# Patient Record
Sex: FEMALE | Race: BLACK OR AFRICAN AMERICAN | NOT HISPANIC OR LATINO | Employment: OTHER | RURAL
[De-identification: names, ages, dates, MRNs, and addresses within clinical notes are randomized per-mention and may not be internally consistent; named-entity substitution may affect disease eponyms.]

---

## 2015-10-27 LAB — CRC RECOMMENDATION EXT: NORMAL

## 2020-01-31 ENCOUNTER — HISTORICAL (OUTPATIENT)
Dept: ADMINISTRATIVE | Facility: HOSPITAL | Age: 60
End: 2020-01-31

## 2020-02-17 LAB
LAB AP CLINICAL INFORMATION: NORMAL
LAB AP DIAGNOSIS - HISTORICAL: NORMAL
LAB AP GROSS PATHOLOGY - HISTORICAL: NORMAL
LAB AP SPECIMEN SUBMITTED - HISTORICAL: NORMAL

## 2020-08-06 ENCOUNTER — HISTORICAL (OUTPATIENT)
Dept: ADMINISTRATIVE | Facility: HOSPITAL | Age: 60
End: 2020-08-06

## 2020-09-29 ENCOUNTER — HISTORICAL (OUTPATIENT)
Dept: ADMINISTRATIVE | Facility: HOSPITAL | Age: 60
End: 2020-09-29

## 2020-09-29 LAB
ANION GAP SERPL CALCULATED.3IONS-SCNC: 12 MMOL/L
APTT PPP: 27.6 SECONDS (ref 25.2–37.3)
BASOPHILS # BLD AUTO: 0.04 X10E3/UL (ref 0–0.2)
BASOPHILS NFR BLD AUTO: 0.5 % (ref 0–1)
BUN SERPL-MCNC: 13 MG/DL (ref 7–18)
CALCIUM SERPL-MCNC: 9.2 MG/DL (ref 8.5–10.1)
CHLORIDE SERPL-SCNC: 106 MMOL/L (ref 98–107)
CK MB SERPL-MCNC: <1 NG/ML (ref 1–3.6)
CK SERPL-CCNC: 88 U/L (ref 26–192)
CO2 SERPL-SCNC: 27 MMOL/L (ref 21–32)
CREAT SERPL-MCNC: 1.04 MG/DL (ref 0.55–1.02)
EOSINOPHIL # BLD AUTO: 0.4 X10E3/UL (ref 0–0.5)
EOSINOPHIL NFR BLD AUTO: 4.6 % (ref 1–4)
ERYTHROCYTE [DISTWIDTH] IN BLOOD BY AUTOMATED COUNT: 16.6 % (ref 11.5–14.5)
GLUCOSE SERPL-MCNC: 110 MG/DL (ref 70–105)
GLUCOSE SERPL-MCNC: 93 MG/DL (ref 74–106)
HCT VFR BLD AUTO: 42.5 % (ref 38–47)
HGB BLD-MCNC: 13.1 G/DL (ref 12–16)
IMM GRANULOCYTES # BLD AUTO: 0.06 X10E3/UL (ref 0–0.04)
IMM GRANULOCYTES NFR BLD: 0.7 % (ref 0–0.4)
INR BLD: 1.05 (ref 0–3.3)
LYMPHOCYTES # BLD AUTO: 1.99 X10E3/UL (ref 1–4.8)
LYMPHOCYTES NFR BLD AUTO: 23.1 % (ref 27–41)
MAGNESIUM SERPL-MCNC: 2.1 MG/DL (ref 1.7–2.3)
MCH RBC QN AUTO: 25.8 PG (ref 27–31)
MCHC RBC AUTO-ENTMCNC: 30.8 G/DL (ref 32–36)
MCV RBC AUTO: 83.7 FL (ref 80–96)
MONOCYTES # BLD AUTO: 0.47 X10E3/UL (ref 0–0.8)
MONOCYTES NFR BLD AUTO: 5.5 % (ref 2–6)
MPC BLD CALC-MCNC: 11.3 FL (ref 9.4–12.4)
MYOGLOBIN SERPL-MCNC: 46 NG/ML (ref 13–71)
NEUTROPHILS # BLD AUTO: 5.66 X10E3/UL (ref 1.8–7.7)
NEUTROPHILS NFR BLD AUTO: 65.6 % (ref 53–65)
NRBC # BLD AUTO: 0 X10E3/UL (ref 0–0)
NRBC, AUTO (.00): 0 /100 (ref 0–0)
NT-PROBNP SERPL-MCNC: 199 PG/ML (ref 1–125)
PLATELET # BLD AUTO: 264 X10E3/UL (ref 150–400)
POTASSIUM SERPL-SCNC: 3.7 MMOL/L (ref 3.5–5.1)
PROTHROMBIN TIME: 13.2 SECONDS (ref 11.7–14.7)
RBC # BLD AUTO: 5.08 X10E6/UL (ref 4.2–5.4)
SODIUM SERPL-SCNC: 141 MMOL/L (ref 136–145)
TROPONIN I SERPL-MCNC: <0.017 NG/ML (ref 0–0.06)
TSH SERPL DL<=0.005 MIU/L-ACNC: 2.27 UIU/ML (ref 0.36–3.74)
WBC # BLD AUTO: 8.62 X10E3/UL (ref 4.5–11)

## 2020-09-30 ENCOUNTER — HISTORICAL (OUTPATIENT)
Dept: ADMINISTRATIVE | Facility: HOSPITAL | Age: 60
End: 2020-09-30

## 2020-09-30 LAB
ANION GAP SERPL CALCULATED.3IONS-SCNC: 12 MMOL/L
BASOPHILS # BLD AUTO: 0.04 X10E3/UL (ref 0–0.2)
BASOPHILS NFR BLD AUTO: 0.4 % (ref 0–1)
BUN SERPL-MCNC: 11 MG/DL (ref 7–18)
CALCIUM SERPL-MCNC: 9.8 MG/DL (ref 8.5–10.1)
CHLORIDE SERPL-SCNC: 103 MMOL/L (ref 98–107)
CO2 SERPL-SCNC: 30 MMOL/L (ref 21–32)
CREAT SERPL-MCNC: 0.97 MG/DL (ref 0.55–1.02)
EOSINOPHIL # BLD AUTO: 0.38 X10E3/UL (ref 0–0.5)
EOSINOPHIL NFR BLD AUTO: 3.7 % (ref 1–4)
ERYTHROCYTE [DISTWIDTH] IN BLOOD BY AUTOMATED COUNT: 16.6 % (ref 11.5–14.5)
GLUCOSE SERPL-MCNC: 103 MG/DL (ref 70–105)
GLUCOSE SERPL-MCNC: 117 MG/DL (ref 70–105)
GLUCOSE SERPL-MCNC: 79 MG/DL (ref 70–105)
GLUCOSE SERPL-MCNC: 94 MG/DL (ref 74–106)
GLUCOSE SERPL-MCNC: 97 MG/DL (ref 70–105)
HCT VFR BLD AUTO: 46.4 % (ref 38–47)
HGB BLD-MCNC: 14.3 G/DL (ref 12–16)
IMM GRANULOCYTES # BLD AUTO: 0.09 X10E3/UL (ref 0–0.04)
IMM GRANULOCYTES NFR BLD: 0.9 % (ref 0–0.4)
LYMPHOCYTES # BLD AUTO: 2.64 X10E3/UL (ref 1–4.8)
LYMPHOCYTES NFR BLD AUTO: 26 % (ref 27–41)
MCH RBC QN AUTO: 26.1 PG (ref 27–31)
MCHC RBC AUTO-ENTMCNC: 30.8 G/DL (ref 32–36)
MCV RBC AUTO: 84.7 FL (ref 80–96)
MONOCYTES # BLD AUTO: 0.46 X10E3/UL (ref 0–0.8)
MONOCYTES NFR BLD AUTO: 4.5 % (ref 2–6)
MPC BLD CALC-MCNC: 11.2 FL (ref 9.4–12.4)
NEUTROPHILS # BLD AUTO: 6.56 X10E3/UL (ref 1.8–7.7)
NEUTROPHILS NFR BLD AUTO: 64.5 % (ref 53–65)
NRBC # BLD AUTO: 0 X10E3/UL (ref 0–0)
NRBC, AUTO (.00): 0 /100 (ref 0–0)
PLATELET # BLD AUTO: 284 X10E3/UL (ref 150–400)
POTASSIUM SERPL-SCNC: 3.6 MMOL/L (ref 3.5–5.1)
RBC # BLD AUTO: 5.48 X10E6/UL (ref 4.2–5.4)
SARS-COV-2 RNA AMPLIFICATION, QUAL: NEGATIVE
SODIUM SERPL-SCNC: 141 MMOL/L (ref 136–145)
TROPONIN I SERPL-MCNC: <0.017 NG/ML (ref 0–0.06)
WBC # BLD AUTO: 10.17 X10E3/UL (ref 4.5–11)

## 2020-10-01 ENCOUNTER — HISTORICAL (OUTPATIENT)
Dept: ADMINISTRATIVE | Facility: HOSPITAL | Age: 60
End: 2020-10-01

## 2020-10-01 LAB — GLUCOSE SERPL-MCNC: 101 MG/DL (ref 70–105)

## 2020-11-19 ENCOUNTER — HISTORICAL (OUTPATIENT)
Dept: ADMINISTRATIVE | Facility: HOSPITAL | Age: 60
End: 2020-11-19

## 2020-11-20 ENCOUNTER — HISTORICAL (OUTPATIENT)
Dept: ADMINISTRATIVE | Facility: HOSPITAL | Age: 60
End: 2020-11-20

## 2020-11-20 LAB
ABO: NORMAL
ALBUMIN SERPL BCP-MCNC: 3.1 G/DL (ref 3.5–5)
ALBUMIN SERPL BCP-MCNC: 3.3 G/DL (ref 3.5–5)
ALBUMIN/GLOB SERPL: 0.8 {RATIO}
ALBUMIN/GLOB SERPL: 0.8 {RATIO}
ALP SERPL-CCNC: 64 U/L (ref 50–130)
ALP SERPL-CCNC: 70 U/L (ref 50–130)
ALT SERPL W P-5'-P-CCNC: 16 U/L (ref 13–56)
ALT SERPL W P-5'-P-CCNC: 16 U/L (ref 13–56)
ANION GAP SERPL CALCULATED.3IONS-SCNC: 11 MMOL/L
ANION GAP SERPL CALCULATED.3IONS-SCNC: 13 MMOL/L
ANTIBODY IDENTIFICATION: NORMAL
ANTIBODY SCREEN: NORMAL
AST SERPL W P-5'-P-CCNC: 25 U/L (ref 15–37)
AST SERPL W P-5'-P-CCNC: 30 U/L (ref 15–37)
BASOPHILS # BLD AUTO: 0.02 X10E3/UL (ref 0–0.2)
BASOPHILS NFR BLD AUTO: 0.4 % (ref 0–1)
BILIRUB SERPL-MCNC: 0.4 MG/DL (ref 0–1.2)
BILIRUB SERPL-MCNC: 0.5 MG/DL (ref 0–1.2)
BUN SERPL-MCNC: 7 MG/DL (ref 7–18)
BUN SERPL-MCNC: 9 MG/DL (ref 7–18)
BUN/CREAT SERPL: 7.4
BUN/CREAT SERPL: 8
CALCIUM SERPL-MCNC: 8.2 MG/DL (ref 8.5–10.1)
CALCIUM SERPL-MCNC: 8.9 MG/DL (ref 8.5–10.1)
CHLORIDE SERPL-SCNC: 102 MMOL/L (ref 98–107)
CHLORIDE SERPL-SCNC: 104 MMOL/L (ref 98–107)
CO2 SERPL-SCNC: 26 MMOL/L (ref 21–32)
CO2 SERPL-SCNC: 27 MMOL/L (ref 21–32)
CREAT SERPL-MCNC: 0.87 MG/DL (ref 0.55–1.02)
CREAT SERPL-MCNC: 1.21 MG/DL (ref 0.55–1.02)
D DIMER PPP FEU-MCNC: 1.26 UG/ML (ref 0–0.47)
EOSINOPHIL # BLD AUTO: 0.04 X10E3/UL (ref 0–0.5)
EOSINOPHIL NFR BLD AUTO: 0.7 % (ref 1–4)
ERYTHROCYTE [DISTWIDTH] IN BLOOD BY AUTOMATED COUNT: 16.1 % (ref 11.5–14.5)
FLUAV AG UPPER RESP QL IA.RAPID: NEGATIVE
FLUBV AG UPPER RESP QL IA.RAPID: NEGATIVE
GLOBULIN SER-MCNC: 4 G/DL (ref 2–4)
GLOBULIN SER-MCNC: 4.1 G/DL (ref 2–4)
GLUCOSE SERPL-MCNC: 104 MG/DL (ref 74–106)
GLUCOSE SERPL-MCNC: 114 MG/DL (ref 70–105)
GLUCOSE SERPL-MCNC: 86 MG/DL (ref 70–105)
GLUCOSE SERPL-MCNC: 89 MG/DL (ref 70–105)
GLUCOSE SERPL-MCNC: 96 MG/DL (ref 74–106)
HCT VFR BLD AUTO: 43.5 % (ref 38–47)
HGB BLD-MCNC: 14.1 G/DL (ref 12–16)
IMM GRANULOCYTES # BLD AUTO: 0.02 X10E3/UL (ref 0–0.04)
IMM GRANULOCYTES NFR BLD: 0.4 % (ref 0–0.4)
LYMPHOCYTES # BLD AUTO: 1.35 X10E3/UL (ref 1–4.8)
LYMPHOCYTES NFR BLD AUTO: 25.2 % (ref 27–41)
MAGNESIUM SERPL-MCNC: 1.6 MG/DL (ref 1.7–2.3)
MCH RBC QN AUTO: 25.7 PG (ref 27–31)
MCHC RBC AUTO-ENTMCNC: 32.4 G/DL (ref 32–36)
MCV RBC AUTO: 79.2 FL (ref 80–96)
MONOCYTES # BLD AUTO: 0.34 X10E3/UL (ref 0–0.8)
MONOCYTES NFR BLD AUTO: 6.3 % (ref 2–6)
MPC BLD CALC-MCNC: 11.7 FL (ref 9.4–12.4)
NEUTROPHILS # BLD AUTO: 3.59 X10E3/UL (ref 1.8–7.7)
NEUTROPHILS NFR BLD AUTO: 67 % (ref 53–65)
NT-PROBNP SERPL-MCNC: 222 PG/ML (ref 1–125)
PLATELET # BLD AUTO: 193 X10E3/UL (ref 150–400)
POTASSIUM SERPL-SCNC: 3.3 MMOL/L (ref 3.5–5.1)
POTASSIUM SERPL-SCNC: 3.4 MMOL/L (ref 3.5–5.1)
PROT SERPL-MCNC: 7.2 G/DL (ref 6.4–8.2)
PROT SERPL-MCNC: 7.3 G/DL (ref 6.4–8.2)
RAPID GROUP A STREP: NEGATIVE
RBC # BLD AUTO: 5.49 X10E6/UL (ref 4.2–5.4)
RH TYPE: NORMAL
SARS-COV+SARS-COV-2 AG RESP QL IA.RAPID: POSITIVE
SODIUM SERPL-SCNC: 138 MMOL/L (ref 136–145)
SODIUM SERPL-SCNC: 139 MMOL/L (ref 136–145)
TROPONIN I SERPL-MCNC: <0.017 NG/ML (ref 0–0.06)
TROPONIN I SERPL-MCNC: <0.017 NG/ML (ref 0–0.06)
WBC # BLD AUTO: 5.36 X10E3/UL (ref 4.5–11)

## 2020-11-21 ENCOUNTER — HISTORICAL (OUTPATIENT)
Dept: ADMINISTRATIVE | Facility: HOSPITAL | Age: 60
End: 2020-11-21

## 2020-11-21 LAB
ALBUMIN SERPL BCP-MCNC: 2.8 G/DL (ref 3.5–5)
ALBUMIN/GLOB SERPL: 0.7 {RATIO}
ALP SERPL-CCNC: 62 U/L (ref 50–130)
ALT SERPL W P-5'-P-CCNC: 18 U/L (ref 13–56)
ANION GAP SERPL CALCULATED.3IONS-SCNC: 12 MMOL/L
AST SERPL W P-5'-P-CCNC: 35 U/L (ref 15–37)
BILIRUB SERPL-MCNC: 0.4 MG/DL (ref 0–1.2)
BUN SERPL-MCNC: 8 MG/DL (ref 7–18)
BUN/CREAT SERPL: 10
CALCIUM SERPL-MCNC: 8.3 MG/DL (ref 8.5–10.1)
CHLORIDE SERPL-SCNC: 103 MMOL/L (ref 98–107)
CO2 SERPL-SCNC: 26 MMOL/L (ref 21–32)
CREAT SERPL-MCNC: 0.8 MG/DL (ref 0.55–1.02)
GLOBULIN SER-MCNC: 4.1 G/DL (ref 2–4)
GLUCOSE SERPL-MCNC: 103 MG/DL (ref 70–105)
GLUCOSE SERPL-MCNC: 110 MG/DL (ref 70–105)
GLUCOSE SERPL-MCNC: 87 MG/DL (ref 74–106)
GLUCOSE SERPL-MCNC: 95 MG/DL (ref 70–105)
GLUCOSE SERPL-MCNC: 99 MG/DL (ref 70–105)
POTASSIUM SERPL-SCNC: 3.5 MMOL/L (ref 3.5–5.1)
PROCALCITONIN SERPL-MCNC: <0.1 NG/ML
PROT SERPL-MCNC: 6.9 G/DL (ref 6.4–8.2)
SODIUM SERPL-SCNC: 137 MMOL/L (ref 136–145)

## 2020-11-22 ENCOUNTER — HISTORICAL (OUTPATIENT)
Dept: ADMINISTRATIVE | Facility: HOSPITAL | Age: 60
End: 2020-11-22

## 2020-11-22 LAB
ALBUMIN SERPL BCP-MCNC: 2.6 G/DL (ref 3.5–5)
ALBUMIN/GLOB SERPL: 0.7 {RATIO}
ALP SERPL-CCNC: 68 U/L (ref 50–130)
ALT SERPL W P-5'-P-CCNC: 17 U/L (ref 13–56)
ANION GAP SERPL CALCULATED.3IONS-SCNC: 12 MMOL/L
AST SERPL W P-5'-P-CCNC: 31 U/L (ref 15–37)
BILIRUB SERPL-MCNC: 0.3 MG/DL (ref 0–1.2)
BUN SERPL-MCNC: 8 MG/DL (ref 7–18)
BUN/CREAT SERPL: 10.5
CALCIUM SERPL-MCNC: 8 MG/DL (ref 8.5–10.1)
CHLORIDE SERPL-SCNC: 106 MMOL/L (ref 98–107)
CO2 SERPL-SCNC: 27 MMOL/L (ref 21–32)
CREAT SERPL-MCNC: 0.76 MG/DL (ref 0.55–1.02)
GLOBULIN SER-MCNC: 3.9 G/DL (ref 2–4)
GLUCOSE SERPL-MCNC: 105 MG/DL (ref 70–105)
GLUCOSE SERPL-MCNC: 106 MG/DL (ref 74–106)
GLUCOSE SERPL-MCNC: 107 MG/DL (ref 70–105)
GLUCOSE SERPL-MCNC: 118 MG/DL (ref 70–105)
GLUCOSE SERPL-MCNC: 96 MG/DL (ref 70–105)
POTASSIUM SERPL-SCNC: 3.6 MMOL/L (ref 3.5–5.1)
PROT SERPL-MCNC: 6.5 G/DL (ref 6.4–8.2)
REPORT: NORMAL
SODIUM SERPL-SCNC: 141 MMOL/L (ref 136–145)

## 2020-11-23 ENCOUNTER — HISTORICAL (OUTPATIENT)
Dept: ADMINISTRATIVE | Facility: HOSPITAL | Age: 60
End: 2020-11-23

## 2020-11-23 LAB
ALBUMIN SERPL BCP-MCNC: 2.5 G/DL (ref 3.5–5)
ALBUMIN/GLOB SERPL: 0.7 {RATIO}
ALP SERPL-CCNC: 60 U/L (ref 50–130)
ALT SERPL W P-5'-P-CCNC: 16 U/L (ref 13–56)
ANION GAP SERPL CALCULATED.3IONS-SCNC: 11 MMOL/L
AST SERPL W P-5'-P-CCNC: 25 U/L (ref 15–37)
BILIRUB SERPL-MCNC: 0.2 MG/DL (ref 0–1.2)
BUN SERPL-MCNC: 9 MG/DL (ref 7–18)
BUN/CREAT SERPL: 11
CALCIUM SERPL-MCNC: 8.2 MG/DL (ref 8.5–10.1)
CHLORIDE SERPL-SCNC: 106 MMOL/L (ref 98–107)
CO2 SERPL-SCNC: 26 MMOL/L (ref 21–32)
CREAT SERPL-MCNC: 0.82 MG/DL (ref 0.55–1.02)
D DIMER PPP FEU-MCNC: 1.18 UG/ML (ref 0–0.47)
GLOBULIN SER-MCNC: 3.8 G/DL (ref 2–4)
GLUCOSE SERPL-MCNC: 107 MG/DL (ref 70–105)
GLUCOSE SERPL-MCNC: 129 MG/DL (ref 70–105)
GLUCOSE SERPL-MCNC: 153 MG/DL (ref 74–106)
GLUCOSE SERPL-MCNC: 93 MG/DL (ref 70–105)
GLUCOSE SERPL-MCNC: 99 MG/DL (ref 70–105)
POTASSIUM SERPL-SCNC: 3.1 MMOL/L (ref 3.5–5.1)
PROT SERPL-MCNC: 6.3 G/DL (ref 6.4–8.2)
SODIUM SERPL-SCNC: 140 MMOL/L (ref 136–145)

## 2020-11-24 ENCOUNTER — HISTORICAL (OUTPATIENT)
Dept: ADMINISTRATIVE | Facility: HOSPITAL | Age: 60
End: 2020-11-24

## 2020-11-24 LAB
ALBUMIN SERPL BCP-MCNC: 2.5 G/DL (ref 3.5–5)
ALBUMIN/GLOB SERPL: 0.6 {RATIO}
ALP SERPL-CCNC: 64 U/L (ref 50–130)
ALT SERPL W P-5'-P-CCNC: 16 U/L (ref 13–56)
ANION GAP SERPL CALCULATED.3IONS-SCNC: 9 MMOL/L
AST SERPL W P-5'-P-CCNC: 24 U/L (ref 15–37)
BILIRUB SERPL-MCNC: 0.2 MG/DL (ref 0–1.2)
BUN SERPL-MCNC: 8 MG/DL (ref 7–18)
BUN/CREAT SERPL: 11.1
CALCIUM SERPL-MCNC: 8.3 MG/DL (ref 8.5–10.1)
CHLORIDE SERPL-SCNC: 108 MMOL/L (ref 98–107)
CO2 SERPL-SCNC: 28 MMOL/L (ref 21–32)
CREAT SERPL-MCNC: 0.72 MG/DL (ref 0.55–1.02)
GLOBULIN SER-MCNC: 3.9 G/DL (ref 2–4)
GLUCOSE SERPL-MCNC: 103 MG/DL (ref 70–105)
GLUCOSE SERPL-MCNC: 107 MG/DL (ref 74–106)
GLUCOSE SERPL-MCNC: 138 MG/DL (ref 70–105)
GLUCOSE SERPL-MCNC: 95 MG/DL (ref 70–105)
GLUCOSE SERPL-MCNC: 97 MG/DL (ref 70–105)
POTASSIUM SERPL-SCNC: 3.7 MMOL/L (ref 3.5–5.1)
PROT SERPL-MCNC: 6.4 G/DL (ref 6.4–8.2)
SODIUM SERPL-SCNC: 141 MMOL/L (ref 136–145)

## 2020-11-25 ENCOUNTER — HISTORICAL (OUTPATIENT)
Dept: ADMINISTRATIVE | Facility: HOSPITAL | Age: 60
End: 2020-11-25

## 2020-11-25 LAB
GLUCOSE SERPL-MCNC: 102 MG/DL (ref 70–105)
GLUCOSE SERPL-MCNC: 114 MG/DL (ref 70–105)
GLUCOSE SERPL-MCNC: 134 MG/DL (ref 70–105)
GLUCOSE SERPL-MCNC: 95 MG/DL (ref 70–105)

## 2020-11-26 ENCOUNTER — HISTORICAL (OUTPATIENT)
Dept: ADMINISTRATIVE | Facility: HOSPITAL | Age: 60
End: 2020-11-26

## 2020-11-26 LAB
ANION GAP SERPL CALCULATED.3IONS-SCNC: 10 MMOL/L
ANISOCYTOSIS BLD QL SMEAR: ABNORMAL
BASOPHILS # BLD AUTO: 0.04 X10E3/UL (ref 0–0.2)
BASOPHILS NFR BLD AUTO: 0.8 % (ref 0–1)
BUN SERPL-MCNC: 11 MG/DL (ref 7–18)
CALCIUM SERPL-MCNC: 8.5 MG/DL (ref 8.5–10.1)
CHLORIDE SERPL-SCNC: 106 MMOL/L (ref 98–107)
CHOLEST SERPL-MCNC: 98 MG/DL
CHOLEST/HDLC SERPL: 2.1 {RATIO}
CO2 SERPL-SCNC: 28 MMOL/L (ref 21–32)
CREAT SERPL-MCNC: 0.75 MG/DL (ref 0.55–1.02)
CRP SERPL-MCNC: 3.1 UG/ML (ref 0–0.8)
CRYSTALS FLD MICRO: ABNORMAL
D DIMER PPP FEU-MCNC: 0.87 UG/ML (ref 0–0.47)
EOSINOPHIL # BLD AUTO: 0.31 X10E3/UL (ref 0–0.5)
EOSINOPHIL NFR BLD AUTO: 5.9 % (ref 1–4)
EOSINOPHIL NFR BLD MANUAL: 4 % (ref 1–4)
ERYTHROCYTE [DISTWIDTH] IN BLOOD BY AUTOMATED COUNT: 15.4 % (ref 11.5–14.5)
GLUCOSE SERPL-MCNC: 102 MG/DL (ref 70–105)
GLUCOSE SERPL-MCNC: 104 MG/DL (ref 74–106)
GLUCOSE SERPL-MCNC: 77 MG/DL (ref 70–105)
GLUCOSE SERPL-MCNC: 94 MG/DL (ref 70–105)
GLUCOSE SERPL-MCNC: 99 MG/DL (ref 70–105)
HCT VFR BLD AUTO: 39.5 % (ref 38–47)
HDLC SERPL-MCNC: 46 MG/DL
HGB BLD-MCNC: 12.3 G/DL (ref 12–16)
IMM GRANULOCYTES # BLD AUTO: 0.03 X10E3/UL (ref 0–0.04)
IMM GRANULOCYTES NFR BLD: 0.6 % (ref 0–0.4)
LDLC SERPL CALC-MCNC: 42 MG/DL
LYMPHOCYTES # BLD AUTO: 2.33 X10E3/UL (ref 1–4.8)
LYMPHOCYTES NFR BLD AUTO: 44.3 % (ref 27–41)
LYMPHOCYTES NFR BLD MANUAL: 49 % (ref 27–41)
MCH RBC QN AUTO: 26 PG (ref 27–31)
MCHC RBC AUTO-ENTMCNC: 31.1 G/DL (ref 32–36)
MCV RBC AUTO: 83.5 FL (ref 80–96)
MONOCYTES # BLD AUTO: 0.43 X10E3/UL (ref 0–0.8)
MONOCYTES NFR BLD AUTO: 8.2 % (ref 2–6)
MONOCYTES NFR BLD MANUAL: 6 % (ref 2–6)
MPC BLD CALC-MCNC: 11 FL (ref 9.4–12.4)
NEUTROPHILS # BLD AUTO: 2.12 X10E3/UL (ref 1.8–7.7)
NEUTROPHILS NFR BLD AUTO: 40.2 % (ref 53–65)
NEUTS BAND NFR BLD MANUAL: 2 % (ref 1–5)
NEUTS SEG NFR BLD MANUAL: 39 % (ref 50–62)
NRBC # BLD AUTO: 0 X10E3/UL (ref 0–0)
NRBC, AUTO (.00): 0 /100 (ref 0–0)
PLATELET # BLD AUTO: 277 X10E3/UL (ref 150–400)
PLATELET MORPHOLOGY: ABNORMAL
POTASSIUM SERPL-SCNC: 3.9 MMOL/L (ref 3.5–5.1)
RBC # BLD AUTO: 4.73 X10E6/UL (ref 4.2–5.4)
REPORT: NORMAL
SODIUM SERPL-SCNC: 140 MMOL/L (ref 136–145)
T4 SERPL-MCNC: 16.4 UG/DL (ref 4.8–13.9)
TRIGL SERPL-MCNC: 48 MG/DL
TSH SERPL DL<=0.005 MIU/L-ACNC: 4.99 UIU/ML (ref 0.36–3.74)
WBC # BLD AUTO: 5.26 X10E3/UL (ref 4.5–11)

## 2020-11-27 ENCOUNTER — HISTORICAL (OUTPATIENT)
Dept: ADMINISTRATIVE | Facility: HOSPITAL | Age: 60
End: 2020-11-27

## 2020-11-27 LAB
GLUCOSE SERPL-MCNC: 123 MG/DL (ref 70–105)
GLUCOSE SERPL-MCNC: 81 MG/DL (ref 70–105)

## 2021-04-07 ENCOUNTER — OFFICE VISIT (OUTPATIENT)
Dept: VASCULAR SURGERY | Facility: CLINIC | Age: 61
End: 2021-04-07
Payer: MEDICARE

## 2021-04-07 VITALS
SYSTOLIC BLOOD PRESSURE: 126 MMHG | DIASTOLIC BLOOD PRESSURE: 80 MMHG | RESPIRATION RATE: 18 BRPM | WEIGHT: 293 LBS | BODY MASS INDEX: 44.41 KG/M2 | HEART RATE: 84 BPM | HEIGHT: 68 IN

## 2021-04-07 DIAGNOSIS — M79.604 LEG PAIN, BILATERAL: ICD-10-CM

## 2021-04-07 DIAGNOSIS — R23.8 OTHER SKIN CHANGES: ICD-10-CM

## 2021-04-07 DIAGNOSIS — I83.813 VARICOSE VEINS OF BILATERAL LOWER EXTREMITIES WITH PAIN: ICD-10-CM

## 2021-04-07 DIAGNOSIS — R60.0 EDEMA, LOWER EXTREMITY: ICD-10-CM

## 2021-04-07 DIAGNOSIS — M79.605 LEG PAIN, BILATERAL: ICD-10-CM

## 2021-04-07 DIAGNOSIS — I87.2 VENOUS INSUFFICIENCY: Primary | ICD-10-CM

## 2021-04-07 DIAGNOSIS — I83.92 ASYMPTOMATIC RUPTURED VARICOSE VEIN OF LEFT LOWER EXTREMITY: ICD-10-CM

## 2021-04-07 PROCEDURE — 99213 OFFICE O/P EST LOW 20 MIN: CPT | Mod: S$PBB,,, | Performed by: FAMILY MEDICINE

## 2021-04-07 PROCEDURE — 99215 OFFICE O/P EST HI 40 MIN: CPT | Mod: PBBFAC | Performed by: FAMILY MEDICINE

## 2021-04-07 PROCEDURE — 99999 PR PBB SHADOW E&M-EST. PATIENT-LVL V: ICD-10-PCS | Mod: PBBFAC,,, | Performed by: FAMILY MEDICINE

## 2021-04-07 PROCEDURE — 99999 PR PBB SHADOW E&M-EST. PATIENT-LVL V: CPT | Mod: PBBFAC,,, | Performed by: FAMILY MEDICINE

## 2021-04-07 PROCEDURE — 99213 PR OFFICE/OUTPT VISIT, EST, LEVL III, 20-29 MIN: ICD-10-PCS | Mod: S$PBB,,, | Performed by: FAMILY MEDICINE

## 2021-04-07 RX ORDER — PEN NEEDLE, DIABETIC 32 GX 1/4"
NEEDLE, DISPOSABLE MISCELLANEOUS
COMMUNITY
Start: 2021-03-23 | End: 2021-10-29 | Stop reason: SDUPTHER

## 2021-04-07 RX ORDER — CILOSTAZOL 100 MG/1
TABLET ORAL
COMMUNITY
Start: 2021-03-24 | End: 2021-04-07 | Stop reason: SDUPTHER

## 2021-04-07 RX ORDER — INSULIN GLARGINE 300 U/ML
INJECTION, SOLUTION SUBCUTANEOUS
COMMUNITY
Start: 2021-03-23 | End: 2022-01-20 | Stop reason: SDUPTHER

## 2021-05-05 ENCOUNTER — OFFICE VISIT (OUTPATIENT)
Dept: PAIN MEDICINE | Facility: CLINIC | Age: 61
End: 2021-05-05
Payer: MEDICARE

## 2021-05-05 VITALS
DIASTOLIC BLOOD PRESSURE: 72 MMHG | BODY MASS INDEX: 44.41 KG/M2 | HEIGHT: 68 IN | WEIGHT: 293 LBS | RESPIRATION RATE: 19 BRPM | SYSTOLIC BLOOD PRESSURE: 173 MMHG | HEART RATE: 70 BPM

## 2021-05-05 DIAGNOSIS — G89.4 CHRONIC PAIN SYNDROME: Chronic | ICD-10-CM

## 2021-05-05 DIAGNOSIS — M79.672 FOOT PAIN, LEFT: Chronic | ICD-10-CM

## 2021-05-05 DIAGNOSIS — G89.29 CHRONIC PAIN OF RIGHT KNEE: Chronic | ICD-10-CM

## 2021-05-05 DIAGNOSIS — I73.9 PVD (PERIPHERAL VASCULAR DISEASE): Chronic | ICD-10-CM

## 2021-05-05 DIAGNOSIS — M47.817 LUMBOSACRAL SPONDYLOSIS WITHOUT MYELOPATHY: Primary | Chronic | ICD-10-CM

## 2021-05-05 DIAGNOSIS — M25.561 CHRONIC PAIN OF RIGHT KNEE: Chronic | ICD-10-CM

## 2021-05-05 PROCEDURE — 1125F PR PAIN SEVERITY QUANTIFIED, PAIN PRESENT: ICD-10-PCS | Mod: ,,, | Performed by: PHYSICIAN ASSISTANT

## 2021-05-05 PROCEDURE — 99214 PR OFFICE/OUTPT VISIT, EST, LEVL IV, 30-39 MIN: ICD-10-PCS | Mod: S$PBB,,, | Performed by: PHYSICIAN ASSISTANT

## 2021-05-05 PROCEDURE — 3008F BODY MASS INDEX DOCD: CPT | Mod: CPTII,,, | Performed by: PHYSICIAN ASSISTANT

## 2021-05-05 PROCEDURE — 1125F AMNT PAIN NOTED PAIN PRSNT: CPT | Mod: ,,, | Performed by: PHYSICIAN ASSISTANT

## 2021-05-05 PROCEDURE — 99215 HC OFFICE/OUTPT VISIT, EST, LEVL V, 40-54 MIN: ICD-10-PCS | Mod: PBBFAC,,, | Performed by: PHYSICIAN ASSISTANT

## 2021-05-05 PROCEDURE — 3008F PR BODY MASS INDEX (BMI) DOCUMENTED: ICD-10-PCS | Mod: CPTII,,, | Performed by: PHYSICIAN ASSISTANT

## 2021-05-05 PROCEDURE — 99214 OFFICE O/P EST MOD 30 MIN: CPT | Mod: S$PBB,,, | Performed by: PHYSICIAN ASSISTANT

## 2021-05-05 PROCEDURE — 99215 OFFICE O/P EST HI 40 MIN: CPT | Mod: PBBFAC,,, | Performed by: PHYSICIAN ASSISTANT

## 2021-05-05 PROCEDURE — 99215 OFFICE O/P EST HI 40 MIN: CPT | Mod: PBBFAC | Performed by: PHYSICIAN ASSISTANT

## 2021-05-05 RX ORDER — HYDROCODONE BITARTRATE AND ACETAMINOPHEN 10; 325 MG/1; MG/1
1 TABLET ORAL EVERY 6 HOURS
Qty: 120 TABLET | Refills: 0 | Status: SHIPPED | OUTPATIENT
Start: 2021-05-05 | End: 2021-06-04

## 2021-05-05 RX ORDER — PREGABALIN 100 MG/1
100 CAPSULE ORAL EVERY 8 HOURS
Qty: 90 CAPSULE | Refills: 0 | Status: SHIPPED | OUTPATIENT
Start: 2021-05-05 | End: 2021-06-07 | Stop reason: SDUPTHER

## 2021-05-13 ENCOUNTER — HISTORICAL (OUTPATIENT)
Dept: ADMINISTRATIVE | Facility: HOSPITAL | Age: 61
End: 2021-05-13

## 2021-06-03 DIAGNOSIS — I87.2 VENOUS INSUFFICIENCY (CHRONIC) (PERIPHERAL): Primary | ICD-10-CM

## 2021-06-07 ENCOUNTER — OFFICE VISIT (OUTPATIENT)
Dept: PAIN MEDICINE | Facility: CLINIC | Age: 61
End: 2021-06-07
Payer: MEDICARE

## 2021-06-07 VITALS
HEART RATE: 68 BPM | SYSTOLIC BLOOD PRESSURE: 176 MMHG | BODY MASS INDEX: 45.99 KG/M2 | RESPIRATION RATE: 16 BRPM | HEIGHT: 67 IN | WEIGHT: 293 LBS | DIASTOLIC BLOOD PRESSURE: 66 MMHG

## 2021-06-07 DIAGNOSIS — Z79.899 ENCOUNTER FOR LONG-TERM (CURRENT) USE OF OTHER MEDICATIONS: ICD-10-CM

## 2021-06-07 DIAGNOSIS — M25.561 CHRONIC PAIN OF RIGHT KNEE: Chronic | ICD-10-CM

## 2021-06-07 DIAGNOSIS — G89.29 CHRONIC PAIN OF RIGHT KNEE: Chronic | ICD-10-CM

## 2021-06-07 DIAGNOSIS — I73.9 PVD (PERIPHERAL VASCULAR DISEASE): Chronic | ICD-10-CM

## 2021-06-07 DIAGNOSIS — M47.817 LUMBOSACRAL SPONDYLOSIS WITHOUT MYELOPATHY: Primary | Chronic | ICD-10-CM

## 2021-06-07 LAB
CTP QC/QA: YES
POC (AMP) AMPHETAMINE: NEGATIVE
POC (BAR) BARBITURATES: NEGATIVE
POC (BUP) BUPRENORPHINE: NEGATIVE
POC (BZO) BENZODIAZEPINES: NEGATIVE
POC (COC) COCAINE: NEGATIVE
POC (MDMA) METHYLENEDIOXYMETHAMPHETAMINE 3,4: NEGATIVE
POC (MET) METHAMPHETAMINE: NEGATIVE
POC (MOP) OPIATES: ABNORMAL
POC (MTD) METHADONE: NEGATIVE
POC (OXY) OXYCODONE: NEGATIVE
POC (PCP) PHENCYCLIDINE: NEGATIVE
POC (TCA) TRICYCLIC ANTIDEPRESSANTS: NEGATIVE
POC TEMPERATURE (URINE): 94
POC THC: NEGATIVE

## 2021-06-07 PROCEDURE — 99215 OFFICE O/P EST HI 40 MIN: CPT | Mod: PBBFAC | Performed by: PHYSICIAN ASSISTANT

## 2021-06-07 PROCEDURE — 99214 PR OFFICE/OUTPT VISIT, EST, LEVL IV, 30-39 MIN: ICD-10-PCS | Mod: S$PBB,,, | Performed by: PHYSICIAN ASSISTANT

## 2021-06-07 PROCEDURE — 80305 DRUG TEST PRSMV DIR OPT OBS: CPT | Mod: PBBFAC | Performed by: PHYSICIAN ASSISTANT

## 2021-06-07 PROCEDURE — 1125F AMNT PAIN NOTED PAIN PRSNT: CPT | Mod: ,,, | Performed by: PHYSICIAN ASSISTANT

## 2021-06-07 PROCEDURE — 99214 OFFICE O/P EST MOD 30 MIN: CPT | Mod: S$PBB,,, | Performed by: PHYSICIAN ASSISTANT

## 2021-06-07 PROCEDURE — 3008F PR BODY MASS INDEX (BMI) DOCUMENTED: ICD-10-PCS | Mod: CPTII,,, | Performed by: PHYSICIAN ASSISTANT

## 2021-06-07 PROCEDURE — 1125F PR PAIN SEVERITY QUANTIFIED, PAIN PRESENT: ICD-10-PCS | Mod: ,,, | Performed by: PHYSICIAN ASSISTANT

## 2021-06-07 PROCEDURE — 3008F BODY MASS INDEX DOCD: CPT | Mod: CPTII,,, | Performed by: PHYSICIAN ASSISTANT

## 2021-06-07 RX ORDER — HYDROCODONE BITARTRATE AND ACETAMINOPHEN 10; 325 MG/1; MG/1
1 TABLET ORAL EVERY 6 HOURS
Qty: 120 TABLET | Refills: 0 | Status: SHIPPED | OUTPATIENT
Start: 2021-08-06 | End: 2021-09-03 | Stop reason: SDUPTHER

## 2021-06-07 RX ORDER — PREGABALIN 100 MG/1
100 CAPSULE ORAL EVERY 8 HOURS
Qty: 90 CAPSULE | Refills: 2 | Status: SHIPPED | OUTPATIENT
Start: 2021-06-07 | End: 2021-09-07 | Stop reason: SDUPTHER

## 2021-06-07 RX ORDER — HYDROCODONE BITARTRATE AND ACETAMINOPHEN 10; 325 MG/1; MG/1
1 TABLET ORAL EVERY 6 HOURS
Qty: 120 TABLET | Refills: 0 | Status: SHIPPED | OUTPATIENT
Start: 2021-07-07 | End: 2021-08-06

## 2021-06-07 RX ORDER — HYDROCODONE BITARTRATE AND ACETAMINOPHEN 10; 325 MG/1; MG/1
1 TABLET ORAL EVERY 6 HOURS
Qty: 120 TABLET | Refills: 0 | Status: SHIPPED | OUTPATIENT
Start: 2021-06-07 | End: 2021-07-07

## 2021-06-08 ENCOUNTER — OFFICE VISIT (OUTPATIENT)
Dept: PRIMARY CARE CLINIC | Facility: CLINIC | Age: 61
End: 2021-06-08
Payer: MEDICARE

## 2021-06-08 VITALS
TEMPERATURE: 97 F | RESPIRATION RATE: 22 BRPM | WEIGHT: 293 LBS | DIASTOLIC BLOOD PRESSURE: 78 MMHG | SYSTOLIC BLOOD PRESSURE: 140 MMHG | BODY MASS INDEX: 44.41 KG/M2 | HEIGHT: 68 IN | OXYGEN SATURATION: 94 % | HEART RATE: 80 BPM

## 2021-06-08 DIAGNOSIS — I10 ESSENTIAL HYPERTENSION: ICD-10-CM

## 2021-06-08 DIAGNOSIS — E11.9 TYPE 2 DIABETES MELLITUS WITHOUT COMPLICATION, WITH LONG-TERM CURRENT USE OF INSULIN: Primary | ICD-10-CM

## 2021-06-08 DIAGNOSIS — E13.51 PERIPHERAL VASCULAR DISEASE DUE TO SECONDARY DIABETES: ICD-10-CM

## 2021-06-08 DIAGNOSIS — M19.90 ARTHRITIS: ICD-10-CM

## 2021-06-08 DIAGNOSIS — J44.9 CHRONIC OBSTRUCTIVE PULMONARY DISEASE, UNSPECIFIED COPD TYPE: ICD-10-CM

## 2021-06-08 DIAGNOSIS — E03.9 HYPOTHYROIDISM, UNSPECIFIED TYPE: ICD-10-CM

## 2021-06-08 DIAGNOSIS — Z79.4 TYPE 2 DIABETES MELLITUS WITHOUT COMPLICATION, WITH LONG-TERM CURRENT USE OF INSULIN: Primary | ICD-10-CM

## 2021-06-08 LAB — GLUCOSE SERPL-MCNC: 92 MG/DL (ref 70–110)

## 2021-06-08 PROCEDURE — 85025 CBC WITH DIFFERENTIAL: ICD-10-PCS | Mod: ,,, | Performed by: CLINICAL MEDICAL LABORATORY

## 2021-06-08 PROCEDURE — 3077F SYST BP >= 140 MM HG: CPT | Mod: ,,, | Performed by: FAMILY MEDICINE

## 2021-06-08 PROCEDURE — 99214 OFFICE O/P EST MOD 30 MIN: CPT | Mod: 25,,, | Performed by: FAMILY MEDICINE

## 2021-06-08 PROCEDURE — 96372 THER/PROPH/DIAG INJ SC/IM: CPT | Mod: ,,, | Performed by: FAMILY MEDICINE

## 2021-06-08 PROCEDURE — 3008F PR BODY MASS INDEX (BMI) DOCUMENTED: ICD-10-PCS | Mod: ,,, | Performed by: FAMILY MEDICINE

## 2021-06-08 PROCEDURE — 3078F PR MOST RECENT DIASTOLIC BLOOD PRESSURE < 80 MM HG: ICD-10-PCS | Mod: ,,, | Performed by: FAMILY MEDICINE

## 2021-06-08 PROCEDURE — 3008F BODY MASS INDEX DOCD: CPT | Mod: ,,, | Performed by: FAMILY MEDICINE

## 2021-06-08 PROCEDURE — 3078F DIAST BP <80 MM HG: CPT | Mod: ,,, | Performed by: FAMILY MEDICINE

## 2021-06-08 PROCEDURE — 82962 POCT GLUCOSE, HAND-HELD DEVICE: ICD-10-PCS | Mod: ,,, | Performed by: FAMILY MEDICINE

## 2021-06-08 PROCEDURE — 85025 COMPLETE CBC W/AUTO DIFF WBC: CPT | Mod: ,,, | Performed by: CLINICAL MEDICAL LABORATORY

## 2021-06-08 PROCEDURE — 80053 COMPREHENSIVE METABOLIC PANEL: ICD-10-PCS | Mod: ,,, | Performed by: CLINICAL MEDICAL LABORATORY

## 2021-06-08 PROCEDURE — 99214 PR OFFICE/OUTPT VISIT, EST, LEVL IV, 30-39 MIN: ICD-10-PCS | Mod: 25,,, | Performed by: FAMILY MEDICINE

## 2021-06-08 PROCEDURE — 96372 PR INJECTION,THERAP/PROPH/DIAG2ST, IM OR SUBCUT: ICD-10-PCS | Mod: ,,, | Performed by: FAMILY MEDICINE

## 2021-06-08 PROCEDURE — 3077F PR MOST RECENT SYSTOLIC BLOOD PRESSURE >= 140 MM HG: ICD-10-PCS | Mod: ,,, | Performed by: FAMILY MEDICINE

## 2021-06-08 PROCEDURE — 80053 COMPREHEN METABOLIC PANEL: CPT | Mod: ,,, | Performed by: CLINICAL MEDICAL LABORATORY

## 2021-06-08 PROCEDURE — 83036 HEMOGLOBIN GLYCOSYLATED A1C: CPT | Mod: ,,, | Performed by: CLINICAL MEDICAL LABORATORY

## 2021-06-08 PROCEDURE — 82962 GLUCOSE BLOOD TEST: CPT | Mod: ,,, | Performed by: FAMILY MEDICINE

## 2021-06-08 PROCEDURE — 83036 HEMOGLOBIN A1C: ICD-10-PCS | Mod: ,,, | Performed by: CLINICAL MEDICAL LABORATORY

## 2021-06-08 RX ORDER — SODIUM CHLORIDE, SODIUM LACTATE, POTASSIUM CHLORIDE, CALCIUM CHLORIDE 600; 310; 30; 20 MG/100ML; MG/100ML; MG/100ML; MG/100ML
INJECTION, SOLUTION INTRAVENOUS CONTINUOUS
Status: CANCELLED | OUTPATIENT
Start: 2021-06-25

## 2021-06-08 RX ORDER — CILOSTAZOL 100 MG/1
100 TABLET ORAL 2 TIMES DAILY
COMMUNITY
Start: 2021-05-11 | End: 2022-02-14 | Stop reason: SDUPTHER

## 2021-06-08 RX ORDER — TRIAMCINOLONE ACETONIDE 1 MG/G
CREAM TOPICAL 3 TIMES DAILY
Qty: 400 G | Refills: 2 | Status: ON HOLD | OUTPATIENT
Start: 2021-06-08 | End: 2023-11-10 | Stop reason: HOSPADM

## 2021-06-08 RX ORDER — KETOROLAC TROMETHAMINE 30 MG/ML
60 INJECTION, SOLUTION INTRAMUSCULAR; INTRAVENOUS
Status: COMPLETED | OUTPATIENT
Start: 2021-06-08 | End: 2021-06-08

## 2021-06-08 RX ORDER — LIDOCAINE HYDROCHLORIDE 10 MG/ML
10 INJECTION, SOLUTION EPIDURAL; INFILTRATION; INTRACAUDAL; PERINEURAL ONCE
Status: CANCELLED | OUTPATIENT
Start: 2021-06-25 | End: 2021-06-25

## 2021-06-08 RX ADMIN — KETOROLAC TROMETHAMINE 60 MG: 30 INJECTION, SOLUTION INTRAMUSCULAR; INTRAVENOUS at 04:06

## 2021-06-09 LAB
ALBUMIN SERPL BCP-MCNC: 3.6 G/DL (ref 3.5–5)
ALBUMIN/GLOB SERPL: 0.9 {RATIO}
ALP SERPL-CCNC: 115 U/L (ref 50–130)
ALT SERPL W P-5'-P-CCNC: 13 U/L (ref 13–56)
ANION GAP SERPL CALCULATED.3IONS-SCNC: 9 MMOL/L (ref 7–16)
AST SERPL W P-5'-P-CCNC: 14 U/L (ref 15–37)
BASOPHILS # BLD AUTO: 0.07 K/UL (ref 0–0.2)
BASOPHILS NFR BLD AUTO: 0.8 % (ref 0–1)
BILIRUB SERPL-MCNC: 0.2 MG/DL (ref 0–1.2)
BUN SERPL-MCNC: 10 MG/DL (ref 7–18)
BUN/CREAT SERPL: 10 (ref 6–20)
CALCIUM SERPL-MCNC: 9.4 MG/DL (ref 8.5–10.1)
CHLORIDE SERPL-SCNC: 107 MMOL/L (ref 98–107)
CO2 SERPL-SCNC: 29 MMOL/L (ref 21–32)
CREAT SERPL-MCNC: 1 MG/DL (ref 0.55–1.02)
DIFFERENTIAL METHOD BLD: ABNORMAL
EOSINOPHIL # BLD AUTO: 0.47 K/UL (ref 0–0.5)
EOSINOPHIL NFR BLD AUTO: 5.1 % (ref 1–4)
ERYTHROCYTE [DISTWIDTH] IN BLOOD BY AUTOMATED COUNT: 15.9 % (ref 11.5–14.5)
GLOBULIN SER-MCNC: 4.2 G/DL (ref 2–4)
GLUCOSE SERPL-MCNC: 96 MG/DL (ref 74–106)
HCT VFR BLD AUTO: 46.2 % (ref 38–47)
HGB BLD-MCNC: 14.3 G/DL (ref 12–16)
IMM GRANULOCYTES # BLD AUTO: 0.06 K/UL (ref 0–0.04)
IMM GRANULOCYTES NFR BLD: 0.7 % (ref 0–0.4)
LYMPHOCYTES # BLD AUTO: 2.67 K/UL (ref 1–4.8)
LYMPHOCYTES NFR BLD AUTO: 29 % (ref 27–41)
MCH RBC QN AUTO: 25.4 PG (ref 27–31)
MCHC RBC AUTO-ENTMCNC: 31 G/DL (ref 32–36)
MCV RBC AUTO: 82.1 FL (ref 80–96)
MONOCYTES # BLD AUTO: 0.47 K/UL (ref 0–0.8)
MONOCYTES NFR BLD AUTO: 5.1 % (ref 2–6)
MPC BLD CALC-MCNC: 12.6 FL (ref 9.4–12.4)
NEUTROPHILS # BLD AUTO: 5.46 K/UL (ref 1.8–7.7)
NEUTROPHILS NFR BLD AUTO: 59.3 % (ref 53–65)
NRBC # BLD AUTO: 0 X10E3/UL
NRBC, AUTO (.00): 0 %
PLATELET # BLD AUTO: 267 K/UL (ref 150–400)
POTASSIUM SERPL-SCNC: 4.6 MMOL/L (ref 3.5–5.1)
PROT SERPL-MCNC: 7.8 G/DL (ref 6.4–8.2)
RBC # BLD AUTO: 5.63 M/UL (ref 4.2–5.4)
SODIUM SERPL-SCNC: 140 MMOL/L (ref 136–145)
WBC # BLD AUTO: 9.2 K/UL (ref 4.5–11)

## 2021-06-10 LAB
EST. AVERAGE GLUCOSE BLD GHB EST-MCNC: 120 MG/DL
HBA1C MFR BLD HPLC: 6.2 % (ref 4.5–6.6)

## 2021-06-16 ENCOUNTER — TELEPHONE (OUTPATIENT)
Dept: PRIMARY CARE CLINIC | Facility: CLINIC | Age: 61
End: 2021-06-16

## 2021-06-22 ENCOUNTER — OFFICE VISIT (OUTPATIENT)
Dept: VASCULAR SURGERY | Facility: CLINIC | Age: 61
End: 2021-06-22
Payer: MEDICARE

## 2021-06-22 VITALS
WEIGHT: 293 LBS | DIASTOLIC BLOOD PRESSURE: 74 MMHG | HEIGHT: 68 IN | SYSTOLIC BLOOD PRESSURE: 134 MMHG | HEART RATE: 84 BPM | BODY MASS INDEX: 44.41 KG/M2 | RESPIRATION RATE: 20 BRPM

## 2021-06-22 DIAGNOSIS — R60.0 EDEMA OF BOTH LOWER EXTREMITIES: ICD-10-CM

## 2021-06-22 DIAGNOSIS — Z86.718 HISTORY OF DVT (DEEP VEIN THROMBOSIS): ICD-10-CM

## 2021-06-22 DIAGNOSIS — M79.604 PAIN IN BOTH LOWER EXTREMITIES: ICD-10-CM

## 2021-06-22 DIAGNOSIS — M79.605 PAIN IN BOTH LOWER EXTREMITIES: ICD-10-CM

## 2021-06-22 DIAGNOSIS — I83.813 VARICOSE VEINS OF BILATERAL LOWER EXTREMITIES WITH PAIN: ICD-10-CM

## 2021-06-22 DIAGNOSIS — I87.2 CHRONIC VENOUS INSUFFICIENCY OF LOWER EXTREMITY: Primary | ICD-10-CM

## 2021-06-22 PROCEDURE — 99214 OFFICE O/P EST MOD 30 MIN: CPT | Mod: S$PBB,,, | Performed by: NURSE PRACTITIONER

## 2021-06-22 PROCEDURE — 3008F PR BODY MASS INDEX (BMI) DOCUMENTED: ICD-10-PCS | Mod: CPTII,,, | Performed by: NURSE PRACTITIONER

## 2021-06-22 PROCEDURE — 99214 PR OFFICE/OUTPT VISIT, EST, LEVL IV, 30-39 MIN: ICD-10-PCS | Mod: S$PBB,,, | Performed by: NURSE PRACTITIONER

## 2021-06-22 PROCEDURE — 3008F BODY MASS INDEX DOCD: CPT | Mod: CPTII,,, | Performed by: NURSE PRACTITIONER

## 2021-06-22 PROCEDURE — 99213 OFFICE O/P EST LOW 20 MIN: CPT | Mod: PBBFAC | Performed by: NURSE PRACTITIONER

## 2021-06-24 RX ORDER — LIDOCAINE HYDROCHLORIDE 10 MG/ML
10 INJECTION, SOLUTION EPIDURAL; INFILTRATION; INTRACAUDAL; PERINEURAL ONCE
Status: COMPLETED | OUTPATIENT
Start: 2021-06-25 | End: 2021-06-25

## 2021-06-25 ENCOUNTER — ANESTHESIA EVENT (OUTPATIENT)
Dept: SURGERY | Facility: HOSPITAL | Age: 61
End: 2021-06-25
Payer: MEDICARE

## 2021-06-25 ENCOUNTER — ANESTHESIA (OUTPATIENT)
Dept: SURGERY | Facility: HOSPITAL | Age: 61
End: 2021-06-25
Payer: MEDICARE

## 2021-06-25 ENCOUNTER — HOSPITAL ENCOUNTER (OUTPATIENT)
Facility: HOSPITAL | Age: 61
Discharge: HOME | End: 2021-06-25
Attending: FAMILY MEDICINE | Admitting: FAMILY MEDICINE
Payer: MEDICARE

## 2021-06-25 VITALS
BODY MASS INDEX: 44.41 KG/M2 | DIASTOLIC BLOOD PRESSURE: 74 MMHG | HEART RATE: 65 BPM | SYSTOLIC BLOOD PRESSURE: 174 MMHG | OXYGEN SATURATION: 94 % | WEIGHT: 293 LBS | RESPIRATION RATE: 20 BRPM | TEMPERATURE: 98 F | HEIGHT: 68 IN

## 2021-06-25 DIAGNOSIS — I87.2 VENOUS INSUFFICIENCY: Primary | ICD-10-CM

## 2021-06-25 DIAGNOSIS — I87.2 VENOUS INSUFFICIENCY (CHRONIC) (PERIPHERAL): Primary | ICD-10-CM

## 2021-06-25 LAB
GLUCOSE SERPL-MCNC: 76 MG/DL (ref 70–105)
GLUCOSE SERPL-MCNC: 87 MG/DL (ref 70–105)

## 2021-06-25 PROCEDURE — 82962 GLUCOSE BLOOD TEST: CPT

## 2021-06-25 PROCEDURE — 25000003 PHARM REV CODE 250: Performed by: NURSE ANESTHETIST, CERTIFIED REGISTERED

## 2021-06-25 PROCEDURE — 25000003 PHARM REV CODE 250: Performed by: FAMILY MEDICINE

## 2021-06-25 PROCEDURE — D9220A PRA ANESTHESIA: Mod: CRNA,,, | Performed by: NURSE ANESTHETIST, CERTIFIED REGISTERED

## 2021-06-25 PROCEDURE — 37000009 HC ANESTHESIA EA ADD 15 MINS: Performed by: FAMILY MEDICINE

## 2021-06-25 PROCEDURE — 36465 NJX NONCMPND SCLRSNT 1 VEIN: CPT | Mod: LT,,, | Performed by: FAMILY MEDICINE

## 2021-06-25 PROCEDURE — 63600175 PHARM REV CODE 636 W HCPCS: Performed by: NURSE ANESTHETIST, CERTIFIED REGISTERED

## 2021-06-25 PROCEDURE — 36000705 HC OR TIME LEV I EA ADD 15 MIN: Performed by: FAMILY MEDICINE

## 2021-06-25 PROCEDURE — 27201423 OPTIME MED/SURG SUP & DEVICES STERILE SUPPLY: Performed by: FAMILY MEDICINE

## 2021-06-25 PROCEDURE — 36000704 HC OR TIME LEV I 1ST 15 MIN: Performed by: FAMILY MEDICINE

## 2021-06-25 PROCEDURE — D9220A PRA ANESTHESIA: ICD-10-PCS | Mod: ANES,,, | Performed by: ANESTHESIOLOGY

## 2021-06-25 PROCEDURE — D9220A PRA ANESTHESIA: ICD-10-PCS | Mod: CRNA,,, | Performed by: NURSE ANESTHETIST, CERTIFIED REGISTERED

## 2021-06-25 PROCEDURE — 37000008 HC ANESTHESIA 1ST 15 MINUTES: Performed by: FAMILY MEDICINE

## 2021-06-25 PROCEDURE — 71000033 HC RECOVERY, INTIAL HOUR: Performed by: FAMILY MEDICINE

## 2021-06-25 PROCEDURE — 36465 PR INJ, NONCMPND FOAM SCLEROSANT, 1 VEIN: ICD-10-PCS | Mod: LT,,, | Performed by: FAMILY MEDICINE

## 2021-06-25 PROCEDURE — 71000015 HC POSTOP RECOV 1ST HR: Performed by: FAMILY MEDICINE

## 2021-06-25 PROCEDURE — D9220A PRA ANESTHESIA: Mod: ANES,,, | Performed by: ANESTHESIOLOGY

## 2021-06-25 RX ORDER — FENTANYL CITRATE 50 UG/ML
INJECTION, SOLUTION INTRAMUSCULAR; INTRAVENOUS
Status: DISCONTINUED | OUTPATIENT
Start: 2021-06-25 | End: 2021-06-25

## 2021-06-25 RX ORDER — MIDAZOLAM HYDROCHLORIDE 1 MG/ML
INJECTION INTRAMUSCULAR; INTRAVENOUS
Status: DISCONTINUED | OUTPATIENT
Start: 2021-06-25 | End: 2021-06-25

## 2021-06-25 RX ORDER — LIDOCAINE HYDROCHLORIDE 20 MG/ML
INJECTION, SOLUTION EPIDURAL; INFILTRATION; INTRACAUDAL; PERINEURAL
Status: DISCONTINUED | OUTPATIENT
Start: 2021-06-25 | End: 2021-06-25

## 2021-06-25 RX ORDER — PROPOFOL 10 MG/ML
VIAL (ML) INTRAVENOUS
Status: DISCONTINUED | OUTPATIENT
Start: 2021-06-25 | End: 2021-06-25

## 2021-06-25 RX ADMIN — PROPOFOL 50 MG: 10 INJECTION, EMULSION INTRAVENOUS at 02:06

## 2021-06-25 RX ADMIN — LIDOCAINE HYDROCHLORIDE 50 MG: 20 INJECTION, SOLUTION INTRAVENOUS at 02:06

## 2021-06-25 RX ADMIN — PROPOFOL 50 MG: 10 INJECTION, EMULSION INTRAVENOUS at 03:06

## 2021-06-25 RX ADMIN — FENTANYL CITRATE 100 MCG: 50 INJECTION INTRAMUSCULAR; INTRAVENOUS at 02:06

## 2021-06-25 RX ADMIN — MIDAZOLAM 2 MG: 1 INJECTION INTRAMUSCULAR; INTRAVENOUS at 02:06

## 2021-06-25 RX ADMIN — SODIUM CHLORIDE: 9 INJECTION, SOLUTION INTRAVENOUS at 02:06

## 2021-06-25 RX ADMIN — LIDOCAINE HYDROCHLORIDE 100 MG: 10 INJECTION, SOLUTION EPIDURAL; INFILTRATION; INTRACAUDAL; PERINEURAL at 07:06

## 2021-06-28 DIAGNOSIS — I87.2 VENOUS INSUFFICIENCY (CHRONIC) (PERIPHERAL): Primary | ICD-10-CM

## 2021-06-28 RX ORDER — SODIUM CHLORIDE 9 MG/ML
30 INJECTION, SOLUTION INTRAVENOUS CONTINUOUS
Status: CANCELLED | OUTPATIENT
Start: 2021-07-02

## 2021-07-01 ENCOUNTER — OFFICE VISIT (OUTPATIENT)
Dept: VASCULAR SURGERY | Facility: CLINIC | Age: 61
End: 2021-07-01
Payer: MEDICARE

## 2021-07-01 ENCOUNTER — HOSPITAL ENCOUNTER (OUTPATIENT)
Dept: RADIOLOGY | Facility: HOSPITAL | Age: 61
Discharge: HOME | End: 2021-07-01
Attending: FAMILY MEDICINE
Payer: MEDICARE

## 2021-07-01 VITALS
RESPIRATION RATE: 16 BRPM | SYSTOLIC BLOOD PRESSURE: 126 MMHG | HEIGHT: 68 IN | WEIGHT: 293 LBS | BODY MASS INDEX: 44.41 KG/M2 | HEART RATE: 92 BPM | DIASTOLIC BLOOD PRESSURE: 80 MMHG

## 2021-07-01 DIAGNOSIS — R23.8 OTHER SKIN CHANGES: ICD-10-CM

## 2021-07-01 DIAGNOSIS — M79.605 LEG PAIN, BILATERAL: ICD-10-CM

## 2021-07-01 DIAGNOSIS — I87.2 VENOUS INSUFFICIENCY: Primary | ICD-10-CM

## 2021-07-01 DIAGNOSIS — R60.0 EDEMA OF BOTH LOWER EXTREMITIES: ICD-10-CM

## 2021-07-01 DIAGNOSIS — I87.2 CHRONIC VENOUS INSUFFICIENCY OF LOWER EXTREMITY: Primary | ICD-10-CM

## 2021-07-01 DIAGNOSIS — I87.2 VENOUS INSUFFICIENCY: ICD-10-CM

## 2021-07-01 DIAGNOSIS — M79.604 LEG PAIN, BILATERAL: ICD-10-CM

## 2021-07-01 PROCEDURE — 3008F BODY MASS INDEX DOCD: CPT | Mod: CPTII,,, | Performed by: NURSE PRACTITIONER

## 2021-07-01 PROCEDURE — 3008F PR BODY MASS INDEX (BMI) DOCUMENTED: ICD-10-PCS | Mod: CPTII,,, | Performed by: NURSE PRACTITIONER

## 2021-07-01 PROCEDURE — 93971 US POST ABLATION VENOUS: ICD-10-PCS | Mod: 26,,, | Performed by: FAMILY MEDICINE

## 2021-07-01 PROCEDURE — 93971 EXTREMITY STUDY: CPT | Mod: 26,,, | Performed by: FAMILY MEDICINE

## 2021-07-01 PROCEDURE — 99214 OFFICE O/P EST MOD 30 MIN: CPT | Mod: S$PBB,,, | Performed by: NURSE PRACTITIONER

## 2021-07-01 PROCEDURE — 93971 EXTREMITY STUDY: CPT | Mod: TC

## 2021-07-01 PROCEDURE — 99214 PR OFFICE/OUTPT VISIT, EST, LEVL IV, 30-39 MIN: ICD-10-PCS | Mod: S$PBB,,, | Performed by: NURSE PRACTITIONER

## 2021-07-01 PROCEDURE — 99215 OFFICE O/P EST HI 40 MIN: CPT | Mod: PBBFAC,25 | Performed by: NURSE PRACTITIONER

## 2021-07-02 DIAGNOSIS — I87.2 VENOUS INSUFFICIENCY (CHRONIC) (PERIPHERAL): Primary | ICD-10-CM

## 2021-07-06 RX ORDER — SODIUM CHLORIDE 9 MG/ML
30 INJECTION, SOLUTION INTRAVENOUS CONTINUOUS
Status: CANCELLED | OUTPATIENT
Start: 2021-07-09

## 2021-07-07 DIAGNOSIS — L29.9 ITCHING: Primary | ICD-10-CM

## 2021-07-07 RX ORDER — HYDROXYZINE HYDROCHLORIDE 25 MG/1
25 TABLET, FILM COATED ORAL EVERY 6 HOURS PRN
Qty: 60 TABLET | Refills: 0 | Status: ON HOLD | OUTPATIENT
Start: 2021-07-07 | End: 2022-04-15 | Stop reason: ALTCHOICE

## 2021-08-03 ENCOUNTER — HOSPITAL ENCOUNTER (OUTPATIENT)
Dept: RADIOLOGY | Facility: HOSPITAL | Age: 61
Discharge: HOME | End: 2021-08-03
Attending: FAMILY MEDICINE
Payer: MEDICARE

## 2021-08-03 ENCOUNTER — OFFICE VISIT (OUTPATIENT)
Dept: VASCULAR SURGERY | Facility: CLINIC | Age: 61
End: 2021-08-03
Payer: MEDICARE

## 2021-08-03 VITALS
BODY MASS INDEX: 44.41 KG/M2 | SYSTOLIC BLOOD PRESSURE: 142 MMHG | WEIGHT: 293 LBS | HEIGHT: 68 IN | HEART RATE: 72 BPM | DIASTOLIC BLOOD PRESSURE: 82 MMHG | RESPIRATION RATE: 16 BRPM

## 2021-08-03 DIAGNOSIS — I87.2 VENOUS INSUFFICIENCY: Primary | ICD-10-CM

## 2021-08-03 DIAGNOSIS — I87.2 VENOUS INSUFFICIENCY: ICD-10-CM

## 2021-08-03 DIAGNOSIS — M79.605 LEG PAIN, BILATERAL: ICD-10-CM

## 2021-08-03 DIAGNOSIS — I83.813 VARICOSE VEINS OF BILATERAL LOWER EXTREMITIES WITH PAIN: ICD-10-CM

## 2021-08-03 DIAGNOSIS — I83.899 RUPTURED VARICOSE VEIN: ICD-10-CM

## 2021-08-03 DIAGNOSIS — R23.8 OTHER SKIN CHANGES: ICD-10-CM

## 2021-08-03 DIAGNOSIS — R60.0 EDEMA, LOWER EXTREMITY: ICD-10-CM

## 2021-08-03 DIAGNOSIS — M79.604 LEG PAIN, BILATERAL: ICD-10-CM

## 2021-08-03 PROCEDURE — 3044F PR MOST RECENT HEMOGLOBIN A1C LEVEL <7.0%: ICD-10-PCS | Mod: CPTII,,, | Performed by: FAMILY MEDICINE

## 2021-08-03 PROCEDURE — 93971 US POST ABLATION VENOUS: ICD-10-PCS | Mod: 26,,, | Performed by: FAMILY MEDICINE

## 2021-08-03 PROCEDURE — 3044F HG A1C LEVEL LT 7.0%: CPT | Mod: CPTII,,, | Performed by: FAMILY MEDICINE

## 2021-08-03 PROCEDURE — 3079F PR MOST RECENT DIASTOLIC BLOOD PRESSURE 80-89 MM HG: ICD-10-PCS | Mod: CPTII,,, | Performed by: FAMILY MEDICINE

## 2021-08-03 PROCEDURE — 3079F DIAST BP 80-89 MM HG: CPT | Mod: CPTII,,, | Performed by: FAMILY MEDICINE

## 2021-08-03 PROCEDURE — 99214 OFFICE O/P EST MOD 30 MIN: CPT | Mod: S$PBB,,, | Performed by: FAMILY MEDICINE

## 2021-08-03 PROCEDURE — 3008F BODY MASS INDEX DOCD: CPT | Mod: CPTII,,, | Performed by: FAMILY MEDICINE

## 2021-08-03 PROCEDURE — 1159F MED LIST DOCD IN RCRD: CPT | Mod: CPTII,,, | Performed by: FAMILY MEDICINE

## 2021-08-03 PROCEDURE — 1160F RVW MEDS BY RX/DR IN RCRD: CPT | Mod: CPTII,,, | Performed by: FAMILY MEDICINE

## 2021-08-03 PROCEDURE — 1160F PR REVIEW ALL MEDS BY PRESCRIBER/CLIN PHARMACIST DOCUMENTED: ICD-10-PCS | Mod: CPTII,,, | Performed by: FAMILY MEDICINE

## 2021-08-03 PROCEDURE — 3008F PR BODY MASS INDEX (BMI) DOCUMENTED: ICD-10-PCS | Mod: CPTII,,, | Performed by: FAMILY MEDICINE

## 2021-08-03 PROCEDURE — 3077F PR MOST RECENT SYSTOLIC BLOOD PRESSURE >= 140 MM HG: ICD-10-PCS | Mod: CPTII,,, | Performed by: FAMILY MEDICINE

## 2021-08-03 PROCEDURE — 99214 PR OFFICE/OUTPT VISIT, EST, LEVL IV, 30-39 MIN: ICD-10-PCS | Mod: S$PBB,,, | Performed by: FAMILY MEDICINE

## 2021-08-03 PROCEDURE — 93971 EXTREMITY STUDY: CPT | Mod: TC

## 2021-08-03 PROCEDURE — 1159F PR MEDICATION LIST DOCUMENTED IN MEDICAL RECORD: ICD-10-PCS | Mod: CPTII,,, | Performed by: FAMILY MEDICINE

## 2021-08-03 PROCEDURE — 93971 EXTREMITY STUDY: CPT | Mod: 26,,, | Performed by: FAMILY MEDICINE

## 2021-08-03 PROCEDURE — 99215 OFFICE O/P EST HI 40 MIN: CPT | Mod: PBBFAC,25 | Performed by: FAMILY MEDICINE

## 2021-08-03 PROCEDURE — 3077F SYST BP >= 140 MM HG: CPT | Mod: CPTII,,, | Performed by: FAMILY MEDICINE

## 2021-08-03 RX ORDER — ASPIRIN 81 MG/1
TABLET ORAL
COMMUNITY
Start: 2021-07-15 | End: 2021-11-10 | Stop reason: SDUPTHER

## 2021-08-23 ENCOUNTER — TELEPHONE (OUTPATIENT)
Dept: FAMILY MEDICINE | Facility: CLINIC | Age: 61
End: 2021-08-23

## 2021-09-07 ENCOUNTER — OFFICE VISIT (OUTPATIENT)
Dept: PAIN MEDICINE | Facility: CLINIC | Age: 61
End: 2021-09-07
Payer: COMMERCIAL

## 2021-09-07 VITALS
WEIGHT: 293 LBS | BODY MASS INDEX: 44.41 KG/M2 | HEIGHT: 68 IN | DIASTOLIC BLOOD PRESSURE: 76 MMHG | SYSTOLIC BLOOD PRESSURE: 165 MMHG | HEART RATE: 71 BPM

## 2021-09-07 DIAGNOSIS — G89.29 CHRONIC PAIN OF RIGHT KNEE: Chronic | ICD-10-CM

## 2021-09-07 DIAGNOSIS — M25.561 CHRONIC PAIN OF RIGHT KNEE: Chronic | ICD-10-CM

## 2021-09-07 DIAGNOSIS — I73.9 PVD (PERIPHERAL VASCULAR DISEASE): Chronic | ICD-10-CM

## 2021-09-07 DIAGNOSIS — Z79.899 ENCOUNTER FOR LONG-TERM (CURRENT) USE OF OTHER MEDICATIONS: ICD-10-CM

## 2021-09-07 DIAGNOSIS — M47.817 LUMBOSACRAL SPONDYLOSIS WITHOUT MYELOPATHY: Primary | Chronic | ICD-10-CM

## 2021-09-07 LAB
CTP QC/QA: YES
POC (AMP) AMPHETAMINE: NEGATIVE
POC (BAR) BARBITURATES: NEGATIVE
POC (BUP) BUPRENORPHINE: NEGATIVE
POC (BZO) BENZODIAZEPINES: NEGATIVE
POC (COC) COCAINE: NEGATIVE
POC (MDMA) METHYLENEDIOXYMETHAMPHETAMINE 3,4: NEGATIVE
POC (MET) METHAMPHETAMINE: NEGATIVE
POC (MOP) OPIATES: ABNORMAL
POC (MTD) METHADONE: NEGATIVE
POC (OXY) OXYCODONE: NEGATIVE
POC (PCP) PHENCYCLIDINE: NEGATIVE
POC (TCA) TRICYCLIC ANTIDEPRESSANTS: NEGATIVE
POC TEMPERATURE (URINE): 92
POC THC: NEGATIVE

## 2021-09-07 PROCEDURE — 80305 DRUG TEST PRSMV DIR OPT OBS: CPT | Mod: PBBFAC | Performed by: PHYSICIAN ASSISTANT

## 2021-09-07 PROCEDURE — 99214 PR OFFICE/OUTPT VISIT, EST, LEVL IV, 30-39 MIN: ICD-10-PCS | Mod: S$PBB,,, | Performed by: PHYSICIAN ASSISTANT

## 2021-09-07 PROCEDURE — 99214 OFFICE O/P EST MOD 30 MIN: CPT | Mod: S$PBB,,, | Performed by: PHYSICIAN ASSISTANT

## 2021-09-07 PROCEDURE — 99215 OFFICE O/P EST HI 40 MIN: CPT | Mod: PBBFAC | Performed by: PHYSICIAN ASSISTANT

## 2021-09-07 RX ORDER — HYDROCODONE BITARTRATE AND ACETAMINOPHEN 10; 325 MG/1; MG/1
1 TABLET ORAL EVERY 6 HOURS
Qty: 120 TABLET | Refills: 0 | Status: SHIPPED | OUTPATIENT
Start: 2021-09-07 | End: 2021-10-07

## 2021-09-07 RX ORDER — DICLOFENAC SODIUM 10 MG/G
2 GEL TOPICAL 4 TIMES DAILY
Qty: 10 TUBE | Refills: 2 | Status: SHIPPED | OUTPATIENT
Start: 2021-09-07 | End: 2021-12-06 | Stop reason: SDUPTHER

## 2021-09-07 RX ORDER — HYDROCODONE BITARTRATE AND ACETAMINOPHEN 10; 325 MG/1; MG/1
1 TABLET ORAL EVERY 6 HOURS
Qty: 120 TABLET | Refills: 0 | Status: SHIPPED | OUTPATIENT
Start: 2021-11-06 | End: 2021-12-06 | Stop reason: SDUPTHER

## 2021-09-07 RX ORDER — PREGABALIN 100 MG/1
100 CAPSULE ORAL EVERY 8 HOURS
Qty: 90 CAPSULE | Refills: 2 | Status: SHIPPED | OUTPATIENT
Start: 2021-09-07 | End: 2021-12-06 | Stop reason: SDUPTHER

## 2021-09-07 RX ORDER — HYDROCODONE BITARTRATE AND ACETAMINOPHEN 10; 325 MG/1; MG/1
1 TABLET ORAL EVERY 6 HOURS
Qty: 120 TABLET | Refills: 0 | Status: SHIPPED | OUTPATIENT
Start: 2021-10-07 | End: 2021-11-06

## 2021-09-09 ENCOUNTER — OFFICE VISIT (OUTPATIENT)
Dept: VASCULAR SURGERY | Facility: CLINIC | Age: 61
End: 2021-09-09
Payer: COMMERCIAL

## 2021-09-09 ENCOUNTER — OFFICE VISIT (OUTPATIENT)
Dept: SURGERY | Facility: CLINIC | Age: 61
End: 2021-09-09
Attending: SURGERY
Payer: COMMERCIAL

## 2021-09-09 VITALS
WEIGHT: 293 LBS | HEART RATE: 68 BPM | RESPIRATION RATE: 20 BRPM | BODY MASS INDEX: 44.41 KG/M2 | SYSTOLIC BLOOD PRESSURE: 132 MMHG | HEIGHT: 68 IN | DIASTOLIC BLOOD PRESSURE: 60 MMHG

## 2021-09-09 DIAGNOSIS — R60.0 EDEMA, LOWER EXTREMITY: ICD-10-CM

## 2021-09-09 DIAGNOSIS — M79.672 FOOT PAIN, LEFT: Primary | ICD-10-CM

## 2021-09-09 DIAGNOSIS — I87.2 VENOUS INSUFFICIENCY: Primary | ICD-10-CM

## 2021-09-09 DIAGNOSIS — I83.899 RUPTURED VARICOSE VEIN: ICD-10-CM

## 2021-09-09 DIAGNOSIS — M79.605 LEG PAIN, BILATERAL: ICD-10-CM

## 2021-09-09 DIAGNOSIS — I83.813 VARICOSE VEINS OF BILATERAL LOWER EXTREMITIES WITH PAIN: ICD-10-CM

## 2021-09-09 DIAGNOSIS — M79.604 LEG PAIN, BILATERAL: ICD-10-CM

## 2021-09-09 PROCEDURE — 99215 OFFICE O/P EST HI 40 MIN: CPT | Mod: PBBFAC | Performed by: FAMILY MEDICINE

## 2021-09-09 PROCEDURE — 99213 OFFICE O/P EST LOW 20 MIN: CPT | Mod: PBBFAC | Performed by: SURGERY

## 2021-09-09 PROCEDURE — 99214 PR OFFICE/OUTPT VISIT, EST, LEVL IV, 30-39 MIN: ICD-10-PCS | Mod: S$PBB,,, | Performed by: FAMILY MEDICINE

## 2021-09-09 PROCEDURE — 99214 OFFICE O/P EST MOD 30 MIN: CPT | Mod: S$PBB,,, | Performed by: FAMILY MEDICINE

## 2021-09-09 PROCEDURE — 99214 PR OFFICE/OUTPT VISIT, EST, LEVL IV, 30-39 MIN: ICD-10-PCS | Mod: S$PBB,,, | Performed by: SURGERY

## 2021-09-09 PROCEDURE — 99214 OFFICE O/P EST MOD 30 MIN: CPT | Mod: S$PBB,,, | Performed by: SURGERY

## 2021-09-13 RX ORDER — CIPROFLOXACIN 500 MG/1
500 TABLET ORAL EVERY 8 HOURS
Qty: 28 TABLET | Refills: 0 | Status: SHIPPED | OUTPATIENT
Start: 2021-09-13 | End: 2021-11-15 | Stop reason: CLARIF

## 2021-09-22 DIAGNOSIS — R07.9 CHEST PAIN: Primary | ICD-10-CM

## 2021-09-27 ENCOUNTER — OFFICE VISIT (OUTPATIENT)
Dept: FAMILY MEDICINE | Facility: CLINIC | Age: 61
End: 2021-09-27
Payer: COMMERCIAL

## 2021-09-27 VITALS
BODY MASS INDEX: 44.41 KG/M2 | DIASTOLIC BLOOD PRESSURE: 90 MMHG | WEIGHT: 293 LBS | SYSTOLIC BLOOD PRESSURE: 193 MMHG | HEART RATE: 68 BPM | RESPIRATION RATE: 20 BRPM | OXYGEN SATURATION: 95 % | HEIGHT: 68 IN

## 2021-09-27 DIAGNOSIS — M79.671 PAIN IN BOTH FEET: ICD-10-CM

## 2021-09-27 DIAGNOSIS — L89.899 PRESSURE INJURY OF SKIN OF FOOT, UNSPECIFIED INJURY STAGE, UNSPECIFIED LATERALITY: ICD-10-CM

## 2021-09-27 DIAGNOSIS — M86.9 OSTEOMYELITIS OF LEFT FOOT, UNSPECIFIED TYPE: ICD-10-CM

## 2021-09-27 DIAGNOSIS — M79.672 PAIN IN BOTH FEET: ICD-10-CM

## 2021-09-27 DIAGNOSIS — I10 ESSENTIAL HYPERTENSION: ICD-10-CM

## 2021-09-27 DIAGNOSIS — I73.9 PVD (PERIPHERAL VASCULAR DISEASE): ICD-10-CM

## 2021-09-27 DIAGNOSIS — L89.899 PRESSURE INJURY OF SKIN OF LEFT FOOT, UNSPECIFIED INJURY STAGE: Primary | ICD-10-CM

## 2021-09-27 DIAGNOSIS — E11.9 TYPE 2 DIABETES MELLITUS WITHOUT COMPLICATION, WITH LONG-TERM CURRENT USE OF INSULIN: ICD-10-CM

## 2021-09-27 DIAGNOSIS — Z79.4 TYPE 2 DIABETES MELLITUS WITHOUT COMPLICATION, WITH LONG-TERM CURRENT USE OF INSULIN: ICD-10-CM

## 2021-09-27 DIAGNOSIS — N39.45 CONTINUOUS LEAKAGE OF URINE: ICD-10-CM

## 2021-09-27 LAB
ANION GAP SERPL CALCULATED.3IONS-SCNC: 6 MMOL/L (ref 7–16)
BASOPHILS # BLD AUTO: 0.08 K/UL (ref 0–0.2)
BASOPHILS NFR BLD AUTO: 0.8 % (ref 0–1)
BILIRUB UR QL STRIP: NEGATIVE
BUN SERPL-MCNC: 10 MG/DL (ref 7–18)
BUN/CREAT SERPL: 10 (ref 6–20)
CALCIUM SERPL-MCNC: 10 MG/DL (ref 8.5–10.1)
CHLORIDE SERPL-SCNC: 108 MMOL/L (ref 98–107)
CLARITY UR: CLEAR
CO2 SERPL-SCNC: 30 MMOL/L (ref 21–32)
COLOR UR: YELLOW
CREAT SERPL-MCNC: 1.03 MG/DL (ref 0.55–1.02)
DIFFERENTIAL METHOD BLD: ABNORMAL
EOSINOPHIL # BLD AUTO: 0.42 K/UL (ref 0–0.5)
EOSINOPHIL NFR BLD AUTO: 4.1 % (ref 1–4)
ERYTHROCYTE [DISTWIDTH] IN BLOOD BY AUTOMATED COUNT: 15.9 % (ref 11.5–14.5)
ERYTHROCYTE [SEDIMENTATION RATE] IN BLOOD BY WESTERGREN METHOD: 31 MM/HR (ref 0–30)
GLUCOSE SERPL-MCNC: 85 MG/DL (ref 74–106)
GLUCOSE UR STRIP-MCNC: NEGATIVE MG/DL
HCT VFR BLD AUTO: 48.5 % (ref 38–47)
HGB BLD-MCNC: 14.9 G/DL (ref 12–16)
IMM GRANULOCYTES # BLD AUTO: 0.05 K/UL (ref 0–0.04)
IMM GRANULOCYTES NFR BLD: 0.5 % (ref 0–0.4)
KETONES UR STRIP-SCNC: NEGATIVE MG/DL
LEUKOCYTE ESTERASE UR QL STRIP: NEGATIVE
LYMPHOCYTES # BLD AUTO: 3.07 K/UL (ref 1–4.8)
LYMPHOCYTES NFR BLD AUTO: 30.3 % (ref 27–41)
MCH RBC QN AUTO: 25.6 PG (ref 27–31)
MCHC RBC AUTO-ENTMCNC: 30.7 G/DL (ref 32–36)
MCV RBC AUTO: 83.3 FL (ref 80–96)
MONOCYTES # BLD AUTO: 0.54 K/UL (ref 0–0.8)
MONOCYTES NFR BLD AUTO: 5.3 % (ref 2–6)
MPC BLD CALC-MCNC: 11.7 FL (ref 9.4–12.4)
NEUTROPHILS # BLD AUTO: 5.98 K/UL (ref 1.8–7.7)
NEUTROPHILS NFR BLD AUTO: 59 % (ref 53–65)
NITRITE UR QL STRIP: NEGATIVE
NRBC # BLD AUTO: 0 X10E3/UL
NRBC, AUTO (.00): 0 %
PH UR STRIP: 6.5 PH UNITS
PLATELET # BLD AUTO: 298 K/UL (ref 150–400)
POTASSIUM SERPL-SCNC: 3.7 MMOL/L (ref 3.5–5.1)
PROT UR QL STRIP: NEGATIVE
RBC # BLD AUTO: 5.82 M/UL (ref 4.2–5.4)
RBC # UR STRIP: NEGATIVE /UL
SODIUM SERPL-SCNC: 140 MMOL/L (ref 136–145)
SP GR UR STRIP: 1.01
TSH SERPL DL<=0.005 MIU/L-ACNC: 2.89 UIU/ML (ref 0.36–3.74)
UROBILINOGEN UR STRIP-ACNC: 0.2 MG/DL
WBC # BLD AUTO: 10.14 K/UL (ref 4.5–11)

## 2021-09-27 PROCEDURE — 85025 COMPLETE CBC W/AUTO DIFF WBC: CPT | Mod: ,,, | Performed by: CLINICAL MEDICAL LABORATORY

## 2021-09-27 PROCEDURE — 81003 URINALYSIS, REFLEX TO URINE CULTURE: ICD-10-PCS | Mod: QW,,, | Performed by: CLINICAL MEDICAL LABORATORY

## 2021-09-27 PROCEDURE — 84443 ASSAY THYROID STIM HORMONE: CPT | Mod: GZ,,, | Performed by: CLINICAL MEDICAL LABORATORY

## 2021-09-27 PROCEDURE — 85651 SEDIMENTATION RATE, AUTOMATED: ICD-10-PCS | Mod: ,,, | Performed by: CLINICAL MEDICAL LABORATORY

## 2021-09-27 PROCEDURE — 81003 URINALYSIS AUTO W/O SCOPE: CPT | Mod: QW,,, | Performed by: CLINICAL MEDICAL LABORATORY

## 2021-09-27 PROCEDURE — 99204 PR OFFICE/OUTPT VISIT, NEW, LEVL IV, 45-59 MIN: ICD-10-PCS | Mod: ,,, | Performed by: INTERNAL MEDICINE

## 2021-09-27 PROCEDURE — 80048 BASIC METABOLIC PNL TOTAL CA: CPT | Mod: ,,, | Performed by: CLINICAL MEDICAL LABORATORY

## 2021-09-27 PROCEDURE — 80048 BASIC METABOLIC PANEL: ICD-10-PCS | Mod: ,,, | Performed by: CLINICAL MEDICAL LABORATORY

## 2021-09-27 PROCEDURE — 84443 TSH: ICD-10-PCS | Mod: GZ,,, | Performed by: CLINICAL MEDICAL LABORATORY

## 2021-09-27 PROCEDURE — 85651 RBC SED RATE NONAUTOMATED: CPT | Mod: ,,, | Performed by: CLINICAL MEDICAL LABORATORY

## 2021-09-27 PROCEDURE — 85025 CBC WITH DIFFERENTIAL: ICD-10-PCS | Mod: ,,, | Performed by: CLINICAL MEDICAL LABORATORY

## 2021-09-27 PROCEDURE — 99204 OFFICE O/P NEW MOD 45 MIN: CPT | Mod: ,,, | Performed by: INTERNAL MEDICINE

## 2021-09-27 RX ORDER — CIPROFLOXACIN 500 MG/1
500 TABLET ORAL EVERY 12 HOURS
Qty: 14 TABLET | Refills: 0 | Status: SHIPPED | OUTPATIENT
Start: 2021-09-27 | End: 2021-11-15 | Stop reason: CLARIF

## 2021-09-27 RX ORDER — METOLAZONE 2.5 MG/1
2.5 TABLET ORAL DAILY
Qty: 30 TABLET | Refills: 2 | Status: SHIPPED | OUTPATIENT
Start: 2021-09-27 | End: 2022-10-25 | Stop reason: SDUPTHER

## 2021-10-13 ENCOUNTER — LAB VISIT (OUTPATIENT)
Dept: LAB | Facility: CLINIC | Age: 61
End: 2021-10-13
Payer: COMMERCIAL

## 2021-10-13 ENCOUNTER — CLINICAL SUPPORT (OUTPATIENT)
Dept: WOUND CARE | Facility: CLINIC | Age: 61
End: 2021-10-13
Attending: FAMILY MEDICINE
Payer: COMMERCIAL

## 2021-10-13 ENCOUNTER — HOSPITAL ENCOUNTER (OUTPATIENT)
Dept: RADIOLOGY | Facility: HOSPITAL | Age: 61
Discharge: HOME | End: 2021-10-13
Attending: INTERNAL MEDICINE
Payer: COMMERCIAL

## 2021-10-13 VITALS
RESPIRATION RATE: 20 BRPM | TEMPERATURE: 98 F | DIASTOLIC BLOOD PRESSURE: 80 MMHG | HEART RATE: 109 BPM | SYSTOLIC BLOOD PRESSURE: 142 MMHG

## 2021-10-13 DIAGNOSIS — M79.672 PAIN IN BOTH FEET: ICD-10-CM

## 2021-10-13 DIAGNOSIS — I73.9 PVD (PERIPHERAL VASCULAR DISEASE): ICD-10-CM

## 2021-10-13 DIAGNOSIS — M19.90 ARTHRITIS: ICD-10-CM

## 2021-10-13 DIAGNOSIS — E13.51 PERIPHERAL VASCULAR DISEASE DUE TO SECONDARY DIABETES: ICD-10-CM

## 2021-10-13 DIAGNOSIS — M25.561 CHRONIC PAIN OF RIGHT KNEE: ICD-10-CM

## 2021-10-13 DIAGNOSIS — E03.9 HYPOTHYROIDISM, UNSPECIFIED TYPE: ICD-10-CM

## 2021-10-13 DIAGNOSIS — L89.899 PRESSURE INJURY OF SKIN OF LEFT FOOT, UNSPECIFIED INJURY STAGE: ICD-10-CM

## 2021-10-13 DIAGNOSIS — M47.817 LUMBOSACRAL SPONDYLOSIS WITHOUT MYELOPATHY: ICD-10-CM

## 2021-10-13 DIAGNOSIS — G89.29 CHRONIC PAIN OF RIGHT KNEE: ICD-10-CM

## 2021-10-13 DIAGNOSIS — G89.4 CHRONIC PAIN SYNDROME: Primary | ICD-10-CM

## 2021-10-13 DIAGNOSIS — Z79.4 TYPE 2 DIABETES MELLITUS WITHOUT COMPLICATION, WITH LONG-TERM CURRENT USE OF INSULIN: ICD-10-CM

## 2021-10-13 DIAGNOSIS — M79.671 PAIN IN BOTH FEET: ICD-10-CM

## 2021-10-13 DIAGNOSIS — M79.672 FOOT PAIN, LEFT: ICD-10-CM

## 2021-10-13 DIAGNOSIS — E11.9 TYPE 2 DIABETES MELLITUS WITHOUT COMPLICATION, WITH LONG-TERM CURRENT USE OF INSULIN: ICD-10-CM

## 2021-10-13 DIAGNOSIS — J44.9 CHRONIC OBSTRUCTIVE PULMONARY DISEASE, UNSPECIFIED COPD TYPE: ICD-10-CM

## 2021-10-13 DIAGNOSIS — R23.8 OTHER SKIN CHANGES: ICD-10-CM

## 2021-10-13 DIAGNOSIS — I10 ESSENTIAL HYPERTENSION: ICD-10-CM

## 2021-10-13 LAB — URATE SERPL-MCNC: 7.6 MG/DL (ref 2.6–6)

## 2021-10-13 PROCEDURE — 84550 ASSAY OF BLOOD/URIC ACID: CPT | Mod: ,,, | Performed by: CLINICAL MEDICAL LABORATORY

## 2021-10-13 PROCEDURE — 73620 XR FOOT 2 VIEW LEFT: ICD-10-PCS | Mod: 26,LT,, | Performed by: RADIOLOGY

## 2021-10-13 PROCEDURE — 36415 COLL VENOUS BLD VENIPUNCTURE: CPT | Mod: ,,, | Performed by: CLINICAL MEDICAL LABORATORY

## 2021-10-13 PROCEDURE — 73620 X-RAY EXAM OF FOOT: CPT | Mod: TC,LT

## 2021-10-13 PROCEDURE — 36415 PR COLLECTION VENOUS BLOOD,VENIPUNCTURE: ICD-10-PCS | Mod: ,,, | Performed by: CLINICAL MEDICAL LABORATORY

## 2021-10-13 PROCEDURE — 99203 OFFICE O/P NEW LOW 30 MIN: CPT | Mod: S$PBB,,, | Performed by: FAMILY MEDICINE

## 2021-10-13 PROCEDURE — 99215 OFFICE O/P EST HI 40 MIN: CPT | Mod: PBBFAC | Performed by: FAMILY MEDICINE

## 2021-10-13 PROCEDURE — 84550 URIC ACID: ICD-10-PCS | Mod: ,,, | Performed by: CLINICAL MEDICAL LABORATORY

## 2021-10-13 PROCEDURE — 99203 PR OFFICE/OUTPT VISIT, NEW, LEVL III, 30-44 MIN: ICD-10-PCS | Mod: S$PBB,,, | Performed by: FAMILY MEDICINE

## 2021-10-13 PROCEDURE — 73620 X-RAY EXAM OF FOOT: CPT | Mod: 26,LT,, | Performed by: RADIOLOGY

## 2021-10-14 RX ORDER — ALLOPURINOL 300 MG/1
300 TABLET ORAL DAILY
Qty: 90 TABLET | Refills: 3 | Status: SHIPPED | OUTPATIENT
Start: 2021-10-14 | End: 2022-10-25 | Stop reason: SDUPTHER

## 2021-10-14 RX ORDER — COLCHICINE 0.6 MG/1
TABLET ORAL
Qty: 10 TABLET | Refills: 11 | Status: SHIPPED | OUTPATIENT
Start: 2021-10-14 | End: 2021-10-18 | Stop reason: SDUPTHER

## 2021-10-18 ENCOUNTER — OFFICE VISIT (OUTPATIENT)
Dept: FAMILY MEDICINE | Facility: CLINIC | Age: 61
End: 2021-10-18
Payer: COMMERCIAL

## 2021-10-18 VITALS
BODY MASS INDEX: 44.41 KG/M2 | WEIGHT: 293 LBS | OXYGEN SATURATION: 95 % | RESPIRATION RATE: 20 BRPM | HEART RATE: 20 BPM | SYSTOLIC BLOOD PRESSURE: 147 MMHG | HEIGHT: 68 IN | DIASTOLIC BLOOD PRESSURE: 67 MMHG

## 2021-10-18 DIAGNOSIS — Z79.4 TYPE 2 DIABETES MELLITUS WITHOUT COMPLICATION, WITH LONG-TERM CURRENT USE OF INSULIN: ICD-10-CM

## 2021-10-18 DIAGNOSIS — E11.9 TYPE 2 DIABETES MELLITUS WITHOUT COMPLICATION, WITH LONG-TERM CURRENT USE OF INSULIN: ICD-10-CM

## 2021-10-18 DIAGNOSIS — M10.9 GOUT, UNSPECIFIED CAUSE, UNSPECIFIED CHRONICITY, UNSPECIFIED SITE: Primary | ICD-10-CM

## 2021-10-18 PROCEDURE — 99214 OFFICE O/P EST MOD 30 MIN: CPT | Mod: ,,, | Performed by: INTERNAL MEDICINE

## 2021-10-18 PROCEDURE — 99214 PR OFFICE/OUTPT VISIT, EST, LEVL IV, 30-39 MIN: ICD-10-PCS | Mod: ,,, | Performed by: INTERNAL MEDICINE

## 2021-10-18 RX ORDER — LEVOTHYROXINE SODIUM 175 UG/1
175 TABLET ORAL DAILY
Qty: 90 TABLET | Refills: 1 | Status: SHIPPED | OUTPATIENT
Start: 2021-10-18 | End: 2022-04-27

## 2021-10-18 RX ORDER — INDOMETHACIN 50 MG/1
50 CAPSULE ORAL
Qty: 9 CAPSULE | Refills: 0 | Status: SHIPPED | OUTPATIENT
Start: 2021-10-18 | End: 2021-10-27

## 2021-10-18 RX ORDER — PANTOPRAZOLE SODIUM 40 MG/1
40 TABLET, DELAYED RELEASE ORAL DAILY
Qty: 90 TABLET | Refills: 1 | Status: SHIPPED | OUTPATIENT
Start: 2021-10-18 | End: 2022-04-25 | Stop reason: SDUPTHER

## 2021-10-18 RX ORDER — PREDNISONE 10 MG/1
10 TABLET ORAL 3 TIMES DAILY
Qty: 9 TABLET | Refills: 0 | Status: CANCELLED | OUTPATIENT
Start: 2021-10-18

## 2021-10-18 RX ORDER — IBUPROFEN 600 MG/1
600 TABLET ORAL 2 TIMES DAILY PRN
Qty: 20 TABLET | Refills: 1 | Status: SHIPPED | OUTPATIENT
Start: 2021-10-18 | End: 2022-01-25 | Stop reason: SDUPTHER

## 2021-10-18 RX ORDER — COLCHICINE 0.6 MG/1
TABLET ORAL
Qty: 30 TABLET | Refills: 2 | Status: SHIPPED | OUTPATIENT
Start: 2021-10-18 | End: 2022-01-25 | Stop reason: SDUPTHER

## 2021-10-27 ENCOUNTER — OFFICE VISIT (OUTPATIENT)
Dept: CARDIOLOGY | Facility: CLINIC | Age: 61
End: 2021-10-27
Payer: COMMERCIAL

## 2021-10-27 VITALS
SYSTOLIC BLOOD PRESSURE: 108 MMHG | HEART RATE: 78 BPM | OXYGEN SATURATION: 97 % | DIASTOLIC BLOOD PRESSURE: 80 MMHG | WEIGHT: 293 LBS | HEIGHT: 68 IN | BODY MASS INDEX: 44.41 KG/M2

## 2021-10-27 DIAGNOSIS — R60.0 BILATERAL LOWER EXTREMITY EDEMA: Primary | ICD-10-CM

## 2021-10-27 DIAGNOSIS — R07.9 CHEST PAIN: ICD-10-CM

## 2021-10-27 DIAGNOSIS — E66.01 MORBID OBESITY: ICD-10-CM

## 2021-10-27 PROCEDURE — 99213 OFFICE O/P EST LOW 20 MIN: CPT | Mod: S$PBB,,, | Performed by: INTERNAL MEDICINE

## 2021-10-27 PROCEDURE — 99213 PR OFFICE/OUTPT VISIT, EST, LEVL III, 20-29 MIN: ICD-10-PCS | Mod: S$PBB,,, | Performed by: INTERNAL MEDICINE

## 2021-10-27 PROCEDURE — 99215 OFFICE O/P EST HI 40 MIN: CPT | Mod: PBBFAC | Performed by: INTERNAL MEDICINE

## 2021-10-27 PROCEDURE — 93005 ELECTROCARDIOGRAM TRACING: CPT | Mod: PBBFAC | Performed by: INTERNAL MEDICINE

## 2021-10-27 PROCEDURE — 93010 EKG 12-LEAD: ICD-10-PCS | Mod: S$PBB,,, | Performed by: INTERNAL MEDICINE

## 2021-10-27 PROCEDURE — 93010 ELECTROCARDIOGRAM REPORT: CPT | Mod: S$PBB,,, | Performed by: INTERNAL MEDICINE

## 2021-10-29 DIAGNOSIS — E11.9 TYPE 2 DIABETES MELLITUS WITHOUT COMPLICATION, WITH LONG-TERM CURRENT USE OF INSULIN: Primary | ICD-10-CM

## 2021-10-29 DIAGNOSIS — J44.9 CHRONIC OBSTRUCTIVE PULMONARY DISEASE, UNSPECIFIED COPD TYPE: ICD-10-CM

## 2021-10-29 DIAGNOSIS — Z79.4 TYPE 2 DIABETES MELLITUS WITHOUT COMPLICATION, WITH LONG-TERM CURRENT USE OF INSULIN: Primary | ICD-10-CM

## 2021-11-01 RX ORDER — BUDESONIDE AND FORMOTEROL FUMARATE DIHYDRATE 160; 4.5 UG/1; UG/1
1-2 AEROSOL RESPIRATORY (INHALATION) EVERY 12 HOURS
Qty: 10.2 G | Refills: 5 | Status: SHIPPED | OUTPATIENT
Start: 2021-11-01 | End: 2022-03-31

## 2021-11-01 RX ORDER — PEN NEEDLE, DIABETIC 32 GX 1/4"
NEEDLE, DISPOSABLE MISCELLANEOUS
Qty: 90 EACH | Refills: 3 | Status: SHIPPED | OUTPATIENT
Start: 2021-11-01 | End: 2023-05-17 | Stop reason: SDUPTHER

## 2021-11-03 ENCOUNTER — CLINICAL SUPPORT (OUTPATIENT)
Dept: WOUND CARE | Facility: CLINIC | Age: 61
End: 2021-11-03
Attending: FAMILY MEDICINE
Payer: COMMERCIAL

## 2021-11-03 VITALS
RESPIRATION RATE: 18 BRPM | DIASTOLIC BLOOD PRESSURE: 90 MMHG | HEART RATE: 72 BPM | SYSTOLIC BLOOD PRESSURE: 172 MMHG | TEMPERATURE: 97 F

## 2021-11-03 DIAGNOSIS — M25.561 CHRONIC PAIN OF RIGHT KNEE: ICD-10-CM

## 2021-11-03 DIAGNOSIS — G89.29 CHRONIC PAIN OF RIGHT KNEE: ICD-10-CM

## 2021-11-03 DIAGNOSIS — E03.9 HYPOTHYROIDISM, UNSPECIFIED TYPE: ICD-10-CM

## 2021-11-03 DIAGNOSIS — E13.51 PERIPHERAL VASCULAR DISEASE DUE TO SECONDARY DIABETES: ICD-10-CM

## 2021-11-03 DIAGNOSIS — I73.9 PVD (PERIPHERAL VASCULAR DISEASE): ICD-10-CM

## 2021-11-03 DIAGNOSIS — M19.90 ARTHRITIS: ICD-10-CM

## 2021-11-03 DIAGNOSIS — J44.9 CHRONIC OBSTRUCTIVE PULMONARY DISEASE, UNSPECIFIED COPD TYPE: ICD-10-CM

## 2021-11-03 DIAGNOSIS — Z79.4 TYPE 2 DIABETES MELLITUS WITHOUT COMPLICATION, WITH LONG-TERM CURRENT USE OF INSULIN: ICD-10-CM

## 2021-11-03 DIAGNOSIS — E11.9 TYPE 2 DIABETES MELLITUS WITHOUT COMPLICATION, WITH LONG-TERM CURRENT USE OF INSULIN: ICD-10-CM

## 2021-11-03 DIAGNOSIS — G89.4 CHRONIC PAIN SYNDROME: Primary | ICD-10-CM

## 2021-11-03 DIAGNOSIS — I10 ESSENTIAL HYPERTENSION: ICD-10-CM

## 2021-11-03 DIAGNOSIS — M79.672 FOOT PAIN, LEFT: ICD-10-CM

## 2021-11-03 DIAGNOSIS — M47.817 LUMBOSACRAL SPONDYLOSIS WITHOUT MYELOPATHY: ICD-10-CM

## 2021-11-03 PROCEDURE — 99215 OFFICE O/P EST HI 40 MIN: CPT | Mod: PBBFAC | Performed by: FAMILY MEDICINE

## 2021-11-03 PROCEDURE — 99214 PR OFFICE/OUTPT VISIT, EST, LEVL IV, 30-39 MIN: ICD-10-PCS | Mod: S$PBB,,, | Performed by: FAMILY MEDICINE

## 2021-11-03 PROCEDURE — 99214 OFFICE O/P EST MOD 30 MIN: CPT | Mod: S$PBB,,, | Performed by: FAMILY MEDICINE

## 2021-11-03 RX ORDER — MUPIROCIN 20 MG/G
1 OINTMENT TOPICAL DAILY
COMMUNITY
Start: 2021-10-20 | End: 2022-01-17

## 2021-11-03 RX ORDER — GENTAMICIN SULFATE 1 MG/G
1 CREAM TOPICAL DAILY
COMMUNITY
Start: 2021-10-13 | End: 2022-02-09

## 2021-11-04 ENCOUNTER — DOCUMENTATION ONLY (OUTPATIENT)
Dept: CARDIOLOGY | Facility: CLINIC | Age: 61
End: 2021-11-04
Payer: MEDICAID

## 2021-11-10 PROBLEM — E66.01 MORBID OBESITY: Status: ACTIVE | Noted: 2021-11-10

## 2021-11-10 PROBLEM — R07.9 CHEST PAIN: Status: ACTIVE | Noted: 2021-11-10

## 2021-11-10 RX ORDER — METOPROLOL TARTRATE 50 MG/1
50 TABLET ORAL 2 TIMES DAILY
Qty: 180 TABLET | Refills: 1 | Status: SHIPPED | OUTPATIENT
Start: 2021-11-10 | End: 2022-02-09

## 2021-11-10 RX ORDER — ASPIRIN 81 MG/1
81 TABLET ORAL DAILY
Qty: 90 TABLET | Refills: 1 | Status: SHIPPED | OUTPATIENT
Start: 2021-11-10 | End: 2022-05-16 | Stop reason: SDUPTHER

## 2021-11-11 ENCOUNTER — OFFICE VISIT (OUTPATIENT)
Dept: VASCULAR SURGERY | Facility: CLINIC | Age: 61
End: 2021-11-11
Payer: COMMERCIAL

## 2021-11-11 VITALS
HEIGHT: 68 IN | RESPIRATION RATE: 16 BRPM | WEIGHT: 293 LBS | DIASTOLIC BLOOD PRESSURE: 82 MMHG | SYSTOLIC BLOOD PRESSURE: 138 MMHG | HEART RATE: 100 BPM | BODY MASS INDEX: 44.41 KG/M2

## 2021-11-11 DIAGNOSIS — R23.8 OTHER SKIN CHANGES: ICD-10-CM

## 2021-11-11 DIAGNOSIS — Z86.718 HISTORY OF DVT (DEEP VEIN THROMBOSIS): ICD-10-CM

## 2021-11-11 DIAGNOSIS — R60.0 EDEMA, LOWER EXTREMITY: ICD-10-CM

## 2021-11-11 DIAGNOSIS — I83.813 VARICOSE VEINS OF BILATERAL LOWER EXTREMITIES WITH PAIN: ICD-10-CM

## 2021-11-11 DIAGNOSIS — M79.605 LEG PAIN, BILATERAL: ICD-10-CM

## 2021-11-11 DIAGNOSIS — I87.2 VENOUS INSUFFICIENCY: Primary | ICD-10-CM

## 2021-11-11 DIAGNOSIS — M79.604 LEG PAIN, BILATERAL: ICD-10-CM

## 2021-11-11 PROCEDURE — 99215 OFFICE O/P EST HI 40 MIN: CPT | Mod: PBBFAC | Performed by: FAMILY MEDICINE

## 2021-11-11 PROCEDURE — 99214 OFFICE O/P EST MOD 30 MIN: CPT | Mod: S$PBB,,, | Performed by: FAMILY MEDICINE

## 2021-11-11 PROCEDURE — 99214 PR OFFICE/OUTPT VISIT, EST, LEVL IV, 30-39 MIN: ICD-10-PCS | Mod: S$PBB,,, | Performed by: FAMILY MEDICINE

## 2021-11-12 PROBLEM — Z86.718 HISTORY OF DVT (DEEP VEIN THROMBOSIS): Status: ACTIVE | Noted: 2021-11-12

## 2021-11-15 ENCOUNTER — HOSPITAL ENCOUNTER (EMERGENCY)
Facility: HOSPITAL | Age: 61
Discharge: HOME | End: 2021-11-15
Attending: FAMILY MEDICINE
Payer: MEDICAID

## 2021-11-15 VITALS
OXYGEN SATURATION: 95 % | TEMPERATURE: 99 F | HEIGHT: 68 IN | DIASTOLIC BLOOD PRESSURE: 52 MMHG | BODY MASS INDEX: 44.41 KG/M2 | SYSTOLIC BLOOD PRESSURE: 132 MMHG | HEART RATE: 78 BPM | RESPIRATION RATE: 18 BRPM | WEIGHT: 293 LBS

## 2021-11-15 DIAGNOSIS — R51.9 ACUTE NONINTRACTABLE HEADACHE, UNSPECIFIED HEADACHE TYPE: Primary | ICD-10-CM

## 2021-11-15 DIAGNOSIS — G44.209 TENSION HEADACHE: ICD-10-CM

## 2021-11-15 LAB
FLUAV AG UPPER RESP QL IA.RAPID: NEGATIVE
FLUBV AG UPPER RESP QL IA.RAPID: NEGATIVE
GLUCOSE SERPL-MCNC: 121 MG/DL (ref 70–105)
SARS-COV+SARS-COV-2 AG RESP QL IA.RAPID: NEGATIVE

## 2021-11-15 PROCEDURE — 63600175 PHARM REV CODE 636 W HCPCS: Performed by: FAMILY MEDICINE

## 2021-11-15 PROCEDURE — 96372 THER/PROPH/DIAG INJ SC/IM: CPT

## 2021-11-15 PROCEDURE — 99283 EMERGENCY DEPT VISIT LOW MDM: CPT | Performed by: FAMILY MEDICINE

## 2021-11-15 PROCEDURE — 99284 EMERGENCY DEPT VISIT MOD MDM: CPT | Mod: CS

## 2021-11-15 PROCEDURE — 82962 GLUCOSE BLOOD TEST: CPT

## 2021-11-15 PROCEDURE — 87428 SARSCOV & INF VIR A&B AG IA: CPT | Performed by: FAMILY MEDICINE

## 2021-11-15 RX ORDER — METHOCARBAMOL 750 MG/1
1500 TABLET, FILM COATED ORAL 3 TIMES DAILY
Qty: 30 TABLET | Refills: 0 | Status: SHIPPED | OUTPATIENT
Start: 2021-11-15 | End: 2021-11-20

## 2021-11-15 RX ORDER — KETOROLAC TROMETHAMINE 30 MG/ML
60 INJECTION, SOLUTION INTRAMUSCULAR; INTRAVENOUS
Status: COMPLETED | OUTPATIENT
Start: 2021-11-15 | End: 2021-11-15

## 2021-11-15 RX ORDER — NABUMETONE 750 MG/1
750 TABLET, FILM COATED ORAL 2 TIMES DAILY
Qty: 20 TABLET | Refills: 0 | Status: SHIPPED | OUTPATIENT
Start: 2021-11-15 | End: 2021-11-25

## 2021-11-15 RX ORDER — ORPHENADRINE CITRATE 30 MG/ML
60 INJECTION INTRAMUSCULAR; INTRAVENOUS
Status: COMPLETED | OUTPATIENT
Start: 2021-11-15 | End: 2021-11-15

## 2021-11-15 RX ADMIN — ORPHENADRINE CITRATE 60 MG: 30 INJECTION INTRAMUSCULAR; INTRAVENOUS at 01:11

## 2021-11-15 RX ADMIN — KETOROLAC TROMETHAMINE 60 MG: 30 INJECTION, SOLUTION INTRAMUSCULAR at 01:11

## 2021-11-16 ENCOUNTER — TELEPHONE (OUTPATIENT)
Dept: FAMILY MEDICINE | Facility: CLINIC | Age: 61
End: 2021-11-16
Payer: MEDICAID

## 2021-11-17 ENCOUNTER — TELEPHONE (OUTPATIENT)
Dept: CARDIOLOGY | Facility: CLINIC | Age: 61
End: 2021-11-17
Payer: MEDICAID

## 2021-11-17 ENCOUNTER — OFFICE VISIT (OUTPATIENT)
Dept: FAMILY MEDICINE | Facility: CLINIC | Age: 61
End: 2021-11-17
Payer: COMMERCIAL

## 2021-11-17 VITALS
SYSTOLIC BLOOD PRESSURE: 156 MMHG | BODY MASS INDEX: 44.41 KG/M2 | RESPIRATION RATE: 19 BRPM | OXYGEN SATURATION: 96 % | WEIGHT: 293 LBS | HEIGHT: 68 IN | DIASTOLIC BLOOD PRESSURE: 78 MMHG | HEART RATE: 69 BPM

## 2021-11-17 DIAGNOSIS — G89.29 CHRONIC NONINTRACTABLE HEADACHE, UNSPECIFIED HEADACHE TYPE: ICD-10-CM

## 2021-11-17 DIAGNOSIS — H53.8 BLURRED VISION: ICD-10-CM

## 2021-11-17 DIAGNOSIS — E66.01 MORBID OBESITY: ICD-10-CM

## 2021-11-17 DIAGNOSIS — S16.1XXA STRAIN OF NECK MUSCLE, INITIAL ENCOUNTER: ICD-10-CM

## 2021-11-17 DIAGNOSIS — I73.9 PVD (PERIPHERAL VASCULAR DISEASE): Primary | ICD-10-CM

## 2021-11-17 DIAGNOSIS — R51.9 CHRONIC NONINTRACTABLE HEADACHE, UNSPECIFIED HEADACHE TYPE: ICD-10-CM

## 2021-11-17 DIAGNOSIS — L03.115 CELLULITIS AND ABSCESS OF RIGHT LEG: ICD-10-CM

## 2021-11-17 DIAGNOSIS — R51.9 NONINTRACTABLE HEADACHE, UNSPECIFIED CHRONICITY PATTERN, UNSPECIFIED HEADACHE TYPE: ICD-10-CM

## 2021-11-17 DIAGNOSIS — L02.415 CELLULITIS AND ABSCESS OF RIGHT LEG: ICD-10-CM

## 2021-11-17 DIAGNOSIS — I10 ESSENTIAL HYPERTENSION: ICD-10-CM

## 2021-11-17 DIAGNOSIS — I87.8 VENOUS STASIS OF LOWER EXTREMITY: ICD-10-CM

## 2021-11-17 DIAGNOSIS — M54.2 NECK PAIN: ICD-10-CM

## 2021-11-17 PROCEDURE — 99214 OFFICE O/P EST MOD 30 MIN: CPT | Mod: ,,, | Performed by: INTERNAL MEDICINE

## 2021-11-17 PROCEDURE — 99214 PR OFFICE/OUTPT VISIT, EST, LEVL IV, 30-39 MIN: ICD-10-PCS | Mod: ,,, | Performed by: INTERNAL MEDICINE

## 2021-11-17 RX ORDER — CLINDAMYCIN HYDROCHLORIDE 150 MG/1
150 CAPSULE ORAL EVERY 8 HOURS
Qty: 21 CAPSULE | Refills: 0 | Status: SHIPPED | OUTPATIENT
Start: 2021-11-17 | End: 2022-01-11 | Stop reason: SDUPTHER

## 2021-11-17 RX ORDER — NAPROXEN 500 MG/1
500 TABLET ORAL 2 TIMES DAILY PRN
Qty: 20 TABLET | Refills: 0 | Status: SHIPPED | OUTPATIENT
Start: 2021-11-17 | End: 2022-02-09

## 2021-11-17 RX ORDER — TOPIRAMATE 25 MG/1
25 TABLET ORAL 2 TIMES DAILY
Qty: 30 TABLET | Refills: 1 | Status: SHIPPED | OUTPATIENT
Start: 2021-11-17 | End: 2021-12-17

## 2021-11-22 ENCOUNTER — OFFICE VISIT (OUTPATIENT)
Dept: VASCULAR SURGERY | Facility: CLINIC | Age: 61
End: 2021-11-22
Payer: COMMERCIAL

## 2021-11-22 ENCOUNTER — HOSPITAL ENCOUNTER (OUTPATIENT)
Dept: RADIOLOGY | Facility: HOSPITAL | Age: 61
Discharge: HOME | End: 2021-11-22
Attending: FAMILY MEDICINE
Payer: COMMERCIAL

## 2021-11-22 VITALS
DIASTOLIC BLOOD PRESSURE: 80 MMHG | HEIGHT: 68 IN | WEIGHT: 293 LBS | HEART RATE: 64 BPM | SYSTOLIC BLOOD PRESSURE: 140 MMHG | BODY MASS INDEX: 44.41 KG/M2 | RESPIRATION RATE: 20 BRPM

## 2021-11-22 DIAGNOSIS — M79.605 LEG PAIN, BILATERAL: ICD-10-CM

## 2021-11-22 DIAGNOSIS — L81.9 HYPERPIGMENTATION OF SKIN: ICD-10-CM

## 2021-11-22 DIAGNOSIS — I83.813 VARICOSE VEINS OF BILATERAL LOWER EXTREMITIES WITH PAIN: ICD-10-CM

## 2021-11-22 DIAGNOSIS — I87.2 VENOUS INSUFFICIENCY: ICD-10-CM

## 2021-11-22 DIAGNOSIS — M79.604 LEG PAIN, BILATERAL: ICD-10-CM

## 2021-11-22 DIAGNOSIS — Z86.718 HISTORY OF DVT (DEEP VEIN THROMBOSIS): ICD-10-CM

## 2021-11-22 DIAGNOSIS — R60.0 EDEMA, LOWER EXTREMITY: ICD-10-CM

## 2021-11-22 DIAGNOSIS — R23.8 OTHER SKIN CHANGES: ICD-10-CM

## 2021-11-22 DIAGNOSIS — I87.2 VENOUS INSUFFICIENCY: Primary | ICD-10-CM

## 2021-11-22 DIAGNOSIS — I87.2 VENOUS INSUFFICIENCY (CHRONIC) (PERIPHERAL): Primary | ICD-10-CM

## 2021-11-22 PROCEDURE — 93970 EXTREMITY STUDY: CPT | Mod: TC

## 2021-11-22 PROCEDURE — 99215 OFFICE O/P EST HI 40 MIN: CPT | Mod: PBBFAC,25 | Performed by: FAMILY MEDICINE

## 2021-11-22 PROCEDURE — 99214 OFFICE O/P EST MOD 30 MIN: CPT | Mod: S$PBB,,, | Performed by: FAMILY MEDICINE

## 2021-11-22 PROCEDURE — 99214 PR OFFICE/OUTPT VISIT, EST, LEVL IV, 30-39 MIN: ICD-10-PCS | Mod: S$PBB,,, | Performed by: FAMILY MEDICINE

## 2021-11-22 PROCEDURE — 93970 US VENOUS REFLUX STUDY BILATERAL: ICD-10-PCS | Mod: 26,,, | Performed by: FAMILY MEDICINE

## 2021-11-22 PROCEDURE — 93970 EXTREMITY STUDY: CPT | Mod: 26,,, | Performed by: FAMILY MEDICINE

## 2021-11-22 RX ORDER — SODIUM CHLORIDE 9 MG/ML
30 INJECTION, SOLUTION INTRAVENOUS CONTINUOUS
Status: CANCELLED | OUTPATIENT
Start: 2021-12-17

## 2021-11-24 ENCOUNTER — HOSPITAL ENCOUNTER (OUTPATIENT)
Dept: CARDIOLOGY | Facility: HOSPITAL | Age: 61
Discharge: HOME | End: 2021-11-24
Attending: INTERNAL MEDICINE
Payer: COMMERCIAL

## 2021-11-24 VITALS — HEIGHT: 68 IN | BODY MASS INDEX: 44.41 KG/M2 | WEIGHT: 293 LBS

## 2021-11-24 DIAGNOSIS — R07.9 CHEST PAIN: ICD-10-CM

## 2021-11-24 PROCEDURE — 93306 TTE W/DOPPLER COMPLETE: CPT | Mod: 26,,, | Performed by: INTERNAL MEDICINE

## 2021-11-24 PROCEDURE — 93306 TTE W/DOPPLER COMPLETE: CPT

## 2021-11-24 PROCEDURE — 93306 ECHO (CUPID ONLY): ICD-10-PCS | Mod: 26,,, | Performed by: INTERNAL MEDICINE

## 2021-11-29 DIAGNOSIS — R51.9 NONINTRACTABLE HEADACHE, UNSPECIFIED CHRONICITY PATTERN, UNSPECIFIED HEADACHE TYPE: Primary | ICD-10-CM

## 2021-11-29 LAB
AORTIC VALVE CUSP SEPERATION: 18.4 CM
AV INDEX (PROSTH): 0.74
AV MEAN GRADIENT: 4 MMHG
AV VALVE AREA: 2.33 CM2
BSA FOR ECHO PROCEDURE: 2.81 M2
CV ECHO LV RWT: 0.55 CM
DOP CALC AO VTI: 25.73 CM
DOP CALC LVOT AREA: 3.1 CM2
DOP CALC LVOT DIAMETER: 2 CM
DOP CALC LVOT STROKE VOLUME: 59.94 CM3
DOP CALCLVOT PEAK VEL VTI: 19.09 CM
E WAVE DECELERATION TIME: 196 MSEC
E/A RATIO: 0.89
E/E' RATIO: 6.22 M/S
ECHO EF ESTIMATED: 90 %
ECHO LV POSTERIOR WALL: 1.27 CM (ref 0.6–1.1)
EJECTION FRACTION: 50 %
FRACTIONAL SHORTENING: 60 % (ref 28–44)
INTERVENTRICULAR SEPTUM: 1.32 CM (ref 0.6–1.1)
LEFT ATRIUM SIZE: 2.99 CM
LEFT INTERNAL DIMENSION IN SYSTOLE: 1.84 CM (ref 2.1–4)
LEFT VENTRICLE DIASTOLIC VOLUME INDEX: 37.44 ML/M2
LEFT VENTRICLE DIASTOLIC VOLUME: 98.83 ML
LEFT VENTRICLE MASS INDEX: 88 G/M2
LEFT VENTRICLE SYSTOLIC VOLUME INDEX: 3.9 ML/M2
LEFT VENTRICLE SYSTOLIC VOLUME: 10.28 ML
LEFT VENTRICULAR INTERNAL DIMENSION IN DIASTOLE: 4.63 CM (ref 3.5–6)
LEFT VENTRICULAR MASS: 231.16 G
LV LATERAL E/E' RATIO: 6.22 M/S
LV SEPTAL E/E' RATIO: 6.22 M/S
LVOT MG: 1 MMHG
MV PEAK A VEL: 0.63 M/S
MV PEAK E VEL: 0.56 M/S
RIGHT VENTRICULAR END-DIASTOLIC DIMENSION: 2.62 CM
TDI LATERAL: 0.09 M/S
TDI SEPTAL: 0.09 M/S
TDI: 0.09 M/S

## 2021-12-01 ENCOUNTER — OFFICE VISIT (OUTPATIENT)
Dept: CARDIOLOGY | Facility: CLINIC | Age: 61
End: 2021-12-01
Payer: COMMERCIAL

## 2021-12-01 VITALS
HEIGHT: 68 IN | HEART RATE: 76 BPM | OXYGEN SATURATION: 92 % | WEIGHT: 293 LBS | BODY MASS INDEX: 44.41 KG/M2 | DIASTOLIC BLOOD PRESSURE: 78 MMHG | SYSTOLIC BLOOD PRESSURE: 134 MMHG

## 2021-12-01 DIAGNOSIS — E66.01 MORBID OBESITY: ICD-10-CM

## 2021-12-01 DIAGNOSIS — R00.2 INTERMITTENT PALPITATIONS: ICD-10-CM

## 2021-12-01 DIAGNOSIS — I10 ESSENTIAL HYPERTENSION: Primary | ICD-10-CM

## 2021-12-01 DIAGNOSIS — R06.09 EXERTIONAL DYSPNEA: ICD-10-CM

## 2021-12-01 PROCEDURE — 99215 OFFICE O/P EST HI 40 MIN: CPT | Mod: PBBFAC | Performed by: INTERNAL MEDICINE

## 2021-12-01 PROCEDURE — 99213 OFFICE O/P EST LOW 20 MIN: CPT | Mod: S$PBB,,, | Performed by: INTERNAL MEDICINE

## 2021-12-01 PROCEDURE — 99213 PR OFFICE/OUTPT VISIT, EST, LEVL III, 20-29 MIN: ICD-10-PCS | Mod: S$PBB,,, | Performed by: INTERNAL MEDICINE

## 2021-12-07 ENCOUNTER — OFFICE VISIT (OUTPATIENT)
Dept: PAIN MEDICINE | Facility: CLINIC | Age: 61
End: 2021-12-07
Payer: COMMERCIAL

## 2021-12-07 VITALS
RESPIRATION RATE: 20 BRPM | HEART RATE: 71 BPM | HEIGHT: 68 IN | WEIGHT: 293 LBS | SYSTOLIC BLOOD PRESSURE: 145 MMHG | BODY MASS INDEX: 44.41 KG/M2 | DIASTOLIC BLOOD PRESSURE: 63 MMHG

## 2021-12-07 DIAGNOSIS — M79.672 FOOT PAIN, LEFT: Chronic | ICD-10-CM

## 2021-12-07 DIAGNOSIS — G89.29 CHRONIC PAIN OF RIGHT KNEE: Chronic | ICD-10-CM

## 2021-12-07 DIAGNOSIS — M47.817 LUMBOSACRAL SPONDYLOSIS WITHOUT MYELOPATHY: Primary | Chronic | ICD-10-CM

## 2021-12-07 DIAGNOSIS — M25.561 CHRONIC PAIN OF RIGHT KNEE: Chronic | ICD-10-CM

## 2021-12-07 DIAGNOSIS — Z79.899 ENCOUNTER FOR LONG-TERM (CURRENT) USE OF OTHER MEDICATIONS: ICD-10-CM

## 2021-12-07 DIAGNOSIS — I73.9 PVD (PERIPHERAL VASCULAR DISEASE): Chronic | ICD-10-CM

## 2021-12-07 PROCEDURE — 96372 THER/PROPH/DIAG INJ SC/IM: CPT | Mod: PBBFAC | Performed by: PHYSICIAN ASSISTANT

## 2021-12-07 PROCEDURE — 99214 PR OFFICE/OUTPT VISIT, EST, LEVL IV, 30-39 MIN: ICD-10-PCS | Mod: S$PBB,25,, | Performed by: PHYSICIAN ASSISTANT

## 2021-12-07 PROCEDURE — 80305 DRUG TEST PRSMV DIR OPT OBS: CPT | Mod: PBBFAC | Performed by: PHYSICIAN ASSISTANT

## 2021-12-07 PROCEDURE — 99215 OFFICE O/P EST HI 40 MIN: CPT | Mod: PBBFAC | Performed by: PHYSICIAN ASSISTANT

## 2021-12-07 PROCEDURE — 99214 OFFICE O/P EST MOD 30 MIN: CPT | Mod: S$PBB,25,, | Performed by: PHYSICIAN ASSISTANT

## 2021-12-07 RX ORDER — HYDROCODONE BITARTRATE AND ACETAMINOPHEN 10; 325 MG/1; MG/1
1 TABLET ORAL EVERY 6 HOURS
Qty: 120 TABLET | Refills: 0 | Status: SHIPPED | OUTPATIENT
Start: 2021-12-08 | End: 2022-01-07

## 2021-12-07 RX ORDER — PREGABALIN 100 MG/1
100 CAPSULE ORAL EVERY 8 HOURS
Qty: 90 CAPSULE | Refills: 1 | Status: SHIPPED | OUTPATIENT
Start: 2021-12-07 | End: 2021-12-07

## 2021-12-07 RX ORDER — DICLOFENAC SODIUM 10 MG/G
2 GEL TOPICAL 4 TIMES DAILY
Qty: 10 EACH | Refills: 1 | Status: SHIPPED | OUTPATIENT
Start: 2021-12-07 | End: 2022-02-09

## 2021-12-07 RX ORDER — PREGABALIN 100 MG/1
100 CAPSULE ORAL EVERY 12 HOURS
Qty: 60 CAPSULE | Refills: 1 | Status: SHIPPED | OUTPATIENT
Start: 2021-12-07 | End: 2022-01-31 | Stop reason: SDUPTHER

## 2021-12-07 RX ORDER — HYDROCODONE BITARTRATE AND ACETAMINOPHEN 10; 325 MG/1; MG/1
1 TABLET ORAL EVERY 6 HOURS
Qty: 120 TABLET | Refills: 0 | Status: SHIPPED | OUTPATIENT
Start: 2022-01-07 | End: 2022-01-31 | Stop reason: SDUPTHER

## 2021-12-07 RX ORDER — KETOROLAC TROMETHAMINE 30 MG/ML
60 INJECTION, SOLUTION INTRAMUSCULAR; INTRAVENOUS
Status: COMPLETED | OUTPATIENT
Start: 2021-12-07 | End: 2021-12-07

## 2021-12-07 RX ADMIN — KETOROLAC TROMETHAMINE 60 MG: 30 INJECTION, SOLUTION INTRAMUSCULAR; INTRAVENOUS at 01:12

## 2021-12-17 DIAGNOSIS — M54.12 CERVICAL RADICULOPATHY: Primary | ICD-10-CM

## 2021-12-26 PROBLEM — R06.09 EXERTIONAL DYSPNEA: Status: ACTIVE | Noted: 2021-12-26

## 2021-12-26 PROBLEM — R00.2 INTERMITTENT PALPITATIONS: Status: ACTIVE | Noted: 2021-12-26

## 2022-01-03 ENCOUNTER — OFFICE VISIT (OUTPATIENT)
Dept: WOUND CARE | Facility: CLINIC | Age: 62
End: 2022-01-03
Attending: FAMILY MEDICINE
Payer: COMMERCIAL

## 2022-01-03 VITALS
RESPIRATION RATE: 20 BRPM | HEART RATE: 66 BPM | DIASTOLIC BLOOD PRESSURE: 66 MMHG | SYSTOLIC BLOOD PRESSURE: 170 MMHG | TEMPERATURE: 97 F

## 2022-01-03 DIAGNOSIS — I10 ESSENTIAL HYPERTENSION: ICD-10-CM

## 2022-01-03 DIAGNOSIS — E03.9 HYPOTHYROIDISM, UNSPECIFIED TYPE: ICD-10-CM

## 2022-01-03 DIAGNOSIS — J44.9 CHRONIC OBSTRUCTIVE PULMONARY DISEASE, UNSPECIFIED COPD TYPE: ICD-10-CM

## 2022-01-03 DIAGNOSIS — G89.29 CHRONIC PAIN OF RIGHT KNEE: ICD-10-CM

## 2022-01-03 DIAGNOSIS — Z79.4 TYPE 2 DIABETES MELLITUS WITHOUT COMPLICATION, WITH LONG-TERM CURRENT USE OF INSULIN: ICD-10-CM

## 2022-01-03 DIAGNOSIS — E11.9 TYPE 2 DIABETES MELLITUS WITHOUT COMPLICATION, WITH LONG-TERM CURRENT USE OF INSULIN: ICD-10-CM

## 2022-01-03 DIAGNOSIS — M47.817 LUMBOSACRAL SPONDYLOSIS WITHOUT MYELOPATHY: ICD-10-CM

## 2022-01-03 DIAGNOSIS — E13.51 PERIPHERAL VASCULAR DISEASE DUE TO SECONDARY DIABETES: ICD-10-CM

## 2022-01-03 DIAGNOSIS — G89.4 CHRONIC PAIN SYNDROME: Primary | ICD-10-CM

## 2022-01-03 DIAGNOSIS — M19.90 ARTHRITIS: ICD-10-CM

## 2022-01-03 DIAGNOSIS — I73.9 PVD (PERIPHERAL VASCULAR DISEASE): ICD-10-CM

## 2022-01-03 DIAGNOSIS — M25.561 CHRONIC PAIN OF RIGHT KNEE: ICD-10-CM

## 2022-01-03 DIAGNOSIS — M79.672 FOOT PAIN, LEFT: ICD-10-CM

## 2022-01-03 PROCEDURE — 99214 PR OFFICE/OUTPT VISIT, EST, LEVL IV, 30-39 MIN: ICD-10-PCS | Mod: S$PBB,,, | Performed by: FAMILY MEDICINE

## 2022-01-03 PROCEDURE — 99214 OFFICE O/P EST MOD 30 MIN: CPT | Mod: S$PBB,,, | Performed by: FAMILY MEDICINE

## 2022-01-03 PROCEDURE — 99215 OFFICE O/P EST HI 40 MIN: CPT | Mod: PBBFAC | Performed by: FAMILY MEDICINE

## 2022-01-03 NOTE — PATIENT INSTRUCTIONS
Left foot wound    Clean with baby shampoo and water    Apply unna boot     Cover with gauze and wrap with sahil,paper tape    Change 2-3 times daily and as needed    Elevate feet as much as possible    No prolong standing

## 2022-01-03 NOTE — PROGRESS NOTES
See wound assessment. Apply unna boot 2-3 times a week and as needed. Elevate feet as much as possible. No prolong standing. Home Health evaluation sent to [facility]. RTC 3 weeks

## 2022-01-04 NOTE — PROGRESS NOTES
Subjective:      Patient ID: Holly Porter is a 61 y.o. female.    Chief Complaint: Non-healing Wound Follow Up and Diabetic Foot Ulcer (Left foot)    Holly Porter a 61 y.o. female presents for follow up on all regular problems which are reviewed and discussed.     Problem List Items Addressed This Visit        Neuro    Chronic pain syndrome - Primary (Chronic)    Lumbosacral spondylosis without myelopathy (Chronic)       Pulmonary    Chronic obstructive pulmonary disease       Cardiac/Vascular    PVD (peripheral vascular disease) (Chronic)    Essential hypertension       Endocrine    Type 2 diabetes mellitus without complication, with long-term current use of insulin    Peripheral vascular disease due to secondary diabetes    Hypothyroidism       Orthopedic    Foot pain, left (Chronic)    Chronic pain of right knee (Chronic)    Arthritis          Past Medical History:  Past Medical History:   Diagnosis Date    Anxiety state     Atherosclerosis of native artery of extremity with intermittent claudication 01/30/2019    bilateral legs    Bilateral leg pain     BMI 50.0-59.9, adult 02/22/2021    Cellulitis 06/11/2020    Chronic pain syndrome     Chronic peripheral venous hypertension 01/08/2019    COPD (chronic obstructive pulmonary disease)     Diabetes     DVT (deep venous thrombosis) 02/12/2020    Embolism and thrombosis of superficial veins of unspecified lower extremity 07/01/2019    bilateral    Frequent headaches 09/20/2018    GERD (gastroesophageal reflux disease)     Hereditary lymphedema     Hx of thyroid cancer     Hyperlipidemia     Hypertension     Hypothyroidism     Migraines     Migraines     Morbid obesity     Nicotine dependence     Non-pressure chronic ulcer of left lower leg 03/09/2021    Osteoarthritis     Other skin changes 03/09/2021    bilateral gaiter regions    Pain in left leg     Pain in right leg     PVD (peripheral vascular disease) 01/08/2019    Seizure  disorder     Sleep apnea     Venous insufficiency (chronic) (peripheral)     Venous stasis     Vitamin D deficiency      Past Surgical History:   Procedure Laterality Date    STAB PHLEBECTOMY OF VARICOSE VEINS Left 01/25/2013    Left leg microphlebectomies x 23 stab avulsions and ligation of multiple varicose veins perrformed by Dr. Cirilo Aguirre.    THYROIDECTOMY      hx: thyroid cancer    TOE AMPUTATION      2nd, 4th and 5th    TOTAL ABDOMINAL HYSTERECTOMY W/ BILATERAL SALPINGOOPHORECTOMY      TUBAL LIGATION      VAGINAL DELIVERY      x 4    VENOUS ABLATION Right 08/09/2019    GSV Varithena Ablation performed by Dr. Estuardo Falcon    VENOUS ABLATION Left 08/02/2019    ATAV Varithena Ablation performed by Dr. Estuardo Falcon.    VENOUS ABLATION Right 07/13/2015    ATAV Laser Ablatio performed by Dr. Cirilo Aguirre.    VENOUS ABLATION Right 07/06/2015    Distal  GSV Laser Ablation performed by dr. Cirilo Aguirre.    VENOUS ABLATION Left 04/20/2015    Left Distal GSV Laser Ablation performed by dr. Cirilo Aguirre.    VENOUS ABLATION Right 10/28/2013    Right Distal GSV RF Ablation performed by Dr. Cirilo Aguirre.    VENOUS ABLATION Left 10/25/2013    SSV RF Ablation performed by dr. Cirilo Aguirre.    VENOUS ABLATION Left 10/07/2013    Left GSV and Left ATAV RF Ablation performed by Dr. Cirilo Aguirre.    VENOUS ABLATION Right 01/21/2013    GSV RF Ablation w/micros x 22 performed by Dr. Cirilo Aguirre.    VENOUS ABLATION Left 06/25/2021    Left distal GSV Varithena Ablation     Review of patient's allergies indicates:   Allergen Reactions    Ammonium peroxydisulfate Shortness Of Breath    Avocado (laurus persea) Anaphylaxis    Bananas [banana] Anaphylaxis and Swelling     CRAMPS,    Chocolate flavor Anaphylaxis     MOUTH SWELLING    Beef-potatoes-peas [nutritional supplement-fiber] Itching    Corticosteroids (glucocorticoids)     Hydrocortisone Blisters    Lasix [furosemide] Blisters     BURNS SKIN    Adhesive Rash and Blisters    Iodine  and iodide containing products Rash    Latex, natural rubber Rash    Pcn [penicillins] Rash    Sulfa (sulfonamide antibiotics) Rash and Blisters     Current Outpatient Medications on File Prior to Visit   Medication Sig Dispense Refill    allopurinoL (ZYLOPRIM) 300 MG tablet Take 1 tablet (300 mg total) by mouth once daily. 90 tablet 3    amitriptyline (ELAVIL) 10 MG tablet       aspirin (ECOTRIN) 81 MG EC tablet Take 1 tablet (81 mg total) by mouth once daily. 90 tablet 1    budesonide-formoterol 160-4.5 mcg (SYMBICORT) 160-4.5 mcg/actuation HFAA Inhale 1-2 puffs into the lungs every 12 (twelve) hours. 10.2 g 5    cilostazoL (PLETAL) 100 MG Tab       clindamycin (CLEOCIN) 150 MG capsule Take 1 capsule (150 mg total) by mouth every 8 (eight) hours. 21 capsule 0    colchicine (COLCRYS) 0.6 mg tablet One pill three times a day for two days,then one pill daily till out 30 tablet 2    diclofenac sodium (VOLTAREN ARTHRITIS PAIN) 1 % Gel Apply 2 g topically 4 (four) times daily. Apply to knees, elbows, joints as needed 10 each 1    gentamicin (GARAMYCIN) 0.1 % cream Apply 1 application topically once daily. Left foot wound      HYDROcodone-acetaminophen (NORCO)  mg per tablet Take 1 tablet by mouth every 6 (six) hours. 120 tablet 0    [START ON 1/7/2022] HYDROcodone-acetaminophen (NORCO)  mg per tablet Take 1 tablet by mouth every 6 (six) hours. 120 tablet 0    hydrOXYzine HCL (ATARAX) 25 MG tablet Take 1 tablet (25 mg total) by mouth every 6 (six) hours as needed for Itching. 60 tablet 0    ibuprofen (ADVIL,MOTRIN) 600 MG tablet Take 1 tablet (600 mg total) by mouth 2 (two) times daily as needed for Pain. 20 tablet 1    levothyroxine (SYNTHROID, LEVOTHROID) 175 MCG tablet Take 1 tablet (175 mcg total) by mouth once daily. 90 tablet 1    losartan-hydrochlorothiazide 50-12.5 mg (HYZAAR) 50-12.5 mg per tablet       metOLazone (ZAROXOLYN) 2.5 MG tablet Take 1 tablet (2.5 mg total) by mouth once  "daily. 30 tablet 2    metoprolol tartrate (LOPRESSOR) 50 MG tablet Take 1 tablet (50 mg total) by mouth 2 (two) times daily. 180 tablet 1    mupirocin (BACTROBAN) 2 % ointment Apply 1 g topically once daily.      naproxen (NAPROSYN) 500 MG tablet Take 1 tablet (500 mg total) by mouth 2 (two) times daily as needed (pain). 20 tablet 0    NOVOFINE 32 32 gauge x 1/4" Ndle Use as directed once daily. 90 each 3    pantoprazole (PROTONIX) 40 MG tablet Take 1 tablet (40 mg total) by mouth once daily. 90 tablet 1    potassium chloride (MICRO-K) 10 MEQ CpSR       pregabalin (LYRICA) 100 MG capsule Take 1 capsule (100 mg total) by mouth every 12 (twelve) hours. 60 capsule 1    topiramate (TOPAMAX) 50 MG tablet       TOUJEO SOLOSTAR U-300 INSULIN 300 unit/mL (1.5 mL) InPn pen       triamcinolone acetonide 0.1% (KENALOG) 0.1 % cream Apply topically 3 (three) times daily. 400 g 2     No current facility-administered medications on file prior to visit.     Social History     Socioeconomic History    Marital status:    Tobacco Use    Smoking status: Current Every Day Smoker     Packs/day: 1.00     Types: Cigarettes    Smokeless tobacco: Never Used   Substance and Sexual Activity    Alcohol use: Never    Drug use: Never    Sexual activity: Not Currently     Family History   Problem Relation Age of Onset    Heart disease Mother         age 84 CHF    Hypertension Mother     Osteoarthritis Mother     Coronary aneurysm Father     Coronary artery disease Father     Hypertension Father     Heart disease Father     No Known Problems Son     No Known Problems Son     No Known Problems Son     No Known Problems Son     No Known Problems Sister     No Known Problems Brother         hx: varicose veins    No Known Problems Brother         MVA: parlazed    No Known Problems Brother        Review of Systems   Constitutional: Negative.  Negative for activity change, appetite change, chills and diaphoresis. "   HENT: Negative.  Negative for congestion, ear pain, hearing loss and postnasal drip.    Eyes: Negative for itching.   Respiratory: Negative for chest tightness and shortness of breath.    Cardiovascular: Negative for chest pain.   Gastrointestinal: Negative for abdominal pain.   Endocrine: Negative for polydipsia.   Genitourinary: Negative for frequency.   Musculoskeletal: Negative for back pain.   Skin: Positive for color change and wound.   Neurological: Negative for headaches.       Objective:     BP (!) 170/66   Pulse 66   Temp 97.1 °F (36.2 °C)   Resp 20     Physical Exam  Constitutional:       Appearance: Normal appearance. She is obese.   HENT:      Head: Normocephalic and atraumatic.      Right Ear: External ear normal.      Left Ear: External ear normal.      Nose: Nose normal.      Mouth/Throat:      Mouth: Mucous membranes are moist.      Pharynx: Oropharynx is clear.   Eyes:      Pupils: Pupils are equal, round, and reactive to light.   Cardiovascular:      Rate and Rhythm: Normal rate and regular rhythm.      Heart sounds: Normal heart sounds.   Pulmonary:      Effort: Pulmonary effort is normal.      Breath sounds: Normal breath sounds.   Abdominal:      Palpations: Abdomen is soft.   Musculoskeletal:      Cervical back: Normal range of motion and neck supple.   Skin:     General: Skin is warm and dry.      Findings: Bruising, erythema and lesion present.   Neurological:      General: No focal deficit present.      Mental Status: She is alert and oriented to person, place, and time. Mental status is at baseline.   Psychiatric:         Mood and Affect: Mood normal.         Behavior: Behavior normal.         Thought Content: Thought content normal.         Judgment: Judgment normal.       Assessment:     1. Chronic pain syndrome    2. PVD (peripheral vascular disease)    3. Foot pain, left    4. Hypothyroidism, unspecified type    5. Lumbosacral spondylosis without myelopathy    6. Chronic pain of  right knee    7. Peripheral vascular disease due to secondary diabetes    8. Essential hypertension    9. Arthritis    10. Chronic obstructive pulmonary disease, unspecified COPD type    11. Type 2 diabetes mellitus without complication, with long-term current use of insulin        Plan:     Problem List Items Addressed This Visit        Neuro    Chronic pain syndrome - Primary (Chronic)    Lumbosacral spondylosis without myelopathy (Chronic)       Pulmonary    Chronic obstructive pulmonary disease       Cardiac/Vascular    PVD (peripheral vascular disease) (Chronic)    Essential hypertension       Endocrine    Type 2 diabetes mellitus without complication, with long-term current use of insulin    Peripheral vascular disease due to secondary diabetes    Hypothyroidism       Orthopedic    Foot pain, left (Chronic)    Chronic pain of right knee (Chronic)    Arthritis        Follow up in about 3 weeks (around 1/24/2022).  Use unaboot    I am having Holly Porter maintain her amitriptyline, losartan-hydrochlorothiazide 50-12.5 mg, potassium chloride, topiramate, TOUJEO SOLOSTAR U-300 INSULIN, cilostazoL, triamcinolone acetonide 0.1%, hydrOXYzine HCL, metOLazone, allopurinoL, pantoprazole, levothyroxine, colchicine, ibuprofen, budesonide-formoterol 160-4.5 mcg, NOVOFINE 32, gentamicin, mupirocin, aspirin, metoprolol tartrate, clindamycin, naproxen, diclofenac sodium, HYDROcodone-acetaminophen, HYDROcodone-acetaminophen, and pregabalin.    Holly was seen today for non-healing wound follow up and diabetic foot ulcer.    Diagnoses and all orders for this visit:    Chronic pain syndrome    PVD (peripheral vascular disease)    Foot pain, left    Hypothyroidism, unspecified type    Lumbosacral spondylosis without myelopathy    Chronic pain of right knee    Peripheral vascular disease due to secondary diabetes    Essential hypertension    Arthritis    Chronic obstructive pulmonary disease, unspecified COPD type    Type 2  diabetes mellitus without complication, with long-term current use of insulin         [unfilled]  No orders of the defined types were placed in this encounter.

## 2022-01-11 ENCOUNTER — OFFICE VISIT (OUTPATIENT)
Dept: FAMILY MEDICINE | Facility: CLINIC | Age: 62
End: 2022-01-11
Payer: COMMERCIAL

## 2022-01-11 ENCOUNTER — HOSPITAL ENCOUNTER (EMERGENCY)
Facility: HOSPITAL | Age: 62
Discharge: HOME | End: 2022-01-11
Attending: EMERGENCY MEDICINE
Payer: COMMERCIAL

## 2022-01-11 VITALS
HEART RATE: 70 BPM | WEIGHT: 293 LBS | TEMPERATURE: 98 F | HEIGHT: 68 IN | OXYGEN SATURATION: 96 % | DIASTOLIC BLOOD PRESSURE: 82 MMHG | BODY MASS INDEX: 44.41 KG/M2 | RESPIRATION RATE: 24 BRPM | SYSTOLIC BLOOD PRESSURE: 154 MMHG

## 2022-01-11 VITALS
WEIGHT: 293 LBS | SYSTOLIC BLOOD PRESSURE: 148 MMHG | TEMPERATURE: 97 F | RESPIRATION RATE: 20 BRPM | HEIGHT: 68 IN | BODY MASS INDEX: 44.41 KG/M2 | HEART RATE: 77 BPM | DIASTOLIC BLOOD PRESSURE: 84 MMHG | OXYGEN SATURATION: 95 %

## 2022-01-11 DIAGNOSIS — L89.899 PRESSURE INJURY OF SKIN OF FOOT, UNSPECIFIED INJURY STAGE, UNSPECIFIED LATERALITY: ICD-10-CM

## 2022-01-11 DIAGNOSIS — L08.9 DIABETIC FOOT INFECTION: ICD-10-CM

## 2022-01-11 DIAGNOSIS — N39.0 URINARY TRACT INFECTION WITHOUT HEMATURIA, SITE UNSPECIFIED: Primary | ICD-10-CM

## 2022-01-11 DIAGNOSIS — E11.628 DIABETIC FOOT INFECTION: ICD-10-CM

## 2022-01-11 LAB
ANION GAP SERPL CALCULATED.3IONS-SCNC: 10 MMOL/L (ref 7–16)
BASOPHILS # BLD AUTO: 0.03 K/UL (ref 0–0.2)
BASOPHILS NFR BLD AUTO: 0.3 % (ref 0–1)
BILIRUB UR QL STRIP: NEGATIVE
BUN SERPL-MCNC: 10 MG/DL (ref 7–18)
BUN/CREAT SERPL: 10 (ref 6–20)
CALCIUM SERPL-MCNC: 9 MG/DL (ref 8.5–10.1)
CHLORIDE SERPL-SCNC: 105 MMOL/L (ref 98–107)
CLARITY UR: CLEAR
CO2 SERPL-SCNC: 29 MMOL/L (ref 21–32)
COLOR UR: YELLOW
CREAT SERPL-MCNC: 1.01 MG/DL (ref 0.55–1.02)
DIFFERENTIAL METHOD BLD: ABNORMAL
EOSINOPHIL # BLD AUTO: 0.49 K/UL (ref 0–0.5)
EOSINOPHIL NFR BLD AUTO: 5.3 % (ref 1–4)
ERYTHROCYTE [DISTWIDTH] IN BLOOD BY AUTOMATED COUNT: 19.4 % (ref 11.5–14.5)
GLUCOSE SERPL-MCNC: 104 MG/DL (ref 74–106)
GLUCOSE SERPL-MCNC: 89 MG/DL (ref 70–105)
GLUCOSE UR STRIP-MCNC: NEGATIVE MG/DL
HCT VFR BLD AUTO: 46.1 % (ref 38–47)
HGB BLD-MCNC: 14.6 G/DL (ref 12–16)
IMM GRANULOCYTES # BLD AUTO: 0.02 K/UL (ref 0–0.04)
IMM GRANULOCYTES NFR BLD: 0.2 % (ref 0–0.4)
KETONES UR STRIP-SCNC: NEGATIVE MG/DL
LEUKOCYTE ESTERASE UR QL STRIP: NEGATIVE
LYMPHOCYTES # BLD AUTO: 2.24 K/UL (ref 1–4.8)
LYMPHOCYTES NFR BLD AUTO: 24 % (ref 27–41)
MCH RBC QN AUTO: 26.5 PG (ref 27–31)
MCHC RBC AUTO-ENTMCNC: 31.7 G/DL (ref 32–36)
MCV RBC AUTO: 83.7 FL (ref 80–96)
MONOCYTES # BLD AUTO: 0.49 K/UL (ref 0–0.8)
MONOCYTES NFR BLD AUTO: 5.3 % (ref 2–6)
MPC BLD CALC-MCNC: 11.2 FL (ref 9.4–12.4)
NEUTROPHILS # BLD AUTO: 6.05 K/UL (ref 1.8–7.7)
NEUTROPHILS NFR BLD AUTO: 64.9 % (ref 53–65)
NITRITE UR QL STRIP: NEGATIVE
PH UR STRIP: 5.5 PH UNITS
PLATELET # BLD AUTO: 250 K/UL (ref 150–400)
POTASSIUM SERPL-SCNC: 3.3 MMOL/L (ref 3.5–5.1)
PROT UR QL STRIP: NEGATIVE
RBC # BLD AUTO: 5.51 M/UL (ref 4.2–5.4)
RBC # UR STRIP: NEGATIVE /UL
SODIUM SERPL-SCNC: 141 MMOL/L (ref 136–145)
SP GR UR STRIP: 1.01
UROBILINOGEN UR STRIP-ACNC: 0.2 MG/DL
WBC # BLD AUTO: 9.32 K/UL (ref 4.5–11)

## 2022-01-11 PROCEDURE — 87070 CULTURE OTHR SPECIMN AEROBIC: CPT | Performed by: EMERGENCY MEDICINE

## 2022-01-11 PROCEDURE — 1159F MED LIST DOCD IN RCRD: CPT | Mod: ,,, | Performed by: INTERNAL MEDICINE

## 2022-01-11 PROCEDURE — 36415 COLL VENOUS BLD VENIPUNCTURE: CPT | Performed by: EMERGENCY MEDICINE

## 2022-01-11 PROCEDURE — 1160F PR REVIEW ALL MEDS BY PRESCRIBER/CLIN PHARMACIST DOCUMENTED: ICD-10-PCS | Mod: ,,, | Performed by: INTERNAL MEDICINE

## 2022-01-11 PROCEDURE — 3077F SYST BP >= 140 MM HG: CPT | Mod: ,,, | Performed by: INTERNAL MEDICINE

## 2022-01-11 PROCEDURE — 1159F PR MEDICATION LIST DOCUMENTED IN MEDICAL RECORD: ICD-10-PCS | Mod: ,,, | Performed by: INTERNAL MEDICINE

## 2022-01-11 PROCEDURE — 99284 EMERGENCY DEPT VISIT MOD MDM: CPT | Mod: 25

## 2022-01-11 PROCEDURE — 3077F PR MOST RECENT SYSTOLIC BLOOD PRESSURE >= 140 MM HG: ICD-10-PCS | Mod: ,,, | Performed by: INTERNAL MEDICINE

## 2022-01-11 PROCEDURE — 3079F DIAST BP 80-89 MM HG: CPT | Mod: ,,, | Performed by: INTERNAL MEDICINE

## 2022-01-11 PROCEDURE — 99283 EMERGENCY DEPT VISIT LOW MDM: CPT | Performed by: EMERGENCY MEDICINE

## 2022-01-11 PROCEDURE — 85025 COMPLETE CBC W/AUTO DIFF WBC: CPT | Performed by: EMERGENCY MEDICINE

## 2022-01-11 PROCEDURE — 3008F BODY MASS INDEX DOCD: CPT | Mod: ,,, | Performed by: INTERNAL MEDICINE

## 2022-01-11 PROCEDURE — 99213 OFFICE O/P EST LOW 20 MIN: CPT | Mod: ,,, | Performed by: INTERNAL MEDICINE

## 2022-01-11 PROCEDURE — 81003 URINALYSIS AUTO W/O SCOPE: CPT | Mod: QW,,, | Performed by: CLINICAL MEDICAL LABORATORY

## 2022-01-11 PROCEDURE — 82962 GLUCOSE BLOOD TEST: CPT

## 2022-01-11 PROCEDURE — 25000003 PHARM REV CODE 250: Performed by: EMERGENCY MEDICINE

## 2022-01-11 PROCEDURE — 80048 BASIC METABOLIC PNL TOTAL CA: CPT | Performed by: EMERGENCY MEDICINE

## 2022-01-11 PROCEDURE — 3079F PR MOST RECENT DIASTOLIC BLOOD PRESSURE 80-89 MM HG: ICD-10-PCS | Mod: ,,, | Performed by: INTERNAL MEDICINE

## 2022-01-11 PROCEDURE — 3008F PR BODY MASS INDEX (BMI) DOCUMENTED: ICD-10-PCS | Mod: ,,, | Performed by: INTERNAL MEDICINE

## 2022-01-11 PROCEDURE — 99213 PR OFFICE/OUTPT VISIT, EST, LEVL III, 20-29 MIN: ICD-10-PCS | Mod: ,,, | Performed by: INTERNAL MEDICINE

## 2022-01-11 PROCEDURE — 1160F RVW MEDS BY RX/DR IN RCRD: CPT | Mod: ,,, | Performed by: INTERNAL MEDICINE

## 2022-01-11 PROCEDURE — 81003 URINALYSIS, REFLEX TO URINE CULTURE: ICD-10-PCS | Mod: QW,,, | Performed by: CLINICAL MEDICAL LABORATORY

## 2022-01-11 PROCEDURE — 96365 THER/PROPH/DIAG IV INF INIT: CPT

## 2022-01-11 RX ORDER — CLINDAMYCIN HYDROCHLORIDE 300 MG/1
300 CAPSULE ORAL EVERY 6 HOURS
Qty: 56 CAPSULE | Refills: 0 | Status: SHIPPED | OUTPATIENT
Start: 2022-01-11 | End: 2022-01-16

## 2022-01-11 RX ORDER — CLINDAMYCIN HYDROCHLORIDE 300 MG/1
1 CAPSULE ORAL 2 TIMES DAILY
COMMUNITY
Start: 2022-01-04 | End: 2022-01-11

## 2022-01-11 RX ORDER — CLINDAMYCIN PHOSPHATE 900 MG/50ML
900 INJECTION, SOLUTION INTRAVENOUS
Status: COMPLETED | OUTPATIENT
Start: 2022-01-11 | End: 2022-01-11

## 2022-01-11 RX ADMIN — CLINDAMYCIN PHOSPHATE 900 MG: 900 INJECTION, SOLUTION INTRAVENOUS at 05:01

## 2022-01-11 NOTE — PROGRESS NOTES
Subjective:       Patient ID: Holly Porter is a 61 y.o. female.    Chief Complaint: Foot Swelling, Foot Pain, Leg Pain, Diabetic Foot Ulcer, and Urinary Tract Infection    Patient presents today with infected and painful left foot ulcer. Very tender to touch. States followed at wound care last week for left foot and seems pain and tenderness have worsened since. Patient advised to report to ER ASAP for IV antibiotics and xray.       Current Medications:    Current Outpatient Medications:     allopurinoL (ZYLOPRIM) 300 MG tablet, Take 1 tablet (300 mg total) by mouth once daily., Disp: 90 tablet, Rfl: 3    amitriptyline (ELAVIL) 10 MG tablet, , Disp: , Rfl:     aspirin (ECOTRIN) 81 MG EC tablet, Take 1 tablet (81 mg total) by mouth once daily., Disp: 90 tablet, Rfl: 1    budesonide-formoterol 160-4.5 mcg (SYMBICORT) 160-4.5 mcg/actuation HFAA, Inhale 1-2 puffs into the lungs every 12 (twelve) hours., Disp: 10.2 g, Rfl: 5    cilostazoL (PLETAL) 100 MG Tab, , Disp: , Rfl:     clindamycin (CLEOCIN) 300 MG capsule, Take 1 capsule by mouth 2 (two) times a day., Disp: , Rfl:     colchicine (COLCRYS) 0.6 mg tablet, One pill three times a day for two days,then one pill daily till out, Disp: 30 tablet, Rfl: 2    gentamicin (GARAMYCIN) 0.1 % cream, Apply 1 application topically once daily. Left foot wound, Disp: , Rfl:     HYDROcodone-acetaminophen (NORCO)  mg per tablet, Take 1 tablet by mouth every 6 (six) hours., Disp: 120 tablet, Rfl: 0    hydrOXYzine HCL (ATARAX) 25 MG tablet, Take 1 tablet (25 mg total) by mouth every 6 (six) hours as needed for Itching., Disp: 60 tablet, Rfl: 0    ibuprofen (ADVIL,MOTRIN) 600 MG tablet, Take 1 tablet (600 mg total) by mouth 2 (two) times daily as needed for Pain., Disp: 20 tablet, Rfl: 1    levothyroxine (SYNTHROID, LEVOTHROID) 175 MCG tablet, Take 1 tablet (175 mcg total) by mouth once daily., Disp: 90 tablet, Rfl: 1    losartan-hydrochlorothiazide 50-12.5 mg  "(HYZAAR) 50-12.5 mg per tablet, , Disp: , Rfl:     metOLazone (ZAROXOLYN) 2.5 MG tablet, Take 1 tablet (2.5 mg total) by mouth once daily., Disp: 30 tablet, Rfl: 2    metoprolol tartrate (LOPRESSOR) 50 MG tablet, Take 1 tablet (50 mg total) by mouth 2 (two) times daily., Disp: 180 tablet, Rfl: 1    mupirocin (BACTROBAN) 2 % ointment, Apply 1 g topically once daily., Disp: , Rfl:     naproxen (NAPROSYN) 500 MG tablet, Take 1 tablet (500 mg total) by mouth 2 (two) times daily as needed (pain)., Disp: 20 tablet, Rfl: 0    NOVOFINE 32 32 gauge x 1/4" Ndle, Use as directed once daily., Disp: 90 each, Rfl: 3    pantoprazole (PROTONIX) 40 MG tablet, Take 1 tablet (40 mg total) by mouth once daily., Disp: 90 tablet, Rfl: 1    potassium chloride (MICRO-K) 10 MEQ CpSR, , Disp: , Rfl:     pregabalin (LYRICA) 100 MG capsule, Take 1 capsule (100 mg total) by mouth every 12 (twelve) hours., Disp: 60 capsule, Rfl: 1    topiramate (TOPAMAX) 50 MG tablet, , Disp: , Rfl:     TOUJEO SOLOSTAR U-300 INSULIN 300 unit/mL (1.5 mL) InPn pen, , Disp: , Rfl:     triamcinolone acetonide 0.1% (KENALOG) 0.1 % cream, Apply topically 3 (three) times daily., Disp: 400 g, Rfl: 2    clindamycin (CLEOCIN) 150 MG capsule, Take 1 capsule (150 mg total) by mouth every 8 (eight) hours., Disp: 21 capsule, Rfl: 0    diclofenac sodium (VOLTAREN ARTHRITIS PAIN) 1 % Gel, Apply 2 g topically 4 (four) times daily. Apply to knees, elbows, joints as needed, Disp: 10 each, Rfl: 1    Last Labs:     No visits with results within 1 Month(s) from this visit.   Latest known visit with results is:   Office Visit on 12/07/2021   Component Date Value    POC Amphetamines 12/07/2021 Negative     POC Barbiturates 12/07/2021 Negative     POC Benzodiazepines 12/07/2021 Negative     POC Cocaine 12/07/2021 Negative     POC THC 12/07/2021 Negative     POC Methadone 12/07/2021 Negative     POC Methamphetamine 12/07/2021 Negative     POC Opiates 12/07/2021 " Presumptive Positive*    POC Oxycodone 12/07/2021 Negative     POC Phencyclidine 12/07/2021 Negative     POC Methylenedioxymetham* 12/07/2021 Negative     POC Tricyclic Antidepres* 12/07/2021 Negative     POC Buprenorphine 12/07/2021 Negative      Acceptab* 12/07/2021 Yes     POC Temperature (Urine) 12/07/2021 92        Last Imaging:  Echo  · The left ventricle is normal in size with concentric remodeling and low   normal systolic function.  · The estimated ejection fraction is 50%.  · Atrial fibrillation not observed.  · Normal right ventricular size.  · Normal left ventricular diastolic function.            Review of Systems   Musculoskeletal:        Infected left foot ulcer   All other systems reviewed and are negative.        Objective:      Physical Exam  Vitals reviewed.   Constitutional:       Appearance: Normal appearance.   Cardiovascular:      Rate and Rhythm: Normal rate and regular rhythm.      Pulses: Normal pulses.      Heart sounds: Normal heart sounds.   Pulmonary:      Effort: Pulmonary effort is normal.      Breath sounds: Normal breath sounds.   Abdominal:      General: Abdomen is flat. Bowel sounds are normal.      Palpations: Abdomen is soft.   Musculoskeletal:         General: Swelling and tenderness present.      Cervical back: Normal range of motion and neck supple.      Left foot: Swelling and tenderness present.   Skin:     General: Skin is warm and dry.   Neurological:      General: No focal deficit present.      Mental Status: She is alert and oriented to person, place, and time. Mental status is at baseline.         Assessment:       1. Urinary tract infection without hematuria, site unspecified  Urinalysis, Reflex to Urine Culture Urine, Clean Catch    Urinalysis, Reflex to Urine Culture Urine, Clean Catch   2. Pressure injury of skin of foot, unspecified injury stage, unspecified laterality          Plan:         Holly was seen today for foot swelling, foot  pain, leg pain, diabetic foot ulcer and urinary tract infection.    Diagnoses and all orders for this visit:    Urinary tract infection without hematuria, site unspecified  -     Urinalysis, Reflex to Urine Culture Urine, Clean Catch; Future  -     Urinalysis, Reflex to Urine Culture Urine, Clean Catch    Pressure injury of skin of foot, unspecified injury stage, unspecified laterality

## 2022-01-11 NOTE — PROGRESS NOTES
1420-Wrapped patient's left foot ulcer. Informed patient to report to ER per order from Dr. Frausto. Voiced understanding. Pt left ambulatory, no distress.   1426-report called to Josey at [facility]

## 2022-01-11 NOTE — ED TRIAGE NOTES
Pt went to wound care 1/2/22 and pt stated it has been draining and infectious looking drainage with swelling. Pt here today for pain and infection to left foot.

## 2022-01-11 NOTE — PATIENT INSTRUCTIONS
Holly was seen today for foot swelling, foot pain, leg pain, diabetic foot ulcer and urinary tract infection.    Diagnoses and all orders for this visit:    Urinary tract infection without hematuria, site unspecified  -     Urinalysis, Reflex to Urine Culture Urine, Clean Catch; Future  -     Urinalysis, Reflex to Urine Culture Urine, Clean Catch    Pressure injury of skin of foot, unspecified injury stage, unspecified laterality

## 2022-01-11 NOTE — ED PROVIDER NOTES
Encounter Date: 1/11/2022       History     Chief Complaint   Patient presents with    foot pain     Diabetic ulcer to left foot with swelling and pain       Patient presents with complaint of foot infection left.  This has been a chronic problem for her.  She has diabetes, has had amputation of the 2nd, 4th, and 5th toes on the left.  She states she saw her primary physician today, Dr. Frausto, and also called her wound care doctor, was told to come to the emergency department for a dose of IV antibiotics.  She is seeing wound care in [place].  Fingerstick blood sugar on arrival was 89 mg/dL.  Has diabetic neuropathy and numbness of the entire foot right and left.  She is supposed to have a vascular bypass operation in the left lower extremity but has not yet been scheduled.        Review of patient's allergies indicates:   Allergen Reactions    Ammonium peroxydisulfate Shortness Of Breath    Avocado (laurus persea) Anaphylaxis    Bananas [banana] Anaphylaxis and Swelling     CRAMPS,    Chocolate flavor Anaphylaxis     MOUTH SWELLING    Silvadene [silver sulfadiazine]     Beef-potatoes-peas [nutritional supplement-fiber] Itching    Corticosteroids (glucocorticoids)     Hydrocortisone Blisters    Lasix [furosemide] Blisters     BURNS SKIN    Adhesive Rash and Blisters    Iodine and iodide containing products Rash    Latex, natural rubber Rash    Pcn [penicillins] Rash    Sulfa (sulfonamide antibiotics) Rash and Blisters     Past Medical History:   Diagnosis Date    Anxiety state     Atherosclerosis of native artery of extremity with intermittent claudication 01/30/2019    bilateral legs    Bilateral leg pain     BMI 50.0-59.9, adult 02/22/2021    Cellulitis 06/11/2020    Chronic pain syndrome     Chronic peripheral venous hypertension 01/08/2019    COPD (chronic obstructive pulmonary disease)     Diabetes     DVT (deep venous thrombosis) 02/12/2020    Embolism and thrombosis of superficial  veins of unspecified lower extremity 07/01/2019    bilateral    Frequent headaches 09/20/2018    GERD (gastroesophageal reflux disease)     Hereditary lymphedema     Hx of thyroid cancer     Hyperlipidemia     Hypertension     Hypothyroidism     Migraines     Migraines     Morbid obesity     Nicotine dependence     Non-pressure chronic ulcer of left lower leg 03/09/2021    Osteoarthritis     Other skin changes 03/09/2021    bilateral gaiter regions    Pain in left leg     Pain in right leg     PVD (peripheral vascular disease) 01/08/2019    Seizure disorder     Sleep apnea     Venous insufficiency (chronic) (peripheral)     Venous stasis     Vitamin D deficiency      Past Surgical History:   Procedure Laterality Date    HYSTERECTOMY      STAB PHLEBECTOMY OF VARICOSE VEINS Left 01/25/2013    Left leg microphlebectomies x 23 stab avulsions and ligation of multiple varicose veins perrformed by Dr. Cirilo Aguirre.    THYROIDECTOMY      hx: thyroid cancer    TOE AMPUTATION      2nd, 4th and 5th    TOTAL ABDOMINAL HYSTERECTOMY W/ BILATERAL SALPINGOOPHORECTOMY      TUBAL LIGATION      VAGINAL DELIVERY      x 4    VENOUS ABLATION Right 08/09/2019    GSV Varithena Ablation performed by Dr. Estuardo Falcon    VENOUS ABLATION Left 08/02/2019    ATAV Varithena Ablation performed by Dr. Estuardo Falcon.    VENOUS ABLATION Right 07/13/2015    ATAV Laser Ablatio performed by Dr. Cirilo Aguirre.    VENOUS ABLATION Right 07/06/2015    Distal  GSV Laser Ablation performed by dr. Cirilo Aguirre.    VENOUS ABLATION Left 04/20/2015    Left Distal GSV Laser Ablation performed by dr. Cirilo Aguirre.    VENOUS ABLATION Right 10/28/2013    Right Distal GSV RF Ablation performed by Dr. Cirilo Aguirre.    VENOUS ABLATION Left 10/25/2013    SSV RF Ablation performed by dr. Cirilo Aguirre.    VENOUS ABLATION Left 10/07/2013    Left GSV and Left ATAV RF Ablation performed by Dr. Cirilo Aguirre.    VENOUS ABLATION Right 01/21/2013    GSV RF Ablation  w/micros x 22 performed by Dr. Cirilo Aguirre.    VENOUS ABLATION Left 06/25/2021    Left distal GSV Varithena Ablation     Family History   Problem Relation Age of Onset    Heart disease Mother         age 84 CHF    Hypertension Mother     Osteoarthritis Mother     Coronary aneurysm Father     Coronary artery disease Father     Hypertension Father     Heart disease Father     No Known Problems Son     No Known Problems Son     No Known Problems Son     No Known Problems Son     No Known Problems Sister     No Known Problems Brother         hx: varicose veins    No Known Problems Brother         MVA: parlazed    No Known Problems Brother      Social History     Tobacco Use    Smoking status: Current Every Day Smoker     Packs/day: 1.00     Types: Cigarettes    Smokeless tobacco: Never Used   Substance Use Topics    Alcohol use: Never    Drug use: Never     Review of Systems   Constitutional: Negative.    HENT: Negative.    Eyes: Negative.    Respiratory: Negative.    Cardiovascular: Negative.    Gastrointestinal: Negative.    Genitourinary: Negative.    Musculoskeletal: Negative.    Skin: Positive for wound (Has nonhealing wounds of the left foot at the site of a 2nd toe amputation on the left.).   Neurological: Negative.    Psychiatric/Behavioral: Negative.    All other systems reviewed and are negative.      Physical Exam     Initial Vitals [01/11/22 1646]   BP Pulse Resp Temp SpO2   (!) 148/84 77 20 97.4 °F (36.3 °C) --      MAP       --         Physical Exam    Nursing note and vitals reviewed.  Constitutional: She appears well-developed and well-nourished. She is not diaphoretic. No distress.   HENT:   Head: Normocephalic.   Right Ear: External ear normal.   Left Ear: External ear normal.   Nose: Nose normal.   Mouth/Throat: Oropharynx is clear and moist.   Eyes: Conjunctivae and EOM are normal. Pupils are equal, round, and reactive to light.   Neck: Neck supple. No tracheal deviation present. No  JVD present.   Normal range of motion.  Cardiovascular: Normal rate, regular rhythm, normal heart sounds and intact distal pulses. Exam reveals no friction rub.    No murmur heard.  Pulmonary/Chest: Breath sounds normal. No stridor. No respiratory distress. She has no wheezes. She has no rhonchi. She has no rales.   Abdominal: Abdomen is soft. Bowel sounds are normal. She exhibits no distension. There is no abdominal tenderness. There is no rebound and no guarding.   Musculoskeletal:         General: Tenderness ( there is tenderness of the distal left foot with swelling and erythema.) and edema (Patient has 2+ edema of both lower extremities, with 3+ edema of the left foot noted as well.) present. Normal range of motion.      Cervical back: Normal range of motion and neck supple.     Lymphadenopathy:     She has no cervical adenopathy.   Neurological: She is alert and oriented to person, place, and time. She has normal strength. A sensory deficit ( has stocking distribution sensory loss of the right and left feet.) is present. No cranial nerve deficit. GCS score is 15. GCS eye subscore is 4. GCS verbal subscore is 5. GCS motor subscore is 6.   Skin: Skin is warm and dry. Capillary refill takes less than 2 seconds. No abscess noted. There is erythema.   Patient has had amputation of the 2nd 4th and 5th toes on the left foot.  There is a small area of drainage at the site of the 2nd toe amputation and some crusting at the site of the 4th and 5th toe amputation.  There is some erythema of the entire left foot with some superficial blister formation noted of the left foot as well.   Psychiatric: She has a normal mood and affect. Her behavior is normal.         Medical Screening Exam   See Full Note    ED Course   Procedures  Labs Reviewed   BASIC METABOLIC PANEL - Abnormal; Notable for the following components:       Result Value    Potassium 3.3 (*)     eGFR 59 (*)     All other components within normal limits   CBC  WITH DIFFERENTIAL - Abnormal; Notable for the following components:    RBC 5.51 (*)     MCH 26.5 (*)     MCHC 31.7 (*)     RDW 19.4 (*)     Lymphocytes % 24.0 (*)     Eosinophils % 5.3 (*)     All other components within normal limits   CULTURE, WOUND   CBC W/ AUTO DIFFERENTIAL    Narrative:     The following orders were created for panel order CBC auto differential.  Procedure                               Abnormality         Status                     ---------                               -----------         ------                     CBC with Differential[[id number]]        Abnormal            Final result                 Please view results for these tests on the individual orders.   POCT GLUCOSE MONITORING CONTINUOUS          Imaging Results          X-Ray Foot Complete Left (Final result)  Result time 01/11/22 17:28:59    Final result by Edson De MD (01/11/22 17:28:59)                 Impression:      As above.      Electronically signed by: Edson De  Date:    01/11/2022  Time:    17:28             Narrative:    EXAMINATION:  XR FOOT COMPLETE 3 VIEW LEFT    CLINICAL HISTORY:  .  Type 2 diabetes mellitus with other skin complications    TECHNIQUE:  AP, lateral and oblique views of the left foot were performed.    COMPARISON:  01/07/2020    FINDINGS:  Since the prior exam there is been amputation at the base of the proximal phalanges of the 2nd, 4th, and 5th digits of the left foot.  There is no fracture identified.  No radiopaque foreign body.  Diffuse soft tissue swelling.                                 Medications   clindamycin in D5W 900 mg/50 mL IVPB 900 mg (900 mg Intravenous New Bag 1/11/22 6265)     Medical Decision Making:   Independently Interpreted Test(s):   I have ordered and independently interpreted X-rays - see summary below.       <> Summary of X-Ray Reading(s): No definite evidence of osteomyelitis by x-ray.  Clinical Tests:   Radiological Study: Reviewed  Radiologist report reviewed,  no indication of osteomyelitis by x-ray report.                 Clinical Impression:   Final diagnoses:  [E11.628, L08.9] Diabetic foot infection          ED Disposition Condition    Discharge Stable        ED Prescriptions     Medication Sig Dispense Start Date End Date Auth. Provider    clindamycin (CLEOCIN) 300 MG capsule Take 1 capsule (300 mg total) by mouth every 6 (six) hours. for 14 days 56 capsule 1/11/2022 1/25/2022 Donovan Cowan DO        Follow-up Information     Follow up With Specialties Details Why Contact Info    Kiah Ruiz MD Family Medicine Schedule an appointment as soon as possible for a visit in 1 day To recheck, and for referral to local wound care specialist here in [place]. [address] [facility] [address]  [phone]             Donovan Cowan DO  01/11/22 0556

## 2022-01-12 ENCOUNTER — TELEPHONE (OUTPATIENT)
Dept: EMERGENCY MEDICINE | Facility: HOSPITAL | Age: 62
End: 2022-01-12
Payer: MEDICAID

## 2022-01-14 DIAGNOSIS — M54.12 CERVICAL RADICULOPATHY: Primary | ICD-10-CM

## 2022-01-16 LAB — MICROORGANISM SPEC CULT: ABNORMAL

## 2022-01-16 RX ORDER — DOXYCYCLINE 100 MG/1
100 CAPSULE ORAL 2 TIMES DAILY
Qty: 20 CAPSULE | Refills: 0 | Status: SHIPPED | OUTPATIENT
Start: 2022-01-16 | End: 2022-01-26

## 2022-01-17 ENCOUNTER — OFFICE VISIT (OUTPATIENT)
Dept: VASCULAR SURGERY | Facility: CLINIC | Age: 62
End: 2022-01-17
Payer: COMMERCIAL

## 2022-01-17 ENCOUNTER — TELEPHONE (OUTPATIENT)
Dept: EMERGENCY MEDICINE | Facility: HOSPITAL | Age: 62
End: 2022-01-17
Payer: MEDICAID

## 2022-01-17 ENCOUNTER — OFFICE VISIT (OUTPATIENT)
Dept: SPINE | Facility: CLINIC | Age: 62
End: 2022-01-17
Payer: COMMERCIAL

## 2022-01-17 ENCOUNTER — HOSPITAL ENCOUNTER (OUTPATIENT)
Dept: RADIOLOGY | Facility: HOSPITAL | Age: 62
Discharge: HOME | End: 2022-01-17
Attending: ORTHOPAEDIC SURGERY
Payer: COMMERCIAL

## 2022-01-17 VITALS
RESPIRATION RATE: 22 BRPM | SYSTOLIC BLOOD PRESSURE: 136 MMHG | WEIGHT: 293 LBS | HEART RATE: 76 BPM | HEIGHT: 68 IN | DIASTOLIC BLOOD PRESSURE: 80 MMHG | BODY MASS INDEX: 44.41 KG/M2

## 2022-01-17 DIAGNOSIS — M54.12 CERVICAL RADICULOPATHY: ICD-10-CM

## 2022-01-17 DIAGNOSIS — I87.2 CHRONIC VENOUS INSUFFICIENCY OF LOWER EXTREMITY: ICD-10-CM

## 2022-01-17 DIAGNOSIS — R60.0 EDEMA OF BOTH LOWER EXTREMITIES: ICD-10-CM

## 2022-01-17 DIAGNOSIS — L81.9 HYPERPIGMENTATION OF SKIN: ICD-10-CM

## 2022-01-17 DIAGNOSIS — M79.604 PAIN IN BOTH LOWER EXTREMITIES: Primary | ICD-10-CM

## 2022-01-17 DIAGNOSIS — M79.605 PAIN IN BOTH LOWER EXTREMITIES: Primary | ICD-10-CM

## 2022-01-17 DIAGNOSIS — S16.1XXA STRAIN OF NECK MUSCLE, INITIAL ENCOUNTER: ICD-10-CM

## 2022-01-17 DIAGNOSIS — L97.521 ULCER OF LEFT FOOT, LIMITED TO BREAKDOWN OF SKIN: ICD-10-CM

## 2022-01-17 DIAGNOSIS — M54.12 CERVICAL RADICULOPATHY: Primary | ICD-10-CM

## 2022-01-17 DIAGNOSIS — I73.9 PERIPHERAL VASCULAR DISEASE: ICD-10-CM

## 2022-01-17 PROCEDURE — 72050 X-RAY EXAM NECK SPINE 4/5VWS: CPT | Mod: 26,,, | Performed by: ORTHOPAEDIC SURGERY

## 2022-01-17 PROCEDURE — 99215 OFFICE O/P EST HI 40 MIN: CPT | Mod: PBBFAC,25 | Performed by: FAMILY MEDICINE

## 2022-01-17 PROCEDURE — 99204 PR OFFICE/OUTPT VISIT, NEW, LEVL IV, 45-59 MIN: ICD-10-PCS | Mod: 25,S$PBB,, | Performed by: ORTHOPAEDIC SURGERY

## 2022-01-17 PROCEDURE — 1159F MED LIST DOCD IN RCRD: CPT | Mod: CPTII,,, | Performed by: FAMILY MEDICINE

## 2022-01-17 PROCEDURE — 72050 X-RAY EXAM NECK SPINE 4/5VWS: CPT | Mod: TC

## 2022-01-17 PROCEDURE — 3075F PR MOST RECENT SYSTOLIC BLOOD PRESS GE 130-139MM HG: ICD-10-PCS | Mod: CPTII,,, | Performed by: FAMILY MEDICINE

## 2022-01-17 PROCEDURE — 3008F BODY MASS INDEX DOCD: CPT | Mod: CPTII,,, | Performed by: FAMILY MEDICINE

## 2022-01-17 PROCEDURE — 3079F PR MOST RECENT DIASTOLIC BLOOD PRESSURE 80-89 MM HG: ICD-10-PCS | Mod: CPTII,,, | Performed by: FAMILY MEDICINE

## 2022-01-17 PROCEDURE — 99406 BEHAV CHNG SMOKING 3-10 MIN: CPT | Mod: S$PBB,,, | Performed by: ORTHOPAEDIC SURGERY

## 2022-01-17 PROCEDURE — 99215 OFFICE O/P EST HI 40 MIN: CPT | Mod: PBBFAC | Performed by: ORTHOPAEDIC SURGERY

## 2022-01-17 PROCEDURE — 99214 PR OFFICE/OUTPT VISIT, EST, LEVL IV, 30-39 MIN: ICD-10-PCS | Mod: S$PBB,,, | Performed by: FAMILY MEDICINE

## 2022-01-17 PROCEDURE — 99214 OFFICE O/P EST MOD 30 MIN: CPT | Mod: S$PBB,,, | Performed by: FAMILY MEDICINE

## 2022-01-17 PROCEDURE — 3079F DIAST BP 80-89 MM HG: CPT | Mod: CPTII,,, | Performed by: FAMILY MEDICINE

## 2022-01-17 PROCEDURE — 72050 XR CERVICAL SPINE AP LAT WITH FLEX EXTEN: ICD-10-PCS | Mod: 26,,, | Performed by: ORTHOPAEDIC SURGERY

## 2022-01-17 PROCEDURE — 3075F SYST BP GE 130 - 139MM HG: CPT | Mod: CPTII,,, | Performed by: FAMILY MEDICINE

## 2022-01-17 PROCEDURE — 99406 PR TOBACCO USE CESSATION INTERMEDIATE 3-10 MINUTES: ICD-10-PCS | Mod: S$PBB,,, | Performed by: ORTHOPAEDIC SURGERY

## 2022-01-17 PROCEDURE — 3008F PR BODY MASS INDEX (BMI) DOCUMENTED: ICD-10-PCS | Mod: CPTII,,, | Performed by: FAMILY MEDICINE

## 2022-01-17 PROCEDURE — 1160F PR REVIEW ALL MEDS BY PRESCRIBER/CLIN PHARMACIST DOCUMENTED: ICD-10-PCS | Mod: CPTII,,, | Performed by: FAMILY MEDICINE

## 2022-01-17 PROCEDURE — 99204 OFFICE O/P NEW MOD 45 MIN: CPT | Mod: 25,S$PBB,, | Performed by: ORTHOPAEDIC SURGERY

## 2022-01-17 PROCEDURE — 1160F RVW MEDS BY RX/DR IN RCRD: CPT | Mod: CPTII,,, | Performed by: FAMILY MEDICINE

## 2022-01-17 PROCEDURE — 1159F PR MEDICATION LIST DOCUMENTED IN MEDICAL RECORD: ICD-10-PCS | Mod: CPTII,,, | Performed by: FAMILY MEDICINE

## 2022-01-17 RX ORDER — MUPIROCIN 20 MG/G
OINTMENT TOPICAL 2 TIMES DAILY
Qty: 80 G | Refills: 2 | Status: SHIPPED | OUTPATIENT
Start: 2022-01-17 | End: 2022-12-01 | Stop reason: SDUPTHER

## 2022-01-17 RX ORDER — MUPIROCIN 20 MG/G
OINTMENT TOPICAL
Status: COMPLETED | OUTPATIENT
Start: 2022-01-17 | End: 2022-01-17

## 2022-01-17 RX ADMIN — MUPIROCIN: 20 OINTMENT TOPICAL at 03:01
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
## 2022-01-17 NOTE — PROGRESS NOTES
AP, lateral, flexion/extension views of the cervical spine reviewed    On the AP there is normal coronal alignment.  There is uncovertebral and facet hypertrophy seen.  On the lateral there is loss of cervical lordosis.  There are spondylotic changes noted with decrease in disc height and osteophyte formation seen.  No fractures or listhesis noted.  No instability on flexion-extension views.    Impression:  Degenerative changes of cervical spine as noted above

## 2022-01-17 NOTE — PROGRESS NOTES
[facility] CLINIC NOTE    Patient ID: Holly Porter is a 61 y.o. female.    I. HISTORY     Chief Complaint:   Chief Complaint   Patient presents with    Follow-up     H and P update.  For left distal ATAV varithena, right distal ATAV varithena, and right SSV RF ablation.        HPI: Holly Porter is a 61 y.o. female who returns to clinic today with complaints of continued lower extremity swelling, pain and or wounds of the left foot.  We last saw the patient on 11/22/2021 and planned on several venous interventions for her chronic venous insufficiency, but the patient canceled and never rescheduled.  Since that time, she has developed ulcerations of her left foot, including left lateral aspect, for foot and dorsum of the foot.  She only has to remaining toes of this foot and now experiencing further skin breakdown.  She was sent to the ED by her PCP, Dr. Frausto, last week due to suspected foot infection.  She was given IV antibiotics in the ED and then the given prescription for oral antibiotics secondary to cultures that were taken at the same time.  Patient complaining of increased foot pain with trouble ambulating.  Her hand-held dopplerable pulses today are monophasic.    Bilateral complete venous reflux study performed 11/22/2021 showed no evidence of DVT bilaterally.  The study also showed well ablated bilateral great saphenous veins in their entirety.  The bilateral anterior thigh accessory veins are short in course and partially close proximally with their to distal sections open, varicose and refluxing.  Refluxing pathological  also noted of the left Gator region.  Right small saphenous vein dilated refluxing.  Left small saphenous vein ablated in its entirety.    Proposed procedures from last visit were a laser ablation of left calf  with non thermal ablation of left distal anterior thigh accessory vein.  Non thermal ablation of the right distal anterior thigh accessory vein and  heat ablation of the right small saphenous vein.    The patient has had numerous venous interventions previously, see her surgical history for details.    Past Medical History:   Diagnosis Date    Anxiety state     Atherosclerosis of native artery of extremity with intermittent claudication 01/30/2019    bilateral legs    Bilateral leg pain     BMI 50.0-59.9, adult 02/22/2021    Cellulitis 06/11/2020    Chronic pain syndrome     Chronic peripheral venous hypertension 01/08/2019    COPD (chronic obstructive pulmonary disease)     Diabetes     DVT (deep venous thrombosis) 02/12/2020    Embolism and thrombosis of superficial veins of unspecified lower extremity 07/01/2019    bilateral    Frequent headaches 09/20/2018    GERD (gastroesophageal reflux disease)     Hereditary lymphedema     Hx of thyroid cancer     Hyperlipidemia     Hypertension     Hypothyroidism     Migraines     Migraines     Morbid obesity     Nicotine dependence     Non-pressure chronic ulcer of left lower leg 03/09/2021    Osteoarthritis     Other skin changes 03/09/2021    bilateral gaiter regions    Pain in left leg     Pain in right leg     PVD (peripheral vascular disease) 01/08/2019    Seizure disorder     Sleep apnea     Venous insufficiency (chronic) (peripheral)     Venous stasis     Vitamin D deficiency         Past Surgical History:   Procedure Laterality Date    HYSTERECTOMY      ILIAC ARTERY STENT Bilateral 01/28/2020    Bilateral distal aorta and common iliac 8 X 59 vbx covered stents performed by Dr. Antonio Jacinto.    STAB PHLEBECTOMY OF VARICOSE VEINS Left 01/25/2013    Left leg microphlebectomies x 23 stab avulsions and ligation of multiple varicose veins perrformed by Dr. Cirilo Aguirre.    THYROIDECTOMY      hx: thyroid cancer    TOE AMPUTATION Left 01/28/2020    2nd, 4th and 5th performed by Dr. Anam Saeed.    TOE AMPUTATION Right 01/28/2020    4th toe performed by Dr. Anam Saeed.    TOTAL  ABDOMINAL HYSTERECTOMY W/ BILATERAL SALPINGOOPHORECTOMY      TUBAL LIGATION      VAGINAL DELIVERY      x 4    VENOUS ABLATION Right 08/09/2019    GSV Varithena Ablation performed by Dr. Estuardo Falcon    VENOUS ABLATION Left 08/02/2019    ATAV Varithena Ablation performed by Dr. Estuardo Falcon.    VENOUS ABLATION Right 07/13/2015    ATAV Laser Ablatio performed by Dr. iCrilo Aguirre.    VENOUS ABLATION Right 07/06/2015    Distal  GSV Laser Ablation performed by dr. Cirilo Aguirre.    VENOUS ABLATION Left 04/20/2015    Left Distal GSV Laser Ablation performed by dr. Cirilo Aguirre.    VENOUS ABLATION Right 10/28/2013    Right Distal GSV RF Ablation performed by Dr. Cirilo Aguirre.    VENOUS ABLATION Left 10/25/2013    SSV RF Ablation performed by dr. Cirilo Aguirre.    VENOUS ABLATION Left 10/07/2013    Left GSV and Left ATAV RF Ablation performed by Dr. Cirilo Aguirre.    VENOUS ABLATION Right 01/21/2013    GSV RF Ablation w/micros x 22 performed by Dr. Cirilo Aguirre.    VENOUS ABLATION Left 06/25/2021    Left distal GSV Varithena Ablation       Social History     Tobacco Use   Smoking Status Current Every Day Smoker    Packs/day: 1.00    Types: Cigarettes   Smokeless Tobacco Never Used         Current Outpatient Medications:     allopurinoL (ZYLOPRIM) 300 MG tablet, Take 1 tablet (300 mg total) by mouth once daily., Disp: 90 tablet, Rfl: 3    amitriptyline (ELAVIL) 10 MG tablet, , Disp: , Rfl:     aspirin (ECOTRIN) 81 MG EC tablet, Take 1 tablet (81 mg total) by mouth once daily., Disp: 90 tablet, Rfl: 1    budesonide-formoterol 160-4.5 mcg (SYMBICORT) 160-4.5 mcg/actuation HFAA, Inhale 1-2 puffs into the lungs every 12 (twelve) hours., Disp: 10.2 g, Rfl: 5    cilostazoL (PLETAL) 100 MG Tab, , Disp: , Rfl:     colchicine (COLCRYS) 0.6 mg tablet, One pill three times a day for two days,then one pill daily till out, Disp: 30 tablet, Rfl: 2    doxycycline (VIBRAMYCIN) 100 MG Cap, Take 1 capsule (100 mg total) by mouth 2 (two) times daily.  "for 10 days, Disp: 20 capsule, Rfl: 0    gentamicin (GARAMYCIN) 0.1 % cream, Apply 1 application topically once daily. Left foot wound, Disp: , Rfl:     HYDROcodone-acetaminophen (NORCO)  mg per tablet, Take 1 tablet by mouth every 6 (six) hours., Disp: 120 tablet, Rfl: 0    hydrOXYzine HCL (ATARAX) 25 MG tablet, Take 1 tablet (25 mg total) by mouth every 6 (six) hours as needed for Itching., Disp: 60 tablet, Rfl: 0    ibuprofen (ADVIL,MOTRIN) 600 MG tablet, Take 1 tablet (600 mg total) by mouth 2 (two) times daily as needed for Pain., Disp: 20 tablet, Rfl: 1    levothyroxine (SYNTHROID, LEVOTHROID) 175 MCG tablet, Take 1 tablet (175 mcg total) by mouth once daily., Disp: 90 tablet, Rfl: 1    losartan-hydrochlorothiazide 50-12.5 mg (HYZAAR) 50-12.5 mg per tablet, , Disp: , Rfl:     metOLazone (ZAROXOLYN) 2.5 MG tablet, Take 1 tablet (2.5 mg total) by mouth once daily., Disp: 30 tablet, Rfl: 2    metoprolol tartrate (LOPRESSOR) 50 MG tablet, Take 1 tablet (50 mg total) by mouth 2 (two) times daily., Disp: 180 tablet, Rfl: 1    naproxen (NAPROSYN) 500 MG tablet, Take 1 tablet (500 mg total) by mouth 2 (two) times daily as needed (pain)., Disp: 20 tablet, Rfl: 0    NOVOFINE 32 32 gauge x 1/4" Ndle, Use as directed once daily., Disp: 90 each, Rfl: 3    pantoprazole (PROTONIX) 40 MG tablet, Take 1 tablet (40 mg total) by mouth once daily., Disp: 90 tablet, Rfl: 1    potassium chloride (MICRO-K) 10 MEQ CpSR, , Disp: , Rfl:     pregabalin (LYRICA) 100 MG capsule, Take 1 capsule (100 mg total) by mouth every 12 (twelve) hours., Disp: 60 capsule, Rfl: 1    topiramate (TOPAMAX) 50 MG tablet, , Disp: , Rfl:     TOUJEO SOLOSTAR U-300 INSULIN 300 unit/mL (1.5 mL) InPn pen, , Disp: , Rfl:     triamcinolone acetonide 0.1% (KENALOG) 0.1 % cream, Apply topically 3 (three) times daily., Disp: 400 g, Rfl: 2    diclofenac sodium (VOLTAREN ARTHRITIS PAIN) 1 % Gel, Apply 2 g topically 4 (four) times daily. Apply to " knees, elbows, joints as needed, Disp: 10 each, Rfl: 1    mupirocin (BACTROBAN) 2 % ointment, Apply topically 2 (two) times daily., Disp: 80 g, Rfl: 2  No current facility-administered medications for this visit.    Review of Systems   Constitutional: Negative for activity change, chills, diaphoresis, fatigue and fever.   Respiratory: Negative for cough and shortness of breath.    Cardiovascular: Positive for leg swelling. Negative for chest pain and claudication.        Hyperpigmentation LE   Gastrointestinal: Negative for nausea and vomiting.   Musculoskeletal: Positive for leg pain. Negative for joint swelling.   Integumentary:  Negative for rash and wound.   Neurological: Negative for weakness and numbness.        II. PHYSICAL EXAM     Physical Exam  Constitutional:       General: She is awake. She is not in acute distress.     Appearance: Normal appearance. She is obese. She is not ill-appearing or toxic-appearing.   HENT:      Head: Normocephalic and atraumatic.   Eyes:      Extraocular Movements: Extraocular movements intact.      Conjunctiva/sclera: Conjunctivae normal.      Pupils: Pupils are equal, round, and reactive to light.   Neck:      Vascular: No carotid bruit or JVD.   Cardiovascular:      Rate and Rhythm: Normal rate and regular rhythm.      Pulses:           Dorsalis pedis pulses are detected w/ Doppler on the left side.        Posterior tibial pulses are detected w/ Doppler on the left side.      Heart sounds: No murmur heard.      Pulmonary:      Effort: Pulmonary effort is normal. No respiratory distress.      Breath sounds: No stridor. No wheezing, rhonchi or rales.   Musculoskeletal:         General: No swelling, tenderness or deformity.      Right lower le+ Edema present.      Left lower le+ Edema present.        Legs:       Comments: Superficial skin breakdown of the medial, lateral and dorsum of the left foot.  Patient noted to have great toe and middle toe only of the left foot.   Tender to palpation along the lateral aspect and plantar surface of the foot.  No foul odor noted.  No actual purulent material noted.    Scattered hyperpigmentation and varicose veins of the bilateral Gator regions.   Feet:      Comments: Monophasic pulses of the left foot with hand-held Doppler of the DPs and PTs.  Skin:     General: Skin is warm.      Capillary Refill: Capillary refill takes less than 2 seconds.      Coloration: Skin is not ashen.      Findings: No bruising, erythema, lesion, rash or wound.   Neurological:      Mental Status: She is alert and oriented to person, place, and time.      Motor: No weakness.   Psychiatric:         Speech: Speech normal.         Behavior: Behavior normal. Behavior is cooperative.         Reticular/Spider veins noted:  RLE: anterior calf, medial calf, ankle and foot  LLE: anterior calf, medial calf, ankle and foot    Varicose veins noted:  RLE: anterior calf, medial calf, medial thigh, ankle and foot  LLE:  anterior calf, medial calf, medial thigh, ankle and foot    CEAP Classification  Clinical Signs: Class 6 - Leg ulceration, skin changes as defined above  Etiologic Classification: Primary  Anatomic distribution: Superficial  Pathophysiologic dysfunction: Reflux       Venous Clinical Severity Score  Pain:2=Daily, moderate activity limitation, occasional analgesics  Varicose Veins: 3=Extensive. Thigh and calf or GS and LS distribution  Venous Edema: 2=Afternoon edema, above ankle  Pigmentation: 2=Diffuse over most of gaiter distribution (lower 1/3) or recent pigmentation (purple)  Inflammation: 1=Mild cellulitis, limited to marginal area around ulcer  Induration: 2=Medial or lateral,less than lower third of leg  Number of Active Ulcers: 2=2  Active Ulceration, Duration: 1=<3 months  Active Ulcer Size: 1=<2 cm diameter  Compressive Therapy: 1=Intermittent use of stockings  Total Score: 17       III. ASSESSMENT & PLAN (MEDICAL DECISION MAKING)     1. Pain in both lower  extremities    2. Edema of both lower extremities    3. Chronic venous insufficiency of lower extremity    4. Hyperpigmentation of skin    5. Peripheral vascular disease    6. Ulcer of left foot, limited to breakdown of skin        Assessment/Diagnosis and Plan:  It has been sometime since the patient has had arterial duplex with ABIs and she showing reduced pulses on exam along with nonhealing ulcers and infection.  Today, I have scheduled the patient arterial duplex with ABIs and clinic visit with Dr. Jacinto, vascular surgery, next week on 01/24/2022, after the patient has had a full week of p.o. antibiotics.  We will follow-up with the patient following this visit in order to see vascular surgeries recommendation and possibly proceed with venous intervention.    Today, mupirocin ointment was applied to the patient's wound and a nonstick dressing was applied along with her sock.  No compression was used.  Orders Placed This Encounter    US Lower Extrem Arteries Bilat with DIMITRY (xpd)    mupirocin 2 % ointment    mupirocin (BACTROBAN) 2 % ointment      Matty Falcon DO

## 2022-01-17 NOTE — TELEPHONE ENCOUNTER
Called Patient. Spoke with Mrs. Porter, told her to stop the Clindamycin and to  Doxycycline sent in to pharmacy.  Pt told of Culture report.  Thanked Nurse for calling.

## 2022-01-17 NOTE — PROGRESS NOTES
Smoking Cessation counseling    Time spent:  3-10 minutes (41631)    We discussed the importance, over both the short and long-term, of smoking cessation; reviewed the patient's willingness to quit; overall history and current use of tobacco smoking products; that there are a variety of methods to help with smoking diminution and cessation (stopping alone, using nicotine substitution medications/gums, Chantix, other medications, etcetera, which the patient will also discuss with his or her primary care physician); that the patient should set a date for smoking cessation; and the patient will follow up with his or her primary care physician for further support and oversight.    We also discussed that for the patient to have surgery while using nicotine, wound healing may be compromised relative to those who do not smoke; that recovery from anesthesia may sometimes be more difficult; and that quitting may decrease the heart, vascular, pulmonary effects in the short term as well as over the long term.  Smoking cessation may be useful in important for any patient will have a fusion as part of surgery or have bone or tissue that has regrown heel (bone fusions, wound closures, etc.)  since surgical results with respect to wound healing, susceptibility to recovery from infection, bone fusion, and tissue and blood vessel health may be impaired or less optimal smokers then nonsmokers.  We talked about the elevated risk of surgical bone fusion failure in the setting of smoking and the potential need for a higher-risk revision surgery should fusion not occur.    I reviewed this with the patient in detail and all questions were answered.  We discussed nature of the patient's condition; real alternatives and realistic options; pros and cons, risks and benefits of all the options, in detail.  I believe the patient understands the above and wishes to proceed as outlined.

## 2022-01-18 RX ORDER — LOSARTAN POTASSIUM AND HYDROCHLOROTHIAZIDE 12.5; 5 MG/1; MG/1
1 TABLET ORAL DAILY
Qty: 90 TABLET | Refills: 1 | Status: SHIPPED | OUTPATIENT
Start: 2022-01-18 | End: 2022-08-11 | Stop reason: SDUPTHER

## 2022-01-18 NOTE — TELEPHONE ENCOUNTER
----- Message from Leora Méndez V sent at 1/17/2022  2:41 PM CST -----  REFILLS losartan-hydrochlorothiazide 50-12.5 mg (HYZAAR) 50-12.5 mg per tablet [phone]

## 2022-01-20 RX ORDER — INSULIN GLARGINE 300 U/ML
10 INJECTION, SOLUTION SUBCUTANEOUS NIGHTLY
Qty: 3 PEN | Refills: 1 | Status: SHIPPED | OUTPATIENT
Start: 2022-01-20 | End: 2022-02-09

## 2022-01-20 NOTE — TELEPHONE ENCOUNTER
----- Message from Leora Méndez V sent at 1/18/2022  2:46 PM CST -----  PT NEEDS TO TALK TO NURSE ABOUT NEW MEDICATION. [phone]    Patient first says she wanted Dr. Frausto to know she had a reaction to cleocin when she was at the hospital. She was getting an iv antibiotic, it was stopped and the med was changed. She says she just wanted Dr. Frausto to know. Then patient asked for an appt-made appt for next Wednesday re: rash and checking on her foot. Patient voiced she didn't want any other amputations. Patient voices need her toujeo, she may not make it through the weekend. Patient says she may be able to get one until she can make her appt on Wednesday.

## 2022-01-24 ENCOUNTER — HOSPITAL ENCOUNTER (OUTPATIENT)
Dept: RADIOLOGY | Facility: HOSPITAL | Age: 62
Discharge: HOME | End: 2022-01-24
Attending: FAMILY MEDICINE
Payer: COMMERCIAL

## 2022-01-24 DIAGNOSIS — M79.605 PAIN IN BOTH LOWER EXTREMITIES: ICD-10-CM

## 2022-01-24 DIAGNOSIS — I73.9 PERIPHERAL VASCULAR DISEASE: ICD-10-CM

## 2022-01-24 DIAGNOSIS — R60.0 EDEMA OF BOTH LOWER EXTREMITIES: ICD-10-CM

## 2022-01-24 DIAGNOSIS — L97.521 ULCER OF LEFT FOOT, LIMITED TO BREAKDOWN OF SKIN: ICD-10-CM

## 2022-01-24 DIAGNOSIS — L81.9 HYPERPIGMENTATION OF SKIN: ICD-10-CM

## 2022-01-24 DIAGNOSIS — M79.604 PAIN IN BOTH LOWER EXTREMITIES: ICD-10-CM

## 2022-01-24 DIAGNOSIS — I87.2 CHRONIC VENOUS INSUFFICIENCY OF LOWER EXTREMITY: ICD-10-CM

## 2022-01-24 PROCEDURE — 93922 UPR/L XTREMITY ART 2 LEVELS: CPT | Mod: 26,,, | Performed by: SURGERY

## 2022-01-24 PROCEDURE — 93925 LOWER EXTREMITY STUDY: CPT | Mod: TC

## 2022-01-24 PROCEDURE — 93925 US ARTERIAL LOWER EXTREMITY BILAT WITH ABI (XPD): ICD-10-PCS | Mod: 26,,, | Performed by: SURGERY

## 2022-01-24 PROCEDURE — 93922 US ARTERIAL LOWER EXTREMITY BILAT WITH ABI (XPD): ICD-10-PCS | Mod: 26,,, | Performed by: SURGERY

## 2022-01-24 PROCEDURE — 93925 LOWER EXTREMITY STUDY: CPT | Mod: 26,,, | Performed by: SURGERY

## 2022-01-25 NOTE — TELEPHONE ENCOUNTER
----- Message from Marianna Chavarria sent at 1/25/2022  3:46 PM CST -----  REFILLS ON colchicine (COLCRYS) 0.6 mg tablet ,cilostazoL (PLETAL) 100 MG Tab & ibuprofen (ADVIL,MOTRIN) 600 MG tablet

## 2022-01-25 NOTE — TELEPHONE ENCOUNTER
Call made to pt to clarify instructions on Pletal 100mg. No answer. No voicemail available.     Refill request cannot be fulfilled until information is received.

## 2022-01-26 ENCOUNTER — OFFICE VISIT (OUTPATIENT)
Dept: SURGERY | Facility: CLINIC | Age: 62
End: 2022-01-26
Payer: COMMERCIAL

## 2022-01-26 VITALS — BODY MASS INDEX: 44.41 KG/M2 | HEIGHT: 68 IN | WEIGHT: 293 LBS

## 2022-01-26 DIAGNOSIS — I73.9 PERIPHERAL VASCULAR DISEASE, UNSPECIFIED: ICD-10-CM

## 2022-01-26 DIAGNOSIS — I73.9 PVD (PERIPHERAL VASCULAR DISEASE): Primary | ICD-10-CM

## 2022-01-26 DIAGNOSIS — I73.9 PVD (PERIPHERAL VASCULAR DISEASE): Chronic | ICD-10-CM

## 2022-01-26 PROBLEM — I96 GANGRENE: Status: ACTIVE | Noted: 2022-01-26

## 2022-01-26 PROCEDURE — 99215 OFFICE O/P EST HI 40 MIN: CPT | Mod: PBBFAC | Performed by: SURGERY

## 2022-01-26 PROCEDURE — 3008F PR BODY MASS INDEX (BMI) DOCUMENTED: ICD-10-PCS | Mod: CPTII,,, | Performed by: SURGERY

## 2022-01-26 PROCEDURE — 1160F PR REVIEW ALL MEDS BY PRESCRIBER/CLIN PHARMACIST DOCUMENTED: ICD-10-PCS | Mod: CPTII,,, | Performed by: SURGERY

## 2022-01-26 PROCEDURE — 1160F RVW MEDS BY RX/DR IN RCRD: CPT | Mod: CPTII,,, | Performed by: SURGERY

## 2022-01-26 PROCEDURE — 99213 PR OFFICE/OUTPT VISIT, EST, LEVL III, 20-29 MIN: ICD-10-PCS | Mod: S$PBB,,, | Performed by: SURGERY

## 2022-01-26 PROCEDURE — 1159F MED LIST DOCD IN RCRD: CPT | Mod: CPTII,,, | Performed by: SURGERY

## 2022-01-26 PROCEDURE — 3008F BODY MASS INDEX DOCD: CPT | Mod: CPTII,,, | Performed by: SURGERY

## 2022-01-26 PROCEDURE — 1159F PR MEDICATION LIST DOCUMENTED IN MEDICAL RECORD: ICD-10-PCS | Mod: CPTII,,, | Performed by: SURGERY

## 2022-01-26 PROCEDURE — 99213 OFFICE O/P EST LOW 20 MIN: CPT | Mod: S$PBB,,, | Performed by: SURGERY

## 2022-01-26 RX ORDER — INSULIN ASPART 100 [IU]/ML
1-10 INJECTION, SOLUTION INTRAVENOUS; SUBCUTANEOUS
Status: CANCELLED | OUTPATIENT
Start: 2022-01-26

## 2022-01-26 RX ORDER — TALC
6 POWDER (GRAM) TOPICAL NIGHTLY PRN
Status: CANCELLED | OUTPATIENT
Start: 2022-01-26

## 2022-01-26 RX ORDER — HYDROCODONE BITARTRATE AND ACETAMINOPHEN 5; 325 MG/1; MG/1
1 TABLET ORAL EVERY 6 HOURS PRN
Status: CANCELLED | OUTPATIENT
Start: 2022-01-26

## 2022-01-26 RX ORDER — ONDANSETRON 2 MG/ML
4 INJECTION INTRAMUSCULAR; INTRAVENOUS EVERY 8 HOURS PRN
Status: CANCELLED | OUTPATIENT
Start: 2022-01-26

## 2022-01-26 RX ORDER — COLCHICINE 0.6 MG/1
TABLET ORAL
Qty: 30 TABLET | Refills: 2 | Status: SHIPPED | OUTPATIENT
Start: 2022-01-26 | End: 2022-05-24 | Stop reason: SDUPTHER

## 2022-01-26 RX ORDER — IBUPROFEN 600 MG/1
600 TABLET ORAL 2 TIMES DAILY PRN
Qty: 20 TABLET | Refills: 1 | Status: SHIPPED | OUTPATIENT
Start: 2022-01-26 | End: 2022-03-31

## 2022-01-26 RX ORDER — IBUPROFEN 200 MG
16 TABLET ORAL
Status: CANCELLED | OUTPATIENT
Start: 2022-01-26

## 2022-01-26 RX ORDER — GLUCAGON 1 MG
1 KIT INJECTION
Status: CANCELLED | OUTPATIENT
Start: 2022-01-26

## 2022-01-26 RX ORDER — ONDANSETRON 4 MG/1
8 TABLET, ORALLY DISINTEGRATING ORAL EVERY 8 HOURS PRN
Status: CANCELLED | OUTPATIENT
Start: 2022-01-26

## 2022-01-26 RX ORDER — IBUPROFEN 200 MG
24 TABLET ORAL
Status: CANCELLED | OUTPATIENT
Start: 2022-01-26

## 2022-01-26 RX ORDER — ACETAMINOPHEN 325 MG/1
650 TABLET ORAL EVERY 4 HOURS PRN
Status: CANCELLED | OUTPATIENT
Start: 2022-01-26

## 2022-01-26 RX ORDER — IBUPROFEN 400 MG/1
400 TABLET ORAL EVERY 6 HOURS PRN
Status: CANCELLED | OUTPATIENT
Start: 2022-01-26

## 2022-01-26 RX ORDER — MORPHINE SULFATE 10 MG/ML
4 INJECTION, SOLUTION INTRAMUSCULAR; INTRAVENOUS EVERY 4 HOURS PRN
Status: CANCELLED | OUTPATIENT
Start: 2022-01-26

## 2022-01-26 RX ORDER — SODIUM CHLORIDE 0.9 % (FLUSH) 0.9 %
10 SYRINGE (ML) INJECTION EVERY 12 HOURS PRN
Status: CANCELLED | OUTPATIENT
Start: 2022-01-26

## 2022-01-27 NOTE — PROGRESS NOTES
Subjective:       Patient ID: Holly Porter is a 61 y.o. female.    Chief Complaint: Follow-up (F/u test results)  Patient has a wound left foot DIMITRY 0.47 left 0.58 on the right need CT angiogram  family history includes Coronary aneurysm in her father; Coronary artery disease in her father; Heart disease in her father and mother; Hypertension in her father and mother; No Known Problems in her brother, brother, brother, sister, son, son, son, and son; Osteoarthritis in her mother.  Past Medical History:   Diagnosis Date    Anxiety state     Atherosclerosis of native artery of extremity with intermittent claudication 01/30/2019    bilateral legs    Bilateral leg pain     BMI 50.0-59.9, adult 02/22/2021    Cellulitis 06/11/2020    Chronic pain syndrome     Chronic peripheral venous hypertension 01/08/2019    COPD (chronic obstructive pulmonary disease)     Diabetes     DVT (deep venous thrombosis) 02/12/2020    Embolism and thrombosis of superficial veins of unspecified lower extremity 07/01/2019    bilateral    Frequent headaches 09/20/2018    GERD (gastroesophageal reflux disease)     Hereditary lymphedema     Hx of thyroid cancer     Hyperlipidemia     Hypertension     Hypothyroidism     Migraines     Migraines     Morbid obesity     Nicotine dependence     Non-pressure chronic ulcer of left lower leg 03/09/2021    Osteoarthritis     Other skin changes 03/09/2021    bilateral gaiter regions    Pain in left leg     Pain in right leg     PVD (peripheral vascular disease) 01/08/2019    Seizure disorder     Sleep apnea     Venous insufficiency (chronic) (peripheral)     Venous stasis     Vitamin D deficiency       Past Surgical History:   Procedure Laterality Date    HYSTERECTOMY      ILIAC ARTERY STENT Bilateral 01/28/2020    Bilateral distal aorta and common iliac 8 X 59 vbx covered stents performed by Dr. Antonio Jacinto.    STAB PHLEBECTOMY OF VARICOSE VEINS Left 01/25/2013    Left  "leg microphlebectomies x 23 stab avulsions and ligation of multiple varicose veins perrformed by Dr. Cirilo Aguirre.    THYROIDECTOMY      hx: thyroid cancer    TOE AMPUTATION Left 01/28/2020    2nd, 4th and 5th performed by Dr. Anam Saeed.    TOE AMPUTATION Right 01/28/2020    4th toe performed by Dr. Anam Saeed.    TOTAL ABDOMINAL HYSTERECTOMY W/ BILATERAL SALPINGOOPHORECTOMY      TUBAL LIGATION      VAGINAL DELIVERY      x 4    VENOUS ABLATION Right 08/09/2019    GSV Varithena Ablation performed by Dr. Estuardo Falcon    VENOUS ABLATION Left 08/02/2019    ATAV Varithena Ablation performed by Dr. Estuardo Falcon.    VENOUS ABLATION Right 07/13/2015    ATAV Laser Ablatio performed by Dr. Cirilo Aguirre.    VENOUS ABLATION Right 07/06/2015    Distal  GSV Laser Ablation performed by dr. Cirilo Aguirre.    VENOUS ABLATION Left 04/20/2015    Left Distal GSV Laser Ablation performed by dr. Cirilo Aguirre.    VENOUS ABLATION Right 10/28/2013    Right Distal GSV RF Ablation performed by Dr. Cirilo Aguirre.    VENOUS ABLATION Left 10/25/2013    SSV RF Ablation performed by dr. Cirilo Aguirre.    VENOUS ABLATION Left 10/07/2013    Left GSV and Left ATAV RF Ablation performed by Dr. Cirilo Aguirre.    VENOUS ABLATION Right 01/21/2013    GSV RF Ablation w/micros x 22 performed by Dr. Cirilo Aguirre.    VENOUS ABLATION Left 06/25/2021    Left distal GSV Varithena Ablation       reports that she has been smoking cigarettes. She has been smoking about 1.00 pack per day. She has never used smokeless tobacco. She reports that she does not drink alcohol and does not use drugs.   HPI  Review of Systems      Objective:      Ht 5' 8" (1.727 m)   Wt (!) 160.6 kg (354 lb)   BMI 53.83 kg/m²    Physical Exam  Constitutional:       Appearance: She is obese.   Cardiovascular:      Rate and Rhythm: Normal rate.   Pulmonary:      Effort: Pulmonary effort is normal.   Musculoskeletal:      Cervical back: Normal range of motion.   Skin:     Capillary Refill: Capillary refill " takes 2 to 3 seconds.      Comments: Great toe amputation left wounds involving metatarsophalangeal area on the dorsum.  With some eschar decreased pulses on exam no purulence no cellulitis   Neurological:      General: No focal deficit present.      Mental Status: She is alert and oriented to person, place, and time.   Psychiatric:         Mood and Affect: Mood normal.         Behavior: Behavior normal.         Thought Content: Thought content normal.         Judgment: Judgment normal.           Assessment:       1. PVD (peripheral vascular disease)    2. diabetes    3. Peripheral vascular disease, unspecified        Plan:       Plan is to admit due to allergy to IV contrast and steroids in order to get her vascular disease further evaluated patient has critical limb ischemia

## 2022-01-28 ENCOUNTER — HOSPITAL ENCOUNTER (OUTPATIENT)
Dept: RADIOLOGY | Facility: HOSPITAL | Age: 62
Discharge: HOME | End: 2022-01-28
Attending: SURGERY
Payer: COMMERCIAL

## 2022-01-28 ENCOUNTER — HOSPITAL ENCOUNTER (EMERGENCY)
Facility: HOSPITAL | Age: 62
Discharge: HOME | End: 2022-01-29
Attending: SPECIALIST
Payer: COMMERCIAL

## 2022-01-28 DIAGNOSIS — R79.89 TROPONIN LEVEL ELEVATED: ICD-10-CM

## 2022-01-28 DIAGNOSIS — I26.99 MULTIPLE PULMONARY EMBOLI: Primary | ICD-10-CM

## 2022-01-28 DIAGNOSIS — I73.9 PERIPHERAL VASCULAR DISEASE, UNSPECIFIED: ICD-10-CM

## 2022-01-28 DIAGNOSIS — R06.02 SHORTNESS OF BREATH: ICD-10-CM

## 2022-01-28 LAB
ALBUMIN SERPL BCP-MCNC: 3.5 G/DL (ref 3.5–5)
ALBUMIN/GLOB SERPL: 0.9 {RATIO}
ALP SERPL-CCNC: 80 U/L (ref 50–130)
ALT SERPL W P-5'-P-CCNC: 11 U/L (ref 13–56)
ANION GAP SERPL CALCULATED.3IONS-SCNC: 15 MMOL/L (ref 7–16)
APTT PPP: 26.9 SECONDS (ref 25.2–37.3)
AST SERPL W P-5'-P-CCNC: 23 U/L (ref 15–37)
BASOPHILS # BLD AUTO: 0.03 K/UL (ref 0–0.2)
BASOPHILS NFR BLD AUTO: 0.3 % (ref 0–1)
BILIRUB SERPL-MCNC: 0.4 MG/DL (ref 0–1.2)
BUN SERPL-MCNC: 13 MG/DL (ref 7–18)
BUN/CREAT SERPL: 13 (ref 6–20)
CALCIUM SERPL-MCNC: 9.5 MG/DL (ref 8.5–10.1)
CHLORIDE SERPL-SCNC: 105 MMOL/L (ref 98–107)
CO2 SERPL-SCNC: 26 MMOL/L (ref 21–32)
CREAT SERPL-MCNC: 1.01 MG/DL (ref 0.55–1.02)
DIFFERENTIAL METHOD BLD: ABNORMAL
EOSINOPHIL # BLD AUTO: 0.33 K/UL (ref 0–0.5)
EOSINOPHIL NFR BLD AUTO: 3.3 % (ref 1–4)
ERYTHROCYTE [DISTWIDTH] IN BLOOD BY AUTOMATED COUNT: 19.6 % (ref 11.5–14.5)
GLOBULIN SER-MCNC: 4.1 G/DL (ref 2–4)
GLUCOSE SERPL-MCNC: 108 MG/DL (ref 74–106)
HCT VFR BLD AUTO: 46.9 % (ref 38–47)
HGB BLD-MCNC: 14.8 G/DL (ref 12–16)
IMM GRANULOCYTES # BLD AUTO: 0.03 K/UL (ref 0–0.04)
IMM GRANULOCYTES NFR BLD: 0.3 % (ref 0–0.4)
INR BLD: 1.01 (ref 0.9–1.1)
LYMPHOCYTES # BLD AUTO: 2.81 K/UL (ref 1–4.8)
LYMPHOCYTES NFR BLD AUTO: 27.8 % (ref 27–41)
MAGNESIUM SERPL-MCNC: 2 MG/DL (ref 1.7–2.3)
MCH RBC QN AUTO: 26.8 PG (ref 27–31)
MCHC RBC AUTO-ENTMCNC: 31.6 G/DL (ref 32–36)
MCV RBC AUTO: 84.8 FL (ref 80–96)
MONOCYTES # BLD AUTO: 0.62 K/UL (ref 0–0.8)
MONOCYTES NFR BLD AUTO: 6.1 % (ref 2–6)
MPC BLD CALC-MCNC: 11.4 FL (ref 9.4–12.4)
NEUTROPHILS # BLD AUTO: 6.3 K/UL (ref 1.8–7.7)
NEUTROPHILS NFR BLD AUTO: 62.2 % (ref 53–65)
PLATELET # BLD AUTO: 224 K/UL (ref 150–400)
POTASSIUM SERPL-SCNC: 3.5 MMOL/L (ref 3.5–5.1)
PROT SERPL-MCNC: 7.6 G/DL (ref 6.4–8.2)
PROTHROMBIN TIME: 13.3 SECONDS (ref 11.7–14.7)
RBC # BLD AUTO: 5.53 M/UL (ref 4.2–5.4)
SODIUM SERPL-SCNC: 142 MMOL/L (ref 136–145)
TROPONIN I SERPL HS-MCNC: 193.4 PG/ML
TROPONIN I SERPL HS-MCNC: 207.7 PG/ML
WBC # BLD AUTO: 10.12 K/UL (ref 4.5–11)

## 2022-01-28 PROCEDURE — 96366 THER/PROPH/DIAG IV INF ADDON: CPT

## 2022-01-28 PROCEDURE — 80053 COMPREHEN METABOLIC PANEL: CPT | Performed by: SPECIALIST

## 2022-01-28 PROCEDURE — 85610 PROTHROMBIN TIME: CPT | Performed by: SPECIALIST

## 2022-01-28 PROCEDURE — 85730 THROMBOPLASTIN TIME PARTIAL: CPT | Performed by: SPECIALIST

## 2022-01-28 PROCEDURE — 99284 EMERGENCY DEPT VISIT MOD MDM: CPT | Performed by: SPECIALIST

## 2022-01-28 PROCEDURE — 96372 THER/PROPH/DIAG INJ SC/IM: CPT | Mod: 59

## 2022-01-28 PROCEDURE — 75635 CT ANGIO ABDOMINAL ARTERIES: CPT | Mod: 26,,, | Performed by: RADIOLOGY

## 2022-01-28 PROCEDURE — 63600175 PHARM REV CODE 636 W HCPCS: Performed by: SPECIALIST

## 2022-01-28 PROCEDURE — 96367 TX/PROPH/DG ADDL SEQ IV INF: CPT

## 2022-01-28 PROCEDURE — 93010 EKG 12-LEAD: ICD-10-PCS | Mod: ,,, | Performed by: INTERNAL MEDICINE

## 2022-01-28 PROCEDURE — 75635 CT ANGIO ABDOMINAL ARTERIES: CPT | Mod: TC

## 2022-01-28 PROCEDURE — 25500020 PHARM REV CODE 255: Performed by: SURGERY

## 2022-01-28 PROCEDURE — 84484 ASSAY OF TROPONIN QUANT: CPT | Performed by: SPECIALIST

## 2022-01-28 PROCEDURE — 99285 EMERGENCY DEPT VISIT HI MDM: CPT | Mod: 25

## 2022-01-28 PROCEDURE — 93010 ELECTROCARDIOGRAM REPORT: CPT | Mod: ,,, | Performed by: INTERNAL MEDICINE

## 2022-01-28 PROCEDURE — 63600175 PHARM REV CODE 636 W HCPCS: Performed by: RADIOLOGY

## 2022-01-28 PROCEDURE — 25000003 PHARM REV CODE 250: Performed by: SPECIALIST

## 2022-01-28 PROCEDURE — 93005 ELECTROCARDIOGRAM TRACING: CPT

## 2022-01-28 PROCEDURE — 75635 CTA RUNOFF ABD PEL BILAT LOWER EXT: ICD-10-PCS | Mod: 26,,, | Performed by: RADIOLOGY

## 2022-01-28 PROCEDURE — 83735 ASSAY OF MAGNESIUM: CPT | Performed by: SPECIALIST

## 2022-01-28 PROCEDURE — 85025 COMPLETE CBC W/AUTO DIFF WBC: CPT | Performed by: SPECIALIST

## 2022-01-28 PROCEDURE — 96365 THER/PROPH/DIAG IV INF INIT: CPT

## 2022-01-28 PROCEDURE — 36415 COLL VENOUS BLD VENIPUNCTURE: CPT | Performed by: SPECIALIST

## 2022-01-28 RX ORDER — ENOXAPARIN SODIUM 100 MG/ML
100 INJECTION SUBCUTANEOUS
Status: COMPLETED | OUTPATIENT
Start: 2022-01-28 | End: 2022-01-28

## 2022-01-28 RX ORDER — HEPARIN SODIUM,PORCINE/D5W 25000/250
25 INTRAVENOUS SOLUTION INTRAVENOUS CONTINUOUS
Status: DISCONTINUED | OUTPATIENT
Start: 2022-01-28 | End: 2022-01-29

## 2022-01-28 RX ORDER — HYDROCODONE BITARTRATE AND ACETAMINOPHEN 10; 325 MG/1; MG/1
1 TABLET ORAL
Status: COMPLETED | OUTPATIENT
Start: 2022-01-28 | End: 2022-01-28

## 2022-01-28 RX ORDER — DIPHENHYDRAMINE HYDROCHLORIDE 50 MG/ML
INJECTION INTRAMUSCULAR; INTRAVENOUS CODE/TRAUMA/SEDATION MEDICATION
Status: COMPLETED | OUTPATIENT
Start: 2022-01-28 | End: 2022-01-28

## 2022-01-28 RX ORDER — POTASSIUM CHLORIDE 7.45 MG/ML
10 INJECTION INTRAVENOUS
Status: COMPLETED | OUTPATIENT
Start: 2022-01-28 | End: 2022-01-28

## 2022-01-28 RX ADMIN — ENOXAPARIN SODIUM 100 MG: 100 INJECTION SUBCUTANEOUS at 03:01

## 2022-01-28 RX ADMIN — HYDROCODONE BITARTRATE AND ACETAMINOPHEN 1 TABLET: 10; 325 TABLET ORAL at 10:01

## 2022-01-28 RX ADMIN — POTASSIUM CHLORIDE 10 MEQ: 7.46 INJECTION, SOLUTION INTRAVENOUS at 05:01

## 2022-01-28 RX ADMIN — HEPARIN SODIUM 25 UNITS/KG/HR: 10000 INJECTION, SOLUTION INTRAVENOUS at 06:01

## 2022-01-28 RX ADMIN — DIPHENHYDRAMINE HYDROCHLORIDE 50 MG: 50 INJECTION, SOLUTION INTRAMUSCULAR; INTRAVENOUS at 09:01

## 2022-01-28 RX ADMIN — IOPAMIDOL 100 ML: 755 INJECTION, SOLUTION INTRAVENOUS at 10:01

## 2022-01-28 NOTE — PROGRESS NOTES
Patient had PE on ct scan  Called by radiologist, after patient had left  Called  all available numbers no answer  Attempting to get clinic admistration involved  Will give to ER if cant get a hold of patient

## 2022-01-28 NOTE — ED NOTES
Called Dr. Jacinto's office to speak with md -  states md not in office at hospital today after leaving voice mail with  - called hospital and obtained dr wheatley cell number-  [phone]- spoke with dr jacinto states have md evaluate pt and call him

## 2022-01-29 VITALS
HEIGHT: 68 IN | HEART RATE: 74 BPM | RESPIRATION RATE: 18 BRPM | TEMPERATURE: 98 F | OXYGEN SATURATION: 94 % | DIASTOLIC BLOOD PRESSURE: 72 MMHG | WEIGHT: 293 LBS | SYSTOLIC BLOOD PRESSURE: 142 MMHG | BODY MASS INDEX: 44.41 KG/M2

## 2022-01-29 LAB
ALBUMIN SERPL BCP-MCNC: 3.2 G/DL (ref 3.5–5)
ALBUMIN/GLOB SERPL: 0.9 {RATIO}
ALP SERPL-CCNC: 74 U/L (ref 50–130)
ALT SERPL W P-5'-P-CCNC: 12 U/L (ref 13–56)
ANION GAP SERPL CALCULATED.3IONS-SCNC: 13 MMOL/L (ref 7–16)
APTT PPP: 70.7 SECONDS (ref 25.2–37.3)
APTT PPP: >200 SECONDS (ref 25.2–37.3)
AST SERPL W P-5'-P-CCNC: 16 U/L (ref 15–37)
BASOPHILS # BLD AUTO: 0.04 K/UL (ref 0–0.2)
BASOPHILS NFR BLD AUTO: 0.3 % (ref 0–1)
BILIRUB SERPL-MCNC: 0.3 MG/DL (ref 0–1.2)
BUN SERPL-MCNC: 13 MG/DL (ref 7–18)
BUN/CREAT SERPL: 15 (ref 6–20)
CALCIUM SERPL-MCNC: 9.1 MG/DL (ref 8.5–10.1)
CHLORIDE SERPL-SCNC: 105 MMOL/L (ref 98–107)
CO2 SERPL-SCNC: 27 MMOL/L (ref 21–32)
CREAT SERPL-MCNC: 0.88 MG/DL (ref 0.55–1.02)
DIFFERENTIAL METHOD BLD: ABNORMAL
EOSINOPHIL # BLD AUTO: 0.5 K/UL (ref 0–0.5)
EOSINOPHIL NFR BLD AUTO: 4.4 % (ref 1–4)
ERYTHROCYTE [DISTWIDTH] IN BLOOD BY AUTOMATED COUNT: 19.3 % (ref 11.5–14.5)
GLOBULIN SER-MCNC: 3.6 G/DL (ref 2–4)
GLUCOSE SERPL-MCNC: 102 MG/DL (ref 74–106)
GLUCOSE SERPL-MCNC: 71 MG/DL (ref 70–105)
HCT VFR BLD AUTO: 45.1 % (ref 38–47)
HGB BLD-MCNC: 14.1 G/DL (ref 12–16)
IMM GRANULOCYTES # BLD AUTO: 0.06 K/UL (ref 0–0.04)
IMM GRANULOCYTES NFR BLD: 0.5 % (ref 0–0.4)
INR BLD: 1.02 (ref 0.9–1.1)
INR BLD: 1.04 (ref 0.9–1.1)
INR BLD: 1.16 (ref 0.9–1.1)
LYMPHOCYTES # BLD AUTO: 3.53 K/UL (ref 1–4.8)
LYMPHOCYTES NFR BLD AUTO: 30.7 % (ref 27–41)
MCH RBC QN AUTO: 26.5 PG (ref 27–31)
MCHC RBC AUTO-ENTMCNC: 31.3 G/DL (ref 32–36)
MCV RBC AUTO: 84.6 FL (ref 80–96)
MONOCYTES # BLD AUTO: 0.68 K/UL (ref 0–0.8)
MONOCYTES NFR BLD AUTO: 5.9 % (ref 2–6)
MPC BLD CALC-MCNC: 11.1 FL (ref 9.4–12.4)
NEUTROPHILS # BLD AUTO: 6.67 K/UL (ref 1.8–7.7)
NEUTROPHILS NFR BLD AUTO: 58.2 % (ref 53–65)
PLATELET # BLD AUTO: 201 K/UL (ref 150–400)
POTASSIUM SERPL-SCNC: 3.4 MMOL/L (ref 3.5–5.1)
PROT SERPL-MCNC: 6.8 G/DL (ref 6.4–8.2)
PROTHROMBIN TIME: 13.4 SECONDS (ref 11.7–14.7)
PROTHROMBIN TIME: 13.6 SECONDS (ref 11.7–14.7)
PROTHROMBIN TIME: 14.8 SECONDS (ref 11.7–14.7)
RBC # BLD AUTO: 5.33 M/UL (ref 4.2–5.4)
SODIUM SERPL-SCNC: 142 MMOL/L (ref 136–145)
TROPONIN I SERPL HS-MCNC: 166.1 PG/ML
WBC # BLD AUTO: 11.48 K/UL (ref 4.5–11)

## 2022-01-29 PROCEDURE — 85730 THROMBOPLASTIN TIME PARTIAL: CPT | Performed by: SPECIALIST

## 2022-01-29 PROCEDURE — 63600175 PHARM REV CODE 636 W HCPCS: Performed by: SPECIALIST

## 2022-01-29 PROCEDURE — 82962 GLUCOSE BLOOD TEST: CPT

## 2022-01-29 PROCEDURE — 84484 ASSAY OF TROPONIN QUANT: CPT | Performed by: SPECIALIST

## 2022-01-29 PROCEDURE — 80053 COMPREHEN METABOLIC PANEL: CPT | Performed by: SPECIALIST

## 2022-01-29 PROCEDURE — 85610 PROTHROMBIN TIME: CPT | Performed by: SPECIALIST

## 2022-01-29 PROCEDURE — 85730 THROMBOPLASTIN TIME PARTIAL: CPT | Performed by: FAMILY MEDICINE

## 2022-01-29 PROCEDURE — 36415 COLL VENOUS BLD VENIPUNCTURE: CPT | Performed by: SPECIALIST

## 2022-01-29 PROCEDURE — 85025 COMPLETE CBC W/AUTO DIFF WBC: CPT | Performed by: SPECIALIST

## 2022-01-29 RX ADMIN — HEPARIN SODIUM 21 UNITS/KG/HR: 10000 INJECTION, SOLUTION INTRAVENOUS at 03:01

## 2022-01-29 NOTE — ED NOTES
VOICED CONCERN WITH DR. MICHEL OF PTT >200 AND DC'ING PT.  ORDERS RECEIVED TO REPEAT PTT, INR, AND PT.

## 2022-01-29 NOTE — ED NOTES
CHEIKH COBIAN CALLED WITH PTT OF >200. HEPARIN INFUSION STOPPED. DR. MICHEL NOTIFIED. VERBAL ORDERS TO DC HEPARIN AND DRAW AN INR.

## 2022-01-29 NOTE — ED PROVIDER NOTES
Encounter Date: 1/28/2022       History     Chief Complaint   Patient presents with    blood clot      HPI  Review of patient's allergies indicates:   Allergen Reactions    Ammonium peroxydisulfate Shortness Of Breath    Avocado (laurus persea) Anaphylaxis    Bananas [banana] Anaphylaxis and Swelling     CRAMPS,    Chocolate flavor Anaphylaxis     MOUTH SWELLING    Silvadene [silver sulfadiazine]     Beef-potatoes-peas [nutritional supplement-fiber] Itching    Clindamycin     Corticosteroids (glucocorticoids)     Hydrocortisone Blisters    Lasix [furosemide] Blisters     BURNS SKIN    Adhesive Rash and Blisters    Iodine and iodide containing products Rash    Latex, natural rubber Rash    Pcn [penicillins] Rash    Sulfa (sulfonamide antibiotics) Rash and Blisters     Past Medical History:   Diagnosis Date    Anxiety state     Atherosclerosis of native artery of extremity with intermittent claudication 01/30/2019    bilateral legs    Bilateral leg pain     BMI 50.0-59.9, adult 02/22/2021    Cellulitis 06/11/2020    Chronic pain syndrome     Chronic peripheral venous hypertension 01/08/2019    COPD (chronic obstructive pulmonary disease)     Diabetes     DVT (deep venous thrombosis) 02/12/2020    Embolism and thrombosis of superficial veins of unspecified lower extremity 07/01/2019    bilateral    Frequent headaches 09/20/2018    GERD (gastroesophageal reflux disease)     Hereditary lymphedema     Hx of thyroid cancer     Hyperlipidemia     Hypertension     Hypothyroidism     Migraines     Migraines     Morbid obesity     Nicotine dependence     Non-pressure chronic ulcer of left lower leg 03/09/2021    Osteoarthritis     Other skin changes 03/09/2021    bilateral gaiter regions    Pain in left leg     Pain in right leg     PVD (peripheral vascular disease) 01/08/2019    Seizure disorder     Sleep apnea     Venous insufficiency (chronic) (peripheral)     Venous stasis      Vitamin D deficiency      Past Surgical History:   Procedure Laterality Date    HYSTERECTOMY      ILIAC ARTERY STENT Bilateral 01/28/2020    Bilateral distal aorta and common iliac 8 X 59 vbx covered stents performed by Dr. Antonio Jacinto.    STAB PHLEBECTOMY OF VARICOSE VEINS Left 01/25/2013    Left leg microphlebectomies x 23 stab avulsions and ligation of multiple varicose veins perrformed by Dr. Cirilo Aguirre.    THYROIDECTOMY      hx: thyroid cancer    TOE AMPUTATION Left 01/28/2020    2nd, 4th and 5th performed by Dr. Anam Saeed.    TOE AMPUTATION Right 01/28/2020    4th toe performed by Dr. Anam Saeed.    TOTAL ABDOMINAL HYSTERECTOMY W/ BILATERAL SALPINGOOPHORECTOMY      TUBAL LIGATION      VAGINAL DELIVERY      x 4    VENOUS ABLATION Right 08/09/2019    GSV Varithena Ablation performed by Dr. Estuardo Falcon    VENOUS ABLATION Left 08/02/2019    ATAV Varithena Ablation performed by Dr. Estuardo Falcon.    VENOUS ABLATION Right 07/13/2015    ATAV Laser Ablatio performed by Dr. Cirilo Aguirre.    VENOUS ABLATION Right 07/06/2015    Distal  GSV Laser Ablation performed by dr. Cirilo Aguirre.    VENOUS ABLATION Left 04/20/2015    Left Distal GSV Laser Ablation performed by dr. Cirilo Aguirre.    VENOUS ABLATION Right 10/28/2013    Right Distal GSV RF Ablation performed by Dr. Cirilo Aguirre.    VENOUS ABLATION Left 10/25/2013    SSV RF Ablation performed by dr. Cirilo Aguirre.    VENOUS ABLATION Left 10/07/2013    Left GSV and Left ATAV RF Ablation performed by Dr. Cirilo Aguirre.    VENOUS ABLATION Right 01/21/2013    GSV RF Ablation w/micros x 22 performed by Dr. Cirilo Aguirre.    VENOUS ABLATION Left 06/25/2021    Left distal GSV Varithena Ablation     Family History   Problem Relation Age of Onset    Heart disease Mother         age 84 CHF    Hypertension Mother     Osteoarthritis Mother     Coronary aneurysm Father     Coronary artery disease Father     Hypertension Father     Heart disease Father     No Known Problems Son     No  Known Problems Son     No Known Problems Son     No Known Problems Son     No Known Problems Sister     No Known Problems Brother         hx: varicose veins    No Known Problems Brother         MVA: parlazed    No Known Problems Brother      Social History     Tobacco Use    Smoking status: Current Every Day Smoker     Packs/day: 1.00     Types: Cigarettes    Smokeless tobacco: Never Used   Substance Use Topics    Alcohol use: Never    Drug use: Never     Review of Systems    Physical Exam     Initial Vitals [01/28/22 1505]   BP Pulse Resp Temp SpO2   (!) 149/86 84 18 97.9 °F (36.6 °C) 97 %      MAP       --         Physical Exam    Medical Screening Exam   See Full Note    ED Course   Procedures  Labs Reviewed   COMPREHENSIVE METABOLIC PANEL - Abnormal; Notable for the following components:       Result Value    Glucose 108 (*)     Globulin 4.1 (*)     ALT 11 (*)     eGFR 59 (*)     All other components within normal limits   TROPONIN I - Abnormal; Notable for the following components:    Troponin I High Sensitivity 207.7 (*)     All other components within normal limits   CBC WITH DIFFERENTIAL - Abnormal; Notable for the following components:    RBC 5.53 (*)     MCH 26.8 (*)     MCHC 31.6 (*)     RDW 19.6 (*)     Monocytes % 6.1 (*)     All other components within normal limits   TROPONIN I - Abnormal; Notable for the following components:    Troponin I High Sensitivity 193.4 (*)     All other components within normal limits   APTT - Abnormal; Notable for the following components:    PTT >200.0 (*)     All other components within normal limits   TROPONIN I - Abnormal; Notable for the following components:    Troponin I High Sensitivity 166.1 (*)     All other components within normal limits   CBC WITH DIFFERENTIAL - Abnormal; Notable for the following components:    WBC 11.48 (*)     MCH 26.5 (*)     MCHC 31.3 (*)     RDW 19.3 (*)     Eosinophils % 4.4 (*)     Immature Granulocytes % 0.5 (*)     Immature  Granulocytes, Absolute 0.06 (*)     All other components within normal limits   COMPREHENSIVE METABOLIC PANEL - Abnormal; Notable for the following components:    Potassium 3.4 (*)     Albumin 3.2 (*)     ALT 12 (*)     All other components within normal limits   APTT - Abnormal; Notable for the following components:    PTT >200.0 (*)     All other components within normal limits   PROTIME-INR - Abnormal; Notable for the following components:    PT 14.8 (*)     INR 1.16 (*)     All other components within normal limits   PT AND PTT - Abnormal; Notable for the following components:    PTT >200.0 (*)     All other components within normal limits   APTT - Normal   PROTIME-INR - Normal   MAGNESIUM - Normal   CBC W/ AUTO DIFFERENTIAL    Narrative:     The following orders were created for panel order CBC auto differential.  Procedure                               Abnormality         Status                     ---------                               -----------         ------                     CBC with Differential[[id number]]        Abnormal            Final result                 Please view results for these tests on the individual orders.   CBC W/ AUTO DIFFERENTIAL    Narrative:     The following orders were created for panel order CBC auto differential.  Procedure                               Abnormality         Status                     ---------                               -----------         ------                     CBC with Differential[[id number]]        Abnormal            Final result                 Please view results for these tests on the individual orders.        ECG Results          EKG 12-lead (In process)  Result time 01/28/22 16:42:02    In process by Interface, Lab In [facility] (01/28/22 16:42:02)                 Narrative:    Test Reason : R06.02,    Vent. Rate : 074 BPM     Atrial Rate : 074 BPM     P-R Int : 150 ms          QRS Dur : 110 ms      QT Int : 410 ms       P-R-T Axes : 072 036 003  degrees     QTc Int : 455 ms    Sinus rhythm with Fusion complexes  Cannot rule out Anterior infarct ,age undetermined  Abnormal ECG  When compared with ECG of 27-OCT-2021 13:24,  Fusion complexes are now Present  Premature atrial complexes are no longer Present  (RBBB and left anterior fascicular block) is no longer Present  Minimal criteria for Anterior infarct are now Present    Referred By: [email or web address]   SELF           Confirmed By:                             Imaging Results          US Lower Extremity Veins Bilateral (Final result)  Result time 01/28/22 16:25:04    Final result by Edson De MD (01/28/22 16:25:04)                 Impression:      No evidence of deep venous thrombosis in either lower extremity.      Electronically signed by: Edson De  Date:    01/28/2022  Time:    16:25             Narrative:    EXAMINATION:  US LOWER EXTREMITY VEINS BILATERAL    CLINICAL HISTORY:  Shortness of breath    TECHNIQUE:  Duplex and color flow Doppler and dynamic compression was performed of the bilateral lower extremity veins.  Ultrasound images captured and stored.    COMPARISON:  None    FINDINGS:  Right thigh veins: The common femoral, femoral, popliteal, and deep femoral veins are patent and free of thrombus. The veins are normally compressible and have normal phasic flow and augmentation response.    Right calf veins: The visualized calf veins are patent.    Left thigh veins: The common femoral, femoral, popliteal, and deep femoral veins are patent and free of thrombus. The veins are normally compressible and have normal phasic flow and augmentation response.    Left calf veins: The visualized calf veins are patent.    Miscellaneous: None                                 Medications   enoxaparin injection 100 mg (100 mg Subcutaneous Given 1/28/22 1524)   potassium chloride 10 mEq in 100 mL IVPB (0 mEq Intravenous Stopped 1/28/22 1826)   HYDROcodone-acetaminophen  mg per tablet 1 tablet (1 tablet Oral  Given 1/28/22 6980)                       Clinical Impression:   Final diagnoses:  [R06.02] Shortness of breath  [I26.99] Multiple pulmonary emboli (Primary)  [R77.8] Troponin level elevated                 Dung Ford MD  01/29/22 0339

## 2022-01-29 NOTE — ED PROVIDER NOTES
Encounter Date: 1/28/2022       History     Chief Complaint   Patient presents with    blood clot      Patient is a morbidly obese AA female (162 kg) who was sent here by her physician Dr. Jacinto for anticogulation due to having RUL, RML, RLL emboli in pulmonary arteries.  Upon entrance to ER Lovenox was given.  Patient labs revealed a Troponin 200 and MD called Cardiology on call.  Dr CASEY reviewed case suggesting heparin drip follow by Freda then d/c home.  I will monitor patient in ER throughout the night and do troponin levels to see if her condition worsens.  There is obvious strain on her heart due to emboli load in right lung.  Patient understands reason for work up and staying in ER due to not having an ICU.  Patient was placed on oxygen.        Review of patient's allergies indicates:   Allergen Reactions    Ammonium peroxydisulfate Shortness Of Breath    Avocado (laurus persea) Anaphylaxis    Bananas [banana] Anaphylaxis and Swelling     CRAMPS,    Chocolate flavor Anaphylaxis     MOUTH SWELLING    Silvadene [silver sulfadiazine]     Beef-potatoes-peas [nutritional supplement-fiber] Itching    Clindamycin     Corticosteroids (glucocorticoids)     Hydrocortisone Blisters    Lasix [furosemide] Blisters     BURNS SKIN    Adhesive Rash and Blisters    Iodine and iodide containing products Rash    Latex, natural rubber Rash    Pcn [penicillins] Rash    Sulfa (sulfonamide antibiotics) Rash and Blisters     Past Medical History:   Diagnosis Date    Anxiety state     Atherosclerosis of native artery of extremity with intermittent claudication 01/30/2019    bilateral legs    Bilateral leg pain     BMI 50.0-59.9, adult 02/22/2021    Cellulitis 06/11/2020    Chronic pain syndrome     Chronic peripheral venous hypertension 01/08/2019    COPD (chronic obstructive pulmonary disease)     Diabetes     DVT (deep venous thrombosis) 02/12/2020    Embolism and thrombosis of superficial veins of  unspecified lower extremity 07/01/2019    bilateral    Frequent headaches 09/20/2018    GERD (gastroesophageal reflux disease)     Hereditary lymphedema     Hx of thyroid cancer     Hyperlipidemia     Hypertension     Hypothyroidism     Migraines     Migraines     Morbid obesity     Nicotine dependence     Non-pressure chronic ulcer of left lower leg 03/09/2021    Osteoarthritis     Other skin changes 03/09/2021    bilateral gaiter regions    Pain in left leg     Pain in right leg     PVD (peripheral vascular disease) 01/08/2019    Seizure disorder     Sleep apnea     Venous insufficiency (chronic) (peripheral)     Venous stasis     Vitamin D deficiency      Past Surgical History:   Procedure Laterality Date    HYSTERECTOMY      ILIAC ARTERY STENT Bilateral 01/28/2020    Bilateral distal aorta and common iliac 8 X 59 vbx covered stents performed by Dr. Antonio Jacinto.    STAB PHLEBECTOMY OF VARICOSE VEINS Left 01/25/2013    Left leg microphlebectomies x 23 stab avulsions and ligation of multiple varicose veins perrformed by Dr. Cirilo Aguirre.    THYROIDECTOMY      hx: thyroid cancer    TOE AMPUTATION Left 01/28/2020    2nd, 4th and 5th performed by Dr. Anam Saeed.    TOE AMPUTATION Right 01/28/2020    4th toe performed by Dr. Anam Saeed.    TOTAL ABDOMINAL HYSTERECTOMY W/ BILATERAL SALPINGOOPHORECTOMY      TUBAL LIGATION      VAGINAL DELIVERY      x 4    VENOUS ABLATION Right 08/09/2019    GSV Varithena Ablation performed by Dr. Estuardo Falcon    VENOUS ABLATION Left 08/02/2019    ATAV Varithena Ablation performed by Dr. Estuardo Falcon.    VENOUS ABLATION Right 07/13/2015    ATAV Laser Ablatio performed by Dr. Cirilo Aguirre.    VENOUS ABLATION Right 07/06/2015    Distal  GSV Laser Ablation performed by dr. Cirilo Aguirre.    VENOUS ABLATION Left 04/20/2015    Left Distal GSV Laser Ablation performed by dr. Criilo Aguirre.    VENOUS ABLATION Right 10/28/2013    Right Distal GSV RF Ablation performed by   Cirilo Aguirre.    VENOUS ABLATION Left 10/25/2013    SSV RF Ablation performed by dr. Cirilo Aguirre.    VENOUS ABLATION Left 10/07/2013    Left GSV and Left ATAV RF Ablation performed by Dr. Cirilo Aguirre.    VENOUS ABLATION Right 01/21/2013    GSV RF Ablation w/micros x 22 performed by Dr. Cirilo Aguirre.    VENOUS ABLATION Left 06/25/2021    Left distal GSV Varithena Ablation     Family History   Problem Relation Age of Onset    Heart disease Mother         age 84 CHF    Hypertension Mother     Osteoarthritis Mother     Coronary aneurysm Father     Coronary artery disease Father     Hypertension Father     Heart disease Father     No Known Problems Son     No Known Problems Son     No Known Problems Son     No Known Problems Son     No Known Problems Sister     No Known Problems Brother         hx: varicose veins    No Known Problems Brother         MVA: parlazed    No Known Problems Brother      Social History     Tobacco Use    Smoking status: Current Every Day Smoker     Packs/day: 1.00     Types: Cigarettes    Smokeless tobacco: Never Used   Substance Use Topics    Alcohol use: Never    Drug use: Never     Review of Systems   Constitutional: Positive for activity change.   HENT: Negative.    Respiratory: Negative.    Cardiovascular: Negative.    Gastrointestinal: Negative.    Endocrine: Negative.    Genitourinary: Negative.    Musculoskeletal: Negative.    Neurological: Negative.    Hematological: Negative.    Psychiatric/Behavioral: Negative.    All other systems reviewed and are negative.      Physical Exam     Initial Vitals [01/28/22 1505]   BP Pulse Resp Temp SpO2   (!) 149/86 84 18 97.9 °F (36.6 °C) 97 %      MAP       --         Physical Exam    Nursing note and vitals reviewed.  Constitutional: She appears well-developed. She is not diaphoretic. No distress.   HENT:   Head: Normocephalic and atraumatic.   Eyes: Conjunctivae are normal. Pupils are equal, round, and reactive to light.   Neck: Neck  supple.   Normal range of motion.  Cardiovascular: Normal rate and regular rhythm.   Pulmonary/Chest: Breath sounds normal.   Abdominal: Abdomen is soft.   Musculoskeletal:         General: Normal range of motion.      Cervical back: Normal range of motion and neck supple.     Neurological: She is alert and oriented to person, place, and time. She has normal strength. GCS score is 15. GCS eye subscore is 4. GCS verbal subscore is 5. GCS motor subscore is 6.   Skin: Skin is warm.   Psychiatric: She has a normal mood and affect. Her behavior is normal.         Medical Screening Exam   See Full Note    ED Course   Procedures  Labs Reviewed   COMPREHENSIVE METABOLIC PANEL - Abnormal; Notable for the following components:       Result Value    Glucose 108 (*)     Globulin 4.1 (*)     ALT 11 (*)     eGFR 59 (*)     All other components within normal limits   TROPONIN I - Abnormal; Notable for the following components:    Troponin I High Sensitivity 207.7 (*)     All other components within normal limits   CBC WITH DIFFERENTIAL - Abnormal; Notable for the following components:    RBC 5.53 (*)     MCH 26.8 (*)     MCHC 31.6 (*)     RDW 19.6 (*)     Monocytes % 6.1 (*)     All other components within normal limits   TROPONIN I - Abnormal; Notable for the following components:    Troponin I High Sensitivity 193.4 (*)     All other components within normal limits   APTT - Abnormal; Notable for the following components:    PTT >200.0 (*)     All other components within normal limits   TROPONIN I - Abnormal; Notable for the following components:    Troponin I High Sensitivity 166.1 (*)     All other components within normal limits   CBC WITH DIFFERENTIAL - Abnormal; Notable for the following components:    WBC 11.48 (*)     MCH 26.5 (*)     MCHC 31.3 (*)     RDW 19.3 (*)     Eosinophils % 4.4 (*)     Immature Granulocytes % 0.5 (*)     Immature Granulocytes, Absolute 0.06 (*)     All other components within normal limits    COMPREHENSIVE METABOLIC PANEL - Abnormal; Notable for the following components:    Potassium 3.4 (*)     Albumin 3.2 (*)     ALT 12 (*)     All other components within normal limits   APTT - Normal   PROTIME-INR - Normal   MAGNESIUM - Normal   CBC W/ AUTO DIFFERENTIAL    Narrative:     The following orders were created for panel order CBC auto differential.  Procedure                               Abnormality         Status                     ---------                               -----------         ------                     CBC with Differential[[id number]]        Abnormal            Final result                 Please view results for these tests on the individual orders.   CBC W/ AUTO DIFFERENTIAL    Narrative:     The following orders were created for panel order CBC auto differential.  Procedure                               Abnormality         Status                     ---------                               -----------         ------                     CBC with Differential[[id number]]        Abnormal            Final result                 Please view results for these tests on the individual orders.        ECG Results          EKG 12-lead (In process)  Result time 01/28/22 16:42:02    In process by Interface, Lab In [facility] (01/28/22 16:42:02)                 Narrative:    Test Reason : R06.02,    Vent. Rate : 074 BPM     Atrial Rate : 074 BPM     P-R Int : 150 ms          QRS Dur : 110 ms      QT Int : 410 ms       P-R-T Axes : 072 036 003 degrees     QTc Int : 455 ms    Sinus rhythm with Fusion complexes  Cannot rule out Anterior infarct ,age undetermined  Abnormal ECG  When compared with ECG of 27-OCT-2021 13:24,  Fusion complexes are now Present  Premature atrial complexes are no longer Present  (RBBB and left anterior fascicular block) is no longer Present  Minimal criteria for Anterior infarct are now Present    Referred By: [email or web address]   SELF           Confirmed By:                              Imaging Results          US Lower Extremity Veins Bilateral (Final result)  Result time 01/28/22 16:25:04    Final result by Edson De MD (01/28/22 16:25:04)                 Impression:      No evidence of deep venous thrombosis in either lower extremity.      Electronically signed by: Edson De  Date:    01/28/2022  Time:    16:25             Narrative:    EXAMINATION:  US LOWER EXTREMITY VEINS BILATERAL    CLINICAL HISTORY:  Shortness of breath    TECHNIQUE:  Duplex and color flow Doppler and dynamic compression was performed of the bilateral lower extremity veins.  Ultrasound images captured and stored.    COMPARISON:  None    FINDINGS:  Right thigh veins: The common femoral, femoral, popliteal, and deep femoral veins are patent and free of thrombus. The veins are normally compressible and have normal phasic flow and augmentation response.    Right calf veins: The visualized calf veins are patent.    Left thigh veins: The common femoral, femoral, popliteal, and deep femoral veins are patent and free of thrombus. The veins are normally compressible and have normal phasic flow and augmentation response.    Left calf veins: The visualized calf veins are patent.    Miscellaneous: None                                 Medications   heparin 25,000 units in dextrose 5% (100 units/ml) IV bolus from bag INITIAL BOLUS (has no administration in time range)   heparin 25,000 units in dextrose 5% 250 mL (100 units/mL) infusion HIGH INTENSITY nomogram - RUSH (21 Units/kg/hr × 162 kg (Order-Specific) Intravenous New Bag 1/29/22 5389)   heparin 25,000 units in dextrose 5% (100 units/ml) IV bolus from bag - ADDITIONAL PRN BOLUS - 60 units/kg (has no administration in time range)   heparin 25,000 units in dextrose 5% (100 units/ml) IV bolus from bag - ADDITIONAL PRN BOLUS - 30 units/kg (has no administration in time range)   enoxaparin injection 100 mg (100 mg Subcutaneous Given 1/28/22 1524)   potassium chloride 10 mEq in  100 mL IVPB (0 mEq Intravenous Stopped 1/28/22 1826)   HYDROcodone-acetaminophen  mg per tablet 1 tablet (1 tablet Oral Given 1/28/22 9737)                       Clinical Impression:   Final diagnoses:  [R06.02] Shortness of breath  [I26.99] Multiple pulmonary emboli (Primary)  [R77.8] Troponin level elevated                 Sugar Marcial MD  01/29/22 0747

## 2022-01-29 NOTE — ED NOTES
Pt assisted to wheelchair- pt to bathroom - pt voided without difficulty - and had bm- pt assisted back to wheelchair and to room - pt room cleansed and sheets changed- pt served lunch tray - pt states she cant eat that - fish noted again -pt states she has seafood allergy- called dietary and explained allergies -pt served new diet tray again

## 2022-01-29 NOTE — ED NOTES
DR. MICHEL SPEAKING WITH DR. GALINDO ON CALL FOR DR. LOERA ABOUT. INSTRUCTED TO RECHECK PTT AT 1600 AND OK TO DC IF IN ACCEPTABLE RANGE.

## 2022-01-29 NOTE — ED NOTES
Pt noted with iv leaking - pt noted with moderate amount of blood on floor and fluid- pt iv clamped and iv reheplocked - flushed with saline and potassium restarted as ordered- pt potassium rate decreased to 20 cc/hr due to iv burning - pt placed in gown

## 2022-01-30 ENCOUNTER — TELEPHONE (OUTPATIENT)
Dept: EMERGENCY MEDICINE | Facility: HOSPITAL | Age: 62
End: 2022-01-30
Payer: MEDICAID

## 2022-01-31 NOTE — PROGRESS NOTES
Disclaimer:  This note has been generated using voice recognition software.  There may be type of graft focal areas that have been missed during a proof reading      Subjective:      Patient ID: Holly Porter is a 61 y.o. female.    Chief Complaint: Leg Pain (left) and Low-back Pain      Pain  This is a chronic problem. The current episode started more than 1 year ago. The problem occurs daily. The problem has been unchanged. Associated symptoms include arthralgias. Pertinent negatives include no change in bowel habit, chest pain, chills, coughing, diaphoresis, fever, rash, sore throat, vertigo or vomiting.     Review of Systems   Constitutional: Negative for appetite change, chills, diaphoresis, fever and unexpected weight change.   HENT: Negative for drooling, ear discharge, ear pain, facial swelling, nosebleeds, sore throat, trouble swallowing, voice change and goiter.    Eyes: Negative for photophobia, pain, discharge, redness and visual disturbance.   Respiratory: Negative for apnea, cough, choking, chest tightness, shortness of breath, wheezing and stridor.    Cardiovascular: Negative for chest pain, palpitations and leg swelling.   Gastrointestinal: Negative for abdominal distention, change in bowel habit, diarrhea, rectal pain, vomiting, fecal incontinence and change in bowel habit.   Endocrine: Negative for cold intolerance, heat intolerance, polydipsia, polyphagia and polyuria.   Genitourinary: Negative for bladder incontinence, dysuria, flank pain, frequency and hot flashes.   Musculoskeletal: Positive for arthralgias, back pain, leg pain and neck stiffness.   Integumentary:  Negative for color change, pallor and rash.   Allergic/Immunologic: Negative for immunocompromised state.   Neurological: Negative for dizziness, vertigo, seizures, syncope, facial asymmetry, speech difficulty, light-headedness, disturbances in coordination, memory loss and coordination difficulties.   Hematological: Negative for  "adenopathy. Does not bruise/bleed easily.   Psychiatric/Behavioral: Negative for agitation, behavioral problems, confusion, decreased concentration, dysphoric mood, hallucinations, self-injury and suicidal ideas. The patient is not nervous/anxious and is not hyperactive.             Objective:  Vitals:    02/02/22 1308 02/02/22 1310   BP: (!) 152/68    Pulse: 84    Resp: 20    Weight: (!) 161 kg (355 lb)    Height: 5' 8" (1.727 m)    PainSc:   8   8         Physical Exam  Vitals and nursing note reviewed. Exam conducted with a chaperone present.   Constitutional:       General: She is awake.      Appearance: Normal appearance. She is obese. She is not diaphoretic.   HENT:      Head: Normocephalic and atraumatic.      Nose: Nose normal.      Mouth/Throat:      Mouth: Mucous membranes are moist.      Pharynx: Oropharynx is clear.   Eyes:      Conjunctiva/sclera: Conjunctivae normal.      Pupils: Pupils are equal, round, and reactive to light.   Cardiovascular:      Rate and Rhythm: Normal rate.   Pulmonary:      Effort: Pulmonary effort is normal. No respiratory distress.   Abdominal:      Palpations: Abdomen is soft.   Musculoskeletal:         General: Normal range of motion.      Cervical back: Normal range of motion and neck supple.   Skin:     General: Skin is warm and dry.   Neurological:      General: No focal deficit present.      Mental Status: She is alert and oriented to person, place, and time. Mental status is at baseline.      Cranial Nerves: Cranial nerves are intact. No cranial nerve deficit (II-XII).   Psychiatric:         Mood and Affect: Mood normal.         Behavior: Behavior normal. Behavior is cooperative.         Thought Content: Thought content normal.           No orders of the defined types were placed in this encounter.       US Lower Extremity Veins Bilateral  Narrative: EXAMINATION:  US LOWER EXTREMITY VEINS BILATERAL    CLINICAL HISTORY:  Shortness of breath    TECHNIQUE:  Duplex and color " flow Doppler and dynamic compression was performed of the bilateral lower extremity veins.  Ultrasound images captured and stored.    COMPARISON:  None    FINDINGS:  Right thigh veins: The common femoral, femoral, popliteal, and deep femoral veins are patent and free of thrombus. The veins are normally compressible and have normal phasic flow and augmentation response.    Right calf veins: The visualized calf veins are patent.    Left thigh veins: The common femoral, femoral, popliteal, and deep femoral veins are patent and free of thrombus. The veins are normally compressible and have normal phasic flow and augmentation response.    Left calf veins: The visualized calf veins are patent.    Miscellaneous: None  Impression: No evidence of deep venous thrombosis in either lower extremity.    Electronically signed by: Edson De  Date:    01/28/2022  Time:    16:25  CTA Runoff ABD PEL Bilat Lower Ext  Narrative: EXAMINATION:  CTA RUNOFF ABD PEL BILAT LOWER EXT    CLINICAL HISTORY:  Claudication or leg ischemia;  Peripheral vascular disease, unspecified    TECHNIQUE:  CT angiogram of the abdomen and pelvis with runoff through the bilateral lower extremities.  3D MIP reconstructions were created and reviewed.  100 mL Isovue 370 utilized.    COMPARISON:  Ultrasound 01/24/2022    FINDINGS:  There is a pulmonary embolus seen in both the right upper lobe, right lower and middle lobe pulmonary arteries.  No pulmonary emboli seen on the left lung base.  Heart appears normal size.    The clinical service was alerted of this incidental finding at time of dictation (critical test result).    Fatty infiltration of the liver.  Gallbladder is unremarkable.  Spleen, kidneys, and pancreas appear normal.  Cyst in the right kidney.  There is a low attenuating left adrenal nodule probably add renal adenoma.    No pneumoperitoneum.  No ascites.    No bowel obstruction or acute bowel abnormality.  A few colonic diverticula.  Urinary bladder  is unremarkable.    There is no fracture detected.    The abdominal aorta is normal caliber with scattered calcifications.  Normal origins of the of celiac and superior mesenteric artery.  Two renal arteries on the right and 1 on the left both of which are patent.  There are bilateral common iliac stents that extend into the distal/lower abdominal aorta that appear patent internal iliac arteries have multifocal calcification.  External iliac arteries appear patent.    The right common femoral artery is patent.  Mild plaque at the proximal right superficial femoral artery which is patent.  The profundus femoris is patent.  Popliteal artery patent.  There appears to be contrast opacification at the trifurcation.  At this point on both the right and left side there is poor contrast opacification bilaterally.  This appears to be a combination of probably the patient's underlying vascular disease as well as the CT scan being time somewhat early for the contrast bolus.    In the left common femoral artery is patent with minimal plaque.  Profundus femoris and superficial femoral artery appear patent as does the popliteal artery.    Numerous venous collateral seen over the right and left lower extremities in the superficial tissues.  Impression: Incidental note made of a prominent pulmonary embolus burden in the right lung.  No pulmonary emboli seen on the left.    Bilateral common iliac stents that appear patent.  Scattered atherosclerotic disease as detailed.  The contrast tapers in the lower extremities as detailed that appears to be a sequela of early scanning rather than stenoses given the symmetry.    Numerous venous collaterals in the lower extremities.    Electronically signed by: Edson De  Date:    01/28/2022  Time:    12:45       Admission on 01/28/2022, Discharged on 01/29/2022   Component Date Value Ref Range Status    Sodium 01/28/2022 142  136 - 145 mmol/L Final    Potassium 01/28/2022 3.5  3.5 - 5.1 mmol/L  Final    Chloride 01/28/2022 105  98 - 107 mmol/L Final    CO2 01/28/2022 26  21 - 32 mmol/L Final    Anion Gap 01/28/2022 15  7 - 16 mmol/L Final    Glucose 01/28/2022 108* 74 - 106 mg/dL Final    BUN 01/28/2022 13  7 - 18 mg/dL Final    Creatinine 01/28/2022 1.01  0.55 - 1.02 mg/dL Final    BUN/Creatinine Ratio 01/28/2022 13  6 - 20 Final    Calcium 01/28/2022 9.5  8.5 - 10.1 mg/dL Final    Total Protein 01/28/2022 7.6  6.4 - 8.2 g/dL Final    Albumin 01/28/2022 3.5  3.5 - 5.0 g/dL Final    Globulin 01/28/2022 4.1* 2.0 - 4.0 g/dL Final    A/G Ratio 01/28/2022 0.9   Final    Bilirubin, Total 01/28/2022 0.4  0.0 - 1.2 mg/dL Final    Alk Phos 01/28/2022 80  50 - 130 U/L Final    ALT 01/28/2022 11* 13 - 56 U/L Final    AST 01/28/2022 23  15 - 37 U/L Final    eGFR 01/28/2022 59* >=60 mL/min/1.73m² Final    PTT 01/28/2022 26.9  25.2 - 37.3 seconds Final    PT 01/28/2022 13.3  11.7 - 14.7 seconds Final    INR 01/28/2022 1.01  0.90 - 1.10 Final    Magnesium 01/28/2022 2.0  1.7 - 2.3 mg/dL Final    Troponin I High Sensitivity 01/28/2022 207.7* <=60.4 pg/mL Final    WBC 01/28/2022 10.12  4.50 - 11.00 K/uL Final    RBC 01/28/2022 5.53* 4.20 - 5.40 M/uL Final    Hemoglobin 01/28/2022 14.8  12.0 - 16.0 g/dL Final    Hematocrit 01/28/2022 46.9  38.0 - 47.0 % Final    MCV 01/28/2022 84.8  80.0 - 96.0 fL Final    MCH 01/28/2022 26.8* 27.0 - 31.0 pg Final    MCHC 01/28/2022 31.6* 32.0 - 36.0 g/dL Final    RDW 01/28/2022 19.6* 11.5 - 14.5 % Final    Platelet Count 01/28/2022 224  150 - 400 K/uL Final    MPV 01/28/2022 11.4  9.4 - 12.4 fL Final    Neutrophils % 01/28/2022 62.2  53.0 - 65.0 % Final    Lymphocytes % 01/28/2022 27.8  27.0 - 41.0 % Final    Neutrophils, Abs 01/28/2022 6.30  1.80 - 7.70 K/uL Final    Lymphocytes, Absolute 01/28/2022 2.81  1.00 - 4.80 K/uL Final    Diff Type 01/28/2022 Auto   Final    Monocytes % 01/28/2022 6.1* 2.0 - 6.0 % Final    Eosinophils % 01/28/2022 3.3  1.0  - 4.0 % Final    Basophils % 01/28/2022 0.3  0.0 - 1.0 % Final    Immature Granulocytes % 01/28/2022 0.3  0.0 - 0.4 % Final    Monocytes, Absolute 01/28/2022 0.62  0.00 - 0.80 K/uL Final    Eosinophils, Absolute 01/28/2022 0.33  0.00 - 0.50 K/uL Final    Immature Granulocytes, Absolute 01/28/2022 0.03  0.00 - 0.04 K/uL Final    Basophils, Absolute 01/28/2022 0.03  0.00 - 0.20 K/uL Final    Troponin I High Sensitivity 01/28/2022 193.4* <=60.4 pg/mL Final    PTT 01/29/2022 >200.0* 25.2 - 37.3 seconds Final    Troponin I High Sensitivity 01/29/2022 166.1* <=60.4 pg/mL Final    WBC 01/29/2022 11.48* 4.50 - 11.00 K/uL Final    RBC 01/29/2022 5.33  4.20 - 5.40 M/uL Final    Hemoglobin 01/29/2022 14.1  12.0 - 16.0 g/dL Final    Hematocrit 01/29/2022 45.1  38.0 - 47.0 % Final    MCV 01/29/2022 84.6  80.0 - 96.0 fL Final    MCH 01/29/2022 26.5* 27.0 - 31.0 pg Final    MCHC 01/29/2022 31.3* 32.0 - 36.0 g/dL Final    RDW 01/29/2022 19.3* 11.5 - 14.5 % Final    Platelet Count 01/29/2022 201  150 - 400 K/uL Final    MPV 01/29/2022 11.1  9.4 - 12.4 fL Final    Neutrophils % 01/29/2022 58.2  53.0 - 65.0 % Final    Lymphocytes % 01/29/2022 30.7  27.0 - 41.0 % Final    Neutrophils, Abs 01/29/2022 6.67  1.80 - 7.70 K/uL Final    Lymphocytes, Absolute 01/29/2022 3.53  1.00 - 4.80 K/uL Final    Diff Type 01/29/2022 Auto   Final    Monocytes % 01/29/2022 5.9  2.0 - 6.0 % Final    Eosinophils % 01/29/2022 4.4* 1.0 - 4.0 % Final    Basophils % 01/29/2022 0.3  0.0 - 1.0 % Final    Immature Granulocytes % 01/29/2022 0.5* 0.0 - 0.4 % Final    Monocytes, Absolute 01/29/2022 0.68  0.00 - 0.80 K/uL Final    Eosinophils, Absolute 01/29/2022 0.50  0.00 - 0.50 K/uL Final    Immature Granulocytes, Absolute 01/29/2022 0.06* 0.00 - 0.04 K/uL Final    Basophils, Absolute 01/29/2022 0.04  0.00 - 0.20 K/uL Final    Sodium 01/29/2022 142  136 - 145 mmol/L Final    Potassium 01/29/2022 3.4* 3.5 - 5.1 mmol/L Final     Chloride 01/29/2022 105  98 - 107 mmol/L Final    CO2 01/29/2022 27  21 - 32 mmol/L Final    Anion Gap 01/29/2022 13  7 - 16 mmol/L Final    Glucose 01/29/2022 102  74 - 106 mg/dL Final    BUN 01/29/2022 13  7 - 18 mg/dL Final    Creatinine 01/29/2022 0.88  0.55 - 1.02 mg/dL Final    BUN/Creatinine Ratio 01/29/2022 15  6 - 20 Final    Calcium 01/29/2022 9.1  8.5 - 10.1 mg/dL Final    Total Protein 01/29/2022 6.8  6.4 - 8.2 g/dL Final    Albumin 01/29/2022 3.2* 3.5 - 5.0 g/dL Final    Globulin 01/29/2022 3.6  2.0 - 4.0 g/dL Final    A/G Ratio 01/29/2022 0.9   Final    Bilirubin, Total 01/29/2022 0.3  0.0 - 1.2 mg/dL Final    Alk Phos 01/29/2022 74  50 - 130 U/L Final    ALT 01/29/2022 12* 13 - 56 U/L Final    AST 01/29/2022 16  15 - 37 U/L Final    eGFR 01/29/2022 69  >=60 mL/min/1.73m² Final    PTT 01/29/2022 >200.0* 25.2 - 37.3 seconds Final    PT 01/29/2022 14.8* 11.7 - 14.7 seconds Final    INR 01/29/2022 1.16* 0.90 - 1.10 Final    PT 01/29/2022 13.6  11.7 - 14.7 seconds Final    INR 01/29/2022 1.04  0.90 - 1.10 Final    PTT 01/29/2022 >200.0* 25.2 - 37.3 seconds Final    PT 01/29/2022 13.4  11.7 - 14.7 seconds Final    INR 01/29/2022 1.02  0.90 - 1.10 Final    PTT 01/29/2022 70.7* 25.2 - 37.3 seconds Final    POC Glucose 01/29/2022 71  70 - 105 mg/dL Final   Admission on 01/11/2022, Discharged on 01/11/2022   Component Date Value Ref Range Status    Culture, Wound/Abscess 01/11/2022 Heavy Growth Staphylococcus epidermidis*  Final    Sodium 01/11/2022 141  136 - 145 mmol/L Final    Potassium 01/11/2022 3.3* 3.5 - 5.1 mmol/L Final    Chloride 01/11/2022 105  98 - 107 mmol/L Final    CO2 01/11/2022 29  21 - 32 mmol/L Final    Anion Gap 01/11/2022 10  7 - 16 mmol/L Final    Glucose 01/11/2022 104  74 - 106 mg/dL Final    BUN 01/11/2022 10  7 - 18 mg/dL Final    Creatinine 01/11/2022 1.01  0.55 - 1.02 mg/dL Final    BUN/Creatinine Ratio 01/11/2022 10  6 - 20 Final    Calcium  01/11/2022 9.0  8.5 - 10.1 mg/dL Final    eGFR 01/11/2022 59* >=60 mL/min/1.73m² Final    WBC 01/11/2022 9.32  4.50 - 11.00 K/uL Final    RBC 01/11/2022 5.51* 4.20 - 5.40 M/uL Final    Hemoglobin 01/11/2022 14.6  12.0 - 16.0 g/dL Final    Hematocrit 01/11/2022 46.1  38.0 - 47.0 % Final    MCV 01/11/2022 83.7  80.0 - 96.0 fL Final    MCH 01/11/2022 26.5* 27.0 - 31.0 pg Final    MCHC 01/11/2022 31.7* 32.0 - 36.0 g/dL Final    RDW 01/11/2022 19.4* 11.5 - 14.5 % Final    Platelet Count 01/11/2022 250  150 - 400 K/uL Final    MPV 01/11/2022 11.2  9.4 - 12.4 fL Final    Neutrophils % 01/11/2022 64.9  53.0 - 65.0 % Final    Lymphocytes % 01/11/2022 24.0* 27.0 - 41.0 % Final    Neutrophils, Abs 01/11/2022 6.05  1.80 - 7.70 K/uL Final    Lymphocytes, Absolute 01/11/2022 2.24  1.00 - 4.80 K/uL Final    Diff Type 01/11/2022 Auto   Final    Monocytes % 01/11/2022 5.3  2.0 - 6.0 % Final    Eosinophils % 01/11/2022 5.3* 1.0 - 4.0 % Final    Basophils % 01/11/2022 0.3  0.0 - 1.0 % Final    Immature Granulocytes % 01/11/2022 0.2  0.0 - 0.4 % Final    Monocytes, Absolute 01/11/2022 0.49  0.00 - 0.80 K/uL Final    Eosinophils, Absolute 01/11/2022 0.49  0.00 - 0.50 K/uL Final    Immature Granulocytes, Absolute 01/11/2022 0.02  0.00 - 0.04 K/uL Final    Basophils, Absolute 01/11/2022 0.03  0.00 - 0.20 K/uL Final    POC Glucose 01/11/2022 89  70 - 105 mg/dL Final   Office Visit on 01/11/2022   Component Date Value Ref Range Status    Color, UA 01/11/2022 Yellow  Colorless, Straw, Yellow, Dark Yellow Final    Clarity, UA 01/11/2022 Clear  Clear Final    pH, UA 01/11/2022 5.5  5.0, 5.5, 6.0, 6.5, 7.0, 7.5, 8.0 pH Units Final    Leukocytes, UA 01/11/2022 Negative  Negative Final    Nitrites, UA 01/11/2022 Negative  Negative Final    Protein, UA 01/11/2022 Negative  Negative Final    Glucose, UA 01/11/2022 Negative  Negative mg/dL Final    Ketones, UA 01/11/2022 Negative  Negative, Trace mg/dL Final     Urobilinogen, UA 01/11/2022 0.2  0.2, 1.0 mg/dL Final    Bilirubin, UA 01/11/2022 Negative  Negative Final    Blood, UA 01/11/2022 Negative  Negative Final    Specific Gravity, UA 01/11/2022 1.010  <=1.005, 1.010, 1.015, 1.020, 1.025, 1.030 Final   Office Visit on 12/07/2021   Component Date Value Ref Range Status    POC Amphetamines 12/07/2021 Negative  Negative, Inconclusive Final    POC Barbiturates 12/07/2021 Negative  Negative, Inconclusive Final    POC Benzodiazepines 12/07/2021 Negative  Negative, Inconclusive Final    POC Cocaine 12/07/2021 Negative  Negative, Inconclusive Final    POC THC 12/07/2021 Negative  Negative, Inconclusive Final    POC Methadone 12/07/2021 Negative  Negative, Inconclusive Final    POC Methamphetamine 12/07/2021 Negative  Negative, Inconclusive Final    POC Opiates 12/07/2021 Presumptive Positive* Negative, Inconclusive Final    POC Oxycodone 12/07/2021 Negative  Negative, Inconclusive Final    POC Phencyclidine 12/07/2021 Negative  Negative, Inconclusive Final    POC Methylenedioxymethamphetamine * 12/07/2021 Negative  Negative, Inconclusive Final    POC Tricyclic Antidepressants 12/07/2021 Negative  Negative, Inconclusive Final    POC Buprenorphine 12/07/2021 Negative   Final     Acceptable 12/07/2021 Yes   Final    POC Temperature (Urine) 12/07/2021 92   Final   Hospital Outpatient Visit on 11/24/2021   Component Date Value Ref Range Status    BSA 11/24/2021 2.81  m2 Final    TDI SEPTAL 11/24/2021 0.09  m/s Final    LV LATERAL E/E' RATIO 11/24/2021 6.22  m/s Final    LV SEPTAL E/E' RATIO 11/24/2021 6.22  m/s Final    EF 11/24/2021 50  % Final    Left Ventricular Outflow Tract Michaela* 11/24/2021 1.00  mmHg Final    AORTIC VALVE CUSP SEPERATION 11/24/2021 [id number]  cm Final    TDI LATERAL 11/24/2021 0.09  m/s Final    LVIDd 11/24/2021 4.63  3.5 - 6.0 cm Final    IVS 11/24/2021 1.32* 0.6 - 1.1 cm Final    Posterior Wall 11/24/2021  1.27* 0.6 - 1.1 cm Final    LVIDs 11/24/2021 1.84* 2.1 - 4.0 cm Final    FS 11/24/2021 60  28 - 44 % Final    LV mass 11/24/2021 231.16  g Final    LA size 11/24/2021 2.99  cm Final    RVDD 11/24/2021 2.62  cm Final    Left Ventricle Relative Wall Thick* 11/24/2021 0.55  cm Final    AV mean gradient 11/24/2021 4  mmHg Final    AV valve area 11/24/2021 2.33  cm2 Final    AV index (prosthetic) 11/24/2021 0.74   Final    E/A ratio 11/24/2021 0.89   Final    Mean e' 11/24/2021 0.09  m/s Final    E wave deceleration time 11/24/2021 196  msec Final    LVOT diameter 11/24/2021 2.00  cm Final    LVOT area 11/24/2021 3.1  cm2 Final    LVOT peak VTI 11/24/2021 19.09  cm Final    Ao VTI 11/24/2021 25.73  cm Final    LVOT stroke volume 11/24/2021 59.94  cm3 Final    E/E' ratio 11/24/2021 6.22  m/s Final    MV Peak E Dima 11/24/2021 0.56  m/s Final    MV Peak A Dima 11/24/2021 0.63  m/s Final    LV Systolic Volume 11/24/2021 10.28  mL Final    LV Systolic Volume Index 11/24/2021 3.9  mL/m2 Final    LV Diastolic Volume 11/24/2021 98.83  mL Final    LV Diastolic Volume Index 11/24/2021 37.44  mL/m2 Final    LV Mass Index 11/24/2021 88  g/m2 Final    Echo EF Estimated 11/24/2021 90  % Final   Admission on 11/15/2021, Discharged on 11/15/2021   Component Date Value Ref Range Status    Influenza A 11/15/2021 Negative  Negative, Invalid Final    Influenza B 11/15/2021 Negative  Negative, Invalid Final    COVID-19 Ag 11/15/2021 Negative  Negative, Invalid Final    POC Glucose 11/15/2021 121* 70 - 105 mg/dL Final   Lab Visit on 10/13/2021   Component Date Value Ref Range Status    Uric Acid 10/13/2021 7.6* 2.6 - 6.0 mg/dL Final   Office Visit on 09/27/2021   Component Date Value Ref Range Status    ESR Westergren 09/27/2021 31* 0 - 30 mm/Hr Final    Sodium 09/27/2021 140  136 - 145 mmol/L Final    Potassium 09/27/2021 3.7  3.5 - 5.1 mmol/L Final    Chloride 09/27/2021 108* 98 - 107 mmol/L Final    CO2  09/27/2021 30  21 - 32 mmol/L Final    Anion Gap 09/27/2021 6* 7 - 16 mmol/L Final    Glucose 09/27/2021 85  74 - 106 mg/dL Final    BUN 09/27/2021 10  7 - 18 mg/dL Final    Creatinine 09/27/2021 1.03* 0.55 - 1.02 mg/dL Final    BUN/Creatinine Ratio 09/27/2021 10  6 - 20 Final    Calcium 09/27/2021 10.0  8.5 - 10.1 mg/dL Final    eGFR  09/27/2021 70  >=60 mL/min/1.73m² Final    Color, UA 09/27/2021 Yellow  Colorless, Straw, Yellow, Dark Yellow Final    Clarity, UA 09/27/2021 Clear  Clear Final    pH, UA 09/27/2021 6.5  5.0, 5.5, 6.0, 6.5, 7.0, 7.5, 8.0 pH Units Final    Leukocytes, UA 09/27/2021 Negative  Negative Final    Nitrites, UA 09/27/2021 Negative  Negative Final    Protein, UA 09/27/2021 Negative  Negative Final    Glucose, UA 09/27/2021 Negative  Negative mg/dL Final    Ketones, UA 09/27/2021 Negative  Negative, Trace mg/dL Final    Urobilinogen, UA 09/27/2021 0.2  0.2, 1.0 mg/dL Final    Bilirubin, UA 09/27/2021 Negative  Negative Final    Blood, UA 09/27/2021 Negative  Negative Final    Specific [place], UA 09/27/2021 1.015  <=1.005, 1.010, 1.015, 1.020, 1.025, 1.030 Final    TSH 09/27/2021 2.890  0.358 - 3.740 uIU/mL Final    WBC 09/27/2021 10.14  4.50 - 11.00 K/uL Final    RBC 09/27/2021 5.82* 4.20 - 5.40 M/uL Final    Hemoglobin 09/27/2021 14.9  12.0 - 16.0 g/dL Final    Hematocrit 09/27/2021 48.5* 38.0 - 47.0 % Final    MCV 09/27/2021 83.3  80.0 - 96.0 fL Final    MCH 09/27/2021 25.6* 27.0 - 31.0 pg Final    MCHC 09/27/2021 30.7* 32.0 - 36.0 g/dL Final    RDW 09/27/2021 15.9* 11.5 - 14.5 % Final    Platelet Count 09/27/2021 298  150 - 400 K/uL Final    MPV 09/27/2021 11.7  9.4 - 12.4 fL Final    Neutrophils % 09/27/2021 59.0  53.0 - 65.0 % Final    Lymphocytes % 09/27/2021 30.3  27.0 - 41.0 % Final    Monocytes % 09/27/2021 5.3  2.0 - 6.0 % Final    Eosinophils % 09/27/2021 4.1* 1.0 - 4.0 % Final    Basophils % 09/27/2021 0.8  0.0 - 1.0 % Final     Immature Granulocytes % 09/27/2021 0.5* 0.0 - 0.4 % Final    nRBC, Auto 09/27/2021 0.0  <=0.0 % Final    Neutrophils, Abs 09/27/2021 5.98  1.80 - 7.70 K/uL Final    Lymphocytes, Absolute 09/27/2021 3.07  1.00 - 4.80 K/uL Final    Monocytes, Absolute 09/27/2021 0.54  0.00 - 0.80 K/uL Final    Eosinophils, Absolute 09/27/2021 0.42  0.00 - 0.50 K/uL Final    Basophils, Absolute 09/27/2021 0.08  0.00 - 0.20 K/uL Final    Immature Granulocytes, Absolute 09/27/2021 0.05* 0.00 - 0.04 K/uL Final    nRBC, Absolute 09/27/2021 0.00  <=0.00 x10e3/uL Final    Diff Type 09/27/2021 Auto   Final   Office Visit on 09/07/2021   Component Date Value Ref Range Status    POC Amphetamines 09/07/2021 Negative  Negative, Inconclusive Final    POC Barbiturates 09/07/2021 Negative  Negative, Inconclusive Final    POC Benzodiazepines 09/07/2021 Negative  Negative, Inconclusive Final    POC Cocaine 09/07/2021 Negative  Negative, Inconclusive Final    POC THC 09/07/2021 Negative  Negative, Inconclusive Final    POC Methadone 09/07/2021 Negative  Negative, Inconclusive Final    POC Methamphetamine 09/07/2021 Negative  Negative, Inconclusive Final    POC Opiates 09/07/2021 Presumptive Positive* Negative, Inconclusive Final    POC Oxycodone 09/07/2021 Negative  Negative, Inconclusive Final    POC Phencyclidine 09/07/2021 Negative  Negative, Inconclusive Final    POC Methylenedioxymethamphetamine * 09/07/2021 Negative  Negative, Inconclusive Final    POC Tricyclic Antidepressants 09/07/2021 Negative  Negative, Inconclusive Final    POC Buprenorphine 09/07/2021 Negative   Final     Acceptable 09/07/2021 Yes   Final    POC Temperature (Urine) 09/07/2021 92   Final           Assessment:      1. Foot pain, left    2. Lumbosacral spondylosis without myelopathy    3. Chronic pain of right knee    4. PVD (peripheral vascular disease)            A's of Opioid Risk Assessment  Activity:Patient can perform ADL.    Analgesia:Patients pain is partially controlled by current medication. Patient has tried OTC medications such as Tylenol and Ibuprofen with out relief.   Adverse Effects: Patient denies constipation or sedation.  Aberrant Behavior:  reviewed with no aberrant drug seeking/taking behavior.  Overdose reversal drug naloxone discussed    Drug screen reviewed      Requested Prescriptions     Signed Prescriptions Disp Refills    HYDROcodone-acetaminophen (NORCO)  mg per tablet 120 tablet 0     Sig: Take 1 tablet by mouth every 6 (six) hours.    pregabalin (LYRICA) 100 MG capsule 60 capsule 1     Sig: Take 1 capsule (100 mg total) by mouth every 12 (twelve) hours.    HYDROcodone-acetaminophen (NORCO)  mg per tablet 120 tablet 0     Sig: Take 1 tablet by mouth every 6 (six) hours.    amitriptyline (ELAVIL) 10 MG tablet 30 tablet 1     Sig: Take 1 tablet (10 mg total) by mouth every evening.         Plan:    Recovering after pulmonary embolism    Again today dealing with left lower extremity pain dealing with severe gout following wound management for nonhealing sore left foot    Diabetic, try to limit steroids    Has had further surgery on her left foot only as 2 toes remaining    She would like to continue with conservative management she states current medications helping control her discomfort    Continue with current medication    Continue activity as directed    Follow-up 2 months    Dr. Crandall, March 2022      Bring original prescription medication bottles/container/box with labels to each visit

## 2022-02-02 ENCOUNTER — OFFICE VISIT (OUTPATIENT)
Dept: PAIN MEDICINE | Facility: CLINIC | Age: 62
End: 2022-02-02
Payer: COMMERCIAL

## 2022-02-02 VITALS
DIASTOLIC BLOOD PRESSURE: 68 MMHG | RESPIRATION RATE: 20 BRPM | BODY MASS INDEX: 44.41 KG/M2 | SYSTOLIC BLOOD PRESSURE: 152 MMHG | HEIGHT: 68 IN | WEIGHT: 293 LBS | HEART RATE: 84 BPM

## 2022-02-02 DIAGNOSIS — M79.672 FOOT PAIN, LEFT: Primary | Chronic | ICD-10-CM

## 2022-02-02 DIAGNOSIS — M25.561 CHRONIC PAIN OF RIGHT KNEE: Chronic | ICD-10-CM

## 2022-02-02 DIAGNOSIS — G89.29 CHRONIC PAIN OF RIGHT KNEE: Chronic | ICD-10-CM

## 2022-02-02 DIAGNOSIS — I73.9 PVD (PERIPHERAL VASCULAR DISEASE): Chronic | ICD-10-CM

## 2022-02-02 DIAGNOSIS — M47.817 LUMBOSACRAL SPONDYLOSIS WITHOUT MYELOPATHY: Chronic | ICD-10-CM

## 2022-02-02 PROCEDURE — 3078F DIAST BP <80 MM HG: CPT | Mod: CPTII,,, | Performed by: PHYSICIAN ASSISTANT

## 2022-02-02 PROCEDURE — 1159F MED LIST DOCD IN RCRD: CPT | Mod: CPTII,,, | Performed by: PHYSICIAN ASSISTANT

## 2022-02-02 PROCEDURE — 99214 OFFICE O/P EST MOD 30 MIN: CPT | Mod: S$PBB,,, | Performed by: PHYSICIAN ASSISTANT

## 2022-02-02 PROCEDURE — 3008F PR BODY MASS INDEX (BMI) DOCUMENTED: ICD-10-PCS | Mod: CPTII,,, | Performed by: PHYSICIAN ASSISTANT

## 2022-02-02 PROCEDURE — 99214 PR OFFICE/OUTPT VISIT, EST, LEVL IV, 30-39 MIN: ICD-10-PCS | Mod: S$PBB,,, | Performed by: PHYSICIAN ASSISTANT

## 2022-02-02 PROCEDURE — 3008F BODY MASS INDEX DOCD: CPT | Mod: CPTII,,, | Performed by: PHYSICIAN ASSISTANT

## 2022-02-02 PROCEDURE — 3077F PR MOST RECENT SYSTOLIC BLOOD PRESSURE >= 140 MM HG: ICD-10-PCS | Mod: CPTII,,, | Performed by: PHYSICIAN ASSISTANT

## 2022-02-02 PROCEDURE — 3078F PR MOST RECENT DIASTOLIC BLOOD PRESSURE < 80 MM HG: ICD-10-PCS | Mod: CPTII,,, | Performed by: PHYSICIAN ASSISTANT

## 2022-02-02 PROCEDURE — 1159F PR MEDICATION LIST DOCUMENTED IN MEDICAL RECORD: ICD-10-PCS | Mod: CPTII,,, | Performed by: PHYSICIAN ASSISTANT

## 2022-02-02 PROCEDURE — 3077F SYST BP >= 140 MM HG: CPT | Mod: CPTII,,, | Performed by: PHYSICIAN ASSISTANT

## 2022-02-02 PROCEDURE — 99215 OFFICE O/P EST HI 40 MIN: CPT | Mod: PBBFAC | Performed by: PHYSICIAN ASSISTANT

## 2022-02-02 RX ORDER — AMITRIPTYLINE HYDROCHLORIDE 10 MG/1
10 TABLET, FILM COATED ORAL NIGHTLY
Qty: 30 TABLET | Refills: 1 | Status: SHIPPED | OUTPATIENT
Start: 2022-02-02 | End: 2022-04-04

## 2022-02-02 RX ORDER — HYDROCODONE BITARTRATE AND ACETAMINOPHEN 10; 325 MG/1; MG/1
1 TABLET ORAL EVERY 6 HOURS
Qty: 120 TABLET | Refills: 0 | Status: SHIPPED | OUTPATIENT
Start: 2022-02-04 | End: 2022-03-06

## 2022-02-02 RX ORDER — DICLOFENAC SODIUM 10 MG/G
2 GEL TOPICAL 4 TIMES DAILY
Qty: 10 EACH | Refills: 1 | Status: CANCELLED | OUTPATIENT
Start: 2022-02-02 | End: 2022-03-04

## 2022-02-02 RX ORDER — PREGABALIN 100 MG/1
100 CAPSULE ORAL EVERY 12 HOURS
Qty: 60 CAPSULE | Refills: 1 | Status: SHIPPED | OUTPATIENT
Start: 2022-02-02 | End: 2022-06-01 | Stop reason: SDUPTHER

## 2022-02-02 RX ORDER — HYDROCODONE BITARTRATE AND ACETAMINOPHEN 10; 325 MG/1; MG/1
1 TABLET ORAL EVERY 6 HOURS
Qty: 120 TABLET | Refills: 0 | Status: SHIPPED | OUTPATIENT
Start: 2022-03-08 | End: 2022-02-09

## 2022-02-02 NOTE — PATIENT INSTRUCTIONS
Patient Education       Joint Pain   About this topic   Joint pain is sometimes called arthralgia. You have pain where one or more bones are connected.       What are the causes?   Joint pain may be caused by:  · Injury  · Infection  · Health problems like an immune disorder, or other illness  · Problems with cartilage, ligaments, or tendons  What can make this more likely to happen?   · Older age  · Doing the same motion over and over with a joint  · Being overweight  · Injuries  What are the main signs?   You may have mild or very bad pain. The pain may be constant or it may come and go. You may have trouble moving the joint that hurts. The pain may burn, stab, or throb. It may be sharp or dull. Your joint may feel stiff, numb, or tingly. It may be hard to move or put weight on a joint if the pain is bad.  How does the doctor diagnose this health problem?   The doctor will ask you questions about your history and do an exam. The doctor will check your joints with care and may order:  · Lab tests  · X-rays  How does the doctor treat this health problem?   Your care is based on what is causing your pain. The doctor will also treat it based on how bad your pain is and where your pain is found on your body. The doctor may suggest you:  · Limit your activity.  · Do stretching exercises.  · Your doctor may want you to start an exercise program. Some kinds of exercise, like swimming, may help ease pain. It can keep your muscles strong and helps you maintain a healthy weight.  · Place an ice pack or a bag of frozen peas wrapped in a towel over the painful part. Never put ice right on the skin. Do not leave the ice on more than 10 to 15 minutes at a time.  · Use heat. Put a heating pad on your painful part for no more than 20 minutes at a time. Never go to sleep with a heating pad on as this can cause burns.  What drugs may be needed?   The doctor may order drugs to:  · Help with pain and swelling  Your doctor may instruct  you to take:  · Drugs like ibuprofen or naproxen for pain. These are all nonsteroidal anti-inflammatory drugs (NSAIDS). Do not take more than one type of these drugs at the same time.  · Drugs for pain such as acetaminophen.  The doctor may give you a shot of an anti-inflammatory drug called a corticosteroid. This will help with swelling.  Helpful tips   · Stay active and work out to keep your muscles strong and flexible.  · Keep a healthy weight. Being heavy puts more stress on your joints. This makes them more likely to hurt.  · Warm up slowly and stretch before you work out. Use good ways to train, such as slowly adding to how far you run. Do not work out if you are overly tired. Take extra care if working out in cold weather.  Last Reviewed Date   2020-10-12  Consumer Information Use and Disclaimer   This information is not specific medical advice and does not replace information you receive from your health care provider. This is only a brief summary of general information. It does NOT include all information about conditions, illnesses, injuries, tests, procedures, treatments, therapies, discharge instructions or life-style choices that may apply to you. You must talk with your health care provider for complete information about your health and treatment options. This information should not be used to decide whether or not to accept your health care providers advice, instructions or recommendations. Only your health care provider has the knowledge and training to provide advice that is right for you.  Copyright   Copyright © 2021 [employer] and its affiliates and/or licensors. All rights reserved.

## 2022-02-03 ENCOUNTER — TELEPHONE (OUTPATIENT)
Dept: FAMILY MEDICINE | Facility: CLINIC | Age: 62
End: 2022-02-03
Payer: MEDICAID

## 2022-02-03 NOTE — TELEPHONE ENCOUNTER
----- Message from Nataliia Martinez sent at 2/2/2022  2:07 PM CST -----  [facility] WILL NOT COVER TOUJEO - NEEDS SUBSTITUTE MED CALLED IN - [facility]- [phone]. PT ONLY HAS MED FOR TONIGHT LEFT.

## 2022-02-03 NOTE — TELEPHONE ENCOUNTER
Pt is referring the pt to [facility].     Call made pt to let her know that a referral will be sent to [facility] to manage the altering of her diabetes medications. Pt asked about a PA being sent to the pharmacy for 1 emergency Toujeo pen. PA request to be completed by Ashley Sanches.

## 2022-02-09 ENCOUNTER — OFFICE VISIT (OUTPATIENT)
Dept: DIABETES SERVICES | Facility: CLINIC | Age: 62
End: 2022-02-09
Payer: COMMERCIAL

## 2022-02-09 VITALS
OXYGEN SATURATION: 95 % | WEIGHT: 293 LBS | BODY MASS INDEX: 44.41 KG/M2 | RESPIRATION RATE: 16 BRPM | HEIGHT: 68 IN | HEART RATE: 71 BPM | DIASTOLIC BLOOD PRESSURE: 84 MMHG | SYSTOLIC BLOOD PRESSURE: 136 MMHG

## 2022-02-09 DIAGNOSIS — M47.817 LUMBOSACRAL SPONDYLOSIS WITHOUT MYELOPATHY: Chronic | ICD-10-CM

## 2022-02-09 DIAGNOSIS — Z79.4 TYPE 2 DIABETES MELLITUS WITHOUT COMPLICATION, WITH LONG-TERM CURRENT USE OF INSULIN: Primary | ICD-10-CM

## 2022-02-09 DIAGNOSIS — G89.4 CHRONIC PAIN SYNDROME: Chronic | ICD-10-CM

## 2022-02-09 DIAGNOSIS — E03.9 HYPOTHYROIDISM, UNSPECIFIED TYPE: ICD-10-CM

## 2022-02-09 DIAGNOSIS — G89.29 CHRONIC PAIN OF RIGHT KNEE: Chronic | ICD-10-CM

## 2022-02-09 DIAGNOSIS — L81.9 HYPERPIGMENTATION OF SKIN: ICD-10-CM

## 2022-02-09 DIAGNOSIS — R60.0 EDEMA OF BOTH LOWER EXTREMITIES: ICD-10-CM

## 2022-02-09 DIAGNOSIS — E13.51 PERIPHERAL VASCULAR DISEASE DUE TO SECONDARY DIABETES: ICD-10-CM

## 2022-02-09 DIAGNOSIS — M25.561 CHRONIC PAIN OF RIGHT KNEE: Chronic | ICD-10-CM

## 2022-02-09 DIAGNOSIS — I10 PRIMARY HYPERTENSION: ICD-10-CM

## 2022-02-09 DIAGNOSIS — E11.9 TYPE 2 DIABETES MELLITUS WITHOUT COMPLICATION, WITH LONG-TERM CURRENT USE OF INSULIN: Primary | ICD-10-CM

## 2022-02-09 DIAGNOSIS — C73 THYROID CANCER: ICD-10-CM

## 2022-02-09 DIAGNOSIS — M79.672 FOOT PAIN, LEFT: Chronic | ICD-10-CM

## 2022-02-09 DIAGNOSIS — J44.9 CHRONIC OBSTRUCTIVE PULMONARY DISEASE, UNSPECIFIED COPD TYPE: ICD-10-CM

## 2022-02-09 DIAGNOSIS — E66.01 MORBID OBESITY: ICD-10-CM

## 2022-02-09 LAB
GLUCOSE SERPL-MCNC: 112 MG/DL (ref 70–110)
HBA1C MFR BLD: 5.7 % (ref 4.5–6.6)

## 2022-02-09 PROCEDURE — 3079F PR MOST RECENT DIASTOLIC BLOOD PRESSURE 80-89 MM HG: ICD-10-PCS | Mod: CPTII,,, | Performed by: NURSE PRACTITIONER

## 2022-02-09 PROCEDURE — 99204 OFFICE O/P NEW MOD 45 MIN: CPT | Mod: S$PBB,,, | Performed by: NURSE PRACTITIONER

## 2022-02-09 PROCEDURE — 3075F SYST BP GE 130 - 139MM HG: CPT | Mod: CPTII,,, | Performed by: NURSE PRACTITIONER

## 2022-02-09 PROCEDURE — 99204 PR OFFICE/OUTPT VISIT, NEW, LEVL IV, 45-59 MIN: ICD-10-PCS | Mod: S$PBB,,, | Performed by: NURSE PRACTITIONER

## 2022-02-09 PROCEDURE — 83036 HEMOGLOBIN GLYCOSYLATED A1C: CPT | Mod: PBBFAC | Performed by: NURSE PRACTITIONER

## 2022-02-09 PROCEDURE — 82962 GLUCOSE BLOOD TEST: CPT | Mod: PBBFAC | Performed by: NURSE PRACTITIONER

## 2022-02-09 PROCEDURE — 1160F PR REVIEW ALL MEDS BY PRESCRIBER/CLIN PHARMACIST DOCUMENTED: ICD-10-PCS | Mod: CPTII,,, | Performed by: NURSE PRACTITIONER

## 2022-02-09 PROCEDURE — 3079F DIAST BP 80-89 MM HG: CPT | Mod: CPTII,,, | Performed by: NURSE PRACTITIONER

## 2022-02-09 PROCEDURE — 1159F PR MEDICATION LIST DOCUMENTED IN MEDICAL RECORD: ICD-10-PCS | Mod: CPTII,,, | Performed by: NURSE PRACTITIONER

## 2022-02-09 PROCEDURE — 3008F BODY MASS INDEX DOCD: CPT | Mod: CPTII,,, | Performed by: NURSE PRACTITIONER

## 2022-02-09 PROCEDURE — 1159F MED LIST DOCD IN RCRD: CPT | Mod: CPTII,,, | Performed by: NURSE PRACTITIONER

## 2022-02-09 PROCEDURE — 3075F PR MOST RECENT SYSTOLIC BLOOD PRESS GE 130-139MM HG: ICD-10-PCS | Mod: CPTII,,, | Performed by: NURSE PRACTITIONER

## 2022-02-09 PROCEDURE — 99215 OFFICE O/P EST HI 40 MIN: CPT | Mod: PBBFAC | Performed by: NURSE PRACTITIONER

## 2022-02-09 PROCEDURE — 1160F RVW MEDS BY RX/DR IN RCRD: CPT | Mod: CPTII,,, | Performed by: NURSE PRACTITIONER

## 2022-02-09 PROCEDURE — 3008F PR BODY MASS INDEX (BMI) DOCUMENTED: ICD-10-PCS | Mod: CPTII,,, | Performed by: NURSE PRACTITIONER

## 2022-02-09 RX ORDER — INSULIN DETEMIR 100 [IU]/ML
10 INJECTION, SOLUTION SUBCUTANEOUS NIGHTLY
COMMUNITY
End: 2023-01-04 | Stop reason: SDUPTHER

## 2022-02-09 RX ORDER — INSULIN GLARGINE 100 [IU]/ML
INJECTION, SOLUTION SUBCUTANEOUS
Qty: 15 ML | Refills: 1 | Status: SHIPPED | OUTPATIENT
Start: 2022-02-09 | End: 2023-01-06

## 2022-02-09 NOTE — PROGRESS NOTES
Subjective:       Patient ID: Holly Porter is a 61 y.o. female.    Chief Complaint: Initial Diabetes Care Management Assessment (Pt here to establish care, diagnosed in 2016, checks sugar 1 x day.)    Here today for DM care.  PCP Dr Frausto.  She has been on toujeo and insurance has changed.  We will change to covered insulin.   Sees Dr Benoit for wound care management/venous insufficiency that is controlled at this time.   Sees Dr Ortega every 6 months of cardiology  Sees Eye care provider in [place] every 3-6 months.  History of thyroid carcinoma, but she is unsure which type.  Dr Whitney removed thyroid here at [facility] so if need be we would be able to get the path report.  On ARB, not on statin.  Last lipid in 2020, we will have her do one today and treat with statin accordingly.      Review of Systems   Constitutional: Negative for activity change, appetite change, diaphoresis and fatigue.   HENT: Negative for nasal congestion, facial swelling and sinus pressure/congestion.    Eyes: Negative for visual disturbance.   Respiratory: Negative for shortness of breath and wheezing.    Cardiovascular: Positive for leg swelling. Negative for chest pain.   Gastrointestinal: Negative for constipation, diarrhea, nausea and vomiting.   Endocrine: Negative for polydipsia, polyphagia and polyuria.   Genitourinary: Negative for dysuria, frequency and urgency.   Musculoskeletal: Negative for gait problem and myalgias.   Integumentary:  Negative for color change, rash and wound.   Neurological: Negative for dizziness, syncope, weakness, headaches, disturbances in coordination and coordination difficulties.   Hematological: Does not bruise/bleed easily.   Psychiatric/Behavioral: Negative for self-injury, sleep disturbance and suicidal ideas. The patient is not nervous/anxious.          Objective:      Physical Exam  Vitals and nursing note reviewed.   Constitutional:       Appearance: Normal appearance.   HENT:      Head:  Normocephalic.   Cardiovascular:      Rate and Rhythm: Normal rate and regular rhythm.      Pulses:           Dorsalis pedis pulses are 2+ on the right side and 2+ on the left side.        Posterior tibial pulses are 2+ on the right side and 2+ on the left side.      Heart sounds: Normal heart sounds.   Pulmonary:      Effort: Pulmonary effort is normal.      Breath sounds: Normal breath sounds.   Musculoskeletal:         General: Normal range of motion.      Right lower leg: Edema present.      Left lower leg: Edema present.   Feet:      Right foot:      Protective Sensation: 6 sites tested. 6 sites sensed.      Toenail Condition: Right toenails are normal.      Left foot:      Protective Sensation: 6 sites tested. 6 sites sensed.      Toenail Condition: Left toenails are normal.      Comments: Right foot amputated 4th toe  Left foot amputated 2nd, 4th and 5th toes  Skin:     General: Skin is warm and dry.      Findings: Lesion present.   Neurological:      General: No focal deficit present.      Mental Status: She is alert and oriented to person, place, and time.   Psychiatric:         Mood and Affect: Mood normal.         Behavior: Behavior normal.         Thought Content: Thought content normal.         Judgment: Judgment normal.         Assessment:       Problem List Items Addressed This Visit        Neuro    Chronic pain syndrome (Chronic)    Lumbosacral spondylosis without myelopathy (Chronic)       Derm    Hyperpigmentation of skin       Pulmonary    Chronic obstructive pulmonary disease       Endocrine    Hypothyroidism    Morbid obesity    Peripheral vascular disease due to secondary diabetes    Relevant Medications    insulin (LANTUS SOLOSTAR U-100 INSULIN) glargine 100 units/mL (3mL) SubQ pen    Type 2 diabetes mellitus without complication, with long-term current use of insulin - Primary    Relevant Medications    insulin (LANTUS SOLOSTAR U-100 INSULIN) glargine 100 units/mL (3mL) SubQ pen    Other  Relevant Orders    Hemoglobin A1C, POCT (Completed)    POCT Glucose, Hand-Held Device (Completed)       Orthopedic    Chronic pain of right knee (Chronic)    Foot pain, left (Chronic)       Other    Edema of both lower extremities          Plan:       Problem List Items Addressed This Visit        Neuro    Chronic pain syndrome (Chronic)    Lumbosacral spondylosis without myelopathy (Chronic)       Derm    Hyperpigmentation of skin       Pulmonary    Chronic obstructive pulmonary disease       Cardiac/Vascular    Hypertension       Oncology    Thyroid cancer       Endocrine    Hypothyroidism    Morbid obesity    Peripheral vascular disease due to secondary diabetes    Relevant Medications    insulin (LANTUS SOLOSTAR U-100 INSULIN) glargine 100 units/mL (3mL) SubQ pen    Type 2 diabetes mellitus without complication, with long-term current use of insulin - Primary    Relevant Medications    insulin (LANTUS SOLOSTAR U-100 INSULIN) glargine 100 units/mL (3mL) SubQ pen    Other Relevant Orders    Hemoglobin A1C, POCT (Completed)    POCT Glucose, Hand-Held Device (Completed)    Lipid Panel    Microalbumin/Creatinine Ratio, Urine       Orthopedic    Chronic pain of right knee (Chronic)    Foot pain, left (Chronic)       Other    Edema of both lower extremities        Stop toujeo and start lantus.  Formulary would not pull in system so I advised pt that it lantus is not covered at the pharmacy have them call us and we will substitute levemir or tresiba.    Education provided and discussed at length.  Weight loss encouraged.

## 2022-02-09 NOTE — PATIENT INSTRUCTIONS
Pt is advised to monitor and document glucose fasting when you wake up before you eat and 2 hours after meal and bring in meter to next visit.      Ensure to take medications as directed.      Follow diabetic diet as directed.      Work to achieve normal body weight.     Ensure to exercise 4-5 times per week for 20 minutes.

## 2022-02-10 ENCOUNTER — OFFICE VISIT (OUTPATIENT)
Dept: NEUROLOGY | Facility: CLINIC | Age: 62
End: 2022-02-10
Payer: COMMERCIAL

## 2022-02-10 VITALS
RESPIRATION RATE: 18 BRPM | WEIGHT: 293 LBS | SYSTOLIC BLOOD PRESSURE: 138 MMHG | HEIGHT: 68 IN | OXYGEN SATURATION: 97 % | DIASTOLIC BLOOD PRESSURE: 84 MMHG | HEART RATE: 88 BPM | BODY MASS INDEX: 44.41 KG/M2

## 2022-02-10 DIAGNOSIS — H53.8 BLURRED VISION: ICD-10-CM

## 2022-02-10 DIAGNOSIS — G44.85 PRIMARY STABBING HEADACHE: ICD-10-CM

## 2022-02-10 DIAGNOSIS — R51.9 NONINTRACTABLE HEADACHE, UNSPECIFIED CHRONICITY PATTERN, UNSPECIFIED HEADACHE TYPE: Primary | ICD-10-CM

## 2022-02-10 DIAGNOSIS — M54.2 NECK PAIN: ICD-10-CM

## 2022-02-10 PROCEDURE — 1159F PR MEDICATION LIST DOCUMENTED IN MEDICAL RECORD: ICD-10-PCS | Mod: CPTII,,, | Performed by: NURSE PRACTITIONER

## 2022-02-10 PROCEDURE — 3079F DIAST BP 80-89 MM HG: CPT | Mod: CPTII,,, | Performed by: NURSE PRACTITIONER

## 2022-02-10 PROCEDURE — 99215 OFFICE O/P EST HI 40 MIN: CPT | Mod: PBBFAC | Performed by: NURSE PRACTITIONER

## 2022-02-10 PROCEDURE — 3075F SYST BP GE 130 - 139MM HG: CPT | Mod: CPTII,,, | Performed by: NURSE PRACTITIONER

## 2022-02-10 PROCEDURE — 3008F PR BODY MASS INDEX (BMI) DOCUMENTED: ICD-10-PCS | Mod: CPTII,,, | Performed by: NURSE PRACTITIONER

## 2022-02-10 PROCEDURE — 99203 OFFICE O/P NEW LOW 30 MIN: CPT | Mod: S$PBB,,, | Performed by: NURSE PRACTITIONER

## 2022-02-10 PROCEDURE — 3008F BODY MASS INDEX DOCD: CPT | Mod: CPTII,,, | Performed by: NURSE PRACTITIONER

## 2022-02-10 PROCEDURE — 1160F RVW MEDS BY RX/DR IN RCRD: CPT | Mod: CPTII,,, | Performed by: NURSE PRACTITIONER

## 2022-02-10 PROCEDURE — 1159F MED LIST DOCD IN RCRD: CPT | Mod: CPTII,,, | Performed by: NURSE PRACTITIONER

## 2022-02-10 PROCEDURE — 3079F PR MOST RECENT DIASTOLIC BLOOD PRESSURE 80-89 MM HG: ICD-10-PCS | Mod: CPTII,,, | Performed by: NURSE PRACTITIONER

## 2022-02-10 PROCEDURE — 3075F PR MOST RECENT SYSTOLIC BLOOD PRESS GE 130-139MM HG: ICD-10-PCS | Mod: CPTII,,, | Performed by: NURSE PRACTITIONER

## 2022-02-10 PROCEDURE — 99203 PR OFFICE/OUTPT VISIT, NEW, LEVL III, 30-44 MIN: ICD-10-PCS | Mod: S$PBB,,, | Performed by: NURSE PRACTITIONER

## 2022-02-10 PROCEDURE — 1160F PR REVIEW ALL MEDS BY PRESCRIBER/CLIN PHARMACIST DOCUMENTED: ICD-10-PCS | Mod: CPTII,,, | Performed by: NURSE PRACTITIONER

## 2022-02-10 RX ORDER — TOPIRAMATE 100 MG/1
150 TABLET, FILM COATED ORAL 2 TIMES DAILY
Qty: 90 TABLET | Refills: 11 | Status: ON HOLD | OUTPATIENT
Start: 2022-02-10 | End: 2024-02-14 | Stop reason: HOSPADM

## 2022-02-10 NOTE — PATIENT INSTRUCTIONS
1. Increase topamax back to 150mg twice daily    2. CT head due to recent sudden severe headache lasting 7 days and not typical of her prior migraines and also recent pulmonary emboli    3. Notify clinic if another severe headache

## 2022-02-10 NOTE — PROGRESS NOTES
Subjective:       Patient ID: Holly Porter is a 61 y.o. female     Chief Complaint:    Chief Complaint   Patient presents with    Follow-up    Migraine        Allergies:  Ammonium peroxydisulfate; Avocado (laurus persea); Bananas [banana]; Chocolate flavor; Silvadene [silver sulfadiazine]; Beef-potatoes-peas [nutritional supplement-fiber]; Clindamycin; Corticosteroids (glucocorticoids); Hydrocortisone; Lasix [furosemide]; Adhesive; Iodine and iodide containing products; Latex, natural rubber; Pcn [penicillins]; and Sulfa (sulfonamide antibiotics)    Current Medications:    Outpatient Encounter Medications as of 2/10/2022   Medication Sig Dispense Refill    allopurinoL (ZYLOPRIM) 300 MG tablet Take 1 tablet (300 mg total) by mouth once daily. 90 tablet 3    amitriptyline (ELAVIL) 10 MG tablet Take 1 tablet (10 mg total) by mouth every evening. 30 tablet 1    aspirin (ECOTRIN) 81 MG EC tablet Take 1 tablet (81 mg total) by mouth once daily. 90 tablet 1    budesonide-formoterol 160-4.5 mcg (SYMBICORT) 160-4.5 mcg/actuation HFAA Inhale 1-2 puffs into the lungs every 12 (twelve) hours. 10.2 g 5    cilostazoL (PLETAL) 100 MG Tab Take 100 mg by mouth 2 (two) times daily.      colchicine (COLCRYS) 0.6 mg tablet One pill three times a day for two days,then one pill daily till out 30 tablet 2    HYDROcodone-acetaminophen (NORCO)  mg per tablet Take 1 tablet by mouth every 6 (six) hours. 120 tablet 0    ibuprofen (ADVIL,MOTRIN) 600 MG tablet Take 1 tablet (600 mg total) by mouth 2 (two) times daily as needed for Pain. 20 tablet 1    insulin (LANTUS SOLOSTAR U-100 INSULIN) glargine 100 units/mL (3mL) SubQ pen Inject 10 units sq daily 15 mL 1    insulin detemir U-100 (LEVEMIR FLEXTOUCH U-100 INSULN) 100 unit/mL (3 mL) InPn pen Inject 10 Units into the skin every evening. 15 ml with 1 refill      levothyroxine (SYNTHROID, LEVOTHROID) 175 MCG tablet Take 1 tablet (175 mcg total) by mouth once daily. 90  "tablet 1    losartan-hydrochlorothiazide 50-12.5 mg (HYZAAR) 50-12.5 mg per tablet Take 1 tablet by mouth once daily. 90 tablet 1    metOLazone (ZAROXOLYN) 2.5 MG tablet Take 1 tablet (2.5 mg total) by mouth once daily. 30 tablet 2    mupirocin (BACTROBAN) 2 % ointment Apply topically 2 (two) times daily. 80 g 2    NOVOFINE 32 32 gauge x 1/4" Ndle Use as directed once daily. 90 each 3    pantoprazole (PROTONIX) 40 MG tablet Take 1 tablet (40 mg total) by mouth once daily. 90 tablet 1    pregabalin (LYRICA) 100 MG capsule Take 1 capsule (100 mg total) by mouth every 12 (twelve) hours. 60 capsule 1    triamcinolone acetonide 0.1% (KENALOG) 0.1 % cream Apply topically 3 (three) times daily. 400 g 2    [DISCONTINUED] topiramate (TOPAMAX) 50 MG tablet       hydrOXYzine HCL (ATARAX) 25 MG tablet Take 1 tablet (25 mg total) by mouth every 6 (six) hours as needed for Itching. (Patient not taking: Reported on 2/10/2022) 60 tablet 0    potassium chloride (MICRO-K) 10 MEQ CpSR       topiramate (TOPAMAX) 100 MG tablet Take 1.5 tablets (150 mg total) by mouth 2 (two) times daily. 90 tablet 11     No facility-administered encounter medications on file as of 2/10/2022.       History of Present Illness  60 y/o AAF new referral from Dr. Frausto for reported headaches.    She is not a new referral.  She has been followed by us in the past for migraine headaches but has not followed since December of 2020.  At that time was taking topamax 150mg BID and reported that her headaches were for most part very well controlled.  However she was out of the medication due to lack of followup at that time.  She still says the topamax works well for her.  On average she only reports about 2 migraines a month on this treatment.  However, more recently she had severe headache for 7 days, posterior, not typical of her prior migraines which are usually more frontal.  On evaluation of her meds, it seems whomever did her last refill only " prescribed it once daily per the patient report.  She is supposed to be on 150mg BID and I refill this.    She does have underlying history of obstructive sleep apnea.    Noted 2 weeks ago patient diagnosed with multiple pulmonary emboli and started on eliquis.  She is to follow with her primary care on Monday for further evaluation.    No recent head imaging, she had severe week long headache just prior to the recent findings of the pulmonary emboli certainly feel this is needed.         Review of Systems  Review of Systems   Constitutional: Negative for diaphoresis and fever.   HENT: Negative for congestion, hearing loss and tinnitus.    Eyes: Negative for blurred vision, double vision, photophobia, discharge and redness.   Respiratory: Negative for cough and shortness of breath.    Cardiovascular: Negative for chest pain.   Gastrointestinal: Negative for abdominal pain, nausea and vomiting.   Musculoskeletal: Negative for back pain, joint pain, myalgias and neck pain.   Skin: Negative for itching and rash.   Neurological: Positive for headaches. Negative for dizziness, tremors, sensory change, speech change, focal weakness, seizures, loss of consciousness and weakness.   Psychiatric/Behavioral: Negative for depression, hallucinations and memory loss. The patient does not have insomnia.    All other systems reviewed and are negative.     Objective:     NEUROLOGICAL EXAMINATION:     MENTAL STATUS   Oriented to person, place, and time.   Attention: normal. Concentration: normal.   Speech: speech is normal   Level of consciousness: alert  Knowledge: good and consistent with education.   Normal comprehension.     CRANIAL NERVES     CN II   Visual fields full to confrontation.   Visual acuity: normal  Right visual field deficit: none  Left visual field deficit: none     CN III, IV, VI   Pupils are equal, round, and reactive to light.  Extraocular motions are normal.   Right pupil: Size: 3 mm. Shape: regular.  Reactivity: brisk. Consensual response: intact. Accommodation: intact.   Left pupil: Size: 3 mm. Shape: regular. Reactivity: brisk. Consensual response: intact. Accommodation: intact.   CN III: no CN III palsy  CN VI: no CN VI palsy  Nystagmus: none   Diplopia: none  Upgaze: normal  Downgaze: normal  Conjugate gaze: present  Vestibulo-ocular reflex: present    CN V   Facial sensation intact.   Right facial sensation deficit: none  Left facial sensation deficit: none  Right corneal reflex: normal  Left corneal reflex: normal    CN VII   Facial expression full, symmetric.   Right facial weakness: none  Left facial weakness: none  Right taste: normal  Left taste: normal    CN VIII   CN VIII normal.   Hearing: intact    CN IX, X   CN IX normal.   CN X normal.   Palate: symmetric    CN XI   CN XI normal.   Right sternocleidomastoid strength: normal  Left sternocleidomastoid strength: normal  Right trapezius strength: normal  Left trapezius strength: normal    CN XII   CN XII normal.   Tongue: not atrophic  Fasciculations: absent  Tongue deviation: none    MOTOR EXAM   Muscle bulk: normal  Overall muscle tone: normal  Right arm tone: normal  Left arm tone: normal  Right arm pronator drift: absent  Left arm pronator drift: absent  Right leg tone: normal  Left leg tone: normal    Strength   Right neck flexion: 5/5  Left neck flexion: 5/5  Right neck extension: 5/5  Left neck extension: 5/5  Right deltoid: 5/5  Left deltoid: 5/5  Right biceps: 5/5  Left biceps: 5/5  Right triceps: 5/5  Left triceps: 5/5  Right wrist flexion: 5/5  Left wrist flexion: 5/5  Right wrist extension: 5/5  Left wrist extension: 5/5  Right interossei: 5/5  Left interossei: 5/5  Right iliopsoas: 5/5  Left iliopsoas: 5/5  Right quadriceps: 5/5  Left quadriceps: 5/5  Right hamstrin/5  Left hamstrin/5  Right anterior tibial: 5/5  Left anterior tibial: 5/5  Right posterior tibial: 5/5  Left posterior tibial: 5/5  Right gastroc: 5/5  Left gastroc:  5/5    REFLEXES     Reflexes   Right brachioradialis: 2+  Left brachioradialis: 2+  Right biceps: 2+  Left biceps: 2+  Right triceps: 2+  Left triceps: 2+  Right patellar: 2+  Left patellar: 2+  Right achilles: 2+  Left achilles: 2+  Right plantar: normal  Left plantar: normal  Right Lambert: absent  Left Lambert: absent  Right ankle clonus: absent  Left ankle clonus: absent  Right pendular knee jerk: absent  Left pendular knee jerk: absent    SENSORY EXAM   Light touch normal.   Right arm light touch: normal  Left arm light touch: normal  Right leg light touch: normal  Left leg light touch: normal  Vibration normal.   Right arm vibration: normal  Left arm vibration: normal  Right leg vibration: normal  Left leg vibration: normal  Proprioception normal.   Right arm proprioception: normal  Left arm proprioception: normal  Right leg proprioception: normal  Left leg proprioception: normal  Pinprick normal.   Right arm pinprick: normal  Left arm pinprick: normal  Right leg pinprick: normal  Left leg pinprick: normal  Graphesthesia: normal  Romberg: negative  Stereognosis: normal    GAIT AND COORDINATION     Gait  Gait: wide-based     Coordination   Finger to nose coordination: normal  Heel to shin coordination: normal  Tandem walking coordination: abnormal    Tremor   Resting tremor: absent  Intention tremor: absent  Action tremor: absent       Physical Exam  Vitals and nursing note reviewed.   Constitutional:       Appearance: Normal appearance. She is obese.   HENT:      Head: Normocephalic.   Eyes:      Extraocular Movements: Extraocular movements intact and EOM normal.      Pupils: Pupils are equal, round, and reactive to light.   Cardiovascular:      Rate and Rhythm: Normal rate and regular rhythm.   Pulmonary:      Effort: Pulmonary effort is normal.      Breath sounds: Normal breath sounds.   Musculoskeletal:         General: No swelling or tenderness. Normal range of motion.      Cervical back: Normal range of  motion and neck supple.      Right lower leg: No edema.      Left lower leg: No edema.   Skin:     General: Skin is warm and dry.      Coloration: Skin is not jaundiced.      Findings: No rash.   Neurological:      General: No focal deficit present.      Mental Status: She is alert and oriented to person, place, and time.      GCS: GCS eye subscore is 4. GCS verbal subscore is 5. GCS motor subscore is 6.      Cranial Nerves: No cranial nerve deficit.      Sensory: No sensory deficit.      Motor: Motor function is intact. No weakness.      Coordination: Coordination is intact. Coordination normal. Finger-Nose-Finger Test, Heel to Shin Test and Romberg Test normal.      Gait: Tandem walk abnormal. Gait normal.      Deep Tendon Reflexes: Reflexes normal.      Reflex Scores:       Tricep reflexes are 2+ on the right side and 2+ on the left side.       Bicep reflexes are 2+ on the right side and 2+ on the left side.       Brachioradialis reflexes are 2+ on the right side and 2+ on the left side.       Patellar reflexes are 2+ on the right side and 2+ on the left side.       Achilles reflexes are 2+ on the right side and 2+ on the left side.  Psychiatric:         Mood and Affect: Mood normal.         Speech: Speech normal.         Behavior: Behavior normal.          Assessment:     Problem List Items Addressed This Visit        Neuro    Nonintractable headache - Primary    Relevant Medications    topiramate (TOPAMAX) 100 MG tablet    Primary stabbing headache    Relevant Orders    CT Head Without Contrast      Other Visit Diagnoses     Neck pain        Blurred vision               Primary Diagnosis and ICD10  Nonintractable headache, unspecified chronicity pattern, unspecified headache type [R51.9]    Plan:     Patient Instructions   1. Increase topamax back to 150mg twice daily    2. CT head due to recent sudden severe headache lasting 7 days and not typical of her prior migraines and also recent pulmonary emboli    3.  Notify clinic if another severe headache      Medications Discontinued During This Encounter   Medication Reason    topiramate (TOPAMAX) 50 MG tablet        Requested Prescriptions     Signed Prescriptions Disp Refills    topiramate (TOPAMAX) 100 MG tablet 90 tablet 11     Sig: Take 1.5 tablets (150 mg total) by mouth 2 (two) times daily.       Orders Placed This Encounter   Procedures    CT Head Without Contrast

## 2022-02-14 ENCOUNTER — OFFICE VISIT (OUTPATIENT)
Dept: FAMILY MEDICINE | Facility: CLINIC | Age: 62
End: 2022-02-14
Payer: COMMERCIAL

## 2022-02-14 VITALS
DIASTOLIC BLOOD PRESSURE: 69 MMHG | SYSTOLIC BLOOD PRESSURE: 132 MMHG | HEART RATE: 68 BPM | HEIGHT: 68 IN | BODY MASS INDEX: 44.41 KG/M2 | TEMPERATURE: 98 F | WEIGHT: 293 LBS | OXYGEN SATURATION: 95 % | RESPIRATION RATE: 20 BRPM

## 2022-02-14 DIAGNOSIS — J40 BRONCHITIS: ICD-10-CM

## 2022-02-14 DIAGNOSIS — I10 PRIMARY HYPERTENSION: Primary | Chronic | ICD-10-CM

## 2022-02-14 DIAGNOSIS — J44.9 CHRONIC OBSTRUCTIVE PULMONARY DISEASE, UNSPECIFIED COPD TYPE: ICD-10-CM

## 2022-02-14 DIAGNOSIS — R05.9 COUGH: ICD-10-CM

## 2022-02-14 DIAGNOSIS — Z79.4 TYPE 2 DIABETES MELLITUS WITHOUT COMPLICATION, WITH LONG-TERM CURRENT USE OF INSULIN: Chronic | ICD-10-CM

## 2022-02-14 DIAGNOSIS — J34.89 NASAL DRAINAGE: ICD-10-CM

## 2022-02-14 DIAGNOSIS — J30.9 ALLERGIC RHINITIS, UNSPECIFIED SEASONALITY, UNSPECIFIED TRIGGER: ICD-10-CM

## 2022-02-14 DIAGNOSIS — E11.9 TYPE 2 DIABETES MELLITUS WITHOUT COMPLICATION, WITH LONG-TERM CURRENT USE OF INSULIN: Chronic | ICD-10-CM

## 2022-02-14 DIAGNOSIS — I26.99 PULMONARY EMBOLISM, UNSPECIFIED CHRONICITY, UNSPECIFIED PULMONARY EMBOLISM TYPE, UNSPECIFIED WHETHER ACUTE COR PULMONALE PRESENT: ICD-10-CM

## 2022-02-14 DIAGNOSIS — Z86.718 HISTORY OF DVT (DEEP VEIN THROMBOSIS): ICD-10-CM

## 2022-02-14 PROCEDURE — 1159F PR MEDICATION LIST DOCUMENTED IN MEDICAL RECORD: ICD-10-PCS | Mod: ,,, | Performed by: INTERNAL MEDICINE

## 2022-02-14 PROCEDURE — 3078F PR MOST RECENT DIASTOLIC BLOOD PRESSURE < 80 MM HG: ICD-10-PCS | Mod: ,,, | Performed by: INTERNAL MEDICINE

## 2022-02-14 PROCEDURE — 3075F SYST BP GE 130 - 139MM HG: CPT | Mod: ,,, | Performed by: INTERNAL MEDICINE

## 2022-02-14 PROCEDURE — 99214 PR OFFICE/OUTPT VISIT, EST, LEVL IV, 30-39 MIN: ICD-10-PCS | Mod: ,,, | Performed by: INTERNAL MEDICINE

## 2022-02-14 PROCEDURE — 1160F RVW MEDS BY RX/DR IN RCRD: CPT | Mod: ,,, | Performed by: INTERNAL MEDICINE

## 2022-02-14 PROCEDURE — 3075F PR MOST RECENT SYSTOLIC BLOOD PRESS GE 130-139MM HG: ICD-10-PCS | Mod: ,,, | Performed by: INTERNAL MEDICINE

## 2022-02-14 PROCEDURE — 3078F DIAST BP <80 MM HG: CPT | Mod: ,,, | Performed by: INTERNAL MEDICINE

## 2022-02-14 PROCEDURE — 3008F BODY MASS INDEX DOCD: CPT | Mod: ,,, | Performed by: INTERNAL MEDICINE

## 2022-02-14 PROCEDURE — 1160F PR REVIEW ALL MEDS BY PRESCRIBER/CLIN PHARMACIST DOCUMENTED: ICD-10-PCS | Mod: ,,, | Performed by: INTERNAL MEDICINE

## 2022-02-14 PROCEDURE — 1159F MED LIST DOCD IN RCRD: CPT | Mod: ,,, | Performed by: INTERNAL MEDICINE

## 2022-02-14 PROCEDURE — 3008F PR BODY MASS INDEX (BMI) DOCUMENTED: ICD-10-PCS | Mod: ,,, | Performed by: INTERNAL MEDICINE

## 2022-02-14 PROCEDURE — 99214 OFFICE O/P EST MOD 30 MIN: CPT | Mod: ,,, | Performed by: INTERNAL MEDICINE

## 2022-02-14 RX ORDER — AZITHROMYCIN 250 MG/1
TABLET, FILM COATED ORAL
Qty: 6 TABLET | Refills: 0 | Status: SHIPPED | OUTPATIENT
Start: 2022-02-14 | End: 2022-03-31 | Stop reason: ALTCHOICE

## 2022-02-14 RX ORDER — CILOSTAZOL 100 MG/1
100 TABLET ORAL 2 TIMES DAILY
Qty: 90 TABLET | Refills: 1 | Status: SHIPPED | OUTPATIENT
Start: 2022-02-14 | End: 2022-06-16 | Stop reason: SDUPTHER

## 2022-02-14 RX ORDER — LORATADINE 10 MG/1
10 TABLET ORAL DAILY
Qty: 30 TABLET | Refills: 0 | Status: SHIPPED | OUTPATIENT
Start: 2022-02-14 | End: 2022-06-06 | Stop reason: SDUPTHER

## 2022-02-14 RX ORDER — GUAIFENESIN 1200 MG/1
1200 TABLET, EXTENDED RELEASE ORAL 2 TIMES DAILY
Qty: 30 TABLET | Refills: 0 | Status: SHIPPED | OUTPATIENT
Start: 2022-02-14 | End: 2022-02-24

## 2022-02-14 NOTE — PATIENT INSTRUCTIONS
Holly was seen today for follow-up, shortness of breath, cough and medication refill.    Diagnoses and all orders for this visit:    Primary hypertension  Comments:  Stable    History of DVT (deep vein thrombosis)    Type 2 diabetes mellitus without complication, with long-term current use of insulin    Cough  Comments:  Referrral and xray ordered    Nasal drainage  Comments:  Meds ordered    Chronic obstructive pulmonary disease, unspecified COPD type  Comments:  Test ordered  Orders:  -     Ambulatory referral/consult to Pulmonology; Future    Pulmonary embolism, unspecified chronicity, unspecified pulmonary embolism type, unspecified whether acute cor pulmonale present  -     X-Ray Chest PA And Lateral; Future  -     Pulmonary function test; Future    Bronchitis  Comments:  Meds ordered    Allergic rhinitis, unspecified seasonality, unspecified trigger  Comments:  Meds ordered    Other orders  -     cilostazoL (PLETAL) 100 MG Tab; Take 1 tablet (100 mg total) by mouth 2 (two) times daily.  -     azithromycin (Z-SANJU) 250 MG tablet; Take 2 tablets by mouth on day 1; Take 1 tablet by mouth on days 2-5  -     loratadine (CLARITIN) 10 mg tablet; Take 1 tablet (10 mg total) by mouth once daily.  -     apixaban (ELIQUIS) 5 mg Tab; Take 1 tablet (5 mg total) by mouth 2 (two) times daily.  -     guaiFENesin 1,200 mg Ta12; Take 1,200 mg by mouth 2 (two) times a day. for 10 days

## 2022-02-14 NOTE — PROGRESS NOTES
Subjective:       Patient ID: Holly Porter is a 61 y.o. female.    Chief Complaint: Follow-up (PE), Shortness of Breath, Cough, and Medication Refill    Patient is here for hospital follow up evaluation. Blood pressure is 132/69 today. Patient states she was in the hospital overnight last month for blood clots in her lungs. Patient states they only put her on Eliquis for a week. States she continues to have a cough and drainage. Also requests a test for COPD. Will refer to pulmonology. Will order PFT and chest xray. Will start Zpack, Guaifenesin 1200mg one PO BID, Claritin 10mg one PO QD, and Eliquis 5mg one PO BID. Follow up in 6 weeks.       Current Medications:    Current Outpatient Medications:     allopurinoL (ZYLOPRIM) 300 MG tablet, Take 1 tablet (300 mg total) by mouth once daily., Disp: 90 tablet, Rfl: 3    amitriptyline (ELAVIL) 10 MG tablet, Take 1 tablet (10 mg total) by mouth every evening., Disp: 30 tablet, Rfl: 1    apixaban (ELIQUIS) 5 mg Tab, Take 1 tablet (5 mg total) by mouth 2 (two) times daily., Disp: 60 tablet, Rfl: 3    aspirin (ECOTRIN) 81 MG EC tablet, Take 1 tablet (81 mg total) by mouth once daily., Disp: 90 tablet, Rfl: 1    azithromycin (Z-SANJU) 250 MG tablet, Take 2 tablets by mouth on day 1; Take 1 tablet by mouth on days 2-5, Disp: 6 tablet, Rfl: 0    budesonide-formoterol 160-4.5 mcg (SYMBICORT) 160-4.5 mcg/actuation HFAA, Inhale 1-2 puffs into the lungs every 12 (twelve) hours., Disp: 10.2 g, Rfl: 5    cilostazoL (PLETAL) 100 MG Tab, Take 1 tablet (100 mg total) by mouth 2 (two) times daily., Disp: 90 tablet, Rfl: 1    colchicine (COLCRYS) 0.6 mg tablet, One pill three times a day for two days,then one pill daily till out, Disp: 30 tablet, Rfl: 2    guaiFENesin 1,200 mg Ta12, Take 1,200 mg by mouth 2 (two) times a day. for 10 days, Disp: 30 tablet, Rfl: 0    HYDROcodone-acetaminophen (NORCO)  mg per tablet, Take 1 tablet by mouth every 6 (six) hours., Disp: 120  "tablet, Rfl: 0    hydrOXYzine HCL (ATARAX) 25 MG tablet, Take 1 tablet (25 mg total) by mouth every 6 (six) hours as needed for Itching. (Patient not taking: Reported on 2/10/2022), Disp: 60 tablet, Rfl: 0    ibuprofen (ADVIL,MOTRIN) 600 MG tablet, Take 1 tablet (600 mg total) by mouth 2 (two) times daily as needed for Pain., Disp: 20 tablet, Rfl: 1    insulin (LANTUS SOLOSTAR U-100 INSULIN) glargine 100 units/mL (3mL) SubQ pen, Inject 10 units sq daily, Disp: 15 mL, Rfl: 1    insulin detemir U-100 (LEVEMIR FLEXTOUCH U-100 INSULN) 100 unit/mL (3 mL) InPn pen, Inject 10 Units into the skin every evening. 15 ml with 1 refill, Disp: , Rfl:     levothyroxine (SYNTHROID, LEVOTHROID) 175 MCG tablet, Take 1 tablet (175 mcg total) by mouth once daily., Disp: 90 tablet, Rfl: 1    loratadine (CLARITIN) 10 mg tablet, Take 1 tablet (10 mg total) by mouth once daily., Disp: 30 tablet, Rfl: 0    losartan-hydrochlorothiazide 50-12.5 mg (HYZAAR) 50-12.5 mg per tablet, Take 1 tablet by mouth once daily., Disp: 90 tablet, Rfl: 1    metOLazone (ZAROXOLYN) 2.5 MG tablet, Take 1 tablet (2.5 mg total) by mouth once daily., Disp: 30 tablet, Rfl: 2    mupirocin (BACTROBAN) 2 % ointment, Apply topically 2 (two) times daily., Disp: 80 g, Rfl: 2    NOVOFINE 32 32 gauge x 1/4" Ndle, Use as directed once daily., Disp: 90 each, Rfl: 3    pantoprazole (PROTONIX) 40 MG tablet, Take 1 tablet (40 mg total) by mouth once daily., Disp: 90 tablet, Rfl: 1    potassium chloride (MICRO-K) 10 MEQ CpSR, , Disp: , Rfl:     pregabalin (LYRICA) 100 MG capsule, Take 1 capsule (100 mg total) by mouth every 12 (twelve) hours., Disp: 60 capsule, Rfl: 1    topiramate (TOPAMAX) 100 MG tablet, Take 1.5 tablets (150 mg total) by mouth 2 (two) times daily., Disp: 90 tablet, Rfl: 11    triamcinolone acetonide 0.1% (KENALOG) 0.1 % cream, Apply topically 3 (three) times daily., Disp: 400 g, Rfl: 2    Last Labs:     Office Visit on 02/09/2022   Component " Date Value    Hemoglobin A1C 02/09/2022 5.7     POC Glucose 02/09/2022 112*   Admission on 01/28/2022, Discharged on 01/29/2022   Component Date Value    Sodium 01/28/2022 142     Potassium 01/28/2022 3.5     Chloride 01/28/2022 105     CO2 01/28/2022 26     Anion Gap 01/28/2022 15     Glucose 01/28/2022 108*    BUN 01/28/2022 13     Creatinine 01/28/2022 1.01     BUN/Creatinine Ratio 01/28/2022 13     Calcium 01/28/2022 9.5     Total Protein 01/28/2022 7.6     Albumin 01/28/2022 3.5     Globulin 01/28/2022 4.1*    A/G Ratio 01/28/2022 0.9     Bilirubin, Total 01/28/2022 0.4     Alk Phos 01/28/2022 80     ALT 01/28/2022 11*    AST 01/28/2022 23     eGFR 01/28/2022 59*    PTT 01/28/2022 26.9     PT 01/28/2022 13.3     INR 01/28/2022 1.01     Magnesium 01/28/2022 2.0     Troponin I High Sensitiv* 01/28/2022 207.7*    WBC 01/28/2022 10.12     RBC 01/28/2022 5.53*    Hemoglobin 01/28/2022 14.8     Hematocrit 01/28/2022 46.9     MCV 01/28/2022 84.8     MCH 01/28/2022 26.8*    MCHC 01/28/2022 31.6*    RDW 01/28/2022 19.6*    Platelet Count 01/28/2022 224     MPV 01/28/2022 11.4     Neutrophils % 01/28/2022 62.2     Lymphocytes % 01/28/2022 27.8     Neutrophils, Abs 01/28/2022 6.30     Lymphocytes, Absolute 01/28/2022 2.81     Diff Type 01/28/2022 Auto     Monocytes % 01/28/2022 6.1*    Eosinophils % 01/28/2022 3.3     Basophils % 01/28/2022 0.3     Immature Granulocytes % 01/28/2022 0.3     Monocytes, Absolute 01/28/2022 0.62     Eosinophils, Absolute 01/28/2022 0.33     Immature Granulocytes, A* 01/28/2022 0.03     Basophils, Absolute 01/28/2022 0.03     Troponin I High Sensitiv* 01/28/2022 193.4*    PTT 01/29/2022 >200.0*    Troponin I High Sensitiv* 01/29/2022 166.1*    WBC 01/29/2022 11.48*    RBC 01/29/2022 5.33     Hemoglobin 01/29/2022 14.1     Hematocrit 01/29/2022 45.1     MCV 01/29/2022 84.6     MCH 01/29/2022 26.5*    MCHC 01/29/2022 31.3*    RDW  01/29/2022 19.3*    Platelet Count 01/29/2022 201     MPV 01/29/2022 11.1     Neutrophils % 01/29/2022 58.2     Lymphocytes % 01/29/2022 30.7     Neutrophils, Abs 01/29/2022 6.67     Lymphocytes, Absolute 01/29/2022 3.53     Diff Type 01/29/2022 Auto     Monocytes % 01/29/2022 5.9     Eosinophils % 01/29/2022 4.4*    Basophils % 01/29/2022 0.3     Immature Granulocytes % 01/29/2022 0.5*    Monocytes, Absolute 01/29/2022 0.68     Eosinophils, Absolute 01/29/2022 0.50     Immature Granulocytes, A* 01/29/2022 0.06*    Basophils, Absolute 01/29/2022 0.04     Sodium 01/29/2022 142     Potassium 01/29/2022 3.4*    Chloride 01/29/2022 105     CO2 01/29/2022 27     Anion Gap 01/29/2022 13     Glucose 01/29/2022 102     BUN 01/29/2022 13     Creatinine 01/29/2022 0.88     BUN/Creatinine Ratio 01/29/2022 15     Calcium 01/29/2022 9.1     Total Protein 01/29/2022 6.8     Albumin 01/29/2022 3.2*    Globulin 01/29/2022 3.6     A/G Ratio 01/29/2022 0.9     Bilirubin, Total 01/29/2022 0.3     Alk Phos 01/29/2022 74     ALT 01/29/2022 12*    AST 01/29/2022 16     eGFR 01/29/2022 69     PTT 01/29/2022 >200.0*    PT 01/29/2022 14.8*    INR 01/29/2022 1.16*    PT 01/29/2022 13.6     INR 01/29/2022 1.04     PTT 01/29/2022 >200.0*    PT 01/29/2022 13.4     INR 01/29/2022 1.02     PTT 01/29/2022 70.7*    POC Glucose 01/29/2022 71        Last Imaging:  US Lower Extremity Veins Bilateral  Narrative: EXAMINATION:  US LOWER EXTREMITY VEINS BILATERAL    CLINICAL HISTORY:  Shortness of breath    TECHNIQUE:  Duplex and color flow Doppler and dynamic compression was performed of the bilateral lower extremity veins.  Ultrasound images captured and stored.    COMPARISON:  None    FINDINGS:  Right thigh veins: The common femoral, femoral, popliteal, and deep femoral veins are patent and free of thrombus. The veins are normally compressible and have normal phasic flow and augmentation response.    Right  calf veins: The visualized calf veins are patent.    Left thigh veins: The common femoral, femoral, popliteal, and deep femoral veins are patent and free of thrombus. The veins are normally compressible and have normal phasic flow and augmentation response.    Left calf veins: The visualized calf veins are patent.    Miscellaneous: None  Impression: No evidence of deep venous thrombosis in either lower extremity.    Electronically signed by: Edson De  Date:    01/28/2022  Time:    16:25  CTA Runoff ABD PEL Bilat Lower Ext  Narrative: EXAMINATION:  CTA RUNOFF ABD PEL BILAT LOWER EXT    CLINICAL HISTORY:  Claudication or leg ischemia;  Peripheral vascular disease, unspecified    TECHNIQUE:  CT angiogram of the abdomen and pelvis with runoff through the bilateral lower extremities.  3D MIP reconstructions were created and reviewed.  100 mL Isovue 370 utilized.    COMPARISON:  Ultrasound 01/24/2022    FINDINGS:  There is a pulmonary embolus seen in both the right upper lobe, right lower and middle lobe pulmonary arteries.  No pulmonary emboli seen on the left lung base.  Heart appears normal size.    The clinical service was alerted of this incidental finding at time of dictation (critical test result).    Fatty infiltration of the liver.  Gallbladder is unremarkable.  Spleen, kidneys, and pancreas appear normal.  Cyst in the right kidney.  There is a low attenuating left adrenal nodule probably add renal adenoma.    No pneumoperitoneum.  No ascites.    No bowel obstruction or acute bowel abnormality.  A few colonic diverticula.  Urinary bladder is unremarkable.    There is no fracture detected.    The abdominal aorta is normal caliber with scattered calcifications.  Normal origins of the of celiac and superior mesenteric artery.  Two renal arteries on the right and 1 on the left both of which are patent.  There are bilateral common iliac stents that extend into the distal/lower abdominal aorta that appear patent  internal iliac arteries have multifocal calcification.  External iliac arteries appear patent.    The right common femoral artery is patent.  Mild plaque at the proximal right superficial femoral artery which is patent.  The profundus femoris is patent.  Popliteal artery patent.  There appears to be contrast opacification at the trifurcation.  At this point on both the right and left side there is poor contrast opacification bilaterally.  This appears to be a combination of probably the patient's underlying vascular disease as well as the CT scan being time somewhat early for the contrast bolus.    In the left common femoral artery is patent with minimal plaque.  Profundus femoris and superficial femoral artery appear patent as does the popliteal artery.    Numerous venous collateral seen over the right and left lower extremities in the superficial tissues.  Impression: Incidental note made of a prominent pulmonary embolus burden in the right lung.  No pulmonary emboli seen on the left.    Bilateral common iliac stents that appear patent.  Scattered atherosclerotic disease as detailed.  The contrast tapers in the lower extremities as detailed that appears to be a sequela of early scanning rather than stenoses given the symmetry.    Numerous venous collaterals in the lower extremities.    Electronically signed by: Edson De  Date:    01/28/2022  Time:    12:45         Review of Systems   HENT: Positive for nasal congestion.    Respiratory: Positive for cough.    All other systems reviewed and are negative.        Objective:      Physical Exam  Vitals reviewed.   Constitutional:       Appearance: Normal appearance. She is normal weight.   HENT:      Right Ear: Tympanic membrane normal.      Left Ear: Tympanic membrane normal.      Nose: Nose normal.      Mouth/Throat:      Mouth: Mucous membranes are moist.      Pharynx: Oropharynx is clear.   Cardiovascular:      Rate and Rhythm: Normal rate and regular rhythm.       Pulses: Normal pulses.      Heart sounds: Normal heart sounds.   Pulmonary:      Effort: Pulmonary effort is normal.   Abdominal:      General: Abdomen is flat. Bowel sounds are normal.      Palpations: Abdomen is soft.   Musculoskeletal:         General: Normal range of motion.      Cervical back: Normal range of motion and neck supple.   Skin:     General: Skin is warm and dry.   Neurological:      General: No focal deficit present.      Mental Status: She is alert and oriented to person, place, and time. Mental status is at baseline.         Assessment:       1. Primary hypertension      Stable   2. History of DVT (deep vein thrombosis)     3. Type 2 diabetes mellitus without complication, with long-term current use of insulin     4. Cough      Referrral and xray ordered   5. Nasal drainage      Meds ordered   6. Chronic obstructive pulmonary disease, unspecified COPD type  Ambulatory referral/consult to Pulmonology    Test ordered   7. Pulmonary embolism, unspecified chronicity, unspecified pulmonary embolism type, unspecified whether acute cor pulmonale present  X-Ray Chest PA And Lateral    Pulmonary function test   8. Bronchitis      Meds ordered   9. Allergic rhinitis, unspecified seasonality, unspecified trigger      Meds ordered        Plan:         Holly was seen today for follow-up, shortness of breath, cough and medication refill.    Diagnoses and all orders for this visit:    Primary hypertension  Comments:  Stable    History of DVT (deep vein thrombosis)    Type 2 diabetes mellitus without complication, with long-term current use of insulin    Cough  Comments:  Referrral and xray ordered    Nasal drainage  Comments:  Meds ordered    Chronic obstructive pulmonary disease, unspecified COPD type  Comments:  Test ordered  Orders:  -     Ambulatory referral/consult to Pulmonology; Future    Pulmonary embolism, unspecified chronicity, unspecified pulmonary embolism type, unspecified whether acute cor  pulmonale present  -     X-Ray Chest PA And Lateral; Future  -     Pulmonary function test; Future    Bronchitis  Comments:  Meds ordered    Allergic rhinitis, unspecified seasonality, unspecified trigger  Comments:  Meds ordered    Other orders  -     cilostazoL (PLETAL) 100 MG Tab; Take 1 tablet (100 mg total) by mouth 2 (two) times daily.  -     azithromycin (Z-SANJU) 250 MG tablet; Take 2 tablets by mouth on day 1; Take 1 tablet by mouth on days 2-5  -     loratadine (CLARITIN) 10 mg tablet; Take 1 tablet (10 mg total) by mouth once daily.  -     apixaban (ELIQUIS) 5 mg Tab; Take 1 tablet (5 mg total) by mouth 2 (two) times daily.  -     guaiFENesin 1,200 mg Ta12; Take 1,200 mg by mouth 2 (two) times a day. for 10 days       Follow up in 6 weeks.

## 2022-02-17 ENCOUNTER — CLINICAL SUPPORT (OUTPATIENT)
Dept: PULMONOLOGY | Facility: HOSPITAL | Age: 62
End: 2022-02-17
Attending: INTERNAL MEDICINE
Payer: COMMERCIAL

## 2022-02-17 ENCOUNTER — TELEPHONE (OUTPATIENT)
Dept: NEUROLOGY | Facility: CLINIC | Age: 62
End: 2022-02-17
Payer: MEDICAID

## 2022-02-17 ENCOUNTER — HOSPITAL ENCOUNTER (OUTPATIENT)
Dept: RADIOLOGY | Facility: HOSPITAL | Age: 62
Discharge: HOME | End: 2022-02-17
Attending: NURSE PRACTITIONER
Payer: COMMERCIAL

## 2022-02-17 DIAGNOSIS — I26.99 PULMONARY EMBOLISM, UNSPECIFIED CHRONICITY, UNSPECIFIED PULMONARY EMBOLISM TYPE, UNSPECIFIED WHETHER ACUTE COR PULMONALE PRESENT: ICD-10-CM

## 2022-02-17 DIAGNOSIS — G44.85 PRIMARY STABBING HEADACHE: ICD-10-CM

## 2022-02-17 PROCEDURE — 94727 GAS DIL/WSHOT DETER LNG VOL: CPT

## 2022-02-17 PROCEDURE — 70450 CT HEAD/BRAIN W/O DYE: CPT | Mod: TC

## 2022-02-17 PROCEDURE — 94727 GAS DIL/WSHOT DETER LNG VOL: CPT | Mod: 26,,, | Performed by: INTERNAL MEDICINE

## 2022-02-17 PROCEDURE — 94727 PR PULM FUNCTION TEST BY GAS: ICD-10-PCS | Mod: 26,,, | Performed by: INTERNAL MEDICINE

## 2022-02-17 PROCEDURE — 94060 PR EVAL OF BRONCHOSPASM: ICD-10-PCS | Mod: 26,,, | Performed by: INTERNAL MEDICINE

## 2022-02-17 PROCEDURE — 70450 CT HEAD WITHOUT CONTRAST: ICD-10-PCS | Mod: 26,,, | Performed by: RADIOLOGY

## 2022-02-17 PROCEDURE — 94729 DIFFUSING CAPACITY: CPT | Mod: 26,,, | Performed by: INTERNAL MEDICINE

## 2022-02-17 PROCEDURE — 94060 EVALUATION OF WHEEZING: CPT

## 2022-02-17 PROCEDURE — 94060 EVALUATION OF WHEEZING: CPT | Mod: 26,,, | Performed by: INTERNAL MEDICINE

## 2022-02-17 PROCEDURE — 94729 DIFFUSING CAPACITY: CPT

## 2022-02-17 PROCEDURE — 70450 CT HEAD/BRAIN W/O DYE: CPT | Mod: 26,,, | Performed by: RADIOLOGY

## 2022-02-17 PROCEDURE — 94729 PR C02/MEMBANE DIFFUSE CAPACITY: ICD-10-PCS | Mod: 26,,, | Performed by: INTERNAL MEDICINE

## 2022-02-17 NOTE — TELEPHONE ENCOUNTER
----- Message from WARREN Mendez sent at 2/17/2022 12:55 PM CST -----  Ct head did not show any acute abnormalities

## 2022-02-17 NOTE — PROCEDURES
Pulmonary function test  Forced vital capacity 2.42 L 68% of predicted  FEV1 1.95 L 70% of predicted  FEV1 ratio 81%  Small airways normal  Mild hyperinflation  Normal DLCO  Very mild restrictive ventilatory impairment secondary to extrathoracic causes

## 2022-03-30 DIAGNOSIS — J44.9 CHRONIC OBSTRUCTIVE PULMONARY DISEASE, UNSPECIFIED COPD TYPE: ICD-10-CM

## 2022-03-30 DIAGNOSIS — I26.99 PULMONARY EMBOLISM, UNSPECIFIED CHRONICITY, UNSPECIFIED PULMONARY EMBOLISM TYPE, UNSPECIFIED WHETHER ACUTE COR PULMONALE PRESENT: Primary | ICD-10-CM

## 2022-03-31 ENCOUNTER — OFFICE VISIT (OUTPATIENT)
Dept: VASCULAR SURGERY | Facility: CLINIC | Age: 62
End: 2022-03-31
Payer: COMMERCIAL

## 2022-03-31 ENCOUNTER — OFFICE VISIT (OUTPATIENT)
Dept: PULMONOLOGY | Facility: CLINIC | Age: 62
End: 2022-03-31
Payer: COMMERCIAL

## 2022-03-31 ENCOUNTER — HOSPITAL ENCOUNTER (OUTPATIENT)
Dept: RADIOLOGY | Facility: HOSPITAL | Age: 62
Discharge: HOME | End: 2022-03-31
Attending: INTERNAL MEDICINE
Payer: COMMERCIAL

## 2022-03-31 VITALS
HEART RATE: 68 BPM | RESPIRATION RATE: 18 BRPM | DIASTOLIC BLOOD PRESSURE: 90 MMHG | WEIGHT: 293 LBS | SYSTOLIC BLOOD PRESSURE: 138 MMHG | OXYGEN SATURATION: 94 % | HEIGHT: 68 IN | BODY MASS INDEX: 44.41 KG/M2

## 2022-03-31 VITALS
DIASTOLIC BLOOD PRESSURE: 80 MMHG | WEIGHT: 293 LBS | HEIGHT: 68 IN | HEART RATE: 86 BPM | SYSTOLIC BLOOD PRESSURE: 116 MMHG | RESPIRATION RATE: 16 BRPM | BODY MASS INDEX: 44.41 KG/M2

## 2022-03-31 DIAGNOSIS — I87.2 VENOUS INSUFFICIENCY: ICD-10-CM

## 2022-03-31 DIAGNOSIS — Z01.818 PRE-OPERATIVE CLEARANCE: ICD-10-CM

## 2022-03-31 DIAGNOSIS — L97.521 ULCER OF LEFT FOOT, LIMITED TO BREAKDOWN OF SKIN: ICD-10-CM

## 2022-03-31 DIAGNOSIS — L81.9 HYPERPIGMENTATION OF SKIN: Primary | ICD-10-CM

## 2022-03-31 DIAGNOSIS — M79.604 LEG PAIN, BILATERAL: ICD-10-CM

## 2022-03-31 DIAGNOSIS — I26.99 PULMONARY EMBOLISM, UNSPECIFIED CHRONICITY, UNSPECIFIED PULMONARY EMBOLISM TYPE, UNSPECIFIED WHETHER ACUTE COR PULMONALE PRESENT: ICD-10-CM

## 2022-03-31 DIAGNOSIS — I87.2 VENOUS INSUFFICIENCY (CHRONIC) (PERIPHERAL): Primary | ICD-10-CM

## 2022-03-31 DIAGNOSIS — I83.813 VARICOSE VEINS OF BILATERAL LOWER EXTREMITIES WITH PAIN: ICD-10-CM

## 2022-03-31 DIAGNOSIS — J44.9 CHRONIC OBSTRUCTIVE PULMONARY DISEASE, UNSPECIFIED COPD TYPE: ICD-10-CM

## 2022-03-31 DIAGNOSIS — I83.899 RUPTURED VARICOSE VEIN: ICD-10-CM

## 2022-03-31 DIAGNOSIS — M79.605 LEG PAIN, BILATERAL: ICD-10-CM

## 2022-03-31 DIAGNOSIS — R60.0 EDEMA, LOWER EXTREMITY: ICD-10-CM

## 2022-03-31 PROCEDURE — 71046 XR CHEST PA AND LATERAL: ICD-10-PCS | Mod: 26,,, | Performed by: RADIOLOGY

## 2022-03-31 PROCEDURE — 71046 X-RAY EXAM CHEST 2 VIEWS: CPT | Mod: 26,,, | Performed by: RADIOLOGY

## 2022-03-31 PROCEDURE — 3008F PR BODY MASS INDEX (BMI) DOCUMENTED: ICD-10-PCS | Mod: CPTII,,, | Performed by: INTERNAL MEDICINE

## 2022-03-31 PROCEDURE — 3079F PR MOST RECENT DIASTOLIC BLOOD PRESSURE 80-89 MM HG: ICD-10-PCS | Mod: CPTII,,, | Performed by: FAMILY MEDICINE

## 2022-03-31 PROCEDURE — 3080F DIAST BP >= 90 MM HG: CPT | Mod: CPTII,,, | Performed by: INTERNAL MEDICINE

## 2022-03-31 PROCEDURE — 99215 OFFICE O/P EST HI 40 MIN: CPT | Mod: PBBFAC,25 | Performed by: INTERNAL MEDICINE

## 2022-03-31 PROCEDURE — 99213 PR OFFICE/OUTPT VISIT, EST, LEVL III, 20-29 MIN: ICD-10-PCS | Mod: S$PBB,,, | Performed by: INTERNAL MEDICINE

## 2022-03-31 PROCEDURE — 99214 PR OFFICE/OUTPT VISIT, EST, LEVL IV, 30-39 MIN: ICD-10-PCS | Mod: S$PBB,,, | Performed by: FAMILY MEDICINE

## 2022-03-31 PROCEDURE — 3074F PR MOST RECENT SYSTOLIC BLOOD PRESSURE < 130 MM HG: ICD-10-PCS | Mod: CPTII,,, | Performed by: FAMILY MEDICINE

## 2022-03-31 PROCEDURE — 3079F DIAST BP 80-89 MM HG: CPT | Mod: CPTII,,, | Performed by: FAMILY MEDICINE

## 2022-03-31 PROCEDURE — 3008F PR BODY MASS INDEX (BMI) DOCUMENTED: ICD-10-PCS | Mod: CPTII,,, | Performed by: FAMILY MEDICINE

## 2022-03-31 PROCEDURE — 99215 OFFICE O/P EST HI 40 MIN: CPT | Mod: PBBFAC,25 | Performed by: FAMILY MEDICINE

## 2022-03-31 PROCEDURE — 99213 OFFICE O/P EST LOW 20 MIN: CPT | Mod: S$PBB,,, | Performed by: INTERNAL MEDICINE

## 2022-03-31 PROCEDURE — 71046 X-RAY EXAM CHEST 2 VIEWS: CPT | Mod: TC

## 2022-03-31 PROCEDURE — 3074F SYST BP LT 130 MM HG: CPT | Mod: CPTII,,, | Performed by: FAMILY MEDICINE

## 2022-03-31 PROCEDURE — 3008F BODY MASS INDEX DOCD: CPT | Mod: CPTII,,, | Performed by: INTERNAL MEDICINE

## 2022-03-31 PROCEDURE — 3080F PR MOST RECENT DIASTOLIC BLOOD PRESSURE >= 90 MM HG: ICD-10-PCS | Mod: CPTII,,, | Performed by: INTERNAL MEDICINE

## 2022-03-31 PROCEDURE — 1159F PR MEDICATION LIST DOCUMENTED IN MEDICAL RECORD: ICD-10-PCS | Mod: CPTII,,, | Performed by: INTERNAL MEDICINE

## 2022-03-31 PROCEDURE — 3075F PR MOST RECENT SYSTOLIC BLOOD PRESS GE 130-139MM HG: ICD-10-PCS | Mod: CPTII,,, | Performed by: INTERNAL MEDICINE

## 2022-03-31 PROCEDURE — 99214 OFFICE O/P EST MOD 30 MIN: CPT | Mod: S$PBB,,, | Performed by: FAMILY MEDICINE

## 2022-03-31 PROCEDURE — 3075F SYST BP GE 130 - 139MM HG: CPT | Mod: CPTII,,, | Performed by: INTERNAL MEDICINE

## 2022-03-31 PROCEDURE — 3008F BODY MASS INDEX DOCD: CPT | Mod: CPTII,,, | Performed by: FAMILY MEDICINE

## 2022-03-31 PROCEDURE — 1159F MED LIST DOCD IN RCRD: CPT | Mod: CPTII,,, | Performed by: INTERNAL MEDICINE

## 2022-03-31 RX ORDER — HYDROCODONE BITARTRATE AND ACETAMINOPHEN 10; 325 MG/1; MG/1
TABLET ORAL
Status: ON HOLD | COMMUNITY
Start: 2022-03-08 | End: 2022-04-15 | Stop reason: SDUPTHER

## 2022-03-31 RX ORDER — FLUTICASONE PROPIONATE AND SALMETEROL 250; 50 UG/1; UG/1
1 POWDER RESPIRATORY (INHALATION) 2 TIMES DAILY
Qty: 60 EACH | Refills: 5 | Status: SHIPPED | OUTPATIENT
Start: 2022-03-31 | End: 2022-05-10

## 2022-03-31 RX ORDER — ALBUTEROL SULFATE 90 UG/1
2 AEROSOL, METERED RESPIRATORY (INHALATION) EVERY 6 HOURS PRN
Qty: 18 G | Refills: 0 | Status: SHIPPED | OUTPATIENT
Start: 2022-03-31 | End: 2022-04-25 | Stop reason: SDUPTHER

## 2022-03-31 NOTE — ASSESSMENT & PLAN NOTE
Patient presents today for evaluation of shortness of breath seems to be doing reasonably well.  No change in patient's medical regimen.

## 2022-03-31 NOTE — PROGRESS NOTES
[facility] CLINIC NOTE    Patient ID: Holly Porter is a 61 y.o. female.    I. HISTORY     Chief Complaint:   Chief Complaint   Patient presents with    Follow-up     Procedure 2, venous follow up        HPI: Holly Porter is a 61 y.o. female who returns to clinic today with complaints of continued lower extremity swelling, pain and or wounds of the left foot.  We last saw the patient proximally 2 and half months ago and referred her to vascular surgery due to her poor peripheral vascular status.  No interventions performed.  The patient was noted to have a PE and started on anticoagulation.  She is currently on Eliquis 5 mg b.i.d..  Since that time the patient has been sent to Wound Care to heal the ulcerations of her left foot.  She now only has a small opening with the left 5th toe would be.  She complains of continued daily edema, pain, hyperpigmentation and nonhealing status of her wounds.  She continues to have symptomatic varicose veins despite previous conservative measures and interventions.  She would like to have underlying condition corrected at this time if possible.    Bilateral complete venous reflux study performed 11/22/2021 showed no evidence of DVT bilaterally.  The study also showed well ablated bilateral great saphenous veins in their entirety.  The bilateral anterior thigh accessory veins are short in course and partially close proximally with their to distal sections open, varicose and refluxing.  Refluxing pathological  also noted of the left Gator region.  Right small saphenous vein dilated refluxing.  Left small saphenous vein ablated in its entirety.    Proposed procedures from last visit were a laser ablation of left calf  with non thermal ablation of left distal anterior thigh accessory vein.  Non thermal ablation of the right distal anterior thigh accessory vein and heat ablation of the right small saphenous vein.    The patient has had numerous venous  interventions previously, see her surgical history for details.    Past Medical History:   Diagnosis Date    Anxiety state     Atherosclerosis of native artery of extremity with intermittent claudication 01/30/2019    bilateral legs    Bilateral leg pain     BMI 50.0-59.9, adult 02/22/2021    Cellulitis 06/11/2020    Chronic pain syndrome     Chronic peripheral venous hypertension 01/08/2019    COPD (chronic obstructive pulmonary disease)     Diabetes     Diabetes mellitus, type 2     DVT (deep venous thrombosis) 02/12/2020    Embolism and thrombosis of superficial veins of unspecified lower extremity 07/01/2019    bilateral    Frequent headaches 09/20/2018    GERD (gastroesophageal reflux disease)     Hereditary lymphedema     Hx of thyroid cancer     Hyperlipidemia     Hypertension     Hypothyroidism     Migraines     Migraines     Morbid obesity     Nicotine dependence     Non-pressure chronic ulcer of left lower leg 03/09/2021    Osteoarthritis     Other skin changes 03/09/2021    bilateral gaiter regions    Pain in left leg     Pain in right leg     PVD (peripheral vascular disease) 01/08/2019    Seizure disorder     Sleep apnea     Venous insufficiency (chronic) (peripheral)     Venous stasis     Vitamin D deficiency         Past Surgical History:   Procedure Laterality Date    HYSTERECTOMY      ILIAC ARTERY STENT Bilateral 01/28/2020    Bilateral distal aorta and common iliac 8 X 59 vbx covered stents performed by Dr. Antonio Jacinto.    STAB PHLEBECTOMY OF VARICOSE VEINS Left 01/25/2013    Left leg microphlebectomies x 23 stab avulsions and ligation of multiple varicose veins perrformed by Dr. Cirilo Aguirre.    THYROIDECTOMY      hx: thyroid cancer    TOE AMPUTATION Left 01/28/2020    2nd, 4th and 5th performed by Dr. Anam Saeed.    TOE AMPUTATION Right 01/28/2020    4th toe performed by Dr. Anam Saeed.    TOTAL ABDOMINAL HYSTERECTOMY W/ BILATERAL SALPINGOOPHORECTOMY       TUBAL LIGATION      VAGINAL DELIVERY      x 4    VENOUS ABLATION Right 08/09/2019    GSV Varithena Ablation performed by Dr. Estuardo Falcon    VENOUS ABLATION Left 08/02/2019    ATAV Varithena Ablation performed by Dr. Estuardo Falcon.    VENOUS ABLATION Right 07/13/2015    ATAV Laser Ablatio performed by Dr. Cirilo Aguirre.    VENOUS ABLATION Right 07/06/2015    Distal  GSV Laser Ablation performed by dr. Cirilo Aguirre.    VENOUS ABLATION Left 04/20/2015    Left Distal GSV Laser Ablation performed by dr. Cirilo Aguirre.    VENOUS ABLATION Right 10/28/2013    Right Distal GSV RF Ablation performed by Dr. Cirilo Aguirre.    VENOUS ABLATION Left 10/25/2013    SSV RF Ablation performed by dr. Cirilo Aguirre.    VENOUS ABLATION Left 10/07/2013    Left GSV and Left ATAV RF Ablation performed by Dr. Cirilo Aguirre.    VENOUS ABLATION Right 01/21/2013    GSV RF Ablation w/micros x 22 performed by Dr. Cirilo Aguirre.    VENOUS ABLATION Left 06/25/2021    Left distal GSV Varithena Ablation       Social History     Tobacco Use   Smoking Status Current Every Day Smoker    Packs/day: 1.00    Years: 33.00    Pack years: 33.00    Types: Cigarettes   Smokeless Tobacco Never Used         Current Outpatient Medications:     allopurinoL (ZYLOPRIM) 300 MG tablet, Take 1 tablet (300 mg total) by mouth once daily., Disp: 90 tablet, Rfl: 3    amitriptyline (ELAVIL) 10 MG tablet, Take 1 tablet (10 mg total) by mouth every evening., Disp: 30 tablet, Rfl: 1    apixaban (ELIQUIS) 5 mg Tab, Take 1 tablet (5 mg total) by mouth 2 (two) times daily., Disp: 60 tablet, Rfl: 3    aspirin (ECOTRIN) 81 MG EC tablet, Take 1 tablet (81 mg total) by mouth once daily., Disp: 90 tablet, Rfl: 1    budesonide-formoterol 160-4.5 mcg (SYMBICORT) 160-4.5 mcg/actuation HFAA, Inhale 1-2 puffs into the lungs every 12 (twelve) hours., Disp: 10.2 g, Rfl: 5    cilostazoL (PLETAL) 100 MG Tab, Take 1 tablet (100 mg total) by mouth 2 (two) times daily., Disp: 90 tablet, Rfl: 1    colchicine  "(COLCRYS) 0.6 mg tablet, One pill three times a day for two days,then one pill daily till out, Disp: 30 tablet, Rfl: 2    HYDROcodone-acetaminophen (NORCO)  mg per tablet, , Disp: , Rfl:     hydrOXYzine HCL (ATARAX) 25 MG tablet, Take 1 tablet (25 mg total) by mouth every 6 (six) hours as needed for Itching., Disp: 60 tablet, Rfl: 0    insulin (LANTUS SOLOSTAR U-100 INSULIN) glargine 100 units/mL (3mL) SubQ pen, Inject 10 units sq daily, Disp: 15 mL, Rfl: 1    insulin detemir U-100 (LEVEMIR FLEXTOUCH U-100 INSULN) 100 unit/mL (3 mL) InPn pen, Inject 10 Units into the skin every evening. 15 ml with 1 refill, Disp: , Rfl:     levothyroxine (SYNTHROID, LEVOTHROID) 175 MCG tablet, Take 1 tablet (175 mcg total) by mouth once daily., Disp: 90 tablet, Rfl: 1    loratadine (CLARITIN) 10 mg tablet, Take 1 tablet (10 mg total) by mouth once daily., Disp: 30 tablet, Rfl: 0    losartan-hydrochlorothiazide 50-12.5 mg (HYZAAR) 50-12.5 mg per tablet, Take 1 tablet by mouth once daily., Disp: 90 tablet, Rfl: 1    metOLazone (ZAROXOLYN) 2.5 MG tablet, Take 1 tablet (2.5 mg total) by mouth once daily., Disp: 30 tablet, Rfl: 2    mupirocin (BACTROBAN) 2 % ointment, Apply topically 2 (two) times daily., Disp: 80 g, Rfl: 2    NOVOFINE 32 32 gauge x 1/4" Ndle, Use as directed once daily., Disp: 90 each, Rfl: 3    pantoprazole (PROTONIX) 40 MG tablet, Take 1 tablet (40 mg total) by mouth once daily., Disp: 90 tablet, Rfl: 1    potassium chloride (MICRO-K) 10 MEQ CpSR, , Disp: , Rfl:     pregabalin (LYRICA) 100 MG capsule, Take 1 capsule (100 mg total) by mouth every 12 (twelve) hours., Disp: 60 capsule, Rfl: 1    topiramate (TOPAMAX) 100 MG tablet, Take 1.5 tablets (150 mg total) by mouth 2 (two) times daily., Disp: 90 tablet, Rfl: 11    triamcinolone acetonide 0.1% (KENALOG) 0.1 % cream, Apply topically 3 (three) times daily., Disp: 400 g, Rfl: 2    Review of Systems   Constitutional: Negative for activity change, " chills, diaphoresis, fatigue and fever.   Respiratory: Negative for cough and shortness of breath.    Cardiovascular: Positive for leg swelling. Negative for chest pain and claudication.        Hyperpigmentation LE   Gastrointestinal: Negative for nausea and vomiting.   Musculoskeletal: Positive for leg pain. Negative for joint swelling.   Integumentary:  Negative for rash and wound.   Neurological: Negative for weakness and numbness.        II. PHYSICAL EXAM     Physical Exam  Constitutional:       General: She is awake. She is not in acute distress.     Appearance: Normal appearance. She is obese. She is not ill-appearing or toxic-appearing.   HENT:      Head: Normocephalic and atraumatic.   Eyes:      Extraocular Movements: Extraocular movements intact.      Conjunctiva/sclera: Conjunctivae normal.      Pupils: Pupils are equal, round, and reactive to light.   Neck:      Vascular: No carotid bruit or JVD.   Cardiovascular:      Rate and Rhythm: Normal rate and regular rhythm.      Pulses:           Dorsalis pedis pulses are detected w/ Doppler on the left side.        Posterior tibial pulses are detected w/ Doppler on the left side.      Heart sounds: No murmur heard.  Pulmonary:      Effort: Pulmonary effort is normal. No respiratory distress.      Breath sounds: No stridor. No wheezing, rhonchi or rales.   Musculoskeletal:         General: No swelling, tenderness or deformity.      Right lower le+ Edema present.      Left lower le+ Edema present.        Legs:       Comments:  Patient noted to have great toe and middle toe only of the left foot.  Tender to palpation along the lateral aspect and plantar surface of the foot.  No foul odor noted.  No actual purulent material noted.  Small opening noted over the most distal aspect of the location with the 5th toe with the.    Scattered hyperpigmentation and varicose veins of the bilateral Gator regions.   Feet:      Comments: Monophasic pulses of the left  foot with hand-held Doppler of the DPs and PTs.  Skin:     General: Skin is warm.      Capillary Refill: Capillary refill takes less than 2 seconds.      Coloration: Skin is not ashen.      Findings: No bruising, erythema, lesion, rash or wound.   Neurological:      Mental Status: She is alert and oriented to person, place, and time.      Motor: No weakness.   Psychiatric:         Speech: Speech normal.         Behavior: Behavior normal. Behavior is cooperative.         Reticular/Spider veins noted:  RLE: anterior calf, medial calf, ankle and foot  LLE: anterior calf, medial calf, ankle and foot    Varicose veins noted:  RLE: anterior calf, medial calf, medial thigh, ankle and foot  LLE:  anterior calf, medial calf, medial thigh, ankle and foot    03/31/2022 Measurements  Trunk:  158cm  Rt:  thigh-84cm, calf-50cm, ankle-30cm  Lt:  thigh-83cm, calf-49cm, ankle-29cm    CEAP Classification  Clinical Signs: Class 6 - Leg ulceration, skin changes as defined above  Etiologic Classification: Primary  Anatomic distribution: Superficial  Pathophysiologic dysfunction: Reflux       Venous Clinical Severity Score  Pain:2=Daily, moderate activity limitation, occasional analgesics  Varicose Veins: 3=Extensive. Thigh and calf or GS and LS distribution  Venous Edema: 2=Afternoon edema, above ankle  Pigmentation: 2=Diffuse over most of gaiter distribution (lower 1/3) or recent pigmentation (purple)  Inflammation: 1=Mild cellulitis, limited to marginal area around ulcer  Induration: 2=Medial or lateral,less than lower third of leg  Number of Active Ulcers: 1=1  Active Ulceration, Duration: 1=<3 months  Active Ulcer Size: 1=<2 cm diameter  Compressive Therapy: 1=Intermittent use of stockings  Total Score: 16       III. ASSESSMENT & PLAN (MEDICAL DECISION MAKING)     1. Hyperpigmentation of skin    2. Edema, lower extremity    3. Venous insufficiency    4. Varicose veins of bilateral lower extremities with pain    5. Leg pain,  bilateral    6. Ulcer of left foot, limited to breakdown of skin    7. Ruptured varicose vein        Assessment/Diagnosis and Plan:  The patient continues to have sequela of chronic venous insufficiency despite over 3 months of conservative therapy. The patient continues to have life altering symptoms as well as a positive reflux study as noted in the history of present illness above. At this time I believe it would be reasonable to proceed with a left calf  with non thermal ablation of the distal anterior thigh accessory vein for symptomatic venous insufficiency.  Untreated venous disease increases the patient's risk for cellulitis, deep vein thrombosis, chronic skin changes and venous ulceration.  Risk and benefits of the procedure were explained to the patient and the patient wishes to proceed. The patient does have a history of DVT and will require prophylactic anticoagulation.  We will start the patient on anticoagulation 1 day prior to the procedure and discontinue at 1 month with a clear postablation ultrasound.    Patient with primary lymphedema of lower extremities. Patient presents with stage 2 primary lymphedema in both lower extremities with hyperplasia present. She has swelling in her lower abdomen along with both legs. She has been compliant with conservative therapies including compression stockings, elevation and a home exercise program for at least 4 weeks yet her swelling persists. We are recommending a compression pump as an adjunct to conservative therapies to better manage her swelling. Due to abdominal swelling as well as the lower extremities, she will require an advanced pump that includes a truncal component to decongest both her extremities and trunk.       Matty Falcon DO

## 2022-03-31 NOTE — PROGRESS NOTES
Subjective:       Patient ID: Holly Porter is a 61 y.o. female.    Chief Complaint: COPD (Consult:Dr. Frausto)    COPD  This is a chronic problem. The current episode started more than 1 year ago. The problem occurs constantly. The problem has been unchanged. Pertinent negatives include no abdominal pain, arthralgias, chest pain, chills, congestion, headaches or rash. The symptoms are aggravated by exertion.     Past Medical History:   Diagnosis Date    Anxiety state     Atherosclerosis of native artery of extremity with intermittent claudication 01/30/2019    bilateral legs    Bilateral leg pain     BMI 50.0-59.9, adult 02/22/2021    Cellulitis 06/11/2020    Chronic pain syndrome     Chronic peripheral venous hypertension 01/08/2019    COPD (chronic obstructive pulmonary disease)     Diabetes     Diabetes mellitus, type 2     DVT (deep venous thrombosis) 02/12/2020    Embolism and thrombosis of superficial veins of unspecified lower extremity 07/01/2019    bilateral    Frequent headaches 09/20/2018    GERD (gastroesophageal reflux disease)     Hereditary lymphedema     Hx of thyroid cancer     Hyperlipidemia     Hypertension     Hypothyroidism     Migraines     Migraines     Morbid obesity     Nicotine dependence     Non-pressure chronic ulcer of left lower leg 03/09/2021    Osteoarthritis     Other skin changes 03/09/2021    bilateral gaiter regions    Pain in left leg     Pain in right leg     PVD (peripheral vascular disease) 01/08/2019    Seizure disorder     Sleep apnea     Venous insufficiency (chronic) (peripheral)     Venous stasis     Vitamin D deficiency      Past Surgical History:   Procedure Laterality Date    HYSTERECTOMY      ILIAC ARTERY STENT Bilateral 01/28/2020    Bilateral distal aorta and common iliac 8 X 59 vbx covered stents performed by Dr. Antonio Jacinto.    STAB PHLEBECTOMY OF VARICOSE VEINS Left 01/25/2013    Left leg microphlebectomies x 23 stab  avulsions and ligation of multiple varicose veins perrformed by Dr. Cirilo Aguirre.    THYROIDECTOMY      hx: thyroid cancer    TOE AMPUTATION Left 01/28/2020    2nd, 4th and 5th performed by Dr. Anam Saeed.    TOE AMPUTATION Right 01/28/2020    4th toe performed by Dr. Anam Saeed.    TOTAL ABDOMINAL HYSTERECTOMY W/ BILATERAL SALPINGOOPHORECTOMY      TUBAL LIGATION      VAGINAL DELIVERY      x 4    VENOUS ABLATION Right 08/09/2019    GSV Varithena Ablation performed by Dr. Estuardo Falcon    VENOUS ABLATION Left 08/02/2019    ATAV Varithena Ablation performed by Dr. Estuardo Falcon.    VENOUS ABLATION Right 07/13/2015    ATAV Laser Ablatio performed by Dr. Cirilo Aguirre.    VENOUS ABLATION Right 07/06/2015    Distal  GSV Laser Ablation performed by dr. Cirilo Aguirre.    VENOUS ABLATION Left 04/20/2015    Left Distal GSV Laser Ablation performed by dr. Cirilo Aguirre.    VENOUS ABLATION Right 10/28/2013    Right Distal GSV RF Ablation performed by Dr. Cirilo Aguirre.    VENOUS ABLATION Left 10/25/2013    SSV RF Ablation performed by dr. Cirilo Aguirre.    VENOUS ABLATION Left 10/07/2013    Left GSV and Left ATAV RF Ablation performed by Dr. Cirilo Aguirre.    VENOUS ABLATION Right 01/21/2013    GSV RF Ablation w/micros x 22 performed by Dr. Cirilo Aguirre.    VENOUS ABLATION Left 06/25/2021    Left distal GSV Varithena Ablation     Family History   Problem Relation Age of Onset    Heart disease Mother         age 84 CHF    Hypertension Mother     Osteoarthritis Mother     Coronary aneurysm Father     Coronary artery disease Father     Hypertension Father     Heart disease Father     No Known Problems Son     No Known Problems Son     No Known Problems Son     No Known Problems Son     No Known Problems Sister     No Known Problems Brother         hx: varicose veins    No Known Problems Brother         MVA: parlazed    No Known Problems Brother      Review of patient's allergies indicates:   Allergen Reactions    Ammonium peroxydisulfate  Shortness Of Breath    Avocado (laurus persea) Anaphylaxis    Bananas [banana] Anaphylaxis and Swelling     CRAMPS,    Chocolate flavor Anaphylaxis     MOUTH SWELLING    Silvadene [silver sulfadiazine]     Clindamycin     Corticosteroids (glucocorticoids)     Hydrocortisone Blisters    Lasix [furosemide] Blisters     BURNS SKIN    Nutritional supplement-fiber Itching    Pregabalin     Shellfish containing products     Adhesive Rash and Blisters    Iodine and iodide containing products Rash    Latex, natural rubber Rash    Pcn [penicillins] Rash    Sulfa (sulfonamide antibiotics) Rash and Blisters      Social History     Tobacco Use    Smoking status: Current Every Day Smoker     Packs/day: 1.00     Years: 33.00     Pack years: 33.00     Types: Cigarettes    Smokeless tobacco: Never Used   Substance Use Topics    Alcohol use: Never    Drug use: Never      Review of Systems   Constitutional: Negative for chills, activity change and night sweats.   HENT: Negative for congestion and ear pain.    Eyes: Negative for redness and itching.   Cardiovascular: Negative for chest pain and palpitations.   Musculoskeletal: Negative for arthralgias and back pain.   Skin: Negative for rash.   Gastrointestinal: Negative for abdominal pain and abdominal distention.   Neurological: Negative for dizziness and headaches.   Hematological: Negative for adenopathy. Does not bruise/bleed easily.   Psychiatric/Behavioral: Negative for confusion. The patient is not nervous/anxious.        Objective:      Physical Exam   Constitutional: She is oriented to person, place, and time. She appears well-developed and well-nourished.   HENT:   Head: Normocephalic.   Nose: Nose normal.   Mouth/Throat: Oropharynx is clear and moist.   Neck: No JVD present. No thyromegaly present.   Cardiovascular: Normal rate, regular rhythm, normal heart sounds and intact distal pulses.   Pulmonary/Chest: Normal expansion, hyperinflation, symmetric  chest wall expansion, effort normal and breath sounds normal.   Abdominal: Soft. Bowel sounds are normal.   Musculoskeletal:         General: Normal range of motion.      Cervical back: Normal range of motion and neck supple.   Lymphadenopathy: No supraclavicular adenopathy is present.     She has no cervical adenopathy.     She has no axillary adenopathy.   Neurological: She is alert and oriented to person, place, and time. She has normal reflexes.   Skin: Skin is warm and dry.   Psychiatric: She has a normal mood and affect. Her behavior is normal.     Personal Diagnostic Review  none pertinent    No flowsheet data found.      Assessment:       1. Chronic obstructive pulmonary disease, unspecified COPD type        Outpatient Encounter Medications as of 3/31/2022   Medication Sig Dispense Refill    albuterol (PROVENTIL/VENTOLIN HFA) 90 mcg/actuation inhaler Inhale 2 puffs into the lungs every 6 (six) hours as needed for Wheezing. Rescue 18 g 0    allopurinoL (ZYLOPRIM) 300 MG tablet Take 1 tablet (300 mg total) by mouth once daily. 90 tablet 3    amitriptyline (ELAVIL) 10 MG tablet Take 1 tablet (10 mg total) by mouth every evening. 30 tablet 1    apixaban (ELIQUIS) 5 mg Tab Take 1 tablet (5 mg total) by mouth 2 (two) times daily. 60 tablet 3    aspirin (ECOTRIN) 81 MG EC tablet Take 1 tablet (81 mg total) by mouth once daily. 90 tablet 1    cilostazoL (PLETAL) 100 MG Tab Take 1 tablet (100 mg total) by mouth 2 (two) times daily. 90 tablet 1    colchicine (COLCRYS) 0.6 mg tablet One pill three times a day for two days,then one pill daily till out 30 tablet 2    fluticasone-salmeterol diskus inhaler 250-50 mcg Inhale 1 puff into the lungs 2 (two) times daily. Controller 60 each 5    HYDROcodone-acetaminophen (NORCO)  mg per tablet       hydrOXYzine HCL (ATARAX) 25 MG tablet Take 1 tablet (25 mg total) by mouth every 6 (six) hours as needed for Itching. 60 tablet 0    insulin (LANTUS SOLOSTAR U-100  "INSULIN) glargine 100 units/mL (3mL) SubQ pen Inject 10 units sq daily 15 mL 1    insulin detemir U-100 (LEVEMIR FLEXTOUCH U-100 INSULN) 100 unit/mL (3 mL) InPn pen Inject 10 Units into the skin every evening. 15 ml with 1 refill      levothyroxine (SYNTHROID, LEVOTHROID) 175 MCG tablet Take 1 tablet (175 mcg total) by mouth once daily. 90 tablet 1    loratadine (CLARITIN) 10 mg tablet Take 1 tablet (10 mg total) by mouth once daily. 30 tablet 0    losartan-hydrochlorothiazide 50-12.5 mg (HYZAAR) 50-12.5 mg per tablet Take 1 tablet by mouth once daily. 90 tablet 1    metOLazone (ZAROXOLYN) 2.5 MG tablet Take 1 tablet (2.5 mg total) by mouth once daily. 30 tablet 2    mupirocin (BACTROBAN) 2 % ointment Apply topically 2 (two) times daily. 80 g 2    NOVOFINE 32 32 gauge x 1/4" Ndle Use as directed once daily. 90 each 3    pantoprazole (PROTONIX) 40 MG tablet Take 1 tablet (40 mg total) by mouth once daily. 90 tablet 1    potassium chloride (MICRO-K) 10 MEQ CpSR       pregabalin (LYRICA) 100 MG capsule Take 1 capsule (100 mg total) by mouth every 12 (twelve) hours. 60 capsule 1    topiramate (TOPAMAX) 100 MG tablet Take 1.5 tablets (150 mg total) by mouth 2 (two) times daily. 90 tablet 11    triamcinolone acetonide 0.1% (KENALOG) 0.1 % cream Apply topically 3 (three) times daily. 400 g 2    [DISCONTINUED] azithromycin (Z-SANJU) 250 MG tablet Take 2 tablets by mouth on day 1; Take 1 tablet by mouth on days 2-5 (Patient not taking: Reported on 3/31/2022) 6 tablet 0    [DISCONTINUED] budesonide-formoterol 160-4.5 mcg (SYMBICORT) 160-4.5 mcg/actuation HFAA Inhale 1-2 puffs into the lungs every 12 (twelve) hours. 10.2 g 5    [DISCONTINUED] ibuprofen (ADVIL,MOTRIN) 600 MG tablet Take 1 tablet (600 mg total) by mouth 2 (two) times daily as needed for Pain. (Patient not taking: Reported on 3/31/2022) 20 tablet 1     No facility-administered encounter medications on file as of 3/31/2022.     No orders of the " defined types were placed in this encounter.      Plan:       Problem List Items Addressed This Visit        Pulmonary    Chronic obstructive pulmonary disease     Patient presents today for evaluation of shortness of breath seems to be doing reasonably well.  No change in patient's medical regimen.           Relevant Medications    fluticasone-salmeterol diskus inhaler 250-50 mcg    albuterol (PROVENTIL/VENTOLIN HFA) 90 mcg/actuation inhaler

## 2022-04-01 RX ORDER — SODIUM CHLORIDE 9 MG/ML
30 INJECTION, SOLUTION INTRAVENOUS CONTINUOUS
Status: CANCELLED | OUTPATIENT
Start: 2022-04-15

## 2022-04-04 ENCOUNTER — OFFICE VISIT (OUTPATIENT)
Dept: FAMILY MEDICINE | Facility: CLINIC | Age: 62
End: 2022-04-04
Payer: COMMERCIAL

## 2022-04-04 ENCOUNTER — OFFICE VISIT (OUTPATIENT)
Dept: PAIN MEDICINE | Facility: CLINIC | Age: 62
End: 2022-04-04
Payer: COMMERCIAL

## 2022-04-04 ENCOUNTER — HOSPITAL ENCOUNTER (OUTPATIENT)
Dept: RADIOLOGY | Facility: HOSPITAL | Age: 62
Discharge: HOME | End: 2022-04-04
Attending: INTERNAL MEDICINE
Payer: COMMERCIAL

## 2022-04-04 ENCOUNTER — TELEPHONE (OUTPATIENT)
Dept: FAMILY MEDICINE | Facility: CLINIC | Age: 62
End: 2022-04-04
Payer: MEDICAID

## 2022-04-04 VITALS
TEMPERATURE: 98 F | BODY MASS INDEX: 44.41 KG/M2 | OXYGEN SATURATION: 99 % | DIASTOLIC BLOOD PRESSURE: 67 MMHG | SYSTOLIC BLOOD PRESSURE: 122 MMHG | HEART RATE: 77 BPM | HEIGHT: 68 IN | RESPIRATION RATE: 22 BRPM | WEIGHT: 293 LBS

## 2022-04-04 VITALS
WEIGHT: 293 LBS | SYSTOLIC BLOOD PRESSURE: 178 MMHG | HEIGHT: 68 IN | DIASTOLIC BLOOD PRESSURE: 78 MMHG | RESPIRATION RATE: 18 BRPM | BODY MASS INDEX: 44.41 KG/M2 | HEART RATE: 82 BPM | OXYGEN SATURATION: 98 %

## 2022-04-04 DIAGNOSIS — E11.628 DIABETIC FOOT INFECTION: ICD-10-CM

## 2022-04-04 DIAGNOSIS — E11.59 TYPE 2 DIABETES MELLITUS WITH OTHER CIRCULATORY COMPLICATION, WITH LONG-TERM CURRENT USE OF INSULIN: ICD-10-CM

## 2022-04-04 DIAGNOSIS — Z79.4 TYPE 2 DIABETES MELLITUS WITH OTHER CIRCULATORY COMPLICATION, WITH LONG-TERM CURRENT USE OF INSULIN: Primary | ICD-10-CM

## 2022-04-04 DIAGNOSIS — E11.59 TYPE 2 DIABETES MELLITUS WITH OTHER CIRCULATORY COMPLICATION, WITH LONG-TERM CURRENT USE OF INSULIN: Primary | ICD-10-CM

## 2022-04-04 DIAGNOSIS — M47.817 LUMBOSACRAL SPONDYLOSIS WITHOUT MYELOPATHY: Chronic | ICD-10-CM

## 2022-04-04 DIAGNOSIS — Z79.4 TYPE 2 DIABETES MELLITUS WITH OTHER CIRCULATORY COMPLICATION, WITH LONG-TERM CURRENT USE OF INSULIN: ICD-10-CM

## 2022-04-04 DIAGNOSIS — M79.672 FOOT PAIN, LEFT: Chronic | ICD-10-CM

## 2022-04-04 DIAGNOSIS — Z79.899 ENCOUNTER FOR LONG-TERM (CURRENT) USE OF OTHER MEDICATIONS: Primary | ICD-10-CM

## 2022-04-04 DIAGNOSIS — L08.9 DIABETIC FOOT INFECTION: ICD-10-CM

## 2022-04-04 DIAGNOSIS — I73.9 PVD (PERIPHERAL VASCULAR DISEASE): Chronic | ICD-10-CM

## 2022-04-04 DIAGNOSIS — L89.899 PRESSURE INJURY OF SKIN OF LEFT FOOT, UNSPECIFIED INJURY STAGE: ICD-10-CM

## 2022-04-04 DIAGNOSIS — G89.4 CHRONIC PAIN SYNDROME: ICD-10-CM

## 2022-04-04 DIAGNOSIS — G62.9 NEUROPATHY: ICD-10-CM

## 2022-04-04 LAB
BASOPHILS # BLD AUTO: 0.08 K/UL (ref 0–0.2)
BASOPHILS NFR BLD AUTO: 0.7 % (ref 0–1)
CTP QC/QA: YES
DIFFERENTIAL METHOD BLD: ABNORMAL
EOSINOPHIL # BLD AUTO: 0.33 K/UL (ref 0–0.5)
EOSINOPHIL NFR BLD AUTO: 3.1 % (ref 1–4)
ERYTHROCYTE [DISTWIDTH] IN BLOOD BY AUTOMATED COUNT: 18.4 % (ref 11.5–14.5)
ERYTHROCYTE [SEDIMENTATION RATE] IN BLOOD BY WESTERGREN METHOD: 3 MM/HR (ref 0–30)
HCT VFR BLD AUTO: 50.7 % (ref 38–47)
HGB BLD-MCNC: 15.5 G/DL (ref 12–16)
IMM GRANULOCYTES # BLD AUTO: 0.06 K/UL (ref 0–0.04)
IMM GRANULOCYTES NFR BLD: 0.6 % (ref 0–0.4)
LYMPHOCYTES # BLD AUTO: 2.9 K/UL (ref 1–4.8)
LYMPHOCYTES NFR BLD AUTO: 27 % (ref 27–41)
MCH RBC QN AUTO: 27.1 PG (ref 27–31)
MCHC RBC AUTO-ENTMCNC: 30.6 G/DL (ref 32–36)
MCV RBC AUTO: 88.8 FL (ref 80–96)
MONOCYTES # BLD AUTO: 0.66 K/UL (ref 0–0.8)
MONOCYTES NFR BLD AUTO: 6.1 % (ref 2–6)
MPC BLD CALC-MCNC: 12.7 FL (ref 9.4–12.4)
NEUTROPHILS # BLD AUTO: 6.71 K/UL (ref 1.8–7.7)
NEUTROPHILS NFR BLD AUTO: 62.5 % (ref 53–65)
NRBC # BLD AUTO: 0 X10E3/UL
NRBC, AUTO (.00): 0 %
PLATELET # BLD AUTO: 243 K/UL (ref 150–400)
POC (AMP) AMPHETAMINE: NEGATIVE
POC (BAR) BARBITURATES: NEGATIVE
POC (BUP) BUPRENORPHINE: NEGATIVE
POC (BZO) BENZODIAZEPINES: NEGATIVE
POC (COC) COCAINE: NEGATIVE
POC (MDMA) METHYLENEDIOXYMETHAMPHETAMINE 3,4: NEGATIVE
POC (MET) METHAMPHETAMINE: NEGATIVE
POC (MOP) OPIATES: ABNORMAL
POC (MTD) METHADONE: NEGATIVE
POC (OXY) OXYCODONE: NEGATIVE
POC (PCP) PHENCYCLIDINE: NEGATIVE
POC (TCA) TRICYCLIC ANTIDEPRESSANTS: NEGATIVE
POC TEMPERATURE (URINE): 90
POC THC: NEGATIVE
RBC # BLD AUTO: 5.71 M/UL (ref 4.2–5.4)
WBC # BLD AUTO: 10.74 K/UL (ref 4.5–11)

## 2022-04-04 PROCEDURE — 73620 X-RAY EXAM OF FOOT: CPT | Mod: 26,LT,, | Performed by: RADIOLOGY

## 2022-04-04 PROCEDURE — 3078F PR MOST RECENT DIASTOLIC BLOOD PRESSURE < 80 MM HG: ICD-10-PCS | Mod: CPTII,,, | Performed by: PAIN MEDICINE

## 2022-04-04 PROCEDURE — 3078F DIAST BP <80 MM HG: CPT | Mod: CPTII,,, | Performed by: PAIN MEDICINE

## 2022-04-04 PROCEDURE — 99214 PR OFFICE/OUTPT VISIT, EST, LEVL IV, 30-39 MIN: ICD-10-PCS | Mod: ,,, | Performed by: INTERNAL MEDICINE

## 2022-04-04 PROCEDURE — 99214 OFFICE O/P EST MOD 30 MIN: CPT | Mod: S$PBB,,, | Performed by: PAIN MEDICINE

## 2022-04-04 PROCEDURE — 73620 X-RAY EXAM OF FOOT: CPT | Mod: TC,LT

## 2022-04-04 PROCEDURE — 3078F DIAST BP <80 MM HG: CPT | Mod: ,,, | Performed by: INTERNAL MEDICINE

## 2022-04-04 PROCEDURE — 73620 XR FOOT 2 VIEW LEFT: ICD-10-PCS | Mod: 26,LT,, | Performed by: RADIOLOGY

## 2022-04-04 PROCEDURE — 3077F PR MOST RECENT SYSTOLIC BLOOD PRESSURE >= 140 MM HG: ICD-10-PCS | Mod: CPTII,,, | Performed by: PAIN MEDICINE

## 2022-04-04 PROCEDURE — 1159F PR MEDICATION LIST DOCUMENTED IN MEDICAL RECORD: ICD-10-PCS | Mod: ,,, | Performed by: INTERNAL MEDICINE

## 2022-04-04 PROCEDURE — 99215 OFFICE O/P EST HI 40 MIN: CPT | Mod: PBBFAC | Performed by: PAIN MEDICINE

## 2022-04-04 PROCEDURE — 99214 PR OFFICE/OUTPT VISIT, EST, LEVL IV, 30-39 MIN: ICD-10-PCS | Mod: S$PBB,,, | Performed by: PAIN MEDICINE

## 2022-04-04 PROCEDURE — 85025 COMPLETE CBC W/AUTO DIFF WBC: CPT | Mod: ,,, | Performed by: CLINICAL MEDICAL LABORATORY

## 2022-04-04 PROCEDURE — 1160F RVW MEDS BY RX/DR IN RCRD: CPT | Mod: ,,, | Performed by: INTERNAL MEDICINE

## 2022-04-04 PROCEDURE — 1159F MED LIST DOCD IN RCRD: CPT | Mod: ,,, | Performed by: INTERNAL MEDICINE

## 2022-04-04 PROCEDURE — 85651 RBC SED RATE NONAUTOMATED: CPT | Mod: ,,, | Performed by: CLINICAL MEDICAL LABORATORY

## 2022-04-04 PROCEDURE — 3074F PR MOST RECENT SYSTOLIC BLOOD PRESSURE < 130 MM HG: ICD-10-PCS | Mod: ,,, | Performed by: INTERNAL MEDICINE

## 2022-04-04 PROCEDURE — 3074F SYST BP LT 130 MM HG: CPT | Mod: ,,, | Performed by: INTERNAL MEDICINE

## 2022-04-04 PROCEDURE — 99214 OFFICE O/P EST MOD 30 MIN: CPT | Mod: ,,, | Performed by: INTERNAL MEDICINE

## 2022-04-04 PROCEDURE — 3008F PR BODY MASS INDEX (BMI) DOCUMENTED: ICD-10-PCS | Mod: CPTII,,, | Performed by: PAIN MEDICINE

## 2022-04-04 PROCEDURE — 1160F PR REVIEW ALL MEDS BY PRESCRIBER/CLIN PHARMACIST DOCUMENTED: ICD-10-PCS | Mod: ,,, | Performed by: INTERNAL MEDICINE

## 2022-04-04 PROCEDURE — 85651 SEDIMENTATION RATE, AUTOMATED: ICD-10-PCS | Mod: ,,, | Performed by: CLINICAL MEDICAL LABORATORY

## 2022-04-04 PROCEDURE — 3078F PR MOST RECENT DIASTOLIC BLOOD PRESSURE < 80 MM HG: ICD-10-PCS | Mod: ,,, | Performed by: INTERNAL MEDICINE

## 2022-04-04 PROCEDURE — 3008F BODY MASS INDEX DOCD: CPT | Mod: ,,, | Performed by: INTERNAL MEDICINE

## 2022-04-04 PROCEDURE — 80305 DRUG TEST PRSMV DIR OPT OBS: CPT | Mod: PBBFAC | Performed by: PAIN MEDICINE

## 2022-04-04 PROCEDURE — 3008F PR BODY MASS INDEX (BMI) DOCUMENTED: ICD-10-PCS | Mod: ,,, | Performed by: INTERNAL MEDICINE

## 2022-04-04 PROCEDURE — 3077F SYST BP >= 140 MM HG: CPT | Mod: CPTII,,, | Performed by: PAIN MEDICINE

## 2022-04-04 PROCEDURE — 85025 CBC WITH DIFFERENTIAL: ICD-10-PCS | Mod: ,,, | Performed by: CLINICAL MEDICAL LABORATORY

## 2022-04-04 PROCEDURE — 3008F BODY MASS INDEX DOCD: CPT | Mod: CPTII,,, | Performed by: PAIN MEDICINE

## 2022-04-04 RX ORDER — HYDROCODONE BITARTRATE AND ACETAMINOPHEN 10; 325 MG/1; MG/1
1 TABLET ORAL EVERY 6 HOURS PRN
Qty: 120 TABLET | Refills: 0 | Status: SHIPPED | OUTPATIENT
Start: 2022-04-07 | End: 2022-05-07

## 2022-04-04 RX ORDER — SULFAMETHOXAZOLE AND TRIMETHOPRIM 800; 160 MG/1; MG/1
1 TABLET ORAL 2 TIMES DAILY
Qty: 20 TABLET | Refills: 0 | Status: CANCELLED | OUTPATIENT
Start: 2022-04-04

## 2022-04-04 RX ORDER — AMITRIPTYLINE HYDROCHLORIDE 50 MG/1
50 TABLET, FILM COATED ORAL NIGHTLY
Qty: 90 TABLET | Refills: 1 | Status: SHIPPED | OUTPATIENT
Start: 2022-04-04 | End: 2022-04-13

## 2022-04-04 RX ORDER — MUPIROCIN 20 MG/G
OINTMENT TOPICAL 2 TIMES DAILY
Qty: 30 G | Refills: 0 | Status: ON HOLD | OUTPATIENT
Start: 2022-04-04 | End: 2022-04-15 | Stop reason: SDUPTHER

## 2022-04-04 RX ORDER — CEPHALEXIN 500 MG/1
500 CAPSULE ORAL 3 TIMES DAILY
Qty: 21 CAPSULE | Refills: 0 | Status: CANCELLED | OUTPATIENT
Start: 2022-04-04 | End: 2022-04-11

## 2022-04-04 RX ORDER — HYDROCODONE BITARTRATE AND ACETAMINOPHEN 10; 325 MG/1; MG/1
1 TABLET ORAL EVERY 6 HOURS PRN
Qty: 120 TABLET | Refills: 0 | Status: ON HOLD | OUTPATIENT
Start: 2022-05-07 | End: 2022-04-15 | Stop reason: SDUPTHER

## 2022-04-04 NOTE — PROGRESS NOTES
Subjective:       Patient ID: Holly Porter is a 61 y.o. female.    Chief Complaint: Follow-up, Foot Pain (C/o pain in left foot when ambulating. ), and Toe Pain (C/o pain and a poorly healing wound on pinky toe (left foot).)    Patient is here today for check up evaluation. Patient is complaining of severe left foot pain s/p toe amputation by Dr Saeed. Patient has severe pain and tenderness to area. States unable to put shoe on foot and cannot stand for long. States has to constantly keep foot elevated or will have severe pain. Xray of left foot from Jan 2022 shows soft tissue swelling. Will need repeat of xray today. Patient feels pain is worsening. Will refer back to Dr Saeed. Will order Home health for wound care of left foot. Will prescribe Bactroban ointment to apply BID to affected area and Keflex 500 mg PO TID x 7 days. Will also increase Elavil to 50 mg PO QHS to help with pain. Will also order lab work. Will follow in 1 month.       Current Medications:    Current Outpatient Medications:     albuterol (PROVENTIL/VENTOLIN HFA) 90 mcg/actuation inhaler, Inhale 2 puffs into the lungs every 6 (six) hours as needed for Wheezing. Rescue, Disp: 18 g, Rfl: 0    allopurinoL (ZYLOPRIM) 300 MG tablet, Take 1 tablet (300 mg total) by mouth once daily., Disp: 90 tablet, Rfl: 3    apixaban (ELIQUIS) 5 mg Tab, Take 1 tablet (5 mg total) by mouth 2 (two) times daily., Disp: 60 tablet, Rfl: 3    aspirin (ECOTRIN) 81 MG EC tablet, Take 1 tablet (81 mg total) by mouth once daily., Disp: 90 tablet, Rfl: 1    cilostazoL (PLETAL) 100 MG Tab, Take 1 tablet (100 mg total) by mouth 2 (two) times daily., Disp: 90 tablet, Rfl: 1    colchicine (COLCRYS) 0.6 mg tablet, One pill three times a day for two days,then one pill daily till out, Disp: 30 tablet, Rfl: 2    fluticasone-salmeterol diskus inhaler 250-50 mcg, Inhale 1 puff into the lungs 2 (two) times daily. Controller, Disp: 60 each, Rfl: 5    HYDROcodone-acetaminophen  "(NORCO)  mg per tablet, , Disp: , Rfl:     [START ON 4/7/2022] HYDROcodone-acetaminophen (NORCO)  mg per tablet, Take 1 tablet by mouth every 6 (six) hours as needed for Pain., Disp: 120 tablet, Rfl: 0    [START ON 5/7/2022] HYDROcodone-acetaminophen (NORCO)  mg per tablet, Take 1 tablet by mouth every 6 (six) hours as needed for Pain., Disp: 120 tablet, Rfl: 0    hydrOXYzine HCL (ATARAX) 25 MG tablet, Take 1 tablet (25 mg total) by mouth every 6 (six) hours as needed for Itching., Disp: 60 tablet, Rfl: 0    insulin (LANTUS SOLOSTAR U-100 INSULIN) glargine 100 units/mL (3mL) SubQ pen, Inject 10 units sq daily, Disp: 15 mL, Rfl: 1    insulin detemir U-100 (LEVEMIR FLEXTOUCH U-100 INSULN) 100 unit/mL (3 mL) InPn pen, Inject 10 Units into the skin every evening. 15 ml with 1 refill, Disp: , Rfl:     levothyroxine (SYNTHROID, LEVOTHROID) 175 MCG tablet, Take 1 tablet (175 mcg total) by mouth once daily., Disp: 90 tablet, Rfl: 1    loratadine (CLARITIN) 10 mg tablet, Take 1 tablet (10 mg total) by mouth once daily., Disp: 30 tablet, Rfl: 0    losartan-hydrochlorothiazide 50-12.5 mg (HYZAAR) 50-12.5 mg per tablet, Take 1 tablet by mouth once daily., Disp: 90 tablet, Rfl: 1    metOLazone (ZAROXOLYN) 2.5 MG tablet, Take 1 tablet (2.5 mg total) by mouth once daily., Disp: 30 tablet, Rfl: 2    mupirocin (BACTROBAN) 2 % ointment, Apply topically 2 (two) times daily., Disp: 80 g, Rfl: 2    NOVOFINE 32 32 gauge x 1/4" Ndle, Use as directed once daily., Disp: 90 each, Rfl: 3    pantoprazole (PROTONIX) 40 MG tablet, Take 1 tablet (40 mg total) by mouth once daily., Disp: 90 tablet, Rfl: 1    potassium chloride (MICRO-K) 10 MEQ CpSR, , Disp: , Rfl:     pregabalin (LYRICA) 100 MG capsule, Take 1 capsule (100 mg total) by mouth every 12 (twelve) hours., Disp: 60 capsule, Rfl: 1    topiramate (TOPAMAX) 100 MG tablet, Take 1.5 tablets (150 mg total) by mouth 2 (two) times daily., Disp: 90 tablet, Rfl: " 11    triamcinolone acetonide 0.1% (KENALOG) 0.1 % cream, Apply topically 3 (three) times daily., Disp: 400 g, Rfl: 2    amitriptyline (ELAVIL) 50 MG tablet, Take 1 tablet (50 mg total) by mouth every evening., Disp: 90 tablet, Rfl: 1    mupirocin (BACTROBAN) 2 % ointment, Apply topically 2 (two) times daily. for 10 days, Disp: 30 g, Rfl: 0    Last Labs:     Office Visit on 04/04/2022   Component Date Value    POC Amphetamines 04/04/2022 Negative     POC Barbiturates 04/04/2022 Negative     POC Benzodiazepines 04/04/2022 Negative     POC Cocaine 04/04/2022 Negative     POC THC 04/04/2022 Negative     POC Methadone 04/04/2022 Negative     POC Methamphetamine 04/04/2022 Negative     POC Opiates 04/04/2022 Presumptive Positive (A)    POC Oxycodone 04/04/2022 Negative     POC Phencyclidine 04/04/2022 Negative     POC Methylenedioxymetham* 04/04/2022 Negative     POC Tricyclic Antidepres* 04/04/2022 Negative     POC Buprenorphine 04/04/2022 Negative      Acceptab* 04/04/2022 Yes     POC Temperature (Urine) 04/04/2022 90        Last Imaging:  X-Ray Foot 2 View Left  Narrative: EXAMINATION:  XR FOOT 2 VIEW LEFT    CLINICAL HISTORY:  Type 2 diabetes mellitus with other circulatory complications    TECHNIQUE:  AP and lateral views of the left foot    COMPARISON:  01/11/2022    FINDINGS:  Previous amputation is are again seen involving the 2nd, 4th, and 5th toes at the proximal aspect of the proximal phalanx.  There is soft tissue swelling or edema present.  There is no osseous erosion or acute fracture detected.  Degenerative changes.  Impression: Similar appearance of the foot.    Electronically signed by: Edson De  Date:    04/04/2022  Time:    16:21         Review of Systems   Musculoskeletal: Positive for gait problem.   Integumentary:  Positive for wound.   Psychiatric/Behavioral: Positive for sleep disturbance.   All other systems reviewed and are negative.        Objective:       Physical Exam  Vitals reviewed.   Constitutional:       Appearance: Normal appearance.   Cardiovascular:      Rate and Rhythm: Normal rate and regular rhythm.      Pulses: Normal pulses.      Heart sounds: Normal heart sounds.   Pulmonary:      Effort: Pulmonary effort is normal.      Breath sounds: Normal breath sounds.   Abdominal:      General: Abdomen is flat. Bowel sounds are normal.      Palpations: Abdomen is soft.   Musculoskeletal:         General: Normal range of motion.      Cervical back: Normal range of motion and neck supple.   Skin:     General: Skin is warm and dry.   Neurological:      General: No focal deficit present.      Mental Status: She is alert and oriented to person, place, and time. Mental status is at baseline.         Assessment:       1. Type 2 diabetes mellitus with other circulatory complication, with long-term current use of insulin  CBC Auto Differential    Sedimentation Rate    X-Ray Foot 2 View Left    CBC Auto Differential    Sedimentation Rate    Ambulatory referral/consult to General Surgery    Ambulatory referral/consult to Home Health   2. Diabetic foot infection     3. Pressure injury of skin of left foot, unspecified injury stage     4. Neuropathy          Plan:         Holly was seen today for follow-up, foot pain and toe pain.    Diagnoses and all orders for this visit:    Type 2 diabetes mellitus with other circulatory complication, with long-term current use of insulin  -     CBC Auto Differential; Future  -     Sedimentation Rate; Future  -     X-Ray Foot 2 View Left; Future  -     CBC Auto Differential  -     Sedimentation Rate  -     Ambulatory referral/consult to General Surgery; Future  -     Ambulatory referral/consult to Home Health; Future    Diabetic foot infection    Pressure injury of skin of left foot, unspecified injury stage    Neuropathy    Other orders  -     amitriptyline (ELAVIL) 50 MG tablet; Take 1 tablet (50 mg total) by mouth every evening.  -      mupirocin (BACTROBAN) 2 % ointment; Apply topically 2 (two) times daily. for 10 days  The following orders have not been finalized:  -     Cancel: sulfamethoxazole-trimethoprim 800-160mg (BACTRIM DS) 800-160 mg Tab  -     Cancel: cephALEXin (KEFLEX) 500 MG capsule

## 2022-04-04 NOTE — TELEPHONE ENCOUNTER
Patient's friend called (pt was in imaging center doing her xray). Friend says the pharmacy told Holly she could take certain antibiotics-keflex, omnicef, ceftin and amoxil.  Dr. Frausto ordered keflex 500mg TID for 7 days.

## 2022-04-04 NOTE — PATIENT INSTRUCTIONS
Holly was seen today for follow-up, foot pain and toe pain.    Diagnoses and all orders for this visit:    Type 2 diabetes mellitus with other circulatory complication, with long-term current use of insulin  -     CBC Auto Differential; Future  -     Sedimentation Rate; Future  -     X-Ray Foot 2 View Left; Future  -     CBC Auto Differential  -     Sedimentation Rate  -     Ambulatory referral/consult to General Surgery; Future  -     Ambulatory referral/consult to Home Health; Future    Diabetic foot infection    Pressure injury of skin of left foot, unspecified injury stage    Neuropathy    Other orders  -     amitriptyline (ELAVIL) 50 MG tablet; Take 1 tablet (50 mg total) by mouth every evening.  -     mupirocin (BACTROBAN) 2 % ointment; Apply topically 2 (two) times daily. for 10 days  The following orders have not been finalized:  -     Cancel: sulfamethoxazole-trimethoprim 800-160mg (BACTRIM DS) 800-160 mg Tab  -     Cancel: cephALEXin (KEFLEX) 500 MG capsule

## 2022-04-04 NOTE — PROGRESS NOTES
She Disclaimer: This note has been generated using voice-recognition software. There may be typographical errors that have been missed during proof-reading        Patient ID: Holly Porter is a 61 y.o. female.      Chief Complaint: Foot Pain      61-year-old female presents today for re-evaluation of severe pain involving the left lower extremity.  She has chronic peripheral vascular disease and is awaiting surgical intervention, April 15, 2021, with Dr. Falcon.  She has a long history of chronic left foot ulceration and has been treated conservatively with medication management for several years. Her pain has worsened and  she is unable to tolerate oxycodone  secondary to severe pruritus.  She returns today for medication refill.            Pain Assessment  Pain Assessment: 0-10  Pain Score:   8  Pain Location: Foot  Pain Descriptors: Aching  Pain Intervention(s): Home medication, Rest      A's of Opioid Risk Assessment  Activity:Patient can not perform ADL.   Analgesia:Patients pain is not controlled by current medication.   Adverse Effects: Patient denies constipation or sedation.  Aberrant Behavior:  reviewed with no aberrant drug seeking/taking behavior.      Patient denies any suicidal or homicidal ideations    Physical Therapy/Home Exercise: not applicable      X-Ray Foot 2 View Left  Narrative: EXAMINATION:  XR FOOT 2 VIEW LEFT    CLINICAL HISTORY:  Type 2 diabetes mellitus with other circulatory complications    TECHNIQUE:  AP and lateral views of the left foot    COMPARISON:  01/11/2022    FINDINGS:  Previous amputation is are again seen involving the 2nd, 4th, and 5th toes at the proximal aspect of the proximal phalanx.  There is soft tissue swelling or edema present.  There is no osseous erosion or acute fracture detected.  Degenerative changes.  Impression: Similar appearance of the foot.    Electronically signed by: Edson De  Date:    04/04/2022  Time:    16:21      Review of Systems    Constitutional: Negative.    HENT: Negative.    Eyes: Negative.    Respiratory: Negative.    Cardiovascular: Negative.    Gastrointestinal: Negative.    Endocrine: Negative.    Genitourinary: Negative.    Musculoskeletal: Positive for joint swelling (LLE), leg pain (LLE) and joint deformity (LLE).   Integumentary:  Positive for color change (LLE). Wound: LLE.   Neurological: Negative.    Hematological: Negative.    Psychiatric/Behavioral: Positive for sleep disturbance.             Past Medical History:   Diagnosis Date    Anxiety state     Atherosclerosis of native artery of extremity with intermittent claudication 01/30/2019    bilateral legs    Bilateral leg pain     BMI 50.0-59.9, adult 02/22/2021    Cellulitis 06/11/2020    Chronic pain syndrome     Chronic peripheral venous hypertension 01/08/2019    COPD (chronic obstructive pulmonary disease)     Diabetes     Diabetes mellitus, type 2     DVT (deep venous thrombosis) 02/12/2020    Embolism and thrombosis of superficial veins of unspecified lower extremity 07/01/2019    bilateral    Frequent headaches 09/20/2018    GERD (gastroesophageal reflux disease)     Hereditary lymphedema     Hx of thyroid cancer     Hyperlipidemia     Hypertension     Hypothyroidism     Migraines     Migraines     Morbid obesity     Nicotine dependence     Non-pressure chronic ulcer of left lower leg 03/09/2021    Osteoarthritis     Other skin changes 03/09/2021    bilateral gaiter regions    Pain in left leg     Pain in right leg     PVD (peripheral vascular disease) 01/08/2019    Seizure disorder     Sleep apnea     Venous insufficiency (chronic) (peripheral)     Venous stasis     Vitamin D deficiency      Past Surgical History:   Procedure Laterality Date    HYSTERECTOMY      ILIAC ARTERY STENT Bilateral 01/28/2020    Bilateral distal aorta and common iliac 8 X 59 vbx covered stents performed by Dr. Antonio Jacinto.    STAB PHLEBECTOMY OF VARICOSE  VEINS Left 01/25/2013    Left leg microphlebectomies x 23 stab avulsions and ligation of multiple varicose veins perrformed by Dr. Cirilo Aguirre.    THYROIDECTOMY      hx: thyroid cancer    TOE AMPUTATION Left 01/28/2020    2nd, 4th and 5th performed by Dr. Anam Saeed.    TOE AMPUTATION Right 01/28/2020    4th toe performed by Dr. Anam Saeed.    TOTAL ABDOMINAL HYSTERECTOMY W/ BILATERAL SALPINGOOPHORECTOMY      TUBAL LIGATION      VAGINAL DELIVERY      x 4    VENOUS ABLATION Right 08/09/2019    GSV Varithena Ablation performed by Dr. Estuardo Falcon    VENOUS ABLATION Left 08/02/2019    ATAV Varithena Ablation performed by Dr. Estuardo Falcon.    VENOUS ABLATION Right 07/13/2015    ATAV Laser Ablatio performed by Dr. Cirilo Aguirre.    VENOUS ABLATION Right 07/06/2015    Distal  GSV Laser Ablation performed by dr. Cirilo Aguirre.    VENOUS ABLATION Left 04/20/2015    Left Distal GSV Laser Ablation performed by dr. Cirilo Aguirre.    VENOUS ABLATION Right 10/28/2013    Right Distal GSV RF Ablation performed by Dr. Cirilo Aguirre.    VENOUS ABLATION Left 10/25/2013    SSV RF Ablation performed by dr. Cirilo Aguirre.    VENOUS ABLATION Left 10/07/2013    Left GSV and Left ATAV RF Ablation performed by Dr. Cirilo Aguirre.    VENOUS ABLATION Right 01/21/2013    GSV RF Ablation w/micros x 22 performed by Dr. Cirilo Aguirre.    VENOUS ABLATION Left 06/25/2021    Left distal GSV Varithena Ablation     Social History     Socioeconomic History    Marital status:    Tobacco Use    Smoking status: Current Every Day Smoker     Packs/day: 1.00     Years: 33.00     Pack years: 33.00     Types: Cigarettes    Smokeless tobacco: Never Used   Substance and Sexual Activity    Alcohol use: Never    Drug use: Never    Sexual activity: Not Currently     Family History   Problem Relation Age of Onset    Heart disease Mother         age 84 CHF    Hypertension Mother     Osteoarthritis Mother     Coronary aneurysm Father     Coronary artery disease Father      Hypertension Father     Heart disease Father     No Known Problems Son     No Known Problems Son     No Known Problems Son     No Known Problems Son     No Known Problems Sister     No Known Problems Brother         hx: varicose veins    No Known Problems Brother         MVA: parlazed    No Known Problems Brother      Review of patient's allergies indicates:   Allergen Reactions    Ammonium peroxydisulfate Shortness Of Breath    Avocado (laurus persea) Anaphylaxis    Bananas [banana] Anaphylaxis and Swelling     CRAMPS,    Chocolate flavor Anaphylaxis     MOUTH SWELLING    Silvadene [silver sulfadiazine]     Clindamycin     Corticosteroids (glucocorticoids)     Hydrocortisone Blisters    Lasix [furosemide] Blisters     BURNS SKIN    Nutritional supplement-fiber Itching    Pregabalin     Shellfish containing products     Adhesive Rash and Blisters    Iodine and iodide containing products Rash    Latex, natural rubber Rash    Pcn [penicillins] Rash    Sulfa (sulfonamide antibiotics) Rash and Blisters     has a current medication list which includes the following prescription(s): albuterol, allopurinol, apixaban, aspirin, cilostazol, colchicine, fluticasone-salmeterol 250-50 mcg/dose, hydrocodone-acetaminophen, hydroxyzine hcl, lantus solostar u-100 insulin, levemir flextouch u-100 insuln, levothyroxine, loratadine, losartan-hydrochlorothiazide 50-12.5 mg, metolazone, mupirocin, novofine 32, pantoprazole, potassium chloride, pregabalin, topiramate, triamcinolone acetonide 0.1%, amitriptyline, [START ON 4/7/2022] hydrocodone-acetaminophen, [START ON 5/7/2022] hydrocodone-acetaminophen, and mupirocin.      Objective:  Vitals:    04/04/22 1313   BP: (!) 178/78   Pulse: 82   Resp: 18        Physical Exam  Vitals and nursing note reviewed.   Constitutional:       General: She is not in acute distress.     Appearance: Normal appearance. She is not ill-appearing, toxic-appearing or diaphoretic.    HENT:      Head: Normocephalic and atraumatic.      Nose: Nose normal.      Mouth/Throat:      Mouth: Mucous membranes are moist.   Eyes:      Extraocular Movements: Extraocular movements intact.      Pupils: Pupils are equal, round, and reactive to light.   Cardiovascular:      Rate and Rhythm: Normal rate and regular rhythm.      Heart sounds: Normal heart sounds.   Pulmonary:      Effort: Pulmonary effort is normal. No respiratory distress.      Breath sounds: Normal breath sounds. No stridor. No wheezing or rhonchi.   Abdominal:      General: Bowel sounds are normal.      Palpations: Abdomen is soft.   Musculoskeletal:         General: No swelling, tenderness or deformity.      Cervical back: Normal and normal range of motion. No spasms or tenderness. No pain with movement. Normal range of motion.      Thoracic back: Normal.      Lumbar back: No spasms, tenderness or bony tenderness. Normal range of motion. Negative right straight leg raise test and negative left straight leg raise test. No scoliosis.      Right lower leg: No edema.      Left lower leg: No edema.      Left ankle: Swelling present. Decreased range of motion.   Skin:     General: Skin is warm.   Neurological:      General: No focal deficit present.      Mental Status: She is alert and oriented to person, place, and time. Mental status is at baseline.      Cranial Nerves: Cranial nerves are intact. No cranial nerve deficit.      Sensory: Sensation is intact. No sensory deficit.      Motor: No weakness.      Coordination: Coordination normal.      Gait: Gait normal.      Deep Tendon Reflexes: Reflexes are normal and symmetric.   Psychiatric:         Mood and Affect: Mood normal.         Behavior: Behavior normal.           Assessment:      1. Encounter for long-term (current) use of other medications    2. Foot pain, left    3. PVD (peripheral vascular disease)    4. Chronic pain syndrome    5. Lumbosacral spondylosis without myelopathy           Plan:  1. reviewed  2.Addiction, Dependency, Tolerance, Opioid abuse-misuse, Death, Diversion Discussed. Overdose reversal drug Naloxone discussed.  3.Refill/Continue medications for pain control and function       Requested Prescriptions     Signed Prescriptions Disp Refills    HYDROcodone-acetaminophen (NORCO)  mg per tablet 120 tablet 0     Sig: Take 1 tablet by mouth every 6 (six) hours as needed for Pain.    HYDROcodone-acetaminophen (NORCO)  mg per tablet 120 tablet 0     Sig: Take 1 tablet by mouth every 6 (six) hours as needed for Pain.     4.Urine drug screen point of care obtained and consistent with prescribed medications and medication refill date    Orders Placed This Encounter   Procedures    POCT Urine Drug Screen Presump     Interpretive Information:     Negative:  No drug detected at the cut off level.   Positive:  This result represents presumptive positive for the   tested drug, other substances may yield a positive response other   than the analyte of interest. This result should be utilized for   diagnostic purpose only. Confirmation testing will be performed upon physician request only.         5.Follow with GLENN Pittman in 2 months for re-evaluation and medication refill  6. Awaiting vein surgery with Dr. Falcon for lower extremity peripheral vascular disease         report:  Reviewed and consistent with medication use as prescribed.      The total time spent for evaluation and management on 04/05/2022 including reviewing separately obtained history, performing a medically appropriate exam and evaluation, documenting clinical information in the health record, independently interpreting results and communicating them to the patient/family/caregiver, and ordering medications/tests/procedures was between 15-29 minutes.    The above plan and management options were discussed at length with patient. Patient is in agreement with the above and verbalized understanding. It  will be communicated with the referring physician via electronic record, fax, or mail.

## 2022-04-11 ENCOUNTER — TELEPHONE (OUTPATIENT)
Dept: FAMILY MEDICINE | Facility: CLINIC | Age: 62
End: 2022-04-11
Payer: COMMERCIAL

## 2022-04-11 NOTE — TELEPHONE ENCOUNTER
Patient called in to ask about oral antibiotic. Informed her no med was sent but will check orders from last week to be sure of change. Voiced understanding.    1242-called patient back to inform her that order wasn't sent to due allergy alert came up when ordering med. Informed will speak with Dr. Frausto ( to discuss what medication may be needed

## 2022-04-13 ENCOUNTER — HOSPITAL ENCOUNTER (OUTPATIENT)
Dept: RADIOLOGY | Facility: HOSPITAL | Age: 62
Discharge: HOME | End: 2022-04-13
Attending: INTERNAL MEDICINE
Payer: COMMERCIAL

## 2022-04-13 ENCOUNTER — OFFICE VISIT (OUTPATIENT)
Dept: FAMILY MEDICINE | Facility: CLINIC | Age: 62
End: 2022-04-13
Payer: COMMERCIAL

## 2022-04-13 VITALS
TEMPERATURE: 97 F | WEIGHT: 293 LBS | SYSTOLIC BLOOD PRESSURE: 142 MMHG | HEART RATE: 70 BPM | OXYGEN SATURATION: 98 % | HEIGHT: 68 IN | DIASTOLIC BLOOD PRESSURE: 88 MMHG | BODY MASS INDEX: 44.41 KG/M2 | RESPIRATION RATE: 18 BRPM

## 2022-04-13 DIAGNOSIS — Z79.4 TYPE 2 DIABETES MELLITUS WITHOUT COMPLICATION, WITH LONG-TERM CURRENT USE OF INSULIN: ICD-10-CM

## 2022-04-13 DIAGNOSIS — R53.83 OTHER FATIGUE: ICD-10-CM

## 2022-04-13 DIAGNOSIS — I10 PRIMARY HYPERTENSION: ICD-10-CM

## 2022-04-13 DIAGNOSIS — E11.9 TYPE 2 DIABETES MELLITUS WITHOUT COMPLICATION, WITH LONG-TERM CURRENT USE OF INSULIN: ICD-10-CM

## 2022-04-13 DIAGNOSIS — E66.01 MORBID OBESITY: ICD-10-CM

## 2022-04-13 DIAGNOSIS — Z11.52 ENCOUNTER FOR SCREENING FOR COVID-19: Primary | ICD-10-CM

## 2022-04-13 DIAGNOSIS — R10.12 LEFT UPPER QUADRANT ABDOMINAL PAIN: ICD-10-CM

## 2022-04-13 DIAGNOSIS — K59.09 OTHER CONSTIPATION: ICD-10-CM

## 2022-04-13 LAB
ANION GAP SERPL CALCULATED.3IONS-SCNC: 10 MMOL/L (ref 7–16)
BUN SERPL-MCNC: 12 MG/DL (ref 7–18)
BUN/CREAT SERPL: 13 (ref 6–20)
CALCIUM SERPL-MCNC: 9.2 MG/DL (ref 8.5–10.1)
CHLORIDE SERPL-SCNC: 108 MMOL/L (ref 98–107)
CO2 SERPL-SCNC: 29 MMOL/L (ref 21–32)
CREAT SERPL-MCNC: 0.9 MG/DL (ref 0.55–1.02)
CTP QC/QA: YES
GLUCOSE SERPL-MCNC: 73 MG/DL (ref 74–106)
NT-PROBNP SERPL-MCNC: 205 PG/ML (ref 1–125)
POTASSIUM SERPL-SCNC: 4.5 MMOL/L (ref 3.5–5.1)
SARS-COV-2 AG RESP QL IA.RAPID: NEGATIVE
SODIUM SERPL-SCNC: 142 MMOL/L (ref 136–145)
TSH SERPL DL<=0.005 MIU/L-ACNC: 0.2 UIU/ML (ref 0.36–3.74)

## 2022-04-13 PROCEDURE — 3079F PR MOST RECENT DIASTOLIC BLOOD PRESSURE 80-89 MM HG: ICD-10-PCS | Mod: ,,, | Performed by: INTERNAL MEDICINE

## 2022-04-13 PROCEDURE — 74018 RADEX ABDOMEN 1 VIEW: CPT | Mod: TC

## 2022-04-13 PROCEDURE — 87426 SARS CORONAVIRUS 2 ANTIGEN POCT: ICD-10-PCS | Mod: QW,,, | Performed by: INTERNAL MEDICINE

## 2022-04-13 PROCEDURE — 83880 NT-PRO NATRIURETIC PEPTIDE: ICD-10-PCS | Mod: ,,, | Performed by: CLINICAL MEDICAL LABORATORY

## 2022-04-13 PROCEDURE — 71100 X-RAY EXAM RIBS UNI 2 VIEWS: CPT | Mod: 26,LT,, | Performed by: STUDENT IN AN ORGANIZED HEALTH CARE EDUCATION/TRAINING PROGRAM

## 2022-04-13 PROCEDURE — 74018 XR KUB: ICD-10-PCS | Mod: 26,,, | Performed by: STUDENT IN AN ORGANIZED HEALTH CARE EDUCATION/TRAINING PROGRAM

## 2022-04-13 PROCEDURE — 71100 X-RAY EXAM RIBS UNI 2 VIEWS: CPT | Mod: TC,LT

## 2022-04-13 PROCEDURE — 71100 XR RIBS 2 VIEW LEFT: ICD-10-PCS | Mod: 26,LT,, | Performed by: STUDENT IN AN ORGANIZED HEALTH CARE EDUCATION/TRAINING PROGRAM

## 2022-04-13 PROCEDURE — 71046 X-RAY EXAM CHEST 2 VIEWS: CPT | Mod: 26,,, | Performed by: STUDENT IN AN ORGANIZED HEALTH CARE EDUCATION/TRAINING PROGRAM

## 2022-04-13 PROCEDURE — 71046 X-RAY EXAM CHEST 2 VIEWS: CPT | Mod: TC

## 2022-04-13 PROCEDURE — 1160F RVW MEDS BY RX/DR IN RCRD: CPT | Mod: ,,, | Performed by: INTERNAL MEDICINE

## 2022-04-13 PROCEDURE — 1159F PR MEDICATION LIST DOCUMENTED IN MEDICAL RECORD: ICD-10-PCS | Mod: ,,, | Performed by: INTERNAL MEDICINE

## 2022-04-13 PROCEDURE — 74018 RADEX ABDOMEN 1 VIEW: CPT | Mod: 26,,, | Performed by: STUDENT IN AN ORGANIZED HEALTH CARE EDUCATION/TRAINING PROGRAM

## 2022-04-13 PROCEDURE — 99214 PR OFFICE/OUTPT VISIT, EST, LEVL IV, 30-39 MIN: ICD-10-PCS | Mod: ,,, | Performed by: INTERNAL MEDICINE

## 2022-04-13 PROCEDURE — 3008F BODY MASS INDEX DOCD: CPT | Mod: ,,, | Performed by: INTERNAL MEDICINE

## 2022-04-13 PROCEDURE — 80048 BASIC METABOLIC PANEL: ICD-10-PCS | Mod: ,,, | Performed by: CLINICAL MEDICAL LABORATORY

## 2022-04-13 PROCEDURE — 3079F DIAST BP 80-89 MM HG: CPT | Mod: ,,, | Performed by: INTERNAL MEDICINE

## 2022-04-13 PROCEDURE — 80048 BASIC METABOLIC PNL TOTAL CA: CPT | Mod: ,,, | Performed by: CLINICAL MEDICAL LABORATORY

## 2022-04-13 PROCEDURE — 83880 ASSAY OF NATRIURETIC PEPTIDE: CPT | Mod: ,,, | Performed by: CLINICAL MEDICAL LABORATORY

## 2022-04-13 PROCEDURE — 1160F PR REVIEW ALL MEDS BY PRESCRIBER/CLIN PHARMACIST DOCUMENTED: ICD-10-PCS | Mod: ,,, | Performed by: INTERNAL MEDICINE

## 2022-04-13 PROCEDURE — 84443 TSH: ICD-10-PCS | Mod: GZ,,, | Performed by: CLINICAL MEDICAL LABORATORY

## 2022-04-13 PROCEDURE — 3077F SYST BP >= 140 MM HG: CPT | Mod: ,,, | Performed by: INTERNAL MEDICINE

## 2022-04-13 PROCEDURE — 1159F MED LIST DOCD IN RCRD: CPT | Mod: ,,, | Performed by: INTERNAL MEDICINE

## 2022-04-13 PROCEDURE — 87426 SARSCOV CORONAVIRUS AG IA: CPT | Mod: QW,,, | Performed by: INTERNAL MEDICINE

## 2022-04-13 PROCEDURE — 3008F PR BODY MASS INDEX (BMI) DOCUMENTED: ICD-10-PCS | Mod: ,,, | Performed by: INTERNAL MEDICINE

## 2022-04-13 PROCEDURE — 99214 OFFICE O/P EST MOD 30 MIN: CPT | Mod: ,,, | Performed by: INTERNAL MEDICINE

## 2022-04-13 PROCEDURE — 84443 ASSAY THYROID STIM HORMONE: CPT | Mod: GZ,,, | Performed by: CLINICAL MEDICAL LABORATORY

## 2022-04-13 PROCEDURE — 71046 XR CHEST PA AND LATERAL: ICD-10-PCS | Mod: 26,,, | Performed by: STUDENT IN AN ORGANIZED HEALTH CARE EDUCATION/TRAINING PROGRAM

## 2022-04-13 PROCEDURE — 3077F PR MOST RECENT SYSTOLIC BLOOD PRESSURE >= 140 MM HG: ICD-10-PCS | Mod: ,,, | Performed by: INTERNAL MEDICINE

## 2022-04-13 RX ORDER — AMITRIPTYLINE HYDROCHLORIDE 25 MG/1
25 TABLET, FILM COATED ORAL NIGHTLY PRN
Qty: 90 TABLET | Refills: 1 | Status: SHIPPED | OUTPATIENT
Start: 2022-04-13 | End: 2022-10-25 | Stop reason: SDUPTHER

## 2022-04-13 RX ORDER — PHENTERMINE HYDROCHLORIDE 37.5 MG/1
37.5 CAPSULE ORAL EVERY MORNING
Qty: 30 CAPSULE | Refills: 0 | Status: SHIPPED | OUTPATIENT
Start: 2022-04-13 | End: 2022-05-13

## 2022-04-13 NOTE — PATIENT INSTRUCTIONS
Holly was seen today for follow-up and pain.    Diagnoses and all orders for this visit:    Encounter for screening for COVID-19  -     SARS Coronavirus 2 Antigen, POCT    Type 2 diabetes mellitus without complication, with long-term current use of insulin  -     Basic Metabolic Panel; Future  -     NT-Pro Natriuretic Peptide; Future  -     Basic Metabolic Panel  -     NT-Pro Natriuretic Peptide    Morbid obesity  -     TSH; Future  -     TSH    Primary hypertension  -     NT-Pro Natriuretic Peptide; Future  -     NT-Pro Natriuretic Peptide    Left upper quadrant abdominal pain  -     X-Ray Chest PA And Lateral; Future  -     X-Ray KUB; Future  -     X-Ray Ribs 2 View Left; Future    Other fatigue    Other constipation    Other orders  The following orders have not been finalized:  -     phentermine 37.5 MG capsule  -     amitriptyline (ELAVIL) 25 MG tablet

## 2022-04-13 NOTE — PROGRESS NOTES
Subjective:       Patient ID: Holly Porter is a 61 y.o. female.    Chief Complaint: Follow-up (Lab work) and pain (Left upper side pain #7 out of 10)    Patient is here today for check up evaluation. Patient reports feeling very weak and tired today. Patient currently on Elavil 50 mg PO QHS. Will decrease to 25 mg PO QHS.  Patient is complaining of pain under her left breast. Denies chest pain. States feels it could be referred pain from constipation. States is takes a stool softener at this time. Will xray left rib series today. Will also order KUB and chest xray. Patient states she feels she is full of fluid and has gained 10 lbs in the past month. Crackles heard on auscultation. Will order BNP. Patient BMI at 54. Diet and exercise discussed. Adipex discussed and risks and benefits explained. Patient current weight at 261. Will start adipex today and advise patient to follow monthly for refills and weight loss progress. Recent labs reviewed and all within normal limits.  Will follow in 1 month.      Current Medications:    Current Outpatient Medications:     albuterol (PROVENTIL/VENTOLIN HFA) 90 mcg/actuation inhaler, Inhale 2 puffs into the lungs every 6 (six) hours as needed for Wheezing. Rescue, Disp: 18 g, Rfl: 0    allopurinoL (ZYLOPRIM) 300 MG tablet, Take 1 tablet (300 mg total) by mouth once daily., Disp: 90 tablet, Rfl: 3    apixaban (ELIQUIS) 5 mg Tab, Take 1 tablet (5 mg total) by mouth 2 (two) times daily., Disp: 60 tablet, Rfl: 3    aspirin (ECOTRIN) 81 MG EC tablet, Take 1 tablet (81 mg total) by mouth once daily., Disp: 90 tablet, Rfl: 1    cilostazoL (PLETAL) 100 MG Tab, Take 1 tablet (100 mg total) by mouth 2 (two) times daily., Disp: 90 tablet, Rfl: 1    colchicine (COLCRYS) 0.6 mg tablet, One pill three times a day for two days,then one pill daily till out, Disp: 30 tablet, Rfl: 2    fluticasone-salmeterol diskus inhaler 250-50 mcg, Inhale 1 puff into the lungs 2 (two) times daily.  "Controller, Disp: 60 each, Rfl: 5    HYDROcodone-acetaminophen (NORCO)  mg per tablet, , Disp: , Rfl:     HYDROcodone-acetaminophen (NORCO)  mg per tablet, Take 1 tablet by mouth every 6 (six) hours as needed for Pain., Disp: 120 tablet, Rfl: 0    [START ON 5/7/2022] HYDROcodone-acetaminophen (NORCO)  mg per tablet, Take 1 tablet by mouth every 6 (six) hours as needed for Pain., Disp: 120 tablet, Rfl: 0    hydrOXYzine HCL (ATARAX) 25 MG tablet, Take 1 tablet (25 mg total) by mouth every 6 (six) hours as needed for Itching., Disp: 60 tablet, Rfl: 0    insulin (LANTUS SOLOSTAR U-100 INSULIN) glargine 100 units/mL (3mL) SubQ pen, Inject 10 units sq daily, Disp: 15 mL, Rfl: 1    insulin detemir U-100 (LEVEMIR FLEXTOUCH U-100 INSULN) 100 unit/mL (3 mL) InPn pen, Inject 10 Units into the skin every evening. 15 ml with 1 refill, Disp: , Rfl:     levothyroxine (SYNTHROID, LEVOTHROID) 175 MCG tablet, Take 1 tablet (175 mcg total) by mouth once daily., Disp: 90 tablet, Rfl: 1    loratadine (CLARITIN) 10 mg tablet, Take 1 tablet (10 mg total) by mouth once daily., Disp: 30 tablet, Rfl: 0    losartan-hydrochlorothiazide 50-12.5 mg (HYZAAR) 50-12.5 mg per tablet, Take 1 tablet by mouth once daily., Disp: 90 tablet, Rfl: 1    metOLazone (ZAROXOLYN) 2.5 MG tablet, Take 1 tablet (2.5 mg total) by mouth once daily., Disp: 30 tablet, Rfl: 2    mupirocin (BACTROBAN) 2 % ointment, Apply topically 2 (two) times daily., Disp: 80 g, Rfl: 2    mupirocin (BACTROBAN) 2 % ointment, Apply topically 2 (two) times daily. for 10 days, Disp: 30 g, Rfl: 0    NOVOFINE 32 32 gauge x 1/4" Ndle, Use as directed once daily., Disp: 90 each, Rfl: 3    pantoprazole (PROTONIX) 40 MG tablet, Take 1 tablet (40 mg total) by mouth once daily., Disp: 90 tablet, Rfl: 1    potassium chloride (MICRO-K) 10 MEQ CpSR, , Disp: , Rfl:     pregabalin (LYRICA) 100 MG capsule, Take 1 capsule (100 mg total) by mouth every 12 (twelve) " hours., Disp: 60 capsule, Rfl: 1    topiramate (TOPAMAX) 100 MG tablet, Take 1.5 tablets (150 mg total) by mouth 2 (two) times daily., Disp: 90 tablet, Rfl: 11    triamcinolone acetonide 0.1% (KENALOG) 0.1 % cream, Apply topically 3 (three) times daily., Disp: 400 g, Rfl: 2  No current facility-administered medications for this visit.    Facility-Administered Medications Ordered in Other Visits:     [START ON 4/15/2022] LIDOcaine-EPINEPHrine 1%-1:100,000 30 mL, LIDOcaine HCL 10 mg/ml (1%) 20 mL, sodium bicarbonate 10 mL in sodium chloride 0.9% 500 mL solution, , MISCELLANEOUS, Once, Matty Falcon, DO    Last Labs:     Office Visit on 04/13/2022   Component Date Value    SARS Coronavirus 2 Antig* 04/13/2022 Negative      Acceptab* 04/13/2022 Yes    Office Visit on 04/04/2022   Component Date Value    ESR Westergren 04/04/2022 3     WBC 04/04/2022 10.74     RBC 04/04/2022 5.71 (A)    Hemoglobin 04/04/2022 15.5     Hematocrit 04/04/2022 50.7 (A)    MCV 04/04/2022 88.8     MCH 04/04/2022 27.1     MCHC 04/04/2022 30.6 (A)    RDW 04/04/2022 18.4 (A)    Platelet Count 04/04/2022 243     MPV 04/04/2022 12.7 (A)    Neutrophils % 04/04/2022 62.5     Lymphocytes % 04/04/2022 27.0     Monocytes % 04/04/2022 6.1 (A)    Eosinophils % 04/04/2022 3.1     Basophils % 04/04/2022 0.7     Immature Granulocytes % 04/04/2022 0.6 (A)    nRBC, Auto 04/04/2022 0.0     Neutrophils, Abs 04/04/2022 6.71     Lymphocytes, Absolute 04/04/2022 2.90     Monocytes, Absolute 04/04/2022 0.66     Eosinophils, Absolute 04/04/2022 0.33     Basophils, Absolute 04/04/2022 0.08     Immature Granulocytes, A* 04/04/2022 0.06 (A)    nRBC, Absolute 04/04/2022 0.00     Diff Type 04/04/2022 Auto    Office Visit on 04/04/2022   Component Date Value    POC Amphetamines 04/04/2022 Negative     POC Barbiturates 04/04/2022 Negative     POC Benzodiazepines 04/04/2022 Negative     POC Cocaine 04/04/2022 Negative      POC THC 04/04/2022 Negative     POC Methadone 04/04/2022 Negative     POC Methamphetamine 04/04/2022 Negative     POC Opiates 04/04/2022 Presumptive Positive (A)    POC Oxycodone 04/04/2022 Negative     POC Phencyclidine 04/04/2022 Negative     POC Methylenedioxymetham* 04/04/2022 Negative     POC Tricyclic Antidepres* 04/04/2022 Negative     POC Buprenorphine 04/04/2022 Negative      Acceptab* 04/04/2022 Yes     POC Temperature (Urine) 04/04/2022 90        Last Imaging:  X-Ray Chest PA And Lateral  Narrative: EXAMINATION:  XR CHEST PA AND LATERAL    CLINICAL HISTORY:  Left upper quadrant pain    TECHNIQUE:  XR CHEST PA AND LATERAL    COMPARISON:  Comparisons were reviewed, if available.    FINDINGS:  No lines or tubes.    Lungs are clear.    Normal pleura.    Enlarged heart    No new acute bone findings.  Impression: As above.    Electronically signed by: Damon Barrow  Date:    04/13/2022  Time:    13:41  X-Ray KUB  Narrative: EXAMINATION:  XR KUB    CLINICAL HISTORY:  Left upper quadrant pain    TECHNIQUE:  XR KUB    COMPARISON:  Comparisons were reviewed, if available.    FINDINGS:  Moderate colonic stool.  No bowel obstruction.  No free air.  No renal calculi.  Impression: As above.    Electronically signed by: Damon Barrow  Date:    04/13/2022  Time:    13:37  X-Ray Ribs 2 View Left  Narrative: EXAMINATION:  XR RIBS 2 VIEW LEFT    CLINICAL HISTORY:  Left upper quadrant pain    TECHNIQUE:  XR RIBS 2 VIEW LEFT    COMPARISON:  Comparisons were reviewed, if available.    FINDINGS:  Left lower ribs poorly visualized.  Otherwise no acutely displaced left-sided rib fracture.  Impression: As above.    Electronically signed by: Damon Barrow  Date:    04/13/2022  Time:    13:36         Review of Systems   Constitutional: Positive for fatigue.   Gastrointestinal: Positive for change in bowel habit, constipation and change in bowel habit.   Integumentary:  Positive for breast  tenderness.   Neurological: Positive for weakness.   All other systems reviewed and are negative.  Breast: Positive for tenderness.        Objective:      Physical Exam  Vitals reviewed.   Constitutional:       Appearance: Normal appearance. She is obese.   Cardiovascular:      Rate and Rhythm: Normal rate and regular rhythm.      Pulses: Normal pulses.      Heart sounds: Normal heart sounds.   Pulmonary:      Effort: Pulmonary effort is normal.      Breath sounds: Normal breath sounds.   Abdominal:      General: Abdomen is flat. Bowel sounds are normal.      Palpations: Abdomen is soft.   Musculoskeletal:         General: Normal range of motion.      Cervical back: Normal range of motion and neck supple.   Skin:     General: Skin is warm and dry.   Neurological:      General: No focal deficit present.      Mental Status: She is alert and oriented to person, place, and time. Mental status is at baseline.         Assessment:       1. Encounter for screening for COVID-19  SARS Coronavirus 2 Antigen, POCT   2. Type 2 diabetes mellitus without complication, with long-term current use of insulin  Basic Metabolic Panel    NT-Pro Natriuretic Peptide    Basic Metabolic Panel    NT-Pro Natriuretic Peptide   3. Morbid obesity  TSH    TSH   4. Primary hypertension  NT-Pro Natriuretic Peptide    NT-Pro Natriuretic Peptide   5. Left upper quadrant abdominal pain  X-Ray Chest PA And Lateral    X-Ray KUB    X-Ray Ribs 2 View Left   6. Other fatigue     7. Other constipation          Plan:         Holly was seen today for follow-up and pain.    Diagnoses and all orders for this visit:    Encounter for screening for COVID-19  -     SARS Coronavirus 2 Antigen, POCT    Type 2 diabetes mellitus without complication, with long-term current use of insulin  -     Basic Metabolic Panel; Future  -     NT-Pro Natriuretic Peptide; Future  -     Basic Metabolic Panel  -     NT-Pro Natriuretic Peptide    Morbid obesity  -     TSH; Future  -      TSH    Primary hypertension  -     NT-Pro Natriuretic Peptide; Future  -     NT-Pro Natriuretic Peptide    Left upper quadrant abdominal pain  -     X-Ray Chest PA And Lateral; Future  -     X-Ray KUB; Future  -     X-Ray Ribs 2 View Left; Future    Other fatigue    Other constipation    Other orders  The following orders have not been finalized:  -     phentermine 37.5 MG capsule  -     amitriptyline (ELAVIL) 25 MG tablet

## 2022-04-14 ENCOUNTER — TELEPHONE (OUTPATIENT)
Dept: FAMILY MEDICINE | Facility: CLINIC | Age: 62
End: 2022-04-14
Payer: COMMERCIAL

## 2022-04-14 NOTE — TELEPHONE ENCOUNTER
----- Message from Saida Soto sent at 4/13/2022  3:58 PM CDT -----  PT CAME IN TODAY FOR HER APPT AND STILL HAVEN'T RECEIVED HER ADEPIX PRESCRIPTION.....& IS WONDERING WHAT'S THE WHOLE UP [phone]

## 2022-04-14 NOTE — TELEPHONE ENCOUNTER
Call returned to pt to let her know that Adipex was sent to Mr. Coronel in [place] on 4/13/22 at 3:14 p.m.     No answer.

## 2022-04-15 ENCOUNTER — ANESTHESIA (OUTPATIENT)
Dept: SURGERY | Facility: HOSPITAL | Age: 62
End: 2022-04-15
Payer: COMMERCIAL

## 2022-04-15 ENCOUNTER — ANESTHESIA EVENT (OUTPATIENT)
Dept: SURGERY | Facility: HOSPITAL | Age: 62
End: 2022-04-15
Payer: COMMERCIAL

## 2022-04-15 ENCOUNTER — HOSPITAL ENCOUNTER (OUTPATIENT)
Facility: HOSPITAL | Age: 62
Discharge: HOME | End: 2022-04-15
Attending: FAMILY MEDICINE | Admitting: FAMILY MEDICINE
Payer: COMMERCIAL

## 2022-04-15 ENCOUNTER — TELEPHONE (OUTPATIENT)
Dept: FAMILY MEDICINE | Facility: CLINIC | Age: 62
End: 2022-04-15
Payer: COMMERCIAL

## 2022-04-15 VITALS
DIASTOLIC BLOOD PRESSURE: 82 MMHG | WEIGHT: 293 LBS | SYSTOLIC BLOOD PRESSURE: 148 MMHG | TEMPERATURE: 98 F | HEART RATE: 64 BPM | RESPIRATION RATE: 15 BRPM | HEIGHT: 66 IN | OXYGEN SATURATION: 95 % | BODY MASS INDEX: 47.09 KG/M2

## 2022-04-15 DIAGNOSIS — I87.2 VENOUS INSUFFICIENCY (CHRONIC) (PERIPHERAL): Primary | ICD-10-CM

## 2022-04-15 LAB
GLUCOSE SERPL-MCNC: 117 MG/DL (ref 70–105)
GLUCOSE SERPL-MCNC: 54 MG/DL (ref 70–105)
GLUCOSE SERPL-MCNC: 66 MG/DL (ref 70–105)
GLUCOSE SERPL-MCNC: 82 MG/DL (ref 70–105)
GLUCOSE SERPL-MCNC: 83 MG/DL (ref 70–105)

## 2022-04-15 PROCEDURE — 71000015 HC POSTOP RECOV 1ST HR: Performed by: FAMILY MEDICINE

## 2022-04-15 PROCEDURE — 36475 PR ENDOVENOUS RF, 1ST VEIN: ICD-10-PCS | Mod: LT,,, | Performed by: FAMILY MEDICINE

## 2022-04-15 PROCEDURE — 37000008 HC ANESTHESIA 1ST 15 MINUTES: Performed by: FAMILY MEDICINE

## 2022-04-15 PROCEDURE — 71000016 HC POSTOP RECOV ADDL HR: Performed by: FAMILY MEDICINE

## 2022-04-15 PROCEDURE — 94640 AIRWAY INHALATION TREATMENT: CPT | Mod: XB

## 2022-04-15 PROCEDURE — 94761 N-INVAS EAR/PLS OXIMETRY MLT: CPT

## 2022-04-15 PROCEDURE — 25000242 PHARM REV CODE 250 ALT 637 W/ HCPCS: Performed by: FAMILY MEDICINE

## 2022-04-15 PROCEDURE — 25000242 PHARM REV CODE 250 ALT 637 W/ HCPCS: Performed by: ANESTHESIOLOGY

## 2022-04-15 PROCEDURE — 27201423 OPTIME MED/SURG SUP & DEVICES STERILE SUPPLY: Performed by: FAMILY MEDICINE

## 2022-04-15 PROCEDURE — D9220A PRA ANESTHESIA: ICD-10-PCS | Mod: CRNA,,, | Performed by: NURSE ANESTHETIST, CERTIFIED REGISTERED

## 2022-04-15 PROCEDURE — 82962 GLUCOSE BLOOD TEST: CPT

## 2022-04-15 PROCEDURE — 36000704 HC OR TIME LEV I 1ST 15 MIN: Performed by: FAMILY MEDICINE

## 2022-04-15 PROCEDURE — 25000003 PHARM REV CODE 250: Performed by: ANESTHESIOLOGY

## 2022-04-15 PROCEDURE — 36000705 HC OR TIME LEV I EA ADD 15 MIN: Performed by: FAMILY MEDICINE

## 2022-04-15 PROCEDURE — 37000009 HC ANESTHESIA EA ADD 15 MINS: Performed by: FAMILY MEDICINE

## 2022-04-15 PROCEDURE — D9220A PRA ANESTHESIA: Mod: CRNA,,, | Performed by: NURSE ANESTHETIST, CERTIFIED REGISTERED

## 2022-04-15 PROCEDURE — D9220A PRA ANESTHESIA: Mod: ANES,,, | Performed by: ANESTHESIOLOGY

## 2022-04-15 PROCEDURE — 36465 NJX NONCMPND SCLRSNT 1 VEIN: CPT | Mod: LT,,, | Performed by: FAMILY MEDICINE

## 2022-04-15 PROCEDURE — 36475 ENDOVENOUS RF 1ST VEIN: CPT | Mod: LT,,, | Performed by: FAMILY MEDICINE

## 2022-04-15 PROCEDURE — 25000003 PHARM REV CODE 250: Performed by: NURSE ANESTHETIST, CERTIFIED REGISTERED

## 2022-04-15 PROCEDURE — 36465 PR INJ, NONCMPND FOAM SCLEROSANT, 1 VEIN: ICD-10-PCS | Mod: LT,,, | Performed by: FAMILY MEDICINE

## 2022-04-15 PROCEDURE — 27000221 HC OXYGEN, UP TO 24 HOURS

## 2022-04-15 PROCEDURE — 71000033 HC RECOVERY, INTIAL HOUR: Performed by: FAMILY MEDICINE

## 2022-04-15 PROCEDURE — D9220A PRA ANESTHESIA: ICD-10-PCS | Mod: ANES,,, | Performed by: ANESTHESIOLOGY

## 2022-04-15 PROCEDURE — 25000003 PHARM REV CODE 250: Performed by: FAMILY MEDICINE

## 2022-04-15 PROCEDURE — 63600175 PHARM REV CODE 636 W HCPCS: Performed by: NURSE ANESTHETIST, CERTIFIED REGISTERED

## 2022-04-15 RX ORDER — SODIUM CHLORIDE 9 MG/ML
30 INJECTION, SOLUTION INTRAVENOUS CONTINUOUS
Status: DISCONTINUED | OUTPATIENT
Start: 2022-04-15 | End: 2022-04-15 | Stop reason: HOSPADM

## 2022-04-15 RX ORDER — PROPOFOL 10 MG/ML
VIAL (ML) INTRAVENOUS
Status: DISCONTINUED | OUTPATIENT
Start: 2022-04-15 | End: 2022-04-15

## 2022-04-15 RX ORDER — SODIUM CHLORIDE, SODIUM LACTATE, POTASSIUM CHLORIDE, CALCIUM CHLORIDE 600; 310; 30; 20 MG/100ML; MG/100ML; MG/100ML; MG/100ML
INJECTION, SOLUTION INTRAVENOUS CONTINUOUS
Status: DISCONTINUED | OUTPATIENT
Start: 2022-04-15 | End: 2022-04-15 | Stop reason: HOSPADM

## 2022-04-15 RX ORDER — MEPERIDINE HYDROCHLORIDE 25 MG/ML
25 INJECTION INTRAMUSCULAR; INTRAVENOUS; SUBCUTANEOUS EVERY 10 MIN PRN
Status: DISCONTINUED | OUTPATIENT
Start: 2022-04-15 | End: 2022-04-15 | Stop reason: HOSPADM

## 2022-04-15 RX ORDER — HYDROMORPHONE HYDROCHLORIDE 2 MG/ML
0.5 INJECTION, SOLUTION INTRAMUSCULAR; INTRAVENOUS; SUBCUTANEOUS EVERY 5 MIN PRN
Status: DISCONTINUED | OUTPATIENT
Start: 2022-04-15 | End: 2022-04-15 | Stop reason: HOSPADM

## 2022-04-15 RX ORDER — ONDANSETRON 2 MG/ML
4 INJECTION INTRAMUSCULAR; INTRAVENOUS DAILY PRN
Status: DISCONTINUED | OUTPATIENT
Start: 2022-04-15 | End: 2022-04-15 | Stop reason: HOSPADM

## 2022-04-15 RX ORDER — MORPHINE SULFATE 10 MG/ML
4 INJECTION INTRAMUSCULAR; INTRAVENOUS; SUBCUTANEOUS EVERY 5 MIN PRN
Status: DISCONTINUED | OUTPATIENT
Start: 2022-04-15 | End: 2022-04-15 | Stop reason: HOSPADM

## 2022-04-15 RX ORDER — MUPIROCIN 20 MG/G
OINTMENT TOPICAL
Status: DISCONTINUED | OUTPATIENT
Start: 2022-04-15 | End: 2022-04-15 | Stop reason: HOSPADM

## 2022-04-15 RX ORDER — IPRATROPIUM BROMIDE AND ALBUTEROL SULFATE 2.5; .5 MG/3ML; MG/3ML
3 SOLUTION RESPIRATORY (INHALATION) ONCE
Status: COMPLETED | OUTPATIENT
Start: 2022-04-15 | End: 2022-04-15

## 2022-04-15 RX ORDER — LIDOCAINE HYDROCHLORIDE 20 MG/ML
INJECTION, SOLUTION EPIDURAL; INFILTRATION; INTRACAUDAL; PERINEURAL
Status: DISCONTINUED | OUTPATIENT
Start: 2022-04-15 | End: 2022-04-15

## 2022-04-15 RX ORDER — LIDOCAINE HYDROCHLORIDE 10 MG/ML
1 INJECTION INFILTRATION; PERINEURAL ONCE
Status: DISCONTINUED | OUTPATIENT
Start: 2022-04-15 | End: 2022-04-15 | Stop reason: HOSPADM

## 2022-04-15 RX ORDER — DIPHENHYDRAMINE HYDROCHLORIDE 50 MG/ML
25 INJECTION INTRAMUSCULAR; INTRAVENOUS EVERY 6 HOURS PRN
Status: DISCONTINUED | OUTPATIENT
Start: 2022-04-15 | End: 2022-04-15 | Stop reason: HOSPADM

## 2022-04-15 RX ADMIN — SODIUM BICARBONATE: 84 INJECTION, SOLUTION INTRAVENOUS at 11:04

## 2022-04-15 RX ADMIN — PROPOFOL 200 MG: 10 INJECTION, EMULSION INTRAVENOUS at 11:04

## 2022-04-15 RX ADMIN — SODIUM CHLORIDE 30 ML/HR: 9 INJECTION, SOLUTION INTRAVENOUS at 07:04

## 2022-04-15 RX ADMIN — LIDOCAINE HYDROCHLORIDE 100 MG: 20 INJECTION, SOLUTION INTRAVENOUS at 10:04

## 2022-04-15 RX ADMIN — IPRATROPIUM BROMIDE AND ALBUTEROL SULFATE 3 ML: .5; 3 SOLUTION RESPIRATORY (INHALATION) at 01:04

## 2022-04-15 RX ADMIN — DEXTROSE MONOHYDRATE 25 G: 25 INJECTION, SOLUTION INTRAVENOUS at 10:04

## 2022-04-15 RX ADMIN — DEXTROSE MONOHYDRATE 25 G: 25 INJECTION, SOLUTION INTRAVENOUS at 12:04

## 2022-04-15 RX ADMIN — IPRATROPIUM BROMIDE AND ALBUTEROL SULFATE 3 ML: 2.5; .5 SOLUTION RESPIRATORY (INHALATION) at 09:04

## 2022-04-15 RX ADMIN — PROPOFOL 200 MG: 10 INJECTION, EMULSION INTRAVENOUS at 10:04

## 2022-04-15 NOTE — INTERVAL H&P NOTE
The patient has been examined and the H&P has been reviewed:    I concur with the findings and no changes have occurred since H&P was written.    Surgery risks, benefits and alternative options discussed and understood by patient/family for a left calf  RF ablation with non thermal ablation of distal anterior thigh vein.          There are no hospital problems to display for this patient.

## 2022-04-15 NOTE — TRANSFER OF CARE
"Anesthesia Transfer of Care Note    Patient: Holly Porter    Procedure(s) Performed: Procedure(s) (LRB):  Left calf  Radiofrequency Ablation (Left)  Left Distal Accessory Thigh vein Varithena Ablation. (Left)    Patient location: PACU    Anesthesia Type: general    Transport from OR: Transported from OR on room air with adequate spontaneous ventilation    Post pain: adequate analgesia    Post assessment: no apparent anesthetic complications and tolerated procedure well    Post vital signs: stable    Level of consciousness: awake    Nausea/Vomiting: no nausea    Complications: none    Transfer of care protocol was followed      Last vitals:   Visit Vitals  BP (!) 155/72   Pulse (!) 57   Temp 36.6 °C (97.9 °F)   Resp 18   Ht 5' 6" (1.676 m)   Wt (!) 161.5 kg (356 lb)   SpO2 100%   Breastfeeding No   BMI 57.46 kg/m²     "

## 2022-04-15 NOTE — PLAN OF CARE
1330 Chinyere GODFREY came to check left leg dressing. Dressing changed and pressure dressing applied to inner calf. Will continue to monitor.

## 2022-04-15 NOTE — PLAN OF CARE
1202 Rec'd pt to PACU arousable to verbal stimuli. VSS. No signs of distress noted. Left lower leg dressing c/d/i.     1205 Accu check 66. D50 given IV per orders, see MAR.     1234 Out of PACU. VSS. No signs of distress/bleeding.     1240 Pt to ASC 19. No family at bedside. Report given to DARIEN Jewell RN. Left lower leg c/d/i with no bleeding noted. Pt awake and alert. Denies needs. /96, P 58, R 14, O2 98% 2L NC.

## 2022-04-15 NOTE — OP NOTE
Procedure:  Endovenous radio frequency ablation of the Left calf  vein and non thermal ablation of the distal anterior thigh accessory vein of the lower extremity.  Non thermal ablation of the left distal anterior thigh accessory vein vein under ultrasound guidance using Varithena non compounded foam Sclerosant 1%.    Preprocedure and postprocedure diagnosis: Symptomatic varicose veins and venous insufficiency of the Left leg and other complications to include leg pain, leg edema and chronic skin changes.  The patient has previously failed conservative therapy consider a C5 chronic venous disease with venous hypertension.    Anesthesia: Local infiltration with monitored anesthesia care.  Estimated blood loss 5 cc.  Specimens removed none.   Complications none.  Implants or grafts: none.    Findings:  A total of 15 cycles of radiofrequency ablation was used to treat 4.5 cm of vein over 5 minutes.  Maximum diameter of the  vein treated is 4.7 mm with a maximum reflux time of 0.5 seconds.    A total of 15 cc of Varithena non compound and foam sclerosant 1%, was used to treat the left distal anterior thigh accessory vein vein.  Maximum diameter of the vein treated was 7.4 mm with a maximum reflux time of 1.1 seconds.    Procedure detail:  After patient identification, surgical site verification, and marked symptomatic Left leg, the patient was taken to the procedure room and placed in the supine position.  Monitored anesthesia care was induced.  The patient was prepped and draped in the normal sterile fashion leaving the Left leg exposed.  A time-out performed identifying the patient and the correct surgical site.  Tumescent anesthesia was then infiltrated at the site of expected cannulation of the Left calf  vein.  The Left calf  vein was then cannulated using the modified Seldinger technique to place a 6 Dutch sheath.  I then advanced a 2.5x10 cm radiofrequency ablation  catheter through the sheath up the saphenous vein to approximately 2 cm distal to the epigastric vein.  Tumescence anesthesia was then infiltrated around the vein to be treated.  The patient was placed in the head-down position.  Radiofrequency ablation of the Left calf  vein was then performed using a total of 15 cycles of radiofrequency ablation to treat 4.5 cm of vein over 15 minutes.  After this was done the catheter and sheath were removed and noted to be intact.  Completion ultrasound revealed a widely patent popliteal vein, sapheno popliteal junction and an ablated calf  vein.      Local 1% lidocaine cutaneous anesthetic was infiltrated at the site of expected cannulation of the left March 11 her vein.  A 5 [place] micropuncture introducer kit was used to gain venous access.  The patient's leg was then elevated approximately 45° above the horizontal and held in that position for 10 minutes to aid in the exsanguination of blood from the treatment area.  Using ultrasound guidance I administered 5 cc at a time of Varithena non compounded a foam sclerosant through the access site into the saphenous varicosity and multiple adjacent varicosities.  Evacuation of blood from the target veins was improved by manipulation of the ultrasound probe which also helped to improve vein filling with the medication.  The volume, rate and flow of the medication was determined by using ultrasound appearance of the saphenous vein system filling with the medication and faustino.  The previously mapping treated veins were scanned extensively with ultrasound to confirm vascular spasm.  After this was done, the catheter and sheath were removed and noted to be intact.  Completion ultrasound revealed a widely patent popliteal and femoral vein and an ablated distal anterior thigh accessory vein vein.  The patient's leg was kept in elevation of approximately 45° above the horizontal and held in that position for  approximately 10 minutes while compression pads were placed over the overlying course of the treated veins and short stretch bandages were applied over the treated area.  The patient was transferred to the recovery room in stable condition.

## 2022-04-15 NOTE — PLAN OF CARE
0800 pt laying in bed. Audible wheeze heard. O2 sat 94% on room air. Pt states she wheezes at home she has history of asthma. Dr. Harrison notified new orders received. Will continue to monitor.

## 2022-04-15 NOTE — TELEPHONE ENCOUNTER
----- Message from Regan Frausto MD sent at 4/14/2022  2:20 PM CDT -----  Need to see 1 week  abnl   tsh     Called patient X2 without success. Was able to contact another person on profile. Namrata-says patient was to have surgery today. Informed her, if she can, to tell patient to call on Monday and schedule appointment for Wednesday or Thursday with Dr. Frausto to discuss lab results. Namrata voiced understanding-says she will give her the message

## 2022-04-15 NOTE — ANESTHESIA POSTPROCEDURE EVALUATION
Anesthesia Post Evaluation    Patient: Holly Porter    Procedure(s) Performed: Procedure(s) (LRB):  Left calf  Radiofrequency Ablation (Left)  Left Distal Accessory Thigh vein Varithena Ablation. (Left)    Final Anesthesia Type: general      Patient location during evaluation: PACU  Post-procedure vital signs: reviewed and stable  Pain management: adequate  Airway patency: patent  JOVANNI mitigation strategies: Extubation and recovery carried out in lateral, semiupright, or other nonsupine position  PONV status at discharge: No PONV  Anesthetic complications: no      Cardiovascular status: hemodynamically stable  Respiratory status: unassisted  Hydration status: euvolemic  Follow-up not needed.          Vitals Value Taken Time   /82 04/15/22 1345   Temp 36.7 °C (98 °F) 04/15/22 1345   Pulse 64 04/15/22 1345   Resp 15 04/15/22 1332   SpO2 95 % 04/15/22 1345         Event Time   Out of Recovery 12:34:00         Pain/Pablo Score: Pablo Score: 9 (4/15/2022 12:30 PM)

## 2022-04-15 NOTE — ANESTHESIA PREPROCEDURE EVALUATION
"                                                                                                             04/15/2022  Holly Porter is a 61 y.o., female.      Pre-op Assessment    I have reviewed the Patient Summary Reports.    I have reviewed the NPO Status.   I have reviewed the Medications.     Review of Systems         Anesthesia Plan  Type of Anesthesia, risks & benefits discussed:    Anesthesia Type: Gen Natural Airway  Intra-op Monitoring Plan: Standard ASA Monitors  Induction:  IV  Informed Consent: Informed consent signed with the Patient and all parties understand the risks and agree with anesthesia plan.  All questions answered.   ASA Score: 3    Ready For Surgery From Anesthesia Perspective.     .  NPO greater than 8 hours  "Slow to wake up" after thyroid surgery  Allergies   Ammonium Peroxydisulfate Shortness Of Breath High  4/7/2021    Avocado (laurus [place]sea) Anaphylaxis High  4/7/2021    Bananas [Banana] Anaphylaxis, Swelling High  4/7/2021    CRAMPS,      Chocolate Flavor Anaphylaxis High  4/7/2021    MOUTH SWELLING      Silvadene [Silver Sulfadiazine]  High  1/11/2022    Clindamycin  Not Specified  1/28/2022    Corticosteroids (glucocorticoids)  Not Specified  5/5/2021    Hydrocortisone Blisters Not Specified  4/7/2021    Lasix [Furosemide] Blisters Not Specified  4/7/2021    BURNS SKIN      Nutritional Supplement-fiber Itching Not Specified  6/12/2020    Pregabalin  Not Specified  6/12/2020    Shellfish Containing Products  Not Specified  6/12/2020    Adhesive Rash, Blisters Low  4/7/2021    Iodine And Iodide Containing Products Rash Low  4/7/2021    Latex, Natural Rubber Rash Low  4/7/2021    Pcn [Penicillins] Rash Low  4/7/2021    Sulfa (sulfonamide Antibiotics) Rash, Blisters Low  4/7/2021      Hct 51  Accucheck 54 . . . Gave one amp D50 and glucose up to 117  1/28/22 EKG: Sinus rhythm with Fusion complexes 74 bpm  11/24/21 Echo: trace MR; EF 50%    Migraines Anxiety state   Cellulitis COPD " (chronic obstructive pulmonary disease)   Chronic pain syndrome PVD (peripheral vascular disease)   BMI 50.0-59.9, adult Atherosclerosis of native artery of extremity with intermittent claudication   Chronic peripheral venous hypertension Embolism and thrombosis of superficial veins of unspecified lower extremity   GERD (gastroesophageal reflux disease) Hypertension   DVT (deep venous thrombosis) Frequent headaches   Hereditary lymphedema Hyperlipidemia   Hypothyroidism Migraines   Morbid obesity Nicotine dependence   Non-pressure chronic ulcer of left lower leg Sleep apnea   Osteoarthritis Other skin changes   Bilateral leg pain Pain in right leg   Pain in left leg Seizure disorder   Diabetes Venous insufficiency (chronic) (peripheral)   Venous stasis Vitamin D deficiency   Hx of thyroid cancer Diabetes mellitus, type 2   JOVANNI  Room air sats ranging from 85 - 96% this morning    Airway exam deferred (COVID precautions); adequate ROM at neck.

## 2022-04-20 ENCOUNTER — HOSPITAL ENCOUNTER (OUTPATIENT)
Dept: RADIOLOGY | Facility: HOSPITAL | Age: 62
Discharge: HOME | End: 2022-04-20
Attending: FAMILY MEDICINE
Payer: COMMERCIAL

## 2022-04-20 ENCOUNTER — OFFICE VISIT (OUTPATIENT)
Dept: VASCULAR SURGERY | Facility: CLINIC | Age: 62
End: 2022-04-20
Payer: COMMERCIAL

## 2022-04-20 ENCOUNTER — TELEPHONE (OUTPATIENT)
Dept: FAMILY MEDICINE | Facility: CLINIC | Age: 62
End: 2022-04-20
Payer: COMMERCIAL

## 2022-04-20 VITALS
WEIGHT: 293 LBS | HEIGHT: 66 IN | BODY MASS INDEX: 47.09 KG/M2 | DIASTOLIC BLOOD PRESSURE: 80 MMHG | HEART RATE: 76 BPM | SYSTOLIC BLOOD PRESSURE: 130 MMHG | RESPIRATION RATE: 20 BRPM

## 2022-04-20 DIAGNOSIS — R60.0 EDEMA, LOWER EXTREMITY: ICD-10-CM

## 2022-04-20 DIAGNOSIS — I83.813 VARICOSE VEINS OF BILATERAL LOWER EXTREMITIES WITH PAIN: ICD-10-CM

## 2022-04-20 DIAGNOSIS — I87.2 VENOUS INSUFFICIENCY: ICD-10-CM

## 2022-04-20 DIAGNOSIS — L81.9 HYPERPIGMENTATION OF SKIN: Primary | ICD-10-CM

## 2022-04-20 DIAGNOSIS — I87.2 VENOUS INSUFFICIENCY (CHRONIC) (PERIPHERAL): ICD-10-CM

## 2022-04-20 PROCEDURE — 1159F MED LIST DOCD IN RCRD: CPT | Mod: CPTII,,, | Performed by: FAMILY MEDICINE

## 2022-04-20 PROCEDURE — 3075F PR MOST RECENT SYSTOLIC BLOOD PRESS GE 130-139MM HG: ICD-10-PCS | Mod: CPTII,,, | Performed by: FAMILY MEDICINE

## 2022-04-20 PROCEDURE — 93971 US POST ABLATION VENOUS: ICD-10-PCS | Mod: 26,,, | Performed by: FAMILY MEDICINE

## 2022-04-20 PROCEDURE — 99215 OFFICE O/P EST HI 40 MIN: CPT | Mod: PBBFAC,25 | Performed by: FAMILY MEDICINE

## 2022-04-20 PROCEDURE — 3079F DIAST BP 80-89 MM HG: CPT | Mod: CPTII,,, | Performed by: FAMILY MEDICINE

## 2022-04-20 PROCEDURE — 99214 PR OFFICE/OUTPT VISIT, EST, LEVL IV, 30-39 MIN: ICD-10-PCS | Mod: S$PBB,,, | Performed by: FAMILY MEDICINE

## 2022-04-20 PROCEDURE — 99214 OFFICE O/P EST MOD 30 MIN: CPT | Mod: S$PBB,,, | Performed by: FAMILY MEDICINE

## 2022-04-20 PROCEDURE — 3008F PR BODY MASS INDEX (BMI) DOCUMENTED: ICD-10-PCS | Mod: CPTII,,, | Performed by: FAMILY MEDICINE

## 2022-04-20 PROCEDURE — 3079F PR MOST RECENT DIASTOLIC BLOOD PRESSURE 80-89 MM HG: ICD-10-PCS | Mod: CPTII,,, | Performed by: FAMILY MEDICINE

## 2022-04-20 PROCEDURE — 3008F BODY MASS INDEX DOCD: CPT | Mod: CPTII,,, | Performed by: FAMILY MEDICINE

## 2022-04-20 PROCEDURE — 93971 EXTREMITY STUDY: CPT | Mod: TC

## 2022-04-20 PROCEDURE — 1160F PR REVIEW ALL MEDS BY PRESCRIBER/CLIN PHARMACIST DOCUMENTED: ICD-10-PCS | Mod: CPTII,,, | Performed by: FAMILY MEDICINE

## 2022-04-20 PROCEDURE — 93971 EXTREMITY STUDY: CPT | Mod: 26,,, | Performed by: FAMILY MEDICINE

## 2022-04-20 PROCEDURE — 3075F SYST BP GE 130 - 139MM HG: CPT | Mod: CPTII,,, | Performed by: FAMILY MEDICINE

## 2022-04-20 PROCEDURE — 1159F PR MEDICATION LIST DOCUMENTED IN MEDICAL RECORD: ICD-10-PCS | Mod: CPTII,,, | Performed by: FAMILY MEDICINE

## 2022-04-20 PROCEDURE — 1160F RVW MEDS BY RX/DR IN RCRD: CPT | Mod: CPTII,,, | Performed by: FAMILY MEDICINE

## 2022-04-20 RX ORDER — PHENTERMINE HYDROCHLORIDE 37.5 MG/1
37.5 TABLET ORAL NIGHTLY
COMMUNITY
Start: 2022-04-13 | End: 2022-05-16 | Stop reason: SDUPTHER

## 2022-04-20 RX ORDER — BUDESONIDE AND FORMOTEROL FUMARATE DIHYDRATE 160; 4.5 UG/1; UG/1
AEROSOL RESPIRATORY (INHALATION)
COMMUNITY
Start: 2022-04-07 | End: 2023-12-06

## 2022-04-20 NOTE — PROGRESS NOTES
[facility] CLINIC NOTE    Patient ID: Holly Porter is a 61 y.o. female.    I. HISTORY     Chief Complaint:   Chief Complaint   Patient presents with    Follow-up     Exam room 4.  5 days s/p left distal accessory thigh vein varithena ablation and left calf  rf ablation.        HPI: Holly Proter is a 61 y.o. female who presents to clinic today for a 5 day follow-up post non thermal ablation of the distal accessory thigh vein and thermal ablation of a left calf  vein.  Post ablation ultrasound performed today shows well ablated calf  and well ablated left distal anterior accessory thigh vein.  No signs of thrombus or complication.  The patient states that she did well over the weekend without complications.  She continues to her postoperative wrap.  No signs of bleeding or phlebitis.  I have encouraged her to wear her thigh-high wrapped with a 2 week postoperative period to help prevent against complications.  She actually looks like she has less swelling today.    The patient has had numerous venous interventions previously, see her surgical history for details.    Past Medical History:   Diagnosis Date    Anxiety state     Atherosclerosis of native artery of extremity with intermittent claudication 01/30/2019    bilateral legs    Bilateral leg pain     BMI 50.0-59.9, adult 02/22/2021    Cellulitis 06/11/2020    Chronic pain syndrome     Chronic peripheral venous hypertension 01/08/2019    COPD (chronic obstructive pulmonary disease)     Diabetes     Diabetes mellitus, type 2     DVT (deep venous thrombosis) 02/12/2020    Embolism and thrombosis of superficial veins of unspecified lower extremity 07/01/2019    bilateral    Frequent headaches 09/20/2018    GERD (gastroesophageal reflux disease)     Hereditary lymphedema     Hx of thyroid cancer     Hyperlipidemia     Hypertension     Hypothyroidism     Migraines     Migraines     Morbid obesity     Nicotine  dependence     Non-pressure chronic ulcer of left lower leg 03/09/2021    Osteoarthritis     Other skin changes 03/09/2021    bilateral gaiter regions    Pain in left leg     Pain in right leg     PVD (peripheral vascular disease) 01/08/2019    Seizure disorder     Sleep apnea     Venous insufficiency (chronic) (peripheral)     Venous stasis     Vitamin D deficiency         Past Surgical History:   Procedure Laterality Date    HYSTERECTOMY      ILIAC ARTERY STENT Bilateral 01/28/2020    Bilateral distal aorta and common iliac 8 X 59 vbx covered stents performed by Dr. Antonio Jacinto.    RADIOFREQUENCY ABLATION Left 4/15/2022    Procedure: Left calf  Radiofrequency Ablation;  Surgeon: Matty Falcon DO;  Location: [facility] OR;  Service: Vascular;  Laterality: Left;    STAB PHLEBECTOMY OF VARICOSE VEINS Left 01/25/2013    Left leg microphlebectomies x 23 stab avulsions and ligation of multiple varicose veins perrformed by Dr. Cirilo Aguirre.    THYROIDECTOMY      hx: thyroid cancer    TOE AMPUTATION Left 01/28/2020    2nd, 4th and 5th performed by Dr. Anam Saeed.    TOE AMPUTATION Right 01/28/2020    4th toe performed by Dr. Anam Saeed.    TOTAL ABDOMINAL HYSTERECTOMY W/ BILATERAL SALPINGOOPHORECTOMY      TUBAL LIGATION      VAGINAL DELIVERY      x 4    VENOUS ABLATION Right 08/09/2019    GSV Varithena Ablation performed by Dr. Estuardo Falcon    VENOUS ABLATION Left 08/02/2019    ATAV Varithena Ablation performed by Dr. Estuardo Falcno.    VENOUS ABLATION Right 07/13/2015    ATAV Laser Ablatio performed by Dr. Cirilo Aguirre.    VENOUS ABLATION Right 07/06/2015    Distal  GSV Laser Ablation performed by dr. Cirilo Aguirre.    VENOUS ABLATION Left 04/20/2015    Left Distal GSV Laser Ablation performed by dr. Cirilo Aguirre.    VENOUS ABLATION Right 10/28/2013    Right Distal GSV RF Ablation performed by Dr. Cirilo Aguirre.    VENOUS ABLATION Left 10/25/2013    SSV RF Ablation performed by dr. Cirilo Aguirre.    VENOUS  ABLATION Left 10/07/2013    Left GSV and Left ATAV RF Ablation performed by Dr. Cirilo Aguirre.    VENOUS ABLATION Right 01/21/2013    GSV RF Ablation w/micros x 22 performed by Dr. Cirilo Aguirre.    VENOUS ABLATION Left 06/25/2021    Left distal GSV Varithena Ablation       Social History     Tobacco Use   Smoking Status Current Every Day Smoker    Packs/day: 1.00    Years: 33.00    Pack years: 33.00    Types: Cigarettes   Smokeless Tobacco Never Used         Current Outpatient Medications:     albuterol (PROVENTIL/VENTOLIN HFA) 90 mcg/actuation inhaler, Inhale 2 puffs into the lungs every 6 (six) hours as needed for Wheezing. Rescue, Disp: 18 g, Rfl: 0    allopurinoL (ZYLOPRIM) 300 MG tablet, Take 1 tablet (300 mg total) by mouth once daily., Disp: 90 tablet, Rfl: 3    amitriptyline (ELAVIL) 25 MG tablet, Take 1 tablet (25 mg total) by mouth nightly as needed for Insomnia., Disp: 90 tablet, Rfl: 1    apixaban (ELIQUIS) 5 mg Tab, Take 1 tablet (5 mg total) by mouth 2 (two) times daily., Disp: 60 tablet, Rfl: 3    aspirin (ECOTRIN) 81 MG EC tablet, Take 1 tablet (81 mg total) by mouth once daily., Disp: 90 tablet, Rfl: 1    cilostazoL (PLETAL) 100 MG Tab, Take 1 tablet (100 mg total) by mouth 2 (two) times daily., Disp: 90 tablet, Rfl: 1    colchicine (COLCRYS) 0.6 mg tablet, One pill three times a day for two days,then one pill daily till out, Disp: 30 tablet, Rfl: 2    fluticasone-salmeterol diskus inhaler 250-50 mcg, Inhale 1 puff into the lungs 2 (two) times daily. Controller, Disp: 60 each, Rfl: 5    HYDROcodone-acetaminophen (NORCO)  mg per tablet, Take 1 tablet by mouth every 6 (six) hours as needed for Pain., Disp: 120 tablet, Rfl: 0    insulin (LANTUS SOLOSTAR U-100 INSULIN) glargine 100 units/mL (3mL) SubQ pen, Inject 10 units sq daily, Disp: 15 mL, Rfl: 1    insulin detemir U-100 (LEVEMIR FLEXTOUCH U-100 INSULN) 100 unit/mL (3 mL) InPn pen, Inject 10 Units into the skin every evening. 15 ml  "with 1 refill, Disp: , Rfl:     levothyroxine (SYNTHROID, LEVOTHROID) 175 MCG tablet, Take 1 tablet (175 mcg total) by mouth once daily., Disp: 90 tablet, Rfl: 1    loratadine (CLARITIN) 10 mg tablet, Take 1 tablet (10 mg total) by mouth once daily., Disp: 30 tablet, Rfl: 0    losartan-hydrochlorothiazide 50-12.5 mg (HYZAAR) 50-12.5 mg per tablet, Take 1 tablet by mouth once daily., Disp: 90 tablet, Rfl: 1    metOLazone (ZAROXOLYN) 2.5 MG tablet, Take 1 tablet (2.5 mg total) by mouth once daily., Disp: 30 tablet, Rfl: 2    mupirocin (BACTROBAN) 2 % ointment, Apply topically 2 (two) times daily., Disp: 80 g, Rfl: 2    NOVOFINE 32 32 gauge x 1/4" Ndle, Use as directed once daily., Disp: 90 each, Rfl: 3    pantoprazole (PROTONIX) 40 MG tablet, Take 1 tablet (40 mg total) by mouth once daily., Disp: 90 tablet, Rfl: 1    phentermine 37.5 MG capsule, Take 1 capsule (37.5 mg total) by mouth every morning., Disp: 30 capsule, Rfl: 0    potassium chloride (MICRO-K) 10 MEQ CpSR, , Disp: , Rfl:     pregabalin (LYRICA) 100 MG capsule, Take 1 capsule (100 mg total) by mouth every 12 (twelve) hours., Disp: 60 capsule, Rfl: 1    SYMBICORT 160-4.5 mcg/actuation HFAA, Inhale into the lungs., Disp: , Rfl:     topiramate (TOPAMAX) 100 MG tablet, Take 1.5 tablets (150 mg total) by mouth 2 (two) times daily., Disp: 90 tablet, Rfl: 11    triamcinolone acetonide 0.1% (KENALOG) 0.1 % cream, Apply topically 3 (three) times daily., Disp: 400 g, Rfl: 2    phentermine (ADIPEX-P) 37.5 mg tablet, Take 37.5 mg by mouth nightly., Disp: , Rfl:     Review of Systems   Constitutional: Negative for activity change, chills, diaphoresis, fatigue and fever.   Respiratory: Negative for cough and shortness of breath.    Cardiovascular: Positive for leg swelling. Negative for chest pain and claudication.        Hyperpigmentation LE   Gastrointestinal: Negative for nausea and vomiting.   Musculoskeletal: Positive for leg pain. Negative for joint " swelling.   Integumentary:  Negative for rash and wound.   Neurological: Negative for weakness and numbness.        II. PHYSICAL EXAM     Physical Exam  Constitutional:       General: She is awake. She is not in acute distress.     Appearance: Normal appearance. She is obese. She is not ill-appearing or toxic-appearing.   HENT:      Head: Normocephalic and atraumatic.   Eyes:      Extraocular Movements: Extraocular movements intact.      Conjunctiva/sclera: Conjunctivae normal.      Pupils: Pupils are equal, round, and reactive to light.   Neck:      Vascular: No carotid bruit or JVD.   Cardiovascular:      Rate and Rhythm: Normal rate and regular rhythm.      Pulses:           Dorsalis pedis pulses are detected w/ Doppler on the left side.        Posterior tibial pulses are detected w/ Doppler on the left side.      Heart sounds: No murmur heard.  Pulmonary:      Effort: Pulmonary effort is normal. No respiratory distress.      Breath sounds: No stridor. No wheezing, rhonchi or rales.   Musculoskeletal:         General: No swelling, tenderness or deformity.      Right lower le+ Edema present.      Left lower le+ Edema present.        Legs:       Comments:  Patient noted to have great toe and middle toe only of the left foot.  Tender to palpation along the lateral aspect and plantar surface of the foot.  No foul odor noted.  No actual purulent material noted.  Small opening noted over the most distal aspect of the location with the 5th toe with the.    Scattered hyperpigmentation and varicose veins of the bilateral Gator regions.   Feet:      Comments: Monophasic pulses of the left foot with hand-held Doppler of the DPs and PTs.  Skin:     General: Skin is warm.      Capillary Refill: Capillary refill takes less than 2 seconds.      Coloration: Skin is not ashen.      Findings: No bruising, erythema, lesion, rash or wound.   Neurological:      Mental Status: She is alert and oriented to person, place, and  time.      Motor: No weakness.   Psychiatric:         Speech: Speech normal.         Behavior: Behavior normal. Behavior is cooperative.       Reticular/Spider veins noted:  RLE: anterior calf, medial calf, ankle and foot  LLE: anterior calf, medial calf, ankle and foot    Varicose veins noted:  RLE: anterior calf, medial calf, medial thigh, ankle and foot  LLE:  anterior calf, medial calf, medial thigh, ankle and foot    CEAP Classification  Clinical Signs: Class 5 - Skin changes as defined above with healed ulceration  Etiologic Classification: Primary  Anatomic distribution: Superficial  Pathophysiologic dysfunction: Reflux       Venous Clinical Severity Score  Pain:2=Daily, moderate activity limitation, occasional analgesics  Varicose Veins: 3=Extensive. Thigh and calf or GS and LS distribution  Venous Edema: 2=Afternoon edema, above ankle  Pigmentation: 2=Diffuse over most of gaiter distribution (lower 1/3) or recent pigmentation (purple)  Inflammation: 1=Mild cellulitis, limited to marginal area around ulcer  Induration: 2=Medial or lateral,less than lower third of leg  Number of Active Ulcers: 1=1  Active Ulceration, Duration: 1=<3 months  Active Ulcer Size: 1=<2 cm diameter  Compressive Therapy: 1=Intermittent use of stockings  Total Score: 16       III. ASSESSMENT & PLAN (MEDICAL DECISION MAKING)     1. Hyperpigmentation of skin    2. Edema, lower extremity    3. Venous insufficiency    4. Varicose veins of bilateral lower extremities with pain        Assessment/Diagnosis and Plan:  The patient is doing well post ablation as encouraged to continue wearing her thigh-high wrap for the 2 week postoperative period and then resume use of knee-high compression garments.  She is also encouraged for daily leg elevation and leg exercises when appropriate.  She continues on anticoagulation.  We will follow up in 1 month for 1 month post ablation visit.  No ultrasound needed unless she is having difficulties.    A leg  only basic pump trial was conducted on 4/28/22 and proved to be ineffective due to non-coverage of the abdomen. It exacerbated the swelling in her abdomen as well as the thigh. Measurements are as follows:  Thigh: Pre 75cm Post 76  Abdomen: Pre 157.5cm Post 159cm  An advanced pump trial was then conducted which contains a truncal component. An advanced pump decongests the lower extremity as well as the abdomen and proved to be a more appropriate treatment. Measurements are as follows:  Thigh: Pre 76cm Post 74cm  Abdomen: Pre 159cm Post 157cm   Please give due consideration to this medically necessary device as lymphedema is a chronic and progressive condition.        Matty Falcon, DO

## 2022-04-20 NOTE — TELEPHONE ENCOUNTER
----- Message from Elizabeth Qureshi sent at 4/19/2022  3:40 PM CDT -----  Patient called and wanted to speak with a nurse     [phone]     Patient had left message because she received message to call the clinic to make appointment. Informed patient Dr. Frausto wanted her to come in one day this week to discuss lab results. She says she can come on Monday due to she will already be in [place] for another appointment. Appointment made for 4-25-22

## 2022-04-25 ENCOUNTER — OFFICE VISIT (OUTPATIENT)
Dept: FAMILY MEDICINE | Facility: CLINIC | Age: 62
End: 2022-04-25
Payer: COMMERCIAL

## 2022-04-25 ENCOUNTER — OFFICE VISIT (OUTPATIENT)
Dept: SURGERY | Facility: CLINIC | Age: 62
End: 2022-04-25
Attending: SURGERY
Payer: COMMERCIAL

## 2022-04-25 VITALS
HEART RATE: 75 BPM | SYSTOLIC BLOOD PRESSURE: 110 MMHG | DIASTOLIC BLOOD PRESSURE: 78 MMHG | TEMPERATURE: 97 F | OXYGEN SATURATION: 97 % | RESPIRATION RATE: 16 BRPM | BODY MASS INDEX: 47.09 KG/M2 | HEIGHT: 66 IN | WEIGHT: 293 LBS

## 2022-04-25 DIAGNOSIS — L97.521 ULCER OF LEFT FOOT, LIMITED TO BREAKDOWN OF SKIN: Primary | ICD-10-CM

## 2022-04-25 DIAGNOSIS — K59.09 OTHER CONSTIPATION: ICD-10-CM

## 2022-04-25 DIAGNOSIS — I83.893 VARICOSE VEINS OF BILATERAL LOWER EXTREMITIES WITH OTHER COMPLICATIONS: ICD-10-CM

## 2022-04-25 DIAGNOSIS — J44.9 CHRONIC OBSTRUCTIVE PULMONARY DISEASE, UNSPECIFIED COPD TYPE: ICD-10-CM

## 2022-04-25 DIAGNOSIS — E53.8 B12 DEFICIENCY: Primary | ICD-10-CM

## 2022-04-25 LAB — VIT B12 SERPL-MCNC: 373 PG/ML (ref 193–986)

## 2022-04-25 PROCEDURE — 99214 PR OFFICE/OUTPT VISIT, EST, LEVL IV, 30-39 MIN: ICD-10-PCS | Mod: ,,, | Performed by: INTERNAL MEDICINE

## 2022-04-25 PROCEDURE — 3008F PR BODY MASS INDEX (BMI) DOCUMENTED: ICD-10-PCS | Mod: ,,, | Performed by: INTERNAL MEDICINE

## 2022-04-25 PROCEDURE — 82607 VITAMIN B-12: CPT | Mod: ,,, | Performed by: CLINICAL MEDICAL LABORATORY

## 2022-04-25 PROCEDURE — 1159F MED LIST DOCD IN RCRD: CPT | Mod: ,,, | Performed by: INTERNAL MEDICINE

## 2022-04-25 PROCEDURE — 99212 PR OFFICE/OUTPT VISIT, EST, LEVL II, 10-19 MIN: ICD-10-PCS | Mod: S$PBB,,, | Performed by: SURGERY

## 2022-04-25 PROCEDURE — 3078F DIAST BP <80 MM HG: CPT | Mod: ,,, | Performed by: INTERNAL MEDICINE

## 2022-04-25 PROCEDURE — 3074F PR MOST RECENT SYSTOLIC BLOOD PRESSURE < 130 MM HG: ICD-10-PCS | Mod: ,,, | Performed by: INTERNAL MEDICINE

## 2022-04-25 PROCEDURE — 1160F PR REVIEW ALL MEDS BY PRESCRIBER/CLIN PHARMACIST DOCUMENTED: ICD-10-PCS | Mod: ,,, | Performed by: INTERNAL MEDICINE

## 2022-04-25 PROCEDURE — 99212 OFFICE O/P EST SF 10 MIN: CPT | Mod: S$PBB,,, | Performed by: SURGERY

## 2022-04-25 PROCEDURE — 99212 OFFICE O/P EST SF 10 MIN: CPT | Mod: PBBFAC | Performed by: SURGERY

## 2022-04-25 PROCEDURE — 1160F RVW MEDS BY RX/DR IN RCRD: CPT | Mod: ,,, | Performed by: INTERNAL MEDICINE

## 2022-04-25 PROCEDURE — 3074F SYST BP LT 130 MM HG: CPT | Mod: ,,, | Performed by: INTERNAL MEDICINE

## 2022-04-25 PROCEDURE — 3008F BODY MASS INDEX DOCD: CPT | Mod: ,,, | Performed by: INTERNAL MEDICINE

## 2022-04-25 PROCEDURE — 3078F PR MOST RECENT DIASTOLIC BLOOD PRESSURE < 80 MM HG: ICD-10-PCS | Mod: ,,, | Performed by: INTERNAL MEDICINE

## 2022-04-25 PROCEDURE — 82607 VITAMIN B12: ICD-10-PCS | Mod: ,,, | Performed by: CLINICAL MEDICAL LABORATORY

## 2022-04-25 PROCEDURE — 99214 OFFICE O/P EST MOD 30 MIN: CPT | Mod: ,,, | Performed by: INTERNAL MEDICINE

## 2022-04-25 PROCEDURE — 1159F PR MEDICATION LIST DOCUMENTED IN MEDICAL RECORD: ICD-10-PCS | Mod: ,,, | Performed by: INTERNAL MEDICINE

## 2022-04-25 RX ORDER — SYRING-NEEDL,DISP,INSUL,0.3 ML 29 G X1/2"
296 SYRINGE, EMPTY DISPOSABLE MISCELLANEOUS ONCE
Qty: 1 EACH | Refills: 0 | Status: SHIPPED | OUTPATIENT
Start: 2022-04-25 | End: 2022-04-25

## 2022-04-25 RX ORDER — ALBUTEROL SULFATE 90 UG/1
2 AEROSOL, METERED RESPIRATORY (INHALATION) 4 TIMES DAILY
Qty: 18 G | Refills: 2 | Status: SHIPPED | OUTPATIENT
Start: 2022-04-25 | End: 2023-10-17 | Stop reason: SDUPTHER

## 2022-04-25 RX ORDER — PANTOPRAZOLE SODIUM 40 MG/1
40 TABLET, DELAYED RELEASE ORAL DAILY
Qty: 90 TABLET | Refills: 1 | Status: SHIPPED | OUTPATIENT
Start: 2022-04-25 | End: 2022-10-25 | Stop reason: SDUPTHER

## 2022-04-25 NOTE — PATIENT INSTRUCTIONS
Holly was seen today for results and diabetes.    Diagnoses and all orders for this visit:    B12 deficiency  -     Vitamin B12; Future  -     Vitamin B12    Chronic obstructive pulmonary disease, unspecified COPD type  Comments:  Test ordered  The following orders have not been finalized:  Orders:  -     albuterol (PROVENTIL/VENTOLIN HFA) 90 mcg/actuation inhaler    Other constipation    Varicose veins of bilateral lower extremities with other complications    Other orders  The following orders have not been finalized:  -     pantoprazole (PROTONIX) 40 MG tablet  -     magnesium citrate solution

## 2022-04-25 NOTE — PROGRESS NOTES
Subjective:       Patient ID: Holly Porter is a 61 y.o. female.    Chief Complaint: Results and Diabetes    Patient is here today for a follow up evaluation. Recent labs reviewed, results were within normal range. Recent stomach X-Ray reviewed, results show excessive stool. Patient has a complaint of constipation. Will order Magnesium Citrate 1 bottle. Patient has a complaint of wheezing. Lungs clear on exam. Will increase Albuterol Inhaler QID. Patient is concerned about B12 level. Will order B12 today. Will follow in 3 months.       Current Medications:    Current Outpatient Medications:     albuterol (PROVENTIL/VENTOLIN HFA) 90 mcg/actuation inhaler, Inhale 2 puffs into the lungs every 6 (six) hours as needed for Wheezing. Rescue, Disp: 18 g, Rfl: 0    allopurinoL (ZYLOPRIM) 300 MG tablet, Take 1 tablet (300 mg total) by mouth once daily., Disp: 90 tablet, Rfl: 3    amitriptyline (ELAVIL) 25 MG tablet, Take 1 tablet (25 mg total) by mouth nightly as needed for Insomnia., Disp: 90 tablet, Rfl: 1    apixaban (ELIQUIS) 5 mg Tab, Take 1 tablet (5 mg total) by mouth 2 (two) times daily., Disp: 60 tablet, Rfl: 3    aspirin (ECOTRIN) 81 MG EC tablet, Take 1 tablet (81 mg total) by mouth once daily., Disp: 90 tablet, Rfl: 1    cilostazoL (PLETAL) 100 MG Tab, Take 1 tablet (100 mg total) by mouth 2 (two) times daily., Disp: 90 tablet, Rfl: 1    colchicine (COLCRYS) 0.6 mg tablet, One pill three times a day for two days,then one pill daily till out, Disp: 30 tablet, Rfl: 2    fluticasone-salmeterol diskus inhaler 250-50 mcg, Inhale 1 puff into the lungs 2 (two) times daily. Controller, Disp: 60 each, Rfl: 5    HYDROcodone-acetaminophen (NORCO)  mg per tablet, Take 1 tablet by mouth every 6 (six) hours as needed for Pain., Disp: 120 tablet, Rfl: 0    insulin (LANTUS SOLOSTAR U-100 INSULIN) glargine 100 units/mL (3mL) SubQ pen, Inject 10 units sq daily, Disp: 15 mL, Rfl: 1    insulin detemir U-100  "(LEVEMIR FLEXTOUCH U-100 INSULN) 100 unit/mL (3 mL) InPn pen, Inject 10 Units into the skin every evening. 15 ml with 1 refill, Disp: , Rfl:     levothyroxine (SYNTHROID, LEVOTHROID) 175 MCG tablet, Take 1 tablet (175 mcg total) by mouth once daily., Disp: 90 tablet, Rfl: 1    loratadine (CLARITIN) 10 mg tablet, Take 1 tablet (10 mg total) by mouth once daily., Disp: 30 tablet, Rfl: 0    losartan-hydrochlorothiazide 50-12.5 mg (HYZAAR) 50-12.5 mg per tablet, Take 1 tablet by mouth once daily., Disp: 90 tablet, Rfl: 1    metOLazone (ZAROXOLYN) 2.5 MG tablet, Take 1 tablet (2.5 mg total) by mouth once daily., Disp: 30 tablet, Rfl: 2    mupirocin (BACTROBAN) 2 % ointment, Apply topically 2 (two) times daily., Disp: 80 g, Rfl: 2    NOVOFINE 32 32 gauge x 1/4" Ndle, Use as directed once daily., Disp: 90 each, Rfl: 3    pantoprazole (PROTONIX) 40 MG tablet, Take 1 tablet (40 mg total) by mouth once daily., Disp: 90 tablet, Rfl: 1    phentermine (ADIPEX-P) 37.5 mg tablet, Take 37.5 mg by mouth nightly., Disp: , Rfl:     phentermine 37.5 MG capsule, Take 1 capsule (37.5 mg total) by mouth every morning., Disp: 30 capsule, Rfl: 0    pregabalin (LYRICA) 100 MG capsule, Take 1 capsule (100 mg total) by mouth every 12 (twelve) hours., Disp: 60 capsule, Rfl: 1    SYMBICORT 160-4.5 mcg/actuation HFAA, Inhale into the lungs., Disp: , Rfl:     topiramate (TOPAMAX) 100 MG tablet, Take 1.5 tablets (150 mg total) by mouth 2 (two) times daily., Disp: 90 tablet, Rfl: 11    triamcinolone acetonide 0.1% (KENALOG) 0.1 % cream, Apply topically 3 (three) times daily., Disp: 400 g, Rfl: 2    potassium chloride (MICRO-K) 10 MEQ CpSR, , Disp: , Rfl:     Last Labs:     Admission on 04/15/2022, Discharged on 04/15/2022   Component Date Value    POC Glucose 04/15/2022 82     POC Glucose 04/15/2022 54 (A)    POC Glucose 04/15/2022 117 (A)    POC Glucose 04/15/2022 66 (A)    POC Glucose 04/15/2022 83    Office Visit on 04/13/2022 "   Component Date Value    SARS Coronavirus 2 Antig* 04/13/2022 Negative      Acceptab* 04/13/2022 Yes     Sodium 04/13/2022 142     Potassium 04/13/2022 4.5     Chloride 04/13/2022 108 (A)    CO2 04/13/2022 29     Anion Gap 04/13/2022 10     Glucose 04/13/2022 73 (A)    BUN 04/13/2022 12     Creatinine 04/13/2022 0.90     BUN/Creatinine Ratio 04/13/2022 13     Calcium 04/13/2022 9.2     eGFR 04/13/2022 67     TSH 04/13/2022 0.197 (A)    ProBNP 04/13/2022 205 (A)   Office Visit on 04/04/2022   Component Date Value    ESR Westergren 04/04/2022 3     WBC 04/04/2022 10.74     RBC 04/04/2022 5.71 (A)    Hemoglobin 04/04/2022 15.5     Hematocrit 04/04/2022 50.7 (A)    MCV 04/04/2022 88.8     MCH 04/04/2022 27.1     MCHC 04/04/2022 30.6 (A)    RDW 04/04/2022 18.4 (A)    Platelet Count 04/04/2022 243     MPV 04/04/2022 12.7 (A)    Neutrophils % 04/04/2022 62.5     Lymphocytes % 04/04/2022 27.0     Monocytes % 04/04/2022 6.1 (A)    Eosinophils % 04/04/2022 3.1     Basophils % 04/04/2022 0.7     Immature Granulocytes % 04/04/2022 0.6 (A)    nRBC, Auto 04/04/2022 0.0     Neutrophils, Abs 04/04/2022 6.71     Lymphocytes, Absolute 04/04/2022 2.90     Monocytes, Absolute 04/04/2022 0.66     Eosinophils, Absolute 04/04/2022 0.33     Basophils, Absolute 04/04/2022 0.08     Immature Granulocytes, A* 04/04/2022 0.06 (A)    nRBC, Absolute 04/04/2022 0.00     Diff Type 04/04/2022 Auto    Office Visit on 04/04/2022   Component Date Value    POC Amphetamines 04/04/2022 Negative     POC Barbiturates 04/04/2022 Negative     POC Benzodiazepines 04/04/2022 Negative     POC Cocaine 04/04/2022 Negative     POC THC 04/04/2022 Negative     POC Methadone 04/04/2022 Negative     POC Methamphetamine 04/04/2022 Negative     POC Opiates 04/04/2022 Presumptive Positive (A)    POC Oxycodone 04/04/2022 Negative     POC Phencyclidine 04/04/2022 Negative     POC Methylenedioxymetham*  04/04/2022 Negative     POC Tricyclic Antidepres* 04/04/2022 Negative     POC Buprenorphine 04/04/2022 Negative      Acceptab* 04/04/2022 Yes     POC Temperature (Urine) 04/04/2022 90        Last Imaging:  US Post Ablation Venous  Narrative: EXAMINATION:  US POST ABLATION VENOUS    CLINICAL HISTORY:  5 days s/p Left calf  RF Ablation and Left Distal Accessory Thigh Vein Varithena Ablation.;.    COMPARISON:  None.    TECHNIQUE:  Patient was placed in the supine position.  Compression on compression images were saved for the record.  Theleft anterior accessory thigh vein, calf  saphenofemoral junction, common femoral vein and great saphenous vein were visualized interrogated.    FINDINGS:  The left saphenofemoral junction and common femoral vein appear to be widely patent with no evidence of EHIT phenomena or thrombosis.  The left anterior thigh accessory vein and left calf  appear to be ablated as expected.  Impression: Status post ablation ultrasound with no evidence of recannulization or complication.    Electronically signed by: Matty Falcon  Date:    04/20/2022  Time:    16:17         Review of Systems   Respiratory: Positive for wheezing.    Gastrointestinal: Positive for constipation.   All other systems reviewed and are negative.        Objective:      Physical Exam  Vitals reviewed.   Constitutional:       Appearance: Normal appearance. She is normal weight.   Cardiovascular:      Rate and Rhythm: Normal rate and regular rhythm.      Pulses: Normal pulses.      Heart sounds: Normal heart sounds.   Pulmonary:      Effort: Pulmonary effort is normal.      Breath sounds: Normal breath sounds.   Abdominal:      General: Abdomen is flat. Bowel sounds are normal.      Palpations: Abdomen is soft.   Musculoskeletal:         General: Normal range of motion.      Cervical back: Normal range of motion and neck supple.   Skin:     General: Skin is warm and dry.    Neurological:      General: No focal deficit present.      Mental Status: She is alert and oriented to person, place, and time. Mental status is at baseline.         Assessment:       1. B12 deficiency  Vitamin B12    Vitamin B12   2. Chronic obstructive pulmonary disease, unspecified COPD type      Test ordered   3. Other constipation     4. Varicose veins of bilateral lower extremities with other complications          Plan:         Holly was seen today for results and diabetes.    Diagnoses and all orders for this visit:    B12 deficiency  -     Vitamin B12; Future  -     Vitamin B12    Chronic obstructive pulmonary disease, unspecified COPD type  Comments:  Test ordered  The following orders have not been finalized:  Orders:  -     albuterol (PROVENTIL/VENTOLIN HFA) 90 mcg/actuation inhaler    Other constipation    Varicose veins of bilateral lower extremities with other complications    Other orders  The following orders have not been finalized:  -     pantoprazole (PROTONIX) 40 MG tablet  -     magnesium citrate solution

## 2022-04-26 NOTE — PROGRESS NOTES
Subjective:       Patient ID: Holly Porter is a 61 y.o. female.    Chief Complaint: Wound Check (Patient states she had surgery by Dr Falcon 2 weeks ago and wound is looking better)    62 y/o female required toe amputations in the past, has been treated in vein center for edema.  She is concerned that the toe amp site has a residual suture, and wishes evaluation.     Wound Check        family history includes Coronary aneurysm in her father; Coronary artery disease in her father; Heart disease in her father and mother; Hypertension in her father and mother; No Known Problems in her brother, brother, brother, sister, son, son, son, and son; Osteoarthritis in her mother.  Past Medical History:   Diagnosis Date    Anxiety state     Atherosclerosis of native artery of extremity with intermittent claudication 01/30/2019    bilateral legs    Bilateral leg pain     BMI 50.0-59.9, adult 02/22/2021    Cellulitis 06/11/2020    Chronic pain syndrome     Chronic peripheral venous hypertension 01/08/2019    COPD (chronic obstructive pulmonary disease)     Diabetes     Diabetes mellitus, type 2     DVT (deep venous thrombosis) 02/12/2020    Embolism and thrombosis of superficial veins of unspecified lower extremity 07/01/2019    bilateral    Frequent headaches 09/20/2018    GERD (gastroesophageal reflux disease)     Hereditary lymphedema     Hx of thyroid cancer     Hyperlipidemia     Hypertension     Hypothyroidism     Migraines     Migraines     Morbid obesity     Nicotine dependence     Non-pressure chronic ulcer of left lower leg 03/09/2021    Osteoarthritis     Other skin changes 03/09/2021    bilateral gaiter regions    Pain in left leg     Pain in right leg     PVD (peripheral vascular disease) 01/08/2019    Seizure disorder     Sleep apnea     Venous insufficiency (chronic) (peripheral)     Venous stasis     Vitamin D deficiency       Past Surgical History:   Procedure Laterality Date     HYSTERECTOMY      ILIAC ARTERY STENT Bilateral 01/28/2020    Bilateral distal aorta and common iliac 8 X 59 vbx covered stents performed by Dr. Antonio Jacinto.    RADIOFREQUENCY ABLATION Left 4/15/2022    Procedure: Left calf  Radiofrequency Ablation;  Surgeon: Matty Falcon DO;  Location: [facility];  Service: Vascular;  Laterality: Left;    STAB PHLEBECTOMY OF VARICOSE VEINS Left 01/25/2013    Left leg microphlebectomies x 23 stab avulsions and ligation of multiple varicose veins perrformed by Dr. Cirilo Aguirre.    THYROIDECTOMY      hx: thyroid cancer    TOE AMPUTATION Left 01/28/2020    2nd, 4th and 5th performed by Dr. Anam Saeed.    TOE AMPUTATION Right 01/28/2020    4th toe performed by Dr. Anam Saeed.    TOTAL ABDOMINAL HYSTERECTOMY W/ BILATERAL SALPINGOOPHORECTOMY      TUBAL LIGATION      VAGINAL DELIVERY      x 4    VENOUS ABLATION Right 08/09/2019    GSV Varithena Ablation performed by Dr. Estuardo Falcon    VENOUS ABLATION Left 08/02/2019    ATAV Varithena Ablation performed by Dr. Estuardo Falcon.    VENOUS ABLATION Right 07/13/2015    ATAV Laser Ablatio performed by Dr. Cirilo Aguirre.    VENOUS ABLATION Right 07/06/2015    Distal  GSV Laser Ablation performed by dr. Cirilo Aguirre.    VENOUS ABLATION Left 04/20/2015    Left Distal GSV Laser Ablation performed by dr. Cirilo Aguirre.    VENOUS ABLATION Right 10/28/2013    Right Distal GSV RF Ablation performed by Dr. Cirilo Aguirre.    VENOUS ABLATION Left 10/25/2013    SSV RF Ablation performed by dr. Cirilo Aguirre.    VENOUS ABLATION Left 10/07/2013    Left GSV and Left ATAV RF Ablation performed by Dr. Cirilo Aguirre.    VENOUS ABLATION Right 01/21/2013    GSV RF Ablation w/micros x 22 performed by Dr. Cirilo Aguirre.    VENOUS ABLATION Left 06/25/2021    Left distal GSV Varithena Ablation       reports that she has been smoking cigarettes. She has a 33.00 pack-year smoking history. She has never used smokeless tobacco. She reports that she does not drink alcohol and  does not use drugs.     Review of Systems   All other systems reviewed and are negative.         Objective:      There were no vitals taken for this visit.   Physical Exam  Cardiovascular:      Rate and Rhythm: Normal rate.   Pulmonary:      Effort: Pulmonary effort is normal.   Abdominal:      Palpations: Abdomen is soft.   Skin:     Comments: Dry skin over 4th toe amp site; no evidence of residual suture present after debridement of dry skin eschar   Neurological:      General: No focal deficit present.      Mental Status: She is alert.           Assessment/Plan:         Ulcer of left foot, limited to breakdown of skin         Problem List Items Addressed This Visit        Orthopedic    Ulcer of left foot, limited to breakdown of skin - Primary    Overview     Had toes amputated in the past;  Wound has healed           Current Assessment & Plan     Continue dressing changes

## 2022-04-27 DIAGNOSIS — E03.9 HYPOTHYROIDISM, UNSPECIFIED TYPE: Primary | ICD-10-CM

## 2022-04-27 NOTE — TELEPHONE ENCOUNTER
Call returned to pt to inform her that Dr. Frausto is adjusting her levothyroxine dose in response to her last TSH results. Dose changed to levothyroxine 1 tab po  150mcg qam. Pharmacy confirmed by pt - [facility] [place]

## 2022-04-27 NOTE — TELEPHONE ENCOUNTER
----- Message from Saida Soto sent at 4/27/2022  1:14 PM CDT -----  PT TYROID MEDICINE NEEDS REFILLED [phone]

## 2022-04-28 RX ORDER — LEVOTHYROXINE SODIUM 150 UG/1
150 TABLET ORAL
Qty: 90 TABLET | Refills: 1 | Status: SHIPPED | OUTPATIENT
Start: 2022-04-28 | End: 2022-10-25 | Stop reason: SDUPTHER

## 2022-05-10 ENCOUNTER — HOSPITAL ENCOUNTER (EMERGENCY)
Facility: HOSPITAL | Age: 62
Discharge: HOME | End: 2022-05-10
Attending: EMERGENCY MEDICINE
Payer: COMMERCIAL

## 2022-05-10 VITALS
DIASTOLIC BLOOD PRESSURE: 78 MMHG | OXYGEN SATURATION: 97 % | WEIGHT: 293 LBS | BODY MASS INDEX: 44.41 KG/M2 | SYSTOLIC BLOOD PRESSURE: 161 MMHG | HEIGHT: 68 IN | HEART RATE: 77 BPM | RESPIRATION RATE: 22 BRPM | TEMPERATURE: 99 F

## 2022-05-10 DIAGNOSIS — R06.00 DYSPNEA: ICD-10-CM

## 2022-05-10 DIAGNOSIS — J44.1 COPD EXACERBATION: Primary | ICD-10-CM

## 2022-05-10 DIAGNOSIS — E87.6 HYPOKALEMIA: ICD-10-CM

## 2022-05-10 LAB
ALBUMIN SERPL BCP-MCNC: 3.8 G/DL (ref 3.5–5)
ALBUMIN/GLOB SERPL: 1 {RATIO}
ALP SERPL-CCNC: 129 U/L (ref 50–130)
ALT SERPL W P-5'-P-CCNC: 14 U/L (ref 13–56)
ANION GAP SERPL CALCULATED.3IONS-SCNC: 12 MMOL/L (ref 7–16)
AST SERPL W P-5'-P-CCNC: 19 U/L (ref 15–37)
BASOPHILS # BLD AUTO: 0.03 K/UL (ref 0–0.2)
BASOPHILS NFR BLD AUTO: 0.2 % (ref 0–1)
BILIRUB SERPL-MCNC: 0.3 MG/DL (ref 0–1.2)
BILIRUB UR QL STRIP: NEGATIVE
BUN SERPL-MCNC: 15 MG/DL (ref 7–18)
BUN/CREAT SERPL: 16 (ref 6–20)
CALCIUM SERPL-MCNC: 9.1 MG/DL (ref 8.5–10.1)
CHLORIDE SERPL-SCNC: 102 MMOL/L (ref 98–107)
CLARITY UR: CLEAR
CO2 SERPL-SCNC: 30 MMOL/L (ref 21–32)
COLOR UR: YELLOW
CREAT SERPL-MCNC: 0.95 MG/DL (ref 0.55–1.02)
DIFFERENTIAL METHOD BLD: ABNORMAL
EOSINOPHIL # BLD AUTO: 1.06 K/UL (ref 0–0.5)
EOSINOPHIL NFR BLD AUTO: 8.7 % (ref 1–4)
ERYTHROCYTE [DISTWIDTH] IN BLOOD BY AUTOMATED COUNT: 16.7 % (ref 11.5–14.5)
GLOBULIN SER-MCNC: 4 G/DL (ref 2–4)
GLUCOSE SERPL-MCNC: 117 MG/DL (ref 74–106)
GLUCOSE SERPL-MCNC: 131 MG/DL (ref 70–105)
GLUCOSE UR STRIP-MCNC: NEGATIVE MG/DL
HCT VFR BLD AUTO: 47.7 % (ref 38–47)
HGB BLD-MCNC: 15.3 G/DL (ref 12–16)
IMM GRANULOCYTES # BLD AUTO: 0.04 K/UL (ref 0–0.04)
IMM GRANULOCYTES NFR BLD: 0.3 % (ref 0–0.4)
KETONES UR STRIP-SCNC: NEGATIVE MG/DL
LEUKOCYTE ESTERASE UR QL STRIP: NEGATIVE
LYMPHOCYTES # BLD AUTO: 2.69 K/UL (ref 1–4.8)
LYMPHOCYTES NFR BLD AUTO: 22 % (ref 27–41)
MAGNESIUM SERPL-MCNC: 2.1 MG/DL (ref 1.7–2.3)
MCH RBC QN AUTO: 28 PG (ref 27–31)
MCHC RBC AUTO-ENTMCNC: 32.1 G/DL (ref 32–36)
MCV RBC AUTO: 87.2 FL (ref 80–96)
MONOCYTES # BLD AUTO: 0.55 K/UL (ref 0–0.8)
MONOCYTES NFR BLD AUTO: 4.5 % (ref 2–6)
MPC BLD CALC-MCNC: 12.1 FL (ref 9.4–12.4)
NEUTROPHILS # BLD AUTO: 7.86 K/UL (ref 1.8–7.7)
NEUTROPHILS NFR BLD AUTO: 64.3 % (ref 53–65)
NITRITE UR QL STRIP: NEGATIVE
NT-PROBNP SERPL-MCNC: 443 PG/ML (ref 1–125)
PH UR STRIP: 7 PH UNITS
PLATELET # BLD AUTO: 232 K/UL (ref 150–400)
POTASSIUM SERPL-SCNC: 3.3 MMOL/L (ref 3.5–5.1)
PROT SERPL-MCNC: 7.8 G/DL (ref 6.4–8.2)
PROT UR QL STRIP: NEGATIVE
RBC # BLD AUTO: 5.47 M/UL (ref 4.2–5.4)
RBC # UR STRIP: NEGATIVE /UL
SODIUM SERPL-SCNC: 141 MMOL/L (ref 136–145)
SP GR UR STRIP: 1.01
UROBILINOGEN UR STRIP-ACNC: 0.2 MG/DL
WBC # BLD AUTO: 12.23 K/UL (ref 4.5–11)

## 2022-05-10 PROCEDURE — 99285 EMERGENCY DEPT VISIT HI MDM: CPT | Mod: 25

## 2022-05-10 PROCEDURE — 25000003 PHARM REV CODE 250: Performed by: EMERGENCY MEDICINE

## 2022-05-10 PROCEDURE — 93010 ELECTROCARDIOGRAM REPORT: CPT | Mod: ,,, | Performed by: EMERGENCY MEDICINE

## 2022-05-10 PROCEDURE — 93005 ELECTROCARDIOGRAM TRACING: CPT

## 2022-05-10 PROCEDURE — 94760 N-INVAS EAR/PLS OXIMETRY 1: CPT

## 2022-05-10 PROCEDURE — 36415 COLL VENOUS BLD VENIPUNCTURE: CPT | Performed by: EMERGENCY MEDICINE

## 2022-05-10 PROCEDURE — 25000242 PHARM REV CODE 250 ALT 637 W/ HCPCS: Mod: GY | Performed by: EMERGENCY MEDICINE

## 2022-05-10 PROCEDURE — 83880 ASSAY OF NATRIURETIC PEPTIDE: CPT | Performed by: EMERGENCY MEDICINE

## 2022-05-10 PROCEDURE — 85025 COMPLETE CBC W/AUTO DIFF WBC: CPT | Performed by: EMERGENCY MEDICINE

## 2022-05-10 PROCEDURE — 27000221 HC OXYGEN, UP TO 24 HOURS

## 2022-05-10 PROCEDURE — 80053 COMPREHEN METABOLIC PANEL: CPT | Performed by: EMERGENCY MEDICINE

## 2022-05-10 PROCEDURE — 83735 ASSAY OF MAGNESIUM: CPT | Performed by: EMERGENCY MEDICINE

## 2022-05-10 PROCEDURE — 93010 EKG 12-LEAD: ICD-10-PCS | Mod: ,,, | Performed by: EMERGENCY MEDICINE

## 2022-05-10 PROCEDURE — 81003 URINALYSIS AUTO W/O SCOPE: CPT | Performed by: EMERGENCY MEDICINE

## 2022-05-10 PROCEDURE — 99284 EMERGENCY DEPT VISIT MOD MDM: CPT | Mod: ,,, | Performed by: EMERGENCY MEDICINE

## 2022-05-10 PROCEDURE — 82962 GLUCOSE BLOOD TEST: CPT

## 2022-05-10 PROCEDURE — 94640 AIRWAY INHALATION TREATMENT: CPT

## 2022-05-10 PROCEDURE — 99284 PR EMERGENCY DEPT VISIT,LEVEL IV: ICD-10-PCS | Mod: ,,, | Performed by: EMERGENCY MEDICINE

## 2022-05-10 RX ORDER — SPIRONOLACTONE 50 MG/1
50 TABLET, FILM COATED ORAL DAILY
Qty: 30 TABLET | Refills: 11 | Status: ON HOLD | OUTPATIENT
Start: 2022-05-10 | End: 2024-02-14 | Stop reason: HOSPADM

## 2022-05-10 RX ORDER — LEVALBUTEROL INHALATION SOLUTION 1.25 MG/3ML
1.25 SOLUTION RESPIRATORY (INHALATION)
Status: COMPLETED | OUTPATIENT
Start: 2022-05-10 | End: 2022-05-10

## 2022-05-10 RX ORDER — POTASSIUM CHLORIDE 20 MEQ/1
20 TABLET, EXTENDED RELEASE ORAL
Status: COMPLETED | OUTPATIENT
Start: 2022-05-10 | End: 2022-05-10

## 2022-05-10 RX ORDER — SPIRONOLACTONE 25 MG/1
100 TABLET ORAL
Status: COMPLETED | OUTPATIENT
Start: 2022-05-10 | End: 2022-05-10

## 2022-05-10 RX ORDER — IPRATROPIUM BROMIDE AND ALBUTEROL SULFATE 2.5; .5 MG/3ML; MG/3ML
3 SOLUTION RESPIRATORY (INHALATION)
Status: COMPLETED | OUTPATIENT
Start: 2022-05-10 | End: 2022-05-10

## 2022-05-10 RX ORDER — POTASSIUM CHLORIDE 20 MEQ/1
20 TABLET, EXTENDED RELEASE ORAL DAILY
Qty: 30 TABLET | Refills: 0 | Status: SHIPPED | OUTPATIENT
Start: 2022-05-10 | End: 2022-08-11 | Stop reason: SDUPTHER

## 2022-05-10 RX ORDER — AZITHROMYCIN 250 MG/1
250 TABLET, FILM COATED ORAL DAILY
Qty: 6 TABLET | Refills: 0 | Status: SHIPPED | OUTPATIENT
Start: 2022-05-10 | End: 2022-06-06 | Stop reason: SDUPTHER

## 2022-05-10 RX ORDER — LEVALBUTEROL INHALATION SOLUTION 0.63 MG/3ML
0.63 SOLUTION RESPIRATORY (INHALATION)
Status: DISCONTINUED | OUTPATIENT
Start: 2022-05-10 | End: 2022-05-10

## 2022-05-10 RX ADMIN — POTASSIUM CHLORIDE 20 MEQ: 20 TABLET, EXTENDED RELEASE ORAL at 07:05

## 2022-05-10 RX ADMIN — SPIRONOLACTONE 100 MG: 25 TABLET ORAL at 06:05

## 2022-05-10 RX ADMIN — IPRATROPIUM BROMIDE AND ALBUTEROL SULFATE 3 ML: .5; 3 SOLUTION RESPIRATORY (INHALATION) at 06:05

## 2022-05-10 RX ADMIN — LEVALBUTEROL HYDROCHLORIDE 1.25 MG: 1.25 SOLUTION RESPIRATORY (INHALATION) at 07:05

## 2022-05-10 NOTE — ED TRIAGE NOTES
"C/o sob starting this am with worsening thru day-states" started advair about 1 week ago and I feel like it made me worse"  "

## 2022-05-10 NOTE — ED PROVIDER NOTES
Encounter Date: 5/10/2022       History     Chief Complaint   Patient presents with    Shortness of Breath     C/o shortness of breath since this am     Patient presents with shortness of breath and wheezing.  Onset this morning but patient also reports that has been getting worse for the past few days.  She feels like the wheezing and shortness of breath this triggered by her Advair Diskus inhaler.  Has a history of CHF and COPD/asthma.  Also has a history of pulmonary embolus and is on Eliquis, states she has been taking her medication as directed and has not missed any doses each of her Eliquis.  Patient is a smoker and continues to smoke.  She takes Zaroxolyn as a diuretic, she is allergic to Lasix.        Review of patient's allergies indicates:   Allergen Reactions    Ammonium peroxydisulfate Shortness Of Breath    Avocado (laurus persea) Anaphylaxis    Bananas [banana] Anaphylaxis and Swelling     CRAMPS,    Chocolate flavor Anaphylaxis     MOUTH SWELLING    Silvadene [silver sulfadiazine]     Clindamycin     Corticosteroids (glucocorticoids)     Hydrocortisone Blisters    Lasix [furosemide] Blisters     BURNS SKIN    Nutritional supplement-fiber Itching    Pregabalin     Shellfish containing products     Adhesive Rash and Blisters    Iodine and iodide containing products Rash    Latex, natural rubber Rash    Pcn [penicillins] Rash    Sulfa (sulfonamide antibiotics) Rash and Blisters     Past Medical History:   Diagnosis Date    Anxiety state     Atherosclerosis of native artery of extremity with intermittent claudication 01/30/2019    bilateral legs    Bilateral leg pain     BMI 50.0-59.9, adult 02/22/2021    Cellulitis 06/11/2020    Chronic pain syndrome     Chronic peripheral venous hypertension 01/08/2019    COPD (chronic obstructive pulmonary disease)     Diabetes     Diabetes mellitus, type 2     DVT (deep venous thrombosis) 02/12/2020    Embolism and thrombosis of  superficial veins of unspecified lower extremity 07/01/2019    bilateral    Frequent headaches 09/20/2018    GERD (gastroesophageal reflux disease)     Hereditary lymphedema     Hx of thyroid cancer     Hyperlipidemia     Hypertension     Hypothyroidism     Migraines     Migraines     Morbid obesity     Nicotine dependence     Non-pressure chronic ulcer of left lower leg 03/09/2021    Osteoarthritis     Other skin changes 03/09/2021    bilateral gaiter regions    Pain in left leg     Pain in right leg     PVD (peripheral vascular disease) 01/08/2019    Seizure disorder     Sleep apnea     Venous insufficiency (chronic) (peripheral)     Venous stasis     Vitamin D deficiency      Past Surgical History:   Procedure Laterality Date    HYSTERECTOMY      ILIAC ARTERY STENT Bilateral 01/28/2020    Bilateral distal aorta and common iliac 8 X 59 vbx covered stents performed by Dr. Antonio Jacinto.    RADIOFREQUENCY ABLATION Left 4/15/2022    Procedure: Left calf  Radiofrequency Ablation;  Surgeon: Matty Falcon DO;  Location: [facility];  Service: Vascular;  Laterality: Left;    STAB PHLEBECTOMY OF VARICOSE VEINS Left 01/25/2013    Left leg microphlebectomies x 23 stab avulsions and ligation of multiple varicose veins perrformed by Dr. Cirilo Aguirre.    THYROIDECTOMY      hx: thyroid cancer    TOE AMPUTATION Left 01/28/2020    2nd, 4th and 5th performed by Dr. Anam Saeed.    TOE AMPUTATION Right 01/28/2020    4th toe performed by Dr. Anam Saeed.    TOTAL ABDOMINAL HYSTERECTOMY W/ BILATERAL SALPINGOOPHORECTOMY      TUBAL LIGATION      VAGINAL DELIVERY      x 4    VENOUS ABLATION Right 08/09/2019    GSV Varithena Ablation performed by Dr. Estuardo Falcon    VENOUS ABLATION Left 08/02/2019    ATAV Varithena Ablation performed by Dr. Estuardo Falcon.    VENOUS ABLATION Right 07/13/2015    ATAV Laser Ablatio performed by Dr. Cirilo Aguirre.    VENOUS ABLATION Right 07/06/2015    Distal  GSV Laser  Ablation performed by dr. Cirilo Aguirre.    VENOUS ABLATION Left 04/20/2015    Left Distal GSV Laser Ablation performed by dr. Cirilo Aguirre.    VENOUS ABLATION Right 10/28/2013    Right Distal GSV RF Ablation performed by Dr. Cirilo Aguirre.    VENOUS ABLATION Left 10/25/2013    SSV RF Ablation performed by dr. Cirilo Aguirre.    VENOUS ABLATION Left 10/07/2013    Left GSV and Left ATAV RF Ablation performed by Dr. Cirilo Aguirre.    VENOUS ABLATION Right 01/21/2013    GSV RF Ablation w/micros x 22 performed by Dr. Cirilo Aguirre.    VENOUS ABLATION Left 06/25/2021    Left distal GSV Varithena Ablation     Family History   Problem Relation Age of Onset    Heart disease Mother         age 84 CHF    Hypertension Mother     Osteoarthritis Mother     Coronary aneurysm Father     Coronary artery disease Father     Hypertension Father     Heart disease Father     No Known Problems Son     No Known Problems Son     No Known Problems Son     No Known Problems Son     No Known Problems Sister     No Known Problems Brother         hx: varicose veins    No Known Problems Brother         MVA: parlazed    No Known Problems Brother      Social History     Tobacco Use    Smoking status: Current Every Day Smoker     Packs/day: 1.00     Years: 33.00     Pack years: 33.00     Types: Cigarettes    Smokeless tobacco: Never Used   Substance Use Topics    Alcohol use: Never    Drug use: Never     Review of Systems   Constitutional: Negative.    HENT: Negative.    Eyes: Negative.    Respiratory: Positive for cough, shortness of breath and wheezing. Negative for choking, chest tightness and stridor.    Cardiovascular: Positive for leg swelling. Negative for chest pain and palpitations.   Gastrointestinal: Negative.    Genitourinary: Negative.    Musculoskeletal: Negative.    Skin: Negative.    Neurological: Negative.    Hematological: Negative.    Psychiatric/Behavioral: Negative.    All other systems reviewed and are negative.      Physical Exam      Initial Vitals   BP Pulse Resp Temp SpO2   05/10/22 1725 05/10/22 1725 05/10/22 1725 05/10/22 1800 05/10/22 1725   (!) 178/93 104 (!) 24 98.8 °F (37.1 °C) (!) 77 %      MAP       --                Physical Exam    Nursing note and vitals reviewed.  Constitutional: She appears well-developed and well-nourished.   Patient appears anxious   HENT:   Head: Normocephalic.   Right Ear: External ear normal.   Left Ear: External ear normal.   Nose: Nose normal.   Mouth/Throat: Oropharynx is clear and moist.   Eyes: Conjunctivae and EOM are normal. Pupils are equal, round, and reactive to light.   Neck: Neck supple. No tracheal deviation present. No JVD present.   Normal range of motion.  Cardiovascular: Normal rate, regular rhythm, normal heart sounds and intact distal pulses.   No murmur heard.  Pulmonary/Chest: No stridor. No respiratory distress. She has wheezes. She has no rhonchi. She has no rales. She exhibits no tenderness.   Abdominal: Abdomen is soft. Bowel sounds are normal. She exhibits no distension. There is no abdominal tenderness.   Musculoskeletal:         General: Edema (Patient has 3+ edema in both lower extremities.) present. Normal range of motion.      Cervical back: Normal range of motion and neck supple.     Lymphadenopathy:     She has no cervical adenopathy.   Neurological: She is alert and oriented to person, place, and time. She has normal strength. No cranial nerve deficit. GCS score is 15. GCS eye subscore is 4. GCS verbal subscore is 5. GCS motor subscore is 6.   Skin: Skin is warm and dry.   Patient has chronic changes of venous stasis in both lower extremities.   Psychiatric: She has a normal mood and affect. Her behavior is normal.         Medical Screening Exam   See Full Note    ED Course   Procedures  Labs Reviewed   NT-PRO NATRIURETIC PEPTIDE - Abnormal; Notable for the following components:       Result Value    ProBNP 443 (*)     All other components within normal limits    COMPREHENSIVE METABOLIC PANEL - Abnormal; Notable for the following components:    Potassium 3.3 (*)     Glucose 117 (*)     All other components within normal limits   CBC WITH DIFFERENTIAL - Abnormal; Notable for the following components:    WBC 12.23 (*)     RBC 5.47 (*)     Hematocrit 47.7 (*)     RDW 16.7 (*)     Lymphocytes % 22.0 (*)     Neutrophils, Abs 7.86 (*)     Eosinophils % 8.7 (*)     Eosinophils, Absolute 1.06 (*)     All other components within normal limits   POCT GLUCOSE MONITORING CONTINUOUS - Abnormal; Notable for the following components:    POC Glucose 131 (*)     All other components within normal limits   MAGNESIUM - Normal   URINALYSIS, REFLEX TO URINE CULTURE - Normal   CBC W/ AUTO DIFFERENTIAL    Narrative:     The following orders were created for panel order CBC auto differential.  Procedure                               Abnormality         Status                     ---------                               -----------         ------                     CBC with Differential[[id number]]        Abnormal            Final result                 Please view results for these tests on the individual orders.        ECG Results          EKG 12-lead (In process)  Result time 05/10/22 18:21:03    In process by Interface, Lab In [facility] (05/10/22 18:21:03)                 Narrative:    Test Reason : R06.00,    Vent. Rate : 104 BPM     Atrial Rate : 104 BPM     P-R Int : 182 ms          QRS Dur : 092 ms      QT Int : 360 ms       P-R-T Axes : 067 112 006 degrees     QTc Int : 473 ms    Sinus tachycardia with Fusion complexes  Right axis deviation  Incomplete right bundle branch block  Possible Right ventricular hypertrophy  Nonspecific ST abnormality  Abnormal ECG  When compared with ECG of 28-JAN-2022 15:29,  ST now depressed in Inferior leads  T wave inversion no longer evident in Anterior leads    Referred By: [email or web address]   SELF           Confirmed By:                             Imaging Results           X-Ray Chest PA And Lateral (Final result)  Result time 05/10/22 18:22:15    Final result by Edson De MD (05/10/22 18:22:15)                 Impression:      No acute findings.      Electronically signed by: Edson De  Date:    05/10/2022  Time:    18:22             Narrative:    EXAMINATION:  XR CHEST PA AND LATERAL    CLINICAL HISTORY:  Dyspnea, unspecified    TECHNIQUE:  PA and lateral views of the chest were performed.    COMPARISON:  None    FINDINGS:  Heart size normal.  Lungs clear.  No pneumothorax or pleural effusion.  Pulmonary vessels show normal caliber.  Degenerative changes of the spine.                                 Medications   levalbuterol nebulizer solution 1.25 mg (has no administration in time range)   albuterol-ipratropium 2.5 mg-0.5 mg/3 mL nebulizer solution 3 mL (3 mLs Nebulization Given 5/10/22 1848)   spironolactone tablet 100 mg (100 mg Oral Given 5/10/22 1832)   potassium chloride SA CR tablet 20 mEq (20 mEq Oral Given 5/10/22 1901)     Medical Decision Making:   Independently Interpreted Test(s):   I have ordered and independently interpreted X-rays - see summary below.       <> Summary of X-Ray Reading(s): Review of chest x-ray shows normal heart size, no acute infiltrates, no acute process noted.  Clinical Tests:   Radiological Study: Reviewed  ED Management:  Patient improved after nebulizer treatment x2.  Patient is saturating 95% on room air at this time.   Radiologist report for chest x-ray shows no acute process.                 Clinical Impression:   Final diagnoses:  [R06.00] Dyspnea  [E87.6] Hypokalemia  [J44.1] COPD exacerbation (Primary)                 Donovan Cowan DO  05/10/22 1938

## 2022-05-11 ENCOUNTER — TELEPHONE (OUTPATIENT)
Dept: EMERGENCY MEDICINE | Facility: HOSPITAL | Age: 62
End: 2022-05-11
Payer: COMMERCIAL

## 2022-05-11 NOTE — DISCHARGE INSTRUCTIONS
Follow-up with your pulmonologist as soon as possible to discuss alternative therapy regarding Symbicort which you have have noticed triggers wheezing when you use it.

## 2022-05-16 ENCOUNTER — TELEPHONE (OUTPATIENT)
Dept: PULMONOLOGY | Facility: CLINIC | Age: 62
End: 2022-05-16
Payer: COMMERCIAL

## 2022-05-16 RX ORDER — HYDROCODONE BITARTRATE AND ACETAMINOPHEN 10; 325 MG/1; MG/1
1 TABLET ORAL 4 TIMES DAILY PRN
COMMUNITY
Start: 2022-05-09 | End: 2022-06-02 | Stop reason: SDUPTHER

## 2022-05-16 RX ORDER — ASPIRIN 81 MG/1
81 TABLET ORAL DAILY
Qty: 90 TABLET | Refills: 1 | Status: SHIPPED | OUTPATIENT
Start: 2022-05-16 | End: 2022-08-11 | Stop reason: SDUPTHER

## 2022-05-16 RX ORDER — PHENTERMINE HYDROCHLORIDE 37.5 MG/1
37.5 TABLET ORAL
Qty: 30 TABLET | Refills: 0 | Status: SHIPPED | OUTPATIENT
Start: 2022-05-16 | End: 2022-06-16 | Stop reason: SDUPTHER

## 2022-05-16 NOTE — TELEPHONE ENCOUNTER
"Requesting "new inhaler order".    Pt reported she went to ER because   she "couldn't breathe".  (ER visit 5/10/22 on chart).  Reported she "allergic to Steroids"   and was using Advair, reported breathing worse with Advair inhaler.   Asked for new inhaler.  Pt could not give details because she was at dentists office at time of return call:  Asked that we call tomorrow.    Addendum  5/17/22    Reported learned she was "allergic to steroids" when in the hospital with Covid.    Reported she had been on Advair in the past:  Then was on Symbicort.  She asked and was changed back  to Advair when she reported she "did better" on Advair.  Stp[[ed Symbicort as instructed when she started Advair    She now reports she feels Advair made her breathing worse, due to "allergy".  Has not used Advair since ER visit  Confirmed she does have and uses Albuterol prn when walking or SOB.   reported breathing "ok" today.  She was told MD will be asked for recommendation later this week on return to clinic.  She agreed to phone here or to , corrinen.    5/23/22: MD recommendation phoned to  and she voiced understanding.  Continue using Albuterol Inhaler, prn. (No new inhaler order at present time).  VO /gp                "

## 2022-05-16 NOTE — TELEPHONE ENCOUNTER
----- Message from Gurinder Anderson RN sent at 5/16/2022  2:17 PM CDT -----  Regarding: Medication Refills  Meds: ASA 81mg once daily, Adipex 37mg once daily    Pharmacy: [facility] [place]    Phone: [phone]

## 2022-05-23 ENCOUNTER — OFFICE VISIT (OUTPATIENT)
Dept: VASCULAR SURGERY | Facility: CLINIC | Age: 62
End: 2022-05-23
Payer: COMMERCIAL

## 2022-05-23 VITALS
SYSTOLIC BLOOD PRESSURE: 126 MMHG | WEIGHT: 293 LBS | RESPIRATION RATE: 18 BRPM | HEIGHT: 68 IN | DIASTOLIC BLOOD PRESSURE: 80 MMHG | HEART RATE: 64 BPM | BODY MASS INDEX: 44.41 KG/M2

## 2022-05-23 DIAGNOSIS — I87.2 VENOUS INSUFFICIENCY: Primary | ICD-10-CM

## 2022-05-23 DIAGNOSIS — I83.813 VARICOSE VEINS OF BILATERAL LOWER EXTREMITIES WITH PAIN: ICD-10-CM

## 2022-05-23 DIAGNOSIS — R23.8 OTHER SKIN CHANGES: ICD-10-CM

## 2022-05-23 DIAGNOSIS — M79.672 FOOT PAIN, LEFT: ICD-10-CM

## 2022-05-23 DIAGNOSIS — I73.9 PVD (PERIPHERAL VASCULAR DISEASE): ICD-10-CM

## 2022-05-23 DIAGNOSIS — R60.0 EDEMA, LOWER EXTREMITY: ICD-10-CM

## 2022-05-23 PROCEDURE — 99215 OFFICE O/P EST HI 40 MIN: CPT | Mod: PBBFAC | Performed by: NURSE PRACTITIONER

## 2022-05-23 PROCEDURE — 3079F DIAST BP 80-89 MM HG: CPT | Mod: ,,, | Performed by: NURSE PRACTITIONER

## 2022-05-23 PROCEDURE — 99214 OFFICE O/P EST MOD 30 MIN: CPT | Mod: S$PBB,,, | Performed by: NURSE PRACTITIONER

## 2022-05-23 PROCEDURE — 1159F MED LIST DOCD IN RCRD: CPT | Mod: ,,, | Performed by: NURSE PRACTITIONER

## 2022-05-23 PROCEDURE — 99214 PR OFFICE/OUTPT VISIT, EST, LEVL IV, 30-39 MIN: ICD-10-PCS | Mod: S$PBB,,, | Performed by: NURSE PRACTITIONER

## 2022-05-23 PROCEDURE — 3008F BODY MASS INDEX DOCD: CPT | Mod: ,,, | Performed by: NURSE PRACTITIONER

## 2022-05-23 PROCEDURE — 3074F SYST BP LT 130 MM HG: CPT | Mod: ,,, | Performed by: NURSE PRACTITIONER

## 2022-05-23 PROCEDURE — 3079F PR MOST RECENT DIASTOLIC BLOOD PRESSURE 80-89 MM HG: ICD-10-PCS | Mod: ,,, | Performed by: NURSE PRACTITIONER

## 2022-05-23 PROCEDURE — 3008F PR BODY MASS INDEX (BMI) DOCUMENTED: ICD-10-PCS | Mod: ,,, | Performed by: NURSE PRACTITIONER

## 2022-05-23 PROCEDURE — 1159F PR MEDICATION LIST DOCUMENTED IN MEDICAL RECORD: ICD-10-PCS | Mod: ,,, | Performed by: NURSE PRACTITIONER

## 2022-05-23 PROCEDURE — 3074F PR MOST RECENT SYSTOLIC BLOOD PRESSURE < 130 MM HG: ICD-10-PCS | Mod: ,,, | Performed by: NURSE PRACTITIONER

## 2022-05-23 NOTE — PROGRESS NOTES
[facility] CLINIC NOTE    Patient ID: Holly Porter is a 61 y.o. female.    I. HISTORY     Chief Complaint:   Chief Complaint   Patient presents with    Follow-up     Exam room 4.  1 month s/p left calf  rf ablation.        HPI: Holly Porter is a 61 y.o. female who presents to clinic today for a one month follow-up post non thermal ablation of the distal accessory thigh vein and thermal ablation of a left calf  vein.  Patient reports her left leg feels better and swelling some improved. Her primary complaint today is left foot pain.   She continues to wear compression intermittently but not daily. She actually looks like she has less swelling today. She has chronic darker skin changes bilaterally. No open ulcers at this time.     The patient has had numerous venous interventions previously, see her surgical history for details.    Past Medical History:   Diagnosis Date    Anxiety state     Atherosclerosis of native artery of extremity with intermittent claudication 01/30/2019    bilateral legs    Bilateral leg pain     BMI 50.0-59.9, adult 02/22/2021    Cellulitis 06/11/2020    Chronic pain syndrome     Chronic peripheral venous hypertension 01/08/2019    COPD (chronic obstructive pulmonary disease)     Diabetes     Diabetes mellitus, type 2     DVT (deep venous thrombosis) 02/12/2020    Embolism and thrombosis of superficial veins of unspecified lower extremity 07/01/2019    bilateral    Frequent headaches 09/20/2018    GERD (gastroesophageal reflux disease)     Hereditary lymphedema     Hx of thyroid cancer     Hyperlipidemia     Hypertension     Hypothyroidism     Migraines     Migraines     Morbid obesity     Nicotine dependence     Non-pressure chronic ulcer of left lower leg 03/09/2021    Osteoarthritis     Other skin changes 03/09/2021    bilateral gaiter regions    Pain in left leg     Pain in right leg     PVD (peripheral vascular disease) 01/08/2019     Seizure disorder     Sleep apnea     Venous insufficiency (chronic) (peripheral)     Venous stasis     Vitamin D deficiency         Past Surgical History:   Procedure Laterality Date    HYSTERECTOMY      ILIAC ARTERY STENT Bilateral 01/28/2020    Bilateral distal aorta and common iliac 8 X 59 vbx covered stents performed by Dr. Antonio Jacinto.    RADIOFREQUENCY ABLATION Left 4/15/2022    Procedure: Left calf  Radiofrequency Ablation;  Surgeon: Matty Falcon DO;  Location: [facility];  Service: Vascular;  Laterality: Left;    STAB PHLEBECTOMY OF VARICOSE VEINS Left 01/25/2013    Left leg microphlebectomies x 23 stab avulsions and ligation of multiple varicose veins perrformed by Dr. Cirilo Aguirre.    THYROIDECTOMY      hx: thyroid cancer    TOE AMPUTATION Left 01/28/2020    2nd, 4th and 5th performed by Dr. Anam Saeed.    TOE AMPUTATION Right 01/28/2020    4th toe performed by Dr. Anam Saeed.    TOTAL ABDOMINAL HYSTERECTOMY W/ BILATERAL SALPINGOOPHORECTOMY      TUBAL LIGATION      VAGINAL DELIVERY      x 4    VENOUS ABLATION Right 08/09/2019    GSV Varithena Ablation performed by Dr. Estuardo Falcon    VENOUS ABLATION Left 08/02/2019    ATAV Varithena Ablation performed by Dr. Estuardo Falcon.    VENOUS ABLATION Right 07/13/2015    ATAV Laser Ablatio performed by Dr. Cirilo Aguirre.    VENOUS ABLATION Right 07/06/2015    Distal  GSV Laser Ablation performed by dr. Cirilo Aguirre.    VENOUS ABLATION Left 04/20/2015    Left Distal GSV Laser Ablation performed by dr. Cirilo Aguirre.    VENOUS ABLATION Right 10/28/2013    Right Distal GSV RF Ablation performed by Dr. Cirilo Aguirre.    VENOUS ABLATION Left 10/25/2013    SSV RF Ablation performed by dr. Cirilo Aguirre.    VENOUS ABLATION Left 10/07/2013    Left GSV and Left ATAV RF Ablation performed by Dr. Cirilo Aguirre.    VENOUS ABLATION Right 01/21/2013    GSV RF Ablation w/micros x 22 performed by Dr. Cirilo Aguirre.    VENOUS ABLATION Left 06/25/2021    Left distal GSV  Varithena Ablation       Social History     Tobacco Use   Smoking Status Current Every Day Smoker    Packs/day: 1.00    Years: 33.00    Pack years: 33.00    Types: Cigarettes   Smokeless Tobacco Never Used         Current Outpatient Medications:     albuterol (PROVENTIL/VENTOLIN HFA) 90 mcg/actuation inhaler, Inhale 2 puffs into the lungs 4 (four) times daily. Rescue, Disp: 18 g, Rfl: 2    allopurinoL (ZYLOPRIM) 300 MG tablet, Take 1 tablet (300 mg total) by mouth once daily., Disp: 90 tablet, Rfl: 3    amitriptyline (ELAVIL) 25 MG tablet, Take 1 tablet (25 mg total) by mouth nightly as needed for Insomnia., Disp: 90 tablet, Rfl: 1    apixaban (ELIQUIS) 5 mg Tab, Take 1 tablet (5 mg total) by mouth 2 (two) times daily., Disp: 60 tablet, Rfl: 3    aspirin (ECOTRIN) 81 MG EC tablet, Take 1 tablet (81 mg total) by mouth once daily., Disp: 90 tablet, Rfl: 1    azithromycin (Z-SANJU) 250 MG tablet, Take 1 tablet (250 mg total) by mouth once daily. Take first 2 tablets together, then 1 every day until finished., Disp: 6 tablet, Rfl: 0    cilostazoL (PLETAL) 100 MG Tab, Take 1 tablet (100 mg total) by mouth 2 (two) times daily., Disp: 90 tablet, Rfl: 1    colchicine (COLCRYS) 0.6 mg tablet, One pill three times a day for two days,then one pill daily till out, Disp: 30 tablet, Rfl: 2    HYDROcodone-acetaminophen (NORCO)  mg per tablet, Take 1 tablet by mouth 4 (four) times daily as needed., Disp: , Rfl:     insulin (LANTUS SOLOSTAR U-100 INSULIN) glargine 100 units/mL (3mL) SubQ pen, Inject 10 units sq daily, Disp: 15 mL, Rfl: 1    insulin detemir U-100 (LEVEMIR FLEXTOUCH U-100 INSULN) 100 unit/mL (3 mL) InPn pen, Inject 10 Units into the skin every evening. 15 ml with 1 refill, Disp: , Rfl:     levothyroxine (SYNTHROID) 150 MCG tablet, Take 1 tablet (150 mcg total) by mouth before breakfast., Disp: 90 tablet, Rfl: 1    loratadine (CLARITIN) 10 mg tablet, Take 1 tablet (10 mg total) by mouth once  "daily., Disp: 30 tablet, Rfl: 0    losartan-hydrochlorothiazide 50-12.5 mg (HYZAAR) 50-12.5 mg per tablet, Take 1 tablet by mouth once daily., Disp: 90 tablet, Rfl: 1    metOLazone (ZAROXOLYN) 2.5 MG tablet, Take 1 tablet (2.5 mg total) by mouth once daily., Disp: 30 tablet, Rfl: 2    mupirocin (BACTROBAN) 2 % ointment, Apply topically 2 (two) times daily., Disp: 80 g, Rfl: 2    NOVOFINE 32 32 gauge x 1/4" Ndle, Use as directed once daily., Disp: 90 each, Rfl: 3    pantoprazole (PROTONIX) 40 MG tablet, Take 1 tablet (40 mg total) by mouth once daily., Disp: 90 tablet, Rfl: 1    phentermine (ADIPEX-P) 37.5 mg tablet, Take 1 tablet (37.5 mg total) by mouth before breakfast., Disp: 30 tablet, Rfl: 0    potassium chloride SA (K-DUR,KLOR-CON) 20 MEQ tablet, Take 1 tablet (20 mEq total) by mouth once daily., Disp: 30 tablet, Rfl: 0    pregabalin (LYRICA) 100 MG capsule, Take 1 capsule (100 mg total) by mouth every 12 (twelve) hours., Disp: 60 capsule, Rfl: 1    spironolactone (ALDACTONE) 50 MG tablet, Take 1 tablet (50 mg total) by mouth once daily., Disp: 30 tablet, Rfl: 11    SYMBICORT 160-4.5 mcg/actuation HFAA, Inhale into the lungs., Disp: , Rfl:     topiramate (TOPAMAX) 100 MG tablet, Take 1.5 tablets (150 mg total) by mouth 2 (two) times daily., Disp: 90 tablet, Rfl: 11    triamcinolone acetonide 0.1% (KENALOG) 0.1 % cream, Apply topically 3 (three) times daily., Disp: 400 g, Rfl: 2    Review of Systems   Constitutional: Negative for activity change, chills, diaphoresis, fatigue and fever.   Respiratory: Negative for cough and shortness of breath.    Cardiovascular: Positive for leg swelling. Negative for chest pain and claudication.        Hyperpigmentation LE   Gastrointestinal: Negative for nausea and vomiting.   Musculoskeletal: Positive for leg pain. Negative for joint swelling.   Integumentary:  Negative for rash and wound.   Neurological: Negative for weakness and numbness.        II. PHYSICAL " EXAM     Physical Exam  Constitutional:       General: She is awake. She is not in acute distress.     Appearance: Normal appearance. She is obese. She is not ill-appearing or toxic-appearing.   HENT:      Head: Normocephalic and atraumatic.   Eyes:      Extraocular Movements: Extraocular movements intact.      Conjunctiva/sclera: Conjunctivae normal.      Pupils: Pupils are equal, round, and reactive to light.   Neck:      Vascular: No carotid bruit or JVD.   Cardiovascular:      Rate and Rhythm: Normal rate and regular rhythm.      Pulses:           Dorsalis pedis pulses are detected w/ Doppler on the left side.        Posterior tibial pulses are detected w/ Doppler on the left side.      Heart sounds: No murmur heard.  Pulmonary:      Effort: Pulmonary effort is normal. No respiratory distress.      Breath sounds: No stridor. No wheezing, rhonchi or rales.   Musculoskeletal:         General: No swelling, tenderness or deformity.      Right lower le+ Edema present.      Left lower le+ Edema present.        Legs:       Comments:  Patient noted to have great toe and middle toe only of the left foot.  Tender to palpation along the lateral aspect and plantar surface of the foot.  No foul odor noted.  No actual purulent material noted.      Scattered hyperpigmentation and varicose veins of the bilateral Gator regions.   Feet:      Comments: Monophasic pulses of the left foot with hand-held Doppler of the DPs and PTs.  Skin:     General: Skin is warm.      Capillary Refill: Capillary refill takes less than 2 seconds.      Coloration: Skin is not ashen.      Findings: No bruising, erythema, lesion, rash or wound.   Neurological:      Mental Status: She is alert and oriented to person, place, and time.      Motor: No weakness.   Psychiatric:         Speech: Speech normal.         Behavior: Behavior normal. Behavior is cooperative.       Reticular/Spider veins noted:  RLE: anterior calf, medial calf, ankle and  foot  LLE: anterior calf, medial calf, ankle and foot    Varicose veins noted:  RLE: anterior calf, medial calf, medial thigh, ankle and foot  LLE:  anterior calf, medial calf, medial thigh, ankle and foot    CEAP Classification  Clinical Signs: Class 5 - Skin changes as defined above with healed ulceration  Etiologic Classification: Primary  Anatomic distribution: Superficial  Pathophysiologic dysfunction: Reflux       Venous Clinical Severity Score  Pain:2=Daily, moderate activity limitation, occasional analgesics  Varicose Veins: 3=Extensive. Thigh and calf or GS and LS distribution  Venous Edema: 2=Afternoon edema, above ankle  Pigmentation: 2=Diffuse over most of gaiter distribution (lower 1/3) or recent pigmentation (purple)  Inflammation: 1=Mild cellulitis, limited to marginal area around ulcer  Induration: 2=Medial or lateral,less than lower third of leg  Number of Active Ulcers: 1=1  Active Ulceration, Duration: 1=<3 months  Active Ulcer Size: 1=<2 cm diameter  Compressive Therapy: 1=Intermittent use of stockings  Total Score: 16       III. ASSESSMENT & PLAN (MEDICAL DECISION MAKING)     1. Venous insufficiency    2. Other skin changes    3. PVD (peripheral vascular disease)    4. Varicose veins of bilateral lower extremities with pain    5. Edema, lower extremity    6. Foot pain, left        Assessment/Diagnosis and Plan:  The patient is doing okay post ablation as encouraged to continue wearing her compression wraps or stockings. Encouraged to wear daily.   She is also encouraged for daily leg elevation and leg exercises when appropriate.  She continues on anticoagulation.  We will follow up in 2month for 3 month post ablation visit.  No ultrasound needed unless she is having difficulties.    She was previously evaluated & submitted for an Advanced lymphedema pump.           WARREN Hou

## 2022-05-24 RX ORDER — COLCHICINE 0.6 MG/1
TABLET ORAL
Qty: 30 TABLET | Refills: 2 | Status: SHIPPED | OUTPATIENT
Start: 2022-05-24 | End: 2022-06-16 | Stop reason: SDUPTHER

## 2022-05-24 NOTE — TELEPHONE ENCOUNTER
----- Message from Denise Guerrero sent at 5/24/2022  3:09 PM CDT -----  Pt would like a refill on colchicine (COLCRYS) 0.6 mg tablet

## 2022-06-01 NOTE — PROGRESS NOTES
Subjective:      Patient ID: Holly Porter is a 61 y.o. female.    Chief Complaint: Low-back Pain, Foot Pain, and Leg Pain      Pain  This is a chronic problem. The current episode started more than 1 year ago. The problem occurs daily. The problem has been unchanged. Associated symptoms include arthralgias. Pertinent negatives include no change in bowel habit, chest pain, chills, coughing, diaphoresis, fever, rash, sore throat, vertigo or vomiting.     Review of Systems   Constitutional: Negative for appetite change, chills, diaphoresis, fever and unexpected weight change.   HENT: Negative for drooling, ear discharge, ear pain, facial swelling, nosebleeds, sore throat, trouble swallowing, voice change and goiter.    Eyes: Negative for photophobia, pain, discharge, redness and visual disturbance.   Respiratory: Negative for apnea, cough, choking, chest tightness, shortness of breath, wheezing and stridor.    Cardiovascular: Negative for chest pain, palpitations and leg swelling.   Gastrointestinal: Negative for abdominal distention, change in bowel habit, diarrhea, rectal pain, vomiting, fecal incontinence and change in bowel habit.   Endocrine: Negative for cold intolerance, heat intolerance, polydipsia, polyphagia and polyuria.   Genitourinary: Negative for bladder incontinence, dysuria, flank pain, frequency and hot flashes.   Musculoskeletal: Positive for arthralgias, back pain, leg pain and neck stiffness.   Integumentary:  Negative for color change, pallor and rash.   Allergic/Immunologic: Negative for immunocompromised state.   Neurological: Negative for dizziness, vertigo, seizures, syncope, facial asymmetry, speech difficulty, light-headedness, disturbances in coordination, memory loss and coordination difficulties.   Hematological: Negative for adenopathy. Does not bruise/bleed easily.   Psychiatric/Behavioral: Negative for agitation, behavioral problems, confusion, decreased concentration, dysphoric  "mood, hallucinations, self-injury and suicidal ideas. The patient is not nervous/anxious and is not hyperactive.             Objective:  Vitals:    06/02/22 1321 06/02/22 1323   BP: 114/63    Pulse: 92    Resp: (P) 17    Weight: (!) (P) 160.1 kg (353 lb)    Height: (P) 5' 8" (1.727 m)    PainSc: (P) 10-Worst pain ever 10-Worst pain ever         Physical Exam  Vitals and nursing note reviewed. Exam conducted with a chaperone present.   Constitutional:       General: She is awake.      Appearance: Normal appearance. She is obese. She is not diaphoretic.   HENT:      Head: Normocephalic and atraumatic.      Nose: Nose normal.      Mouth/Throat:      Mouth: Mucous membranes are moist.      Pharynx: Oropharynx is clear.   Eyes:      Conjunctiva/sclera: Conjunctivae normal.      Pupils: Pupils are equal, round, and reactive to light.   Cardiovascular:      Rate and Rhythm: Normal rate.   Pulmonary:      Effort: Pulmonary effort is normal. No respiratory distress.   Abdominal:      Palpations: Abdomen is soft.   Musculoskeletal:         General: Normal range of motion.      Cervical back: Normal range of motion and neck supple.   Skin:     General: Skin is warm and dry.   Neurological:      General: No focal deficit present.      Mental Status: She is alert and oriented to person, place, and time. Mental status is at baseline.      Cranial Nerves: Cranial nerves are intact. No cranial nerve deficit (II-XII).   Psychiatric:         Mood and Affect: Mood normal.         Behavior: Behavior normal. Behavior is cooperative.         Thought Content: Thought content normal.           No orders of the defined types were placed in this encounter.       X-Ray Chest PA And Lateral  Narrative: EXAMINATION:  XR CHEST PA AND LATERAL    CLINICAL HISTORY:  Dyspnea, unspecified    TECHNIQUE:  PA and lateral views of the chest were performed.    COMPARISON:  None    FINDINGS:  Heart size normal.  Lungs clear.  No pneumothorax or pleural " effusion.  Pulmonary vessels show normal caliber.  Degenerative changes of the spine.  Impression: No acute findings.    Electronically signed by: Edson De  Date:    05/10/2022  Time:    18:22       Admission on 05/10/2022, Discharged on 05/10/2022   Component Date Value Ref Range Status    POC Glucose 05/10/2022 131 (A) 70 - 105 mg/dL Final    ProBNP 05/10/2022 443 (A) 1 - 125 pg/mL Final    Sodium 05/10/2022 141  136 - 145 mmol/L Final    Potassium 05/10/2022 3.3 (A) 3.5 - 5.1 mmol/L Final    Chloride 05/10/2022 102  98 - 107 mmol/L Final    CO2 05/10/2022 30  21 - 32 mmol/L Final    Anion Gap 05/10/2022 12  7 - 16 mmol/L Final    Glucose 05/10/2022 117 (A) 74 - 106 mg/dL Final    BUN 05/10/2022 15  7 - 18 mg/dL Final    Creatinine 05/10/2022 0.95  0.55 - 1.02 mg/dL Final    BUN/Creatinine Ratio 05/10/2022 16  6 - 20 Final    Calcium 05/10/2022 9.1  8.5 - 10.1 mg/dL Final    Total Protein 05/10/2022 7.8  6.4 - 8.2 g/dL Final    Albumin 05/10/2022 3.8  3.5 - 5.0 g/dL Final    Globulin 05/10/2022 4.0  2.0 - 4.0 g/dL Final    A/G Ratio 05/10/2022 1.0   Final    Bilirubin, Total 05/10/2022 0.3  0.0 - 1.2 mg/dL Final    Alk Phos 05/10/2022 129  50 - 130 U/L Final    ALT 05/10/2022 14  13 - 56 U/L Final    AST 05/10/2022 19  15 - 37 U/L Final    eGFR 05/10/2022 63  >=60 mL/min/1.73m² Final    Magnesium 05/10/2022 2.1  1.7 - 2.3 mg/dL Final    Color, UA 05/10/2022 Yellow  Colorless, Straw, Yellow, Dark Yellow Final    Clarity, UA 05/10/2022 Clear  Clear Final    pH, UA 05/10/2022 7.0  5.0, 5.5, 6.0, 6.5, 7.0, 7.5, 8.0 pH Units Final    Leukocytes, UA 05/10/2022 Negative  Negative Final    Nitrites, UA 05/10/2022 Negative  Negative Final    Protein, UA 05/10/2022 Negative  Negative Final    Glucose, UA 05/10/2022 Negative  Negative mg/dL Final    Ketones, UA 05/10/2022 Negative  Negative, Trace mg/dL Final    Urobilinogen, UA 05/10/2022 0.2  0.2, 1.0 mg/dL Final    Bilirubin, UA 05/10/2022  Negative  Negative Final    Blood, UA 05/10/2022 Negative  Negative Final    Specific Gravity, UA 05/10/2022 1.010  <=1.005, 1.010, 1.015, 1.020, 1.025, 1.030 Final    WBC 05/10/2022 12.23 (A) 4.50 - 11.00 K/uL Final    RBC 05/10/2022 5.47 (A) 4.20 - 5.40 M/uL Final    Hemoglobin 05/10/2022 15.3  12.0 - 16.0 g/dL Final    Hematocrit 05/10/2022 47.7 (A) 38.0 - 47.0 % Final    MCV 05/10/2022 87.2  80.0 - 96.0 fL Final    MCH 05/10/2022 28.0  27.0 - 31.0 pg Final    MCHC 05/10/2022 32.1  32.0 - 36.0 g/dL Final    RDW 05/10/2022 16.7 (A) 11.5 - 14.5 % Final    Platelet Count 05/10/2022 232  150 - 400 K/uL Final    MPV 05/10/2022 12.1  9.4 - 12.4 fL Final    Neutrophils % 05/10/2022 64.3  53.0 - 65.0 % Final    Lymphocytes % 05/10/2022 22.0 (A) 27.0 - 41.0 % Final    Neutrophils, Abs 05/10/2022 7.86 (A) 1.80 - 7.70 K/uL Final    Lymphocytes, Absolute 05/10/2022 2.69  1.00 - 4.80 K/uL Final    Diff Type 05/10/2022 Auto   Final    Monocytes % 05/10/2022 4.5  2.0 - 6.0 % Final    Eosinophils % 05/10/2022 8.7 (A) 1.0 - 4.0 % Final    Basophils % 05/10/2022 0.2  0.0 - 1.0 % Final    Immature Granulocytes % 05/10/2022 0.3  0.0 - 0.4 % Final    Monocytes, Absolute 05/10/2022 0.55  0.00 - 0.80 K/uL Final    Eosinophils, Absolute 05/10/2022 1.06 (A) 0.00 - 0.50 K/uL Final    Immature Granulocytes, Absolute 05/10/2022 0.04  0.00 - 0.04 K/uL Final    Basophils, Absolute 05/10/2022 0.03  0.00 - 0.20 K/uL Final   Office Visit on 04/25/2022   Component Date Value Ref Range Status    Vitamin B12 04/25/2022 373  193 - 986 pg/mL Final   Admission on 04/15/2022, Discharged on 04/15/2022   Component Date Value Ref Range Status    POC Glucose 04/15/2022 82  70 - 105 mg/dL Final    POC Glucose 04/15/2022 54 (A) 70 - 105 mg/dL Final    POC Glucose 04/15/2022 117 (A) 70 - 105 mg/dL Final    POC Glucose 04/15/2022 66 (A) 70 - 105 mg/dL Final    POC Glucose 04/15/2022 83  70 - 105 mg/dL Final   Office Visit on  04/13/2022   Component Date Value Ref Range Status    SARS Coronavirus 2 Antigen 04/13/2022 Negative  Negative Final     Acceptable 04/13/2022 Yes   Final    Sodium 04/13/2022 142  136 - 145 mmol/L Final    Potassium 04/13/2022 4.5  3.5 - 5.1 mmol/L Final    Chloride 04/13/2022 108 (A) 98 - 107 mmol/L Final    CO2 04/13/2022 29  21 - 32 mmol/L Final    Anion Gap 04/13/2022 10  7 - 16 mmol/L Final    Glucose 04/13/2022 73 (A) 74 - 106 mg/dL Final    BUN 04/13/2022 12  7 - 18 mg/dL Final    Creatinine 04/13/2022 0.90  0.55 - 1.02 mg/dL Final    BUN/Creatinine Ratio 04/13/2022 13  6 - 20 Final    Calcium 04/13/2022 9.2  8.5 - 10.1 mg/dL Final    eGFR 04/13/2022 67  >=60 mL/min/1.73m² Final    TSH 04/13/2022 0.197 (A) 0.358 - 3.740 uIU/mL Final    ProBNP 04/13/2022 205 (A) 1 - 125 pg/mL Final   Office Visit on 04/04/2022   Component Date Value Ref Range Status    ESR Westergren 04/04/2022 3  0 - 30 mm/Hr Final    WBC 04/04/2022 10.74  4.50 - 11.00 K/uL Final    RBC 04/04/2022 5.71 (A) 4.20 - 5.40 M/uL Final    Hemoglobin 04/04/2022 15.5  12.0 - 16.0 g/dL Final    Hematocrit 04/04/2022 50.7 (A) 38.0 - 47.0 % Final    MCV 04/04/2022 88.8  80.0 - 96.0 fL Final    MCH 04/04/2022 27.1  27.0 - 31.0 pg Final    MCHC 04/04/2022 30.6 (A) 32.0 - 36.0 g/dL Final    RDW 04/04/2022 18.4 (A) 11.5 - 14.5 % Final    Platelet Count 04/04/2022 243  150 - 400 K/uL Final    MPV 04/04/2022 12.7 (A) 9.4 - 12.4 fL Final    Neutrophils % 04/04/2022 62.5  53.0 - 65.0 % Final    Lymphocytes % 04/04/2022 27.0  27.0 - 41.0 % Final    Monocytes % 04/04/2022 6.1 (A) 2.0 - 6.0 % Final    Eosinophils % 04/04/2022 3.1  1.0 - 4.0 % Final    Basophils % 04/04/2022 0.7  0.0 - 1.0 % Final    Immature Granulocytes % 04/04/2022 0.6 (A) 0.0 - 0.4 % Final    nRBC, Auto 04/04/2022 0.0  <=0.0 % Final    Neutrophils, Abs 04/04/2022 6.71  1.80 - 7.70 K/uL Final    Lymphocytes, Absolute 04/04/2022 2.90  1.00 - 4.80  K/uL Final    Monocytes, Absolute 04/04/2022 0.66  0.00 - 0.80 K/uL Final    Eosinophils, Absolute 04/04/2022 0.33  0.00 - 0.50 K/uL Final    Basophils, Absolute 04/04/2022 0.08  0.00 - 0.20 K/uL Final    Immature Granulocytes, Absolute 04/04/2022 0.06 (A) 0.00 - 0.04 K/uL Final    nRBC, Absolute 04/04/2022 0.00  <=0.00 x10e3/uL Final    Diff Type 04/04/2022 Auto   Final   Office Visit on 04/04/2022   Component Date Value Ref Range Status    POC Amphetamines 04/04/2022 Negative  Negative, Inconclusive Final    POC Barbiturates 04/04/2022 Negative  Negative, Inconclusive Final    POC Benzodiazepines 04/04/2022 Negative  Negative, Inconclusive Final    POC Cocaine 04/04/2022 Negative  Negative, Inconclusive Final    POC THC 04/04/2022 Negative  Negative, Inconclusive Final    POC Methadone 04/04/2022 Negative  Negative, Inconclusive Final    POC Methamphetamine 04/04/2022 Negative  Negative, Inconclusive Final    POC Opiates 04/04/2022 Presumptive Positive (A) Negative, Inconclusive Final    POC Oxycodone 04/04/2022 Negative  Negative, Inconclusive Final    POC Phencyclidine 04/04/2022 Negative  Negative, Inconclusive Final    POC Methylenedioxymethamphetamine * 04/04/2022 Negative  Negative, Inconclusive Final    POC Tricyclic Antidepressants 04/04/2022 Negative  Negative, Inconclusive Final    POC Buprenorphine 04/04/2022 Negative   Final     Acceptable 04/04/2022 Yes   Final    POC Temperature (Urine) 04/04/2022 90   Final   Office Visit on 02/09/2022   Component Date Value Ref Range Status    Hemoglobin A1C 02/09/2022 5.7  4.5 - 6.6 % Final    POC Glucose 02/09/2022 112 (A) 70 - 110 MG/DL Final   Admission on 01/28/2022, Discharged on 01/29/2022   Component Date Value Ref Range Status    Sodium 01/28/2022 142  136 - 145 mmol/L Final    Potassium 01/28/2022 3.5  3.5 - 5.1 mmol/L Final    Chloride 01/28/2022 105  98 - 107 mmol/L Final    CO2 01/28/2022 26  21 - 32 mmol/L  Final    Anion Gap 01/28/2022 15  7 - 16 mmol/L Final    Glucose 01/28/2022 108 (A) 74 - 106 mg/dL Final    BUN 01/28/2022 13  7 - 18 mg/dL Final    Creatinine 01/28/2022 1.01  0.55 - 1.02 mg/dL Final    BUN/Creatinine Ratio 01/28/2022 13  6 - 20 Final    Calcium 01/28/2022 9.5  8.5 - 10.1 mg/dL Final    Total Protein 01/28/2022 7.6  6.4 - 8.2 g/dL Final    Albumin 01/28/2022 3.5  3.5 - 5.0 g/dL Final    Globulin 01/28/2022 4.1 (A) 2.0 - 4.0 g/dL Final    A/G Ratio 01/28/2022 0.9   Final    Bilirubin, Total 01/28/2022 0.4  0.0 - 1.2 mg/dL Final    Alk Phos 01/28/2022 80  50 - 130 U/L Final    ALT 01/28/2022 11 (A) 13 - 56 U/L Final    AST 01/28/2022 23  15 - 37 U/L Final    eGFR 01/28/2022 59 (A) >=60 mL/min/1.73m² Final    PTT 01/28/2022 26.9  25.2 - 37.3 seconds Final    PT 01/28/2022 13.3  11.7 - 14.7 seconds Final    INR 01/28/2022 1.01  0.90 - 1.10 Final    Magnesium 01/28/2022 2.0  1.7 - 2.3 mg/dL Final    Troponin I High Sensitivity 01/28/2022 207.7 (A) <=60.4 pg/mL Final    WBC 01/28/2022 10.12  4.50 - 11.00 K/uL Final    RBC 01/28/2022 5.53 (A) 4.20 - 5.40 M/uL Final    Hemoglobin 01/28/2022 14.8  12.0 - 16.0 g/dL Final    Hematocrit 01/28/2022 46.9  38.0 - 47.0 % Final    MCV 01/28/2022 84.8  80.0 - 96.0 fL Final    MCH 01/28/2022 26.8 (A) 27.0 - 31.0 pg Final    MCHC 01/28/2022 31.6 (A) 32.0 - 36.0 g/dL Final    RDW 01/28/2022 19.6 (A) 11.5 - 14.5 % Final    Platelet Count 01/28/2022 224  150 - 400 K/uL Final    MPV 01/28/2022 11.4  9.4 - 12.4 fL Final    Neutrophils % 01/28/2022 62.2  53.0 - 65.0 % Final    Lymphocytes % 01/28/2022 27.8  27.0 - 41.0 % Final    Neutrophils, Abs 01/28/2022 6.30  1.80 - 7.70 K/uL Final    Lymphocytes, Absolute 01/28/2022 2.81  1.00 - 4.80 K/uL Final    Diff Type 01/28/2022 Auto   Final    Monocytes % 01/28/2022 6.1 (A) 2.0 - 6.0 % Final    Eosinophils % 01/28/2022 3.3  1.0 - 4.0 % Final    Basophils % 01/28/2022 0.3  0.0 - 1.0 % Final     Immature Granulocytes % 01/28/2022 0.3  0.0 - 0.4 % Final    Monocytes, Absolute 01/28/2022 0.62  0.00 - 0.80 K/uL Final    Eosinophils, Absolute 01/28/2022 0.33  0.00 - 0.50 K/uL Final    Immature Granulocytes, Absolute 01/28/2022 0.03  0.00 - 0.04 K/uL Final    Basophils, Absolute 01/28/2022 0.03  0.00 - 0.20 K/uL Final    Troponin I High Sensitivity 01/28/2022 193.4 (A) <=60.4 pg/mL Final    PTT 01/29/2022 >200.0 (A) 25.2 - 37.3 seconds Final    Troponin I High Sensitivity 01/29/2022 166.1 (A) <=60.4 pg/mL Final    WBC 01/29/2022 11.48 (A) 4.50 - 11.00 K/uL Final    RBC 01/29/2022 5.33  4.20 - 5.40 M/uL Final    Hemoglobin 01/29/2022 14.1  12.0 - 16.0 g/dL Final    Hematocrit 01/29/2022 45.1  38.0 - 47.0 % Final    MCV 01/29/2022 84.6  80.0 - 96.0 fL Final    MCH 01/29/2022 26.5 (A) 27.0 - 31.0 pg Final    MCHC 01/29/2022 31.3 (A) 32.0 - 36.0 g/dL Final    RDW 01/29/2022 19.3 (A) 11.5 - 14.5 % Final    Platelet Count 01/29/2022 201  150 - 400 K/uL Final    MPV 01/29/2022 11.1  9.4 - 12.4 fL Final    Neutrophils % 01/29/2022 58.2  53.0 - 65.0 % Final    Lymphocytes % 01/29/2022 30.7  27.0 - 41.0 % Final    Neutrophils, Abs 01/29/2022 6.67  1.80 - 7.70 K/uL Final    Lymphocytes, Absolute 01/29/2022 3.53  1.00 - 4.80 K/uL Final    Diff Type 01/29/2022 Auto   Final    Monocytes % 01/29/2022 5.9  2.0 - 6.0 % Final    Eosinophils % 01/29/2022 4.4 (A) 1.0 - 4.0 % Final    Basophils % 01/29/2022 0.3  0.0 - 1.0 % Final    Immature Granulocytes % 01/29/2022 0.5 (A) 0.0 - 0.4 % Final    Monocytes, Absolute 01/29/2022 0.68  0.00 - 0.80 K/uL Final    Eosinophils, Absolute 01/29/2022 0.50  0.00 - 0.50 K/uL Final    Immature Granulocytes, Absolute 01/29/2022 0.06 (A) 0.00 - 0.04 K/uL Final    Basophils, Absolute 01/29/2022 0.04  0.00 - 0.20 K/uL Final    Sodium 01/29/2022 142  136 - 145 mmol/L Final    Potassium 01/29/2022 3.4 (A) 3.5 - 5.1 mmol/L Final    Chloride 01/29/2022 105  98 - 107  mmol/L Final    CO2 01/29/2022 27  21 - 32 mmol/L Final    Anion Gap 01/29/2022 13  7 - 16 mmol/L Final    Glucose 01/29/2022 102  74 - 106 mg/dL Final    BUN 01/29/2022 13  7 - 18 mg/dL Final    Creatinine 01/29/2022 0.88  0.55 - 1.02 mg/dL Final    BUN/Creatinine Ratio 01/29/2022 15  6 - 20 Final    Calcium 01/29/2022 9.1  8.5 - 10.1 mg/dL Final    Total Protein 01/29/2022 6.8  6.4 - 8.2 g/dL Final    Albumin 01/29/2022 3.2 (A) 3.5 - 5.0 g/dL Final    Globulin 01/29/2022 3.6  2.0 - 4.0 g/dL Final    A/G Ratio 01/29/2022 0.9   Final    Bilirubin, Total 01/29/2022 0.3  0.0 - 1.2 mg/dL Final    Alk Phos 01/29/2022 74  50 - 130 U/L Final    ALT 01/29/2022 12 (A) 13 - 56 U/L Final    AST 01/29/2022 16  15 - 37 U/L Final    eGFR 01/29/2022 69  >=60 mL/min/1.73m² Final    PTT 01/29/2022 >200.0 (A) 25.2 - 37.3 seconds Final    PT 01/29/2022 14.8 (A) 11.7 - 14.7 seconds Final    INR 01/29/2022 1.16 (A) 0.90 - 1.10 Final    PT 01/29/2022 13.6  11.7 - 14.7 seconds Final    INR 01/29/2022 1.04  0.90 - 1.10 Final    PTT 01/29/2022 >200.0 (A) 25.2 - 37.3 seconds Final    PT 01/29/2022 13.4  11.7 - 14.7 seconds Final    INR 01/29/2022 1.02  0.90 - 1.10 Final    PTT 01/29/2022 70.7 (A) 25.2 - 37.3 seconds Final    POC Glucose 01/29/2022 71  70 - 105 mg/dL Final   Admission on 01/11/2022, Discharged on 01/11/2022   Component Date Value Ref Range Status    Culture, Wound/Abscess 01/11/2022 Heavy Growth Staphylococcus epidermidis (A)  Final    Sodium 01/11/2022 141  136 - 145 mmol/L Final    Potassium 01/11/2022 3.3 (A) 3.5 - 5.1 mmol/L Final    Chloride 01/11/2022 105  98 - 107 mmol/L Final    CO2 01/11/2022 29  21 - 32 mmol/L Final    Anion Gap 01/11/2022 10  7 - 16 mmol/L Final    Glucose 01/11/2022 104  74 - 106 mg/dL Final    BUN 01/11/2022 10  7 - 18 mg/dL Final    Creatinine 01/11/2022 1.01  0.55 - 1.02 mg/dL Final    BUN/Creatinine Ratio 01/11/2022 10  6 - 20 Final    Calcium 01/11/2022  9.0  8.5 - 10.1 mg/dL Final    eGFR 01/11/2022 59 (A) >=60 mL/min/1.73m² Final    WBC 01/11/2022 9.32  4.50 - 11.00 K/uL Final    RBC 01/11/2022 5.51 (A) 4.20 - 5.40 M/uL Final    Hemoglobin 01/11/2022 14.6  12.0 - 16.0 g/dL Final    Hematocrit 01/11/2022 46.1  38.0 - 47.0 % Final    MCV 01/11/2022 83.7  80.0 - 96.0 fL Final    MCH 01/11/2022 26.5 (A) 27.0 - 31.0 pg Final    MCHC 01/11/2022 31.7 (A) 32.0 - 36.0 g/dL Final    RDW 01/11/2022 19.4 (A) 11.5 - 14.5 % Final    Platelet Count 01/11/2022 250  150 - 400 K/uL Final    MPV 01/11/2022 11.2  9.4 - 12.4 fL Final    Neutrophils % 01/11/2022 64.9  53.0 - 65.0 % Final    Lymphocytes % 01/11/2022 24.0 (A) 27.0 - 41.0 % Final    Neutrophils, Abs 01/11/2022 6.05  1.80 - 7.70 K/uL Final    Lymphocytes, Absolute 01/11/2022 2.24  1.00 - 4.80 K/uL Final    Diff Type 01/11/2022 Auto   Final    Monocytes % 01/11/2022 5.3  2.0 - 6.0 % Final    Eosinophils % 01/11/2022 5.3 (A) 1.0 - 4.0 % Final    Basophils % 01/11/2022 0.3  0.0 - 1.0 % Final    Immature Granulocytes % 01/11/2022 0.2  0.0 - 0.4 % Final    Monocytes, Absolute 01/11/2022 0.49  0.00 - 0.80 K/uL Final    Eosinophils, Absolute 01/11/2022 0.49  0.00 - 0.50 K/uL Final    Immature Granulocytes, Absolute 01/11/2022 0.02  0.00 - 0.04 K/uL Final    Basophils, Absolute 01/11/2022 0.03  0.00 - 0.20 K/uL Final    POC Glucose 01/11/2022 89  70 - 105 mg/dL Final   Office Visit on 01/11/2022   Component Date Value Ref Range Status    Color, UA 01/11/2022 Yellow  Colorless, Straw, Yellow, Dark Yellow Final    Clarity, UA 01/11/2022 Clear  Clear Final    pH, UA 01/11/2022 5.5  5.0, 5.5, 6.0, 6.5, 7.0, 7.5, 8.0 pH Units Final    Leukocytes, UA 01/11/2022 Negative  Negative Final    Nitrites, UA 01/11/2022 Negative  Negative Final    Protein, UA 01/11/2022 Negative  Negative Final    Glucose, UA 01/11/2022 Negative  Negative mg/dL Final    Ketones, UA 01/11/2022 Negative  Negative, Trace mg/dL  Final    Urobilinogen, UA 01/11/2022 0.2  0.2, 1.0 mg/dL Final    Bilirubin, UA 01/11/2022 Negative  Negative Final    Blood, UA 01/11/2022 Negative  Negative Final    Specific Gravity, UA 01/11/2022 1.010  <=1.005, 1.010, 1.015, 1.020, 1.025, 1.030 Final   There may be more visits with results that are not included.           Assessment:      1. Lumbosacral spondylosis without myelopathy    2. Chronic pain of right knee    3. PVD (peripheral vascular disease)    4. Foot pain, left            A's of Opioid Risk Assessment  Activity:Patient can perform ADL.   Analgesia:Patients pain is partially controlled by current medication. Patient has tried OTC medications such as Tylenol and Ibuprofen with out relief.   Adverse Effects: Patient denies constipation or sedation.  Aberrant Behavior:  reviewed with no aberrant drug seeking/taking behavior.  Overdose reversal drug naloxone discussed    Drug screen reviewed      Requested Prescriptions     Pending Prescriptions Disp Refills    pregabalin (LYRICA) 100 MG capsule 60 capsule 1     Sig: Take 1 capsule (100 mg total) by mouth every 12 (twelve) hours.         Plan:    Uses Rollator walker assistance ambulation    Requesting assistance with 4 point walker for home use for assistance round how she states her roller walker too wide to get through the door at home    Will order walker for home use assistance with ambulation    Dealing with left lower extremity pain dealing with severe gout following wound management for nonhealing sore left foot    Diabetic, try to limit steroids    Requesting assistance with narcotics she states current medications not controlling her discomfort    We discussed fentanyl patch she declines to use patch    Cannot tolerate OxyContin she is severely allergic    After discussing options were continue with Norco 10 1 p.o. q.6 hours    Continue activity as tolerated    Follow-up 2 months    Dr. Crandall, April 2023    Bring original  prescription medication bottles/container/box with labels to each visit

## 2022-06-02 ENCOUNTER — TELEPHONE (OUTPATIENT)
Dept: SURGERY | Facility: CLINIC | Age: 62
End: 2022-06-02
Payer: COMMERCIAL

## 2022-06-02 ENCOUNTER — OFFICE VISIT (OUTPATIENT)
Dept: PAIN MEDICINE | Facility: CLINIC | Age: 62
End: 2022-06-02
Payer: COMMERCIAL

## 2022-06-02 VITALS — DIASTOLIC BLOOD PRESSURE: 63 MMHG | HEART RATE: 92 BPM | SYSTOLIC BLOOD PRESSURE: 114 MMHG

## 2022-06-02 DIAGNOSIS — M47.817 LUMBOSACRAL SPONDYLOSIS WITHOUT MYELOPATHY: Primary | Chronic | ICD-10-CM

## 2022-06-02 DIAGNOSIS — I73.9 PVD (PERIPHERAL VASCULAR DISEASE): Chronic | ICD-10-CM

## 2022-06-02 DIAGNOSIS — G89.29 CHRONIC PAIN OF RIGHT KNEE: Chronic | ICD-10-CM

## 2022-06-02 DIAGNOSIS — M25.561 CHRONIC PAIN OF RIGHT KNEE: Chronic | ICD-10-CM

## 2022-06-02 DIAGNOSIS — M79.672 FOOT PAIN, LEFT: Chronic | ICD-10-CM

## 2022-06-02 PROCEDURE — 1159F MED LIST DOCD IN RCRD: CPT | Mod: ,,, | Performed by: PHYSICIAN ASSISTANT

## 2022-06-02 PROCEDURE — 99215 OFFICE O/P EST HI 40 MIN: CPT | Mod: PBBFAC | Performed by: PHYSICIAN ASSISTANT

## 2022-06-02 PROCEDURE — 1159F PR MEDICATION LIST DOCUMENTED IN MEDICAL RECORD: ICD-10-PCS | Mod: ,,, | Performed by: PHYSICIAN ASSISTANT

## 2022-06-02 PROCEDURE — 99214 PR OFFICE/OUTPT VISIT, EST, LEVL IV, 30-39 MIN: ICD-10-PCS | Mod: S$PBB,,, | Performed by: PHYSICIAN ASSISTANT

## 2022-06-02 PROCEDURE — 3078F PR MOST RECENT DIASTOLIC BLOOD PRESSURE < 80 MM HG: ICD-10-PCS | Mod: ,,, | Performed by: PHYSICIAN ASSISTANT

## 2022-06-02 PROCEDURE — 3078F DIAST BP <80 MM HG: CPT | Mod: ,,, | Performed by: PHYSICIAN ASSISTANT

## 2022-06-02 PROCEDURE — 3074F SYST BP LT 130 MM HG: CPT | Mod: ,,, | Performed by: PHYSICIAN ASSISTANT

## 2022-06-02 PROCEDURE — 3074F PR MOST RECENT SYSTOLIC BLOOD PRESSURE < 130 MM HG: ICD-10-PCS | Mod: ,,, | Performed by: PHYSICIAN ASSISTANT

## 2022-06-02 PROCEDURE — 99214 OFFICE O/P EST MOD 30 MIN: CPT | Mod: S$PBB,,, | Performed by: PHYSICIAN ASSISTANT

## 2022-06-02 RX ORDER — PREGABALIN 100 MG/1
100 CAPSULE ORAL EVERY 12 HOURS
Qty: 60 CAPSULE | Refills: 1 | Status: SHIPPED | OUTPATIENT
Start: 2022-06-02 | End: 2022-08-04 | Stop reason: SDUPTHER

## 2022-06-02 RX ORDER — HYDROCODONE BITARTRATE AND ACETAMINOPHEN 10; 325 MG/1; MG/1
1 TABLET ORAL EVERY 6 HOURS PRN
Qty: 120 TABLET | Refills: 0 | Status: SHIPPED | OUTPATIENT
Start: 2022-07-08 | End: 2022-08-04 | Stop reason: SDUPTHER

## 2022-06-02 RX ORDER — HYDROCODONE BITARTRATE AND ACETAMINOPHEN 10; 325 MG/1; MG/1
1 TABLET ORAL EVERY 6 HOURS PRN
Qty: 120 TABLET | Refills: 0 | Status: SHIPPED | OUTPATIENT
Start: 2022-06-08 | End: 2022-07-08

## 2022-06-02 NOTE — TELEPHONE ENCOUNTER
6/1/22 Phone call from patient. Patient states she is having increased pain with purulent drainage from left foot toe and foot.Patient states she needs to be seen asap. Patient did come to appointment.

## 2022-06-03 NOTE — PROGRESS NOTES
[facility]     PATIENT NAME: Holly Porter   : 1960    AGE: 61 y.o. DATE: 2022   MRN: [id number]        Reason for Visit / Chief Complaint: Medicare AWV (Subsequent [employer] AWV visit )        Holly Porter presents for a Subsequent [employer] AWV today.     The following components were reviewed and updated:    Medical/Social/Family History:  Past Medical History:   Diagnosis Date    Anxiety state     Atherosclerosis of native artery of extremity with intermittent claudication 2019    bilateral legs    Bilateral leg pain     BMI 50.0-59.9, adult 2021    Cellulitis 2020    Chronic pain syndrome     Chronic peripheral venous hypertension 2019    COPD (chronic obstructive pulmonary disease)     Diabetes     Diabetes mellitus, type 2     DVT (deep venous thrombosis) 2020    Embolism and thrombosis of superficial veins of unspecified lower extremity 2019    bilateral    Frequent headaches 2018    GERD (gastroesophageal reflux disease)     Hereditary lymphedema     Hx of thyroid cancer     Hyperlipidemia     Hypertension     Hypothyroidism     Migraines     Migraines     Morbid obesity     Nicotine dependence     Non-pressure chronic ulcer of left lower leg 2021    Osteoarthritis     Other skin changes 2021    bilateral gaiter regions    Pain in left leg     Pain in right leg     PVD (peripheral vascular disease) 2019    Seizure disorder     Sleep apnea     Venous insufficiency (chronic) (peripheral)     Venous stasis     Vitamin D deficiency         Family History   Problem Relation Age of Onset    Heart disease Mother         age 84 CHF    Hypertension Mother     Osteoarthritis Mother     Coronary aneurysm Father     Coronary artery disease Father     Hypertension Father     Heart disease Father     No Known Problems Son     No Known Problems Son     No Known  Problems Son     No Known Problems Son     No Known Problems Sister     No Known Problems Brother         hx: varicose veins    No Known Problems Brother         MVA: parlazed    No Known Problems Brother         Past Surgical History:   Procedure Laterality Date    HYSTERECTOMY      ILIAC ARTERY STENT Bilateral 01/28/2020    Bilateral distal aorta and common iliac 8 X 59 vbx covered stents performed by Dr. Antonio Jacinto.    RADIOFREQUENCY ABLATION Left 4/15/2022    Procedure: Left calf  Radiofrequency Ablation;  Surgeon: Matty Falcon DO;  Location: [facility];  Service: Vascular;  Laterality: Left;    STAB PHLEBECTOMY OF VARICOSE VEINS Left 01/25/2013    Left leg microphlebectomies x 23 stab avulsions and ligation of multiple varicose veins perrformed by Dr. Cirilo Aguirre.    THYROIDECTOMY      hx: thyroid cancer    TOE AMPUTATION Left 01/28/2020    2nd, 4th and 5th performed by Dr. Anam Saeed.    TOE AMPUTATION Right 01/28/2020    4th toe performed by Dr. Anam Saeed.    TOTAL ABDOMINAL HYSTERECTOMY W/ BILATERAL SALPINGOOPHORECTOMY      TUBAL LIGATION      VAGINAL DELIVERY      x 4    VENOUS ABLATION Right 08/09/2019    GSV Varithena Ablation performed by Dr. Estuardo Falcon    VENOUS ABLATION Left 08/02/2019    ATAV Varithena Ablation performed by Dr. Estuardo Falcon.    VENOUS ABLATION Right 07/13/2015    ATAV Laser Ablatio performed by Dr. Cirilo Aguirre.    VENOUS ABLATION Right 07/06/2015    Distal  GSV Laser Ablation performed by dr. Cirilo Aguirre.    VENOUS ABLATION Left 04/20/2015    Left Distal GSV Laser Ablation performed by dr. Cirilo Aguirre.    VENOUS ABLATION Right 10/28/2013    Right Distal GSV RF Ablation performed by Dr. Cirilo Aguirre.    VENOUS ABLATION Left 10/25/2013    SSV RF Ablation performed by dr. Cirilo Aguirre.    VENOUS ABLATION Left 10/07/2013    Left GSV and Left ATAV RF Ablation performed by Dr. Cirilo Aguirre.    VENOUS ABLATION Right 01/21/2013    GSV RF Ablation w/micros x 22 performed by  Dr. Cirilo Aguirre.    VENOUS ABLATION Left 06/25/2021    Left distal GSV Varithena Ablation       Social History     Tobacco Use   Smoking Status Current Every Day Smoker    Packs/day: 1.00    Years: 33.00    Pack years: 33.00    Types: Cigarettes   Smokeless Tobacco Never Used       Social History     Substance and Sexual Activity   Alcohol Use Never         · Allergies and Current Medications     Review of patient's allergies indicates:   Allergen Reactions    Ammonium peroxydisulfate Shortness Of Breath    Avocado (laurus persea) Anaphylaxis    Bananas [banana] Anaphylaxis and Swelling     CRAMPS,    Chocolate flavor Anaphylaxis     MOUTH SWELLING    Fentanyl Shortness Of Breath and Itching    Percocet [oxycodone-acetaminophen] Shortness Of Breath and Itching    Silvadene [silver sulfadiazine]     Clindamycin     Corticosteroids (glucocorticoids)     Hydrocortisone Blisters    Lasix [furosemide] Blisters     BURNS SKIN    Nutritional supplement-fiber Itching    Pregabalin     Shellfish containing products     Adhesive Rash and Blisters    Iodine and iodide containing products Rash    Latex, natural rubber Rash    Pcn [penicillins] Rash    Sulfa (sulfonamide antibiotics) Rash and Blisters       Current Outpatient Medications:     albuterol (PROVENTIL/VENTOLIN HFA) 90 mcg/actuation inhaler, Inhale 2 puffs into the lungs 4 (four) times daily. Rescue, Disp: 18 g, Rfl: 2    allopurinoL (ZYLOPRIM) 300 MG tablet, Take 1 tablet (300 mg total) by mouth once daily., Disp: 90 tablet, Rfl: 3    amitriptyline (ELAVIL) 25 MG tablet, Take 1 tablet (25 mg total) by mouth nightly as needed for Insomnia., Disp: 90 tablet, Rfl: 1    apixaban (ELIQUIS) 5 mg Tab, Take 1 tablet (5 mg total) by mouth 2 (two) times daily., Disp: 60 tablet, Rfl: 3    aspirin (ECOTRIN) 81 MG EC tablet, Take 1 tablet (81 mg total) by mouth once daily., Disp: 90 tablet, Rfl: 1    cilostazoL (PLETAL) 100 MG Tab, Take 1 tablet (100 mg  "total) by mouth 2 (two) times daily., Disp: 90 tablet, Rfl: 1    colchicine (COLCRYS) 0.6 mg tablet, One pill three times a day for two days,then one pill daily till out, Disp: 30 tablet, Rfl: 2    [START ON 6/8/2022] HYDROcodone-acetaminophen (NORCO)  mg per tablet, Take 1 tablet by mouth every 6 (six) hours as needed for Pain., Disp: 120 tablet, Rfl: 0    insulin (LANTUS SOLOSTAR U-100 INSULIN) glargine 100 units/mL (3mL) SubQ pen, Inject 10 units sq daily, Disp: 15 mL, Rfl: 1    insulin detemir U-100 (LEVEMIR FLEXTOUCH U-100 INSULN) 100 unit/mL (3 mL) InPn pen, Inject 10 Units into the skin every evening. 15 ml with 1 refill, Disp: , Rfl:     levothyroxine (SYNTHROID) 150 MCG tablet, Take 1 tablet (150 mcg total) by mouth before breakfast., Disp: 90 tablet, Rfl: 1    losartan-hydrochlorothiazide 50-12.5 mg (HYZAAR) 50-12.5 mg per tablet, Take 1 tablet by mouth once daily., Disp: 90 tablet, Rfl: 1    metOLazone (ZAROXOLYN) 2.5 MG tablet, Take 1 tablet (2.5 mg total) by mouth once daily., Disp: 30 tablet, Rfl: 2    mupirocin (BACTROBAN) 2 % ointment, Apply topically 2 (two) times daily., Disp: 80 g, Rfl: 2    NOVOFINE 32 32 gauge x 1/4" Ndle, Use as directed once daily., Disp: 90 each, Rfl: 3    pantoprazole (PROTONIX) 40 MG tablet, Take 1 tablet (40 mg total) by mouth once daily., Disp: 90 tablet, Rfl: 1    phentermine (ADIPEX-P) 37.5 mg tablet, Take 1 tablet (37.5 mg total) by mouth before breakfast., Disp: 30 tablet, Rfl: 0    potassium chloride SA (K-DUR,KLOR-CON) 20 MEQ tablet, Take 1 tablet (20 mEq total) by mouth once daily., Disp: 30 tablet, Rfl: 0    pregabalin (LYRICA) 100 MG capsule, Take 1 capsule (100 mg total) by mouth every 12 (twelve) hours., Disp: 60 capsule, Rfl: 1    spironolactone (ALDACTONE) 50 MG tablet, Take 1 tablet (50 mg total) by mouth once daily., Disp: 30 tablet, Rfl: 11    SYMBICORT 160-4.5 mcg/actuation HFAA, Inhale into the lungs., Disp: , Rfl:     topiramate " (TOPAMAX) 100 MG tablet, Take 1.5 tablets (150 mg total) by mouth 2 (two) times daily., Disp: 90 tablet, Rfl: 11    triamcinolone acetonide 0.1% (KENALOG) 0.1 % cream, Apply topically 3 (three) times daily., Disp: 400 g, Rfl: 2    azithromycin (Z-SANJU) 250 MG tablet, Take 1 tablet (250 mg total) by mouth once daily. Take first 2 tablets together, then 1 every day until finished., Disp: 6 tablet, Rfl: 0    [START ON 7/8/2022] HYDROcodone-acetaminophen (NORCO)  mg per tablet, Take 1 tablet by mouth every 6 (six) hours as needed for Pain. (Patient not taking: Reported on 6/6/2022), Disp: 120 tablet, Rfl: 0    loratadine (CLARITIN) 10 mg tablet, Take 1 tablet (10 mg total) by mouth once daily., Disp: 30 tablet, Rfl: 3      · Health Risk Assessment   Fall Risk: uses walker but not at risk    Obesity: BMI Body mass index is 53.98 kg/m².   Advance Directive: no but information given   Depression: PHQ9- 6   HTN:  DASH diet, exercise, weight management, med compliance, home BP monitoring, and follow-up discussed.   T2DM:  diabetic diet, glucose monitoring, activity level, weight management, med compliance, and follow-up discussed.  STI: not at risk   Statin Use: no      · Health Maintenance   Last eye exam: states saw Dr. Conley 4/21-[facility] faxed   Last CV screen with lipids: drawn this visit   Diabetes screening with fasting glucose or A1c: 4/13/22   Colonoscopy: 10/28/15 Dr. Stephen-repeat in 5 years-referral sent   Flu Vaccine: declined   Pneumonia vaccines: declined   COVID vaccine:states had done at [facility] faxed   Hep B vaccine: na   DEXA: referral sent    Last pap/pelvic: declined  Last Mammogram: referral sent  AAA screening: na   HIV Screeing: drawn this visit  Hepatitis C Screen: drawn this visit  Low Dose CT Scan: declined states had done a month ago with Dr. Loyola    Health Maintenance Topics with due status: Not Due       Topic Last Completion Date    Foot Exam 02/09/2022    Hemoglobin A1c  02/09/2022    Influenza Vaccine Not Due     Health Maintenance Due   Topic Date Due    Hepatitis C Screening  Never done    Lipid Panel  Never done    Diabetes Urine Screening  Never done    COVID-19 Vaccine (1) Never done    Pneumococcal Vaccines (Age 0-64) (1 - PCV) Never done    Eye Exam  Never done    HIV Screening  Never done    TETANUS VACCINE  Never done    Mammogram  Never done    Shingles Vaccine (1 of 2) Never done    Low Dose Statin  Never done    Colorectal Cancer Screening  10/27/2020    LDCT Lung Screen  11/23/2021         Lab results available in Epic or see dates from [facility] above:   Lab Results   Component Value Date    CHOL 98 11/26/2020     Lab Results   Component Value Date    HDL 46 11/26/2020     Lab Results   Component Value Date    LDLCALC 42 11/26/2020     Lab Results   Component Value Date    TRIG 48 11/26/2020     Lab Results   Component Value Date    CHOLHDL 2.1 11/26/2020       Lab Results   Component Value Date    HGBA1C 5.7 02/09/2022       Sodium   Date Value Ref Range Status   05/10/2022 141 136 - 145 mmol/L Final     Potassium   Date Value Ref Range Status   05/10/2022 3.3 (L) 3.5 - 5.1 mmol/L Final     Chloride   Date Value Ref Range Status   05/10/2022 102 98 - 107 mmol/L Final     CO2   Date Value Ref Range Status   05/10/2022 30 21 - 32 mmol/L Final     Glucose   Date Value Ref Range Status   05/10/2022 117 (H) 74 - 106 mg/dL Final     BUN   Date Value Ref Range Status   05/10/2022 15 7 - 18 mg/dL Final     Creatinine   Date Value Ref Range Status   05/10/2022 0.95 0.55 - 1.02 mg/dL Final     Calcium   Date Value Ref Range Status   05/10/2022 9.1 8.5 - 10.1 mg/dL Final     Total Protein   Date Value Ref Range Status   05/10/2022 7.8 6.4 - 8.2 g/dL Final     Albumin   Date Value Ref Range Status   05/10/2022 3.8 3.5 - 5.0 g/dL Final     Bilirubin, Total   Date Value Ref Range Status   05/10/2022 0.3 0.0 - 1.2 mg/dL Final     Alk Phos   Date Value Ref Range Status  "  05/10/2022 129 50 - 130 U/L Final     AST   Date Value Ref Range Status   05/10/2022 19 15 - 37 U/L Final     ALT   Date Value Ref Range Status   05/10/2022 14 13 - 56 U/L Final     Anion Gap   Date Value Ref Range Status   05/10/2022 12 7 - 16 mmol/L Final     eGFR    Date Value Ref Range Status   09/27/2021 70 >=60 mL/min/1.73m² Final     eGFR   Date Value Ref Range Status   05/10/2022 63 >=60 mL/min/1.73m² Final            Incontinence  Bowel: no  Bladder: wears pads for bladder leakage      · Care Team  Dr. Frausto- PCP                  Dr. Conley-optometry                             Dr. Crandall-pain treatment                             Dr. Ortega-cardiology                             Dr. Davis-psychiatry  **See Completed Assessments for Annual Wellness visit within the encounter summary    The following assessments were completed & reviewed:  · Depression Screening  · Cognitive function Screening  · Timed Get Up Test  · Whisper Test  · Vision Screen  · Health Risk Assessment  · Checklist of ADLs and IADLs        Objective  Vitals:    06/06/22 1031   BP: 124/82   Pulse: 69   Resp: 18   Temp: 98.4 °F (36.9 °C)   TempSrc: Oral   SpO2: 96%   Weight: (!) 161 kg (355 lb)   Height: 5' 8" (1.727 m)   PainSc:   9   PainLoc: Foot      Body mass index is 53.98 kg/m².  Ideal body weight: 63.9 kg (140 lb 14 oz)       Physical Exam  Constitutional:       General: She is not in acute distress.     Appearance: Normal appearance.   HENT:      Head: Normocephalic.      Mouth/Throat:      Mouth: Mucous membranes are moist.   Cardiovascular:      Rate and Rhythm: Normal rate and regular rhythm.      Pulses: Normal pulses.      Heart sounds: Normal heart sounds. No murmur heard.  Pulmonary:      Effort: No respiratory distress.      Breath sounds: Normal breath sounds. No wheezing, rhonchi or rales.   Musculoskeletal:         General: Normal range of motion.      Cervical back: Neck supple.   Skin:     General: " Skin is warm and dry.      Comments: Pt noted to have great toe and middle toe only of the left foot, left foot swollen and tender to touch. No drainage or malodor noted.    Neurological:      Mental Status: She is alert and oriented to person, place, and time.   Psychiatric:         Mood and Affect: Mood normal.         Behavior: Behavior normal.           Assessment:     1. Encounter for subsequent annual wellness visit (AWV) in Medicare patient    2. Essential hypertension  - Lipid Panel; Future    3. Hyperlipidemia, unspecified hyperlipidemia type  - Lipid Panel; Future    4. Type 2 diabetes mellitus without complication, with long-term current use of insulin    5. Osteoarthritis, unspecified osteoarthritis type, unspecified site  - DXA Bone Density Spine And Hip; Future    6. Morbid obesity    7. Gastroesophageal reflux disease, unspecified whether esophagitis present    8. BMI 50.0-59.9, adult    9. Screening for STD (sexually transmitted disease)  - HIV 1/2 Ag/Ab (4th Gen); Future    10. Screening for diabetes mellitus  - Microalbumin/Creatinine Ratio, Urine; Future    11. Encounter for screening for malignant neoplasm of breast, unspecified screening modality  - Mammo Digital Screening Bilat; Future    12. Screening for colon cancer  - Ambulatory referral/consult to Gastroenterology; Future    13. Screening for cervical cancer  - Ambulatory referral/consult to Gynecology; Future    14. Post-menopausal  - DXA Bone Density Spine And Hip; Future         Plan:    Referrals:   Bone Density screening  Colonoscopy screening  Pap smear / pelvic exam  Screening mammogram       Advised to call office if does not hear from anyone with referral appt within 2-3 weeks to check on status of referral. Voiced understanding.      Discussed and provided with a screening schedule and personal prevention plan in accordance with USPSTF age appropriate recommendations and Medicare screening guidelines.   Education, counseling, and  referrals were provided as needed.  After Visit Summary printed and given to patient which includes written education and a list of any referrals if indicated.     Education including diet, exercise, falls, smoking cessation and advanced directives discussed with patient and patient verbalized understanding.      F/u plan for yearly AWV.    Signature: Fay SEAY-BC

## 2022-06-06 ENCOUNTER — OFFICE VISIT (OUTPATIENT)
Dept: FAMILY MEDICINE | Facility: CLINIC | Age: 62
End: 2022-06-06
Payer: COMMERCIAL

## 2022-06-06 VITALS
RESPIRATION RATE: 18 BRPM | OXYGEN SATURATION: 96 % | DIASTOLIC BLOOD PRESSURE: 82 MMHG | BODY MASS INDEX: 44.41 KG/M2 | WEIGHT: 293 LBS | HEART RATE: 69 BPM | HEIGHT: 68 IN | SYSTOLIC BLOOD PRESSURE: 124 MMHG | TEMPERATURE: 98 F

## 2022-06-06 DIAGNOSIS — M19.90 OSTEOARTHRITIS, UNSPECIFIED OSTEOARTHRITIS TYPE, UNSPECIFIED SITE: ICD-10-CM

## 2022-06-06 DIAGNOSIS — Z12.4 SCREENING FOR CERVICAL CANCER: ICD-10-CM

## 2022-06-06 DIAGNOSIS — Z78.0 POST-MENOPAUSAL: ICD-10-CM

## 2022-06-06 DIAGNOSIS — Z12.11 SCREENING FOR COLON CANCER: ICD-10-CM

## 2022-06-06 DIAGNOSIS — Z79.4 TYPE 2 DIABETES MELLITUS WITHOUT COMPLICATION, WITH LONG-TERM CURRENT USE OF INSULIN: ICD-10-CM

## 2022-06-06 DIAGNOSIS — E66.01 MORBID OBESITY: ICD-10-CM

## 2022-06-06 DIAGNOSIS — E78.5 HYPERLIPIDEMIA, UNSPECIFIED HYPERLIPIDEMIA TYPE: ICD-10-CM

## 2022-06-06 DIAGNOSIS — Z00.00 ENCOUNTER FOR SUBSEQUENT ANNUAL WELLNESS VISIT (AWV) IN MEDICARE PATIENT: Primary | ICD-10-CM

## 2022-06-06 DIAGNOSIS — I10 ESSENTIAL HYPERTENSION: ICD-10-CM

## 2022-06-06 DIAGNOSIS — Z12.39 ENCOUNTER FOR SCREENING FOR MALIGNANT NEOPLASM OF BREAST, UNSPECIFIED SCREENING MODALITY: ICD-10-CM

## 2022-06-06 DIAGNOSIS — K21.9 GASTROESOPHAGEAL REFLUX DISEASE, UNSPECIFIED WHETHER ESOPHAGITIS PRESENT: ICD-10-CM

## 2022-06-06 DIAGNOSIS — Z13.1 SCREENING FOR DIABETES MELLITUS: ICD-10-CM

## 2022-06-06 DIAGNOSIS — E11.9 TYPE 2 DIABETES MELLITUS WITHOUT COMPLICATION, WITH LONG-TERM CURRENT USE OF INSULIN: ICD-10-CM

## 2022-06-06 DIAGNOSIS — Z11.3 SCREENING FOR STD (SEXUALLY TRANSMITTED DISEASE): ICD-10-CM

## 2022-06-06 LAB
CHOLEST SERPL-MCNC: 172 MG/DL (ref 0–200)
CHOLEST/HDLC SERPL: 3.4 {RATIO}
CREAT UR-MCNC: 107 MG/DL (ref 28–219)
HDLC SERPL-MCNC: 51 MG/DL (ref 40–60)
HIV 1+O+2 AB SERPL QL: NORMAL
LDLC SERPL CALC-MCNC: 99 MG/DL
LDLC/HDLC SERPL: 1.9 {RATIO}
MICROALBUMIN UR-MCNC: 0.8 MG/DL (ref 0–2.8)
MICROALBUMIN/CREAT RATIO PNL UR: 7.5 MG/G (ref 0–30)
NONHDLC SERPL-MCNC: 121 MG/DL
TRIGL SERPL-MCNC: 112 MG/DL (ref 35–150)
VLDLC SERPL-MCNC: 22 MG/DL

## 2022-06-06 PROCEDURE — 3074F SYST BP LT 130 MM HG: CPT | Mod: ,,, | Performed by: NURSE PRACTITIONER

## 2022-06-06 PROCEDURE — G0439 PPPS, SUBSEQ VISIT: HCPCS | Mod: ,,, | Performed by: NURSE PRACTITIONER

## 2022-06-06 PROCEDURE — 3008F BODY MASS INDEX DOCD: CPT | Mod: ,,, | Performed by: NURSE PRACTITIONER

## 2022-06-06 PROCEDURE — 1159F PR MEDICATION LIST DOCUMENTED IN MEDICAL RECORD: ICD-10-PCS | Mod: ,,, | Performed by: NURSE PRACTITIONER

## 2022-06-06 PROCEDURE — 87389 HIV 1 / 2 ANTIBODY: ICD-10-PCS | Mod: ,,, | Performed by: CLINICAL MEDICAL LABORATORY

## 2022-06-06 PROCEDURE — 3079F PR MOST RECENT DIASTOLIC BLOOD PRESSURE 80-89 MM HG: ICD-10-PCS | Mod: ,,, | Performed by: NURSE PRACTITIONER

## 2022-06-06 PROCEDURE — 3079F DIAST BP 80-89 MM HG: CPT | Mod: ,,, | Performed by: NURSE PRACTITIONER

## 2022-06-06 PROCEDURE — 1160F PR REVIEW ALL MEDS BY PRESCRIBER/CLIN PHARMACIST DOCUMENTED: ICD-10-PCS | Mod: ,,, | Performed by: NURSE PRACTITIONER

## 2022-06-06 PROCEDURE — 87389 HIV-1 AG W/HIV-1&-2 AB AG IA: CPT | Mod: ,,, | Performed by: CLINICAL MEDICAL LABORATORY

## 2022-06-06 PROCEDURE — 82043 UR ALBUMIN QUANTITATIVE: CPT | Mod: ,,, | Performed by: CLINICAL MEDICAL LABORATORY

## 2022-06-06 PROCEDURE — 80061 LIPID PANEL: ICD-10-PCS | Mod: ,,, | Performed by: CLINICAL MEDICAL LABORATORY

## 2022-06-06 PROCEDURE — 82043 MICROALBUMIN / CREATININE RATIO URINE: ICD-10-PCS | Mod: ,,, | Performed by: CLINICAL MEDICAL LABORATORY

## 2022-06-06 PROCEDURE — 3008F PR BODY MASS INDEX (BMI) DOCUMENTED: ICD-10-PCS | Mod: ,,, | Performed by: NURSE PRACTITIONER

## 2022-06-06 PROCEDURE — 82570 MICROALBUMIN / CREATININE RATIO URINE: ICD-10-PCS | Mod: ,,, | Performed by: CLINICAL MEDICAL LABORATORY

## 2022-06-06 PROCEDURE — G0439 PR MEDICARE ANNUAL WELLNESS SUBSEQUENT VISIT: ICD-10-PCS | Mod: ,,, | Performed by: NURSE PRACTITIONER

## 2022-06-06 PROCEDURE — 1159F MED LIST DOCD IN RCRD: CPT | Mod: ,,, | Performed by: NURSE PRACTITIONER

## 2022-06-06 PROCEDURE — 82570 ASSAY OF URINE CREATININE: CPT | Mod: ,,, | Performed by: CLINICAL MEDICAL LABORATORY

## 2022-06-06 PROCEDURE — 80061 LIPID PANEL: CPT | Mod: ,,, | Performed by: CLINICAL MEDICAL LABORATORY

## 2022-06-06 PROCEDURE — 1160F RVW MEDS BY RX/DR IN RCRD: CPT | Mod: ,,, | Performed by: NURSE PRACTITIONER

## 2022-06-06 PROCEDURE — 3074F PR MOST RECENT SYSTOLIC BLOOD PRESSURE < 130 MM HG: ICD-10-PCS | Mod: ,,, | Performed by: NURSE PRACTITIONER

## 2022-06-06 RX ORDER — LORATADINE 10 MG/1
10 TABLET ORAL DAILY
Qty: 30 TABLET | Refills: 3 | Status: ON HOLD | OUTPATIENT
Start: 2022-06-06 | End: 2023-11-01 | Stop reason: SDUPTHER

## 2022-06-06 RX ORDER — AZITHROMYCIN 250 MG/1
250 TABLET, FILM COATED ORAL DAILY
Qty: 6 TABLET | Refills: 0 | Status: SHIPPED | OUTPATIENT
Start: 2022-06-06 | End: 2022-12-01

## 2022-06-06 NOTE — PATIENT INSTRUCTIONS
Counseling and Referral of Other Preventative  (Italic type indicates deductible and co-insurance are waived)    Patient Name: Holly Porter  Today's Date: 6/6/2022    Health Maintenance         Date Due Completion Date    Hepatitis C Screening Never done ---    Lipid Panel Never done ---    Diabetes Urine Screening Never done ---    COVID-19 Vaccine (1) Never done ---    Pneumococcal Vaccines (Age 0-64) (1 - PCV) Never done ---    Eye Exam Never done ---    HIV Screening Never done ---    TETANUS VACCINE Never done ---    Mammogram Never done ---    Shingles Vaccine (1 of 2) Never done ---    Low Dose Statin Never done ---    Colorectal Cancer Screening 10/27/2020 10/27/2015    LDCT Lung Screen 11/23/2021 11/23/2020    Hemoglobin A1c 08/09/2022 2/9/2022    Influenza Vaccine (Season Ended) 09/01/2022 ---    Foot Exam 02/09/2023 2/9/2022 (Done)    Override on 2/9/2022: Done          No orders of the defined types were placed in this encounter.

## 2022-06-16 ENCOUNTER — OFFICE VISIT (OUTPATIENT)
Dept: FAMILY MEDICINE | Facility: CLINIC | Age: 62
End: 2022-06-16
Payer: COMMERCIAL

## 2022-06-16 VITALS
TEMPERATURE: 98 F | HEIGHT: 68 IN | DIASTOLIC BLOOD PRESSURE: 78 MMHG | SYSTOLIC BLOOD PRESSURE: 140 MMHG | HEART RATE: 79 BPM | WEIGHT: 293 LBS | RESPIRATION RATE: 20 BRPM | BODY MASS INDEX: 44.41 KG/M2 | OXYGEN SATURATION: 97 %

## 2022-06-16 DIAGNOSIS — E66.01 MORBID OBESITY: ICD-10-CM

## 2022-06-16 DIAGNOSIS — G89.4 CHRONIC PAIN SYNDROME: ICD-10-CM

## 2022-06-16 DIAGNOSIS — R30.0 DYSURIA: Primary | ICD-10-CM

## 2022-06-16 DIAGNOSIS — M10.9 GOUT, UNSPECIFIED CAUSE, UNSPECIFIED CHRONICITY, UNSPECIFIED SITE: ICD-10-CM

## 2022-06-16 DIAGNOSIS — F17.200 TOBACCO DEPENDENCY: ICD-10-CM

## 2022-06-16 DIAGNOSIS — Z02.83 ENCOUNTER FOR DRUG SCREENING: ICD-10-CM

## 2022-06-16 DIAGNOSIS — K59.09 OTHER CONSTIPATION: ICD-10-CM

## 2022-06-16 DIAGNOSIS — I87.8 VENOUS STASIS: ICD-10-CM

## 2022-06-16 LAB
BACTERIA #/AREA URNS HPF: ABNORMAL /HPF
BILIRUB UR QL STRIP: NEGATIVE
CLARITY UR: CLEAR
COLOR UR: YELLOW
CTP QC/QA: YES
GLUCOSE UR STRIP-MCNC: NEGATIVE MG/DL
KETONES UR STRIP-SCNC: NEGATIVE MG/DL
LEUKOCYTE ESTERASE UR QL STRIP: ABNORMAL
MUCOUS THREADS #/AREA URNS HPF: ABNORMAL /HPF
NITRITE UR QL STRIP: NEGATIVE
PH UR STRIP: 6.5 PH UNITS
POC (AMP) AMPHETAMINE: ABNORMAL
POC (BAR) BARBITURATES: NEGATIVE
POC (BUP) BUPRENORPHINE: NEGATIVE
POC (BZO) BENZODIAZEPINES: NEGATIVE
POC (COC) COCAINE: NEGATIVE
POC (MDMA) METHYLENEDIOXYMETHAMPHETAMINE 3,4: NEGATIVE
POC (MET) METHAMPHETAMINE: NEGATIVE
POC (MOP) OPIATES: ABNORMAL
POC (MTD) METHADONE: NEGATIVE
POC (OXY) OXYCODONE: NEGATIVE
POC (PCP) PHENCYCLIDINE: NEGATIVE
POC (TCA) TRICYCLIC ANTIDEPRESSANTS: ABNORMAL
POC TEMPERATURE (URINE): 95
POC THC: NEGATIVE
PROT UR QL STRIP: NEGATIVE
RBC # UR STRIP: NEGATIVE /UL
RBC #/AREA URNS HPF: ABNORMAL /HPF
SP GR UR STRIP: 1.02
SQUAMOUS #/AREA URNS LPF: ABNORMAL /LPF
UROBILINOGEN UR STRIP-ACNC: 0.2 MG/DL
WBC #/AREA URNS HPF: ABNORMAL /HPF

## 2022-06-16 PROCEDURE — 81001 URINALYSIS, REFLEX TO URINE CULTURE: ICD-10-PCS | Mod: ,,, | Performed by: CLINICAL MEDICAL LABORATORY

## 2022-06-16 PROCEDURE — 3061F PR NEG MICROALBUMINURIA RESULT DOCUMENTED/REVIEW: ICD-10-PCS | Mod: ,,, | Performed by: INTERNAL MEDICINE

## 2022-06-16 PROCEDURE — 3066F NEPHROPATHY DOC TX: CPT | Mod: ,,, | Performed by: INTERNAL MEDICINE

## 2022-06-16 PROCEDURE — 87086 URINE CULTURE/COLONY COUNT: CPT | Mod: ,,, | Performed by: CLINICAL MEDICAL LABORATORY

## 2022-06-16 PROCEDURE — 3078F DIAST BP <80 MM HG: CPT | Mod: ,,, | Performed by: INTERNAL MEDICINE

## 2022-06-16 PROCEDURE — 1159F PR MEDICATION LIST DOCUMENTED IN MEDICAL RECORD: ICD-10-PCS | Mod: ,,, | Performed by: INTERNAL MEDICINE

## 2022-06-16 PROCEDURE — 3077F PR MOST RECENT SYSTOLIC BLOOD PRESSURE >= 140 MM HG: ICD-10-PCS | Mod: ,,, | Performed by: INTERNAL MEDICINE

## 2022-06-16 PROCEDURE — 1160F RVW MEDS BY RX/DR IN RCRD: CPT | Mod: ,,, | Performed by: INTERNAL MEDICINE

## 2022-06-16 PROCEDURE — 1159F MED LIST DOCD IN RCRD: CPT | Mod: ,,, | Performed by: INTERNAL MEDICINE

## 2022-06-16 PROCEDURE — 3066F PR DOCUMENTATION OF TREATMENT FOR NEPHROPATHY: ICD-10-PCS | Mod: ,,, | Performed by: INTERNAL MEDICINE

## 2022-06-16 PROCEDURE — 3078F PR MOST RECENT DIASTOLIC BLOOD PRESSURE < 80 MM HG: ICD-10-PCS | Mod: ,,, | Performed by: INTERNAL MEDICINE

## 2022-06-16 PROCEDURE — 99214 PR OFFICE/OUTPT VISIT, EST, LEVL IV, 30-39 MIN: ICD-10-PCS | Mod: ,,, | Performed by: INTERNAL MEDICINE

## 2022-06-16 PROCEDURE — 80305 POCT URINE DRUG SCREEN PRESUMP: ICD-10-PCS | Mod: QW,,, | Performed by: INTERNAL MEDICINE

## 2022-06-16 PROCEDURE — 3061F NEG MICROALBUMINURIA REV: CPT | Mod: ,,, | Performed by: INTERNAL MEDICINE

## 2022-06-16 PROCEDURE — 80305 DRUG TEST PRSMV DIR OPT OBS: CPT | Mod: QW,,, | Performed by: INTERNAL MEDICINE

## 2022-06-16 PROCEDURE — 3008F BODY MASS INDEX DOCD: CPT | Mod: ,,, | Performed by: INTERNAL MEDICINE

## 2022-06-16 PROCEDURE — 87086 CULTURE, URINE: ICD-10-PCS | Mod: ,,, | Performed by: CLINICAL MEDICAL LABORATORY

## 2022-06-16 PROCEDURE — 81001 URINALYSIS AUTO W/SCOPE: CPT | Mod: ,,, | Performed by: CLINICAL MEDICAL LABORATORY

## 2022-06-16 PROCEDURE — 3008F PR BODY MASS INDEX (BMI) DOCUMENTED: ICD-10-PCS | Mod: ,,, | Performed by: INTERNAL MEDICINE

## 2022-06-16 PROCEDURE — 1160F PR REVIEW ALL MEDS BY PRESCRIBER/CLIN PHARMACIST DOCUMENTED: ICD-10-PCS | Mod: ,,, | Performed by: INTERNAL MEDICINE

## 2022-06-16 PROCEDURE — 3077F SYST BP >= 140 MM HG: CPT | Mod: ,,, | Performed by: INTERNAL MEDICINE

## 2022-06-16 PROCEDURE — 99214 OFFICE O/P EST MOD 30 MIN: CPT | Mod: ,,, | Performed by: INTERNAL MEDICINE

## 2022-06-16 RX ORDER — DOCUSATE SODIUM 100 MG/1
100 CAPSULE, LIQUID FILLED ORAL 2 TIMES DAILY
Qty: 60 CAPSULE | Refills: 2 | Status: SHIPPED | OUTPATIENT
Start: 2022-06-16 | End: 2023-12-06

## 2022-06-16 RX ORDER — CILOSTAZOL 100 MG/1
100 TABLET ORAL 2 TIMES DAILY
Qty: 90 TABLET | Refills: 1 | Status: SHIPPED | OUTPATIENT
Start: 2022-06-16 | End: 2022-10-25 | Stop reason: SDUPTHER

## 2022-06-16 RX ORDER — PHENTERMINE HYDROCHLORIDE 37.5 MG/1
37.5 TABLET ORAL
Qty: 30 TABLET | Refills: 0 | Status: SHIPPED | OUTPATIENT
Start: 2022-06-16 | End: 2022-08-11 | Stop reason: SDUPTHER

## 2022-06-16 RX ORDER — POLYETHYLENE GLYCOL 3350 17 G/17G
17 POWDER, FOR SOLUTION ORAL DAILY
Qty: 10 EACH | Refills: 3 | Status: SHIPPED | OUTPATIENT
Start: 2022-06-16 | End: 2022-06-26

## 2022-06-16 RX ORDER — COLCHICINE 0.6 MG/1
TABLET ORAL
Qty: 30 TABLET | Refills: 2 | Status: SHIPPED | OUTPATIENT
Start: 2022-06-16 | End: 2022-10-25 | Stop reason: SDUPTHER

## 2022-06-17 NOTE — TELEPHONE ENCOUNTER
----- Message from Regan Frausto MD sent at 6/16/2022  3:35 PM CDT -----  Macrobid 100mg    1 po bid   x 7 days     Called patient-no answer, not able to leave message.  Called another number-friend Namrata answered, informed friend if she could just let patient know she has an antibiotic to take for UTI. Friend says she will tell patient.    153-patient called in. Informed her Dr. Frausto has to send the medication through to her pharmacy. Voiced understanding.

## 2022-06-18 LAB — UA COMPLETE W REFLEX CULTURE PNL UR: NORMAL

## 2022-06-19 RX ORDER — NITROFURANTOIN 25; 75 MG/1; MG/1
100 CAPSULE ORAL 2 TIMES DAILY
Qty: 14 CAPSULE | Refills: 0 | Status: SHIPPED | OUTPATIENT
Start: 2022-06-19 | End: 2022-06-26

## 2022-06-20 NOTE — PROGRESS NOTES
Subjective:       Patient ID: Holly Porter is a 61 y.o. female.    Chief Complaint: Back Pain, Hip Pain, Leg Pain, Dysuria, and Medication Refill    Patient is here today for check up evaluation. Patient states she currently follows with pain management but is still in pain. Does have allergies to a majority of pain medications making it hard to control her pain. Patient reports her leg feels like is has gotten worse. States 'goes dead' at times when walking and cannot feel her legs and feet. Will refer back to [facility] ASAP. Patient states she already has appointment next week. Patient also complaining of constipation. States is taking OTC laxative but not helping. Will prescribe Colace 100 mg PO BID and Miralax 1 pk per day x 10 days. Patient also reports she has been cutting back on smoking. Continued tobacco cessation.  Patient also requesting refill of adipex. Will refill today and follow in 1 month for weight loss progress.     Back Pain  Associated symptoms include dysuria and leg pain.   Hip Pain     Leg Pain     Dysuria     Medication Refill        Current Medications:    Current Outpatient Medications:     albuterol (PROVENTIL/VENTOLIN HFA) 90 mcg/actuation inhaler, Inhale 2 puffs into the lungs 4 (four) times daily. Rescue, Disp: 18 g, Rfl: 2    allopurinoL (ZYLOPRIM) 300 MG tablet, Take 1 tablet (300 mg total) by mouth once daily., Disp: 90 tablet, Rfl: 3    amitriptyline (ELAVIL) 25 MG tablet, Take 1 tablet (25 mg total) by mouth nightly as needed for Insomnia., Disp: 90 tablet, Rfl: 1    apixaban (ELIQUIS) 5 mg Tab, Take 1 tablet (5 mg total) by mouth 2 (two) times daily., Disp: 60 tablet, Rfl: 3    aspirin (ECOTRIN) 81 MG EC tablet, Take 1 tablet (81 mg total) by mouth once daily., Disp: 90 tablet, Rfl: 1    azithromycin (Z-SANJU) 250 MG tablet, Take 1 tablet (250 mg total) by mouth once daily. Take first 2 tablets together, then 1 every day until finished., Disp: 6 tablet, Rfl: 0     "HYDROcodone-acetaminophen (NORCO)  mg per tablet, Take 1 tablet by mouth every 6 (six) hours as needed for Pain., Disp: 120 tablet, Rfl: 0    [START ON 7/8/2022] HYDROcodone-acetaminophen (NORCO)  mg per tablet, Take 1 tablet by mouth every 6 (six) hours as needed for Pain., Disp: 120 tablet, Rfl: 0    insulin (LANTUS SOLOSTAR U-100 INSULIN) glargine 100 units/mL (3mL) SubQ pen, Inject 10 units sq daily, Disp: 15 mL, Rfl: 1    insulin detemir U-100 (LEVEMIR FLEXTOUCH U-100 INSULN) 100 unit/mL (3 mL) InPn pen, Inject 10 Units into the skin every evening. 15 ml with 1 refill, Disp: , Rfl:     levothyroxine (SYNTHROID) 150 MCG tablet, Take 1 tablet (150 mcg total) by mouth before breakfast., Disp: 90 tablet, Rfl: 1    loratadine (CLARITIN) 10 mg tablet, Take 1 tablet (10 mg total) by mouth once daily., Disp: 30 tablet, Rfl: 3    losartan-hydrochlorothiazide 50-12.5 mg (HYZAAR) 50-12.5 mg per tablet, Take 1 tablet by mouth once daily., Disp: 90 tablet, Rfl: 1    metOLazone (ZAROXOLYN) 2.5 MG tablet, Take 1 tablet (2.5 mg total) by mouth once daily., Disp: 30 tablet, Rfl: 2    mupirocin (BACTROBAN) 2 % ointment, Apply topically 2 (two) times daily., Disp: 80 g, Rfl: 2    NOVOFINE 32 32 gauge x 1/4" Ndle, Use as directed once daily., Disp: 90 each, Rfl: 3    pantoprazole (PROTONIX) 40 MG tablet, Take 1 tablet (40 mg total) by mouth once daily., Disp: 90 tablet, Rfl: 1    potassium chloride SA (K-DUR,KLOR-CON) 20 MEQ tablet, Take 1 tablet (20 mEq total) by mouth once daily., Disp: 30 tablet, Rfl: 0    pregabalin (LYRICA) 100 MG capsule, Take 1 capsule (100 mg total) by mouth every 12 (twelve) hours., Disp: 60 capsule, Rfl: 1    spironolactone (ALDACTONE) 50 MG tablet, Take 1 tablet (50 mg total) by mouth once daily., Disp: 30 tablet, Rfl: 11    SYMBICORT 160-4.5 mcg/actuation HFAA, Inhale into the lungs., Disp: , Rfl:     topiramate (TOPAMAX) 100 MG tablet, Take 1.5 tablets (150 mg total) by " mouth 2 (two) times daily., Disp: 90 tablet, Rfl: 11    triamcinolone acetonide 0.1% (KENALOG) 0.1 % cream, Apply topically 3 (three) times daily., Disp: 400 g, Rfl: 2    cilostazoL (PLETAL) 100 MG Tab, Take 1 tablet (100 mg total) by mouth 2 (two) times daily., Disp: 90 tablet, Rfl: 1    colchicine (COLCRYS) 0.6 mg tablet, One pill three times a day for two days,then one pill daily till out, Disp: 30 tablet, Rfl: 2    docusate sodium (COLACE) 100 MG capsule, Take 1 capsule (100 mg total) by mouth 2 (two) times daily., Disp: 60 capsule, Rfl: 2    nitrofurantoin, macrocrystal-monohydrate, (MACROBID) 100 MG capsule, Take 1 capsule (100 mg total) by mouth 2 (two) times daily. for 7 days, Disp: 14 capsule, Rfl: 0    phentermine (ADIPEX-P) 37.5 mg tablet, Take 1 tablet (37.5 mg total) by mouth before breakfast., Disp: 30 tablet, Rfl: 0    polyethylene glycol (GLYCOLAX) 17 gram PwPk, Take 17 g by mouth once daily. for 10 days, Disp: 10 each, Rfl: 3    Last Labs:     Office Visit on 06/16/2022   Component Date Value    POC Amphetamines 06/16/2022 Presumptive Positive (A)    POC Barbiturates 06/16/2022 Negative     POC Benzodiazepines 06/16/2022 Negative     POC Cocaine 06/16/2022 Negative     POC THC 06/16/2022 Negative     POC Methadone 06/16/2022 Negative     POC Methamphetamine 06/16/2022 Negative     POC Opiates 06/16/2022 Presumptive Positive (A)    POC Oxycodone 06/16/2022 Negative     POC Phencyclidine 06/16/2022 Negative     POC Methylenedioxymetham* 06/16/2022 Negative     POC Buprenorphine 06/16/2022 Negative      Acceptab* 06/16/2022 Yes     POC Temperature (Urine) 06/16/2022 95     Color, UA 06/16/2022 Yellow     Clarity, UA 06/16/2022 Clear     pH, UA 06/16/2022 6.5     Leukocytes, UA 06/16/2022 Trace (A)    Nitrites, UA 06/16/2022 Negative     Protein, UA 06/16/2022 Negative     Glucose, UA 06/16/2022 Negative     Ketones, UA 06/16/2022 Negative     Urobilinogen,  UA 06/16/2022 0.2     Bilirubin, UA 06/16/2022 Negative     Blood, UA 06/16/2022 Negative     Specific Gravity, UA 06/16/2022 1.020     WBC, UA 06/16/2022 5-10 (A)    RBC, UA 06/16/2022 None Seen     Bacteria, UA 06/16/2022 Moderate (A)    Squamous Epithelial Cell* 06/16/2022 Moderate (A)    Mucus, UA 06/16/2022 Few (A)    Culture, Urine 06/16/2022 Skin/Urogenital Tamara Isolated, no further workup.    Office Visit on 06/06/2022   Component Date Value    Creatinine, Urine 06/06/2022 107     Microalbumin 06/06/2022 0.8     Microalbumin/Creatinine * 06/06/2022 7.5     Triglycerides 06/06/2022 112     Cholesterol 06/06/2022 172     HDL Cholesterol 06/06/2022 51     Cholesterol/HDL Ratio (R* 06/06/2022 3.4     Non-HDL 06/06/2022 121     LDL Calculated 06/06/2022 99     LDL/HDL 06/06/2022 1.9     VLDL 06/06/2022 22     HIV 1/2 06/06/2022 Non-Reactive        Last Imaging:  X-Ray Chest PA And Lateral  Narrative: EXAMINATION:  XR CHEST PA AND LATERAL    CLINICAL HISTORY:  Dyspnea, unspecified    TECHNIQUE:  PA and lateral views of the chest were performed.    COMPARISON:  None    FINDINGS:  Heart size normal.  Lungs clear.  No pneumothorax or pleural effusion.  Pulmonary vessels show normal caliber.  Degenerative changes of the spine.  Impression: No acute findings.    Electronically signed by: Edson De  Date:    05/10/2022  Time:    18:22         Review of Systems   Genitourinary: Positive for dysuria.   Musculoskeletal: Positive for back pain and leg pain.   All other systems reviewed and are negative.        Objective:      Physical Exam  Vitals reviewed.   Constitutional:       Appearance: Normal appearance.   Cardiovascular:      Rate and Rhythm: Normal rate and regular rhythm.      Pulses: Normal pulses.      Heart sounds: Normal heart sounds.   Pulmonary:      Effort: Pulmonary effort is normal.      Breath sounds: Normal breath sounds.   Abdominal:      General: Abdomen is flat. Bowel sounds are  normal.      Palpations: Abdomen is soft.   Musculoskeletal:         General: Normal range of motion.      Cervical back: Normal range of motion and neck supple.   Skin:     General: Skin is warm and dry.   Neurological:      General: No focal deficit present.      Mental Status: She is alert and oriented to person, place, and time. Mental status is at baseline.         Assessment:       1. Dysuria  Urinalysis, Reflex to Urine Culture    Urinalysis, Reflex to Urine Culture    Urinalysis, Microscopic    Urine culture   2. Encounter for drug screening  POCT Urine Drug Screen Presump   3. Morbid obesity     4. Tobacco dependency     5. Gout, unspecified cause, unspecified chronicity, unspecified site     6. Chronic pain syndrome     7. Venous stasis     8. Other constipation          Plan:         Holly was seen today for back pain, hip pain, leg pain, dysuria and medication refill.    Diagnoses and all orders for this visit:    Dysuria  -     Urinalysis, Reflex to Urine Culture; Future  -     Urinalysis, Reflex to Urine Culture  -     Urinalysis, Microscopic  -     Urine culture    Encounter for drug screening  -     POCT Urine Drug Screen Presump    Morbid obesity    Tobacco dependency    Gout, unspecified cause, unspecified chronicity, unspecified site    Chronic pain syndrome    Venous stasis    Other constipation    Other orders  -     colchicine (COLCRYS) 0.6 mg tablet; One pill three times a day for two days,then one pill daily till out  -     cilostazoL (PLETAL) 100 MG Tab; Take 1 tablet (100 mg total) by mouth 2 (two) times daily.  -     phentermine (ADIPEX-P) 37.5 mg tablet; Take 1 tablet (37.5 mg total) by mouth before breakfast.  -     docusate sodium (COLACE) 100 MG capsule; Take 1 capsule (100 mg total) by mouth 2 (two) times daily.  -     polyethylene glycol (GLYCOLAX) 17 gram PwPk; Take 17 g by mouth once daily. for 10 days

## 2022-06-20 NOTE — PATIENT INSTRUCTIONS
Holly was seen today for back pain, hip pain, leg pain, dysuria and medication refill.    Diagnoses and all orders for this visit:    Dysuria  -     Urinalysis, Reflex to Urine Culture; Future  -     Urinalysis, Reflex to Urine Culture  -     Urinalysis, Microscopic  -     Urine culture    Encounter for drug screening  -     POCT Urine Drug Screen Presump    Morbid obesity    Tobacco dependency    Gout, unspecified cause, unspecified chronicity, unspecified site    Chronic pain syndrome    Venous stasis    Other constipation    Other orders  -     colchicine (COLCRYS) 0.6 mg tablet; One pill three times a day for two days,then one pill daily till out  -     cilostazoL (PLETAL) 100 MG Tab; Take 1 tablet (100 mg total) by mouth 2 (two) times daily.  -     phentermine (ADIPEX-P) 37.5 mg tablet; Take 1 tablet (37.5 mg total) by mouth before breakfast.  -     docusate sodium (COLACE) 100 MG capsule; Take 1 capsule (100 mg total) by mouth 2 (two) times daily.  -     polyethylene glycol (GLYCOLAX) 17 gram PwPk; Take 17 g by mouth once daily. for 10 days

## 2022-07-29 ENCOUNTER — PATIENT OUTREACH (OUTPATIENT)
Dept: FAMILY MEDICINE | Facility: CLINIC | Age: 62
End: 2022-07-29
Payer: MEDICAID

## 2022-08-04 ENCOUNTER — OFFICE VISIT (OUTPATIENT)
Dept: PAIN MEDICINE | Facility: CLINIC | Age: 62
End: 2022-08-04
Payer: COMMERCIAL

## 2022-08-04 VITALS
BODY MASS INDEX: 44.41 KG/M2 | DIASTOLIC BLOOD PRESSURE: 60 MMHG | HEIGHT: 68 IN | HEART RATE: 91 BPM | WEIGHT: 293 LBS | SYSTOLIC BLOOD PRESSURE: 161 MMHG

## 2022-08-04 DIAGNOSIS — M47.817 LUMBOSACRAL SPONDYLOSIS WITHOUT MYELOPATHY: Primary | Chronic | ICD-10-CM

## 2022-08-04 DIAGNOSIS — M25.561 CHRONIC PAIN OF RIGHT KNEE: Chronic | ICD-10-CM

## 2022-08-04 DIAGNOSIS — I73.9 PVD (PERIPHERAL VASCULAR DISEASE): Chronic | ICD-10-CM

## 2022-08-04 DIAGNOSIS — M79.672 FOOT PAIN, LEFT: Chronic | ICD-10-CM

## 2022-08-04 DIAGNOSIS — G89.29 CHRONIC PAIN OF RIGHT KNEE: Chronic | ICD-10-CM

## 2022-08-04 PROCEDURE — 3008F PR BODY MASS INDEX (BMI) DOCUMENTED: ICD-10-PCS | Mod: ,,, | Performed by: PHYSICIAN ASSISTANT

## 2022-08-04 PROCEDURE — 99215 OFFICE O/P EST HI 40 MIN: CPT | Mod: PBBFAC | Performed by: PHYSICIAN ASSISTANT

## 2022-08-04 PROCEDURE — 3078F DIAST BP <80 MM HG: CPT | Mod: ,,, | Performed by: PHYSICIAN ASSISTANT

## 2022-08-04 PROCEDURE — 3077F SYST BP >= 140 MM HG: CPT | Mod: ,,, | Performed by: PHYSICIAN ASSISTANT

## 2022-08-04 PROCEDURE — 3008F BODY MASS INDEX DOCD: CPT | Mod: ,,, | Performed by: PHYSICIAN ASSISTANT

## 2022-08-04 PROCEDURE — 99214 PR OFFICE/OUTPT VISIT, EST, LEVL IV, 30-39 MIN: ICD-10-PCS | Mod: S$PBB,,, | Performed by: PHYSICIAN ASSISTANT

## 2022-08-04 PROCEDURE — 3077F PR MOST RECENT SYSTOLIC BLOOD PRESSURE >= 140 MM HG: ICD-10-PCS | Mod: ,,, | Performed by: PHYSICIAN ASSISTANT

## 2022-08-04 PROCEDURE — 99214 OFFICE O/P EST MOD 30 MIN: CPT | Mod: S$PBB,,, | Performed by: PHYSICIAN ASSISTANT

## 2022-08-04 PROCEDURE — 3078F PR MOST RECENT DIASTOLIC BLOOD PRESSURE < 80 MM HG: ICD-10-PCS | Mod: ,,, | Performed by: PHYSICIAN ASSISTANT

## 2022-08-04 RX ORDER — HYDROCODONE BITARTRATE AND ACETAMINOPHEN 10; 325 MG/1; MG/1
1 TABLET ORAL EVERY 6 HOURS PRN
Qty: 120 TABLET | Refills: 0 | Status: SHIPPED | OUTPATIENT
Start: 2022-09-06 | End: 2022-08-08

## 2022-08-04 RX ORDER — HYDROCODONE BITARTRATE AND ACETAMINOPHEN 10; 325 MG/1; MG/1
1 TABLET ORAL EVERY 6 HOURS PRN
Qty: 120 TABLET | Refills: 0 | Status: SHIPPED | OUTPATIENT
Start: 2022-08-07 | End: 2022-08-08

## 2022-08-04 RX ORDER — PREGABALIN 100 MG/1
100 CAPSULE ORAL EVERY 12 HOURS
Qty: 60 CAPSULE | Refills: 1 | Status: SHIPPED | OUTPATIENT
Start: 2022-08-04 | End: 2022-08-11 | Stop reason: SDUPTHER

## 2022-08-04 NOTE — PROGRESS NOTES
Subjective:      Patient ID: Holly Porter is a 61 y.o. female.    Chief Complaint: Back Pain, Knee Pain, Foot Pain (Left is worse), and Hip Pain (bilateral)      Pain  This is a chronic problem. The current episode started more than 1 year ago. The problem occurs daily. The problem has been waxing and waning. Associated symptoms include arthralgias. Pertinent negatives include no change in bowel habit, chest pain, chills, coughing, diaphoresis, fever, rash, sore throat, vertigo or vomiting.     Review of Systems   Constitutional: Negative for appetite change, chills, diaphoresis, fever and unexpected weight change.   HENT: Negative for drooling, ear discharge, ear pain, facial swelling, nosebleeds, sore throat, trouble swallowing, voice change and goiter.    Eyes: Negative for photophobia, pain, discharge, redness and visual disturbance.   Respiratory: Negative for apnea, cough, choking, chest tightness, shortness of breath, wheezing and stridor.    Cardiovascular: Negative for chest pain, palpitations and leg swelling.   Gastrointestinal: Negative for abdominal distention, change in bowel habit, diarrhea, rectal pain, vomiting, fecal incontinence and change in bowel habit.   Endocrine: Negative for cold intolerance, heat intolerance, polydipsia, polyphagia and polyuria.   Genitourinary: Negative for bladder incontinence, dysuria, flank pain, frequency and hot flashes.   Musculoskeletal: Positive for arthralgias, back pain, leg pain and neck stiffness.   Integumentary:  Negative for color change, pallor and rash.   Allergic/Immunologic: Negative for immunocompromised state.   Neurological: Negative for dizziness, vertigo, seizures, syncope, facial asymmetry, speech difficulty, light-headedness, disturbances in coordination, memory loss and coordination difficulties.   Hematological: Negative for adenopathy. Does not bruise/bleed easily.   Psychiatric/Behavioral: Negative for agitation, behavioral problems,  confusion, decreased concentration, dysphoric mood, hallucinations, self-injury and suicidal ideas. The patient is not nervous/anxious and is not hyperactive.             Past Surgical History:   Procedure Laterality Date    HYSTERECTOMY      ILIAC ARTERY STENT Bilateral 01/28/2020    Bilateral distal aorta and common iliac 8 X 59 vbx covered stents performed by Dr. Antonio Jacinto.    RADIOFREQUENCY ABLATION Left 4/15/2022    Procedure: Left calf  Radiofrequency Ablation;  Surgeon: Matty Faclon DO;  Location: [facility], [place];  Service: Vascular;  Laterality: Left;    STAB PHLEBECTOMY OF VARICOSE VEINS Left 01/25/2013    Left leg microphlebectomies x 23 stab avulsions and ligation of multiple varicose veins perrformed by Dr. Cirilo Aguirre.    THYROIDECTOMY      hx: thyroid cancer    TOE AMPUTATION Left 01/28/2020    2nd, 4th and 5th performed by Dr. Anam Saeed.    TOE AMPUTATION Right 01/28/2020    4th toe performed by Dr. Anam Saeed.    TOTAL ABDOMINAL HYSTERECTOMY W/ BILATERAL SALPINGOOPHORECTOMY      TUBAL LIGATION      VAGINAL DELIVERY      x 4    VENOUS ABLATION Right 08/09/2019    GSV Varithena Ablation performed by Dr. Estuardo Falcon    VENOUS ABLATION Left 08/02/2019    ATAV Varithena Ablation performed by Dr. Estuardo Falcon.    VENOUS ABLATION Right 07/13/2015    ATAV Laser Ablatio performed by Dr. Cirilo Aguirre.    VENOUS ABLATION Right 07/06/2015    Distal  GSV Laser Ablation performed by dr. Cirilo Aguirre.    VENOUS ABLATION Left 04/20/2015    Left Distal GSV Laser Ablation performed by dr. Cirilo Aguirre.    VENOUS ABLATION Right 10/28/2013    Right Distal GSV RF Ablation performed by Dr. Cirilo Aguirre.    VENOUS ABLATION Left 10/25/2013    SSV RF Ablation performed by dr. Cirilo Aguirre.    VENOUS ABLATION Left 10/07/2013    Left GSV and Left ATAV RF Ablation performed by Dr. Cirilo Aguirre.    VENOUS ABLATION Right 01/21/2013    GSV RF Ablation w/micros x 22 performed by Dr. Cirilo Aguirre.    VENOUS ABLATION Left  "06/25/2021    Left distal GSV Varithena Ablation       Objective:  Vitals:    08/04/22 1326 08/04/22 1327   BP: (!) 161/60    Pulse: 91    Weight: (!) 166.5 kg (367 lb)    Height: 5' 8" (1.727 m)    PainSc:   7   7         Physical Exam  Vitals and nursing note reviewed. Exam conducted with a chaperone present.   Constitutional:       General: She is awake.      Appearance: Normal appearance. She is obese. She is not diaphoretic.   HENT:      Head: Normocephalic and atraumatic.      Nose: Nose normal.      Mouth/Throat:      Mouth: Mucous membranes are moist.      Pharynx: Oropharynx is clear.   Eyes:      Conjunctiva/sclera: Conjunctivae normal.      Pupils: Pupils are equal, round, and reactive to light.   Cardiovascular:      Rate and Rhythm: Normal rate.   Pulmonary:      Effort: Pulmonary effort is normal. No respiratory distress.   Abdominal:      Palpations: Abdomen is soft.   Musculoskeletal:         General: Normal range of motion.      Cervical back: Normal range of motion and neck supple.   Skin:     General: Skin is warm and dry.   Neurological:      General: No focal deficit present.      Mental Status: She is alert and oriented to person, place, and time. Mental status is at baseline.      Cranial Nerves: No cranial nerve deficit (II-XII).   Psychiatric:         Mood and Affect: Mood normal.         Behavior: Behavior normal. Behavior is cooperative.         Thought Content: Thought content normal.           Orders Placed This Encounter   Procedures    Ambulatory referral/consult to Physical/Occupational Therapy     Standing Status:   Future     Standing Expiration Date:   9/4/2023     Referral Priority:   Routine     Referral Type:   Physical Medicine     Referral Reason:   Specialty Services Required     Requested Specialty:   Physical Therapy     Number of Visits Requested:   1        X-Ray Chest PA And Lateral  Narrative: EXAMINATION:  XR CHEST PA AND LATERAL    CLINICAL HISTORY:  Dyspnea, " unspecified    TECHNIQUE:  PA and lateral views of the chest were performed.    COMPARISON:  None    FINDINGS:  Heart size normal.  Lungs clear.  No pneumothorax or pleural effusion.  Pulmonary vessels show normal caliber.  Degenerative changes of the spine.  Impression: No acute findings.    Electronically signed by: Edson De  Date:    05/10/2022  Time:    18:22       Patient Outreach on 07/29/2022   Component Date Value Ref Range Status    CRC Recommendation External 10/27/2015 Repeat colonoscopy in 5 years   Final   Office Visit on 06/16/2022   Component Date Value Ref Range Status    POC Amphetamines 06/16/2022 Presumptive Positive (A) Negative, Inconclusive Final    POC Barbiturates 06/16/2022 Negative  Negative, Inconclusive Final    POC Benzodiazepines 06/16/2022 Negative  Negative, Inconclusive Final    POC Cocaine 06/16/2022 Negative  Negative, Inconclusive Final    POC THC 06/16/2022 Negative  Negative, Inconclusive Final    POC Methadone 06/16/2022 Negative  Negative, Inconclusive Final    POC Methamphetamine 06/16/2022 Negative  Negative, Inconclusive Final    POC Opiates 06/16/2022 Presumptive Positive (A) Negative, Inconclusive Final    POC Oxycodone 06/16/2022 Negative  Negative, Inconclusive Final    POC Phencyclidine 06/16/2022 Negative  Negative, Inconclusive Final    POC Methylenedioxymethamphetamine * 06/16/2022 Negative  Negative, Inconclusive Final    POC Buprenorphine 06/16/2022 Negative   Final     Acceptable 06/16/2022 Yes   Final    POC Temperature (Urine) 06/16/2022 95   Final    Color, UA 06/16/2022 Yellow  Colorless, Straw, Yellow, Dark Yellow Final    Clarity, UA 06/16/2022 Clear  Clear Final    pH, UA 06/16/2022 6.5  5.0, 5.5, 6.0, 6.5, 7.0, 7.5, 8.0 pH Units Final    Leukocytes, UA 06/16/2022 Trace (A) Negative Final    Nitrites, UA 06/16/2022 Negative  Negative Final    Protein, UA 06/16/2022 Negative  Negative Final    Glucose, UA 06/16/2022  Negative  Negative mg/dL Final    Ketones, UA 06/16/2022 Negative  Negative, Trace mg/dL Final    Urobilinogen, UA 06/16/2022 0.2  0.2, 1.0 mg/dL Final    Bilirubin, UA 06/16/2022 Negative  Negative Final    Blood, UA 06/16/2022 Negative  Negative Final    Specific [place], UA 06/16/2022 1.020  <=1.005, 1.010, 1.015, 1.020, 1.025, 1.030 Final    WBC, UA 06/16/2022 5-10 (A) None Seen, 0-5 /hpf Final    RBC, UA 06/16/2022 None Seen  None Seen, 0-3 /hpf Final    Bacteria, UA 06/16/2022 Moderate (A) None Seen, None Seen To Occasional, Rare /hpf Final    Squamous Epithelial Cells, UA 06/16/2022 Moderate (A) None Seen, Rare, None Seen To Occasional /lpf Final    Mucus, UA 06/16/2022 Few (A) None Seen /hpf Final    Culture, Urine 06/16/2022 Skin/Urogenital Tamara Isolated, no further workup.   Final   Office Visit on 06/06/2022   Component Date Value Ref Range Status    Creatinine, Urine 06/06/2022 107  28 - 219 mg/dL Final    Microalbumin 06/06/2022 0.8  0.0 - 2.8 mg/dL Final    Microalbumin/Creatinine Ratio 06/06/2022 7.5  0.0 - 30.0 mg/g Final    Triglycerides 06/06/2022 112  35 - 150 mg/dL Final    Cholesterol 06/06/2022 172  0 - 200 mg/dL Final    HDL Cholesterol 06/06/2022 51  40 - 60 mg/dL Final    Cholesterol/HDL Ratio (Risk Factor) 06/06/2022 3.4   Final    Non-HDL 06/06/2022 121  mg/dL Final    LDL Calculated 06/06/2022 99  mg/dL Final    LDL/HDL 06/06/2022 1.9   Final    VLDL 06/06/2022 22  mg/dL Final    HIV 1/2 06/06/2022 Non-Reactive  Non-Reactive Final   Admission on 05/10/2022, Discharged on 05/10/2022   Component Date Value Ref Range Status    POC Glucose 05/10/2022 131 (A) 70 - 105 mg/dL Final    ProBNP 05/10/2022 443 (A) 1 - 125 pg/mL Final    Sodium 05/10/2022 141  136 - 145 mmol/L Final    Potassium 05/10/2022 3.3 (A) 3.5 - 5.1 mmol/L Final    Chloride 05/10/2022 102  98 - 107 mmol/L Final    CO2 05/10/2022 30  21 - 32 mmol/L Final    Anion Gap 05/10/2022 12  7 - 16 mmol/L  Final    Glucose 05/10/2022 117 (A) 74 - 106 mg/dL Final    BUN 05/10/2022 15  7 - 18 mg/dL Final    Creatinine 05/10/2022 0.95  0.55 - 1.02 mg/dL Final    BUN/Creatinine Ratio 05/10/2022 16  6 - 20 Final    Calcium 05/10/2022 9.1  8.5 - 10.1 mg/dL Final    Total Protein 05/10/2022 7.8  6.4 - 8.2 g/dL Final    Albumin 05/10/2022 3.8  3.5 - 5.0 g/dL Final    Globulin 05/10/2022 4.0  2.0 - 4.0 g/dL Final    A/G Ratio 05/10/2022 1.0   Final    Bilirubin, Total 05/10/2022 0.3  0.0 - 1.2 mg/dL Final    Alk Phos 05/10/2022 129  50 - 130 U/L Final    ALT 05/10/2022 14  13 - 56 U/L Final    AST 05/10/2022 19  15 - 37 U/L Final    eGFR 05/10/2022 63  >=60 mL/min/1.73m² Final    Magnesium 05/10/2022 2.1  1.7 - 2.3 mg/dL Final    Color, UA 05/10/2022 Yellow  Colorless, Straw, Yellow, Dark Yellow Final    Clarity, UA 05/10/2022 Clear  Clear Final    pH, UA 05/10/2022 7.0  5.0, 5.5, 6.0, 6.5, 7.0, 7.5, 8.0 pH Units Final    Leukocytes, UA 05/10/2022 Negative  Negative Final    Nitrites, UA 05/10/2022 Negative  Negative Final    Protein, UA 05/10/2022 Negative  Negative Final    Glucose, UA 05/10/2022 Negative  Negative mg/dL Final    Ketones, UA 05/10/2022 Negative  Negative, Trace mg/dL Final    Urobilinogen, UA 05/10/2022 0.2  0.2, 1.0 mg/dL Final    Bilirubin, UA 05/10/2022 Negative  Negative Final    Blood, UA 05/10/2022 Negative  Negative Final    Specific Gravity, UA 05/10/2022 1.010  <=1.005, 1.010, 1.015, 1.020, 1.025, 1.030 Final    WBC 05/10/2022 12.23 (A) 4.50 - 11.00 K/uL Final    RBC 05/10/2022 5.47 (A) 4.20 - 5.40 M/uL Final    Hemoglobin 05/10/2022 15.3  12.0 - 16.0 g/dL Final    Hematocrit 05/10/2022 47.7 (A) 38.0 - 47.0 % Final    MCV 05/10/2022 87.2  80.0 - 96.0 fL Final    MCH 05/10/2022 28.0  27.0 - 31.0 pg Final    MCHC 05/10/2022 32.1  32.0 - 36.0 g/dL Final    RDW 05/10/2022 16.7 (A) 11.5 - 14.5 % Final    Platelet Count 05/10/2022 232  150 - 400 K/uL Final    MPV  05/10/2022 12.1  9.4 - 12.4 fL Final    Neutrophils % 05/10/2022 64.3  53.0 - 65.0 % Final    Lymphocytes % 05/10/2022 22.0 (A) 27.0 - 41.0 % Final    Neutrophils, Abs 05/10/2022 7.86 (A) 1.80 - 7.70 K/uL Final    Lymphocytes, Absolute 05/10/2022 2.69  1.00 - 4.80 K/uL Final    Diff Type 05/10/2022 Auto   Final    Monocytes % 05/10/2022 4.5  2.0 - 6.0 % Final    Eosinophils % 05/10/2022 8.7 (A) 1.0 - 4.0 % Final    Basophils % 05/10/2022 0.2  0.0 - 1.0 % Final    Immature Granulocytes % 05/10/2022 0.3  0.0 - 0.4 % Final    Monocytes, Absolute 05/10/2022 0.55  0.00 - 0.80 K/uL Final    Eosinophils, Absolute 05/10/2022 1.06 (A) 0.00 - 0.50 K/uL Final    Immature Granulocytes, Absolute 05/10/2022 0.04  0.00 - 0.04 K/uL Final    Basophils, Absolute 05/10/2022 0.03  0.00 - 0.20 K/uL Final   Office Visit on 04/25/2022   Component Date Value Ref Range Status    Vitamin B12 04/25/2022 373  193 - 986 pg/mL Final   Admission on 04/15/2022, Discharged on 04/15/2022   Component Date Value Ref Range Status    POC Glucose 04/15/2022 82  70 - 105 mg/dL Final    POC Glucose 04/15/2022 54 (A) 70 - 105 mg/dL Final    POC Glucose 04/15/2022 117 (A) 70 - 105 mg/dL Final    POC Glucose 04/15/2022 66 (A) 70 - 105 mg/dL Final    POC Glucose 04/15/2022 83  70 - 105 mg/dL Final   Office Visit on 04/13/2022   Component Date Value Ref Range Status    SARS Coronavirus 2 Antigen 04/13/2022 Negative  Negative Final     Acceptable 04/13/2022 Yes   Final    Sodium 04/13/2022 142  136 - 145 mmol/L Final    Potassium 04/13/2022 4.5  3.5 - 5.1 mmol/L Final    Chloride 04/13/2022 108 (A) 98 - 107 mmol/L Final    CO2 04/13/2022 29  21 - 32 mmol/L Final    Anion Gap 04/13/2022 10  7 - 16 mmol/L Final    Glucose 04/13/2022 73 (A) 74 - 106 mg/dL Final    BUN 04/13/2022 12  7 - 18 mg/dL Final    Creatinine 04/13/2022 0.90  0.55 - 1.02 mg/dL Final    BUN/Creatinine Ratio 04/13/2022 13  6 - 20 Final    Calcium  04/13/2022 9.2  8.5 - 10.1 mg/dL Final    eGFR 04/13/2022 67  >=60 mL/min/1.73m² Final    TSH 04/13/2022 0.197 (A) 0.358 - 3.740 uIU/mL Final    ProBNP 04/13/2022 205 (A) 1 - 125 pg/mL Final   Office Visit on 04/04/2022   Component Date Value Ref Range Status    ESR Westergren 04/04/2022 3  0 - 30 mm/Hr Final    WBC 04/04/2022 10.74  4.50 - 11.00 K/uL Final    RBC 04/04/2022 5.71 (A) 4.20 - 5.40 M/uL Final    Hemoglobin 04/04/2022 15.5  12.0 - 16.0 g/dL Final    Hematocrit 04/04/2022 50.7 (A) 38.0 - 47.0 % Final    MCV 04/04/2022 88.8  80.0 - 96.0 fL Final    MCH 04/04/2022 27.1  27.0 - 31.0 pg Final    MCHC 04/04/2022 30.6 (A) 32.0 - 36.0 g/dL Final    RDW 04/04/2022 18.4 (A) 11.5 - 14.5 % Final    Platelet Count 04/04/2022 243  150 - 400 K/uL Final    MPV 04/04/2022 12.7 (A) 9.4 - 12.4 fL Final    Neutrophils % 04/04/2022 62.5  53.0 - 65.0 % Final    Lymphocytes % 04/04/2022 27.0  27.0 - 41.0 % Final    Monocytes % 04/04/2022 6.1 (A) 2.0 - 6.0 % Final    Eosinophils % 04/04/2022 3.1  1.0 - 4.0 % Final    Basophils % 04/04/2022 0.7  0.0 - 1.0 % Final    Immature Granulocytes % 04/04/2022 0.6 (A) 0.0 - 0.4 % Final    nRBC, Auto 04/04/2022 0.0  <=0.0 % Final    Neutrophils, Abs 04/04/2022 6.71  1.80 - 7.70 K/uL Final    Lymphocytes, Absolute 04/04/2022 2.90  1.00 - 4.80 K/uL Final    Monocytes, Absolute 04/04/2022 0.66  0.00 - 0.80 K/uL Final    Eosinophils, Absolute 04/04/2022 0.33  0.00 - 0.50 K/uL Final    Basophils, Absolute 04/04/2022 0.08  0.00 - 0.20 K/uL Final    Immature Granulocytes, Absolute 04/04/2022 0.06 (A) 0.00 - 0.04 K/uL Final    nRBC, Absolute 04/04/2022 0.00  <=0.00 x10e3/uL Final    Diff Type 04/04/2022 Auto   Final   Office Visit on 04/04/2022   Component Date Value Ref Range Status    POC Amphetamines 04/04/2022 Negative  Negative, Inconclusive Final    POC Barbiturates 04/04/2022 Negative  Negative, Inconclusive Final    POC Benzodiazepines 04/04/2022 Negative   Negative, Inconclusive Final    POC Cocaine 04/04/2022 Negative  Negative, Inconclusive Final    POC THC 04/04/2022 Negative  Negative, Inconclusive Final    POC Methadone 04/04/2022 Negative  Negative, Inconclusive Final    POC Methamphetamine 04/04/2022 Negative  Negative, Inconclusive Final    POC Opiates 04/04/2022 Presumptive Positive (A) Negative, Inconclusive Final    POC Oxycodone 04/04/2022 Negative  Negative, Inconclusive Final    POC Phencyclidine 04/04/2022 Negative  Negative, Inconclusive Final    POC Methylenedioxymethamphetamine * 04/04/2022 Negative  Negative, Inconclusive Final    POC Tricyclic Antidepressants 04/04/2022 Negative  Negative, Inconclusive Final    POC Buprenorphine 04/04/2022 Negative   Final     Acceptable 04/04/2022 Yes   Final    POC Temperature (Urine) 04/04/2022 90   Final   Office Visit on 02/09/2022   Component Date Value Ref Range Status    Hemoglobin A1C 02/09/2022 5.7  4.5 - 6.6 % Final    POC Glucose 02/09/2022 112 (A) 70 - 110 MG/DL Final   There may be more visits with results that are not included.           Assessment:      1. Lumbosacral spondylosis without myelopathy    2. Chronic pain of right knee    3. PVD (peripheral vascular disease)    4. Foot pain, left            A's of Opioid Risk Assessment  Activity:Patient can perform ADL.   Analgesia:Patients pain is partially controlled by current medication. Patient has tried OTC medications such as Tylenol and Ibuprofen with out relief.   Adverse Effects: Patient denies constipation or sedation.  Aberrant Behavior:  reviewed with no aberrant drug seeking/taking behavior.  Overdose reversal drug naloxone discussed    Drug screen reviewed      Requested Prescriptions     Signed Prescriptions Disp Refills    pregabalin (LYRICA) 100 MG capsule 60 capsule 1     Sig: Take 1 capsule (100 mg total) by mouth every 12 (twelve) hours.    HYDROcodone-acetaminophen (NORCO)  mg per tablet 120  tablet 0     Sig: Take 1 tablet by mouth every 6 (six) hours as needed for Pain.    HYDROcodone-acetaminophen (NORCO)  mg per tablet 120 tablet 0     Sig: Take 1 tablet by mouth every 6 (six) hours as needed for Pain.         Plan:    Uses Rollator walker assistance ambulation    Requesting assistance with 4 point walker for home use for assistance round how she states her roller walker too wide to get through the door at home    Will order walker for home use assistance with ambulation    Dealing with left lower extremity pain dealing with severe gout following wound management for nonhealing sore left foot    Diabetic, try to limit steroids    Requesting assistance with narcotics she states current medications not controlling her discomfort    We discussed fentanyl patch she declines to use patch    Cannot tolerate OxyContin she is severely allergic    After discussing options were continue with Norco 10 1 p.o. q.6 hours    Continue activity as tolerated    Follow-up 2 months    Dr. Crandall, April 2023    Pill count     Physical therapy August 4, 2022 [facility]    Bring original prescription medication bottles/container/box with labels to each visit

## 2022-08-08 RX ORDER — HYDROCODONE BITARTRATE AND ACETAMINOPHEN 10; 325 MG/1; MG/1
1 TABLET ORAL EVERY 6 HOURS PRN
Qty: 120 TABLET | Refills: 0 | Status: SHIPPED | OUTPATIENT
Start: 2022-09-07 | End: 2022-10-04 | Stop reason: SDUPTHER

## 2022-08-08 RX ORDER — HYDROCODONE BITARTRATE AND ACETAMINOPHEN 10; 325 MG/1; MG/1
1 TABLET ORAL EVERY 6 HOURS PRN
Qty: 120 TABLET | Refills: 0 | Status: SHIPPED | OUTPATIENT
Start: 2022-08-08 | End: 2022-09-07

## 2022-08-11 ENCOUNTER — OFFICE VISIT (OUTPATIENT)
Dept: FAMILY MEDICINE | Facility: CLINIC | Age: 62
End: 2022-08-11
Payer: COMMERCIAL

## 2022-08-11 VITALS
WEIGHT: 293 LBS | SYSTOLIC BLOOD PRESSURE: 162 MMHG | OXYGEN SATURATION: 98 % | RESPIRATION RATE: 20 BRPM | HEIGHT: 68 IN | BODY MASS INDEX: 44.41 KG/M2 | HEART RATE: 93 BPM | TEMPERATURE: 97 F | DIASTOLIC BLOOD PRESSURE: 84 MMHG

## 2022-08-11 DIAGNOSIS — Z00.00 ENCOUNTER FOR GENERAL ADULT MEDICAL EXAMINATION W/O ABNORMAL FINDINGS: ICD-10-CM

## 2022-08-11 DIAGNOSIS — E11.9 TYPE 2 DIABETES MELLITUS WITHOUT COMPLICATION, WITH LONG-TERM CURRENT USE OF INSULIN: ICD-10-CM

## 2022-08-11 DIAGNOSIS — M47.817 LUMBOSACRAL SPONDYLOSIS WITHOUT MYELOPATHY: Chronic | ICD-10-CM

## 2022-08-11 DIAGNOSIS — M79.672 FOOT PAIN, LEFT: Chronic | ICD-10-CM

## 2022-08-11 DIAGNOSIS — E66.01 MORBID OBESITY: ICD-10-CM

## 2022-08-11 DIAGNOSIS — R10.2 PELVIC PAIN: Primary | ICD-10-CM

## 2022-08-11 DIAGNOSIS — N39.498 OTHER URINARY INCONTINENCE: ICD-10-CM

## 2022-08-11 DIAGNOSIS — N30.00 ACUTE CYSTITIS WITHOUT HEMATURIA: ICD-10-CM

## 2022-08-11 DIAGNOSIS — Z79.4 TYPE 2 DIABETES MELLITUS WITHOUT COMPLICATION, WITH LONG-TERM CURRENT USE OF INSULIN: ICD-10-CM

## 2022-08-11 DIAGNOSIS — I73.9 PVD (PERIPHERAL VASCULAR DISEASE): Chronic | ICD-10-CM

## 2022-08-11 DIAGNOSIS — K59.09 OTHER CONSTIPATION: ICD-10-CM

## 2022-08-11 DIAGNOSIS — G89.29 CHRONIC PAIN OF RIGHT KNEE: Chronic | ICD-10-CM

## 2022-08-11 DIAGNOSIS — M25.561 CHRONIC PAIN OF RIGHT KNEE: Chronic | ICD-10-CM

## 2022-08-11 DIAGNOSIS — I10 ESSENTIAL HYPERTENSION: ICD-10-CM

## 2022-08-11 PROCEDURE — 90471 ZOSTER RECOMBINANT VACCINE: ICD-10-PCS | Mod: GZ,,, | Performed by: INTERNAL MEDICINE

## 2022-08-11 PROCEDURE — 90472 IMMUNIZATION ADMIN EACH ADD: CPT | Mod: GZ,,, | Performed by: INTERNAL MEDICINE

## 2022-08-11 PROCEDURE — 90472 TDAP VACCINE GREATER THAN OR EQUAL TO 7YO IM: ICD-10-PCS | Mod: GZ,,, | Performed by: INTERNAL MEDICINE

## 2022-08-11 PROCEDURE — 99214 OFFICE O/P EST MOD 30 MIN: CPT | Mod: 25,,, | Performed by: INTERNAL MEDICINE

## 2022-08-11 PROCEDURE — 81003 URINALYSIS AUTO W/O SCOPE: CPT | Mod: QW,,, | Performed by: CLINICAL MEDICAL LABORATORY

## 2022-08-11 PROCEDURE — 90715 TDAP VACCINE GREATER THAN OR EQUAL TO 7YO IM: ICD-10-PCS | Mod: GZ,,, | Performed by: INTERNAL MEDICINE

## 2022-08-11 PROCEDURE — 90750 ZOSTER RECOMBINANT VACCINE: ICD-10-PCS | Mod: GZ,,, | Performed by: INTERNAL MEDICINE

## 2022-08-11 PROCEDURE — 3077F SYST BP >= 140 MM HG: CPT | Mod: ,,, | Performed by: INTERNAL MEDICINE

## 2022-08-11 PROCEDURE — 90750 HZV VACC RECOMBINANT IM: CPT | Mod: GZ,,, | Performed by: INTERNAL MEDICINE

## 2022-08-11 PROCEDURE — 3008F PR BODY MASS INDEX (BMI) DOCUMENTED: ICD-10-PCS | Mod: ,,, | Performed by: INTERNAL MEDICINE

## 2022-08-11 PROCEDURE — 81003 URINALYSIS, REFLEX TO URINE CULTURE: ICD-10-PCS | Mod: QW,,, | Performed by: CLINICAL MEDICAL LABORATORY

## 2022-08-11 PROCEDURE — 90715 TDAP VACCINE 7 YRS/> IM: CPT | Mod: GZ,,, | Performed by: INTERNAL MEDICINE

## 2022-08-11 PROCEDURE — 99214 PR OFFICE/OUTPT VISIT, EST, LEVL IV, 30-39 MIN: ICD-10-PCS | Mod: 25,,, | Performed by: INTERNAL MEDICINE

## 2022-08-11 PROCEDURE — 3008F BODY MASS INDEX DOCD: CPT | Mod: ,,, | Performed by: INTERNAL MEDICINE

## 2022-08-11 PROCEDURE — 3077F PR MOST RECENT SYSTOLIC BLOOD PRESSURE >= 140 MM HG: ICD-10-PCS | Mod: ,,, | Performed by: INTERNAL MEDICINE

## 2022-08-11 PROCEDURE — 90471 IMMUNIZATION ADMIN: CPT | Mod: GZ,,, | Performed by: INTERNAL MEDICINE

## 2022-08-11 PROCEDURE — 86803 HEPATITIS C ANTIBODY: ICD-10-PCS | Mod: ,,, | Performed by: CLINICAL MEDICAL LABORATORY

## 2022-08-11 PROCEDURE — 86803 HEPATITIS C AB TEST: CPT | Mod: ,,, | Performed by: CLINICAL MEDICAL LABORATORY

## 2022-08-11 RX ORDER — PREGABALIN 100 MG/1
100 CAPSULE ORAL EVERY 12 HOURS
Qty: 60 CAPSULE | Refills: 1 | Status: SHIPPED | OUTPATIENT
Start: 2022-08-11 | End: 2022-10-04 | Stop reason: SDUPTHER

## 2022-08-11 RX ORDER — CLINDAMYCIN HYDROCHLORIDE 300 MG/1
300 CAPSULE ORAL 3 TIMES DAILY
Qty: 15 CAPSULE | Refills: 0 | Status: CANCELLED | OUTPATIENT
Start: 2022-08-11 | End: 2022-08-16

## 2022-08-12 LAB
BILIRUB UR QL STRIP: NEGATIVE
CLARITY UR: CLEAR
COLOR UR: NORMAL
GLUCOSE UR STRIP-MCNC: NORMAL MG/DL
HCV AB SER QL: NORMAL
KETONES UR STRIP-SCNC: NEGATIVE MG/DL
LEUKOCYTE ESTERASE UR QL STRIP: NEGATIVE
NITRITE UR QL STRIP: NEGATIVE
PH UR STRIP: 5 PH UNITS
PROT UR QL STRIP: NEGATIVE
RBC # UR STRIP: NEGATIVE /UL
SP GR UR STRIP: 1.02
UROBILINOGEN UR STRIP-ACNC: NORMAL MG/DL

## 2022-08-12 RX ORDER — MUPIROCIN 20 MG/G
OINTMENT TOPICAL 2 TIMES DAILY
Qty: 30 G | Refills: 0 | Status: SHIPPED | OUTPATIENT
Start: 2022-08-12 | End: 2022-08-17

## 2022-08-12 RX ORDER — ASPIRIN 81 MG/1
81 TABLET ORAL DAILY
Qty: 90 TABLET | Refills: 1 | Status: SHIPPED | OUTPATIENT
Start: 2022-08-12 | End: 2022-10-25 | Stop reason: SDUPTHER

## 2022-08-12 RX ORDER — PHENTERMINE HYDROCHLORIDE 37.5 MG/1
37.5 TABLET ORAL
Qty: 30 TABLET | Refills: 0 | Status: SHIPPED | OUTPATIENT
Start: 2022-08-12 | End: 2023-12-06

## 2022-08-12 RX ORDER — LOSARTAN POTASSIUM AND HYDROCHLOROTHIAZIDE 12.5; 5 MG/1; MG/1
1 TABLET ORAL DAILY
Qty: 90 TABLET | Refills: 1 | Status: SHIPPED | OUTPATIENT
Start: 2022-08-12 | End: 2023-12-06

## 2022-08-12 RX ORDER — POTASSIUM CHLORIDE 20 MEQ/1
20 TABLET, EXTENDED RELEASE ORAL DAILY
Qty: 90 TABLET | Refills: 1 | Status: SHIPPED | OUTPATIENT
Start: 2022-08-12 | End: 2023-06-29 | Stop reason: SDUPTHER

## 2022-08-12 NOTE — PROGRESS NOTES
"Subjective:       Patient ID: Holly Porter is a 61 y.o. female.    Chief Complaint: Insect Bite (Right shoulder), Obesity, Sinus Problem (Related to mold in house), Rash, Cough, Sputum Production, Hypertension, Diabetes, Medication Refill, Constipation, Urinary Urgency, Vaginal Pain, and Hip Pain    Patient is here today for check up evaluation. Patient states her left foot is getting better and the swelling has gone down. States has not followed with a Podiatrist in years. Will refer her to Dr Lucio. Patient is complaining of a rash and discomfort of her groin and pelvis. States when standing up has very severe pain and 'feels like something is tearing apart". 1 month ago patient had UTI. States is still having trouble with frequency and incontinence. Patient is complaining of severe pelvic pain and states it is unbearable. Will order STAT CT abdomen/pelvis with PO contrast and IV contrast. Will refer to Dr Ambrose and reorder UA with C&S. Patient also complaining of chronic constipation. States has tried every stool softener and nothing is working. Advise to try castor oil. Patient is also complaining of cyst/pimple to right shoulder/neck area and states was very painful until she "popped the head out". Will order Cleocin 300 mg PPO TID x 5 days and bactroban ointment to apply BID. Will follow in 1 month.   Care gaps discussed and patient refused all.      Current Medications:    Current Outpatient Medications:     albuterol (PROVENTIL/VENTOLIN HFA) 90 mcg/actuation inhaler, Inhale 2 puffs into the lungs 4 (four) times daily. Rescue, Disp: 18 g, Rfl: 2    allopurinoL (ZYLOPRIM) 300 MG tablet, Take 1 tablet (300 mg total) by mouth once daily., Disp: 90 tablet, Rfl: 3    amitriptyline (ELAVIL) 25 MG tablet, Take 1 tablet (25 mg total) by mouth nightly as needed for Insomnia., Disp: 90 tablet, Rfl: 1    apixaban (ELIQUIS) 5 mg Tab, Take 1 tablet (5 mg total) by mouth 2 (two) times daily., Disp: 60 tablet, Rfl: " "3    cilostazoL (PLETAL) 100 MG Tab, Take 1 tablet (100 mg total) by mouth 2 (two) times daily., Disp: 90 tablet, Rfl: 1    colchicine (COLCRYS) 0.6 mg tablet, One pill three times a day for two days,then one pill daily till out, Disp: 30 tablet, Rfl: 2    HYDROcodone-acetaminophen (NORCO)  mg per tablet, Take 1 tablet by mouth every 6 (six) hours as needed for Pain., Disp: 120 tablet, Rfl: 0    [START ON 9/7/2022] HYDROcodone-acetaminophen (NORCO)  mg per tablet, Take 1 tablet by mouth every 6 (six) hours as needed for Pain., Disp: 120 tablet, Rfl: 0    insulin detemir U-100 (LEVEMIR FLEXTOUCH U-100 INSULN) 100 unit/mL (3 mL) InPn pen, Inject 10 Units into the skin every evening. 15 ml with 1 refill, Disp: , Rfl:     levothyroxine (SYNTHROID) 150 MCG tablet, Take 1 tablet (150 mcg total) by mouth before breakfast., Disp: 90 tablet, Rfl: 1    loratadine (CLARITIN) 10 mg tablet, Take 1 tablet (10 mg total) by mouth once daily., Disp: 30 tablet, Rfl: 3    metOLazone (ZAROXOLYN) 2.5 MG tablet, Take 1 tablet (2.5 mg total) by mouth once daily., Disp: 30 tablet, Rfl: 2    mupirocin (BACTROBAN) 2 % ointment, Apply topically 2 (two) times daily., Disp: 80 g, Rfl: 2    NOVOFINE 32 32 gauge x 1/4" Ndle, Use as directed once daily., Disp: 90 each, Rfl: 3    pantoprazole (PROTONIX) 40 MG tablet, Take 1 tablet (40 mg total) by mouth once daily., Disp: 90 tablet, Rfl: 1    pregabalin (LYRICA) 100 MG capsule, Take 1 capsule (100 mg total) by mouth every 12 (twelve) hours., Disp: 60 capsule, Rfl: 1    spironolactone (ALDACTONE) 50 MG tablet, Take 1 tablet (50 mg total) by mouth once daily., Disp: 30 tablet, Rfl: 11    SYMBICORT 160-4.5 mcg/actuation HFAA, Inhale into the lungs., Disp: , Rfl:     topiramate (TOPAMAX) 100 MG tablet, Take 1.5 tablets (150 mg total) by mouth 2 (two) times daily., Disp: 90 tablet, Rfl: 11    triamcinolone acetonide 0.1% (KENALOG) 0.1 % cream, Apply topically 3 (three) times " daily., Disp: 400 g, Rfl: 2    aspirin (ECOTRIN) 81 MG EC tablet, Take 1 tablet (81 mg total) by mouth once daily., Disp: 90 tablet, Rfl: 1    azithromycin (Z-SANJU) 250 MG tablet, Take 1 tablet (250 mg total) by mouth once daily. Take first 2 tablets together, then 1 every day until finished., Disp: 6 tablet, Rfl: 0    docusate sodium (COLACE) 100 MG capsule, Take 1 capsule (100 mg total) by mouth 2 (two) times daily. (Patient not taking: Reported on 8/11/2022), Disp: 60 capsule, Rfl: 2    insulin (LANTUS SOLOSTAR U-100 INSULIN) glargine 100 units/mL (3mL) SubQ pen, Inject 10 units sq daily, Disp: 15 mL, Rfl: 1    losartan-hydrochlorothiazide 50-12.5 mg (HYZAAR) 50-12.5 mg per tablet, Take 1 tablet by mouth once daily., Disp: 90 tablet, Rfl: 1    mupirocin (BACTROBAN) 2 % ointment, Apply topically 2 (two) times daily. for 5 days, Disp: 30 g, Rfl: 0    phentermine (ADIPEX-P) 37.5 mg tablet, Take 1 tablet (37.5 mg total) by mouth before breakfast., Disp: 30 tablet, Rfl: 0    potassium chloride SA (K-DUR,KLOR-CON) 20 MEQ tablet, Take 1 tablet (20 mEq total) by mouth once daily., Disp: 90 tablet, Rfl: 1    Last Labs:     Patient Outreach on 07/29/2022   Component Date Value    CRC Recommendation Exter* 10/27/2015 Repeat colonoscopy in 5 years        Last Imaging:  X-Ray Chest PA And Lateral  Narrative: EXAMINATION:  XR CHEST PA AND LATERAL    CLINICAL HISTORY:  Dyspnea, unspecified    TECHNIQUE:  PA and lateral views of the chest were performed.    COMPARISON:  None    FINDINGS:  Heart size normal.  Lungs clear.  No pneumothorax or pleural effusion.  Pulmonary vessels show normal caliber.  Degenerative changes of the spine.  Impression: No acute findings.    Electronically signed by: Edson De  Date:    05/10/2022  Time:    18:22         Review of Systems   Gastrointestinal: Positive for constipation.   Genitourinary: Positive for bladder incontinence, frequency, pelvic pain and urgency.   Integumentary:   Positive for mole/lesion.   All other systems reviewed and are negative.        Objective:      Physical Exam  Vitals reviewed.   Constitutional:       Appearance: Normal appearance.   Cardiovascular:      Rate and Rhythm: Normal rate and regular rhythm.      Pulses: Normal pulses.      Heart sounds: Normal heart sounds.   Pulmonary:      Effort: Pulmonary effort is normal.      Breath sounds: Normal breath sounds.   Abdominal:      General: Abdomen is flat. Bowel sounds are normal.      Palpations: Abdomen is soft.   Musculoskeletal:         General: Normal range of motion.      Cervical back: Normal range of motion and neck supple.   Skin:     General: Skin is warm and dry.   Neurological:      General: No focal deficit present.      Mental Status: She is alert and oriented to person, place, and time. Mental status is at baseline.         Assessment:       1. Pelvic pain  Ambulatory referral/consult to Obstetrics / Gynecology    CT Abdomen Pelvis W Wo Contrast    CANCELED: Ambulatory referral/consult to Obstetrics / Gynecology   2. Morbid obesity     3. Essential hypertension     4. Other constipation  Hepatitis C Antibody    Ambulatory referral/consult to Gastroenterology    Urinalysis, Reflex to Urine Culture    Hepatitis C Antibody    Urinalysis, Reflex to Urine Culture   5. Acute cystitis without hematuria     6. Other urinary incontinence     7. Type 2 diabetes mellitus without complication, with long-term current use of insulin     8. Encounter for general adult medical examination w/o abnormal findings  Ambulatory referral/consult to Ophthalmology    Ambulatory referral/consult to Podiatry        Plan:         Holly was seen today for insect bite, obesity, sinus problem, rash, cough, sputum production, hypertension, diabetes, medication refill, constipation, urinary urgency, vaginal pain and hip pain.    Diagnoses and all orders for this visit:    Pelvic pain  -     Cancel: Ambulatory referral/consult to  Obstetrics / Gynecology; Future  -     Ambulatory referral/consult to Obstetrics / Gynecology; Future  -     CT Abdomen Pelvis W Wo Contrast; Future    Morbid obesity    Essential hypertension    Other constipation  -     Hepatitis C Antibody; Future  -     Ambulatory referral/consult to Gastroenterology; Future  -     Urinalysis, Reflex to Urine Culture; Future  -     Hepatitis C Antibody  -     Urinalysis, Reflex to Urine Culture    Acute cystitis without hematuria    Other urinary incontinence    Type 2 diabetes mellitus without complication, with long-term current use of insulin    Encounter for general adult medical examination w/o abnormal findings  -     Ambulatory referral/consult to Ophthalmology; Future  -     Ambulatory referral/consult to Podiatry; Future    Other orders  -     aspirin (ECOTRIN) 81 MG EC tablet; Take 1 tablet (81 mg total) by mouth once daily.  -     losartan-hydrochlorothiazide 50-12.5 mg (HYZAAR) 50-12.5 mg per tablet; Take 1 tablet by mouth once daily.  -     phentermine (ADIPEX-P) 37.5 mg tablet; Take 1 tablet (37.5 mg total) by mouth before breakfast.  -     potassium chloride SA (K-DUR,KLOR-CON) 20 MEQ tablet; Take 1 tablet (20 mEq total) by mouth once daily.  -     Zoster Recombinant Vaccine  -     Tdap Vaccine  -     mupirocin (BACTROBAN) 2 % ointment; Apply topically 2 (two) times daily. for 5 days  The following orders have not been finalized:  -     Cancel: clindamycin (CLEOCIN) 300 MG capsule

## 2022-08-12 NOTE — PATIENT INSTRUCTIONS
Holly was seen today for insect bite, obesity, sinus problem, rash, cough, sputum production, hypertension, diabetes, medication refill, constipation, urinary urgency, vaginal pain and hip pain.    Diagnoses and all orders for this visit:    Pelvic pain  -     Cancel: Ambulatory referral/consult to Obstetrics / Gynecology; Future  -     Ambulatory referral/consult to Obstetrics / Gynecology; Future  -     CT Abdomen Pelvis W Wo Contrast; Future    Morbid obesity    Essential hypertension    Other constipation  -     Hepatitis C Antibody; Future  -     Ambulatory referral/consult to Gastroenterology; Future  -     Urinalysis, Reflex to Urine Culture; Future  -     Hepatitis C Antibody  -     Urinalysis, Reflex to Urine Culture    Acute cystitis without hematuria    Other urinary incontinence    Type 2 diabetes mellitus without complication, with long-term current use of insulin    Encounter for general adult medical examination w/o abnormal findings  -     Ambulatory referral/consult to Ophthalmology; Future  -     Ambulatory referral/consult to Podiatry; Future    Other orders  -     aspirin (ECOTRIN) 81 MG EC tablet; Take 1 tablet (81 mg total) by mouth once daily.  -     losartan-hydrochlorothiazide 50-12.5 mg (HYZAAR) 50-12.5 mg per tablet; Take 1 tablet by mouth once daily.  -     phentermine (ADIPEX-P) 37.5 mg tablet; Take 1 tablet (37.5 mg total) by mouth before breakfast.  -     potassium chloride SA (K-DUR,KLOR-CON) 20 MEQ tablet; Take 1 tablet (20 mEq total) by mouth once daily.  -     Zoster Recombinant Vaccine  -     Tdap Vaccine  -     mupirocin (BACTROBAN) 2 % ointment; Apply topically 2 (two) times daily. for 5 days  The following orders have not been finalized:  -     Cancel: clindamycin (CLEOCIN) 300 MG capsule

## 2022-08-16 ENCOUNTER — HOSPITAL ENCOUNTER (OUTPATIENT)
Dept: RADIOLOGY | Facility: HOSPITAL | Age: 62
Discharge: HOME | End: 2022-08-16
Attending: NURSE PRACTITIONER
Payer: MEDICARE

## 2022-08-16 DIAGNOSIS — Z78.0 POST-MENOPAUSAL: ICD-10-CM

## 2022-08-16 DIAGNOSIS — Z12.39 ENCOUNTER FOR SCREENING FOR MALIGNANT NEOPLASM OF BREAST, UNSPECIFIED SCREENING MODALITY: ICD-10-CM

## 2022-08-16 DIAGNOSIS — M19.90 OSTEOARTHRITIS, UNSPECIFIED OSTEOARTHRITIS TYPE, UNSPECIFIED SITE: ICD-10-CM

## 2022-08-16 PROCEDURE — 77067 SCR MAMMO BI INCL CAD: CPT | Mod: TC

## 2022-08-16 PROCEDURE — 77080 DEXA BONE DENSITY SPINE HIP: ICD-10-PCS | Mod: 26,,, | Performed by: RADIOLOGY

## 2022-08-16 PROCEDURE — 77080 DXA BONE DENSITY AXIAL: CPT | Mod: 26,,, | Performed by: RADIOLOGY

## 2022-08-16 PROCEDURE — 77080 DXA BONE DENSITY AXIAL: CPT | Mod: TC

## 2022-09-09 ENCOUNTER — HOSPITAL ENCOUNTER (EMERGENCY)
Facility: HOSPITAL | Age: 62
Discharge: HOME | End: 2022-09-09
Attending: EMERGENCY MEDICINE
Payer: COMMERCIAL

## 2022-09-09 VITALS
DIASTOLIC BLOOD PRESSURE: 66 MMHG | SYSTOLIC BLOOD PRESSURE: 128 MMHG | WEIGHT: 293 LBS | BODY MASS INDEX: 55.65 KG/M2 | TEMPERATURE: 99 F | HEART RATE: 62 BPM | RESPIRATION RATE: 20 BRPM | OXYGEN SATURATION: 98 %

## 2022-09-09 DIAGNOSIS — Z71.89 COMPLEX CARE COORDINATION: ICD-10-CM

## 2022-09-09 DIAGNOSIS — M79.604 PAIN AND SWELLING OF RIGHT LOWER EXTREMITY: ICD-10-CM

## 2022-09-09 DIAGNOSIS — M79.89 PAIN AND SWELLING OF RIGHT LOWER EXTREMITY: ICD-10-CM

## 2022-09-09 DIAGNOSIS — R52 PAIN: ICD-10-CM

## 2022-09-09 DIAGNOSIS — M25.561 ARTHRALGIA OF RIGHT KNEE: Primary | ICD-10-CM

## 2022-09-09 LAB
ALBUMIN SERPL BCP-MCNC: 3.5 G/DL (ref 3.5–5)
ALBUMIN/GLOB SERPL: 0.9 {RATIO}
ALP SERPL-CCNC: 94 U/L (ref 50–130)
ALT SERPL W P-5'-P-CCNC: 14 U/L (ref 13–56)
ANION GAP SERPL CALCULATED.3IONS-SCNC: 8 MMOL/L (ref 7–16)
AST SERPL W P-5'-P-CCNC: 19 U/L (ref 15–37)
BASOPHILS # BLD AUTO: 0.08 K/UL (ref 0–0.2)
BASOPHILS NFR BLD AUTO: 0.7 % (ref 0–1)
BILIRUB SERPL-MCNC: 0.4 MG/DL (ref ?–1.2)
BUN SERPL-MCNC: 19 MG/DL (ref 7–18)
BUN/CREAT SERPL: 17 (ref 6–20)
CALCIUM SERPL-MCNC: 9.6 MG/DL (ref 8.5–10.1)
CHLORIDE SERPL-SCNC: 101 MMOL/L (ref 98–107)
CO2 SERPL-SCNC: 29 MMOL/L (ref 21–32)
CREAT SERPL-MCNC: 1.11 MG/DL (ref 0.55–1.02)
DIFFERENTIAL METHOD BLD: ABNORMAL
EGFR (NO RACE VARIABLE) (RUSH/TITUS): 56 ML/MIN/1.73M²
EOSINOPHIL # BLD AUTO: 1.04 K/UL (ref 0–0.5)
EOSINOPHIL NFR BLD AUTO: 9.3 % (ref 1–4)
ERYTHROCYTE [DISTWIDTH] IN BLOOD BY AUTOMATED COUNT: 17.4 % (ref 11.5–14.5)
GLOBULIN SER-MCNC: 4 G/DL (ref 2–4)
GLUCOSE SERPL-MCNC: 110 MG/DL (ref 74–106)
HCT VFR BLD AUTO: 47.1 % (ref 38–47)
HGB BLD-MCNC: 15.2 G/DL (ref 12–16)
IMM GRANULOCYTES # BLD AUTO: 0.07 K/UL (ref 0–0.04)
IMM GRANULOCYTES NFR BLD: 0.6 % (ref 0–0.4)
LIPASE SERPL-CCNC: 75 U/L (ref 73–393)
LYMPHOCYTES # BLD AUTO: 2.8 K/UL (ref 1–4.8)
LYMPHOCYTES NFR BLD AUTO: 25 % (ref 27–41)
MCH RBC QN AUTO: 26.7 PG (ref 27–31)
MCHC RBC AUTO-ENTMCNC: 32.3 G/DL (ref 32–36)
MCV RBC AUTO: 82.6 FL (ref 80–96)
MONOCYTES # BLD AUTO: 0.61 K/UL (ref 0–0.8)
MONOCYTES NFR BLD AUTO: 5.5 % (ref 2–6)
MPC BLD CALC-MCNC: 11.4 FL (ref 9.4–12.4)
NEUTROPHILS # BLD AUTO: 6.59 K/UL (ref 1.8–7.7)
NEUTROPHILS NFR BLD AUTO: 58.9 % (ref 53–65)
NRBC # BLD AUTO: 0 X10E3/UL
NRBC, AUTO (.00): 0 %
PLATELET # BLD AUTO: 261 K/UL (ref 150–400)
POTASSIUM SERPL-SCNC: 3.1 MMOL/L (ref 3.5–5.1)
PROT SERPL-MCNC: 7.5 G/DL (ref 6.4–8.2)
RBC # BLD AUTO: 5.7 M/UL (ref 4.2–5.4)
SODIUM SERPL-SCNC: 135 MMOL/L (ref 136–145)
WBC # BLD AUTO: 11.19 K/UL (ref 4.5–11)

## 2022-09-09 PROCEDURE — 99285 EMERGENCY DEPT VISIT HI MDM: CPT | Mod: 25

## 2022-09-09 PROCEDURE — 99283 EMERGENCY DEPT VISIT LOW MDM: CPT | Mod: ,,, | Performed by: EMERGENCY MEDICINE

## 2022-09-09 PROCEDURE — 83690 ASSAY OF LIPASE: CPT | Performed by: EMERGENCY MEDICINE

## 2022-09-09 PROCEDURE — 99283 PR EMERGENCY DEPT VISIT,LEVEL III: ICD-10-PCS | Mod: ,,, | Performed by: EMERGENCY MEDICINE

## 2022-09-09 PROCEDURE — 36415 COLL VENOUS BLD VENIPUNCTURE: CPT | Performed by: EMERGENCY MEDICINE

## 2022-09-09 PROCEDURE — 80053 COMPREHEN METABOLIC PANEL: CPT | Performed by: EMERGENCY MEDICINE

## 2022-09-09 PROCEDURE — 99284 EMERGENCY DEPT VISIT MOD MDM: CPT | Mod: 25

## 2022-09-09 PROCEDURE — 85025 COMPLETE CBC W/AUTO DIFF WBC: CPT | Performed by: EMERGENCY MEDICINE

## 2022-09-09 NOTE — DISCHARGE INSTRUCTIONS
Take your pain medications as prescribed by Dr. Frausto.  Follow-up with Dr. Frausto as needed.    Also follow-up with orthopedist.  Wear knee brace as needed.    Use ibuprofen 400 mg up to 3 times daily for a week.

## 2022-09-09 NOTE — ED NOTES
Assumed care of patient, resting in bed quietly without distress.  Patient assessment unchanged since last visit.  No complaints voiced at present.

## 2022-09-09 NOTE — ED PROVIDER NOTES
Encounter Date: 9/9/2022       History     Chief Complaint   Patient presents with    Leg Pain     R leg - states cant walk on it - hx dvt    Abdominal Pain     Chronic - appt c dr stewart monday    Back Pain     Chronic - appt c dr stewart monday     A 62-year-old female patient came to the emergency department with multiple complaints.  The patient states that she woke up this morning trying to go to the bathroom she started having severe pain in her right leg mainly in her right knee and she was barely able to put weight on it.  The patient also mentioned that she has lower abdominal pain and back pain.  Those pains has been chronic but they were increased this morning.  There is no fever or chills.  There is no chest pain or shortness of breath.  There is no reported nausea or vomiting.    The history is provided by the patient. No  was used.   Review of patient's allergies indicates:   Allergen Reactions    Ammonium peroxydisulfate Shortness Of Breath    Avocado (laurus persea) Anaphylaxis    Bananas [banana] Anaphylaxis and Swelling     CRAMPS,    Chocolate flavor Anaphylaxis     MOUTH SWELLING    Fentanyl Shortness Of Breath and Itching    Percocet [oxycodone-acetaminophen] Shortness Of Breath and Itching    Silvadene [silver sulfadiazine]     Clindamycin     Corticosteroids (glucocorticoids)     Hydrocortisone Blisters    Lasix [furosemide] Blisters     BURNS SKIN    Nutritional supplement-fiber Itching    Pregabalin     Shellfish containing products     Adhesive Rash and Blisters    Iodine and iodide containing products Rash    Latex, natural rubber Rash    Pcn [penicillins] Rash    Sulfa (sulfonamide antibiotics) Rash and Blisters     Past Medical History:   Diagnosis Date    Anxiety state     Atherosclerosis of native artery of extremity with intermittent claudication 01/30/2019    bilateral legs    Bilateral leg pain     BMI 50.0-59.9, adult 02/22/2021    Cellulitis 06/11/2020    Chronic  pain syndrome     Chronic peripheral venous hypertension 01/08/2019    COPD (chronic obstructive pulmonary disease)     Diabetes     Diabetes mellitus, type 2     DVT (deep venous thrombosis) 02/12/2020    Embolism and thrombosis of superficial veins of unspecified lower extremity 07/01/2019    bilateral    Frequent headaches 09/20/2018    GERD (gastroesophageal reflux disease)     Hereditary lymphedema     Hx of thyroid cancer     Hyperlipidemia     Hypertension     Hypothyroidism     Migraines     Migraines     Morbid obesity     Nicotine dependence     Non-pressure chronic ulcer of left lower leg 03/09/2021    Osteoarthritis     Other skin changes 03/09/2021    bilateral gaiter regions    Pain in left leg     Pain in right leg     PVD (peripheral vascular disease) 01/08/2019    Seizure disorder     Sleep apnea     Venous insufficiency (chronic) (peripheral)     Venous stasis     Vitamin D deficiency      Past Surgical History:   Procedure Laterality Date    HYSTERECTOMY      ILIAC ARTERY STENT Bilateral 01/28/2020    Bilateral distal aorta and common iliac 8 X 59 vbx covered stents performed by Dr. Antonio Jacinto.    RADIOFREQUENCY ABLATION Left 04/15/2022    Procedure: Left calf  Radiofrequency Ablation;  Surgeon: Matty Falcon DO;  Location: [facility], [place];  Service: Vascular;  Laterality: Left;    STAB PHLEBECTOMY OF VARICOSE VEINS Left 01/25/2013    Left leg microphlebectomies x 23 stab avulsions and ligation of multiple varicose veins perrformed by Dr. Cirilo Aguirre.    THYROIDECTOMY      hx: thyroid cancer    TOE AMPUTATION Left 01/28/2020    2nd, 4th and 5th performed by Dr. Anam Saeed.    TOE AMPUTATION Right 01/28/2020    4th toe performed by Dr. Anam Saeed.    TOTAL ABDOMINAL HYSTERECTOMY W/ BILATERAL SALPINGOOPHORECTOMY      TUBAL LIGATION      VAGINAL DELIVERY      x 4    VENOUS ABLATION Right 08/09/2019    GSV Varithena Ablation performed by Dr. Estuardo Falcon    VENOUS ABLATION Left 08/02/2019     ATAV Varithena Ablation performed by Dr. Estuardo Falcon.    VENOUS ABLATION Right 07/13/2015    ATAV Laser Ablatio performed by Dr. Cirilo Aguirre.    VENOUS ABLATION Right 07/06/2015    Distal  GSV Laser Ablation performed by dr. Cirilo Aguirre.    VENOUS ABLATION Left 04/20/2015    Left Distal GSV Laser Ablation performed by dr. Cirilo Aguirre.    VENOUS ABLATION Right 10/28/2013    Right Distal GSV RF Ablation performed by Dr. Cirilo Aguirre.    VENOUS ABLATION Left 10/25/2013    SSV RF Ablation performed by dr. Cirilo Aguirre.    VENOUS ABLATION Left 10/07/2013    Left GSV and Left ATAV RF Ablation performed by Dr. Cirilo Aguirre.    VENOUS ABLATION Right 01/21/2013    GSV RF Ablation w/micros x 22 performed by Dr. Cirilo Aguirre.    VENOUS ABLATION Left 06/25/2021    Left distal GSV Varithena Ablation     Family History   Problem Relation Age of Onset    Heart disease Mother         age 84 CHF    Hypertension Mother     Osteoarthritis Mother     Coronary aneurysm Father     Coronary artery disease Father     Hypertension Father     Heart disease Father     No Known Problems Son     No Known Problems Son     No Known Problems Son     No Known Problems Son     No Known Problems Sister     No Known Problems Brother         hx: varicose veins    No Known Problems Brother         MVA: parlazed    No Known Problems Brother      Social History     Tobacco Use    Smoking status: Every Day     Packs/day: 1.00     Years: 33.00     Pack years: 33.00     Types: Cigarettes    Smokeless tobacco: Never   Substance Use Topics    Alcohol use: Never    Drug use: Never     Review of Systems   Constitutional:  Negative for fever.   HENT:  Negative for sore throat.    Respiratory:  Negative for shortness of breath.    Cardiovascular:  Negative for chest pain.   Gastrointestinal:  Positive for abdominal pain. Negative for nausea.   Genitourinary:  Negative for dysuria.   Musculoskeletal:  Positive for back pain.   Skin:  Negative for rash.   Neurological:  Negative for  weakness.   Hematological:  Does not bruise/bleed easily.   All other systems reviewed and are negative.    Physical Exam     Initial Vitals [09/09/22 0520]   BP Pulse Resp Temp SpO2   134/80 78 20 98.7 °F (37.1 °C) (!) 94 %      MAP       --         Physical Exam    Nursing note and vitals reviewed.  Constitutional: She appears well-developed and well-nourished.   Morbidly obese female patient in moderate distress   HENT:   Head: Normocephalic and atraumatic.   Eyes: EOM are normal. Pupils are equal, round, and reactive to light.   Neck: Neck supple. No thyromegaly present.   Normal range of motion.  Cardiovascular:  Normal rate, regular rhythm, normal heart sounds and intact distal pulses.           No murmur heard.  Pulmonary/Chest: Breath sounds normal. She has no wheezes.   Abdominal: Abdomen is soft. Bowel sounds are normal. There is no abdominal tenderness.   Musculoskeletal:         General: No tenderness or edema. Normal range of motion.      Cervical back: Normal range of motion and neck supple.      Comments: Painful tender right knee     Lymphadenopathy:     She has no cervical adenopathy.   Neurological: She is alert and oriented to person, place, and time. She has normal strength and normal reflexes. No cranial nerve deficit or sensory deficit. GCS score is 15. GCS eye subscore is 4. GCS verbal subscore is 5. GCS motor subscore is 6.   Skin: Skin is warm and dry. Capillary refill takes less than 2 seconds. No rash noted.   Psychiatric: She has a normal mood and affect.       Medical Screening Exam   See Full Note    ED Course   Procedures  Labs Reviewed   COMPREHENSIVE METABOLIC PANEL - Abnormal; Notable for the following components:       Result Value    Sodium 135 (*)     Potassium 3.1 (*)     Glucose 110 (*)     BUN 19 (*)     Creatinine 1.11 (*)     eGFR 56 (*)     All other components within normal limits   CBC WITH DIFFERENTIAL - Abnormal; Notable for the following components:    WBC 11.19 (*)      RBC 5.70 (*)     Hematocrit 47.1 (*)     MCH 26.7 (*)     RDW 17.4 (*)     Lymphocytes % 25.0 (*)     Eosinophils % 9.3 (*)     Immature Granulocytes % 0.6 (*)     Eosinophils, Absolute 1.04 (*)     Immature Granulocytes, Absolute 0.07 (*)     All other components within normal limits   LIPASE - Normal   CBC W/ AUTO DIFFERENTIAL    Narrative:     The following orders were created for panel order CBC W/ AUTO DIFFERENTIAL.  Procedure                               Abnormality         Status                     ---------                               -----------         ------                     CBC with Differential[[id number]]        Abnormal            Final result                 Please view results for these tests on the individual orders.   URINALYSIS, REFLEX TO URINE CULTURE          Imaging Results              US Lower Extremity Veins Right (In process)                      X-Ray Tibia Fibula 2 View Right (In process)                      X-Ray Knee 1 or 2 View Right (In process)  Result time 09/09/22 06:08:59   Procedure changed from X-Ray Knee 3 View Right                    X-Ray Lumbar Spine Ap And Lateral (In process)  Result time 09/09/22 06:07:23                     Medications - No data to display  Medical Decision Making:   At 6:45 a.m. patient says that she has been having some pain to right knee.  She came to the ER last night she says because when she put weight on the right knee heart worse than usual.  She has been having exacerbations of this type pain for at least several months.  Her primary care doctor's prescribed her hydrocodone which she took before she came the ER says now she is feeling much better.  However she says she has never seen the orthopedist.  She plans on following back up with primary care to have a bone density scan.  Patient's x-ray showed no acute abnormality based on the ED read.  Ultrasound shows new DVT.  Physical exam shows patient is obese with mild generalized  tenderness to the right knee with no effusion.  Full range of motion passive without laxity.  X-ray does show that she does have loss of joint space especially medially.  Patient agreed to have ambulatory referral to orthopedist.  She will be placed in a knee immobilizer to use as needed.  She voiced understanding to return if symptoms worsen or new symptoms develop.           ED Course as of 09/09/22 0704   Fri Sep 09, 2022   0628 Patient care is transferred to Dr. Recinos [HK]      ED Course User Index  [HK] Terrence Malnoey MD          Clinical Impression:   Final diagnoses:  [R52] Pain  [M79.604, M79.89] Pain and swelling of right lower extremity  [M25.561] Arthralgia of right knee (Primary)        ED Disposition Condition    Discharge Stable          ED Prescriptions    None       Follow-up Information       Follow up With Specialties Details Why Contact Info    Regan Frausto MD Internal Medicine, Family Medicine Schedule an appointment as soon as possible for a visit  As needed [address] [place] [address]  [facility] [id number]  [phone]      [facility] - Emergency Department Emergency Medicine  If symptoms worsen [address] [place] [id number] [phone]             Agus Recinos MD  09/09/22 0704

## 2022-09-22 ENCOUNTER — HOSPITAL ENCOUNTER (OUTPATIENT)
Dept: RADIOLOGY | Facility: HOSPITAL | Age: 62
Discharge: HOME | End: 2022-09-22
Attending: ORTHOPAEDIC SURGERY
Payer: COMMERCIAL

## 2022-09-22 DIAGNOSIS — M25.561 ACUTE PAIN OF RIGHT KNEE: ICD-10-CM

## 2022-09-22 PROCEDURE — 73564 X-RAY EXAM KNEE 4 OR MORE: CPT | Mod: TC,RT

## 2022-10-04 ENCOUNTER — OFFICE VISIT (OUTPATIENT)
Dept: FAMILY MEDICINE | Facility: CLINIC | Age: 62
End: 2022-10-04
Payer: COMMERCIAL

## 2022-10-04 ENCOUNTER — OFFICE VISIT (OUTPATIENT)
Dept: PAIN MEDICINE | Facility: CLINIC | Age: 62
End: 2022-10-04
Payer: COMMERCIAL

## 2022-10-04 VITALS
HEART RATE: 64 BPM | BODY MASS INDEX: 41.95 KG/M2 | DIASTOLIC BLOOD PRESSURE: 72 MMHG | HEIGHT: 70 IN | WEIGHT: 293 LBS | SYSTOLIC BLOOD PRESSURE: 154 MMHG

## 2022-10-04 VITALS
OXYGEN SATURATION: 93 % | DIASTOLIC BLOOD PRESSURE: 80 MMHG | BODY MASS INDEX: 43.4 KG/M2 | RESPIRATION RATE: 18 BRPM | SYSTOLIC BLOOD PRESSURE: 138 MMHG | HEIGHT: 69 IN | WEIGHT: 293 LBS | HEART RATE: 95 BPM | TEMPERATURE: 98 F

## 2022-10-04 DIAGNOSIS — E11.9 TYPE 2 DIABETES MELLITUS WITHOUT COMPLICATION, WITH LONG-TERM CURRENT USE OF INSULIN: ICD-10-CM

## 2022-10-04 DIAGNOSIS — E66.01 MORBID OBESITY: ICD-10-CM

## 2022-10-04 DIAGNOSIS — M47.817 LUMBOSACRAL SPONDYLOSIS WITHOUT MYELOPATHY: Primary | Chronic | ICD-10-CM

## 2022-10-04 DIAGNOSIS — M85.80 OSTEOPENIA, UNSPECIFIED LOCATION: ICD-10-CM

## 2022-10-04 DIAGNOSIS — M79.672 FOOT PAIN, LEFT: Chronic | ICD-10-CM

## 2022-10-04 DIAGNOSIS — F17.200 TOBACCO DEPENDENCY: ICD-10-CM

## 2022-10-04 DIAGNOSIS — Z79.899 ENCOUNTER FOR LONG-TERM (CURRENT) USE OF OTHER MEDICATIONS: ICD-10-CM

## 2022-10-04 DIAGNOSIS — E87.6 HYPOKALEMIA: ICD-10-CM

## 2022-10-04 DIAGNOSIS — G89.29 CHRONIC PAIN OF RIGHT KNEE: Chronic | ICD-10-CM

## 2022-10-04 DIAGNOSIS — Z79.4 TYPE 2 DIABETES MELLITUS WITHOUT COMPLICATION, WITH LONG-TERM CURRENT USE OF INSULIN: ICD-10-CM

## 2022-10-04 DIAGNOSIS — I73.9 PVD (PERIPHERAL VASCULAR DISEASE): Chronic | ICD-10-CM

## 2022-10-04 DIAGNOSIS — F51.01 PRIMARY INSOMNIA: ICD-10-CM

## 2022-10-04 DIAGNOSIS — M25.561 CHRONIC PAIN OF RIGHT KNEE: Chronic | ICD-10-CM

## 2022-10-04 DIAGNOSIS — G47.33 OSA ON CPAP: Primary | ICD-10-CM

## 2022-10-04 DIAGNOSIS — F29 PSYCHOSIS, UNSPECIFIED PSYCHOSIS TYPE: ICD-10-CM

## 2022-10-04 LAB
CTP QC/QA: YES
EST. AVERAGE GLUCOSE BLD GHB EST-MCNC: 120 MG/DL
HBA1C MFR BLD HPLC: 6.2 % (ref 4.5–6.6)
POC (AMP) AMPHETAMINE: ABNORMAL
POC (BAR) BARBITURATES: NEGATIVE
POC (BUP) BUPRENORPHINE: NEGATIVE
POC (BZO) BENZODIAZEPINES: NEGATIVE
POC (COC) COCAINE: NEGATIVE
POC (MDMA) METHYLENEDIOXYMETHAMPHETAMINE 3,4: NEGATIVE
POC (MET) METHAMPHETAMINE: NEGATIVE
POC (MOP) OPIATES: ABNORMAL
POC (MTD) METHADONE: NEGATIVE
POC (OXY) OXYCODONE: NEGATIVE
POC (PCP) PHENCYCLIDINE: NEGATIVE
POC (TCA) TRICYCLIC ANTIDEPRESSANTS: NEGATIVE
POC TEMPERATURE (URINE): 92
POC THC: NEGATIVE

## 2022-10-04 PROCEDURE — 1159F PR MEDICATION LIST DOCUMENTED IN MEDICAL RECORD: ICD-10-PCS | Mod: ,,, | Performed by: INTERNAL MEDICINE

## 2022-10-04 PROCEDURE — 83036 HEMOGLOBIN A1C: ICD-10-PCS | Mod: ,,, | Performed by: CLINICAL MEDICAL LABORATORY

## 2022-10-04 PROCEDURE — 3066F PR DOCUMENTATION OF TREATMENT FOR NEPHROPATHY: ICD-10-PCS | Mod: ,,, | Performed by: INTERNAL MEDICINE

## 2022-10-04 PROCEDURE — 3044F HG A1C LEVEL LT 7.0%: CPT | Mod: ,,, | Performed by: INTERNAL MEDICINE

## 2022-10-04 PROCEDURE — 3066F NEPHROPATHY DOC TX: CPT | Mod: ,,, | Performed by: INTERNAL MEDICINE

## 2022-10-04 PROCEDURE — 80305 DRUG TEST PRSMV DIR OPT OBS: CPT | Mod: PBBFAC | Performed by: PHYSICIAN ASSISTANT

## 2022-10-04 PROCEDURE — 3079F PR MOST RECENT DIASTOLIC BLOOD PRESSURE 80-89 MM HG: ICD-10-PCS | Mod: ,,, | Performed by: INTERNAL MEDICINE

## 2022-10-04 PROCEDURE — 83036 HEMOGLOBIN GLYCOSYLATED A1C: CPT | Mod: ,,, | Performed by: CLINICAL MEDICAL LABORATORY

## 2022-10-04 PROCEDURE — 1159F PR MEDICATION LIST DOCUMENTED IN MEDICAL RECORD: ICD-10-PCS | Mod: CPTII,,, | Performed by: PHYSICIAN ASSISTANT

## 2022-10-04 PROCEDURE — 3078F PR MOST RECENT DIASTOLIC BLOOD PRESSURE < 80 MM HG: ICD-10-PCS | Mod: CPTII,,, | Performed by: PHYSICIAN ASSISTANT

## 2022-10-04 PROCEDURE — 3066F NEPHROPATHY DOC TX: CPT | Mod: CPTII,,, | Performed by: PHYSICIAN ASSISTANT

## 2022-10-04 PROCEDURE — 1160F PR REVIEW ALL MEDS BY PRESCRIBER/CLIN PHARMACIST DOCUMENTED: ICD-10-PCS | Mod: ,,, | Performed by: INTERNAL MEDICINE

## 2022-10-04 PROCEDURE — 3008F PR BODY MASS INDEX (BMI) DOCUMENTED: ICD-10-PCS | Mod: CPTII,,, | Performed by: PHYSICIAN ASSISTANT

## 2022-10-04 PROCEDURE — 3008F BODY MASS INDEX DOCD: CPT | Mod: ,,, | Performed by: INTERNAL MEDICINE

## 2022-10-04 PROCEDURE — 3061F NEG MICROALBUMINURIA REV: CPT | Mod: ,,, | Performed by: INTERNAL MEDICINE

## 2022-10-04 PROCEDURE — 3077F SYST BP >= 140 MM HG: CPT | Mod: CPTII,,, | Performed by: PHYSICIAN ASSISTANT

## 2022-10-04 PROCEDURE — 3044F PR MOST RECENT HEMOGLOBIN A1C LEVEL <7.0%: ICD-10-PCS | Mod: ,,, | Performed by: INTERNAL MEDICINE

## 2022-10-04 PROCEDURE — 3061F NEG MICROALBUMINURIA REV: CPT | Mod: CPTII,,, | Performed by: PHYSICIAN ASSISTANT

## 2022-10-04 PROCEDURE — 99214 OFFICE O/P EST MOD 30 MIN: CPT | Mod: ,,, | Performed by: INTERNAL MEDICINE

## 2022-10-04 PROCEDURE — 3008F BODY MASS INDEX DOCD: CPT | Mod: CPTII,,, | Performed by: PHYSICIAN ASSISTANT

## 2022-10-04 PROCEDURE — 99215 OFFICE O/P EST HI 40 MIN: CPT | Mod: PBBFAC | Performed by: PHYSICIAN ASSISTANT

## 2022-10-04 PROCEDURE — 3061F PR NEG MICROALBUMINURIA RESULT DOCUMENTED/REVIEW: ICD-10-PCS | Mod: CPTII,,, | Performed by: PHYSICIAN ASSISTANT

## 2022-10-04 PROCEDURE — 3077F PR MOST RECENT SYSTOLIC BLOOD PRESSURE >= 140 MM HG: ICD-10-PCS | Mod: CPTII,,, | Performed by: PHYSICIAN ASSISTANT

## 2022-10-04 PROCEDURE — 1160F RVW MEDS BY RX/DR IN RCRD: CPT | Mod: ,,, | Performed by: INTERNAL MEDICINE

## 2022-10-04 PROCEDURE — 3079F DIAST BP 80-89 MM HG: CPT | Mod: ,,, | Performed by: INTERNAL MEDICINE

## 2022-10-04 PROCEDURE — G0481 PR DRUG TEST DEF 8-14 CLASSES: ICD-10-PCS | Mod: ,,, | Performed by: CLINICAL MEDICAL LABORATORY

## 2022-10-04 PROCEDURE — G0481 DRUG TEST DEF 8-14 CLASSES: HCPCS | Mod: ,,, | Performed by: CLINICAL MEDICAL LABORATORY

## 2022-10-04 PROCEDURE — 3078F DIAST BP <80 MM HG: CPT | Mod: CPTII,,, | Performed by: PHYSICIAN ASSISTANT

## 2022-10-04 PROCEDURE — 1159F MED LIST DOCD IN RCRD: CPT | Mod: ,,, | Performed by: INTERNAL MEDICINE

## 2022-10-04 PROCEDURE — 99214 PR OFFICE/OUTPT VISIT, EST, LEVL IV, 30-39 MIN: ICD-10-PCS | Mod: ,,, | Performed by: INTERNAL MEDICINE

## 2022-10-04 PROCEDURE — 3008F PR BODY MASS INDEX (BMI) DOCUMENTED: ICD-10-PCS | Mod: ,,, | Performed by: INTERNAL MEDICINE

## 2022-10-04 PROCEDURE — 3075F PR MOST RECENT SYSTOLIC BLOOD PRESS GE 130-139MM HG: ICD-10-PCS | Mod: ,,, | Performed by: INTERNAL MEDICINE

## 2022-10-04 PROCEDURE — 3061F PR NEG MICROALBUMINURIA RESULT DOCUMENTED/REVIEW: ICD-10-PCS | Mod: ,,, | Performed by: INTERNAL MEDICINE

## 2022-10-04 PROCEDURE — 1159F MED LIST DOCD IN RCRD: CPT | Mod: CPTII,,, | Performed by: PHYSICIAN ASSISTANT

## 2022-10-04 PROCEDURE — 3075F SYST BP GE 130 - 139MM HG: CPT | Mod: ,,, | Performed by: INTERNAL MEDICINE

## 2022-10-04 PROCEDURE — 99214 PR OFFICE/OUTPT VISIT, EST, LEVL IV, 30-39 MIN: ICD-10-PCS | Mod: S$PBB,,, | Performed by: PHYSICIAN ASSISTANT

## 2022-10-04 PROCEDURE — 99214 OFFICE O/P EST MOD 30 MIN: CPT | Mod: S$PBB,,, | Performed by: PHYSICIAN ASSISTANT

## 2022-10-04 PROCEDURE — 3066F PR DOCUMENTATION OF TREATMENT FOR NEPHROPATHY: ICD-10-PCS | Mod: CPTII,,, | Performed by: PHYSICIAN ASSISTANT

## 2022-10-04 RX ORDER — HYDROCODONE BITARTRATE AND ACETAMINOPHEN 10; 325 MG/1; MG/1
1 TABLET ORAL EVERY 6 HOURS PRN
Qty: 120 TABLET | Refills: 0 | Status: SHIPPED | OUTPATIENT
Start: 2022-11-04 | End: 2022-12-07 | Stop reason: SDUPTHER

## 2022-10-04 RX ORDER — NABUMETONE 750 MG/1
750 TABLET, FILM COATED ORAL 2 TIMES DAILY PRN
Qty: 60 TABLET | Refills: 0 | Status: SHIPPED | OUTPATIENT
Start: 2022-10-04 | End: 2022-12-07

## 2022-10-04 RX ORDER — HYDROCODONE BITARTRATE AND ACETAMINOPHEN 10; 325 MG/1; MG/1
1 TABLET ORAL EVERY 6 HOURS PRN
Qty: 120 TABLET | Refills: 0 | Status: SHIPPED | OUTPATIENT
Start: 2022-10-07 | End: 2022-11-06

## 2022-10-04 RX ORDER — PREGABALIN 100 MG/1
100 CAPSULE ORAL EVERY 12 HOURS
Qty: 60 CAPSULE | Refills: 1 | Status: SHIPPED | OUTPATIENT
Start: 2022-10-04 | End: 2022-12-08

## 2022-10-04 NOTE — PROGRESS NOTES
Subjective:         Patient ID: Holly Porter is a 62 y.o. female.    Chief Complaint: Hip Pain, Leg Pain, Knee Pain, and Foot Pain      Pain  This is a chronic problem. The current episode started more than 1 year ago. The problem occurs daily. The problem has been unchanged. Associated symptoms include arthralgias. Pertinent negatives include no anorexia, change in bowel habit, chest pain, chills, coughing, diaphoresis, fever, rash, sore throat, vertigo or vomiting.   Review of Systems   Constitutional:  Negative for activity change, appetite change, chills, diaphoresis, fever and unexpected weight change.   HENT:  Negative for drooling, ear discharge, ear pain, facial swelling, nosebleeds, sore throat, trouble swallowing, voice change and goiter.    Eyes:  Negative for photophobia, pain, discharge, redness and visual disturbance.   Respiratory:  Negative for apnea, cough, choking, chest tightness, shortness of breath, wheezing and stridor.    Cardiovascular:  Negative for chest pain, palpitations and leg swelling.   Gastrointestinal:  Negative for abdominal distention, anorexia, change in bowel habit, diarrhea, rectal pain, vomiting, fecal incontinence and change in bowel habit.   Endocrine: Negative for cold intolerance, heat intolerance, polydipsia, polyphagia and polyuria.   Genitourinary:  Negative for bladder incontinence, dysuria, flank pain, frequency and hot flashes.   Musculoskeletal:  Positive for arthralgias, back pain, leg pain and neck stiffness.   Integumentary:  Negative for color change, pallor and rash.   Allergic/Immunologic: Negative for immunocompromised state.   Neurological:  Negative for dizziness, vertigo, seizures, syncope, facial asymmetry, speech difficulty, light-headedness, coordination difficulties, memory loss and coordination difficulties.   Hematological:  Negative for adenopathy. Does not bruise/bleed easily.   Psychiatric/Behavioral:  Negative for agitation, behavioral  problems, confusion, decreased concentration, dysphoric mood, hallucinations, self-injury and suicidal ideas. The patient is not nervous/anxious and is not hyperactive.          Past Medical History:   Diagnosis Date    Anxiety state     Atherosclerosis of native artery of extremity with intermittent claudication 01/30/2019    bilateral legs    Bilateral leg pain     BMI 50.0-59.9, adult 02/22/2021    Cellulitis 06/11/2020    Chronic pain syndrome     Chronic peripheral venous hypertension 01/08/2019    COPD (chronic obstructive pulmonary disease)     Diabetes     Diabetes mellitus, type 2     DVT (deep venous thrombosis) 02/12/2020    Embolism and thrombosis of superficial veins of unspecified lower extremity 07/01/2019    bilateral    Frequent headaches 09/20/2018    GERD (gastroesophageal reflux disease)     Hereditary lymphedema     Hx of thyroid cancer     Hyperlipidemia     Hypertension     Hypothyroidism     Migraines     Migraines     Morbid obesity     Nicotine dependence     Non-pressure chronic ulcer of left lower leg 03/09/2021    Osteoarthritis     Other skin changes 03/09/2021    bilateral gaiter regions    Pain in left leg     Pain in right leg     PVD (peripheral vascular disease) 01/08/2019    Seizure disorder     Sleep apnea     Venous insufficiency (chronic) (peripheral)     Venous stasis     Vitamin D deficiency      Past Surgical History:   Procedure Laterality Date    HYSTERECTOMY      ILIAC ARTERY STENT Bilateral 01/28/2020    Bilateral distal aorta and common iliac 8 X 59 vbx covered stents performed by Dr. Antonio Jacinto.    RADIOFREQUENCY ABLATION Left 04/15/2022    Procedure: Left calf  Radiofrequency Ablation;  Surgeon: Matty Falcon DO;  Location: [facility];  Service: Vascular;  Laterality: Left;    STAB PHLEBECTOMY OF VARICOSE VEINS Left 01/25/2013    Left leg microphlebectomies x 23 stab avulsions and ligation of multiple varicose veins perrformed by Dr. Cirilo Aguirre.     THYROIDECTOMY      hx: thyroid cancer    TOE AMPUTATION Left 01/28/2020    2nd, 4th and 5th performed by Dr. Anam Saeed.    TOE AMPUTATION Right 01/28/2020    4th toe performed by Dr. Anam Saeed.    TOTAL ABDOMINAL HYSTERECTOMY W/ BILATERAL SALPINGOOPHORECTOMY      TUBAL LIGATION      VAGINAL DELIVERY      x 4    VENOUS ABLATION Right 08/09/2019    GSV Varithena Ablation performed by Dr. Estuardo Falcon    VENOUS ABLATION Left 08/02/2019    ATAV Varithena Ablation performed by Dr. Estuardo Falcon.    VENOUS ABLATION Right 07/13/2015    ATAV Laser Ablatio performed by Dr. Cirilo Aguirre.    VENOUS ABLATION Right 07/06/2015    Distal  GSV Laser Ablation performed by dr. Cirilo Aguirre.    VENOUS ABLATION Left 04/20/2015    Left Distal GSV Laser Ablation performed by dr. Cirilo Aguirre.    VENOUS ABLATION Right 10/28/2013    Right Distal GSV RF Ablation performed by Dr. Cirilo Aguirre.    VENOUS ABLATION Left 10/25/2013    SSV RF Ablation performed by dr. Cirilo Aguirre.    VENOUS ABLATION Left 10/07/2013    Left GSV and Left ATAV RF Ablation performed by Dr. Cirilo Aguirre.    VENOUS ABLATION Right 01/21/2013    GSV RF Ablation w/micros x 22 performed by Dr. Cirilo Aguirre.    VENOUS ABLATION Left 06/25/2021    Left distal GSV Varithena Ablation     Social History     Socioeconomic History    Marital status:    Tobacco Use    Smoking status: Every Day     Packs/day: 1.00     Years: 33.00     Pack years: 33.00     Types: Cigarettes    Smokeless tobacco: Never   Substance and Sexual Activity    Alcohol use: Never    Drug use: Never    Sexual activity: Not Currently     Family History   Problem Relation Age of Onset    Heart disease Mother         age 84 CHF    Hypertension Mother     Osteoarthritis Mother     Coronary aneurysm Father     Coronary artery disease Father     Hypertension Father     Heart disease Father     No Known Problems Son     No Known Problems Son     No Known Problems Son     No Known Problems Son     No Known Problems Sister     No Known  "Problems Brother         hx: varicose veins    No Known Problems Brother         MVA: parlazed    No Known Problems Brother      Review of patient's allergies indicates:   Allergen Reactions    Ammonium peroxydisulfate Shortness Of Breath    Avocado (laurus persea) Anaphylaxis    Bananas [banana] Anaphylaxis and Swelling     CRAMPS,    Chocolate flavor Anaphylaxis     MOUTH SWELLING    Fentanyl Shortness Of Breath and Itching    Percocet [oxycodone-acetaminophen] Shortness Of Breath and Itching    Silvadene [silver sulfadiazine]     Clindamycin     Corticosteroids (glucocorticoids)     Hydrocortisone Blisters    Lasix [furosemide] Blisters     BURNS SKIN    Nutritional supplement-fiber Itching    Pregabalin     Shellfish containing products     Adhesive Rash and Blisters    Iodine and iodide containing products Rash    Latex, natural rubber Rash    Pcn [penicillins] Rash    Sulfa (sulfonamide antibiotics) Rash and Blisters        Objective:  Vitals:    10/04/22 1300 10/04/22 1301   BP: (!) 154/72    Pulse: 64    Weight: (!) 166 kg (366 lb)    Height: 5' 9.6" (1.768 m)    PainSc:   7   7         Physical Exam  Vitals and nursing note reviewed. Exam conducted with a chaperone present.   Constitutional:       General: She is awake. She is not in acute distress.     Appearance: Normal appearance. She is obese. She is not toxic-appearing or diaphoretic.   HENT:      Head: Normocephalic and atraumatic.      Nose: Nose normal.      Mouth/Throat:      Mouth: Mucous membranes are moist.      Pharynx: Oropharynx is clear.   Eyes:      Conjunctiva/sclera: Conjunctivae normal.      Pupils: Pupils are equal, round, and reactive to light.   Cardiovascular:      Rate and Rhythm: Normal rate.   Pulmonary:      Effort: Pulmonary effort is normal. No respiratory distress.   Abdominal:      Palpations: Abdomen is soft.   Musculoskeletal:         General: Normal range of motion.      Cervical back: Normal range of motion and neck " supple.   Skin:     General: Skin is warm and dry.   Neurological:      General: No focal deficit present.      Mental Status: She is alert and oriented to person, place, and time. Mental status is at baseline.      Cranial Nerves: No cranial nerve deficit (II-XII).   Psychiatric:         Mood and Affect: Mood normal.         Behavior: Behavior normal. Behavior is cooperative.         Thought Content: Thought content normal.         X-Ray Knee Complete 4 Or More Views Right  See Procedure Notes for results.     IMPRESSION: Please see Ortho procedure notes for report.      This procedure was auto-finalized by: Virtual Radiologist       Admission on 09/09/2022, Discharged on 09/09/2022   Component Date Value Ref Range Status    Sodium 09/09/2022 135 (L)  136 - 145 mmol/L Final    Potassium 09/09/2022 3.1 (L)  3.5 - 5.1 mmol/L Final    Chloride 09/09/2022 101  98 - 107 mmol/L Final    CO2 09/09/2022 29  21 - 32 mmol/L Final    Anion Gap 09/09/2022 8  7 - 16 mmol/L Final    Glucose 09/09/2022 110 (H)  74 - 106 mg/dL Final    BUN 09/09/2022 19 (H)  7 - 18 mg/dL Final    Creatinine 09/09/2022 1.11 (H)  0.55 - 1.02 mg/dL Final    BUN/Creatinine Ratio 09/09/2022 17  6 - 20 Final    Calcium 09/09/2022 9.6  8.5 - 10.1 mg/dL Final    Total Protein 09/09/2022 7.5  6.4 - 8.2 g/dL Final    Albumin 09/09/2022 3.5  3.5 - 5.0 g/dL Final    Globulin 09/09/2022 4.0  2.0 - 4.0 g/dL Final    A/G Ratio 09/09/2022 0.9   Final    Bilirubin, Total 09/09/2022 0.4  >0.0 - 1.2 mg/dL Final    Alk Phos 09/09/2022 94  50 - 130 U/L Final    ALT 09/09/2022 14  13 - 56 U/L Final    AST 09/09/2022 19  15 - 37 U/L Final    eGFR 09/09/2022 56 (L)  >=60 mL/min/1.73m² Final    Lipase 09/09/2022 75  73 - 393 U/L Final    WBC 09/09/2022 11.19 (H)  4.50 - 11.00 K/uL Final    RBC 09/09/2022 5.70 (H)  4.20 - 5.40 M/uL Final    Hemoglobin 09/09/2022 15.2  12.0 - 16.0 g/dL Final    Hematocrit 09/09/2022 47.1 (H)  38.0 - 47.0 % Final    MCV 09/09/2022 82.6  80.0  - 96.0 fL Final    MCH 09/09/2022 26.7 (L)  27.0 - 31.0 pg Final    MCHC 09/09/2022 32.3  32.0 - 36.0 g/dL Final    RDW 09/09/2022 17.4 (H)  11.5 - 14.5 % Final    Platelet Count 09/09/2022 261  150 - 400 K/uL Final    MPV 09/09/2022 11.4  9.4 - 12.4 fL Final    Neutrophils % 09/09/2022 58.9  53.0 - 65.0 % Final    Lymphocytes % 09/09/2022 25.0 (L)  27.0 - 41.0 % Final    Monocytes % 09/09/2022 5.5  2.0 - 6.0 % Final    Eosinophils % 09/09/2022 9.3 (H)  1.0 - 4.0 % Final    Basophils % 09/09/2022 0.7  0.0 - 1.0 % Final    Immature Granulocytes % 09/09/2022 0.6 (H)  0.0 - 0.4 % Final    nRBC, Auto 09/09/2022 0.0  <=0.0 % Final    Neutrophils, Abs 09/09/2022 6.59  1.80 - 7.70 K/uL Final    Lymphocytes, Absolute 09/09/2022 2.80  1.00 - 4.80 K/uL Final    Monocytes, Absolute 09/09/2022 0.61  0.00 - 0.80 K/uL Final    Eosinophils, Absolute 09/09/2022 1.04 (H)  0.00 - 0.50 K/uL Final    Basophils, Absolute 09/09/2022 0.08  0.00 - 0.20 K/uL Final    Immature Granulocytes, Absolute 09/09/2022 0.07 (H)  0.00 - 0.04 K/uL Final    nRBC, Absolute 09/09/2022 0.00  <=0.00 x10e3/uL Final    Diff Type 09/09/2022 Auto   Final   Office Visit on 08/11/2022   Component Date Value Ref Range Status    Hepatitis C Ab 08/11/2022 Non-Reactive  Non-Reactive Final    Color, UA 08/11/2022 Light-Yellow  Colorless, Straw, Yellow, Light Yellow, Dark Yellow Final    Clarity, UA 08/11/2022 Clear  Clear Final    pH, UA 08/11/2022 5.0  5.0 to 8.0 pH Units Final    Leukocytes, UA 08/11/2022 Negative  Negative Final    Nitrites, UA 08/11/2022 Negative  Negative Final    Protein, UA 08/11/2022 Negative  Negative Final    Glucose, UA 08/11/2022 Normal  Normal mg/dL Final    Ketones, UA 08/11/2022 Negative  Negative mg/dL Final    Urobilinogen, UA 08/11/2022 Normal  0.2, 1.0, Normal mg/dL Final    Bilirubin, UA 08/11/2022 Negative  Negative Final    Blood, UA 08/11/2022 Negative  Negative Final    Specific Gravity, UA 08/11/2022 1.017  <=1.030 Final    Patient Outreach on 07/29/2022   Component Date Value Ref Range Status    CRC Recommendation External 10/27/2015 Repeat colonoscopy in 5 years   Final   Office Visit on 06/16/2022   Component Date Value Ref Range Status    POC Amphetamines 06/16/2022 Presumptive Positive (A)  Negative, Inconclusive Final    POC Barbiturates 06/16/2022 Negative  Negative, Inconclusive Final    POC Benzodiazepines 06/16/2022 Negative  Negative, Inconclusive Final    POC Cocaine 06/16/2022 Negative  Negative, Inconclusive Final    POC THC 06/16/2022 Negative  Negative, Inconclusive Final    POC Methadone 06/16/2022 Negative  Negative, Inconclusive Final    POC Methamphetamine 06/16/2022 Negative  Negative, Inconclusive Final    POC Opiates 06/16/2022 Presumptive Positive (A)  Negative, Inconclusive Final    POC Oxycodone 06/16/2022 Negative  Negative, Inconclusive Final    POC Phencyclidine 06/16/2022 Negative  Negative, Inconclusive Final    POC Methylenedioxymethamphetamine * 06/16/2022 Negative  Negative, Inconclusive Final    POC Buprenorphine 06/16/2022 Negative   Final     Acceptable 06/16/2022 Yes   Final    POC Temperature (Urine) 06/16/2022 95   Final    Color, UA 06/16/2022 Yellow  Colorless, Straw, Yellow, Dark Yellow Final    Clarity, UA 06/16/2022 Clear  Clear Final    pH, UA 06/16/2022 6.5  5.0, 5.5, 6.0, 6.5, 7.0, 7.5, 8.0 pH Units Final    Leukocytes, UA 06/16/2022 Trace (A)  Negative Final    Nitrites, UA 06/16/2022 Negative  Negative Final    Protein, UA 06/16/2022 Negative  Negative Final    Glucose, UA 06/16/2022 Negative  Negative mg/dL Final    Ketones, UA 06/16/2022 Negative  Negative, Trace mg/dL Final    Urobilinogen, UA 06/16/2022 0.2  0.2, 1.0 mg/dL Final    Bilirubin, UA 06/16/2022 Negative  Negative Final    Blood, UA 06/16/2022 Negative  Negative Final    Specific Gravity, UA 06/16/2022 1.020  <=1.005, 1.010, 1.015, 1.020, 1.025, 1.030 Final    WBC, UA 06/16/2022 5-10 (A)  None Seen, 0-5  /hpf Final    RBC, UA 06/16/2022 None Seen  None Seen, 0-3 /hpf Final    Bacteria, UA 06/16/2022 Moderate (A)  None Seen, None Seen To Occasional, Rare /hpf Final    Squamous Epithelial Cells, UA 06/16/2022 Moderate (A)  None Seen, Rare, None Seen To Occasional /lpf Final    Mucus, UA 06/16/2022 Few (A)  None Seen /hpf Final    Culture, Urine 06/16/2022 Skin/Urogenital Tamara Isolated, no further workup.   Final   Office Visit on 06/06/2022   Component Date Value Ref Range Status    Creatinine, Urine 06/06/2022 107  28 - 219 mg/dL Final    Microalbumin 06/06/2022 0.8  0.0 - 2.8 mg/dL Final    Microalbumin/Creatinine Ratio 06/06/2022 7.5  0.0 - 30.0 mg/g Final    Triglycerides 06/06/2022 112  35 - 150 mg/dL Final    Cholesterol 06/06/2022 172  0 - 200 mg/dL Final    HDL Cholesterol 06/06/2022 51  40 - 60 mg/dL Final    Cholesterol/HDL Ratio (Risk Factor) 06/06/2022 3.4   Final    Non-HDL 06/06/2022 121  mg/dL Final    LDL Calculated 06/06/2022 99  mg/dL Final    LDL/HDL 06/06/2022 1.9   Final    VLDL 06/06/2022 22  mg/dL Final    HIV 1/2 06/06/2022 Non-Reactive  Non-Reactive Final   Admission on 05/10/2022, Discharged on 05/10/2022   Component Date Value Ref Range Status    POC Glucose 05/10/2022 131 (H)  70 - 105 mg/dL Final    ProBNP 05/10/2022 443 (H)  1 - 125 pg/mL Final    Sodium 05/10/2022 141  136 - 145 mmol/L Final    Potassium 05/10/2022 3.3 (L)  3.5 - 5.1 mmol/L Final    Chloride 05/10/2022 102  98 - 107 mmol/L Final    CO2 05/10/2022 30  21 - 32 mmol/L Final    Anion Gap 05/10/2022 12  7 - 16 mmol/L Final    Glucose 05/10/2022 117 (H)  74 - 106 mg/dL Final    BUN 05/10/2022 15  7 - 18 mg/dL Final    Creatinine 05/10/2022 0.95  0.55 - 1.02 mg/dL Final    BUN/Creatinine Ratio 05/10/2022 16  6 - 20 Final    Calcium 05/10/2022 9.1  8.5 - 10.1 mg/dL Final    Total Protein 05/10/2022 7.8  6.4 - 8.2 g/dL Final    Albumin 05/10/2022 3.8  3.5 - 5.0 g/dL Final    Globulin 05/10/2022 4.0  2.0 - 4.0 g/dL Final    A/G  Ratio 05/10/2022 1.0   Final    Bilirubin, Total 05/10/2022 0.3  0.0 - 1.2 mg/dL Final    Alk Phos 05/10/2022 129  50 - 130 U/L Final    ALT 05/10/2022 14  13 - 56 U/L Final    AST 05/10/2022 19  15 - 37 U/L Final    eGFR 05/10/2022 63  >=60 mL/min/1.73m² Final    Magnesium 05/10/2022 2.1  1.7 - 2.3 mg/dL Final    Color, UA 05/10/2022 Yellow  Colorless, Straw, Yellow, Dark Yellow Final    Clarity, UA 05/10/2022 Clear  Clear Final    pH, UA 05/10/2022 7.0  5.0, 5.5, 6.0, 6.5, 7.0, 7.5, 8.0 pH Units Final    Leukocytes, UA 05/10/2022 Negative  Negative Final    Nitrites, UA 05/10/2022 Negative  Negative Final    Protein, UA 05/10/2022 Negative  Negative Final    Glucose, UA 05/10/2022 Negative  Negative mg/dL Final    Ketones, UA 05/10/2022 Negative  Negative, Trace mg/dL Final    Urobilinogen, UA 05/10/2022 0.2  0.2, 1.0 mg/dL Final    Bilirubin, UA 05/10/2022 Negative  Negative Final    Blood, UA 05/10/2022 Negative  Negative Final    Specific Gravity, UA 05/10/2022 1.010  <=1.005, 1.010, 1.015, 1.020, 1.025, 1.030 Final    WBC 05/10/2022 12.23 (H)  4.50 - 11.00 K/uL Final    RBC 05/10/2022 5.47 (H)  4.20 - 5.40 M/uL Final    Hemoglobin 05/10/2022 15.3  12.0 - 16.0 g/dL Final    Hematocrit 05/10/2022 47.7 (H)  38.0 - 47.0 % Final    MCV 05/10/2022 87.2  80.0 - 96.0 fL Final    MCH 05/10/2022 28.0  27.0 - 31.0 pg Final    MCHC 05/10/2022 32.1  32.0 - 36.0 g/dL Final    RDW 05/10/2022 16.7 (H)  11.5 - 14.5 % Final    Platelet Count 05/10/2022 232  150 - 400 K/uL Final    MPV 05/10/2022 12.1  9.4 - 12.4 fL Final    Neutrophils % 05/10/2022 64.3  53.0 - 65.0 % Final    Lymphocytes % 05/10/2022 22.0 (L)  27.0 - 41.0 % Final    Neutrophils, Abs 05/10/2022 7.86 (H)  1.80 - 7.70 K/uL Final    Lymphocytes, Absolute 05/10/2022 2.69  1.00 - 4.80 K/uL Final    Diff Type 05/10/2022 Auto   Final    Monocytes % 05/10/2022 4.5  2.0 - 6.0 % Final    Eosinophils % 05/10/2022 8.7 (H)  1.0 - 4.0 % Final    Basophils % 05/10/2022 0.2   0.0 - 1.0 % Final    Immature Granulocytes % 05/10/2022 0.3  0.0 - 0.4 % Final    Monocytes, Absolute 05/10/2022 0.55  0.00 - 0.80 K/uL Final    Eosinophils, Absolute 05/10/2022 1.06 (H)  0.00 - 0.50 K/uL Final    Immature Granulocytes, Absolute 05/10/2022 0.04  0.00 - 0.04 K/uL Final    Basophils, Absolute 05/10/2022 0.03  0.00 - 0.20 K/uL Final   Office Visit on 04/25/2022   Component Date Value Ref Range Status    Vitamin B12 04/25/2022 373  193 - 986 pg/mL Final   Admission on 04/15/2022, Discharged on 04/15/2022   Component Date Value Ref Range Status    POC Glucose 04/15/2022 82  70 - 105 mg/dL Final    POC Glucose 04/15/2022 54 (L)  70 - 105 mg/dL Final    POC Glucose 04/15/2022 117 (H)  70 - 105 mg/dL Final    POC Glucose 04/15/2022 66 (L)  70 - 105 mg/dL Final    POC Glucose 04/15/2022 83  70 - 105 mg/dL Final   Office Visit on 04/13/2022   Component Date Value Ref Range Status    SARS Coronavirus 2 Antigen 04/13/2022 Negative  Negative Final     Acceptable 04/13/2022 Yes   Final    Sodium 04/13/2022 142  136 - 145 mmol/L Final    Potassium 04/13/2022 4.5  3.5 - 5.1 mmol/L Final    Chloride 04/13/2022 108 (H)  98 - 107 mmol/L Final    CO2 04/13/2022 29  21 - 32 mmol/L Final    Anion Gap 04/13/2022 10  7 - 16 mmol/L Final    Glucose 04/13/2022 73 (L)  74 - 106 mg/dL Final    BUN 04/13/2022 12  7 - 18 mg/dL Final    Creatinine 04/13/2022 0.90  0.55 - 1.02 mg/dL Final    BUN/Creatinine Ratio 04/13/2022 13  6 - 20 Final    Calcium 04/13/2022 9.2  8.5 - 10.1 mg/dL Final    eGFR 04/13/2022 67  >=60 mL/min/1.73m² Final    TSH 04/13/2022 0.197 (L)  0.358 - 3.740 uIU/mL Final    ProBNP 04/13/2022 205 (H)  1 - 125 pg/mL Final         Orders Placed This Encounter   Procedures    Drug Screen Definitive 14, Urine     Standing Status:   Future     Number of Occurrences:   1     Standing Expiration Date:   12/3/2023     Order Specific Question:   Specimen Source     Answer:   Urine    POCT Urine Drug Screen  Presump     Interpretive Information:     Negative:  No drug detected at the cut off level.   Positive:  This result represents presumptive positive for the   tested drug, other substances may yield a positive response other   than the analyte of interest. This result should be utilized for   diagnostic purpose only. Confirmation testing will be performed upon physician request only.            Requested Prescriptions     Pending Prescriptions Disp Refills    pregabalin (LYRICA) 100 MG capsule 60 capsule 1     Sig: Take 1 capsule (100 mg total) by mouth every 12 (twelve) hours.    HYDROcodone-acetaminophen (NORCO)  mg per tablet 120 tablet 0     Sig: Take 1 tablet by mouth every 6 (six) hours as needed for Pain.    HYDROcodone-acetaminophen (NORCO)  mg per tablet 120 tablet 0     Sig: Take 1 tablet by mouth every 6 (six) hours as needed for Pain.       Assessment:     1. Lumbosacral spondylosis without myelopathy    2. Chronic pain of right knee    3. PVD (peripheral vascular disease)    4. Foot pain, left    5. Encounter for long-term (current) use of other medications         A's of Opioid Risk Assessment  Activity:Patient can perform ADL.   Analgesia:Patients pain is partially controlled by current medication. Patient has tried OTC medications such as Tylenol and Ibuprofen with out relief.   Adverse Effects: Patient denies constipation or sedation.  Aberrant Behavior:  reviewed with no aberrant drug seeking/taking behavior.  Overdose reversal drug naloxone discussed    Drug screen reviewed      Plan:    Uses Rollator walker assistance ambulation    Following orthopedics knee pain [facility], she states she was advised not to have surgery due to her weight    Continues to heal with nonhealing wounds lower extremities    Diabetic, try to limit steroids    Cannot tolerate OxyContin she is severely allergic    Continue home exercise program as tolerated    Continue activity as tolerated    Follow-up  2 months    Dr. Crandall, April 2023    Bring original prescription medication bottles/container/box with labels to each visit    Pill count    Physical therapy    Massage therapy declines

## 2022-10-05 NOTE — PROGRESS NOTES
"Subjective:       Patient ID: Holly Porter is a 62 y.o. female.    Chief Complaint: Pelvic Pain and Results (Dexa scan)    Patient is here today check up evaluation. Patient recent bone scan reviewed and shows osteopenia. Discussed with patient the risks of fractures. Will prescribe Vit D 50,000 units BID and CA Carbinate 500 mg PO BID. Patient appears drowsy today and states she is fatigued. States she cannot sleep and sometimes gets paranoid. States feels "the people on the TV are going to come out and get me". States has not followed with Psychology due to finances and was following with Dr River. Patient is currently on Elavil. Will discontinue today and start Seroquel 25 mg PO QHS. Patient reports she has JOVANNI and uses a Cpap but her Cpap was recalled and has not received a new one. Will order new CPAP. Will follow in 6 weeks.     Current Medications:    Current Outpatient Medications:     albuterol (PROVENTIL/VENTOLIN HFA) 90 mcg/actuation inhaler, Inhale 2 puffs into the lungs 4 (four) times daily. Rescue, Disp: 18 g, Rfl: 2    allopurinoL (ZYLOPRIM) 300 MG tablet, Take 1 tablet (300 mg total) by mouth once daily., Disp: 90 tablet, Rfl: 3    amitriptyline (ELAVIL) 25 MG tablet, Take 1 tablet (25 mg total) by mouth nightly as needed for Insomnia., Disp: 90 tablet, Rfl: 1    apixaban (ELIQUIS) 5 mg Tab, Take 1 tablet (5 mg total) by mouth 2 (two) times daily., Disp: 60 tablet, Rfl: 3    aspirin (ECOTRIN) 81 MG EC tablet, Take 1 tablet (81 mg total) by mouth once daily., Disp: 90 tablet, Rfl: 1    cilostazoL (PLETAL) 100 MG Tab, Take 1 tablet (100 mg total) by mouth 2 (two) times daily., Disp: 90 tablet, Rfl: 1    colchicine (COLCRYS) 0.6 mg tablet, One pill three times a day for two days,then one pill daily till out, Disp: 30 tablet, Rfl: 2    [START ON 10/7/2022] HYDROcodone-acetaminophen (NORCO)  mg per tablet, Take 1 tablet by mouth every 6 (six) hours as needed for Pain., Disp: 120 tablet, Rfl: " "0    [START ON 11/4/2022] HYDROcodone-acetaminophen (NORCO)  mg per tablet, Take 1 tablet by mouth every 6 (six) hours as needed for Pain., Disp: 120 tablet, Rfl: 0    insulin (LANTUS SOLOSTAR U-100 INSULIN) glargine 100 units/mL (3mL) SubQ pen, Inject 10 units sq daily, Disp: 15 mL, Rfl: 1    insulin detemir U-100 (LEVEMIR FLEXTOUCH U-100 INSULN) 100 unit/mL (3 mL) InPn pen, Inject 10 Units into the skin every evening. 15 ml with 1 refill, Disp: , Rfl:     levothyroxine (SYNTHROID) 150 MCG tablet, Take 1 tablet (150 mcg total) by mouth before breakfast., Disp: 90 tablet, Rfl: 1    loratadine (CLARITIN) 10 mg tablet, Take 1 tablet (10 mg total) by mouth once daily., Disp: 30 tablet, Rfl: 3    losartan-hydrochlorothiazide 50-12.5 mg (HYZAAR) 50-12.5 mg per tablet, Take 1 tablet by mouth once daily., Disp: 90 tablet, Rfl: 1    metOLazone (ZAROXOLYN) 2.5 MG tablet, Take 1 tablet (2.5 mg total) by mouth once daily., Disp: 30 tablet, Rfl: 2    mupirocin (BACTROBAN) 2 % ointment, Apply topically 2 (two) times daily., Disp: 80 g, Rfl: 2    nabumetone (RELAFEN) 750 MG tablet, Take 1 tablet (750 mg total) by mouth 2 (two) times daily as needed for Pain., Disp: 60 tablet, Rfl: 0    NOVOFINE 32 32 gauge x 1/4" Ndle, Use as directed once daily., Disp: 90 each, Rfl: 3    pantoprazole (PROTONIX) 40 MG tablet, Take 1 tablet (40 mg total) by mouth once daily., Disp: 90 tablet, Rfl: 1    phentermine (ADIPEX-P) 37.5 mg tablet, Take 1 tablet (37.5 mg total) by mouth before breakfast., Disp: 30 tablet, Rfl: 0    potassium chloride SA (K-DUR,KLOR-CON) 20 MEQ tablet, Take 1 tablet (20 mEq total) by mouth once daily., Disp: 90 tablet, Rfl: 1    pregabalin (LYRICA) 100 MG capsule, Take 1 capsule (100 mg total) by mouth every 12 (twelve) hours., Disp: 60 capsule, Rfl: 1    spironolactone (ALDACTONE) 50 MG tablet, Take 1 tablet (50 mg total) by mouth once daily., Disp: 30 tablet, Rfl: 11    SYMBICORT 160-4.5 mcg/actuation HFAA, Inhale " into the lungs., Disp: , Rfl:     topiramate (TOPAMAX) 100 MG tablet, Take 1.5 tablets (150 mg total) by mouth 2 (two) times daily., Disp: 90 tablet, Rfl: 11    triamcinolone acetonide 0.1% (KENALOG) 0.1 % cream, Apply topically 3 (three) times daily., Disp: 400 g, Rfl: 2    azithromycin (Z-SANJU) 250 MG tablet, Take 1 tablet (250 mg total) by mouth once daily. Take first 2 tablets together, then 1 every day until finished. (Patient not taking: Reported on 10/4/2022), Disp: 6 tablet, Rfl: 0    docusate sodium (COLACE) 100 MG capsule, Take 1 capsule (100 mg total) by mouth 2 (two) times daily. (Patient not taking: No sig reported), Disp: 60 capsule, Rfl: 2    Last Labs:     Office Visit on 10/04/2022   Component Date Value    Hemoglobin A1C 10/04/2022 6.2     Estimated Average Glucose 10/04/2022 120    Office Visit on 10/04/2022   Component Date Value    POC Amphetamines 10/04/2022 Presumptive Positive (A)     POC Barbiturates 10/04/2022 Negative     POC Benzodiazepines 10/04/2022 Negative     POC Cocaine 10/04/2022 Negative     POC THC 10/04/2022 Negative     POC Methadone 10/04/2022 Negative     POC Methamphetamine 10/04/2022 Negative     POC Opiates 10/04/2022 Presumptive Positive (A)     POC Oxycodone 10/04/2022 Negative     POC Phencyclidine 10/04/2022 Negative     POC Methylenedioxymetham* 10/04/2022 Negative     POC Tricyclic Antidepres* 10/04/2022 Negative     POC Buprenorphine 10/04/2022 Negative      Acceptab* 10/04/2022 Yes     POC Temperature (Urine) 10/04/2022 92    Admission on 09/09/2022, Discharged on 09/09/2022   Component Date Value    Sodium 09/09/2022 135 (L)     Potassium 09/09/2022 3.1 (L)     Chloride 09/09/2022 101     CO2 09/09/2022 29     Anion Gap 09/09/2022 8     Glucose 09/09/2022 110 (H)     BUN 09/09/2022 19 (H)     Creatinine 09/09/2022 1.11 (H)     BUN/Creatinine Ratio 09/09/2022 17     Calcium 09/09/2022 9.6     Total Protein 09/09/2022 7.5     Albumin 09/09/2022 3.5      Globulin 09/09/2022 4.0     A/G Ratio 09/09/2022 0.9     Bilirubin, Total 09/09/2022 0.4     Alk Phos 09/09/2022 94     ALT 09/09/2022 14     AST 09/09/2022 19     eGFR 09/09/2022 56 (L)     Lipase 09/09/2022 75     WBC 09/09/2022 11.19 (H)     RBC 09/09/2022 5.70 (H)     Hemoglobin 09/09/2022 15.2     Hematocrit 09/09/2022 47.1 (H)     MCV 09/09/2022 82.6     MCH 09/09/2022 26.7 (L)     MCHC 09/09/2022 32.3     RDW 09/09/2022 17.4 (H)     Platelet Count 09/09/2022 261     MPV 09/09/2022 11.4     Neutrophils % 09/09/2022 58.9     Lymphocytes % 09/09/2022 25.0 (L)     Monocytes % 09/09/2022 5.5     Eosinophils % 09/09/2022 9.3 (H)     Basophils % 09/09/2022 0.7     Immature Granulocytes % 09/09/2022 0.6 (H)     nRBC, Auto 09/09/2022 0.0     Neutrophils, Abs 09/09/2022 6.59     Lymphocytes, Absolute 09/09/2022 2.80     Monocytes, Absolute 09/09/2022 0.61     Eosinophils, Absolute 09/09/2022 1.04 (H)     Basophils, Absolute 09/09/2022 0.08     Immature Granulocytes, A* 09/09/2022 0.07 (H)     nRBC, Absolute 09/09/2022 0.00     Diff Type 09/09/2022 Auto        Last Imaging:  X-Ray Knee Complete 4 Or More Views Right  See Procedure Notes for results.     IMPRESSION: Please see Ortho procedure notes for report.      This procedure was auto-finalized by: Virtual Radiologist         Review of Systems   Constitutional:  Positive for fatigue.   Respiratory:  Positive for apnea.    Psychiatric/Behavioral:  Positive for hallucinations and sleep disturbance.    All other systems reviewed and are negative.      Objective:      Physical Exam  Vitals reviewed.   Constitutional:       Appearance: Normal appearance. She is obese.   Cardiovascular:      Rate and Rhythm: Normal rate and regular rhythm.      Pulses: Normal pulses.      Heart sounds: Normal heart sounds.   Pulmonary:      Effort: Pulmonary effort is normal.      Breath sounds: Normal breath sounds.   Abdominal:      General: Abdomen is flat. Bowel sounds are normal.       Palpations: Abdomen is soft.   Musculoskeletal:         General: Normal range of motion.      Cervical back: Normal range of motion and neck supple.   Skin:     General: Skin is warm and dry.   Neurological:      General: No focal deficit present.      Mental Status: She is alert and oriented to person, place, and time. Mental status is at baseline.       Assessment:       1. JOVANNI on CPAP  Ambulatory referral/consult to Sleep Disorders      2. Tobacco dependency        3. Primary insomnia        4. Osteopenia, unspecified location        5. Morbid obesity  Ambulatory referral/consult to Weight Management Program      6. Psychosis, unspecified psychosis type        7. Type 2 diabetes mellitus without complication, with long-term current use of insulin  Hemoglobin A1C    Hemoglobin A1C      8. Hypokalemia  Potassium    Potassium           Plan:         Holly was seen today for pelvic pain and results.    Diagnoses and all orders for this visit:    JOVANNI on CPAP  -     Ambulatory referral/consult to Sleep Disorders; Future    Tobacco dependency    Primary insomnia    Osteopenia, unspecified location    Morbid obesity  -     Ambulatory referral/consult to Weight Management Program; Future    Psychosis, unspecified psychosis type    Type 2 diabetes mellitus without complication, with long-term current use of insulin  -     Hemoglobin A1C; Future  -     Hemoglobin A1C    Hypokalemia  -     Potassium; Future  -     Potassium    Other orders  The following orders have not been finalized:  -     cholecalciferol, vitamin D3, 125 mcg (5,000 unit) capsule  -     calcium carbonate (OS-MICHAELA) 500 mg calcium (1,250 mg) tablet  -     QUEtiapine (SEROQUEL) 25 MG Tab

## 2022-10-05 NOTE — PATIENT INSTRUCTIONS
Holly was seen today for pelvic pain and results.    Diagnoses and all orders for this visit:    JOVANNI on CPAP  -     Ambulatory referral/consult to Sleep Disorders; Future    Tobacco dependency    Primary insomnia    Osteopenia, unspecified location    Morbid obesity  -     Ambulatory referral/consult to Weight Management Program; Future    Psychosis, unspecified psychosis type    Type 2 diabetes mellitus without complication, with long-term current use of insulin  -     Hemoglobin A1C; Future  -     Hemoglobin A1C    Hypokalemia  -     Potassium; Future  -     Potassium    Other orders  The following orders have not been finalized:  -     cholecalciferol, vitamin D3, 125 mcg (5,000 unit) capsule  -     calcium carbonate (OS-MICHAELA) 500 mg calcium (1,250 mg) tablet  -     QUEtiapine (SEROQUEL) 25 MG Tab

## 2022-10-06 LAB
6-ACETYLMORPHINE, URINE (RUSH): NEGATIVE 10 NG/ML
7-AMINOCLONAZEPAM, URINE (RUSH): NEGATIVE 25 NG/ML
A-HYDROXYALPRAZOLAM, URINE (RUSH): NEGATIVE 25 NG/ML
ACETYL FENTANYL, URINE (RUSH): NEGATIVE 2.5 NG/ML
ACETYL NORFENTANYL OXALATE, URINE (RUSH): NEGATIVE 5 NG/ML
AMPHET UR QL SCN: NEGATIVE
BENZOYLECGONINE, URINE (RUSH): NEGATIVE 100 NG/ML
BUPRENORPHINE UR QL SCN: NEGATIVE 25 NG/ML
CODEINE, URINE (RUSH): NEGATIVE 25 NG/ML
CREAT UR-MCNC: 225 MG/DL (ref 28–219)
EDDP, URINE (RUSH): NEGATIVE 25 NG/ML
FENTANYL, URINE (RUSH): NEGATIVE 2.5 NG/ML
HYDROCODONE, URINE (RUSH): >250 25 NG/ML
HYDROMORPHONE, URINE (RUSH): >250 25 NG/ML
LORAZEPAM, URINE (RUSH): NEGATIVE 25 NG/ML
METHADONE UR QL SCN: NEGATIVE 25 NG/ML
METHAMPHET UR QL SCN: NEGATIVE
MORPHINE, URINE (RUSH): NEGATIVE 25 NG/ML
NORBUPRENORPHINE, URINE (RUSH): NEGATIVE 25 NG/ML
NORDIAZEPAM, URINE (RUSH): NEGATIVE 25 NG/ML
NORFENTANYL OXALATE, URINE (RUSH): NEGATIVE 5 NG/ML
NORHYDROCODONE, URINE (RUSH): >500 50 NG/ML
NOROXYCODONE HCL, URINE (RUSH): NEGATIVE 50 NG/ML
OXAZEPAM, URINE (RUSH): NEGATIVE 25 NG/ML
OXYCODONE UR QL SCN: NEGATIVE 25 NG/ML
OXYMORPHONE, URINE (RUSH): NEGATIVE 25 NG/ML
PH UR STRIP: 5.5 PH UNITS
SP GR UR STRIP: 1.02
TAPENTADOL, URINE (RUSH): NEGATIVE 25 NG/ML
TEMAZEPAM, URINE (RUSH): NEGATIVE 25 NG/ML
THC-COOH, URINE (RUSH): NEGATIVE 25 NG/ML
TRAMADOL, URINE (RUSH): NEGATIVE 100 NG/ML

## 2022-10-06 RX ORDER — QUETIAPINE FUMARATE 25 MG/1
25 TABLET, FILM COATED ORAL DAILY
Qty: 30 TABLET | Refills: 1 | Status: ON HOLD | OUTPATIENT
Start: 2022-10-06 | End: 2023-11-01 | Stop reason: SDUPTHER

## 2022-10-06 RX ORDER — CALCIUM CARBONATE 500(1250)
1 TABLET ORAL 2 TIMES DAILY
Qty: 60 TABLET | Refills: 3 | Status: SHIPPED | OUTPATIENT
Start: 2022-10-06

## 2022-10-06 RX ORDER — CHOLECALCIFEROL (VITAMIN D3) 125 MCG
5000 CAPSULE ORAL 2 TIMES DAILY
Qty: 60 CAPSULE | Refills: 3 | Status: SHIPPED | OUTPATIENT
Start: 2022-10-06 | End: 2023-06-28 | Stop reason: SDUPTHER

## 2022-10-25 RX ORDER — ASPIRIN 81 MG/1
81 TABLET ORAL DAILY
Qty: 90 TABLET | Refills: 1 | Status: SHIPPED | OUTPATIENT
Start: 2022-10-25 | End: 2023-05-30 | Stop reason: SDUPTHER

## 2022-10-25 RX ORDER — LEVOTHYROXINE SODIUM 150 UG/1
150 TABLET ORAL
Qty: 90 TABLET | Refills: 1 | Status: SHIPPED | OUTPATIENT
Start: 2022-10-25 | End: 2023-05-30 | Stop reason: SDUPTHER

## 2022-10-25 RX ORDER — METOLAZONE 2.5 MG/1
2.5 TABLET ORAL DAILY
Qty: 30 TABLET | Refills: 2 | Status: SHIPPED | OUTPATIENT
Start: 2022-10-25 | End: 2022-11-07 | Stop reason: SDUPTHER

## 2022-10-25 RX ORDER — CILOSTAZOL 100 MG/1
100 TABLET ORAL 2 TIMES DAILY
Qty: 90 TABLET | Refills: 1 | Status: SHIPPED | OUTPATIENT
Start: 2022-10-25 | End: 2023-06-28 | Stop reason: SDUPTHER

## 2022-10-25 RX ORDER — PANTOPRAZOLE SODIUM 40 MG/1
40 TABLET, DELAYED RELEASE ORAL DAILY
Qty: 90 TABLET | Refills: 1 | Status: SHIPPED | OUTPATIENT
Start: 2022-10-25 | End: 2023-05-04 | Stop reason: SDUPTHER

## 2022-10-25 RX ORDER — ALLOPURINOL 300 MG/1
300 TABLET ORAL DAILY
Qty: 90 TABLET | Refills: 3 | Status: SHIPPED | OUTPATIENT
Start: 2022-10-25 | End: 2023-08-01

## 2022-10-25 RX ORDER — AMITRIPTYLINE HYDROCHLORIDE 25 MG/1
25 TABLET, FILM COATED ORAL NIGHTLY PRN
Qty: 90 TABLET | Refills: 1 | Status: SHIPPED | OUTPATIENT
Start: 2022-10-25 | End: 2023-12-06

## 2022-10-25 RX ORDER — COLCHICINE 0.6 MG/1
TABLET ORAL
Qty: 30 TABLET | Refills: 2 | Status: SHIPPED | OUTPATIENT
Start: 2022-10-25 | End: 2023-02-16 | Stop reason: SDUPTHER

## 2022-10-25 NOTE — TELEPHONE ENCOUNTER
----- Message from Van Benavidez sent at 10/25/2022 12:33 PM CDT -----  Pt would like a nurse to contact her ASAP

## 2022-11-04 ENCOUNTER — TELEPHONE (OUTPATIENT)
Dept: FAMILY MEDICINE | Facility: CLINIC | Age: 62
End: 2022-11-04
Payer: COMMERCIAL

## 2022-11-04 NOTE — TELEPHONE ENCOUNTER
----- Message from Sruthi Mayer sent at 11/2/2022  3:57 PM CDT -----  Pt called and asked for a refill and also asked for a call back so she can get two referral appts rescheduled     [phone]- called pt regarding med refills; pt stated that she needed Atarax for itching under skin; informed her that she would need to make an appt to see doctor pat in order to be rx that med; pt voiced understanding and stated that she would just wait until her appt on 11/15 to see him. No other questions asked at this time.

## 2022-11-07 ENCOUNTER — TELEPHONE (OUTPATIENT)
Dept: FAMILY MEDICINE | Facility: CLINIC | Age: 62
End: 2022-11-07
Payer: COMMERCIAL

## 2022-11-07 RX ORDER — METOLAZONE 2.5 MG/1
2.5 TABLET ORAL DAILY
Qty: 30 TABLET | Refills: 2 | Status: SHIPPED | OUTPATIENT
Start: 2022-11-07 | End: 2023-08-01 | Stop reason: SDUPTHER

## 2022-11-07 NOTE — TELEPHONE ENCOUNTER
----- Message from Sruthi Mayer sent at 11/7/2022  1:28 PM CST -----  Pt called and asked for a call back about her nose bleeding     [phone]- called pt regarding above message; I asked pt if nose is still bleeding; pt stated it has not bled since yesterday; also stated that EMS has been to her house twice about it over the weekend; advised pt that if nose starts to bleed again to go to nearest ER. Pt voiced understanding and made an appt for 11/08/22.

## 2022-11-08 ENCOUNTER — OFFICE VISIT (OUTPATIENT)
Dept: FAMILY MEDICINE | Facility: CLINIC | Age: 62
End: 2022-11-08
Payer: COMMERCIAL

## 2022-11-08 VITALS
RESPIRATION RATE: 16 BRPM | TEMPERATURE: 98 F | HEIGHT: 69 IN | BODY MASS INDEX: 43.4 KG/M2 | SYSTOLIC BLOOD PRESSURE: 126 MMHG | HEART RATE: 84 BPM | OXYGEN SATURATION: 95 % | WEIGHT: 293 LBS | DIASTOLIC BLOOD PRESSURE: 74 MMHG

## 2022-11-08 DIAGNOSIS — M79.671 BILATERAL LEG AND FOOT PAIN: ICD-10-CM

## 2022-11-08 DIAGNOSIS — M79.604 BILATERAL LEG AND FOOT PAIN: ICD-10-CM

## 2022-11-08 DIAGNOSIS — M79.672 BILATERAL LEG AND FOOT PAIN: ICD-10-CM

## 2022-11-08 DIAGNOSIS — R04.0 EPISTAXIS: Primary | ICD-10-CM

## 2022-11-08 DIAGNOSIS — M79.605 BILATERAL LEG AND FOOT PAIN: ICD-10-CM

## 2022-11-08 DIAGNOSIS — F17.200 TOBACCO DEPENDENCE: ICD-10-CM

## 2022-11-08 LAB
BASOPHILS # BLD AUTO: 0.06 K/UL (ref 0–0.2)
BASOPHILS NFR BLD AUTO: 0.6 % (ref 0–1)
DIFFERENTIAL METHOD BLD: ABNORMAL
EOSINOPHIL # BLD AUTO: 0.33 K/UL (ref 0–0.5)
EOSINOPHIL NFR BLD AUTO: 3.3 % (ref 1–4)
ERYTHROCYTE [DISTWIDTH] IN BLOOD BY AUTOMATED COUNT: 16.2 % (ref 11.5–14.5)
ERYTHROCYTE [SEDIMENTATION RATE] IN BLOOD BY WESTERGREN METHOD: 12 MM/HR (ref 0–30)
HCT VFR BLD AUTO: 49.8 % (ref 38–47)
HGB BLD-MCNC: 15.5 G/DL (ref 12–16)
IMM GRANULOCYTES # BLD AUTO: 0.06 K/UL (ref 0–0.04)
IMM GRANULOCYTES NFR BLD: 0.6 % (ref 0–0.4)
LYMPHOCYTES # BLD AUTO: 2.38 K/UL (ref 1–4.8)
LYMPHOCYTES NFR BLD AUTO: 23.7 % (ref 27–41)
MCH RBC QN AUTO: 26.8 PG (ref 27–31)
MCHC RBC AUTO-ENTMCNC: 31.1 G/DL (ref 32–36)
MCV RBC AUTO: 86 FL (ref 80–96)
MONOCYTES # BLD AUTO: 0.62 K/UL (ref 0–0.8)
MONOCYTES NFR BLD AUTO: 6.2 % (ref 2–6)
MPC BLD CALC-MCNC: 13.1 FL (ref 9.4–12.4)
NEUTROPHILS # BLD AUTO: 6.59 K/UL (ref 1.8–7.7)
NEUTROPHILS NFR BLD AUTO: 65.6 % (ref 53–65)
NRBC # BLD AUTO: 0 X10E3/UL
NRBC, AUTO (.00): 0 %
PLATELET # BLD AUTO: 241 K/UL (ref 150–400)
PLATELET MORPHOLOGY: ABNORMAL
POTASSIUM SERPL-SCNC: 3.9 MMOL/L (ref 3.5–5.1)
RBC # BLD AUTO: 5.79 M/UL (ref 4.2–5.4)
RBC MORPH BLD: NORMAL
WBC # BLD AUTO: 10.04 K/UL (ref 4.5–11)

## 2022-11-08 PROCEDURE — 3061F PR NEG MICROALBUMINURIA RESULT DOCUMENTED/REVIEW: ICD-10-PCS | Mod: ,,, | Performed by: INTERNAL MEDICINE

## 2022-11-08 PROCEDURE — 99214 PR OFFICE/OUTPT VISIT, EST, LEVL IV, 30-39 MIN: ICD-10-PCS | Mod: ,,, | Performed by: INTERNAL MEDICINE

## 2022-11-08 PROCEDURE — 3066F PR DOCUMENTATION OF TREATMENT FOR NEPHROPATHY: ICD-10-PCS | Mod: ,,, | Performed by: INTERNAL MEDICINE

## 2022-11-08 PROCEDURE — 3044F HG A1C LEVEL LT 7.0%: CPT | Mod: ,,, | Performed by: INTERNAL MEDICINE

## 2022-11-08 PROCEDURE — 3008F PR BODY MASS INDEX (BMI) DOCUMENTED: ICD-10-PCS | Mod: ,,, | Performed by: INTERNAL MEDICINE

## 2022-11-08 PROCEDURE — 3078F DIAST BP <80 MM HG: CPT | Mod: ,,, | Performed by: INTERNAL MEDICINE

## 2022-11-08 PROCEDURE — 85025 COMPLETE CBC W/AUTO DIFF WBC: CPT | Mod: ,,, | Performed by: CLINICAL MEDICAL LABORATORY

## 2022-11-08 PROCEDURE — 85651 SEDIMENTATION RATE, AUTOMATED: ICD-10-PCS | Mod: ,,, | Performed by: CLINICAL MEDICAL LABORATORY

## 2022-11-08 PROCEDURE — 99214 OFFICE O/P EST MOD 30 MIN: CPT | Mod: ,,, | Performed by: INTERNAL MEDICINE

## 2022-11-08 PROCEDURE — 3074F SYST BP LT 130 MM HG: CPT | Mod: ,,, | Performed by: INTERNAL MEDICINE

## 2022-11-08 PROCEDURE — 84132 POTASSIUM: ICD-10-PCS | Mod: ,,, | Performed by: CLINICAL MEDICAL LABORATORY

## 2022-11-08 PROCEDURE — 1159F PR MEDICATION LIST DOCUMENTED IN MEDICAL RECORD: ICD-10-PCS | Mod: ,,, | Performed by: INTERNAL MEDICINE

## 2022-11-08 PROCEDURE — 3066F NEPHROPATHY DOC TX: CPT | Mod: ,,, | Performed by: INTERNAL MEDICINE

## 2022-11-08 PROCEDURE — 3061F NEG MICROALBUMINURIA REV: CPT | Mod: ,,, | Performed by: INTERNAL MEDICINE

## 2022-11-08 PROCEDURE — 3074F PR MOST RECENT SYSTOLIC BLOOD PRESSURE < 130 MM HG: ICD-10-PCS | Mod: ,,, | Performed by: INTERNAL MEDICINE

## 2022-11-08 PROCEDURE — 1160F RVW MEDS BY RX/DR IN RCRD: CPT | Mod: ,,, | Performed by: INTERNAL MEDICINE

## 2022-11-08 PROCEDURE — 85651 RBC SED RATE NONAUTOMATED: CPT | Mod: ,,, | Performed by: CLINICAL MEDICAL LABORATORY

## 2022-11-08 PROCEDURE — 3008F BODY MASS INDEX DOCD: CPT | Mod: ,,, | Performed by: INTERNAL MEDICINE

## 2022-11-08 PROCEDURE — 84132 ASSAY OF SERUM POTASSIUM: CPT | Mod: ,,, | Performed by: CLINICAL MEDICAL LABORATORY

## 2022-11-08 PROCEDURE — 85025 CBC WITH DIFFERENTIAL: ICD-10-PCS | Mod: ,,, | Performed by: CLINICAL MEDICAL LABORATORY

## 2022-11-08 PROCEDURE — 1160F PR REVIEW ALL MEDS BY PRESCRIBER/CLIN PHARMACIST DOCUMENTED: ICD-10-PCS | Mod: ,,, | Performed by: INTERNAL MEDICINE

## 2022-11-08 PROCEDURE — 1159F MED LIST DOCD IN RCRD: CPT | Mod: ,,, | Performed by: INTERNAL MEDICINE

## 2022-11-08 PROCEDURE — 3078F PR MOST RECENT DIASTOLIC BLOOD PRESSURE < 80 MM HG: ICD-10-PCS | Mod: ,,, | Performed by: INTERNAL MEDICINE

## 2022-11-08 PROCEDURE — 3044F PR MOST RECENT HEMOGLOBIN A1C LEVEL <7.0%: ICD-10-PCS | Mod: ,,, | Performed by: INTERNAL MEDICINE

## 2022-11-08 RX ORDER — HYDROXYZINE HYDROCHLORIDE 25 MG/1
25 TABLET, FILM COATED ORAL 2 TIMES DAILY
Qty: 20 TABLET | Refills: 0 | Status: SHIPPED | OUTPATIENT
Start: 2022-11-08 | End: 2022-12-01

## 2022-11-08 NOTE — PROGRESS NOTES
"Subjective:       Patient ID: Holly Porter is a 62 y.o. female.    Chief Complaint: Epistaxis, Arthritis, and Facial Pain    Patient is here today for a follow up evaluation. Patient blood pressure is stable today on intake, 126/74. Patient has a complaint of recurrent nose bleeds. Patient states her nosebleeds started on Thursday. Patient states her mouth would also become full of blood. Patient states her nose also bled Friday and Saturday. Patient states she contacted the ambulance on each occasion but her nose had stopped bleeding before their arrival. Patient states she used gauze to pack her nose during the night. Discussed with patient reasons of nose bleeds. Patient states she does not pick her nose. Patient also states she is currently not taking Eliquis. Patient states she has a history of blood clots. Will order CBC. Will order venous dopplers bilateral lower extremity. Will refer to , ENT for appointment today or tomorrow morning. Patient also has a complaint of "lump" on the left side of her face. Patient states the lump is sore. Patient also states she is unable to wear her dentures due to lump. Will order x-ray of facial series. Will order US of Neck for left sided jaw and neck pain. Will order lab work. Patient is requesting Atarax. Will order Atarax 25mg PO BID #20. Will follow with patient in 1 week.     Epistaxis     Arthritis    Facial Pain      Current Medications:    Current Outpatient Medications:     albuterol (PROVENTIL/VENTOLIN HFA) 90 mcg/actuation inhaler, Inhale 2 puffs into the lungs 4 (four) times daily. Rescue, Disp: 18 g, Rfl: 2    allopurinoL (ZYLOPRIM) 300 MG tablet, Take 1 tablet (300 mg total) by mouth once daily., Disp: 90 tablet, Rfl: 3    amitriptyline (ELAVIL) 25 MG tablet, Take 1 tablet (25 mg total) by mouth nightly as needed for Insomnia., Disp: 90 tablet, Rfl: 1    apixaban (ELIQUIS) 5 mg Tab, Take 1 tablet (5 mg total) by mouth 2 (two) times daily., Disp: 60 " "tablet, Rfl: 3    aspirin (ECOTRIN) 81 MG EC tablet, Take 1 tablet (81 mg total) by mouth once daily., Disp: 90 tablet, Rfl: 1    calcium carbonate (OS-MICHAELA) 500 mg calcium (1,250 mg) tablet, Take 1 tablet (500 mg total) by mouth 2 (two) times daily., Disp: 60 tablet, Rfl: 3    cholecalciferol, vitamin D3, 125 mcg (5,000 unit) capsule, Take 1 capsule (5,000 Units total) by mouth 2 (two) times a day., Disp: 60 capsule, Rfl: 3    cilostazoL (PLETAL) 100 MG Tab, Take 1 tablet (100 mg total) by mouth 2 (two) times daily., Disp: 90 tablet, Rfl: 1    colchicine (COLCRYS) 0.6 mg tablet, One pill three times a day for two days,then one pill daily till out, Disp: 30 tablet, Rfl: 2    HYDROcodone-acetaminophen (NORCO)  mg per tablet, Take 1 tablet by mouth every 6 (six) hours as needed for Pain., Disp: 120 tablet, Rfl: 0    insulin (LANTUS SOLOSTAR U-100 INSULIN) glargine 100 units/mL (3mL) SubQ pen, Inject 10 units sq daily, Disp: 15 mL, Rfl: 1    insulin detemir U-100 (LEVEMIR FLEXTOUCH U-100 INSULN) 100 unit/mL (3 mL) InPn pen, Inject 10 Units into the skin every evening. 15 ml with 1 refill, Disp: , Rfl:     levothyroxine (SYNTHROID) 150 MCG tablet, Take 1 tablet (150 mcg total) by mouth before breakfast., Disp: 90 tablet, Rfl: 1    loratadine (CLARITIN) 10 mg tablet, Take 1 tablet (10 mg total) by mouth once daily., Disp: 30 tablet, Rfl: 3    losartan-hydrochlorothiazide 50-12.5 mg (HYZAAR) 50-12.5 mg per tablet, Take 1 tablet by mouth once daily., Disp: 90 tablet, Rfl: 1    metOLazone (ZAROXOLYN) 2.5 MG tablet, Take 1 tablet (2.5 mg total) by mouth once daily., Disp: 30 tablet, Rfl: 2    mupirocin (BACTROBAN) 2 % ointment, Apply topically 2 (two) times daily., Disp: 80 g, Rfl: 2    nabumetone (RELAFEN) 750 MG tablet, Take 1 tablet (750 mg total) by mouth 2 (two) times daily as needed for Pain., Disp: 60 tablet, Rfl: 0    NOVOFINE 32 32 gauge x 1/4" Ndle, Use as directed once daily., Disp: 90 each, Rfl: 3    " pantoprazole (PROTONIX) 40 MG tablet, Take 1 tablet (40 mg total) by mouth once daily., Disp: 90 tablet, Rfl: 1    phentermine (ADIPEX-P) 37.5 mg tablet, Take 1 tablet (37.5 mg total) by mouth before breakfast., Disp: 30 tablet, Rfl: 0    potassium chloride SA (K-DUR,KLOR-CON) 20 MEQ tablet, Take 1 tablet (20 mEq total) by mouth once daily., Disp: 90 tablet, Rfl: 1    pregabalin (LYRICA) 100 MG capsule, Take 1 capsule (100 mg total) by mouth every 12 (twelve) hours., Disp: 60 capsule, Rfl: 1    QUEtiapine (SEROQUEL) 25 MG Tab, Take 1 tablet (25 mg total) by mouth once daily., Disp: 30 tablet, Rfl: 1    spironolactone (ALDACTONE) 50 MG tablet, Take 1 tablet (50 mg total) by mouth once daily., Disp: 30 tablet, Rfl: 11    SYMBICORT 160-4.5 mcg/actuation HFAA, Inhale into the lungs., Disp: , Rfl:     topiramate (TOPAMAX) 100 MG tablet, Take 1.5 tablets (150 mg total) by mouth 2 (two) times daily., Disp: 90 tablet, Rfl: 11    triamcinolone acetonide 0.1% (KENALOG) 0.1 % cream, Apply topically 3 (three) times daily., Disp: 400 g, Rfl: 2    azithromycin (Z-SANJU) 250 MG tablet, Take 1 tablet (250 mg total) by mouth once daily. Take first 2 tablets together, then 1 every day until finished., Disp: 6 tablet, Rfl: 0    docusate sodium (COLACE) 100 MG capsule, Take 1 capsule (100 mg total) by mouth 2 (two) times daily., Disp: 60 capsule, Rfl: 2    Last Labs:     No visits with results within 1 Month(s) from this visit.   Latest known visit with results is:   Office Visit on 10/04/2022   Component Date Value    Hemoglobin A1C 10/04/2022 6.2     Estimated Average Glucose 10/04/2022 120        Last Imaging:  X-Ray Facial Bones  3 Or More View  Narrative: EXAMINATION:  XR FACIAL BONES 3 OR MORE VIEW    CLINICAL HISTORY:  Epistaxis    COMPARISON:  None.    FINDINGS:  No air-fluid levels or abnormal soft tissue density are present within the sinuses. No fracture is identified. Sinuses and orbits appear normally formed.  Patient is  edentulous.  No other abnormality seen.  Impression: No evidence of sinus abnormality demonstrated.    Electronically signed by: Modesto Tristan  Date:    11/08/2022  Time:    15:28         Review of Systems   HENT:  Positive for nosebleeds.    Musculoskeletal:  Positive for arthritis.   Integumentary:         Lump left side of face   All other systems reviewed and are negative.      Objective:      Physical Exam  Vitals reviewed.   Constitutional:       Appearance: Normal appearance. She is normal weight.   Cardiovascular:      Rate and Rhythm: Normal rate and regular rhythm.      Pulses: Normal pulses.      Heart sounds: Normal heart sounds.   Pulmonary:      Effort: Pulmonary effort is normal.      Breath sounds: Normal breath sounds.   Abdominal:      General: Abdomen is flat. Bowel sounds are normal.      Palpations: Abdomen is soft.   Musculoskeletal:         General: Normal range of motion.      Cervical back: Normal range of motion and neck supple.   Skin:     General: Skin is warm and dry.   Neurological:      General: No focal deficit present.      Mental Status: She is alert and oriented to person, place, and time. Mental status is at baseline.       Assessment:       1. Epistaxis  CBC Auto Differential    Sedimentation Rate    X-Ray Facial Bones  3 Or More View    US Soft Tissue Head Neck Thyroid    CBC Auto Differential    Sedimentation Rate    Ambulatory referral/consult to ENT      2. Bilateral leg and foot pain  VAS US Venous Legs Bilateral      3. Tobacco dependence             Plan:         Holly was seen today for epistaxis, arthritis and facial pain.    Diagnoses and all orders for this visit:    Epistaxis  -     CBC Auto Differential; Future  -     Sedimentation Rate; Future  -     X-Ray Facial Bones  3 Or More View; Future  -     US Soft Tissue Head Neck Thyroid; Future  -     CBC Auto Differential  -     Sedimentation Rate  -     Ambulatory referral/consult to ENT; Future    Bilateral leg  and foot pain  -     VAS US Venous Legs Bilateral; Future    Tobacco dependence    Other orders  The following orders have not been finalized:  -     hydrOXYzine HCL (ATARAX) 25 MG tablet

## 2022-11-08 NOTE — PATIENT INSTRUCTIONS
Holly was seen today for epistaxis, arthritis and facial pain.    Diagnoses and all orders for this visit:    Epistaxis  -     CBC Auto Differential; Future  -     Sedimentation Rate; Future  -     X-Ray Facial Bones  3 Or More View; Future  -     US Soft Tissue Head Neck Thyroid; Future  -     CBC Auto Differential  -     Sedimentation Rate  -     Ambulatory referral/consult to ENT; Future    Bilateral leg and foot pain  -     VAS US Venous Legs Bilateral; Future    Tobacco dependence    Other orders  The following orders have not been finalized:  -     hydrOXYzine HCL (ATARAX) 25 MG tablet

## 2022-11-24 ENCOUNTER — HOSPITAL ENCOUNTER (EMERGENCY)
Facility: HOSPITAL | Age: 62
Discharge: HOME | End: 2022-11-24
Attending: INTERNAL MEDICINE
Payer: MEDICARE

## 2022-11-24 VITALS
OXYGEN SATURATION: 100 % | TEMPERATURE: 98 F | BODY MASS INDEX: 44.41 KG/M2 | WEIGHT: 293 LBS | HEART RATE: 89 BPM | HEIGHT: 68 IN | DIASTOLIC BLOOD PRESSURE: 63 MMHG | SYSTOLIC BLOOD PRESSURE: 137 MMHG | RESPIRATION RATE: 20 BRPM

## 2022-11-24 DIAGNOSIS — G89.29 TOE PAIN, CHRONIC, RIGHT: ICD-10-CM
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
## 2022-11-24 DIAGNOSIS — M79.674 TOE PAIN, CHRONIC, RIGHT: ICD-10-CM

## 2022-11-24 DIAGNOSIS — S80.01XA CONTUSION OF RIGHT KNEE, INITIAL ENCOUNTER: Primary | ICD-10-CM

## 2022-11-24 PROCEDURE — 63600175 PHARM REV CODE 636 W HCPCS: Performed by: INTERNAL MEDICINE

## 2022-11-24 PROCEDURE — 99284 EMERGENCY DEPT VISIT MOD MDM: CPT

## 2022-11-24 PROCEDURE — 99284 EMERGENCY DEPT VISIT MOD MDM: CPT | Performed by: INTERNAL MEDICINE

## 2022-11-24 PROCEDURE — 96372 THER/PROPH/DIAG INJ SC/IM: CPT | Performed by: INTERNAL MEDICINE

## 2022-11-24 RX ORDER — KETOROLAC TROMETHAMINE 15 MG/ML
15 INJECTION, SOLUTION INTRAMUSCULAR; INTRAVENOUS
Status: COMPLETED | OUTPATIENT
Start: 2022-11-24 | End: 2022-11-24

## 2022-11-24 RX ADMIN — KETOROLAC TROMETHAMINE 15 MG: 15 INJECTION, SOLUTION INTRAMUSCULAR; INTRAVENOUS at 03:11

## 2022-11-24 NOTE — ED PROVIDER NOTES
Encounter Date: 11/24/2022       History     Chief Complaint   Patient presents with    Fall     Patient comes in said that she fell this morning cut her right knee gave way.  She has chronic right knee pain is wearing abrasion has seen orthopedics multiple times.  She is had ultrasounds and she is on Eliquis.  Complaining mainly of right foot pain now.      Review of patient's allergies indicates:   Allergen Reactions    Ammonium peroxydisulfate Shortness Of Breath    Avocado (laurus persea) Anaphylaxis    Bananas [banana] Anaphylaxis and Swelling     CRAMPS,    Chocolate flavor Anaphylaxis     MOUTH SWELLING    Fentanyl Shortness Of Breath and Itching    Percocet [oxycodone-acetaminophen] Shortness Of Breath and Itching    Silvadene [silver sulfadiazine]     Clindamycin     Corticosteroids (glucocorticoids)     Hydrocortisone Blisters    Lasix [furosemide] Blisters     BURNS SKIN    Nutritional supplement-fiber Itching    Pregabalin     Shellfish containing products     Adhesive Rash and Blisters    Iodine and iodide containing products Rash    Latex, natural rubber Rash    Pcn [penicillins] Rash    Sulfa (sulfonamide antibiotics) Rash and Blisters     Past Medical History:   Diagnosis Date    Anxiety state     Atherosclerosis of native artery of extremity with intermittent claudication 01/30/2019    bilateral legs    Bilateral leg pain     BMI 50.0-59.9, adult 02/22/2021    Cellulitis 06/11/2020    Chronic pain syndrome     Chronic peripheral venous hypertension 01/08/2019    COPD (chronic obstructive pulmonary disease)     Diabetes     Diabetes mellitus, type 2     DVT (deep venous thrombosis) 02/12/2020    Embolism and thrombosis of superficial veins of unspecified lower extremity 07/01/2019    bilateral    Frequent headaches 09/20/2018    GERD (gastroesophageal reflux disease)     Hereditary lymphedema     Hx of thyroid cancer     Hyperlipidemia     Hypertension     Hypothyroidism     Migraines     Migraines      Morbid obesity     Nicotine dependence     Non-pressure chronic ulcer of left lower leg 03/09/2021    Osteoarthritis     Other skin changes 03/09/2021    bilateral gaiter regions    Pain in left leg     Pain in right leg     PVD (peripheral vascular disease) 01/08/2019    Seizure disorder     Sleep apnea     Venous insufficiency (chronic) (peripheral)     Venous stasis     Vitamin D deficiency      Past Surgical History:   Procedure Laterality Date    HYSTERECTOMY      ILIAC ARTERY STENT Bilateral 01/28/2020    Bilateral distal aorta and common iliac 8 X 59 vbx covered stents performed by Dr. Antonio Jacinto.    RADIOFREQUENCY ABLATION Left 04/15/2022    Procedure: Left calf  Radiofrequency Ablation;  Surgeon: Matty Falcon DO;  Location: [facility];  Service: Vascular;  Laterality: Left;    STAB PHLEBECTOMY OF VARICOSE VEINS Left 01/25/2013    Left leg microphlebectomies x 23 stab avulsions and ligation of multiple varicose veins perrformed by Dr. Cirilo Aguirre.    THYROIDECTOMY      hx: thyroid cancer    TOE AMPUTATION Left 01/28/2020    2nd, 4th and 5th performed by Dr. Anam Saeed.    TOE AMPUTATION Right 01/28/2020    4th toe performed by Dr. Anam Saeed.    TOTAL ABDOMINAL HYSTERECTOMY W/ BILATERAL SALPINGOOPHORECTOMY      TUBAL LIGATION      VAGINAL DELIVERY      x 4    VENOUS ABLATION Right 08/09/2019    GSV Varithena Ablation performed by Dr. Estuardo Falcon    VENOUS ABLATION Left 08/02/2019    ATAV Varithena Ablation performed by Dr. Estuardo Falcon.    VENOUS ABLATION Right 07/13/2015    ATAV Laser Ablatio performed by Dr. Cirilo Aguirre.    VENOUS ABLATION Right 07/06/2015    Distal  GSV Laser Ablation performed by dr. Cirilo Aguirre.    VENOUS ABLATION Left 04/20/2015    Left Distal GSV Laser Ablation performed by dr. Cirilo Aguirre.    VENOUS ABLATION Right 10/28/2013    Right Distal GSV RF Ablation performed by Dr. Cirilo Aguirre.    VENOUS ABLATION Left 10/25/2013    SSV RF Ablation performed by dr. Cirilo Aguirre.    VENOUS  ABLATION Left 10/07/2013    Left GSV and Left ATAV RF Ablation performed by Dr. Cirilo Aguirre.    VENOUS ABLATION Right 01/21/2013    GSV RF Ablation w/micros x 22 performed by Dr. Cirilo Aguirre.    VENOUS ABLATION Left 06/25/2021    Left distal GSV Varithena Ablation     Family History   Problem Relation Age of Onset    Heart disease Mother         age 84 CHF    Hypertension Mother     Osteoarthritis Mother     Coronary aneurysm Father     Coronary artery disease Father     Hypertension Father     Heart disease Father     No Known Problems Son     No Known Problems Son     No Known Problems Son     No Known Problems Son     No Known Problems Sister     No Known Problems Brother         hx: varicose veins    No Known Problems Brother         MVA: parlazed    No Known Problems Brother      Social History     Tobacco Use    Smoking status: Every Day     Packs/day: 1.00     Years: 33.00     Pack years: 33.00     Types: Cigarettes    Smokeless tobacco: Never   Substance Use Topics    Alcohol use: Never    Drug use: Never     Review of Systems   Constitutional:  Negative for fever.   HENT:  Negative for sore throat.    Respiratory:  Negative for shortness of breath.    Cardiovascular:  Negative for chest pain.   Gastrointestinal:  Negative for nausea.   Genitourinary:  Negative for dysuria.   Musculoskeletal:  Negative for back pain.   Skin:  Negative for rash.   Neurological:  Negative for weakness.   Hematological:  Does not bruise/bleed easily.     Physical Exam     Initial Vitals [11/24/22 1427]   BP Pulse Resp Temp SpO2   (!) 159/65 89 20 98.2 °F (36.8 °C) 100 %      MAP       --         Physical Exam    Vitals reviewed.  Constitutional: She appears well-developed.   Eyes: Pupils are equal, round, and reactive to light.   Neck:   Normal range of motion.  Cardiovascular:  Normal rate.           Pulmonary/Chest: Breath sounds normal.   Abdominal: Abdomen is soft.   Musculoskeletal:      Cervical back: Normal range of motion.       Right knee: Bony tenderness and crepitus present. Decreased range of motion. Tenderness present. No lateral joint line tenderness.        Legs:       Comments: 1. Bony crepitus at the knee decreased range of motion secondary to pain no deformity.2.  Right little toe shows some deformity tenderness some bleeding secondary to trauma.  Chronic swelling of the right lower leg.  Neurovascular intact.     Neurological: She is alert. She has normal strength and normal reflexes. No cranial nerve deficit or sensory deficit. GCS eye subscore is 4. GCS verbal subscore is 5. GCS motor subscore is 6.   Skin: Skin is warm.   Psychiatric: She has a normal mood and affect.       Medical Screening Exam   See Full Note    ED Course   Procedures  Labs Reviewed - No data to display       Imaging Results              X-Ray Knee 1 or 2 View Right (Final result)  Result time 11/24/22 14:52:55      Final result by Christian Foss DO (11/24/22 14:52:55)                   Impression:      As above.    Point of Service: [facility]      Electronically signed by: Christian Foss  Date:    11/24/2022  Time:    14:52               Narrative:    EXAMINATION:  XR KNEE 1 OR 2 VIEW RIGHT    CLINICAL HISTORY:  Fall (on) (from) other stairs and steps, initial encounter    COMPARISON:  Right knee x-ray September 22, 2022    TECHNIQUE:  Frontal and lateral views of the right knee.    FINDINGS:  Moderate loss of joint space of the medial compartment.  Mild tricompartmental marginal osteophyte formation.  No acute fracture or dislocation.                                       X-Ray Foot Complete Right (Final result)  Result time 11/24/22 14:57:25      Final result by Christian Foss DO (11/24/22 14:57:25)                   Impression:      As above.    Point of Service: [facility]      Electronically signed by: Christian Foss  Date:    11/24/2022  Time:    14:57               Narrative:    EXAMINATION:  XR FOOT COMPLETE 3 VIEW  RIGHT    CLINICAL HISTORY:  Fall (on) (from) other stairs and steps, initial encounter    COMPARISON:  Bilateral foot x-ray January 7, 2020    TECHNIQUE:  Frontal, lateral, and oblique views of the right foot.    FINDINGS:  There is amputation change of the 4th digit to the level of the proximal aspect of the proximal phalanx.  There are suspected erosive changes of the metatarsal heads of the 2nd through 5th digits suspicious for sequela of osteomyelitis.  Age indeterminate mildly displaced fractures involving the distal metaphyses of the 2nd through 4th digits.  Significant soft tissue swelling.  Prominent plantar calcaneal spurring.                                       Medications   ketorolac injection 15 mg (has no administration in time range)     Medical Decision Making:   ED Management:  X-ray the right foot shows no fracture dislocation.  He does have some areas of chronic osteomyelitis or according to radiologist some sequelae age indeterminate.  She states Dr. Saeed sees her for this as well as she sees Dr. Avalos and vascular.  Advised her she needs to go back to Dr. Avalos and vascular to see if they need to consider amputation of that toe.  Patient is he Dr. Avalos on Monday.           ED Course as of 11/24/22 1526   Thu Nov 24, 2022   1456 X-Ray Knee 1 or 2 View Right [PW]   1501 X-Ray Foot Complete Right [PW]      ED Course User Index  [PW] Jett Sunshine MD          Clinical Impression:   Final diagnoses:  [W10.8XXA] Fall (on) (from) other stairs and steps, initial encounter  [S80.01XA] Contusion of right knee, initial encounter (Primary)  [M79.674, G89.29] Toe pain, chronic, right        ED Disposition Condition    Discharge Stable          ED Prescriptions    None       Follow-up Information       Follow up With Specialties Details Why Contact Info    Regan Frausto MD Internal Medicine, Family Medicine In 3 days  [address] [place] [address]  [facility] [place]  [address]  [phone]      Regan Frausto MD Internal Medicine, Family Medicine   [address] [place] [address]  [facility] [address]  [phone]               Jett Sunshine MD  11/24/22 6136

## 2022-11-28 ENCOUNTER — TELEPHONE (OUTPATIENT)
Dept: OBSTETRICS AND GYNECOLOGY | Facility: CLINIC | Age: 62
End: 2022-11-28
Payer: COMMERCIAL

## 2022-11-30 ENCOUNTER — TELEPHONE (OUTPATIENT)
Dept: FAMILY MEDICINE | Facility: CLINIC | Age: 62
End: 2022-11-30
Payer: COMMERCIAL

## 2022-11-30 NOTE — TELEPHONE ENCOUNTER
1554- pt called and stated that she fell Thanksgiving morning and stated that she injured herself getting out of bed; stated it was a toe next to the one that is cut off; also stated that she went to the [facility] 11/24/22 and stated they did xrays but no blood work; c/o bilateral feet edema. Informed pt to come in to see Dr. Frausto so a plan of care can be made for the fall that she had; pt stated she will come in the morning to facility. No other questions asked at this time.

## 2022-12-01 ENCOUNTER — HOSPITAL ENCOUNTER (INPATIENT)
Facility: HOSPITAL | Age: 62
LOS: 3 days | Discharge: HOME | DRG: 603 | End: 2022-12-04
Attending: HOSPITALIST | Admitting: HOSPITALIST
Payer: COMMERCIAL

## 2022-12-01 ENCOUNTER — OFFICE VISIT (OUTPATIENT)
Dept: FAMILY MEDICINE | Facility: CLINIC | Age: 62
End: 2022-12-01
Payer: COMMERCIAL

## 2022-12-01 VITALS
SYSTOLIC BLOOD PRESSURE: 146 MMHG | HEART RATE: 71 BPM | WEIGHT: 293 LBS | RESPIRATION RATE: 20 BRPM | BODY MASS INDEX: 44.41 KG/M2 | DIASTOLIC BLOOD PRESSURE: 89 MMHG | TEMPERATURE: 97 F | OXYGEN SATURATION: 96 % | HEIGHT: 68 IN

## 2022-12-01 DIAGNOSIS — T14.8XXA FRACTURE: Primary | ICD-10-CM

## 2022-12-01 DIAGNOSIS — R07.9 CHEST PAIN: ICD-10-CM

## 2022-12-01 DIAGNOSIS — Z79.4 TYPE 2 DIABETES MELLITUS WITHOUT COMPLICATION, WITH LONG-TERM CURRENT USE OF INSULIN: ICD-10-CM

## 2022-12-01 DIAGNOSIS — E11.628 TYPE 2 DIABETES MELLITUS WITH OTHER SKIN COMPLICATION, WITH LONG-TERM CURRENT USE OF INSULIN: ICD-10-CM

## 2022-12-01 DIAGNOSIS — L03.115 CELLULITIS OF RIGHT FOOT: ICD-10-CM
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
## 2022-12-01 DIAGNOSIS — L03.115 CELLULITIS OF RIGHT LOWER EXTREMITY: Primary | ICD-10-CM

## 2022-12-01 DIAGNOSIS — E11.9 TYPE 2 DIABETES MELLITUS WITHOUT COMPLICATION, WITH LONG-TERM CURRENT USE OF INSULIN: ICD-10-CM

## 2022-12-01 DIAGNOSIS — Z79.4 TYPE 2 DIABETES MELLITUS WITH OTHER SKIN COMPLICATION, WITH LONG-TERM CURRENT USE OF INSULIN: ICD-10-CM

## 2022-12-01 PROBLEM — Z72.0 TOBACCO USE: Status: ACTIVE | Noted: 2022-12-01

## 2022-12-01 LAB
ALBUMIN SERPL BCP-MCNC: 3.8 G/DL (ref 3.5–5)
ALBUMIN/GLOB SERPL: 1 {RATIO}
ALP SERPL-CCNC: 102 U/L (ref 50–130)
ALT SERPL W P-5'-P-CCNC: 23 U/L (ref 13–56)
ANION GAP SERPL CALCULATED.3IONS-SCNC: 13 MMOL/L (ref 7–16)
ANISOCYTOSIS BLD QL SMEAR: ABNORMAL
APTT PPP: 31.1 SECONDS (ref 25.2–37.3)
AST SERPL W P-5'-P-CCNC: 24 U/L (ref 15–37)
BASOPHILS # BLD AUTO: 0.08 K/UL (ref 0–0.2)
BASOPHILS NFR BLD AUTO: 0.8 % (ref 0–1)
BILIRUB SERPL-MCNC: 0.5 MG/DL (ref ?–1.2)
BILIRUB UR QL STRIP: NEGATIVE
BUN SERPL-MCNC: 14 MG/DL (ref 7–18)
BUN/CREAT SERPL: 12 (ref 6–20)
CALCIUM SERPL-MCNC: 9.6 MG/DL (ref 8.5–10.1)
CHLORIDE SERPL-SCNC: 100 MMOL/L (ref 98–107)
CLARITY UR: CLEAR
CO2 SERPL-SCNC: 30 MMOL/L (ref 21–32)
COLOR UR: YELLOW
CREAT SERPL-MCNC: 1.18 MG/DL (ref 0.55–1.02)
CRP SERPL-MCNC: 3.7 MG/DL (ref 0–0.8)
DIFFERENTIAL METHOD BLD: ABNORMAL
EGFR (NO RACE VARIABLE) (RUSH/TITUS): 52 ML/MIN/1.73M²
EOSINOPHIL # BLD AUTO: 0.24 K/UL (ref 0–0.5)
EOSINOPHIL NFR BLD AUTO: 2.3 % (ref 1–4)
EOSINOPHIL NFR BLD MANUAL: 3 % (ref 1–4)
ERYTHROCYTE [DISTWIDTH] IN BLOOD BY AUTOMATED COUNT: 16 % (ref 11.5–14.5)
ERYTHROCYTE [SEDIMENTATION RATE] IN BLOOD BY WESTERGREN METHOD: 19 MM/HR (ref 0–30)
FERRITIN SERPL-MCNC: 51 NG/ML (ref 8–252)
GLOBULIN SER-MCNC: 4 G/DL (ref 2–4)
GLUCOSE SERPL-MCNC: 114 MG/DL (ref 70–105)
GLUCOSE SERPL-MCNC: 142 MG/DL (ref 70–105)
GLUCOSE SERPL-MCNC: 98 MG/DL (ref 74–106)
GLUCOSE UR STRIP-MCNC: NORMAL MG/DL
HCT VFR BLD AUTO: 53.1 % (ref 38–47)
HGB BLD-MCNC: 16.2 G/DL (ref 12–16)
IMM GRANULOCYTES # BLD AUTO: 0.07 K/UL (ref 0–0.04)
IMM GRANULOCYTES NFR BLD: 0.7 % (ref 0–0.4)
INR BLD: 1.04
IRON SATN MFR SERPL: 12 % (ref 14–50)
IRON SERPL-MCNC: 46 ΜG/DL (ref 50–170)
KETONES UR STRIP-SCNC: NEGATIVE MG/DL
LEUKOCYTE ESTERASE UR QL STRIP: NEGATIVE
LYMPHOCYTES # BLD AUTO: 2.31 K/UL (ref 1–4.8)
LYMPHOCYTES NFR BLD AUTO: 21.7 % (ref 27–41)
LYMPHOCYTES NFR BLD MANUAL: 22 % (ref 27–41)
MCH RBC QN AUTO: 26.3 PG (ref 27–31)
MCHC RBC AUTO-ENTMCNC: 30.5 G/DL (ref 32–36)
MCV RBC AUTO: 86.3 FL (ref 80–96)
MONOCYTES # BLD AUTO: 0.63 K/UL (ref 0–0.8)
MONOCYTES NFR BLD AUTO: 5.9 % (ref 2–6)
MONOCYTES NFR BLD MANUAL: 6 % (ref 2–6)
MPC BLD CALC-MCNC: 12.2 FL (ref 9.4–12.4)
NEUTROPHILS # BLD AUTO: 7.3 K/UL (ref 1.8–7.7)
NEUTROPHILS NFR BLD AUTO: 68.6 % (ref 53–65)
NEUTS SEG NFR BLD MANUAL: 69 % (ref 50–62)
NITRITE UR QL STRIP: NEGATIVE
NRBC # BLD AUTO: 0 X10E3/UL
NRBC, AUTO (.00): 0 %
PH UR STRIP: 5 PH UNITS
PLATELET # BLD AUTO: 308 K/UL (ref 150–400)
PLATELET MORPHOLOGY: ABNORMAL
POTASSIUM SERPL-SCNC: 3.6 MMOL/L (ref 3.5–5.1)
PROT SERPL-MCNC: 7.8 G/DL (ref 6.4–8.2)
PROT UR QL STRIP: 10
PROTHROMBIN TIME: 13.2 SECONDS (ref 11.7–14.7)
RBC # BLD AUTO: 6.15 M/UL (ref 4.2–5.4)
RBC # UR STRIP: NEGATIVE /UL
SODIUM SERPL-SCNC: 139 MMOL/L (ref 136–145)
SP GR UR STRIP: 1.03
TIBC SERPL-MCNC: 376 ΜG/DL (ref 250–450)
TSH SERPL DL<=0.005 MIU/L-ACNC: 2 UIU/ML (ref 0.36–3.74)
UROBILINOGEN UR STRIP-ACNC: 2 MG/DL
WBC # BLD AUTO: 10.63 K/UL (ref 4.5–11)

## 2022-12-01 PROCEDURE — 3077F SYST BP >= 140 MM HG: CPT | Mod: ,,, | Performed by: INTERNAL MEDICINE

## 2022-12-01 PROCEDURE — 81003 URINALYSIS AUTO W/O SCOPE: CPT | Performed by: NURSE PRACTITIONER

## 2022-12-01 PROCEDURE — S0030 INJECTION, METRONIDAZOLE: HCPCS | Performed by: NURSE PRACTITIONER

## 2022-12-01 PROCEDURE — 3079F PR MOST RECENT DIASTOLIC BLOOD PRESSURE 80-89 MM HG: ICD-10-PCS | Mod: ,,, | Performed by: INTERNAL MEDICINE

## 2022-12-01 PROCEDURE — 85025 COMPLETE CBC W/AUTO DIFF WBC: CPT | Performed by: NURSE PRACTITIONER

## 2022-12-01 PROCEDURE — 3077F PR MOST RECENT SYSTOLIC BLOOD PRESSURE >= 140 MM HG: ICD-10-PCS | Mod: ,,, | Performed by: INTERNAL MEDICINE

## 2022-12-01 PROCEDURE — 1159F MED LIST DOCD IN RCRD: CPT | Mod: ,,, | Performed by: INTERNAL MEDICINE

## 2022-12-01 PROCEDURE — 94640 AIRWAY INHALATION TREATMENT: CPT

## 2022-12-01 PROCEDURE — 99900031 HC PATIENT EDUCATION (STAT)

## 2022-12-01 PROCEDURE — 96368 THER/DIAG CONCURRENT INF: CPT

## 2022-12-01 PROCEDURE — 82728 ASSAY OF FERRITIN: CPT | Performed by: STUDENT IN AN ORGANIZED HEALTH CARE EDUCATION/TRAINING PROGRAM

## 2022-12-01 PROCEDURE — 25000003 PHARM REV CODE 250: Performed by: STUDENT IN AN ORGANIZED HEALTH CARE EDUCATION/TRAINING PROGRAM

## 2022-12-01 PROCEDURE — 86140 C-REACTIVE PROTEIN: CPT | Performed by: STUDENT IN AN ORGANIZED HEALTH CARE EDUCATION/TRAINING PROGRAM

## 2022-12-01 PROCEDURE — 11000001 HC ACUTE MED/SURG PRIVATE ROOM

## 2022-12-01 PROCEDURE — 1160F RVW MEDS BY RX/DR IN RCRD: CPT | Mod: ,,, | Performed by: INTERNAL MEDICINE

## 2022-12-01 PROCEDURE — 99223 1ST HOSP IP/OBS HIGH 75: CPT | Mod: ,,, | Performed by: STUDENT IN AN ORGANIZED HEALTH CARE EDUCATION/TRAINING PROGRAM

## 2022-12-01 PROCEDURE — 3079F DIAST BP 80-89 MM HG: CPT | Mod: ,,, | Performed by: INTERNAL MEDICINE

## 2022-12-01 PROCEDURE — 63600175 PHARM REV CODE 636 W HCPCS: Performed by: STUDENT IN AN ORGANIZED HEALTH CARE EDUCATION/TRAINING PROGRAM

## 2022-12-01 PROCEDURE — 99285 EMERGENCY DEPT VISIT HI MDM: CPT | Mod: 25

## 2022-12-01 PROCEDURE — 99214 PR OFFICE/OUTPT VISIT, EST, LEVL IV, 30-39 MIN: ICD-10-PCS | Mod: ,,, | Performed by: INTERNAL MEDICINE

## 2022-12-01 PROCEDURE — 96375 TX/PRO/DX INJ NEW DRUG ADDON: CPT

## 2022-12-01 PROCEDURE — 93010 ELECTROCARDIOGRAM REPORT: CPT | Mod: ,,, | Performed by: STUDENT IN AN ORGANIZED HEALTH CARE EDUCATION/TRAINING PROGRAM

## 2022-12-01 PROCEDURE — 85730 THROMBOPLASTIN TIME PARTIAL: CPT | Performed by: NURSE PRACTITIONER

## 2022-12-01 PROCEDURE — 3061F PR NEG MICROALBUMINURIA RESULT DOCUMENTED/REVIEW: ICD-10-PCS | Mod: ,,, | Performed by: INTERNAL MEDICINE

## 2022-12-01 PROCEDURE — 1159F PR MEDICATION LIST DOCUMENTED IN MEDICAL RECORD: ICD-10-PCS | Mod: ,,, | Performed by: INTERNAL MEDICINE

## 2022-12-01 PROCEDURE — 3008F PR BODY MASS INDEX (BMI) DOCUMENTED: ICD-10-PCS | Mod: ,,, | Performed by: INTERNAL MEDICINE

## 2022-12-01 PROCEDURE — 83540 ASSAY OF IRON: CPT | Performed by: STUDENT IN AN ORGANIZED HEALTH CARE EDUCATION/TRAINING PROGRAM

## 2022-12-01 PROCEDURE — 87040 BLOOD CULTURE FOR BACTERIA: CPT | Performed by: NURSE PRACTITIONER

## 2022-12-01 PROCEDURE — 82962 GLUCOSE BLOOD TEST: CPT

## 2022-12-01 PROCEDURE — 99214 OFFICE O/P EST MOD 30 MIN: CPT | Mod: ,,, | Performed by: INTERNAL MEDICINE

## 2022-12-01 PROCEDURE — 25000003 PHARM REV CODE 250: Performed by: NURSE PRACTITIONER

## 2022-12-01 PROCEDURE — 99223 PR INITIAL HOSPITAL CARE,LEVL III: ICD-10-PCS | Mod: ,,, | Performed by: STUDENT IN AN ORGANIZED HEALTH CARE EDUCATION/TRAINING PROGRAM

## 2022-12-01 PROCEDURE — 36415 COLL VENOUS BLD VENIPUNCTURE: CPT | Performed by: NURSE PRACTITIONER

## 2022-12-01 PROCEDURE — 3066F NEPHROPATHY DOC TX: CPT | Mod: ,,, | Performed by: INTERNAL MEDICINE

## 2022-12-01 PROCEDURE — 3066F PR DOCUMENTATION OF TREATMENT FOR NEPHROPATHY: ICD-10-PCS | Mod: ,,, | Performed by: INTERNAL MEDICINE

## 2022-12-01 PROCEDURE — 3061F NEG MICROALBUMINURIA REV: CPT | Mod: ,,, | Performed by: INTERNAL MEDICINE

## 2022-12-01 PROCEDURE — 1160F PR REVIEW ALL MEDS BY PRESCRIBER/CLIN PHARMACIST DOCUMENTED: ICD-10-PCS | Mod: ,,, | Performed by: INTERNAL MEDICINE

## 2022-12-01 PROCEDURE — 84443 ASSAY THYROID STIM HORMONE: CPT | Performed by: STUDENT IN AN ORGANIZED HEALTH CARE EDUCATION/TRAINING PROGRAM

## 2022-12-01 PROCEDURE — S0073 INJECTION, AZTREONAM, 500 MG: HCPCS | Performed by: NURSE PRACTITIONER

## 2022-12-01 PROCEDURE — 80053 COMPREHEN METABOLIC PANEL: CPT | Performed by: NURSE PRACTITIONER

## 2022-12-01 PROCEDURE — 99285 EMERGENCY DEPT VISIT HI MDM: CPT | Mod: ,,, | Performed by: NURSE PRACTITIONER

## 2022-12-01 PROCEDURE — 93010 EKG 12-LEAD: ICD-10-PCS | Mod: ,,, | Performed by: STUDENT IN AN ORGANIZED HEALTH CARE EDUCATION/TRAINING PROGRAM

## 2022-12-01 PROCEDURE — 96365 THER/PROPH/DIAG IV INF INIT: CPT

## 2022-12-01 PROCEDURE — 85651 RBC SED RATE NONAUTOMATED: CPT | Performed by: STUDENT IN AN ORGANIZED HEALTH CARE EDUCATION/TRAINING PROGRAM

## 2022-12-01 PROCEDURE — 99285 PR EMERGENCY DEPT VISIT,LEVEL V: ICD-10-PCS | Mod: ,,, | Performed by: NURSE PRACTITIONER

## 2022-12-01 PROCEDURE — 85610 PROTHROMBIN TIME: CPT | Performed by: NURSE PRACTITIONER

## 2022-12-01 PROCEDURE — 3044F PR MOST RECENT HEMOGLOBIN A1C LEVEL <7.0%: ICD-10-PCS | Mod: ,,, | Performed by: INTERNAL MEDICINE

## 2022-12-01 PROCEDURE — 3008F BODY MASS INDEX DOCD: CPT | Mod: ,,, | Performed by: INTERNAL MEDICINE

## 2022-12-01 PROCEDURE — 25000242 PHARM REV CODE 250 ALT 637 W/ HCPCS: Performed by: STUDENT IN AN ORGANIZED HEALTH CARE EDUCATION/TRAINING PROGRAM

## 2022-12-01 PROCEDURE — 94761 N-INVAS EAR/PLS OXIMETRY MLT: CPT

## 2022-12-01 PROCEDURE — 93005 ELECTROCARDIOGRAM TRACING: CPT

## 2022-12-01 PROCEDURE — 3044F HG A1C LEVEL LT 7.0%: CPT | Mod: ,,, | Performed by: INTERNAL MEDICINE

## 2022-12-01 RX ORDER — ALLOPURINOL 300 MG/1
300 TABLET ORAL DAILY
Status: DISCONTINUED | OUTPATIENT
Start: 2022-12-02 | End: 2022-12-04 | Stop reason: HOSPADM

## 2022-12-01 RX ORDER — FLUTICASONE FUROATE AND VILANTEROL 100; 25 UG/1; UG/1
1 POWDER RESPIRATORY (INHALATION) DAILY
Status: DISCONTINUED | OUTPATIENT
Start: 2022-12-02 | End: 2022-12-01

## 2022-12-01 RX ORDER — METOLAZONE 2.5 MG/1
2.5 TABLET ORAL DAILY
Status: DISCONTINUED | OUTPATIENT
Start: 2022-12-02 | End: 2022-12-04 | Stop reason: HOSPADM

## 2022-12-01 RX ORDER — LEVOTHYROXINE SODIUM 150 UG/1
150 TABLET ORAL
Status: DISCONTINUED | OUTPATIENT
Start: 2022-12-02 | End: 2022-12-04 | Stop reason: HOSPADM

## 2022-12-01 RX ORDER — SODIUM CHLORIDE 0.9 % (FLUSH) 0.9 %
10 SYRINGE (ML) INJECTION EVERY 12 HOURS PRN
Status: DISCONTINUED | OUTPATIENT
Start: 2022-12-01 | End: 2022-12-04 | Stop reason: HOSPADM

## 2022-12-01 RX ORDER — DOCUSATE SODIUM 100 MG/1
100 CAPSULE, LIQUID FILLED ORAL 2 TIMES DAILY
Status: DISCONTINUED | OUTPATIENT
Start: 2022-12-01 | End: 2022-12-04 | Stop reason: HOSPADM

## 2022-12-01 RX ORDER — IBUPROFEN 200 MG
16 TABLET ORAL
Status: DISCONTINUED | OUTPATIENT
Start: 2022-12-01 | End: 2022-12-04 | Stop reason: HOSPADM

## 2022-12-01 RX ORDER — ENOXAPARIN SODIUM 100 MG/ML
40 INJECTION SUBCUTANEOUS EVERY 12 HOURS
Status: DISCONTINUED | OUTPATIENT
Start: 2022-12-01 | End: 2022-12-04 | Stop reason: HOSPADM

## 2022-12-01 RX ORDER — METRONIDAZOLE 500 MG/100ML
500 INJECTION, SOLUTION INTRAVENOUS
Status: DISCONTINUED | OUTPATIENT
Start: 2022-12-01 | End: 2022-12-04 | Stop reason: HOSPADM

## 2022-12-01 RX ORDER — GLUCAGON 1 MG
1 KIT INJECTION
Status: DISCONTINUED | OUTPATIENT
Start: 2022-12-01 | End: 2022-12-04 | Stop reason: HOSPADM

## 2022-12-01 RX ORDER — CETIRIZINE HYDROCHLORIDE 10 MG/1
10 TABLET ORAL DAILY
Status: DISCONTINUED | OUTPATIENT
Start: 2022-12-02 | End: 2022-12-04 | Stop reason: HOSPADM

## 2022-12-01 RX ORDER — SPIRONOLACTONE 25 MG/1
50 TABLET ORAL DAILY
Status: DISCONTINUED | OUTPATIENT
Start: 2022-12-02 | End: 2022-12-04 | Stop reason: HOSPADM

## 2022-12-01 RX ORDER — NALOXONE HCL 0.4 MG/ML
0.02 VIAL (ML) INJECTION
Status: DISCONTINUED | OUTPATIENT
Start: 2022-12-01 | End: 2022-12-04 | Stop reason: HOSPADM

## 2022-12-01 RX ORDER — ASPIRIN 81 MG/1
81 TABLET ORAL DAILY
Status: DISCONTINUED | OUTPATIENT
Start: 2022-12-02 | End: 2022-12-04 | Stop reason: HOSPADM

## 2022-12-01 RX ORDER — BUDESONIDE 0.5 MG/2ML
0.5 INHALANT ORAL EVERY 12 HOURS
Status: DISCONTINUED | OUTPATIENT
Start: 2022-12-01 | End: 2022-12-04 | Stop reason: HOSPADM

## 2022-12-01 RX ORDER — IBUPROFEN 200 MG
24 TABLET ORAL
Status: DISCONTINUED | OUTPATIENT
Start: 2022-12-01 | End: 2022-12-04 | Stop reason: HOSPADM

## 2022-12-01 RX ORDER — PREGABALIN 50 MG/1
100 CAPSULE ORAL EVERY 12 HOURS
Status: DISCONTINUED | OUTPATIENT
Start: 2022-12-01 | End: 2022-12-04 | Stop reason: HOSPADM

## 2022-12-01 RX ORDER — QUETIAPINE FUMARATE 25 MG/1
25 TABLET, FILM COATED ORAL DAILY
Status: DISCONTINUED | OUTPATIENT
Start: 2022-12-02 | End: 2022-12-04 | Stop reason: HOSPADM

## 2022-12-01 RX ORDER — METRONIDAZOLE 500 MG/100ML
500 INJECTION, SOLUTION INTRAVENOUS
Status: DISCONTINUED | OUTPATIENT
Start: 2022-12-01 | End: 2022-12-01

## 2022-12-01 RX ORDER — PANTOPRAZOLE SODIUM 40 MG/1
40 TABLET, DELAYED RELEASE ORAL DAILY
Status: DISCONTINUED | OUTPATIENT
Start: 2022-12-02 | End: 2022-12-04 | Stop reason: HOSPADM

## 2022-12-01 RX ORDER — CILOSTAZOL 100 MG/1
100 TABLET ORAL 2 TIMES DAILY
Status: DISCONTINUED | OUTPATIENT
Start: 2022-12-01 | End: 2022-12-04 | Stop reason: HOSPADM

## 2022-12-01 RX ORDER — ALBUTEROL SULFATE 90 UG/1
2 AEROSOL, METERED RESPIRATORY (INHALATION) 4 TIMES DAILY
Status: DISCONTINUED | OUTPATIENT
Start: 2022-12-01 | End: 2022-12-01

## 2022-12-01 RX ORDER — INSULIN ASPART 100 [IU]/ML
1-10 INJECTION, SOLUTION INTRAVENOUS; SUBCUTANEOUS
Status: DISCONTINUED | OUTPATIENT
Start: 2022-12-01 | End: 2022-12-04 | Stop reason: HOSPADM

## 2022-12-01 RX ORDER — ALBUTEROL SULFATE 0.83 MG/ML
2.5 SOLUTION RESPIRATORY (INHALATION) EVERY 4 HOURS
Status: DISCONTINUED | OUTPATIENT
Start: 2022-12-01 | End: 2022-12-04 | Stop reason: HOSPADM

## 2022-12-01 RX ADMIN — ALBUTEROL SULFATE 2.5 MG: 2.5 SOLUTION RESPIRATORY (INHALATION) at 07:12

## 2022-12-01 RX ADMIN — BUDESONIDE 0.5 MG: 0.5 INHALANT ORAL at 07:12

## 2022-12-01 RX ADMIN — CEFEPIME 1 G: 1 INJECTION, POWDER, FOR SOLUTION INTRAMUSCULAR; INTRAVENOUS at 09:12

## 2022-12-01 RX ADMIN — METRONIDAZOLE 500 MG: 500 INJECTION, SOLUTION INTRAVENOUS at 04:12

## 2022-12-01 RX ADMIN — CILOSTAZOL 100 MG: 100 TABLET ORAL at 09:12

## 2022-12-01 RX ADMIN — ENOXAPARIN SODIUM 40 MG: 40 INJECTION SUBCUTANEOUS at 09:12

## 2022-12-01 RX ADMIN — TOPIRAMATE 150 MG: 100 TABLET, FILM COATED ORAL at 09:12

## 2022-12-01 RX ADMIN — VANCOMYCIN HYDROCHLORIDE 1750 MG: 500 INJECTION, POWDER, LYOPHILIZED, FOR SOLUTION INTRAVENOUS at 04:12

## 2022-12-01 RX ADMIN — DOCUSATE SODIUM 100 MG: 100 CAPSULE, LIQUID FILLED ORAL at 09:12

## 2022-12-01 RX ADMIN — AZTREONAM 1000 MG: 1 INJECTION, POWDER, LYOPHILIZED, FOR SOLUTION INTRAMUSCULAR; INTRAVENOUS at 04:12

## 2022-12-01 RX ADMIN — PREGABALIN 100 MG: 50 CAPSULE ORAL at 09:12

## 2022-12-01 NOTE — SUBJECTIVE & OBJECTIVE
Past Medical History:   Diagnosis Date    Anxiety state     Atherosclerosis of native artery of extremity with intermittent claudication 01/30/2019    bilateral legs    Bilateral leg pain     BMI 50.0-59.9, adult 02/22/2021    Cellulitis 06/11/2020    Chronic pain syndrome     Chronic peripheral venous hypertension 01/08/2019    COPD (chronic obstructive pulmonary disease)     Diabetes     Diabetes mellitus, type 2     DVT (deep venous thrombosis) 02/12/2020    Embolism and thrombosis of superficial veins of unspecified lower extremity 07/01/2019    bilateral    Frequent headaches 09/20/2018    GERD (gastroesophageal reflux disease)     Hereditary lymphedema     Hx of thyroid cancer     Hyperlipidemia     Hypertension     Hypothyroidism     Migraines     Migraines     Morbid obesity     Nicotine dependence     Non-pressure chronic ulcer of left lower leg 03/09/2021    Osteoarthritis     Other skin changes 03/09/2021    bilateral gaiter regions    Pain in left leg     Pain in right leg     PVD (peripheral vascular disease) 01/08/2019    Seizure disorder     Sleep apnea     Venous insufficiency (chronic) (peripheral)     Venous stasis     Vitamin D deficiency        Past Surgical History:   Procedure Laterality Date    HYSTERECTOMY      ILIAC ARTERY STENT Bilateral 01/28/2020    Bilateral distal aorta and common iliac 8 X 59 vbx covered stents performed by Dr. Antonio Jacinto.    RADIOFREQUENCY ABLATION Left 04/15/2022    Procedure: Left calf  Radiofrequency Ablation;  Surgeon: Matty Falcon DO;  Location: [facility] [place];  Service: Vascular;  Laterality: Left;    STAB PHLEBECTOMY OF VARICOSE VEINS Left 01/25/2013    Left leg microphlebectomies x 23 stab avulsions and ligation of multiple varicose veins perrformed by Dr. Cirilo Aguirre.    THYROIDECTOMY      hx: thyroid cancer    TOE AMPUTATION Left 01/28/2020    2nd, 4th and 5th performed by Dr. Anam Saeed.    TOE AMPUTATION Right 01/28/2020    4th toe performed by  Dr. Anam Saeed.    TOTAL ABDOMINAL HYSTERECTOMY W/ BILATERAL SALPINGOOPHORECTOMY      TUBAL LIGATION      VAGINAL DELIVERY      x 4    VENOUS ABLATION Right 08/09/2019    GSV Varithena Ablation performed by Dr. Estuardo Falcon    VENOUS ABLATION Left 08/02/2019    ATAV Varithena Ablation performed by Dr. Estuardo Falcon.    VENOUS ABLATION Right 07/13/2015    ATAV Laser Ablatio performed by Dr. Cirilo Aguirre.    VENOUS ABLATION Right 07/06/2015    Distal  GSV Laser Ablation performed by dr. Cirilo Aguirre.    VENOUS ABLATION Left 04/20/2015    Left Distal GSV Laser Ablation performed by dr. Cirilo Aguirre.    VENOUS ABLATION Right 10/28/2013    Right Distal GSV RF Ablation performed by Dr. Cirilo Aguirre.    VENOUS ABLATION Left 10/25/2013    SSV RF Ablation performed by dr. Cirilo Aguirre.    VENOUS ABLATION Left 10/07/2013    Left GSV and Left ATAV RF Ablation performed by Dr. Cirilo Aguirre.    VENOUS ABLATION Right 01/21/2013    GSV RF Ablation w/micros x 22 performed by Dr. Cirilo Aguirre.    VENOUS ABLATION Left 06/25/2021    Left distal GSV Varithena Ablation       Review of patient's allergies indicates:   Allergen Reactions    Ammonium peroxydisulfate Shortness Of Breath    Avocado (laurus persea) Anaphylaxis    Bananas [banana] Anaphylaxis and Swelling     CRAMPS,    Chocolate flavor Anaphylaxis     MOUTH SWELLING    Fentanyl Shortness Of Breath and Itching    Percocet [oxycodone-acetaminophen] Shortness Of Breath and Itching    Silvadene [silver sulfadiazine]     Clindamycin     Corticosteroids (glucocorticoids)     Hydrocortisone Blisters    Lasix [furosemide] Blisters     BURNS SKIN    Nutritional supplement-fiber Itching    Pregabalin     Shellfish containing products     Adhesive Rash and Blisters    Iodine and iodide containing products Rash    Latex, natural rubber Rash    Pcn [penicillins] Rash    Sulfa (sulfonamide antibiotics) Rash and Blisters       No current facility-administered medications on file prior to encounter.     Current  Outpatient Medications on File Prior to Encounter   Medication Sig    albuterol (PROVENTIL/VENTOLIN HFA) 90 mcg/actuation inhaler Inhale 2 puffs into the lungs 4 (four) times daily. Rescue    allopurinoL (ZYLOPRIM) 300 MG tablet Take 1 tablet (300 mg total) by mouth once daily.    amitriptyline (ELAVIL) 25 MG tablet Take 1 tablet (25 mg total) by mouth nightly as needed for Insomnia.    apixaban (ELIQUIS) 5 mg Tab Take 1 tablet (5 mg total) by mouth 2 (two) times daily.    aspirin (ECOTRIN) 81 MG EC tablet Take 1 tablet (81 mg total) by mouth once daily.    calcium carbonate (OS-MICHAELA) 500 mg calcium (1,250 mg) tablet Take 1 tablet (500 mg total) by mouth 2 (two) times daily.    cholecalciferol, vitamin D3, 125 mcg (5,000 unit) capsule Take 1 capsule (5,000 Units total) by mouth 2 (two) times a day.    cilostazoL (PLETAL) 100 MG Tab Take 1 tablet (100 mg total) by mouth 2 (two) times daily.    colchicine (COLCRYS) 0.6 mg tablet One pill three times a day for two days,then one pill daily till out    docusate sodium (COLACE) 100 MG capsule Take 1 capsule (100 mg total) by mouth 2 (two) times daily.    HYDROcodone-acetaminophen (NORCO)  mg per tablet Take 1 tablet by mouth every 6 (six) hours as needed for Pain.    insulin (LANTUS SOLOSTAR U-100 INSULIN) glargine 100 units/mL (3mL) SubQ pen Inject 10 units sq daily    insulin detemir U-100 (LEVEMIR FLEXTOUCH U-100 INSULN) 100 unit/mL (3 mL) InPn pen Inject 10 Units into the skin every evening. 15 ml with 1 refill    levothyroxine (SYNTHROID) 150 MCG tablet Take 1 tablet (150 mcg total) by mouth before breakfast.    loratadine (CLARITIN) 10 mg tablet Take 1 tablet (10 mg total) by mouth once daily.    losartan-hydrochlorothiazide 50-12.5 mg (HYZAAR) 50-12.5 mg per tablet Take 1 tablet by mouth once daily.    metOLazone (ZAROXOLYN) 2.5 MG tablet Take 1 tablet (2.5 mg total) by mouth once daily.    mupirocin (BACTROBAN) 2 % ointment Apply topically 2 (two) times  "daily.    nabumetone (RELAFEN) 750 MG tablet Take 1 tablet (750 mg total) by mouth 2 (two) times daily as needed for Pain.    NOVOFINE 32 32 gauge x 1/4" Ndle Use as directed once daily.    pantoprazole (PROTONIX) 40 MG tablet Take 1 tablet (40 mg total) by mouth once daily.    phentermine (ADIPEX-P) 37.5 mg tablet Take 1 tablet (37.5 mg total) by mouth before breakfast.    potassium chloride SA (K-DUR,KLOR-CON) 20 MEQ tablet Take 1 tablet (20 mEq total) by mouth once daily.    pregabalin (LYRICA) 100 MG capsule Take 1 capsule (100 mg total) by mouth every 12 (twelve) hours.    QUEtiapine (SEROQUEL) 25 MG Tab Take 1 tablet (25 mg total) by mouth once daily.    spironolactone (ALDACTONE) 50 MG tablet Take 1 tablet (50 mg total) by mouth once daily.    SYMBICORT 160-4.5 mcg/actuation HFAA Inhale into the lungs.    topiramate (TOPAMAX) 100 MG tablet Take 1.5 tablets (150 mg total) by mouth 2 (two) times daily.    triamcinolone acetonide 0.1% (KENALOG) 0.1 % cream Apply topically 3 (three) times daily.    [DISCONTINUED] azithromycin (Z-SANJU) 250 MG tablet Take 1 tablet (250 mg total) by mouth once daily. Take first 2 tablets together, then 1 every day until finished. (Patient not taking: Reported on 12/1/2022)    [DISCONTINUED] hydrOXYzine HCL (ATARAX) 25 MG tablet Take 1 tablet (25 mg total) by mouth 2 (two) times a day.     Family History       Problem Relation (Age of Onset)    Coronary aneurysm Father    Coronary artery disease Father    Heart disease Mother, Father    Hypertension Mother, Father    No Known Problems Son, Son, Son, Son, Sister, Brother, Brother, Brother    Osteoarthritis Mother          Tobacco Use    Smoking status: Every Day     Packs/day: 1.00     Years: 33.00     Pack years: 33.00     Types: Cigarettes    Smokeless tobacco: Never   Substance and Sexual Activity    Alcohol use: Never    Drug use: Never    Sexual activity: Not Currently     Review of Systems   Constitutional:  Negative for chills, " fatigue, fever and unexpected weight change.   HENT:  Negative for congestion, mouth sores and sore throat.    Eyes:  Negative for photophobia and visual disturbance.   Respiratory:  Negative for cough, chest tightness, shortness of breath and wheezing.    Cardiovascular:  Positive for leg swelling. Negative for chest pain and palpitations.   Gastrointestinal:  Negative for abdominal pain, diarrhea, nausea and vomiting.   Endocrine: Negative for cold intolerance and heat intolerance.   Genitourinary:  Negative for difficulty urinating, dysuria, frequency and urgency.   Musculoskeletal:  Positive for joint swelling. Negative for arthralgias, back pain and myalgias.        Right foot redness, swelling and tenderness   Skin:  Negative for pallor and rash.   Neurological:  Negative for tremors, seizures, syncope, weakness, numbness and headaches.   Hematological:  Does not bruise/bleed easily.   Psychiatric/Behavioral:  Negative for agitation, confusion, hallucinations and suicidal ideas.    Objective:     Vital Signs (Most Recent):  Temp: 97.6 °F (36.4 °C) (12/01/22 1154)  Pulse: 98 (12/01/22 1154)  Resp: 19 (12/01/22 1154)  BP: (!) 138/98 (12/01/22 1154)  SpO2: 97 % (12/01/22 1154)   Vital Signs (24h Range):  Temp:  [97.1 °F (36.2 °C)-97.6 °F (36.4 °C)] 97.6 °F (36.4 °C)  Pulse:  [71-98] 98  Resp:  [19-20] 19  SpO2:  [96 %-97 %] 97 %  BP: (138-150)/(89-98) 138/98     Weight: (!) 161 kg (355 lb)  Body mass index is 53.98 kg/m².    Physical Exam  Vitals reviewed.   Constitutional:       Appearance: Normal appearance. She is obese.   HENT:      Head: Normocephalic and atraumatic.      Nose: Nose normal.   Eyes:      Extraocular Movements: Extraocular movements intact.      Conjunctiva/sclera: Conjunctivae normal.   Neck:      Trachea: Trachea normal.   Cardiovascular:      Rate and Rhythm: Normal rate and regular rhythm.      Pulses: Normal pulses.      Heart sounds: Normal heart sounds.   Pulmonary:      Effort:  Pulmonary effort is normal.      Breath sounds: Normal breath sounds and air entry.   Abdominal:      General: Bowel sounds are normal.      Palpations: Abdomen is soft.   Musculoskeletal:      Cervical back: Neck supple.      Comments: Moves all extremities, normal tone  Amputated toes- well healed surgical scars   Skin:     General: Skin is warm and dry.      Capillary Refill: Capillary refill takes less than 2 seconds.   Neurological:      General: No focal deficit present.      Mental Status: She is alert and oriented to person, place, and time.      Cranial Nerves: Cranial nerves 2-12 are intact.      Comments: Grossly normal motor and sensory function without focal deficit appreciated.   Psychiatric:         Mood and Affect: Mood and affect normal.         Behavior: Behavior normal. Behavior is cooperative.         Thought Content: Thought content normal.           Significant Labs: All pertinent labs within the past 24 hours have been reviewed.    Significant Imaging: I have reviewed all pertinent imaging results/findings within the past 24 hours.

## 2022-12-01 NOTE — PROGRESS NOTES
Pharmacy consulted to assist in vancomycin dosing for this 62 year-old female with suspected bacteremia, goal trough 15-20. Will begin vancomycin 2500 mg IV every 18 hours. BUN/Scr within normal limits. Trough ordered for 12/3 at 2230 before 4th dose. Pharmacy will monitor daily and make necessary adjustments.    Jing Rm, PharmD  Ext. [phone]

## 2022-12-01 NOTE — ASSESSMENT & PLAN NOTE
Body mass index is 53.98 kg/m². Morbid obesity complicates all aspects of disease management from diagnostic modalities to treatment. Weight loss encouraged and health benefits explained to patient.

## 2022-12-01 NOTE — HPI
"62yoaaf with pmh of morbid obesity, diabetes, PVD, HF?, tobacco use, htn, COPD who presents to ED from for evaluation of painful right foot. On Thanksgiving Day she experienced a mechanical fall that resulted in obvious deformity of right little toe that was "put back in place" by her sister.  There was an open wound at that time. She presented to [facility] ED and was told she needed to see Dr. Saeed (who amputated her other toes) for an amputation.  She returned home, her foot subsequently became red, tender and swollen so she presents to [facility] ED today for evaluation. Denies any f/c, no chest pain, sob, diaphoresis, wheezing, cough, dysuria, diarrhea or abdominal pain. There is still a small wound on the medial aspect of the little toe which intermittently bleeds. Denies any purulent drainage.  ROS otherwise negative.    "

## 2022-12-01 NOTE — ASSESSMENT & PLAN NOTE
Diabetic with open foot wound  Vanc, flagyl, zosyn for now  Blood cultures sent  MRI for osteo  Gen surg if osteo  Ortho for fracture if no osteo

## 2022-12-01 NOTE — H&P
"[facility] - Emergency Department  Hospital Medicine  History & Physical    Patient Name: Holly Porter  MRN: [id number]  Patient Class: IP- Inpatient  Admission Date: 12/1/2022  Attending Physician: Kenan Broderick DO   Primary Care Provider: Regan Frausto MD         Patient information was obtained from patient, past medical records and ER records.     Subjective:     Principal Problem:Cellulitis of right foot    Chief Complaint:   Chief Complaint   Patient presents with    Foot Swelling        HPI: 62yoaaf with pmh of morbid obesity, diabetes, PVD, HF?, tobacco use, htn, COPD who presents to ED from for evaluation of painful right foot. On Thanksgiving Day she experienced a mechanical fall that resulted in obvious deformity of right little toe that was "put back in place" by her sister.  There was an open wound at that time. She presented to [facility] ED and was told she needed to see Dr. Saeed (who amputated her other toes) for an amputation.  She returned home, her foot subsequently became red, tender and swollen so she presents to [facility] ED today for evaluation. Denies any f/c, no chest pain, sob, diaphoresis, wheezing, cough, dysuria, diarrhea or abdominal pain. There is still a small wound on the medial aspect of the little toe which intermittently bleeds. Denies any purulent drainage.  ROS otherwise negative.        Past Medical History:   Diagnosis Date    Anxiety state     Atherosclerosis of native artery of extremity with intermittent claudication 01/30/2019    bilateral legs    Bilateral leg pain     BMI 50.0-59.9, adult 02/22/2021    Cellulitis 06/11/2020    Chronic pain syndrome     Chronic peripheral venous hypertension 01/08/2019    COPD (chronic obstructive pulmonary disease)     Diabetes     Diabetes mellitus, type 2     DVT (deep venous thrombosis) 02/12/2020    Embolism and thrombosis of superficial veins of unspecified lower extremity 07/01/2019    bilateral    Frequent " headaches 09/20/2018    GERD (gastroesophageal reflux disease)     Hereditary lymphedema     Hx of thyroid cancer     Hyperlipidemia     Hypertension     Hypothyroidism     Migraines     Migraines     Morbid obesity     Nicotine dependence     Non-pressure chronic ulcer of left lower leg 03/09/2021    Osteoarthritis     Other skin changes 03/09/2021    bilateral gaiter regions    Pain in left leg     Pain in right leg     PVD (peripheral vascular disease) 01/08/2019    Seizure disorder     Sleep apnea     Venous insufficiency (chronic) (peripheral)     Venous stasis     Vitamin D deficiency        Past Surgical History:   Procedure Laterality Date    HYSTERECTOMY      ILIAC ARTERY STENT Bilateral 01/28/2020    Bilateral distal aorta and common iliac 8 X 59 vbx covered stents performed by Dr. Antonio Jacinto.    RADIOFREQUENCY ABLATION Left 04/15/2022    Procedure: Left calf  Radiofrequency Ablation;  Surgeon: Matty Falcon DO;  Location: [facility];  Service: Vascular;  Laterality: Left;    STAB PHLEBECTOMY OF VARICOSE VEINS Left 01/25/2013    Left leg microphlebectomies x 23 stab avulsions and ligation of multiple varicose veins perrformed by Dr. Cirilo Aguirre.    THYROIDECTOMY      hx: thyroid cancer    TOE AMPUTATION Left 01/28/2020    2nd, 4th and 5th performed by Dr. Anam Saeed.    TOE AMPUTATION Right 01/28/2020    4th toe performed by Dr. Anam Saeed.    TOTAL ABDOMINAL HYSTERECTOMY W/ BILATERAL SALPINGOOPHORECTOMY      TUBAL LIGATION      VAGINAL DELIVERY      x 4    VENOUS ABLATION Right 08/09/2019    GSV Varithena Ablation performed by Dr. Estuardo Falcon    VENOUS ABLATION Left 08/02/2019    ATAV Varithena Ablation performed by Dr. Estuardo Falcon.    VENOUS ABLATION Right 07/13/2015    ATAV Laser Ablatio performed by Dr. Cirilo Aguirre.    VENOUS ABLATION Right 07/06/2015    Distal  GSV Laser Ablation performed by dr. Cirilo Aguirre.    VENOUS ABLATION Left 04/20/2015    Left  Distal GSV Laser Ablation performed by dr. Cirilo Aguirre.    VENOUS ABLATION Right 10/28/2013    Right Distal GSV RF Ablation performed by Dr. Cirilo Aguirre.    VENOUS ABLATION Left 10/25/2013    SSV RF Ablation performed by dr. Cirilo Aguirre.    VENOUS ABLATION Left 10/07/2013    Left GSV and Left ATAV RF Ablation performed by Dr. Cirilo Aguirre.    VENOUS ABLATION Right 01/21/2013    GSV RF Ablation w/micros x 22 performed by Dr. Cirilo Aguirre.    VENOUS ABLATION Left 06/25/2021    Left distal GSV Varithena Ablation       Review of patient's allergies indicates:   Allergen Reactions    Ammonium peroxydisulfate Shortness Of Breath    Avocado (laurus persea) Anaphylaxis    Bananas [banana] Anaphylaxis and Swelling     CRAMPS,    Chocolate flavor Anaphylaxis     MOUTH SWELLING    Fentanyl Shortness Of Breath and Itching    Percocet [oxycodone-acetaminophen] Shortness Of Breath and Itching    Silvadene [silver sulfadiazine]     Clindamycin     Corticosteroids (glucocorticoids)     Hydrocortisone Blisters    Lasix [furosemide] Blisters     BURNS SKIN    Nutritional supplement-fiber Itching    Pregabalin     Shellfish containing products     Adhesive Rash and Blisters    Iodine and iodide containing products Rash    Latex, natural rubber Rash    Pcn [penicillins] Rash    Sulfa (sulfonamide antibiotics) Rash and Blisters       No current facility-administered medications on file prior to encounter.     Current Outpatient Medications on File Prior to Encounter   Medication Sig    albuterol (PROVENTIL/VENTOLIN HFA) 90 mcg/actuation inhaler Inhale 2 puffs into the lungs 4 (four) times daily. Rescue    allopurinoL (ZYLOPRIM) 300 MG tablet Take 1 tablet (300 mg total) by mouth once daily.    amitriptyline (ELAVIL) 25 MG tablet Take 1 tablet (25 mg total) by mouth nightly as needed for Insomnia.    apixaban (ELIQUIS) 5 mg Tab Take 1 tablet (5 mg total) by mouth 2 (two) times daily.    aspirin (ECOTRIN) 81 MG EC tablet Take  "1 tablet (81 mg total) by mouth once daily.    calcium carbonate (OS-MICHAELA) 500 mg calcium (1,250 mg) tablet Take 1 tablet (500 mg total) by mouth 2 (two) times daily.    cholecalciferol, vitamin D3, 125 mcg (5,000 unit) capsule Take 1 capsule (5,000 Units total) by mouth 2 (two) times a day.    cilostazoL (PLETAL) 100 MG Tab Take 1 tablet (100 mg total) by mouth 2 (two) times daily.    colchicine (COLCRYS) 0.6 mg tablet One pill three times a day for two days,then one pill daily till out    docusate sodium (COLACE) 100 MG capsule Take 1 capsule (100 mg total) by mouth 2 (two) times daily.    HYDROcodone-acetaminophen (NORCO)  mg per tablet Take 1 tablet by mouth every 6 (six) hours as needed for Pain.    insulin (LANTUS SOLOSTAR U-100 INSULIN) glargine 100 units/mL (3mL) SubQ pen Inject 10 units sq daily    insulin detemir U-100 (LEVEMIR FLEXTOUCH U-100 INSULN) 100 unit/mL (3 mL) InPn pen Inject 10 Units into the skin every evening. 15 ml with 1 refill    levothyroxine (SYNTHROID) 150 MCG tablet Take 1 tablet (150 mcg total) by mouth before breakfast.    loratadine (CLARITIN) 10 mg tablet Take 1 tablet (10 mg total) by mouth once daily.    losartan-hydrochlorothiazide 50-12.5 mg (HYZAAR) 50-12.5 mg per tablet Take 1 tablet by mouth once daily.    metOLazone (ZAROXOLYN) 2.5 MG tablet Take 1 tablet (2.5 mg total) by mouth once daily.    mupirocin (BACTROBAN) 2 % ointment Apply topically 2 (two) times daily.    nabumetone (RELAFEN) 750 MG tablet Take 1 tablet (750 mg total) by mouth 2 (two) times daily as needed for Pain.    NOVOFINE 32 32 gauge x 1/4" Ndle Use as directed once daily.    pantoprazole (PROTONIX) 40 MG tablet Take 1 tablet (40 mg total) by mouth once daily.    phentermine (ADIPEX-P) 37.5 mg tablet Take 1 tablet (37.5 mg total) by mouth before breakfast.    potassium chloride SA (K-DUR,KLOR-CON) 20 MEQ tablet Take 1 tablet (20 mEq total) by mouth once daily.    pregabalin (LYRICA) " 100 MG capsule Take 1 capsule (100 mg total) by mouth every 12 (twelve) hours.    QUEtiapine (SEROQUEL) 25 MG Tab Take 1 tablet (25 mg total) by mouth once daily.    spironolactone (ALDACTONE) 50 MG tablet Take 1 tablet (50 mg total) by mouth once daily.    SYMBICORT 160-4.5 mcg/actuation HFAA Inhale into the lungs.    topiramate (TOPAMAX) 100 MG tablet Take 1.5 tablets (150 mg total) by mouth 2 (two) times daily.    triamcinolone acetonide 0.1% (KENALOG) 0.1 % cream Apply topically 3 (three) times daily.    [DISCONTINUED] azithromycin (Z-SANJU) 250 MG tablet Take 1 tablet (250 mg total) by mouth once daily. Take first 2 tablets together, then 1 every day until finished. (Patient not taking: Reported on 12/1/2022)    [DISCONTINUED] hydrOXYzine HCL (ATARAX) 25 MG tablet Take 1 tablet (25 mg total) by mouth 2 (two) times a day.     Family History       Problem Relation (Age of Onset)    Coronary aneurysm Father    Coronary artery disease Father    Heart disease Mother, Father    Hypertension Mother, Father    No Known Problems Son, Son, Son, Son, Sister, Brother, Brother, Brother    Osteoarthritis Mother          Tobacco Use    Smoking status: Every Day     Packs/day: 1.00     Years: 33.00     Pack years: 33.00     Types: Cigarettes    Smokeless tobacco: Never   Substance and Sexual Activity    Alcohol use: Never    Drug use: Never    Sexual activity: Not Currently     Review of Systems   Constitutional:  Negative for chills, fatigue, fever and unexpected weight change.   HENT:  Negative for congestion, mouth sores and sore throat.    Eyes:  Negative for photophobia and visual disturbance.   Respiratory:  Negative for cough, chest tightness, shortness of breath and wheezing.    Cardiovascular:  Positive for leg swelling. Negative for chest pain and palpitations.   Gastrointestinal:  Negative for abdominal pain, diarrhea, nausea and vomiting.   Endocrine: Negative for cold intolerance and heat intolerance.    Genitourinary:  Negative for difficulty urinating, dysuria, frequency and urgency.   Musculoskeletal:  Positive for joint swelling. Negative for arthralgias, back pain and myalgias.        Right foot redness, swelling and tenderness   Skin:  Negative for pallor and rash.   Neurological:  Negative for tremors, seizures, syncope, weakness, numbness and headaches.   Hematological:  Does not bruise/bleed easily.   Psychiatric/Behavioral:  Negative for agitation, confusion, hallucinations and suicidal ideas.    Objective:     Vital Signs (Most Recent):  Temp: 97.6 °F (36.4 °C) (12/01/22 1154)  Pulse: 98 (12/01/22 1154)  Resp: 19 (12/01/22 1154)  BP: (!) 138/98 (12/01/22 1154)  SpO2: 97 % (12/01/22 1154)   Vital Signs (24h Range):  Temp:  [97.1 °F (36.2 °C)-97.6 °F (36.4 °C)] 97.6 °F (36.4 °C)  Pulse:  [71-98] 98  Resp:  [19-20] 19  SpO2:  [96 %-97 %] 97 %  BP: (138-150)/(89-98) 138/98     Weight: (!) 161 kg (355 lb)  Body mass index is 53.98 kg/m².    Physical Exam  Vitals reviewed.   Constitutional:       Appearance: Normal appearance. She is obese.   HENT:      Head: Normocephalic and atraumatic.      Nose: Nose normal.   Eyes:      Extraocular Movements: Extraocular movements intact.      Conjunctiva/sclera: Conjunctivae normal.   Neck:      Trachea: Trachea normal.   Cardiovascular:      Rate and Rhythm: Normal rate and regular rhythm.      Pulses: Normal pulses.      Heart sounds: Normal heart sounds.   Pulmonary:      Effort: Pulmonary effort is normal.      Breath sounds: Normal breath sounds and air entry.   Abdominal:      General: Bowel sounds are normal.      Palpations: Abdomen is soft.   Musculoskeletal:      Cervical back: Neck supple.      Comments: Moves all extremities, normal tone  Amputated toes- well healed surgical scars   Skin:     General: Skin is warm and dry.      Capillary Refill: Capillary refill takes less than 2 seconds.   Neurological:      General: No focal deficit present.      Mental  Status: She is alert and oriented to person, place, and time.      Cranial Nerves: Cranial nerves 2-12 are intact.      Comments: Grossly normal motor and sensory function without focal deficit appreciated.   Psychiatric:         Mood and Affect: Mood and affect normal.         Behavior: Behavior normal. Behavior is cooperative.         Thought Content: Thought content normal.           Significant Labs: All pertinent labs within the past 24 hours have been reviewed.    Significant Imaging: I have reviewed all pertinent imaging results/findings within the past 24 hours.    Assessment/Plan:     * Cellulitis of right foot  Diabetic with open foot wound  Vanc, flagyl, zosyn for now  Blood cultures sent  MRI for osteo  Gen surg if osteo  Ortho for fracture if no osteo      Tobacco use  Counseled cessation      Exertional dyspnea  Chronic        Morbid obesity  Body mass index is 53.98 kg/m². Morbid obesity complicates all aspects of disease management from diagnostic modalities to treatment. Weight loss encouraged and health benefits explained to patient.         Chronic obstructive pulmonary disease  Continue home LABA, ICS  Albuterol PRN      Hypothyroidism  Continue synthroid      Arthritis  Tylenol if needed      Hypertension  Adjust medications as needed      PVD (peripheral vascular disease)  Continue asa        VTE Risk Mitigation (From admission, onward)         Ordered     enoxaparin injection 40 mg  Every 12 hours         12/01/22 1715     IP VTE HIGH RISK PATIENT  Once         12/01/22 1715     Place sequential compression device  Until discontinued         12/01/22 1715                   Kenan Broderick DO  Department of Hospital Medicine   [facility] - Emergency Department

## 2022-12-01 NOTE — PROGRESS NOTES
"1105- pt informed of needing to go to [facility] r/t severe cellulitis of Right foot.pt voiced understanding and stated she "would wait out in lobby of this facility for someone to come and pick her up and take her there."    1106- call made to [facility] to give report; spoke with Tina.     "

## 2022-12-01 NOTE — ED PROVIDER NOTES
Encounter Date: 12/1/2022       History     Chief Complaint   Patient presents with    Foot Swelling     Patient presents to ER with complaint of right foot pain and edema.  Patient states she was referred to ER from Dr Frausto's office.  Patient states her symptoms started after a fall on Thanksgiving. Patient states slipped and fell hit her head, hurt her upper back and caught her left small toe on something when she fell.  She states the small toe was bleeding at time of fall.  She states states her 4th digit of left foot was amputated 2 years ago by Dr Saeed.  She has not had any problems with the foot since that time.  She states she was seen after fall and had x-rays done.  She was told to follow up with her PCP.  She states her foot is swollen, red, and painful.  She scheduled follow up and when she got there today, PCP told her she needed to be seen by Dr Saeed because he needed to amputate her toe.  She is anxious and emotional about this.  She she is also followed by pain treatment.  She toke Norco PTA without any relief from her pain.      The history is provided by the patient and the EMS personnel. No  was used.   Review of patient's allergies indicates:   Allergen Reactions    Ammonium peroxydisulfate Shortness Of Breath    Avocado (laurus persea) Anaphylaxis    Bananas [banana] Anaphylaxis and Swelling     CRAMPS,    Chocolate flavor Anaphylaxis     MOUTH SWELLING    Fentanyl Shortness Of Breath and Itching    Percocet [oxycodone-acetaminophen] Shortness Of Breath and Itching    Silvadene [silver sulfadiazine]     Clindamycin     Corticosteroids (glucocorticoids)     Hydrocortisone Blisters    Lasix [furosemide] Blisters     BURNS SKIN    Nutritional supplement-fiber Itching    Pregabalin     Shellfish containing products     Adhesive Rash and Blisters    Iodine and iodide containing products Rash    Latex, natural rubber Rash    Pcn [penicillins] Rash    Sulfa (sulfonamide  antibiotics) Rash and Blisters     Past Medical History:   Diagnosis Date    Anxiety state     Atherosclerosis of native artery of extremity with intermittent claudication 01/30/2019    bilateral legs    Bilateral leg pain     BMI 50.0-59.9, adult 02/22/2021    Cellulitis 06/11/2020    Chronic pain syndrome     Chronic peripheral venous hypertension 01/08/2019    COPD (chronic obstructive pulmonary disease)     Diabetes     Diabetes mellitus, type 2     DVT (deep venous thrombosis) 02/12/2020    Embolism and thrombosis of superficial veins of unspecified lower extremity 07/01/2019    bilateral    Frequent headaches 09/20/2018    GERD (gastroesophageal reflux disease)     Hereditary lymphedema     Hx of thyroid cancer     Hyperlipidemia     Hypertension     Hypothyroidism     Migraines     Migraines     Morbid obesity     Nicotine dependence     Non-pressure chronic ulcer of left lower leg 03/09/2021    Osteoarthritis     Other skin changes 03/09/2021    bilateral gaiter regions    Pain in left leg     Pain in right leg     PVD (peripheral vascular disease) 01/08/2019    Seizure disorder     Sleep apnea     Venous insufficiency (chronic) (peripheral)     Venous stasis     Vitamin D deficiency      Past Surgical History:   Procedure Laterality Date    HYSTERECTOMY      ILIAC ARTERY STENT Bilateral 01/28/2020    Bilateral distal aorta and common iliac 8 X 59 vbx covered stents performed by Dr. Antonio Jacinto.    RADIOFREQUENCY ABLATION Left 04/15/2022    Procedure: Left calf  Radiofrequency Ablation;  Surgeon: Matty Falcon DO;  Location: [facility] OR;  Service: Vascular;  Laterality: Left;    STAB PHLEBECTOMY OF VARICOSE VEINS Left 01/25/2013    Left leg microphlebectomies x 23 stab avulsions and ligation of multiple varicose veins perrformed by Dr. Cirilo Aguirre.    THYROIDECTOMY      hx: thyroid cancer    TOE AMPUTATION Left 01/28/2020    2nd, 4th and 5th performed by Dr. Anam Saeed.    TOE AMPUTATION Right  01/28/2020    4th toe performed by Dr. Anam Saeed.    TOTAL ABDOMINAL HYSTERECTOMY W/ BILATERAL SALPINGOOPHORECTOMY      TUBAL LIGATION      VAGINAL DELIVERY      x 4    VENOUS ABLATION Right 08/09/2019    GSV Varithena Ablation performed by Dr. sEtuardo Falcon    VENOUS ABLATION Left 08/02/2019    ATAV Varithena Ablation performed by Dr. Estuardo Falcon.    VENOUS ABLATION Right 07/13/2015    ATAV Laser Ablatio performed by Dr. Cirilo Aguirre.    VENOUS ABLATION Right 07/06/2015    Distal  GSV Laser Ablation performed by dr. Cirilo Aguirre.    VENOUS ABLATION Left 04/20/2015    Left Distal GSV Laser Ablation performed by dr. Cirilo Aguirre.    VENOUS ABLATION Right 10/28/2013    Right Distal GSV RF Ablation performed by Dr. Cirilo Aguirre.    VENOUS ABLATION Left 10/25/2013    SSV RF Ablation performed by dr. Cirilo Aguirre.    VENOUS ABLATION Left 10/07/2013    Left GSV and Left ATAV RF Ablation performed by Dr. Cirilo Aguirre.    VENOUS ABLATION Right 01/21/2013    GSV RF Ablation w/micros x 22 performed by Dr. Cirilo Aguirre.    VENOUS ABLATION Left 06/25/2021    Left distal GSV Varithena Ablation     Family History   Problem Relation Age of Onset    Heart disease Mother         age 84 CHF    Hypertension Mother     Osteoarthritis Mother     Coronary aneurysm Father     Coronary artery disease Father     Hypertension Father     Heart disease Father     No Known Problems Son     No Known Problems Son     No Known Problems Son     No Known Problems Son     No Known Problems Sister     No Known Problems Brother         hx: varicose veins    No Known Problems Brother         MVA: parlazed    No Known Problems Brother      Social History     Tobacco Use    Smoking status: Every Day     Packs/day: 1.00     Years: 33.00     Pack years: 33.00     Types: Cigarettes    Smokeless tobacco: Never   Substance Use Topics    Alcohol use: Never    Drug use: Never     Review of Systems   Constitutional:  Positive for activity change and fatigue.   All other systems reviewed  and are negative.    Physical Exam     Initial Vitals [12/01/22 1154]   BP Pulse Resp Temp SpO2   (!) 138/98 98 19 97.6 °F (36.4 °C) 97 %      MAP       --         Physical Exam    Nursing note and vitals reviewed.  Constitutional: She appears well-developed and well-nourished.   HENT:   Head: Normocephalic.   Right Ear: External ear normal.   Left Ear: External ear normal.   Nose: Nose normal.   Mouth/Throat: Oropharynx is clear and moist.   Eyes: Conjunctivae and EOM are normal. Pupils are equal, round, and reactive to light.   Neck: Neck supple.   Normal range of motion.  Cardiovascular:  Normal rate, regular rhythm, normal heart sounds and intact distal pulses.           Pulmonary/Chest: Breath sounds normal.   Abdominal: Abdomen is soft. Bowel sounds are normal.   Musculoskeletal:         General: Tenderness and edema present. Normal range of motion.      Cervical back: Normal range of motion and neck supple.     Neurological: She is alert and oriented to person, place, and time. She has normal strength. GCS score is 15. GCS eye subscore is 4. GCS verbal subscore is 5. GCS motor subscore is 6.   Skin: Skin is warm and dry. Capillary refill takes less than 2 seconds. There is erythema.        Edema and erythema noted to right foot.  Dried blood noted medial 5th digit without noted wound.  Foot is painful to touch.  Exam is limited related to pain.    Psychiatric: She has a normal mood and affect. Her behavior is normal. Judgment and thought content normal.       Medical Screening Exam   See Full Note    ED Course   Procedures  Labs Reviewed   COMPREHENSIVE METABOLIC PANEL - Abnormal; Notable for the following components:       Result Value    Creatinine 1.18 (*)     eGFR 52 (*)     All other components within normal limits   URINALYSIS, REFLEX TO URINE CULTURE - Abnormal; Notable for the following components:    Protein, UA 10 (*)     Urobilinogen, UA 2 (*)     Specific Gravity, UA 1.031 (*)     All other  components within normal limits   CBC WITH DIFFERENTIAL - Abnormal; Notable for the following components:    RBC 6.15 (*)     Hemoglobin 16.2 (*)     Hematocrit 53.1 (*)     MCH 26.3 (*)     MCHC 30.5 (*)     RDW 16.0 (*)     Neutrophils % 68.6 (*)     Lymphocytes % 21.7 (*)     Immature Granulocytes % 0.7 (*)     Immature Granulocytes, Absolute 0.07 (*)     All other components within normal limits   MANUAL DIFFERENTIAL - Abnormal; Notable for the following components:    Segmented Neutrophils, Man % 69 (*)     Lymphocytes, Man % 22 (*)     Platelet Morphology Few Large Platelets (*)     All other components within normal limits   POCT GLUCOSE MONITORING CONTINUOUS - Abnormal; Notable for the following components:    POC Glucose 114 (*)     All other components within normal limits   APTT - Normal   PROTIME-INR - Normal   CULTURE, BLOOD   CULTURE, BLOOD   CBC W/ AUTO DIFFERENTIAL    Narrative:     The following orders were created for panel order CBC auto differential.  Procedure                               Abnormality         Status                     ---------                               -----------         ------                     CBC with Differential[[id number]]        Abnormal            Final result               Manual Differential[[id number]]          Abnormal            Final result                 Please view results for these tests on the individual orders.          Imaging Results              X-Ray Foot Complete Right (Final result)  Result time 12/01/22 12:44:41      Final result by Modesto Tristan II, MD (12/01/22 12:44:41)                   Impression:      Distal 1st 2nd 3rd metatarsal fractures as described above.      Electronically signed by: Modesto Tristan  Date:    12/01/2022  Time:    12:44               Narrative:    EXAMINATION:  XR FOOT COMPLETE 3 VIEW RIGHT    CLINICAL HISTORY:  Unspecified fall, initial encounter    COMPARISON:  None available    FINDINGS:  No there is  suggestion of nondisplaced fracture of the distal 1st metatarsal.  There is fractures of the distal 2nd and 3rd metatarsals with slight displacement.  No other definite fracture seen.  There is erosion of the distal 4th metatarsal.  Definite fracture at this level not seen.  The alignment of the joints appears normal.  No degenerative change is present.  No soft tissue abnormality is seen.                                       Medications   metronidazole IVPB 500 mg (has no administration in time range)   aztreonam (AZACTAM) 1,000 mg in dextrose 5 % in water (D5W) 5 % 50 mL IVPB (MB+) (has no administration in time range)     Medical Decision Making:   Other:   I have discussed this case with another health care provider.       <> Summary of the Discussion: Dr Rangel called in consult.  Recommend admission to medicine and consult to surgery. NPO after midnight.   1545 Dr Tang consulted.  Will admit to hospitalist service care of Dr RONALDO Broderick.                  Clinical Impression:   Final diagnoses:  [W19.XXXA] Fall  [L03.115] Cellulitis of right foot (Primary)  [E11.628, Z79.4] Type 2 diabetes mellitus with other skin complication, with long-term current use of insulin        ED Disposition Condition    Admit Stable                Angeli Del Cid, WARREN  12/01/22 4394

## 2022-12-01 NOTE — PROGRESS NOTES
"Subjective:       Patient ID: Holly Porter is a 62 y.o. female.    Chief Complaint: Knee Pain (Right Knee) and Toe Pain    Patient is here today for a follow up evaluation. Patient blood pressure is 138/98 today on intake. Patient has a complaint of fall. Patient states she fell 11/24/22. Patient states she hit her head and her back. Patient states she hit her right knee. Patient also states her toe "caught onto something" and "snagged". Patient states her foot is swollen and painful. Patient right foot swollen and warm on exam. Discussed with patient about possible infection of right foot. Patient is currently wearing a knee brace. Patient also has her foot wrapped with gauze. Patient states she followed with ER after fall. Patient states she was given a shot. Patient also states she had an x-ray done. Will refer patient to ER for evaluation and admission for IV and Ultrasound of Right Leg. Will follow with patient  in 1 month.     Current Medications:  No current facility-administered medications for this visit.  No current outpatient medications on file.    Facility-Administered Medications Ordered in Other Visits:     albuterol nebulizer solution 2.5 mg, 2.5 mg, Nebulization, Q4H, Kenan Broderick DO, 2.5 mg at 12/02/22 0735    allopurinoL tablet 300 mg, 300 mg, Oral, Daily, Kenan Broderick DO, 300 mg at 12/02/22 0904    aspirin EC tablet 81 mg, 81 mg, Oral, Daily, Kenan Broderick DO, 81 mg at 12/02/22 0904    budesonide nebulizer solution 0.5 mg, 0.5 mg, Nebulization, Q12H, Keann Broderick DO, 0.5 mg at 12/02/22 0741    ceFEPIme (MAXIPIME) 1 g in dextrose 5 % in water (D5W) 5 % 50 mL IVPB (MB+), 1 g, Intravenous, Q6H, Kenan Broderick DO, Stopped at 12/02/22 0935    cetirizine tablet 10 mg, 10 mg, Oral, Daily, Kenan Broderick DO, 10 mg at 12/02/22 0905    cilostazoL tablet 100 mg, 100 mg, Oral, BID, Kenan Broderick DO, 100 mg at 12/02/22 0904    dextrose 10% bolus 125 mL, 12.5 g, Intravenous, PRN, Kenan Broderick DO   "  dextrose 10% bolus 125 mL, 12.5 g, Intravenous, PRN, Kenan Broderick DO    dextrose 10% bolus 250 mL, 25 g, Intravenous, PRN, Kenan Broderick DO    dextrose 10% bolus 250 mL, 25 g, Intravenous, PRN, Kenan Broderick DO    docusate sodium capsule 100 mg, 100 mg, Oral, BID, Kenan Broderick DO, 100 mg at 12/02/22 0904    enoxaparin injection 40 mg, 40 mg, Subcutaneous, Q12H, Kenan Broderick DO, 40 mg at 12/02/22 0905    glucagon (human recombinant) injection 1 mg, 1 mg, Intramuscular, PRN, Kenan Broderick DO    glucagon (human recombinant) injection 1 mg, 1 mg, Intramuscular, PRN, Kenan Broderick DO    glucose chewable tablet 16 g, 16 g, Oral, PRN, Kenan Broderick DO    glucose chewable tablet 24 g, 24 g, Oral, PRN, Kenan Broderick DO    HYDROcodone-acetaminophen  mg per tablet 1 tablet, 1 tablet, Oral, Q6H PRN, Nilo Murrieta MD, 1 tablet at 12/02/22 0048    insulin aspart U-100 injection 1-10 Units, 1-10 Units, Subcutaneous, QID (AC + HS) PRN, Kenan Broderick DO    insulin detemir U-100 injection 20 Units, 20 Units, Subcutaneous, QHS, Kenan Broderick DO    levothyroxine tablet 150 mcg, 150 mcg, Oral, Before breakfast, Kenan Broderick DO, 150 mcg at 12/02/22 0634    metOLazone tablet 2.5 mg, 2.5 mg, Oral, Daily, Kenan Broderick DO, 2.5 mg at 12/02/22 0905    metronidazole IVPB 500 mg, 500 mg, Intravenous, Q8H, Kenan Broderick DO, Stopped at 12/02/22 1005    naloxone 0.4 mg/mL injection 0.02 mg, 0.02 mg, Intravenous, PRN, Kenan Broderick DO    NIFEdipine 24 hr tablet 60 mg, 60 mg, Oral, Daily, Kenan Broderick DO, 60 mg at 12/02/22 0904    pantoprazole EC tablet 40 mg, 40 mg, Oral, Daily, Kenan Broderick DO, 40 mg at 12/02/22 0905    pregabalin capsule 100 mg, 100 mg, Oral, Q12H, Kenan Broderick DO, 100 mg at 12/02/22 0904    QUEtiapine tablet 25 mg, 25 mg, Oral, Daily, Kenan Broderick DO    sodium chloride 0.9% flush 10 mL, 10 mL, Intravenous, Q12H PRN, Kenan Broderick DO    spironolactone tablet 50 mg, 50 mg, Oral, Daily, Keann  DO [place], 50 mg at 12/02/22 0905    topiramate tablet 150 mg, 150 mg, Oral, BID, Kenan Broderick DO, 150 mg at 12/02/22 0904    vancomycin (VANCOCIN) 2,500 mg in dextrose 5 % 500 mL IVPB, 2,500 mg, Intravenous, Q18H, Kenan Broderick DO    vancomycin - pharmacy to dose, , Intravenous, pharmacy to manage frequency, Kenan Broderick DO    Last Labs:     Admission on 12/01/2022   Component Date Value    POC Glucose 12/01/2022 114 (H)     Sodium 12/01/2022 139     Potassium 12/01/2022 3.6     Chloride 12/01/2022 100     CO2 12/01/2022 30     Anion Gap 12/01/2022 13     Glucose 12/01/2022 98     BUN 12/01/2022 14     Creatinine 12/01/2022 1.18 (H)     BUN/Creatinine Ratio 12/01/2022 12     Calcium 12/01/2022 9.6     Total Protein 12/01/2022 7.8     Albumin 12/01/2022 3.8     Globulin 12/01/2022 4.0     A/G Ratio 12/01/2022 1.0     Bilirubin, Total 12/01/2022 0.5     Alk Phos 12/01/2022 102     ALT 12/01/2022 23     AST 12/01/2022 24     eGFR 12/01/2022 52 (L)     Color, UA 12/01/2022 Yellow     Clarity, UA 12/01/2022 Clear     pH, UA 12/01/2022 5.0     Leukocytes, UA 12/01/2022 Negative     Nitrites, UA 12/01/2022 Negative     Protein, UA 12/01/2022 10 (A)     Glucose, UA 12/01/2022 Normal     Ketones, UA 12/01/2022 Negative     Urobilinogen, UA 12/01/2022 2 (A)     Bilirubin, UA 12/01/2022 Negative     Blood, UA 12/01/2022 Negative     Specific Gravity, UA 12/01/2022 1.031 (H)     PTT 12/01/2022 31.1     PT 12/01/2022 13.2     INR 12/01/2022 1.04     WBC 12/01/2022 10.63     RBC 12/01/2022 6.15 (H)     Hemoglobin 12/01/2022 16.2 (H)     Hematocrit 12/01/2022 53.1 (H)     MCV 12/01/2022 86.3     MCH 12/01/2022 26.3 (L)     MCHC 12/01/2022 30.5 (L)     RDW 12/01/2022 16.0 (H)     Platelet Count 12/01/2022 308     MPV 12/01/2022 12.2     Neutrophils % 12/01/2022 68.6 (H)     Lymphocytes % 12/01/2022 21.7 (L)     Monocytes % 12/01/2022 5.9     Eosinophils % 12/01/2022 2.3     Basophils % 12/01/2022 0.8     Immature  Granulocytes % 12/01/2022 0.7 (H)     nRBC, Auto 12/01/2022 0.0     Neutrophils, Abs 12/01/2022 7.30     Lymphocytes, Absolute 12/01/2022 2.31     Monocytes, Absolute 12/01/2022 0.63     Eosinophils, Absolute 12/01/2022 0.24     Basophils, Absolute 12/01/2022 0.08     Immature Granulocytes, A* 12/01/2022 0.07 (H)     nRBC, Absolute 12/01/2022 0.00     Diff Type 12/01/2022 Manual     Segmented Neutrophils, M* 12/01/2022 69 (H)     Lymphocytes, Man % 12/01/2022 22 (L)     Monocytes, Man % 12/01/2022 6     Eosinophils, Man % 12/01/2022 3     Platelet Morphology 12/01/2022 Few Large Platelets (A)     Anisocytosis 12/01/2022 1+     ESR Westergren 12/01/2022 19     CRP 12/01/2022 3.70 (H)     Iron 12/01/2022 46 (L)     Iron Saturation 12/01/2022 12 (L)     TIBC 12/01/2022 376     Ferritin 12/01/2022 51     TSH 12/01/2022 2.000     POC Glucose 12/01/2022 142 (H)     Sodium 12/02/2022 139     Potassium 12/02/2022 3.7     Chloride 12/02/2022 105     CO2 12/02/2022 28     Anion Gap 12/02/2022 10     Glucose 12/02/2022 105     BUN 12/02/2022 17     Creatinine 12/02/2022 1.09 (H)     BUN/Creatinine Ratio 12/02/2022 16     Calcium 12/02/2022 9.4     eGFR 12/02/2022 57 (L)     Magnesium 12/02/2022 1.9     Phosphorus 12/02/2022 4.7 (H)     WBC 12/02/2022 8.97     RBC 12/02/2022 5.53 (H)     Hemoglobin 12/02/2022 14.6     Hematocrit 12/02/2022 49.4 (H)     MCV 12/02/2022 89.3     MCH 12/02/2022 26.4 (L)     MCHC 12/02/2022 29.6 (L)     RDW 12/02/2022 15.7 (H)     Platelet Count 12/02/2022 247     MPV 12/02/2022 12.4     Neutrophils % 12/02/2022 60.1     Lymphocytes % 12/02/2022 27.1     Monocytes % 12/02/2022 7.8 (H)     Eosinophils % 12/02/2022 3.9     Basophils % 12/02/2022 0.8     Immature Granulocytes % 12/02/2022 0.3     nRBC, Auto 12/02/2022 0.0     Neutrophils, Abs 12/02/2022 5.39     Lymphocytes, Absolute 12/02/2022 2.43     Monocytes, Absolute 12/02/2022 0.70     Eosinophils, Absolute 12/02/2022 0.35     Basophils,  Absolute 12/02/2022 0.07     Immature Granulocytes, A* 12/02/2022 0.03     nRBC, Absolute 12/02/2022 0.00     Diff Type 12/02/2022 Auto     POC Glucose 12/02/2022 112 (H)    Office Visit on 11/08/2022   Component Date Value    ESR Westergren 11/08/2022 12     WBC 11/08/2022 10.04     RBC 11/08/2022 5.79 (H)     Hemoglobin 11/08/2022 15.5     Hematocrit 11/08/2022 49.8 (H)     MCV 11/08/2022 86.0     MCH 11/08/2022 26.8 (L)     MCHC 11/08/2022 31.1 (L)     RDW 11/08/2022 16.2 (H)     Platelet Count 11/08/2022 241     MPV 11/08/2022 13.1 (H)     Neutrophils % 11/08/2022 65.6 (H)     Lymphocytes % 11/08/2022 23.7 (L)     Monocytes % 11/08/2022 6.2 (H)     Eosinophils % 11/08/2022 3.3     Basophils % 11/08/2022 0.6     Immature Granulocytes % 11/08/2022 0.6 (H)     nRBC, Auto 11/08/2022 0.0     Neutrophils, Abs 11/08/2022 6.59     Lymphocytes, Absolute 11/08/2022 2.38     Monocytes, Absolute 11/08/2022 0.62     Eosinophils, Absolute 11/08/2022 0.33     Basophils, Absolute 11/08/2022 0.06     Immature Granulocytes, A* 11/08/2022 0.06 (H)     nRBC, Absolute 11/08/2022 0.00     Diff Type 11/08/2022 Scan Smear     Platelet Morphology 11/08/2022 Large Platelets (A)     RBC Morphology 11/08/2022 Normal        Last Imaging:  X-Ray Foot Complete Right  Narrative: EXAMINATION:  XR FOOT COMPLETE 3 VIEW RIGHT    CLINICAL HISTORY:  Unspecified fall, initial encounter    COMPARISON:  None available    FINDINGS:  No there is suggestion of nondisplaced fracture of the distal 1st metatarsal.  There is fractures of the distal 2nd and 3rd metatarsals with slight displacement.  No other definite fracture seen.  There is erosion of the distal 4th metatarsal.  Definite fracture at this level not seen.  The alignment of the joints appears normal.  No degenerative change is present.  No soft tissue abnormality is seen.  Impression: Distal 1st 2nd 3rd metatarsal fractures as described above.    Electronically signed by: Modesto  Gilsongenesis  Date:    12/01/2022  Time:    12:44         Review of Systems   Musculoskeletal:  Positive for leg pain.        Right knee pain     Foot pain    All other systems reviewed and are negative.      Objective:      Physical Exam  Vitals reviewed.   Constitutional:       Appearance: Normal appearance. She is normal weight.   Cardiovascular:      Rate and Rhythm: Normal rate and regular rhythm.      Pulses: Normal pulses.      Heart sounds: Normal heart sounds.   Pulmonary:      Effort: Pulmonary effort is normal.      Breath sounds: Normal breath sounds.   Abdominal:      General: Abdomen is flat. Bowel sounds are normal.      Palpations: Abdomen is soft.   Musculoskeletal:         General: Swelling present. Normal range of motion.      Cervical back: Normal range of motion and neck supple.      Comments: Right foot swelling    Skin:     General: Skin is warm and dry.   Neurological:      General: No focal deficit present.      Mental Status: She is alert and oriented to person, place, and time. Mental status is at baseline.       Assessment:       1. Cellulitis of right lower extremity  US Lower Extremity Veins Right           Plan:         Holly was seen today for knee pain and toe pain.    Diagnoses and all orders for this visit:    Cellulitis of right lower extremity  -     US Lower Extremity Veins Right; Future    Other orders  The following orders have not been finalized:  -     mupirocin (BACTROBAN) 2 % ointment

## 2022-12-02 LAB
ANION GAP SERPL CALCULATED.3IONS-SCNC: 10 MMOL/L (ref 7–16)
BASOPHILS # BLD AUTO: 0.07 K/UL (ref 0–0.2)
BASOPHILS NFR BLD AUTO: 0.8 % (ref 0–1)
BUN SERPL-MCNC: 17 MG/DL (ref 7–18)
BUN/CREAT SERPL: 16 (ref 6–20)
CALCIUM SERPL-MCNC: 9.4 MG/DL (ref 8.5–10.1)
CHLORIDE SERPL-SCNC: 105 MMOL/L (ref 98–107)
CO2 SERPL-SCNC: 28 MMOL/L (ref 21–32)
CREAT SERPL-MCNC: 1.09 MG/DL (ref 0.55–1.02)
DIFFERENTIAL METHOD BLD: ABNORMAL
EGFR (NO RACE VARIABLE) (RUSH/TITUS): 57 ML/MIN/1.73M²
EOSINOPHIL # BLD AUTO: 0.35 K/UL (ref 0–0.5)
EOSINOPHIL NFR BLD AUTO: 3.9 % (ref 1–4)
ERYTHROCYTE [DISTWIDTH] IN BLOOD BY AUTOMATED COUNT: 15.7 % (ref 11.5–14.5)
GLUCOSE SERPL-MCNC: 105 MG/DL (ref 74–106)
GLUCOSE SERPL-MCNC: 112 MG/DL (ref 70–105)
GLUCOSE SERPL-MCNC: 132 MG/DL (ref 70–105)
GLUCOSE SERPL-MCNC: 139 MG/DL (ref 70–105)
GLUCOSE SERPL-MCNC: 142 MG/DL (ref 70–105)
HCT VFR BLD AUTO: 49.4 % (ref 38–47)
HGB BLD-MCNC: 14.6 G/DL (ref 12–16)
IMM GRANULOCYTES # BLD AUTO: 0.03 K/UL (ref 0–0.04)
IMM GRANULOCYTES NFR BLD: 0.3 % (ref 0–0.4)
LYMPHOCYTES # BLD AUTO: 2.43 K/UL (ref 1–4.8)
LYMPHOCYTES NFR BLD AUTO: 27.1 % (ref 27–41)
MAGNESIUM SERPL-MCNC: 1.9 MG/DL (ref 1.7–2.3)
MCH RBC QN AUTO: 26.4 PG (ref 27–31)
MCHC RBC AUTO-ENTMCNC: 29.6 G/DL (ref 32–36)
MCV RBC AUTO: 89.3 FL (ref 80–96)
MONOCYTES # BLD AUTO: 0.7 K/UL (ref 0–0.8)
MONOCYTES NFR BLD AUTO: 7.8 % (ref 2–6)
MPC BLD CALC-MCNC: 12.4 FL (ref 9.4–12.4)
NEUTROPHILS # BLD AUTO: 5.39 K/UL (ref 1.8–7.7)
NEUTROPHILS NFR BLD AUTO: 60.1 % (ref 53–65)
NRBC # BLD AUTO: 0 X10E3/UL
NRBC, AUTO (.00): 0 %
PHOSPHATE SERPL-MCNC: 4.7 MG/DL (ref 2.5–4.5)
PLATELET # BLD AUTO: 247 K/UL (ref 150–400)
POTASSIUM SERPL-SCNC: 3.7 MMOL/L (ref 3.5–5.1)
RBC # BLD AUTO: 5.53 M/UL (ref 4.2–5.4)
SODIUM SERPL-SCNC: 139 MMOL/L (ref 136–145)
WBC # BLD AUTO: 8.97 K/UL (ref 4.5–11)

## 2022-12-02 PROCEDURE — 82962 GLUCOSE BLOOD TEST: CPT

## 2022-12-02 PROCEDURE — 85025 COMPLETE CBC W/AUTO DIFF WBC: CPT | Performed by: STUDENT IN AN ORGANIZED HEALTH CARE EDUCATION/TRAINING PROGRAM

## 2022-12-02 PROCEDURE — 80048 BASIC METABOLIC PNL TOTAL CA: CPT | Performed by: STUDENT IN AN ORGANIZED HEALTH CARE EDUCATION/TRAINING PROGRAM

## 2022-12-02 PROCEDURE — 25000242 PHARM REV CODE 250 ALT 637 W/ HCPCS: Performed by: STUDENT IN AN ORGANIZED HEALTH CARE EDUCATION/TRAINING PROGRAM

## 2022-12-02 PROCEDURE — 63600175 PHARM REV CODE 636 W HCPCS: Performed by: STUDENT IN AN ORGANIZED HEALTH CARE EDUCATION/TRAINING PROGRAM

## 2022-12-02 PROCEDURE — 36415 COLL VENOUS BLD VENIPUNCTURE: CPT | Performed by: STUDENT IN AN ORGANIZED HEALTH CARE EDUCATION/TRAINING PROGRAM

## 2022-12-02 PROCEDURE — 99900035 HC TECH TIME PER 15 MIN (STAT)

## 2022-12-02 PROCEDURE — 25500020 PHARM REV CODE 255: Performed by: STUDENT IN AN ORGANIZED HEALTH CARE EDUCATION/TRAINING PROGRAM

## 2022-12-02 PROCEDURE — 99232 SBSQ HOSP IP/OBS MODERATE 35: CPT | Mod: ,,, | Performed by: STUDENT IN AN ORGANIZED HEALTH CARE EDUCATION/TRAINING PROGRAM

## 2022-12-02 PROCEDURE — 83735 ASSAY OF MAGNESIUM: CPT | Performed by: STUDENT IN AN ORGANIZED HEALTH CARE EDUCATION/TRAINING PROGRAM

## 2022-12-02 PROCEDURE — 25000003 PHARM REV CODE 250: Performed by: STUDENT IN AN ORGANIZED HEALTH CARE EDUCATION/TRAINING PROGRAM

## 2022-12-02 PROCEDURE — 25000003 PHARM REV CODE 250

## 2022-12-02 PROCEDURE — A9577 INJ MULTIHANCE: HCPCS | Performed by: STUDENT IN AN ORGANIZED HEALTH CARE EDUCATION/TRAINING PROGRAM

## 2022-12-02 PROCEDURE — 11000001 HC ACUTE MED/SURG PRIVATE ROOM

## 2022-12-02 PROCEDURE — 27000221 HC OXYGEN, UP TO 24 HOURS

## 2022-12-02 PROCEDURE — 94761 N-INVAS EAR/PLS OXIMETRY MLT: CPT

## 2022-12-02 PROCEDURE — 99232 PR SUBSEQUENT HOSPITAL CARE,LEVL II: ICD-10-PCS | Mod: ,,, | Performed by: STUDENT IN AN ORGANIZED HEALTH CARE EDUCATION/TRAINING PROGRAM

## 2022-12-02 PROCEDURE — 84100 ASSAY OF PHOSPHORUS: CPT | Performed by: STUDENT IN AN ORGANIZED HEALTH CARE EDUCATION/TRAINING PROGRAM

## 2022-12-02 PROCEDURE — S0030 INJECTION, METRONIDAZOLE: HCPCS | Performed by: STUDENT IN AN ORGANIZED HEALTH CARE EDUCATION/TRAINING PROGRAM

## 2022-12-02 PROCEDURE — 94640 AIRWAY INHALATION TREATMENT: CPT

## 2022-12-02 RX ORDER — HYDROCODONE BITARTRATE AND ACETAMINOPHEN 10; 325 MG/1; MG/1
1 TABLET ORAL EVERY 6 HOURS PRN
Status: DISCONTINUED | OUTPATIENT
Start: 2022-12-02 | End: 2022-12-03

## 2022-12-02 RX ORDER — NIFEDIPINE 60 MG/1
60 TABLET, EXTENDED RELEASE ORAL DAILY
Status: DISCONTINUED | OUTPATIENT
Start: 2022-12-02 | End: 2022-12-03

## 2022-12-02 RX ADMIN — CILOSTAZOL 100 MG: 100 TABLET ORAL at 09:12

## 2022-12-02 RX ADMIN — ALLOPURINOL 300 MG: 300 TABLET ORAL at 09:12

## 2022-12-02 RX ADMIN — METRONIDAZOLE 500 MG: 500 INJECTION, SOLUTION INTRAVENOUS at 12:12

## 2022-12-02 RX ADMIN — LEVOTHYROXINE SODIUM 150 MCG: 0.15 TABLET ORAL at 06:12

## 2022-12-02 RX ADMIN — ENOXAPARIN SODIUM 40 MG: 40 INJECTION SUBCUTANEOUS at 09:12

## 2022-12-02 RX ADMIN — CEFEPIME 1 G: 1 INJECTION, POWDER, FOR SOLUTION INTRAMUSCULAR; INTRAVENOUS at 11:12

## 2022-12-02 RX ADMIN — CEFEPIME 1 G: 1 INJECTION, POWDER, FOR SOLUTION INTRAMUSCULAR; INTRAVENOUS at 09:12

## 2022-12-02 RX ADMIN — TOPIRAMATE 150 MG: 100 TABLET, FILM COATED ORAL at 09:12

## 2022-12-02 RX ADMIN — METRONIDAZOLE 500 MG: 500 INJECTION, SOLUTION INTRAVENOUS at 09:12

## 2022-12-02 RX ADMIN — ASPIRIN 81 MG: 81 TABLET, DELAYED RELEASE ORAL at 09:12

## 2022-12-02 RX ADMIN — GADOBENATE DIMEGLUMINE 20 ML: 529 INJECTION, SOLUTION INTRAVENOUS at 12:12

## 2022-12-02 RX ADMIN — NIFEDIPINE 60 MG: 60 TABLET, EXTENDED RELEASE ORAL at 09:12

## 2022-12-02 RX ADMIN — ALBUTEROL SULFATE 2.5 MG: 2.5 SOLUTION RESPIRATORY (INHALATION) at 07:12

## 2022-12-02 RX ADMIN — PANTOPRAZOLE SODIUM 40 MG: 40 TABLET, DELAYED RELEASE ORAL at 09:12

## 2022-12-02 RX ADMIN — CETIRIZINE HYDROCHLORIDE 10 MG: 10 TABLET, FILM COATED ORAL at 09:12

## 2022-12-02 RX ADMIN — DOCUSATE SODIUM 100 MG: 100 CAPSULE, LIQUID FILLED ORAL at 09:12

## 2022-12-02 RX ADMIN — SPIRONOLACTONE 50 MG: 25 TABLET ORAL at 09:12

## 2022-12-02 RX ADMIN — ALBUTEROL SULFATE 2.5 MG: 2.5 SOLUTION RESPIRATORY (INHALATION) at 04:12

## 2022-12-02 RX ADMIN — CEFEPIME 1 G: 1 INJECTION, POWDER, FOR SOLUTION INTRAMUSCULAR; INTRAVENOUS at 03:12

## 2022-12-02 RX ADMIN — PREGABALIN 100 MG: 50 CAPSULE ORAL at 09:12

## 2022-12-02 RX ADMIN — HYDROCODONE BITARTRATE AND ACETAMINOPHEN 1 TABLET: 10; 325 TABLET ORAL at 09:12

## 2022-12-02 RX ADMIN — HYDROCODONE BITARTRATE AND ACETAMINOPHEN 1 TABLET: 10; 325 TABLET ORAL at 12:12

## 2022-12-02 RX ADMIN — METOLAZONE 2.5 MG: 2.5 TABLET ORAL at 09:12

## 2022-12-02 RX ADMIN — METRONIDAZOLE 500 MG: 500 INJECTION, SOLUTION INTRAVENOUS at 05:12

## 2022-12-02 RX ADMIN — ALBUTEROL SULFATE 2.5 MG: 2.5 SOLUTION RESPIRATORY (INHALATION) at 12:12

## 2022-12-02 RX ADMIN — BUDESONIDE 0.5 MG: 0.5 INHALANT ORAL at 07:12

## 2022-12-02 RX ADMIN — INSULIN DETEMIR 20 UNITS: 100 INJECTION, SOLUTION SUBCUTANEOUS at 09:12

## 2022-12-02 RX ADMIN — CEFEPIME 1 G: 1 INJECTION, POWDER, FOR SOLUTION INTRAMUSCULAR; INTRAVENOUS at 05:12

## 2022-12-02 RX ADMIN — VANCOMYCIN HYDROCHLORIDE 2500 MG: 500 INJECTION, POWDER, LYOPHILIZED, FOR SOLUTION INTRAVENOUS at 12:12

## 2022-12-02 NOTE — NURSING
1800 Pt arrived to floor from ED. No distress noted. Assisted into bed. VS stable. Redness and swelling noted to RLE. No complaints or requests at this time. Call bell in reach. Side rails up x2.

## 2022-12-02 NOTE — CONSULTS
Department of Orthopedic Surgery    Consult    HISTORY: Holly Porter is a 62 y.o. consult for right foot fractures happened on Thanksgiving 11/24/2022.  She had x-rays performed Dr. Jett Sunshine on that day then had follow-up x-rays performed on 12/01/2022 her foot is not been immobilized and she is been walking on it she has x-rays showing fractures involving the 1st metatarsal head and the necks of the neck head neck fracture involving the 2nd 3rd 4th and 5th metatarsals so all the right foot    I did have the nurses get a cam walker boot put her foot to immobilize it she has underlying chronic cellulitis for years she is had the right 4th toe amputated either by Dr. Jacinto or Dr. Saeed she is not for sure which but she is been on antibiotics for years she has neuropathy she has morbid obesity and peripheral vascular disease        And her foot is not been immobilized.  Or her weight taken off of the    PAST MEDICAL HISTORY:   Past Medical History:   Diagnosis Date    Anxiety state     Atherosclerosis of native artery of extremity with intermittent claudication 01/30/2019    bilateral legs    Bilateral leg pain     BMI 50.0-59.9, adult 02/22/2021    Cellulitis 06/11/2020    Chronic pain syndrome     Chronic peripheral venous hypertension 01/08/2019    COPD (chronic obstructive pulmonary disease)     Diabetes     Diabetes mellitus, type 2     DVT (deep venous thrombosis) 02/12/2020    Embolism and thrombosis of superficial veins of unspecified lower extremity 07/01/2019    bilateral    Frequent headaches 09/20/2018    GERD (gastroesophageal reflux disease)     Hereditary lymphedema     Hx of thyroid cancer     Hyperlipidemia     Hypertension     Hypothyroidism     Migraines     Migraines     Morbid obesity     Nicotine dependence     Non-pressure chronic ulcer of left lower leg 03/09/2021    Osteoarthritis     Other skin changes 03/09/2021    bilateral gaiter regions    Pain in left leg     Pain in right leg      PVD (peripheral vascular disease) 01/08/2019    Seizure disorder     Sleep apnea     Venous insufficiency (chronic) (peripheral)     Venous stasis     Vitamin D deficiency         PAST SURGICAL HISTORY:   Past Surgical History:   Procedure Laterality Date    HYSTERECTOMY      ILIAC ARTERY STENT Bilateral 01/28/2020    Bilateral distal aorta and common iliac 8 X 59 vbx covered stents performed by Dr. Antonio Jacinto.    RADIOFREQUENCY ABLATION Left 04/15/2022    Procedure: Left calf  Radiofrequency Ablation;  Surgeon: Matty Falcon DO;  Location: [facility];  Service: Vascular;  Laterality: Left;    STAB PHLEBECTOMY OF VARICOSE VEINS Left 01/25/2013    Left leg microphlebectomies x 23 stab avulsions and ligation of multiple varicose veins perrformed by Dr. Cirilo Aguirre.    THYROIDECTOMY      hx: thyroid cancer    TOE AMPUTATION Left 01/28/2020    2nd, 4th and 5th performed by Dr. Anam Saeed.    TOE AMPUTATION Right 01/28/2020    4th toe performed by Dr. Anam Saeed.    TOTAL ABDOMINAL HYSTERECTOMY W/ BILATERAL SALPINGOOPHORECTOMY      TUBAL LIGATION      VAGINAL DELIVERY      x 4    VENOUS ABLATION Right 08/09/2019    GSV Varithena Ablation performed by Dr. Estuardo Falcon    VENOUS ABLATION Left 08/02/2019    ATAV Varithena Ablation performed by Dr. Estuardo Falcon.    VENOUS ABLATION Right 07/13/2015    ATAV Laser Ablatio performed by Dr. Cirilo Aguirre.    VENOUS ABLATION Right 07/06/2015    Distal  GSV Laser Ablation performed by dr. Cirilo Aguirre.    VENOUS ABLATION Left 04/20/2015    Left Distal GSV Laser Ablation performed by dr. Cirilo Aguirre.    VENOUS ABLATION Right 10/28/2013    Right Distal GSV RF Ablation performed by Dr. Cirilo Aguirre.    VENOUS ABLATION Left 10/25/2013    SSV RF Ablation performed by dr. Cirilo Aguirre.    VENOUS ABLATION Left 10/07/2013    Left GSV and Left ATAV RF Ablation performed by Dr. Cirilo Aguirre.    VENOUS ABLATION Right 01/21/2013    GSV RF Ablation w/micros x 22 performed by Dr. Cirilo Aguirre.    VENOUS  ABLATION Left 06/25/2021    Left distal GSV Varithena Ablation          ALLERGIES:   Review of patient's allergies indicates:   Allergen Reactions    Ammonium peroxydisulfate Shortness Of Breath    Avocado (laurus persea) Anaphylaxis    Bananas [banana] Anaphylaxis and Swelling     CRAMPS,    Chocolate flavor Anaphylaxis     MOUTH SWELLING    Fentanyl Shortness Of Breath and Itching    Percocet [oxycodone-acetaminophen] Shortness Of Breath and Itching    Silvadene [silver sulfadiazine]     Clindamycin     Corticosteroids (glucocorticoids)     Hydrocortisone Blisters    Lasix [furosemide] Blisters     BURNS SKIN    Nutritional supplement-fiber Itching    Pregabalin     Shellfish containing products     Adhesive Rash and Blisters    Iodine and iodide containing products Rash    Latex, natural rubber Rash    Pcn [penicillins] Rash    Sulfa (sulfonamide antibiotics) Rash and Blisters          MEDICATIONS:   Medications Prior to Admission   Medication Sig Dispense Refill Last Dose    albuterol (PROVENTIL/VENTOLIN HFA) 90 mcg/actuation inhaler Inhale 2 puffs into the lungs 4 (four) times daily. Rescue 18 g 2 Unknown    allopurinoL (ZYLOPRIM) 300 MG tablet Take 1 tablet (300 mg total) by mouth once daily. 90 tablet 3 Unknown    amitriptyline (ELAVIL) 25 MG tablet Take 1 tablet (25 mg total) by mouth nightly as needed for Insomnia. 90 tablet 1 Unknown    apixaban (ELIQUIS) 5 mg Tab Take 1 tablet (5 mg total) by mouth 2 (two) times daily. 60 tablet 3 Unknown    aspirin (ECOTRIN) 81 MG EC tablet Take 1 tablet (81 mg total) by mouth once daily. 90 tablet 1 Unknown    calcium carbonate (OS-MICHAELA) 500 mg calcium (1,250 mg) tablet Take 1 tablet (500 mg total) by mouth 2 (two) times daily. 60 tablet 3 Unknown    cholecalciferol, vitamin D3, 125 mcg (5,000 unit) capsule Take 1 capsule (5,000 Units total) by mouth 2 (two) times a day. 60 capsule 3 Unknown    cilostazoL (PLETAL) 100 MG Tab Take 1 tablet (100 mg total) by mouth 2 (two)  "times daily. 90 tablet 1 Unknown    colchicine (COLCRYS) 0.6 mg tablet One pill three times a day for two days,then one pill daily till out 30 tablet 2 Unknown    docusate sodium (COLACE) 100 MG capsule Take 1 capsule (100 mg total) by mouth 2 (two) times daily. 60 capsule 2 Unknown    HYDROcodone-acetaminophen (NORCO)  mg per tablet Take 1 tablet by mouth every 6 (six) hours as needed for Pain. 120 tablet 0 Unknown    insulin (LANTUS SOLOSTAR U-100 INSULIN) glargine 100 units/mL (3mL) SubQ pen Inject 10 units sq daily 15 mL 1 Unknown    insulin detemir U-100 (LEVEMIR FLEXTOUCH U-100 INSULN) 100 unit/mL (3 mL) InPn pen Inject 10 Units into the skin every evening. 15 ml with 1 refill   Unknown    levothyroxine (SYNTHROID) 150 MCG tablet Take 1 tablet (150 mcg total) by mouth before breakfast. 90 tablet 1 Unknown    loratadine (CLARITIN) 10 mg tablet Take 1 tablet (10 mg total) by mouth once daily. 30 tablet 3 Unknown    losartan-hydrochlorothiazide 50-12.5 mg (HYZAAR) 50-12.5 mg per tablet Take 1 tablet by mouth once daily. 90 tablet 1 Unknown    metOLazone (ZAROXOLYN) 2.5 MG tablet Take 1 tablet (2.5 mg total) by mouth once daily. 30 tablet 2 Unknown    mupirocin (BACTROBAN) 2 % ointment Apply topically 2 (two) times daily. 80 g 2 Unknown    nabumetone (RELAFEN) 750 MG tablet Take 1 tablet (750 mg total) by mouth 2 (two) times daily as needed for Pain. 60 tablet 0 Unknown    NOVOFINE 32 32 gauge x 1/4" Ndle Use as directed once daily. 90 each 3 Unknown    pantoprazole (PROTONIX) 40 MG tablet Take 1 tablet (40 mg total) by mouth once daily. 90 tablet 1 Unknown    phentermine (ADIPEX-P) 37.5 mg tablet Take 1 tablet (37.5 mg total) by mouth before breakfast. 30 tablet 0 Unknown    potassium chloride SA (K-DUR,KLOR-CON) 20 MEQ tablet Take 1 tablet (20 mEq total) by mouth once daily. 90 tablet 1 Unknown    pregabalin (LYRICA) 100 MG capsule Take 1 capsule (100 mg total) by mouth every 12 (twelve) hours. 60 capsule " 1 Unknown    QUEtiapine (SEROQUEL) 25 MG Tab Take 1 tablet (25 mg total) by mouth once daily. 30 tablet 1 Unknown    spironolactone (ALDACTONE) 50 MG tablet Take 1 tablet (50 mg total) by mouth once daily. 30 tablet 11 Unknown    SYMBICORT 160-4.5 mcg/actuation HFAA Inhale into the lungs.   Unknown    topiramate (TOPAMAX) 100 MG tablet Take 1.5 tablets (150 mg total) by mouth 2 (two) times daily. 90 tablet 11 Unknown    triamcinolone acetonide 0.1% (KENALOG) 0.1 % cream Apply topically 3 (three) times daily. 400 g 2 Unknown        SOCIAL HISTORY:   Social History     Socioeconomic History    Marital status:    Tobacco Use    Smoking status: Every Day     Packs/day: 1.00     Years: 33.00     Pack years: 33.00     Types: Cigarettes    Smokeless tobacco: Never   Substance and Sexual Activity    Alcohol use: Never    Drug use: Never    Sexual activity: Not Currently     Social Determinants of Health     Financial Resource Strain: Low Risk     Difficulty of Paying Living Expenses: Not very hard   Food Insecurity: No Food Insecurity    Worried About Running Out of Food in the Last Year: Never true    Ran Out of Food in the Last Year: Never true   Transportation Needs: No Transportation Needs    Lack of Transportation (Medical): No    Lack of Transportation (Non-Medical): No   Physical Activity: Inactive    Days of Exercise per Week: 0 days    Minutes of Exercise per Session: 0 min   Stress: No Stress Concern Present    Feeling of Stress : Not at all   Social Connections: Socially Isolated    Frequency of Communication with Friends and Family: More than three times a week    Frequency of Social Gatherings with Friends and Family: More than three times a week    Attends Synagogue Services: Never    Active Member of Clubs or Organizations: No    Attends Club or Organization Meetings: Never    Marital Status:    Housing Stability: Low Risk     Unable to Pay for Housing in the Last Year: No    Number of Places  Lived in the Last Year: 2    Unstable Housing in the Last Year: No          FAMILY HISTORY:   Family History   Problem Relation Age of Onset    Heart disease Mother         age 84 CHF    Hypertension Mother     Osteoarthritis Mother     Coronary aneurysm Father     Coronary artery disease Father     Hypertension Father     Heart disease Father     No Known Problems Son     No Known Problems Son     No Known Problems Son     No Known Problems Son     No Known Problems Sister     No Known Problems Brother         hx: varicose veins    No Known Problems Brother         MVA: parlazed    No Known Problems Brother           PHYSICAL EXAM:    Vitals:    12/02/22 1600   BP: (!) 154/68   Pulse: 76   Resp: 18   Temp: 98.4 °F (36.9 °C)     Body mass index is 51.79 kg/m².      In general, this is a well-developed, well-nourished female . The patient is alert, oriented and cooperative.      HEENT:  Normocephalic, atraumatic.  Extraocular movements are intact bilaterally.  The oropharynx is benign.       NECK:  Nontender with good range of motion.      LUNGS:  Clear to auscultation bilaterally.      HEART:  Demonstrates a regular rate and rhythm.  No murmurs appreciated.      ABDOMEN:  Soft, non-tender, non-distended.        EXTREMITIES:  Right foot there is little erythema at swollen she is tender to the touch there is no skin breakdown on the toes      RADIOGRAPHIC FINDINGS:  X-rays from yesterday are reviewed there are metatarsal fractures look like they could be treated non operatively involving the 1st 2nd 3rd 4th and 5th metatarsal heads metatarsal head neck is      LAB:    Lab Results   Component Value Date    WBC 8.97 12/02/2022    HGB 14.6 12/02/2022    HCT 49.4 (H) 12/02/2022     12/02/2022                  Lab Results   Component Value Date    INR 1.04 12/01/2022    INR 1.02 01/29/2022    INR 1.04 01/29/2022    APTT 31.1 12/01/2022                Lab Results   Component Value Date     12/02/2022          IMPRESSION:  Fractures about a week old she is been walking on these she has chronic cellulitis fractures involving the 1st metatarsal tarsal head 2nd 3rd 4th and 5th metatarsal head neck fractures all in acceptable position      PLAN:  Strict nonweightbearing 1. She is got good her weight off these they are never going to heal  Cam walker boot to the right foot    John Navas III

## 2022-12-02 NOTE — PT/OT/SLP PROGRESS
Physical Therapy      Patient Name:  Holly Porter   MRN:  [id number]    Patient not seen today secondary to Other (Comment) (Pt just had MRI of right foot which revealed multiple metatarsal fractures. Pt has not been evaluated by ortho. Pt complaining of pain and states he does not want to try to get up until foot is addressed). Will follow-up 12/3/22.

## 2022-12-02 NOTE — PLAN OF CARE
[facility] - Orthopedic  Initial Discharge Assessment       Primary Care Provider: Regan Frausto MD    Admission Diagnosis: Fall [W19.XXXA]  Cellulitis of right foot [L03.115]  Chest pain [R07.9]  Type 2 diabetes mellitus with other skin complication, with long-term current use of insulin [E11.628, Z79.4]    Admission Date: 12/1/2022  Expected Discharge Date:     Discharge Barriers Identified: None    Payor: [employer] / [facility] / Product Type: Medicare Advantage /     Extended Emergency Contact Information  Primary Emergency Contact: COLT MCDERMOTT  Home Phone: [phone]  Relation: Friend  Preferred language: English   needed? No  Secondary Emergency Contact: DANIEL BRANCH  [employer] Phone: [phone]  Relation: Other  Preferred language: English   needed? No    Discharge Plan A: Home  Discharge Plan B: Home Health      [facility] [place] [address]  [address]  [place] [address]  Phone: [phone] Fax: [phone]      Initial Assessment (most recent)       Adult Discharge Assessment - 12/02/22 1454          Discharge Assessment    Assessment Type Discharge Planning Assessment     Source of Information patient     Communicated MONTSERRAT with patient/caregiver Date not available/Unable to determine     Lives With alone   but has been staying with her sister for about 2 months while her house is being worked on.    Do you expect to return to your current living situation? Yes     Do you have help at home or someone to help you manage your care at home? Yes     Who are your caregiver(s) and their phone number(s)? sister     Prior to hospitilization cognitive status: Alert/Oriented     Current cognitive status: Alert/Oriented     Walking or Climbing Stairs Difficulty ambulation difficulty, requires equipment     Mobility Management usues rollator     Dressing/Bathing Difficulty none     Equipment Currently Used at Home rollator      Patient currently being followed by outpatient case management? No     Do you currently have service(s) that help you manage your care at home? No     Do you take prescription medications? Yes     Do you have prescription coverage? Yes     Coverage [employer]     Do you have any problems affording any of your prescribed medications? No     Is the patient taking medications as prescribed? yes     Who is going to help you get home at discharge? nic Robledo     How do you get to doctors appointments? family or friend will provide     Are you on dialysis? No     Do you take coumadin? No     Discharge Plan A Home     Discharge Plan B Home Health     DME Needed Upon Discharge  none     Discharge Plan discussed with: Patient     Discharge Barriers Identified None        Physical Activity    On average, how many days per week do you engage in moderate to strenuous exercise (like a brisk walk)? 0 days     On average, how many minutes do you engage in exercise at this level? 0 min        Financial Resource Strain    How hard is it for you to pay for the very basics like food, housing, medical care, and heating? Not very hard        Housing Stability    In the last 12 months, was there a time when you were not able to pay the mortgage or rent on time? No     In the last 12 months, how many places have you lived? 2     In the last 12 months, was there a time when you did not have a steady place to sleep or slept in a shelter (including now)? No        Transportation Needs    In the past 12 months, has lack of transportation kept you from medical appointments or from getting medications? No     In the past 12 months, has lack of transportation kept you from meetings, work, or from getting things needed for daily living? No        Food Insecurity    Within the past 12 months, you worried that your food would run out before you got the money to buy more. Never true     Within the past 12 months, the food you bought just didn't  last and you didn't have money to get more. Never true        Stress    Do you feel stress - tense, restless, nervous, or anxious, or unable to sleep at night because your mind is troubled all the time - these days? Not at all        Social Connections    In a typical week, how many times do you talk on the phone with family, friends, or neighbors? More than three times a week     How often do you get together with friends or relatives? More than three times a week     How often do you attend Mandaen or Hindu services? Never     Do you belong to any clubs or organizations such as Mandaen groups, unions, fraternal or athletic groups, or school groups? No     How often do you attend meetings of the clubs or organizations you belong to? Never     Are you , , , , never , or living with a partner?         Alcohol Use    Q1: How often do you have a drink containing alcohol? Never     Q2: How many drinks containing alcohol do you have on a typical day when you are drinking? Patient does not drink     Q3: How often do you have six or more drinks on one occasion? Never                   Spoke with patient in her room. She states that she lives alone but has been staying with her sister as she is having work done on her house. No home health. Has a rollator at home but no other equipment. Dc plan is back home with sister. IM explained and signed by patient. Will follow for dc planning.

## 2022-12-02 NOTE — SUBJECTIVE & OBJECTIVE
Interval History: NAEO    Review of Systems   Constitutional:  Negative for chills, fatigue, fever and unexpected weight change.   HENT:  Negative for congestion, mouth sores and sore throat.    Eyes:  Negative for photophobia and visual disturbance.   Respiratory:  Negative for cough, chest tightness, shortness of breath and wheezing.    Cardiovascular:  Positive for leg swelling. Negative for chest pain and palpitations.   Gastrointestinal:  Negative for abdominal pain, diarrhea, nausea and vomiting.   Endocrine: Negative for cold intolerance and heat intolerance.   Genitourinary:  Negative for difficulty urinating, dysuria, frequency and urgency.   Musculoskeletal:  Positive for joint swelling. Negative for arthralgias, back pain and myalgias.        Right foot redness, swelling and tenderness   Skin:  Negative for pallor and rash.   Neurological:  Negative for tremors, seizures, syncope, weakness, numbness and headaches.   Hematological:  Does not bruise/bleed easily.   Psychiatric/Behavioral:  Negative for agitation, confusion, hallucinations and suicidal ideas.    Objective:     Vital Signs (Most Recent):  Temp: 97.9 °F (36.6 °C) (12/02/22 0814)  Pulse: 70 (12/02/22 0814)  Resp: 20 (12/02/22 0814)  BP: (!) 141/67 (12/02/22 0814)  SpO2: 100 % (12/02/22 0814)   Vital Signs (24h Range):  Temp:  [97.1 °F (36.2 °C)-97.9 °F (36.6 °C)] 97.9 °F (36.6 °C)  Pulse:  [52-98] 70  Resp:  [16-20] 20  SpO2:  [89 %-100 %] 100 %  BP: (138-195)/(63-98) 141/67     Weight: (!) 154.5 kg (340 lb 9.6 oz)  Body mass index is 51.79 kg/m².    Intake/Output Summary (Last 24 hours) at 12/2/2022 1030  Last data filed at 12/2/2022 0336  Gross per 24 hour   Intake 1100 ml   Output --   Net 1100 ml      Physical Exam  Vitals reviewed.   Constitutional:       Appearance: Normal appearance. She is obese.   HENT:      Head: Normocephalic and atraumatic.      Nose: Nose normal.   Eyes:      Extraocular Movements: Extraocular movements intact.       Conjunctiva/sclera: Conjunctivae normal.   Neck:      Trachea: Trachea normal.   Cardiovascular:      Rate and Rhythm: Normal rate and regular rhythm.      Pulses: Normal pulses.      Heart sounds: Normal heart sounds.   Pulmonary:      Effort: Pulmonary effort is normal.      Breath sounds: Normal breath sounds and air entry.   Abdominal:      General: Bowel sounds are normal.      Palpations: Abdomen is soft.   Musculoskeletal:      Cervical back: Neck supple.      Comments: Moves all extremities, normal tone  Amputated toes- well healed surgical scars   Skin:     General: Skin is warm and dry.      Capillary Refill: Capillary refill takes less than 2 seconds.   Neurological:      General: No focal deficit present.      Mental Status: She is alert and oriented to person, place, and time.      Cranial Nerves: Cranial nerves 2-12 are intact.      Comments: Grossly normal motor and sensory function without focal deficit appreciated.   Psychiatric:         Mood and Affect: Mood and affect normal.         Behavior: Behavior normal. Behavior is cooperative.         Thought Content: Thought content normal.       Significant Labs: All pertinent labs within the past 24 hours have been reviewed.    Significant Imaging: I have reviewed all pertinent imaging results/findings within the past 24 hours.

## 2022-12-02 NOTE — HOSPITAL COURSE
12/2-pain better, MRI today  12/3- no osteo.  Needs non-weight bearing so fractures can heal. Abx for cellulitis. CM for wheelchair. Ortho ordered boot.  12/4- wheelchair and bariatric rolling walker at bedside. Patient is stable for discharge.  Re-enforced ortho recommendation for non-weight bearing and elevation of the foot.  Will give 5 days abx to cover for cellulitis.  Needs to follow up with PCP in 1 week. Follow up with Ortho in 6-8 weeks. Given COPD history will add LAMA to regimen.

## 2022-12-02 NOTE — PLAN OF CARE
Problem: Adult Inpatient Plan of Care  Goal: Plan of Care Review  Outcome: Ongoing, Progressing  Goal: Patient-Specific Goal (Individualized)  Outcome: Ongoing, Progressing  Goal: Absence of Hospital-Acquired Illness or Injury  Outcome: Ongoing, Progressing  Goal: Optimal Comfort and Wellbeing  Outcome: Ongoing, Progressing  Goal: Readiness for Transition of Care  Outcome: Ongoing, Progressing     Problem: Bariatric Environmental Safety  Goal: Safety Maintained with Care  Outcome: Ongoing, Progressing     Problem: Diabetes Comorbidity  Goal: Blood Glucose Level Within Targeted Range  Outcome: Ongoing, Progressing     Problem: Impaired Wound Healing  Goal: Optimal Wound Healing  Outcome: Ongoing, Progressing     Problem: Gas Exchange Impaired  Goal: Optimal Gas Exchange  Outcome: Ongoing, Progressing

## 2022-12-02 NOTE — PLAN OF CARE
Attempted to see patient for initial dc planning assessment but patient is gone for an MRI. Will follow up later.

## 2022-12-02 NOTE — PATIENT INSTRUCTIONS
Holly was seen today for knee pain and toe pain.    Diagnoses and all orders for this visit:    Cellulitis of right lower extremity  -     US Lower Extremity Veins Right; Future    Other orders  The following orders have not been finalized:  -     mupirocin (BACTROBAN) 2 % ointment

## 2022-12-02 NOTE — ASSESSMENT & PLAN NOTE
Body mass index is 51.79 kg/m². Morbid obesity complicates all aspects of disease management from diagnostic modalities to treatment. Weight loss encouraged and health benefits explained to patient.

## 2022-12-02 NOTE — PROGRESS NOTES
"[facility]  [facility] Medicine  Progress Note    Patient Name: Holly Porter  MRN: [id number]  Patient Class: IP- Inpatient   Admission Date: 12/1/2022  Length of Stay: 1 days  Attending Physician: Kenan Broderick DO  Primary Care Provider: Regan Frausto MD        Subjective:     Principal Problem:Cellulitis of right foot        HPI:  62yoaaf with pmh of morbid obesity, diabetes, PVD, HF?, tobacco use, htn, COPD who presents to ED from for evaluation of painful right foot. On Thanksgiving Day she experienced a mechanical fall that resulted in obvious deformity of right little toe that was "put back in place" by her sister.  There was an open wound at that time. She presented to [facility] and was told she needed to see Dr. Saeed (who amputated her other toes) for an amputation.  She returned home, her foot subsequently became red, tender and swollen so she presents to [facility] ED today for evaluation. Denies any f/c, no chest pain, sob, diaphoresis, wheezing, cough, dysuria, diarrhea or abdominal pain. There is still a small wound on the medial aspect of the little toe which intermittently bleeds. Denies any purulent drainage.  ROS otherwise negative.        Overview/Hospital Course:  12/2-pain better, MRI today      Interval History: NAEO    Review of Systems   Constitutional:  Negative for chills, fatigue, fever and unexpected weight change.   HENT:  Negative for congestion, mouth sores and sore throat.    Eyes:  Negative for photophobia and visual disturbance.   Respiratory:  Negative for cough, chest tightness, shortness of breath and wheezing.    Cardiovascular:  Positive for leg swelling. Negative for chest pain and palpitations.   Gastrointestinal:  Negative for abdominal pain, diarrhea, nausea and vomiting.   Endocrine: Negative for cold intolerance and heat intolerance.   Genitourinary:  Negative for difficulty urinating, dysuria, frequency and urgency.   Musculoskeletal:  Positive for " joint swelling. Negative for arthralgias, back pain and myalgias.        Right foot redness, swelling and tenderness   Skin:  Negative for pallor and rash.   Neurological:  Negative for tremors, seizures, syncope, weakness, numbness and headaches.   Hematological:  Does not bruise/bleed easily.   Psychiatric/Behavioral:  Negative for agitation, confusion, hallucinations and suicidal ideas.    Objective:     Vital Signs (Most Recent):  Temp: 97.9 °F (36.6 °C) (12/02/22 0814)  Pulse: 70 (12/02/22 0814)  Resp: 20 (12/02/22 0814)  BP: (!) 141/67 (12/02/22 0814)  SpO2: 100 % (12/02/22 0814)   Vital Signs (24h Range):  Temp:  [97.1 °F (36.2 °C)-97.9 °F (36.6 °C)] 97.9 °F (36.6 °C)  Pulse:  [52-98] 70  Resp:  [16-20] 20  SpO2:  [89 %-100 %] 100 %  BP: (138-195)/(63-98) 141/67     Weight: (!) 154.5 kg (340 lb 9.6 oz)  Body mass index is 51.79 kg/m².    Intake/Output Summary (Last 24 hours) at 12/2/2022 1030  Last data filed at 12/2/2022 0336  Gross per 24 hour   Intake 1100 ml   Output --   Net 1100 ml      Physical Exam  Vitals reviewed.   Constitutional:       Appearance: Normal appearance. She is obese.   HENT:      Head: Normocephalic and atraumatic.      Nose: Nose normal.   Eyes:      Extraocular Movements: Extraocular movements intact.      Conjunctiva/sclera: Conjunctivae normal.   Neck:      Trachea: Trachea normal.   Cardiovascular:      Rate and Rhythm: Normal rate and regular rhythm.      Pulses: Normal pulses.      Heart sounds: Normal heart sounds.   Pulmonary:      Effort: Pulmonary effort is normal.      Breath sounds: Normal breath sounds and air entry.   Abdominal:      General: Bowel sounds are normal.      Palpations: Abdomen is soft.   Musculoskeletal:      Cervical back: Neck supple.      Comments: Moves all extremities, normal tone  Amputated toes- well healed surgical scars   Skin:     General: Skin is warm and dry.      Capillary Refill: Capillary refill takes less than 2 seconds.   Neurological:       General: No focal deficit present.      Mental Status: She is alert and oriented to person, place, and time.      Cranial Nerves: Cranial nerves 2-12 are intact.      Comments: Grossly normal motor and sensory function without focal deficit appreciated.   Psychiatric:         Mood and Affect: Mood and affect normal.         Behavior: Behavior normal. Behavior is cooperative.         Thought Content: Thought content normal.       Significant Labs: All pertinent labs within the past 24 hours have been reviewed.    Significant Imaging: I have reviewed all pertinent imaging results/findings within the past 24 hours.      Assessment/Plan:      * Cellulitis of right foot  Diabetic with open foot wound  Vanc, flagyl, cef  Blood cultures sent  MRI for osteo  Gen surg if osteo  Ortho for fracture if no osteo      Tobacco use  Counseled cessation      Exertional dyspnea  Chronic        Morbid obesity  Body mass index is 51.79 kg/m². Morbid obesity complicates all aspects of disease management from diagnostic modalities to treatment. Weight loss encouraged and health benefits explained to patient.         Chronic obstructive pulmonary disease  Continue home LABA, ICS  Albuterol PRN      Hypothyroidism  Continue synthroid      Arthritis  Tylenol if needed      Hypertension  Adjust medications as needed      PVD (peripheral vascular disease)  Continue asa        VTE Risk Mitigation (From admission, onward)         Ordered     enoxaparin injection 40 mg  Every 12 hours         12/01/22 1715     IP VTE HIGH RISK PATIENT  Once         12/01/22 1715     Place sequential compression device  Until discontinued         12/01/22 1715                Discharge Planning   MONTSERRAT:      Code Status: Full Code   Is the patient medically ready for discharge?:     Reason for patient still in hospital (select all that apply): Treatment                     Kenan Broderick DO  Department of Hospital Medicine   [facility]

## 2022-12-03 PROBLEM — T14.8XXA FRACTURE: Status: ACTIVE | Noted: 2022-12-03

## 2022-12-03 PROBLEM — G47.33 OSA ON CPAP: Chronic | Status: ACTIVE | Noted: 2022-12-03

## 2022-12-03 LAB
ANION GAP SERPL CALCULATED.3IONS-SCNC: 10 MMOL/L (ref 7–16)
BASOPHILS # BLD AUTO: 0.06 K/UL (ref 0–0.2)
BASOPHILS NFR BLD AUTO: 0.6 % (ref 0–1)
BUN SERPL-MCNC: 15 MG/DL (ref 7–18)
BUN/CREAT SERPL: 14 (ref 6–20)
CALCIUM SERPL-MCNC: 9.8 MG/DL (ref 8.5–10.1)
CHLORIDE SERPL-SCNC: 100 MMOL/L (ref 98–107)
CO2 SERPL-SCNC: 32 MMOL/L (ref 21–32)
CREAT SERPL-MCNC: 1.07 MG/DL (ref 0.55–1.02)
DIFFERENTIAL METHOD BLD: ABNORMAL
EGFR (NO RACE VARIABLE) (RUSH/TITUS): 58 ML/MIN/1.73M²
EOSINOPHIL # BLD AUTO: 0.72 K/UL (ref 0–0.5)
EOSINOPHIL NFR BLD AUTO: 7.5 % (ref 1–4)
ERYTHROCYTE [DISTWIDTH] IN BLOOD BY AUTOMATED COUNT: 15.6 % (ref 11.5–14.5)
GLUCOSE SERPL-MCNC: 105 MG/DL (ref 70–105)
GLUCOSE SERPL-MCNC: 123 MG/DL (ref 74–106)
GLUCOSE SERPL-MCNC: 134 MG/DL (ref 70–105)
GLUCOSE SERPL-MCNC: 147 MG/DL (ref 70–105)
GLUCOSE SERPL-MCNC: 94 MG/DL (ref 70–105)
HCT VFR BLD AUTO: 47.5 % (ref 38–47)
HGB BLD-MCNC: 14.5 G/DL (ref 12–16)
IMM GRANULOCYTES # BLD AUTO: 0.04 K/UL (ref 0–0.04)
IMM GRANULOCYTES NFR BLD: 0.4 % (ref 0–0.4)
LYMPHOCYTES # BLD AUTO: 2.15 K/UL (ref 1–4.8)
LYMPHOCYTES NFR BLD AUTO: 22.3 % (ref 27–41)
MAGNESIUM SERPL-MCNC: 1.9 MG/DL (ref 1.7–2.3)
MCH RBC QN AUTO: 26.6 PG (ref 27–31)
MCHC RBC AUTO-ENTMCNC: 30.5 G/DL (ref 32–36)
MCV RBC AUTO: 87.2 FL (ref 80–96)
MONOCYTES # BLD AUTO: 0.78 K/UL (ref 0–0.8)
MONOCYTES NFR BLD AUTO: 8.1 % (ref 2–6)
MPC BLD CALC-MCNC: 12.6 FL (ref 9.4–12.4)
NEUTROPHILS # BLD AUTO: 5.9 K/UL (ref 1.8–7.7)
NEUTROPHILS NFR BLD AUTO: 61.1 % (ref 53–65)
NRBC # BLD AUTO: 0 X10E3/UL
NRBC, AUTO (.00): 0 %
PHOSPHATE SERPL-MCNC: 5.1 MG/DL (ref 2.5–4.5)
PLATELET # BLD AUTO: 265 K/UL (ref 150–400)
POTASSIUM SERPL-SCNC: 3.7 MMOL/L (ref 3.5–5.1)
RBC # BLD AUTO: 5.45 M/UL (ref 4.2–5.4)
SODIUM SERPL-SCNC: 138 MMOL/L (ref 136–145)
WBC # BLD AUTO: 9.65 K/UL (ref 4.5–11)

## 2022-12-03 PROCEDURE — 82962 GLUCOSE BLOOD TEST: CPT

## 2022-12-03 PROCEDURE — 84100 ASSAY OF PHOSPHORUS: CPT | Performed by: STUDENT IN AN ORGANIZED HEALTH CARE EDUCATION/TRAINING PROGRAM

## 2022-12-03 PROCEDURE — 94761 N-INVAS EAR/PLS OXIMETRY MLT: CPT

## 2022-12-03 PROCEDURE — 11000001 HC ACUTE MED/SURG PRIVATE ROOM

## 2022-12-03 PROCEDURE — 25000242 PHARM REV CODE 250 ALT 637 W/ HCPCS: Performed by: STUDENT IN AN ORGANIZED HEALTH CARE EDUCATION/TRAINING PROGRAM

## 2022-12-03 PROCEDURE — 94660 CPAP INITIATION&MGMT: CPT

## 2022-12-03 PROCEDURE — 99232 PR SUBSEQUENT HOSPITAL CARE,LEVL II: ICD-10-PCS | Mod: ,,, | Performed by: STUDENT IN AN ORGANIZED HEALTH CARE EDUCATION/TRAINING PROGRAM

## 2022-12-03 PROCEDURE — 25000003 PHARM REV CODE 250: Performed by: STUDENT IN AN ORGANIZED HEALTH CARE EDUCATION/TRAINING PROGRAM

## 2022-12-03 PROCEDURE — S0030 INJECTION, METRONIDAZOLE: HCPCS | Performed by: STUDENT IN AN ORGANIZED HEALTH CARE EDUCATION/TRAINING PROGRAM

## 2022-12-03 PROCEDURE — 85025 COMPLETE CBC W/AUTO DIFF WBC: CPT | Performed by: STUDENT IN AN ORGANIZED HEALTH CARE EDUCATION/TRAINING PROGRAM

## 2022-12-03 PROCEDURE — 99900035 HC TECH TIME PER 15 MIN (STAT)

## 2022-12-03 PROCEDURE — 97161 PT EVAL LOW COMPLEX 20 MIN: CPT

## 2022-12-03 PROCEDURE — 36415 COLL VENOUS BLD VENIPUNCTURE: CPT | Performed by: STUDENT IN AN ORGANIZED HEALTH CARE EDUCATION/TRAINING PROGRAM

## 2022-12-03 PROCEDURE — 63600175 PHARM REV CODE 636 W HCPCS: Performed by: STUDENT IN AN ORGANIZED HEALTH CARE EDUCATION/TRAINING PROGRAM

## 2022-12-03 PROCEDURE — 94640 AIRWAY INHALATION TREATMENT: CPT

## 2022-12-03 PROCEDURE — 80048 BASIC METABOLIC PNL TOTAL CA: CPT | Performed by: STUDENT IN AN ORGANIZED HEALTH CARE EDUCATION/TRAINING PROGRAM

## 2022-12-03 PROCEDURE — 27000221 HC OXYGEN, UP TO 24 HOURS

## 2022-12-03 PROCEDURE — 25000003 PHARM REV CODE 250

## 2022-12-03 PROCEDURE — 83735 ASSAY OF MAGNESIUM: CPT | Performed by: STUDENT IN AN ORGANIZED HEALTH CARE EDUCATION/TRAINING PROGRAM

## 2022-12-03 PROCEDURE — 27000190 HC CPAP FULL FACE MASK W/VALVE

## 2022-12-03 PROCEDURE — 99232 SBSQ HOSP IP/OBS MODERATE 35: CPT | Mod: ,,, | Performed by: STUDENT IN AN ORGANIZED HEALTH CARE EDUCATION/TRAINING PROGRAM

## 2022-12-03 RX ORDER — CARVEDILOL 12.5 MG/1
12.5 TABLET ORAL 2 TIMES DAILY
Status: DISCONTINUED | OUTPATIENT
Start: 2022-12-03 | End: 2022-12-04 | Stop reason: HOSPADM

## 2022-12-03 RX ORDER — ACETAMINOPHEN 500 MG
1000 TABLET ORAL EVERY 8 HOURS PRN
Status: DISCONTINUED | OUTPATIENT
Start: 2022-12-03 | End: 2022-12-04 | Stop reason: HOSPADM

## 2022-12-03 RX ORDER — LABETALOL HYDROCHLORIDE 5 MG/ML
20 INJECTION, SOLUTION INTRAVENOUS EVERY 6 HOURS PRN
Status: DISCONTINUED | OUTPATIENT
Start: 2022-12-03 | End: 2022-12-04 | Stop reason: HOSPADM

## 2022-12-03 RX ADMIN — CARVEDILOL 12.5 MG: 12.5 TABLET, FILM COATED ORAL at 03:12

## 2022-12-03 RX ADMIN — TOPIRAMATE 150 MG: 100 TABLET, FILM COATED ORAL at 08:12

## 2022-12-03 RX ADMIN — CEFEPIME 1 G: 1 INJECTION, POWDER, FOR SOLUTION INTRAMUSCULAR; INTRAVENOUS at 12:12

## 2022-12-03 RX ADMIN — QUETIAPINE 25 MG: 25 TABLET ORAL at 09:12

## 2022-12-03 RX ADMIN — CILOSTAZOL 100 MG: 100 TABLET ORAL at 08:12

## 2022-12-03 RX ADMIN — ALBUTEROL SULFATE 2.5 MG: 2.5 SOLUTION RESPIRATORY (INHALATION) at 12:12

## 2022-12-03 RX ADMIN — PANTOPRAZOLE SODIUM 40 MG: 40 TABLET, DELAYED RELEASE ORAL at 09:12

## 2022-12-03 RX ADMIN — CEFEPIME 1 G: 1 INJECTION, POWDER, FOR SOLUTION INTRAMUSCULAR; INTRAVENOUS at 07:12

## 2022-12-03 RX ADMIN — ALLOPURINOL 300 MG: 300 TABLET ORAL at 09:12

## 2022-12-03 RX ADMIN — LEVOTHYROXINE SODIUM 150 MCG: 0.15 TABLET ORAL at 05:12

## 2022-12-03 RX ADMIN — BUDESONIDE 0.5 MG: 0.5 INHALANT ORAL at 07:12

## 2022-12-03 RX ADMIN — ACETAMINOPHEN 1000 MG: 500 TABLET ORAL at 09:12

## 2022-12-03 RX ADMIN — METRONIDAZOLE 500 MG: 500 INJECTION, SOLUTION INTRAVENOUS at 12:12

## 2022-12-03 RX ADMIN — CILOSTAZOL 100 MG: 100 TABLET ORAL at 09:12

## 2022-12-03 RX ADMIN — ENOXAPARIN SODIUM 40 MG: 40 INJECTION SUBCUTANEOUS at 08:12

## 2022-12-03 RX ADMIN — CARVEDILOL 12.5 MG: 12.5 TABLET, FILM COATED ORAL at 08:12

## 2022-12-03 RX ADMIN — METRONIDAZOLE 500 MG: 500 INJECTION, SOLUTION INTRAVENOUS at 09:12

## 2022-12-03 RX ADMIN — DOCUSATE SODIUM 100 MG: 100 CAPSULE, LIQUID FILLED ORAL at 09:12

## 2022-12-03 RX ADMIN — SPIRONOLACTONE 50 MG: 25 TABLET ORAL at 09:12

## 2022-12-03 RX ADMIN — ALBUTEROL SULFATE 2.5 MG: 2.5 SOLUTION RESPIRATORY (INHALATION) at 07:12

## 2022-12-03 RX ADMIN — ALBUTEROL SULFATE 2.5 MG: 2.5 SOLUTION RESPIRATORY (INHALATION) at 05:12

## 2022-12-03 RX ADMIN — PREGABALIN 100 MG: 50 CAPSULE ORAL at 09:12

## 2022-12-03 RX ADMIN — LABETALOL HYDROCHLORIDE 20 MG: 5 INJECTION INTRAVENOUS at 02:12

## 2022-12-03 RX ADMIN — TOPIRAMATE 150 MG: 100 TABLET, FILM COATED ORAL at 12:12

## 2022-12-03 RX ADMIN — CETIRIZINE HYDROCHLORIDE 10 MG: 10 TABLET, FILM COATED ORAL at 09:12

## 2022-12-03 RX ADMIN — METOLAZONE 2.5 MG: 2.5 TABLET ORAL at 09:12

## 2022-12-03 RX ADMIN — NIFEDIPINE 60 MG: 60 TABLET, EXTENDED RELEASE ORAL at 09:12

## 2022-12-03 RX ADMIN — CEFEPIME 1 G: 1 INJECTION, POWDER, FOR SOLUTION INTRAMUSCULAR; INTRAVENOUS at 05:12

## 2022-12-03 RX ADMIN — CEFEPIME 1 G: 1 INJECTION, POWDER, FOR SOLUTION INTRAMUSCULAR; INTRAVENOUS at 11:12

## 2022-12-03 RX ADMIN — ALBUTEROL SULFATE 2.5 MG: 2.5 SOLUTION RESPIRATORY (INHALATION) at 04:12

## 2022-12-03 RX ADMIN — VANCOMYCIN HYDROCHLORIDE 2500 MG: 500 INJECTION, POWDER, LYOPHILIZED, FOR SOLUTION INTRAVENOUS at 05:12

## 2022-12-03 RX ADMIN — ENOXAPARIN SODIUM 40 MG: 40 INJECTION SUBCUTANEOUS at 09:12

## 2022-12-03 RX ADMIN — ASPIRIN 81 MG: 81 TABLET, DELAYED RELEASE ORAL at 09:12

## 2022-12-03 RX ADMIN — INSULIN DETEMIR 20 UNITS: 100 INJECTION, SOLUTION SUBCUTANEOUS at 09:12

## 2022-12-03 RX ADMIN — HYDROCODONE BITARTRATE AND ACETAMINOPHEN 1 TABLET: 10; 325 TABLET ORAL at 05:12

## 2022-12-03 NOTE — ASSESSMENT & PLAN NOTE
Diabetic with open foot wound  flagyl, cef  Blood cultures sent  No osteo  Fractures-appreciate ortho  Non-weight bearing, walking boot  CM for wheelchair

## 2022-12-03 NOTE — PROGRESS NOTES
Physical therapy seen the patient is getting her a wheelchair she is unable to maintain nonweightbearing status on a walker along    Long discussion with the patient again she is got to elevate her foot she had her foot hanging down today    She is also got get back with her doctors about her chronic cellulitis.  She has severe venous disease apparently

## 2022-12-03 NOTE — SUBJECTIVE & OBJECTIVE
Interval History: NAEO    Review of Systems   Constitutional:  Negative for chills, fatigue, fever and unexpected weight change.   HENT:  Negative for congestion, mouth sores and sore throat.    Eyes:  Negative for photophobia and visual disturbance.   Respiratory:  Negative for cough, chest tightness, shortness of breath and wheezing.    Cardiovascular:  Positive for leg swelling. Negative for chest pain and palpitations.   Gastrointestinal:  Negative for abdominal pain, diarrhea, nausea and vomiting.   Endocrine: Negative for cold intolerance and heat intolerance.   Genitourinary:  Negative for difficulty urinating, dysuria, frequency and urgency.   Musculoskeletal:  Positive for joint swelling. Negative for arthralgias, back pain and myalgias.        Right foot redness, swelling and tenderness   Skin:  Negative for pallor and rash.   Neurological:  Negative for tremors, seizures, syncope, weakness, numbness and headaches.   Hematological:  Does not bruise/bleed easily.   Psychiatric/Behavioral:  Negative for agitation, confusion, hallucinations and suicidal ideas.    Objective:     Vital Signs (Most Recent):  Temp: 98.4 °F (36.9 °C) (12/03/22 0735)  Pulse: 83 (12/03/22 0735)  Resp: 16 (12/03/22 0729)  BP: (!) 165/88 (12/03/22 0735)  SpO2: (!) 94 % (12/03/22 0735)   Vital Signs (24h Range):  Temp:  [97.4 °F (36.3 °C)-98.5 °F (36.9 °C)] 98.4 °F (36.9 °C)  Pulse:  [] 83  Resp:  [16-21] 16  SpO2:  [90 %-97 %] 94 %  BP: (127-165)/(49-88) 165/88     Weight: (!) 154.5 kg (340 lb 9.6 oz)  Body mass index is 51.79 kg/m².    Intake/Output Summary (Last 24 hours) at 12/3/2022 0950  Last data filed at 12/3/2022 0710  Gross per 24 hour   Intake 700 ml   Output 1 ml   Net 699 ml        Physical Exam  Vitals reviewed.   Constitutional:       Appearance: Normal appearance. She is obese.   HENT:      Head: Normocephalic and atraumatic.      Nose: Nose normal.   Eyes:      Extraocular Movements: Extraocular movements intact.       Conjunctiva/sclera: Conjunctivae normal.   Neck:      Trachea: Trachea normal.   Cardiovascular:      Rate and Rhythm: Normal rate and regular rhythm.      Pulses: Normal pulses.      Heart sounds: Normal heart sounds.   Pulmonary:      Effort: Pulmonary effort is normal.      Breath sounds: Normal breath sounds and air entry.   Abdominal:      General: Bowel sounds are normal.      Palpations: Abdomen is soft.   Musculoskeletal:      Cervical back: Neck supple.      Comments: Moves all extremities, normal tone  Amputated toes- well healed surgical scars   Skin:     General: Skin is warm and dry.      Capillary Refill: Capillary refill takes less than 2 seconds.   Neurological:      General: No focal deficit present.      Mental Status: She is alert and oriented to person, place, and time.      Cranial Nerves: Cranial nerves 2-12 are intact.      Comments: Grossly normal motor and sensory function without focal deficit appreciated.   Psychiatric:         Mood and Affect: Mood and affect normal.         Behavior: Behavior normal. Behavior is cooperative.         Thought Content: Thought content normal.       Significant Labs: All pertinent labs within the past 24 hours have been reviewed.    Significant Imaging: I have reviewed all pertinent imaging results/findings within the past 24 hours.

## 2022-12-03 NOTE — PLAN OF CARE
MARIAA consulted for bariatric W/C with elevating leg rests and bariatric rollator.  Discussed with pt and she would like to use [facility] for DME.  Orders faxed to  [facility].  SW following.

## 2022-12-03 NOTE — PLAN OF CARE
Problem: Adult Inpatient Plan of Care  Goal: Plan of Care Review  Outcome: Ongoing, Progressing  Flowsheets (Taken 12/2/2022 1810)  Plan of Care Reviewed With: patient  Goal: Patient-Specific Goal (Individualized)  Outcome: Ongoing, Progressing  Flowsheets (Taken 12/2/2022 1810)  Anxieties, Fears or Concerns: pain control  Individualized Care Needs: pain control  Patient-Specific Goals (Include Timeframe): pain control  Goal: Absence of Hospital-Acquired Illness or Injury  Outcome: Ongoing, Progressing  Goal: Optimal Comfort and Wellbeing  Outcome: Ongoing, Progressing  Goal: Readiness for Transition of Care  Outcome: Ongoing, Progressing

## 2022-12-03 NOTE — PT/OT/SLP EVAL
"Physical Therapy Evaluation and Treatment    Patient Name: Holly Porter   MRN: [id number]  Recent Surgery: * No surgery found *      Recommendations:     Discharge Recommendations: home with home health, home health PT   Discharge Equipment Recommendations: rollator, wheelchair (bariatric)   Barriers to discharge:  equipment to be arranged; IV abx    Assessment:     Holly Porter is a 62 y.o. female admitted with a medical diagnosis of Cellulitis of right foot. Fractures of 1-5 metatarsal heads She presents with the following impairments/functional limitations: weakness, impaired endurance, impaired functional mobility, pain, orthopedic precautions.     Patient with orders for strict NWB right LE in CAM walking boot but is unable to maintain this restriction and hop with walker. Patient will require a wheel chair for mobility; wheel chair will not go in to the bathroom/bedroom. Patient able to use the knee walker but reports her insurance did not pay for same last time she needed one. Patient has an old rollator and would like to arrange a new one whilst hospitalized to help with transfers and transition into bedroom and bathroom. Patient will be safe to go home once equipment is arranged.    Rehab Prognosis: Good; patient would benefit from acute skilled PT services to address these deficits and reach maximum level of function.  Recent Surgery: * No surgery found *      Plan:     During this hospitalization, patient to be seen 5 x/week to address the identified rehab impairments via gait training, therapeutic activities and progress toward the following goals:    Plan of Care Expires: 01/03/23    Subjective     Chief Complaint: cellulitis right foot; fractures 1-5 MT right  Patient/Family Comments/Goals: "I am at my sister's until my house if ready on the 15th"  Pain/Comfort:  Pain Rating 1: 4/10  Location - Side 1: Right  Location 1: foot  Pain Addressed 1: Pre-medicate for activity, Reposition, Distraction, " Cessation of Activity  Pain Rating Post-Intervention 1: 4/10    Patients cultural, spiritual, Oriental orthodox conflicts given the current situation: no    Social History:  Living Environment: Patient  living with her sister until her home renos are completed on the 15th. To have a ramp put on sister's home and her home to increase ease of entry  . Both homes are single story.  Prior Level of Function: Prior to admission, patient was independent with ADLs, using Rolling walker at times for ambulation.  Equipment Used at Home: RW, BSC  DME owned (not currently used):  old rollator that is broken per pt  Assistance Upon Discharge: family    Objective:     Communicated with Rancho Holt RN prior to session. Patient found sitting edge of bed with peripheral IV upon PT entry to room.    General Precautions: Standard, fall   Orthopedic Precautions:RLE non weight bearing (in CAM walking boot)   Braces:  (CAM walking boot)   Respiratory Status: Nasal cannula, flow 2 L/min    Exams:  RLE ROM: Deficits: hip/knee WEF; ankle in CAM walker  RLE Strength: Deficits: hip/knee WFL; ankle not tested  LLE ROM: WFL  LLE Strength: WFL  Cognitive Exam: Patient is oriented to Person, Place, Time, Situation      Functional Mobility:  Bed Mobility:   Sit to Supine: independence  Transfers:  Sit to Stand: contact guard assistance with rolling walker with cues for  weight bearing precautions. Patient exceeding NWB right LE restriction  Bed to Chair: contact guard assistance with  knee walker  with cues for  weight bearing precautions and sequencing  using step transfer with right knee on knee walker. With use of RW pt bearing PWB via right heel in CAM Walking boot- unable to fully maintain NWB right LE  Gait:   Patient ambulated 40 with  knee walker  and contact guard assistance. Patient required cues for sequencing and speed/safety  to increase independence and safety. Patient required cues ~ 25% of the time. Patient unable to ambulate with RW  maintaining NWB right LE and will require a w/c at d/c. Patient reports insurance did not pay for knee walker last time she needed one.  Balance:   Sitting: GOOD+: Maintains balance through MAXIMAL excursions of active trunk motion  Standing: GOOD: Needs SUPERVISION only during gait and able to self right with moderate LOB when using knee walker; Adequate standing balance with RW and CAM walker if pt using right heel balance pressure. Poor ability to stand maintaining NWB rigght    Therapeutic Activities and Exercises:  Patient educated on role of acute care PT and PT POC and safety while in hospital including calling nurse for mobility  Educated about weightbearing precautions (NWB right in CAM walking boot) and provided cuing for adherence as appropriate during session    Discussion re: home equipments needs based on NWB restriction and home limitations. Recommend wheel chair for primary mobility. Pt has old rollator (broken) and would like another for transitions to bedroom/bathroom as ins would not pay for knee scooter.    AM-PAC 6 CLICK MOBILITY  Total Score:18     Patient left up in chair with all lines intact, call button in reach, and RN notified.    GOALS:   Multidisciplinary Problems       Physical Therapy Goals          Problem: Physical Therapy    Goal Priority Disciplines Outcome Goal Variances Interventions   Physical Therapy Goal     PT, PT/OT Ongoing, Progressing     Description: Short Term Goals to be met by: 2022    Patient will increase functional independence with mobility by performin. Supine to sit with independently  2. Sit to stand transfer with independently using Rolling walker and NWB right LE  3. Bed to chair transfer with independently using Rolling walker and NWB right LE  4. Gait  x 100 feet with independently using knee walker  5. Lower extremity exercise program x30 reps per handout, with assistance as needed    Long Term Goals to be met by: 2/3/2022    Pt will regain  full independent functional mobility with lowest level of assistive device to return to home situation and prior activities of daily living.                        History:     Past Medical History:   Diagnosis Date    Anxiety state     Atherosclerosis of native artery of extremity with intermittent claudication 01/30/2019    bilateral legs    Bilateral leg pain     BMI 50.0-59.9, adult 02/22/2021    Cellulitis 06/11/2020    Chronic pain syndrome     Chronic peripheral venous hypertension 01/08/2019    COPD (chronic obstructive pulmonary disease)     Diabetes     Diabetes mellitus, type 2     DVT (deep venous thrombosis) 02/12/2020    Embolism and thrombosis of superficial veins of unspecified lower extremity 07/01/2019    bilateral    Frequent headaches 09/20/2018    GERD (gastroesophageal reflux disease)     Hereditary lymphedema     Hx of thyroid cancer     Hyperlipidemia     Hypertension     Hypothyroidism     Migraines     Migraines     Morbid obesity     Nicotine dependence     Non-pressure chronic ulcer of left lower leg 03/09/2021    Osteoarthritis     Other skin changes 03/09/2021    bilateral gaiter regions    Pain in left leg     Pain in right leg     PVD (peripheral vascular disease) 01/08/2019    Seizure disorder     Sleep apnea     Venous insufficiency (chronic) (peripheral)     Venous stasis     Vitamin D deficiency        Past Surgical History:   Procedure Laterality Date    HYSTERECTOMY      ILIAC ARTERY STENT Bilateral 01/28/2020    Bilateral distal aorta and common iliac 8 X 59 vbx covered stents performed by Dr. Antonio Jacinto.    RADIOFREQUENCY ABLATION Left 04/15/2022    Procedure: Left calf  Radiofrequency Ablation;  Surgeon: Matty Falcon DO;  Location: [facility];  Service: Vascular;  Laterality: Left;    STAB PHLEBECTOMY OF VARICOSE VEINS Left 01/25/2013    Left leg microphlebectomies x 23 stab avulsions and ligation of multiple varicose veins perrformed by Dr. Cirilo Aguirre.     THYROIDECTOMY      hx: thyroid cancer    TOE AMPUTATION Left 01/28/2020    2nd, 4th and 5th performed by Dr. Anam Saeed.    TOE AMPUTATION Right 01/28/2020    4th toe performed by Dr. Anam Saeed.    TOTAL ABDOMINAL HYSTERECTOMY W/ BILATERAL SALPINGOOPHORECTOMY      TUBAL LIGATION      VAGINAL DELIVERY      x 4    VENOUS ABLATION Right 08/09/2019    GSV Varithena Ablation performed by Dr. Estuardo Falcon    VENOUS ABLATION Left 08/02/2019    ATAV Varithena Ablation performed by Dr. Estuardo Falcon.    VENOUS ABLATION Right 07/13/2015    ATAV Laser Ablatio performed by Dr. Cirilo Aguirre.    VENOUS ABLATION Right 07/06/2015    Distal  GSV Laser Ablation performed by dr. Cirilo Aguirre.    VENOUS ABLATION Left 04/20/2015    Left Distal GSV Laser Ablation performed by dr. Cirilo Aguirre.    VENOUS ABLATION Right 10/28/2013    Right Distal GSV RF Ablation performed by Dr. Cirilo Aguirre.    VENOUS ABLATION Left 10/25/2013    SSV RF Ablation performed by dr. Cirilo Aguirre.    VENOUS ABLATION Left 10/07/2013    Left GSV and Left ATAV RF Ablation performed by Dr. Cirilo Aguirre.    VENOUS ABLATION Right 01/21/2013    GSV RF Ablation w/micros x 22 performed by Dr. Cirilo Aguirre.    VENOUS ABLATION Left 06/25/2021    Left distal GSV Varithena Ablation       Time Tracking:     PT Received On: 12/03/22  PT Start Time: 0900  PT Stop Time: 0920  PT Total Time (min): 20 min     Billable Minutes: Evaluation Low complexity    12/03/2022

## 2022-12-03 NOTE — PROGRESS NOTES
"[facility]  [facility] Medicine  Progress Note    Patient Name: Holly Porter  MRN: [id number]  Patient Class: IP- Inpatient   Admission Date: 12/1/2022  Length of Stay: 2 days  Attending Physician: Kenan Broderick DO  Primary Care Provider: Regan Frausto MD        Subjective:     Principal Problem:Cellulitis of right foot        HPI:  62yoaaf with pmh of morbid obesity, diabetes, PVD, HF?, tobacco use, htn, COPD who presents to ED from for evaluation of painful right foot. On Thanksgiving Day she experienced a mechanical fall that resulted in obvious deformity of right little toe that was "put back in place" by her sister.  There was an open wound at that time. She presented to [facility] ED and was told she needed to see Dr. Saeed (who amputated her other toes) for an amputation.  She returned home, her foot subsequently became red, tender and swollen so she presents to [facility] ED today for evaluation. Denies any f/c, no chest pain, sob, diaphoresis, wheezing, cough, dysuria, diarrhea or abdominal pain. There is still a small wound on the medial aspect of the little toe which intermittently bleeds. Denies any purulent drainage.  ROS otherwise negative.        Overview/Hospital Course:  12/2-pain better, MRI today  12/3- no osteo.  Needs non-weight bearing so fractures can heal. Abx for cellulitis. CM for wheelchair. Ortho ordered boot.      Interval History: NAEO    Review of Systems   Constitutional:  Negative for chills, fatigue, fever and unexpected weight change.   HENT:  Negative for congestion, mouth sores and sore throat.    Eyes:  Negative for photophobia and visual disturbance.   Respiratory:  Negative for cough, chest tightness, shortness of breath and wheezing.    Cardiovascular:  Positive for leg swelling. Negative for chest pain and palpitations.   Gastrointestinal:  Negative for abdominal pain, diarrhea, nausea and vomiting.   Endocrine: Negative for cold intolerance and heat " intolerance.   Genitourinary:  Negative for difficulty urinating, dysuria, frequency and urgency.   Musculoskeletal:  Positive for joint swelling. Negative for arthralgias, back pain and myalgias.        Right foot redness, swelling and tenderness   Skin:  Negative for pallor and rash.   Neurological:  Negative for tremors, seizures, syncope, weakness, numbness and headaches.   Hematological:  Does not bruise/bleed easily.   Psychiatric/Behavioral:  Negative for agitation, confusion, hallucinations and suicidal ideas.    Objective:     Vital Signs (Most Recent):  Temp: 98.4 °F (36.9 °C) (12/03/22 0735)  Pulse: 83 (12/03/22 0735)  Resp: 16 (12/03/22 0729)  BP: (!) 165/88 (12/03/22 0735)  SpO2: (!) 94 % (12/03/22 0735)   Vital Signs (24h Range):  Temp:  [97.4 °F (36.3 °C)-98.5 °F (36.9 °C)] 98.4 °F (36.9 °C)  Pulse:  [] 83  Resp:  [16-21] 16  SpO2:  [90 %-97 %] 94 %  BP: (127-165)/(49-88) 165/88     Weight: (!) 154.5 kg (340 lb 9.6 oz)  Body mass index is 51.79 kg/m².    Intake/Output Summary (Last 24 hours) at 12/3/2022 0950  Last data filed at 12/3/2022 0710  Gross per 24 hour   Intake 700 ml   Output 1 ml   Net 699 ml        Physical Exam  Vitals reviewed.   Constitutional:       Appearance: Normal appearance. She is obese.   HENT:      Head: Normocephalic and atraumatic.      Nose: Nose normal.   Eyes:      Extraocular Movements: Extraocular movements intact.      Conjunctiva/sclera: Conjunctivae normal.   Neck:      Trachea: Trachea normal.   Cardiovascular:      Rate and Rhythm: Normal rate and regular rhythm.      Pulses: Normal pulses.      Heart sounds: Normal heart sounds.   Pulmonary:      Effort: Pulmonary effort is normal.      Breath sounds: Normal breath sounds and air entry.   Abdominal:      General: Bowel sounds are normal.      Palpations: Abdomen is soft.   Musculoskeletal:      Cervical back: Neck supple.      Comments: Moves all extremities, normal tone  Amputated toes- well healed surgical  scars   Skin:     General: Skin is warm and dry.      Capillary Refill: Capillary refill takes less than 2 seconds.   Neurological:      General: No focal deficit present.      Mental Status: She is alert and oriented to person, place, and time.      Cranial Nerves: Cranial nerves 2-12 are intact.      Comments: Grossly normal motor and sensory function without focal deficit appreciated.   Psychiatric:         Mood and Affect: Mood and affect normal.         Behavior: Behavior normal. Behavior is cooperative.         Thought Content: Thought content normal.       Significant Labs: All pertinent labs within the past 24 hours have been reviewed.    Significant Imaging: I have reviewed all pertinent imaging results/findings within the past 24 hours.      Assessment/Plan:      * Cellulitis of right foot  Diabetic with open foot wound  flagyl, cef  Blood cultures sent  No osteo  Fractures-appreciate ortho  Non-weight bearing, walking boot  CM for wheelchair      Fracture  Fracture right foot  Appreciate ortho      JOVANNI on CPAP  cpap at night      Tobacco use  Counseled cessation      Exertional dyspnea  Chronic        Morbid obesity  Body mass index is 51.79 kg/m². Morbid obesity complicates all aspects of disease management from diagnostic modalities to treatment. Weight loss encouraged and health benefits explained to patient.         Chronic obstructive pulmonary disease  Continue home LABA, ICS  Albuterol PRN      Hypothyroidism  Continue synthroid      Arthritis  Tylenol if needed      Hypertension  Adjust medications as needed      PVD (peripheral vascular disease)  Continue asa        VTE Risk Mitigation (From admission, onward)         Ordered     enoxaparin injection 40 mg  Every 12 hours         12/01/22 1715     IP VTE HIGH RISK PATIENT  Once         12/01/22 1715     Place sequential compression device  Until discontinued         12/01/22 1715                Discharge Planning   MONTSERRAT:      Code Status: Full  Code   Is the patient medically ready for discharge?:     Reason for patient still in hospital (select all that apply): Treatment  Discharge Plan A: Home                  Kenan Broderick DO  Department of Hospital Medicine   [facility]

## 2022-12-03 NOTE — PLAN OF CARE
Problem: Physical Therapy  Goal: Physical Therapy Goal  Description: Short Term Goals to be met by: 2022    Patient will increase functional independence with mobility by performin. Supine to sit with independently  2. Sit to stand transfer with independently using Rolling walker and NWB right LE  3. Bed to chair transfer with independently using Rolling walker and NWB right LE  4. Gait  x 100 feet with independently using knee walker  5. Lower extremity exercise program x30 reps per handout, with assistance as needed    Long Term Goals to be met by: 2/3/2022    Pt will regain full independent functional mobility with lowest level of assistive device to return to home situation and prior activities of daily living.   Outcome: Ongoing, Progressing     Patient with orders for strict NWB right LE in CAM walking boot but is unable to maintain this restriction and hop with walker. Patient will require a wheel chair for mobility; wheel chair will not go in to the bathroom/bedroom. Patient able to use the knee walker but reports her insurance did not pay for same last time she needed one. Patient has an old rollator and would like to arrange a new one whilst hospitalized to help with transfers and transition into bedroom and bathroom. Patient will be safe to go home once equipment is arranged.

## 2022-12-04 ENCOUNTER — HOSPITAL ENCOUNTER (EMERGENCY)
Facility: HOSPITAL | Age: 62
Discharge: HOME | End: 2022-12-04
Payer: COMMERCIAL

## 2022-12-04 VITALS
SYSTOLIC BLOOD PRESSURE: 130 MMHG | RESPIRATION RATE: 20 BRPM | BODY MASS INDEX: 44.41 KG/M2 | HEART RATE: 96 BPM | TEMPERATURE: 98 F | DIASTOLIC BLOOD PRESSURE: 54 MMHG | OXYGEN SATURATION: 91 % | WEIGHT: 293 LBS | HEIGHT: 68 IN

## 2022-12-04 VITALS
BODY MASS INDEX: 44.41 KG/M2 | HEART RATE: 77 BPM | SYSTOLIC BLOOD PRESSURE: 130 MMHG | DIASTOLIC BLOOD PRESSURE: 56 MMHG | HEIGHT: 68 IN | OXYGEN SATURATION: 97 % | RESPIRATION RATE: 14 BRPM | TEMPERATURE: 98 F | WEIGHT: 293 LBS

## 2022-12-04 DIAGNOSIS — Z92.89 HOSPITALIZATION WITHIN LAST 30 DAYS: ICD-10-CM

## 2022-12-04 DIAGNOSIS — R06.02 CHRONIC SHORTNESS OF BREATH: Primary | ICD-10-CM

## 2022-12-04 DIAGNOSIS — R91.1 NODULE OF LEFT LUNG: ICD-10-CM

## 2022-12-04 LAB
ANION GAP SERPL CALCULATED.3IONS-SCNC: 9 MMOL/L (ref 7–16)
BASOPHILS # BLD AUTO: 0.05 K/UL (ref 0–0.2)
BASOPHILS NFR BLD AUTO: 0.6 % (ref 0–1)
BUN SERPL-MCNC: 14 MG/DL (ref 7–18)
BUN/CREAT SERPL: 14 (ref 6–20)
CALCIUM SERPL-MCNC: 9.8 MG/DL (ref 8.5–10.1)
CHLORIDE SERPL-SCNC: 99 MMOL/L (ref 98–107)
CO2 SERPL-SCNC: 32 MMOL/L (ref 21–32)
CREAT SERPL-MCNC: 1 MG/DL (ref 0.55–1.02)
D DIMER PPP FEU-MCNC: 0.68 ΜG/ML (ref 0–0.47)
DIFFERENTIAL METHOD BLD: ABNORMAL
EGFR (NO RACE VARIABLE) (RUSH/TITUS): 63 ML/MIN/1.73M²
EOSINOPHIL # BLD AUTO: 1.02 K/UL (ref 0–0.5)
EOSINOPHIL NFR BLD AUTO: 12 % (ref 1–4)
ERYTHROCYTE [DISTWIDTH] IN BLOOD BY AUTOMATED COUNT: 15.3 % (ref 11.5–14.5)
GLUCOSE SERPL-MCNC: 101 MG/DL (ref 70–105)
GLUCOSE SERPL-MCNC: 111 MG/DL (ref 74–106)
HCT VFR BLD AUTO: 47.2 % (ref 38–47)
HGB BLD-MCNC: 14.5 G/DL (ref 12–16)
IMM GRANULOCYTES # BLD AUTO: 0.05 K/UL (ref 0–0.04)
IMM GRANULOCYTES NFR BLD: 0.6 % (ref 0–0.4)
LYMPHOCYTES # BLD AUTO: 1.74 K/UL (ref 1–4.8)
LYMPHOCYTES NFR BLD AUTO: 20.5 % (ref 27–41)
MAGNESIUM SERPL-MCNC: 2 MG/DL (ref 1.7–2.3)
MAGNESIUM SERPL-MCNC: 2.3 MG/DL (ref 1.7–2.3)
MCH RBC QN AUTO: 26.3 PG (ref 27–31)
MCHC RBC AUTO-ENTMCNC: 30.7 G/DL (ref 32–36)
MCV RBC AUTO: 85.7 FL (ref 80–96)
MONOCYTES # BLD AUTO: 0.52 K/UL (ref 0–0.8)
MONOCYTES NFR BLD AUTO: 6.1 % (ref 2–6)
MPC BLD CALC-MCNC: 12.1 FL (ref 9.4–12.4)
NEUTROPHILS # BLD AUTO: 5.11 K/UL (ref 1.8–7.7)
NEUTROPHILS NFR BLD AUTO: 60.2 % (ref 53–65)
NRBC # BLD AUTO: 0 X10E3/UL
NRBC, AUTO (.00): 0 %
NT-PROBNP SERPL-MCNC: 44 PG/ML (ref 1–125)
PHOSPHATE SERPL-MCNC: 4.4 MG/DL (ref 2.5–4.5)
PLATELET # BLD AUTO: 265 K/UL (ref 150–400)
POTASSIUM SERPL-SCNC: 3.4 MMOL/L (ref 3.5–5.1)
RBC # BLD AUTO: 5.51 M/UL (ref 4.2–5.4)
SODIUM SERPL-SCNC: 137 MMOL/L (ref 136–145)
TROPONIN I SERPL HS-MCNC: 13.3 PG/ML
WBC # BLD AUTO: 8.49 K/UL (ref 4.5–11)

## 2022-12-04 PROCEDURE — 99239 PR HOSPITAL DISCHARGE DAY,>30 MIN: ICD-10-PCS | Mod: ,,, | Performed by: STUDENT IN AN ORGANIZED HEALTH CARE EDUCATION/TRAINING PROGRAM

## 2022-12-04 PROCEDURE — 63600175 PHARM REV CODE 636 W HCPCS: Performed by: STUDENT IN AN ORGANIZED HEALTH CARE EDUCATION/TRAINING PROGRAM

## 2022-12-04 PROCEDURE — 85379 FIBRIN DEGRADATION QUANT: CPT | Performed by: NURSE PRACTITIONER

## 2022-12-04 PROCEDURE — 99284 PR EMERGENCY DEPT VISIT,LEVEL IV: ICD-10-PCS | Mod: ,,, | Performed by: NURSE PRACTITIONER

## 2022-12-04 PROCEDURE — 82962 GLUCOSE BLOOD TEST: CPT

## 2022-12-04 PROCEDURE — 99285 EMERGENCY DEPT VISIT HI MDM: CPT | Mod: 25

## 2022-12-04 PROCEDURE — 25500020 PHARM REV CODE 255: Performed by: NURSE PRACTITIONER

## 2022-12-04 PROCEDURE — 99900035 HC TECH TIME PER 15 MIN (STAT)

## 2022-12-04 PROCEDURE — 99284 EMERGENCY DEPT VISIT MOD MDM: CPT | Mod: ,,, | Performed by: NURSE PRACTITIONER

## 2022-12-04 PROCEDURE — 63600175 PHARM REV CODE 636 W HCPCS: Performed by: NURSE PRACTITIONER

## 2022-12-04 PROCEDURE — 94660 CPAP INITIATION&MGMT: CPT

## 2022-12-04 PROCEDURE — 36415 COLL VENOUS BLD VENIPUNCTURE: CPT | Performed by: STUDENT IN AN ORGANIZED HEALTH CARE EDUCATION/TRAINING PROGRAM

## 2022-12-04 PROCEDURE — 85025 COMPLETE CBC W/AUTO DIFF WBC: CPT | Performed by: STUDENT IN AN ORGANIZED HEALTH CARE EDUCATION/TRAINING PROGRAM

## 2022-12-04 PROCEDURE — 83735 ASSAY OF MAGNESIUM: CPT | Performed by: NURSE PRACTITIONER

## 2022-12-04 PROCEDURE — 25000003 PHARM REV CODE 250: Performed by: STUDENT IN AN ORGANIZED HEALTH CARE EDUCATION/TRAINING PROGRAM

## 2022-12-04 PROCEDURE — 94640 AIRWAY INHALATION TREATMENT: CPT

## 2022-12-04 PROCEDURE — S0030 INJECTION, METRONIDAZOLE: HCPCS | Performed by: STUDENT IN AN ORGANIZED HEALTH CARE EDUCATION/TRAINING PROGRAM

## 2022-12-04 PROCEDURE — 94761 N-INVAS EAR/PLS OXIMETRY MLT: CPT

## 2022-12-04 PROCEDURE — 80048 BASIC METABOLIC PNL TOTAL CA: CPT | Performed by: STUDENT IN AN ORGANIZED HEALTH CARE EDUCATION/TRAINING PROGRAM

## 2022-12-04 PROCEDURE — 83735 ASSAY OF MAGNESIUM: CPT | Performed by: STUDENT IN AN ORGANIZED HEALTH CARE EDUCATION/TRAINING PROGRAM

## 2022-12-04 PROCEDURE — 99239 HOSP IP/OBS DSCHRG MGMT >30: CPT | Mod: ,,, | Performed by: STUDENT IN AN ORGANIZED HEALTH CARE EDUCATION/TRAINING PROGRAM

## 2022-12-04 PROCEDURE — 25000242 PHARM REV CODE 250 ALT 637 W/ HCPCS: Performed by: STUDENT IN AN ORGANIZED HEALTH CARE EDUCATION/TRAINING PROGRAM

## 2022-12-04 PROCEDURE — 27000221 HC OXYGEN, UP TO 24 HOURS

## 2022-12-04 PROCEDURE — 93010 EKG 12-LEAD: ICD-10-PCS | Mod: ,,, | Performed by: INTERNAL MEDICINE

## 2022-12-04 PROCEDURE — 84100 ASSAY OF PHOSPHORUS: CPT | Performed by: STUDENT IN AN ORGANIZED HEALTH CARE EDUCATION/TRAINING PROGRAM

## 2022-12-04 PROCEDURE — 83880 ASSAY OF NATRIURETIC PEPTIDE: CPT | Performed by: NURSE PRACTITIONER

## 2022-12-04 PROCEDURE — 96374 THER/PROPH/DIAG INJ IV PUSH: CPT

## 2022-12-04 PROCEDURE — 93010 ELECTROCARDIOGRAM REPORT: CPT | Mod: ,,, | Performed by: INTERNAL MEDICINE

## 2022-12-04 PROCEDURE — 84484 ASSAY OF TROPONIN QUANT: CPT | Performed by: NURSE PRACTITIONER

## 2022-12-04 PROCEDURE — 93005 ELECTROCARDIOGRAM TRACING: CPT

## 2022-12-04 RX ORDER — TIOTROPIUM BROMIDE 18 UG/1
18 CAPSULE ORAL; RESPIRATORY (INHALATION) DAILY
Qty: 90 CAPSULE | Refills: 3 | Status: SHIPPED | OUTPATIENT
Start: 2022-12-04 | End: 2024-02-14

## 2022-12-04 RX ORDER — CARVEDILOL 12.5 MG/1
6.25 TABLET ORAL 2 TIMES DAILY
Qty: 30 TABLET | Refills: 11 | Status: SHIPPED | OUTPATIENT
Start: 2022-12-04 | End: 2024-03-13 | Stop reason: SDUPTHER

## 2022-12-04 RX ORDER — NIFEDIPINE 60 MG/1
60 TABLET, EXTENDED RELEASE ORAL DAILY
Qty: 30 TABLET | Refills: 11 | Status: SHIPPED | OUTPATIENT
Start: 2022-12-04 | End: 2023-10-17 | Stop reason: SDUPTHER

## 2022-12-04 RX ORDER — CIPROFLOXACIN 500 MG/1
750 TABLET ORAL EVERY 12 HOURS
Qty: 10 TABLET | Refills: 0 | Status: SHIPPED | OUTPATIENT
Start: 2022-12-04 | End: 2023-03-15 | Stop reason: ALTCHOICE

## 2022-12-04 RX ORDER — DIPHENHYDRAMINE HYDROCHLORIDE 50 MG/ML
25 INJECTION INTRAMUSCULAR; INTRAVENOUS
Status: COMPLETED | OUTPATIENT
Start: 2022-12-04 | End: 2022-12-04

## 2022-12-04 RX ORDER — METRONIDAZOLE 500 MG/1
500 TABLET ORAL EVERY 8 HOURS
Qty: 15 TABLET | Refills: 0 | Status: SHIPPED | OUTPATIENT
Start: 2022-12-04 | End: 2022-12-09

## 2022-12-04 RX ADMIN — ASPIRIN 81 MG: 81 TABLET, DELAYED RELEASE ORAL at 09:12

## 2022-12-04 RX ADMIN — METOLAZONE 2.5 MG: 2.5 TABLET ORAL at 09:12

## 2022-12-04 RX ADMIN — DOCUSATE SODIUM 100 MG: 100 CAPSULE, LIQUID FILLED ORAL at 09:12

## 2022-12-04 RX ADMIN — ENOXAPARIN SODIUM 40 MG: 40 INJECTION SUBCUTANEOUS at 09:12

## 2022-12-04 RX ADMIN — TOPIRAMATE 150 MG: 100 TABLET, FILM COATED ORAL at 09:12

## 2022-12-04 RX ADMIN — CARVEDILOL 12.5 MG: 12.5 TABLET, FILM COATED ORAL at 09:12

## 2022-12-04 RX ADMIN — NIFEDIPINE 90 MG: 30 TABLET, FILM COATED, EXTENDED RELEASE ORAL at 09:12

## 2022-12-04 RX ADMIN — CEFEPIME 1 G: 1 INJECTION, POWDER, FOR SOLUTION INTRAMUSCULAR; INTRAVENOUS at 05:12

## 2022-12-04 RX ADMIN — ALLOPURINOL 300 MG: 300 TABLET ORAL at 09:12

## 2022-12-04 RX ADMIN — BUDESONIDE 0.5 MG: 0.5 INHALANT ORAL at 07:12

## 2022-12-04 RX ADMIN — PANTOPRAZOLE SODIUM 40 MG: 40 TABLET, DELAYED RELEASE ORAL at 09:12

## 2022-12-04 RX ADMIN — ALBUTEROL SULFATE 2.5 MG: 2.5 SOLUTION RESPIRATORY (INHALATION) at 01:12

## 2022-12-04 RX ADMIN — LEVOTHYROXINE SODIUM 150 MCG: 0.15 TABLET ORAL at 05:12

## 2022-12-04 RX ADMIN — ALBUTEROL SULFATE 2.5 MG: 2.5 SOLUTION RESPIRATORY (INHALATION) at 07:12

## 2022-12-04 RX ADMIN — DIPHENHYDRAMINE HYDROCHLORIDE 25 MG: 50 INJECTION INTRAMUSCULAR; INTRAVENOUS at 04:12

## 2022-12-04 RX ADMIN — ALBUTEROL SULFATE 2.5 MG: 2.5 SOLUTION RESPIRATORY (INHALATION) at 12:12

## 2022-12-04 RX ADMIN — SPIRONOLACTONE 50 MG: 25 TABLET ORAL at 09:12

## 2022-12-04 RX ADMIN — IOPAMIDOL 100 ML: 755 INJECTION, SOLUTION INTRAVENOUS at 04:12

## 2022-12-04 RX ADMIN — QUETIAPINE 25 MG: 25 TABLET ORAL at 09:12

## 2022-12-04 RX ADMIN — METRONIDAZOLE 500 MG: 500 INJECTION, SOLUTION INTRAVENOUS at 10:12

## 2022-12-04 RX ADMIN — ALBUTEROL SULFATE 2.5 MG: 2.5 SOLUTION RESPIRATORY (INHALATION) at 03:12

## 2022-12-04 RX ADMIN — CEFEPIME 1 G: 1 INJECTION, POWDER, FOR SOLUTION INTRAMUSCULAR; INTRAVENOUS at 10:12

## 2022-12-04 RX ADMIN — CETIRIZINE HYDROCHLORIDE 10 MG: 10 TABLET, FILM COATED ORAL at 09:12

## 2022-12-04 RX ADMIN — METRONIDAZOLE 500 MG: 500 INJECTION, SOLUTION INTRAVENOUS at 03:12

## 2022-12-04 RX ADMIN — CILOSTAZOL 100 MG: 100 TABLET ORAL at 09:12

## 2022-12-04 NOTE — DISCHARGE SUMMARY
"[facility]  [facility] Medicine  Discharge Summary      Patient Name: Holly Porter  MRN: [id number]  EDGARD: [id number]  Patient Class: IP- Inpatient  Admission Date: 12/1/2022  Hospital Length of Stay: 3 days  Discharge Date and Time:  12/04/2022 10:02 AM  Attending Physician: Kenan Broderick DO   Discharging Provider: Kenan Broderick DO  Primary Care Provider: Regan Frausto MD    Primary Care Team: Networked reference to record PCT     HPI:   62yoaaf with pmh of morbid obesity, diabetes, PVD, HF?, tobacco use, htn, COPD who presents to ED from for evaluation of painful right foot. On Thanksgiving Day she experienced a mechanical fall that resulted in obvious deformity of right little toe that was "put back in place" by her sister.  There was an open wound at that time. She presented to [facility] ED and was told she needed to see Dr. Saeed (who amputated her other toes) for an amputation.  She returned home, her foot subsequently became red, tender and swollen so she presents to [facility] ED today for evaluation. Denies any f/c, no chest pain, sob, diaphoresis, wheezing, cough, dysuria, diarrhea or abdominal pain. There is still a small wound on the medial aspect of the little toe which intermittently bleeds. Denies any purulent drainage.  ROS otherwise negative.        * No surgery found *      Hospital Course:   12/2-pain better, MRI today  12/3- no osteo.  Needs non-weight bearing so fractures can heal. Abx for cellulitis. CM for wheelchair. Ortho ordered boot.  12/4- wheelchair and bariatric rolling walker at bedside. Patient is stable for discharge.  Re-enforced ortho recommendation for non-weight bearing and elevation of the foot.  Will give 5 days abx to cover for cellulitis.  Needs to follow up with PCP in 1 week. Follow up with Ortho in 6-8 weeks. Given COPD history will add LAMA to regimen.       Goals of Care Treatment Preferences:  Code Status: Full Code      Consults:   Consults (From " admission, onward)        Status Ordering Provider     Inpatient consult to Social Work  Once        Provider:  (Not yet assigned)    Completed JOSAFAT ORTIZ     Inpatient consult to Social Work  Once        Provider:  (Not yet assigned)    Completed KIMBERLI PAIZ III     Inpatient consult to Orthopedic Surgery  Once        Provider:  Kimberli Paiz III, MD    Completed JOSAFAT ORTIZ     Pharmacy to dose Vancomycin consult  Once        Provider:  (Not yet assigned)   See Hyperspace for full Linked Orders Report.    Acknowledged JOSAFAT ORTIZ          * Cellulitis of right foot  Oral abx to completed 7 day course      Fracture  Walking boot  Wheelchair   Bariatric rolling walker      JOVANNI on CPAP  cpap at night      Tobacco use  Counseled cessation      Exertional dyspnea  Chronic        Morbid obesity  Body mass index is 51.79 kg/m². Morbid obesity complicates all aspects of disease management from diagnostic modalities to treatment. Weight loss encouraged and health benefits explained to patient.         Chronic obstructive pulmonary disease  Add LAMA  Continue LABA, ICS      Hypothyroidism  Continue synthroid      Arthritis  Tylenol if needed      Hypertension  Adjust medications as needed      PVD (peripheral vascular disease)  Continue asa        Final Active Diagnoses:    Diagnosis Date Noted POA    PRINCIPAL PROBLEM:  Cellulitis of right foot [L03.115] 12/01/2022 Unknown    JOVANNI on CPAP [G47.33, Z99.89] 12/03/2022 Not Applicable     Chronic    Fracture [T14.8XXA] 12/03/2022 Yes    Tobacco use [Z72.0] 12/01/2022 Yes    Exertional dyspnea [R06.09] 12/26/2021 Yes    Morbid obesity [E66.01] 11/10/2021 Yes    Hypertension [I10] 06/08/2021 Yes    Arthritis [M19.90] 06/08/2021 Yes    Hypothyroidism [E03.9] 06/08/2021 Yes    Chronic obstructive pulmonary disease [J44.9] 06/08/2021 Yes    PVD (peripheral vascular disease) [I73.9] 01/08/2019 Yes      Problems Resolved During this Admission:        Discharged Condition: good    Disposition: Home or Self Care    Follow Up:   Follow-up Information     Regan Frausto MD. Schedule an appointment as soon as possible for a visit in 1 week(s).    Specialties: Internal Medicine, Family Medicine  Contact information:  [address] [place]  [facility] [address]  [phone]             John Navas III, MD. Schedule an appointment as soon as possible for a visit in 2 month(s).    Specialty: Orthopedic Surgery  Contact information:  [address] [place] [id number]  [phone]                       Patient Instructions:      Diet diabetic     Weight bearing restrictions (specify):   Order Comments: No weight bearing right foot       Significant Diagnostic Studies: Labs: All labs within the past 24 hours have been reviewed    Pending Diagnostic Studies:     None         Medications:  Reconciled Home Medications:      Medication List      START taking these medications    carvediloL 12.5 MG tablet  Commonly known as: COREG  Take 0.5 tablets (6.25 mg total) by mouth 2 (two) times daily.     ciprofloxacin HCl 500 MG tablet  Commonly known as: CIPRO  Take 1.5 tablets (750 mg total) by mouth every 12 (twelve) hours.     metroNIDAZOLE 500 MG tablet  Commonly known as: FLAGYL  Take 1 tablet (500 mg total) by mouth every 8 (eight) hours. for 5 days     NIFEdipine 60 MG (OSM) 24 hr tablet  Commonly known as: PROCARDIA-XL  Take 1 tablet (60 mg total) by mouth once daily.     tiotropium 18 mcg inhalation capsule  Commonly known as: SPIRIVA  Inhale 1 capsule (18 mcg total) into the lungs once daily. Controller        CONTINUE taking these medications    albuterol 90 mcg/actuation inhaler  Commonly known as: PROVENTIL/VENTOLIN HFA  Inhale 2 puffs into the lungs 4 (four) times daily. Rescue     allopurinoL 300 MG tablet  Commonly known as: ZYLOPRIM  Take 1 tablet (300 mg total) by mouth once daily.     amitriptyline 25 MG tablet  Commonly  known as: ELAVIL  Take 1 tablet (25 mg total) by mouth nightly as needed for Insomnia.     apixaban 5 mg Tab  Commonly known as: ELIQUIS  Take 1 tablet (5 mg total) by mouth 2 (two) times daily.     aspirin 81 MG EC tablet  Commonly known as: ECOTRIN  Take 1 tablet (81 mg total) by mouth once daily.     calcium carbonate 500 mg calcium (1,250 mg) tablet  Commonly known as: OS-MICHAELA  Take 1 tablet (500 mg total) by mouth 2 (two) times daily.     cholecalciferol (vitamin D3) 125 mcg (5,000 unit) capsule  Take 1 capsule (5,000 Units total) by mouth 2 (two) times a day.     cilostazoL 100 MG Tab  Commonly known as: PLETAL  Take 1 tablet (100 mg total) by mouth 2 (two) times daily.     colchicine 0.6 mg tablet  Commonly known as: COLCRYS  One pill three times a day for two days,then one pill daily till out     docusate sodium 100 MG capsule  Commonly known as: COLACE  Take 1 capsule (100 mg total) by mouth 2 (two) times daily.     HYDROcodone-acetaminophen  mg per tablet  Commonly known as: NORCO  Take 1 tablet by mouth every 6 (six) hours as needed for Pain.     LANTUS SOLOSTAR U-100 INSULIN glargine 100 units/mL SubQ pen  Generic drug: insulin  Inject 10 units sq daily     LEVEMIR FLEXTOUCH U-100 INSULN 100 unit/mL (3 mL) Inpn pen  Generic drug: insulin detemir U-100  Inject 10 Units into the skin every evening. 15 ml with 1 refill     levothyroxine 150 MCG tablet  Commonly known as: SYNTHROID  Take 1 tablet (150 mcg total) by mouth before breakfast.     loratadine 10 mg tablet  Commonly known as: CLARITIN  Take 1 tablet (10 mg total) by mouth once daily.     losartan-hydrochlorothiazide 50-12.5 mg 50-12.5 mg per tablet  Commonly known as: HYZAAR  Take 1 tablet by mouth once daily.     metOLazone 2.5 MG tablet  Commonly known as: ZAROXOLYN  Take 1 tablet (2.5 mg total) by mouth once daily.     mupirocin 2 % ointment  Commonly known as: BACTROBAN  Apply topically 2 (two) times daily.     nabumetone 750 MG  "tablet  Commonly known as: RELAFEN  Take 1 tablet (750 mg total) by mouth 2 (two) times daily as needed for Pain.     NOVOFINE 32 32 gauge x 1/4" Ndle  Generic drug: pen needle, diabetic  Use as directed once daily.     pantoprazole 40 MG tablet  Commonly known as: PROTONIX  Take 1 tablet (40 mg total) by mouth once daily.     phentermine 37.5 mg tablet  Commonly known as: ADIPEX-P  Take 1 tablet (37.5 mg total) by mouth before breakfast.     potassium chloride SA 20 MEQ tablet  Commonly known as: K-DUR,KLOR-CON  Take 1 tablet (20 mEq total) by mouth once daily.     pregabalin 100 MG capsule  Commonly known as: LYRICA  Take 1 capsule (100 mg total) by mouth every 12 (twelve) hours.     QUEtiapine 25 MG Tab  Commonly known as: SEROQUEL  Take 1 tablet (25 mg total) by mouth once daily.     spironolactone 50 MG tablet  Commonly known as: ALDACTONE  Take 1 tablet (50 mg total) by mouth once daily.     SYMBICORT 160-4.5 mcg/actuation Hfaa  Generic drug: budesonide-formoterol 160-4.5 mcg  Inhale into the lungs.     topiramate 100 MG tablet  Commonly known as: TOPAMAX  Take 1.5 tablets (150 mg total) by mouth 2 (two) times daily.     triamcinolone acetonide 0.1% 0.1 % cream  Commonly known as: KENALOG  Apply topically 3 (three) times daily.            Indwelling Lines/Drains at time of discharge:   Lines/Drains/Airways     None                 Time spent on the discharge of patient: >30 minutes         Kenan Broderick DO  Department of Hospital Medicine  [facility]  "

## 2022-12-04 NOTE — PLAN OF CARE
Problem: Adult Inpatient Plan of Care  Goal: Plan of Care Review  Outcome: Ongoing, Progressing  Goal: Patient-Specific Goal (Individualized)  Outcome: Ongoing, Progressing  Goal: Absence of Hospital-Acquired Illness or Injury  Outcome: Ongoing, Progressing  Goal: Optimal Comfort and Wellbeing  Outcome: Ongoing, Progressing  Goal: Readiness for Transition of Care  Outcome: Ongoing, Progressing     Problem: Bariatric Environmental Safety  Goal: Safety Maintained with Care  Outcome: Ongoing, Progressing     Problem: Diabetes Comorbidity  Goal: Blood Glucose Level Within Targeted Range  Outcome: Ongoing, Progressing     Problem: Impaired Wound Healing  Goal: Optimal Wound Healing  Outcome: Ongoing, Progressing     Problem: Gas Exchange Impaired  Goal: Optimal Gas Exchange  Outcome: Ongoing, Progressing   Refused VTE prophylaxis

## 2022-12-04 NOTE — ED PROVIDER NOTES
Encounter Date: 12/4/2022       History     Chief Complaint   Patient presents with    Shortness of Breath     Patient presents to ER with complaint of shortness of breath.  She was discharged from the hospital less than 1 hour ago.  She was hospitalized for fracture and cellulitis right foot. She stopped by restaurant to get food to go home and she became weak and short of breath.  She denies chest pain at present.  She complains of shortness of breath and weakness.  She has chronic shortness of breath and has oxygen to use at home.  She states she does not wear it all the time but only when she needs it.  She determines when she needs it.  She did not have the oxygen in car for the ride home. She denies nausea, vomiting, or diarrhea.  She is wearing a boot on her right foot.     The history is provided by the patient and a relative. No  was used.   Review of patient's allergies indicates:   Allergen Reactions    Ammonium peroxydisulfate Shortness Of Breath    Avocado (laurus persea) Anaphylaxis    Bananas [banana] Anaphylaxis and Swelling     CRAMPS,    Chocolate flavor Anaphylaxis     MOUTH SWELLING    Fentanyl Shortness Of Breath and Itching    Percocet [oxycodone-acetaminophen] Shortness Of Breath and Itching    Silvadene [silver sulfadiazine]     Clindamycin     Corticosteroids (glucocorticoids)     Hydrocortisone Blisters    Lasix [furosemide] Blisters     BURNS SKIN    Nutritional supplement-fiber Itching    Pregabalin     Shellfish containing products     Adhesive Rash and Blisters    Iodine and iodide containing products Rash    Latex, natural rubber Rash    Pcn [penicillins] Rash    Sulfa (sulfonamide antibiotics) Rash and Blisters     Past Medical History:   Diagnosis Date    Anxiety state     Atherosclerosis of native artery of extremity with intermittent claudication 01/30/2019    bilateral legs    Bilateral leg pain     BMI 50.0-59.9, adult 02/22/2021    Cellulitis 06/11/2020     Chronic pain syndrome     Chronic peripheral venous hypertension 01/08/2019    COPD (chronic obstructive pulmonary disease)     Diabetes     Diabetes mellitus, type 2     DVT (deep venous thrombosis) 02/12/2020    Embolism and thrombosis of superficial veins of unspecified lower extremity 07/01/2019    bilateral    Frequent headaches 09/20/2018    GERD (gastroesophageal reflux disease)     Hereditary lymphedema     Hx of thyroid cancer     Hyperlipidemia     Hypertension     Hypothyroidism     Migraines     Migraines     Morbid obesity     Nicotine dependence     Non-pressure chronic ulcer of left lower leg 03/09/2021    Osteoarthritis     Other skin changes 03/09/2021    bilateral gaiter regions    Pain in left leg     Pain in right leg     PVD (peripheral vascular disease) 01/08/2019    Seizure disorder     Sleep apnea     Venous insufficiency (chronic) (peripheral)     Venous stasis     Vitamin D deficiency      Past Surgical History:   Procedure Laterality Date    HYSTERECTOMY      ILIAC ARTERY STENT Bilateral 01/28/2020    Bilateral distal aorta and common iliac 8 X 59 vbx covered stents performed by Dr. Antonio Jacinto.    RADIOFREQUENCY ABLATION Left 04/15/2022    Procedure: Left calf  Radiofrequency Ablation;  Surgeon: Matty Falcon DO;  Location: [facility];  Service: Vascular;  Laterality: Left;    STAB PHLEBECTOMY OF VARICOSE VEINS Left 01/25/2013    Left leg microphlebectomies x 23 stab avulsions and ligation of multiple varicose veins perrformed by Dr. Cirilo Aguirre.    THYROIDECTOMY      hx: thyroid cancer    TOE AMPUTATION Left 01/28/2020    2nd, 4th and 5th performed by Dr. Anam Saeed.    TOE AMPUTATION Right 01/28/2020    4th toe performed by Dr. Anam Saeed.    TOTAL ABDOMINAL HYSTERECTOMY W/ BILATERAL SALPINGOOPHORECTOMY      TUBAL LIGATION      VAGINAL DELIVERY      x 4    VENOUS ABLATION Right 08/09/2019    GSV Varithena Ablation performed by Dr. Estuardo Falcon    VENOUS ABLATION Left  08/02/2019    ATAV Varithena Ablation performed by Dr. Estuardo Falcon.    VENOUS ABLATION Right 07/13/2015    ATAV Laser Ablatio performed by Dr. Cirilo Agiurre.    VENOUS ABLATION Right 07/06/2015    Distal  GSV Laser Ablation performed by dr. Cirilo Aguirre.    VENOUS ABLATION Left 04/20/2015    Left Distal GSV Laser Ablation performed by dr. Cirilo Aguirre.    VENOUS ABLATION Right 10/28/2013    Right Distal GSV RF Ablation performed by Dr. Cirilo Aguirre.    VENOUS ABLATION Left 10/25/2013    SSV RF Ablation performed by dr. Cirilo Aguirre.    VENOUS ABLATION Left 10/07/2013    Left GSV and Left ATAV RF Ablation performed by Dr. Cirilo Aguirre.    VENOUS ABLATION Right 01/21/2013    GSV RF Ablation w/micros x 22 performed by Dr. Cirilo Aguirre.    VENOUS ABLATION Left 06/25/2021    Left distal GSV Varithena Ablation     Family History   Problem Relation Age of Onset    Heart disease Mother         age 84 CHF    Hypertension Mother     Osteoarthritis Mother     Coronary aneurysm Father     Coronary artery disease Father     Hypertension Father     Heart disease Father     No Known Problems Son     No Known Problems Son     No Known Problems Son     No Known Problems Son     No Known Problems Sister     No Known Problems Brother         hx: varicose veins    No Known Problems Brother         MVA: parlazed    No Known Problems Brother      Social History     Tobacco Use    Smoking status: Every Day     Packs/day: 1.00     Years: 33.00     Pack years: 33.00     Types: Cigarettes    Smokeless tobacco: Never   Substance Use Topics    Alcohol use: Never    Drug use: Never     Review of Systems   Constitutional:  Positive for activity change and fatigue.   Respiratory:  Positive for shortness of breath.    Neurological:  Positive for weakness.   All other systems reviewed and are negative.    Physical Exam     Initial Vitals [12/04/22 1515]   BP Pulse Resp Temp SpO2   (!) 128/54 82 18 98 °F (36.7 °C) (!) 75 %      MAP       --         Physical Exam    Nursing  note and vitals reviewed.  Constitutional: She appears well-developed and well-nourished.   HENT:   Head: Normocephalic.   Right Ear: External ear normal.   Left Ear: External ear normal.   Nose: Nose normal.   Mouth/Throat: Oropharynx is clear and moist.   Eyes: Conjunctivae and EOM are normal. Pupils are equal, round, and reactive to light.   Neck: Neck supple.   Normal range of motion.  Cardiovascular:  Normal rate, regular rhythm, normal heart sounds and intact distal pulses.           Pulmonary/Chest: She has rhonchi.   Abdominal: Abdomen is soft. Bowel sounds are normal.   Musculoskeletal:         General: Normal range of motion.      Cervical back: Normal range of motion and neck supple.     Neurological: She is alert and oriented to person, place, and time. She has normal strength. GCS score is 15. GCS eye subscore is 4. GCS verbal subscore is 5. GCS motor subscore is 6.   Skin: Skin is warm and dry. Capillary refill takes less than 2 seconds.   Psychiatric: She has a normal mood and affect. Her behavior is normal. Judgment and thought content normal.       Medical Screening Exam   See Full Note    ED Course   Procedures  Labs Reviewed   D DIMER, QUANTITATIVE - Abnormal; Notable for the following components:       Result Value    D-Dimer 0.68 (*)     All other components within normal limits   NT-PRO NATRIURETIC PEPTIDE - Normal   TROPONIN I - Normal   MAGNESIUM - Normal   POCT GLUCOSE MONITORING CONTINUOUS          Imaging Results              US Lower Extremity Veins Bilateral (Final result)  Result time 12/04/22 17:27:22      Final result by Rene Santiago MD (12/04/22 17:27:22)                   Impression:      Unremarkable duplex imaging of the bilateral lower extremity. No evidence of DVT.    The exam was technically difficult with limited visualization because of patient body habitus, per the technologist      Electronically signed by: Rene Santiago  Date:    12/04/2022  Time:    17:27                Narrative:    EXAMINATION:  US LOWER EXTREMITY VEINS BILATERAL    CLINICAL HISTORY:  Bilateral lower extremity edema, elevated d-dimer;    TECHNIQUE:  Duplex scan of the bilateral lower extremity veins using the B-mode/grayscale imaging and Doppler spectral analysis and color-flow    COMPARISON:  No previous similar    FINDINGS:  Major venous structures of the bilateral lower extremity demonstrate a normal course and caliber. There is no evidence of deep venous thrombosis. No abnormal intrinsic echogenic lesions are demonstrated in the scanned blood vessels. The veins of the bilateral lower extremity demonstrate good compressibility with normal color flow study and spectral analysis.                                       CTA Chest Non-Coronary (PE Studies) (Final result)  Result time 12/04/22 17:04:10      Final result by Rene Santiago MD (12/04/22 17:04:10)                   Impression:      No gross central pulmonary embolus.  Evaluation for pulmonary emboli is grossly limited otherwise because of contrast timing factors    No omar pneumonia    A 5 mm pleural base nodule left upper lobe of uncertain chronicity.  Recommend follow-up CT in 6-12 months to document stability over time      Electronically signed by: Rene Santiago  Date:    12/04/2022  Time:    17:04               Narrative:    EXAMINATION:  CTA CHEST NON CORONARY (PE STUDIES)    CLINICAL HISTORY:  Pulmonary embolism (PE) suspected, positive D-dimer;.    COMPARISON:  No previous similar CT    TECHNIQUE:  Thin spiral CT sections were obtained through the chest during the dynamic IV administration of 100 ml of Isovue 370.  In addition to multiplanar reconstruction images, 3-D images were also generated, archived, and analyzed.    The CT examination was performed using one or more of the following dose reduction techniques: Automated exposure control, adjustment of the mA and kV according to patient's size, use of acute or iterative  reconstruction techniques.    FINDINGS:  There is no gross central pulmonary embolus, but evaluation for pulmonary embolus is otherwise limited because of contrast timing factors.  There is no thoracic aorta aneurysm or dissection.  There is mild to moderate atherosclerotic calcification of the wall of the aortic arch.    There is a mildly enlarged right paratracheal lymph node measuring 15 mm short axis diameter.  There is mild to moderate coronary artery calcification involving the left anterior descending coronary artery.    There is no pleural or pericardial effusion.    There is no omar pneumonia.  There is a 5 mm pleural base nodule left upper lobe image 197 of the 5th series.  Lungs are otherwise clear aside from some dependent atelectasis.  Central airway is clear.    There is no definite acute process in the partially visualized upper abdomen.    There is scattered mild to moderate degenerative disc narrowing and spondylosis of the thoracic spine                                       X-Ray Chest AP Portable (Final result)  Result time 12/04/22 15:55:47      Final result by Rene Santiago MD (12/04/22 15:55:47)                   Impression:      Cardiomegaly and borderline pulmonary venous hypertension appearance without overt pulmonary edema    Shallow breath      Electronically signed by: Rene Santiago  Date:    12/04/2022  Time:    15:55               Narrative:    EXAMINATION:  XR CHEST AP PORTABLE    CLINICAL HISTORY:  shortness of breath;.    COMPARISON:  05/10/2022    TECHNIQUE:  Chest x-ray AP portable erect    FINDINGS:  There is cardiomegaly.  There is no mediastinal mass.  There is mild to moderate aortic arch calcification.  Pulmonary vasculature is upper normal.    Lungs are grossly clear for shallow breath.  There is no gross pleural effusion.    Osseous structures are unchanged.  There is moderate thoracic spondylosis                                       Medications   diphenhydrAMINE  injection 25 mg (25 mg Intravenous Given 12/4/22 1615)   iopamidoL (ISOVUE-370) injection 100 mL (100 mLs Intravenous Given 12/4/22 1643)     Medical Decision Making:   Differential Diagnosis:   Copd exacerbation  Pneumonia   PE  Clinical Tests:   Lab Tests: Ordered and Reviewed       <> Summary of Lab: D-dimer - elevated  Radiological Study: Ordered and Reviewed  ED Management:  Discussed elevated d-dimer with patient and family.  CT PE ordered.  CT PE results negative for PE.  Left lung nodule identified.  Will refer to pulmonology for follow up.   Discussed results with patient and family. Discussed importance of follow up with Pulmonology and importance of wearing home O2.   Family and patient verbalized understanding.   Other:   I have discussed this case with another health care provider.       <> Summary of the Discussion: Discussed patient and plan of care with Dr Navas (ER).                  Clinical Impression:   Final diagnoses:  [R06.02] Chronic shortness of breath (Primary)  [Z92.89] Hospitalization within last 30 days  [R91.1] Nodule of left lung        ED Disposition Condition    Discharge Stable          ED Prescriptions    None       Follow-up Information       Follow up With Specialties Details Why Contact Info    Regan Frausto MD Internal Medicine, Family Medicine Schedule an appointment as soon as possible for a visit  If symptoms worsen [facility] [place] [address]  [facility] [place] [address]  [phone]               WARREN Leong  12/04/22 2052

## 2022-12-05 ENCOUNTER — TELEPHONE (OUTPATIENT)
Dept: EMERGENCY MEDICINE | Facility: HOSPITAL | Age: 62
End: 2022-12-05
Payer: COMMERCIAL

## 2022-12-05 ENCOUNTER — TELEPHONE (OUTPATIENT)
Dept: FAMILY MEDICINE | Facility: CLINIC | Age: 62
End: 2022-12-05

## 2022-12-05 RX ORDER — POLYETHYLENE GLYCOL 3350 17 G/17G
17 POWDER, FOR SOLUTION ORAL DAILY
COMMUNITY

## 2022-12-05 RX ORDER — MUPIROCIN 20 MG/G
OINTMENT TOPICAL 2 TIMES DAILY
Qty: 80 G | Refills: 2 | Status: SHIPPED | OUTPATIENT
Start: 2022-12-05 | End: 2023-12-06

## 2022-12-05 NOTE — ED NOTES
NP was in talking to pt about discharge. Pt called out and requested to have help go to the restroom. Pt was unplugged from monitor and assisted to the potty chair. Was instructed to reach out when pt was finished.

## 2022-12-05 NOTE — PLAN OF CARE
[facility] - Emergency Department  Discharge Final Note    Primary Care Provider: Regan Frausto MD    Expected Discharge Date:     Final Discharge Note (most recent)       Final Note - 12/05/22 0915          Final Note    Assessment Type Final Discharge Note     Anticipated Discharge Disposition Home or Self Care                     Important Message from Medicare             Contact Info       Regan Frausto MD   Specialty: Internal Medicine, Family Medicine   Relationship: PCP - General    [facility] [address] [place] [address] [facility] [address]   Phone: [phone]       Next Steps: Schedule an appointment as soon as possible for a visit    Instructions: If symptoms worsen          Patient discharged home. Obtained equipment for patient. IM done.

## 2022-12-05 NOTE — TELEPHONE ENCOUNTER
Spoke with pt for [facility]CC call. Pt reports she is doing okay today. She states she went back to the ER yesterday because she was not feeling good. Per ER pt c/o SOB. Pt denies. She reports she uses oxygen at home as needed. She states she got a tank from a friend. Pt reports she continues to smoke and states she plans to quit. She states she will discuss this with PCP. Pt reports her right foot is doing okay. She states she does not have an open wound at this time. She reports she is wearing boot, not bearing wt on foot and she is keeping her foot elevated. Pt reports she is staying with her sister for support. She reports she has a rollator and w/c and denies any other DME needs. Pt vu of f/u appt with PCP. She reports she will schedule appt with Dr Navas on her own. Meds reviewed. Pt reports she no longer takes docusate, Levemir, nabumetone, Adipex, pregabalin, Seroquel or triamcinolone cream. Pt reports got all new Rx except for Spirva d/t needing authorization from dr. Instructed pt to take all meds to her dr visit. Joanne vu. Instructed pt to report new or worsening symptoms to her dr. Joanne vu.

## 2022-12-05 NOTE — DISCHARGE INSTRUCTIONS
Continue treatment plan given at hospital discharge earlier today.   Wear home O2 as prescribed.   Dr Loyola's office will call with appointment to follow up in pulmonology clinic.  Return to ER with new or worsening symptoms.

## 2022-12-06 NOTE — TELEPHONE ENCOUNTER
Contacted [facility] pharmacy to follow up on Spiriva. Pharmacy tech stated med was not covered by pt's insurance. Stated fax was sent to Dr Broderick for other med covered by pt's insurance. Sent a message to Dr Broderick to notify him of fax from [facility].

## 2022-12-07 LAB
BACTERIA BLD CULT: NORMAL
BACTERIA BLD CULT: NORMAL

## 2022-12-07 NOTE — PROGRESS NOTES
Subjective:         Patient ID: Holly Porter is a 62 y.o. female.    Chief Complaint: Low-back Pain and Back Pain      Pain  This is a chronic problem. The current episode started more than 1 year ago. The problem occurs daily. The problem has been waxing and waning. Associated symptoms include arthralgias. Pertinent negatives include no anorexia, change in bowel habit, chest pain, chills, coughing, diaphoresis, fever, sore throat, vertigo or vomiting.   Review of Systems   Constitutional:  Negative for activity change, appetite change, chills, diaphoresis, fever and unexpected weight change.   HENT:  Negative for drooling, ear discharge, ear pain, facial swelling, nosebleeds, sore throat, trouble swallowing, voice change and goiter.    Eyes:  Negative for photophobia, pain, discharge, redness and visual disturbance.   Respiratory:  Negative for apnea, cough, choking, chest tightness, shortness of breath, wheezing and stridor.    Cardiovascular:  Negative for chest pain, palpitations and leg swelling.   Gastrointestinal:  Negative for abdominal distention, anorexia, change in bowel habit, diarrhea, rectal pain, vomiting, fecal incontinence and change in bowel habit.   Endocrine: Negative for cold intolerance, heat intolerance, polydipsia, polyphagia and polyuria.   Genitourinary:  Negative for bladder incontinence, dysuria, flank pain, frequency and hot flashes.   Musculoskeletal:  Positive for arthralgias, back pain, leg pain and neck stiffness.   Integumentary:  Negative for color change and pallor.   Allergic/Immunologic: Negative for immunocompromised state.   Neurological:  Negative for dizziness, vertigo, seizures, syncope, facial asymmetry, speech difficulty, light-headedness, coordination difficulties, memory loss and coordination difficulties.   Hematological:  Negative for adenopathy. Does not bruise/bleed easily.   Psychiatric/Behavioral:  Negative for agitation, behavioral problems, confusion,  decreased concentration, dysphoric mood, hallucinations, self-injury and suicidal ideas. The patient is not nervous/anxious and is not hyperactive.          Past Medical History:   Diagnosis Date    Anxiety state     Atherosclerosis of native artery of extremity with intermittent claudication 01/30/2019    bilateral legs    Bilateral leg pain     BMI 50.0-59.9, adult 02/22/2021    Cellulitis 06/11/2020    Chronic pain syndrome     Chronic peripheral venous hypertension 01/08/2019    COPD (chronic obstructive pulmonary disease)     Diabetes     Diabetes mellitus, type 2     DVT (deep venous thrombosis) 02/12/2020    Embolism and thrombosis of superficial veins of unspecified lower extremity 07/01/2019    bilateral    Frequent headaches 09/20/2018    GERD (gastroesophageal reflux disease)     Hereditary lymphedema     Hx of thyroid cancer     Hyperlipidemia     Hypertension     Hypothyroidism     Migraines     Migraines     Morbid obesity     Nicotine dependence     Non-pressure chronic ulcer of left lower leg 03/09/2021    Osteoarthritis     Other skin changes 03/09/2021    bilateral gaiter regions    Pain in left leg     Pain in right leg     PVD (peripheral vascular disease) 01/08/2019    Seizure disorder     Sleep apnea     Venous insufficiency (chronic) (peripheral)     Venous stasis     Vitamin D deficiency      Past Surgical History:   Procedure Laterality Date    HYSTERECTOMY      ILIAC ARTERY STENT Bilateral 01/28/2020    Bilateral distal aorta and common iliac 8 X 59 vbx covered stents performed by Dr. Antonio Jacinto.    RADIOFREQUENCY ABLATION Left 04/15/2022    Procedure: Left calf  Radiofrequency Ablation;  Surgeon: Matty Falcon DO;  Location: [facility];  Service: Vascular;  Laterality: Left;    STAB PHLEBECTOMY OF VARICOSE VEINS Left 01/25/2013    Left leg microphlebectomies x 23 stab avulsions and ligation of multiple varicose veins perrformed by Dr. Cirilo Aguirre.    THYROIDECTOMY      hx:  thyroid cancer    TOE AMPUTATION Left 01/28/2020    2nd, 4th and 5th performed by Dr. Anam Saeed.    TOE AMPUTATION Right 01/28/2020    4th toe performed by Dr. Anam Saeed.    TOTAL ABDOMINAL HYSTERECTOMY W/ BILATERAL SALPINGOOPHORECTOMY      TUBAL LIGATION      VAGINAL DELIVERY      x 4    VENOUS ABLATION Right 08/09/2019    GSV Varithena Ablation performed by Dr. Estuardo Falcon    VENOUS ABLATION Left 08/02/2019    ATAV Varithena Ablation performed by Dr. Estuardo Falcon.    VENOUS ABLATION Right 07/13/2015    ATAV Laser Ablatio performed by Dr. Cirilo Aguirre.    VENOUS ABLATION Right 07/06/2015    Distal  GSV Laser Ablation performed by dr. Cirilo Aguirre.    VENOUS ABLATION Left 04/20/2015    Left Distal GSV Laser Ablation performed by dr. Cirilo Aguirre.    VENOUS ABLATION Right 10/28/2013    Right Distal GSV RF Ablation performed by Dr. Cirilo Aguirre.    VENOUS ABLATION Left 10/25/2013    SSV RF Ablation performed by dr. Cirilo Aguirre.    VENOUS ABLATION Left 10/07/2013    Left GSV and Left ATAV RF Ablation performed by Dr. Cirilo Aguirre.    VENOUS ABLATION Right 01/21/2013    GSV RF Ablation w/micros x 22 performed by Dr. Cirilo Aguirre.    VENOUS ABLATION Left 06/25/2021    Left distal GSV Varithena Ablation     Social History     Socioeconomic History    Marital status:    Tobacco Use    Smoking status: Every Day     Packs/day: 1.00     Years: 33.00     Pack years: 33.00     Types: Cigarettes    Smokeless tobacco: Never   Substance and Sexual Activity    Alcohol use: Never    Drug use: Never    Sexual activity: Not Currently     Social Determinants of Health     Financial Resource Strain: Low Risk     Difficulty of Paying Living Expenses: Not very hard   Food Insecurity: No Food Insecurity    Worried About Running Out of Food in the Last Year: Never true    Ran Out of Food in the Last Year: Never true   Transportation Needs: No Transportation Needs    Lack of Transportation (Medical): No    Lack of Transportation (Non-Medical): No   Physical  Activity: Inactive    Days of Exercise per Week: 0 days    Minutes of Exercise per Session: 0 min   Stress: No Stress Concern Present    Feeling of Stress : Not at all   Social Connections: Socially Isolated    Frequency of Communication with Friends and Family: More than three times a week    Frequency of Social Gatherings with Friends and Family: More than three times a week    Attends Jewish Services: Never    Active Member of Clubs or Organizations: No    Attends Club or Organization Meetings: Never    Marital Status:    Housing Stability: Low Risk     Unable to Pay for Housing in the Last Year: No    Number of Places Lived in the Last Year: 2    Unstable Housing in the Last Year: No     Family History   Problem Relation Age of Onset    Heart disease Mother         age 84 CHF    Hypertension Mother     Osteoarthritis Mother     Coronary aneurysm Father     Coronary artery disease Father     Hypertension Father     Heart disease Father     No Known Problems Son     No Known Problems Son     No Known Problems Son     No Known Problems Son     No Known Problems Sister     No Known Problems Brother         hx: varicose veins    No Known Problems Brother         MVA: parlazed    No Known Problems Brother      Review of patient's allergies indicates:   Allergen Reactions    Ammonium peroxydisulfate Shortness Of Breath    Avocado (laurus persea) Anaphylaxis    Bananas [banana] Anaphylaxis and Swelling     CRAMPS,    Chocolate flavor Anaphylaxis     MOUTH SWELLING    Fentanyl Shortness Of Breath and Itching    Percocet [oxycodone-acetaminophen] Shortness Of Breath and Itching    Silvadene [silver sulfadiazine]     Clindamycin     Corticosteroids (glucocorticoids)     Hydrocortisone Blisters    Lasix [furosemide] Blisters     BURNS SKIN    Nutritional supplement-fiber Itching    Pregabalin     Shellfish containing products     Adhesive Rash and Blisters    Iodine and iodide containing products Rash    Latex,  "natural rubber Rash    Pcn [penicillins] Rash    Sulfa (sulfonamide antibiotics) Rash and Blisters        Objective:  Vitals:    12/08/22 1405   Resp: 20   Weight: (!) 161 kg (355 lb)   Height: 5' 8" (1.727 m)   PainSc:   6         Physical Exam  Vitals and nursing note reviewed. Exam conducted with a chaperone present.   Constitutional:       General: She is awake. She is not in acute distress.     Appearance: Normal appearance. She is obese. She is not toxic-appearing or diaphoretic.   HENT:      Head: Normocephalic and atraumatic.      Nose: Nose normal.      Mouth/Throat:      Mouth: Mucous membranes are moist.      Pharynx: Oropharynx is clear.   Eyes:      Conjunctiva/sclera: Conjunctivae normal.      Pupils: Pupils are equal, round, and reactive to light.   Cardiovascular:      Rate and Rhythm: Normal rate.   Pulmonary:      Effort: Pulmonary effort is normal. No respiratory distress.   Abdominal:      Palpations: Abdomen is soft.   Musculoskeletal:         General: Normal range of motion.      Cervical back: Normal range of motion and neck supple.   Skin:     General: Skin is warm and dry.   Neurological:      General: No focal deficit present.      Mental Status: She is alert and oriented to person, place, and time. Mental status is at baseline.      Cranial Nerves: No cranial nerve deficit (II-XII).   Psychiatric:         Mood and Affect: Mood normal.         Behavior: Behavior normal. Behavior is cooperative.         Thought Content: Thought content normal.         US Lower Extremity Veins Bilateral  Narrative: EXAMINATION:  US LOWER EXTREMITY VEINS BILATERAL    CLINICAL HISTORY:  Bilateral lower extremity edema, elevated d-dimer;    TECHNIQUE:  Duplex scan of the bilateral lower extremity veins using the B-mode/grayscale imaging and Doppler spectral analysis and color-flow    COMPARISON:  No previous similar    FINDINGS:  Major venous structures of the bilateral lower extremity demonstrate a normal " course and caliber. There is no evidence of deep venous thrombosis. No abnormal intrinsic echogenic lesions are demonstrated in the scanned blood vessels. The veins of the bilateral lower extremity demonstrate good compressibility with normal color flow study and spectral analysis.  Impression: Unremarkable duplex imaging of the bilateral lower extremity. No evidence of DVT.    The exam was technically difficult with limited visualization because of patient body habitus, per the technologist    Electronically signed by: Rene Santiago  Date:    12/04/2022  Time:    17:27  CTA Chest Non-Coronary (PE Studies)  Narrative: EXAMINATION:  CTA CHEST NON CORONARY (PE STUDIES)    CLINICAL HISTORY:  Pulmonary embolism (PE) suspected, positive D-dimer;.    COMPARISON:  No previous similar CT    TECHNIQUE:  Thin spiral CT sections were obtained through the chest during the dynamic IV administration of 100 ml of Isovue 370.  In addition to multiplanar reconstruction images, 3-D images were also generated, archived, and analyzed.    The CT examination was performed using one or more of the following dose reduction techniques: Automated exposure control, adjustment of the mA and kV according to patient's size, use of acute or iterative reconstruction techniques.    FINDINGS:  There is no gross central pulmonary embolus, but evaluation for pulmonary embolus is otherwise limited because of contrast timing factors.  There is no thoracic aorta aneurysm or dissection.  There is mild to moderate atherosclerotic calcification of the wall of the aortic arch.    There is a mildly enlarged right paratracheal lymph node measuring 15 mm short axis diameter.  There is mild to moderate coronary artery calcification involving the left anterior descending coronary artery.    There is no pleural or pericardial effusion.    There is no omar pneumonia.  There is a 5 mm pleural base nodule left upper lobe image 197 of the 5th series.  Lungs are  otherwise clear aside from some dependent atelectasis.  Central airway is clear.    There is no definite acute process in the partially visualized upper abdomen.    There is scattered mild to moderate degenerative disc narrowing and spondylosis of the thoracic spine  Impression: No gross central pulmonary embolus.  Evaluation for pulmonary emboli is grossly limited otherwise because of contrast timing factors    No omar pneumonia    A 5 mm pleural base nodule left upper lobe of uncertain chronicity.  Recommend follow-up CT in 6-12 months to document stability over time    Electronically signed by: Rene Santiago  Date:    12/04/2022  Time:    17:04  X-Ray Chest AP Portable  Narrative: EXAMINATION:  XR CHEST AP PORTABLE    CLINICAL HISTORY:  shortness of breath;.    COMPARISON:  05/10/2022    TECHNIQUE:  Chest x-ray AP portable erect    FINDINGS:  There is cardiomegaly.  There is no mediastinal mass.  There is mild to moderate aortic arch calcification.  Pulmonary vasculature is upper normal.    Lungs are grossly clear for shallow breath.  There is no gross pleural effusion.    Osseous structures are unchanged.  There is moderate thoracic spondylosis  Impression: Cardiomegaly and borderline pulmonary venous hypertension appearance without overt pulmonary edema    Shallow breath    Electronically signed by: Rene Santiago  Date:    12/04/2022  Time:    15:55       Admission on 12/04/2022, Discharged on 12/04/2022   Component Date Value Ref Range Status    D-Dimer 12/04/2022 0.68 (H)  0.00 - 0.47 µg/mL Final    ProBNP 12/04/2022 44  1 - 125 pg/mL Final    Troponin I High Sensitivity 12/04/2022 13.3  <=60.4 pg/mL Final    Magnesium 12/04/2022 2.3  1.7 - 2.3 mg/dL Final    POC Glucose 12/04/2022 101  70 - 105 mg/dL Final   Admission on 12/01/2022, Discharged on 12/04/2022   Component Date Value Ref Range Status    POC Glucose 12/01/2022 114 (H)  70 - 105 mg/dL Final    Sodium 12/01/2022 139  136 - 145 mmol/L Final     Potassium 12/01/2022 3.6  3.5 - 5.1 mmol/L Final    Chloride 12/01/2022 100  98 - 107 mmol/L Final    CO2 12/01/2022 30  21 - 32 mmol/L Final    Anion Gap 12/01/2022 13  7 - 16 mmol/L Final    Glucose 12/01/2022 98  74 - 106 mg/dL Final    BUN 12/01/2022 14  7 - 18 mg/dL Final    Creatinine 12/01/2022 1.18 (H)  0.55 - 1.02 mg/dL Final    BUN/Creatinine Ratio 12/01/2022 12  6 - 20 Final    Calcium 12/01/2022 9.6  8.5 - 10.1 mg/dL Final    Total Protein 12/01/2022 7.8  6.4 - 8.2 g/dL Final    Albumin 12/01/2022 3.8  3.5 - 5.0 g/dL Final    Globulin 12/01/2022 4.0  2.0 - 4.0 g/dL Final    A/G Ratio 12/01/2022 1.0   Final    Bilirubin, Total 12/01/2022 0.5  >0.0 - 1.2 mg/dL Final    Alk Phos 12/01/2022 102  50 - 130 U/L Final    ALT 12/01/2022 23  13 - 56 U/L Final    AST 12/01/2022 24  15 - 37 U/L Final    eGFR 12/01/2022 52 (L)  >=60 mL/min/1.73m² Final    Color, UA 12/01/2022 Yellow  Colorless, Straw, Yellow, Light Yellow, Dark Yellow Final    Clarity, UA 12/01/2022 Clear  Clear Final    pH, UA 12/01/2022 5.0  5.0 to 8.0 pH Units Final    Leukocytes, UA 12/01/2022 Negative  Negative Final    Nitrites, UA 12/01/2022 Negative  Negative Final    Protein, UA 12/01/2022 10 (A)  Negative Final    Glucose, UA 12/01/2022 Normal  Normal mg/dL Final    Ketones, UA 12/01/2022 Negative  Negative mg/dL Final    Urobilinogen, UA 12/01/2022 2 (A)  0.2, 1.0, Normal mg/dL Final    Bilirubin, UA 12/01/2022 Negative  Negative Final    Blood, UA 12/01/2022 Negative  Negative Final    Specific Gravity, UA 12/01/2022 1.031 (H)  <=1.030 Final    Culture, Blood 12/01/2022 No Growth at 5 days   Final    Culture, Blood 12/01/2022 No Growth at 5 days   Final    PTT 12/01/2022 31.1  25.2 - 37.3 seconds Final    PT 12/01/2022 13.2  11.7 - 14.7 seconds Final    INR 12/01/2022 1.04  <=3.30 Final    WBC 12/01/2022 10.63  4.50 - 11.00 K/uL Final    RBC 12/01/2022 6.15 (H)  4.20 - 5.40 M/uL Final    Hemoglobin 12/01/2022 16.2 (H)  12.0 - 16.0 g/dL  Final    Hematocrit 12/01/2022 53.1 (H)  38.0 - 47.0 % Final    MCV 12/01/2022 86.3  80.0 - 96.0 fL Final    MCH 12/01/2022 26.3 (L)  27.0 - 31.0 pg Final    MCHC 12/01/2022 30.5 (L)  32.0 - 36.0 g/dL Final    RDW 12/01/2022 16.0 (H)  11.5 - 14.5 % Final    Platelet Count 12/01/2022 308  150 - 400 K/uL Final    MPV 12/01/2022 12.2  9.4 - 12.4 fL Final    Neutrophils % 12/01/2022 68.6 (H)  53.0 - 65.0 % Final    Lymphocytes % 12/01/2022 21.7 (L)  27.0 - 41.0 % Final    Monocytes % 12/01/2022 5.9  2.0 - 6.0 % Final    Eosinophils % 12/01/2022 2.3  1.0 - 4.0 % Final    Basophils % 12/01/2022 0.8  0.0 - 1.0 % Final    Immature Granulocytes % 12/01/2022 0.7 (H)  0.0 - 0.4 % Final    nRBC, Auto 12/01/2022 0.0  <=0.0 % Final    Neutrophils, Abs 12/01/2022 7.30  1.80 - 7.70 K/uL Final    Lymphocytes, Absolute 12/01/2022 2.31  1.00 - 4.80 K/uL Final    Monocytes, Absolute 12/01/2022 0.63  0.00 - 0.80 K/uL Final    Eosinophils, Absolute 12/01/2022 0.24  0.00 - 0.50 K/uL Final    Basophils, Absolute 12/01/2022 0.08  0.00 - 0.20 K/uL Final    Immature Granulocytes, Absolute 12/01/2022 0.07 (H)  0.00 - 0.04 K/uL Final    nRBC, Absolute 12/01/2022 0.00  <=0.00 x10e3/uL Final    Diff Type 12/01/2022 Manual   Final    Segmented Neutrophils, Man % 12/01/2022 69 (H)  50 - 62 % Final    Lymphocytes, Man % 12/01/2022 22 (L)  27 - 41 % Final    Monocytes, Man % 12/01/2022 6  2 - 6 % Final    Eosinophils, Man % 12/01/2022 3  1 - 4 % Final    Platelet Morphology 12/01/2022 Few Large Platelets (A)  Normal Final    Anisocytosis 12/01/2022 1+   Final    ESR Westergren 12/01/2022 19  0 - 30 mm/Hr Final    CRP 12/01/2022 3.70 (H)  0.00 - 0.80 mg/dL Final    Iron 12/01/2022 46 (L)  50 - 170 µg/dL Final    Iron Saturation 12/01/2022 12 (L)  14 - 50 % Final    TIBC 12/01/2022 376  250 - 450 µg/dL Final    Ferritin 12/01/2022 51  8 - 252 ng/mL Final    TSH 12/01/2022 2.000  0.358 - 3.740 uIU/mL Final    POC Glucose 12/01/2022 142 (H)  70 - 105  mg/dL Final    Sodium 12/02/2022 139  136 - 145 mmol/L Final    Potassium 12/02/2022 3.7  3.5 - 5.1 mmol/L Final    Chloride 12/02/2022 105  98 - 107 mmol/L Final    CO2 12/02/2022 28  21 - 32 mmol/L Final    Anion Gap 12/02/2022 10  7 - 16 mmol/L Final    Glucose 12/02/2022 105  74 - 106 mg/dL Final    BUN 12/02/2022 17  7 - 18 mg/dL Final    Creatinine 12/02/2022 1.09 (H)  0.55 - 1.02 mg/dL Final    BUN/Creatinine Ratio 12/02/2022 16  6 - 20 Final    Calcium 12/02/2022 9.4  8.5 - 10.1 mg/dL Final    eGFR 12/02/2022 57 (L)  >=60 mL/min/1.73m² Final    Magnesium 12/02/2022 1.9  1.7 - 2.3 mg/dL Final    Phosphorus 12/02/2022 4.7 (H)  2.5 - 4.5 mg/dL Final    WBC 12/02/2022 8.97  4.50 - 11.00 K/uL Final    RBC 12/02/2022 5.53 (H)  4.20 - 5.40 M/uL Final    Hemoglobin 12/02/2022 14.6  12.0 - 16.0 g/dL Final    Hematocrit 12/02/2022 49.4 (H)  38.0 - 47.0 % Final    MCV 12/02/2022 89.3  80.0 - 96.0 fL Final    MCH 12/02/2022 26.4 (L)  27.0 - 31.0 pg Final    MCHC 12/02/2022 29.6 (L)  32.0 - 36.0 g/dL Final    RDW 12/02/2022 15.7 (H)  11.5 - 14.5 % Final    Platelet Count 12/02/2022 247  150 - 400 K/uL Final    MPV 12/02/2022 12.4  9.4 - 12.4 fL Final    Neutrophils % 12/02/2022 60.1  53.0 - 65.0 % Final    Lymphocytes % 12/02/2022 27.1  27.0 - 41.0 % Final    Monocytes % 12/02/2022 7.8 (H)  2.0 - 6.0 % Final    Eosinophils % 12/02/2022 3.9  1.0 - 4.0 % Final    Basophils % 12/02/2022 0.8  0.0 - 1.0 % Final    Immature Granulocytes % 12/02/2022 0.3  0.0 - 0.4 % Final    nRBC, Auto 12/02/2022 0.0  <=0.0 % Final    Neutrophils, Abs 12/02/2022 5.39  1.80 - 7.70 K/uL Final    Lymphocytes, Absolute 12/02/2022 2.43  1.00 - 4.80 K/uL Final    Monocytes, Absolute 12/02/2022 0.70  0.00 - 0.80 K/uL Final    Eosinophils, Absolute 12/02/2022 0.35  0.00 - 0.50 K/uL Final    Basophils, Absolute 12/02/2022 0.07  0.00 - 0.20 K/uL Final    Immature Granulocytes, Absolute 12/02/2022 0.03  0.00 - 0.04 K/uL Final    nRBC, Absolute 12/02/2022  0.00  <=0.00 x10e3/uL Final    Diff Type 12/02/2022 Auto   Final    POC Glucose 12/02/2022 112 (H)  70 - 105 mg/dL Final    POC Glucose 12/02/2022 132 (H)  70 - 105 mg/dL Final    POC Glucose 12/02/2022 139 (H)  70 - 105 mg/dL Final    POC Glucose 12/02/2022 142 (H)  70 - 105 mg/dL Final    Sodium 12/03/2022 138  136 - 145 mmol/L Final    Potassium 12/03/2022 3.7  3.5 - 5.1 mmol/L Final    Chloride 12/03/2022 100  98 - 107 mmol/L Final    CO2 12/03/2022 32  21 - 32 mmol/L Final    Anion Gap 12/03/2022 10  7 - 16 mmol/L Final    Glucose 12/03/2022 123 (H)  74 - 106 mg/dL Final    BUN 12/03/2022 15  7 - 18 mg/dL Final    Creatinine 12/03/2022 1.07 (H)  0.55 - 1.02 mg/dL Final    BUN/Creatinine Ratio 12/03/2022 14  6 - 20 Final    Calcium 12/03/2022 9.8  8.5 - 10.1 mg/dL Final    eGFR 12/03/2022 58 (L)  >=60 mL/min/1.73m² Final    Magnesium 12/03/2022 1.9  1.7 - 2.3 mg/dL Final    Phosphorus 12/03/2022 5.1 (H)  2.5 - 4.5 mg/dL Final    WBC 12/03/2022 9.65  4.50 - 11.00 K/uL Final    RBC 12/03/2022 5.45 (H)  4.20 - 5.40 M/uL Final    Hemoglobin 12/03/2022 14.5  12.0 - 16.0 g/dL Final    Hematocrit 12/03/2022 47.5 (H)  38.0 - 47.0 % Final    MCV 12/03/2022 87.2  80.0 - 96.0 fL Final    MCH 12/03/2022 26.6 (L)  27.0 - 31.0 pg Final    MCHC 12/03/2022 30.5 (L)  32.0 - 36.0 g/dL Final    RDW 12/03/2022 15.6 (H)  11.5 - 14.5 % Final    Platelet Count 12/03/2022 265  150 - 400 K/uL Final    MPV 12/03/2022 12.6 (H)  9.4 - 12.4 fL Final    Neutrophils % 12/03/2022 61.1  53.0 - 65.0 % Final    Lymphocytes % 12/03/2022 22.3 (L)  27.0 - 41.0 % Final    Monocytes % 12/03/2022 8.1 (H)  2.0 - 6.0 % Final    Eosinophils % 12/03/2022 7.5 (H)  1.0 - 4.0 % Final    Basophils % 12/03/2022 0.6  0.0 - 1.0 % Final    Immature Granulocytes % 12/03/2022 0.4  0.0 - 0.4 % Final    nRBC, Auto 12/03/2022 0.0  <=0.0 % Final    Neutrophils, Abs 12/03/2022 5.90  1.80 - 7.70 K/uL Final    Lymphocytes, Absolute 12/03/2022 2.15  1.00 - 4.80 K/uL Final     Monocytes, Absolute 12/03/2022 0.78  0.00 - 0.80 K/uL Final    Eosinophils, Absolute 12/03/2022 0.72 (H)  0.00 - 0.50 K/uL Final    Basophils, Absolute 12/03/2022 0.06  0.00 - 0.20 K/uL Final    Immature Granulocytes, Absolute 12/03/2022 0.04  0.00 - 0.04 K/uL Final    nRBC, Absolute 12/03/2022 0.00  <=0.00 x10e3/uL Final    Diff Type 12/03/2022 Auto   Final    POC Glucose 12/03/2022 147 (H)  70 - 105 mg/dL Final    POC Glucose 12/03/2022 134 (H)  70 - 105 mg/dL Final    POC Glucose 12/03/2022 105  70 - 105 mg/dL Final    POC Glucose 12/03/2022 94  70 - 105 mg/dL Final    Sodium 12/04/2022 137  136 - 145 mmol/L Final    Potassium 12/04/2022 3.4 (L)  3.5 - 5.1 mmol/L Final    Chloride 12/04/2022 99  98 - 107 mmol/L Final    CO2 12/04/2022 32  21 - 32 mmol/L Final    Anion Gap 12/04/2022 9  7 - 16 mmol/L Final    Glucose 12/04/2022 111 (H)  74 - 106 mg/dL Final    BUN 12/04/2022 14  7 - 18 mg/dL Final    Creatinine 12/04/2022 1.00  0.55 - 1.02 mg/dL Final    BUN/Creatinine Ratio 12/04/2022 14  6 - 20 Final    Calcium 12/04/2022 9.8  8.5 - 10.1 mg/dL Final    eGFR 12/04/2022 63  >=60 mL/min/1.73m² Final    Magnesium 12/04/2022 2.0  1.7 - 2.3 mg/dL Final    Phosphorus 12/04/2022 4.4  2.5 - 4.5 mg/dL Final    WBC 12/04/2022 8.49  4.50 - 11.00 K/uL Final    RBC 12/04/2022 5.51 (H)  4.20 - 5.40 M/uL Final    Hemoglobin 12/04/2022 14.5  12.0 - 16.0 g/dL Final    Hematocrit 12/04/2022 47.2 (H)  38.0 - 47.0 % Final    MCV 12/04/2022 85.7  80.0 - 96.0 fL Final    MCH 12/04/2022 26.3 (L)  27.0 - 31.0 pg Final    MCHC 12/04/2022 30.7 (L)  32.0 - 36.0 g/dL Final    RDW 12/04/2022 15.3 (H)  11.5 - 14.5 % Final    Platelet Count 12/04/2022 265  150 - 400 K/uL Final    MPV 12/04/2022 12.1  9.4 - 12.4 fL Final    Neutrophils % 12/04/2022 60.2  53.0 - 65.0 % Final    Lymphocytes % 12/04/2022 20.5 (L)  27.0 - 41.0 % Final    Monocytes % 12/04/2022 6.1 (H)  2.0 - 6.0 % Final    Eosinophils % 12/04/2022 12.0 (H)  1.0 - 4.0 % Final     Basophils % 12/04/2022 0.6  0.0 - 1.0 % Final    Immature Granulocytes % 12/04/2022 0.6 (H)  0.0 - 0.4 % Final    nRBC, Auto 12/04/2022 0.0  <=0.0 % Final    Neutrophils, Abs 12/04/2022 5.11  1.80 - 7.70 K/uL Final    Lymphocytes, Absolute 12/04/2022 1.74  1.00 - 4.80 K/uL Final    Monocytes, Absolute 12/04/2022 0.52  0.00 - 0.80 K/uL Final    Eosinophils, Absolute 12/04/2022 1.02 (H)  0.00 - 0.50 K/uL Final    Basophils, Absolute 12/04/2022 0.05  0.00 - 0.20 K/uL Final    Immature Granulocytes, Absolute 12/04/2022 0.05 (H)  0.00 - 0.04 K/uL Final    nRBC, Absolute 12/04/2022 0.00  <=0.00 x10e3/uL Final    Diff Type 12/04/2022 Auto   Final   Office Visit on 11/08/2022   Component Date Value Ref Range Status    ESR Westergren 11/08/2022 12  0 - 30 mm/Hr Final    WBC 11/08/2022 10.04  4.50 - 11.00 K/uL Final    RBC 11/08/2022 5.79 (H)  4.20 - 5.40 M/uL Final    Hemoglobin 11/08/2022 15.5  12.0 - 16.0 g/dL Final    Hematocrit 11/08/2022 49.8 (H)  38.0 - 47.0 % Final    MCV 11/08/2022 86.0  80.0 - 96.0 fL Final    MCH 11/08/2022 26.8 (L)  27.0 - 31.0 pg Final    MCHC 11/08/2022 31.1 (L)  32.0 - 36.0 g/dL Final    RDW 11/08/2022 16.2 (H)  11.5 - 14.5 % Final    Platelet Count 11/08/2022 241  150 - 400 K/uL Final    MPV 11/08/2022 13.1 (H)  9.4 - 12.4 fL Final    Neutrophils % 11/08/2022 65.6 (H)  53.0 - 65.0 % Final    Lymphocytes % 11/08/2022 23.7 (L)  27.0 - 41.0 % Final    Monocytes % 11/08/2022 6.2 (H)  2.0 - 6.0 % Final    Eosinophils % 11/08/2022 3.3  1.0 - 4.0 % Final    Basophils % 11/08/2022 0.6  0.0 - 1.0 % Final    Immature Granulocytes % 11/08/2022 0.6 (H)  0.0 - 0.4 % Final    nRBC, Auto 11/08/2022 0.0  <=0.0 % Final    Neutrophils, Abs 11/08/2022 6.59  1.80 - 7.70 K/uL Final    Lymphocytes, Absolute 11/08/2022 2.38  1.00 - 4.80 K/uL Final    Monocytes, Absolute 11/08/2022 0.62  0.00 - 0.80 K/uL Final    Eosinophils, Absolute 11/08/2022 0.33  0.00 - 0.50 K/uL Final    Basophils, Absolute 11/08/2022 0.06   0.00 - 0.20 K/uL Final    Immature Granulocytes, Absolute 11/08/2022 0.06 (H)  0.00 - 0.04 K/uL Final    nRBC, Absolute 11/08/2022 0.00  <=0.00 x10e3/uL Final    Diff Type 11/08/2022 Scan Smear   Final    Platelet Morphology 11/08/2022 Large Platelets (A)  Normal Final    RBC Morphology 11/08/2022 Normal   Final   Office Visit on 10/04/2022   Component Date Value Ref Range Status    Potassium 11/08/2022 3.9  3.5 - 5.1 mmol/L Final    Hemoglobin A1C 10/04/2022 6.2  4.5 - 6.6 % Final    Estimated Average Glucose 10/04/2022 120  mg/dL Final   Office Visit on 10/04/2022   Component Date Value Ref Range Status    POC Amphetamines 10/04/2022 Presumptive Positive (A)  Negative, Inconclusive Final    POC Barbiturates 10/04/2022 Negative  Negative, Inconclusive Final    POC Benzodiazepines 10/04/2022 Negative  Negative, Inconclusive Final    POC Cocaine 10/04/2022 Negative  Negative, Inconclusive Final    POC THC 10/04/2022 Negative  Negative, Inconclusive Final    POC Methadone 10/04/2022 Negative  Negative, Inconclusive Final    POC Methamphetamine 10/04/2022 Negative  Negative, Inconclusive Final    POC Opiates 10/04/2022 Presumptive Positive (A)  Negative, Inconclusive Final    POC Oxycodone 10/04/2022 Negative  Negative, Inconclusive Final    POC Phencyclidine 10/04/2022 Negative  Negative, Inconclusive Final    POC Methylenedioxymethamphetamine * 10/04/2022 Negative  Negative, Inconclusive Final    POC Tricyclic Antidepressants 10/04/2022 Negative  Negative, Inconclusive Final    POC Buprenorphine 10/04/2022 Negative   Final     Acceptable 10/04/2022 Yes   Final    POC Temperature (Urine) 10/04/2022 92   Final    pH, UA 10/04/2022 5.5  5.0 to 8.0 pH Units Final    Creatinine, Urine 10/04/2022 225 (H)  28 - 219 mg/dL Final    6-Acetylmorphine 10/04/2022 Negative  10 ng/mL Final    7-Aminoclonazepam 10/04/2022 Negative  Negative 25 ng/mL Final    a-Hydroxyalprazolam 10/04/2022 Negative  Negative 25 ng/mL  Final    Acetyl Fentanyl 10/04/2022 Negative  2.5 ng/mL Final    Acetyl Norfentanyl Oxalate 10/04/2022 Negative  5 ng/mL Final    Benzoylecgonine 10/04/2022 Negative  100 ng/mL Final    Buprenorphine 10/04/2022 Negative  25 ng/mL Final    Codeine 10/04/2022 Negative  25 ng/mL Final    EDDP 10/04/2022 Negative  25 ng/mL Final    Fentanyl 10/04/2022 Negative  2.5 ng/mL Final    Hydrocodone 10/04/2022 >250.0 (H)  <25.0 25 ng/mL Final    Hydromorphone 10/04/2022 >250.0 (H)  <25.0 25 ng/mL Final    Lorazepam 10/04/2022 Negative  25 ng/mL Final    Morphine 10/04/2022 Negative  25 ng/mL Final    Norbuprenorphine 10/04/2022 Negative  25 ng/mL Final    Nordiazepam 10/04/2022 Negative  25 ng/mL Final    Norfentanyl Oxalate 10/04/2022 Negative  5 ng/mL Final    Norhydrocodone 10/04/2022 >500.0 (H)  <50.0 50 ng/mL Final    Noroxycodone HCL 10/04/2022 Negative  50 ng/mL Final    Oxazepam 10/04/2022 Negative  25 ng/mL Final    Oxymorphone 10/04/2022 Negative  25 ng/mL Final    Tapentadol 10/04/2022 Negative  25 ng/mL Final    Temazepam 10/04/2022 Negative  25 ng/mL Final    THC-COOH 10/04/2022 Negative  25 ng/mL Final    Tramadol 10/04/2022 Negative  100 ng/mL Final    Amphetamine, Urine 10/04/2022 Negative  Negative Final    Methamphetamines, Urine 10/04/2022 Negative  Negative Final    Methadone, Urine 10/04/2022 Negative  Negative 25 ng/mL Final    Oxycodone, Urine 10/04/2022 Negative  Negative 25 ng/mL Final    Specific Gravity, UA 10/04/2022 1.023  <=1.030 Final   Admission on 09/09/2022, Discharged on 09/09/2022   Component Date Value Ref Range Status    Sodium 09/09/2022 135 (L)  136 - 145 mmol/L Final    Potassium 09/09/2022 3.1 (L)  3.5 - 5.1 mmol/L Final    Chloride 09/09/2022 101  98 - 107 mmol/L Final    CO2 09/09/2022 29  21 - 32 mmol/L Final    Anion Gap 09/09/2022 8  7 - 16 mmol/L Final    Glucose 09/09/2022 110 (H)  74 - 106 mg/dL Final    BUN 09/09/2022 19 (H)  7 - 18 mg/dL Final    Creatinine 09/09/2022 1.11 (H)   0.55 - 1.02 mg/dL Final    BUN/Creatinine Ratio 09/09/2022 17  6 - 20 Final    Calcium 09/09/2022 9.6  8.5 - 10.1 mg/dL Final    Total Protein 09/09/2022 7.5  6.4 - 8.2 g/dL Final    Albumin 09/09/2022 3.5  3.5 - 5.0 g/dL Final    Globulin 09/09/2022 4.0  2.0 - 4.0 g/dL Final    A/G Ratio 09/09/2022 0.9   Final    Bilirubin, Total 09/09/2022 0.4  >0.0 - 1.2 mg/dL Final    Alk Phos 09/09/2022 94  50 - 130 U/L Final    ALT 09/09/2022 14  13 - 56 U/L Final    AST 09/09/2022 19  15 - 37 U/L Final    eGFR 09/09/2022 56 (L)  >=60 mL/min/1.73m² Final    Lipase 09/09/2022 75  73 - 393 U/L Final    WBC 09/09/2022 11.19 (H)  4.50 - 11.00 K/uL Final    RBC 09/09/2022 5.70 (H)  4.20 - 5.40 M/uL Final    Hemoglobin 09/09/2022 15.2  12.0 - 16.0 g/dL Final    Hematocrit 09/09/2022 47.1 (H)  38.0 - 47.0 % Final    MCV 09/09/2022 82.6  80.0 - 96.0 fL Final    MCH 09/09/2022 26.7 (L)  27.0 - 31.0 pg Final    MCHC 09/09/2022 32.3  32.0 - 36.0 g/dL Final    RDW 09/09/2022 17.4 (H)  11.5 - 14.5 % Final    Platelet Count 09/09/2022 261  150 - 400 K/uL Final    MPV 09/09/2022 11.4  9.4 - 12.4 fL Final    Neutrophils % 09/09/2022 58.9  53.0 - 65.0 % Final    Lymphocytes % 09/09/2022 25.0 (L)  27.0 - 41.0 % Final    Monocytes % 09/09/2022 5.5  2.0 - 6.0 % Final    Eosinophils % 09/09/2022 9.3 (H)  1.0 - 4.0 % Final    Basophils % 09/09/2022 0.7  0.0 - 1.0 % Final    Immature Granulocytes % 09/09/2022 0.6 (H)  0.0 - 0.4 % Final    nRBC, Auto 09/09/2022 0.0  <=0.0 % Final    Neutrophils, Abs 09/09/2022 6.59  1.80 - 7.70 K/uL Final    Lymphocytes, Absolute 09/09/2022 2.80  1.00 - 4.80 K/uL Final    Monocytes, Absolute 09/09/2022 0.61  0.00 - 0.80 K/uL Final    Eosinophils, Absolute 09/09/2022 1.04 (H)  0.00 - 0.50 K/uL Final    Basophils, Absolute 09/09/2022 0.08  0.00 - 0.20 K/uL Final    Immature Granulocytes, Absolute 09/09/2022 0.07 (H)  0.00 - 0.04 K/uL Final    nRBC, Absolute 09/09/2022 0.00  <=0.00 x10e3/uL Final    Diff Type  09/09/2022 Auto   Final   Office Visit on 08/11/2022   Component Date Value Ref Range Status    Hepatitis C Ab 08/11/2022 Non-Reactive  Non-Reactive Final    Color, UA 08/11/2022 Light-Yellow  Colorless, Straw, Yellow, Light Yellow, Dark Yellow Final    Clarity, UA 08/11/2022 Clear  Clear Final    pH, UA 08/11/2022 5.0  5.0 to 8.0 pH Units Final    Leukocytes, UA 08/11/2022 Negative  Negative Final    Nitrites, UA 08/11/2022 Negative  Negative Final    Protein, UA 08/11/2022 Negative  Negative Final    Glucose, UA 08/11/2022 Normal  Normal mg/dL Final    Ketones, UA 08/11/2022 Negative  Negative mg/dL Final    Urobilinogen, UA 08/11/2022 Normal  0.2, 1.0, Normal mg/dL Final    Bilirubin, UA 08/11/2022 Negative  Negative Final    Blood, UA 08/11/2022 Negative  Negative Final    Specific Gravity, UA 08/11/2022 1.017  <=1.030 Final   Patient Outreach on 07/29/2022   Component Date Value Ref Range Status    CRC Recommendation External 10/27/2015 Repeat colonoscopy in 5 years   Final   Office Visit on 06/16/2022   Component Date Value Ref Range Status    POC Amphetamines 06/16/2022 Presumptive Positive (A)  Negative, Inconclusive Final    POC Barbiturates 06/16/2022 Negative  Negative, Inconclusive Final    POC Benzodiazepines 06/16/2022 Negative  Negative, Inconclusive Final    POC Cocaine 06/16/2022 Negative  Negative, Inconclusive Final    POC THC 06/16/2022 Negative  Negative, Inconclusive Final    POC Methadone 06/16/2022 Negative  Negative, Inconclusive Final    POC Methamphetamine 06/16/2022 Negative  Negative, Inconclusive Final    POC Opiates 06/16/2022 Presumptive Positive (A)  Negative, Inconclusive Final    POC Oxycodone 06/16/2022 Negative  Negative, Inconclusive Final    POC Phencyclidine 06/16/2022 Negative  Negative, Inconclusive Final    POC Methylenedioxymethamphetamine * 06/16/2022 Negative  Negative, Inconclusive Final    POC Buprenorphine 06/16/2022 Negative   Final     Acceptable  06/16/2022 Yes   Final    POC Temperature (Urine) 06/16/2022 95   Final    Color, UA 06/16/2022 Yellow  Colorless, Straw, Yellow, Dark Yellow Final    Clarity, UA 06/16/2022 Clear  Clear Final    pH, UA 06/16/2022 6.5  5.0, 5.5, 6.0, 6.5, 7.0, 7.5, 8.0 pH Units Final    Leukocytes, UA 06/16/2022 Trace (A)  Negative Final    Nitrites, UA 06/16/2022 Negative  Negative Final    Protein, UA 06/16/2022 Negative  Negative Final    Glucose, UA 06/16/2022 Negative  Negative mg/dL Final    Ketones, UA 06/16/2022 Negative  Negative, Trace mg/dL Final    Urobilinogen, UA 06/16/2022 0.2  0.2, 1.0 mg/dL Final    Bilirubin, UA 06/16/2022 Negative  Negative Final    Blood, UA 06/16/2022 Negative  Negative Final    Specific Gravity, UA 06/16/2022 1.020  <=1.005, 1.010, 1.015, 1.020, 1.025, 1.030 Final    WBC, UA 06/16/2022 5-10 (A)  None Seen, 0-5 /hpf Final    RBC, UA 06/16/2022 None Seen  None Seen, 0-3 /hpf Final    Bacteria, UA 06/16/2022 Moderate (A)  None Seen, None Seen To Occasional, Rare /hpf Final    Squamous Epithelial Cells, UA 06/16/2022 Moderate (A)  None Seen, Rare, None Seen To Occasional /lpf Final    Mucus, UA 06/16/2022 Few (A)  None Seen /hpf Final    Culture, Urine 06/16/2022 Skin/Urogenital Tamara Isolated, no further workup.   Final         Orders Placed This Encounter   Procedures    POCT Urine Drug Screen Presump     Interpretive Information:     Negative:  No drug detected at the cut off level.   Positive:  This result represents presumptive positive for the   tested drug, other substances may yield a positive response other   than the analyte of interest. This result should be utilized for   diagnostic purpose only. Confirmation testing will be performed upon physician request only.              Requested Prescriptions     Signed Prescriptions Disp Refills    HYDROcodone-acetaminophen (NORCO)  mg per tablet 120 tablet 0     Sig: Take 1 tablet by mouth every 6 (six) hours as needed for Pain.     naloxone (NARCAN) 4 mg/actuation Spry 1 each 0     Si spray (4 mg total) by Nasal route once. for 1 dose    HYDROcodone-acetaminophen (NORCO)  mg per tablet 120 tablet 0     Sig: Take 1 tablet by mouth every 6 (six) hours as needed for Pain.       Assessment:     1. Lumbosacral spondylosis without myelopathy    2. Chronic pain of right knee    3. PVD (peripheral vascular disease)    4. Foot pain, left    5. Encounter for long-term (current) use of other medications         A's of Opioid Risk Assessment  Activity:Patient can perform ADL.   Analgesia:Patients pain is partially controlled by current medication. Patient has tried OTC medications such as Tylenol and Ibuprofen with out relief.   Adverse Effects: Patient denies constipation or sedation.  Aberrant Behavior:  reviewed with no aberrant drug seeking/taking behavior.  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 Crandall, 2023    Bring original prescription medication bottles/container/box with labels to each visit    Pill count    Physical therapy    Massage therapy declines

## 2022-12-08 ENCOUNTER — OFFICE VISIT (OUTPATIENT)
Dept: PAIN MEDICINE | Facility: CLINIC | Age: 62
End: 2022-12-08
Payer: COMMERCIAL

## 2022-12-08 VITALS — WEIGHT: 293 LBS | HEIGHT: 68 IN | RESPIRATION RATE: 20 BRPM | BODY MASS INDEX: 44.41 KG/M2

## 2022-12-08 DIAGNOSIS — M79.672 FOOT PAIN, LEFT: Chronic | ICD-10-CM

## 2022-12-08 DIAGNOSIS — M47.817 LUMBOSACRAL SPONDYLOSIS WITHOUT MYELOPATHY: Primary | Chronic | ICD-10-CM

## 2022-12-08 DIAGNOSIS — Z79.899 ENCOUNTER FOR LONG-TERM (CURRENT) USE OF OTHER MEDICATIONS: ICD-10-CM

## 2022-12-08 DIAGNOSIS — I73.9 PVD (PERIPHERAL VASCULAR DISEASE): Chronic | ICD-10-CM

## 2022-12-08 DIAGNOSIS — M25.561 CHRONIC PAIN OF RIGHT KNEE: Chronic | ICD-10-CM

## 2022-12-08 DIAGNOSIS — G89.29 CHRONIC PAIN OF RIGHT KNEE: Chronic | ICD-10-CM

## 2022-12-08 LAB
CTP QC/QA: YES
POC (AMP) AMPHETAMINE: NEGATIVE
POC (BAR) BARBITURATES: NEGATIVE
POC (BUP) BUPRENORPHINE: NEGATIVE
POC (BZO) BENZODIAZEPINES: NEGATIVE
POC (COC) COCAINE: NEGATIVE
POC (MDMA) METHYLENEDIOXYMETHAMPHETAMINE 3,4: NEGATIVE
POC (MET) METHAMPHETAMINE: NEGATIVE
POC (MOP) OPIATES: ABNORMAL
POC (MTD) METHADONE: NEGATIVE
POC (OXY) OXYCODONE: NEGATIVE
POC (PCP) PHENCYCLIDINE: NEGATIVE
POC (TCA) TRICYCLIC ANTIDEPRESSANTS: NEGATIVE
POC TEMPERATURE (URINE): 90
POC THC: NEGATIVE

## 2022-12-08 PROCEDURE — 3066F PR DOCUMENTATION OF TREATMENT FOR NEPHROPATHY: ICD-10-PCS | Mod: CPTII,,, | Performed by: PHYSICIAN ASSISTANT

## 2022-12-08 PROCEDURE — 3008F BODY MASS INDEX DOCD: CPT | Mod: CPTII,,, | Performed by: PHYSICIAN ASSISTANT

## 2022-12-08 PROCEDURE — 99215 OFFICE O/P EST HI 40 MIN: CPT | Mod: PBBFAC | Performed by: PHYSICIAN ASSISTANT

## 2022-12-08 PROCEDURE — 80305 DRUG TEST PRSMV DIR OPT OBS: CPT | Mod: PBBFAC | Performed by: PHYSICIAN ASSISTANT

## 2022-12-08 PROCEDURE — 1159F MED LIST DOCD IN RCRD: CPT | Mod: CPTII,,, | Performed by: PHYSICIAN ASSISTANT

## 2022-12-08 PROCEDURE — 99214 PR OFFICE/OUTPT VISIT, EST, LEVL IV, 30-39 MIN: ICD-10-PCS | Mod: S$PBB,,, | Performed by: PHYSICIAN ASSISTANT

## 2022-12-08 PROCEDURE — 3061F PR NEG MICROALBUMINURIA RESULT DOCUMENTED/REVIEW: ICD-10-PCS | Mod: CPTII,,, | Performed by: PHYSICIAN ASSISTANT

## 2022-12-08 PROCEDURE — 3008F PR BODY MASS INDEX (BMI) DOCUMENTED: ICD-10-PCS | Mod: CPTII,,, | Performed by: PHYSICIAN ASSISTANT

## 2022-12-08 PROCEDURE — 3044F HG A1C LEVEL LT 7.0%: CPT | Mod: CPTII,,, | Performed by: PHYSICIAN ASSISTANT

## 2022-12-08 PROCEDURE — 1111F DSCHRG MED/CURRENT MED MERGE: CPT | Mod: CPTII,,, | Performed by: PHYSICIAN ASSISTANT

## 2022-12-08 PROCEDURE — 3044F PR MOST RECENT HEMOGLOBIN A1C LEVEL <7.0%: ICD-10-PCS | Mod: CPTII,,, | Performed by: PHYSICIAN ASSISTANT

## 2022-12-08 PROCEDURE — 3061F NEG MICROALBUMINURIA REV: CPT | Mod: CPTII,,, | Performed by: PHYSICIAN ASSISTANT

## 2022-12-08 PROCEDURE — 1111F PR DISCHARGE MEDS RECONCILED W/ CURRENT OUTPATIENT MED LIST: ICD-10-PCS | Mod: CPTII,,, | Performed by: PHYSICIAN ASSISTANT

## 2022-12-08 PROCEDURE — 1159F PR MEDICATION LIST DOCUMENTED IN MEDICAL RECORD: ICD-10-PCS | Mod: CPTII,,, | Performed by: PHYSICIAN ASSISTANT

## 2022-12-08 PROCEDURE — 3066F NEPHROPATHY DOC TX: CPT | Mod: CPTII,,, | Performed by: PHYSICIAN ASSISTANT

## 2022-12-08 PROCEDURE — 99214 OFFICE O/P EST MOD 30 MIN: CPT | Mod: S$PBB,,, | Performed by: PHYSICIAN ASSISTANT

## 2022-12-08 RX ORDER — HYDROCODONE BITARTRATE AND ACETAMINOPHEN 10; 325 MG/1; MG/1
1 TABLET ORAL EVERY 6 HOURS PRN
Qty: 120 TABLET | Refills: 0 | Status: SHIPPED | OUTPATIENT
Start: 2022-12-08 | End: 2023-01-07

## 2022-12-08 RX ORDER — NALOXONE HYDROCHLORIDE 4 MG/.1ML
1 SPRAY NASAL ONCE
Qty: 1 EACH | Refills: 0 | Status: SHIPPED | OUTPATIENT
Start: 2022-12-08 | End: 2022-12-08

## 2022-12-08 RX ORDER — HYDROCODONE BITARTRATE AND ACETAMINOPHEN 10; 325 MG/1; MG/1
1 TABLET ORAL EVERY 6 HOURS PRN
Qty: 120 TABLET | Refills: 0 | Status: SHIPPED | OUTPATIENT
Start: 2023-01-07 | End: 2023-02-08 | Stop reason: SDUPTHER

## 2022-12-08 RX ORDER — PREGABALIN 100 MG/1
100 CAPSULE ORAL EVERY 12 HOURS
Qty: 60 CAPSULE | Refills: 1 | Status: CANCELLED | OUTPATIENT
Start: 2022-12-08

## 2022-12-15 ENCOUNTER — TELEPHONE (OUTPATIENT)
Dept: INTERNAL MEDICINE | Facility: CLINIC | Age: 62
End: 2022-12-15
Payer: COMMERCIAL

## 2022-12-16 ENCOUNTER — TELEPHONE (OUTPATIENT)
Dept: PULMONOLOGY | Facility: CLINIC | Age: 62
End: 2022-12-16
Payer: COMMERCIAL

## 2022-12-16 NOTE — TELEPHONE ENCOUNTER
"Pt phoned clinic and requested an order for "portable Oxygen".    Request was forwarded to MD: he reviewed chart and responded:  Pt was seen in March, no indication for Oxygen at that time.  Per /gp    Pt had PFT 2/17/22: seen in clinic 3/31/22:  was to be seen in 6 months for follow-up with    and was a "no show" for 10/3/22 appointment.    Since then she has been to other clinic appts, ER, and  Admitted once.  Chart does reflect appt with  scheduled for 1/23/22 as requested by Blayne, RAJESHP, ER from pt's 12/4/22 visit to ER.    On return call to  today:  She was given above assessment from MD.  She confirmed that she does not currently have Oxygen.  She confirmed that she understood that she is to see  for issue with CT from earlier this month.  (Ct done  recommends follow-up ~ 6 months for 5 mm nodule).  Date  for  appt 1/23/23 confirmed with her.    She was asked about sleep study, c_pap.  She reported that she needs to reschedule Sleep test: missed due to illness.  She confirmed that she tried calling 's office but has not had return call yet.   was encouraged to keep 1/23/23 appt with  as currently that is "sooner" than his first open appt.  She was encouraged to contact 's office  early next week, prn.  She voiced understanding.//gp    "

## 2023-01-06 RX ORDER — INSULIN DETEMIR 100 [IU]/ML
10 INJECTION, SOLUTION SUBCUTANEOUS NIGHTLY
Qty: 15 ML | Refills: 1 | Status: SHIPPED | OUTPATIENT
Start: 2023-01-06 | End: 2023-05-17 | Stop reason: SDUPTHER

## 2023-01-11 ENCOUNTER — OFFICE VISIT (OUTPATIENT)
Dept: OBSTETRICS AND GYNECOLOGY | Facility: CLINIC | Age: 63
End: 2023-01-11
Payer: MEDICARE

## 2023-01-11 VITALS
SYSTOLIC BLOOD PRESSURE: 138 MMHG | DIASTOLIC BLOOD PRESSURE: 90 MMHG | WEIGHT: 293 LBS | RESPIRATION RATE: 18 BRPM | HEIGHT: 68 IN | BODY MASS INDEX: 44.41 KG/M2

## 2023-01-11 DIAGNOSIS — R10.2 PELVIC PAIN: Primary | ICD-10-CM

## 2023-01-11 PROCEDURE — 3008F PR BODY MASS INDEX (BMI) DOCUMENTED: ICD-10-PCS | Mod: CPTII,,, | Performed by: STUDENT IN AN ORGANIZED HEALTH CARE EDUCATION/TRAINING PROGRAM

## 2023-01-11 PROCEDURE — 3080F DIAST BP >= 90 MM HG: CPT | Mod: CPTII,,, | Performed by: STUDENT IN AN ORGANIZED HEALTH CARE EDUCATION/TRAINING PROGRAM

## 2023-01-11 PROCEDURE — 99203 PR OFFICE/OUTPT VISIT, NEW, LEVL III, 30-44 MIN: ICD-10-PCS | Mod: S$PBB,,, | Performed by: STUDENT IN AN ORGANIZED HEALTH CARE EDUCATION/TRAINING PROGRAM

## 2023-01-11 PROCEDURE — 99215 OFFICE O/P EST HI 40 MIN: CPT | Mod: PBBFAC | Performed by: STUDENT IN AN ORGANIZED HEALTH CARE EDUCATION/TRAINING PROGRAM

## 2023-01-11 PROCEDURE — 1159F PR MEDICATION LIST DOCUMENTED IN MEDICAL RECORD: ICD-10-PCS | Mod: CPTII,,, | Performed by: STUDENT IN AN ORGANIZED HEALTH CARE EDUCATION/TRAINING PROGRAM

## 2023-01-11 PROCEDURE — 3080F PR MOST RECENT DIASTOLIC BLOOD PRESSURE >= 90 MM HG: ICD-10-PCS | Mod: CPTII,,, | Performed by: STUDENT IN AN ORGANIZED HEALTH CARE EDUCATION/TRAINING PROGRAM

## 2023-01-11 PROCEDURE — 1159F MED LIST DOCD IN RCRD: CPT | Mod: CPTII,,, | Performed by: STUDENT IN AN ORGANIZED HEALTH CARE EDUCATION/TRAINING PROGRAM

## 2023-01-11 PROCEDURE — 3075F SYST BP GE 130 - 139MM HG: CPT | Mod: CPTII,,, | Performed by: STUDENT IN AN ORGANIZED HEALTH CARE EDUCATION/TRAINING PROGRAM

## 2023-01-11 PROCEDURE — 99203 OFFICE O/P NEW LOW 30 MIN: CPT | Mod: S$PBB,,, | Performed by: STUDENT IN AN ORGANIZED HEALTH CARE EDUCATION/TRAINING PROGRAM

## 2023-01-11 PROCEDURE — 3075F PR MOST RECENT SYSTOLIC BLOOD PRESS GE 130-139MM HG: ICD-10-PCS | Mod: CPTII,,, | Performed by: STUDENT IN AN ORGANIZED HEALTH CARE EDUCATION/TRAINING PROGRAM

## 2023-01-11 PROCEDURE — 3008F BODY MASS INDEX DOCD: CPT | Mod: CPTII,,, | Performed by: STUDENT IN AN ORGANIZED HEALTH CARE EDUCATION/TRAINING PROGRAM

## 2023-01-11 RX ORDER — LANCETS
EACH MISCELLANEOUS
COMMUNITY
Start: 2023-01-09

## 2023-01-11 RX ORDER — BLOOD-GLUCOSE METER
EACH MISCELLANEOUS
COMMUNITY
Start: 2023-01-09

## 2023-01-11 RX ORDER — NALOXONE HYDROCHLORIDE 4 MG/.1ML
1 SPRAY NASAL ONCE
COMMUNITY
Start: 2022-12-08

## 2023-01-11 RX ORDER — BLOOD SUGAR DIAGNOSTIC
STRIP MISCELLANEOUS
COMMUNITY
Start: 2023-01-09

## 2023-01-11 NOTE — PROGRESS NOTES
Gynecology History and Physical    Assessment/Plan:   Problem List Items Addressed This Visit    None  Visit Diagnoses       Pelvic pain    -  Primary    Relevant Orders    US Pelvis Comp with Transvag NON-OB (xpd              CC:   Chief Complaint   Patient presents with    Well Woman     Complains of pelvic pain and lower abdominal pain   Pt has a hyst in        HPI: Holly Porter is a 62 y.o. female who presents with complaints of groin pain that is worse when she is stressed.  States she sometimes has a bump with a rash/hives when she gets really stressed.      Review of Systems: The following ROS was otherwise negative, except as noted in the HPI:  constitutional, HEENT, respiratory, cardiovascular, gastrointestinal, genitourinary, skin, musculoskeletal, neurological, psych    Gynecologic History:   denies history of abnormal pap smears  denies history of of STIs  Menstrual history:       Obstetrical History:  OB History          4    Para   4    Term                AB        Living             SAB        IAB        Ectopic        Multiple        Live Births                     Past Medical History:   Past Medical History:   Diagnosis Date    Anxiety state     Atherosclerosis of native artery of extremity with intermittent claudication 2019    bilateral legs    Bilateral leg pain     BMI 50.0-59.9, adult 2021    Cellulitis 2020    Chronic pain syndrome     Chronic peripheral venous hypertension 2019    COPD (chronic obstructive pulmonary disease)     Diabetes     Diabetes mellitus, type 2     DVT (deep venous thrombosis) 2020    Embolism and thrombosis of superficial veins of unspecified lower extremity 2019    bilateral    Frequent headaches 2018    GERD (gastroesophageal reflux disease)     Hereditary lymphedema     Hx of thyroid cancer     Hyperlipidemia     Hypertension     Hypothyroidism     Migraines     Migraines     Morbid obesity      Nicotine dependence     Non-pressure chronic ulcer of left lower leg 03/09/2021    Osteoarthritis     Other skin changes 03/09/2021    bilateral gaiter regions    Pain in left leg     Pain in right leg     PVD (peripheral vascular disease) 01/08/2019    Seizure disorder     Sleep apnea     Venous insufficiency (chronic) (peripheral)     Venous stasis     Vitamin D deficiency        Medications:  Medication List with Changes/Refills   Current Medications    ACCU-CHEK GUIDE GLUCOSE METER MISC        ACCU-CHEK GUIDE TEST STRIPS STRP        ACCU-CHEK SOFTCLIX LANCETS MISC        ALBUTEROL (PROVENTIL/VENTOLIN HFA) 90 MCG/ACTUATION INHALER    Inhale 2 puffs into the lungs 4 (four) times daily. Rescue    ALLOPURINOL (ZYLOPRIM) 300 MG TABLET    Take 1 tablet (300 mg total) by mouth once daily.    AMITRIPTYLINE (ELAVIL) 25 MG TABLET    Take 1 tablet (25 mg total) by mouth nightly as needed for Insomnia.    APIXABAN (ELIQUIS) 5 MG TAB    Take 1 tablet (5 mg total) by mouth 2 (two) times daily.    ASPIRIN (ECOTRIN) 81 MG EC TABLET    Take 1 tablet (81 mg total) by mouth once daily.    CALCIUM CARBONATE (OS-MICHAELA) 500 MG CALCIUM (1,250 MG) TABLET    Take 1 tablet (500 mg total) by mouth 2 (two) times daily.    CARVEDILOL (COREG) 12.5 MG TABLET    Take 0.5 tablets (6.25 mg total) by mouth 2 (two) times daily.    CHOLECALCIFEROL, VITAMIN D3, 125 MCG (5,000 UNIT) CAPSULE    Take 1 capsule (5,000 Units total) by mouth 2 (two) times a day.    CILOSTAZOL (PLETAL) 100 MG TAB    Take 1 tablet (100 mg total) by mouth 2 (two) times daily.    CIPROFLOXACIN HCL (CIPRO) 500 MG TABLET    Take 1.5 tablets (750 mg total) by mouth every 12 (twelve) hours.    COLCHICINE (COLCRYS) 0.6 MG TABLET    One pill three times a day for two days,then one pill daily till out    DOCUSATE SODIUM (COLACE) 100 MG CAPSULE    Take 1 capsule (100 mg total) by mouth 2 (two) times daily.    HYDROCODONE-ACETAMINOPHEN (NORCO)  MG PER TABLET    Take 1 tablet by  "mouth every 6 (six) hours as needed for Pain.    INSULIN DETEMIR U-100 (LEVEMIR FLEXTOUCH U-100 INSULN) 100 UNIT/ML (3 ML) INPN PEN    Inject 10 Units into the skin every evening. 15 ml with 1 refill    LEVOTHYROXINE (SYNTHROID) 150 MCG TABLET    Take 1 tablet (150 mcg total) by mouth before breakfast.    LORATADINE (CLARITIN) 10 MG TABLET    Take 1 tablet (10 mg total) by mouth once daily.    LOSARTAN-HYDROCHLOROTHIAZIDE 50-12.5 MG (HYZAAR) 50-12.5 MG PER TABLET    Take 1 tablet by mouth once daily.    METOLAZONE (ZAROXOLYN) 2.5 MG TABLET    Take 1 tablet (2.5 mg total) by mouth once daily.    MUPIROCIN (BACTROBAN) 2 % OINTMENT    Apply topically 2 (two) times daily.    NALOXONE (NARCAN) 4 MG/ACTUATION SPRY    1 spray once.    NIFEDIPINE (PROCARDIA-XL) 60 MG (OSM) 24 HR TABLET    Take 1 tablet (60 mg total) by mouth once daily.    NOVOFINE 32 32 GAUGE X 1/4" NDLE    Use as directed once daily.    PANTOPRAZOLE (PROTONIX) 40 MG TABLET    Take 1 tablet (40 mg total) by mouth once daily.    PHENTERMINE (ADIPEX-P) 37.5 MG TABLET    Take 1 tablet (37.5 mg total) by mouth before breakfast.    POLYETHYLENE GLYCOL (GLYCOLAX) 17 GRAM PWPK    Take 17 g by mouth once daily.    POTASSIUM CHLORIDE SA (K-DUR,KLOR-CON) 20 MEQ TABLET    Take 1 tablet (20 mEq total) by mouth once daily.    QUETIAPINE (SEROQUEL) 25 MG TAB    Take 1 tablet (25 mg total) by mouth once daily.    SPIRONOLACTONE (ALDACTONE) 50 MG TABLET    Take 1 tablet (50 mg total) by mouth once daily.    SYMBICORT 160-4.5 MCG/ACTUATION HFAA    Inhale into the lungs.    TIOTROPIUM (SPIRIVA) 18 MCG INHALATION CAPSULE    Inhale 1 capsule (18 mcg total) into the lungs once daily. Controller    TOPIRAMATE (TOPAMAX) 100 MG TABLET    Take 1.5 tablets (150 mg total) by mouth 2 (two) times daily.    TRIAMCINOLONE ACETONIDE 0.1% (KENALOG) 0.1 % CREAM    Apply topically 3 (three) times daily.       Allergies:  Ammonium peroxydisulfate; Avocado (laurus persea); Bananas [banana]; " Chocolate flavor; Fentanyl; Percocet [oxycodone-acetaminophen]; Silvadene [silver sulfadiazine]; Clindamycin; Corticosteroids (glucocorticoids); Hydrocortisone; Lasix [furosemide]; Nutritional supplement-fiber; Pregabalin; Shellfish containing products; Adhesive; Iodine and iodide containing products; Latex, natural rubber; Pcn [penicillins]; and Sulfa (sulfonamide antibiotics)    Surgical History:  Past Surgical History:   Procedure Laterality Date    HYSTERECTOMY      ILIAC ARTERY STENT Bilateral 01/28/2020    Bilateral distal aorta and common iliac 8 X 59 vbx covered stents performed by Dr. Antonio Jacinto.    RADIOFREQUENCY ABLATION Left 04/15/2022    Procedure: Left calf  Radiofrequency Ablation;  Surgeon: Matty Falcon DO;  Location: [facility];  Service: Vascular;  Laterality: Left;    STAB PHLEBECTOMY OF VARICOSE VEINS Left 01/25/2013    Left leg microphlebectomies x 23 stab avulsions and ligation of multiple varicose veins perrformed by Dr. Cirilo Aguirre.    THYROIDECTOMY      hx: thyroid cancer    TOE AMPUTATION Left 01/28/2020    2nd, 4th and 5th performed by Dr. Anam Saeed.    TOE AMPUTATION Right 01/28/2020    4th toe performed by Dr. Anam Saeed.    TOTAL ABDOMINAL HYSTERECTOMY W/ BILATERAL SALPINGOOPHORECTOMY      TUBAL LIGATION      VAGINAL DELIVERY      x 4    VENOUS ABLATION Right 08/09/2019    GSV Varithena Ablation performed by Dr. Estuardo Falcon    VENOUS ABLATION Left 08/02/2019    ATAV Varithena Ablation performed by Dr. Estuardo Falcon.    VENOUS ABLATION Right 07/13/2015    ATAV Laser Ablatio performed by Dr. Cirilo Aguirre.    VENOUS ABLATION Right 07/06/2015    Distal  GSV Laser Ablation performed by dr. Cirilo Aguirre.    VENOUS ABLATION Left 04/20/2015    Left Distal GSV Laser Ablation performed by dr. Cirilo Aguirre.    VENOUS ABLATION Right 10/28/2013    Right Distal GSV RF Ablation performed by Dr. Cirilo Aguirre.    VENOUS ABLATION Left 10/25/2013    SSV RF Ablation performed by dr. Cirilo Aguirre.    VENOUS  "ABLATION Left 10/07/2013    Left GSV and Left ATAV RF Ablation performed by Dr. Cirilo Aguirre.    VENOUS ABLATION Right 01/21/2013    GSV RF Ablation w/micros x 22 performed by Dr. Cirilo Aguirre.    VENOUS ABLATION Left 06/25/2021    Left distal GSV Varithena Ablation       Family History:  Family History   Problem Relation Age of Onset    Heart disease Mother         age 84 CHF    Hypertension Mother     Osteoarthritis Mother     Coronary aneurysm Father     Coronary artery disease Father     Hypertension Father     Heart disease Father     No Known Problems Son     No Known Problems Son     No Known Problems Son     No Known Problems Son     No Known Problems Sister     No Known Problems Brother         hx: varicose veins    No Known Problems Brother         MVA: parlazed    No Known Problems Brother        Social History:  Social History     Substance and Sexual Activity   Alcohol Use Never     Social History     Substance and Sexual Activity   Drug Use Never     Social History     Tobacco Use   Smoking Status Every Day    Packs/day: 0.50    Years: 33.00    Pack years: 16.50    Types: Cigarettes   Smokeless Tobacco Never       Physical Exam:  BP (!) 138/90 (BP Location: Left arm, Patient Position: Sitting)   Resp 18   Ht 5' 8" (1.727 m)   Wt (!) 156.8 kg (345 lb 9.6 oz)   BMI 52.55 kg/m²     General: Alert, well appearing, no acute distress  Head: Normocephalic, atraumatic  Lungs: Unlabored respirations  Abdomen: Soft, nontender, nondistended   Pelvic:   External: normal female genitalia, no masses or lesions   Extremities: No redness or tenderness  Skin: Well perfused, normal coloration and turgor, no lesions or rashes visualized  Neuro: Alert, oriented, normal speech, no focal deficits, moves extremities appropriately  Osteopathic: No TART changes    Labs:  No visits with results within 1 Day(s) from this visit.   Latest known visit with results is:   Office Visit on 12/08/2022   Component Date Value    POC " Amphetamines 12/08/2022 Negative     POC Barbiturates 12/08/2022 Negative     POC Benzodiazepines 12/08/2022 Negative     POC Cocaine 12/08/2022 Negative     POC THC 12/08/2022 Negative     POC Methadone 12/08/2022 Negative     POC Methamphetamine 12/08/2022 Negative     POC Opiates 12/08/2022 Presumptive Positive (A)     POC Oxycodone 12/08/2022 Negative     POC Phencyclidine 12/08/2022 Negative     POC Methylenedioxymetham* 12/08/2022 Negative     POC Tricyclic Antidepres* 12/08/2022 Negative     POC Buprenorphine 12/08/2022 Negative      Acceptab* 12/08/2022 Yes     POC Temperature (Urine) 12/08/2022 90       Plan:    Follow-up when having a flare up.

## 2023-02-08 ENCOUNTER — OFFICE VISIT (OUTPATIENT)
Dept: PAIN MEDICINE | Facility: CLINIC | Age: 63
End: 2023-02-08
Payer: MEDICARE

## 2023-02-08 VITALS
BODY MASS INDEX: 44.41 KG/M2 | HEIGHT: 68 IN | SYSTOLIC BLOOD PRESSURE: 167 MMHG | WEIGHT: 293 LBS | HEART RATE: 87 BPM | DIASTOLIC BLOOD PRESSURE: 70 MMHG | RESPIRATION RATE: 18 BRPM

## 2023-02-08 DIAGNOSIS — G89.29 CHRONIC PAIN OF RIGHT KNEE: Chronic | ICD-10-CM

## 2023-02-08 DIAGNOSIS — M79.672 FOOT PAIN, LEFT: Chronic | ICD-10-CM

## 2023-02-08 DIAGNOSIS — M47.817 LUMBOSACRAL SPONDYLOSIS WITHOUT MYELOPATHY: Primary | Chronic | ICD-10-CM

## 2023-02-08 DIAGNOSIS — M25.561 CHRONIC PAIN OF RIGHT KNEE: Chronic | ICD-10-CM

## 2023-02-08 DIAGNOSIS — I73.9 PVD (PERIPHERAL VASCULAR DISEASE): Chronic | ICD-10-CM

## 2023-02-08 PROCEDURE — 99215 OFFICE O/P EST HI 40 MIN: CPT | Mod: PBBFAC | Performed by: PHYSICIAN ASSISTANT

## 2023-02-08 PROCEDURE — 3078F DIAST BP <80 MM HG: CPT | Mod: CPTII,,, | Performed by: PHYSICIAN ASSISTANT

## 2023-02-08 PROCEDURE — 96372 THER/PROPH/DIAG INJ SC/IM: CPT | Mod: PBBFAC | Performed by: PHYSICIAN ASSISTANT

## 2023-02-08 PROCEDURE — 99214 PR OFFICE/OUTPT VISIT, EST, LEVL IV, 30-39 MIN: ICD-10-PCS | Mod: S$PBB,25,, | Performed by: PHYSICIAN ASSISTANT

## 2023-02-08 PROCEDURE — 3077F SYST BP >= 140 MM HG: CPT | Mod: CPTII,,, | Performed by: PHYSICIAN ASSISTANT

## 2023-02-08 PROCEDURE — 3078F PR MOST RECENT DIASTOLIC BLOOD PRESSURE < 80 MM HG: ICD-10-PCS | Mod: CPTII,,, | Performed by: PHYSICIAN ASSISTANT

## 2023-02-08 PROCEDURE — 99214 OFFICE O/P EST MOD 30 MIN: CPT | Mod: S$PBB,25,, | Performed by: PHYSICIAN ASSISTANT

## 2023-02-08 PROCEDURE — 1159F PR MEDICATION LIST DOCUMENTED IN MEDICAL RECORD: ICD-10-PCS | Mod: CPTII,,, | Performed by: PHYSICIAN ASSISTANT

## 2023-02-08 PROCEDURE — 3077F PR MOST RECENT SYSTOLIC BLOOD PRESSURE >= 140 MM HG: ICD-10-PCS | Mod: CPTII,,, | Performed by: PHYSICIAN ASSISTANT

## 2023-02-08 PROCEDURE — 1159F MED LIST DOCD IN RCRD: CPT | Mod: CPTII,,, | Performed by: PHYSICIAN ASSISTANT

## 2023-02-08 PROCEDURE — 3008F BODY MASS INDEX DOCD: CPT | Mod: CPTII,,, | Performed by: PHYSICIAN ASSISTANT

## 2023-02-08 PROCEDURE — 3008F PR BODY MASS INDEX (BMI) DOCUMENTED: ICD-10-PCS | Mod: CPTII,,, | Performed by: PHYSICIAN ASSISTANT

## 2023-02-08 RX ORDER — HYDROCODONE BITARTRATE AND ACETAMINOPHEN 10; 325 MG/1; MG/1
1 TABLET ORAL EVERY 6 HOURS PRN
Qty: 120 TABLET | Refills: 0 | Status: SHIPPED | OUTPATIENT
Start: 2023-03-10 | End: 2023-04-05 | Stop reason: SDUPTHER

## 2023-02-08 RX ORDER — HYDROCODONE BITARTRATE AND ACETAMINOPHEN 10; 325 MG/1; MG/1
1 TABLET ORAL EVERY 6 HOURS PRN
Qty: 120 TABLET | Refills: 0 | Status: SHIPPED | OUTPATIENT
Start: 2023-02-08 | End: 2023-04-05 | Stop reason: SDUPTHER

## 2023-02-08 RX ORDER — KETOROLAC TROMETHAMINE 30 MG/ML
60 INJECTION, SOLUTION INTRAMUSCULAR; INTRAVENOUS
Status: COMPLETED | OUTPATIENT
Start: 2023-02-08 | End: 2023-02-08

## 2023-02-08 RX ADMIN — KETOROLAC TROMETHAMINE 60 MG: 60 INJECTION, SOLUTION INTRAMUSCULAR at 01:02

## 2023-02-08 NOTE — PROGRESS NOTES
Subjective:         Patient ID: Holly Porter is a 62 y.o. female.    Chief Complaint: Groin Pain and Knee Pain            Pain  This is a chronic problem. The current episode started more than 1 year ago. The problem occurs daily. The problem has been unchanged. Associated symptoms include arthralgias. Pertinent negatives include no chest pain, chills, coughing, diaphoresis, fever, sore throat, vertigo or vomiting.   Review of Systems   Constitutional:  Negative for activity change, chills, diaphoresis, fever and unexpected weight change.   HENT:  Negative for drooling, ear discharge, ear pain, facial swelling, nosebleeds, sore throat, trouble swallowing, voice change and goiter.    Eyes:  Negative for photophobia, pain, discharge, redness and visual disturbance.   Respiratory:  Negative for apnea, cough, choking, chest tightness, shortness of breath, wheezing and stridor.    Cardiovascular:  Negative for chest pain, palpitations and leg swelling.   Gastrointestinal:  Negative for abdominal distention, diarrhea, rectal pain, vomiting and fecal incontinence.   Endocrine: Negative for cold intolerance, heat intolerance, polydipsia, polyphagia and polyuria.   Genitourinary:  Negative for bladder incontinence, dysuria, flank pain, frequency and hot flashes.   Musculoskeletal:  Positive for arthralgias, back pain, leg pain and neck stiffness.   Integumentary:  Negative for color change and pallor.   Allergic/Immunologic: Negative for immunocompromised state.   Neurological:  Negative for dizziness, vertigo, seizures, syncope, facial asymmetry, speech difficulty, light-headedness, coordination difficulties, memory loss and coordination difficulties.   Hematological:  Negative for adenopathy. Does not bruise/bleed easily.   Psychiatric/Behavioral:  Negative for agitation, behavioral problems, confusion, decreased concentration, dysphoric mood, hallucinations, self-injury and suicidal ideas. The patient is not  nervous/anxious and is not hyperactive.          Past Medical History:   Diagnosis Date    Anxiety state     Atherosclerosis of native artery of extremity with intermittent claudication 01/30/2019    bilateral legs    Bilateral leg pain     BMI 50.0-59.9, adult 02/22/2021    Cellulitis 06/11/2020    Chronic pain syndrome     Chronic peripheral venous hypertension 01/08/2019    COPD (chronic obstructive pulmonary disease)     Diabetes     Diabetes mellitus, type 2     DVT (deep venous thrombosis) 02/12/2020    Embolism and thrombosis of superficial veins of unspecified lower extremity 07/01/2019    bilateral    Frequent headaches 09/20/2018    GERD (gastroesophageal reflux disease)     Hereditary lymphedema     Hx of thyroid cancer     Hyperlipidemia     Hypertension     Hypothyroidism     Migraines     Migraines     Morbid obesity     Nicotine dependence     Non-pressure chronic ulcer of left lower leg 03/09/2021    Osteoarthritis     Other skin changes 03/09/2021    bilateral gaiter regions    Pain in left leg     Pain in right leg     PVD (peripheral vascular disease) 01/08/2019    Seizure disorder     Sleep apnea     Venous insufficiency (chronic) (peripheral)     Venous stasis     Vitamin D deficiency      Past Surgical History:   Procedure Laterality Date    HYSTERECTOMY      ILIAC ARTERY STENT Bilateral 01/28/2020    Bilateral distal aorta and common iliac 8 X 59 vbx covered stents performed by Dr. Antonio Jacinto.    RADIOFREQUENCY ABLATION Left 04/15/2022    Procedure: Left calf  Radiofrequency Ablation;  Surgeon: Matty Falcon DO;  Location: [facility], [place];  Service: Vascular;  Laterality: Left;    STAB PHLEBECTOMY OF VARICOSE VEINS Left 01/25/2013    Left leg microphlebectomies x 23 stab avulsions and ligation of multiple varicose veins perrformed by Dr. Cirilo Aguirre.    THYROIDECTOMY      hx: thyroid cancer    TOE AMPUTATION Left 01/28/2020    2nd, 4th and 5th performed by Dr. Anam Saeed.    TOE  AMPUTATION Right 01/28/2020    4th toe performed by Dr. Anam Saeed.    TOTAL ABDOMINAL HYSTERECTOMY W/ BILATERAL SALPINGOOPHORECTOMY      TUBAL LIGATION      VAGINAL DELIVERY      x 4    VENOUS ABLATION Right 08/09/2019    GSV Varithena Ablation performed by Dr. Estuardo Falcon    VENOUS ABLATION Left 08/02/2019    ATAV Varithena Ablation performed by Dr. Estuardo Falcon.    VENOUS ABLATION Right 07/13/2015    ATAV Laser Ablatio performed by Dr. Cirilo Aguirre.    VENOUS ABLATION Right 07/06/2015    Distal  GSV Laser Ablation performed by dr. Cirilo Aguirre.    VENOUS ABLATION Left 04/20/2015    Left Distal GSV Laser Ablation performed by dr. Cirilo Aguirre.    VENOUS ABLATION Right 10/28/2013    Right Distal GSV RF Ablation performed by Dr. Cirilo Aguirre.    VENOUS ABLATION Left 10/25/2013    SSV RF Ablation performed by dr. Cirilo Aguirre.    VENOUS ABLATION Left 10/07/2013    Left GSV and Left ATAV RF Ablation performed by Dr. Cirilo Aguirre.    VENOUS ABLATION Right 01/21/2013    GSV RF Ablation w/micros x 22 performed by Dr. Cirilo Aguirre.    VENOUS ABLATION Left 06/25/2021    Left distal GSV Varithena Ablation     Social History     Socioeconomic History    Marital status:    Tobacco Use    Smoking status: Every Day     Packs/day: 0.50     Years: 33.00     Pack years: 16.50     Types: Cigarettes    Smokeless tobacco: Never   Substance and Sexual Activity    Alcohol use: Never    Drug use: Never    Sexual activity: Not Currently     Social Determinants of Health     Financial Resource Strain: Low Risk     Difficulty of Paying Living Expenses: Not very hard   Food Insecurity: No Food Insecurity    Worried About Running Out of Food in the Last Year: Never true    Ran Out of Food in the Last Year: Never true   Transportation Needs: No Transportation Needs    Lack of Transportation (Medical): No    Lack of Transportation (Non-Medical): No   Physical Activity: Inactive    Days of Exercise per Week: 0 days    Minutes of Exercise per Session: 0 min    Stress: No Stress Concern Present    Feeling of Stress : Not at all   Social Connections: Socially Isolated    Frequency of Communication with Friends and Family: More than three times a week    Frequency of Social Gatherings with Friends and Family: More than three times a week    Attends [employer] Services: Never    Active Member of Clubs or Organizations: No    Attends Club or Organization Meetings: Never    Marital Status:    Housing Stability: Low Risk     Unable to Pay for Housing in the Last Year: No    Number of Places Lived in the Last Year: 2    Unstable Housing in the Last Year: No     Family History   Problem Relation Age of Onset    Heart disease Mother         age 84 CHF    Hypertension Mother     Osteoarthritis Mother     Coronary aneurysm Father     Coronary artery disease Father     Hypertension Father     Heart disease Father     No Known Problems Son     No Known Problems Son     No Known Problems Son     No Known Problems Son     No Known Problems Sister     No Known Problems Brother         hx: varicose veins    No Known Problems Brother         MVA: parlazed    No Known Problems Brother      Review of patient's allergies indicates:   Allergen Reactions    Ammonium peroxydisulfate Shortness Of Breath    Avocado (laurus persea) Anaphylaxis    Bananas [banana] Anaphylaxis and Swelling     CRAMPS,    Chocolate flavor Anaphylaxis     MOUTH SWELLING    Fentanyl Shortness Of Breath and Itching    Percocet [oxycodone-acetaminophen] Shortness Of Breath and Itching    Silvadene [silver sulfadiazine]     Clindamycin     Corticosteroids (glucocorticoids)     Hydrocortisone Blisters    Lasix [furosemide] Blisters     BURNS SKIN    Nutritional supplement-fiber Itching    Pregabalin     Shellfish containing products     Adhesive Rash and Blisters    Iodine and iodide containing products Rash    Latex, natural rubber Rash    Pcn [penicillins] Rash    Sulfa (sulfonamide antibiotics) Rash and Blisters  "       Objective:  Vitals:    02/08/23 1300   BP: (!) 167/70   Pulse: 87   Resp: 18   Weight: (!) 161.9 kg (357 lb)   Height: 5' 8" (1.727 m)   PainSc:   7         Physical Exam  Vitals and nursing note reviewed. Exam conducted with a chaperone present.   Constitutional:       General: She is awake. She is not in acute distress.     Appearance: Normal appearance. She is obese. She is not ill-appearing, toxic-appearing or diaphoretic.   HENT:      Head: Normocephalic and atraumatic.      Nose: Nose normal.      Mouth/Throat:      Mouth: Mucous membranes are moist.      Pharynx: Oropharynx is clear.   Eyes:      Conjunctiva/sclera: Conjunctivae normal.      Pupils: Pupils are equal, round, and reactive to light.   Cardiovascular:      Rate and Rhythm: Normal rate.   Pulmonary:      Effort: Pulmonary effort is normal. No respiratory distress.   Abdominal:      Palpations: Abdomen is soft.   Musculoskeletal:         General: Normal range of motion.      Cervical back: Normal range of motion and neck supple.   Skin:     General: Skin is warm and dry.   Neurological:      General: No focal deficit present.      Mental Status: She is alert and oriented to person, place, and time. Mental status is at baseline.      Cranial Nerves: No cranial nerve deficit (II-XII).   Psychiatric:         Mood and Affect: Mood normal.         Behavior: Behavior normal. Behavior is cooperative.         Thought Content: Thought content normal.         US Lower Extremity Veins Bilateral  Narrative: EXAMINATION:  US LOWER EXTREMITY VEINS BILATERAL    CLINICAL HISTORY:  Bilateral lower extremity edema, elevated d-dimer;    TECHNIQUE:  Duplex scan of the bilateral lower extremity veins using the B-mode/grayscale imaging and Doppler spectral analysis and color-flow    COMPARISON:  No previous similar    FINDINGS:  Major venous structures of the bilateral lower extremity demonstrate a normal course and caliber. There is no evidence of deep venous " thrombosis. No abnormal intrinsic echogenic lesions are demonstrated in the scanned blood vessels. The veins of the bilateral lower extremity demonstrate good compressibility with normal color flow study and spectral analysis.  Impression: Unremarkable duplex imaging of the bilateral lower extremity. No evidence of DVT.    The exam was technically difficult with limited visualization because of patient body habitus, per the technologist    Electronically signed by: Rene Santiago  Date:    12/04/2022  Time:    17:27  CTA Chest Non-Coronary (PE Studies)  Narrative: EXAMINATION:  CTA CHEST NON CORONARY (PE STUDIES)    CLINICAL HISTORY:  Pulmonary embolism (PE) suspected, positive D-dimer;.    COMPARISON:  No previous similar CT    TECHNIQUE:  Thin spiral CT sections were obtained through the chest during the dynamic IV administration of 100 ml of Isovue 370.  In addition to multiplanar reconstruction images, 3-D images were also generated, archived, and analyzed.    The CT examination was performed using one or more of the following dose reduction techniques: Automated exposure control, adjustment of the mA and kV according to patient's size, use of acute or iterative reconstruction techniques.    FINDINGS:  There is no gross central pulmonary embolus, but evaluation for pulmonary embolus is otherwise limited because of contrast timing factors.  There is no thoracic aorta aneurysm or dissection.  There is mild to moderate atherosclerotic calcification of the wall of the aortic arch.    There is a mildly enlarged right paratracheal lymph node measuring 15 mm short axis diameter.  There is mild to moderate coronary artery calcification involving the left anterior descending coronary artery.    There is no pleural or pericardial effusion.    There is no omar pneumonia.  There is a 5 mm pleural base nodule left upper lobe image 197 of the 5th series.  Lungs are otherwise clear aside from some dependent atelectasis.   Central airway is clear.    There is no definite acute process in the partially visualized upper abdomen.    There is scattered mild to moderate degenerative disc narrowing and spondylosis of the thoracic spine  Impression: No gross central pulmonary embolus.  Evaluation for pulmonary emboli is grossly limited otherwise because of contrast timing factors    No omar pneumonia    A 5 mm pleural base nodule left upper lobe of uncertain chronicity.  Recommend follow-up CT in 6-12 months to document stability over time    Electronically signed by: Rene Santiago  Date:    12/04/2022  Time:    17:04  X-Ray Chest AP Portable  Narrative: EXAMINATION:  XR CHEST AP PORTABLE    CLINICAL HISTORY:  shortness of breath;.    COMPARISON:  05/10/2022    TECHNIQUE:  Chest x-ray AP portable erect    FINDINGS:  There is cardiomegaly.  There is no mediastinal mass.  There is mild to moderate aortic arch calcification.  Pulmonary vasculature is upper normal.    Lungs are grossly clear for shallow breath.  There is no gross pleural effusion.    Osseous structures are unchanged.  There is moderate thoracic spondylosis  Impression: Cardiomegaly and borderline pulmonary venous hypertension appearance without overt pulmonary edema    Shallow breath    Electronically signed by: Rene Santiago  Date:    12/04/2022  Time:    15:55         Office Visit on 12/08/2022   Component Date Value Ref Range Status    POC Amphetamines 12/08/2022 Negative  Negative, Inconclusive Final    POC Barbiturates 12/08/2022 Negative  Negative, Inconclusive Final    POC Benzodiazepines 12/08/2022 Negative  Negative, Inconclusive Final    POC Cocaine 12/08/2022 Negative  Negative, Inconclusive Final    POC THC 12/08/2022 Negative  Negative, Inconclusive Final    POC Methadone 12/08/2022 Negative  Negative, Inconclusive Final    POC Methamphetamine 12/08/2022 Negative  Negative, Inconclusive Final    POC Opiates 12/08/2022 Presumptive Positive (A)  Negative,  Inconclusive Final    POC Oxycodone 12/08/2022 Negative  Negative, Inconclusive Final    POC Phencyclidine 12/08/2022 Negative  Negative, Inconclusive Final    POC Methylenedioxymethamphetamine * 12/08/2022 Negative  Negative, Inconclusive Final    POC Tricyclic Antidepressants 12/08/2022 Negative  Negative, Inconclusive Final    POC Buprenorphine 12/08/2022 Negative   Final     Acceptable 12/08/2022 Yes   Final    POC Temperature (Urine) 12/08/2022 90   Final   Admission on 12/04/2022, Discharged on 12/04/2022   Component Date Value Ref Range Status    D-Dimer 12/04/2022 0.68 (H)  0.00 - 0.47 µg/mL Final    ProBNP 12/04/2022 44  1 - 125 pg/mL Final    Troponin I High Sensitivity 12/04/2022 13.3  <=60.4 pg/mL Final    Magnesium 12/04/2022 2.3  1.7 - 2.3 mg/dL Final    POC Glucose 12/04/2022 101  70 - 105 mg/dL Final   Admission on 12/01/2022, Discharged on 12/04/2022   Component Date Value Ref Range Status    POC Glucose 12/01/2022 114 (H)  70 - 105 mg/dL Final    Sodium 12/01/2022 139  136 - 145 mmol/L Final    Potassium 12/01/2022 3.6  3.5 - 5.1 mmol/L Final    Chloride 12/01/2022 100  98 - 107 mmol/L Final    CO2 12/01/2022 30  21 - 32 mmol/L Final    Anion Gap 12/01/2022 13  7 - 16 mmol/L Final    Glucose 12/01/2022 98  74 - 106 mg/dL Final    BUN 12/01/2022 14  7 - 18 mg/dL Final    Creatinine 12/01/2022 1.18 (H)  0.55 - 1.02 mg/dL Final    BUN/Creatinine Ratio 12/01/2022 12  6 - 20 Final    Calcium 12/01/2022 9.6  8.5 - 10.1 mg/dL Final    Total Protein 12/01/2022 7.8  6.4 - 8.2 g/dL Final    Albumin 12/01/2022 3.8  3.5 - 5.0 g/dL Final    Globulin 12/01/2022 4.0  2.0 - 4.0 g/dL Final    A/G Ratio 12/01/2022 1.0   Final    Bilirubin, Total 12/01/2022 0.5  >0.0 - 1.2 mg/dL Final    Alk Phos 12/01/2022 102  50 - 130 U/L Final    ALT 12/01/2022 23  13 - 56 U/L Final    AST 12/01/2022 24  15 - 37 U/L Final    eGFR 12/01/2022 52 (L)  >=60 mL/min/1.73m² Final    Color, UA 12/01/2022 Yellow  Colorless,  Straw, Yellow, Light Yellow, Dark Yellow Final    Clarity, UA 12/01/2022 Clear  Clear Final    pH, UA 12/01/2022 5.0  5.0 to 8.0 pH Units Final    Leukocytes, UA 12/01/2022 Negative  Negative Final    Nitrites, UA 12/01/2022 Negative  Negative Final    Protein, UA 12/01/2022 10 (A)  Negative Final    Glucose, UA 12/01/2022 Normal  Normal mg/dL Final    Ketones, UA 12/01/2022 Negative  Negative mg/dL Final    Urobilinogen, UA 12/01/2022 2 (A)  0.2, 1.0, Normal mg/dL Final    Bilirubin, UA 12/01/2022 Negative  Negative Final    Blood, UA 12/01/2022 Negative  Negative Final    Specific Gravity, UA 12/01/2022 1.031 (H)  <=1.030 Final    Culture, Blood 12/01/2022 No Growth at 5 days   Final    Culture, Blood 12/01/2022 No Growth at 5 days   Final    PTT 12/01/2022 31.1  25.2 - 37.3 seconds Final    PT 12/01/2022 13.2  11.7 - 14.7 seconds Final    INR 12/01/2022 1.04  <=3.30 Final    WBC 12/01/2022 10.63  4.50 - 11.00 K/uL Final    RBC 12/01/2022 6.15 (H)  4.20 - 5.40 M/uL Final    Hemoglobin 12/01/2022 16.2 (H)  12.0 - 16.0 g/dL Final    Hematocrit 12/01/2022 53.1 (H)  38.0 - 47.0 % Final    MCV 12/01/2022 86.3  80.0 - 96.0 fL Final    MCH 12/01/2022 26.3 (L)  27.0 - 31.0 pg Final    MCHC 12/01/2022 30.5 (L)  32.0 - 36.0 g/dL Final    RDW 12/01/2022 16.0 (H)  11.5 - 14.5 % Final    Platelet Count 12/01/2022 308  150 - 400 K/uL Final    MPV 12/01/2022 12.2  9.4 - 12.4 fL Final    Neutrophils % 12/01/2022 68.6 (H)  53.0 - 65.0 % Final    Lymphocytes % 12/01/2022 21.7 (L)  27.0 - 41.0 % Final    Monocytes % 12/01/2022 5.9  2.0 - 6.0 % Final    Eosinophils % 12/01/2022 2.3  1.0 - 4.0 % Final    Basophils % 12/01/2022 0.8  0.0 - 1.0 % Final    Immature Granulocytes % 12/01/2022 0.7 (H)  0.0 - 0.4 % Final    nRBC, Auto 12/01/2022 0.0  <=0.0 % Final    Neutrophils, Abs 12/01/2022 7.30  1.80 - 7.70 K/uL Final    Lymphocytes, Absolute 12/01/2022 2.31  1.00 - 4.80 K/uL Final    Monocytes, Absolute 12/01/2022 0.63  0.00 - 0.80 K/uL  Final    Eosinophils, Absolute 12/01/2022 0.24  0.00 - 0.50 K/uL Final    Basophils, Absolute 12/01/2022 0.08  0.00 - 0.20 K/uL Final    Immature Granulocytes, Absolute 12/01/2022 0.07 (H)  0.00 - 0.04 K/uL Final    nRBC, Absolute 12/01/2022 0.00  <=0.00 x10e3/uL Final    Diff Type 12/01/2022 Manual   Final    Segmented Neutrophils, Man % 12/01/2022 69 (H)  50 - 62 % Final    Lymphocytes, Man % 12/01/2022 22 (L)  27 - 41 % Final    Monocytes, Man % 12/01/2022 6  2 - 6 % Final    Eosinophils, Man % 12/01/2022 3  1 - 4 % Final    Platelet Morphology 12/01/2022 Few Large Platelets (A)  Normal Final    Anisocytosis 12/01/2022 1+   Final    ESR Westergren 12/01/2022 19  0 - 30 mm/Hr Final    CRP 12/01/2022 3.70 (H)  0.00 - 0.80 mg/dL Final    Iron 12/01/2022 46 (L)  50 - 170 µg/dL Final    Iron Saturation 12/01/2022 12 (L)  14 - 50 % Final    TIBC 12/01/2022 376  250 - 450 µg/dL Final    Ferritin 12/01/2022 51  8 - 252 ng/mL Final    TSH 12/01/2022 2.000  0.358 - 3.740 uIU/mL Final    POC Glucose 12/01/2022 142 (H)  70 - 105 mg/dL Final    Sodium 12/02/2022 139  136 - 145 mmol/L Final    Potassium 12/02/2022 3.7  3.5 - 5.1 mmol/L Final    Chloride 12/02/2022 105  98 - 107 mmol/L Final    CO2 12/02/2022 28  21 - 32 mmol/L Final    Anion Gap 12/02/2022 10  7 - 16 mmol/L Final    Glucose 12/02/2022 105  74 - 106 mg/dL Final    BUN 12/02/2022 17  7 - 18 mg/dL Final    Creatinine 12/02/2022 1.09 (H)  0.55 - 1.02 mg/dL Final    BUN/Creatinine Ratio 12/02/2022 16  6 - 20 Final    Calcium 12/02/2022 9.4  8.5 - 10.1 mg/dL Final    eGFR 12/02/2022 57 (L)  >=60 mL/min/1.73m² Final    Magnesium 12/02/2022 1.9  1.7 - 2.3 mg/dL Final    Phosphorus 12/02/2022 4.7 (H)  2.5 - 4.5 mg/dL Final    WBC 12/02/2022 8.97  4.50 - 11.00 K/uL Final    RBC 12/02/2022 5.53 (H)  4.20 - 5.40 M/uL Final    Hemoglobin 12/02/2022 14.6  12.0 - 16.0 g/dL Final    Hematocrit 12/02/2022 49.4 (H)  38.0 - 47.0 % Final    MCV 12/02/2022 89.3  80.0 - 96.0 fL  Final    MCH 12/02/2022 26.4 (L)  27.0 - 31.0 pg Final    MCHC 12/02/2022 29.6 (L)  32.0 - 36.0 g/dL Final    RDW 12/02/2022 15.7 (H)  11.5 - 14.5 % Final    Platelet Count 12/02/2022 247  150 - 400 K/uL Final    MPV 12/02/2022 12.4  9.4 - 12.4 fL Final    Neutrophils % 12/02/2022 60.1  53.0 - 65.0 % Final    Lymphocytes % 12/02/2022 27.1  27.0 - 41.0 % Final    Monocytes % 12/02/2022 7.8 (H)  2.0 - 6.0 % Final    Eosinophils % 12/02/2022 3.9  1.0 - 4.0 % Final    Basophils % 12/02/2022 0.8  0.0 - 1.0 % Final    Immature Granulocytes % 12/02/2022 0.3  0.0 - 0.4 % Final    nRBC, Auto 12/02/2022 0.0  <=0.0 % Final    Neutrophils, Abs 12/02/2022 5.39  1.80 - 7.70 K/uL Final    Lymphocytes, Absolute 12/02/2022 2.43  1.00 - 4.80 K/uL Final    Monocytes, Absolute 12/02/2022 0.70  0.00 - 0.80 K/uL Final    Eosinophils, Absolute 12/02/2022 0.35  0.00 - 0.50 K/uL Final    Basophils, Absolute 12/02/2022 0.07  0.00 - 0.20 K/uL Final    Immature Granulocytes, Absolute 12/02/2022 0.03  0.00 - 0.04 K/uL Final    nRBC, Absolute 12/02/2022 0.00  <=0.00 x10e3/uL Final    Diff Type 12/02/2022 Auto   Final    POC Glucose 12/02/2022 112 (H)  70 - 105 mg/dL Final    POC Glucose 12/02/2022 132 (H)  70 - 105 mg/dL Final    POC Glucose 12/02/2022 139 (H)  70 - 105 mg/dL Final    POC Glucose 12/02/2022 142 (H)  70 - 105 mg/dL Final    Sodium 12/03/2022 138  136 - 145 mmol/L Final    Potassium 12/03/2022 3.7  3.5 - 5.1 mmol/L Final    Chloride 12/03/2022 100  98 - 107 mmol/L Final    CO2 12/03/2022 32  21 - 32 mmol/L Final    Anion Gap 12/03/2022 10  7 - 16 mmol/L Final    Glucose 12/03/2022 123 (H)  74 - 106 mg/dL Final    BUN 12/03/2022 15  7 - 18 mg/dL Final    Creatinine 12/03/2022 1.07 (H)  0.55 - 1.02 mg/dL Final    BUN/Creatinine Ratio 12/03/2022 14  6 - 20 Final    Calcium 12/03/2022 9.8  8.5 - 10.1 mg/dL Final    eGFR 12/03/2022 58 (L)  >=60 mL/min/1.73m² Final    Magnesium 12/03/2022 1.9  1.7 - 2.3 mg/dL Final    Phosphorus  12/03/2022 5.1 (H)  2.5 - 4.5 mg/dL Final    WBC 12/03/2022 9.65  4.50 - 11.00 K/uL Final    RBC 12/03/2022 5.45 (H)  4.20 - 5.40 M/uL Final    Hemoglobin 12/03/2022 14.5  12.0 - 16.0 g/dL Final    Hematocrit 12/03/2022 47.5 (H)  38.0 - 47.0 % Final    MCV 12/03/2022 87.2  80.0 - 96.0 fL Final    MCH 12/03/2022 26.6 (L)  27.0 - 31.0 pg Final    MCHC 12/03/2022 30.5 (L)  32.0 - 36.0 g/dL Final    RDW 12/03/2022 15.6 (H)  11.5 - 14.5 % Final    Platelet Count 12/03/2022 265  150 - 400 K/uL Final    MPV 12/03/2022 12.6 (H)  9.4 - 12.4 fL Final    Neutrophils % 12/03/2022 61.1  53.0 - 65.0 % Final    Lymphocytes % 12/03/2022 22.3 (L)  27.0 - 41.0 % Final    Monocytes % 12/03/2022 8.1 (H)  2.0 - 6.0 % Final    Eosinophils % 12/03/2022 7.5 (H)  1.0 - 4.0 % Final    Basophils % 12/03/2022 0.6  0.0 - 1.0 % Final    Immature Granulocytes % 12/03/2022 0.4  0.0 - 0.4 % Final    nRBC, Auto 12/03/2022 0.0  <=0.0 % Final    Neutrophils, Abs 12/03/2022 5.90  1.80 - 7.70 K/uL Final    Lymphocytes, Absolute 12/03/2022 2.15  1.00 - 4.80 K/uL Final    Monocytes, Absolute 12/03/2022 0.78  0.00 - 0.80 K/uL Final    Eosinophils, Absolute 12/03/2022 0.72 (H)  0.00 - 0.50 K/uL Final    Basophils, Absolute 12/03/2022 0.06  0.00 - 0.20 K/uL Final    Immature Granulocytes, Absolute 12/03/2022 0.04  0.00 - 0.04 K/uL Final    nRBC, Absolute 12/03/2022 0.00  <=0.00 x10e3/uL Final    Diff Type 12/03/2022 Auto   Final    POC Glucose 12/03/2022 147 (H)  70 - 105 mg/dL Final    POC Glucose 12/03/2022 134 (H)  70 - 105 mg/dL Final    POC Glucose 12/03/2022 105  70 - 105 mg/dL Final    POC Glucose 12/03/2022 94  70 - 105 mg/dL Final    Sodium 12/04/2022 137  136 - 145 mmol/L Final    Potassium 12/04/2022 3.4 (L)  3.5 - 5.1 mmol/L Final    Chloride 12/04/2022 99  98 - 107 mmol/L Final    CO2 12/04/2022 32  21 - 32 mmol/L Final    Anion Gap 12/04/2022 9  7 - 16 mmol/L Final    Glucose 12/04/2022 111 (H)  74 - 106 mg/dL Final    BUN 12/04/2022 14  7 - 18  mg/dL Final    Creatinine 12/04/2022 1.00  0.55 - 1.02 mg/dL Final    BUN/Creatinine Ratio 12/04/2022 14  6 - 20 Final    Calcium 12/04/2022 9.8  8.5 - 10.1 mg/dL Final    eGFR 12/04/2022 63  >=60 mL/min/1.73m² Final    Magnesium 12/04/2022 2.0  1.7 - 2.3 mg/dL Final    Phosphorus 12/04/2022 4.4  2.5 - 4.5 mg/dL Final    WBC 12/04/2022 8.49  4.50 - 11.00 K/uL Final    RBC 12/04/2022 5.51 (H)  4.20 - 5.40 M/uL Final    Hemoglobin 12/04/2022 14.5  12.0 - 16.0 g/dL Final    Hematocrit 12/04/2022 47.2 (H)  38.0 - 47.0 % Final    MCV 12/04/2022 85.7  80.0 - 96.0 fL Final    MCH 12/04/2022 26.3 (L)  27.0 - 31.0 pg Final    MCHC 12/04/2022 30.7 (L)  32.0 - 36.0 g/dL Final    RDW 12/04/2022 15.3 (H)  11.5 - 14.5 % Final    Platelet Count 12/04/2022 265  150 - 400 K/uL Final    MPV 12/04/2022 12.1  9.4 - 12.4 fL Final    Neutrophils % 12/04/2022 60.2  53.0 - 65.0 % Final    Lymphocytes % 12/04/2022 20.5 (L)  27.0 - 41.0 % Final    Monocytes % 12/04/2022 6.1 (H)  2.0 - 6.0 % Final    Eosinophils % 12/04/2022 12.0 (H)  1.0 - 4.0 % Final    Basophils % 12/04/2022 0.6  0.0 - 1.0 % Final    Immature Granulocytes % 12/04/2022 0.6 (H)  0.0 - 0.4 % Final    nRBC, Auto 12/04/2022 0.0  <=0.0 % Final    Neutrophils, Abs 12/04/2022 5.11  1.80 - 7.70 K/uL Final    Lymphocytes, Absolute 12/04/2022 1.74  1.00 - 4.80 K/uL Final    Monocytes, Absolute 12/04/2022 0.52  0.00 - 0.80 K/uL Final    Eosinophils, Absolute 12/04/2022 1.02 (H)  0.00 - 0.50 K/uL Final    Basophils, Absolute 12/04/2022 0.05  0.00 - 0.20 K/uL Final    Immature Granulocytes, Absolute 12/04/2022 0.05 (H)  0.00 - 0.04 K/uL Final    nRBC, Absolute 12/04/2022 0.00  <=0.00 x10e3/uL Final    Diff Type 12/04/2022 Auto   Final   Office Visit on 11/08/2022   Component Date Value Ref Range Status    ESR Westergren 11/08/2022 12  0 - 30 mm/Hr Final    WBC 11/08/2022 10.04  4.50 - 11.00 K/uL Final    RBC 11/08/2022 5.79 (H)  4.20 - 5.40 M/uL Final    Hemoglobin 11/08/2022 15.5  12.0  - 16.0 g/dL Final    Hematocrit 11/08/2022 49.8 (H)  38.0 - 47.0 % Final    MCV 11/08/2022 86.0  80.0 - 96.0 fL Final    MCH 11/08/2022 26.8 (L)  27.0 - 31.0 pg Final    MCHC 11/08/2022 31.1 (L)  32.0 - 36.0 g/dL Final    RDW 11/08/2022 16.2 (H)  11.5 - 14.5 % Final    Platelet Count 11/08/2022 241  150 - 400 K/uL Final    MPV 11/08/2022 13.1 (H)  9.4 - 12.4 fL Final    Neutrophils % 11/08/2022 65.6 (H)  53.0 - 65.0 % Final    Lymphocytes % 11/08/2022 23.7 (L)  27.0 - 41.0 % Final    Monocytes % 11/08/2022 6.2 (H)  2.0 - 6.0 % Final    Eosinophils % 11/08/2022 3.3  1.0 - 4.0 % Final    Basophils % 11/08/2022 0.6  0.0 - 1.0 % Final    Immature Granulocytes % 11/08/2022 0.6 (H)  0.0 - 0.4 % Final    nRBC, Auto 11/08/2022 0.0  <=0.0 % Final    Neutrophils, Abs 11/08/2022 6.59  1.80 - 7.70 K/uL Final    Lymphocytes, Absolute 11/08/2022 2.38  1.00 - 4.80 K/uL Final    Monocytes, Absolute 11/08/2022 0.62  0.00 - 0.80 K/uL Final    Eosinophils, Absolute 11/08/2022 0.33  0.00 - 0.50 K/uL Final    Basophils, Absolute 11/08/2022 0.06  0.00 - 0.20 K/uL Final    Immature Granulocytes, Absolute 11/08/2022 0.06 (H)  0.00 - 0.04 K/uL Final    nRBC, Absolute 11/08/2022 0.00  <=0.00 x10e3/uL Final    Diff Type 11/08/2022 Scan Smear   Final    Platelet Morphology 11/08/2022 Large Platelets (A)  Normal Final    RBC Morphology 11/08/2022 Normal   Final   Office Visit on 10/04/2022   Component Date Value Ref Range Status    Potassium 11/08/2022 3.9  3.5 - 5.1 mmol/L Final    Hemoglobin A1C 10/04/2022 6.2  4.5 - 6.6 % Final    Estimated Average Glucose 10/04/2022 120  mg/dL Final   Office Visit on 10/04/2022   Component Date Value Ref Range Status    POC Amphetamines 10/04/2022 Presumptive Positive (A)  Negative, Inconclusive Final    POC Barbiturates 10/04/2022 Negative  Negative, Inconclusive Final    POC Benzodiazepines 10/04/2022 Negative  Negative, Inconclusive Final    POC Cocaine 10/04/2022 Negative  Negative, Inconclusive Final     POC THC 10/04/2022 Negative  Negative, Inconclusive Final    POC Methadone 10/04/2022 Negative  Negative, Inconclusive Final    POC Methamphetamine 10/04/2022 Negative  Negative, Inconclusive Final    POC Opiates 10/04/2022 Presumptive Positive (A)  Negative, Inconclusive Final    POC Oxycodone 10/04/2022 Negative  Negative, Inconclusive Final    POC Phencyclidine 10/04/2022 Negative  Negative, Inconclusive Final    POC Methylenedioxymethamphetamine * 10/04/2022 Negative  Negative, Inconclusive Final    POC Tricyclic Antidepressants 10/04/2022 Negative  Negative, Inconclusive Final    POC Buprenorphine 10/04/2022 Negative   Final     Acceptable 10/04/2022 Yes   Final    POC Temperature (Urine) 10/04/2022 92   Final    pH, UA 10/04/2022 5.5  5.0 to 8.0 pH Units Final    Creatinine, Urine 10/04/2022 225 (H)  28 - 219 mg/dL Final    6-Acetylmorphine 10/04/2022 Negative  10 ng/mL Final    7-Aminoclonazepam 10/04/2022 Negative  Negative 25 ng/mL Final    a-Hydroxyalprazolam 10/04/2022 Negative  Negative 25 ng/mL Final    Acetyl Fentanyl 10/04/2022 Negative  2.5 ng/mL Final    Acetyl Norfentanyl Oxalate 10/04/2022 Negative  5 ng/mL Final    Benzoylecgonine 10/04/2022 Negative  100 ng/mL Final    Buprenorphine 10/04/2022 Negative  25 ng/mL Final    Codeine 10/04/2022 Negative  25 ng/mL Final    EDDP 10/04/2022 Negative  25 ng/mL Final    Fentanyl 10/04/2022 Negative  2.5 ng/mL Final    Hydrocodone 10/04/2022 >250.0 (H)  <25.0 25 ng/mL Final    Hydromorphone 10/04/2022 >250.0 (H)  <25.0 25 ng/mL Final    Lorazepam 10/04/2022 Negative  25 ng/mL Final    Morphine 10/04/2022 Negative  25 ng/mL Final    Norbuprenorphine 10/04/2022 Negative  25 ng/mL Final    Nordiazepam 10/04/2022 Negative  25 ng/mL Final    Norfentanyl Oxalate 10/04/2022 Negative  5 ng/mL Final    Norhydrocodone 10/04/2022 >500.0 (H)  <50.0 50 ng/mL Final    Noroxycodone HCL 10/04/2022 Negative  50 ng/mL Final    Oxazepam 10/04/2022 Negative   25 ng/mL Final    Oxymorphone 10/04/2022 Negative  25 ng/mL Final    Tapentadol 10/04/2022 Negative  25 ng/mL Final    Temazepam 10/04/2022 Negative  25 ng/mL Final    THC-COOH 10/04/2022 Negative  25 ng/mL Final    Tramadol 10/04/2022 Negative  100 ng/mL Final    Amphetamine, Urine 10/04/2022 Negative  Negative Final    Methamphetamines, Urine 10/04/2022 Negative  Negative Final    Methadone, Urine 10/04/2022 Negative  Negative 25 ng/mL Final    Oxycodone, Urine 10/04/2022 Negative  Negative 25 ng/mL Final    Specific Gravity, UA 10/04/2022 1.023  <=1.030 Final   Admission on 09/09/2022, Discharged on 09/09/2022   Component Date Value Ref Range Status    Sodium 09/09/2022 135 (L)  136 - 145 mmol/L Final    Potassium 09/09/2022 3.1 (L)  3.5 - 5.1 mmol/L Final    Chloride 09/09/2022 101  98 - 107 mmol/L Final    CO2 09/09/2022 29  21 - 32 mmol/L Final    Anion Gap 09/09/2022 8  7 - 16 mmol/L Final    Glucose 09/09/2022 110 (H)  74 - 106 mg/dL Final    BUN 09/09/2022 19 (H)  7 - 18 mg/dL Final    Creatinine 09/09/2022 1.11 (H)  0.55 - 1.02 mg/dL Final    BUN/Creatinine Ratio 09/09/2022 17  6 - 20 Final    Calcium 09/09/2022 9.6  8.5 - 10.1 mg/dL Final    Total Protein 09/09/2022 7.5  6.4 - 8.2 g/dL Final    Albumin 09/09/2022 3.5  3.5 - 5.0 g/dL Final    Globulin 09/09/2022 4.0  2.0 - 4.0 g/dL Final    A/G Ratio 09/09/2022 0.9   Final    Bilirubin, Total 09/09/2022 0.4  >0.0 - 1.2 mg/dL Final    Alk Phos 09/09/2022 94  50 - 130 U/L Final    ALT 09/09/2022 14  13 - 56 U/L Final    AST 09/09/2022 19  15 - 37 U/L Final    eGFR 09/09/2022 56 (L)  >=60 mL/min/1.73m² Final    Lipase 09/09/2022 75  73 - 393 U/L Final    WBC 09/09/2022 11.19 (H)  4.50 - 11.00 K/uL Final    RBC 09/09/2022 5.70 (H)  4.20 - 5.40 M/uL Final    Hemoglobin 09/09/2022 15.2  12.0 - 16.0 g/dL Final    Hematocrit 09/09/2022 47.1 (H)  38.0 - 47.0 % Final    MCV 09/09/2022 82.6  80.0 - 96.0 fL Final    MCH 09/09/2022 26.7 (L)  27.0 - 31.0 pg Final     MCHC 09/09/2022 32.3  32.0 - 36.0 g/dL Final    RDW 09/09/2022 17.4 (H)  11.5 - 14.5 % Final    Platelet Count 09/09/2022 261  150 - 400 K/uL Final    MPV 09/09/2022 11.4  9.4 - 12.4 fL Final    Neutrophils % 09/09/2022 58.9  53.0 - 65.0 % Final    Lymphocytes % 09/09/2022 25.0 (L)  27.0 - 41.0 % Final    Monocytes % 09/09/2022 5.5  2.0 - 6.0 % Final    Eosinophils % 09/09/2022 9.3 (H)  1.0 - 4.0 % Final    Basophils % 09/09/2022 0.7  0.0 - 1.0 % Final    Immature Granulocytes % 09/09/2022 0.6 (H)  0.0 - 0.4 % Final    nRBC, Auto 09/09/2022 0.0  <=0.0 % Final    Neutrophils, Abs 09/09/2022 6.59  1.80 - 7.70 K/uL Final    Lymphocytes, Absolute 09/09/2022 2.80  1.00 - 4.80 K/uL Final    Monocytes, Absolute 09/09/2022 0.61  0.00 - 0.80 K/uL Final    Eosinophils, Absolute 09/09/2022 1.04 (H)  0.00 - 0.50 K/uL Final    Basophils, Absolute 09/09/2022 0.08  0.00 - 0.20 K/uL Final    Immature Granulocytes, Absolute 09/09/2022 0.07 (H)  0.00 - 0.04 K/uL Final    nRBC, Absolute 09/09/2022 0.00  <=0.00 x10e3/uL Final    Diff Type 09/09/2022 Auto   Final   Office Visit on 08/11/2022   Component Date Value Ref Range Status    Hepatitis C Ab 08/11/2022 Non-Reactive  Non-Reactive Final    Color, UA 08/11/2022 Light-Yellow  Colorless, Straw, Yellow, Light Yellow, Dark Yellow Final    Clarity, UA 08/11/2022 Clear  Clear Final    pH, UA 08/11/2022 5.0  5.0 to 8.0 pH Units Final    Leukocytes, UA 08/11/2022 Negative  Negative Final    Nitrites, UA 08/11/2022 Negative  Negative Final    Protein, UA 08/11/2022 Negative  Negative Final    Glucose, UA 08/11/2022 Normal  Normal mg/dL Final    Ketones, UA 08/11/2022 Negative  Negative mg/dL Final    Urobilinogen, UA 08/11/2022 Normal  0.2, 1.0, Normal mg/dL Final    Bilirubin, UA 08/11/2022 Negative  Negative Final    Blood, UA 08/11/2022 Negative  Negative Final    Specific Gravity, UA 08/11/2022 1.017  <=1.030 Final         No orders of the defined types were placed in this  encounter.          Requested Prescriptions     Pending Prescriptions Disp Refills    HYDROcodone-acetaminophen (NORCO)  mg per tablet 120 tablet 0     Sig: Take 1 tablet by mouth every 6 (six) hours as needed for Pain.    HYDROcodone-acetaminophen (NORCO)  mg per tablet 120 tablet 0     Sig: Take 1 tablet by mouth every 6 (six) hours as needed for Pain.       Assessment:     1. Lumbosacral spondylosis without myelopathy    2. Chronic pain of right knee    3. PVD (peripheral vascular disease)    4. Foot pain, left         A's of Opioid Risk Assessment  Activity:Patient can perform ADL.   Analgesia:Patients pain is partially controlled by current medication. Patient has tried OTC medications such as Tylenol and Ibuprofen with out relief.   Adverse Effects: Patient denies constipation or sedation.  Aberrant Behavior:  reviewed with no aberrant drug seeking/taking behavior.  Overdose reversal drug naloxone discussed    Drug screen reviewed      Plan:    Narcan December 2022    Wheelchair/4 point walker for mobility due to chronic nonhealing wounds right foot     Following wound management chronic ulcers lower extremities    Following orthopedics knee pain [facility], she states she was advised not to have surgery due to her weight    Continues to heal with nonhealing wounds lower extremities    Diabetic, limit steroids    Cannot tolerate OxyContin she is severely allergic    Requesting Toradol injection    Toradol 60 mg IM     Requesting resume physical therapy and [facility]     Prescription for physical therapy sent to [facility]    Continue home exercise program as tolerated    Continue activity as tolerated    Follow-up 2 months    Dr. Crandall, April 2023    Bring original prescription medication bottles/container/box with labels to each visit    Pill count    Physical therapy [facility] [place] February 2023    Massage therapy declines

## 2023-02-16 RX ORDER — COLCHICINE 0.6 MG/1
TABLET ORAL
Qty: 30 TABLET | Refills: 2 | Status: SHIPPED | OUTPATIENT
Start: 2023-02-16 | End: 2023-05-30 | Stop reason: SDUPTHER

## 2023-03-15 ENCOUNTER — OFFICE VISIT (OUTPATIENT)
Dept: FAMILY MEDICINE | Facility: CLINIC | Age: 63
End: 2023-03-15
Payer: MEDICARE

## 2023-03-15 VITALS
DIASTOLIC BLOOD PRESSURE: 78 MMHG | HEART RATE: 72 BPM | SYSTOLIC BLOOD PRESSURE: 132 MMHG | TEMPERATURE: 98 F | BODY MASS INDEX: 44.41 KG/M2 | WEIGHT: 293 LBS | OXYGEN SATURATION: 96 % | HEIGHT: 68 IN | RESPIRATION RATE: 18 BRPM

## 2023-03-15 DIAGNOSIS — L02.91 ABSCESS: ICD-10-CM

## 2023-03-15 DIAGNOSIS — F32.1 MODERATE MAJOR DEPRESSION: Primary | ICD-10-CM

## 2023-03-15 DIAGNOSIS — E11.9 TYPE 2 DIABETES MELLITUS WITHOUT COMPLICATION, WITH LONG-TERM CURRENT USE OF INSULIN: ICD-10-CM

## 2023-03-15 DIAGNOSIS — Z79.4 TYPE 2 DIABETES MELLITUS WITHOUT COMPLICATION, WITH LONG-TERM CURRENT USE OF INSULIN: ICD-10-CM

## 2023-03-15 LAB
ALBUMIN SERPL BCP-MCNC: 3.6 G/DL (ref 3.5–5)
ALBUMIN/GLOB SERPL: 0.8 {RATIO}
ALP SERPL-CCNC: 99 U/L (ref 50–130)
ALT SERPL W P-5'-P-CCNC: 15 U/L (ref 13–56)
ANION GAP SERPL CALCULATED.3IONS-SCNC: 7 MMOL/L (ref 7–16)
AST SERPL W P-5'-P-CCNC: 16 U/L (ref 15–37)
BASOPHILS # BLD AUTO: 0.04 K/UL (ref 0–0.2)
BASOPHILS NFR BLD AUTO: 0.5 % (ref 0–1)
BILIRUB SERPL-MCNC: 0.3 MG/DL (ref ?–1.2)
BUN SERPL-MCNC: 14 MG/DL (ref 7–18)
BUN/CREAT SERPL: 13 (ref 6–20)
CALCIUM SERPL-MCNC: 9.4 MG/DL (ref 8.5–10.1)
CHLORIDE SERPL-SCNC: 104 MMOL/L (ref 98–107)
CO2 SERPL-SCNC: 28 MMOL/L (ref 21–32)
CREAT SERPL-MCNC: 1.06 MG/DL (ref 0.55–1.02)
DIFFERENTIAL METHOD BLD: ABNORMAL
EGFR (NO RACE VARIABLE) (RUSH/TITUS): 59 ML/MIN/1.73M²
EOSINOPHIL # BLD AUTO: 0.51 K/UL (ref 0–0.5)
EOSINOPHIL NFR BLD AUTO: 5.9 % (ref 1–4)
ERYTHROCYTE [DISTWIDTH] IN BLOOD BY AUTOMATED COUNT: 17.1 % (ref 11.5–14.5)
EST. AVERAGE GLUCOSE BLD GHB EST-MCNC: 117 MG/DL
GLOBULIN SER-MCNC: 4.3 G/DL (ref 2–4)
GLUCOSE SERPL-MCNC: 97 MG/DL (ref 74–106)
HBA1C MFR BLD HPLC: 6.1 % (ref 4.5–6.6)
HCT VFR BLD AUTO: 51.1 % (ref 38–47)
HGB BLD-MCNC: 15.6 G/DL (ref 12–16)
IMM GRANULOCYTES # BLD AUTO: 0.03 K/UL (ref 0–0.04)
IMM GRANULOCYTES NFR BLD: 0.3 % (ref 0–0.4)
LYMPHOCYTES # BLD AUTO: 1.94 K/UL (ref 1–4.8)
LYMPHOCYTES NFR BLD AUTO: 22.4 % (ref 27–41)
MCH RBC QN AUTO: 25.2 PG (ref 27–31)
MCHC RBC AUTO-ENTMCNC: 30.5 G/DL (ref 32–36)
MCV RBC AUTO: 82.6 FL (ref 80–96)
MONOCYTES # BLD AUTO: 0.52 K/UL (ref 0–0.8)
MONOCYTES NFR BLD AUTO: 6 % (ref 2–6)
MPC BLD CALC-MCNC: 12.8 FL (ref 9.4–12.4)
NEUTROPHILS # BLD AUTO: 5.61 K/UL (ref 1.8–7.7)
NEUTROPHILS NFR BLD AUTO: 64.9 % (ref 53–65)
NRBC # BLD AUTO: 0 X10E3/UL
NRBC, AUTO (.00): 0 %
PLATELET # BLD AUTO: 244 K/UL (ref 150–400)
POTASSIUM SERPL-SCNC: 3.8 MMOL/L (ref 3.5–5.1)
PROT SERPL-MCNC: 7.9 G/DL (ref 6.4–8.2)
RBC # BLD AUTO: 6.19 M/UL (ref 4.2–5.4)
SODIUM SERPL-SCNC: 135 MMOL/L (ref 136–145)
WBC # BLD AUTO: 8.65 K/UL (ref 4.5–11)

## 2023-03-15 PROCEDURE — 80053 COMPREHENSIVE METABOLIC PANEL: ICD-10-PCS | Mod: ,,, | Performed by: CLINICAL MEDICAL LABORATORY

## 2023-03-15 PROCEDURE — 85025 COMPLETE CBC W/AUTO DIFF WBC: CPT | Mod: ,,, | Performed by: CLINICAL MEDICAL LABORATORY

## 2023-03-15 PROCEDURE — 3008F BODY MASS INDEX DOCD: CPT | Mod: ,,, | Performed by: NURSE PRACTITIONER

## 2023-03-15 PROCEDURE — 80053 COMPREHEN METABOLIC PANEL: CPT | Mod: ,,, | Performed by: CLINICAL MEDICAL LABORATORY

## 2023-03-15 PROCEDURE — 1160F RVW MEDS BY RX/DR IN RCRD: CPT | Mod: ,,, | Performed by: NURSE PRACTITIONER

## 2023-03-15 PROCEDURE — 3008F PR BODY MASS INDEX (BMI) DOCUMENTED: ICD-10-PCS | Mod: ,,, | Performed by: NURSE PRACTITIONER

## 2023-03-15 PROCEDURE — 1160F PR REVIEW ALL MEDS BY PRESCRIBER/CLIN PHARMACIST DOCUMENTED: ICD-10-PCS | Mod: ,,, | Performed by: NURSE PRACTITIONER

## 2023-03-15 PROCEDURE — 1159F MED LIST DOCD IN RCRD: CPT | Mod: ,,, | Performed by: NURSE PRACTITIONER

## 2023-03-15 PROCEDURE — 99214 PR OFFICE/OUTPT VISIT, EST, LEVL IV, 30-39 MIN: ICD-10-PCS | Mod: ,,, | Performed by: NURSE PRACTITIONER

## 2023-03-15 PROCEDURE — 83036 HEMOGLOBIN GLYCOSYLATED A1C: CPT | Mod: ,,, | Performed by: CLINICAL MEDICAL LABORATORY

## 2023-03-15 PROCEDURE — 99214 OFFICE O/P EST MOD 30 MIN: CPT | Mod: ,,, | Performed by: NURSE PRACTITIONER

## 2023-03-15 PROCEDURE — 3075F SYST BP GE 130 - 139MM HG: CPT | Mod: ,,, | Performed by: NURSE PRACTITIONER

## 2023-03-15 PROCEDURE — 1159F PR MEDICATION LIST DOCUMENTED IN MEDICAL RECORD: ICD-10-PCS | Mod: ,,, | Performed by: NURSE PRACTITIONER

## 2023-03-15 PROCEDURE — 3075F PR MOST RECENT SYSTOLIC BLOOD PRESS GE 130-139MM HG: ICD-10-PCS | Mod: ,,, | Performed by: NURSE PRACTITIONER

## 2023-03-15 PROCEDURE — 85025 CBC WITH DIFFERENTIAL: ICD-10-PCS | Mod: ,,, | Performed by: CLINICAL MEDICAL LABORATORY

## 2023-03-15 PROCEDURE — 87070 CULTURE OTHR SPECIMN AEROBIC: CPT | Mod: ,,, | Performed by: CLINICAL MEDICAL LABORATORY

## 2023-03-15 PROCEDURE — 3078F DIAST BP <80 MM HG: CPT | Mod: ,,, | Performed by: NURSE PRACTITIONER

## 2023-03-15 PROCEDURE — 87070 CULTURE, WOUND: ICD-10-PCS | Mod: ,,, | Performed by: CLINICAL MEDICAL LABORATORY

## 2023-03-15 PROCEDURE — 83036 HEMOGLOBIN A1C: ICD-10-PCS | Mod: ,,, | Performed by: CLINICAL MEDICAL LABORATORY

## 2023-03-15 PROCEDURE — 3078F PR MOST RECENT DIASTOLIC BLOOD PRESSURE < 80 MM HG: ICD-10-PCS | Mod: ,,, | Performed by: NURSE PRACTITIONER

## 2023-03-15 RX ORDER — SERTRALINE HYDROCHLORIDE 50 MG/1
50 TABLET, FILM COATED ORAL DAILY
Qty: 30 TABLET | Refills: 5 | Status: SHIPPED | OUTPATIENT
Start: 2023-03-15

## 2023-03-15 RX ORDER — DOXYCYCLINE HYCLATE 100 MG
100 TABLET ORAL EVERY 12 HOURS
Qty: 20 TABLET | Refills: 0 | Status: SHIPPED | OUTPATIENT
Start: 2023-03-15 | End: 2023-03-25

## 2023-03-15 NOTE — PROGRESS NOTES
"[facility]  Chief Complaint      Chief Complaint   Patient presents with    Cough     Patient reports a come and go cough. Patient reports having some mucous come up when coughing. Patient reports taking Claritin and Mucinex. Patient reports symptoms for a while.     Back Pain     Patient reports lower back pain. Patient reports taking Norco and it helps for about 10-20 minutes. Patient reports symptoms for "Some Years". Patient reports a 6 on the pain scale.    Hip Pain     Patient reports hip pain. Patient reports mostly on the right side when she lays down. Patient reports taking Norco and it helps for "a minute". Patient reports a 6 on the pain scale.    Cyst     Patient reports having 2 knots between her breast. Patient reprots symptoms since the weekend.     Depression     Patient reports Dr. Davis told her that she needs to see someone to get on depression medication.     Foot Pain     Patient reports foot pain. Patient reports it was broke in 4 places. Patient reports it has not been X-Rayed since it was "Heeled". Patient reports an 8 on the pain scale.         History of Present Illness      Holly Porter is a 62 y.o. female with chronic conditions of Chronic Pain followed by Pain Mgmt, GOUT, Vitamin D Deficiency, Diabetes Mellitus Type 2, Hypothyroidism, Hypertension, Allergic Rhinitis, COPD, Hx of DVT, Hyperlipidemia, Morbid Obesity, Osteoarthritis, Peripheral Vascular Disease, GERD, and Tobacco Use who presents today for a plethora of complaints including cough, back pain, hip pain, cyst,foot pain, and depression. Ms. Porter is followed by Dr. Regan Frausto for her primary care for whom I am seeing her in his absence today. She c/o cough that comes and goes. She reports using Claritin and Mucinex for this and that the symptoms has been present "for a while".  She c/o back pain ongoing for years. She rates pain "6" on numeric pain scale today. She reports that her hip pain is " "mainly on her right side when she lays down and rates pain "6" on numeric pain scale. She is followed by Pain Management. She is on Norco and reports that it only helps for about 10-20 minutes. Discussed the need to discuss her uncontrolled pain with pain management at her visit on 04/05/2023. She reports having a right foot injury that resulted in it being broken in 4 places and reports not having any imaging since it healed. She rates pain "8" on numeric pain scale today. She reports that Dr. Davis told her that she needed to see someone to get on something for depression. Her PQH9 score today was 15 which is indicative of moderate depression. Discussed with there that a lot of her ailments could be related to her depression and that it needed to be treated and she also needed to follow up with PCP as directed.     Her  was in a hurry today and she opted to get treatment for the abscess in her groin and start on medication for her depression that was recommended by her therapist. She will follow up with pain management for her c/o pain and follow up with PCP next week. She did agree to have labs drawn today.         Past Medical History:  Past Medical History:   Diagnosis Date    Anxiety state     Atherosclerosis of native artery of extremity with intermittent claudication 01/30/2019    bilateral legs    Bilateral leg pain     BMI 50.0-59.9, adult 02/22/2021    Cellulitis 06/11/2020    Chronic pain syndrome     Chronic peripheral venous hypertension 01/08/2019    COPD (chronic obstructive pulmonary disease)     Diabetes     Diabetes mellitus, type 2     DVT (deep venous thrombosis) 02/12/2020    Embolism and thrombosis of superficial veins of unspecified lower extremity 07/01/2019    bilateral    Frequent headaches 09/20/2018    GERD (gastroesophageal reflux disease)     Hereditary lymphedema     Hx of thyroid cancer     Hyperlipidemia     Hypertension     Hypothyroidism     Migraines     Migraines     " Morbid obesity     Nicotine dependence     Non-pressure chronic ulcer of left lower leg 03/09/2021    Osteoarthritis     Other skin changes 03/09/2021    bilateral gaiter regions    Pain in left leg     Pain in right leg     PVD (peripheral vascular disease) 01/08/2019    Seizure disorder     Sleep apnea     Venous insufficiency (chronic) (peripheral)     Venous stasis     Vitamin D deficiency        Past Surgical History:   has a past surgical history that includes Total abdominal hysterectomy w/ bilateral salpingoophorectomy; Thyroidectomy; Tubal ligation; Vaginal delivery; Venous ablation (Right, 08/09/2019); Venous ablation (Left, 08/02/2019); Venous ablation (Right, 07/13/2015); Venous ablation (Right, 07/06/2015); Venous ablation (Left, 04/20/2015); Venous ablation (Right, 10/28/2013); Venous ablation (Left, 10/25/2013); Venous ablation (Left, 10/07/2013); Stab phlebectomy of varicose veins (Left, 01/25/2013); Venous ablation (Right, 01/21/2013); Toe amputation (Left, 01/28/2020); Venous ablation (Left, 06/25/2021); Hysterectomy; Toe amputation (Right, 01/28/2020); Iliac artery stent (Bilateral, 01/28/2020); and Radiofrequency ablation (Left, 04/15/2022).    Social History:  Social History     Tobacco Use    Smoking status: Every Day     Packs/day: 0.50     Years: 33.00     Pack years: 16.50     Types: Cigarettes    Smokeless tobacco: Never   Substance Use Topics    Alcohol use: Never    Drug use: Never       I personally reviewed all past medical, surgical, and social.     Review of Systems   Constitutional:  Negative for chills and fever.   HENT:  Negative for congestion, rhinorrhea and sore throat.    Respiratory:  Positive for cough.    Gastrointestinal:  Negative for abdominal pain, constipation and diarrhea.   Genitourinary:  Negative for dysuria, frequency and urgency.   Musculoskeletal:  Positive for arthralgias, back pain, gait problem and myalgias.   Skin:  Positive for wound (left side vaginal area  reports been present x 4 days with some drainage).   Neurological:  Negative for dizziness and headaches.   Psychiatric/Behavioral:  Positive for depression and dysphoric mood. The patient is nervous/anxious.       Medications:  Outpatient Encounter Medications as of 3/15/2023   Medication Sig Dispense Refill    ACCU-CHEK GUIDE GLUCOSE METER Misc       ACCU-CHEK GUIDE TEST STRIPS Strp       ACCU-CHEK SOFTCLIX LANCETS Misc       albuterol (PROVENTIL/VENTOLIN HFA) 90 mcg/actuation inhaler Inhale 2 puffs into the lungs 4 (four) times daily. Rescue 18 g 2    allopurinoL (ZYLOPRIM) 300 MG tablet Take 1 tablet (300 mg total) by mouth once daily. 90 tablet 3    amitriptyline (ELAVIL) 25 MG tablet Take 1 tablet (25 mg total) by mouth nightly as needed for Insomnia. 90 tablet 1    apixaban (ELIQUIS) 5 mg Tab Take 1 tablet (5 mg total) by mouth 2 (two) times daily. 60 tablet 3    aspirin (ECOTRIN) 81 MG EC tablet Take 1 tablet (81 mg total) by mouth once daily. 90 tablet 1    calcium carbonate (OS-MICHAELA) 500 mg calcium (1,250 mg) tablet Take 1 tablet (500 mg total) by mouth 2 (two) times daily. 60 tablet 3    carvediloL (COREG) 12.5 MG tablet Take 0.5 tablets (6.25 mg total) by mouth 2 (two) times daily. 30 tablet 11    cholecalciferol, vitamin D3, 125 mcg (5,000 unit) capsule Take 1 capsule (5,000 Units total) by mouth 2 (two) times a day. 60 capsule 3    cilostazoL (PLETAL) 100 MG Tab Take 1 tablet (100 mg total) by mouth 2 (two) times daily. 90 tablet 1    colchicine (COLCRYS) 0.6 mg tablet One pill three times a day for two days,then one pill daily till out 30 tablet 2    docusate sodium (COLACE) 100 MG capsule Take 1 capsule (100 mg total) by mouth 2 (two) times daily. 60 capsule 2    HYDROcodone-acetaminophen (NORCO)  mg per tablet Take 1 tablet by mouth every 6 (six) hours as needed for Pain. 120 tablet 0    insulin detemir U-100 (LEVEMIR FLEXTOUCH U-100 INSULN) 100 unit/mL (3 mL) InPn pen Inject 10 Units into the  "skin every evening. 15 ml with 1 refill 15 mL 1    levothyroxine (SYNTHROID) 150 MCG tablet Take 1 tablet (150 mcg total) by mouth before breakfast. 90 tablet 1    loratadine (CLARITIN) 10 mg tablet Take 1 tablet (10 mg total) by mouth once daily. 30 tablet 3    losartan-hydrochlorothiazide 50-12.5 mg (HYZAAR) 50-12.5 mg per tablet Take 1 tablet by mouth once daily. 90 tablet 1    metOLazone (ZAROXOLYN) 2.5 MG tablet Take 1 tablet (2.5 mg total) by mouth once daily. 30 tablet 2    mupirocin (BACTROBAN) 2 % ointment Apply topically 2 (two) times daily. 80 g 2    naloxone (NARCAN) 4 mg/actuation Spry 1 spray once.      NIFEdipine (PROCARDIA-XL) 60 MG (OSM) 24 hr tablet Take 1 tablet (60 mg total) by mouth once daily. 30 tablet 11    NOVOFINE 32 32 gauge x 1/4" Ndle Use as directed once daily. 90 each 3    pantoprazole (PROTONIX) 40 MG tablet Take 1 tablet (40 mg total) by mouth once daily. 90 tablet 1    potassium chloride SA (K-DUR,KLOR-CON) 20 MEQ tablet Take 1 tablet (20 mEq total) by mouth once daily. 90 tablet 1    QUEtiapine (SEROQUEL) 25 MG Tab Take 1 tablet (25 mg total) by mouth once daily. 30 tablet 1    spironolactone (ALDACTONE) 50 MG tablet Take 1 tablet (50 mg total) by mouth once daily. 30 tablet 11    SYMBICORT 160-4.5 mcg/actuation HFAA Inhale into the lungs.      tiotropium (SPIRIVA) 18 mcg inhalation capsule Inhale 1 capsule (18 mcg total) into the lungs once daily. Controller 90 capsule 3    topiramate (TOPAMAX) 100 MG tablet Take 1.5 tablets (150 mg total) by mouth 2 (two) times daily. 90 tablet 11    triamcinolone acetonide 0.1% (KENALOG) 0.1 % cream Apply topically 3 (three) times daily. 400 g 2    doxycycline (VIBRA-TABS) 100 MG tablet Take 1 tablet (100 mg total) by mouth every 12 (twelve) hours. for 10 days 20 tablet 0    [] HYDROcodone-acetaminophen (NORCO)  mg per tablet Take 1 tablet by mouth every 6 (six) hours as needed for Pain. 120 tablet 0    phentermine (ADIPEX-P) 37.5 " mg tablet Take 1 tablet (37.5 mg total) by mouth before breakfast. (Patient not taking: Reported on 12/5/2022) 30 tablet 0    polyethylene glycol (GLYCOLAX) 17 gram PwPk Take 17 g by mouth once daily.      sertraline (ZOLOFT) 50 MG tablet Take 1 tablet (50 mg total) by mouth once daily. 30 tablet 5    [DISCONTINUED] ciprofloxacin HCl (CIPRO) 500 MG tablet Take 1.5 tablets (750 mg total) by mouth every 12 (twelve) hours. (Patient not taking: Reported on 3/15/2023) 10 tablet 0     No facility-administered encounter medications on file as of 3/15/2023.       Allergies:  Review of patient's allergies indicates:   Allergen Reactions    Ammonium peroxydisulfate Shortness Of Breath    Avocado (laurus persea) Anaphylaxis    Bananas [banana] Anaphylaxis and Swelling     CRAMPS,    Chocolate flavor Anaphylaxis     MOUTH SWELLING    Fentanyl Shortness Of Breath and Itching    Percocet [oxycodone-acetaminophen] Shortness Of Breath and Itching    Silvadene [silver sulfadiazine]     Clindamycin     Corticosteroids (glucocorticoids)     Hydrocortisone Blisters    Lasix [furosemide] Blisters     BURNS SKIN    Nutritional supplement-fiber Itching    Pregabalin     Shellfish containing products     Adhesive Rash and Blisters    Iodine and iodide containing products Rash    Latex, natural rubber Rash    Pcn [penicillins] Rash    Sulfa (sulfonamide antibiotics) Rash and Blisters       Health Maintenance:  Immunization History   Administered Date(s) Administered    COVID-19, vector-nr, [id number], PF ([facility]) 03/23/2021    Tdap 08/11/2022    Zoster Recombinant 08/11/2022      Health Maintenance   Topic Date Due    Eye Exam  Never done    Foot Exam  02/09/2023    Low Dose Statin  08/12/2023 (Originally 8/16/1981)    Hemoglobin A1c  04/04/2023    Lipid Panel  06/06/2023    Mammogram  08/16/2023    TETANUS VACCINE  08/11/2032    Hepatitis C Screening  Completed        Physical Exam      Vital Signs  Temp: 97.5 °F (36.4 °C)  Temp Source:  "Oral  Pulse: 72  Resp: 18  SpO2: 96 %  BP: 132/78  BP Location: Left arm  Patient Position: Sitting  Pain Score:   6  Pain Loc: Back (HIP 6 FOOT 8)  Height and Weight  Height: 5' 8" (172.7 cm)  Weight: (!) 159.7 kg (352 lb)  BSA (Calculated - sq m): 2.77 sq meters  BMI (Calculated): 53.5  Weight in (lb) to have BMI = 25: 164.1]    Physical Exam  Exam conducted with a chaperone present.   Constitutional:       Appearance: She is obese.   HENT:      Head: Normocephalic.      Right Ear: External ear normal.      Left Ear: External ear normal.   Cardiovascular:      Rate and Rhythm: Normal rate and regular rhythm.      Pulses: Normal pulses.      Heart sounds: Normal heart sounds.   Pulmonary:      Effort: Pulmonary effort is normal.      Breath sounds: Normal breath sounds.   Abdominal:      General: Bowel sounds are normal.      Palpations: Abdomen is soft.      Tenderness: There is no abdominal tenderness.   Genitourinary:      Feet:      Comments: Walking boot present to right foot  Neurological:      Mental Status: She is alert and oriented to person, place, and time.   Psychiatric:         Attention and Perception: Attention normal.         Mood and Affect: Mood and affect normal.         Speech: Speech normal.         Behavior: Behavior is cooperative.        Laboratory:  CBC:  Recent Labs   Lab 12/02/22  0335 12/03/22  0515 12/04/22  0528   WBC 8.97 9.65 8.49   RBC 5.53 H 5.45 H 5.51 H   Hemoglobin 14.6 14.5 14.5   Hematocrit 49.4 H 47.5 H 47.2 H   Platelet Count 247 265 265   MCV 89.3 87.2 85.7   MCH 26.4 L 26.6 L 26.3 L   MCHC 29.6 L 30.5 L 30.7 L     CMP:  Recent Labs   Lab 05/10/22  1813 09/09/22  0540 11/08/22  1503 12/01/22  1312 12/02/22  0335 12/04/22  0528   Glucose 117 H 110 H  --  98   < > 111 H   Calcium 9.1 9.6  --  9.6   < > 9.8   Albumin 3.8 3.5  --  3.8  --   --    Total Protein 7.8 7.5  --  7.8  --   --    Sodium 141 135 L  --  139   < > 137   Potassium 3.3 L 3.1 L   < > 3.6   < > 3.4 L   CO2 30 " 29  --  30   < > 32   Chloride 102 101  --  100   < > 99   BUN 15 19 H  --  14   < > 14   Alk Phos 129 94  --  102  --   --    ALT 14 14  --  23  --   --    AST 19 19  --  24  --   --    Bilirubin, Total 0.3 0.4  --  0.5  --   --     < > = values in this interval not displayed.     LIPIDS:  Recent Labs   Lab 11/26/20  0415 09/27/21  1656 04/13/22  1233 06/06/22  1139 12/01/22  1312   TSH 4.990 H 2.890 0.197 L  --  2.000   HDL Cholesterol 46  --   --  51  --    Cholesterol 98  --   --  172  --    Triglycerides 48  --   --  112  --    LDL Calculated 42  --   --  99  --    Cholesterol/HDL Ratio (Risk Factor) 2.1  --   --  3.4  --    Non-HDL  --   --   --  121  --      TSH:  Recent Labs   Lab 09/27/21  1656 04/13/22  1233 12/01/22  1312   TSH 2.890 0.197 L 2.000     A1C:  Recent Labs   Lab 06/08/21  1537 02/09/22  0952 10/04/22  1640   Hemoglobin A1C 6.2 5.7 6.2       Point Of Care Testing:  Nitrites, UA   Date Value Ref Range Status   12/01/2022 Negative Negative Final     Urobilinogen, UA   Date Value Ref Range Status   12/01/2022 2 (A) 0.2, 1.0, Normal mg/dL Final     pH, UA   Date Value Ref Range Status   12/01/2022 5.0 5.0 to 8.0 pH Units Final     Specific Gravity, UA   Date Value Ref Range Status   12/01/2022 1.031 (H) <=1.030 Final     Ketones, UA   Date Value Ref Range Status   12/01/2022 Negative Negative mg/dL Final       Lab Results   Component Value Date    [id number] Negative 04/13/2022         Assessment/Plan     Abscess  -     CBC Auto Differential; Future; Expected date: 03/15/2023  -     doxycycline (VIBRA-TABS) 100 MG tablet; Take 1 tablet (100 mg total) by mouth every 12 (twelve) hours. for 10 days  Dispense: 20 tablet; Refill: 0  -     Culture, Wound; Future; Expected date: 03/15/2023    Moderate major depression  -     sertraline (ZOLOFT) 50 MG tablet; Take 1 tablet (50 mg total) by mouth once daily.  Dispense: 30 tablet; Refill: 5    Type 2 diabetes mellitus without complication, with long-term  current use of insulin  -     Comprehensive Metabolic Panel; Future; Expected date: 03/15/2023  -     Hemoglobin A1C; Future; Expected date: 03/15/2023        Follow up with PCP as directed and/or sooner as needed.     CHEYENNE Mullen-[facility]

## 2023-03-16 ENCOUNTER — TELEPHONE (OUTPATIENT)
Dept: FAMILY MEDICINE | Facility: CLINIC | Age: 63
End: 2023-03-16
Payer: MEDICARE

## 2023-03-16 NOTE — TELEPHONE ENCOUNTER
----- Message from Oly Todd NP sent at 3/16/2023  4:02 PM CDT -----  Regarding: labs  Please inform patient that her lab revealed a sodium level of 135 (136-145) creatinine 1.06 (0.55-1.02) increase water intake. Follow up with PCP as directed on 03/22/2023 @ 6018. Please be sure to bring all medication bottles from all doctors that she sees including over-the-counter medication.

## 2023-03-16 NOTE — TELEPHONE ENCOUNTER
Informed patient of lab results. Informed her we would notify her when culture is available. Patient voiced understanding.  Ailyn Hood CMA

## 2023-03-17 ENCOUNTER — TELEPHONE (OUTPATIENT)
Dept: FAMILY MEDICINE | Facility: CLINIC | Age: 63
End: 2023-03-17
Payer: MEDICARE

## 2023-03-17 LAB — MICROORGANISM SPEC CULT: NORMAL

## 2023-03-17 NOTE — TELEPHONE ENCOUNTER
----- Message from Oly Todd NP sent at 3/17/2023 11:38 AM CDT -----  Regarding: wound culture  Please inform patient that wound culture revealed Normal Skin Tamara, no change in regimen is required at this time.

## 2023-03-29 ENCOUNTER — OFFICE VISIT (OUTPATIENT)
Dept: FAMILY MEDICINE | Facility: CLINIC | Age: 63
End: 2023-03-29
Payer: MEDICARE

## 2023-03-29 VITALS
SYSTOLIC BLOOD PRESSURE: 131 MMHG | RESPIRATION RATE: 20 BRPM | OXYGEN SATURATION: 96 % | DIASTOLIC BLOOD PRESSURE: 74 MMHG | BODY MASS INDEX: 44.41 KG/M2 | HEIGHT: 68 IN | WEIGHT: 293 LBS | TEMPERATURE: 98 F | HEART RATE: 76 BPM

## 2023-03-29 DIAGNOSIS — I50.9 CONGESTIVE HEART FAILURE, UNSPECIFIED HF CHRONICITY, UNSPECIFIED HEART FAILURE TYPE: ICD-10-CM

## 2023-03-29 DIAGNOSIS — E66.01 CLASS 3 SEVERE OBESITY WITH BODY MASS INDEX (BMI) OF 50.0 TO 59.9 IN ADULT, UNSPECIFIED OBESITY TYPE, UNSPECIFIED WHETHER SERIOUS COMORBIDITY PRESENT: ICD-10-CM

## 2023-03-29 DIAGNOSIS — M16.0 OSTEOARTHRITIS OF BOTH HIPS, UNSPECIFIED OSTEOARTHRITIS TYPE: ICD-10-CM

## 2023-03-29 DIAGNOSIS — M19.071 OSTEOARTHRITIS OF RIGHT FOOT, UNSPECIFIED OSTEOARTHRITIS TYPE: Primary | ICD-10-CM

## 2023-03-29 PROCEDURE — 1160F PR REVIEW ALL MEDS BY PRESCRIBER/CLIN PHARMACIST DOCUMENTED: ICD-10-PCS | Mod: ,,, | Performed by: INTERNAL MEDICINE

## 2023-03-29 PROCEDURE — 1159F PR MEDICATION LIST DOCUMENTED IN MEDICAL RECORD: ICD-10-PCS | Mod: ,,, | Performed by: INTERNAL MEDICINE

## 2023-03-29 PROCEDURE — 3044F PR MOST RECENT HEMOGLOBIN A1C LEVEL <7.0%: ICD-10-PCS | Mod: ,,, | Performed by: INTERNAL MEDICINE

## 2023-03-29 PROCEDURE — 3044F HG A1C LEVEL LT 7.0%: CPT | Mod: ,,, | Performed by: INTERNAL MEDICINE

## 2023-03-29 PROCEDURE — 99214 OFFICE O/P EST MOD 30 MIN: CPT | Mod: ,,, | Performed by: INTERNAL MEDICINE

## 2023-03-29 PROCEDURE — 3075F PR MOST RECENT SYSTOLIC BLOOD PRESS GE 130-139MM HG: ICD-10-PCS | Mod: ,,, | Performed by: INTERNAL MEDICINE

## 2023-03-29 PROCEDURE — 3008F PR BODY MASS INDEX (BMI) DOCUMENTED: ICD-10-PCS | Mod: ,,, | Performed by: INTERNAL MEDICINE

## 2023-03-29 PROCEDURE — 3078F DIAST BP <80 MM HG: CPT | Mod: ,,, | Performed by: INTERNAL MEDICINE

## 2023-03-29 PROCEDURE — 3078F PR MOST RECENT DIASTOLIC BLOOD PRESSURE < 80 MM HG: ICD-10-PCS | Mod: ,,, | Performed by: INTERNAL MEDICINE

## 2023-03-29 PROCEDURE — 1159F MED LIST DOCD IN RCRD: CPT | Mod: ,,, | Performed by: INTERNAL MEDICINE

## 2023-03-29 PROCEDURE — 3008F BODY MASS INDEX DOCD: CPT | Mod: ,,, | Performed by: INTERNAL MEDICINE

## 2023-03-29 PROCEDURE — 99214 PR OFFICE/OUTPT VISIT, EST, LEVL IV, 30-39 MIN: ICD-10-PCS | Mod: ,,, | Performed by: INTERNAL MEDICINE

## 2023-03-29 PROCEDURE — 1160F RVW MEDS BY RX/DR IN RCRD: CPT | Mod: ,,, | Performed by: INTERNAL MEDICINE

## 2023-03-29 PROCEDURE — 3075F SYST BP GE 130 - 139MM HG: CPT | Mod: ,,, | Performed by: INTERNAL MEDICINE

## 2023-03-31 PROBLEM — I50.9 CONGESTIVE HEART FAILURE: Status: ACTIVE | Noted: 2023-03-31

## 2023-03-31 PROBLEM — M16.0 OSTEOARTHRITIS OF BOTH HIPS: Status: ACTIVE | Noted: 2023-03-31

## 2023-03-31 PROBLEM — E66.813 CLASS 3 SEVERE OBESITY WITH BODY MASS INDEX (BMI) OF 50.0 TO 59.9 IN ADULT: Status: ACTIVE | Noted: 2021-11-10

## 2023-03-31 PROBLEM — M19.071 OSTEOARTHRITIS OF RIGHT FOOT: Status: ACTIVE | Noted: 2023-03-31

## 2023-03-31 NOTE — PROGRESS NOTES
Subjective:       Patient ID: Holly Porter is a 62 y.o. female.    Chief Complaint: Follow-up, Foot Problem (Right foot numb ), Leg Problem (both), and Hip Problem (both)    Follow-up  Associated symptoms include arthralgias, joint swelling and myalgias. Pertinent negatives include no abdominal pain, chest pain, fatigue or fever.   .  Patient seen evaluated today.  Patient complains of moderate pain on the right foot patient has history of CHF morbid obesity and also complains of pain in both heels.  Patient has shortness of breath complains of bilateral ankle edema.  She complains of pain in her feet and pain in her ankles.  Patient states that she has taken Lasix in the past but now only takes Zaroxolyn or metolazone.  I will start to Rocephin might 40 mg 1 p.o. q.day.  BMP BNP x-ray of bilateral ankles x-ray bilateral feet and also do a chest x-ray today.  Because of the diffuse severe OA will refer the patient back to Orthopedics Dr. Navas.  Patient may benefit from a steroid injection.    Current Medications:    Current Outpatient Medications:     ACCU-CHEK GUIDE GLUCOSE METER [facility], , Disp: , Rfl:     ACCU-CHEK GUIDE TEST STRIPS [facility], , Disp: , Rfl:     ACCU-CHEK SOFTCLIX LANCETS Misc, , Disp: , Rfl:     albuterol (PROVENTIL/VENTOLIN HFA) 90 mcg/actuation inhaler, Inhale 2 puffs into the lungs 4 (four) times daily. Rescue, Disp: 18 g, Rfl: 2    allopurinoL (ZYLOPRIM) 300 MG tablet, Take 1 tablet (300 mg total) by mouth once daily., Disp: 90 tablet, Rfl: 3    amitriptyline (ELAVIL) 25 MG tablet, Take 1 tablet (25 mg total) by mouth nightly as needed for Insomnia., Disp: 90 tablet, Rfl: 1    apixaban (ELIQUIS) 5 mg Tab, Take 1 tablet (5 mg total) by mouth 2 (two) times daily., Disp: 60 tablet, Rfl: 3    aspirin (ECOTRIN) 81 MG EC tablet, Take 1 tablet (81 mg total) by mouth once daily., Disp: 90 tablet, Rfl: 1    calcium carbonate (OS-MICHAELA) 500 mg calcium (1,250 mg) tablet, Take 1 tablet (500 mg total) by  "mouth 2 (two) times daily., Disp: 60 tablet, Rfl: 3    carvediloL (COREG) 12.5 MG tablet, Take 0.5 tablets (6.25 mg total) by mouth 2 (two) times daily., Disp: 30 tablet, Rfl: 11    cholecalciferol, vitamin D3, 125 mcg (5,000 unit) capsule, Take 1 capsule (5,000 Units total) by mouth 2 (two) times a day., Disp: 60 capsule, Rfl: 3    cilostazoL (PLETAL) 100 MG Tab, Take 1 tablet (100 mg total) by mouth 2 (two) times daily., Disp: 90 tablet, Rfl: 1    colchicine (COLCRYS) 0.6 mg tablet, One pill three times a day for two days,then one pill daily till out, Disp: 30 tablet, Rfl: 2    docusate sodium (COLACE) 100 MG capsule, Take 1 capsule (100 mg total) by mouth 2 (two) times daily., Disp: 60 capsule, Rfl: 2    HYDROcodone-acetaminophen (NORCO)  mg per tablet, Take 1 tablet by mouth every 6 (six) hours as needed for Pain., Disp: 120 tablet, Rfl: 0    insulin detemir U-100 (LEVEMIR FLEXTOUCH U-100 INSULN) 100 unit/mL (3 mL) InPn pen, Inject 10 Units into the skin every evening. 15 ml with 1 refill, Disp: 15 mL, Rfl: 1    levothyroxine (SYNTHROID) 150 MCG tablet, Take 1 tablet (150 mcg total) by mouth before breakfast., Disp: 90 tablet, Rfl: 1    loratadine (CLARITIN) 10 mg tablet, Take 1 tablet (10 mg total) by mouth once daily., Disp: 30 tablet, Rfl: 3    losartan-hydrochlorothiazide 50-12.5 mg (HYZAAR) 50-12.5 mg per tablet, Take 1 tablet by mouth once daily., Disp: 90 tablet, Rfl: 1    metOLazone (ZAROXOLYN) 2.5 MG tablet, Take 1 tablet (2.5 mg total) by mouth once daily., Disp: 30 tablet, Rfl: 2    mupirocin (BACTROBAN) 2 % ointment, Apply topically 2 (two) times daily., Disp: 80 g, Rfl: 2    naloxone (NARCAN) 4 mg/actuation Spry, 1 spray once., Disp: , Rfl:     NIFEdipine (PROCARDIA-XL) 60 MG (OSM) 24 hr tablet, Take 1 tablet (60 mg total) by mouth once daily., Disp: 30 tablet, Rfl: 11    NOVOFINE 32 32 gauge x 1/4" Ndle, Use as directed once daily., Disp: 90 each, Rfl: 3    pantoprazole (PROTONIX) 40 MG " tablet, Take 1 tablet (40 mg total) by mouth once daily., Disp: 90 tablet, Rfl: 1    phentermine (ADIPEX-P) 37.5 mg tablet, Take 1 tablet (37.5 mg total) by mouth before breakfast., Disp: 30 tablet, Rfl: 0    polyethylene glycol (GLYCOLAX) 17 gram PwPk, Take 17 g by mouth once daily., Disp: , Rfl:     potassium chloride SA (K-DUR,KLOR-CON) 20 MEQ tablet, Take 1 tablet (20 mEq total) by mouth once daily., Disp: 90 tablet, Rfl: 1    QUEtiapine (SEROQUEL) 25 MG Tab, Take 1 tablet (25 mg total) by mouth once daily., Disp: 30 tablet, Rfl: 1    sertraline (ZOLOFT) 50 MG tablet, Take 1 tablet (50 mg total) by mouth once daily., Disp: 30 tablet, Rfl: 5    spironolactone (ALDACTONE) 50 MG tablet, Take 1 tablet (50 mg total) by mouth once daily., Disp: 30 tablet, Rfl: 11    SYMBICORT 160-4.5 mcg/actuation HFAA, Inhale into the lungs., Disp: , Rfl:     tiotropium (SPIRIVA) 18 mcg inhalation capsule, Inhale 1 capsule (18 mcg total) into the lungs once daily. Controller, Disp: 90 capsule, Rfl: 3    triamcinolone acetonide 0.1% (KENALOG) 0.1 % cream, Apply topically 3 (three) times daily., Disp: 400 g, Rfl: 2    topiramate (TOPAMAX) 100 MG tablet, Take 1.5 tablets (150 mg total) by mouth 2 (two) times daily., Disp: 90 tablet, Rfl: 11           Review of Systems   Constitutional:  Negative for appetite change, fatigue and fever.   Respiratory:  Negative for shortness of breath.    Cardiovascular:  Negative for chest pain.   Gastrointestinal:  Negative for abdominal pain and constipation.   Endocrine: Negative for polydipsia, polyphagia and polyuria.   Genitourinary:  Negative for difficulty urinating, frequency and hot flashes.   Musculoskeletal:  Positive for arthralgias, joint swelling and myalgias.   Allergic/Immunologic: Negative for environmental allergies.   Neurological:  Negative for dizziness and light-headedness.   Psychiatric/Behavioral:  Negative for agitation.               Vitals:    03/29/23 1507   BP: 131/74   BP  "Location: Right arm   Patient Position: Sitting   BP Method: Large (Automatic)   Pulse: 76   Resp: 20   Temp: 97.5 °F (36.4 °C)   TempSrc: Temporal   SpO2: 96%   Weight: (!) 161.8 kg (356 lb 9.6 oz)   Height: 5' 8" (1.727 m)        Physical Exam  Vitals and nursing note reviewed.   Constitutional:       Appearance: Normal appearance.   Cardiovascular:      Rate and Rhythm: Normal rate and regular rhythm.      Pulses: Normal pulses.      Heart sounds: Normal heart sounds.   Pulmonary:      Effort: Pulmonary effort is normal.      Breath sounds: Normal breath sounds.   Abdominal:      General: Abdomen is flat. Bowel sounds are normal.      Palpations: Abdomen is soft.   Musculoskeletal:         General: Normal range of motion.   Skin:     General: Skin is warm and dry.   Neurological:      General: No focal deficit present.      Mental Status: She is alert and oriented to person, place, and time. Mental status is at baseline.         Last Labs:     Office Visit on 03/15/2023   Component Date Value    Sodium 03/15/2023 135 (L)     Potassium 03/15/2023 3.8     Chloride 03/15/2023 104     CO2 03/15/2023 28     Anion Gap 03/15/2023 7     Glucose 03/15/2023 97     BUN 03/15/2023 14     Creatinine 03/15/2023 1.06 (H)     BUN/Creatinine Ratio 03/15/2023 13     Calcium 03/15/2023 9.4     Total Protein 03/15/2023 7.9     Albumin 03/15/2023 3.6     Globulin 03/15/2023 4.3 (H)     A/G Ratio 03/15/2023 0.8     Bilirubin, Total 03/15/2023 0.3     Alk Phos 03/15/2023 99     ALT 03/15/2023 15     AST 03/15/2023 16     eGFR 03/15/2023 59 (L)     Hemoglobin A1C 03/15/2023 6.1     Estimated Average Glucose 03/15/2023 117     Culture, Wound/Abscess 03/15/2023 Normal Skin Tamara, no further workup.     WBC 03/15/2023 8.65     RBC 03/15/2023 6.19 (H)     Hemoglobin 03/15/2023 15.6     Hematocrit 03/15/2023 51.1 (H)     MCV 03/15/2023 82.6     MCH 03/15/2023 25.2 (L)     MCHC 03/15/2023 30.5 (L)     RDW 03/15/2023 17.1 (H)     Platelet " Count 03/15/2023 244     MPV 03/15/2023 12.8 (H)     Neutrophils % 03/15/2023 64.9     Lymphocytes % 03/15/2023 22.4 (L)     Monocytes % 03/15/2023 6.0     Eosinophils % 03/15/2023 5.9 (H)     Basophils % 03/15/2023 0.5     Immature Granulocytes % 03/15/2023 0.3     nRBC, Auto 03/15/2023 0.0     Neutrophils, Abs 03/15/2023 5.61     Lymphocytes, Absolute 03/15/2023 1.94     Monocytes, Absolute 03/15/2023 0.52     Eosinophils, Absolute 03/15/2023 0.51 (H)     Basophils, Absolute 03/15/2023 0.04     Immature Granulocytes, A* 03/15/2023 0.03     nRBC, Absolute 03/15/2023 0.00     Diff Type 03/15/2023 Auto        Last Imaging:  US Lower Extremity Veins Bilateral  Narrative: EXAMINATION:  US LOWER EXTREMITY VEINS BILATERAL    CLINICAL HISTORY:  Bilateral lower extremity edema, elevated d-dimer;    TECHNIQUE:  Duplex scan of the bilateral lower extremity veins using the B-mode/grayscale imaging and Doppler spectral analysis and color-flow    COMPARISON:  No previous similar    FINDINGS:  Major venous structures of the bilateral lower extremity demonstrate a normal course and caliber. There is no evidence of deep venous thrombosis. No abnormal intrinsic echogenic lesions are demonstrated in the scanned blood vessels. The veins of the bilateral lower extremity demonstrate good compressibility with normal color flow study and spectral analysis.  Impression: Unremarkable duplex imaging of the bilateral lower extremity. No evidence of DVT.    The exam was technically difficult with limited visualization because of patient body habitus, per the technologist    Electronically signed by: Rene Santiago  Date:    12/04/2022  Time:    17:27  CTA Chest Non-Coronary (PE Studies)  Narrative: EXAMINATION:  CTA CHEST NON CORONARY (PE STUDIES)    CLINICAL HISTORY:  Pulmonary embolism (PE) suspected, positive D-dimer;.    COMPARISON:  No previous similar CT    TECHNIQUE:  Thin spiral CT sections were obtained through the chest during the  dynamic IV administration of 100 ml of Isovue 370.  In addition to multiplanar reconstruction images, 3-D images were also generated, archived, and analyzed.    The CT examination was performed using one or more of the following dose reduction techniques: Automated exposure control, adjustment of the mA and kV according to patient's size, use of acute or iterative reconstruction techniques.    FINDINGS:  There is no gross central pulmonary embolus, but evaluation for pulmonary embolus is otherwise limited because of contrast timing factors.  There is no thoracic aorta aneurysm or dissection.  There is mild to moderate atherosclerotic calcification of the wall of the aortic arch.    There is a mildly enlarged right paratracheal lymph node measuring 15 mm short axis diameter.  There is mild to moderate coronary artery calcification involving the left anterior descending coronary artery.    There is no pleural or pericardial effusion.    There is no omar pneumonia.  There is a 5 mm pleural base nodule left upper lobe image 197 of the 5th series.  Lungs are otherwise clear aside from some dependent atelectasis.  Central airway is clear.    There is no definite acute process in the partially visualized upper abdomen.    There is scattered mild to moderate degenerative disc narrowing and spondylosis of the thoracic spine  Impression: No gross central pulmonary embolus.  Evaluation for pulmonary emboli is grossly limited otherwise because of contrast timing factors    No omar pneumonia    A 5 mm pleural base nodule left upper lobe of uncertain chronicity.  Recommend follow-up CT in 6-12 months to document stability over time    Electronically signed by: Rene Santiago  Date:    12/04/2022  Time:    17:04  X-Ray Chest AP Portable  Narrative: EXAMINATION:  XR CHEST AP PORTABLE    CLINICAL HISTORY:  shortness of breath;.    COMPARISON:  05/10/2022    TECHNIQUE:  Chest x-ray AP portable erect    FINDINGS:  There is  cardiomegaly.  There is no mediastinal mass.  There is mild to moderate aortic arch calcification.  Pulmonary vasculature is upper normal.    Lungs are grossly clear for shallow breath.  There is no gross pleural effusion.    Osseous structures are unchanged.  There is moderate thoracic spondylosis  Impression: Cardiomegaly and borderline pulmonary venous hypertension appearance without overt pulmonary edema    Shallow breath    Electronically signed by: Rene Santiago  Date:    12/04/2022  Time:    15:55         **Labs and x-rays personally reviewed by me    ** reviewed      Objective:        Assessment:       1. Osteoarthritis of right foot, unspecified osteoarthritis type  X-Ray Ankle 2 View Right    X-Ray Foot 2 View Right    Ambulatory referral/consult to Orthopedics      2. Osteoarthritis of both hips, unspecified osteoarthritis type  Ambulatory referral/consult to Orthopedics      3. Congestive heart failure, unspecified HF chronicity, unspecified heart failure type  Basic Metabolic Panel    X-Ray Chest PA And Lateral    NT-Pro Natriuretic Peptide      4. Class 3 severe obesity with body mass index (BMI) of 50.0 to 59.9 in adult, unspecified obesity type, unspecified whether serious comorbidity present             Plan:         [unfilled]

## 2023-04-05 ENCOUNTER — HOSPITAL ENCOUNTER (OUTPATIENT)
Dept: RADIOLOGY | Facility: HOSPITAL | Age: 63
Discharge: HOME | End: 2023-04-05
Attending: PHYSICIAN ASSISTANT
Payer: MEDICARE

## 2023-04-05 ENCOUNTER — OFFICE VISIT (OUTPATIENT)
Dept: PAIN MEDICINE | Facility: CLINIC | Age: 63
End: 2023-04-05
Payer: MEDICARE

## 2023-04-05 VITALS
BODY MASS INDEX: 45.99 KG/M2 | DIASTOLIC BLOOD PRESSURE: 82 MMHG | HEIGHT: 67 IN | WEIGHT: 293 LBS | SYSTOLIC BLOOD PRESSURE: 150 MMHG | HEART RATE: 72 BPM

## 2023-04-05 DIAGNOSIS — M25.561 CHRONIC PAIN OF RIGHT KNEE: Chronic | ICD-10-CM

## 2023-04-05 DIAGNOSIS — M19.071 OSTEOARTHRITIS OF RIGHT FOOT, UNSPECIFIED OSTEOARTHRITIS TYPE: ICD-10-CM

## 2023-04-05 DIAGNOSIS — I73.9 PVD (PERIPHERAL VASCULAR DISEASE): Chronic | ICD-10-CM

## 2023-04-05 DIAGNOSIS — M47.817 LUMBOSACRAL SPONDYLOSIS WITHOUT MYELOPATHY: Chronic | ICD-10-CM

## 2023-04-05 DIAGNOSIS — M79.671 FOOT PAIN, RIGHT: ICD-10-CM

## 2023-04-05 DIAGNOSIS — M54.12 RADICULOPATHY, CERVICAL REGION: Primary | ICD-10-CM

## 2023-04-05 DIAGNOSIS — M79.672 FOOT PAIN, LEFT: Chronic | ICD-10-CM

## 2023-04-05 DIAGNOSIS — M54.12 RADICULOPATHY, CERVICAL REGION: ICD-10-CM

## 2023-04-05 DIAGNOSIS — G89.29 CHRONIC PAIN OF RIGHT KNEE: Chronic | ICD-10-CM

## 2023-04-05 DIAGNOSIS — L03.115 CELLULITIS OF RIGHT FOOT: Primary | ICD-10-CM

## 2023-04-05 PROCEDURE — 3079F PR MOST RECENT DIASTOLIC BLOOD PRESSURE 80-89 MM HG: ICD-10-PCS | Mod: CPTII,,, | Performed by: PHYSICIAN ASSISTANT

## 2023-04-05 PROCEDURE — 1159F PR MEDICATION LIST DOCUMENTED IN MEDICAL RECORD: ICD-10-PCS | Mod: CPTII,,, | Performed by: PHYSICIAN ASSISTANT

## 2023-04-05 PROCEDURE — 3079F DIAST BP 80-89 MM HG: CPT | Mod: CPTII,,, | Performed by: PHYSICIAN ASSISTANT

## 2023-04-05 PROCEDURE — 73600 X-RAY EXAM OF ANKLE: CPT | Mod: 26,RT,, | Performed by: RADIOLOGY

## 2023-04-05 PROCEDURE — 99214 OFFICE O/P EST MOD 30 MIN: CPT | Mod: S$PBB,25,, | Performed by: PHYSICIAN ASSISTANT

## 2023-04-05 PROCEDURE — 3044F HG A1C LEVEL LT 7.0%: CPT | Mod: CPTII,,, | Performed by: PHYSICIAN ASSISTANT

## 2023-04-05 PROCEDURE — 1159F MED LIST DOCD IN RCRD: CPT | Mod: CPTII,,, | Performed by: PHYSICIAN ASSISTANT

## 2023-04-05 PROCEDURE — 72040 X-RAY EXAM NECK SPINE 2-3 VW: CPT | Mod: TC

## 2023-04-05 PROCEDURE — 3077F PR MOST RECENT SYSTOLIC BLOOD PRESSURE >= 140 MM HG: ICD-10-PCS | Mod: CPTII,,, | Performed by: PHYSICIAN ASSISTANT

## 2023-04-05 PROCEDURE — 72040 X-RAY EXAM NECK SPINE 2-3 VW: CPT | Mod: 26,,, | Performed by: RADIOLOGY

## 2023-04-05 PROCEDURE — 73620 X-RAY EXAM OF FOOT: CPT | Mod: 26,RT,, | Performed by: RADIOLOGY

## 2023-04-05 PROCEDURE — 99215 OFFICE O/P EST HI 40 MIN: CPT | Mod: PBBFAC | Performed by: PHYSICIAN ASSISTANT

## 2023-04-05 PROCEDURE — 3044F PR MOST RECENT HEMOGLOBIN A1C LEVEL <7.0%: ICD-10-PCS | Mod: CPTII,,, | Performed by: PHYSICIAN ASSISTANT

## 2023-04-05 PROCEDURE — 73600 X-RAY EXAM OF ANKLE: CPT | Mod: TC,RT

## 2023-04-05 PROCEDURE — 96372 THER/PROPH/DIAG INJ SC/IM: CPT | Mod: PBBFAC | Performed by: PHYSICIAN ASSISTANT

## 2023-04-05 PROCEDURE — 73600 XR ANKLE 2 VIEW RIGHT: ICD-10-PCS | Mod: 26,RT,, | Performed by: RADIOLOGY

## 2023-04-05 PROCEDURE — 73620 XR FOOT 2 VIEW RIGHT: ICD-10-PCS | Mod: 26,RT,, | Performed by: RADIOLOGY

## 2023-04-05 PROCEDURE — 3077F SYST BP >= 140 MM HG: CPT | Mod: CPTII,,, | Performed by: PHYSICIAN ASSISTANT

## 2023-04-05 PROCEDURE — 3008F PR BODY MASS INDEX (BMI) DOCUMENTED: ICD-10-PCS | Mod: CPTII,,, | Performed by: PHYSICIAN ASSISTANT

## 2023-04-05 PROCEDURE — 99214 PR OFFICE/OUTPT VISIT, EST, LEVL IV, 30-39 MIN: ICD-10-PCS | Mod: S$PBB,25,, | Performed by: PHYSICIAN ASSISTANT

## 2023-04-05 PROCEDURE — 72040 XR CERVICAL SPINE AP LATERAL: ICD-10-PCS | Mod: 26,,, | Performed by: RADIOLOGY

## 2023-04-05 PROCEDURE — 73620 X-RAY EXAM OF FOOT: CPT | Mod: TC,RT

## 2023-04-05 PROCEDURE — 3008F BODY MASS INDEX DOCD: CPT | Mod: CPTII,,, | Performed by: PHYSICIAN ASSISTANT

## 2023-04-05 RX ORDER — KETOROLAC TROMETHAMINE 30 MG/ML
60 INJECTION, SOLUTION INTRAMUSCULAR; INTRAVENOUS
Status: COMPLETED | OUTPATIENT
Start: 2023-04-05 | End: 2023-04-05

## 2023-04-05 RX ORDER — HYDROCODONE BITARTRATE AND ACETAMINOPHEN 10; 325 MG/1; MG/1
1 TABLET ORAL EVERY 6 HOURS PRN
Qty: 120 TABLET | Refills: 0 | Status: SHIPPED | OUTPATIENT
Start: 2023-04-07 | End: 2023-06-01 | Stop reason: SDUPTHER

## 2023-04-05 RX ORDER — HYDROCODONE BITARTRATE AND ACETAMINOPHEN 10; 325 MG/1; MG/1
1 TABLET ORAL EVERY 6 HOURS PRN
Qty: 120 TABLET | Refills: 0 | Status: SHIPPED | OUTPATIENT
Start: 2023-05-07 | End: 2023-06-01 | Stop reason: SDUPTHER

## 2023-04-05 RX ADMIN — KETOROLAC TROMETHAMINE 60 MG: 30 INJECTION, SOLUTION INTRAMUSCULAR at 02:04

## 2023-04-05 NOTE — PROGRESS NOTES
Subjective:         Patient ID: Holly Porter is a 62 y.o. female.    Chief Complaint: Hip Pain (Bilateral) and Foot Pain (Right)            Pain  This is a chronic problem. The current episode started more than 1 year ago. The problem occurs daily. The problem has been waxing and waning. Associated symptoms include arthralgias. Pertinent negatives include no chest pain, chills, coughing, diaphoresis, fever, sore throat, vertigo or vomiting.   Review of Systems   Constitutional:  Negative for activity change, chills, diaphoresis, fever and unexpected weight change.   HENT:  Negative for drooling, ear discharge, ear pain, facial swelling, nosebleeds, sore throat, trouble swallowing, voice change and goiter.    Eyes:  Negative for photophobia, pain, discharge, redness and visual disturbance.   Respiratory:  Negative for apnea, cough, choking, chest tightness, shortness of breath, wheezing and stridor.    Cardiovascular:  Negative for chest pain, palpitations and leg swelling.   Gastrointestinal:  Negative for abdominal distention, diarrhea, rectal pain, vomiting and fecal incontinence.   Endocrine: Negative for cold intolerance, heat intolerance, polydipsia, polyphagia and polyuria.   Genitourinary:  Negative for bladder incontinence, dysuria, flank pain, frequency and hot flashes.   Musculoskeletal:  Positive for arthralgias, back pain, leg pain and neck stiffness.   Integumentary:  Negative for color change and pallor.   Allergic/Immunologic: Negative for immunocompromised state.   Neurological:  Negative for dizziness, vertigo, seizures, syncope, facial asymmetry, speech difficulty, light-headedness, coordination difficulties, memory loss and coordination difficulties.   Hematological:  Negative for adenopathy. Does not bruise/bleed easily.   Psychiatric/Behavioral:  Negative for agitation, behavioral problems, confusion, decreased concentration, dysphoric mood, hallucinations, self-injury and suicidal ideas.  The patient is not nervous/anxious and is not hyperactive.          Past Medical History:   Diagnosis Date    Anxiety state     Atherosclerosis of native artery of extremity with intermittent claudication 01/30/2019    bilateral legs    Bilateral leg pain     BMI 50.0-59.9, adult 02/22/2021    Cellulitis 06/11/2020    Chronic pain syndrome     Chronic peripheral venous hypertension 01/08/2019    COPD (chronic obstructive pulmonary disease)     Diabetes     Diabetes mellitus, type 2     DVT (deep venous thrombosis) 02/12/2020    Embolism and thrombosis of superficial veins of unspecified lower extremity 07/01/2019    bilateral    Frequent headaches 09/20/2018    GERD (gastroesophageal reflux disease)     Hereditary lymphedema     Hx of thyroid cancer     Hyperlipidemia     Hypertension     Hypothyroidism     Migraines     Migraines     Morbid obesity     Nicotine dependence     Non-pressure chronic ulcer of left lower leg 03/09/2021    Osteoarthritis     Other skin changes 03/09/2021    bilateral gaiter regions    Pain in left leg     Pain in right leg     PVD (peripheral vascular disease) 01/08/2019    Seizure disorder     Sleep apnea     Venous insufficiency (chronic) (peripheral)     Venous stasis     Vitamin D deficiency      Past Surgical History:   Procedure Laterality Date    HYSTERECTOMY      ILIAC ARTERY STENT Bilateral 01/28/2020    Bilateral distal aorta and common iliac 8 X 59 vbx covered stents performed by Dr. Antonio Jacinto.    RADIOFREQUENCY ABLATION Left 04/15/2022    Procedure: Left calf  Radiofrequency Ablation;  Surgeon: Matty Falcon DO;  Location: [facility];  Service: Vascular;  Laterality: Left;    STAB PHLEBECTOMY OF VARICOSE VEINS Left 01/25/2013    Left leg microphlebectomies x 23 stab avulsions and ligation of multiple varicose veins perrformed by Dr. Cirilo Aguirre.    THYROIDECTOMY      hx: thyroid cancer    TOE AMPUTATION Left 01/28/2020    2nd, 4th and 5th performed by Dr. Mendieta  Christophe.    TOE AMPUTATION Right 01/28/2020    4th toe performed by Dr. Anam Saeed.    TOTAL ABDOMINAL HYSTERECTOMY W/ BILATERAL SALPINGOOPHORECTOMY      TUBAL LIGATION      VAGINAL DELIVERY      x 4    VENOUS ABLATION Right 08/09/2019    GSV Varithena Ablation performed by Dr. Estuardo Falcon    VENOUS ABLATION Left 08/02/2019    ATAV Varithena Ablation performed by Dr. Estuardo Falcon.    VENOUS ABLATION Right 07/13/2015    ATAV Laser Ablatio performed by Dr. Cirilo Aguirre.    VENOUS ABLATION Right 07/06/2015    Distal  GSV Laser Ablation performed by dr. Cirilo Aguirre.    VENOUS ABLATION Left 04/20/2015    Left Distal GSV Laser Ablation performed by dr. Cirilo Aguirre.    VENOUS ABLATION Right 10/28/2013    Right Distal GSV RF Ablation performed by Dr. Cirilo Aguirre.    VENOUS ABLATION Left 10/25/2013    SSV RF Ablation performed by dr. Cirilo Aguirre.    VENOUS ABLATION Left 10/07/2013    Left GSV and Left ATAV RF Ablation performed by Dr. Cirilo Aguirre.    VENOUS ABLATION Right 01/21/2013    GSV RF Ablation w/micros x 22 performed by Dr. Cirilo Aguirre.    VENOUS ABLATION Left 06/25/2021    Left distal GSV Varithena Ablation     Social History     Socioeconomic History    Marital status:    Tobacco Use    Smoking status: Every Day     Packs/day: 0.50     Years: 33.00     Pack years: 16.50     Types: Cigarettes    Smokeless tobacco: Never   Substance and Sexual Activity    Alcohol use: Never    Drug use: Never    Sexual activity: Not Currently     Social Determinants of Health     Financial Resource Strain: Low Risk     Difficulty of Paying Living Expenses: Not very hard   Food Insecurity: No Food Insecurity    Worried About Running Out of Food in the Last Year: Never true    Ran Out of Food in the Last Year: Never true   Transportation Needs: No Transportation Needs    Lack of Transportation (Medical): No    Lack of Transportation (Non-Medical): No   Physical Activity: Inactive    Days of Exercise per Week: 0 days    Minutes of Exercise per Session:  0 min   Stress: No Stress Concern Present    Feeling of Stress : Not at all   Social Connections: Socially Isolated    Frequency of Communication with Friends and Family: More than three times a week    Frequency of Social Gatherings with Friends and Family: More than three times a week    Attends Confucianist Services: Never    Active Member of Clubs or Organizations: No    Attends Club or Organization Meetings: Never    Marital Status:    Housing Stability: Low Risk     Unable to Pay for Housing in the Last Year: No    Number of Places Lived in the Last Year: 2    Unstable Housing in the Last Year: No     Family History   Problem Relation Age of Onset    Heart disease Mother         age 84 CHF    Hypertension Mother     Osteoarthritis Mother     Coronary aneurysm Father     Coronary artery disease Father     Hypertension Father     Heart disease Father     No Known Problems Son     No Known Problems Son     No Known Problems Son     No Known Problems Son     No Known Problems Sister     No Known Problems Brother         hx: varicose veins    No Known Problems Brother         MVA: parlazed    No Known Problems Brother      Review of patient's allergies indicates:   Allergen Reactions    Ammonium peroxydisulfate Shortness Of Breath    Avocado (laurus persea) Anaphylaxis    Bananas [banana] Anaphylaxis and Swelling     CRAMPS,    Chocolate flavor Anaphylaxis     MOUTH SWELLING    Fentanyl Shortness Of Breath and Itching    Percocet [oxycodone-acetaminophen] Shortness Of Breath and Itching    Silvadene [silver sulfadiazine]     Clindamycin     Corticosteroids (glucocorticoids)     Hydrocortisone Blisters    Lasix [furosemide] Blisters     BURNS SKIN    Nutritional supplement-fiber Itching    Pregabalin     Shellfish containing products     Adhesive Rash and Blisters    Iodine and iodide containing products Rash    Latex, natural rubber Rash    Pcn [penicillins] Rash    Sulfa (sulfonamide antibiotics) Rash and  "Blisters        Objective:  Vitals:    04/05/23 1333   BP: (!) 150/82   Pulse: 72   Weight: (!) 158.3 kg (349 lb)   Height: 5' 7" (1.702 m)   PainSc:   7         Physical Exam  Vitals and nursing note reviewed. Exam conducted with a chaperone present.   Constitutional:       General: She is awake. She is not in acute distress.     Appearance: Normal appearance. She is obese. She is not ill-appearing, toxic-appearing or diaphoretic.   HENT:      Head: Normocephalic and atraumatic.      Nose: Nose normal.      Mouth/Throat:      Mouth: Mucous membranes are moist.      Pharynx: Oropharynx is clear.   Eyes:      Conjunctiva/sclera: Conjunctivae normal.      Pupils: Pupils are equal, round, and reactive to light.   Cardiovascular:      Rate and Rhythm: Normal rate.   Pulmonary:      Effort: Pulmonary effort is normal. No respiratory distress.   Abdominal:      Palpations: Abdomen is soft.   Musculoskeletal:         General: Normal range of motion.      Cervical back: Normal range of motion and neck supple.   Skin:     General: Skin is warm and dry.   Neurological:      General: No focal deficit present.      Mental Status: She is alert and oriented to person, place, and time. Mental status is at baseline.      Cranial Nerves: No cranial nerve deficit (II-XII).   Psychiatric:         Mood and Affect: Mood normal.         Behavior: Behavior normal. Behavior is cooperative.         Thought Content: Thought content normal.         US Lower Extremity Veins Bilateral  Narrative: EXAMINATION:  US LOWER EXTREMITY VEINS BILATERAL    CLINICAL HISTORY:  Bilateral lower extremity edema, elevated d-dimer;    TECHNIQUE:  Duplex scan of the bilateral lower extremity veins using the B-mode/grayscale imaging and Doppler spectral analysis and color-flow    COMPARISON:  No previous similar    FINDINGS:  Major venous structures of the bilateral lower extremity demonstrate a normal course and caliber. There is no evidence of deep venous " thrombosis. No abnormal intrinsic echogenic lesions are demonstrated in the scanned blood vessels. The veins of the bilateral lower extremity demonstrate good compressibility with normal color flow study and spectral analysis.  Impression: Unremarkable duplex imaging of the bilateral lower extremity. No evidence of DVT.    The exam was technically difficult with limited visualization because of patient body habitus, per the technologist    Electronically signed by: Rene Santiago  Date:    12/04/2022  Time:    17:27  CTA Chest Non-Coronary (PE Studies)  Narrative: EXAMINATION:  CTA CHEST NON CORONARY (PE STUDIES)    CLINICAL HISTORY:  Pulmonary embolism (PE) suspected, positive D-dimer;.    COMPARISON:  No previous similar CT    TECHNIQUE:  Thin spiral CT sections were obtained through the chest during the dynamic IV administration of 100 ml of Isovue 370.  In addition to multiplanar reconstruction images, 3-D images were also generated, archived, and analyzed.    The CT examination was performed using one or more of the following dose reduction techniques: Automated exposure control, adjustment of the mA and kV according to patient's size, use of acute or iterative reconstruction techniques.    FINDINGS:  There is no gross central pulmonary embolus, but evaluation for pulmonary embolus is otherwise limited because of contrast timing factors.  There is no thoracic aorta aneurysm or dissection.  There is mild to moderate atherosclerotic calcification of the wall of the aortic arch.    There is a mildly enlarged right paratracheal lymph node measuring 15 mm short axis diameter.  There is mild to moderate coronary artery calcification involving the left anterior descending coronary artery.    There is no pleural or pericardial effusion.    There is no omar pneumonia.  There is a 5 mm pleural base nodule left upper lobe image 197 of the 5th series.  Lungs are otherwise clear aside from some dependent atelectasis.   Central airway is clear.    There is no definite acute process in the partially visualized upper abdomen.    There is scattered mild to moderate degenerative disc narrowing and spondylosis of the thoracic spine  Impression: No gross central pulmonary embolus.  Evaluation for pulmonary emboli is grossly limited otherwise because of contrast timing factors    No omar pneumonia    A 5 mm pleural base nodule left upper lobe of uncertain chronicity.  Recommend follow-up CT in 6-12 months to document stability over time    Electronically signed by: Rene Santiago  Date:    12/04/2022  Time:    17:04  X-Ray Chest AP Portable  Narrative: EXAMINATION:  XR CHEST AP PORTABLE    CLINICAL HISTORY:  shortness of breath;.    COMPARISON:  05/10/2022    TECHNIQUE:  Chest x-ray AP portable erect    FINDINGS:  There is cardiomegaly.  There is no mediastinal mass.  There is mild to moderate aortic arch calcification.  Pulmonary vasculature is upper normal.    Lungs are grossly clear for shallow breath.  There is no gross pleural effusion.    Osseous structures are unchanged.  There is moderate thoracic spondylosis  Impression: Cardiomegaly and borderline pulmonary venous hypertension appearance without overt pulmonary edema    Shallow breath    Electronically signed by: Rene Santiago  Date:    12/04/2022  Time:    15:55         Office Visit on 03/15/2023   Component Date Value Ref Range Status    Sodium 03/15/2023 135 (L)  136 - 145 mmol/L Final    Potassium 03/15/2023 3.8  3.5 - 5.1 mmol/L Final    Chloride 03/15/2023 104  98 - 107 mmol/L Final    CO2 03/15/2023 28  21 - 32 mmol/L Final    Anion Gap 03/15/2023 7  7 - 16 mmol/L Final    Glucose 03/15/2023 97  74 - 106 mg/dL Final    BUN 03/15/2023 14  7 - 18 mg/dL Final    Creatinine 03/15/2023 1.06 (H)  0.55 - 1.02 mg/dL Final    BUN/Creatinine Ratio 03/15/2023 13  6 - 20 Final    Calcium 03/15/2023 9.4  8.5 - 10.1 mg/dL Final    Total Protein 03/15/2023 7.9  6.4 - 8.2 g/dL  Final    Albumin 03/15/2023 3.6  3.5 - 5.0 g/dL Final    Globulin 03/15/2023 4.3 (H)  2.0 - 4.0 g/dL Final    A/G Ratio 03/15/2023 0.8   Final    Bilirubin, Total 03/15/2023 0.3  >0.0 - 1.2 mg/dL Final    Alk Phos 03/15/2023 99  50 - 130 U/L Final    ALT 03/15/2023 15  13 - 56 U/L Final    AST 03/15/2023 16  15 - 37 U/L Final    eGFR 03/15/2023 59 (L)  >=60 mL/min/1.73m² Final    Hemoglobin A1C 03/15/2023 6.1  4.5 - 6.6 % Final    Estimated Average Glucose 03/15/2023 117  mg/dL Final    Culture, Wound/Abscess 03/15/2023 Normal Skin Tamara, no further workup.   Final    WBC 03/15/2023 8.65  4.50 - 11.00 K/uL Final    RBC 03/15/2023 6.19 (H)  4.20 - 5.40 M/uL Final    Hemoglobin 03/15/2023 15.6  12.0 - 16.0 g/dL Final    Hematocrit 03/15/2023 51.1 (H)  38.0 - 47.0 % Final    MCV 03/15/2023 82.6  80.0 - 96.0 fL Final    MCH 03/15/2023 25.2 (L)  27.0 - 31.0 pg Final    MCHC 03/15/2023 30.5 (L)  32.0 - 36.0 g/dL Final    RDW 03/15/2023 17.1 (H)  11.5 - 14.5 % Final    Platelet Count 03/15/2023 244  150 - 400 K/uL Final    MPV 03/15/2023 12.8 (H)  9.4 - 12.4 fL Final    Neutrophils % 03/15/2023 64.9  53.0 - 65.0 % Final    Lymphocytes % 03/15/2023 22.4 (L)  27.0 - 41.0 % Final    Monocytes % 03/15/2023 6.0  2.0 - 6.0 % Final    Eosinophils % 03/15/2023 5.9 (H)  1.0 - 4.0 % Final    Basophils % 03/15/2023 0.5  0.0 - 1.0 % Final    Immature Granulocytes % 03/15/2023 0.3  0.0 - 0.4 % Final    nRBC, Auto 03/15/2023 0.0  <=0.0 % Final    Neutrophils, Abs 03/15/2023 5.61  1.80 - 7.70 K/uL Final    Lymphocytes, Absolute 03/15/2023 1.94  1.00 - 4.80 K/uL Final    Monocytes, Absolute 03/15/2023 0.52  0.00 - 0.80 K/uL Final    Eosinophils, Absolute 03/15/2023 0.51 (H)  0.00 - 0.50 K/uL Final    Basophils, Absolute 03/15/2023 0.04  0.00 - 0.20 K/uL Final    Immature Granulocytes, Absolute 03/15/2023 0.03  0.00 - 0.04 K/uL Final    nRBC, Absolute 03/15/2023 0.00  <=0.00 x10e3/uL Final    Diff Type 03/15/2023 Auto   Final   Office  Visit on 12/08/2022   Component Date Value Ref Range Status    POC Amphetamines 12/08/2022 Negative  Negative, Inconclusive Final    POC Barbiturates 12/08/2022 Negative  Negative, Inconclusive Final    POC Benzodiazepines 12/08/2022 Negative  Negative, Inconclusive Final    POC Cocaine 12/08/2022 Negative  Negative, Inconclusive Final    POC THC 12/08/2022 Negative  Negative, Inconclusive Final    POC Methadone 12/08/2022 Negative  Negative, Inconclusive Final    POC Methamphetamine 12/08/2022 Negative  Negative, Inconclusive Final    POC Opiates 12/08/2022 Presumptive Positive (A)  Negative, Inconclusive Final    POC Oxycodone 12/08/2022 Negative  Negative, Inconclusive Final    POC Phencyclidine 12/08/2022 Negative  Negative, Inconclusive Final    POC Methylenedioxymethamphetamine * 12/08/2022 Negative  Negative, Inconclusive Final    POC Tricyclic Antidepressants 12/08/2022 Negative  Negative, Inconclusive Final    POC Buprenorphine 12/08/2022 Negative   Final     Acceptable 12/08/2022 Yes   Final    POC Temperature (Urine) 12/08/2022 90   Final   Admission on 12/04/2022, Discharged on 12/04/2022   Component Date Value Ref Range Status    D-Dimer 12/04/2022 0.68 (H)  0.00 - 0.47 µg/mL Final    ProBNP 12/04/2022 44  1 - 125 pg/mL Final    Troponin I High Sensitivity 12/04/2022 13.3  <=60.4 pg/mL Final    Magnesium 12/04/2022 2.3  1.7 - 2.3 mg/dL Final    POC Glucose 12/04/2022 101  70 - 105 mg/dL Final   Admission on 12/01/2022, Discharged on 12/04/2022   Component Date Value Ref Range Status    POC Glucose 12/01/2022 114 (H)  70 - 105 mg/dL Final    Sodium 12/01/2022 139  136 - 145 mmol/L Final    Potassium 12/01/2022 3.6  3.5 - 5.1 mmol/L Final    Chloride 12/01/2022 100  98 - 107 mmol/L Final    CO2 12/01/2022 30  21 - 32 mmol/L Final    Anion Gap 12/01/2022 13  7 - 16 mmol/L Final    Glucose 12/01/2022 98  74 - 106 mg/dL Final    BUN 12/01/2022 14  7 - 18 mg/dL Final    Creatinine 12/01/2022  1.18 (H)  0.55 - 1.02 mg/dL Final    BUN/Creatinine Ratio 12/01/2022 12  6 - 20 Final    Calcium 12/01/2022 9.6  8.5 - 10.1 mg/dL Final    Total Protein 12/01/2022 7.8  6.4 - 8.2 g/dL Final    Albumin 12/01/2022 3.8  3.5 - 5.0 g/dL Final    Globulin 12/01/2022 4.0  2.0 - 4.0 g/dL Final    A/G Ratio 12/01/2022 1.0   Final    Bilirubin, Total 12/01/2022 0.5  >0.0 - 1.2 mg/dL Final    Alk Phos 12/01/2022 102  50 - 130 U/L Final    ALT 12/01/2022 23  13 - 56 U/L Final    AST 12/01/2022 24  15 - 37 U/L Final    eGFR 12/01/2022 52 (L)  >=60 mL/min/1.73m² Final    Color, UA 12/01/2022 Yellow  Colorless, Straw, Yellow, Light Yellow, Dark Yellow Final    Clarity, UA 12/01/2022 Clear  Clear Final    pH, UA 12/01/2022 5.0  5.0 to 8.0 pH Units Final    Leukocytes, UA 12/01/2022 Negative  Negative Final    Nitrites, UA 12/01/2022 Negative  Negative Final    Protein, UA 12/01/2022 10 (A)  Negative Final    Glucose, UA 12/01/2022 Normal  Normal mg/dL Final    Ketones, UA 12/01/2022 Negative  Negative mg/dL Final    Urobilinogen, UA 12/01/2022 2 (A)  0.2, 1.0, Normal mg/dL Final    Bilirubin, UA 12/01/2022 Negative  Negative Final    Blood, UA 12/01/2022 Negative  Negative Final    Specific Gravity, UA 12/01/2022 1.031 (H)  <=1.030 Final    Culture, Blood 12/01/2022 No Growth at 5 days   Final    Culture, Blood 12/01/2022 No Growth at 5 days   Final    PTT 12/01/2022 31.1  25.2 - 37.3 seconds Final    PT 12/01/2022 13.2  11.7 - 14.7 seconds Final    INR 12/01/2022 1.04  <=3.30 Final    WBC 12/01/2022 10.63  4.50 - 11.00 K/uL Final    RBC 12/01/2022 6.15 (H)  4.20 - 5.40 M/uL Final    Hemoglobin 12/01/2022 16.2 (H)  12.0 - 16.0 g/dL Final    Hematocrit 12/01/2022 53.1 (H)  38.0 - 47.0 % Final    MCV 12/01/2022 86.3  80.0 - 96.0 fL Final    MCH 12/01/2022 26.3 (L)  27.0 - 31.0 pg Final    MCHC 12/01/2022 30.5 (L)  32.0 - 36.0 g/dL Final    RDW 12/01/2022 16.0 (H)  11.5 - 14.5 % Final    Platelet Count 12/01/2022 308  150 - 400 K/uL  Final    MPV 12/01/2022 12.2  9.4 - 12.4 fL Final    Neutrophils % 12/01/2022 68.6 (H)  53.0 - 65.0 % Final    Lymphocytes % 12/01/2022 21.7 (L)  27.0 - 41.0 % Final    Monocytes % 12/01/2022 5.9  2.0 - 6.0 % Final    Eosinophils % 12/01/2022 2.3  1.0 - 4.0 % Final    Basophils % 12/01/2022 0.8  0.0 - 1.0 % Final    Immature Granulocytes % 12/01/2022 0.7 (H)  0.0 - 0.4 % Final    nRBC, Auto 12/01/2022 0.0  <=0.0 % Final    Neutrophils, Abs 12/01/2022 7.30  1.80 - 7.70 K/uL Final    Lymphocytes, Absolute 12/01/2022 2.31  1.00 - 4.80 K/uL Final    Monocytes, Absolute 12/01/2022 0.63  0.00 - 0.80 K/uL Final    Eosinophils, Absolute 12/01/2022 0.24  0.00 - 0.50 K/uL Final    Basophils, Absolute 12/01/2022 0.08  0.00 - 0.20 K/uL Final    Immature Granulocytes, Absolute 12/01/2022 0.07 (H)  0.00 - 0.04 K/uL Final    nRBC, Absolute 12/01/2022 0.00  <=0.00 x10e3/uL Final    Diff Type 12/01/2022 Manual   Final    Segmented Neutrophils, Man % 12/01/2022 69 (H)  50 - 62 % Final    Lymphocytes, Man % 12/01/2022 22 (L)  27 - 41 % Final    Monocytes, Man % 12/01/2022 6  2 - 6 % Final    Eosinophils, Man % 12/01/2022 3  1 - 4 % Final    Platelet Morphology 12/01/2022 Few Large Platelets (A)  Normal Final    Anisocytosis 12/01/2022 1+   Final    ESR Westergren 12/01/2022 19  0 - 30 mm/Hr Final    CRP 12/01/2022 3.70 (H)  0.00 - 0.80 mg/dL Final    Iron 12/01/2022 46 (L)  50 - 170 µg/dL Final    Iron Saturation 12/01/2022 12 (L)  14 - 50 % Final    TIBC 12/01/2022 376  250 - 450 µg/dL Final    Ferritin 12/01/2022 51  8 - 252 ng/mL Final    TSH 12/01/2022 2.000  0.358 - 3.740 uIU/mL Final    POC Glucose 12/01/2022 142 (H)  70 - 105 mg/dL Final    Sodium 12/02/2022 139  136 - 145 mmol/L Final    Potassium 12/02/2022 3.7  3.5 - 5.1 mmol/L Final    Chloride 12/02/2022 105  98 - 107 mmol/L Final    CO2 12/02/2022 28  21 - 32 mmol/L Final    Anion Gap 12/02/2022 10  7 - 16 mmol/L Final    Glucose 12/02/2022 105  74 - 106 mg/dL Final     BUN 12/02/2022 17  7 - 18 mg/dL Final    Creatinine 12/02/2022 1.09 (H)  0.55 - 1.02 mg/dL Final    BUN/Creatinine Ratio 12/02/2022 16  6 - 20 Final    Calcium 12/02/2022 9.4  8.5 - 10.1 mg/dL Final    eGFR 12/02/2022 57 (L)  >=60 mL/min/1.73m² Final    Magnesium 12/02/2022 1.9  1.7 - 2.3 mg/dL Final    Phosphorus 12/02/2022 4.7 (H)  2.5 - 4.5 mg/dL Final    WBC 12/02/2022 8.97  4.50 - 11.00 K/uL Final    RBC 12/02/2022 5.53 (H)  4.20 - 5.40 M/uL Final    Hemoglobin 12/02/2022 14.6  12.0 - 16.0 g/dL Final    Hematocrit 12/02/2022 49.4 (H)  38.0 - 47.0 % Final    MCV 12/02/2022 89.3  80.0 - 96.0 fL Final    MCH 12/02/2022 26.4 (L)  27.0 - 31.0 pg Final    MCHC 12/02/2022 29.6 (L)  32.0 - 36.0 g/dL Final    RDW 12/02/2022 15.7 (H)  11.5 - 14.5 % Final    Platelet Count 12/02/2022 247  150 - 400 K/uL Final    MPV 12/02/2022 12.4  9.4 - 12.4 fL Final    Neutrophils % 12/02/2022 60.1  53.0 - 65.0 % Final    Lymphocytes % 12/02/2022 27.1  27.0 - 41.0 % Final    Monocytes % 12/02/2022 7.8 (H)  2.0 - 6.0 % Final    Eosinophils % 12/02/2022 3.9  1.0 - 4.0 % Final    Basophils % 12/02/2022 0.8  0.0 - 1.0 % Final    Immature Granulocytes % 12/02/2022 0.3  0.0 - 0.4 % Final    nRBC, Auto 12/02/2022 0.0  <=0.0 % Final    Neutrophils, Abs 12/02/2022 5.39  1.80 - 7.70 K/uL Final    Lymphocytes, Absolute 12/02/2022 2.43  1.00 - 4.80 K/uL Final    Monocytes, Absolute 12/02/2022 0.70  0.00 - 0.80 K/uL Final    Eosinophils, Absolute 12/02/2022 0.35  0.00 - 0.50 K/uL Final    Basophils, Absolute 12/02/2022 0.07  0.00 - 0.20 K/uL Final    Immature Granulocytes, Absolute 12/02/2022 0.03  0.00 - 0.04 K/uL Final    nRBC, Absolute 12/02/2022 0.00  <=0.00 x10e3/uL Final    Diff Type 12/02/2022 Auto   Final    POC Glucose 12/02/2022 112 (H)  70 - 105 mg/dL Final    POC Glucose 12/02/2022 132 (H)  70 - 105 mg/dL Final    POC Glucose 12/02/2022 139 (H)  70 - 105 mg/dL Final    POC Glucose 12/02/2022 142 (H)  70 - 105 mg/dL Final    Sodium  12/03/2022 138  136 - 145 mmol/L Final    Potassium 12/03/2022 3.7  3.5 - 5.1 mmol/L Final    Chloride 12/03/2022 100  98 - 107 mmol/L Final    CO2 12/03/2022 32  21 - 32 mmol/L Final    Anion Gap 12/03/2022 10  7 - 16 mmol/L Final    Glucose 12/03/2022 123 (H)  74 - 106 mg/dL Final    BUN 12/03/2022 15  7 - 18 mg/dL Final    Creatinine 12/03/2022 1.07 (H)  0.55 - 1.02 mg/dL Final    BUN/Creatinine Ratio 12/03/2022 14  6 - 20 Final    Calcium 12/03/2022 9.8  8.5 - 10.1 mg/dL Final    eGFR 12/03/2022 58 (L)  >=60 mL/min/1.73m² Final    Magnesium 12/03/2022 1.9  1.7 - 2.3 mg/dL Final    Phosphorus 12/03/2022 5.1 (H)  2.5 - 4.5 mg/dL Final    WBC 12/03/2022 9.65  4.50 - 11.00 K/uL Final    RBC 12/03/2022 5.45 (H)  4.20 - 5.40 M/uL Final    Hemoglobin 12/03/2022 14.5  12.0 - 16.0 g/dL Final    Hematocrit 12/03/2022 47.5 (H)  38.0 - 47.0 % Final    MCV 12/03/2022 87.2  80.0 - 96.0 fL Final    MCH 12/03/2022 26.6 (L)  27.0 - 31.0 pg Final    MCHC 12/03/2022 30.5 (L)  32.0 - 36.0 g/dL Final    RDW 12/03/2022 15.6 (H)  11.5 - 14.5 % Final    Platelet Count 12/03/2022 265  150 - 400 K/uL Final    MPV 12/03/2022 12.6 (H)  9.4 - 12.4 fL Final    Neutrophils % 12/03/2022 61.1  53.0 - 65.0 % Final    Lymphocytes % 12/03/2022 22.3 (L)  27.0 - 41.0 % Final    Monocytes % 12/03/2022 8.1 (H)  2.0 - 6.0 % Final    Eosinophils % 12/03/2022 7.5 (H)  1.0 - 4.0 % Final    Basophils % 12/03/2022 0.6  0.0 - 1.0 % Final    Immature Granulocytes % 12/03/2022 0.4  0.0 - 0.4 % Final    nRBC, Auto 12/03/2022 0.0  <=0.0 % Final    Neutrophils, Abs 12/03/2022 5.90  1.80 - 7.70 K/uL Final    Lymphocytes, Absolute 12/03/2022 2.15  1.00 - 4.80 K/uL Final    Monocytes, Absolute 12/03/2022 0.78  0.00 - 0.80 K/uL Final    Eosinophils, Absolute 12/03/2022 0.72 (H)  0.00 - 0.50 K/uL Final    Basophils, Absolute 12/03/2022 0.06  0.00 - 0.20 K/uL Final    Immature Granulocytes, Absolute 12/03/2022 0.04  0.00 - 0.04 K/uL Final    nRBC, Absolute 12/03/2022  0.00  <=0.00 x10e3/uL Final    Diff Type 12/03/2022 Auto   Final    POC Glucose 12/03/2022 147 (H)  70 - 105 mg/dL Final    POC Glucose 12/03/2022 134 (H)  70 - 105 mg/dL Final    POC Glucose 12/03/2022 105  70 - 105 mg/dL Final    POC Glucose 12/03/2022 94  70 - 105 mg/dL Final    Sodium 12/04/2022 137  136 - 145 mmol/L Final    Potassium 12/04/2022 3.4 (L)  3.5 - 5.1 mmol/L Final    Chloride 12/04/2022 99  98 - 107 mmol/L Final    CO2 12/04/2022 32  21 - 32 mmol/L Final    Anion Gap 12/04/2022 9  7 - 16 mmol/L Final    Glucose 12/04/2022 111 (H)  74 - 106 mg/dL Final    BUN 12/04/2022 14  7 - 18 mg/dL Final    Creatinine 12/04/2022 1.00  0.55 - 1.02 mg/dL Final    BUN/Creatinine Ratio 12/04/2022 14  6 - 20 Final    Calcium 12/04/2022 9.8  8.5 - 10.1 mg/dL Final    eGFR 12/04/2022 63  >=60 mL/min/1.73m² Final    Magnesium 12/04/2022 2.0  1.7 - 2.3 mg/dL Final    Phosphorus 12/04/2022 4.4  2.5 - 4.5 mg/dL Final    WBC 12/04/2022 8.49  4.50 - 11.00 K/uL Final    RBC 12/04/2022 5.51 (H)  4.20 - 5.40 M/uL Final    Hemoglobin 12/04/2022 14.5  12.0 - 16.0 g/dL Final    Hematocrit 12/04/2022 47.2 (H)  38.0 - 47.0 % Final    MCV 12/04/2022 85.7  80.0 - 96.0 fL Final    MCH 12/04/2022 26.3 (L)  27.0 - 31.0 pg Final    MCHC 12/04/2022 30.7 (L)  32.0 - 36.0 g/dL Final    RDW 12/04/2022 15.3 (H)  11.5 - 14.5 % Final    Platelet Count 12/04/2022 265  150 - 400 K/uL Final    MPV 12/04/2022 12.1  9.4 - 12.4 fL Final    Neutrophils % 12/04/2022 60.2  53.0 - 65.0 % Final    Lymphocytes % 12/04/2022 20.5 (L)  27.0 - 41.0 % Final    Monocytes % 12/04/2022 6.1 (H)  2.0 - 6.0 % Final    Eosinophils % 12/04/2022 12.0 (H)  1.0 - 4.0 % Final    Basophils % 12/04/2022 0.6  0.0 - 1.0 % Final    Immature Granulocytes % 12/04/2022 0.6 (H)  0.0 - 0.4 % Final    nRBC, Auto 12/04/2022 0.0  <=0.0 % Final    Neutrophils, Abs 12/04/2022 5.11  1.80 - 7.70 K/uL Final    Lymphocytes, Absolute 12/04/2022 1.74  1.00 - 4.80 K/uL Final    Monocytes,  Absolute 12/04/2022 0.52  0.00 - 0.80 K/uL Final    Eosinophils, Absolute 12/04/2022 1.02 (H)  0.00 - 0.50 K/uL Final    Basophils, Absolute 12/04/2022 0.05  0.00 - 0.20 K/uL Final    Immature Granulocytes, Absolute 12/04/2022 0.05 (H)  0.00 - 0.04 K/uL Final    nRBC, Absolute 12/04/2022 0.00  <=0.00 x10e3/uL Final    Diff Type 12/04/2022 Auto   Final   Office Visit on 11/08/2022   Component Date Value Ref Range Status    ESR Westergren 11/08/2022 12  0 - 30 mm/Hr Final    WBC 11/08/2022 10.04  4.50 - 11.00 K/uL Final    RBC 11/08/2022 5.79 (H)  4.20 - 5.40 M/uL Final    Hemoglobin 11/08/2022 15.5  12.0 - 16.0 g/dL Final    Hematocrit 11/08/2022 49.8 (H)  38.0 - 47.0 % Final    MCV 11/08/2022 86.0  80.0 - 96.0 fL Final    MCH 11/08/2022 26.8 (L)  27.0 - 31.0 pg Final    MCHC 11/08/2022 31.1 (L)  32.0 - 36.0 g/dL Final    RDW 11/08/2022 16.2 (H)  11.5 - 14.5 % Final    Platelet Count 11/08/2022 241  150 - 400 K/uL Final    MPV 11/08/2022 13.1 (H)  9.4 - 12.4 fL Final    Neutrophils % 11/08/2022 65.6 (H)  53.0 - 65.0 % Final    Lymphocytes % 11/08/2022 23.7 (L)  27.0 - 41.0 % Final    Monocytes % 11/08/2022 6.2 (H)  2.0 - 6.0 % Final    Eosinophils % 11/08/2022 3.3  1.0 - 4.0 % Final    Basophils % 11/08/2022 0.6  0.0 - 1.0 % Final    Immature Granulocytes % 11/08/2022 0.6 (H)  0.0 - 0.4 % Final    nRBC, Auto 11/08/2022 0.0  <=0.0 % Final    Neutrophils, Abs 11/08/2022 6.59  1.80 - 7.70 K/uL Final    Lymphocytes, Absolute 11/08/2022 2.38  1.00 - 4.80 K/uL Final    Monocytes, Absolute 11/08/2022 0.62  0.00 - 0.80 K/uL Final    Eosinophils, Absolute 11/08/2022 0.33  0.00 - 0.50 K/uL Final    Basophils, Absolute 11/08/2022 0.06  0.00 - 0.20 K/uL Final    Immature Granulocytes, Absolute 11/08/2022 0.06 (H)  0.00 - 0.04 K/uL Final    nRBC, Absolute 11/08/2022 0.00  <=0.00 x10e3/uL Final    Diff Type 11/08/2022 Scan Smear   Final    Platelet Morphology 11/08/2022 Large Platelets (A)  Normal Final    RBC Morphology  11/08/2022 Normal   Final         Orders Placed This Encounter   Procedures    X-Ray Cervical Spine AP And Lateral     Standing Status:   Future     Standing Expiration Date:   4/5/2024     Order Specific Question:   Does the patient have a neck collar or brace on?     Answer:   No     Order Specific Question:   May the Radiologist modify the order per protocol to meet the clinical needs of the patient?     Answer:   Yes     Order Specific Question:   Release to patient     Answer:   Immediate    MRI Cervical Spine Without Contrast     Standing Status:   Future     Standing Expiration Date:   4/5/2024     Order Specific Question:   Does the patient have a pacemaker or a defibrillator (Note: Some facilities may not be able to schedule an MRI for patients with pacemakers and defibrillators. You should contact your local radiology department to determine if this is the case.)?     Answer:   No     Order Specific Question:   Does the patient have an aneurysm or surgical clip, pump, nerve/brain stimulator, middle/inner ear prosthesis, or other metal implant or foreign object (bullet, shrapnel)? If they have a card related to their implant, ask them to bring it. Issues related to the implant may cause the MRI to be delayed.     Answer:   No     Order Specific Question:   Is the patient claustrophobic?     Answer:   No     Order Specific Question:   Will the patient require sedation?     Answer:   No     Order Specific Question:   Does the patient have any of the following conditions? Diabetes, History of Renal Disease or Hypertension requiring medical therapy?     Answer:   No     Order Specific Question:   May the Radiologist modify the order per protocol to meet the clinical needs of the patient?     Answer:   Yes     Order Specific Question:   Is this part of a Research Study?     Answer:   No     Order Specific Question:   Recist criteria?     Answer:   No     Order Specific Question:   Will this service be billed to  a Worker's Comp policy?     Answer:   No     Order Specific Question:   Does the patient have on a skin patch for medication with aluminized backing?     Answer:   No           Requested Prescriptions     Signed Prescriptions Disp Refills    HYDROcodone-acetaminophen (NORCO)  mg per tablet 120 tablet 0     Sig: Take 1 tablet by mouth every 6 (six) hours as needed for Pain.    HYDROcodone-acetaminophen (NORCO)  mg per tablet 120 tablet 0     Sig: Take 1 tablet by mouth every 6 (six) hours as needed for Pain.       Assessment:     1. Radiculopathy, cervical region    2. Lumbosacral spondylosis without myelopathy    3. Chronic pain of right knee    4. PVD (peripheral vascular disease)    5. Foot pain, left         A's of Opioid Risk Assessment  Activity:Patient can perform ADL.   Analgesia:Patients pain is partially controlled by current medication. Patient has tried OTC medications such as Tylenol and Ibuprofen with out relief.   Adverse Effects: Patient denies constipation or sedation.  Aberrant Behavior:  reviewed with no aberrant drug seeking/taking behavior.  Overdose reversal drug naloxone discussed    Drug screen reviewed      Plan:    Narcan December 2022    Wheelchair/4 point walker for mobility due to chronic nonhealing wounds right foot     Following wound management chronic ulcers lower extremities    Following orthopedics knee pain [facility], she states she was advised not to have surgery due to her weight    Continues to heal with nonhealing wounds lower extremities    Diabetic, limit steroids    Cannot tolerate OxyContin she is severely allergic    Complaint increasing cervical spine discomfort bilateral arm pain numbness and tingling worse with increased use her arms worse with increased activity better with short periods resting her pain is radicular in nature     She denies loss of bowel or bladder function denies electrical sensation    Requesting Toradol injection    Toradol 60 mg  IM     Physical therapy did not improve her discomfort    Continue home exercise program as tolerated    Will order x-ray cervical spine     MRI cervical spine no contrast, cervical radiculopathy  MRI for consideration procedure/surgery    I have given the patient medically directed home exercise program    Patient has tried NSAIDs and neuromodulators (neurontin, lyrica, elavil, or cymbalta)    Follow-up 2 months    Dr. Crandall, April 2023    Bring original prescription medication bottles/container/box with labels to each visit    Pill count    Physical therapy Amanuel Kumar February 2023    Massage therapy declines

## 2023-04-09 DIAGNOSIS — Z71.89 COMPLEX CARE COORDINATION: ICD-10-CM

## 2023-05-04 RX ORDER — PANTOPRAZOLE SODIUM 40 MG/1
40 TABLET, DELAYED RELEASE ORAL DAILY
Qty: 90 TABLET | Refills: 1 | Status: ON HOLD | OUTPATIENT
Start: 2023-05-04 | End: 2023-11-01 | Stop reason: SDUPTHER

## 2023-05-04 NOTE — TELEPHONE ENCOUNTER
----- Message from Lynne Rueda sent at 5/4/2023  2:34 PM CDT -----  PT CALLED FOR REFILL          pantoprazole (PROTONIX) 40 MG tablet        [facility] [place] [facility] [phone]

## 2023-05-17 DIAGNOSIS — Z79.4 TYPE 2 DIABETES MELLITUS WITHOUT COMPLICATION, WITH LONG-TERM CURRENT USE OF INSULIN: ICD-10-CM

## 2023-05-17 DIAGNOSIS — E11.9 TYPE 2 DIABETES MELLITUS WITHOUT COMPLICATION, WITH LONG-TERM CURRENT USE OF INSULIN: ICD-10-CM

## 2023-05-17 RX ORDER — PEN NEEDLE, DIABETIC 32 GX 1/4"
NEEDLE, DISPOSABLE MISCELLANEOUS
Qty: 90 EACH | Refills: 3 | Status: SHIPPED | OUTPATIENT
Start: 2023-05-17 | End: 2023-12-06

## 2023-05-17 RX ORDER — INSULIN DETEMIR 100 [IU]/ML
10 INJECTION, SOLUTION SUBCUTANEOUS NIGHTLY
Qty: 15 ML | Refills: 1 | Status: SHIPPED | OUTPATIENT
Start: 2023-05-17 | End: 2024-01-19 | Stop reason: SDUPTHER

## 2023-05-30 RX ORDER — ASPIRIN 81 MG/1
81 TABLET ORAL DAILY
Qty: 90 TABLET | Refills: 1 | Status: ON HOLD | OUTPATIENT
Start: 2023-05-30 | End: 2023-11-08

## 2023-05-30 RX ORDER — COLCHICINE 0.6 MG/1
TABLET ORAL
Qty: 30 TABLET | Refills: 2 | Status: SHIPPED | OUTPATIENT
Start: 2023-05-30 | End: 2023-08-08

## 2023-05-30 RX ORDER — LEVOTHYROXINE SODIUM 150 UG/1
150 TABLET ORAL
Qty: 90 TABLET | Refills: 1 | Status: SHIPPED | OUTPATIENT
Start: 2023-05-30 | End: 2023-09-06

## 2023-05-30 NOTE — PROGRESS NOTES
[facility]     PATIENT NAME: Holly Porter   : 1960    AGE: 62 y.o. DATE: 2023   MRN: [id number]        Reason for Visit / Chief Complaint: Medicare AWV (Subsequent [facility] Medicare AWV visit )        Holly Porter presents for an Subsequent [facility] Medicare AWV today.    Review of Systems   Constitutional:  Negative for fever and malaise/fatigue.   HENT:  Negative for congestion, hearing loss and sore throat.    Eyes:  Negative for blurred vision and pain.   Respiratory:  Negative for cough and shortness of breath.    Cardiovascular:  Negative for chest pain.   Gastrointestinal:  Negative for abdominal pain and nausea.   Genitourinary:  Negative for dysuria and frequency.   Musculoskeletal:  Positive for back pain, joint pain and myalgias. Negative for falls.   Skin:  Negative for rash.   Neurological:  Negative for dizziness, weakness and headaches.   Psychiatric/Behavioral:  Positive for depression. The patient is nervous/anxious.       The following components were reviewed and updated:      Medical/Social/Family History:  Past Medical History:   Diagnosis Date    Anxiety state     Atherosclerosis of native artery of extremity with intermittent claudication 2019    bilateral legs    Bilateral leg pain     BMI 50.0-59.9, adult 2021    Cellulitis 2020    Chronic pain syndrome     Chronic peripheral venous hypertension 2019    COPD (chronic obstructive pulmonary disease)     Diabetes     Diabetes mellitus, type 2     DVT (deep venous thrombosis) 2020    Embolism and thrombosis of superficial veins of unspecified lower extremity 2019    bilateral    Frequent headaches 2018    GERD (gastroesophageal reflux disease)     Hereditary lymphedema     Hx of thyroid cancer     Hyperlipidemia     Hypertension     Hypothyroidism     Migraines     Migraines     Morbid obesity     Nicotine dependence     Non-pressure  chronic ulcer of left lower leg 03/09/2021    Osteoarthritis     Other skin changes 03/09/2021    bilateral gaiter regions    Pain in left leg     Pain in right leg     PVD (peripheral vascular disease) 01/08/2019    Seizure disorder     Sleep apnea     Venous insufficiency (chronic) (peripheral)     Venous stasis     Vitamin D deficiency         Family History   Problem Relation Age of Onset    Heart disease Mother         age 84 CHF    Hypertension Mother     Osteoarthritis Mother     Coronary aneurysm Father     Coronary artery disease Father     Hypertension Father     Heart disease Father     No Known Problems Son     No Known Problems Son     No Known Problems Son     No Known Problems Son     No Known Problems Sister     No Known Problems Brother         hx: varicose veins    No Known Problems Brother         MVA: parlazed    No Known Problems Brother         Past Surgical History:   Procedure Laterality Date    HYSTERECTOMY      ILIAC ARTERY STENT Bilateral 01/28/2020    Bilateral distal aorta and common iliac 8 X 59 vbx covered stents performed by Dr. Antonio Jacinto.    RADIOFREQUENCY ABLATION Left 04/15/2022    Procedure: Left calf  Radiofrequency Ablation;  Surgeon: Matty Falcon DO;  Location: [facility];  Service: Vascular;  Laterality: Left;    STAB PHLEBECTOMY OF VARICOSE VEINS Left 01/25/2013    Left leg microphlebectomies x 23 stab avulsions and ligation of multiple varicose veins perrformed by Dr. Cirilo Aguirre.    THYROIDECTOMY      hx: thyroid cancer    TOE AMPUTATION Left 01/28/2020    2nd, 4th and 5th performed by Dr. Anam Saeed.    TOE AMPUTATION Right 01/28/2020    4th toe performed by Dr. Anam Saeed.    TOTAL ABDOMINAL HYSTERECTOMY W/ BILATERAL SALPINGOOPHORECTOMY      TUBAL LIGATION      VAGINAL DELIVERY      x 4    VENOUS ABLATION Right 08/09/2019    GSV Varithena Ablation performed by Dr. Estuardo Falcon    VENOUS ABLATION Left 08/02/2019    ATAV  Varithena Ablation performed by Dr. Estuardo Falcon.    VENOUS ABLATION Right 07/13/2015    ATAV Laser Ablatio performed by Dr. Cirilo Aguirre.    VENOUS ABLATION Right 07/06/2015    Distal  GSV Laser Ablation performed by dr. Cirilo Aguirre.    VENOUS ABLATION Left 04/20/2015    Left Distal GSV Laser Ablation performed by dr. Cirilo Aguirre.    VENOUS ABLATION Right 10/28/2013    Right Distal GSV RF Ablation performed by Dr. Cirilo Aguirre.    VENOUS ABLATION Left 10/25/2013    SSV RF Ablation performed by dr. Cirilo Aguirre.    VENOUS ABLATION Left 10/07/2013    Left GSV and Left ATAV RF Ablation performed by Dr. Cirilo Aguirre.    VENOUS ABLATION Right 01/21/2013    GSV RF Ablation w/micros x 22 performed by Dr. Cirilo Aguirre.    VENOUS ABLATION Left 06/25/2021    Left distal GSV Varithena Ablation       Social History     Tobacco Use   Smoking Status Every Day    Packs/day: 0.50    Years: 33.00    Pack years: 16.50    Types: Cigarettes    Passive exposure: Current   Smokeless Tobacco Never       Social History     Substance and Sexual Activity   Alcohol Use Never         Allergies and Current Medications     Review of patient's allergies indicates:   Allergen Reactions    Ammonium peroxydisulfate Shortness Of Breath    Avocado (laurus persea) Anaphylaxis    Bananas [banana] Anaphylaxis and Swelling     CRAMPS,    Chocolate flavor Anaphylaxis     MOUTH SWELLING    Fentanyl Shortness Of Breath and Itching    Percocet [oxycodone-acetaminophen] Shortness Of Breath and Itching    Silvadene [silver sulfadiazine]     Clindamycin     Corticosteroids (glucocorticoids)     Hydrocortisone Blisters    Lasix [furosemide] Blisters     BURNS SKIN    Nutritional supplement-fiber Itching    Pregabalin     Shellfish containing products     Adhesive Rash and Blisters    Iodine and iodide containing products Rash    Latex, natural rubber Rash    Pcn [penicillins] Rash    Sulfa (sulfonamide antibiotics) Rash and Blisters       Current  Outpatient Medications:     ACCU-CHEK GUIDE GLUCOSE METER [facility], , Disp: , Rfl:     ACCU-CHEK GUIDE TEST STRIPS Strp, , Disp: , Rfl:     ACCU-CHEK SOFTCLIX LANCETS Misc, , Disp: , Rfl:     albuterol (PROVENTIL/VENTOLIN HFA) 90 mcg/actuation inhaler, Inhale 2 puffs into the lungs 4 (four) times daily. Rescue, Disp: 18 g, Rfl: 2    allopurinoL (ZYLOPRIM) 300 MG tablet, Take 1 tablet (300 mg total) by mouth once daily., Disp: 90 tablet, Rfl: 3    amitriptyline (ELAVIL) 25 MG tablet, Take 1 tablet (25 mg total) by mouth nightly as needed for Insomnia., Disp: 90 tablet, Rfl: 1    apixaban (ELIQUIS) 5 mg Tab, Take 1 tablet (5 mg total) by mouth 2 (two) times daily., Disp: 60 tablet, Rfl: 3    aspirin (ECOTRIN) 81 MG EC tablet, Take 1 tablet (81 mg total) by mouth once daily., Disp: 90 tablet, Rfl: 1    calcium carbonate (OS-MICHAELA) 500 mg calcium (1,250 mg) tablet, Take 1 tablet (500 mg total) by mouth 2 (two) times daily., Disp: 60 tablet, Rfl: 3    carvediloL (COREG) 12.5 MG tablet, Take 0.5 tablets (6.25 mg total) by mouth 2 (two) times daily., Disp: 30 tablet, Rfl: 11    cholecalciferol, vitamin D3, 125 mcg (5,000 unit) capsule, Take 1 capsule (5,000 Units total) by mouth 2 (two) times a day., Disp: 60 capsule, Rfl: 3    cilostazoL (PLETAL) 100 MG Tab, Take 1 tablet (100 mg total) by mouth 2 (two) times daily., Disp: 90 tablet, Rfl: 1    colchicine (COLCRYS) 0.6 mg tablet, One pill three times a day for two days,then one pill daily till out, Disp: 30 tablet, Rfl: 2    HYDROcodone-acetaminophen (NORCO)  mg per tablet, Take 1 tablet by mouth every 6 (six) hours as needed for Pain., Disp: 120 tablet, Rfl: 0    insulin detemir U-100, Levemir, (LEVEMIR FLEXTOUCH U100 INSULIN) 100 unit/mL (3 mL) InPn pen, Inject 10 Units into the skin every evening. 15 ml with 1 refill, Disp: 15 mL, Rfl: 1    levothyroxine (SYNTHROID) 150 MCG tablet, Take 1 tablet (150 mcg total) by mouth before breakfast., Disp: 90  "tablet, Rfl: 1    metOLazone (ZAROXOLYN) 2.5 MG tablet, Take 1 tablet (2.5 mg total) by mouth once daily., Disp: 30 tablet, Rfl: 2    mupirocin (BACTROBAN) 2 % ointment, Apply topically 2 (two) times daily., Disp: 80 g, Rfl: 2    naloxone (NARCAN) 4 mg/actuation Spry, 1 spray once., Disp: , Rfl:     NIFEdipine (PROCARDIA-XL) 60 MG (OSM) 24 hr tablet, Take 1 tablet (60 mg total) by mouth once daily., Disp: 30 tablet, Rfl: 11    NOVOFINE 32 32 gauge x 1/4" Ndle, Use as directed once daily., Disp: 90 each, Rfl: 3    pantoprazole (PROTONIX) 40 MG tablet, Take 1 tablet (40 mg total) by mouth once daily., Disp: 90 tablet, Rfl: 1    polyethylene glycol (GLYCOLAX) 17 gram PwPk, Take 17 g by mouth once daily., Disp: , Rfl:     potassium chloride SA (K-DUR,KLOR-CON) 20 MEQ tablet, Take 1 tablet (20 mEq total) by mouth once daily., Disp: 90 tablet, Rfl: 1    QUEtiapine (SEROQUEL) 25 MG Tab, Take 1 tablet (25 mg total) by mouth once daily., Disp: 30 tablet, Rfl: 1    sertraline (ZOLOFT) 50 MG tablet, Take 1 tablet (50 mg total) by mouth once daily., Disp: 30 tablet, Rfl: 5    spironolactone (ALDACTONE) 50 MG tablet, Take 1 tablet (50 mg total) by mouth once daily., Disp: 30 tablet, Rfl: 11    SYMBICORT 160-4.5 mcg/actuation HFAA, Inhale into the lungs., Disp: , Rfl:     topiramate (TOPAMAX) 100 MG tablet, Take 1.5 tablets (150 mg total) by mouth 2 (two) times daily., Disp: 90 tablet, Rfl: 11    triamcinolone acetonide 0.1% (KENALOG) 0.1 % cream, Apply topically 3 (three) times daily., Disp: 400 g, Rfl: 2    docusate sodium (COLACE) 100 MG capsule, Take 1 capsule (100 mg total) by mouth 2 (two) times daily. (Patient not taking: Reported on 6/5/2023), Disp: 60 capsule, Rfl: 2    loratadine (CLARITIN) 10 mg tablet, Take 1 tablet (10 mg total) by mouth once daily. (Patient not taking: Reported on 4/5/2023), Disp: 30 tablet, Rfl: 3    losartan-hydrochlorothiazide 50-12.5 mg (HYZAAR) 50-12.5 mg per tablet, Take 1 " tablet by mouth once daily. (Patient not taking: Reported on 2023), Disp: 90 tablet, Rfl: 1    phentermine (ADIPEX-P) 37.5 mg tablet, Take 1 tablet (37.5 mg total) by mouth before breakfast. (Patient not taking: Reported on 2023), Disp: 30 tablet, Rfl: 0    tiotropium (SPIRIVA) 18 mcg inhalation capsule, Inhale 1 capsule (18 mcg total) into the lungs once daily. Controller (Patient not taking: Reported on 2023), Disp: 90 capsule, Rfl: 3      Health Risk Assessment   Fall Risk:  at risk uses walker with safety information given   Obesity: BMI Body mass index is 55.44 kg/m².   Advance Directive: no but information given   Depression: PHQ9- 0   HTN: DASH diet, exercise, weight management, med compliance, home BP monitoring, and follow-up discussed.   T2DM:  diabetic diet, glucose monitoring, activity level, weight management, med compliance, and follow-up discussed.  STI: not at risk   Statin Use: no      Health Maintenance   Last eye exam: [facility] [place]   Last CV screen with lipids: drawn this visit   Diabetes screening with fasting glucose or A1c: 3/15/23   Colonoscopy: referral ordered this visit   Flu Vaccine: declined-end of season   Pneumonia vaccines: declined   COVID vaccine: (azeem) 3/23/21   Hep B vaccine: na   DEXA: 22   Last pap/pelvic: saw Dr. Lopez 23 -history of hysterectomy  Last Mammogram: 22   AAA screening: na   HIV Screenin22 HIV 1/2 Ag/Ab non-reactive  Hepatitis C Screen: 22 non-reactive  Low Dose CT Scan: 22    Health Maintenance Topics with due status: Not Due       Topic Last Completion Date    Lipid Panel 2022    TETANUS VACCINE 2022    Mammogram 2022    Hemoglobin A1c 03/15/2023    Influenza Vaccine Not Due     Health Maintenance Due   Topic Date Due    Eye Exam  Never done    Foot Exam  2023    Diabetes Urine Screening  2023         Lab results available in Epic or  see dates from [facility] above:   Lab Results   Component Value Date    CHOL 172 06/06/2022    CHOL 98 11/26/2020     Lab Results   Component Value Date    HDL 51 06/06/2022    HDL 46 11/26/2020     Lab Results   Component Value Date    LDLCALC 99 06/06/2022    LDLCALC 42 11/26/2020     Lab Results   Component Value Date    TRIG 112 06/06/2022    TRIG 48 11/26/2020     Lab Results   Component Value Date    CHOLHDL 3.4 06/06/2022    CHOLHDL 2.1 11/26/2020       Lab Results   Component Value Date    HGBA1C 6.1 03/15/2023       Sodium   Date Value Ref Range Status   03/15/2023 135 (L) 136 - 145 mmol/L Final     Potassium   Date Value Ref Range Status   03/15/2023 3.8 3.5 - 5.1 mmol/L Final     Chloride   Date Value Ref Range Status   03/15/2023 104 98 - 107 mmol/L Final     CO2   Date Value Ref Range Status   03/15/2023 28 21 - 32 mmol/L Final     Glucose   Date Value Ref Range Status   03/15/2023 97 74 - 106 mg/dL Final     BUN   Date Value Ref Range Status   03/15/2023 14 7 - 18 mg/dL Final     Creatinine   Date Value Ref Range Status   03/15/2023 1.06 (H) 0.55 - 1.02 mg/dL Final     Calcium   Date Value Ref Range Status   03/15/2023 9.4 8.5 - 10.1 mg/dL Final     Total Protein   Date Value Ref Range Status   03/15/2023 7.9 6.4 - 8.2 g/dL Final     Albumin   Date Value Ref Range Status   03/15/2023 3.6 3.5 - 5.0 g/dL Final     Bilirubin, Total   Date Value Ref Range Status   03/15/2023 0.3 >0.0 - 1.2 mg/dL Final     Alk Phos   Date Value Ref Range Status   03/15/2023 99 50 - 130 U/L Final     AST   Date Value Ref Range Status   03/15/2023 16 15 - 37 U/L Final     ALT   Date Value Ref Range Status   03/15/2023 15 13 - 56 U/L Final     Anion Gap   Date Value Ref Range Status   03/15/2023 7 7 - 16 mmol/L Final     eGFR    Date Value Ref Range Status   09/27/2021 70 >=60 mL/min/1.73m² Final     eGFR   Date Value Ref Range Status   05/10/2022 63 >=60 mL/min/1.73m² Final         Incontinence  Bowel: no  Bladder:  "uses pads      Care Team  Dr. Frausto -PCP                 Dr. Davis- psychology                 Dr. Crandall -pain treatment                            Aurea Varghese FNP -diabetic management                            Dr. Loyola -pulmonlogy  **See Completed Assessments for Annual Wellness visit within the encounter summary    The following assessments were completed & reviewed:  Depression Screening  Cognitive function Screening  Timed Get Up Test  Whisper Test  Vision Screen  Health Risk Assessment  Checklist of ADLs and IADLs        Objective  Vitals:    06/05/23 1055   BP: 126/70   Pulse: 64   Resp: 16   Temp: 98 °F (36.7 °C)   TempSrc: Oral   SpO2: 95%   Weight: (!) 160.6 kg (354 lb)   Height: 5' 7" (1.702 m)   PainSc:   9   PainLoc: Leg      Body mass index is 55.44 kg/m².  Ideal body weight: 61.6 kg (135 lb 12.9 oz)       Physical Exam  Constitutional:       General: She is not in acute distress.     Appearance: Normal appearance. She is obese.   HENT:      Head: Normocephalic.      Mouth/Throat:      Mouth: Mucous membranes are moist.   Cardiovascular:      Rate and Rhythm: Normal rate and regular rhythm.      Pulses: Normal pulses.      Heart sounds: Normal heart sounds. No murmur heard.  Pulmonary:      Effort: Pulmonary effort is normal. No respiratory distress.      Breath sounds: Normal breath sounds.   Abdominal:      Palpations: Abdomen is soft.      Tenderness: There is no abdominal tenderness.   Musculoskeletal:         General: Normal range of motion.      Cervical back: Neck supple.   Skin:     General: Skin is warm and dry.   Neurological:      Mental Status: She is alert and oriented to person, place, and time.   Psychiatric:         Mood and Affect: Mood is depressed.         Behavior: Behavior is cooperative.       Assessment:     1. Essential hypertension    2. Hyperlipidemia, unspecified hyperlipidemia type    3. Type 2 diabetes mellitus without complication, with long-term current use of " insulin    4. PVD (peripheral vascular disease)    5. Lumbosacral spondylosis without myelopathy    6. Congestive heart failure, unspecified HF chronicity, unspecified heart failure type    7. Screening for colon cancer    8. BMI 50.0-59.9, adult    9. Encounter for subsequent annual wellness visit (AWV) in Medicare patient         Plan:    Referrals:   Referral to GI for colon cancer screening    Pt declined influenza, pneumonia , and Hep B vaccines.    Will obtain urine microalbumin and lipid panel today.      Advised to call office if does not hear from anyone with referral appt within 2-3 weeks to check on status of referral. Voiced understanding.      Discussed and provided with a screening schedule and personal prevention plan in accordance with USPSTF age appropriate recommendations and Medicare screening guidelines.   Education, counseling, and referrals were provided as needed.  After Visit Summary printed and given to patient which includes written education and a list of any referrals if indicated.     Education including diet, exercise, falls, BMI, smoking cessation and advanced directives discussed with patient and patient verbalized understanding.      F/u plan for yearly AWV.    Signature: Fay GONZALEZ

## 2023-06-01 NOTE — PROGRESS NOTES
Subjective:         Patient ID: Holly Porter is a 62 y.o. female.    Chief Complaint: Low-back Pain and Leg Pain (bilateral)            Pain  This is a chronic problem. The current episode started more than 1 year ago. The problem occurs daily. The problem has been unchanged. Associated symptoms include arthralgias. Pertinent negatives include no chest pain, chills, coughing, diaphoresis, fever, sore throat, vertigo or vomiting.   Review of Systems   Constitutional:  Negative for activity change, chills, diaphoresis, fever and unexpected weight change.   HENT:  Negative for drooling, ear discharge, ear pain, facial swelling, nosebleeds, sore throat, trouble swallowing, voice change and goiter.    Eyes:  Negative for photophobia, pain, discharge, redness and visual disturbance.   Respiratory:  Negative for apnea, cough, choking, chest tightness, shortness of breath, wheezing and stridor.    Cardiovascular:  Negative for chest pain, palpitations and leg swelling.   Gastrointestinal:  Negative for abdominal distention, diarrhea, rectal pain, vomiting and fecal incontinence.   Endocrine: Negative for cold intolerance, heat intolerance, polydipsia, polyphagia and polyuria.   Genitourinary:  Negative for bladder incontinence, dysuria, flank pain, frequency and hot flashes.   Musculoskeletal:  Positive for arthralgias, back pain, leg pain and neck stiffness.   Integumentary:  Negative for color change and pallor.   Allergic/Immunologic: Negative for immunocompromised state.   Neurological:  Negative for dizziness, vertigo, seizures, syncope, facial asymmetry, speech difficulty, light-headedness, coordination difficulties, memory loss and coordination difficulties.   Hematological:  Negative for adenopathy. Does not bruise/bleed easily.   Psychiatric/Behavioral:  Negative for agitation, behavioral problems, confusion, decreased concentration, dysphoric mood, hallucinations, self-injury and suicidal ideas. The patient  is not nervous/anxious and is not hyperactive.          Past Medical History:   Diagnosis Date    Anxiety state     Atherosclerosis of native artery of extremity with intermittent claudication 01/30/2019    bilateral legs    Bilateral leg pain     BMI 50.0-59.9, adult 02/22/2021    Cellulitis 06/11/2020    Chronic pain syndrome     Chronic peripheral venous hypertension 01/08/2019    COPD (chronic obstructive pulmonary disease)     Diabetes     Diabetes mellitus, type 2     DVT (deep venous thrombosis) 02/12/2020    Embolism and thrombosis of superficial veins of unspecified lower extremity 07/01/2019    bilateral    Frequent headaches 09/20/2018    GERD (gastroesophageal reflux disease)     Hereditary lymphedema     Hx of thyroid cancer     Hyperlipidemia     Hypertension     Hypothyroidism     Migraines     Migraines     Morbid obesity     Nicotine dependence     Non-pressure chronic ulcer of left lower leg 03/09/2021    Osteoarthritis     Other skin changes 03/09/2021    bilateral gaiter regions    Pain in left leg     Pain in right leg     PVD (peripheral vascular disease) 01/08/2019    Seizure disorder     Sleep apnea     Venous insufficiency (chronic) (peripheral)     Venous stasis     Vitamin D deficiency      Past Surgical History:   Procedure Laterality Date    HYSTERECTOMY      ILIAC ARTERY STENT Bilateral 01/28/2020    Bilateral distal aorta and common iliac 8 X 59 vbx covered stents performed by Dr. Antonio Jacinto.    RADIOFREQUENCY ABLATION Left 04/15/2022    Procedure: Left calf  Radiofrequency Ablation;  Surgeon: Matty Falcon DO;  Location: [facility];  Service: Vascular;  Laterality: Left;    STAB PHLEBECTOMY OF VARICOSE VEINS Left 01/25/2013    Left leg microphlebectomies x 23 stab avulsions and ligation of multiple varicose veins perrformed by Dr. Cirilo Aguirre.    THYROIDECTOMY      hx: thyroid cancer    TOE AMPUTATION Left 01/28/2020    2nd, 4th and  5th performed by Dr. Anam Saeed.    TOE AMPUTATION Right 01/28/2020    4th toe performed by Dr. Anam Saeed.    TOTAL ABDOMINAL HYSTERECTOMY W/ BILATERAL SALPINGOOPHORECTOMY      TUBAL LIGATION      VAGINAL DELIVERY      x 4    VENOUS ABLATION Right 08/09/2019    GSV Varithena Ablation performed by Dr. Estuardo Falcon    VENOUS ABLATION Left 08/02/2019    ATAV Varithena Ablation performed by Dr. Estuardo Falcon.    VENOUS ABLATION Right 07/13/2015    ATAV Laser Ablatio performed by Dr. Cirilo Aguirre.    VENOUS ABLATION Right 07/06/2015    Distal  GSV Laser Ablation performed by dr. Cirilo Aguirre.    VENOUS ABLATION Left 04/20/2015    Left Distal GSV Laser Ablation performed by dr. Cirilo Aguirre.    VENOUS ABLATION Right 10/28/2013    Right Distal GSV RF Ablation performed by Dr. Cirilo Aguirre.    VENOUS ABLATION Left 10/25/2013    SSV RF Ablation performed by dr. Cirilo Aguirre.    VENOUS ABLATION Left 10/07/2013    Left GSV and Left ATAV RF Ablation performed by Dr. Cirilo Aguirre.    VENOUS ABLATION Right 01/21/2013    GSV RF Ablation w/micros x 22 performed by Dr. Cirilo Aguirre.    VENOUS ABLATION Left 06/25/2021    Left distal GSV Varithena Ablation     Social History     Socioeconomic History    Marital status:    Tobacco Use    Smoking status: Every Day     Packs/day: 0.50     Years: 33.00     Pack years: 16.50     Types: Cigarettes     Passive exposure: Current    Smokeless tobacco: Never   Substance and Sexual Activity    Alcohol use: Never    Drug use: Never    Sexual activity: Not Currently     Social Determinants of Health     Financial Resource Strain: Low Risk     Difficulty of Paying Living Expenses: Not very hard   Food Insecurity: No Food Insecurity    Worried About Running Out of Food in the Last Year: Never true    Ran Out of Food in the Last Year: Never true   Transportation Needs: No Transportation Needs    Lack of Transportation (Medical): No    Lack of Transportation (Non-Medical): No   Physical Activity:  Inactive    Days of Exercise per Week: 0 days    Minutes of Exercise per Session: 0 min   Stress: No Stress Concern Present    Feeling of Stress : Not at all   Social Connections: Socially Isolated    Frequency of Communication with Friends and Family: More than three times a week    Frequency of Social Gatherings with Friends and Family: More than three times a week    Attends Episcopal Services: Never    Active Member of Clubs or Organizations: No    Attends Club or Organization Meetings: Never    Marital Status:    Housing Stability: Low Risk     Unable to Pay for Housing in the Last Year: No    Number of Places Lived in the Last Year: 2    Unstable Housing in the Last Year: No     Family History   Problem Relation Age of Onset    Heart disease Mother         age 84 CHF    Hypertension Mother     Osteoarthritis Mother     Coronary aneurysm Father     Coronary artery disease Father     Hypertension Father     Heart disease Father     No Known Problems Son     No Known Problems Son     No Known Problems Son     No Known Problems Son     No Known Problems Sister     No Known Problems Brother         hx: varicose veins    No Known Problems Brother         MVA: parlazed    No Known Problems Brother      Review of patient's allergies indicates:   Allergen Reactions    Ammonium peroxydisulfate Shortness Of Breath    Avocado (laurus persea) Anaphylaxis    Bananas [banana] Anaphylaxis and Swelling     CRAMPS,    Chocolate flavor Anaphylaxis     MOUTH SWELLING    Fentanyl Shortness Of Breath and Itching    Percocet [oxycodone-acetaminophen] Shortness Of Breath and Itching    Silvadene [silver sulfadiazine]     Clindamycin     Corticosteroids (glucocorticoids)     Hydrocortisone Blisters    Lasix [furosemide] Blisters     BURNS SKIN    Nutritional supplement-fiber Itching    Pregabalin     Shellfish containing products     Adhesive Rash and Blisters    Iodine and iodide  "containing products Rash    Latex, natural rubber Rash    Pcn [penicillins] Rash    Sulfa (sulfonamide antibiotics) Rash and Blisters        Objective:  Vitals:    06/05/23 1247 06/05/23 1250   BP: (!) 157/75    Pulse: 92    Resp: (!) 22    Weight: (!) 159.2 kg (351 lb)    Height: 5' 7.5" (1.715 m)    PainSc:   7   7         Physical Exam  Vitals and nursing note reviewed. Exam conducted with a chaperone present.   Constitutional:       General: She is awake. She is not in acute distress.     Appearance: Normal appearance. She is obese. She is not ill-appearing, toxic-appearing or diaphoretic.   HENT:      Head: Normocephalic and atraumatic.      Nose: Nose normal.      Mouth/Throat:      Mouth: Mucous membranes are moist.      Pharynx: Oropharynx is clear.   Eyes:      Conjunctiva/sclera: Conjunctivae normal.      Pupils: Pupils are equal, round, and reactive to light.   Cardiovascular:      Rate and Rhythm: Normal rate.   Pulmonary:      Effort: Pulmonary effort is normal. No respiratory distress.   Abdominal:      Palpations: Abdomen is soft.   Musculoskeletal:         General: Normal range of motion.      Cervical back: Normal range of motion and neck supple.   Skin:     General: Skin is warm and dry.   Neurological:      General: No focal deficit present.      Mental Status: She is alert and oriented to person, place, and time. Mental status is at baseline.      Cranial Nerves: No cranial nerve deficit (II-XII).   Psychiatric:         Mood and Affect: Mood normal.         Behavior: Behavior normal. Behavior is cooperative.         Thought Content: Thought content normal.         X-Ray Cervical Spine AP And Lateral  Narrative: EXAMINATION:  XR CERVICAL SPINE AP LATERAL    CLINICAL HISTORY:  Radiculopathy, cervical region    COMPARISON:  17 January 2022    TECHNIQUE:  XR CERVICAL SPINE AP LATERAL    FINDINGS:  No fracture is seen. Vertebral body heights and alignment are normal.  The disc space heights are " well-maintained.  No significant degenerative change is present.  There is calcification of the carotid bulbs.  Impression: No evidence of cervical spine abnormality demonstrated.    Electronically signed by: Modesto Tristan  Date:    04/05/2023  Time:    15:59  X-Ray Foot 2 View Right  Narrative: EXAMINATION:  XR ANKLE 2 VIEW RIGHT; XR FOOT 2 VIEW RIGHT    CLINICAL HISTORY:  Primary osteoarthritis, right ankle and foot    COMPARISON:  1 December 2022    TECHNIQUE:  XR ANKLE 2 VIEW RIGHT; XR FOOT 2 VIEW RIGHT    FINDINGS:  No evidence of acute fracture seen.  Previous partial amputation 4th digit performed.  There is irregularity of the distal 2nd, 3rd and 4th metatarsals similar to previous.  The alignment of the joints appears normal.  Mild ankle, midfoot and 1st metatarsophalangeal joint degenerative change is present.  No soft tissue abnormality is seen.  Impression: Osteoarthrosis as described above.    Electronically signed by: Modesto Tristan  Date:    04/05/2023  Time:    14:45  X-Ray Ankle 2 View Right  Narrative: EXAMINATION:  XR ANKLE 2 VIEW RIGHT; XR FOOT 2 VIEW RIGHT    CLINICAL HISTORY:  Primary osteoarthritis, right ankle and foot    COMPARISON:  1 December 2022    TECHNIQUE:  XR ANKLE 2 VIEW RIGHT; XR FOOT 2 VIEW RIGHT    FINDINGS:  No evidence of acute fracture seen.  Previous partial amputation 4th digit performed.  There is irregularity of the distal 2nd, 3rd and 4th metatarsals similar to previous.  The alignment of the joints appears normal.  Mild ankle, midfoot and 1st metatarsophalangeal joint degenerative change is present.  No soft tissue abnormality is seen.  Impression: Osteoarthrosis as described above.    Electronically signed by: Modesto Tristan  Date:    04/05/2023  Time:    14:45         Office Visit on 03/15/2023   Component Date Value Ref Range Status    Sodium 03/15/2023 135 (L)  136 - 145 mmol/L Final    Potassium 03/15/2023 3.8  3.5 - 5.1 mmol/L Final    Chloride 03/15/2023  104  98 - 107 mmol/L Final    CO2 03/15/2023 28  21 - 32 mmol/L Final    Anion Gap 03/15/2023 7  7 - 16 mmol/L Final    Glucose 03/15/2023 97  74 - 106 mg/dL Final    BUN 03/15/2023 14  7 - 18 mg/dL Final    Creatinine 03/15/2023 1.06 (H)  0.55 - 1.02 mg/dL Final    BUN/Creatinine Ratio 03/15/2023 13  6 - 20 Final    Calcium 03/15/2023 9.4  8.5 - 10.1 mg/dL Final    Total Protein 03/15/2023 7.9  6.4 - 8.2 g/dL Final    Albumin 03/15/2023 3.6  3.5 - 5.0 g/dL Final    Globulin 03/15/2023 4.3 (H)  2.0 - 4.0 g/dL Final    A/G Ratio 03/15/2023 0.8   Final    Bilirubin, Total 03/15/2023 0.3  >0.0 - 1.2 mg/dL Final    Alk Phos 03/15/2023 99  50 - 130 U/L Final    ALT 03/15/2023 15  13 - 56 U/L Final    AST 03/15/2023 16  15 - 37 U/L Final    eGFR 03/15/2023 59 (L)  >=60 mL/min/1.73m² Final    Hemoglobin A1C 03/15/2023 6.1  4.5 - 6.6 % Final    Estimated Average Glucose 03/15/2023 117  mg/dL Final    Culture, Wound/Abscess 03/15/2023 Normal Skin Tamara, no further workup.   Final    WBC 03/15/2023 8.65  4.50 - 11.00 K/uL Final    RBC 03/15/2023 6.19 (H)  4.20 - 5.40 M/uL Final    Hemoglobin 03/15/2023 15.6  12.0 - 16.0 g/dL Final    Hematocrit 03/15/2023 51.1 (H)  38.0 - 47.0 % Final    MCV 03/15/2023 82.6  80.0 - 96.0 fL Final    MCH 03/15/2023 25.2 (L)  27.0 - 31.0 pg Final    MCHC 03/15/2023 30.5 (L)  32.0 - 36.0 g/dL Final    RDW 03/15/2023 17.1 (H)  11.5 - 14.5 % Final    Platelet Count 03/15/2023 244  150 - 400 K/uL Final    MPV 03/15/2023 12.8 (H)  9.4 - 12.4 fL Final    Neutrophils % 03/15/2023 64.9  53.0 - 65.0 % Final    Lymphocytes % 03/15/2023 22.4 (L)  27.0 - 41.0 % Final    Monocytes % 03/15/2023 6.0  2.0 - 6.0 % Final    Eosinophils % 03/15/2023 5.9 (H)  1.0 - 4.0 % Final    Basophils % 03/15/2023 0.5  0.0 - 1.0 % Final    Immature Granulocytes % 03/15/2023 0.3  0.0 - 0.4 % Final    nRBC, Auto 03/15/2023 0.0  <=0.0 % Final    Neutrophils, Abs 03/15/2023 5.61  1.80 - 7.70  K/uL Final    Lymphocytes, Absolute 03/15/2023 1.94  1.00 - 4.80 K/uL Final    Monocytes, Absolute 03/15/2023 0.52  0.00 - 0.80 K/uL Final    Eosinophils, Absolute 03/15/2023 0.51 (H)  0.00 - 0.50 K/uL Final    Basophils, Absolute 03/15/2023 0.04  0.00 - 0.20 K/uL Final    Immature Granulocytes, Absolute 03/15/2023 0.03  0.00 - 0.04 K/uL Final    nRBC, Absolute 03/15/2023 0.00  <=0.00 x10e3/uL Final    Diff Type 03/15/2023 Auto   Final   Office Visit on 12/08/2022   Component Date Value Ref Range Status    POC Amphetamines 12/08/2022 Negative  Negative, Inconclusive Final    POC Barbiturates 12/08/2022 Negative  Negative, Inconclusive Final    POC Benzodiazepines 12/08/2022 Negative  Negative, Inconclusive Final    POC Cocaine 12/08/2022 Negative  Negative, Inconclusive Final    POC THC 12/08/2022 Negative  Negative, Inconclusive Final    POC Methadone 12/08/2022 Negative  Negative, Inconclusive Final    POC Methamphetamine 12/08/2022 Negative  Negative, Inconclusive Final    POC Opiates 12/08/2022 Presumptive Positive (A)  Negative, Inconclusive Final    POC Oxycodone 12/08/2022 Negative  Negative, Inconclusive Final    POC Phencyclidine 12/08/2022 Negative  Negative, Inconclusive Final    POC Methylenedioxymethamphetamine * 12/08/2022 Negative  Negative, Inconclusive Final    POC Tricyclic Antidepressants 12/08/2022 Negative  Negative, Inconclusive Final    POC Buprenorphine 12/08/2022 Negative   Final     Acceptable 12/08/2022 Yes   Final    POC Temperature (Urine) 12/08/2022 90   Final         Orders Placed This Encounter   Procedures    X-Ray Hips Bilateral 2 View Inc AP Pelvis     Standing Status:   Future     Standing Expiration Date:   6/5/2024     Order Specific Question:   Does the patient have a splint or a brace?     Answer:   No     Order Specific Question:   Does the patient have a cast?     Answer:   No     Order Specific Question:   May the Radiologist modify the  order per protocol to meet the clinical needs of the patient?     Answer:   Yes     Order Specific Question:   Release to patient     Answer:   Immediate    X-Ray Lumbar Spine 2 Or 3 Views     Standing Status:   Future     Standing Expiration Date:   9/5/2023     Order Specific Question:   May the Radiologist modify the order per protocol to meet the clinical needs of the patient?     Answer:   Yes     Order Specific Question:   Does the patient have a neck collar or brace on?     Answer:   No    MRI Lumbar Spine Without Contrast     Standing Status:   Future     Number of Occurrences:   1     Standing Expiration Date:   6/5/2024     Order Specific Question:   Does the patient have a pacemaker or a defibrillator (Note: Some facilities may not be able to schedule an MRI for patients with pacemakers and defibrillators. You should contact your local radiology department to determine if this is the case.)?     Answer:   No     Order Specific Question:   Does the patient have an aneurysm or surgical clip, pump, nerve/brain stimulator, middle/inner ear prosthesis, or other metal implant or foreign object (bullet, shrapnel)? If they have a card related to their implant, ask them to bring it. Issues related to the implant may cause the MRI to be delayed.     Answer:   No     Order Specific Question:   Is the patient claustrophobic?     Answer:   No     Order Specific Question:   Will the patient require sedation?     Answer:   No     Order Specific Question:   Does the patient have any of the following conditions? Diabetes, History of Renal Disease or Hypertension requiring medical therapy?     Answer:   No     Order Specific Question:   May the Radiologist modify the order per protocol to meet the clinical needs of the patient?     Answer:   Yes     Order Specific Question:   Is this part of a Research Study?     Answer:   No     Order Specific Question:   Recist criteria?     Answer:   No     Order Specific Question:    Will this service be billed to a Worker's Comp policy?     Answer:   No     Order Specific Question:   Does the patient have on a skin patch for medication with aluminized backing?     Answer:   No    POCT Urine Drug Screen Presump     Interpretive Information:     Negative:  No drug detected at the cut off level.   Positive:  This result represents presumptive positive for the   tested drug, other substances may yield a positive response other   than the analyte of interest. This result should be utilized for   diagnostic purpose only. Confirmation testing will be performed upon physician request only.              Requested Prescriptions     Signed Prescriptions Disp Refills    HYDROcodone-acetaminophen (NORCO)  mg per tablet 120 tablet 0     Sig: Take 1 tablet by mouth every 6 (six) hours as needed for Pain.    HYDROcodone-acetaminophen (NORCO)  mg per tablet 120 tablet 0     Sig: Take 1 tablet by mouth every 6 (six) hours as needed for Pain.       Assessment:     1. Lumbosacral radiculopathy    2. Chronic pain of right knee    3. PVD (peripheral vascular disease)    4. Foot pain, left    5. Radiculopathy, cervical region    6. Encounter for long-term (current) use of other medications    7. Dorsalgia, unspecified    8. Lumbar radiculopathy, chronic         A's of Opioid Risk Assessment  Activity:Patient can perform ADL.   Analgesia:Patients pain is partially controlled by current medication. Patient has tried OTC medications such as Tylenol and Ibuprofen with out relief.   Adverse Effects: Patient denies constipation or sedation.  Aberrant Behavior:  reviewed with no aberrant drug seeking/taking behavior.  Overdose reversal drug naloxone discussed    Drug screen reviewed      Plan:    Narcan December 2022    Wheelchair/4 point walker for mobility due to chronic nonhealing wounds right foot     Following wound management chronic ulcers lower extremities    Following orthopedics knee pain [facility], she states she was advised not to have surgery due to her weight    Diabetic, limit steroids    Cannot tolerate OxyContin she is severely allergic    Complaint of back pain buttock and leg pain bilateral pain numbness and tingling radicular in nature lower extremities     Complaint cervical spine discomfort bilateral arm pain numbness and tingling worse with increased use her arms worse with increased activity better with short periods resting her pain is radicular in nature     MRI cervical spine ARM dated April 18, 2023 multiple level degenerative changes C4/5 disc osteophyte complex mild central canal stenosis uncovertebral hypertrophic    Will place report under media    She denies loss of bowel or bladder function denies electrical sensation    Requesting Toradol injection    Toradol 60 mg IM     Physical therapy did not improve her discomfort    Patient states MRI cervical spine was completed     She will discuss this with the MRI Center     She is requesting further evaluation for her lumbar radiculopathy symptoms     X-ray pelvis and hips x-ray lumbar spine     MRI lumbar spine no contrast open air [facility]    MRI for consideration procedure/surgery    I have given the patient medically directed home exercise program    Patient has tried NSAIDs and neuromodulators (neurontin, lyrica, elavil, or cymbalta)    Continue home exercise program as tolerated    Follow-up 2 months    Dr. Crandall, April 2023    Bring original prescription medication bottles/container/box with labels to each visit

## 2023-06-05 ENCOUNTER — OFFICE VISIT (OUTPATIENT)
Dept: FAMILY MEDICINE | Facility: CLINIC | Age: 63
End: 2023-06-05
Payer: MEDICARE

## 2023-06-05 ENCOUNTER — OFFICE VISIT (OUTPATIENT)
Dept: PAIN MEDICINE | Facility: CLINIC | Age: 63
End: 2023-06-05
Payer: MEDICARE

## 2023-06-05 VITALS
SYSTOLIC BLOOD PRESSURE: 126 MMHG | HEART RATE: 64 BPM | OXYGEN SATURATION: 95 % | WEIGHT: 293 LBS | HEIGHT: 67 IN | RESPIRATION RATE: 16 BRPM | BODY MASS INDEX: 45.99 KG/M2 | TEMPERATURE: 98 F | DIASTOLIC BLOOD PRESSURE: 70 MMHG

## 2023-06-05 VITALS
HEART RATE: 92 BPM | RESPIRATION RATE: 22 BRPM | SYSTOLIC BLOOD PRESSURE: 157 MMHG | WEIGHT: 293 LBS | HEIGHT: 68 IN | DIASTOLIC BLOOD PRESSURE: 75 MMHG | BODY MASS INDEX: 44.41 KG/M2

## 2023-06-05 DIAGNOSIS — M54.17 LUMBOSACRAL RADICULOPATHY: Primary | Chronic | ICD-10-CM

## 2023-06-05 DIAGNOSIS — M54.12 RADICULOPATHY, CERVICAL REGION: ICD-10-CM

## 2023-06-05 DIAGNOSIS — E11.9 TYPE 2 DIABETES MELLITUS WITHOUT COMPLICATION, WITH LONG-TERM CURRENT USE OF INSULIN: ICD-10-CM

## 2023-06-05 DIAGNOSIS — I73.9 PVD (PERIPHERAL VASCULAR DISEASE): Chronic | ICD-10-CM

## 2023-06-05 DIAGNOSIS — I50.9 CONGESTIVE HEART FAILURE, UNSPECIFIED HF CHRONICITY, UNSPECIFIED HEART FAILURE TYPE: ICD-10-CM

## 2023-06-05 DIAGNOSIS — M54.16 LUMBAR RADICULOPATHY, CHRONIC: ICD-10-CM

## 2023-06-05 DIAGNOSIS — E78.5 HYPERLIPIDEMIA, UNSPECIFIED HYPERLIPIDEMIA TYPE: ICD-10-CM

## 2023-06-05 DIAGNOSIS — I73.9 PVD (PERIPHERAL VASCULAR DISEASE): ICD-10-CM

## 2023-06-05 DIAGNOSIS — Z79.899 ENCOUNTER FOR LONG-TERM (CURRENT) USE OF OTHER MEDICATIONS: ICD-10-CM

## 2023-06-05 DIAGNOSIS — M25.561 CHRONIC PAIN OF RIGHT KNEE: Chronic | ICD-10-CM

## 2023-06-05 DIAGNOSIS — I10 ESSENTIAL HYPERTENSION: ICD-10-CM

## 2023-06-05 DIAGNOSIS — G89.29 CHRONIC PAIN OF RIGHT KNEE: Chronic | ICD-10-CM

## 2023-06-05 DIAGNOSIS — Z00.00 ENCOUNTER FOR SUBSEQUENT ANNUAL WELLNESS VISIT (AWV) IN MEDICARE PATIENT: Primary | ICD-10-CM

## 2023-06-05 DIAGNOSIS — Z79.4 TYPE 2 DIABETES MELLITUS WITHOUT COMPLICATION, WITH LONG-TERM CURRENT USE OF INSULIN: ICD-10-CM

## 2023-06-05 DIAGNOSIS — M79.672 FOOT PAIN, LEFT: Chronic | ICD-10-CM

## 2023-06-05 DIAGNOSIS — Z12.11 SCREENING FOR COLON CANCER: ICD-10-CM

## 2023-06-05 DIAGNOSIS — M54.9 DORSALGIA, UNSPECIFIED: ICD-10-CM

## 2023-06-05 DIAGNOSIS — M47.817 LUMBOSACRAL SPONDYLOSIS WITHOUT MYELOPATHY: Chronic | ICD-10-CM

## 2023-06-05 LAB
CHOLEST SERPL-MCNC: 192 MG/DL (ref 0–200)
CHOLEST/HDLC SERPL: 3.4 {RATIO}
CREAT UR-MCNC: 261 MG/DL (ref 28–219)
CTP QC/QA: YES
HDLC SERPL-MCNC: 57 MG/DL (ref 40–60)
LDLC SERPL CALC-MCNC: 115 MG/DL
LDLC/HDLC SERPL: 2 {RATIO}
MICROALBUMIN UR-MCNC: 1.4 MG/DL (ref 0–2.8)
MICROALBUMIN/CREAT RATIO PNL UR: 5.4 MG/G (ref 0–30)
NONHDLC SERPL-MCNC: 135 MG/DL
POC (AMP) AMPHETAMINE: NEGATIVE
POC (BAR) BARBITURATES: NEGATIVE
POC (BUP) BUPRENORPHINE: NEGATIVE
POC (BZO) BENZODIAZEPINES: NEGATIVE
POC (COC) COCAINE: NEGATIVE
POC (MDMA) METHYLENEDIOXYMETHAMPHETAMINE 3,4: NEGATIVE
POC (MET) METHAMPHETAMINE: NEGATIVE
POC (MOP) OPIATES: ABNORMAL
POC (MTD) METHADONE: NEGATIVE
POC (OXY) OXYCODONE: NEGATIVE
POC (PCP) PHENCYCLIDINE: NEGATIVE
POC (TCA) TRICYCLIC ANTIDEPRESSANTS: NEGATIVE
POC TEMPERATURE (URINE): 90
POC THC: NEGATIVE
TRIGL SERPL-MCNC: 102 MG/DL (ref 35–150)
VLDLC SERPL-MCNC: 20 MG/DL

## 2023-06-05 PROCEDURE — 3008F BODY MASS INDEX DOCD: CPT | Mod: CPTII,,, | Performed by: PHYSICIAN ASSISTANT

## 2023-06-05 PROCEDURE — G0438 PR WELCOME MEDICARE ANNUAL WELLNESS INITIAL VISIT: ICD-10-PCS | Mod: ,,, | Performed by: NURSE PRACTITIONER

## 2023-06-05 PROCEDURE — 3008F BODY MASS INDEX DOCD: CPT | Mod: ,,, | Performed by: NURSE PRACTITIONER

## 2023-06-05 PROCEDURE — 3044F PR MOST RECENT HEMOGLOBIN A1C LEVEL <7.0%: ICD-10-PCS | Mod: ,,, | Performed by: NURSE PRACTITIONER

## 2023-06-05 PROCEDURE — 3078F DIAST BP <80 MM HG: CPT | Mod: CPTII,,, | Performed by: PHYSICIAN ASSISTANT

## 2023-06-05 PROCEDURE — 82570 MICROALBUMIN / CREATININE RATIO URINE: ICD-10-PCS | Mod: ,,, | Performed by: CLINICAL MEDICAL LABORATORY

## 2023-06-05 PROCEDURE — 80061 LIPID PANEL: ICD-10-PCS | Mod: ,,, | Performed by: CLINICAL MEDICAL LABORATORY

## 2023-06-05 PROCEDURE — 3044F HG A1C LEVEL LT 7.0%: CPT | Mod: ,,, | Performed by: NURSE PRACTITIONER

## 2023-06-05 PROCEDURE — 3008F PR BODY MASS INDEX (BMI) DOCUMENTED: ICD-10-PCS | Mod: ,,, | Performed by: NURSE PRACTITIONER

## 2023-06-05 PROCEDURE — 80061 LIPID PANEL: CPT | Mod: ,,, | Performed by: CLINICAL MEDICAL LABORATORY

## 2023-06-05 PROCEDURE — 3077F PR MOST RECENT SYSTOLIC BLOOD PRESSURE >= 140 MM HG: ICD-10-PCS | Mod: CPTII,,, | Performed by: PHYSICIAN ASSISTANT

## 2023-06-05 PROCEDURE — 3077F SYST BP >= 140 MM HG: CPT | Mod: CPTII,,, | Performed by: PHYSICIAN ASSISTANT

## 2023-06-05 PROCEDURE — 96372 THER/PROPH/DIAG INJ SC/IM: CPT | Mod: PBBFAC | Performed by: PHYSICIAN ASSISTANT

## 2023-06-05 PROCEDURE — 99214 OFFICE O/P EST MOD 30 MIN: CPT | Mod: S$PBB,25,, | Performed by: PHYSICIAN ASSISTANT

## 2023-06-05 PROCEDURE — 3074F SYST BP LT 130 MM HG: CPT | Mod: ,,, | Performed by: NURSE PRACTITIONER

## 2023-06-05 PROCEDURE — 1159F MED LIST DOCD IN RCRD: CPT | Mod: ,,, | Performed by: NURSE PRACTITIONER

## 2023-06-05 PROCEDURE — 1159F PR MEDICATION LIST DOCUMENTED IN MEDICAL RECORD: ICD-10-PCS | Mod: CPTII,,, | Performed by: PHYSICIAN ASSISTANT

## 2023-06-05 PROCEDURE — 82043 MICROALBUMIN / CREATININE RATIO URINE: ICD-10-PCS | Mod: ,,, | Performed by: CLINICAL MEDICAL LABORATORY

## 2023-06-05 PROCEDURE — 3078F DIAST BP <80 MM HG: CPT | Mod: ,,, | Performed by: NURSE PRACTITIONER

## 2023-06-05 PROCEDURE — 1159F MED LIST DOCD IN RCRD: CPT | Mod: CPTII,,, | Performed by: PHYSICIAN ASSISTANT

## 2023-06-05 PROCEDURE — 80305 DRUG TEST PRSMV DIR OPT OBS: CPT | Mod: PBBFAC | Performed by: PHYSICIAN ASSISTANT

## 2023-06-05 PROCEDURE — G0438 PPPS, INITIAL VISIT: HCPCS | Mod: ,,, | Performed by: NURSE PRACTITIONER

## 2023-06-05 PROCEDURE — 3044F HG A1C LEVEL LT 7.0%: CPT | Mod: CPTII,,, | Performed by: PHYSICIAN ASSISTANT

## 2023-06-05 PROCEDURE — 1160F PR REVIEW ALL MEDS BY PRESCRIBER/CLIN PHARMACIST DOCUMENTED: ICD-10-PCS | Mod: ,,, | Performed by: NURSE PRACTITIONER

## 2023-06-05 PROCEDURE — 82570 ASSAY OF URINE CREATININE: CPT | Mod: ,,, | Performed by: CLINICAL MEDICAL LABORATORY

## 2023-06-05 PROCEDURE — 1160F RVW MEDS BY RX/DR IN RCRD: CPT | Mod: ,,, | Performed by: NURSE PRACTITIONER

## 2023-06-05 PROCEDURE — 3044F PR MOST RECENT HEMOGLOBIN A1C LEVEL <7.0%: ICD-10-PCS | Mod: CPTII,,, | Performed by: PHYSICIAN ASSISTANT

## 2023-06-05 PROCEDURE — 99214 PR OFFICE/OUTPT VISIT, EST, LEVL IV, 30-39 MIN: ICD-10-PCS | Mod: S$PBB,25,, | Performed by: PHYSICIAN ASSISTANT

## 2023-06-05 PROCEDURE — 3074F PR MOST RECENT SYSTOLIC BLOOD PRESSURE < 130 MM HG: ICD-10-PCS | Mod: ,,, | Performed by: NURSE PRACTITIONER

## 2023-06-05 PROCEDURE — 99215 OFFICE O/P EST HI 40 MIN: CPT | Mod: PBBFAC | Performed by: PHYSICIAN ASSISTANT

## 2023-06-05 PROCEDURE — 3078F PR MOST RECENT DIASTOLIC BLOOD PRESSURE < 80 MM HG: ICD-10-PCS | Mod: CPTII,,, | Performed by: PHYSICIAN ASSISTANT

## 2023-06-05 PROCEDURE — 3078F PR MOST RECENT DIASTOLIC BLOOD PRESSURE < 80 MM HG: ICD-10-PCS | Mod: ,,, | Performed by: NURSE PRACTITIONER

## 2023-06-05 PROCEDURE — 82043 UR ALBUMIN QUANTITATIVE: CPT | Mod: ,,, | Performed by: CLINICAL MEDICAL LABORATORY

## 2023-06-05 PROCEDURE — 1159F PR MEDICATION LIST DOCUMENTED IN MEDICAL RECORD: ICD-10-PCS | Mod: ,,, | Performed by: NURSE PRACTITIONER

## 2023-06-05 PROCEDURE — 3008F PR BODY MASS INDEX (BMI) DOCUMENTED: ICD-10-PCS | Mod: CPTII,,, | Performed by: PHYSICIAN ASSISTANT

## 2023-06-05 RX ORDER — HYDROCODONE BITARTRATE AND ACETAMINOPHEN 10; 325 MG/1; MG/1
1 TABLET ORAL EVERY 6 HOURS PRN
Qty: 120 TABLET | Refills: 0 | Status: SHIPPED | OUTPATIENT
Start: 2023-07-06 | End: 2023-08-05

## 2023-06-05 RX ORDER — KETOROLAC TROMETHAMINE 30 MG/ML
60 INJECTION, SOLUTION INTRAMUSCULAR; INTRAVENOUS
Status: COMPLETED | OUTPATIENT
Start: 2023-06-05 | End: 2023-06-05

## 2023-06-05 RX ORDER — HYDROCODONE BITARTRATE AND ACETAMINOPHEN 10; 325 MG/1; MG/1
1 TABLET ORAL EVERY 6 HOURS PRN
Qty: 120 TABLET | Refills: 0 | Status: SHIPPED | OUTPATIENT
Start: 2023-06-06 | End: 2023-07-06

## 2023-06-05 RX ADMIN — KETOROLAC TROMETHAMINE 60 MG: 30 INJECTION, SOLUTION INTRAMUSCULAR at 01:06

## 2023-06-05 NOTE — PATIENT INSTRUCTIONS
Counseling and Referral of Other Preventative  (Italic type indicates deductible and co-insurance are waived)    Patient Name: Holly Porter  Today's Date: 6/5/2023    Health Maintenance         Date Due Completion Date    Eye Exam Never done ---[facility]ment [facility] [place] [facility]18/23    Foot Exam 02/09/2023 2/9/2022 (Done)    Override on 2/9/2022: Done    Diabetes Urine Screening 06/06/2023 6/6/2022-drawn this visit    Pneumococcal Vaccines (Age 0-64) (1 - PCV) 06/06/2023 (Originally 8/16/1966) ---declined    Low Dose Statin 08/12/2023 (Originally 8/16/1980) ---    Colorectal Cancer Screening 08/12/2023 (Originally 1960) 10/27/2015    COVID-19 Vaccine (2 - Booster for Russell series) 08/12/2023 (Originally 5/18/2021) 3/23/2021    Shingles Vaccine (2 of 2) 03/15/2024 (Originally 10/6/2022) 8/11/2022    Lipid Panel 06/06/2023 6/6/2022-drawn this visit    Mammogram 08/16/2023 8/16/2022    Influenza Vaccine (Season Ended) 09/01/2023 ---    Hemoglobin A1c 09/15/2023 3/15/2023    TETANUS VACCINE 08/11/2032 8/11/2022          No orders of the defined types were placed in this encounter.

## 2023-06-07 DIAGNOSIS — Z12.11 SCREENING FOR COLON CANCER: Primary | ICD-10-CM

## 2023-06-07 RX ORDER — POLYETHYLENE GLYCOL 3350, SODIUM SULFATE ANHYDROUS, SODIUM BICARBONATE, SODIUM CHLORIDE, POTASSIUM CHLORIDE 236; 22.74; 6.74; 5.86; 2.97 G/4L; G/4L; G/4L; G/4L; G/4L
4 POWDER, FOR SOLUTION ORAL ONCE
Qty: 4000 ML | Refills: 0 | Status: SHIPPED | OUTPATIENT
Start: 2023-06-07 | End: 2023-06-07

## 2023-06-28 ENCOUNTER — TELEPHONE (OUTPATIENT)
Dept: FAMILY MEDICINE | Facility: CLINIC | Age: 63
End: 2023-06-28
Payer: MEDICARE

## 2023-06-28 ENCOUNTER — OFFICE VISIT (OUTPATIENT)
Dept: FAMILY MEDICINE | Facility: CLINIC | Age: 63
End: 2023-06-28
Payer: MEDICARE

## 2023-06-28 ENCOUNTER — HOSPITAL ENCOUNTER (OUTPATIENT)
Dept: RADIOLOGY | Facility: HOSPITAL | Age: 63
Discharge: HOME | End: 2023-06-28
Attending: INTERNAL MEDICINE
Payer: MEDICARE

## 2023-06-28 VITALS
HEIGHT: 67 IN | WEIGHT: 293 LBS | TEMPERATURE: 98 F | HEART RATE: 83 BPM | BODY MASS INDEX: 45.99 KG/M2 | OXYGEN SATURATION: 95 % | DIASTOLIC BLOOD PRESSURE: 63 MMHG | SYSTOLIC BLOOD PRESSURE: 125 MMHG | RESPIRATION RATE: 18 BRPM

## 2023-06-28 DIAGNOSIS — M10.9 GOUT, UNSPECIFIED CAUSE, UNSPECIFIED CHRONICITY, UNSPECIFIED SITE: Primary | ICD-10-CM

## 2023-06-28 DIAGNOSIS — I50.9 CONGESTIVE HEART FAILURE, UNSPECIFIED HF CHRONICITY, UNSPECIFIED HEART FAILURE TYPE: ICD-10-CM

## 2023-06-28 DIAGNOSIS — S89.82XA: ICD-10-CM

## 2023-06-28 DIAGNOSIS — M54.17 LUMBOSACRAL RADICULOPATHY: ICD-10-CM

## 2023-06-28 DIAGNOSIS — M79.672 LEFT FOOT PAIN: ICD-10-CM

## 2023-06-28 DIAGNOSIS — M54.50 LOW BACK PAIN, UNSPECIFIED BACK PAIN LATERALITY, UNSPECIFIED CHRONICITY, UNSPECIFIED WHETHER SCIATICA PRESENT: ICD-10-CM

## 2023-06-28 DIAGNOSIS — R82.90 URINE ABNORMALITY: ICD-10-CM

## 2023-06-28 DIAGNOSIS — E66.9 OBESITY, UNSPECIFIED CLASSIFICATION, UNSPECIFIED OBESITY TYPE, UNSPECIFIED WHETHER SERIOUS COMORBIDITY PRESENT: ICD-10-CM

## 2023-06-28 DIAGNOSIS — M54.9 DORSALGIA, UNSPECIFIED: ICD-10-CM

## 2023-06-28 LAB
ANION GAP SERPL CALCULATED.3IONS-SCNC: 8 MMOL/L (ref 7–16)
BILIRUB UR QL STRIP: NEGATIVE
BUN SERPL-MCNC: 14 MG/DL (ref 7–18)
BUN/CREAT SERPL: 14 (ref 6–20)
CALCIUM SERPL-MCNC: 9.6 MG/DL (ref 8.5–10.1)
CHLORIDE SERPL-SCNC: 102 MMOL/L (ref 98–107)
CLARITY UR: CLEAR
CO2 SERPL-SCNC: 32 MMOL/L (ref 21–32)
COLOR UR: YELLOW
CREAT SERPL-MCNC: 0.98 MG/DL (ref 0.55–1.02)
EGFR (NO RACE VARIABLE) (RUSH/TITUS): 65 ML/MIN/1.73M2
GLUCOSE SERPL-MCNC: 86 MG/DL (ref 74–106)
GLUCOSE UR STRIP-MCNC: NORMAL MG/DL
KETONES UR STRIP-SCNC: NEGATIVE MG/DL
LEUKOCYTE ESTERASE UR QL STRIP: NEGATIVE
NITRITE UR QL STRIP: NEGATIVE
NT-PROBNP SERPL-MCNC: 112 PG/ML (ref 1–125)
PH UR STRIP: 5.5 PH UNITS
POTASSIUM SERPL-SCNC: 4.3 MMOL/L (ref 3.5–5.1)
PROT UR QL STRIP: 20
RBC # UR STRIP: NEGATIVE /UL
SODIUM SERPL-SCNC: 138 MMOL/L (ref 136–145)
SP GR UR STRIP: 1.03
URATE SERPL-MCNC: 3.6 MG/DL (ref 2.6–6)
UROBILINOGEN UR STRIP-ACNC: 2 MG/DL

## 2023-06-28 PROCEDURE — 73620 X-RAY EXAM OF FOOT: CPT | Mod: 26,LT,, | Performed by: STUDENT IN AN ORGANIZED HEALTH CARE EDUCATION/TRAINING PROGRAM

## 2023-06-28 PROCEDURE — 3066F PR DOCUMENTATION OF TREATMENT FOR NEPHROPATHY: ICD-10-PCS | Mod: ,,, | Performed by: INTERNAL MEDICINE

## 2023-06-28 PROCEDURE — 84550 URIC ACID: ICD-10-PCS | Mod: ,,, | Performed by: CLINICAL MEDICAL LABORATORY

## 2023-06-28 PROCEDURE — 3074F PR MOST RECENT SYSTOLIC BLOOD PRESSURE < 130 MM HG: ICD-10-PCS | Mod: ,,, | Performed by: INTERNAL MEDICINE

## 2023-06-28 PROCEDURE — 83880 ASSAY OF NATRIURETIC PEPTIDE: CPT | Mod: ,,, | Performed by: CLINICAL MEDICAL LABORATORY

## 2023-06-28 PROCEDURE — 80048 BASIC METABOLIC PANEL: ICD-10-PCS | Mod: ,,, | Performed by: CLINICAL MEDICAL LABORATORY

## 2023-06-28 PROCEDURE — 3008F BODY MASS INDEX DOCD: CPT | Mod: ,,, | Performed by: INTERNAL MEDICINE

## 2023-06-28 PROCEDURE — 73521 XR HIPS BILATERAL 2 VIEW INCL AP PELVIS: ICD-10-PCS | Mod: 26,,, | Performed by: RADIOLOGY

## 2023-06-28 PROCEDURE — 1160F PR REVIEW ALL MEDS BY PRESCRIBER/CLIN PHARMACIST DOCUMENTED: ICD-10-PCS | Mod: ,,, | Performed by: INTERNAL MEDICINE

## 2023-06-28 PROCEDURE — 73620 XR FOOT 2 VIEW LEFT: ICD-10-PCS | Mod: 26,LT,, | Performed by: STUDENT IN AN ORGANIZED HEALTH CARE EDUCATION/TRAINING PROGRAM

## 2023-06-28 PROCEDURE — 3066F NEPHROPATHY DOC TX: CPT | Mod: ,,, | Performed by: INTERNAL MEDICINE

## 2023-06-28 PROCEDURE — 84550 ASSAY OF BLOOD/URIC ACID: CPT | Mod: ,,, | Performed by: CLINICAL MEDICAL LABORATORY

## 2023-06-28 PROCEDURE — 81003 URINALYSIS AUTO W/O SCOPE: CPT | Mod: QW,,, | Performed by: CLINICAL MEDICAL LABORATORY

## 2023-06-28 PROCEDURE — 99214 PR OFFICE/OUTPT VISIT, EST, LEVL IV, 30-39 MIN: ICD-10-PCS | Mod: ,,, | Performed by: INTERNAL MEDICINE

## 2023-06-28 PROCEDURE — 73521 X-RAY EXAM HIPS BI 2 VIEWS: CPT | Mod: TC

## 2023-06-28 PROCEDURE — 80048 BASIC METABOLIC PNL TOTAL CA: CPT | Mod: ,,, | Performed by: CLINICAL MEDICAL LABORATORY

## 2023-06-28 PROCEDURE — 3078F DIAST BP <80 MM HG: CPT | Mod: ,,, | Performed by: INTERNAL MEDICINE

## 2023-06-28 PROCEDURE — 1159F MED LIST DOCD IN RCRD: CPT | Mod: ,,, | Performed by: INTERNAL MEDICINE

## 2023-06-28 PROCEDURE — 3078F PR MOST RECENT DIASTOLIC BLOOD PRESSURE < 80 MM HG: ICD-10-PCS | Mod: ,,, | Performed by: INTERNAL MEDICINE

## 2023-06-28 PROCEDURE — 3044F HG A1C LEVEL LT 7.0%: CPT | Mod: ,,, | Performed by: INTERNAL MEDICINE

## 2023-06-28 PROCEDURE — 81003 URINALYSIS, REFLEX TO URINE CULTURE: ICD-10-PCS | Mod: QW,,, | Performed by: CLINICAL MEDICAL LABORATORY

## 2023-06-28 PROCEDURE — 3074F SYST BP LT 130 MM HG: CPT | Mod: ,,, | Performed by: INTERNAL MEDICINE

## 2023-06-28 PROCEDURE — 3008F PR BODY MASS INDEX (BMI) DOCUMENTED: ICD-10-PCS | Mod: ,,, | Performed by: INTERNAL MEDICINE

## 2023-06-28 PROCEDURE — 72100 XR LUMBAR SPINE 2 OR 3 VIEWS: ICD-10-PCS | Mod: 26,,, | Performed by: RADIOLOGY

## 2023-06-28 PROCEDURE — 3061F PR NEG MICROALBUMINURIA RESULT DOCUMENTED/REVIEW: ICD-10-PCS | Mod: ,,, | Performed by: INTERNAL MEDICINE

## 2023-06-28 PROCEDURE — 99214 OFFICE O/P EST MOD 30 MIN: CPT | Mod: ,,, | Performed by: INTERNAL MEDICINE

## 2023-06-28 PROCEDURE — 73521 X-RAY EXAM HIPS BI 2 VIEWS: CPT | Mod: 26,,, | Performed by: RADIOLOGY

## 2023-06-28 PROCEDURE — 1160F RVW MEDS BY RX/DR IN RCRD: CPT | Mod: ,,, | Performed by: INTERNAL MEDICINE

## 2023-06-28 PROCEDURE — 83880 NT-PRO NATRIURETIC PEPTIDE: ICD-10-PCS | Mod: ,,, | Performed by: CLINICAL MEDICAL LABORATORY

## 2023-06-28 PROCEDURE — 3061F NEG MICROALBUMINURIA REV: CPT | Mod: ,,, | Performed by: INTERNAL MEDICINE

## 2023-06-28 PROCEDURE — 72100 X-RAY EXAM L-S SPINE 2/3 VWS: CPT | Mod: TC

## 2023-06-28 PROCEDURE — 3044F PR MOST RECENT HEMOGLOBIN A1C LEVEL <7.0%: ICD-10-PCS | Mod: ,,, | Performed by: INTERNAL MEDICINE

## 2023-06-28 PROCEDURE — 72100 X-RAY EXAM L-S SPINE 2/3 VWS: CPT | Mod: 26,,, | Performed by: RADIOLOGY

## 2023-06-28 PROCEDURE — 1159F PR MEDICATION LIST DOCUMENTED IN MEDICAL RECORD: ICD-10-PCS | Mod: ,,, | Performed by: INTERNAL MEDICINE

## 2023-06-28 PROCEDURE — 73620 X-RAY EXAM OF FOOT: CPT | Mod: TC,LT

## 2023-06-28 RX ORDER — CILOSTAZOL 100 MG/1
100 TABLET ORAL 2 TIMES DAILY
Qty: 90 TABLET | Refills: 1 | Status: SHIPPED | OUTPATIENT
Start: 2023-06-28 | End: 2023-10-17 | Stop reason: SDUPTHER

## 2023-06-28 RX ORDER — DOXYCYCLINE 100 MG/1
100 CAPSULE ORAL 2 TIMES DAILY
Qty: 14 CAPSULE | Refills: 0 | Status: SHIPPED | OUTPATIENT
Start: 2023-06-28 | End: 2023-09-30 | Stop reason: CLARIF

## 2023-06-28 RX ORDER — CHOLECALCIFEROL (VITAMIN D3) 125 MCG
5000 CAPSULE ORAL 2 TIMES DAILY
Qty: 60 CAPSULE | Refills: 3 | Status: SHIPPED | OUTPATIENT
Start: 2023-06-28 | End: 2023-12-06

## 2023-06-28 RX ORDER — DULOXETIN HYDROCHLORIDE 30 MG/1
30 CAPSULE, DELAYED RELEASE ORAL DAILY
Qty: 30 CAPSULE | Refills: 0 | Status: SHIPPED | OUTPATIENT
Start: 2023-06-28 | End: 2023-08-01 | Stop reason: SDUPTHER

## 2023-06-28 NOTE — TELEPHONE ENCOUNTER
----- Message from Regan Frausto MD sent at 6/28/2023  3:02 PM CDT -----  Need to see in  1 week please  abnl results     1510 Pt is already scheduled to come in on 7/5/23

## 2023-06-29 RX ORDER — POTASSIUM CHLORIDE 20 MEQ/1
20 TABLET, EXTENDED RELEASE ORAL DAILY
Qty: 90 TABLET | Refills: 1 | Status: SHIPPED | OUTPATIENT
Start: 2023-06-29

## 2023-07-05 ENCOUNTER — HOSPITAL ENCOUNTER (OUTPATIENT)
Dept: RADIOLOGY | Facility: HOSPITAL | Age: 63
Discharge: HOME | End: 2023-07-05
Attending: ORTHOPAEDIC SURGERY
Payer: MEDICARE

## 2023-07-05 ENCOUNTER — OFFICE VISIT (OUTPATIENT)
Dept: SPINE | Facility: CLINIC | Age: 63
End: 2023-07-05
Payer: MEDICARE

## 2023-07-05 ENCOUNTER — OFFICE VISIT (OUTPATIENT)
Dept: FAMILY MEDICINE | Facility: CLINIC | Age: 63
End: 2023-07-05
Payer: MEDICARE

## 2023-07-05 VITALS
DIASTOLIC BLOOD PRESSURE: 80 MMHG | TEMPERATURE: 97 F | OXYGEN SATURATION: 97 % | BODY MASS INDEX: 45.99 KG/M2 | WEIGHT: 293 LBS | RESPIRATION RATE: 18 BRPM | HEIGHT: 67 IN | SYSTOLIC BLOOD PRESSURE: 120 MMHG | HEART RATE: 54 BPM

## 2023-07-05 VITALS
BODY MASS INDEX: 45.99 KG/M2 | HEART RATE: 88 BPM | HEIGHT: 67 IN | OXYGEN SATURATION: 95 % | TEMPERATURE: 98 F | SYSTOLIC BLOOD PRESSURE: 123 MMHG | DIASTOLIC BLOOD PRESSURE: 68 MMHG | WEIGHT: 293 LBS | RESPIRATION RATE: 18 BRPM

## 2023-07-05 DIAGNOSIS — M54.12 CERVICAL RADICULOPATHY: ICD-10-CM

## 2023-07-05 DIAGNOSIS — G89.4 CHRONIC PAIN SYNDROME: ICD-10-CM

## 2023-07-05 DIAGNOSIS — M54.12 CERVICAL RADICULOPATHY: Primary | ICD-10-CM

## 2023-07-05 DIAGNOSIS — E66.01 MORBID OBESITY DUE TO EXCESS CALORIES: ICD-10-CM

## 2023-07-05 DIAGNOSIS — E66.01 CLASS 3 SEVERE OBESITY WITH BODY MASS INDEX (BMI) OF 50.0 TO 59.9 IN ADULT, UNSPECIFIED OBESITY TYPE, UNSPECIFIED WHETHER SERIOUS COMORBIDITY PRESENT: Primary | ICD-10-CM

## 2023-07-05 DIAGNOSIS — M54.50 LOW BACK PAIN, UNSPECIFIED BACK PAIN LATERALITY, UNSPECIFIED CHRONICITY, UNSPECIFIED WHETHER SCIATICA PRESENT: ICD-10-CM

## 2023-07-05 DIAGNOSIS — M43.16 SPONDYLOLISTHESIS, LUMBAR REGION: ICD-10-CM

## 2023-07-05 DIAGNOSIS — M50.30 DDD (DEGENERATIVE DISC DISEASE), CERVICAL: ICD-10-CM

## 2023-07-05 DIAGNOSIS — M54.16 LUMBAR RADICULOPATHY: ICD-10-CM

## 2023-07-05 PROBLEM — R82.90 URINE ABNORMALITY: Status: ACTIVE | Noted: 2023-07-05

## 2023-07-05 PROBLEM — M10.9 GOUT: Status: ACTIVE | Noted: 2023-07-05

## 2023-07-05 PROBLEM — S89.82XA: Status: ACTIVE | Noted: 2023-07-05

## 2023-07-05 PROBLEM — M79.672 LEFT FOOT PAIN: Status: ACTIVE | Noted: 2019-01-08

## 2023-07-05 PROBLEM — E66.9 OBESITY: Status: ACTIVE | Noted: 2021-11-10

## 2023-07-05 PROCEDURE — 99214 PR OFFICE/OUTPT VISIT, EST, LEVL IV, 30-39 MIN: ICD-10-PCS | Mod: 25,S$PBB,, | Performed by: ORTHOPAEDIC SURGERY

## 2023-07-05 PROCEDURE — 1159F MED LIST DOCD IN RCRD: CPT | Mod: ,,, | Performed by: INTERNAL MEDICINE

## 2023-07-05 PROCEDURE — 72110 X-RAY EXAM L-2 SPINE 4/>VWS: CPT | Mod: 26,,, | Performed by: ORTHOPAEDIC SURGERY

## 2023-07-05 PROCEDURE — 3008F PR BODY MASS INDEX (BMI) DOCUMENTED: ICD-10-PCS | Mod: ,,, | Performed by: INTERNAL MEDICINE

## 2023-07-05 PROCEDURE — 72110 XR LUMBAR SPINE 4-5 VIEW WITH BENDING VIEWS: ICD-10-PCS | Mod: 26,,, | Performed by: ORTHOPAEDIC SURGERY

## 2023-07-05 PROCEDURE — 99215 OFFICE O/P EST HI 40 MIN: CPT | Mod: PBBFAC | Performed by: ORTHOPAEDIC SURGERY

## 2023-07-05 PROCEDURE — 3074F SYST BP LT 130 MM HG: CPT | Mod: ,,, | Performed by: INTERNAL MEDICINE

## 2023-07-05 PROCEDURE — 3066F NEPHROPATHY DOC TX: CPT | Mod: ,,, | Performed by: INTERNAL MEDICINE

## 2023-07-05 PROCEDURE — 3044F PR MOST RECENT HEMOGLOBIN A1C LEVEL <7.0%: ICD-10-PCS | Mod: CPTII,,, | Performed by: ORTHOPAEDIC SURGERY

## 2023-07-05 PROCEDURE — 1160F PR REVIEW ALL MEDS BY PRESCRIBER/CLIN PHARMACIST DOCUMENTED: ICD-10-PCS | Mod: ,,, | Performed by: INTERNAL MEDICINE

## 2023-07-05 PROCEDURE — 3079F PR MOST RECENT DIASTOLIC BLOOD PRESSURE 80-89 MM HG: ICD-10-PCS | Mod: CPTII,,, | Performed by: ORTHOPAEDIC SURGERY

## 2023-07-05 PROCEDURE — 1159F MED LIST DOCD IN RCRD: CPT | Mod: CPTII,,, | Performed by: ORTHOPAEDIC SURGERY

## 2023-07-05 PROCEDURE — 99214 OFFICE O/P EST MOD 30 MIN: CPT | Mod: 25,S$PBB,, | Performed by: ORTHOPAEDIC SURGERY

## 2023-07-05 PROCEDURE — 3074F SYST BP LT 130 MM HG: CPT | Mod: CPTII,,, | Performed by: ORTHOPAEDIC SURGERY

## 2023-07-05 PROCEDURE — 3066F NEPHROPATHY DOC TX: CPT | Mod: CPTII,,, | Performed by: ORTHOPAEDIC SURGERY

## 2023-07-05 PROCEDURE — 3061F PR NEG MICROALBUMINURIA RESULT DOCUMENTED/REVIEW: ICD-10-PCS | Mod: CPTII,,, | Performed by: ORTHOPAEDIC SURGERY

## 2023-07-05 PROCEDURE — 3074F PR MOST RECENT SYSTOLIC BLOOD PRESSURE < 130 MM HG: ICD-10-PCS | Mod: ,,, | Performed by: INTERNAL MEDICINE

## 2023-07-05 PROCEDURE — 3008F BODY MASS INDEX DOCD: CPT | Mod: CPTII,,, | Performed by: ORTHOPAEDIC SURGERY

## 2023-07-05 PROCEDURE — 3066F PR DOCUMENTATION OF TREATMENT FOR NEPHROPATHY: ICD-10-PCS | Mod: ,,, | Performed by: INTERNAL MEDICINE

## 2023-07-05 PROCEDURE — 1160F RVW MEDS BY RX/DR IN RCRD: CPT | Mod: ,,, | Performed by: INTERNAL MEDICINE

## 2023-07-05 PROCEDURE — 3079F DIAST BP 80-89 MM HG: CPT | Mod: CPTII,,, | Performed by: ORTHOPAEDIC SURGERY

## 2023-07-05 PROCEDURE — 3008F PR BODY MASS INDEX (BMI) DOCUMENTED: ICD-10-PCS | Mod: CPTII,,, | Performed by: ORTHOPAEDIC SURGERY

## 2023-07-05 PROCEDURE — 99214 PR OFFICE/OUTPT VISIT, EST, LEVL IV, 30-39 MIN: ICD-10-PCS | Mod: ,,, | Performed by: INTERNAL MEDICINE

## 2023-07-05 PROCEDURE — 72050 X-RAY EXAM NECK SPINE 4/5VWS: CPT | Mod: TC

## 2023-07-05 PROCEDURE — 99406 PR TOBACCO USE CESSATION INTERMEDIATE 3-10 MINUTES: ICD-10-PCS | Mod: S$PBB,,, | Performed by: ORTHOPAEDIC SURGERY

## 2023-07-05 PROCEDURE — 99214 OFFICE O/P EST MOD 30 MIN: CPT | Mod: ,,, | Performed by: INTERNAL MEDICINE

## 2023-07-05 PROCEDURE — 3078F PR MOST RECENT DIASTOLIC BLOOD PRESSURE < 80 MM HG: ICD-10-PCS | Mod: ,,, | Performed by: INTERNAL MEDICINE

## 2023-07-05 PROCEDURE — 99406 BEHAV CHNG SMOKING 3-10 MIN: CPT | Mod: S$PBB,,, | Performed by: ORTHOPAEDIC SURGERY

## 2023-07-05 PROCEDURE — 3008F BODY MASS INDEX DOCD: CPT | Mod: ,,, | Performed by: INTERNAL MEDICINE

## 2023-07-05 PROCEDURE — 3061F NEG MICROALBUMINURIA REV: CPT | Mod: ,,, | Performed by: INTERNAL MEDICINE

## 2023-07-05 PROCEDURE — 3066F PR DOCUMENTATION OF TREATMENT FOR NEPHROPATHY: ICD-10-PCS | Mod: CPTII,,, | Performed by: ORTHOPAEDIC SURGERY

## 2023-07-05 PROCEDURE — 3044F HG A1C LEVEL LT 7.0%: CPT | Mod: CPTII,,, | Performed by: ORTHOPAEDIC SURGERY

## 2023-07-05 PROCEDURE — 1159F PR MEDICATION LIST DOCUMENTED IN MEDICAL RECORD: ICD-10-PCS | Mod: CPTII,,, | Performed by: ORTHOPAEDIC SURGERY

## 2023-07-05 PROCEDURE — 72050 XR CERVICAL SPINE AP LAT WITH FLEX EXTEN: ICD-10-PCS | Mod: 26,,, | Performed by: ORTHOPAEDIC SURGERY

## 2023-07-05 PROCEDURE — 3074F PR MOST RECENT SYSTOLIC BLOOD PRESSURE < 130 MM HG: ICD-10-PCS | Mod: CPTII,,, | Performed by: ORTHOPAEDIC SURGERY

## 2023-07-05 PROCEDURE — 72050 X-RAY EXAM NECK SPINE 4/5VWS: CPT | Mod: 26,,, | Performed by: ORTHOPAEDIC SURGERY

## 2023-07-05 PROCEDURE — 1159F PR MEDICATION LIST DOCUMENTED IN MEDICAL RECORD: ICD-10-PCS | Mod: ,,, | Performed by: INTERNAL MEDICINE

## 2023-07-05 PROCEDURE — 72110 X-RAY EXAM L-2 SPINE 4/>VWS: CPT | Mod: TC

## 2023-07-05 PROCEDURE — 3078F DIAST BP <80 MM HG: CPT | Mod: ,,, | Performed by: INTERNAL MEDICINE

## 2023-07-05 PROCEDURE — 3061F NEG MICROALBUMINURIA REV: CPT | Mod: CPTII,,, | Performed by: ORTHOPAEDIC SURGERY

## 2023-07-05 PROCEDURE — 3061F PR NEG MICROALBUMINURIA RESULT DOCUMENTED/REVIEW: ICD-10-PCS | Mod: ,,, | Performed by: INTERNAL MEDICINE

## 2023-07-05 PROCEDURE — 3044F PR MOST RECENT HEMOGLOBIN A1C LEVEL <7.0%: ICD-10-PCS | Mod: ,,, | Performed by: INTERNAL MEDICINE

## 2023-07-05 PROCEDURE — 3044F HG A1C LEVEL LT 7.0%: CPT | Mod: ,,, | Performed by: INTERNAL MEDICINE

## 2023-07-05 RX ORDER — IBUPROFEN 800 MG/1
800 TABLET ORAL 2 TIMES DAILY PRN
Qty: 30 TABLET | Refills: 2 | Status: ON HOLD | OUTPATIENT
Start: 2023-07-05 | End: 2024-02-14 | Stop reason: HOSPADM

## 2023-07-05 RX ORDER — PHENTERMINE HYDROCHLORIDE 37.5 MG/1
37.5 TABLET ORAL DAILY
Qty: 30 TABLET | Refills: 0 | Status: CANCELLED | OUTPATIENT
Start: 2023-07-05

## 2023-07-05 RX ORDER — CYCLOBENZAPRINE HCL 10 MG
10 TABLET ORAL NIGHTLY
Qty: 30 TABLET | Refills: 2 | Status: SHIPPED | OUTPATIENT
Start: 2023-07-05 | End: 2023-12-06

## 2023-07-05 NOTE — PROGRESS NOTES
Smoking Cessation counseling    Time spent:  3-10 minutes (53213)    We discussed the importance, over both the short and long-term, of smoking cessation; reviewed the patient's willingness to quit; overall history and current use of tobacco smoking products; that there are a variety of methods to help with smoking diminution and cessation (stopping alone, using nicotine substitution medications/gums, Chantix, other medications, etcetera, which the patient will also discuss with his or her primary care physician); that the patient should set a date for smoking cessation; and the patient will follow up with his or her primary care physician for further support and oversight.    We also discussed that for the patient to have surgery while using nicotine, wound healing may be compromised relative to those who do not smoke; that recovery from anesthesia may sometimes be more difficult; and that quitting may decrease the heart, vascular, pulmonary effects in the short term as well as over the long term.  Smoking cessation may be useful in important for any patient will have a fusion as part of surgery or have bone or tissue that has regrown heel (bone fusions, wound closures, etc.)  since surgical results with respect to wound healing, susceptibility to recovery from infection, bone fusion, and tissue and blood vessel health may be impaired or less optimal smokers then nonsmokers.  We talked about the elevated risk of surgical bone fusion failure in the setting of smoking and the potential need for a higher-risk revision surgery should fusion not occur.    I reviewed this with the patient in detail and all questions were answered.  We discussed nature of the patient's condition; real alternatives and realistic options; pros and cons, risks and benefits of all the options, in detail.  I believe the patient understands the above and wishes to proceed as outlined.       Sarecycline Pregnancy And Lactation Text: This medication is Pregnancy Category D and not consider safe during pregnancy. It is also excreted in breast milk.

## 2023-07-05 NOTE — PROGRESS NOTES
AP, lateral, flexion/extension views of the lumbar spine reviewed    On the AP there is normal coronal alignment.  There are 5 non-rib-bearing lumbar vertebrae.  On the lateral there is decreased lumbar lordosis.  There is spondylotic disease with decreased disc height and osteophyte formation noted.  Grade 1 anterolisthesis at L4-5.    Impression:  Spondylotic changes of the lumbar spine as noted above
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
## 2023-07-05 NOTE — PROGRESS NOTES
Subjective:       Patient ID: Holly Porter is a 62 y.o. female.    Chief Complaint: Results (Abnl labs)    HPI  .  62-year-old female seen today patient complains of pain and discomfort in the left foot patient also states that she has a growth of the left foot she complains of chronic low back pain.  She does have chronic obesity.  She has pain in the left foot at rest pain in the left lower extremity.  On exam patient does have evidence of cellulitis.  And also has arthritic changes in the left foot.  Will check the patient for gout will x-ray the left foot will start doxycycline 100 mg 1 p.o. b.i.d. for cellulitis.  Patient has chronic musculoskeletal pain will start Cymbalta 30 mg 1 p.o. q.day.    Refer to physical therapy.    In regards to the low back pain will also refer to ortho spine specialist Dr. Rueda.  Patient has had a small wound to the left foot will refer to the wound care center.  She has a nodule to the left foot will referred to the podiatrist Dr. Lucio and also General surgery Dr. Saeed.    Current Medications:    Current Outpatient Medications:     ACCU-CHEK GUIDE GLUCOSE METER [facility], , Disp: , Rfl:     ACCU-CHEK GUIDE TEST STRIPS [facility], , Disp: , Rfl:     ACCU-CHEK SOFTCLIX LANCETS Misc, , Disp: , Rfl:     albuterol (PROVENTIL/VENTOLIN HFA) 90 mcg/actuation inhaler, Inhale 2 puffs into the lungs 4 (four) times daily. Rescue, Disp: 18 g, Rfl: 2    allopurinoL (ZYLOPRIM) 300 MG tablet, Take 1 tablet (300 mg total) by mouth once daily., Disp: 90 tablet, Rfl: 3    amitriptyline (ELAVIL) 25 MG tablet, Take 1 tablet (25 mg total) by mouth nightly as needed for Insomnia., Disp: 90 tablet, Rfl: 1    aspirin (ECOTRIN) 81 MG EC tablet, Take 1 tablet (81 mg total) by mouth once daily., Disp: 90 tablet, Rfl: 1    calcium carbonate (OS-MICHAELA) 500 mg calcium (1,250 mg) tablet, Take 1 tablet (500 mg total) by mouth 2 (two) times daily., Disp: 60 tablet, Rfl: 3    carvediloL (COREG) 12.5 MG tablet, Take 0.5  "tablets (6.25 mg total) by mouth 2 (two) times daily., Disp: 30 tablet, Rfl: 11    colchicine (COLCRYS) 0.6 mg tablet, One pill three times a day for two days,then one pill daily till out, Disp: 30 tablet, Rfl: 2    docusate sodium (COLACE) 100 MG capsule, Take 1 capsule (100 mg total) by mouth 2 (two) times daily., Disp: 60 capsule, Rfl: 2    HYDROcodone-acetaminophen (NORCO)  mg per tablet, Take 1 tablet by mouth every 6 (six) hours as needed for Pain., Disp: 120 tablet, Rfl: 0    [START ON 7/6/2023] HYDROcodone-acetaminophen (NORCO)  mg per tablet, Take 1 tablet by mouth every 6 (six) hours as needed for Pain., Disp: 120 tablet, Rfl: 0    insulin detemir U-100, Levemir, (LEVEMIR FLEXTOUCH U100 INSULIN) 100 unit/mL (3 mL) InPn pen, Inject 10 Units into the skin every evening. 15 ml with 1 refill, Disp: 15 mL, Rfl: 1    levothyroxine (SYNTHROID) 150 MCG tablet, Take 1 tablet (150 mcg total) by mouth before breakfast., Disp: 90 tablet, Rfl: 1    loratadine (CLARITIN) 10 mg tablet, Take 1 tablet (10 mg total) by mouth once daily., Disp: 30 tablet, Rfl: 3    losartan-hydrochlorothiazide 50-12.5 mg (HYZAAR) 50-12.5 mg per tablet, Take 1 tablet by mouth once daily., Disp: 90 tablet, Rfl: 1    metOLazone (ZAROXOLYN) 2.5 MG tablet, Take 1 tablet (2.5 mg total) by mouth once daily., Disp: 30 tablet, Rfl: 2    mupirocin (BACTROBAN) 2 % ointment, Apply topically 2 (two) times daily., Disp: 80 g, Rfl: 2    naloxone (NARCAN) 4 mg/actuation Spry, 1 spray once., Disp: , Rfl:     NIFEdipine (PROCARDIA-XL) 60 MG (OSM) 24 hr tablet, Take 1 tablet (60 mg total) by mouth once daily., Disp: 30 tablet, Rfl: 11    NOVOFINE 32 32 gauge x 1/4" Ndle, Use as directed once daily., Disp: 90 each, Rfl: 3    pantoprazole (PROTONIX) 40 MG tablet, Take 1 tablet (40 mg total) by mouth once daily., Disp: 90 tablet, Rfl: 1    phentermine (ADIPEX-P) 37.5 mg tablet, Take 1 tablet (37.5 mg total) by mouth before breakfast., Disp: 30 tablet, " Rfl: 0    polyethylene glycol (GLYCOLAX) 17 gram PwPk, Take 17 g by mouth once daily., Disp: , Rfl:     QUEtiapine (SEROQUEL) 25 MG Tab, Take 1 tablet (25 mg total) by mouth once daily., Disp: 30 tablet, Rfl: 1    sertraline (ZOLOFT) 50 MG tablet, Take 1 tablet (50 mg total) by mouth once daily., Disp: 30 tablet, Rfl: 5    spironolactone (ALDACTONE) 50 MG tablet, Take 1 tablet (50 mg total) by mouth once daily., Disp: 30 tablet, Rfl: 11    SYMBICORT 160-4.5 mcg/actuation HFAA, Inhale into the lungs., Disp: , Rfl:     tiotropium (SPIRIVA) 18 mcg inhalation capsule, Inhale 1 capsule (18 mcg total) into the lungs once daily. Controller, Disp: 90 capsule, Rfl: 3    topiramate (TOPAMAX) 100 MG tablet, Take 1.5 tablets (150 mg total) by mouth 2 (two) times daily., Disp: 90 tablet, Rfl: 11    triamcinolone acetonide 0.1% (KENALOG) 0.1 % cream, Apply topically 3 (three) times daily., Disp: 400 g, Rfl: 2    apixaban (ELIQUIS) 5 mg Tab, Take 1 tablet (5 mg total) by mouth 2 (two) times daily., Disp: 60 tablet, Rfl: 3    cholecalciferol, vitamin D3, 125 mcg (5,000 unit) capsule, Take 1 capsule (5,000 Units total) by mouth 2 (two) times a day., Disp: 60 capsule, Rfl: 3    cilostazoL (PLETAL) 100 MG Tab, Take 1 tablet (100 mg total) by mouth 2 (two) times daily., Disp: 90 tablet, Rfl: 1    doxycycline (VIBRAMYCIN) 100 MG Cap, Take 1 capsule (100 mg total) by mouth 2 (two) times a day., Disp: 14 capsule, Rfl: 0    DULoxetine (CYMBALTA) 30 MG capsule, Take 1 capsule (30 mg total) by mouth once daily., Disp: 30 capsule, Rfl: 0    potassium chloride SA (K-DUR,KLOR-CON) 20 MEQ tablet, Take 1 tablet (20 mEq total) by mouth once daily., Disp: 90 tablet, Rfl: 1           Review of Systems   Constitutional:  Negative for appetite change, fatigue and fever.   Respiratory:  Negative for shortness of breath.    Cardiovascular:  Negative for chest pain.   Gastrointestinal:  Negative for abdominal pain and constipation.   Endocrine: Negative  "for polydipsia, polyphagia and polyuria.   Genitourinary:  Negative for difficulty urinating, frequency and hot flashes.   Musculoskeletal:  Positive for arthralgias.   Allergic/Immunologic: Negative for environmental allergies.   Neurological:  Negative for dizziness and light-headedness.   Psychiatric/Behavioral:  Negative for agitation.               Vitals:    06/28/23 1045   BP: 125/63   BP Location: Right arm   Patient Position: Sitting   BP Method: Large (Automatic)   Pulse: 83   Resp: 18   Temp: 97.6 °F (36.4 °C)   TempSrc: Temporal   SpO2: 95%   Weight: (!) 158.8 kg (350 lb 3.2 oz)   Height: 5' 7" (1.702 m)        Physical Exam  Vitals and nursing note reviewed.   Constitutional:       Appearance: Normal appearance. She is obese.   Cardiovascular:      Rate and Rhythm: Normal rate and regular rhythm.      Pulses: Normal pulses.      Heart sounds: Normal heart sounds.   Pulmonary:      Effort: Pulmonary effort is normal.      Breath sounds: Normal breath sounds.   Abdominal:      General: Abdomen is flat. Bowel sounds are normal.      Palpations: Abdomen is soft.   Musculoskeletal:         General: Normal range of motion.   Skin:     General: Skin is warm and dry.   Neurological:      General: No focal deficit present.      Mental Status: She is alert and oriented to person, place, and time. Mental status is at baseline.         Last Labs:     Office Visit on 06/28/2023   Component Date Value    Sodium 06/28/2023 138     Potassium 06/28/2023 4.3     Chloride 06/28/2023 102     CO2 06/28/2023 32     Anion Gap 06/28/2023 8     Glucose 06/28/2023 86     BUN 06/28/2023 14     Creatinine 06/28/2023 0.98     BUN/Creatinine Ratio 06/28/2023 14     Calcium 06/28/2023 9.6     eGFR 06/28/2023 65     ProBNP 06/28/2023 112     Uric Acid 06/28/2023 3.6     Color, UA 06/28/2023 Yellow     Clarity, UA 06/28/2023 Clear     pH, UA 06/28/2023 5.5     Leukocytes, UA 06/28/2023 Negative     Nitrites, UA 06/28/2023 Negative     " Protein, UA 06/28/2023 20 (A)     Glucose, UA 06/28/2023 Normal     Ketones, UA 06/28/2023 Negative     Urobilinogen, UA 06/28/2023 2 (A)     Bilirubin, UA 06/28/2023 Negative     Blood, UA 06/28/2023 Negative     Specific Gravity, UA 06/28/2023 1.033 (H)    Office Visit on 06/05/2023   Component Date Value    POC Amphetamines 06/05/2023 Negative     POC Barbiturates 06/05/2023 Negative     POC Benzodiazepines 06/05/2023 Negative     POC Cocaine 06/05/2023 Negative     POC THC 06/05/2023 Negative     POC Methadone 06/05/2023 Negative     POC Methamphetamine 06/05/2023 Negative     POC Opiates 06/05/2023 Presumptive Positive (A)     POC Oxycodone 06/05/2023 Negative     POC Phencyclidine 06/05/2023 Negative     POC Methylenedioxymetham* 06/05/2023 Negative     POC Tricyclic Antidepres* 06/05/2023 Negative     POC Buprenorphine 06/05/2023 Negative      Acceptab* 06/05/2023 Yes     POC Temperature (Urine) 06/05/2023 90    Office Visit on 06/05/2023   Component Date Value    Triglycerides 06/05/2023 102     Cholesterol 06/05/2023 192     HDL Cholesterol 06/05/2023 57     Cholesterol/HDL Ratio (R* 06/05/2023 3.4     Non-HDL 06/05/2023 135     LDL Calculated 06/05/2023 115     LDL/HDL 06/05/2023 2.0     VLDL 06/05/2023 20     Creatinine, Urine 06/05/2023 261 (H)     Microalbumin 06/05/2023 1.4     Microalbumin/Creatinine * 06/05/2023 5.4        Last Imaging:  X-Ray Cervical Spine AP Lat with Flex Ex  See Procedure Notes for results.     IMPRESSION: Please see Ortho procedure notes for report.      This procedure was auto-finalized by: Virtual Radiologist  X-Ray Lumbar 4-5 View including Bending Views  See Procedure Notes for results.     IMPRESSION: Please see Ortho procedure notes for report.      This procedure was auto-finalized by: Virtual Radiologist         **Labs and x-rays personally reviewed by me    ** reviewed      Objective:        Assessment:       1. Gout, unspecified cause, unspecified  chronicity, unspecified site  Uric Acid    Uric Acid      2. Congestive heart failure, unspecified HF chronicity, unspecified heart failure type  Basic Metabolic Panel    NT-Pro Natriuretic Peptide      3. Left foot pain  X-Ray Foot 2 View Left      4. Urine abnormality  Urinalysis, Reflex to Urine Culture    Urinalysis, Reflex to Urine Culture      5. Obesity, unspecified classification, unspecified obesity type, unspecified whether serious comorbidity present  Ambulatory referral/consult to Weight Management Program      6. Low back pain, unspecified back pain laterality, unspecified chronicity, unspecified whether sciatica present  Ambulatory referral/consult to [facility]    Ambulatory referral/consult to Physical/Occupational Therapy      7. Surfer's nodules of left foot, initial encounter  Ambulatory referral/consult to General Surgery    Ambulatory referral/consult to Podiatry    CANCELED: Ambulatory referral/consult to Podiatry           Plan:         [unfilled]

## 2023-07-06 NOTE — PROGRESS NOTES
Subjective:       Patient ID: Holly Porter is a 62 y.o. female.    Chief Complaint: Gout    HPI  .  Patient seen today she is awake alert stated that she is hurting all over.  She complains of pain in the neck pain in his shoulders planning knees she complains of moderate to severe low back pain.  She does have diffuse tenderness in the lower back.  She has tenderness in his shoulders.  Patient also has chronic obesity.    The plan Adipex 37.5 mg 1 p.o. q.day, recommend diet exercise and lifestyle modification.    Ibuprofen 800 mg 1 p.o. b.i.d. p.r.n. pain and Flexeril 10 mg 1 p.o. q.h.s. p.r.n. muscle spasms.  Current Medications:    Current Outpatient Medications:     [facility], , Disp: , Rfl:     ACCU-CHEK GUIDE TEST STRIPS [facility], , Disp: , Rfl:     ACCU-CHEK SOFTCLIX LANCETS Misc, , Disp: , Rfl:     albuterol (PROVENTIL/VENTOLIN HFA) 90 mcg/actuation inhaler, Inhale 2 puffs into the lungs 4 (four) times daily. Rescue, Disp: 18 g, Rfl: 2    allopurinoL (ZYLOPRIM) 300 MG tablet, Take 1 tablet (300 mg total) by mouth once daily., Disp: 90 tablet, Rfl: 3    amitriptyline (ELAVIL) 25 MG tablet, Take 1 tablet (25 mg total) by mouth nightly as needed for Insomnia., Disp: 90 tablet, Rfl: 1    apixaban (ELIQUIS) 5 mg Tab, Take 1 tablet (5 mg total) by mouth 2 (two) times daily., Disp: 60 tablet, Rfl: 3    aspirin (ECOTRIN) 81 MG EC tablet, Take 1 tablet (81 mg total) by mouth once daily., Disp: 90 tablet, Rfl: 1    calcium carbonate (OS-MICHAELA) 500 mg calcium (1,250 mg) tablet, Take 1 tablet (500 mg total) by mouth 2 (two) times daily., Disp: 60 tablet, Rfl: 3    carvediloL (COREG) 12.5 MG tablet, Take 0.5 tablets (6.25 mg total) by mouth 2 (two) times daily., Disp: 30 tablet, Rfl: 11    cholecalciferol, vitamin D3, 125 mcg (5,000 unit) capsule, Take 1 capsule (5,000 Units total) by mouth 2 (two) times a day., Disp: 60 capsule, Rfl: 3    cilostazoL (PLETAL) 100 MG Tab, Take 1 tablet (100 mg total)  "by mouth 2 (two) times daily., Disp: 90 tablet, Rfl: 1    colchicine (COLCRYS) 0.6 mg tablet, One pill three times a day for two days,then one pill daily till out, Disp: 30 tablet, Rfl: 2    docusate sodium (COLACE) 100 MG capsule, Take 1 capsule (100 mg total) by mouth 2 (two) times daily., Disp: 60 capsule, Rfl: 2    doxycycline (VIBRAMYCIN) 100 MG Cap, Take 1 capsule (100 mg total) by mouth 2 (two) times a day., Disp: 14 capsule, Rfl: 0    DULoxetine (CYMBALTA) 30 MG capsule, Take 1 capsule (30 mg total) by mouth once daily., Disp: 30 capsule, Rfl: 0    HYDROcodone-acetaminophen (NORCO)  mg per tablet, Take 1 tablet by mouth every 6 (six) hours as needed for Pain., Disp: 120 tablet, Rfl: 0    HYDROcodone-acetaminophen (NORCO)  mg per tablet, Take 1 tablet by mouth every 6 (six) hours as needed for Pain., Disp: 120 tablet, Rfl: 0    insulin detemir U-100, Levemir, (LEVEMIR FLEXTOUCH U100 INSULIN) 100 unit/mL (3 mL) InPn pen, Inject 10 Units into the skin every evening. 15 ml with 1 refill, Disp: 15 mL, Rfl: 1    levothyroxine (SYNTHROID) 150 MCG tablet, Take 1 tablet (150 mcg total) by mouth before breakfast., Disp: 90 tablet, Rfl: 1    loratadine (CLARITIN) 10 mg tablet, Take 1 tablet (10 mg total) by mouth once daily., Disp: 30 tablet, Rfl: 3    losartan-hydrochlorothiazide 50-12.5 mg (HYZAAR) 50-12.5 mg per tablet, Take 1 tablet by mouth once daily., Disp: 90 tablet, Rfl: 1    metOLazone (ZAROXOLYN) 2.5 MG tablet, Take 1 tablet (2.5 mg total) by mouth once daily., Disp: 30 tablet, Rfl: 2    mupirocin (BACTROBAN) 2 % ointment, Apply topically 2 (two) times daily., Disp: 80 g, Rfl: 2    naloxone (NARCAN) 4 mg/actuation Spry, 1 spray once., Disp: , Rfl:     NIFEdipine (PROCARDIA-XL) 60 MG (OSM) 24 hr tablet, Take 1 tablet (60 mg total) by mouth once daily., Disp: 30 tablet, Rfl: 11    NOVOFINE 32 32 gauge x 1/4" Ndle, Use as directed once daily., Disp: 90 each, Rfl: 3    pantoprazole (PROTONIX) 40 MG " tablet, Take 1 tablet (40 mg total) by mouth once daily., Disp: 90 tablet, Rfl: 1    phentermine (ADIPEX-P) 37.5 mg tablet, Take 1 tablet (37.5 mg total) by mouth before breakfast., Disp: 30 tablet, Rfl: 0    polyethylene glycol (GLYCOLAX) 17 gram PwPk, Take 17 g by mouth once daily., Disp: , Rfl:     potassium chloride SA (K-DUR,KLOR-CON) 20 MEQ tablet, Take 1 tablet (20 mEq total) by mouth once daily., Disp: 90 tablet, Rfl: 1    QUEtiapine (SEROQUEL) 25 MG Tab, Take 1 tablet (25 mg total) by mouth once daily., Disp: 30 tablet, Rfl: 1    sertraline (ZOLOFT) 50 MG tablet, Take 1 tablet (50 mg total) by mouth once daily., Disp: 30 tablet, Rfl: 5    spironolactone (ALDACTONE) 50 MG tablet, Take 1 tablet (50 mg total) by mouth once daily., Disp: 30 tablet, Rfl: 11    SYMBICORT 160-4.5 mcg/actuation HFAA, Inhale into the lungs., Disp: , Rfl:     tiotropium (SPIRIVA) 18 mcg inhalation capsule, Inhale 1 capsule (18 mcg total) into the lungs once daily. Controller, Disp: 90 capsule, Rfl: 3    topiramate (TOPAMAX) 100 MG tablet, Take 1.5 tablets (150 mg total) by mouth 2 (two) times daily., Disp: 90 tablet, Rfl: 11    triamcinolone acetonide 0.1% (KENALOG) 0.1 % cream, Apply topically 3 (three) times daily., Disp: 400 g, Rfl: 2    cyclobenzaprine (FLEXERIL) 10 MG tablet, Take 1 tablet (10 mg total) by mouth every evening., Disp: 30 tablet, Rfl: 2    ibuprofen (ADVIL,MOTRIN) 800 MG tablet, Take 1 tablet (800 mg total) by mouth 2 (two) times daily as needed for Pain., Disp: 30 tablet, Rfl: 2           Review of Systems   Constitutional:  Negative for appetite change, fatigue and fever.   Respiratory:  Negative for shortness of breath.    Cardiovascular:  Negative for chest pain.   Gastrointestinal:  Negative for abdominal pain and constipation.   Endocrine: Negative for polydipsia, polyphagia and polyuria.   Genitourinary:  Negative for difficulty urinating, frequency and hot flashes.   Musculoskeletal:  Positive for  "arthralgias.   Allergic/Immunologic: Negative for environmental allergies.   Neurological:  Negative for dizziness and light-headedness.   Psychiatric/Behavioral:  Negative for agitation.               Vitals:    07/05/23 1027   BP: 123/68   BP Location: Right arm   Patient Position: Sitting   BP Method: X-Large (Automatic)   Pulse: 88   Resp: 18   Temp: 97.8 °F (36.6 °C)   TempSrc: Temporal   SpO2: 95%   Weight: (!) 157.7 kg (347 lb 9.6 oz)   Height: 5' 7" (1.702 m)        Physical Exam  Vitals and nursing note reviewed.   Constitutional:       Appearance: Normal appearance. She is obese.   Cardiovascular:      Rate and Rhythm: Normal rate and regular rhythm.      Pulses: Normal pulses.      Heart sounds: Normal heart sounds.   Pulmonary:      Effort: Pulmonary effort is normal.      Breath sounds: Normal breath sounds.   Abdominal:      General: Abdomen is flat. Bowel sounds are normal.      Palpations: Abdomen is soft.   Musculoskeletal:         General: Normal range of motion.   Skin:     General: Skin is warm and dry.   Neurological:      General: No focal deficit present.      Mental Status: She is alert and oriented to person, place, and time. Mental status is at baseline.         Last Labs:     Office Visit on 06/28/2023   Component Date Value    Sodium 06/28/2023 138     Potassium 06/28/2023 4.3     Chloride 06/28/2023 102     CO2 06/28/2023 32     Anion Gap 06/28/2023 8     Glucose 06/28/2023 86     BUN 06/28/2023 14     Creatinine 06/28/2023 0.98     BUN/Creatinine Ratio 06/28/2023 14     Calcium 06/28/2023 9.6     eGFR 06/28/2023 65     ProBNP 06/28/2023 112     Uric Acid 06/28/2023 3.6     Color, UA 06/28/2023 Yellow     Clarity, UA 06/28/2023 Clear     pH, UA 06/28/2023 5.5     Leukocytes, UA 06/28/2023 Negative     Nitrites, UA 06/28/2023 Negative     Protein, UA 06/28/2023 20 (A)     Glucose, UA 06/28/2023 Normal     Ketones, UA 06/28/2023 Negative     Urobilinogen, UA 06/28/2023 2 (A)     Bilirubin, " UA 06/28/2023 Negative     Blood, UA 06/28/2023 Negative     Specific Gravity, UA 06/28/2023 1.033 (H)        Last Imaging:  X-Ray Cervical Spine AP Lat with Flex Ex  See Procedure Notes for results.     IMPRESSION: Please see Ortho procedure notes for report.      This procedure was auto-finalized by: Virtual Radiologist  X-Ray Lumbar 4-5 View including Bending Views  See Procedure Notes for results.     IMPRESSION: Please see Ortho procedure notes for report.      This procedure was auto-finalized by: Virtual Radiologist         **Labs and x-rays personally reviewed by me    ** reviewed      Objective:        Assessment:       1. Class 3 severe obesity with body mass index (BMI) of 50.0 to 59.9 in adult, unspecified obesity type, unspecified whether serious comorbidity present        2. Low back pain, unspecified back pain laterality, unspecified chronicity, unspecified whether sciatica present        3. Chronic pain syndrome             Plan:         [unfilled]

## 2023-07-20 ENCOUNTER — PATIENT MESSAGE (OUTPATIENT)
Dept: ADMINISTRATIVE | Facility: HOSPITAL | Age: 63
End: 2023-07-20

## 2023-08-01 RX ORDER — METOLAZONE 2.5 MG/1
2.5 TABLET ORAL DAILY
Qty: 30 TABLET | Refills: 2 | Status: SHIPPED | OUTPATIENT
Start: 2023-08-01

## 2023-08-01 RX ORDER — DULOXETIN HYDROCHLORIDE 30 MG/1
30 CAPSULE, DELAYED RELEASE ORAL DAILY
Qty: 30 CAPSULE | Refills: 0 | Status: SHIPPED | OUTPATIENT
Start: 2023-08-01 | End: 2023-08-31

## 2023-08-01 RX ORDER — ALLOPURINOL 300 MG/1
300 TABLET ORAL
Qty: 90 TABLET | Refills: 3 | Status: SHIPPED | OUTPATIENT
Start: 2023-08-01

## 2023-08-02 ENCOUNTER — OFFICE VISIT (OUTPATIENT)
Dept: PAIN MEDICINE | Facility: CLINIC | Age: 63
End: 2023-08-02
Payer: MEDICARE

## 2023-08-02 VITALS
HEIGHT: 67 IN | DIASTOLIC BLOOD PRESSURE: 78 MMHG | HEART RATE: 82 BPM | SYSTOLIC BLOOD PRESSURE: 146 MMHG | WEIGHT: 293 LBS | BODY MASS INDEX: 45.99 KG/M2 | RESPIRATION RATE: 18 BRPM

## 2023-08-02 DIAGNOSIS — G89.4 CHRONIC PAIN SYNDROME: Chronic | ICD-10-CM

## 2023-08-02 DIAGNOSIS — M47.817 LUMBOSACRAL SPONDYLOSIS WITHOUT MYELOPATHY: Chronic | ICD-10-CM

## 2023-08-02 DIAGNOSIS — M25.561 CHRONIC PAIN OF RIGHT KNEE: Primary | Chronic | ICD-10-CM

## 2023-08-02 DIAGNOSIS — I73.9 PVD (PERIPHERAL VASCULAR DISEASE): Chronic | ICD-10-CM

## 2023-08-02 DIAGNOSIS — G89.29 CHRONIC PAIN OF RIGHT KNEE: Primary | Chronic | ICD-10-CM

## 2023-08-02 DIAGNOSIS — M54.16 LUMBAR RADICULOPATHY, CHRONIC: Chronic | ICD-10-CM

## 2023-08-02 PROCEDURE — 99214 PR OFFICE/OUTPT VISIT, EST, LEVL IV, 30-39 MIN: ICD-10-PCS | Mod: S$PBB,,, | Performed by: PAIN MEDICINE

## 2023-08-02 PROCEDURE — 3066F PR DOCUMENTATION OF TREATMENT FOR NEPHROPATHY: ICD-10-PCS | Mod: CPTII,,, | Performed by: PAIN MEDICINE

## 2023-08-02 PROCEDURE — 3061F NEG MICROALBUMINURIA REV: CPT | Mod: CPTII,,, | Performed by: PAIN MEDICINE

## 2023-08-02 PROCEDURE — 3008F PR BODY MASS INDEX (BMI) DOCUMENTED: ICD-10-PCS | Mod: CPTII,,, | Performed by: PAIN MEDICINE

## 2023-08-02 PROCEDURE — 99214 OFFICE O/P EST MOD 30 MIN: CPT | Mod: S$PBB,,, | Performed by: PAIN MEDICINE

## 2023-08-02 PROCEDURE — 3078F DIAST BP <80 MM HG: CPT | Mod: CPTII,,, | Performed by: PAIN MEDICINE

## 2023-08-02 PROCEDURE — 3044F HG A1C LEVEL LT 7.0%: CPT | Mod: CPTII,,, | Performed by: PAIN MEDICINE

## 2023-08-02 PROCEDURE — 99215 OFFICE O/P EST HI 40 MIN: CPT | Mod: PBBFAC | Performed by: PAIN MEDICINE

## 2023-08-02 PROCEDURE — 1159F PR MEDICATION LIST DOCUMENTED IN MEDICAL RECORD: ICD-10-PCS | Mod: CPTII,,, | Performed by: PAIN MEDICINE

## 2023-08-02 PROCEDURE — 3061F PR NEG MICROALBUMINURIA RESULT DOCUMENTED/REVIEW: ICD-10-PCS | Mod: CPTII,,, | Performed by: PAIN MEDICINE

## 2023-08-02 PROCEDURE — 3077F SYST BP >= 140 MM HG: CPT | Mod: CPTII,,, | Performed by: PAIN MEDICINE

## 2023-08-02 PROCEDURE — 3077F PR MOST RECENT SYSTOLIC BLOOD PRESSURE >= 140 MM HG: ICD-10-PCS | Mod: CPTII,,, | Performed by: PAIN MEDICINE

## 2023-08-02 PROCEDURE — 3008F BODY MASS INDEX DOCD: CPT | Mod: CPTII,,, | Performed by: PAIN MEDICINE

## 2023-08-02 PROCEDURE — 3044F PR MOST RECENT HEMOGLOBIN A1C LEVEL <7.0%: ICD-10-PCS | Mod: CPTII,,, | Performed by: PAIN MEDICINE

## 2023-08-02 PROCEDURE — 3078F PR MOST RECENT DIASTOLIC BLOOD PRESSURE < 80 MM HG: ICD-10-PCS | Mod: CPTII,,, | Performed by: PAIN MEDICINE

## 2023-08-02 PROCEDURE — 3066F NEPHROPATHY DOC TX: CPT | Mod: CPTII,,, | Performed by: PAIN MEDICINE

## 2023-08-02 PROCEDURE — 1159F MED LIST DOCD IN RCRD: CPT | Mod: CPTII,,, | Performed by: PAIN MEDICINE

## 2023-08-02 RX ORDER — HYDROCODONE BITARTRATE AND ACETAMINOPHEN 10; 325 MG/1; MG/1
1 TABLET ORAL EVERY 6 HOURS PRN
Qty: 120 TABLET | Refills: 0 | Status: SHIPPED | OUTPATIENT
Start: 2023-09-04 | End: 2023-09-29 | Stop reason: SDUPTHER

## 2023-08-02 RX ORDER — HYDROCODONE BITARTRATE AND ACETAMINOPHEN 10; 325 MG/1; MG/1
1 TABLET ORAL EVERY 6 HOURS PRN
Qty: 120 TABLET | Refills: 0 | Status: SHIPPED | OUTPATIENT
Start: 2023-08-05 | End: 2023-09-29 | Stop reason: SDUPTHER

## 2023-08-02 NOTE — PROGRESS NOTES
She Disclaimer: This note has been generated using voice-recognition software. There may be typographical errors that have been missed during proof-reading        Patient ID: Hloly Porter is a 62 y.o. female.      Chief Complaint: Leg Pain (bilateral), Foot Pain (bilateral), and Low-back Pain      62-year-old male returns for re-evaluation of intractable bilateral lumbar radicular pain, right greater than left. She notes paresthesia and subjective weakness of the lower extremities.  The pain is exacerbated with prolonged walking and standing.  She was evaluated by Dr. Rueda but is not a surgical candidate and was directed to participate in physical therapy and weight loss.  X-rays and MRIs revealed degenerative changes of both the cervical and lumbar spine.  She was evaluated for vascular disease by Dr. Falcon.  She has a long history of left foot ulceration and has been treated conservatively with medication for many years.  She remains on Norco 4 times a day she and has NEVER received nerve block injections from our clinic, although she informed Dr. Rueda's, per his notes that  multiple nerve block injections provided no relief in the past.            Pain Assessment  Pain Assessment: 0-10  Pain Score:   8  Pain Location: Back (lower back pain, bilateral legs and feet pain)  Pain Descriptors: Aching, Sharp  Pain Frequency: Constant/continuous  Pain Onset: Awakened from sleep  Clinical Progression: Gradually worsening  Aggravating Factors: Walking, Standing  Pain Intervention(s): Medication (See eMAR), Home medication      A's of Opioid Risk Assessment  Activity:Patient can not perform ADL.   Analgesia:Patients pain is not controlled by current medication.   Adverse Effects: Patient denies constipation or sedation.  Aberrant Behavior:  reviewed with no aberrant drug seeking/taking behavior.      Patient denies any suicidal or homicidal ideations    Physical Therapy/Home Exercise: not applicable      X-Ray  Cervical Spine AP Lat with Flex Ex  See Procedure Notes for results.     IMPRESSION: Please see Ortho procedure notes for report.      This procedure was auto-finalized by: Virtual Radiologist  X-Ray Lumbar 4-5 View including Bending Views  See Procedure Notes for results.     IMPRESSION: Please see Ortho procedure notes for report.      This procedure was auto-finalized by: Virtual Radiologist      Review of Systems   Constitutional: Negative.    HENT: Negative.     Eyes: Negative.    Respiratory: Negative.     Cardiovascular: Negative.    Gastrointestinal: Negative.    Endocrine: Negative.    Genitourinary: Negative.    Musculoskeletal:  Positive for joint swelling (LLE), leg pain (LLE) and joint deformity (LLE).   Integumentary:  Positive for color change (LLE). Wound: LLE.  Neurological: Negative.    Hematological: Negative.    Psychiatric/Behavioral:  Positive for sleep disturbance.              Past Medical History:   Diagnosis Date    Anxiety state     Atherosclerosis of native artery of extremity with intermittent claudication 01/30/2019    bilateral legs    Bilateral leg pain     BMI 50.0-59.9, adult 02/22/2021    Cellulitis 06/11/2020    Chronic pain syndrome     Chronic peripheral venous hypertension 01/08/2019    COPD (chronic obstructive pulmonary disease)     Diabetes     Diabetes mellitus, type 2     DVT (deep venous thrombosis) 02/12/2020    Embolism and thrombosis of superficial veins of unspecified lower extremity 07/01/2019    bilateral    Frequent headaches 09/20/2018    GERD (gastroesophageal reflux disease)     Hereditary lymphedema     Hx of thyroid cancer     Hyperlipidemia     Hypertension     Hypothyroidism     Migraines     Migraines     Morbid obesity     Nicotine dependence     Non-pressure chronic ulcer of left lower leg 03/09/2021    Osteoarthritis     Other skin changes 03/09/2021    bilateral gaiter regions    Pain in left leg     Pain in right leg     PVD (peripheral vascular  disease) 01/08/2019    Seizure disorder     Sleep apnea     Venous insufficiency (chronic) (peripheral)     Venous stasis     Vitamin D deficiency      Past Surgical History:   Procedure Laterality Date    HYSTERECTOMY      ILIAC ARTERY STENT Bilateral 01/28/2020    Bilateral distal aorta and common iliac 8 X 59 vbx covered stents performed by Dr. Antonio Jacinto.    RADIOFREQUENCY ABLATION Left 04/15/2022    Procedure: Left calf  Radiofrequency Ablation;  Surgeon: Matty Falcon DO;  Location: [facility], [place];  Service: Vascular;  Laterality: Left;    STAB PHLEBECTOMY OF VARICOSE VEINS Left 01/25/2013    Left leg microphlebectomies x 23 stab avulsions and ligation of multiple varicose veins perrformed by Dr. Cirilo Aguirre.    THYROIDECTOMY      hx: thyroid cancer    TOE AMPUTATION Left 01/28/2020    2nd, 4th and 5th performed by Dr. Anam Saeed.    TOE AMPUTATION Right 01/28/2020    4th toe performed by Dr. Anam Saeed.    TOTAL ABDOMINAL HYSTERECTOMY W/ BILATERAL SALPINGOOPHORECTOMY      TUBAL LIGATION      VAGINAL DELIVERY      x 4    VENOUS ABLATION Right 08/09/2019    GSV Varithena Ablation performed by Dr. Estuardo Falcon    VENOUS ABLATION Left 08/02/2019    ATAV Varithena Ablation performed by Dr. Estuardo Falcon.    VENOUS ABLATION Right 07/13/2015    ATAV Laser Ablatio performed by Dr. Cirilo Aguirre.    VENOUS ABLATION Right 07/06/2015    Distal  GSV Laser Ablation performed by dr. Cirilo Aguirre.    VENOUS ABLATION Left 04/20/2015    Left Distal GSV Laser Ablation performed by dr. Cirilo Aguirre.    VENOUS ABLATION Right 10/28/2013    Right Distal GSV RF Ablation performed by Dr. Cirilo Aguirre.    VENOUS ABLATION Left 10/25/2013    SSV RF Ablation performed by dr. Cirilo Aguirre.    VENOUS ABLATION Left 10/07/2013    Left GSV and Left ATAV RF Ablation performed by Dr. Cirilo Aguirre.    VENOUS ABLATION Right 01/21/2013    GSV RF Ablation w/micros x 22 performed by Dr. Cirilo Aguirre.    VENOUS ABLATION Left 06/25/2021    Left distal GSV Varithena  Ablation     Social History     Socioeconomic History    Marital status:    Tobacco Use    Smoking status: Every Day     Current packs/day: 0.50     Average packs/day: 0.5 packs/day for 33.0 years (16.5 ttl pk-yrs)     Types: Cigarettes     Passive exposure: Current    Smokeless tobacco: Never   Substance and Sexual Activity    Alcohol use: Never    Drug use: Never    Sexual activity: Not Currently     Social Determinants of Health     Financial Resource Strain: Low Risk  (12/2/2022)    Overall Financial Resource Strain (CARDIA)     Difficulty of Paying Living Expenses: Not very hard   Food Insecurity: No Food Insecurity (12/2/2022)    Hunger Vital Sign     Worried About Running Out of Food in the Last Year: Never true     Ran Out of Food in the Last Year: Never true   Transportation Needs: No Transportation Needs (12/2/2022)    PRAPARE - Transportation     Lack of Transportation (Medical): No     Lack of Transportation (Non-Medical): No   Physical Activity: Inactive (12/2/2022)    Exercise Vital Sign     Days of Exercise per Week: 0 days     Minutes of Exercise per Session: 0 min   Stress: No Stress Concern Present (12/2/2022)    [employer] - Occupational Stress Questionnaire     Feeling of Stress : Not at all   Social Connections: Socially Isolated (12/2/2022)    Social Connection and Isolation Panel [NHANES]     Frequency of Communication with Friends and Family: More than three times a week     Frequency of Social Gatherings with Friends and Family: More than three times a week     Attends [employer] Services: Never     Active Member of Clubs or Organizations: No     Attends Club or Organization Meetings: Never     Marital Status:    Housing Stability: Low Risk  (12/2/2022)    Housing Stability Vital Sign     Unable to Pay for Housing in the Last Year: No     Number of Places Lived in the Last Year: 2     Unstable Housing in the Last Year: No     Family History    Problem Relation Age of Onset    Heart disease Mother         age 84 CHF    Hypertension Mother     Osteoarthritis Mother     Coronary aneurysm Father     Coronary artery disease Father     Hypertension Father     Heart disease Father     No Known Problems Son     No Known Problems Son     No Known Problems Son     No Known Problems Son     No Known Problems Sister     No Known Problems Brother         hx: varicose veins    No Known Problems Brother         MVA: parlazed    No Known Problems Brother      Review of patient's allergies indicates:   Allergen Reactions    Ammonium peroxydisulfate Shortness Of Breath    Avocado (laurus persea) Anaphylaxis    Bananas [banana] Anaphylaxis and Swelling     CRAMPS,    Chocolate flavor Anaphylaxis     MOUTH SWELLING    Fentanyl Shortness Of Breath and Itching    Percocet [oxycodone-acetaminophen] Shortness Of Breath and Itching    Silvadene [silver sulfadiazine]     Clindamycin     Corticosteroids (glucocorticoids)     Hydrocortisone Blisters    Lasix [furosemide] Blisters     BURNS SKIN    Nutritional supplement-fiber Itching    Pregabalin     Shellfish containing products     Adhesive Rash and Blisters    Iodine and iodide containing products Rash    Latex, natural rubber Rash    Pcn [penicillins] Rash    Sulfa (sulfonamide antibiotics) Rash and Blisters     has a current medication list which includes the following prescription(s): accu-chek guide glucose meter, accu-chek guide test strips, accu-chek softclix lancets, allopurinol, amitriptyline, apixaban, aspirin, calcium carbonate, carvedilol, cholecalciferol (vitamin d3), cilostazol, colchicine, cyclobenzaprine, docusate sodium, doxycycline, duloxetine, hydrocodone-acetaminophen, ibuprofen, levemir flextouch u100 insulin, levothyroxine, metolazone, mupirocin, naloxone, nifedipine, novofine 32, pantoprazole, polyethylene glycol, potassium chloride sa, quetiapine, sertraline, symbicort, tiotropium, topiramate,  triamcinolone acetonide 0.1%, albuterol, [START ON 8/5/2023] hydrocodone-acetaminophen, [START ON 9/4/2023] hydrocodone-acetaminophen, loratadine, losartan-hydrochlorothiazide 50-12.5 mg, phentermine, and spironolactone.      Objective:  Vitals:    08/02/23 1437   BP: (!) 146/78   Pulse: 82   Resp: 18        Physical Exam  Vitals (Physically deconditioned) and nursing note reviewed.   Constitutional:       General: She is not in acute distress.     Appearance: Normal appearance. She is obese. She is not ill-appearing, toxic-appearing or diaphoretic.   HENT:      Head: Normocephalic and atraumatic.      Nose: Nose normal.      Mouth/Throat:      Mouth: Mucous membranes are moist.   Eyes:      Extraocular Movements: Extraocular movements intact.      Pupils: Pupils are equal, round, and reactive to light.   Cardiovascular:      Rate and Rhythm: Normal rate and regular rhythm.      Heart sounds: Normal heart sounds.   Pulmonary:      Effort: Pulmonary effort is normal. No respiratory distress.      Breath sounds: Normal breath sounds. No stridor. No wheezing or rhonchi.   Abdominal:      General: Bowel sounds are normal.      Palpations: Abdomen is soft.   Musculoskeletal:         General: No swelling or deformity.      Cervical back: Normal and normal range of motion. No spasms or tenderness. No pain with movement. Normal range of motion.      Thoracic back: Normal.      Lumbar back: Tenderness and bony tenderness present. No spasms. Decreased range of motion. Negative right straight leg raise test and negative left straight leg raise test. No scoliosis.      Right lower leg: No edema.      Left lower leg: No edema.      Left ankle: Swelling present. Decreased range of motion.   Skin:     General: Skin is warm.   Neurological:      General: No focal deficit present.      Mental Status: She is alert and oriented to person, place, and time. Mental status is at baseline.      Cranial Nerves: No cranial nerve deficit.       Sensory: Sensation is intact. No sensory deficit.      Motor: No weakness.      Coordination: Coordination normal.      Gait: Gait (Uses  a walker to assist with mobility) normal.      Deep Tendon Reflexes: Reflexes are normal and symmetric.   Psychiatric:         Mood and Affect: Mood normal.         Behavior: Behavior normal.           Assessment:      1. Chronic pain of right knee    2. PVD (peripheral vascular disease)    3. Lumbar radiculopathy, chronic    4. Lumbosacral spondylosis without myelopathy    5. Chronic pain syndrome          Plan:  1. reviewed  2.Addiction, Dependency, Tolerance, Opioid abuse-misuse, Death, Diversion Discussed. Overdose reversal drug Naloxone discussed.  3.Refill/Continue medications for pain control and function       Requested Prescriptions     Signed Prescriptions Disp Refills    HYDROcodone-acetaminophen (NORCO)  mg per tablet 120 tablet 0     Sig: Take 1 tablet by mouth every 6 (six) hours as needed for Pain.    HYDROcodone-acetaminophen (NORCO)  mg per tablet 120 tablet 0     Sig: Take 1 tablet by mouth every 6 (six) hours as needed for Pain.     4.Urine drug screen point of care obtained and consistent with prescribed medications and medication refill date    No orders of the defined types were placed in this encounter.     5.Follow with GLENN Pittman in 2 months for re-evaluation and medication refill  6. Patient defers nerve block injections, physical therapy or surgical consultation           report:  Reviewed and consistent with medication use as prescribed.      The total time spent for evaluation and management on 08/04/2023 including reviewing separately obtained history, performing a medically appropriate exam and evaluation, documenting clinical information in the health record, independently interpreting results and communicating them to the patient/family/caregiver, and ordering medications/tests/procedures was between 15-29 minutes.    The above  plan and management options were discussed at length with patient. Patient is in agreement with the above and verbalized understanding. It will be communicated with the referring physician via electronic record, fax, or mail.

## 2023-08-08 RX ORDER — COLCHICINE 0.6 MG/1
TABLET ORAL
Qty: 30 TABLET | Refills: 2 | Status: SHIPPED | OUTPATIENT
Start: 2023-08-08 | End: 2023-10-31

## 2023-08-31 RX ORDER — DULOXETIN HYDROCHLORIDE 30 MG/1
30 CAPSULE, DELAYED RELEASE ORAL DAILY
Qty: 30 CAPSULE | Refills: 0 | Status: SHIPPED | OUTPATIENT
Start: 2023-08-31

## 2023-09-04 ENCOUNTER — PATIENT MESSAGE (OUTPATIENT)
Dept: DIABETES SERVICES | Facility: CLINIC | Age: 63
End: 2023-09-04
Payer: MEDICARE

## 2023-09-04 PROBLEM — Z00.00 ENCOUNTER FOR SUBSEQUENT ANNUAL WELLNESS VISIT (AWV) IN MEDICARE PATIENT: Status: RESOLVED | Noted: 2023-06-05 | Resolved: 2023-09-04

## 2023-09-06 RX ORDER — LEVOTHYROXINE SODIUM 150 UG/1
150 TABLET ORAL
Qty: 90 TABLET | Refills: 1 | Status: ON HOLD | OUTPATIENT
Start: 2023-09-06 | End: 2023-11-01 | Stop reason: SDUPTHER

## 2023-09-29 ENCOUNTER — HOSPITAL ENCOUNTER (EMERGENCY)
Facility: HOSPITAL | Age: 63
Discharge: HOME | End: 2023-09-30
Payer: MEDICARE

## 2023-09-29 DIAGNOSIS — L02.91 ABSCESS: Primary | ICD-10-CM

## 2023-09-29 PROCEDURE — 99284 EMERGENCY DEPT VISIT MOD MDM: CPT | Mod: ,,, | Performed by: FAMILY MEDICINE

## 2023-09-29 PROCEDURE — 99284 PR EMERGENCY DEPT VISIT,LEVEL IV: ICD-10-PCS | Mod: ,,, | Performed by: FAMILY MEDICINE

## 2023-09-29 PROCEDURE — 99283 EMERGENCY DEPT VISIT LOW MDM: CPT | Mod: 25

## 2023-09-29 NOTE — PROGRESS NOTES
Subjective:         Patient ID: Holly Porter is a 63 y.o. female.    Chief Complaint: Leg Pain (Left leg and foot)            Pain  This is a chronic problem. The current episode started more than 1 year ago. The problem occurs daily. The problem has been waxing and waning. Associated symptoms include arthralgias. Pertinent negatives include no chest pain, chills, coughing, diaphoresis, fever, sore throat, vertigo or vomiting.     Review of Systems   Constitutional:  Negative for activity change, chills, diaphoresis, fever and unexpected weight change.   HENT:  Negative for drooling, ear discharge, ear pain, facial swelling, nosebleeds, sore throat, trouble swallowing, voice change and goiter.    Eyes:  Negative for photophobia, pain, discharge, redness and visual disturbance.   Respiratory:  Negative for apnea, cough, choking, chest tightness, shortness of breath, wheezing and stridor.    Cardiovascular:  Negative for chest pain, palpitations and leg swelling.   Gastrointestinal:  Negative for abdominal distention, diarrhea, rectal pain, vomiting and fecal incontinence.   Endocrine: Negative for cold intolerance, heat intolerance, polydipsia, polyphagia and polyuria.   Genitourinary:  Negative for bladder incontinence, dysuria, flank pain, frequency and hot flashes.   Musculoskeletal:  Positive for arthralgias, back pain, leg pain and neck stiffness.   Integumentary:  Negative for color change and pallor.   Allergic/Immunologic: Negative for immunocompromised state.   Neurological:  Negative for dizziness, vertigo, seizures, syncope, facial asymmetry, speech difficulty, light-headedness, memory loss and coordination difficulties.   Hematological:  Negative for adenopathy. Does not bruise/bleed easily.   Psychiatric/Behavioral:  Negative for agitation, behavioral problems, confusion, decreased concentration, dysphoric mood, hallucinations, self-injury and suicidal ideas. The patient is not nervous/anxious and  is not hyperactive.            Past Medical History:   Diagnosis Date    Anxiety state     Atherosclerosis of native artery of extremity with intermittent claudication 01/30/2019    bilateral legs    Bilateral leg pain     BMI 50.0-59.9, adult 02/22/2021    Cellulitis 06/11/2020    Chronic pain syndrome     Chronic peripheral venous hypertension 01/08/2019    COPD (chronic obstructive pulmonary disease)     Diabetes     Diabetes mellitus, type 2     DVT (deep venous thrombosis) 02/12/2020    Embolism and thrombosis of superficial veins of unspecified lower extremity 07/01/2019    bilateral    Frequent headaches 09/20/2018    GERD (gastroesophageal reflux disease)     Hereditary lymphedema     Hx of thyroid cancer     Hyperlipidemia     Hypertension     Hypothyroidism     Migraines     Migraines     Morbid obesity     Nicotine dependence     Non-pressure chronic ulcer of left lower leg 03/09/2021    Osteoarthritis     Other skin changes 03/09/2021    bilateral gaiter regions    Pain in left leg     Pain in right leg     PVD (peripheral vascular disease) 01/08/2019    Seizure disorder     Sleep apnea     Venous insufficiency (chronic) (peripheral)     Venous stasis     Vitamin D deficiency      Past Surgical History:   Procedure Laterality Date    ABCESS DRAINAGE      HYSTERECTOMY      ILIAC ARTERY STENT Bilateral 01/28/2020    Bilateral distal aorta and common iliac 8 X 59 vbx covered stents performed by Dr. Antonio Jacinto.    RADIOFREQUENCY ABLATION Left 04/15/2022    Procedure: Left calf  Radiofrequency Ablation;  Surgeon: Matty Falcon DO;  Location: [facility];  Service: Vascular;  Laterality: Left;    STAB PHLEBECTOMY OF VARICOSE VEINS Left 01/25/2013    Left leg microphlebectomies x 23 stab avulsions and ligation of multiple varicose veins perrformed by Dr. Cirilo Aguirre.    THYROIDECTOMY      hx: thyroid cancer    TOE AMPUTATION Left 01/28/2020    2nd, 4th and  5th performed by Dr. Anam Saeed.    TOE AMPUTATION Right 01/28/2020    4th toe performed by Dr. Anam Saeed.    TOTAL ABDOMINAL HYSTERECTOMY W/ BILATERAL SALPINGOOPHORECTOMY      TUBAL LIGATION      VAGINAL DELIVERY      x 4    VENOUS ABLATION Right 08/09/2019    GSV Varithena Ablation performed by Dr. Estuardo Falcon    VENOUS ABLATION Left 08/02/2019    ATAV Varithena Ablation performed by Dr. Estuardo Falcon.    VENOUS ABLATION Right 07/13/2015    ATAV Laser Ablatio performed by Dr. Cirilo Aguirre.    VENOUS ABLATION Right 07/06/2015    Distal  GSV Laser Ablation performed by dr. Cirilo Aguirre.    VENOUS ABLATION Left 04/20/2015    Left Distal GSV Laser Ablation performed by dr. Cirilo Aguirre.    VENOUS ABLATION Right 10/28/2013    Right Distal GSV RF Ablation performed by Dr. Cirilo Aguirre.    VENOUS ABLATION Left 10/25/2013    SSV RF Ablation performed by dr. Cirilo Aguirre.    VENOUS ABLATION Left 10/07/2013    Left GSV and Left ATAV RF Ablation performed by Dr. Cirilo Aguirre.    VENOUS ABLATION Right 01/21/2013    GSV RF Ablation w/micros x 22 performed by Dr. Cirilo Aguirre.    VENOUS ABLATION Left 06/25/2021    Left distal GSV Varithena Ablation     Social History     Socioeconomic History    Marital status:    Tobacco Use    Smoking status: Every Day     Current packs/day: 0.50     Average packs/day: 0.5 packs/day for 33.0 years (16.5 ttl pk-yrs)     Types: Cigarettes     Passive exposure: Current    Smokeless tobacco: Never   Substance and Sexual Activity    Alcohol use: Never    Drug use: Never    Sexual activity: Not Currently     Social Determinants of Health     Financial Resource Strain: Low Risk  (12/2/2022)    Overall Financial Resource Strain (CARDIA)     Difficulty of Paying Living Expenses: Not very hard   Food Insecurity: No Food Insecurity (12/2/2022)    Hunger Vital Sign     Worried About Running Out of Food in the Last Year: Never true     Ran Out of Food in the Last Year: Never true   Transportation  Needs: No Transportation Needs (12/2/2022)    PRAPARE - Transportation     Lack of Transportation (Medical): No     Lack of Transportation (Non-Medical): No   Physical Activity: Inactive (12/2/2022)    Exercise Vital Sign     Days of Exercise per Week: 0 days     Minutes of Exercise per Session: 0 min   Stress: No Stress Concern Present (12/2/2022)    [place] [employer] - Occupational Stress Questionnaire     Feeling of Stress : Not at all   Social Connections: Socially Isolated (12/2/2022)    Social Connection and Isolation Panel [NHANES]     Frequency of Communication with Friends and Family: More than three times a week     Frequency of Social Gatherings with Friends and Family: More than three times a week     Attends Synagogue Services: Never     Active Member of Clubs or Organizations: No     Attends Club or Organization Meetings: Never     Marital Status:    Housing Stability: Low Risk  (12/2/2022)    Housing Stability Vital Sign     Unable to Pay for Housing in the Last Year: No     Number of Places Lived in the Last Year: 2     Unstable Housing in the Last Year: No     Family History   Problem Relation Age of Onset    Heart disease Mother         age 84 CHF    Hypertension Mother     Osteoarthritis Mother     Coronary aneurysm Father     Coronary artery disease Father     Hypertension Father     Heart disease Father     No Known Problems Son     No Known Problems Son     No Known Problems Son     No Known Problems Son     No Known Problems Sister     No Known Problems Brother         hx: varicose veins    No Known Problems Brother         MVA: parlazed    No Known Problems Brother      Review of patient's allergies indicates:   Allergen Reactions    Ammonium peroxydisulfate Shortness Of Breath    Avocado (laurus persea) Anaphylaxis    Bananas [banana] Anaphylaxis and Swelling     CRAMPS,    Chocolate flavor Anaphylaxis     MOUTH SWELLING     "Fentanyl Shortness Of Breath and Itching    Percocet [oxycodone-acetaminophen] Shortness Of Breath and Itching    Silvadene [silver sulfadiazine]     Clindamycin     Corticosteroids (glucocorticoids)     Hydrocortisone Blisters    Lasix [furosemide] Blisters     BURNS SKIN    Nutritional supplement-fiber Itching    Pregabalin     Shellfish containing products     Adhesive Rash and Blisters    Iodine and iodide containing products Rash    Latex, natural rubber Rash    Pcn [penicillins] Rash    Sulfa (sulfonamide antibiotics) Rash and Blisters        Objective:  Vitals:    10/02/23 1336 10/02/23 1341   BP: (!) 141/74    Pulse: 100    Resp: (!) 22    Weight: (!) 165.1 kg (364 lb)    Height: 5' 8" (1.727 m)    PainSc:   7   7         Physical Exam  Vitals and nursing note reviewed. Exam conducted with a chaperone present.   Constitutional:       General: She is awake. She is not in acute distress.     Appearance: Normal appearance. She is obese. She is not ill-appearing, toxic-appearing or diaphoretic.   HENT:      Head: Normocephalic and atraumatic.      Nose: Nose normal.      Mouth/Throat:      Mouth: Mucous membranes are moist.      Pharynx: Oropharynx is clear.   Eyes:      Conjunctiva/sclera: Conjunctivae normal.      Pupils: Pupils are equal, round, and reactive to light.   Cardiovascular:      Rate and Rhythm: Normal rate.   Pulmonary:      Effort: Pulmonary effort is normal. No respiratory distress.   Abdominal:      Palpations: Abdomen is soft.   Musculoskeletal:         General: Normal range of motion.      Cervical back: Normal range of motion and neck supple.   Skin:     General: Skin is warm and dry.   Neurological:      General: No focal deficit present.      Mental Status: She is alert and oriented to person, place, and time. Mental status is at baseline.      Cranial Nerves: No cranial nerve deficit (II-XII).   Psychiatric:         Mood and Affect: Mood normal.         Behavior: Behavior " normal. Behavior is cooperative.         Thought Content: Thought content normal.         X-Ray Cervical Spine AP Lat with Flex Ex  See Procedure Notes for results.     IMPRESSION: Please see Ortho procedure notes for report.      This procedure was auto-finalized by: Virtual Radiologist  X-Ray Lumbar 4-5 View including Bending Views  See Procedure Notes for results.     IMPRESSION: Please see Ortho procedure notes for report.      This procedure was auto-finalized by: Virtual Radiologist         Admission on 10/01/2023, Discharged on 10/01/2023   Component Date Value Ref Range Status    POC Glucose 10/01/2023 98  70 - 105 mg/dL Final   Admission on 09/29/2023, Discharged on 09/30/2023   Component Date Value Ref Range Status    Culture, Wound/Abscess 09/30/2023 No Growth at 2 Days   Final   Office Visit on 06/28/2023   Component Date Value Ref Range Status    Sodium 06/28/2023 138  136 - 145 mmol/L Final    Potassium 06/28/2023 4.3  3.5 - 5.1 mmol/L Final    Chloride 06/28/2023 102  98 - 107 mmol/L Final    CO2 06/28/2023 32  21 - 32 mmol/L Final    Anion Gap 06/28/2023 8  7 - 16 mmol/L Final    Glucose 06/28/2023 86  74 - 106 mg/dL Final    BUN 06/28/2023 14  7 - 18 mg/dL Final    Creatinine 06/28/2023 0.98  0.55 - 1.02 mg/dL Final    BUN/Creatinine Ratio 06/28/2023 14  6 - 20 Final    Calcium 06/28/2023 9.6  8.5 - 10.1 mg/dL Final    eGFR 06/28/2023 65  >=60 mL/min/1.73m2 Final    ProBNP 06/28/2023 112  1 - 125 pg/mL Final    Uric Acid 06/28/2023 3.6  2.6 - 6.0 mg/dL Final    Color, UA 06/28/2023 Yellow  Colorless, Straw, Yellow, Light Yellow, Dark Yellow Final    Clarity, UA 06/28/2023 Clear  Clear Final    pH, UA 06/28/2023 5.5  5.0 to 8.0 pH Units Final    Leukocytes, UA 06/28/2023 Negative  Negative Final    Nitrites, UA 06/28/2023 Negative  Negative Final    Protein, UA 06/28/2023 20 (A)  Negative Final    Glucose, UA 06/28/2023 Normal  Normal mg/dL Final    Ketones, UA 06/28/2023  Negative  Negative mg/dL Final    Urobilinogen, UA 06/28/2023 2 (A)  0.2, 1.0, Normal mg/dL Final    Bilirubin, UA 06/28/2023 Negative  Negative Final    Blood, UA 06/28/2023 Negative  Negative Final    Specific [place], UA 06/28/2023 1.033 (H)  <=1.030 Final   Office Visit on 06/05/2023   Component Date Value Ref Range Status    POC Amphetamines 06/05/2023 Negative  Negative, Inconclusive Final    POC Barbiturates 06/05/2023 Negative  Negative, Inconclusive Final    POC Benzodiazepines 06/05/2023 Negative  Negative, Inconclusive Final    POC Cocaine 06/05/2023 Negative  Negative, Inconclusive Final    POC THC 06/05/2023 Negative  Negative, Inconclusive Final    POC Methadone 06/05/2023 Negative  Negative, Inconclusive Final    POC Methamphetamine 06/05/2023 Negative  Negative, Inconclusive Final    POC Opiates 06/05/2023 Presumptive Positive (A)  Negative, Inconclusive Final    POC Oxycodone 06/05/2023 Negative  Negative, Inconclusive Final    POC Phencyclidine 06/05/2023 Negative  Negative, Inconclusive Final    POC Methylenedioxymethamphetamine * 06/05/2023 Negative  Negative, Inconclusive Final    POC Tricyclic Antidepressants 06/05/2023 Negative  Negative, Inconclusive Final    POC Buprenorphine 06/05/2023 Negative   Final     Acceptable 06/05/2023 Yes   Final    POC Temperature (Urine) 06/05/2023 90   Final   Office Visit on 06/05/2023   Component Date Value Ref Range Status    Triglycerides 06/05/2023 102  35 - 150 mg/dL Final    Cholesterol 06/05/2023 192  0 - 200 mg/dL Final    HDL Cholesterol 06/05/2023 57  40 - 60 mg/dL Final    Cholesterol/HDL Ratio (Risk Factor) 06/05/2023 3.4   Final    Non-HDL 06/05/2023 135  mg/dL Final    LDL Calculated 06/05/2023 115  mg/dL Final    LDL/HDL 06/05/2023 2.0   Final    VLDL 06/05/2023 20  mg/dL Final    Creatinine, Urine 06/05/2023 261 (H)  28 - 219 mg/dL Final    Microalbumin 06/05/2023 1.4  0.0 - 2.8 mg/dL Final     Microalbumin/Creatinine Ratio 06/05/2023 5.4  0.0 - 30.0 mg/g Final         Orders Placed This Encounter   Procedures    POCT Urine Drug Screen Presump     Interpretive Information:     Negative:  No drug detected at the cut off level.   Positive:  This result represents presumptive positive for the   tested drug, other substances may yield a positive response other   than the analyte of interest. This result should be utilized for   diagnostic purpose only. Confirmation testing will be performed upon physician request only.              Requested Prescriptions     Signed Prescriptions Disp Refills    HYDROcodone-acetaminophen (NORCO)  mg per tablet 120 tablet 0     Sig: Take 1 tablet by mouth every 6 (six) hours as needed for Pain.    HYDROcodone-acetaminophen (NORCO)  mg per tablet 120 tablet 0     Sig: Take 1 tablet by mouth every 6 (six) hours as needed for Pain.       Assessment:     1. Lumbosacral spondylosis without myelopathy    2. Chronic pain of right knee    3. Encounter for long-term (current) use of other medications         A's of Opioid Risk Assessment  Activity:Patient can perform ADL.   Analgesia:Patients pain is partially controlled by current medication. Patient has tried OTC medications such as Tylenol and Ibuprofen with out relief.   Adverse Effects: Patient denies constipation or sedation.  Aberrant Behavior:  reviewed with no aberrant drug seeking/taking behavior.  Overdose reversal drug naloxone discussed    Drug screen reviewed      MRI cervical spine ARM dated April 18, 2023 multiple level degenerative changes C4/5 disc osteophyte complex mild central canal stenosis uncovertebral hypertrophic        Plan:    Narcan December 2022    Wheelchair/4 point walker for mobility due to chronic nonhealing wounds right foot     Following wound management chronic ulcers lower extremities    Following orthopedics knee pain [facility], she states she was advised not to have surgery  due to her weight    Diabetic, limit steroids    Cannot tolerate OxyContin she is severely allergic    Currently having multiple cysts groin area that have been excised    Continues to deal with chronic joint and back pain     Requesting Toradol injection     She denies loss of bowel or bladder function denies electrical sensation    Requesting Toradol injection    Toradol 60 mg IM     Continue home exercise program as tolerated    Follow-up 2 months    Dr. Crandall, August 2024    Bring original prescription medication bottles/container/box with labels to each visit

## 2023-09-29 NOTE — Clinical Note
"Holly Puriricia" German was seen and treated in our emergency department on 9/29/2023.  She may return to work on 10/03/2023.       If you have any questions or concerns, please don't hesitate to call.      Angel Gold MD"

## 2023-09-30 VITALS
OXYGEN SATURATION: 90 % | HEIGHT: 67 IN | DIASTOLIC BLOOD PRESSURE: 73 MMHG | RESPIRATION RATE: 22 BRPM | BODY MASS INDEX: 45.99 KG/M2 | SYSTOLIC BLOOD PRESSURE: 139 MMHG | TEMPERATURE: 98 F | HEART RATE: 95 BPM | WEIGHT: 293 LBS

## 2023-09-30 PROCEDURE — 10060 I&D ABSCESS SIMPLE/SINGLE: CPT | Mod: ,,, | Performed by: FAMILY MEDICINE

## 2023-09-30 PROCEDURE — 87070 CULTURE OTHR SPECIMN AEROBIC: CPT | Performed by: FAMILY MEDICINE

## 2023-09-30 PROCEDURE — 25000003 PHARM REV CODE 250: Performed by: FAMILY MEDICINE

## 2023-09-30 PROCEDURE — 10060 PR DRAIN SKIN ABSCESS SIMPLE: ICD-10-PCS | Mod: ,,, | Performed by: FAMILY MEDICINE

## 2023-09-30 PROCEDURE — 10060 I&D ABSCESS SIMPLE/SINGLE: CPT

## 2023-09-30 RX ORDER — LIDOCAINE HYDROCHLORIDE 10 MG/ML
5 INJECTION, SOLUTION EPIDURAL; INFILTRATION; INTRACAUDAL; PERINEURAL
Status: COMPLETED | OUTPATIENT
Start: 2023-09-30 | End: 2023-09-30

## 2023-09-30 RX ORDER — DOXYCYCLINE 100 MG/1
100 CAPSULE ORAL 2 TIMES DAILY
Qty: 20 CAPSULE | Refills: 0 | Status: SHIPPED | OUTPATIENT
Start: 2023-09-30 | End: 2023-10-10

## 2023-09-30 RX ORDER — DOXYCYCLINE HYCLATE 100 MG
100 TABLET ORAL EVERY 12 HOURS
Status: DISCONTINUED | OUTPATIENT
Start: 2023-09-30 | End: 2023-09-30

## 2023-09-30 RX ORDER — DOXYCYCLINE HYCLATE 100 MG
100 TABLET ORAL EVERY 12 HOURS
Status: DISCONTINUED | OUTPATIENT
Start: 2023-09-30 | End: 2023-09-30 | Stop reason: HOSPADM

## 2023-09-30 RX ADMIN — LIDOCAINE HYDROCHLORIDE 50 MG: 10 INJECTION, SOLUTION EPIDURAL; INFILTRATION; INTRACAUDAL; PERINEURAL at 04:09

## 2023-09-30 NOTE — ED PROVIDER NOTES
Encounter Date: 9/29/2023       History     Chief Complaint   Patient presents with    Vaginal Bleeding     Thinks she has vaginal bleeding:   S/P TVH years ago  No fever or chills  No PMH of perineal trauma      Review of patient's allergies indicates:   Allergen Reactions    Ammonium peroxydisulfate Shortness Of Breath    Avocado (laurus persea) Anaphylaxis    Bananas [banana] Anaphylaxis and Swelling     CRAMPS,    Chocolate flavor Anaphylaxis     MOUTH SWELLING    Fentanyl Shortness Of Breath and Itching    Percocet [oxycodone-acetaminophen] Shortness Of Breath and Itching    Silvadene [silver sulfadiazine]     Clindamycin     Corticosteroids (glucocorticoids)     Hydrocortisone Blisters    Lasix [furosemide] Blisters     BURNS SKIN    Nutritional supplement-fiber Itching    Pregabalin     Shellfish containing products     Adhesive Rash and Blisters    Iodine and iodide containing products Rash    Latex, natural rubber Rash    Pcn [penicillins] Rash    Sulfa (sulfonamide antibiotics) Rash and Blisters     Past Medical History:   Diagnosis Date    Anxiety state     Atherosclerosis of native artery of extremity with intermittent claudication 01/30/2019    bilateral legs    Bilateral leg pain     BMI 50.0-59.9, adult 02/22/2021    Cellulitis 06/11/2020    Chronic pain syndrome     Chronic peripheral venous hypertension 01/08/2019    COPD (chronic obstructive pulmonary disease)     Diabetes     Diabetes mellitus, type 2     DVT (deep venous thrombosis) 02/12/2020    Embolism and thrombosis of superficial veins of unspecified lower extremity 07/01/2019    bilateral    Frequent headaches 09/20/2018    GERD (gastroesophageal reflux disease)     Hereditary lymphedema     Hx of thyroid cancer     Hyperlipidemia     Hypertension     Hypothyroidism     Migraines     Migraines     Morbid obesity     Nicotine dependence     Non-pressure chronic ulcer of left lower leg 03/09/2021    Osteoarthritis     Other skin changes  03/09/2021    bilateral gaiter regions    Pain in left leg     Pain in right leg     PVD (peripheral vascular disease) 01/08/2019    Seizure disorder     Sleep apnea     Venous insufficiency (chronic) (peripheral)     Venous stasis     Vitamin D deficiency      Past Surgical History:   Procedure Laterality Date    HYSTERECTOMY      ILIAC ARTERY STENT Bilateral 01/28/2020    Bilateral distal aorta and common iliac 8 X 59 vbx covered stents performed by Dr. Antonio Jacinto.    RADIOFREQUENCY ABLATION Left 04/15/2022    Procedure: Left calf  Radiofrequency Ablation;  Surgeon: Matty Falcon DO;  Location: [facility];  Service: Vascular;  Laterality: Left;    STAB PHLEBECTOMY OF VARICOSE VEINS Left 01/25/2013    Left leg microphlebectomies x 23 stab avulsions and ligation of multiple varicose veins perrformed by Dr. Cirilo Aguirre.    THYROIDECTOMY      hx: thyroid cancer    TOE AMPUTATION Left 01/28/2020    2nd, 4th and 5th performed by Dr. Anam Saeed.    TOE AMPUTATION Right 01/28/2020    4th toe performed by Dr. Anam Saeed.    TOTAL ABDOMINAL HYSTERECTOMY W/ BILATERAL SALPINGOOPHORECTOMY      TUBAL LIGATION      VAGINAL DELIVERY      x 4    VENOUS ABLATION Right 08/09/2019    GSV Varithena Ablation performed by Dr. Estuardo Falcon    VENOUS ABLATION Left 08/02/2019    ATAV Varithena Ablation performed by Dr. Estuardo Falcon.    VENOUS ABLATION Right 07/13/2015    ATAV Laser Ablatio performed by Dr. Cirilo Aguirre.    VENOUS ABLATION Right 07/06/2015    Distal  GSV Laser Ablation performed by dr. Cirilo Aguirre.    VENOUS ABLATION Left 04/20/2015    Left Distal GSV Laser Ablation performed by dr. Cirilo Aguirre.    VENOUS ABLATION Right 10/28/2013    Right Distal GSV RF Ablation performed by Dr. Cirilo Aguirre.    VENOUS ABLATION Left 10/25/2013    SSV RF Ablation performed by dr. Cirilo Aguirre.    VENOUS ABLATION Left 10/07/2013    Left GSV and Left ATAV RF Ablation performed by Dr. Cirilo Aguirre.    VENOUS ABLATION Right 01/21/2013    GSV RF Ablation  w/micros x 22 performed by Dr. Cirilo Aguirre.    VENOUS ABLATION Left 06/25/2021    Left distal GSV Varithena Ablation     Family History   Problem Relation Age of Onset    Heart disease Mother         age 84 CHF    Hypertension Mother     Osteoarthritis Mother     Coronary aneurysm Father     Coronary artery disease Father     Hypertension Father     Heart disease Father     No Known Problems Son     No Known Problems Son     No Known Problems Son     No Known Problems Son     No Known Problems Sister     No Known Problems Brother         hx: varicose veins    No Known Problems Brother         MVA: parlazed    No Known Problems Brother      Social History     Tobacco Use    Smoking status: Every Day     Current packs/day: 0.50     Average packs/day: 0.5 packs/day for 33.0 years (16.5 ttl pk-yrs)     Types: Cigarettes     Passive exposure: Current    Smokeless tobacco: Never   Substance Use Topics    Alcohol use: Never    Drug use: Never     Review of Systems   Constitutional: Negative.    Skin:  Positive for wound (8 cm abscess left proximal medial thigh).   All other systems reviewed and are negative.      Physical Exam     Initial Vitals [09/29/23 2345]   BP Pulse Resp Temp SpO2   134/68 95 (!) 23 98.7 °F (37.1 °C) (!) 93 %      MAP       --         Physical Exam    Constitutional: She appears well-developed and well-nourished.   Morbidly obese   HENT:   Head: Normocephalic and atraumatic.   Eyes: EOM are normal. Pupils are equal, round, and reactive to light.   Neck: Neck supple.   Normal range of motion.  Pulmonary/Chest: She has no wheezes. She has no rhonchi.   Abdominal: Abdomen is soft. She exhibits no distension. There is no abdominal tenderness.   Genitourinary:    Vagina normal.      No vaginal discharge.     Musculoskeletal:         General: Normal range of motion.      Cervical back: Normal range of motion and neck supple.     Neurological: She is alert and oriented to person, place, and time.   Skin:  "Abscess (left medial proximal thigh) noted.   Psychiatric: She has a normal mood and affect. Thought content normal.         Medical Screening Exam   See Full Note    ED Course   I & D - Incision and Drainage    Date/Time: 2023 5:07 AM  Location procedure was performed: [facility] EMERGENCY DEPARTMENT    Performed by: Angel Gold MD  Authorized by: Angel Gold MD  Pre-operative diagnosis: Abscess left proximal medial thigh  Post-operative diagnosis: Same, with sanguinous purulent discharge  Consent Done: Yes  Consent: Verbal consent obtained.  Risks and benefits: risks, benefits and alternatives were discussed  Consent given by: patient  Patient understanding: patient states understanding of the procedure being performed  Patient consent: the patient's understanding of the procedure matches consent given  Procedure consent: procedure consent matches procedure scheduled  Relevant documents: relevant documents not present or verified  Test results: test results not available  Site marked: the operative site was marked  Imaging studies: imaging studies not available  Required items: required blood products, implants, devices, and special equipment available  Patient identity confirmed: , name, provided demographic data and verbally with patient  Time out: Immediately prior to procedure a "time out" was called to verify the correct patient, procedure, equipment, support staff and site/side marked as required.  Type: abscess  Body area: lower extremity  Location details: left leg    Anesthesia:  Local Anesthetic: lidocaine 1% without epinephrine  Scalpel size: 11  Incision type: single straight  Complexity: simple  Drainage: serosanguinous  Drainage amount: copious  Wound treatment: incision, wound left open, wound packed and expression of material  Technical procedures used: wound lavaged with 30 cc NS prior to packing  Significant surgical tasks conducted by the assistant(s): Organizing surgical " material and helping with pack and dress  Complications: No  Estimated blood loss (mL): 5  Specimens: No  Implants: No  Patient tolerance: Patient tolerated the procedure well with no immediate complications        Labs Reviewed - No data to display       Imaging Results    None          Medications   LIDOcaine (PF) 10 mg/ml (1%) injection 50 mg (has no administration in time range)   doxycycline tablet 100 mg (has no administration in time range)     Medical Decision Making  8 cm abscess found left medial thigh that was already draining purulent sanguinous material.       Amount and/or Complexity of Data Reviewed  Independent Historian:      Details: Gave us details of her bleeding abscess.   Reassured that she had no vaginal bleeding  External Data Reviewed:      Details: Wound cultured after Betadine wash x 3  Labs:  Decision-making details documented in ED Course.  Radiology:      Details: Radiology not ordered  Discussion of management or test interpretation with external provider(s): Discussed need to I&D this abscess.      Risk  Prescription drug management.  Minor surgery with no identified risk factors.  Risk Details: Tolerated the I&D well.   ABD pad for sterile dressing.  Doxycycline 100 mg BID for followup antibiotic.   Packing to be removed tomorrow                               Clinical Impression:   Final diagnoses:  [L02.91] Abscess (Primary)        ED Disposition Condition    Discharge Stable          ED Prescriptions       Medication Sig Dispense Start Date End Date Auth. Provider    doxycycline (VIBRAMYCIN) 100 MG Cap Take 1 capsule (100 mg total) by mouth 2 (two) times daily. for 10 days 20 capsule 9/30/2023 10/10/2023 Angel Gold MD          Follow-up Information       Follow up With Specialties Details Why Contact Info    Regan Frausto MD Internal Medicine, Family Medicine, Hospitalist In 1 week As needed, If symptoms worsen [address] [place] [address]  [facility]  [place]  [place] [address]  [phone]      [facility] - Emergency Department Emergency Medicine In 1 day for packing removal [address] [place] [id number] [phone]             Angel Gold MD  09/30/23 0524

## 2023-09-30 NOTE — ED TRIAGE NOTES
PRESENTS VIA [facility] FROM HOME WITH C/O VAGINAL BLEEDING. STATES WHEN SHE WENT TO THE BATHROOM JUST PTA THERE WAS BLOOD ON THE TISSUE WHEN WHERE SHE WIPED.

## 2023-09-30 NOTE — ED NOTES
Lemon sized abscess to left medial thigh I&D'd, culture obained, area cleansed, packed with 1/2 inch Iodoform packing, site covered with ABD and secured with paper tape per MD. MD and SN out of ER 4 for patient to get dressed.

## 2023-10-01 ENCOUNTER — HOSPITAL ENCOUNTER (EMERGENCY)
Facility: HOSPITAL | Age: 63
Discharge: HOME | End: 2023-10-01
Attending: FAMILY MEDICINE
Payer: MEDICARE

## 2023-10-01 VITALS
WEIGHT: 293 LBS | RESPIRATION RATE: 19 BRPM | HEART RATE: 88 BPM | BODY MASS INDEX: 45.99 KG/M2 | OXYGEN SATURATION: 90 % | TEMPERATURE: 98 F | SYSTOLIC BLOOD PRESSURE: 144 MMHG | HEIGHT: 67 IN | DIASTOLIC BLOOD PRESSURE: 82 MMHG

## 2023-10-01 DIAGNOSIS — Z51.89 WOUND CHECK, ABSCESS: Primary | ICD-10-CM

## 2023-10-01 LAB — GLUCOSE SERPL-MCNC: 98 MG/DL (ref 70–105)

## 2023-10-01 PROCEDURE — 99999 HC NO LEVEL OF SERVICE - ED ONLY: CPT

## 2023-10-01 PROCEDURE — 82962 GLUCOSE BLOOD TEST: CPT

## 2023-10-01 PROCEDURE — 99024 PR POST-OP FOLLOW-UP VISIT: ICD-10-PCS | Mod: ,,, | Performed by: FAMILY MEDICINE

## 2023-10-01 PROCEDURE — 99024 POSTOP FOLLOW-UP VISIT: CPT | Mod: ,,, | Performed by: FAMILY MEDICINE

## 2023-10-01 NOTE — ED NOTES
Pt had packing to left inner thigh and bandage removed by ERP noted foul odor ERP repacked abscess

## 2023-10-01 NOTE — ED PROVIDER NOTES
Encounter Date: 10/1/2023       History     Chief Complaint   Patient presents with    Wound Check     63 y o female presents to ER via POV FOR ABSCESS RECHECK. S/p I&D with packing of left thigh abscess. No complaints.    The history is provided by the patient. No  was used.     Review of patient's allergies indicates:   Allergen Reactions    Ammonium peroxydisulfate Shortness Of Breath    Avocado (laurus persea) Anaphylaxis    Bananas [banana] Anaphylaxis and Swelling     CRAMPS,    Chocolate flavor Anaphylaxis     MOUTH SWELLING    Fentanyl Shortness Of Breath and Itching    Percocet [oxycodone-acetaminophen] Shortness Of Breath and Itching    Silvadene [silver sulfadiazine]     Clindamycin     Corticosteroids (glucocorticoids)     Hydrocortisone Blisters    Lasix [furosemide] Blisters     BURNS SKIN    Nutritional supplement-fiber Itching    Pregabalin     Shellfish containing products     Adhesive Rash and Blisters    Iodine and iodide containing products Rash    Latex, natural rubber Rash    Pcn [penicillins] Rash    Sulfa (sulfonamide antibiotics) Rash and Blisters     Past Medical History:   Diagnosis Date    Anxiety state     Atherosclerosis of native artery of extremity with intermittent claudication 01/30/2019    bilateral legs    Bilateral leg pain     BMI 50.0-59.9, adult 02/22/2021    Cellulitis 06/11/2020    Chronic pain syndrome     Chronic peripheral venous hypertension 01/08/2019    COPD (chronic obstructive pulmonary disease)     Diabetes     Diabetes mellitus, type 2     DVT (deep venous thrombosis) 02/12/2020    Embolism and thrombosis of superficial veins of unspecified lower extremity 07/01/2019    bilateral    Frequent headaches 09/20/2018    GERD (gastroesophageal reflux disease)     Hereditary lymphedema     Hx of thyroid cancer     Hyperlipidemia     Hypertension     Hypothyroidism     Migraines     Migraines     Morbid obesity     Nicotine dependence     Non-pressure  chronic ulcer of left lower leg 03/09/2021    Osteoarthritis     Other skin changes 03/09/2021    bilateral gaiter regions    Pain in left leg     Pain in right leg     PVD (peripheral vascular disease) 01/08/2019    Seizure disorder     Sleep apnea     Venous insufficiency (chronic) (peripheral)     Venous stasis     Vitamin D deficiency      Past Surgical History:   Procedure Laterality Date    HYSTERECTOMY      ILIAC ARTERY STENT Bilateral 01/28/2020    Bilateral distal aorta and common iliac 8 X 59 vbx covered stents performed by Dr. Antonio Jacinto.    RADIOFREQUENCY ABLATION Left 04/15/2022    Procedure: Left calf  Radiofrequency Ablation;  Surgeon: Matty Falcon DO;  Location: [facility];  Service: Vascular;  Laterality: Left;    STAB PHLEBECTOMY OF VARICOSE VEINS Left 01/25/2013    Left leg microphlebectomies x 23 stab avulsions and ligation of multiple varicose veins perrformed by Dr. Cirilo Aguirre.    THYROIDECTOMY      hx: thyroid cancer    TOE AMPUTATION Left 01/28/2020    2nd, 4th and 5th performed by Dr. Anam Saeed.    TOE AMPUTATION Right 01/28/2020    4th toe performed by Dr. Anam Saeed.    TOTAL ABDOMINAL HYSTERECTOMY W/ BILATERAL SALPINGOOPHORECTOMY      TUBAL LIGATION      VAGINAL DELIVERY      x 4    VENOUS ABLATION Right 08/09/2019    GSV Varithena Ablation performed by Dr. Estuardo Falcon    VENOUS ABLATION Left 08/02/2019    ATAV Varithena Ablation performed by Dr. Estuardo Falcon.    VENOUS ABLATION Right 07/13/2015    ATAV Laser Ablatio performed by Dr. Cirilo Aguirre.    VENOUS ABLATION Right 07/06/2015    Distal  GSV Laser Ablation performed by dr. Cirilo Aguirre.    VENOUS ABLATION Left 04/20/2015    Left Distal GSV Laser Ablation performed by dr. Cirilo Aguirre.    VENOUS ABLATION Right 10/28/2013    Right Distal GSV RF Ablation performed by Dr. Cirilo Aguirre.    VENOUS ABLATION Left 10/25/2013    SSV RF Ablation performed by dr. Cirilo Aguirre.    VENOUS ABLATION Left 10/07/2013    Left GSV and Left ATAV RF  Ablation performed by Dr. Cirilo Aguirre.    VENOUS ABLATION Right 01/21/2013    GSV RF Ablation w/micros x 22 performed by Dr. Cirilo Aguirre.    VENOUS ABLATION Left 06/25/2021    Left distal GSV Varithena Ablation     Family History   Problem Relation Age of Onset    Heart disease Mother         age 84 CHF    Hypertension Mother     Osteoarthritis Mother     Coronary aneurysm Father     Coronary artery disease Father     Hypertension Father     Heart disease Father     No Known Problems Son     No Known Problems Son     No Known Problems Son     No Known Problems Son     No Known Problems Sister     No Known Problems Brother         hx: varicose veins    No Known Problems Brother         MVA: parlazed    No Known Problems Brother      Social History     Tobacco Use    Smoking status: Every Day     Current packs/day: 0.50     Average packs/day: 0.5 packs/day for 33.0 years (16.5 ttl pk-yrs)     Types: Cigarettes     Passive exposure: Current    Smokeless tobacco: Never   Substance Use Topics    Alcohol use: Never    Drug use: Never     Review of Systems   Constitutional: Negative.    HENT: Negative.     Eyes: Negative.    Respiratory: Negative.     Cardiovascular: Negative.    Gastrointestinal: Negative.    Endocrine: Negative.    Genitourinary: Negative.    Musculoskeletal: Negative.    Skin:  Positive for wound.   Allergic/Immunologic: Negative.    Neurological: Negative.    Hematological: Negative.    Psychiatric/Behavioral: Negative.         Physical Exam     Initial Vitals [10/01/23 1720]   BP Pulse Resp Temp SpO2   (!) 145/84 97 16 97.9 °F (36.6 °C) (!) 89 %      MAP       --         Physical Exam    Nursing note and vitals reviewed.  Constitutional: She appears well-developed and well-nourished.   HENT:   Head: Normocephalic and atraumatic.   Right Ear: External ear normal.   Left Ear: External ear normal.   Nose: Nose normal.   Eyes: Conjunctivae and EOM are normal. Pupils are equal, round, and reactive to light.    Neck: Neck supple.   Normal range of motion.  Cardiovascular:  Normal rate and regular rhythm.           Musculoskeletal:      Cervical back: Normal range of motion and neck supple.     Neurological: She is alert and oriented to person, place, and time. She has normal strength and normal reflexes. GCS score is 15. GCS eye subscore is 4. GCS verbal subscore is 5. GCS motor subscore is 6.   Skin: Skin is warm and dry. Capillary refill takes less than 2 seconds. Abscess noted.   Packing removed. No pus expressed. Repacked.   Psychiatric: She has a normal mood and affect. Her behavior is normal. Judgment and thought content normal.         Medical Screening Exam   See Full Note    ED Course   Procedures  Labs Reviewed   POCT GLUCOSE MONITORING CONTINUOUS          Imaging Results    None          Medications - No data to display  Medical Decision Making                             Clinical Impression:   Final diagnoses:  [Z51.89] Wound check, abscess (Primary)        ED Disposition Condition    Discharge Stable          ED Prescriptions    None       Follow-up Information       Follow up With Specialties Details Why Contact Info    Regan Frausto MD Internal Medicine, Family Medicine, Hospitalist In 3 days For wound re-check [address] [place] [address]  [facility] [address]  [phone]               Cricket Lockhart MD  10/01/23 5514     GENERAL: No fever and no chills  EENT: No sore throat and no dysphagia.  RESPIRATORY: No cough and no SOB.  GI: No abdo pain, N/V/D

## 2023-10-01 NOTE — ED TRIAGE NOTES
Pt to ER for wound check states she has packing that needs to be removed. Pt was seen in the ER on Friday for abscess.  Denies pain at this time

## 2023-10-02 ENCOUNTER — OFFICE VISIT (OUTPATIENT)
Dept: PAIN MEDICINE | Facility: CLINIC | Age: 63
End: 2023-10-02
Payer: MEDICARE

## 2023-10-02 VITALS
BODY MASS INDEX: 44.41 KG/M2 | HEIGHT: 68 IN | DIASTOLIC BLOOD PRESSURE: 74 MMHG | RESPIRATION RATE: 22 BRPM | SYSTOLIC BLOOD PRESSURE: 141 MMHG | WEIGHT: 293 LBS | HEART RATE: 100 BPM

## 2023-10-02 DIAGNOSIS — G89.29 CHRONIC PAIN OF RIGHT KNEE: Chronic | ICD-10-CM

## 2023-10-02 DIAGNOSIS — M25.561 CHRONIC PAIN OF RIGHT KNEE: Chronic | ICD-10-CM

## 2023-10-02 DIAGNOSIS — Z79.899 ENCOUNTER FOR LONG-TERM (CURRENT) USE OF OTHER MEDICATIONS: ICD-10-CM

## 2023-10-02 DIAGNOSIS — M47.817 LUMBOSACRAL SPONDYLOSIS WITHOUT MYELOPATHY: Primary | Chronic | ICD-10-CM

## 2023-10-02 LAB
CTP QC/QA: YES
MICROORGANISM SPEC CULT: NORMAL
POC (AMP) AMPHETAMINE: NEGATIVE
POC (BAR) BARBITURATES: NEGATIVE
POC (BUP) BUPRENORPHINE: NEGATIVE
POC (BZO) BENZODIAZEPINES: NEGATIVE
POC (COC) COCAINE: NEGATIVE
POC (MDMA) METHYLENEDIOXYMETHAMPHETAMINE 3,4: NEGATIVE
POC (MET) METHAMPHETAMINE: NEGATIVE
POC (MOP) OPIATES: ABNORMAL
POC (MTD) METHADONE: NEGATIVE
POC (OXY) OXYCODONE: NEGATIVE
POC (PCP) PHENCYCLIDINE: NEGATIVE
POC (TCA) TRICYCLIC ANTIDEPRESSANTS: NEGATIVE
POC TEMPERATURE (URINE): 92
POC THC: NEGATIVE

## 2023-10-02 PROCEDURE — 1159F PR MEDICATION LIST DOCUMENTED IN MEDICAL RECORD: ICD-10-PCS | Mod: CPTII,,, | Performed by: PHYSICIAN ASSISTANT

## 2023-10-02 PROCEDURE — 3044F PR MOST RECENT HEMOGLOBIN A1C LEVEL <7.0%: ICD-10-PCS | Mod: CPTII,,, | Performed by: PHYSICIAN ASSISTANT

## 2023-10-02 PROCEDURE — 3066F NEPHROPATHY DOC TX: CPT | Mod: CPTII,,, | Performed by: PHYSICIAN ASSISTANT

## 2023-10-02 PROCEDURE — 1159F MED LIST DOCD IN RCRD: CPT | Mod: CPTII,,, | Performed by: PHYSICIAN ASSISTANT

## 2023-10-02 PROCEDURE — 80305 DRUG TEST PRSMV DIR OPT OBS: CPT | Mod: PBBFAC | Performed by: PHYSICIAN ASSISTANT

## 2023-10-02 PROCEDURE — 99999PBSHW PR PBB SHADOW TECHNICAL ONLY FILED TO HB: Mod: PBBFAC,,,

## 2023-10-02 PROCEDURE — 99999PBSHW PR PBB SHADOW TECHNICAL ONLY FILED TO HB: ICD-10-PCS | Mod: PBBFAC,,,

## 2023-10-02 PROCEDURE — 3078F PR MOST RECENT DIASTOLIC BLOOD PRESSURE < 80 MM HG: ICD-10-PCS | Mod: CPTII,,, | Performed by: PHYSICIAN ASSISTANT

## 2023-10-02 PROCEDURE — 96372 THER/PROPH/DIAG INJ SC/IM: CPT | Mod: PBBFAC | Performed by: PHYSICIAN ASSISTANT

## 2023-10-02 PROCEDURE — 3078F DIAST BP <80 MM HG: CPT | Mod: CPTII,,, | Performed by: PHYSICIAN ASSISTANT

## 2023-10-02 PROCEDURE — 3077F SYST BP >= 140 MM HG: CPT | Mod: CPTII,,, | Performed by: PHYSICIAN ASSISTANT

## 2023-10-02 PROCEDURE — 3061F PR NEG MICROALBUMINURIA RESULT DOCUMENTED/REVIEW: ICD-10-PCS | Mod: CPTII,,, | Performed by: PHYSICIAN ASSISTANT

## 2023-10-02 PROCEDURE — 3044F HG A1C LEVEL LT 7.0%: CPT | Mod: CPTII,,, | Performed by: PHYSICIAN ASSISTANT

## 2023-10-02 PROCEDURE — 99214 OFFICE O/P EST MOD 30 MIN: CPT | Mod: S$PBB,25,, | Performed by: PHYSICIAN ASSISTANT

## 2023-10-02 PROCEDURE — 3066F PR DOCUMENTATION OF TREATMENT FOR NEPHROPATHY: ICD-10-PCS | Mod: CPTII,,, | Performed by: PHYSICIAN ASSISTANT

## 2023-10-02 PROCEDURE — 3008F BODY MASS INDEX DOCD: CPT | Mod: CPTII,,, | Performed by: PHYSICIAN ASSISTANT

## 2023-10-02 PROCEDURE — 3008F PR BODY MASS INDEX (BMI) DOCUMENTED: ICD-10-PCS | Mod: CPTII,,, | Performed by: PHYSICIAN ASSISTANT

## 2023-10-02 PROCEDURE — 3061F NEG MICROALBUMINURIA REV: CPT | Mod: CPTII,,, | Performed by: PHYSICIAN ASSISTANT

## 2023-10-02 PROCEDURE — 99999PBSHW POCT URINE DRUG SCREEN PRESUMP: Mod: PBBFAC,,,

## 2023-10-02 PROCEDURE — 3077F PR MOST RECENT SYSTOLIC BLOOD PRESSURE >= 140 MM HG: ICD-10-PCS | Mod: CPTII,,, | Performed by: PHYSICIAN ASSISTANT

## 2023-10-02 PROCEDURE — 99215 OFFICE O/P EST HI 40 MIN: CPT | Mod: PBBFAC,25 | Performed by: PHYSICIAN ASSISTANT

## 2023-10-02 PROCEDURE — 99214 PR OFFICE/OUTPT VISIT, EST, LEVL IV, 30-39 MIN: ICD-10-PCS | Mod: S$PBB,25,, | Performed by: PHYSICIAN ASSISTANT

## 2023-10-02 RX ORDER — KETOROLAC TROMETHAMINE 30 MG/ML
60 INJECTION, SOLUTION INTRAMUSCULAR; INTRAVENOUS
Status: COMPLETED | OUTPATIENT
Start: 2023-10-02 | End: 2023-10-02

## 2023-10-02 RX ORDER — HYDROCODONE BITARTRATE AND ACETAMINOPHEN 10; 325 MG/1; MG/1
1 TABLET ORAL EVERY 6 HOURS PRN
Qty: 120 TABLET | Refills: 0 | Status: SHIPPED | OUTPATIENT
Start: 2023-11-05 | End: 2023-11-29 | Stop reason: SDUPTHER

## 2023-10-02 RX ORDER — HYDROCODONE BITARTRATE AND ACETAMINOPHEN 10; 325 MG/1; MG/1
1 TABLET ORAL EVERY 6 HOURS PRN
Qty: 120 TABLET | Refills: 0 | Status: ON HOLD | OUTPATIENT
Start: 2023-10-06 | End: 2023-11-10 | Stop reason: HOSPADM

## 2023-10-02 RX ADMIN — KETOROLAC TROMETHAMINE 60 MG: 60 INJECTION, SOLUTION INTRAMUSCULAR at 02:10

## 2023-10-05 ENCOUNTER — HOSPITAL ENCOUNTER (EMERGENCY)
Facility: HOSPITAL | Age: 63
Discharge: ANOTHER HOSPITAL | End: 2023-10-05
Attending: INTERNAL MEDICINE
Payer: MEDICARE

## 2023-10-05 VITALS
DIASTOLIC BLOOD PRESSURE: 67 MMHG | HEART RATE: 92 BPM | WEIGHT: 293 LBS | BODY MASS INDEX: 44.41 KG/M2 | OXYGEN SATURATION: 88 % | HEIGHT: 68 IN | SYSTOLIC BLOOD PRESSURE: 136 MMHG | TEMPERATURE: 99 F | RESPIRATION RATE: 20 BRPM

## 2023-10-05 DIAGNOSIS — I50.43 ACUTE ON CHRONIC COMBINED SYSTOLIC AND DIASTOLIC CONGESTIVE HEART FAILURE: ICD-10-CM

## 2023-10-05 DIAGNOSIS — J96.21 ACUTE ON CHRONIC RESPIRATORY FAILURE WITH HYPOXEMIA: Primary | ICD-10-CM

## 2023-10-05 DIAGNOSIS — I24.9 ACUTE CORONARY SYNDROME WITHOUT HIGH TROPONIN: ICD-10-CM

## 2023-10-05 DIAGNOSIS — R06.02 SHORTNESS OF BREATH: ICD-10-CM

## 2023-10-05 DIAGNOSIS — E87.6 HYPOKALEMIA: ICD-10-CM

## 2023-10-05 LAB
ALBUMIN SERPL BCP-MCNC: 3.3 G/DL (ref 3.5–5)
ALBUMIN/GLOB SERPL: 0.9 {RATIO}
ALP SERPL-CCNC: 111 U/L (ref 50–130)
ALT SERPL W P-5'-P-CCNC: 13 U/L (ref 13–56)
ANION GAP SERPL CALCULATED.3IONS-SCNC: 8 MMOL/L (ref 7–16)
AST SERPL W P-5'-P-CCNC: 24 U/L (ref 15–37)
BASOPHILS # BLD AUTO: 0.06 K/UL (ref 0–0.2)
BASOPHILS NFR BLD AUTO: 0.7 % (ref 0–1)
BILIRUB SERPL-MCNC: 0.4 MG/DL (ref ?–1.2)
BUN SERPL-MCNC: 12 MG/DL (ref 7–18)
BUN/CREAT SERPL: 11 (ref 6–20)
CALCIUM SERPL-MCNC: 9.7 MG/DL (ref 8.5–10.1)
CHLORIDE SERPL-SCNC: 98 MMOL/L (ref 98–107)
CO2 SERPL-SCNC: 35 MMOL/L (ref 21–32)
CREAT SERPL-MCNC: 1.07 MG/DL (ref 0.55–1.02)
D DIMER PPP FEU-MCNC: 2.46 ΜG/ML (ref 0–0.47)
DIFFERENTIAL METHOD BLD: ABNORMAL
EGFR (NO RACE VARIABLE) (RUSH/TITUS): 58 ML/MIN/1.73M2
EOSINOPHIL # BLD AUTO: 0.15 K/UL (ref 0–0.5)
EOSINOPHIL NFR BLD AUTO: 1.8 % (ref 1–4)
ERYTHROCYTE [DISTWIDTH] IN BLOOD BY AUTOMATED COUNT: 17.7 % (ref 11.5–14.5)
GLOBULIN SER-MCNC: 3.7 G/DL (ref 2–4)
GLUCOSE SERPL-MCNC: 122 MG/DL (ref 70–105)
GLUCOSE SERPL-MCNC: 124 MG/DL (ref 74–106)
HCT VFR BLD AUTO: 48.4 % (ref 38–47)
HGB BLD-MCNC: 15.3 G/DL (ref 12–16)
IMM GRANULOCYTES # BLD AUTO: 0.06 K/UL (ref 0–0.04)
IMM GRANULOCYTES NFR BLD: 0.7 % (ref 0–0.4)
LYMPHOCYTES # BLD AUTO: 1.65 K/UL (ref 1–4.8)
LYMPHOCYTES NFR BLD AUTO: 19.4 % (ref 27–41)
MCH RBC QN AUTO: 25 PG (ref 27–31)
MCHC RBC AUTO-ENTMCNC: 31.6 G/DL (ref 32–36)
MCV RBC AUTO: 79 FL (ref 80–96)
MONOCYTES # BLD AUTO: 0.85 K/UL (ref 0–0.8)
MONOCYTES NFR BLD AUTO: 10 % (ref 2–6)
MPC BLD CALC-MCNC: 10.8 FL (ref 9.4–12.4)
NEUTROPHILS # BLD AUTO: 5.72 K/UL (ref 1.8–7.7)
NEUTROPHILS NFR BLD AUTO: 67.4 % (ref 53–65)
NRBC # BLD AUTO: 0 X10E3/UL
NRBC, AUTO (.00): 0 %
NT-PROBNP SERPL-MCNC: 1932 PG/ML (ref 1–125)
PLATELET # BLD AUTO: 290 K/UL (ref 150–400)
POTASSIUM SERPL-SCNC: 3 MMOL/L (ref 3.5–5.1)
PROT SERPL-MCNC: 7 G/DL (ref 6.4–8.2)
RBC # BLD AUTO: 6.13 M/UL (ref 4.2–5.4)
SODIUM SERPL-SCNC: 138 MMOL/L (ref 136–145)
TROPONIN I SERPL DL<=0.01 NG/ML-MCNC: 40.8 PG/ML
WBC # BLD AUTO: 8.49 K/UL (ref 4.5–11)

## 2023-10-05 PROCEDURE — 83880 ASSAY OF NATRIURETIC PEPTIDE: CPT | Performed by: INTERNAL MEDICINE

## 2023-10-05 PROCEDURE — 99285 PR EMERGENCY DEPT VISIT,LEVEL V: ICD-10-PCS | Mod: ,,, | Performed by: INTERNAL MEDICINE

## 2023-10-05 PROCEDURE — 93010 ELECTROCARDIOGRAM REPORT: CPT | Mod: ,,, | Performed by: INTERNAL MEDICINE

## 2023-10-05 PROCEDURE — 27000221 HC OXYGEN, UP TO 24 HOURS

## 2023-10-05 PROCEDURE — 96374 THER/PROPH/DIAG INJ IV PUSH: CPT

## 2023-10-05 PROCEDURE — 93005 ELECTROCARDIOGRAM TRACING: CPT

## 2023-10-05 PROCEDURE — 82962 GLUCOSE BLOOD TEST: CPT

## 2023-10-05 PROCEDURE — 85025 COMPLETE CBC W/AUTO DIFF WBC: CPT | Performed by: INTERNAL MEDICINE

## 2023-10-05 PROCEDURE — 93010 EKG 12-LEAD: ICD-10-PCS | Mod: ,,, | Performed by: INTERNAL MEDICINE

## 2023-10-05 PROCEDURE — 25000242 PHARM REV CODE 250 ALT 637 W/ HCPCS: Performed by: INTERNAL MEDICINE

## 2023-10-05 PROCEDURE — 99285 EMERGENCY DEPT VISIT HI MDM: CPT | Mod: 25

## 2023-10-05 PROCEDURE — 94640 AIRWAY INHALATION TREATMENT: CPT

## 2023-10-05 PROCEDURE — 25000003 PHARM REV CODE 250: Performed by: INTERNAL MEDICINE

## 2023-10-05 PROCEDURE — 84484 ASSAY OF TROPONIN QUANT: CPT | Performed by: INTERNAL MEDICINE

## 2023-10-05 PROCEDURE — 94760 N-INVAS EAR/PLS OXIMETRY 1: CPT

## 2023-10-05 PROCEDURE — 80053 COMPREHEN METABOLIC PANEL: CPT | Performed by: INTERNAL MEDICINE

## 2023-10-05 PROCEDURE — 99285 EMERGENCY DEPT VISIT HI MDM: CPT | Mod: ,,, | Performed by: INTERNAL MEDICINE

## 2023-10-05 PROCEDURE — 85379 FIBRIN DEGRADATION QUANT: CPT | Performed by: INTERNAL MEDICINE

## 2023-10-05 RX ORDER — METHYLPREDNISOLONE SOD SUCC 125 MG
125 VIAL (EA) INJECTION
Status: DISCONTINUED | OUTPATIENT
Start: 2023-10-05 | End: 2023-10-05

## 2023-10-05 RX ORDER — IPRATROPIUM BROMIDE AND ALBUTEROL SULFATE 2.5; .5 MG/3ML; MG/3ML
3 SOLUTION RESPIRATORY (INHALATION)
Status: COMPLETED | OUTPATIENT
Start: 2023-10-05 | End: 2023-10-05

## 2023-10-05 RX ORDER — BUMETANIDE 0.25 MG/ML
1 INJECTION INTRAMUSCULAR; INTRAVENOUS
Status: COMPLETED | OUTPATIENT
Start: 2023-10-05 | End: 2023-10-05

## 2023-10-05 RX ORDER — POTASSIUM CHLORIDE 750 MG/1
10 TABLET, EXTENDED RELEASE ORAL
Status: COMPLETED | OUTPATIENT
Start: 2023-10-05 | End: 2023-10-05

## 2023-10-05 RX ADMIN — IPRATROPIUM BROMIDE AND ALBUTEROL SULFATE 3 ML: 2.5; .5 SOLUTION RESPIRATORY (INHALATION) at 03:10

## 2023-10-05 RX ADMIN — BUMETANIDE 1 MG: 0.25 INJECTION INTRAMUSCULAR; INTRAVENOUS at 03:10

## 2023-10-05 RX ADMIN — POTASSIUM CHLORIDE 10 MEQ: 750 TABLET, FILM COATED, EXTENDED RELEASE ORAL at 05:10

## 2023-10-05 NOTE — ED NOTES
[facility] called for pt transport to [facility]. [facility] will transport. Pt accepted by Dr Peter Castro. Will be admitted to room 9325G

## 2023-10-05 NOTE — ED PROVIDER NOTES
Encounter Date: 10/5/2023       History     Chief Complaint   Patient presents with    Shortness of Breath     Patient presents to the emergency room with complaints of increasing shortness breath for the last several days.  Has bilateral lower leg swelling as well.  The patient says he was diagnosed with COPD 2 years ago, she was told she needed oxygen February after seeing Dr. Loyola, pulmonologist but it was never ordered for her.  The patient states she still smokes on a daily basis.  Patient states that she is had attacks but she is allergic to steroids causes swelling.  States she does have history congestive heart failure which she is seen Dr. Ortega a cardiologist several months ago.  She denies any fever chills, nausea vomiting neurologic complaints even though she does have history of seizures.  She does complain of some chest tightness and increasing dyspnea on exertion with PND and orthopnea.    The patient has chronic lymphedema of the lower legs in his undergone vascular surgery with stents in the past, she has a history of DVT and pulmonary embolus in the past.  She had a normal heart catheterization in 2020 but none since that time.        Review of patient's allergies indicates:   Allergen Reactions    Ammonium peroxydisulfate Shortness Of Breath    Avocado (laurus persea) Anaphylaxis    Bananas [banana] Anaphylaxis and Swelling     CRAMPS,    Chocolate flavor Anaphylaxis     MOUTH SWELLING    Fentanyl Shortness Of Breath and Itching    Percocet [oxycodone-acetaminophen] Shortness Of Breath and Itching    Silvadene [silver sulfadiazine]     Clindamycin     Corticosteroids (glucocorticoids)     Hydrocortisone Blisters    Lasix [furosemide] Blisters     BURNS SKIN    Nutritional supplement-fiber Itching    Pregabalin     Shellfish containing products     Adhesive Rash and Blisters    Iodine and iodide containing products Rash    Latex, natural rubber Rash    Pcn [penicillins] Rash    Sulfa (sulfonamide  antibiotics) Rash and Blisters     Past Medical History:   Diagnosis Date    Anxiety state     Atherosclerosis of native artery of extremity with intermittent claudication 01/30/2019    bilateral legs    Bilateral leg pain     BMI 50.0-59.9, adult 02/22/2021    Cellulitis 06/11/2020    Chronic pain syndrome     Chronic peripheral venous hypertension 01/08/2019    COPD (chronic obstructive pulmonary disease)     Diabetes     Diabetes mellitus, type 2     DVT (deep venous thrombosis) 02/12/2020    Embolism and thrombosis of superficial veins of unspecified lower extremity 07/01/2019    bilateral    Frequent headaches 09/20/2018    GERD (gastroesophageal reflux disease)     Hereditary lymphedema     Hx of thyroid cancer     Hyperlipidemia     Hypertension     Hypothyroidism     Migraines     Migraines     Morbid obesity     Nicotine dependence     Non-pressure chronic ulcer of left lower leg 03/09/2021    Osteoarthritis     Other skin changes 03/09/2021    bilateral gaiter regions    Pain in left leg     Pain in right leg     PVD (peripheral vascular disease) 01/08/2019    Seizure disorder     Sleep apnea     Venous insufficiency (chronic) (peripheral)     Venous stasis     Vitamin D deficiency      Past Surgical History:   Procedure Laterality Date    ABCESS DRAINAGE      HYSTERECTOMY      ILIAC ARTERY STENT Bilateral 01/28/2020    Bilateral distal aorta and common iliac 8 X 59 vbx covered stents performed by Dr. Antonio Jacinto.    RADIOFREQUENCY ABLATION Left 04/15/2022    Procedure: Left calf  Radiofrequency Ablation;  Surgeon: Matty Falcon DO;  Location: [facility];  Service: Vascular;  Laterality: Left;    STAB PHLEBECTOMY OF VARICOSE VEINS Left 01/25/2013    Left leg microphlebectomies x 23 stab avulsions and ligation of multiple varicose veins perrformed by Dr. Cirilo Aguirre.    THYROIDECTOMY      hx: thyroid cancer    TOE AMPUTATION Left 01/28/2020    2nd, 4th and 5th performed by Dr. Anam Saeed.    TOE  AMPUTATION Right 01/28/2020    4th toe performed by Dr. Anam Saeed.    TOTAL ABDOMINAL HYSTERECTOMY W/ BILATERAL SALPINGOOPHORECTOMY      TUBAL LIGATION      VAGINAL DELIVERY      x 4    VENOUS ABLATION Right 08/09/2019    GSV Varithena Ablation performed by Dr. Estuardo Falcon    VENOUS ABLATION Left 08/02/2019    ATAV Varithena Ablation performed by Dr. Estuardo Falcon.    VENOUS ABLATION Right 07/13/2015    ATAV Laser Ablatio performed by Dr. Cirilo Aguirre.    VENOUS ABLATION Right 07/06/2015    Distal  GSV Laser Ablation performed by dr. Cirilo Aguirre.    VENOUS ABLATION Left 04/20/2015    Left Distal GSV Laser Ablation performed by dr. Cirilo Aguirre.    VENOUS ABLATION Right 10/28/2013    Right Distal GSV RF Ablation performed by Dr. Cirilo Aguirre.    VENOUS ABLATION Left 10/25/2013    SSV RF Ablation performed by dr. Cirilo Aguirre.    VENOUS ABLATION Left 10/07/2013    Left GSV and Left ATAV RF Ablation performed by Dr. Cirilo Aguirre.    VENOUS ABLATION Right 01/21/2013    GSV RF Ablation w/micros x 22 performed by Dr. Cirilo Aguirre.    VENOUS ABLATION Left 06/25/2021    Left distal GSV Varithena Ablation     Family History   Problem Relation Age of Onset    Heart disease Mother         age 84 CHF    Hypertension Mother     Osteoarthritis Mother     Coronary aneurysm Father     Coronary artery disease Father     Hypertension Father     Heart disease Father     No Known Problems Son     No Known Problems Son     No Known Problems Son     No Known Problems Son     No Known Problems Sister     No Known Problems Brother         hx: varicose veins    No Known Problems Brother         MVA: parlazed    No Known Problems Brother      Social History     Tobacco Use    Smoking status: Every Day     Current packs/day: 0.50     Average packs/day: 0.5 packs/day for 33.0 years (16.5 ttl pk-yrs)     Types: Cigarettes     Passive exposure: Current    Smokeless tobacco: Never   Substance Use Topics    Alcohol use: Never    Drug use: Never     Review of Systems    Constitutional:  Negative for fever.   HENT:  Negative for sore throat.    Respiratory:  Negative for shortness of breath.    Cardiovascular:  Negative for chest pain.   Gastrointestinal:  Negative for nausea.   Genitourinary:  Negative for dysuria.   Musculoskeletal:  Negative for back pain.   Skin:  Negative for rash.   Neurological:  Negative for weakness.   Hematological:  Does not bruise/bleed easily.       Physical Exam     Initial Vitals [10/05/23 1508]   BP Pulse Resp Temp SpO2   (!) 149/79 (!) 117 (!) 32 98.6 °F (37 °C) (!) 82 %      MAP       --         Physical Exam    Vitals reviewed.  Constitutional: She appears well-developed. She is Obese . She is cooperative.   Eyes: Pupils are equal, round, and reactive to light.   Neck:   Normal range of motion.  Cardiovascular:  Regular rhythm and normal pulses.   Tachycardia present.   Exam reveals no S3.       Pulmonary/Chest: No accessory muscle usage. No respiratory distress. She has decreased breath sounds. She has rales.   Abdominal: Abdomen is soft.   Musculoskeletal:         General: Normal range of motion.      Cervical back: Normal range of motion.      Comments: Bilateral lower leg pitting and edema     Neurological: She is alert. She has normal strength and normal reflexes. No cranial nerve deficit. GCS eye subscore is 4. GCS verbal subscore is 5. GCS motor subscore is 6.   Skin: Skin is warm.   Psychiatric: She has a normal mood and affect.         Medical Screening Exam   See Full Note    ED Course   Procedures  Labs Reviewed   COMPREHENSIVE METABOLIC PANEL - Abnormal; Notable for the following components:       Result Value    Potassium 3.0 (*)     CO2 35 (*)     Glucose 124 (*)     Creatinine 1.07 (*)     Albumin 3.3 (*)     eGFR 58 (*)     All other components within normal limits   NT-PRO NATRIURETIC PEPTIDE - Abnormal; Notable for the following components:    ProBNP 1,932 (*)     All other components within normal limits   D DIMER,  QUANTITATIVE - Abnormal; Notable for the following components:    D-Dimer 2.46 (*)     All other components within normal limits   CBC WITH DIFFERENTIAL - Abnormal; Notable for the following components:    RBC 6.13 (*)     Hematocrit 48.4 (*)     MCV 79.0 (*)     MCH 25.0 (*)     MCHC 31.6 (*)     RDW 17.7 (*)     Neutrophils % 67.4 (*)     Lymphocytes % 19.4 (*)     Monocytes % 10.0 (*)     Immature Granulocytes % 0.7 (*)     Monocytes, Absolute 0.85 (*)     Immature Granulocytes, Absolute 0.06 (*)     All other components within normal limits   POCT GLUCOSE MONITORING CONTINUOUS - Abnormal; Notable for the following components:    POC Glucose 122 (*)     All other components within normal limits   TROPONIN I - Normal   CBC W/ AUTO DIFFERENTIAL    Narrative:     The following orders were created for panel order CBC Auto Differential.  Procedure                               Abnormality         Status                     ---------                               -----------         ------                     CBC with Differential[[id number]]       Abnormal            Final result                 Please view results for these tests on the individual orders.     EKG Readings: (Independently Interpreted)   Initial Reading: No STEMI. Previous EKG: Compared with most recent EKG Previous EKG Date: 12/04/2022. Rhythm: Sinus Tachycardia. Heart Rate: 110. Ectopy: No Ectopy. Conduction: RBBB. T Waves: Normal. Axis: Right Axis Deviation. Clinical Impression: Sinus Tachycardia with RBBB   Sinus tachycardia with a rate of 110, right bundle-branch block with no change from previous EKG.       Imaging Results              X-Ray Chest 1 View (Final result)  Result time 10/05/23 15:36:23      Final result by Rene Santiago MD (10/05/23 15:36:23)                   Impression:      Cardiomegaly and evidence of CHF      Electronically signed by: Rene Santiago  Date:    10/05/2023  Time:    15:36               Narrative:     EXAMINATION:  XR CHEST 1 VIEW    CLINICAL HISTORY:  shortness of breath;.    COMPARISON:  December 4, 2022    TECHNIQUE:  Chest x-ray AP portable erect    FINDINGS:  There is cardiomegaly and mild pulmonary vascular prominence.  There is some probable mild hazy pulmonary edema in the lung bases.  There is no significant pleural effusion.    Osseous structures are unchanged                                       Medications   potassium chloride CR tablet 10 mEq (has no administration in time range)   albuterol-ipratropium 2.5 mg-0.5 mg/3 mL nebulizer solution 3 mL (3 mLs Nebulization Given 10/5/23 1550)   bumetanide injection 1 mg (1 mg Intravenous Given 10/5/23 1549)     Medical Decision Making  Patient with a history of COPD states she supposed been oxygen but it was never ordered.  Increasing shortness of breath [facility] rule out congestive heart failure or decompensated COPD, cardiac ischemia, electrolyte imbalance, pneumonia a viral illness.    Amount and/or Complexity of Data Reviewed  Labs: ordered. Decision-making details documented in ED Course.  Radiology: ordered. Decision-making details documented in ED Course.  ECG/medicine tests:  Decision-making details documented in ED Course.  Discussion of management or test interpretation with external provider(s): PATIENT WITH exacerbation of his COPD with hypoxia, decompensated congestive heart failure, has elevated D-dimer with a history of pulmonary embolus and DVT on Eliquis already, nonexertional chest pain.  Discussed with the patient she would need high level care including pulmonology, Cardiology, echocardiogram, possibly cardiac catheterization, and telemetry monitoring.  Has significant capacity at [facility], will try [facility].    Risk  Prescription drug management.               ED Course as of 10/05/23 1657   Thu Oct 05, 2023   1543 CBC Auto Differential(!) [PW]   1543 POC Glucose(!): 122 [PW]   1543 X-Ray Chest 1 View [PW]   1554 D-Dimer(!):  2.46 [PW]   1619 NT-Pro Natriuretic Peptide(!) [PW]   1619 NT-proBNP(!): 1,932 [PW]   1619 Troponin I High Sensitivity: 40.8 [PW]   1619 D-Dimer(!): 2.46 [PW]   1619 Comprehensive Metabolic Panel(!) [PW]   1637 Unable to get ABGs the patient with multiple sticks, patient is a diabetic has prefer disease.  Patient has no acute respiratory distress at present comfortable on oxygen, minimal shortness of breath. [PW]   1656 Spoke with [facility] for possible admission in transfer. [PW]      ED Course User Index  [PW] Jett Sunshine MD                    Clinical Impression:   Final diagnoses:  [R06.02] Shortness of breath  [J96.21] Acute on chronic respiratory failure with hypoxemia (Primary)  [I24.9] Acute coronary syndrome without high troponin  [I50.43] Acute on chronic combined systolic and diastolic congestive heart failure  [E87.6] Hypokalemia        ED Disposition Condition    Transfer to Another Facility Stable                Jett Sunshine MD  10/05/23 [id number]

## 2023-10-05 NOTE — ED NOTES
Transferred to [facility] via [facility] for admit to [facility]

## 2023-10-06 NOTE — ADDENDUM NOTE
Encounter addended by: Shilpa Luciano on: 10/6/2023 8:05 AM   Actions taken: SmartForm saved, Flowsheet accepted

## 2023-10-09 NOTE — ADDENDUM NOTE
Encounter addended by: Shilpa Luciano on: 10/9/2023 1:21 PM   Actions taken: SmartForm saved, Flowsheet accepted

## 2023-10-17 ENCOUNTER — OFFICE VISIT (OUTPATIENT)
Dept: FAMILY MEDICINE | Facility: CLINIC | Age: 63
End: 2023-10-17
Payer: MEDICARE

## 2023-10-17 ENCOUNTER — HOSPITAL ENCOUNTER (OUTPATIENT)
Dept: RADIOLOGY | Facility: HOSPITAL | Age: 63
Discharge: HOME | End: 2023-10-17
Attending: INTERNAL MEDICINE
Payer: MEDICARE

## 2023-10-17 VITALS
SYSTOLIC BLOOD PRESSURE: 160 MMHG | WEIGHT: 293 LBS | HEIGHT: 68 IN | HEART RATE: 72 BPM | DIASTOLIC BLOOD PRESSURE: 70 MMHG | TEMPERATURE: 97 F | RESPIRATION RATE: 18 BRPM | BODY MASS INDEX: 44.41 KG/M2 | OXYGEN SATURATION: 95 %

## 2023-10-17 DIAGNOSIS — Z12.31 ENCOUNTER FOR SCREENING MAMMOGRAM FOR MALIGNANT NEOPLASM OF BREAST: ICD-10-CM

## 2023-10-17 DIAGNOSIS — Z12.39 ENCOUNTER FOR SCREENING FOR MALIGNANT NEOPLASM OF BREAST, UNSPECIFIED SCREENING MODALITY: ICD-10-CM

## 2023-10-17 DIAGNOSIS — I50.9 CONGESTIVE HEART FAILURE, UNSPECIFIED HF CHRONICITY, UNSPECIFIED HEART FAILURE TYPE: Primary | ICD-10-CM

## 2023-10-17 DIAGNOSIS — Z01.00 DIABETIC EYE EXAM: ICD-10-CM

## 2023-10-17 DIAGNOSIS — J44.9 CHRONIC OBSTRUCTIVE PULMONARY DISEASE, UNSPECIFIED COPD TYPE: ICD-10-CM

## 2023-10-17 DIAGNOSIS — E11.9 DIABETIC EYE EXAM: ICD-10-CM

## 2023-10-17 LAB
ANION GAP SERPL CALCULATED.3IONS-SCNC: 5 MMOL/L (ref 7–16)
BUN SERPL-MCNC: 30 MG/DL (ref 7–18)
BUN/CREAT SERPL: 25 (ref 6–20)
CALCIUM SERPL-MCNC: 9.8 MG/DL (ref 8.5–10.1)
CHLORIDE SERPL-SCNC: 92 MMOL/L (ref 98–107)
CO2 SERPL-SCNC: 41 MMOL/L (ref 21–32)
CREAT SERPL-MCNC: 1.22 MG/DL (ref 0.55–1.02)
EGFR (NO RACE VARIABLE) (RUSH/TITUS): 50 ML/MIN/1.73M2
GLUCOSE SERPL-MCNC: 100 MG/DL (ref 74–106)
NT-PROBNP SERPL-MCNC: 286 PG/ML (ref 1–125)
POTASSIUM SERPL-SCNC: 2.7 MMOL/L (ref 3.5–5.1)
SODIUM SERPL-SCNC: 135 MMOL/L (ref 136–145)

## 2023-10-17 PROCEDURE — 71046 X-RAY EXAM CHEST 2 VIEWS: CPT | Mod: TC

## 2023-10-17 PROCEDURE — 3077F SYST BP >= 140 MM HG: CPT | Mod: ,,, | Performed by: INTERNAL MEDICINE

## 2023-10-17 PROCEDURE — 99214 OFFICE O/P EST MOD 30 MIN: CPT | Mod: ,,, | Performed by: INTERNAL MEDICINE

## 2023-10-17 PROCEDURE — 3008F PR BODY MASS INDEX (BMI) DOCUMENTED: ICD-10-PCS | Mod: ,,, | Performed by: INTERNAL MEDICINE

## 2023-10-17 PROCEDURE — 3061F NEG MICROALBUMINURIA REV: CPT | Mod: ,,, | Performed by: INTERNAL MEDICINE

## 2023-10-17 PROCEDURE — 71046 XR CHEST PA AND LATERAL: ICD-10-PCS | Mod: 26,,, | Performed by: STUDENT IN AN ORGANIZED HEALTH CARE EDUCATION/TRAINING PROGRAM

## 2023-10-17 PROCEDURE — 1160F PR REVIEW ALL MEDS BY PRESCRIBER/CLIN PHARMACIST DOCUMENTED: ICD-10-PCS | Mod: ,,, | Performed by: INTERNAL MEDICINE

## 2023-10-17 PROCEDURE — 1160F RVW MEDS BY RX/DR IN RCRD: CPT | Mod: ,,, | Performed by: INTERNAL MEDICINE

## 2023-10-17 PROCEDURE — 3044F HG A1C LEVEL LT 7.0%: CPT | Mod: ,,, | Performed by: INTERNAL MEDICINE

## 2023-10-17 PROCEDURE — 1159F MED LIST DOCD IN RCRD: CPT | Mod: ,,, | Performed by: INTERNAL MEDICINE

## 2023-10-17 PROCEDURE — 99214 PR OFFICE/OUTPT VISIT, EST, LEVL IV, 30-39 MIN: ICD-10-PCS | Mod: ,,, | Performed by: INTERNAL MEDICINE

## 2023-10-17 PROCEDURE — 1159F PR MEDICATION LIST DOCUMENTED IN MEDICAL RECORD: ICD-10-PCS | Mod: ,,, | Performed by: INTERNAL MEDICINE

## 2023-10-17 PROCEDURE — 3077F PR MOST RECENT SYSTOLIC BLOOD PRESSURE >= 140 MM HG: ICD-10-PCS | Mod: ,,, | Performed by: INTERNAL MEDICINE

## 2023-10-17 PROCEDURE — 71046 X-RAY EXAM CHEST 2 VIEWS: CPT | Mod: 26,,, | Performed by: STUDENT IN AN ORGANIZED HEALTH CARE EDUCATION/TRAINING PROGRAM

## 2023-10-17 PROCEDURE — 83880 ASSAY OF NATRIURETIC PEPTIDE: CPT | Mod: ,,, | Performed by: CLINICAL MEDICAL LABORATORY

## 2023-10-17 PROCEDURE — 80048 BASIC METABOLIC PNL TOTAL CA: CPT | Mod: ,,, | Performed by: CLINICAL MEDICAL LABORATORY

## 2023-10-17 PROCEDURE — 3078F DIAST BP <80 MM HG: CPT | Mod: ,,, | Performed by: INTERNAL MEDICINE

## 2023-10-17 PROCEDURE — 3078F PR MOST RECENT DIASTOLIC BLOOD PRESSURE < 80 MM HG: ICD-10-PCS | Mod: ,,, | Performed by: INTERNAL MEDICINE

## 2023-10-17 PROCEDURE — 3061F PR NEG MICROALBUMINURIA RESULT DOCUMENTED/REVIEW: ICD-10-PCS | Mod: ,,, | Performed by: INTERNAL MEDICINE

## 2023-10-17 PROCEDURE — 3008F BODY MASS INDEX DOCD: CPT | Mod: ,,, | Performed by: INTERNAL MEDICINE

## 2023-10-17 PROCEDURE — 3044F PR MOST RECENT HEMOGLOBIN A1C LEVEL <7.0%: ICD-10-PCS | Mod: ,,, | Performed by: INTERNAL MEDICINE

## 2023-10-17 PROCEDURE — 3066F PR DOCUMENTATION OF TREATMENT FOR NEPHROPATHY: ICD-10-PCS | Mod: ,,, | Performed by: INTERNAL MEDICINE

## 2023-10-17 PROCEDURE — 83880 NT-PRO NATRIURETIC PEPTIDE: ICD-10-PCS | Mod: ,,, | Performed by: CLINICAL MEDICAL LABORATORY

## 2023-10-17 PROCEDURE — 80048 BASIC METABOLIC PANEL: ICD-10-PCS | Mod: ,,, | Performed by: CLINICAL MEDICAL LABORATORY

## 2023-10-17 PROCEDURE — 3066F NEPHROPATHY DOC TX: CPT | Mod: ,,, | Performed by: INTERNAL MEDICINE

## 2023-10-17 RX ORDER — DEXAMETHASONE 4 MG/1
TABLET ORAL
Status: ON HOLD | COMMUNITY
Start: 2023-10-10 | End: 2024-02-14 | Stop reason: HOSPADM

## 2023-10-17 RX ORDER — FLUTICASONE FUROATE AND VILANTEROL TRIFENATATE 100; 25 UG/1; UG/1
1 POWDER RESPIRATORY (INHALATION) DAILY
Qty: 60 EACH | Refills: 0 | Status: SHIPPED | OUTPATIENT
Start: 2023-10-17 | End: 2024-02-21

## 2023-10-17 RX ORDER — GABAPENTIN 600 MG/1
600 TABLET ORAL 3 TIMES DAILY
Qty: 180 TABLET | Refills: 2 | Status: SHIPPED | OUTPATIENT
Start: 2023-10-17

## 2023-10-17 RX ORDER — BUMETANIDE 2 MG/1
2 TABLET ORAL 2 TIMES DAILY
COMMUNITY
Start: 2023-10-10 | End: 2023-12-06 | Stop reason: SDUPTHER

## 2023-10-17 RX ORDER — IBUPROFEN 200 MG
1 TABLET ORAL EVERY 24 HOURS
Status: ON HOLD | COMMUNITY
Start: 2023-10-10 | End: 2024-02-14 | Stop reason: HOSPADM

## 2023-10-17 RX ORDER — ALBUTEROL SULFATE 90 UG/1
2 AEROSOL, METERED RESPIRATORY (INHALATION) 4 TIMES DAILY
Qty: 18 G | Refills: 2 | Status: SHIPPED | OUTPATIENT
Start: 2023-10-17 | End: 2024-10-16

## 2023-10-17 RX ORDER — CILOSTAZOL 100 MG/1
100 TABLET ORAL 2 TIMES DAILY
Qty: 90 TABLET | Refills: 1 | Status: SHIPPED | OUTPATIENT
Start: 2023-10-17

## 2023-10-17 RX ORDER — FLUTICASONE FUROATE AND VILANTEROL TRIFENATATE 100; 25 UG/1; UG/1
1 POWDER RESPIRATORY (INHALATION)
COMMUNITY
Start: 2023-10-10 | End: 2023-10-17 | Stop reason: SDUPTHER

## 2023-10-17 RX ORDER — NIFEDIPINE 90 MG/1
90 TABLET, EXTENDED RELEASE ORAL DAILY
Qty: 90 TABLET | Refills: 1 | Status: ON HOLD | OUTPATIENT
Start: 2023-10-17 | End: 2023-11-10 | Stop reason: SDUPTHER

## 2023-10-17 NOTE — PROGRESS NOTES
Informed pt of allergy contraction to gabapentin; pt requests gabapentin and states she has taken it before

## 2023-10-18 PROBLEM — Z12.31 ENCOUNTER FOR SCREENING MAMMOGRAM FOR MALIGNANT NEOPLASM OF BREAST: Status: ACTIVE | Noted: 2023-06-05

## 2023-10-18 PROBLEM — Z12.39 ENCOUNTER FOR SCREENING FOR MALIGNANT NEOPLASM OF BREAST: Status: ACTIVE | Noted: 2023-06-05

## 2023-10-18 PROBLEM — Z01.00 DIABETIC EYE EXAM: Status: ACTIVE | Noted: 2023-06-05

## 2023-10-18 PROBLEM — E11.9 DIABETIC EYE EXAM: Status: ACTIVE | Noted: 2023-06-05

## 2023-10-18 NOTE — PROGRESS NOTES
Subjective:       Patient ID: Holly Porter is a 63 y.o. female.    Chief Complaint: Leg Pain and Back Pain  Patient presents with chronic obesity, chronic CHF chronic hypertension chronic tobacco dependency chronic worsening diabetic neuropathy.  The plan will be gabapentin 600 mg 1 p.o. 3 times a day.  Patient's O2 was 60% on pulse ox in the hospital I will write for home O2 for the patient.  Will order follow-up chest x-ray shows this recently got a Hospital for CHF in the she does have shortness of breath will check a BMP and a BNP today.    Patient requesting a shower chair I will order a shower chair.  Blood pressure is 160/90   Increase Procardia to 90 mg XL 1 p.o. q.day.  Health maintenance issues refer the patient to Ophthalmology for eye exam   Leg Pain     Back Pain  Associated symptoms include leg pain. Pertinent negatives include no abdominal pain, chest pain or fever.     .    Current Medications:    Current Outpatient Medications:     ACCU-CHEK GUIDE GLUCOSE METER Misc, , Disp: , Rfl:     ACCU-CHEK GUIDE TEST STRIPS Strp, , Disp: , Rfl:     ACCU-CHEK SOFTCLIX LANCETS Misc, , Disp: , Rfl:     allopurinoL (ZYLOPRIM) 300 MG tablet, TAKE ONE TABLET EVERY DAY, Disp: 90 tablet, Rfl: 3    amitriptyline (ELAVIL) 25 MG tablet, Take 1 tablet (25 mg total) by mouth nightly as needed for Insomnia., Disp: 90 tablet, Rfl: 1    apixaban (ELIQUIS) 5 mg Tab, Take 1 tablet (5 mg total) by mouth 2 (two) times daily., Disp: 60 tablet, Rfl: 3    aspirin (ECOTRIN) 81 MG EC tablet, Take 1 tablet (81 mg total) by mouth once daily., Disp: 90 tablet, Rfl: 1    bumetanide (BUMEX) 2 MG tablet, Take 2 mg by mouth 2 (two) times daily., Disp: , Rfl:     calcium carbonate (OS-MICHAELA) 500 mg calcium (1,250 mg) tablet, Take 1 tablet (500 mg total) by mouth 2 (two) times daily., Disp: 60 tablet, Rfl: 3    carvediloL (COREG) 12.5 MG tablet, Take 0.5 tablets (6.25 mg total) by mouth 2 (two) times daily., Disp: 30 tablet, Rfl: 11     cholecalciferol, vitamin D3, 125 mcg (5,000 unit) capsule, Take 1 capsule (5,000 Units total) by mouth 2 (two) times a day., Disp: 60 capsule, Rfl: 3    colchicine (COLCRYS) 0.6 mg tablet, TAKE 1 TABLET BY MOUTH 3 TIMES A DAY FOR 2 DAYS THEN 1 TABLET DAILY UNTIL GONE, Disp: 30 tablet, Rfl: 2    cyclobenzaprine (FLEXERIL) 10 MG tablet, Take 1 tablet (10 mg total) by mouth every evening., Disp: 30 tablet, Rfl: 2    dexAMETHasone (DECADRON) 4 MG Tab, Take by mouth., Disp: , Rfl:     docusate sodium (COLACE) 100 MG capsule, Take 1 capsule (100 mg total) by mouth 2 (two) times daily., Disp: 60 capsule, Rfl: 2    DULoxetine (CYMBALTA) 30 MG capsule, TAKE 1 CAPSULE (30 MG TOTAL) BY MOUTH ONCE DAILY., Disp: 30 capsule, Rfl: 0    HYDROcodone-acetaminophen (NORCO)  mg per tablet, Take 1 tablet by mouth every 6 (six) hours as needed for Pain., Disp: 120 tablet, Rfl: 0    [START ON 11/5/2023] HYDROcodone-acetaminophen (NORCO)  mg per tablet, Take 1 tablet by mouth every 6 (six) hours as needed for Pain., Disp: 120 tablet, Rfl: 0    ibuprofen (ADVIL,MOTRIN) 800 MG tablet, Take 1 tablet (800 mg total) by mouth 2 (two) times daily as needed for Pain., Disp: 30 tablet, Rfl: 2    insulin detemir U-100, Levemir, (LEVEMIR FLEXTOUCH U100 INSULIN) 100 unit/mL (3 mL) InPn pen, Inject 10 Units into the skin every evening. 15 ml with 1 refill, Disp: 15 mL, Rfl: 1    levothyroxine (SYNTHROID) 150 MCG tablet, TAKE 1 TABLET BY MOUTH BEFORE BREAKFAST., Disp: 90 tablet, Rfl: 1    losartan-hydrochlorothiazide 50-12.5 mg (HYZAAR) 50-12.5 mg per tablet, Take 1 tablet by mouth once daily., Disp: 90 tablet, Rfl: 1    metOLazone (ZAROXOLYN) 2.5 MG tablet, Take 1 tablet (2.5 mg total) by mouth once daily., Disp: 30 tablet, Rfl: 2    mupirocin (BACTROBAN) 2 % ointment, Apply topically 2 (two) times daily., Disp: 80 g, Rfl: 2    naloxone (NARCAN) 4 mg/actuation Spry, 1 spray once., Disp: , Rfl:     nicotine (NICODERM CQ) 21 mg/24 hr, Place 1  "patch onto the skin every 24 hours., Disp: , Rfl:     NOVOFINE 32 32 gauge x 1/4" Ndle, Use as directed once daily., Disp: 90 each, Rfl: 3    pantoprazole (PROTONIX) 40 MG tablet, Take 1 tablet (40 mg total) by mouth once daily., Disp: 90 tablet, Rfl: 1    phentermine (ADIPEX-P) 37.5 mg tablet, Take 1 tablet (37.5 mg total) by mouth before breakfast., Disp: 30 tablet, Rfl: 0    polyethylene glycol (GLYCOLAX) 17 gram PwPk, Take 17 g by mouth once daily., Disp: , Rfl:     potassium chloride SA (K-DUR,KLOR-CON) 20 MEQ tablet, Take 1 tablet (20 mEq total) by mouth once daily., Disp: 90 tablet, Rfl: 1    QUEtiapine (SEROQUEL) 25 MG Tab, Take 1 tablet (25 mg total) by mouth once daily., Disp: 30 tablet, Rfl: 1    sertraline (ZOLOFT) 50 MG tablet, Take 1 tablet (50 mg total) by mouth once daily., Disp: 30 tablet, Rfl: 5    tiotropium (SPIRIVA) 18 mcg inhalation capsule, Inhale 1 capsule (18 mcg total) into the lungs once daily. Controller, Disp: 90 capsule, Rfl: 3    triamcinolone acetonide 0.1% (KENALOG) 0.1 % cream, Apply topically 3 (three) times daily., Disp: 400 g, Rfl: 2    albuterol (PROVENTIL/VENTOLIN HFA) 90 mcg/actuation inhaler, Inhale 2 puffs into the lungs 4 (four) times daily. Rescue, Disp: 18 g, Rfl: 2    BREO ELLIPTA 100-25 mcg/dose diskus inhaler, Inhale 1 puff into the lungs once daily., Disp: 60 each, Rfl: 0    cilostazoL (PLETAL) 100 MG Tab, Take 1 tablet (100 mg total) by mouth 2 (two) times daily., Disp: 90 tablet, Rfl: 1    gabapentin (NEURONTIN) 600 MG tablet, Take 1 tablet (600 mg total) by mouth 3 (three) times daily., Disp: 180 tablet, Rfl: 2    loratadine (CLARITIN) 10 mg tablet, Take 1 tablet (10 mg total) by mouth once daily., Disp: 30 tablet, Rfl: 3    NIFEdipine (PROCARDIA-XL) 90 MG (OSM) 24 hr tablet, Take 1 tablet (90 mg total) by mouth once daily., Disp: 90 tablet, Rfl: 1    spironolactone (ALDACTONE) 50 MG tablet, Take 1 tablet (50 mg total) by mouth once daily., Disp: 30 tablet, Rfl: " "11    SYMBICORT 160-4.5 mcg/actuation HFAA, Inhale into the lungs., Disp: , Rfl:     topiramate (TOPAMAX) 100 MG tablet, Take 1.5 tablets (150 mg total) by mouth 2 (two) times daily., Disp: 90 tablet, Rfl: 11           Review of Systems   Constitutional:  Negative for appetite change, fatigue and fever.   Respiratory:  Negative for shortness of breath.    Cardiovascular:  Negative for chest pain.   Gastrointestinal:  Negative for abdominal pain and constipation.   Endocrine: Negative for polydipsia, polyphagia and polyuria.   Genitourinary:  Negative for difficulty urinating, frequency and hot flashes.   Musculoskeletal:  Positive for back pain and leg pain.   Allergic/Immunologic: Negative for environmental allergies.   Neurological:  Negative for dizziness and light-headedness.   Psychiatric/Behavioral:  Negative for agitation.                 Vitals:    10/17/23 1102 10/17/23 1124   BP: (!) 162/81 (!) 160/70   BP Location: Right arm    Patient Position: Sitting    BP Method: Large (Automatic)    Pulse: 72    Resp: 18    Temp: 97.1 °F (36.2 °C)    TempSrc: Temporal    SpO2: 95%    Weight: (!) 154.8 kg (341 lb 4.8 oz)    Height: 5' 8" (1.727 m)         Physical Exam  Vitals and nursing note reviewed.   Constitutional:       Appearance: Normal appearance.   Cardiovascular:      Rate and Rhythm: Normal rate and regular rhythm.      Pulses: Normal pulses.      Heart sounds: Normal heart sounds.   Pulmonary:      Effort: Pulmonary effort is normal.      Breath sounds: Normal breath sounds.   Abdominal:      General: Abdomen is flat. Bowel sounds are normal.      Palpations: Abdomen is soft.   Musculoskeletal:         General: Normal range of motion.   Skin:     General: Skin is warm and dry.   Neurological:      General: No focal deficit present.      Mental Status: She is alert and oriented to person, place, and time. Mental status is at baseline.           Last Labs:     Office Visit on 10/17/2023   Component Date " Value    Sodium 10/17/2023 135 (L)     Potassium 10/17/2023 2.7 (L)     Chloride 10/17/2023 92 (L)     CO2 10/17/2023 41 (H)     Anion Gap 10/17/2023 5 (L)     Glucose 10/17/2023 100     BUN 10/17/2023 30 (H)     Creatinine 10/17/2023 1.22 (H)     BUN/Creatinine Ratio 10/17/2023 25 (H)     Calcium 10/17/2023 9.8     eGFR 10/17/2023 50 (L)     ProBNP 10/17/2023 286 (H)    Admission on 10/05/2023, Discharged on 10/05/2023   Component Date Value    Sodium 10/05/2023 138     Potassium 10/05/2023 3.0 (L)     Chloride 10/05/2023 98     CO2 10/05/2023 35 (H)     Anion Gap 10/05/2023 8     Glucose 10/05/2023 124 (H)     BUN 10/05/2023 12     Creatinine 10/05/2023 1.07 (H)     BUN/Creatinine Ratio 10/05/2023 11     Calcium 10/05/2023 9.7     Total Protein 10/05/2023 7.0     Albumin 10/05/2023 3.3 (L)     Globulin 10/05/2023 3.7     A/G Ratio 10/05/2023 0.9     Bilirubin, Total 10/05/2023 0.4     Alk Phos 10/05/2023 111     ALT 10/05/2023 13     AST 10/05/2023 24     eGFR 10/05/2023 58 (L)     Troponin I High Sensitiv* 10/05/2023 40.8     ProBNP 10/05/2023 1,932 (H)     D-Dimer 10/05/2023 2.46 (H)     WBC 10/05/2023 8.49     RBC 10/05/2023 6.13 (H)     Hemoglobin 10/05/2023 15.3     Hematocrit 10/05/2023 48.4 (H)     MCV 10/05/2023 79.0 (L)     MCH 10/05/2023 25.0 (L)     MCHC 10/05/2023 31.6 (L)     RDW 10/05/2023 17.7 (H)     Platelet Count 10/05/2023 290     MPV 10/05/2023 10.8     Neutrophils % 10/05/2023 67.4 (H)     Lymphocytes % 10/05/2023 19.4 (L)     Monocytes % 10/05/2023 10.0 (H)     Eosinophils % 10/05/2023 1.8     Basophils % 10/05/2023 0.7     Immature Granulocytes % 10/05/2023 0.7 (H)     nRBC, Auto 10/05/2023 0.0     Neutrophils, Abs 10/05/2023 5.72     Lymphocytes, Absolute 10/05/2023 1.65     Monocytes, Absolute 10/05/2023 0.85 (H)     Eosinophils, Absolute 10/05/2023 0.15     Basophils, Absolute 10/05/2023 0.06     Immature Granulocytes, A* 10/05/2023 0.06 (H)     nRBC, Absolute 10/05/2023 0.00     Diff  Type 10/05/2023 Auto     POC Glucose 10/05/2023 122 (H)    Office Visit on 10/02/2023   Component Date Value    POC Amphetamines 10/02/2023 Negative     POC Barbiturates 10/02/2023 Negative     POC Benzodiazepines 10/02/2023 Negative     POC Cocaine 10/02/2023 Negative     POC THC 10/02/2023 Negative     POC Methadone 10/02/2023 Negative     POC Methamphetamine 10/02/2023 Negative     POC Opiates 10/02/2023 Presumptive Positive (A)     POC Oxycodone 10/02/2023 Negative     POC Phencyclidine 10/02/2023 Negative     POC Methylenedioxymetham* 10/02/2023 Negative     POC Tricyclic Antidepres* 10/02/2023 Negative     POC Buprenorphine 10/02/2023 Negative      Acceptab* 10/02/2023 Yes     POC Temperature (Urine) 10/02/2023 92    Admission on 10/01/2023, Discharged on 10/01/2023   Component Date Value    POC Glucose 10/01/2023 98    Admission on 09/29/2023, Discharged on 09/30/2023   Component Date Value    Culture, Wound/Abscess 09/30/2023 No Growth at 2 Days        Last Imaging:  X-Ray Chest PA And Lateral  Narrative: EXAMINATION:  XR CHEST PA AND LATERAL    CLINICAL HISTORY:  Heart failure, unspecified    TECHNIQUE:  XR CHEST PA AND LATERAL    COMPARISON:  10/5/23    FINDINGS:  No lines or tubes.    Interstitial edema, similar relative to 10/05/2023.    Normal pleura.    The pulmonary arteries are enlarged, similar.    Cardiac silhouette is similar to comparison exam.    No obvious acute bone findings.  Impression: Interstitial edema, similar relative to 10/05/2023.    Electronically signed by: Damon Barrow  Date:    10/17/2023  Time:    12:27         **Labs and x-rays personally reviewed by me    ** reviewed      Objective:        Assessment:       1. Congestive heart failure, unspecified HF chronicity, unspecified heart failure type  Basic Metabolic Panel    NT-Pro Natriuretic Peptide    X-Ray Chest PA And Lateral    Basic Metabolic Panel    NT-Pro Natriuretic Peptide      2. Chronic obstructive  pulmonary disease, unspecified COPD type  albuterol (PROVENTIL/VENTOLIN HFA) 90 mcg/actuation inhaler    Test ordered      3. Diabetic eye exam  Ambulatory referral/consult to Ophthalmology      4. Encounter for screening for malignant neoplasm of breast, unspecified screening modality  Mammo Digital Screening [facility] w/ [address]      5. Encounter for screening mammogram for malignant neoplasm of breast  Mammo Digital Screening Bilat w/ [address]           Plan:         [unfilled]

## 2023-10-19 ENCOUNTER — TELEPHONE (OUTPATIENT)
Dept: FAMILY MEDICINE | Facility: CLINIC | Age: 63
End: 2023-10-19
Payer: MEDICARE

## 2023-10-19 NOTE — TELEPHONE ENCOUNTER
----- Message from Regan Frausto MD sent at 10/17/2023  2:01 PM CDT -----  Need to see in  am       [id number] Pt is scheduled to come in on 10/24/23

## 2023-11-01 ENCOUNTER — OFFICE VISIT (OUTPATIENT)
Dept: FAMILY MEDICINE | Facility: CLINIC | Age: 63
End: 2023-11-01
Payer: MEDICARE

## 2023-11-01 VITALS
BODY MASS INDEX: 44.41 KG/M2 | SYSTOLIC BLOOD PRESSURE: 136 MMHG | HEART RATE: 103 BPM | HEIGHT: 68 IN | TEMPERATURE: 98 F | OXYGEN SATURATION: 97 % | RESPIRATION RATE: 18 BRPM | WEIGHT: 293 LBS | DIASTOLIC BLOOD PRESSURE: 64 MMHG

## 2023-11-01 DIAGNOSIS — Z79.4 TYPE 2 DIABETES MELLITUS WITHOUT COMPLICATION, WITH LONG-TERM CURRENT USE OF INSULIN: ICD-10-CM

## 2023-11-01 DIAGNOSIS — E11.9 TYPE 2 DIABETES MELLITUS WITHOUT COMPLICATION, WITH LONG-TERM CURRENT USE OF INSULIN: ICD-10-CM

## 2023-11-01 DIAGNOSIS — I50.9 CONGESTIVE HEART FAILURE, UNSPECIFIED HF CHRONICITY, UNSPECIFIED HEART FAILURE TYPE: Primary | ICD-10-CM

## 2023-11-01 PROCEDURE — 99214 PR OFFICE/OUTPT VISIT, EST, LEVL IV, 30-39 MIN: ICD-10-PCS | Mod: ,,, | Performed by: INTERNAL MEDICINE

## 2023-11-01 PROCEDURE — 3061F PR NEG MICROALBUMINURIA RESULT DOCUMENTED/REVIEW: ICD-10-PCS | Mod: ,,, | Performed by: INTERNAL MEDICINE

## 2023-11-01 PROCEDURE — 1159F MED LIST DOCD IN RCRD: CPT | Mod: ,,, | Performed by: INTERNAL MEDICINE

## 2023-11-01 PROCEDURE — 3061F NEG MICROALBUMINURIA REV: CPT | Mod: ,,, | Performed by: INTERNAL MEDICINE

## 2023-11-01 PROCEDURE — 3008F PR BODY MASS INDEX (BMI) DOCUMENTED: ICD-10-PCS | Mod: ,,, | Performed by: INTERNAL MEDICINE

## 2023-11-01 PROCEDURE — 3075F SYST BP GE 130 - 139MM HG: CPT | Mod: ,,, | Performed by: INTERNAL MEDICINE

## 2023-11-01 PROCEDURE — 3078F PR MOST RECENT DIASTOLIC BLOOD PRESSURE < 80 MM HG: ICD-10-PCS | Mod: ,,, | Performed by: INTERNAL MEDICINE

## 2023-11-01 PROCEDURE — 1160F RVW MEDS BY RX/DR IN RCRD: CPT | Mod: ,,, | Performed by: INTERNAL MEDICINE

## 2023-11-01 PROCEDURE — 3044F PR MOST RECENT HEMOGLOBIN A1C LEVEL <7.0%: ICD-10-PCS | Mod: ,,, | Performed by: INTERNAL MEDICINE

## 2023-11-01 PROCEDURE — 3008F BODY MASS INDEX DOCD: CPT | Mod: ,,, | Performed by: INTERNAL MEDICINE

## 2023-11-01 PROCEDURE — 4010F ACE/ARB THERAPY RXD/TAKEN: CPT | Mod: ,,, | Performed by: INTERNAL MEDICINE

## 2023-11-01 PROCEDURE — 3075F PR MOST RECENT SYSTOLIC BLOOD PRESS GE 130-139MM HG: ICD-10-PCS | Mod: ,,, | Performed by: INTERNAL MEDICINE

## 2023-11-01 PROCEDURE — 1160F PR REVIEW ALL MEDS BY PRESCRIBER/CLIN PHARMACIST DOCUMENTED: ICD-10-PCS | Mod: ,,, | Performed by: INTERNAL MEDICINE

## 2023-11-01 PROCEDURE — 3066F NEPHROPATHY DOC TX: CPT | Mod: ,,, | Performed by: INTERNAL MEDICINE

## 2023-11-01 PROCEDURE — 99214 OFFICE O/P EST MOD 30 MIN: CPT | Mod: ,,, | Performed by: INTERNAL MEDICINE

## 2023-11-01 PROCEDURE — 3066F PR DOCUMENTATION OF TREATMENT FOR NEPHROPATHY: ICD-10-PCS | Mod: ,,, | Performed by: INTERNAL MEDICINE

## 2023-11-01 PROCEDURE — 4010F PR ACE/ARB THEARPY RXD/TAKEN: ICD-10-PCS | Mod: ,,, | Performed by: INTERNAL MEDICINE

## 2023-11-01 PROCEDURE — 3044F HG A1C LEVEL LT 7.0%: CPT | Mod: ,,, | Performed by: INTERNAL MEDICINE

## 2023-11-01 PROCEDURE — 1159F PR MEDICATION LIST DOCUMENTED IN MEDICAL RECORD: ICD-10-PCS | Mod: ,,, | Performed by: INTERNAL MEDICINE

## 2023-11-01 PROCEDURE — 3078F DIAST BP <80 MM HG: CPT | Mod: ,,, | Performed by: INTERNAL MEDICINE

## 2023-11-03 ENCOUNTER — HOSPITAL ENCOUNTER (INPATIENT)
Facility: HOSPITAL | Age: 63
LOS: 7 days | Discharge: HOME | DRG: 189 | End: 2023-11-10
Attending: FAMILY MEDICINE | Admitting: FAMILY MEDICINE
Payer: MEDICARE

## 2023-11-03 ENCOUNTER — HOSPITAL ENCOUNTER (EMERGENCY)
Facility: HOSPITAL | Age: 63
Discharge: ANOTHER HOSPITAL | End: 2023-11-03
Attending: SPECIALIST
Payer: MEDICARE

## 2023-11-03 ENCOUNTER — HOSPITAL ENCOUNTER (OUTPATIENT)
Dept: RADIOLOGY | Facility: HOSPITAL | Age: 63
Discharge: HOME | DRG: 189 | End: 2023-11-03
Attending: INTERNAL MEDICINE
Payer: MEDICARE

## 2023-11-03 VITALS
BODY MASS INDEX: 44.41 KG/M2 | OXYGEN SATURATION: 90 % | TEMPERATURE: 98 F | SYSTOLIC BLOOD PRESSURE: 120 MMHG | RESPIRATION RATE: 18 BRPM | HEART RATE: 88 BPM | DIASTOLIC BLOOD PRESSURE: 63 MMHG | WEIGHT: 293 LBS | HEIGHT: 68 IN

## 2023-11-03 DIAGNOSIS — E11.59 OBESITY, DIABETES, AND HYPERTENSION SYNDROME: ICD-10-CM

## 2023-11-03 DIAGNOSIS — Z91.199 NON COMPLIANCE WITH MEDICAL TREATMENT: ICD-10-CM

## 2023-11-03 DIAGNOSIS — I50.9 CONGESTIVE HEART FAILURE, UNSPECIFIED HF CHRONICITY, UNSPECIFIED HEART FAILURE TYPE: ICD-10-CM

## 2023-11-03 DIAGNOSIS — J96.22 ACUTE ON CHRONIC RESPIRATORY FAILURE WITH HYPOXIA AND HYPERCAPNIA: ICD-10-CM

## 2023-11-03 DIAGNOSIS — I50.9 CHF (CONGESTIVE HEART FAILURE): ICD-10-CM

## 2023-11-03 DIAGNOSIS — I21.4 NSTEMI (NON-ST ELEVATED MYOCARDIAL INFARCTION): ICD-10-CM

## 2023-11-03 DIAGNOSIS — J44.1 COPD EXACERBATION: Primary | ICD-10-CM

## 2023-11-03 DIAGNOSIS — E66.2 OBESITY HYPOVENTILATION SYNDROME: ICD-10-CM

## 2023-11-03 DIAGNOSIS — E87.6 HYPOKALEMIA: ICD-10-CM

## 2023-11-03 DIAGNOSIS — N18.9 ACUTE RENAL FAILURE SUPERIMPOSED ON CHRONIC KIDNEY DISEASE, UNSPECIFIED ACUTE RENAL FAILURE TYPE, UNSPECIFIED CKD STAGE: ICD-10-CM

## 2023-11-03 DIAGNOSIS — I50.23 ACUTE ON CHRONIC SYSTOLIC CONGESTIVE HEART FAILURE: Primary | ICD-10-CM

## 2023-11-03 DIAGNOSIS — R06.02 SHORTNESS OF BREATH: ICD-10-CM

## 2023-11-03 DIAGNOSIS — N17.9 ACUTE RENAL FAILURE SUPERIMPOSED ON CHRONIC KIDNEY DISEASE, UNSPECIFIED ACUTE RENAL FAILURE TYPE, UNSPECIFIED CKD STAGE: ICD-10-CM

## 2023-11-03 DIAGNOSIS — I15.2 OBESITY, DIABETES, AND HYPERTENSION SYNDROME: ICD-10-CM

## 2023-11-03 DIAGNOSIS — J44.1 COPD WITH ACUTE EXACERBATION: ICD-10-CM

## 2023-11-03 DIAGNOSIS — E11.69 OBESITY, DIABETES, AND HYPERTENSION SYNDROME: ICD-10-CM

## 2023-11-03 DIAGNOSIS — R79.89 ELEVATED TROPONIN: ICD-10-CM

## 2023-11-03 DIAGNOSIS — I50.9 ACUTE ON CHRONIC HEART FAILURE: ICD-10-CM

## 2023-11-03 DIAGNOSIS — J96.21 ACUTE ON CHRONIC RESPIRATORY FAILURE WITH HYPOXIA AND HYPERCAPNIA: ICD-10-CM

## 2023-11-03 DIAGNOSIS — E66.9 OBESITY, DIABETES, AND HYPERTENSION SYNDROME: ICD-10-CM

## 2023-11-03 DIAGNOSIS — I21.A1 TYPE 2 MI (MYOCARDIAL INFARCTION): ICD-10-CM

## 2023-11-03 DIAGNOSIS — F17.219 CIGARETTE NICOTINE DEPENDENCE WITH NICOTINE-INDUCED DISORDER: ICD-10-CM

## 2023-11-03 DIAGNOSIS — R07.9 CHEST PAIN: ICD-10-CM

## 2023-11-03 DIAGNOSIS — I50.33 ACUTE ON CHRONIC DIASTOLIC CHF (CONGESTIVE HEART FAILURE): ICD-10-CM

## 2023-11-03 DIAGNOSIS — E11.65 TYPE 2 DIABETES MELLITUS WITH HYPERGLYCEMIA, WITHOUT LONG-TERM CURRENT USE OF INSULIN: ICD-10-CM

## 2023-11-03 PROBLEM — K21.9 GERD (GASTROESOPHAGEAL REFLUX DISEASE): Status: ACTIVE | Noted: 2023-11-03

## 2023-11-03 PROBLEM — R71.8 MICROCYTOSIS: Status: ACTIVE | Noted: 2023-11-03

## 2023-11-03 PROBLEM — U07.1 COVID-19: Status: ACTIVE | Noted: 2023-11-03

## 2023-11-03 PROBLEM — E66.01 SEVERE OBESITY (BMI >= 40): Status: ACTIVE | Noted: 2023-11-03

## 2023-11-03 PROBLEM — G43.909 MIGRAINE: Status: ACTIVE | Noted: 2023-11-03

## 2023-11-03 PROBLEM — F17.200 NICOTINE DEPENDENCE: Status: ACTIVE | Noted: 2023-11-03

## 2023-11-03 PROBLEM — E11.9 DIABETES MELLITUS, TYPE 2: Status: ACTIVE | Noted: 2023-11-03

## 2023-11-03 LAB
ALBUMIN SERPL BCP-MCNC: 3.4 G/DL (ref 3.5–5)
ALBUMIN/GLOB SERPL: 0.9 {RATIO}
ALP SERPL-CCNC: 89 U/L (ref 50–130)
ALT SERPL W P-5'-P-CCNC: 20 U/L (ref 13–56)
ANION GAP SERPL CALCULATED.3IONS-SCNC: 6 MMOL/L (ref 7–16)
APTT PPP: 27.5 SECONDS (ref 25.2–37.3)
APTT PPP: 34.7 SECONDS (ref 25.2–37.3)
AST SERPL W P-5'-P-CCNC: 35 U/L (ref 15–37)
BACTERIA #/AREA URNS HPF: ABNORMAL /HPF
BASOPHILS # BLD AUTO: 0.04 K/UL (ref 0–0.2)
BASOPHILS NFR BLD AUTO: 0.4 % (ref 0–1)
BILIRUB SERPL-MCNC: 1.1 MG/DL (ref ?–1.2)
BILIRUB UR QL STRIP: ABNORMAL
BUN SERPL-MCNC: 33 MG/DL (ref 7–18)
BUN/CREAT SERPL: 19 (ref 6–20)
CALCIUM SERPL-MCNC: 9.2 MG/DL (ref 8.5–10.1)
CHLORIDE SERPL-SCNC: 86 MMOL/L (ref 98–107)
CHOLEST SERPL-MCNC: 166 MG/DL (ref 0–200)
CHOLEST/HDLC SERPL: 5 {RATIO}
CLARITY UR: CLEAR
CO2 SERPL-SCNC: 43 MMOL/L (ref 21–32)
COLOR UR: YELLOW
CREAT SERPL-MCNC: 1.78 MG/DL (ref 0.55–1.02)
DIFFERENTIAL METHOD BLD: ABNORMAL
EGFR (NO RACE VARIABLE) (RUSH/TITUS): 32 ML/MIN/1.73M2
EOSINOPHIL # BLD AUTO: 0.1 K/UL (ref 0–0.5)
EOSINOPHIL NFR BLD AUTO: 1 % (ref 1–4)
ERYTHROCYTE [DISTWIDTH] IN BLOOD BY AUTOMATED COUNT: 19.7 % (ref 11.5–14.5)
EST. AVERAGE GLUCOSE BLD GHB EST-MCNC: 120 MG/DL
GLOBULIN SER-MCNC: 3.9 G/DL (ref 2–4)
GLUCOSE SERPL-MCNC: 145 MG/DL (ref 74–106)
GLUCOSE SERPL-MCNC: 212 MG/DL (ref 70–105)
GLUCOSE UR STRIP-MCNC: NEGATIVE MG/DL
HBA1C MFR BLD HPLC: 6.2 % (ref 4.5–6.6)
HCT VFR BLD AUTO: 47.1 % (ref 38–47)
HDLC SERPL-MCNC: 33 MG/DL (ref 40–60)
HGB BLD-MCNC: 14.4 G/DL (ref 12–16)
IMM GRANULOCYTES # BLD AUTO: 0.05 K/UL (ref 0–0.04)
IMM GRANULOCYTES NFR BLD: 0.5 % (ref 0–0.4)
INR BLD: 1.01
INR BLD: 1.09
KETONES UR STRIP-SCNC: ABNORMAL MG/DL
LACTATE SERPL-SCNC: 1.9 MMOL/L (ref 0.4–2)
LDLC SERPL CALC-MCNC: 111 MG/DL
LDLC/HDLC SERPL: 3.4 {RATIO}
LEUKOCYTE ESTERASE UR QL STRIP: NEGATIVE
LYMPHOCYTES # BLD AUTO: 1.84 K/UL (ref 1–4.8)
LYMPHOCYTES NFR BLD AUTO: 19.1 % (ref 27–41)
MAGNESIUM SERPL-MCNC: 1.9 MG/DL (ref 1.7–2.3)
MCH RBC QN AUTO: 23.7 PG (ref 27–31)
MCHC RBC AUTO-ENTMCNC: 30.6 G/DL (ref 32–36)
MCV RBC AUTO: 77.6 FL (ref 80–96)
MONOCYTES # BLD AUTO: 0.61 K/UL (ref 0–0.8)
MONOCYTES NFR BLD AUTO: 6.3 % (ref 2–6)
MPC BLD CALC-MCNC: 10.3 FL (ref 9.4–12.4)
NEUTROPHILS # BLD AUTO: 6.98 K/UL (ref 1.8–7.7)
NEUTROPHILS NFR BLD AUTO: 72.7 % (ref 53–65)
NITRITE UR QL STRIP: NEGATIVE
NONHDLC SERPL-MCNC: 133 MG/DL
NRBC # BLD AUTO: 0.04 X10E3/UL
NRBC, AUTO (.00): 0.4 %
NT-PROBNP SERPL-MCNC: 3183 PG/ML (ref 1–125)
PH UR STRIP: 5.5 PH UNITS
PLATELET # BLD AUTO: 233 K/UL (ref 150–400)
POTASSIUM SERPL-SCNC: 2.1 MMOL/L (ref 3.5–5.1)
PROT SERPL-MCNC: 7.3 G/DL (ref 6.4–8.2)
PROT UR QL STRIP: 30
PROTHROMBIN TIME: 13.2 SECONDS (ref 11.7–14.7)
PROTHROMBIN TIME: 14 SECONDS (ref 11.7–14.7)
RBC # BLD AUTO: 6.07 M/UL (ref 4.2–5.4)
RBC # UR STRIP: NEGATIVE /UL
RBC #/AREA URNS HPF: ABNORMAL /HPF
SODIUM SERPL-SCNC: 133 MMOL/L (ref 136–145)
SP GR UR STRIP: 1.02
SQUAMOUS #/AREA URNS LPF: ABNORMAL /LPF
TRIGL SERPL-MCNC: 110 MG/DL (ref 35–150)
TROPONIN I SERPL DL<=0.01 NG/ML-MCNC: 258.8 PG/ML
TROPONIN I SERPL DL<=0.01 NG/ML-MCNC: 297.1 PG/ML
TSH SERPL DL<=0.005 MIU/L-ACNC: 3.55 UIU/ML (ref 0.36–3.74)
UROBILINOGEN UR STRIP-ACNC: 1 MG/DL
VLDLC SERPL-MCNC: 22 MG/DL
WBC # BLD AUTO: 9.62 K/UL (ref 4.5–11)
WBC #/AREA URNS HPF: ABNORMAL /HPF

## 2023-11-03 PROCEDURE — 25000003 PHARM REV CODE 250: Performed by: HOSPITALIST

## 2023-11-03 PROCEDURE — 83880 ASSAY OF NATRIURETIC PEPTIDE: CPT | Performed by: SPECIALIST

## 2023-11-03 PROCEDURE — 94660 CPAP INITIATION&MGMT: CPT

## 2023-11-03 PROCEDURE — 85730 THROMBOPLASTIN TIME PARTIAL: CPT | Performed by: SPECIALIST

## 2023-11-03 PROCEDURE — 84484 ASSAY OF TROPONIN QUANT: CPT

## 2023-11-03 PROCEDURE — 99223 1ST HOSP IP/OBS HIGH 75: CPT | Mod: ,,, | Performed by: HOSPITALIST

## 2023-11-03 PROCEDURE — 25000003 PHARM REV CODE 250: Performed by: STUDENT IN AN ORGANIZED HEALTH CARE EDUCATION/TRAINING PROGRAM

## 2023-11-03 PROCEDURE — 83605 ASSAY OF LACTIC ACID: CPT | Performed by: SPECIALIST

## 2023-11-03 PROCEDURE — 99223 PR INITIAL HOSPITAL CARE,LEVL III: ICD-10-PCS | Mod: ,,, | Performed by: HOSPITALIST

## 2023-11-03 PROCEDURE — 80061 LIPID PANEL: CPT | Performed by: HOSPITALIST

## 2023-11-03 PROCEDURE — 63600175 PHARM REV CODE 636 W HCPCS: Performed by: SPECIALIST

## 2023-11-03 PROCEDURE — 93010 EKG 12-LEAD: ICD-10-PCS | Mod: ,,, | Performed by: INTERNAL MEDICINE

## 2023-11-03 PROCEDURE — 96365 THER/PROPH/DIAG IV INF INIT: CPT

## 2023-11-03 PROCEDURE — 99285 EMERGENCY DEPT VISIT HI MDM: CPT | Mod: ,,, | Performed by: SPECIALIST

## 2023-11-03 PROCEDURE — 99285 PR EMERGENCY DEPT VISIT,LEVEL V: ICD-10-PCS | Mod: ,,, | Performed by: SPECIALIST

## 2023-11-03 PROCEDURE — 71046 X-RAY EXAM CHEST 2 VIEWS: CPT | Mod: 26,,, | Performed by: RADIOLOGY

## 2023-11-03 PROCEDURE — 94760 N-INVAS EAR/PLS OXIMETRY 1: CPT

## 2023-11-03 PROCEDURE — 96366 THER/PROPH/DIAG IV INF ADDON: CPT

## 2023-11-03 PROCEDURE — 99285 EMERGENCY DEPT VISIT HI MDM: CPT | Mod: 25

## 2023-11-03 PROCEDURE — 82962 GLUCOSE BLOOD TEST: CPT

## 2023-11-03 PROCEDURE — 27000190 HC CPAP FULL FACE MASK W/VALVE

## 2023-11-03 PROCEDURE — 11000001 HC ACUTE MED/SURG PRIVATE ROOM

## 2023-11-03 PROCEDURE — 85610 PROTHROMBIN TIME: CPT | Performed by: HOSPITALIST

## 2023-11-03 PROCEDURE — 85025 COMPLETE CBC W/AUTO DIFF WBC: CPT | Performed by: SPECIALIST

## 2023-11-03 PROCEDURE — 71046 XR CHEST PA AND LATERAL: ICD-10-PCS | Mod: 26,,, | Performed by: RADIOLOGY

## 2023-11-03 PROCEDURE — 71046 X-RAY EXAM CHEST 2 VIEWS: CPT | Mod: TC

## 2023-11-03 PROCEDURE — 63600175 PHARM REV CODE 636 W HCPCS

## 2023-11-03 PROCEDURE — 84484 ASSAY OF TROPONIN QUANT: CPT | Performed by: HOSPITALIST

## 2023-11-03 PROCEDURE — 27000221 HC OXYGEN, UP TO 24 HOURS

## 2023-11-03 PROCEDURE — 83036 HEMOGLOBIN GLYCOSYLATED A1C: CPT | Performed by: HOSPITALIST

## 2023-11-03 PROCEDURE — 99900031 HC PATIENT EDUCATION (STAT)

## 2023-11-03 PROCEDURE — 80053 COMPREHEN METABOLIC PANEL: CPT | Performed by: SPECIALIST

## 2023-11-03 PROCEDURE — 25000003 PHARM REV CODE 250: Performed by: SPECIALIST

## 2023-11-03 PROCEDURE — 99900035 HC TECH TIME PER 15 MIN (STAT)

## 2023-11-03 PROCEDURE — 84443 ASSAY THYROID STIM HORMONE: CPT | Performed by: HOSPITALIST

## 2023-11-03 PROCEDURE — 96372 THER/PROPH/DIAG INJ SC/IM: CPT | Mod: 59 | Performed by: SPECIALIST

## 2023-11-03 PROCEDURE — 81001 URINALYSIS AUTO W/SCOPE: CPT | Performed by: SPECIALIST

## 2023-11-03 PROCEDURE — 93005 ELECTROCARDIOGRAM TRACING: CPT

## 2023-11-03 PROCEDURE — 83735 ASSAY OF MAGNESIUM: CPT | Performed by: SPECIALIST

## 2023-11-03 PROCEDURE — 84484 ASSAY OF TROPONIN QUANT: CPT | Performed by: SPECIALIST

## 2023-11-03 PROCEDURE — 96375 TX/PRO/DX INJ NEW DRUG ADDON: CPT

## 2023-11-03 PROCEDURE — 25000242 PHARM REV CODE 250 ALT 637 W/ HCPCS: Performed by: HOSPITALIST

## 2023-11-03 PROCEDURE — 93010 ELECTROCARDIOGRAM REPORT: CPT | Mod: ,,, | Performed by: INTERNAL MEDICINE

## 2023-11-03 PROCEDURE — 63600175 PHARM REV CODE 636 W HCPCS: Performed by: HOSPITALIST

## 2023-11-03 PROCEDURE — 85730 THROMBOPLASTIN TIME PARTIAL: CPT | Performed by: HOSPITALIST

## 2023-11-03 PROCEDURE — 85610 PROTHROMBIN TIME: CPT | Performed by: SPECIALIST

## 2023-11-03 PROCEDURE — 94640 AIRWAY INHALATION TREATMENT: CPT

## 2023-11-03 RX ORDER — FUROSEMIDE 10 MG/ML
40 INJECTION INTRAMUSCULAR; INTRAVENOUS
Status: DISCONTINUED | OUTPATIENT
Start: 2023-11-04 | End: 2023-11-03

## 2023-11-03 RX ORDER — HYDROCODONE BITARTRATE AND ACETAMINOPHEN 5; 325 MG/1; MG/1
1 TABLET ORAL
Status: COMPLETED | OUTPATIENT
Start: 2023-11-03 | End: 2023-11-03

## 2023-11-03 RX ORDER — IBUPROFEN 200 MG
1 TABLET ORAL EVERY 24 HOURS
Status: DISCONTINUED | OUTPATIENT
Start: 2023-11-04 | End: 2023-11-10 | Stop reason: HOSPADM

## 2023-11-03 RX ORDER — POLYETHYLENE GLYCOL 3350 17 G/17G
17 POWDER, FOR SOLUTION ORAL DAILY
Status: DISCONTINUED | OUTPATIENT
Start: 2023-11-04 | End: 2023-11-08

## 2023-11-03 RX ORDER — LOSARTAN POTASSIUM 50 MG/1
50 TABLET ORAL DAILY
Status: DISCONTINUED | OUTPATIENT
Start: 2023-11-04 | End: 2023-11-03

## 2023-11-03 RX ORDER — HYDROCODONE BITARTRATE AND ACETAMINOPHEN 10; 325 MG/1; MG/1
1 TABLET ORAL EVERY 6 HOURS PRN
Status: DISCONTINUED | OUTPATIENT
Start: 2023-11-03 | End: 2023-11-10 | Stop reason: HOSPADM

## 2023-11-03 RX ORDER — BUMETANIDE 0.25 MG/ML
1 INJECTION INTRAMUSCULAR; INTRAVENOUS DAILY
Status: DISCONTINUED | OUTPATIENT
Start: 2023-11-04 | End: 2023-11-03

## 2023-11-03 RX ORDER — NITROGLYCERIN 0.4 MG/1
0.4 TABLET SUBLINGUAL EVERY 5 MIN PRN
Status: DISCONTINUED | OUTPATIENT
Start: 2023-11-03 | End: 2023-11-10 | Stop reason: HOSPADM

## 2023-11-03 RX ORDER — BUMETANIDE 0.25 MG/ML
1 INJECTION INTRAMUSCULAR; INTRAVENOUS EVERY 12 HOURS
Status: DISCONTINUED | OUTPATIENT
Start: 2023-11-03 | End: 2023-11-05

## 2023-11-03 RX ORDER — DOCUSATE SODIUM 100 MG/1
100 CAPSULE, LIQUID FILLED ORAL 2 TIMES DAILY
Status: DISCONTINUED | OUTPATIENT
Start: 2023-11-03 | End: 2023-11-10 | Stop reason: HOSPADM

## 2023-11-03 RX ORDER — LOSARTAN POTASSIUM AND HYDROCHLOROTHIAZIDE 12.5; 5 MG/1; MG/1
1 TABLET ORAL DAILY
Status: DISCONTINUED | OUTPATIENT
Start: 2023-11-04 | End: 2023-11-03

## 2023-11-03 RX ORDER — SPIRONOLACTONE 25 MG/1
50 TABLET ORAL DAILY
Status: DISCONTINUED | OUTPATIENT
Start: 2023-11-04 | End: 2023-11-10 | Stop reason: HOSPADM

## 2023-11-03 RX ORDER — IPRATROPIUM BROMIDE AND ALBUTEROL SULFATE 2.5; .5 MG/3ML; MG/3ML
3 SOLUTION RESPIRATORY (INHALATION) EVERY 6 HOURS
Status: DISCONTINUED | OUTPATIENT
Start: 2023-11-03 | End: 2023-11-10 | Stop reason: HOSPADM

## 2023-11-03 RX ORDER — PANTOPRAZOLE SODIUM 40 MG/1
40 TABLET, DELAYED RELEASE ORAL DAILY
Status: DISCONTINUED | OUTPATIENT
Start: 2023-11-04 | End: 2023-11-10 | Stop reason: HOSPADM

## 2023-11-03 RX ORDER — VIT C/E/ZN/COPPR/LUTEIN/ZEAXAN 250MG-90MG
5000 CAPSULE ORAL 2 TIMES DAILY
Status: DISCONTINUED | OUTPATIENT
Start: 2023-11-03 | End: 2023-11-03

## 2023-11-03 RX ORDER — LEVOTHYROXINE SODIUM 150 UG/1
150 TABLET ORAL
Status: DISCONTINUED | OUTPATIENT
Start: 2023-11-04 | End: 2023-11-10 | Stop reason: HOSPADM

## 2023-11-03 RX ORDER — ENOXAPARIN SODIUM 100 MG/ML
1 INJECTION SUBCUTANEOUS EVERY 12 HOURS
Status: DISCONTINUED | OUTPATIENT
Start: 2023-11-03 | End: 2023-11-03 | Stop reason: HOSPADM

## 2023-11-03 RX ORDER — SERTRALINE HYDROCHLORIDE 50 MG/1
50 TABLET, FILM COATED ORAL DAILY
Status: DISCONTINUED | OUTPATIENT
Start: 2023-11-04 | End: 2023-11-10 | Stop reason: HOSPADM

## 2023-11-03 RX ORDER — POTASSIUM CHLORIDE 7.45 MG/ML
10 INJECTION INTRAVENOUS
Status: COMPLETED | OUTPATIENT
Start: 2023-11-03 | End: 2023-11-03

## 2023-11-03 RX ORDER — HYDROCHLOROTHIAZIDE 12.5 MG/1
12.5 TABLET ORAL DAILY
Status: DISCONTINUED | OUTPATIENT
Start: 2023-11-04 | End: 2023-11-03

## 2023-11-03 RX ORDER — IBUPROFEN 200 MG
24 TABLET ORAL
Status: DISCONTINUED | OUTPATIENT
Start: 2023-11-03 | End: 2023-11-10 | Stop reason: HOSPADM

## 2023-11-03 RX ORDER — DULOXETIN HYDROCHLORIDE 30 MG/1
30 CAPSULE, DELAYED RELEASE ORAL DAILY
Status: DISCONTINUED | OUTPATIENT
Start: 2023-11-04 | End: 2023-11-10 | Stop reason: HOSPADM

## 2023-11-03 RX ORDER — ASPIRIN 81 MG/1
81 TABLET ORAL DAILY
Status: DISCONTINUED | OUTPATIENT
Start: 2023-11-04 | End: 2023-11-10 | Stop reason: HOSPADM

## 2023-11-03 RX ORDER — POTASSIUM CHLORIDE 20 MEQ/1
40 TABLET, EXTENDED RELEASE ORAL
Status: COMPLETED | OUTPATIENT
Start: 2023-11-03 | End: 2023-11-03

## 2023-11-03 RX ORDER — BUDESONIDE 0.5 MG/2ML
0.5 INHALANT ORAL EVERY 12 HOURS
Status: DISCONTINUED | OUTPATIENT
Start: 2023-11-03 | End: 2023-11-04

## 2023-11-03 RX ORDER — GLUCAGON 1 MG
1 KIT INJECTION
Status: DISCONTINUED | OUTPATIENT
Start: 2023-11-03 | End: 2023-11-10 | Stop reason: HOSPADM

## 2023-11-03 RX ORDER — GABAPENTIN 300 MG/1
600 CAPSULE ORAL 3 TIMES DAILY
Status: DISCONTINUED | OUTPATIENT
Start: 2023-11-03 | End: 2023-11-10 | Stop reason: HOSPADM

## 2023-11-03 RX ORDER — CALCIUM CARBONATE 200(500)MG
500 TABLET,CHEWABLE ORAL 2 TIMES DAILY
Status: DISCONTINUED | OUTPATIENT
Start: 2023-11-03 | End: 2023-11-10 | Stop reason: HOSPADM

## 2023-11-03 RX ORDER — ALLOPURINOL 300 MG/1
300 TABLET ORAL DAILY
Status: DISCONTINUED | OUTPATIENT
Start: 2023-11-04 | End: 2023-11-10 | Stop reason: HOSPADM

## 2023-11-03 RX ORDER — BUMETANIDE 0.25 MG/ML
1 INJECTION INTRAMUSCULAR; INTRAVENOUS
Status: COMPLETED | OUTPATIENT
Start: 2023-11-03 | End: 2023-11-03

## 2023-11-03 RX ORDER — LOSARTAN POTASSIUM 100 MG/1
100 TABLET ORAL DAILY
Status: DISCONTINUED | OUTPATIENT
Start: 2023-11-04 | End: 2023-11-05

## 2023-11-03 RX ORDER — NIFEDIPINE 30 MG/1
90 TABLET, EXTENDED RELEASE ORAL DAILY
Status: DISCONTINUED | OUTPATIENT
Start: 2023-11-04 | End: 2023-11-06

## 2023-11-03 RX ORDER — IBUPROFEN 200 MG
16 TABLET ORAL
Status: DISCONTINUED | OUTPATIENT
Start: 2023-11-03 | End: 2023-11-10 | Stop reason: HOSPADM

## 2023-11-03 RX ORDER — CYCLOBENZAPRINE HCL 10 MG
10 TABLET ORAL NIGHTLY
Status: DISCONTINUED | OUTPATIENT
Start: 2023-11-03 | End: 2023-11-10 | Stop reason: HOSPADM

## 2023-11-03 RX ORDER — HYDROCODONE BITARTRATE AND ACETAMINOPHEN 10; 325 MG/1; MG/1
1 TABLET ORAL EVERY 6 HOURS PRN
Status: DISCONTINUED | OUTPATIENT
Start: 2023-11-03 | End: 2023-11-04

## 2023-11-03 RX ORDER — ACETAMINOPHEN 500 MG
5000 TABLET ORAL 2 TIMES DAILY
Status: DISCONTINUED | OUTPATIENT
Start: 2023-11-03 | End: 2023-11-10 | Stop reason: HOSPADM

## 2023-11-03 RX ORDER — SODIUM CHLORIDE 0.9 % (FLUSH) 0.9 %
10 SYRINGE (ML) INJECTION EVERY 12 HOURS PRN
Status: DISCONTINUED | OUTPATIENT
Start: 2023-11-03 | End: 2023-11-10 | Stop reason: HOSPADM

## 2023-11-03 RX ADMIN — BUMETANIDE 1 MG: 0.25 INJECTION INTRAMUSCULAR; INTRAVENOUS at 01:11

## 2023-11-03 RX ADMIN — POTASSIUM CHLORIDE 10 MEQ: 7.46 INJECTION, SOLUTION INTRAVENOUS at 01:11

## 2023-11-03 RX ADMIN — CYCLOBENZAPRINE 10 MG: 10 TABLET, FILM COATED ORAL at 08:11

## 2023-11-03 RX ADMIN — ENOXAPARIN SODIUM 160 MG: 80 INJECTION SUBCUTANEOUS at 03:11

## 2023-11-03 RX ADMIN — TICAGRELOR 180 MG: 90 TABLET ORAL at 08:11

## 2023-11-03 RX ADMIN — GABAPENTIN 600 MG: 300 CAPSULE ORAL at 08:11

## 2023-11-03 RX ADMIN — CHOLECALCIFEROL TAB 125 MCG (5000 UNIT) 5000 UNITS: 125 TAB at 08:11

## 2023-11-03 RX ADMIN — HYDROCODONE BITARTRATE AND ACETAMINOPHEN 1 TABLET: 5; 325 TABLET ORAL at 03:11

## 2023-11-03 RX ADMIN — CALCIUM CARBONATE (ANTACID) CHEW TAB 500 MG 500 MG: 500 CHEW TAB at 08:11

## 2023-11-03 RX ADMIN — INSULIN DETEMIR 10 UNITS: 100 INJECTION, SOLUTION SUBCUTANEOUS at 08:11

## 2023-11-03 RX ADMIN — METHYLPREDNISOLONE SODIUM SUCCINATE 40 MG: 40 INJECTION INTRAMUSCULAR; INTRAVENOUS at 10:11

## 2023-11-03 RX ADMIN — BUDESONIDE INHALATION 0.5 MG: 0.5 SUSPENSION RESPIRATORY (INHALATION) at 08:11

## 2023-11-03 RX ADMIN — DOCUSATE SODIUM 100 MG: 100 CAPSULE, LIQUID FILLED ORAL at 08:11

## 2023-11-03 RX ADMIN — SODIUM CHLORIDE 250 ML: 9 INJECTION, SOLUTION INTRAVENOUS at 01:11

## 2023-11-03 RX ADMIN — POTASSIUM CHLORIDE 40 MEQ: 1500 TABLET, EXTENDED RELEASE ORAL at 01:11

## 2023-11-03 RX ADMIN — TOPIRAMATE 150 MG: 100 TABLET, FILM COATED ORAL at 08:11

## 2023-11-03 RX ADMIN — IPRATROPIUM BROMIDE AND ALBUTEROL SULFATE 3 ML: 2.5; .5 SOLUTION RESPIRATORY (INHALATION) at 08:11

## 2023-11-03 NOTE — NURSING
Patient is brought by ems to room 570. Patient is a&ox4. Stable. On 3 liters via nasal cannula .lehman cath present

## 2023-11-03 NOTE — Clinical Note
The right groin and right radial was prepped. The site was prepped with ChloraPrep and chlorhexidine. The site was clipped. The patient was draped. Draped at 1225

## 2023-11-03 NOTE — ED TRIAGE NOTES
Pt to er via wheelchair with c/o increased sob- pt states she went to [facility] this am and had lab work done per order of dr stewart- pt states her cardiologist is dr nichols- pt c/o slurred speech as well on the way home -pt noted on arrival to er with increased sob o2 sat 73 % room air and slurred speech - pt with muscle drawing to arms noted - edema noted to lower extremities - pt states she does have hx of blood clots

## 2023-11-03 NOTE — ED NOTES
Pt accepted at [facility] room 570 per dr mak -dr naidu obtained room assignment- number for report [phone]

## 2023-11-03 NOTE — Clinical Note
Patient transported to 570 via bed.  Patient tolerated well. Bedside report and assessment with EPIFANIO Carrero.  Puncture site stable.  No signs of bleeding or hematoma.  Pulse dopplered.  VS stable.  Patient education complete.

## 2023-11-03 NOTE — LETTER
November 4, 2023         [address] [place] [id number]  Phone: [phone]  Fax: [phone]       Patient: Holly Porter   YOB: 1960  Date of Visit: 11/04/2023    To Whom It May Concern:    Yann Porter  was at [facility] on 11/04/2023.and Radha Caruso  was with her during her stay The patient may return to work on 11/06/2023. with no restrictions. If you have any questions or concerns, or if I can be of further assistance, please do not hesitate to contact me.    Sincerely,    Kenan Broderick MD

## 2023-11-03 NOTE — ED NOTES
Pt states she saw dr stewart on Wednesday and had labs drawn on today - pt states dr stewart is suppose to order her some oxygen, but it has not come through- pt states she was sent [facility] er here on 10/11/23- dr vargas was suppose to order her oxygen as well

## 2023-11-03 NOTE — Clinical Note
The catheter insertion attempt was made into the aorta ostium   right coronary artery. The catheter was unable to access the area..

## 2023-11-04 PROBLEM — I21.4 NSTEMI (NON-ST ELEVATED MYOCARDIAL INFARCTION): Status: RESOLVED | Noted: 2023-11-03 | Resolved: 2023-11-04

## 2023-11-04 PROBLEM — E11.69 OBESITY, DIABETES, AND HYPERTENSION SYNDROME: Status: ACTIVE | Noted: 2023-11-04

## 2023-11-04 PROBLEM — E11.59 OBESITY, DIABETES, AND HYPERTENSION SYNDROME: Status: ACTIVE | Noted: 2023-11-04

## 2023-11-04 PROBLEM — I21.A1 TYPE 2 MI (MYOCARDIAL INFARCTION): Status: ACTIVE | Noted: 2023-11-04

## 2023-11-04 PROBLEM — E66.9 OBESITY, DIABETES, AND HYPERTENSION SYNDROME: Status: ACTIVE | Noted: 2023-11-04

## 2023-11-04 PROBLEM — I15.2 OBESITY, DIABETES, AND HYPERTENSION SYNDROME: Status: ACTIVE | Noted: 2023-11-04

## 2023-11-04 LAB
ALBUMIN SERPL BCP-MCNC: 3.1 G/DL (ref 3.5–5)
ANION GAP SERPL CALCULATED.3IONS-SCNC: 10 MMOL/L (ref 7–16)
BASOPHILS # BLD AUTO: 0.02 K/UL (ref 0–0.2)
BASOPHILS NFR BLD AUTO: 0.2 % (ref 0–1)
BUN SERPL-MCNC: 30 MG/DL (ref 7–18)
BUN/CREAT SERPL: 19 (ref 6–20)
CALCIUM SERPL-MCNC: 9.5 MG/DL (ref 8.5–10.1)
CHLORIDE SERPL-SCNC: 84 MMOL/L (ref 98–107)
CO2 SERPL-SCNC: 43 MMOL/L (ref 21–32)
CREAT SERPL-MCNC: 1.55 MG/DL (ref 0.55–1.02)
DIFFERENTIAL METHOD BLD: ABNORMAL
EGFR (NO RACE VARIABLE) (RUSH/TITUS): 37 ML/MIN/1.73M2
EOSINOPHIL # BLD AUTO: 0 K/UL (ref 0–0.5)
EOSINOPHIL NFR BLD AUTO: 0 % (ref 1–4)
ERYTHROCYTE [DISTWIDTH] IN BLOOD BY AUTOMATED COUNT: 19.9 % (ref 11.5–14.5)
FERRITIN SERPL-MCNC: 29 NG/ML (ref 8–252)
FOLATE SERPL-MCNC: >20 NG/ML (ref 3.1–17.5)
GLUCOSE SERPL-MCNC: 133 MG/DL (ref 74–106)
HCO3 UR-SCNC: 53.7 MMOL/L (ref 21–28)
HCO3 UR-SCNC: 53.9 MMOL/L (ref 21–28)
HCT VFR BLD AUTO: 46.6 % (ref 38–47)
HGB BLD-MCNC: 14.4 G/DL (ref 12–16)
IMM GRANULOCYTES # BLD AUTO: 0.06 K/UL (ref 0–0.04)
IMM GRANULOCYTES NFR BLD: 0.7 % (ref 0–0.4)
IRON SATN MFR SERPL: 5 % (ref 14–50)
IRON SERPL-MCNC: 22 ΜG/DL (ref 50–170)
LYMPHOCYTES # BLD AUTO: 0.75 K/UL (ref 1–4.8)
LYMPHOCYTES NFR BLD AUTO: 8.3 % (ref 27–41)
MAGNESIUM SERPL-MCNC: 2.3 MG/DL (ref 1.7–2.3)
MCH RBC QN AUTO: 23.9 PG (ref 27–31)
MCHC RBC AUTO-ENTMCNC: 30.9 G/DL (ref 32–36)
MCV RBC AUTO: 77.3 FL (ref 80–96)
MONOCYTES # BLD AUTO: 0.16 K/UL (ref 0–0.8)
MONOCYTES NFR BLD AUTO: 1.8 % (ref 2–6)
MPC BLD CALC-MCNC: 10.7 FL (ref 9.4–12.4)
NEUTROPHILS # BLD AUTO: 8.02 K/UL (ref 1.8–7.7)
NEUTROPHILS NFR BLD AUTO: 89 % (ref 53–65)
NRBC # BLD AUTO: 0.02 X10E3/UL
NRBC, AUTO (.00): 0.2 %
PCO2 BLDA: 60 MMHG (ref 35–48)
PCO2 BLDA: 63 MMHG (ref 35–48)
PH SMN: 7.54 [PH] (ref 7.35–7.45)
PH SMN: 7.56 [PH] (ref 7.35–7.45)
PHOSPHATE SERPL-MCNC: 3.3 MG/DL (ref 2.5–4.5)
PLATELET # BLD AUTO: 215 K/UL (ref 150–400)
PO2 BLDA: 58 MMHG (ref 83–108)
PO2 BLDA: 65 MMHG (ref 83–108)
POC BASE EXCESS: 27 MMOL/L (ref -2–3)
POC BASE EXCESS: 27.2 MMOL/L (ref -2–3)
POC SATURATED O2: 93 % (ref 95–98)
POC SATURATED O2: 95 % (ref 95–98)
POTASSIUM SERPL-SCNC: 2.4 MMOL/L (ref 3.5–5.1)
RBC # BLD AUTO: 6.03 M/UL (ref 4.2–5.4)
SODIUM SERPL-SCNC: 135 MMOL/L (ref 136–145)
TIBC SERPL-MCNC: 432 ΜG/DL (ref 250–450)
TROPONIN I SERPL DL<=0.01 NG/ML-MCNC: 129.4 PG/ML
TROPONIN I SERPL DL<=0.01 NG/ML-MCNC: 229.7 PG/ML
TROPONIN I SERPL DL<=0.01 NG/ML-MCNC: 277.5 PG/ML
VIT B12 SERPL-MCNC: 1103 PG/ML (ref 193–986)
WBC # BLD AUTO: 9.01 K/UL (ref 4.5–11)

## 2023-11-04 PROCEDURE — 83550 IRON BINDING TEST: CPT

## 2023-11-04 PROCEDURE — 99233 SBSQ HOSP IP/OBS HIGH 50: CPT | Mod: GC,,, | Performed by: STUDENT IN AN ORGANIZED HEALTH CARE EDUCATION/TRAINING PROGRAM

## 2023-11-04 PROCEDURE — 25000003 PHARM REV CODE 250: Performed by: HOSPITALIST

## 2023-11-04 PROCEDURE — 11000001 HC ACUTE MED/SURG PRIVATE ROOM

## 2023-11-04 PROCEDURE — 27000221 HC OXYGEN, UP TO 24 HOURS

## 2023-11-04 PROCEDURE — 25000242 PHARM REV CODE 250 ALT 637 W/ HCPCS: Performed by: HOSPITALIST

## 2023-11-04 PROCEDURE — 25000003 PHARM REV CODE 250: Performed by: STUDENT IN AN ORGANIZED HEALTH CARE EDUCATION/TRAINING PROGRAM

## 2023-11-04 PROCEDURE — 25000003 PHARM REV CODE 250

## 2023-11-04 PROCEDURE — S4991 NICOTINE PATCH NONLEGEND: HCPCS | Performed by: HOSPITALIST

## 2023-11-04 PROCEDURE — 94761 N-INVAS EAR/PLS OXIMETRY MLT: CPT

## 2023-11-04 PROCEDURE — 94640 AIRWAY INHALATION TREATMENT: CPT

## 2023-11-04 PROCEDURE — 99233 PR SUBSEQUENT HOSPITAL CARE,LEVL III: ICD-10-PCS | Mod: GC,,, | Performed by: STUDENT IN AN ORGANIZED HEALTH CARE EDUCATION/TRAINING PROGRAM

## 2023-11-04 PROCEDURE — 82803 BLOOD GASES ANY COMBINATION: CPT

## 2023-11-04 PROCEDURE — 83735 ASSAY OF MAGNESIUM: CPT

## 2023-11-04 PROCEDURE — 83540 ASSAY OF IRON: CPT

## 2023-11-04 PROCEDURE — 93010 EKG 12-LEAD: ICD-10-PCS | Mod: ,,, | Performed by: STUDENT IN AN ORGANIZED HEALTH CARE EDUCATION/TRAINING PROGRAM

## 2023-11-04 PROCEDURE — 93010 ELECTROCARDIOGRAM REPORT: CPT | Mod: ,,, | Performed by: STUDENT IN AN ORGANIZED HEALTH CARE EDUCATION/TRAINING PROGRAM

## 2023-11-04 PROCEDURE — 63600175 PHARM REV CODE 636 W HCPCS: Performed by: INTERNAL MEDICINE

## 2023-11-04 PROCEDURE — 85025 COMPLETE CBC W/AUTO DIFF WBC: CPT

## 2023-11-04 PROCEDURE — 93005 ELECTROCARDIOGRAM TRACING: CPT

## 2023-11-04 PROCEDURE — 84484 ASSAY OF TROPONIN QUANT: CPT | Performed by: STUDENT IN AN ORGANIZED HEALTH CARE EDUCATION/TRAINING PROGRAM

## 2023-11-04 PROCEDURE — 63600175 PHARM REV CODE 636 W HCPCS: Performed by: HOSPITALIST

## 2023-11-04 PROCEDURE — 99900035 HC TECH TIME PER 15 MIN (STAT)

## 2023-11-04 PROCEDURE — 82746 ASSAY OF FOLIC ACID SERUM: CPT

## 2023-11-04 PROCEDURE — 82728 ASSAY OF FERRITIN: CPT

## 2023-11-04 PROCEDURE — 80069 RENAL FUNCTION PANEL: CPT

## 2023-11-04 PROCEDURE — 94760 N-INVAS EAR/PLS OXIMETRY 1: CPT

## 2023-11-04 PROCEDURE — 84484 ASSAY OF TROPONIN QUANT: CPT

## 2023-11-04 PROCEDURE — 63600175 PHARM REV CODE 636 W HCPCS

## 2023-11-04 PROCEDURE — 94660 CPAP INITIATION&MGMT: CPT

## 2023-11-04 RX ORDER — ATORVASTATIN CALCIUM 80 MG/1
80 TABLET, FILM COATED ORAL DAILY
Status: DISCONTINUED | OUTPATIENT
Start: 2023-11-04 | End: 2023-11-06

## 2023-11-04 RX ORDER — POTASSIUM CHLORIDE 7.45 MG/ML
20 INJECTION INTRAVENOUS ONCE
Status: COMPLETED | OUTPATIENT
Start: 2023-11-04 | End: 2023-11-04

## 2023-11-04 RX ORDER — TALC
6 POWDER (GRAM) TOPICAL NIGHTLY PRN
Status: DISCONTINUED | OUTPATIENT
Start: 2023-11-04 | End: 2023-11-10 | Stop reason: HOSPADM

## 2023-11-04 RX ORDER — SIMETHICONE 80 MG
1 TABLET,CHEWABLE ORAL 4 TIMES DAILY PRN
Status: DISCONTINUED | OUTPATIENT
Start: 2023-11-04 | End: 2023-11-10 | Stop reason: HOSPADM

## 2023-11-04 RX ORDER — POTASSIUM CHLORIDE 7.45 MG/ML
20 INJECTION INTRAVENOUS ONCE
Status: DISCONTINUED | OUTPATIENT
Start: 2023-11-04 | End: 2023-11-04

## 2023-11-04 RX ORDER — POTASSIUM CHLORIDE 20 MEQ/1
40 TABLET, EXTENDED RELEASE ORAL 3 TIMES DAILY
Status: COMPLETED | OUTPATIENT
Start: 2023-11-04 | End: 2023-11-04

## 2023-11-04 RX ADMIN — IPRATROPIUM BROMIDE AND ALBUTEROL SULFATE 3 ML: 2.5; .5 SOLUTION RESPIRATORY (INHALATION) at 01:11

## 2023-11-04 RX ADMIN — POTASSIUM CHLORIDE 20 MEQ: 7.46 INJECTION, SOLUTION INTRAVENOUS at 06:11

## 2023-11-04 RX ADMIN — TOPIRAMATE 150 MG: 100 TABLET, FILM COATED ORAL at 09:11

## 2023-11-04 RX ADMIN — CALCIUM CARBONATE (ANTACID) CHEW TAB 500 MG 500 MG: 500 CHEW TAB at 09:11

## 2023-11-04 RX ADMIN — CHOLECALCIFEROL TAB 125 MCG (5000 UNIT) 5000 UNITS: 125 TAB at 09:11

## 2023-11-04 RX ADMIN — BUDESONIDE INHALATION 0.5 MG: 0.5 SUSPENSION RESPIRATORY (INHALATION) at 07:11

## 2023-11-04 RX ADMIN — METHYLPREDNISOLONE SODIUM SUCCINATE 40 MG: 40 INJECTION INTRAMUSCULAR; INTRAVENOUS at 11:11

## 2023-11-04 RX ADMIN — BUMETANIDE 1 MG: 0.25 INJECTION INTRAMUSCULAR; INTRAVENOUS at 12:11

## 2023-11-04 RX ADMIN — SERTRALINE HYDROCHLORIDE 50 MG: 50 TABLET ORAL at 09:11

## 2023-11-04 RX ADMIN — INSULIN DETEMIR 10 UNITS: 100 INJECTION, SOLUTION SUBCUTANEOUS at 09:11

## 2023-11-04 RX ADMIN — POLYETHYLENE GLYCOL 3350 17 G: 17 POWDER, FOR SOLUTION ORAL at 09:11

## 2023-11-04 RX ADMIN — NIFEDIPINE 90 MG: 30 TABLET, FILM COATED, EXTENDED RELEASE ORAL at 09:11

## 2023-11-04 RX ADMIN — SPIRONOLACTONE 50 MG: 25 TABLET ORAL at 09:11

## 2023-11-04 RX ADMIN — DOCUSATE SODIUM 100 MG: 100 CAPSULE, LIQUID FILLED ORAL at 09:11

## 2023-11-04 RX ADMIN — POTASSIUM CHLORIDE 40 MEQ: 1500 TABLET, EXTENDED RELEASE ORAL at 09:11

## 2023-11-04 RX ADMIN — CYCLOBENZAPRINE 10 MG: 10 TABLET, FILM COATED ORAL at 09:11

## 2023-11-04 RX ADMIN — POTASSIUM CHLORIDE 40 MEQ: 1500 TABLET, EXTENDED RELEASE ORAL at 03:11

## 2023-11-04 RX ADMIN — PANTOPRAZOLE SODIUM 40 MG: 40 TABLET, DELAYED RELEASE ORAL at 09:11

## 2023-11-04 RX ADMIN — ATORVASTATIN CALCIUM 80 MG: 80 TABLET, FILM COATED ORAL at 09:11

## 2023-11-04 RX ADMIN — LOSARTAN POTASSIUM 100 MG: 100 TABLET, FILM COATED ORAL at 09:11

## 2023-11-04 RX ADMIN — DULOXETINE 30 MG: 30 CAPSULE, DELAYED RELEASE ORAL at 09:11

## 2023-11-04 RX ADMIN — TICAGRELOR 90 MG: 90 TABLET ORAL at 09:11

## 2023-11-04 RX ADMIN — IPRATROPIUM BROMIDE AND ALBUTEROL SULFATE 3 ML: 2.5; .5 SOLUTION RESPIRATORY (INHALATION) at 07:11

## 2023-11-04 RX ADMIN — ALLOPURINOL 300 MG: 300 TABLET ORAL at 09:11

## 2023-11-04 RX ADMIN — NICOTINE 1 PATCH: 21 PATCH, EXTENDED RELEASE TRANSDERMAL at 09:11

## 2023-11-04 RX ADMIN — LEVOTHYROXINE SODIUM 150 MCG: 0.15 TABLET ORAL at 05:11

## 2023-11-04 RX ADMIN — BUMETANIDE 1 MG: 0.25 INJECTION INTRAMUSCULAR; INTRAVENOUS at 11:11

## 2023-11-04 RX ADMIN — GABAPENTIN 600 MG: 300 CAPSULE ORAL at 03:11

## 2023-11-04 RX ADMIN — IRON SUCROSE 200 MG: 20 INJECTION, SOLUTION INTRAVENOUS at 03:11

## 2023-11-04 RX ADMIN — GABAPENTIN 600 MG: 300 CAPSULE ORAL at 09:11

## 2023-11-04 RX ADMIN — ASPIRIN 81 MG: 81 TABLET, COATED ORAL at 09:11

## 2023-11-04 NOTE — PLAN OF CARE
Problem: Adult Inpatient Plan of Care  Goal: Plan of Care Review  Outcome: Ongoing, Progressing  Goal: Patient-Specific Goal (Individualized)  Outcome: Ongoing, Progressing  Goal: Absence of Hospital-Acquired Illness or Injury  Outcome: Ongoing, Progressing  Goal: Optimal Comfort and Wellbeing  Outcome: Ongoing, Progressing  Goal: Readiness for Transition of Care  Outcome: Ongoing, Progressing     Problem: Diabetes Comorbidity  Goal: Blood Glucose Level Within Targeted Range  Outcome: Ongoing, Progressing     Problem: Bariatric Environmental Safety  Goal: Safety Maintained with Care  Outcome: Ongoing, Progressing     Problem: Gas Exchange Impaired  Goal: Optimal Gas Exchange  Outcome: Ongoing, Progressing     Problem: Breathing Pattern Ineffective  Goal: Effective Breathing Pattern  Outcome: Ongoing, Progressing     Problem: Fluid and Electrolyte Imbalance (Acute Kidney Injury/Impairment)  Goal: Fluid and Electrolyte Balance  Outcome: Ongoing, Progressing     Problem: Oral Intake Inadequate (Acute Kidney Injury/Impairment)  Goal: Optimal Nutrition Intake  Outcome: Ongoing, Progressing     Problem: Renal Function Impairment (Acute Kidney Injury/Impairment)  Goal: Effective Renal Function  Outcome: Ongoing, Progressing     Problem: Noninvasive Ventilation Acute  Goal: Effective Unassisted Ventilation and Oxygenation  Outcome: Ongoing, Progressing

## 2023-11-04 NOTE — ASSESSMENT & PLAN NOTE
Patient was supposed to be on home oxygen but has not been for many months. Pt was unable to obtain home oxygen in spite of contacting her medical providers office numerous times. She continues to smoke despite medical advise with last smoking one week ago

## 2023-11-04 NOTE — HPI
Patient is a 64 yo female who presents to the hospital as a transfer from [facility] for higher level of care. She presented to the [facility] earlier today with a complaint of shortness for several days that has worsened over the past two days, at which time she started experiencing a severe decrease in her urinary output associated with a right flank, nausea, vomit  and low abdominal pain but denies any urinary symptoms. The shortness of breath is also associated with an acute onset of chest pain today. Chest pain is located at the left sternal region as is associated with radiation to her bilateral arms and bilateral sides of her neck, mild diaphoresis and tremor, but denies any palpitations. Patient has a history of COPD and follows with Pulmonologist Dr. Loyola with recommendation for home oxygen several months ago which was ordered by her PCP Dr. Frausto,  but the patient stated that she never received the order and has thus not been on home oxygen.. Moreover, the patient continues to smoke about 2 packs daily despite medical advice to quit smoking.     Of note, patient presented to the [facility] on 10/5/2023 with similar symptoms of worsening shortness of breath and was transferred to [facility] in [place], [place] for higher level of care, where she was admitted for acute on chronic respiratory failure secondary to COPD exacerbation in the setting of positive Covid 19 infection on 10/06/2023. She only had mild symptoms for this Covid 19 infection, but had a severe Covid 19 infection in 2021. She has received only one dose of the Covid 19 vaccine back in 2021 with no boosters.    On arrival to the  [facility], the patient was afebrile, but vital signs were significant for /54 and RR 22 with SPO2 of 73% with no leukocytosis, no source of infection and normal lactic acidosis. She was thus placed on supplemental oxygenation with  improvement of SPO2. Ensuring work up revealed CMP with hypokalemia with K of 2.4 but normal Mg, bicarb of 45; acute on chronic renal failure with BUN/CR 29/1.84 with GFR 30 from a baseline of BUN/CR 12/1.07 and GFR of 58 four weeks ago, initial troponin of 297 from a baseline of 40.8 four weeks ago; elevated NTpBNP of 3183 from a baseline of 286 two weeks ago and 1932 four weeks ago. Last documented Echo in our system in 2021 showed HFpEF with EF of 55%. CXR with cardiomegaly without omar pulmonary edema or focal consolidation and EKG with sinus tachycardia and RBBB. CT head with negative findings as checked for initial complaint of lightheadedness and slurred speech on arrival that has since been resolved.  Coagulation study was normal and urinalysis grossly normal. Patient will be admitted for further evaluation and management.

## 2023-11-04 NOTE — ASSESSMENT & PLAN NOTE
"Patient is identified as having Diastolic (HFpEF) heart failure that is Acute on chronic. CHF is currently uncontrolled due to Continued edema of extremities and Rales/crackles on pulmonary exam. Latest ECHO performed and demonstrates- Results for orders placed during the hospital encounter of 11/24/21    Echo    Interpretation Summary  · The left ventricle is normal in size with concentric remodeling and low normal systolic function.  · The estimated ejection fraction is 50%.  · Atrial fibrillation not observed.  · Normal right ventricular size.  · Normal left ventricular diastolic function.  . Continue ACE/ARB, Furosemide and Aldactone and monitor clinical status closely. Monitor on telemetry. Patient is off CHF pathway.  Monitor strict Is&Os and daily weights.  Place on fluid restriction of 2 L. Cardiology has been consulted. Continue to stress to patient importance of self efficacy and  on diet for CHF. Last BNP reviewed- and noted below No results for input(s): "BNP", "BNPTRIAGEBLO" in the last 168 hours..      - Bumetanide 1 mg BID and home aldactone, but monitor renal function daily  - Holding BB due to CHF exacerbation   - Strict I and O  - Daily weight  - Pending Echo repeat  - Cardiology consulted, thanks for the assistance    Continue current management. Echo results pending   "

## 2023-11-04 NOTE — PLAN OF CARE
Problem: Gas Exchange Impaired  Goal: Optimal Gas Exchange  Outcome: Ongoing, Progressing     Problem: Breathing Pattern Ineffective  Goal: Effective Breathing Pattern  Outcome: Ongoing, Progressing     Problem: Noninvasive Ventilation Acute  Goal: Effective Unassisted Ventilation and Oxygenation  Outcome: Ongoing, Progressing

## 2023-11-04 NOTE — SUBJECTIVE & OBJECTIVE
Past Medical History:   Diagnosis Date    Acquired hypothyroidism     Atherosclerosis of native artery of extremity with intermittent claudication 01/30/2019    bilateral legs    BMI 50.0-59.9, adult 02/22/2021    Chronic pain syndrome     opioid depen    COPD (chronic obstructive pulmonary disease)     COVID-19 10/06/2023    Depression     Diabetes mellitus, type 2     followed by Aurea Kwong NP, [facility]    DVT of lower extremity, bilateral     Essential hypertension 06/08/2021    GERD (gastroesophageal reflux disease)     Gout 07/05/2023    Hyperlipidemia     Lumbosacral radiculopathy 06/05/2023    followed by GLENN Valdes, [facility]    Migraines     Nicotine dependence     Nicotine dependence     Obesity hypoventilation syndrome     chronic CO2 retainer with compensatory metabolic alkalosis    Osteoarthritis     Seizure disorder     Sleep apnea     on cpap    Thyroid cancer     Venous stasis ulcer limited to breakdown of skin with varicose veins     followed by Estuardo Zhao    Vitamin D deficiency        Past Surgical History:   Procedure Laterality Date    ABCESS DRAINAGE      HYSTERECTOMY      ILIAC ARTERY STENT Bilateral 01/28/2020    Bilateral distal aorta and common iliac 8 X 59 vbx covered stents performed by Dr. Antonio Jacinto.    RADIOFREQUENCY ABLATION Left 04/15/2022    Procedure: Left calf  Radiofrequency Ablation;  Surgeon: Matty Falcon DO;  Location: [facility];  Service: Vascular;  Laterality: Left;    STAB PHLEBECTOMY OF VARICOSE VEINS Left 01/25/2013    Left leg microphlebectomies x 23 stab avulsions and ligation of multiple varicose veins perrformed by Dr. Cirilo Aguirre.    THYROIDECTOMY      hx: thyroid cancer    TOE AMPUTATION Left 01/28/2020    2nd, 4th and 5th performed by Dr. Anam Saeed.    TOE AMPUTATION Right 01/28/2020    4th toe performed by Dr. Anam Saeed.    TOTAL ABDOMINAL HYSTERECTOMY W/ BILATERAL SALPINGOOPHORECTOMY      TUBAL LIGATION       VAGINAL DELIVERY      x 4    VENOUS ABLATION Right 08/09/2019    GSV Varithena Ablation performed by Dr. Estuardo Falcon    VENOUS ABLATION Left 08/02/2019    ATAV Varithena Ablation performed by Dr. Estuardo Falcon.    VENOUS ABLATION Right 07/13/2015    ATAV Laser Ablatio performed by Dr. Cirilo Aguirre.    VENOUS ABLATION Right 07/06/2015    Distal  GSV Laser Ablation performed by dr. Cirilo Aguirre.    VENOUS ABLATION Left 04/20/2015    Left Distal GSV Laser Ablation performed by dr. Cirilo Aguirre.    VENOUS ABLATION Right 10/28/2013    Right Distal GSV RF Ablation performed by Dr. Cirilo Aguirre.    VENOUS ABLATION Left 10/25/2013    SSV RF Ablation performed by dr. Cirilo Aguirre.    VENOUS ABLATION Left 10/07/2013    Left GSV and Left ATAV RF Ablation performed by Dr. Cirilo Aguirre.    VENOUS ABLATION Right 01/21/2013    GSV RF Ablation w/micros x 22 performed by Dr. Cirilo Aguirre.    VENOUS ABLATION Left 06/25/2021    Left distal GSV Varithena Ablation       Review of patient's allergies indicates:   Allergen Reactions    Ammonium peroxydisulfate Shortness Of Breath    Avocado (laurus persea) Anaphylaxis    Bananas [banana] Anaphylaxis and Swelling     CRAMPS,    Chocolate flavor Anaphylaxis     MOUTH SWELLING    Fentanyl Shortness Of Breath and Itching    Percocet [oxycodone-acetaminophen] Shortness Of Breath and Itching    Silvadene [silver sulfadiazine]     Clindamycin     Corticosteroids (glucocorticoids)     Hydrocortisone Blisters    Lasix [furosemide] Blisters     BURNS SKIN    Nutritional supplement-fiber Itching    Pregabalin     Shellfish containing products     Adhesive Rash and Blisters    Iodine Rash    Iodine and iodide containing products Rash    Latex, natural rubber Rash    Pcn [penicillins] Rash    Sulfa (sulfonamide antibiotics) Rash and Blisters       Current Facility-Administered Medications on File Prior to Encounter   Medication    [COMPLETED] bumetanide injection 1 mg    [COMPLETED] HYDROcodone-acetaminophen 5-325 mg per tablet  1 tablet    [COMPLETED] potassium chloride 10 mEq in 100 mL IVPB    [COMPLETED] potassium chloride SA CR tablet 40 mEq    [COMPLETED] sodium chloride 0.9% bolus 250 mL 250 mL    [DISCONTINUED] enoxaparin injection 160 mg     Current Outpatient Medications on File Prior to Encounter   Medication Sig    ACCU-CHEK GUIDE GLUCOSE METER Misc     ACCU-CHEK GUIDE TEST STRIPS Strp     ACCU-CHEK SOFTCLIX LANCETS Misc     albuterol (PROVENTIL/VENTOLIN HFA) 90 mcg/actuation inhaler Inhale 2 puffs into the lungs 4 (four) times daily. Rescue    allopurinoL (ZYLOPRIM) 300 MG tablet TAKE ONE TABLET EVERY DAY    amitriptyline (ELAVIL) 25 MG tablet Take 1 tablet (25 mg total) by mouth nightly as needed for Insomnia.    apixaban (ELIQUIS) 5 mg Tab Take 1 tablet (5 mg total) by mouth 2 (two) times daily.    aspirin (ECOTRIN) 81 MG EC tablet Take 1 tablet (81 mg total) by mouth once daily.    BREO ELLIPTA 100-25 mcg/dose diskus inhaler Inhale 1 puff into the lungs once daily.    bumetanide (BUMEX) 2 MG tablet Take 2 mg by mouth 2 (two) times daily.    calcium carbonate (OS-MICHAELA) 500 mg calcium (1,250 mg) tablet Take 1 tablet (500 mg total) by mouth 2 (two) times daily.    carvediloL (COREG) 12.5 MG tablet Take 0.5 tablets (6.25 mg total) by mouth 2 (two) times daily.    cholecalciferol, vitamin D3, 125 mcg (5,000 unit) capsule Take 1 capsule (5,000 Units total) by mouth 2 (two) times a day.    cilostazoL (PLETAL) 100 MG Tab Take 1 tablet (100 mg total) by mouth 2 (two) times daily.    colchicine, gout, (COLCRYS) 0.6 mg tablet TAKE 1 TABLET BY MOUTH 3 TIMES A DAY FOR 2 DAYS THEN 1 TABLET DAILY UNTIL GONE    cyclobenzaprine (FLEXERIL) 10 MG tablet Take 1 tablet (10 mg total) by mouth every evening.    dexAMETHasone (DECADRON) 4 MG Tab Take by mouth.    docusate sodium (COLACE) 100 MG capsule Take 1 capsule (100 mg total) by mouth 2 (two) times daily.    DULoxetine (CYMBALTA) 30 MG capsule TAKE 1 CAPSULE (30 MG TOTAL) BY MOUTH ONCE DAILY.  "   gabapentin (NEURONTIN) 600 MG tablet Take 1 tablet (600 mg total) by mouth 3 (three) times daily.    HYDROcodone-acetaminophen (NORCO)  mg per tablet Take 1 tablet by mouth every 6 (six) hours as needed for Pain.    [START ON 11/5/2023] HYDROcodone-acetaminophen (NORCO)  mg per tablet Take 1 tablet by mouth every 6 (six) hours as needed for Pain.    ibuprofen (ADVIL,MOTRIN) 800 MG tablet Take 1 tablet (800 mg total) by mouth 2 (two) times daily as needed for Pain.    insulin detemir U-100, Levemir, (LEVEMIR FLEXTOUCH U100 INSULIN) 100 unit/mL (3 mL) InPn pen Inject 10 Units into the skin every evening. 15 ml with 1 refill    levothyroxine (SYNTHROID) 150 MCG tablet TAKE 1 TABLET BY MOUTH BEFORE BREAKFAST.    loratadine (CLARITIN) 10 mg tablet Take 1 tablet (10 mg total) by mouth once daily.    losartan-hydrochlorothiazide 50-12.5 mg (HYZAAR) 50-12.5 mg per tablet Take 1 tablet by mouth once daily.    metOLazone (ZAROXOLYN) 2.5 MG tablet Take 1 tablet (2.5 mg total) by mouth once daily.    mupirocin (BACTROBAN) 2 % ointment Apply topically 2 (two) times daily.    naloxone (NARCAN) 4 mg/actuation Spry 1 spray once.    nicotine (NICODERM CQ) 21 mg/24 hr Place 1 patch onto the skin every 24 hours.    NIFEdipine (PROCARDIA-XL) 90 MG (OSM) 24 hr tablet Take 1 tablet (90 mg total) by mouth once daily.    NOVOFINE 32 32 gauge x 1/4" Ndle Use as directed once daily.    pantoprazole (PROTONIX) 40 MG tablet Take 1 tablet (40 mg total) by mouth once daily.    phentermine (ADIPEX-P) 37.5 mg tablet Take 1 tablet (37.5 mg total) by mouth before breakfast.    polyethylene glycol (GLYCOLAX) 17 gram PwPk Take 17 g by mouth once daily.    potassium chloride SA (K-DUR,KLOR-CON) 20 MEQ tablet Take 1 tablet (20 mEq total) by mouth once daily.    QUEtiapine (SEROQUEL) 25 MG Tab Take 1 tablet (25 mg total) by mouth once daily.    sertraline (ZOLOFT) 50 MG tablet Take 1 tablet (50 mg total) by mouth once daily.    " spironolactone (ALDACTONE) 50 MG tablet Take 1 tablet (50 mg total) by mouth once daily.    SYMBICORT 160-4.5 mcg/actuation HFAA Inhale into the lungs.    tiotropium (SPIRIVA) 18 mcg inhalation capsule Inhale 1 capsule (18 mcg total) into the lungs once daily. Controller    topiramate (TOPAMAX) 100 MG tablet Take 1.5 tablets (150 mg total) by mouth 2 (two) times daily.    triamcinolone acetonide 0.1% (KENALOG) 0.1 % cream Apply topically 3 (three) times daily.     Family History       Problem Relation (Age of Onset)    Coronary aneurysm Father    Coronary artery disease Father    Heart disease Mother, Father    Hypertension Mother, Father    No Known Problems Son, Son, Son, Son, Sister, Brother, Brother, Brother    Osteoarthritis Mother          Tobacco Use    Smoking status: Every Day     Current packs/day: 0.50     Average packs/day: 0.5 packs/day for 33.0 years (16.5 ttl pk-yrs)     Types: Cigarettes     Passive exposure: Current    Smokeless tobacco: Never   Substance and Sexual Activity    Alcohol use: Never    Drug use: Never    Sexual activity: Not Currently     Review of Systems   Constitutional:  Positive for activity change and fatigue. Negative for chills, diaphoresis and fever.   HENT:  Negative for sinus pressure, sore throat, tinnitus, trouble swallowing and voice change.    Respiratory:  Positive for shortness of breath. Negative for cough and chest tightness.    Cardiovascular:  Positive for chest pain and leg swelling. Negative for palpitations.   Gastrointestinal:  Positive for abdominal pain and nausea. Negative for blood in stool and vomiting.   Genitourinary:  Positive for decreased urine volume and flank pain. Negative for dysuria and hematuria.   Musculoskeletal:  Negative for neck pain and neck stiffness.   Skin:  Negative for pallor.   Neurological:  Positive for tremors and weakness. Negative for seizures, syncope and speech difficulty.   Psychiatric/Behavioral:  Negative for agitation,  behavioral problems and confusion.      Objective:     Vital Signs (Most Recent):  Temp: 98 °F (36.7 °C) (11/03/23 1847)  Pulse: 80 (11/03/23 2022)  Resp: (!) 22 (11/03/23 2022)  BP: 121/62 (11/03/23 1847)  SpO2: (!) 90 % (11/03/23 2022) Vital Signs (24h Range):  Temp:  [98 °F (36.7 °C)-98.1 °F (36.7 °C)] 98 °F (36.7 °C)  Pulse:  [73-88] 80  Resp:  [17-26] 22  SpO2:  [73 %-91 %] 90 %  BP: (102-125)/(43-74) 121/62     Weight: (!) 158.3 kg (349 lb)  Body mass index is 53.07 kg/m².     Physical Exam  Vitals and nursing note reviewed.   Constitutional:       General: She is not in acute distress.     Appearance: Normal appearance. She is ill-appearing.   HENT:      Head: Normocephalic and atraumatic.      Right Ear: External ear normal.      Left Ear: External ear normal.      Nose: Nose normal. No congestion or rhinorrhea.      Mouth/Throat:      Mouth: Mucous membranes are dry.      Pharynx: No oropharyngeal exudate or posterior oropharyngeal erythema.   Eyes:      General: No scleral icterus.        Right eye: No discharge.         Left eye: No discharge.      Extraocular Movements: Extraocular movements intact.      Conjunctiva/sclera: Conjunctivae normal.   Pulmonary:      Effort: Pulmonary effort is normal.      Breath sounds: Normal breath sounds. No rhonchi or rales.   Abdominal:      General: Bowel sounds are normal.      Tenderness: There is abdominal tenderness. There is no guarding.      Hernia: No hernia is present.   Neurological:      Mental Status: She is alert.                Significant Labs: All pertinent labs within the past 24 hours have been reviewed.    Significant Imaging: I have reviewed all pertinent imaging results/findings within the past 24 hours.  I have reviewed and interpreted all pertinent imaging results/findings within the past 24 hours.

## 2023-11-04 NOTE — ASSESSMENT & PLAN NOTE
Patient with Hypercapnic and Hypoxic Respiratory failure which is Acute on chronic.  she is on home oxygen at 2-3 LPM but has not been using it LPM. Supplemental oxygen was provided and noted- Oxygen Concentration (%):  [32] 32    .   Signs/symptoms of respiratory failure include- increased work of breathing and lethargy. Contributing diagnoses includes - CHF, COPD, Obesity Hypoventilation and recent Covid 19 infection and non complaince with home oxygenation Labs and images were reviewed. Patient Has recent ABG, which has been reviewed. Will treat underlying causes and adjust management of respiratory failure as follows- supplemental oxygenation and BIPAP at night

## 2023-11-04 NOTE — ASSESSMENT & PLAN NOTE
Patient with acute kidney injury/acute renal failure likely due to acute tubular necrosis of unknown etiology but may be due to recent covid 19 about 4 weeks ago FRANKO is currently stable. Baseline creatinine 1.07 about one month ago - Labs reviewed- Renal function/electrolytes with Estimated Creatinine Clearance: 58.9 mL/min (A) (based on SCr of 1.55 mg/dL (H)). according to latest data. Monitor urine output and serial BMP and adjust therapy as needed. Avoid nephrotoxins and renally dose meds for GFR listed above.      - Pending US kidney  - Pending urine Na, Urea and CR. BMP to calculate FeNA and FeUrea  -  Avoid nephrotoxic drugs  - Renally dose all applicable medications  - Strict input and output  - Daily renal panel to monitor renal function  -   11/4: Nephrology consulted   Renal sono with normal size kidneys w/o obstruction.  -continue K supplemenation and Fe supplementation

## 2023-11-04 NOTE — ASSESSMENT & PLAN NOTE
Patient was supposed to be on home oxygen but has not been for many months and continue to smoke despite medical advise with last smoking one week ago

## 2023-11-04 NOTE — PROGRESS NOTES
[facility] - [facility] Tele[facility] Hospital Medicine  Progress Note    Patient Name: Holly Porter  MRN: [id number]  Patient Class: IP- Inpatient   Admission Date: 11/3/2023  Length of Stay: 1 days  Attending Physician: Kenan Broderick DO  Primary Care Provider: Regan Frausto MD        Subjective:     Principal Problem:Acute on chronic respiratory failure with hypoxia and hypercapnia        HPI:  Patient is a 64 yo female who presents to the hospital as a transfer from [facility] for higher level of care. She presented to the [facility] earlier today with a complaint of shortness for several days that has worsened over the past two days, at which time she started experiencing a severe decrease in her urinary output associated with a right flank, nausea, vomit  and low abdominal pain but denies any urinary symptoms. The shortness of breath is also associated with an acute onset of chest pain today. Chest pain is located at the left sternal region as is associated with radiation to her bilateral arms and bilateral sides of her neck, mild diaphoresis and tremor, but denies any palpitations. Patient has a history of COPD and follows with Pulmonologist Dr. Loyola with recommendation for home oxygen several months ago which was ordered by her PCP Dr. Frausto,  but the patient stated that she never received the order and has thus not been on home oxygen.. Moreover, the patient continues to smoke about 2 packs daily despite medical advice to quit smoking.     Of note, patient presented to the [facility] on 10/5/2023 with similar symptoms of worsening shortness of breath and was transferred to [facility] in [place], [place] for higher level of care, where she was admitted for acute on chronic respiratory failure secondary to COPD exacerbation in the setting of positive Covid 19 infection on 10/06/2023. She only had mild symptoms for this  Covid 19 infection, but had a severe Covid 19 infection in 2021. She has received only one dose of the Covid 19 vaccine back in 2021 with no boosters.    On arrival to the  critical access hospital, the patient was afebrile, but vital signs were significant for /54 and RR 22 with SPO2 of 73% with no leukocytosis, no source of infection and normal lactic acidosis. She was thus placed on supplemental oxygenation with improvement of SPO2. Ensuring work up revealed CMP with hypokalemia with K of 2.4 but normal Mg, bicarb of 45; acute on chronic renal failure with BUN/CR 29/1.84 with GFR 30 from a baseline of BUN/CR 12/1.07 and GFR of 58 four weeks ago, initial troponin of 297 from a baseline of 40.8 four weeks ago; elevated NTpBNP of 3183 from a baseline of 286 two weeks ago and 1932 four weeks ago. Last documented Echo in our system in 2021 showed HFpEF with EF of 55%. CXR with cardiomegaly without omar pulmonary edema or focal consolidation and EKG with sinus tachycardia and RBBB. CT head with negative findings as checked for initial complaint of lightheadedness and slurred speech on arrival that has since been resolved.  Coagulation study was normal and urinalysis grossly normal. Patient will be admitted for further evaluation and management.          Overview/Hospital Course:  No notes on file    Interval History: 11/4: Patient examined at bedside. Nephrology consulted. Renal function improving. K 2.4 with NL magnesium and low Fe. Supplementing with KCL and Iron Sucrose. Will continue to monitor.     Review of Systems   Constitutional:  Positive for fatigue. Negative for chills, diaphoresis and fever.   HENT:  Negative for sinus pressure, sore throat, tinnitus, trouble swallowing and voice change.    Respiratory:  Positive for shortness of breath. Negative for cough and chest tightness.    Cardiovascular:  Positive for leg swelling. Negative for chest pain and palpitations.   Gastrointestinal:  Positive for  abdominal pain. Negative for blood in stool, nausea and vomiting.   Genitourinary:  Positive for decreased urine volume. Negative for dysuria and hematuria.   Musculoskeletal:  Negative for neck pain and neck stiffness.   Skin:  Negative for pallor.   Neurological:  Positive for weakness. Negative for tremors, seizures, syncope and speech difficulty.   Psychiatric/Behavioral:  Negative for agitation, behavioral problems and confusion.      Objective:     Vital Signs (Most Recent):  Temp: 97.2 °F (36.2 °C) (11/04/23 1453)  Pulse: 92 (11/04/23 1453)  Resp: 20 (11/04/23 1336)  BP: 126/84 (11/04/23 1453)  SpO2: 96 % (11/04/23 1453) Vital Signs (24h Range):  Temp:  [97.2 °F (36.2 °C)-98.9 °F (37.2 °C)] 97.2 °F (36.2 °C)  Pulse:  [61-92] 92  Resp:  [20-23] 20  SpO2:  [83 %-97 %] 96 %  BP: (103-126)/(43-85) 126/84     Weight: (!) 158.3 kg (349 lb)  Body mass index is 53.07 kg/m².    Intake/Output Summary (Last 24 hours) at 11/4/2023 1635  Last data filed at 11/4/2023 1141  Gross per 24 hour   Intake --   Output 2200 ml   Net -2200 ml         Physical Exam  Vitals and nursing note reviewed.   Constitutional:       General: She is not in acute distress.     Appearance: Normal appearance. She is ill-appearing.   HENT:      Head: Normocephalic and atraumatic.      Right Ear: External ear normal.      Left Ear: External ear normal.      Nose: Nose normal. No congestion or rhinorrhea.      Mouth/Throat:      Mouth: Mucous membranes are dry.      Pharynx: No oropharyngeal exudate or posterior oropharyngeal erythema.   Eyes:      General: No scleral icterus.        Right eye: No discharge.         Left eye: No discharge.      Extraocular Movements: Extraocular movements intact.      Conjunctiva/sclera: Conjunctivae normal.   Pulmonary:      Effort: Pulmonary effort is normal.      Breath sounds: Normal breath sounds. No rhonchi or rales.   Abdominal:      General: Bowel sounds are normal.      Tenderness: There is no abdominal  tenderness. There is no guarding.      Hernia: No hernia is present.   Musculoskeletal:      Comments: Bilateral lower extremity tenderness on palpation   Neurological:      Mental Status: She is alert.             Significant Labs: All pertinent labs within the past 24 hours have been reviewed.    Significant Imaging: I have reviewed all pertinent imaging results/findings within the past 24 hours.      Assessment/Plan:      * Acute on chronic respiratory failure with hypoxia and hypercapnia  Patient with Hypercapnic and Hypoxic Respiratory failure which is Acute on chronic.  she is on home oxygen at 2-3 LPM but has not been using it LPM. Supplemental oxygen was provided and noted- Oxygen Concentration (%):  [32-50] 50    .   Signs/symptoms of respiratory failure include- increased work of breathing and lethargy. Contributing diagnoses includes - CHF, COPD, Obesity Hypoventilation and recent Covid 19 infection and non complaince with home oxygenation Labs and images were reviewed. Patient Has recent ABG, which has been reviewed. Will treat underlying causes and adjust management of respiratory failure as follows- supplemental oxygenation and BIPAP at night    11/4: Pt found to be hypersomnolent. Started on CPAP at FIO2 50% and exp 12    Obesity, diabetes, and hypertension syndrome  Body mass index is 53.07 kg/m². Morbid obesity complicates all aspects of disease management from diagnostic modalities to treatment. Weight loss encouraged and health benefits explained to patient.         Migraine  Continue home medication      Nicotine dependence  Dangers of cigarette smoking were reviewed with patient in detail. Patient was Counseled for 3-10 minutes. Nicotine replacement options were discussed. Nicotine replacement was discussed- prescribed    GERD (gastroesophageal reflux disease)  Continue home PPI      COVID-19  Patient tested positive for Covid 19 on 10/6/2023 with minimal symptom    Microcytosis  Fe 22  Started  "Iron sucrose    Non compliance with medical treatment  Patient was supposed to be on home oxygen but has not been for many months. Pt was unable to obtain home oxygen in spite of contacting her medical providers office numerous times. She continues to smoke despite medical advise with last smoking one week ago      Hypokalemia  K 2.4 but mag normal  Monitor and replace as indicated    Obesity hypoventilation syndrome  Body mass index is 53.07 kg/m². Morbid obesity complicates all aspects of disease management from diagnostic modalities to treatment. Weight loss encouraged and health benefits explained to patient.         Severe obesity (BMI >= 40)  Body mass index is 53.07 kg/m². Morbid obesity complicates all aspects of disease management from diagnostic modalities to treatment. Weight loss encouraged and health benefits explained to patient.         Diabetes mellitus, type 2  Patient's FSGs are controlled on current medication regimen.  Last A1c reviewed-   Lab Results   Component Value Date    HGBA1C 6.2 11/03/2023     Most recent fingerstick glucose reviewed- No results for input(s): "POCTGLUCOSE" in the last 24 hours.  Current correctional scale  Medium  Maintain anti-hyperglycemic dose as follows-   Antihyperglycemics (From admission, onward)    Start     Stop Route Frequency Ordered    11/03/23 2100  insulin detemir U-100 injection 10 Units         -- SubQ Nightly 11/03/23 1942        Hold Oral hypoglycemics while patient is in the hospital.    Acute on chronic renal failure  Patient with acute kidney injury/acute renal failure likely due to acute tubular necrosis of unknown etiology but may be due to recent covid 19 about 4 weeks ago FRANKO is currently stable. Baseline creatinine 1.07 about one month ago - Labs reviewed- Renal function/electrolytes with Estimated Creatinine Clearance: 58.9 mL/min (A) (based on SCr of 1.55 mg/dL (H)). according to latest data. Monitor urine output and serial BMP and adjust " "therapy as needed. Avoid nephrotoxins and renally dose meds for GFR listed above.      - Pending US kidney  - Pending urine Na, Urea and CR. BMP to calculate FeNA and FeUrea  -  Avoid nephrotoxic drugs  - Renally dose all applicable medications  - Strict input and output  - Daily renal panel to monitor renal function  -   11/4: Nephrology consulted   Renal sono with normal size kidneys w/o obstruction.  -continue K supplemenation and Fe supplementation        Acute on chronic diastolic CHF (congestive heart failure)  Patient is identified as having Diastolic (HFpEF) heart failure that is Acute on chronic. CHF is currently uncontrolled due to Continued edema of extremities and Rales/crackles on pulmonary exam. Latest ECHO performed and demonstrates- Results for orders placed during the hospital encounter of 11/24/21    Echo    Interpretation Summary  · The left ventricle is normal in size with concentric remodeling and low normal systolic function.  · The estimated ejection fraction is 50%.  · Atrial fibrillation not observed.  · Normal right ventricular size.  · Normal left ventricular diastolic function.  . Continue ACE/ARB, Furosemide and Aldactone and monitor clinical status closely. Monitor on telemetry. Patient is off CHF pathway.  Monitor strict Is&Os and daily weights.  Place on fluid restriction of 2 L. Cardiology has been consulted. Continue to stress to patient importance of self efficacy and  on diet for CHF. Last BNP reviewed- and noted below No results for input(s): "BNP", "BNPTRIAGEBLO" in the last 168 hours..      - Bumetanide 1 mg BID and home aldactone, but monitor renal function daily  - Holding BB due to CHF exacerbation   - Strict I and O  - Daily weight  - Pending Echo repeat  - Cardiology consulted, thanks for the assistance    Continue current management. Echo results pending     COPD exacerbation  Patient's COPD is with exacerbation noted by continued dyspnea, use of accessory muscles " for breathing and worsening of baseline hypoxia currently.  Patient is currently off COPD Pathway. Continue scheduled inhalers Steroids, Antibiotics and Supplemental oxygen and monitor respiratory status closely.     - DuoNeb q6h  - Budesonide 0.5 mg BID  - Solumedrol 40 mg BID  - On continuous CPAP        VTE Risk Mitigation (From admission, onward)         Ordered     Reason for No Pharmacological VTE Prophylaxis  Once        Question:  Reasons:  Answer:  Physician Provided (leave comment)    11/03/23 2125     IP VTE HIGH RISK PATIENT  Once         11/03/23 2125     Place sequential compression device  Until discontinued         11/03/23 2125                Discharge Planning   MONTSERRAT:      Code Status: Full Code   Is the patient medically ready for discharge?:     Reason for patient still in hospital (select all that apply): Treatment                     Namrata Sweet MD  Department of Hospital Medicine   [facility] - [facility] Tele[facility]

## 2023-11-04 NOTE — ASSESSMENT & PLAN NOTE
Patient's COPD is with exacerbation noted by continued dyspnea, use of accessory muscles for breathing and worsening of baseline hypoxia currently.  Patient is currently off COPD Pathway. Continue scheduled inhalers Steroids, Antibiotics and Supplemental oxygen and monitor respiratory status closely.     - DuoNeb q6h  - Budesonide 0.5 mg BID  - Solumedrol 40 mg BID  - On continuous CPAP

## 2023-11-04 NOTE — ASSESSMENT & PLAN NOTE
Patient with Hypercapnic and Hypoxic Respiratory failure which is Acute on chronic.  she is on home oxygen at 2-3 LPM but has not been using it LPM. Supplemental oxygen was provided and noted- Oxygen Concentration (%):  [32-50] 50    .   Signs/symptoms of respiratory failure include- increased work of breathing and lethargy. Contributing diagnoses includes - CHF, COPD, Obesity Hypoventilation and recent Covid 19 infection and non complaince with home oxygenation Labs and images were reviewed. Patient Has recent ABG, which has been reviewed. Will treat underlying causes and adjust management of respiratory failure as follows- supplemental oxygenation and BIPAP at night    11/4: Pt found to be hypersomnolent. Started on CPAP at FIO2 50% and exp 12

## 2023-11-04 NOTE — ASSESSMENT & PLAN NOTE
Patient's COPD is with exacerbation noted by continued dyspnea, use of accessory muscles for breathing and worsening of baseline hypoxia currently.  Patient is currently off COPD Pathway. Continue scheduled inhalers Steroids, Antibiotics and Supplemental oxygen and monitor respiratory status closely.     - DuoNeb q6h  - Budesonide 0.5 mg BID  - Solumedrol 40 mg BID  - Supplemental oxygenation and BIPAP at night

## 2023-11-04 NOTE — SUBJECTIVE & OBJECTIVE
Interval History: 11/4: Patient examined at bedside. Nephrology consulted. Renal function improving. K 2.4 with NL magnesium and low Fe. Supplementing with KCL and Iron Sucrose. Will continue to monitor.     Review of Systems   Constitutional:  Positive for fatigue. Negative for chills, diaphoresis and fever.   HENT:  Negative for sinus pressure, sore throat, tinnitus, trouble swallowing and voice change.    Respiratory:  Positive for shortness of breath. Negative for cough and chest tightness.    Cardiovascular:  Positive for leg swelling. Negative for chest pain and palpitations.   Gastrointestinal:  Positive for abdominal pain. Negative for blood in stool, nausea and vomiting.   Genitourinary:  Positive for decreased urine volume. Negative for dysuria and hematuria.   Musculoskeletal:  Negative for neck pain and neck stiffness.   Skin:  Negative for pallor.   Neurological:  Positive for weakness. Negative for tremors, seizures, syncope and speech difficulty.   Psychiatric/Behavioral:  Negative for agitation, behavioral problems and confusion.      Objective:     Vital Signs (Most Recent):  Temp: 97.2 °F (36.2 °C) (11/04/23 1453)  Pulse: 92 (11/04/23 1453)  Resp: 20 (11/04/23 1336)  BP: 126/84 (11/04/23 1453)  SpO2: 96 % (11/04/23 1453) Vital Signs (24h Range):  Temp:  [97.2 °F (36.2 °C)-98.9 °F (37.2 °C)] 97.2 °F (36.2 °C)  Pulse:  [61-92] 92  Resp:  [20-23] 20  SpO2:  [83 %-97 %] 96 %  BP: (103-126)/(43-85) 126/84     Weight: (!) 158.3 kg (349 lb)  Body mass index is 53.07 kg/m².    Intake/Output Summary (Last 24 hours) at 11/4/2023 1635  Last data filed at 11/4/2023 1141  Gross per 24 hour   Intake --   Output 2200 ml   Net -2200 ml         Physical Exam  Vitals and nursing note reviewed.   Constitutional:       General: She is not in acute distress.     Appearance: Normal appearance. She is ill-appearing.   HENT:      Head: Normocephalic and atraumatic.      Right Ear: External ear normal.      Left Ear:  External ear normal.      Nose: Nose normal. No congestion or rhinorrhea.      Mouth/Throat:      Mouth: Mucous membranes are dry.      Pharynx: No oropharyngeal exudate or posterior oropharyngeal erythema.   Eyes:      General: No scleral icterus.        Right eye: No discharge.         Left eye: No discharge.      Extraocular Movements: Extraocular movements intact.      Conjunctiva/sclera: Conjunctivae normal.   Pulmonary:      Effort: Pulmonary effort is normal.      Breath sounds: Normal breath sounds. No rhonchi or rales.   Abdominal:      General: Bowel sounds are normal.      Tenderness: There is no abdominal tenderness. There is no guarding.      Hernia: No hernia is present.   Musculoskeletal:      Comments: Bilateral lower extremity tenderness on palpation   Neurological:      Mental Status: She is alert.             Significant Labs: All pertinent labs within the past 24 hours have been reviewed.    Significant Imaging: I have reviewed all pertinent imaging results/findings within the past 24 hours.

## 2023-11-04 NOTE — CONSULTS
[facility] - [facility] Tele[facility]  Nephrology  Consult Note    Patient Name: Holly Porter  MRN: [id number]  Admission Date: 11/3/2023  Hospital Length of Stay: 1 days  Attending Provider: Kenan Broderick DO   Primary Care Physician: Regan Frausto MD  Principal Problem:Acute on chronic respiratory failure with hypoxia and hypercapnia    Consults  Subjective:     HPI: Mrs. Porter is seen with a creat of 1.7, usual baseline 1.2 or so. She has a hx of respiratory failure with at least 2 episodes of Covid infection. She is obese and says she doesn't use CPAP at home. History is somewhat sketchy since she is sleepy and mostly keeps her eyes closed.    Past Medical History:   Diagnosis Date    Acquired hypothyroidism     Atherosclerosis of native artery of extremity with intermittent claudication 01/30/2019    bilateral legs    BMI 50.0-59.9, adult 02/22/2021    Chronic pain syndrome     opioid depen    COPD (chronic obstructive pulmonary disease)     COVID-19 10/06/2023    Depression     Diabetes mellitus, type 2     followed by Aurea Kwong NP, [facility]    DVT of lower extremity, bilateral     Essential hypertension 06/08/2021    GERD (gastroesophageal reflux disease)     Gout 07/05/2023    Hyperlipidemia     Lumbosacral radiculopathy 06/05/2023    followed by GLENN Valdes, [facility]    Migraines     Nicotine dependence     Nicotine dependence     Obesity hypoventilation syndrome     chronic CO2 retainer with compensatory metabolic alkalosis    Osteoarthritis     Seizure disorder     Sleep apnea     on cpap    Thyroid cancer     Venous stasis ulcer limited to breakdown of skin with varicose veins     followed by Estuardo Zhao    Vitamin D deficiency        Past Surgical History:   Procedure Laterality Date    ABCESS DRAINAGE      HYSTERECTOMY      ILIAC ARTERY STENT Bilateral 01/28/2020    Bilateral distal aorta and common iliac 8 X 59 vbx covered stents performed by   Antonio Jacinto.    RADIOFREQUENCY ABLATION Left 04/15/2022    Procedure: Left calf  Radiofrequency Ablation;  Surgeon: Matty Falcon DO;  Location: [facility];  Service: Vascular;  Laterality: Left;    STAB PHLEBECTOMY OF VARICOSE VEINS Left 01/25/2013    Left leg microphlebectomies x 23 stab avulsions and ligation of multiple varicose veins perrformed by Dr. Cirilo Aguirre.    THYROIDECTOMY      hx: thyroid cancer    TOE AMPUTATION Left 01/28/2020    2nd, 4th and 5th performed by Dr. Anam Saeed.    TOE AMPUTATION Right 01/28/2020    4th toe performed by Dr. Anam Saeed.    TOTAL ABDOMINAL HYSTERECTOMY W/ BILATERAL SALPINGOOPHORECTOMY      TUBAL LIGATION      VAGINAL DELIVERY      x 4    VENOUS ABLATION Right 08/09/2019    GSV Varithena Ablation performed by Dr. Estuardo Falcon    VENOUS ABLATION Left 08/02/2019    ATAV Varithena Ablation performed by Dr. Estuardo Falcon.    VENOUS ABLATION Right 07/13/2015    ATAV Laser Ablatio performed by Dr. Cirilo Aguirre.    VENOUS ABLATION Right 07/06/2015    Distal  GSV Laser Ablation performed by dr. Cirilo Aguirre.    VENOUS ABLATION Left 04/20/2015    Left Distal GSV Laser Ablation performed by dr. Cirilo Aguirre.    VENOUS ABLATION Right 10/28/2013    Right Distal GSV RF Ablation performed by Dr. Cirilo Aguirre.    VENOUS ABLATION Left 10/25/2013    SSV RF Ablation performed by dr. Cirilo Aguirre.    VENOUS ABLATION Left 10/07/2013    Left GSV and Left ATAV RF Ablation performed by Dr. Cirilo Aguirre.    VENOUS ABLATION Right 01/21/2013    GSV RF Ablation w/micros x 22 performed by Dr. Cirilo Aguirre.    VENOUS ABLATION Left 06/25/2021    Left distal GSV Varithena Ablation       Review of patient's allergies indicates:   Allergen Reactions    Ammonium peroxydisulfate Shortness Of Breath    Avocado (laurus persea) Anaphylaxis    Bananas [banana] Anaphylaxis and Swelling     CRAMPS,    Chocolate flavor Anaphylaxis     MOUTH SWELLING    Fentanyl Shortness Of Breath and Itching    Percocet [oxycodone-acetaminophen]  Shortness Of Breath and Itching    Silvadene [silver sulfadiazine]     Clindamycin     Corticosteroids (glucocorticoids)     Hydrocortisone Blisters    Lasix [furosemide] Blisters     BURNS SKIN    Nutritional supplement-fiber Itching    Pregabalin     Shellfish containing products     Adhesive Rash and Blisters    Iodine Rash    Iodine and iodide containing products Rash    Latex, natural rubber Rash    Pcn [penicillins] Rash    Sulfa (sulfonamide antibiotics) Rash and Blisters     Current Facility-Administered Medications   Medication Frequency    albuterol-ipratropium 2.5 mg-0.5 mg/3 mL nebulizer solution 3 mL Q6H    allopurinoL tablet 300 mg Daily    aspirin EC tablet 81 mg Daily    atorvastatin tablet 80 mg Daily    budesonide nebulizer solution 0.5 mg Q12H    bumetanide injection 1 mg Q12H    calcium carbonate 200 mg calcium (500 mg) chewable tablet 500 mg BID    cholecalciferol (vitamin D3) 125 mcg (5,000 unit) tablet 5,000 Units BID    cyclobenzaprine tablet 10 mg QHS    dextrose 10% bolus 125 mL 125 mL PRN    dextrose 10% bolus 250 mL 250 mL PRN    docusate sodium capsule 100 mg BID    DULoxetine DR capsule 30 mg Daily    gabapentin capsule 600 mg TID    glucagon (human recombinant) injection 1 mg PRN    glucose chewable tablet 16 g PRN    glucose chewable tablet 24 g PRN    HYDROcodone-acetaminophen  mg per tablet 1 tablet Q6H PRN    insulin detemir U-100 injection 10 Units QHS    levothyroxine tablet 150 mcg Before breakfast    losartan tablet 100 mg Daily    melatonin tablet 6 mg Nightly PRN    methylPREDNISolone sodium succinate injection 40 mg Q12H    nicotine 21 mg/24 hr 1 patch Daily    NIFEdipine 24 hr tablet 90 mg Daily    nitroGLYCERIN SL tablet 0.4 mg Q5 Min PRN    pantoprazole EC tablet 40 mg Daily    polyethylene glycol packet 17 g Daily    potassium chloride SA CR tablet 40 mEq TID    sertraline tablet 50 mg Daily    simethicone chewable tablet 80 mg QID PRN    sodium chloride 0.9% flush  10 mL Q12H PRN    spironolactone tablet 50 mg Daily    ticagrelor tablet 90 mg BID    topiramate tablet 150 mg BID     Family History       Problem Relation (Age of Onset)    Coronary aneurysm Father    Coronary artery disease Father    Heart disease Mother, Father    Hypertension Mother, Father    No Known Problems Son, Son, Son, Son, Sister, Brother, Brother, Brother    Osteoarthritis Mother          Tobacco Use    Smoking status: Every Day     Current packs/day: 0.50     Average packs/day: 0.5 packs/day for 33.0 years (16.5 ttl pk-yrs)     Types: Cigarettes     Passive exposure: Current    Smokeless tobacco: Never   Substance and Sexual Activity    Alcohol use: Never    Drug use: Never    Sexual activity: Not Currently     Review of Systems  Objective:     Vital Signs (Most Recent):  Temp: 97.7 °F (36.5 °C) (11/04/23 0600)  Pulse: 62 (11/04/23 0733)  Resp: 20 (11/04/23 0733)  BP: (!) 117/53 (11/04/23 0600)  SpO2: (!) 92 % (11/04/23 0733) Vital Signs (24h Range):  Temp:  [97.6 °F (36.4 °C)-98.9 °F (37.2 °C)] 97.7 °F (36.5 °C)  Pulse:  [61-88] 62  Resp:  [17-26] 20  SpO2:  [73 %-95 %] 92 %  BP: (102-125)/(43-74) 117/53     Weight: (!) 158.3 kg (349 lb) (11/04/23 0235)  Body mass index is 53.07 kg/m².  Body surface area is 2.76 meters squared.    I/O last 3 completed shifts:  In: -   Out: 500 [Urine:500]    Physical Exam Obese, sleepy. Chest clear. 1+ leg edema    Significant Labs:  BMP:   Recent Labs   Lab 11/04/23 0321   *   *   K 2.4*   CL 84*   CO2 43*   BUN 30*   CREATININE 1.55*   CALCIUM 9.5   MG 2.3     CBC:   Recent Labs   Lab 11/04/23 0321   WBC 9.01   RBC 6.03*   HGB 14.4   HCT 46.6      MCV 77.3*   MCH 23.9*   MCHC 30.9*     All labs within the past 24 hours have been reviewed.    Significant Imaging:  Labs: Reviewed  US: Reviewed    Assessment/Plan:     Active Diagnoses:    Diagnosis Date Noted POA    PRINCIPAL PROBLEM:  Acute on chronic respiratory failure with hypoxia and  hypercapnia [J96.21, J96.22] 11/03/2023 Yes    COPD exacerbation [J44.1] 11/03/2023 Yes    Acute on chronic diastolic CHF (congestive heart failure) [I50.33] 11/03/2023 Yes    Acute on chronic renal failure [N17.9, N18.9] 11/03/2023 Yes    NSTEMI (non-ST elevated myocardial infarction) [I21.4] 11/03/2023 Yes    Diabetes mellitus, type 2 [E11.9] 11/03/2023 Yes    Obesity hypoventilation syndrome [E66.2] 11/03/2023 Yes    Hypokalemia [E87.6] 11/03/2023 Yes    Non compliance with medical treatment [Z91.199] 11/03/2023 Not Applicable    Microcytosis [R71.8] 11/03/2023 Yes    COVID-19 [U07.1] 11/03/2023 Yes    GERD (gastroesophageal reflux disease) [K21.9] 11/03/2023 Yes    Nicotine dependence [F17.200] 11/03/2023 Yes    Migraine [G43.909] 11/03/2023 Yes      Problems Resolved During this Admission:       Elevated serum bicarb w/o diuretic on home med. Suspect CO2 retention. Will check ABG's.  Renal sono with normal size kidneys w/o obstruction.  Agree K supplement.  IV Iron supplement reasonable in view of very low iron sat    Thank you for your consult. I will follow-up with patient. Please contact us if you have any additional questions.    Fahad Esposito MD  Nephrology  [facility] - [address] [place] [address]

## 2023-11-04 NOTE — ASSESSMENT & PLAN NOTE
"Patient is identified as having Diastolic (HFpEF) heart failure that is Acute on chronic. CHF is currently uncontrolled due to Continued edema of extremities and Rales/crackles on pulmonary exam. Latest ECHO performed and demonstrates- Results for orders placed during the hospital encounter of 11/24/21    Echo    Interpretation Summary  · The left ventricle is normal in size with concentric remodeling and low normal systolic function.  · The estimated ejection fraction is 50%.  · Atrial fibrillation not observed.  · Normal right ventricular size.  · Normal left ventricular diastolic function.  . Continue ACE/ARB, Furosemide and Aldactone and monitor clinical status closely. Monitor on telemetry. Patient is off CHF pathway.  Monitor strict Is&Os and daily weights.  Place on fluid restriction of 2 L. Cardiology has been consulted. Continue to stress to patient importance of self efficacy and  on diet for CHF. Last BNP reviewed- and noted below No results for input(s): "BNP", "BNPTRIAGEBLO" in the last 168 hours..      - Bumetanide 1 mg BID and home aldactone, but monitor renal function daily  - Holding BB due to CHF exacerbation   - Strict I and O  - Daily weight  - Pending Echo repeat  - Cardiology consulted, thanks for the assistance  " Keystone Flap Text: The defect edges were debeveled with a #15 scalpel blade.  Given the location of the defect, shape of the defect a keystone flap was deemed most appropriate.  Using a sterile surgical marker, an appropriate keystone flap was drawn incorporating the defect, outlining the appropriate donor tissue and placing the expected incisions within the relaxed skin tension lines where possible. The area thus outlined was incised deep to adipose tissue with a #15 scalpel blade.  The skin margins were undermined to an appropriate distance in all directions around the primary defect and laterally outward around the flap utilizing iris scissors. Purse String (Intermediate) Text: Given the location of the defect and the characteristics of the surrounding skin a purse string intermediate closure was deemed most appropriate.  Undermining was performed circumfirentially around the surgical defect.  A purse string suture was then placed and tightened. Inflammation Suggestive Of Cancer Camouflage Histology Text: There was a dense lymphocytic infiltrate which prevented adequate histologic evaluation of adjacent structures. Mohs Method Verbiage: An incision at a 45 degree angle following the standard Mohs approach was done and the specimen was harvested as a microscopic controlled layer. Special Stains Stage 11 - Results: Base On Clearance Noted Above Mart-1 - Positive Histology Text: MART-1 staining demonstrates areas of higher density and clustering of melanocytes with Pagetoid spread upwards within the epidermis. The surgical margins are positive for tumor cells. M-Plasty Complex Repair Preamble Text (Leave Blank If You Do Not Want): Extensive wide undermining was performed. Integrate Histology Into Note?: Yes Burow's Advancement Flap Text: The defect edges were debeveled with a #15 scalpel blade.  Given the location of the defect and the proximity to free margins a Burow's advancement flap was deemed most appropriate.  Using a sterile surgical marker, the appropriate advancement flap was drawn incorporating the defect and placing the expected incisions within the relaxed skin tension lines where possible. The area thus outlined was incised deep to adipose tissue with a #15 scalpel blade.  The skin margins were undermined to an appropriate distance in all directions utilizing iris scissors. Stage 3: Additional Anesthesia Type: 1% lidocaine with epinephrine Quadrants Reporting?: 0 Ftsg Text: The defect edges were debeveled with a #15 scalpel blade.  Given the location of the defect, shape of the defect and the proximity to free margins a full thickness skin graft was deemed most appropriate.  Using a sterile surgical marker, the primary defect shape was transferred to the donor site. The area thus outlined was incised deep to adipose tissue with a #15 scalpel blade.  The harvested graft was then trimmed of adipose tissue until only dermis and epidermis was left.  The skin margins of the secondary defect were undermined to an appropriate distance in all directions utilizing iris scissors.  The secondary defect was closed with interrupted buried subcutaneous sutures.  The skin edges were then re-apposed with running  sutures.  The skin graft was then placed in the primary defect and oriented appropriately. Skin Substitute Text: Given the location of the defect, shape of the defect and the proximity to free margins a skin substitute graft was deemed most appropriate.  The graft material was trimmed to fit the size of the defect. The graft was then placed in the primary defect and oriented appropriately. Quadrant Reporting?: no Complex Repair And Graft Additional Text (Will Appearing After The Standard Complex Repair Text): The complex repair was not sufficient to completely close the primary defect. The remaining additional defect was repaired with the graft mentioned below. Peng Advancement Flap Text: The defect edges were debeveled with a #15 scalpel blade.  Given the location of the defect, shape of the defect and the proximity to free margins a Peng advancement flap was deemed most appropriate.  Using a sterile surgical marker, an appropriate advancement flap was drawn incorporating the defect and placing the expected incisions within the relaxed skin tension lines where possible. The area thus outlined was incised deep to adipose tissue with a #15 scalpel blade.  The skin margins were undermined to an appropriate distance in all directions utilizing iris scissors. Area M Indication Text: Tumors in this location are included in Area M (cheek, forehead, scalp, neck, jawline and pretibial skin).  Mohs surgery is indicated for tumors in these anatomic locations. M-Plasty Intermediate Repair Preamble Text (Leave Blank If You Do Not Want): Undermining was performed with blunt dissection. Consent (Scalp)/Introductory Paragraph: The rationale for Mohs was explained to the patient and consent was obtained. The risks, benefits and alternatives to therapy were discussed in detail. Specifically, the risks of changes in hair growth pattern secondary to repair, infection, scarring, bleeding, prolonged wound healing, incomplete removal, allergy to anesthesia, nerve injury and recurrence were addressed. Prior to the procedure, the treatment site was clearly identified and confirmed by the patient. All components of Universal Protocol/PAUSE Rule completed. Trilobed Flap Text: The defect edges were debeveled with a #15 scalpel blade.  Given the location of the defect and the proximity to free margins a trilobed flap was deemed most appropriate.  Using a sterile surgical marker, an appropriate trilobed flap drawn around the defect.    The area thus outlined was incised deep to adipose tissue with a #15 scalpel blade.  The skin margins were undermined to an appropriate distance in all directions utilizing iris scissors. Mastoid Interpolation Flap Text: A decision was made to reconstruct the defect utilizing an interpolation axial flap and a staged reconstruction.  A telfa template was made of the defect.  This telfa template was then used to outline the mastoid interpolation flap.  The donor area for the pedicle flap was then injected with anesthesia.  The flap was excised through the skin and subcutaneous tissue down to the layer of the underlying musculature.  The pedicle flap was carefully excised within this deep plane to maintain its blood supply.  The edges of the donor site were undermined.   The donor site was closed in a primary fashion.  The pedicle was then rotated into position and sutured.  Once the tube was sutured into place, adequate blood supply was confirmed with blanching and refill.  The pedicle was then wrapped with xeroform gauze and dressed appropriately with a telfa and gauze bandage to ensure continued blood supply and protect the attached pedicle. Referred To Otolaryngology For Closure Text (Leave Blank If You Do Not Want): After obtaining clear surgical margins the patient was sent to otolaryngology for surgical repair.  The patient understands they will receive post-surgical care and follow-up from the referring physician's office. Bcc Infiltrative Histology Text: There were numerous aggregates of basaloid cells demonstrating an infiltrative pattern. Muscle Hinge Flap Text: The defect edges were debeveled with a #15 scalpel blade.  Given the size, depth and location of the defect and the proximity to free margins a muscle hinge flap was deemed most appropriate.  Using a sterile surgical marker, an appropriate hinge flap was drawn incorporating the defect. The area thus outlined was incised with a #15 scalpel blade.  The skin margins were undermined to an appropriate distance in all directions utilizing iris scissors. Advancement Flap (Double) Text: The defect edges were debeveled with a #15 scalpel blade.  Given the location of the defect and the proximity to free margins a double advancement flap was deemed most appropriate.  Using a sterile surgical marker, the appropriate advancement flaps were drawn incorporating the defect and placing the expected incisions within the relaxed skin tension lines where possible. The area thus outlined was incised deep to adipose tissue with a #15 scalpel blade.  The skin margins were undermined to an appropriate distance in all directions utilizing iris scissors. Rhomboid Transposition Flap Text: The defect edges were debeveled with a #15 scalpel blade.  Given the location of the defect and the proximity to free margins a rhomboid transposition flap was deemed most appropriate.  Using a sterile surgical marker, an appropriate rhomboid flap was drawn incorporating the defect.    The area thus outlined was incised deep to adipose tissue with a #15 scalpel blade.  The skin margins were undermined to an appropriate distance in all directions utilizing iris scissors. Initial Size Of Lesion: 2.6 Complex Repair And Flap Additional Text (Will Appearing After The Standard Complex Repair Text): The complex repair was not sufficient to completely close the primary defect. The remaining additional defect was repaired with the flap mentioned below. O-Z Flap Text: The defect edges were debeveled with a #15 scalpel blade.  Given the location of the defect, shape of the defect and the proximity to free margins an O-Z flap was deemed most appropriate.  Using a sterile surgical marker, an appropriate transposition flap was drawn incorporating the defect and placing the expected incisions within the relaxed skin tension lines where possible. The area thus outlined was incised deep to adipose tissue with a #15 scalpel blade.  The skin margins were undermined to an appropriate distance in all directions utilizing iris scissors. W Plasty Text: The lesion was extirpated to the level of the fat with a #15 scalpel blade.  Given the location of the defect, shape of the defect and the proximity to free margins a W-plasty was deemed most appropriate for repair.  Using a sterile surgical marker, the appropriate transposition arms of the W-plasty were drawn incorporating the defect and placing the expected incisions within the relaxed skin tension lines where possible.    The area thus outlined was incised deep to adipose tissue with a #15 scalpel blade.  The skin margins were undermined to an appropriate distance in all directions utilizing iris scissors.  The opposing transposition arms were then transposed into place in opposite direction and anchored with interrupted buried subcutaneous sutures. Dermal Autograft Text: The defect edges were debeveled with a #15 scalpel blade.  Given the location of the defect, shape of the defect and the proximity to free margins a dermal autograft was deemed most appropriate.  Using a sterile surgical marker, the primary defect shape was transferred to the donor site. The area thus outlined was incised deep to adipose tissue with a #15 scalpel blade.  The harvested graft was then trimmed of adipose and epidermal tissue until only dermis was left.  The skin graft was then placed in the primary defect and oriented appropriately. Undermining Type: Entire Wound Bi-Rhombic Flap Text: The defect edges were debeveled with a #15 scalpel blade.  Given the location of the defect and the proximity to free margins a bi-rhombic flap was deemed most appropriate.  Using a sterile surgical marker, an appropriate rhombic flap was drawn incorporating the defect. The area thus outlined was incised deep to adipose tissue with a #15 scalpel blade.  The skin margins were undermined to an appropriate distance in all directions utilizing iris scissors. Consent (Temporal Branch)/Introductory Paragraph: The rationale for Mohs was explained to the patient and consent was obtained. The risks, benefits and alternatives to therapy were discussed in detail. Specifically, the risks of damage to the temporal branch of the facial nerve, infection, scarring, bleeding, prolonged wound healing, incomplete removal, allergy to anesthesia, and recurrence were addressed. Prior to the procedure, the treatment site was clearly identified and confirmed by the patient. All components of Universal Protocol/PAUSE Rule completed. Closure 3 Information: This tab is for additional flaps and grafts above and beyond our usual structured repairs.  Please note if you enter information here it will not currently bill and you will need to add the billing information manually. Bilobed Transposition Flap Text: The defect edges were debeveled with a #15 scalpel blade.  Given the location of the defect and the proximity to free margins a bilobed transposition flap was deemed most appropriate.  Using a sterile surgical marker, an appropriate bilobe flap drawn around the defect.    The area thus outlined was incised deep to adipose tissue with a #15 scalpel blade.  The skin margins were undermined to an appropriate distance in all directions utilizing iris scissors. Epidermal Closure Graft Donor Site (Optional): simple interrupted Consent (Lip)/Introductory Paragraph: The rationale for Mohs was explained to the patient and consent was obtained. The risks, benefits and alternatives to therapy were discussed in detail. Specifically, the risks of lip deformity, changes in the oral aperture, infection, scarring, bleeding, prolonged wound healing, incomplete removal, allergy to anesthesia, nerve injury and recurrence were addressed. Prior to the procedure, the treatment site was clearly identified and confirmed by the patient. All components of Universal Protocol/PAUSE Rule completed. Mart-1 - Negative Histology Text: MART-1 staining demonstrates a normal density and pattern of melanocytes along the dermal-epidermal junction. The surgical margins are negative for tumor cells. Transposition Flap Text: The defect edges were debeveled with a #15 scalpel blade.  Given the location of the defect and the proximity to free margins a transposition flap was deemed most appropriate.  Using a sterile surgical marker, an appropriate transposition flap was drawn incorporating the defect.    The area thus outlined was incised deep to adipose tissue with a #15 scalpel blade.  The skin margins were undermined to an appropriate distance in all directions utilizing iris scissors. Purse String (Simple) Text: Given the location of the defect and the characteristics of the surrounding skin a purse string closure was deemed most appropriate.  Undermining was performed circumfirentially around the surgical defect.  A purse string suture was then placed and tightened. Eye Protection Verbiage: Before proceeding with the stage, a plastic scleral shield was inserted. The globe was anesthetized with a few drops of 1% lidocaine with 1:100,000 epinephrine. Then, an appropriate sized scleral shield was chosen and coated with lacrilube ointment. The shield was gently inserted and left in place for the duration of each stage. After the stage was completed, the shield was gently removed. No Residual Tumor Seen Histology Text: There were no malignant cells seen in the sections examined. Referred To Oculoplastics For Closure Text (Leave Blank If You Do Not Want): After obtaining clear surgical margins the patient was sent to oculoplastics for surgical repair.  The patient understands they will receive post-surgical care and follow-up from the referring physician's office. Island Pedicle Flap-Requiring Vessel Identification Text: The defect edges were debeveled with a #15 scalpel blade.  Given the location of the defect, shape of the defect and the proximity to free margins an island pedicle advancement flap was deemed most appropriate.  Using a sterile surgical marker, an appropriate advancement flap was drawn, based on the axial vessel mentioned above, incorporating the defect, outlining the appropriate donor tissue and placing the expected incisions within the relaxed skin tension lines where possible.    The area thus outlined was incised deep to adipose tissue with a #15 scalpel blade.  The skin margins were undermined to an appropriate distance in all directions around the primary defect and laterally outward around the island pedicle utilizing iris scissors.  There was minimal undermining beneath the pedicle flap. Full Thickness Lip Wedge Repair (Flap) Text: Given the location of the defect and the proximity to free margins a full thickness wedge repair was deemed most appropriate.  Using a sterile surgical marker, the appropriate repair was drawn incorporating the defect and placing the expected incisions perpendicular to the vermillion border.  The vermillion border was also meticulously outlined to ensure appropriate reapproximation during the repair.  The area thus outlined was incised through and through with a #15 scalpel blade.  The muscularis and dermis were reaproximated with deep sutures following hemostasis. Care was taken to realign the vermillion border before proceeding with the superficial closure.  Once the vermillion was realigned the superfical and mucosal closure was finished. Wound Care (No Sutures): Petrolatum Epidermal Autograft Text: The defect edges were debeveled with a #15 scalpel blade.  Given the location of the defect, shape of the defect and the proximity to free margins an epidermal autograft was deemed most appropriate.  Using a sterile surgical marker, the primary defect shape was transferred to the donor site. The epidermal graft was then harvested.  The skin graft was then placed in the primary defect and oriented appropriately. Hemigard Retention Suture: 2-0 Nylon H Plasty Text: Given the location of the defect, shape of the defect and the proximity to free margins a H-plasty was deemed most appropriate for repair.  Using a sterile surgical marker, the appropriate advancement arms of the H-plasty were drawn incorporating the defect and placing the expected incisions within the relaxed skin tension lines where possible. The area thus outlined was incised deep to adipose tissue with a #15 scalpel blade. The skin margins were undermined to an appropriate distance in all directions utilizing iris scissors.  The opposing advancement arms were then advanced into place in opposite direction and anchored with interrupted buried subcutaneous sutures. O-L Flap Text: The defect edges were debeveled with a #15 scalpel blade.  Given the location of the defect, shape of the defect and the proximity to free margins an O-L flap was deemed most appropriate.  Using a sterile surgical marker, an appropriate advancement flap was drawn incorporating the defect and placing the expected incisions within the relaxed skin tension lines where possible.    The area thus outlined was incised deep to adipose tissue with a #15 scalpel blade.  The skin margins were undermined to an appropriate distance in all directions utilizing iris scissors. Body Location Override (Optional - Billing Will Still Be Based On Selected Body Map Location If Applicable): left malar cheek Epidermal Closure: running Presence Of Scar Tissue (For Histology): absent Surgeon: Dr. Julito Jauregui Repair Anesthesia Method: local infiltration Area H Indication Text: Tumors in this location are included in Area H (eyelids, eyebrows, nose, lips, chin, ear, pre-auricular, post-auricular, temple, genitalia, hands, feet, ankles and areola).  Tissue conservation is critical in these anatomic locations. Consent 3/Introductory Paragraph: I gave the patient a chance to ask questions they had about the procedure.  Following this I explained the Mohs procedure and consent was obtained. The risks, benefits and alternatives to therapy were discussed in detail. Specifically, the risks of infection, scarring, bleeding, prolonged wound healing, incomplete removal, allergy to anesthesia, nerve injury and recurrence were addressed. Prior to the procedure, the treatment site was clearly identified and confirmed by the patient. All components of Universal Protocol/PAUSE Rule completed. Melolabial Interpolation Flap Text: A decision was made to reconstruct the defect utilizing an interpolation axial flap and a staged reconstruction.  A telfa template was made of the defect.  This telfa template was then used to outline the melolabial interpolation flap.  The donor area for the pedicle flap was then injected with anesthesia.  The flap was excised through the skin and subcutaneous tissue down to the layer of the underlying musculature.  The pedicle flap was carefully excised within this deep plane to maintain its blood supply.  The edges of the donor site were undermined.   The donor site was closed in a primary fashion.  The pedicle was then rotated into position and sutured.  Once the tube was sutured into place, adequate blood supply was confirmed with blanching and refill.  The pedicle was then wrapped with xeroform gauze and dressed appropriately with a telfa and gauze bandage to ensure continued blood supply and protect the attached pedicle. Mucosal Advancement Flap Text: Given the location of the defect, shape of the defect and the proximity to free margins a mucosal advancement flap was deemed most appropriate. Incisions were made with a 15 blade scalpel in the appropriate fashion along the cutaneous vermillion border and the mucosal lip. The remaining actinically damaged mucosal tissue was excised.  The mucosal advancement flap was then elevated to the gingival sulcus with care taken to preserve the neurovascular structures and advanced into the primary defect. Care was taken to ensure that precise realignment of the vermillion border was achieved. No Repair - Repaired With Adjacent Surgical Defect Text (Leave Blank If You Do Not Want): After obtaining clear surgical margins the defect was repaired concurrently with another surgical defect which was in close approximation. Xenograft Text: The defect edges were debeveled with a #15 scalpel blade.  Given the location of the defect, shape of the defect and the proximity to free margins a xenograft was deemed most appropriate.  The graft was then trimmed to fit the size of the defect.  The graft was then placed in the primary defect and oriented appropriately. Star Wedge Flap Text: The defect edges were debeveled with a #15 scalpel blade.  Given the location of the defect, shape of the defect and the proximity to free margins a star wedge flap was deemed most appropriate.  Using a sterile surgical marker, an appropriate rotation flap was drawn incorporating the defect and placing the expected incisions within the relaxed skin tension lines where possible. The area thus outlined was incised deep to adipose tissue with a #15 scalpel blade.  The skin margins were undermined to an appropriate distance in all directions utilizing iris scissors. Double Island Pedicle Flap Text: The defect edges were debeveled with a #15 scalpel blade.  Given the location of the defect, shape of the defect and the proximity to free margins a double island pedicle advancement flap was deemed most appropriate.  Using a sterile surgical marker, an appropriate advancement flap was drawn incorporating the defect, outlining the appropriate donor tissue and placing the expected incisions within the relaxed skin tension lines where possible.    The area thus outlined was incised deep to adipose tissue with a #15 scalpel blade.  The skin margins were undermined to an appropriate distance in all directions around the primary defect and laterally outward around the island pedicle utilizing iris scissors.  There was minimal undermining beneath the pedicle flap. Same Histology In Subsequent Stages Text: The pattern and morphology of the tumor is as described in the first stage. Rhombic Flap Text: The defect edges were debeveled with a #15 scalpel blade.  Given the location of the defect and the proximity to free margins a rhombic flap was deemed most appropriate.  Using a sterile surgical marker, an appropriate rhombic flap was drawn incorporating the defect.    The area thus outlined was incised deep to adipose tissue with a #15 scalpel blade.  The skin margins were undermined to an appropriate distance in all directions utilizing iris scissors. Ear Wedge Repair Text: A wedge excision was completed by carrying down an excision through the full thickness of the ear and cartilage with an inward facing Burow's triangle. The wound was then closed in a layered fashion. Tumor Depth: Less than 6mm from granular layer and no invasion beyond the subcutaneous fat Manual Repair Warning Statement: We plan on removing the manually selected variable below in favor of our much easier automatic structured text blocks found in the previous tab. We decided to do this to help make the flow better and give you the full power of structured data. Manual selection is never going to be ideal in our platform and I would encourage you to avoid using manual selection from this point on, especially since I will be sunsetting this feature. It is important that you do one of two things with the customized text below. First, you can save all of the text in a word file so you can have it for future reference. Second, transfer the text to the appropriate area in the Library tab. Lastly, if there is a flap or graft type which we do not have you need to let us know right away so I can add it in before the variable is hidden. No need to panic, we plan to give you roughly 6 months to make the change. Retention Suture Bite Size: 3 mm Staging Info: By selecting yes to the question above you will include information on AJCC 8 tumor staging in your Mohs note. Information on tumor staging will be automatically added for SCCs on the head and neck. AJCC 8 includes tumor size, tumor depth, perineural involvement and bone invasion. Tarsorrhaphy Text: A tarsorrhaphy was performed using Frost sutures. Advancement Flap (Single) Text: The defect edges were debeveled with a #15 scalpel blade.  Given the location of the defect and the proximity to free margins a single advancement flap was deemed most appropriate.  Using a sterile surgical marker, an appropriate advancement flap was drawn incorporating the defect and placing the expected incisions within the relaxed skin tension lines where possible. The area thus outlined was incised deep to adipose tissue with a #15 scalpel blade.  The skin margins were undermined to an appropriate distance in all directions utilizing iris scissors. Consent 2/Introductory Paragraph: Mohs surgery was explained to the patient and consent was obtained. The risks, benefits and alternatives to therapy were discussed in detail. Specifically, the risks of infection, scarring, bleeding, prolonged wound healing, incomplete removal, allergy to anesthesia, nerve injury and recurrence were addressed. Prior to the procedure, the treatment site was clearly identified and confirmed by the patient. All components of Universal Protocol/PAUSE Rule completed. Repair Hemostasis (Optional): Electrocautery Modified Advancement Flap Text: The defect edges were debeveled with a #15 scalpel blade.  Given the location of the defect, shape of the defect and the proximity to free margins a modified advancement flap was deemed most appropriate.  Using a sterile surgical marker, an appropriate advancement flap was drawn incorporating the defect and placing the expected incisions within the relaxed skin tension lines where possible.    The area thus outlined was incised deep to adipose tissue with a #15 scalpel blade.  The skin margins were undermined to an appropriate distance in all directions utilizing iris scissors. Consent (Nose)/Introductory Paragraph: The rationale for Mohs was explained to the patient and consent was obtained. The risks, benefits and alternatives to therapy were discussed in detail. Specifically, the risks of nasal deformity, changes in the flow of air through the nose, infection, scarring, bleeding, prolonged wound healing, incomplete removal, allergy to anesthesia, nerve injury and recurrence were addressed. Prior to the procedure, the treatment site was clearly identified and confirmed by the patient. All components of Universal Protocol/PAUSE Rule completed. Interpolation Flap Text: A decision was made to reconstruct the defect utilizing an interpolation axial flap and a staged reconstruction.  A telfa template was made of the defect.  This telfa template was then used to outline the interpolation flap.  The donor area for the pedicle flap was then injected with anesthesia.  The flap was excised through the skin and subcutaneous tissue down to the layer of the underlying musculature.  The interpolation flap was carefully excised within this deep plane to maintain its blood supply.  The edges of the donor site were undermined.   The donor site was closed in a primary fashion.  The pedicle was then rotated into position and sutured.  Once the tube was sutured into place, adequate blood supply was confirmed with blanching and refill.  The pedicle was then wrapped with xeroform gauze and dressed appropriately with a telfa and gauze bandage to ensure continued blood supply and protect the attached pedicle. Bilobed Flap Text: The defect edges were debeveled with a #15 scalpel blade.  Given the location of the defect and the proximity to free margins a bilobe flap was deemed most appropriate.  Using a sterile surgical marker, an appropriate bilobe flap drawn around the defect.    The area thus outlined was incised deep to adipose tissue with a #15 scalpel blade.  The skin margins were undermined to an appropriate distance in all directions utilizing iris scissors. Alar Island Pedicle Flap Text: The defect edges were debeveled with a #15 scalpel blade.  Given the location of the defect, shape of the defect and the proximity to the alar rim an island pedicle advancement flap was deemed most appropriate.  Using a sterile surgical marker, an appropriate advancement flap was drawn incorporating the defect, outlining the appropriate donor tissue and placing the expected incisions within the nasal ala running parallel to the alar rim. The area thus outlined was incised with a #15 scalpel blade.  The skin margins were undermined minimally to an appropriate distance in all directions around the primary defect and laterally outward around the island pedicle utilizing iris scissors.  There was minimal undermining beneath the pedicle flap. Repair Performed By Another Provider Text (Leave Blank If You Do Not Want): After obtaining clear surgical margins the defect was repaired by another provider. Information: Selecting Yes will display possible errors in your note based on the variables you have selected. This validation is only offered as a suggestion for you. PLEASE NOTE THAT THE VALIDATION TEXT WILL BE REMOVED WHEN YOU FINALIZE YOUR NOTE. IF YOU WANT TO FAX A PRELIMINARY NOTE YOU WILL NEED TO TOGGLE THIS TO 'NO' IF YOU DO NOT WANT IT IN YOUR FAXED NOTE. Melolabial Transposition Flap Text: The defect edges were debeveled with a #15 scalpel blade.  Given the location of the defect and the proximity to free margins a melolabial flap was deemed most appropriate.  Using a sterile surgical marker, an appropriate melolabial transposition flap was drawn incorporating the defect.    The area thus outlined was incised deep to adipose tissue with a #15 scalpel blade.  The skin margins were undermined to an appropriate distance in all directions utilizing iris scissors. O-T Advancement Flap Text: The defect edges were debeveled with a #15 scalpel blade.  Given the location of the defect, shape of the defect and the proximity to free margins an O-T advancement flap was deemed most appropriate.  Using a sterile surgical marker, an appropriate advancement flap was drawn incorporating the defect and placing the expected incisions within the relaxed skin tension lines where possible.    The area thus outlined was incised deep to adipose tissue with a #15 scalpel blade.  The skin margins were undermined to an appropriate distance in all directions utilizing iris scissors. Composite Graft Text: The defect edges were debeveled with a #15 scalpel blade.  Given the location of the defect, shape of the defect, the proximity to free margins and the fact the defect was full thickness a composite graft was deemed most appropriate.  The defect was outline and then transferred to the donor site.  A full thickness graft was then excised from the donor site. The graft was then placed in the primary defect, oriented appropriately and then sutured into place.  The secondary defect was then repaired using a primary closure. V-Y Plasty Text: The defect edges were debeveled with a #15 scalpel blade.  Given the location of the defect, shape of the defect and the proximity to free margins an V-Y advancement flap was deemed most appropriate.  Using a sterile surgical marker, an appropriate advancement flap was drawn incorporating the defect and placing the expected incisions within the relaxed skin tension lines where possible.    The area thus outlined was incised deep to adipose tissue with a #15 scalpel blade.  The skin margins were undermined to an appropriate distance in all directions utilizing iris scissors. Cheek-To-Nose Interpolation Flap Text: A decision was made to reconstruct the defect utilizing an interpolation axial flap and a staged reconstruction.  A telfa template was made of the defect.  This telfa template was then used to outline the Cheek-To-Nose Interpolation flap.  The donor area for the pedicle flap was then injected with anesthesia.  The flap was excised through the skin and subcutaneous tissue down to the layer of the underlying musculature.  The interpolation flap was carefully excised within this deep plane to maintain its blood supply.  The edges of the donor site were undermined.   The donor site was closed in a primary fashion.  The pedicle was then rotated into position and sutured.  Once the tube was sutured into place, adequate blood supply was confirmed with blanching and refill.  The pedicle was then wrapped with xeroform gauze and dressed appropriately with a telfa and gauze bandage to ensure continued blood supply and protect the attached pedicle. Dressing: steri-strips and pressure dressing Mercedes Flap Text: The defect edges were debeveled with a #15 scalpel blade.  Given the location of the defect, shape of the defect and the proximity to free margins a Mercedes flap was deemed most appropriate.  Using a sterile surgical marker, an appropriate advancement flap was drawn incorporating the defect and placing the expected incisions within the relaxed skin tension lines where possible. The area thus outlined was incised deep to adipose tissue with a #15 scalpel blade.  The skin margins were undermined to an appropriate distance in all directions utilizing iris scissors. Banner Transposition Flap Text: The defect edges were debeveled with a #15 scalpel blade.  Given the location of the defect and the proximity to free margins a Banner transposition flap was deemed most appropriate.  Using a sterile surgical marker, an appropriate flap drawn around the defect. The area thus outlined was incised deep to adipose tissue with a #15 scalpel blade.  The skin margins were undermined to an appropriate distance in all directions utilizing iris scissors. Why Was The Change Made?: Please Select the Appropriate Response Mauc Instructions: By selecting yes to the question below the MAUC number will be added into the note.  This will be calculated automatically based on the diagnosis chosen, the size entered, the body zone selected (H,M,L) and the specific indications you chose. You will also have the option to override the Mohs AUC if you disagree with the automatically calculated number and this option is found in the Case Summary tab. Consent (Ear)/Introductory Paragraph: The rationale for Mohs was explained to the patient and consent was obtained. The risks, benefits and alternatives to therapy were discussed in detail. Specifically, the risks of ear deformity, infection, scarring, bleeding, prolonged wound healing, incomplete removal, allergy to anesthesia, nerve injury and recurrence were addressed. Prior to the procedure, the treatment site was clearly identified and confirmed by the patient. All components of Universal Protocol/PAUSE Rule completed. Staged Advancement Flap Text: The defect edges were debeveled with a #15 scalpel blade.  Given the location of the defect, shape of the defect and the proximity to free margins a staged advancement flap was deemed most appropriate.  Using a sterile surgical marker, an appropriate advancement flap was drawn incorporating the defect and placing the expected incisions within the relaxed skin tension lines where possible. The area thus outlined was incised deep to adipose tissue with a #15 scalpel blade.  The skin margins were undermined to an appropriate distance in all directions utilizing iris scissors. Cheiloplasty (Complex) Text: A decision was made to reconstruct the defect with a  cheiloplasty.  The defect was undermined extensively.  Additional obicularis oris muscle was excised with a 15 blade scalpel.  The defect was converted into a full thickness wedge to facilite a better cosmetic result.  Small vessels were then tied off with 5-0 monocyrl. The obicularis oris, superficial fascia, adipose and dermis were then reapproximated.  After the deeper layers were approximated the epidermis was reapproximated with particular care given to realign the vermillion border. Secondary Intention Text (Leave Blank If You Do Not Want): The defect will heal with secondary intention. Anesthesia Type: 1% lidocaine with 1:100,000 epinephrine and 408mcg clindamycin/ml and a 1:10 solution of 8.4% sodium bicarbonate Dressing (No Sutures): dry sterile dressing Localized Dermabrasion With Wire Brush Text: The patient was draped in routine manner.  Localized dermabrasion using 3 x 17 mm wire brush was performed in routine manner to papillary dermis. This spot dermabrasion is being performed to complete skin cancer reconstruction. It also will eliminate the other sun damaged precancerous cells that are known to be part of the regional effect of a lifetime's worth of sun exposure. This localized dermabrasion is therapeutic and should not be considered cosmetic in any regard. Stage 1: Number Of Blocks?: 1 Referred To Mid-Level For Closure Text (Leave Blank If You Do Not Want): After obtaining clear surgical margins the patient was sent to a mid-level provider for surgical repair.  The patient understands they will receive post-surgical care and follow-up from the mid-level provider. Double O-Z Plasty Text: The defect edges were debeveled with a #15 scalpel blade.  Given the location of the defect, shape of the defect and the proximity to free margins a Double O-Z plasty (double transposition flap) was deemed most appropriate.  Using a sterile surgical marker, the appropriate transposition flaps were drawn incorporating the defect and placing the expected incisions within the relaxed skin tension lines where possible. The area thus outlined was incised deep to adipose tissue with a #15 scalpel blade.  The skin margins were undermined to an appropriate distance in all directions utilizing iris scissors.  Hemostasis was achieved with electrocautery.  The flaps were then transposed into place, one clockwise and the other counterclockwise, and anchored with interrupted buried subcutaneous sutures. Cartilage Graft Text: The defect edges were debeveled with a #15 scalpel blade.  Given the location of the defect, shape of the defect, the fact the defect involved a full thickness cartilage defect a cartilage graft was deemed most appropriate.  An appropriate donor site was identified, cleansed, and anesthetized. The cartilage graft was then harvested and transferred to the recipient site, oriented appropriately and then sutured into place.  The secondary defect was then repaired using a primary closure. Primary Defect Width In Cm (Final Defect Size - Required For Flaps/Grafts): 2.8 Consent 1/Introductory Paragraph: The rationale for Mohs was explained to the patient and consent was obtained. The risks, benefits and alternatives to therapy were discussed in detail. Specifically, the risks of infection, scarring, bleeding, prolonged wound healing, incomplete removal, allergy to anesthesia, nerve injury and recurrence were addressed. Prior to the procedure, the treatment site was clearly identified and confirmed by the patient. All components of Universal Protocol/PAUSE Rule completed. Anesthesia Volume In Cc: 6 A-T Advancement Flap Text: The defect edges were debeveled with a #15 scalpel blade.  Given the location of the defect, shape of the defect and the proximity to free margins an A-T advancement flap was deemed most appropriate.  Using a sterile surgical marker, an appropriate advancement flap was drawn incorporating the defect and placing the expected incisions within the relaxed skin tension lines where possible.    The area thus outlined was incised deep to adipose tissue with a #15 scalpel blade.  The skin margins were undermined to an appropriate distance in all directions utilizing iris scissors. Ear Star Wedge Flap Text: The defect edges were debeveled with a #15 blade scalpel.  Given the location of the defect and the proximity to free margins (helical rim) an ear star wedge flap was deemed most appropriate.  Using a sterile surgical marker, the appropriate flap was drawn incorporating the defect and placing the expected incisions between the helical rim and antihelix where possible.  The area thus outlined was incised through and through with a #15 scalpel blade. Donor Site Anesthesia Type: same as repair anesthesia Graft Cartilage Fenestration Text: The cartilage was fenestrated with a 2mm punch biopsy to help facilitate graft survival and healing. Cheiloplasty (Less Than 50%) Text: A decision was made to reconstruct the defect with a  cheiloplasty.  The defect was undermined extensively.  Additional obicularis oris muscle was excised with a 15 blade scalpel.  The defect was converted into a full thickness wedge, of less than 50% of the vertical height of the lip, to facilite a better cosmetic result.  Small vessels were then tied off with 5-0 monocyrl. The obicularis oris, superficial fascia, adipose and dermis were then reapproximated.  After the deeper layers were approximated the epidermis was reapproximated with particular care given to realign the vermillion border. Spiral Flap Text: The defect edges were debeveled with a #15 scalpel blade.  Given the location of the defect, shape of the defect and the proximity to free margins a spiral flap was deemed most appropriate.  Using a sterile surgical marker, an appropriate rotation flap was drawn incorporating the defect and placing the expected incisions within the relaxed skin tension lines where possible. The area thus outlined was incised deep to adipose tissue with a #15 scalpel blade.  The skin margins were undermined to an appropriate distance in all directions utilizing iris scissors. Island Pedicle Flap With Canthal Suspension Text: The defect edges were debeveled with a #15 scalpel blade.  Given the location of the defect, shape of the defect and the proximity to free margins an island pedicle advancement flap was deemed most appropriate.  Using a sterile surgical marker, an appropriate advancement flap was drawn incorporating the defect, outlining the appropriate donor tissue and placing the expected incisions within the relaxed skin tension lines where possible. The area thus outlined was incised deep to adipose tissue with a #15 scalpel blade.  The skin margins were undermined to an appropriate distance in all directions around the primary defect and laterally outward around the island pedicle utilizing iris scissors.  There was minimal undermining beneath the pedicle flap. A suspension suture was placed in the canthal tendon to prevent tension and prevent ectropion. Depth Of Tumor Invasion (For Histology): tumor not visualized (deep and peripheral margins are clear of tumor) Orbicularis Oris Muscle Flap Text: The defect edges were debeveled with a #15 scalpel blade.  Given that the defect affected the competency of the oral sphincter an obicularis oris muscle flap was deemed most appropriate to restore this competency and normal muscle function.  Using a sterile surgical marker, an appropriate flap was drawn incorporating the defect. The area thus outlined was incised with a #15 scalpel blade. Length To Time In Minutes Device Was In Place: 10 Crescentic Advancement Flap Text: The defect edges were debeveled with a #15 scalpel blade.  Given the location of the defect and the proximity to free margins a crescentic advancement flap was deemed most appropriate.  Using a sterile surgical marker, the appropriate advancement flap was drawn incorporating the defect and placing the expected incisions within the relaxed skin tension lines where possible.    The area thus outlined was incised deep to adipose tissue with a #15 scalpel blade.  The skin margins were undermined to an appropriate distance in all directions utilizing iris scissors. O-Z Plasty Text: The defect edges were debeveled with a #15 scalpel blade.  Given the location of the defect, shape of the defect and the proximity to free margins an O-Z plasty (double transposition flap) was deemed most appropriate.  Using a sterile surgical marker, the appropriate transposition flaps were drawn incorporating the defect and placing the expected incisions within the relaxed skin tension lines where possible.    The area thus outlined was incised deep to adipose tissue with a #15 scalpel blade.  The skin margins were undermined to an appropriate distance in all directions utilizing iris scissors.  Hemostasis was achieved with electrocautery.  The flaps were then transposed into place, one clockwise and the other counterclockwise, and anchored with interrupted buried subcutaneous sutures. Burow's Graft Text: The defect edges were debeveled with a #15 scalpel blade.  Given the location of the defect, shape of the defect, the proximity to free margins and the presence of a standing cone deformity a Burow's skin graft was deemed most appropriate. The standing cone was removed and this tissue was then trimmed to the shape of the primary defect. The adipose tissue was also removed until only dermis and epidermis were left.  The skin margins of the secondary defect were undermined to an appropriate distance in all directions utilizing iris scissors.  The secondary defect was closed with interrupted buried subcutaneous sutures.  The skin edges were then re-apposed with running  sutures.  The skin graft was then placed in the primary defect and oriented appropriately. Alternatives Discussed Intro (Do Not Add Period): I discussed alternative treatments to Mohs surgery and specifically discussed the risks and benefits of Primary Defect Length In Cm (Final Defect Size - Required For Flaps/Grafts): 3.1 Consent Type: Consent 1 (Standard) Advancement-Rotation Flap Text: The defect edges were debeveled with a #15 scalpel blade.  Given the location of the defect, shape of the defect and the proximity to free margins an advancement-rotation flap was deemed most appropriate.  Using a sterile surgical marker, an appropriate flap was drawn incorporating the defect and placing the expected incisions within the relaxed skin tension lines where possible. The area thus outlined was incised deep to adipose tissue with a #15 scalpel blade.  The skin margins were undermined to an appropriate distance in all directions utilizing iris scissors. Consent (Near Eyelid Margin)/Introductory Paragraph: The rationale for Mohs was explained to the patient and consent was obtained. The risks, benefits and alternatives to therapy were discussed in detail. Specifically, the risks of ectropion or eyelid deformity, infection, scarring, bleeding, prolonged wound healing, incomplete removal, allergy to anesthesia, nerve injury and recurrence were addressed. Prior to the procedure, the treatment site was clearly identified and confirmed by the patient. All components of Universal Protocol/PAUSE Rule completed. Cheek Interpolation Flap Text: A decision was made to reconstruct the defect utilizing an interpolation axial flap and a staged reconstruction.  A telfa template was made of the defect.  This telfa template was then used to outline the Cheek Interpolation flap.  The donor area for the pedicle flap was then injected with anesthesia.  The flap was excised through the skin and subcutaneous tissue down to the layer of the underlying musculature.  The interpolation flap was carefully excised within this deep plane to maintain its blood supply.  The edges of the donor site were undermined.   The donor site was closed in a primary fashion.  The pedicle was then rotated into position and sutured.  Once the tube was sutured into place, adequate blood supply was confirmed with blanching and refill.  The pedicle was then wrapped with xeroform gauze and dressed appropriately with a telfa and gauze bandage to ensure continued blood supply and protect the attached pedicle. Closure 2 Information: This tab is for additional flaps and grafts, including complex repair and grafts and complex repair and flaps. You can also specify a different location for the additional defect, if the location is the same you do not need to select a new one. We will insert the automated text for the repair you select below just as we do for solitary flaps and grafts. Please note that at this time if you select a location with a different insurance zone you will need to override the ICD10 and CPT if appropriate. Where Do You Want The Question To Include Opioid Counseling Located?: Case Summary Tab Non-Graft Cartilage Fenestration Text: The cartilage was fenestrated with a 2mm punch biopsy to help facilitate healing. Paramedian Forehead Flap Text: A decision was made to reconstruct the defect utilizing an interpolation axial flap and a staged reconstruction.  A telfa template was made of the defect.  This telfa template was then used to outline the paramedian forehead pedicle flap.  The donor area for the pedicle flap was then injected with anesthesia.  The flap was excised through the skin and subcutaneous tissue down to the layer of the underlying musculature.  The pedicle flap was carefully excised within this deep plane to maintain its blood supply.  The edges of the donor site were undermined.   The donor site was closed in a primary fashion.  The pedicle was then rotated into position and sutured.  Once the tube was sutured into place, adequate blood supply was confirmed with blanching and refill.  The pedicle was then wrapped with xeroform gauze and dressed appropriately with a telfa and gauze bandage to ensure continued blood supply and protect the attached pedicle. Mohs Rapid Report Verbiage: The area of clinically evident tumor was marked with skin marking ink and appropriately hatched.  The initial incision was made following the Mohs approach through the skin.  The specimen was taken to the lab, divided into the necessary number of pieces, chromacoded and processed according to the Mohs protocol.  This was repeated in successive stages until a tumor free defect was achieved. Island Pedicle Flap Text: The defect edges were debeveled with a #15 scalpel blade.  Given the location of the defect, shape of the defect and the proximity to free margins an island pedicle advancement flap was deemed most appropriate.  Using a sterile surgical marker, an appropriate advancement flap was drawn incorporating the defect, outlining the appropriate donor tissue and placing the expected incisions within the relaxed skin tension lines where possible.    The area thus outlined was incised deep to adipose tissue with a #15 scalpel blade.  The skin margins were undermined to an appropriate distance in all directions around the primary defect and laterally outward around the island pedicle utilizing iris scissors.  There was minimal undermining beneath the pedicle flap. Postop Diagnosis: same Referred To Asc For Closure Text (Leave Blank If You Do Not Want): After obtaining clear surgical margins the patient was sent to an ASC for surgical repair.  The patient understands they will receive post-surgical care and follow-up from the ASC physician. Nasalis-Muscle-Based Myocutaneous Island Pedicle Flap Text: Using a #15 blade, an incision was made around the donor flap to the level of the nasalis muscle. Wide lateral undermining was then performed in both the subcutaneous plane above the nasalis muscle, and in a submuscular plane just above periosteum. This allowed the formation of a free nasalis muscle axial pedicle (based on the angular artery) which was still attached to the actual cutaneous flap, increasing its mobility and vascular viability. Hemostasis was obtained with pinpoint electrocoagulation. The flap was mobilized into position and the pivotal anchor points positioned and stabilized with buried interrupted sutures. Subcutaneous and dermal tissues were closed in a multilayered fashion with sutures. Tissue redundancies were excised, and the epidermal edges were apposed without significant tension and sutured with sutures. Repair Type: Referred to ASC for closure Mohs Histo Method Verbiage: Each section was then chromacoded and processed in the Mohs lab using the Mohs protocol and submitted for frozen section. Partial Purse String (Intermediate) Text: Given the location of the defect and the characteristics of the surrounding skin an intermediate purse string closure was deemed most appropriate.  Undermining was performed circumfirentially around the surgical defect.  A purse string suture was then placed and tightened. Wound tension only allowed a partial closure of the circular defect. Bcc Histology Text: There were numerous aggregates of basaloid cells. V-Y Flap Text: The defect edges were debeveled with a #15 scalpel blade.  Given the location of the defect, shape of the defect and the proximity to free margins a V-Y flap was deemed most appropriate.  Using a sterile surgical marker, an appropriate advancement flap was drawn incorporating the defect and placing the expected incisions within the relaxed skin tension lines where possible.    The area thus outlined was incised deep to adipose tissue with a #15 scalpel blade.  The skin margins were undermined to an appropriate distance in all directions utilizing iris scissors. Zygomaticofacial Flap Text: Given the location of the defect, shape of the defect and the proximity to free margins a zygomaticofacial flap was deemed most appropriate for repair.  Using a sterile surgical marker, the appropriate flap was drawn incorporating the defect and placing the expected incisions within the relaxed skin tension lines where possible. The area thus outlined was incised deep to adipose tissue with a #15 scalpel blade with preservation of a vascular pedicle.  The skin margins were undermined to an appropriate distance in all directions utilizing iris scissors.  The flap was then placed into the defect and anchored with interrupted buried subcutaneous sutures. Tissue Cultured Epidermal Autograft Text: The defect edges were debeveled with a #15 scalpel blade.  Given the location of the defect, shape of the defect and the proximity to free margins a tissue cultured epidermal autograft was deemed most appropriate.  The graft was then trimmed to fit the size of the defect.  The graft was then placed in the primary defect and oriented appropriately. Bilateral Helical Rim Advancement Flap Text: The defect edges were debeveled with a #15 blade scalpel.  Given the location of the defect and the proximity to free margins (helical rim) a bilateral helical rim advancement flap was deemed most appropriate.  Using a sterile surgical marker, the appropriate advancement flaps were drawn incorporating the defect and placing the expected incisions between the helical rim and antihelix where possible.  The area thus outlined was incised through and through with a #15 scalpel blade.  With a skin hook and iris scissors, the flaps were gently and sharply undermined and freed up. Consent (Spinal Accessory)/Introductory Paragraph: The rationale for Mohs was explained to the patient and consent was obtained. The risks, benefits and alternatives to therapy were discussed in detail. Specifically, the risks of damage to the spinal accessory nerve, infection, scarring, bleeding, prolonged wound healing, incomplete removal, allergy to anesthesia, and recurrence were addressed. Prior to the procedure, the treatment site was clearly identified and confirmed by the patient. All components of Universal Protocol/PAUSE Rule completed. Rotation Flap Text: The defect edges were debeveled with a #15 scalpel blade.  Given the location of the defect, shape of the defect and the proximity to free margins a rotation flap was deemed most appropriate.  Using a sterile surgical marker, an appropriate rotation flap was drawn incorporating the defect and placing the expected incisions within the relaxed skin tension lines where possible.    The area thus outlined was incised deep to adipose tissue with a #15 scalpel blade.  The skin margins were undermined to an appropriate distance in all directions utilizing iris scissors. Tumor Debulked?: curette Nasal Turnover Hinge Flap Text: The defect edges were debeveled with a #15 scalpel blade.  Given the size, depth, location of the defect and the defect being full thickness a nasal turnover hinge flap was deemed most appropriate.  Using a sterile surgical marker, an appropriate hinge flap was drawn incorporating the defect. The area thus outlined was incised with a #15 scalpel blade. The flap was designed to recreate the nasal mucosal lining and the alar rim. The skin margins were undermined to an appropriate distance in all directions utilizing iris scissors. Post-Care Instructions: I reviewed with the patient in detail post-care instructions. Patient is not to engage in any heavy lifting, exercise, or swimming for the next 14 days. Should the patient develop any fevers, chills, bleeding, severe pain patient will contact the office immediately.\\n\\nThis procedure was performed using accepted \"true\" Mohs microscopic surgical technique with Dr. Julito Jauregui Performing the surgical removal of all specimens, mapping of all tissue removed and pathologic evaluation of all histology slides to determine the absence or presence of tumor and correlate that to the Mohs map. Dr. Julito Jauregui is an [employer] fellowship trained Mohs surgeon who acted in the integrated but distinct roles of surgeon and pathologist for all facets of the procedure. No providers other than Dr. Julito Jauregui were involved in either the removal of tissue or evaluation of histology slides.\\n\\nDr. Julito Jauregui M.D.\\nMohs Surgeon and Mohs Pathologist Subsequent Stages Histo Method Verbiage: Using a similar technique to that described above, a thin layer of tissue was removed from all areas where tumor was visible on the previous stage.  The tissue was again oriented, mapped, dyed, and processed as above. Partial Purse String (Simple) Text: Given the location of the defect and the characteristics of the surrounding skin a simple purse string closure was deemed most appropriate.  Undermining was performed circumfirentially around the surgical defect.  A purse string suture was then placed and tightened. Wound tension only allowed a partial closure of the circular defect. Surgeon/Pathologist Verbiage (Will Incorporate Name Of Surgeon From Intro If Not Blank): operated in two distinct and integrated capacities as the surgeon and pathologist. Referred To Plastics For Closure Text (Leave Blank If You Do Not Want): After obtaining clear surgical margins the patient was sent to plastics for surgical repair.  The patient understands they will receive post-surgical care and follow-up from the referring physician's office. Split-Thickness Skin Graft Text: The defect edges were debeveled with a #15 scalpel blade.  Given the location of the defect, shape of the defect and the proximity to free margins a split thickness skin graft was deemed most appropriate.  Using a sterile surgical marker, the primary defect shape was transferred to the donor site. The split thickness graft was then harvested.  The skin graft was then placed in the primary defect and oriented appropriately. O-T Plasty Text: The defect edges were debeveled with a #15 scalpel blade.  Given the location of the defect, shape of the defect and the proximity to free margins an O-T plasty was deemed most appropriate.  Using a sterile surgical marker, an appropriate O-T plasty was drawn incorporating the defect and placing the expected incisions within the relaxed skin tension lines where possible.    The area thus outlined was incised deep to adipose tissue with a #15 scalpel blade.  The skin margins were undermined to an appropriate distance in all directions utilizing iris scissors. Medical Necessity Statement: Based on my medical judgement, Mohs surgery is the most appropriate treatment for this cancer compared to other treatments. Chonodrocutaneous Helical Advancement Flap Text: The defect edges were debeveled with a #15 scalpel blade.  Given the location of the defect and the proximity to free margins a chondrocutaneous helical advancement flap was deemed most appropriate.  Using a sterile surgical marker, the appropriate advancement flap was drawn incorporating the defect and placing the expected incisions within the relaxed skin tension lines where possible. The area thus outlined was incised deep to adipose tissue with a #15 scalpel blade. The skin margins were undermined to an appropriate distance in all directions utilizing iris scissors. Consent (Marginal Mandibular)/Introductory Paragraph: The rationale for Mohs was explained to the patient and consent was obtained. The risks, benefits and alternatives to therapy were discussed in detail. Specifically, the risks of damage to the marginal mandibular branch of the facial nerve, infection, scarring, bleeding, prolonged wound healing, incomplete removal, allergy to anesthesia, and recurrence were addressed. Prior to the procedure, the treatment site was clearly identified and confirmed by the patient. All components of Universal Protocol/PAUSE Rule completed. Double O-Z Flap Text: The defect edges were debeveled with a #15 scalpel blade.  Given the location of the defect, shape of the defect and the proximity to free margins a Double O-Z flap was deemed most appropriate.  Using a sterile surgical marker, an appropriate transposition flap was drawn incorporating the defect and placing the expected incisions within the relaxed skin tension lines where possible. The area thus outlined was incised deep to adipose tissue with a #15 scalpel blade.  The skin margins were undermined to an appropriate distance in all directions utilizing iris scissors. Z Plasty Text: The lesion was extirpated to the level of the fat with a #15 scalpel blade.  Given the location of the defect, shape of the defect and the proximity to free margins a Z-plasty was deemed most appropriate for repair.  Using a sterile surgical marker, the appropriate transposition arms of the Z-plasty were drawn incorporating the defect and placing the expected incisions within the relaxed skin tension lines where possible.    The area thus outlined was incised deep to adipose tissue with a #15 scalpel blade.  The skin margins were undermined to an appropriate distance in all directions utilizing iris scissors.  The opposing transposition arms were then transposed into place in opposite direction and anchored with interrupted buried subcutaneous sutures. Area L Indication Text: Tumors in this location are included in Area L (trunk and extremities).  Mohs surgery is indicated for larger tumors, or tumors with aggressive histologic features, in these anatomic locations. Helical Rim Advancement Flap Text: The defect edges were debeveled with a #15 blade scalpel.  Given the location of the defect and the proximity to free margins (helical rim) a double helical rim advancement flap was deemed most appropriate.  Using a sterile surgical marker, the appropriate advancement flaps were drawn incorporating the defect and placing the expected incisions between the helical rim and antihelix where possible.  The area thus outlined was incised through and through with a #15 scalpel blade.  With a skin hook and iris scissors, the flaps were gently and sharply undermined and freed up. Detail Level: Detailed Posterior Auricular Interpolation Flap Text: A decision was made to reconstruct the defect utilizing an interpolation axial flap and a staged reconstruction.  A telfa template was made of the defect.  This telfa template was then used to outline the posterior auricular interpolation flap.  The donor area for the pedicle flap was then injected with anesthesia.  The flap was excised through the skin and subcutaneous tissue down to the layer of the underlying musculature.  The pedicle flap was carefully excised within this deep plane to maintain its blood supply.  The edges of the donor site were undermined.   The donor site was closed in a primary fashion.  The pedicle was then rotated into position and sutured.  Once the tube was sutured into place, adequate blood supply was confirmed with blanching and refill.  The pedicle was then wrapped with xeroform gauze and dressed appropriately with a telfa and gauze bandage to ensure continued blood supply and protect the attached pedicle. Hatchet Flap Text: The defect edges were debeveled with a #15 scalpel blade.  Given the location of the defect, shape of the defect and the proximity to free margins a hatchet flap was deemed most appropriate.  Using a sterile surgical marker, an appropriate hatchet flap was drawn incorporating the defect and placing the expected incisions within the relaxed skin tension lines where possible.    The area thus outlined was incised deep to adipose tissue with a #15 scalpel blade.  The skin margins were undermined to an appropriate distance in all directions utilizing iris scissors. Dorsal Nasal Flap Text: The defect edges were debeveled with a #15 scalpel blade.  Given the location of the defect and the proximity to free margins a dorsal nasal flap was deemed most appropriate.  Using a sterile surgical marker, an appropriate dorsal nasal flap was drawn around the defect.    The area thus outlined was incised deep to adipose tissue with a #15 scalpel blade.  The skin margins were undermined to an appropriate distance in all directions utilizing iris scissors. Estimated Blood Loss (Cc): minimal

## 2023-11-04 NOTE — ASSESSMENT & PLAN NOTE
Body mass index is 53.07 kg/m². Morbid obesity complicates all aspects of disease management from diagnostic modalities to treatment. Weight loss encouraged and health benefits explained to patient.

## 2023-11-04 NOTE — H&P
[facility] Medicine  History & Physical    Patient Name: Holly Porter  MRN: [id number]  Patient Class: IP- Inpatient  Admission Date: 11/3/2023  Attending Physician: Kenan Broderick DO   Primary Care Provider: Regan Frausto MD         Patient information was obtained from patient, relative(s), past medical records and ER records.     Subjective:     Principal Problem:Acute on chronic respiratory failure with hypoxia and hypercapnia    Chief Complaint:   Chief Complaint   Patient presents with    Shortness of Breath    Chest Pain        HPI: Patient is a 62 yo female who presents to the hospital as a transfer from [facility] for higher level of care. She presented to the [facility] earlier today with a complaint of shortness for several days that has worsened over the past two days, at which time she started experiencing a severe decrease in her urinary output associated with a right flank, nausea, vomit  and low abdominal pain but denies any urinary symptoms. The shortness of breath is also associated with an acute onset of chest pain today. Chest pain is located at the left sternal region as is associated with radiation to her bilateral arms and bilateral sides of her neck, mild diaphoresis and tremor, but denies any palpitations. Patient has a history of COPD and follows with Pulmonologist Dr. Loyola with recommendation for home oxygen several months ago which was ordered by her PCP Dr. Frausto,  but the patient stated that she never received the order and has thus not been on home oxygen.. Moreover, the patient continues to smoke about 2 packs daily despite medical advice to quit smoking.     Of note, patient presented to the [facility] on 10/5/2023 with similar symptoms of worsening shortness of breath and was transferred to [facility] in [place], [place] for higher level of care, where she  was admitted for acute on chronic respiratory failure secondary to COPD exacerbation in the setting of positive Covid 19 infection on 10/06/2023. She only had mild symptoms for this Covid 19 infection, but had a severe Covid 19 infection in 2021. She has received only one dose of the Covid 19 vaccine back in 2021 with no boosters.    On arrival to the  critical access hospital, the patient was afebrile, but vital signs were significant for /54 and RR 22 with SPO2 of 73% with no leukocytosis, no source of infection and normal lactic acidosis. She was thus placed on supplemental oxygenation with improvement of SPO2. Ensuring work up revealed CMP with hypokalemia with K of 2.4 but normal Mg, bicarb of 45; acute on chronic renal failure with BUN/CR 29/1.84 with GFR 30 from a baseline of BUN/CR 12/1.07 and GFR of 58 four weeks ago, initial troponin of 297 from a baseline of 40.8 four weeks ago; elevated NTpBNP of 3183 from a baseline of 286 two weeks ago and 1932 four weeks ago. Last documented Echo in our system in 2021 showed HFpEF with EF of 55%. CXR with cardiomegaly without omar pulmonary edema or focal consolidation and EKG with sinus tachycardia and RBBB. CT head with negative findings as checked for initial complaint of lightheadedness and slurred speech on arrival that has since been resolved.  Coagulation study was normal and urinalysis grossly normal. Patient will be admitted for further evaluation and management.          Past Medical History:   Diagnosis Date    Acquired hypothyroidism     Atherosclerosis of native artery of extremity with intermittent claudication 01/30/2019    bilateral legs    BMI 50.0-59.9, adult 02/22/2021    Chronic pain syndrome     opioid depen    COPD (chronic obstructive pulmonary disease)     COVID-19 10/06/2023    Depression     Diabetes mellitus, type 2     followed by Aurea Kwong NP, Critical access hospital Diabetes clinic    DVT of lower extremity, bilateral     Essential  hypertension 06/08/2021    GERD (gastroesophageal reflux disease)     Gout 07/05/2023    Hyperlipidemia     Lumbosacral radiculopathy 06/05/2023    followed by GLENN Valdes, [facility]    Migraines     Nicotine dependence     Nicotine dependence     Obesity hypoventilation syndrome     chronic CO2 retainer with compensatory metabolic alkalosis    Osteoarthritis     Seizure disorder     Sleep apnea     on cpap    Thyroid cancer     Venous stasis ulcer limited to breakdown of skin with varicose veins     followed by Estuardo Zhao    Vitamin D deficiency        Past Surgical History:   Procedure Laterality Date    ABCESS DRAINAGE      HYSTERECTOMY      ILIAC ARTERY STENT Bilateral 01/28/2020    Bilateral distal aorta and common iliac 8 X 59 vbx covered stents performed by Dr. Antonio Jacinto.    RADIOFREQUENCY ABLATION Left 04/15/2022    Procedure: Left calf  Radiofrequency Ablation;  Surgeon: Matty Falcon DO;  Location: [facility];  Service: Vascular;  Laterality: Left;    STAB PHLEBECTOMY OF VARICOSE VEINS Left 01/25/2013    Left leg microphlebectomies x 23 stab avulsions and ligation of multiple varicose veins perrformed by Dr. Cirilo Aguirre.    THYROIDECTOMY      hx: thyroid cancer    TOE AMPUTATION Left 01/28/2020    2nd, 4th and 5th performed by Dr. Anam Saeed.    TOE AMPUTATION Right 01/28/2020    4th toe performed by Dr. Anam Saeed.    TOTAL ABDOMINAL HYSTERECTOMY W/ BILATERAL SALPINGOOPHORECTOMY      TUBAL LIGATION      VAGINAL DELIVERY      x 4    VENOUS ABLATION Right 08/09/2019    GSV Varithena Ablation performed by Dr. Estuardo Falcon    VENOUS ABLATION Left 08/02/2019    ATAV Varithena Ablation performed by Dr. Estuardo Falcon.    VENOUS ABLATION Right 07/13/2015    ATAV Laser Ablatio performed by Dr. Cirilo Aguirre.    VENOUS ABLATION Right 07/06/2015    Distal  GSV Laser Ablation performed by dr. Cirilo Aguirre.    VENOUS ABLATION Left 04/20/2015    Left Distal GSV  Laser Ablation performed by dr. Cirilo Aguirre.    VENOUS ABLATION Right 10/28/2013    Right Distal GSV RF Ablation performed by Dr. Cirilo Aguirre.    VENOUS ABLATION Left 10/25/2013    SSV RF Ablation performed by dr. Cirilo Aguirre.    VENOUS ABLATION Left 10/07/2013    Left GSV and Left ATAV RF Ablation performed by Dr. Cirilo Aguirre.    VENOUS ABLATION Right 01/21/2013    GSV RF Ablation w/micros x 22 performed by Dr. Cirilo Aguirre.    VENOUS ABLATION Left 06/25/2021    Left distal GSV Varithena Ablation       Review of patient's allergies indicates:   Allergen Reactions    Ammonium peroxydisulfate Shortness Of Breath    Avocado (laurus persea) Anaphylaxis    Bananas [banana] Anaphylaxis and Swelling     CRAMPS,    Chocolate flavor Anaphylaxis     MOUTH SWELLING    Fentanyl Shortness Of Breath and Itching    Percocet [oxycodone-acetaminophen] Shortness Of Breath and Itching    Silvadene [silver sulfadiazine]     Clindamycin     Corticosteroids (glucocorticoids)     Hydrocortisone Blisters    Lasix [furosemide] Blisters     BURNS SKIN    Nutritional supplement-fiber Itching    Pregabalin     Shellfish containing products     Adhesive Rash and Blisters    Iodine Rash    Iodine and iodide containing products Rash    Latex, natural rubber Rash    Pcn [penicillins] Rash    Sulfa (sulfonamide antibiotics) Rash and Blisters       Current Facility-Administered Medications on File Prior to Encounter   Medication    [COMPLETED] bumetanide injection 1 mg    [COMPLETED] HYDROcodone-acetaminophen 5-325 mg per tablet 1 tablet    [COMPLETED] potassium chloride 10 mEq in 100 mL IVPB    [COMPLETED] potassium chloride SA CR tablet 40 mEq    [COMPLETED] sodium chloride 0.9% bolus 250 mL 250 mL    [DISCONTINUED] enoxaparin injection 160 mg     Current Outpatient Medications on File Prior to Encounter   Medication Sig    ACCU-CHEK GUIDE GLUCOSE METER Misc     ACCU-CHEK GUIDE TEST STRIPS Strp     ACCU-CHEK SOFTCLIX LANCETS  Misc     albuterol (PROVENTIL/VENTOLIN HFA) 90 mcg/actuation inhaler Inhale 2 puffs into the lungs 4 (four) times daily. Rescue    allopurinoL (ZYLOPRIM) 300 MG tablet TAKE ONE TABLET EVERY DAY    amitriptyline (ELAVIL) 25 MG tablet Take 1 tablet (25 mg total) by mouth nightly as needed for Insomnia.    apixaban (ELIQUIS) 5 mg Tab Take 1 tablet (5 mg total) by mouth 2 (two) times daily.    aspirin (ECOTRIN) 81 MG EC tablet Take 1 tablet (81 mg total) by mouth once daily.    BREO ELLIPTA 100-25 mcg/dose diskus inhaler Inhale 1 puff into the lungs once daily.    bumetanide (BUMEX) 2 MG tablet Take 2 mg by mouth 2 (two) times daily.    calcium carbonate (OS-MICHAELA) 500 mg calcium (1,250 mg) tablet Take 1 tablet (500 mg total) by mouth 2 (two) times daily.    carvediloL (COREG) 12.5 MG tablet Take 0.5 tablets (6.25 mg total) by mouth 2 (two) times daily.    cholecalciferol, vitamin D3, 125 mcg (5,000 unit) capsule Take 1 capsule (5,000 Units total) by mouth 2 (two) times a day.    cilostazoL (PLETAL) 100 MG Tab Take 1 tablet (100 mg total) by mouth 2 (two) times daily.    colchicine, gout, (COLCRYS) 0.6 mg tablet TAKE 1 TABLET BY MOUTH 3 TIMES A DAY FOR 2 DAYS THEN 1 TABLET DAILY UNTIL GONE    cyclobenzaprine (FLEXERIL) 10 MG tablet Take 1 tablet (10 mg total) by mouth every evening.    dexAMETHasone (DECADRON) 4 MG Tab Take by mouth.    docusate sodium (COLACE) 100 MG capsule Take 1 capsule (100 mg total) by mouth 2 (two) times daily.    DULoxetine (CYMBALTA) 30 MG capsule TAKE 1 CAPSULE (30 MG TOTAL) BY MOUTH ONCE DAILY.    gabapentin (NEURONTIN) 600 MG tablet Take 1 tablet (600 mg total) by mouth 3 (three) times daily.    HYDROcodone-acetaminophen (NORCO)  mg per tablet Take 1 tablet by mouth every 6 (six) hours as needed for Pain.    [START ON 11/5/2023] HYDROcodone-acetaminophen (NORCO)  mg per tablet Take 1 tablet by mouth every 6 (six) hours as needed for Pain.    ibuprofen  "(ADVIL,MOTRIN) 800 MG tablet Take 1 tablet (800 mg total) by mouth 2 (two) times daily as needed for Pain.    insulin detemir U-100, Levemir, (LEVEMIR FLEXTOUCH U100 INSULIN) 100 unit/mL (3 mL) InPn pen Inject 10 Units into the skin every evening. 15 ml with 1 refill    levothyroxine (SYNTHROID) 150 MCG tablet TAKE 1 TABLET BY MOUTH BEFORE BREAKFAST.    loratadine (CLARITIN) 10 mg tablet Take 1 tablet (10 mg total) by mouth once daily.    losartan-hydrochlorothiazide 50-12.5 mg (HYZAAR) 50-12.5 mg per tablet Take 1 tablet by mouth once daily.    metOLazone (ZAROXOLYN) 2.5 MG tablet Take 1 tablet (2.5 mg total) by mouth once daily.    mupirocin (BACTROBAN) 2 % ointment Apply topically 2 (two) times daily.    naloxone (NARCAN) 4 mg/actuation Spry 1 spray once.    nicotine (NICODERM CQ) 21 mg/24 hr Place 1 patch onto the skin every 24 hours.    NIFEdipine (PROCARDIA-XL) 90 MG (OSM) 24 hr tablet Take 1 tablet (90 mg total) by mouth once daily.    NOVOFINE 32 32 gauge x 1/4" Ndle Use as directed once daily.    pantoprazole (PROTONIX) 40 MG tablet Take 1 tablet (40 mg total) by mouth once daily.    phentermine (ADIPEX-P) 37.5 mg tablet Take 1 tablet (37.5 mg total) by mouth before breakfast.    polyethylene glycol (GLYCOLAX) 17 gram PwPk Take 17 g by mouth once daily.    potassium chloride SA (K-DUR,KLOR-CON) 20 MEQ tablet Take 1 tablet (20 mEq total) by mouth once daily.    QUEtiapine (SEROQUEL) 25 MG Tab Take 1 tablet (25 mg total) by mouth once daily.    sertraline (ZOLOFT) 50 MG tablet Take 1 tablet (50 mg total) by mouth once daily.    spironolactone (ALDACTONE) 50 MG tablet Take 1 tablet (50 mg total) by mouth once daily.    SYMBICORT 160-4.5 mcg/actuation HFAA Inhale into the lungs.    tiotropium (SPIRIVA) 18 mcg inhalation capsule Inhale 1 capsule (18 mcg total) into the lungs once daily. Controller    topiramate (TOPAMAX) 100 MG tablet Take 1.5 tablets (150 mg total) by mouth 2 (two) times " daily.    triamcinolone acetonide 0.1% (KENALOG) 0.1 % cream Apply topically 3 (three) times daily.     Family History       Problem Relation (Age of Onset)    Coronary aneurysm Father    Coronary artery disease Father    Heart disease Mother, Father    Hypertension Mother, Father    No Known Problems Son, Son, Son, Son, Sister, Brother, Brother, Brother    Osteoarthritis Mother          Tobacco Use    Smoking status: Every Day     Current packs/day: 0.50     Average packs/day: 0.5 packs/day for 33.0 years (16.5 ttl pk-yrs)     Types: Cigarettes     Passive exposure: Current    Smokeless tobacco: Never   Substance and Sexual Activity    Alcohol use: Never    Drug use: Never    Sexual activity: Not Currently     Review of Systems   Constitutional:  Positive for activity change and fatigue. Negative for chills, diaphoresis and fever.   HENT:  Negative for sinus pressure, sore throat, tinnitus, trouble swallowing and voice change.    Respiratory:  Positive for shortness of breath. Negative for cough and chest tightness.    Cardiovascular:  Positive for chest pain and leg swelling. Negative for palpitations.   Gastrointestinal:  Positive for abdominal pain and nausea. Negative for blood in stool and vomiting.   Genitourinary:  Positive for decreased urine volume and flank pain. Negative for dysuria and hematuria.   Musculoskeletal:  Negative for neck pain and neck stiffness.   Skin:  Negative for pallor.   Neurological:  Positive for tremors and weakness. Negative for seizures, syncope and speech difficulty.   Psychiatric/Behavioral:  Negative for agitation, behavioral problems and confusion.      Objective:     Vital Signs (Most Recent):  Temp: 98 °F (36.7 °C) (11/03/23 1847)  Pulse: 80 (11/03/23 2022)  Resp: (!) 22 (11/03/23 2022)  BP: 121/62 (11/03/23 1847)  SpO2: (!) 90 % (11/03/23 2022) Vital Signs (24h Range):  Temp:  [98 °F (36.7 °C)-98.1 °F (36.7 °C)] 98 °F (36.7 °C)  Pulse:  [73-88] 80  Resp:  [17-26]  22  SpO2:  [73 %-91 %] 90 %  BP: (102-125)/(43-74) 121/62     Weight: (!) 158.3 kg (349 lb)  Body mass index is 53.07 kg/m².     Physical Exam  Vitals and nursing note reviewed.   Constitutional:       General: She is not in acute distress.     Appearance: Normal appearance. She is ill-appearing.   HENT:      Head: Normocephalic and atraumatic.      Right Ear: External ear normal.      Left Ear: External ear normal.      Nose: Nose normal. No congestion or rhinorrhea.      Mouth/Throat:      Mouth: Mucous membranes are dry.      Pharynx: No oropharyngeal exudate or posterior oropharyngeal erythema.   Eyes:      General: No scleral icterus.        Right eye: No discharge.         Left eye: No discharge.      Extraocular Movements: Extraocular movements intact.      Conjunctiva/sclera: Conjunctivae normal.   Pulmonary:      Effort: Pulmonary effort is normal.      Breath sounds: Normal breath sounds. No rhonchi or rales.   Abdominal:      General: Bowel sounds are normal.      Tenderness: There is abdominal tenderness. There is no guarding.      Hernia: No hernia is present.   Neurological:      Mental Status: She is alert.                Significant Labs: All pertinent labs within the past 24 hours have been reviewed.    Significant Imaging: I have reviewed all pertinent imaging results/findings within the past 24 hours.  I have reviewed and interpreted all pertinent imaging results/findings within the past 24 hours.    Assessment/Plan:     * Acute on chronic respiratory failure with hypoxia and hypercapnia  Patient with Hypercapnic and Hypoxic Respiratory failure which is Acute on chronic.  she is on home oxygen at 2-3 LPM but has not been using it LPM. Supplemental oxygen was provided and noted- Oxygen Concentration (%):  [32] 32    .   Signs/symptoms of respiratory failure include- increased work of breathing and lethargy. Contributing diagnoses includes - CHF, COPD, Obesity Hypoventilation and recent Covid 19  "infection and non complaince with home oxygenation Labs and images were reviewed. Patient Has recent ABG, which has been reviewed. Will treat underlying causes and adjust management of respiratory failure as follows- supplemental oxygenation and BIPAP at night    COPD exacerbation  Patient's COPD is with exacerbation noted by continued dyspnea, use of accessory muscles for breathing and worsening of baseline hypoxia currently.  Patient is currently off COPD Pathway. Continue scheduled inhalers Steroids, Antibiotics and Supplemental oxygen and monitor respiratory status closely.     - DuoNeb q6h  - Budesonide 0.5 mg BID  - Solumedrol 40 mg BID  - Supplemental oxygenation and BIPAP at night      Acute on chronic diastolic CHF (congestive heart failure)  Patient is identified as having Diastolic (HFpEF) heart failure that is Acute on chronic. CHF is currently uncontrolled due to Continued edema of extremities and Rales/crackles on pulmonary exam. Latest ECHO performed and demonstrates- Results for orders placed during the hospital encounter of 11/24/21    Echo    Interpretation Summary  · The left ventricle is normal in size with concentric remodeling and low normal systolic function.  · The estimated ejection fraction is 50%.  · Atrial fibrillation not observed.  · Normal right ventricular size.  · Normal left ventricular diastolic function.  . Continue ACE/ARB, Furosemide and Aldactone and monitor clinical status closely. Monitor on telemetry. Patient is off CHF pathway.  Monitor strict Is&Os and daily weights.  Place on fluid restriction of 2 L. Cardiology has been consulted. Continue to stress to patient importance of self efficacy and  on diet for CHF. Last BNP reviewed- and noted below No results for input(s): "BNP", "BNPTRIAGEBLO" in the last 168 hours..      - Bumetanide 1 mg BID and home aldactone, but monitor renal function daily  - Holding BB due to CHF exacerbation   - Strict I and O  - Daily " weight  - Pending Echo repeat  - Cardiology consulted, thanks for the assistance    NSTEMI (non-ST elevated myocardial infarction)  - ALONZO score of 4 with 20% risk at 14 days of all-cause mortality  - Heart score of 6, Moderate risk, Risk of MACE of 12-16.6%  - Trend troponin and ECG  - Pending Echo in AM  - Continue DAPT, tammi intensity statin, and ARB but holding BB due to CHF exacerbation  -  Nitroglycerin, morphine and supplemental Oxygen PRN  -  Received Lovenox 1 mg/kg piror to transfer  -  NPO after midnight  - Cardiology consulted, thanks for the assistance      Acute on chronic renal failure  Patient with acute kidney injury/acute renal failure likely due to acute tubular necrosis of unknown etiology but may be due to recent covid 19 about 4 weeks ago FRANKO is currently stable. Baseline creatinine 1.07 about one month ago - Labs reviewed- Renal function/electrolytes with Estimated Creatinine Clearance: 51.3 mL/min (A) (based on SCr of 1.78 mg/dL (H)). according to latest data. Monitor urine output and serial BMP and adjust therapy as needed. Avoid nephrotoxins and renally dose meds for GFR listed above.      - Pending US kidney  - Pending urine Na, Urea and CR. BMP to calculate FeNA and FeUrea  -  Avoid nephrotoxic drugs  - Renally dose all applicable medications  - Strict input and output  - Daily renal panel to monitor renal function  - Nephrology consulted, thanks for the assistance    Non compliance with medical treatment  Patient was supposed to be on home oxygen but has not been for many months and continue to smoke despite medical advise with last smoking one week ago      Severe obesity (BMI >= 40)  Body mass index is 53.07 kg/m². Morbid obesity complicates all aspects of disease management from diagnostic modalities to treatment. Weight loss encouraged and health benefits explained to patient.         Obesity hypoventilation syndrome  Body mass index is 53.07 kg/m². Morbid obesity complicates all  "aspects of disease management from diagnostic modalities to treatment. Weight loss encouraged and health benefits explained to patient.         Diabetes mellitus, type 2  Patient's FSGs are controlled on current medication regimen.  Last A1c reviewed-   Lab Results   Component Value Date    HGBA1C 6.2 11/03/2023     Most recent fingerstick glucose reviewed- No results for input(s): "POCTGLUCOSE" in the last 24 hours.  Current correctional scale  Medium  Maintain anti-hyperglycemic dose as follows-   Antihyperglycemics (From admission, onward)    Start     Stop Route Frequency Ordered    11/03/23 2100  insulin detemir U-100 injection 10 Units         -- SubQ Nightly 11/03/23 1942        Hold Oral hypoglycemics while patient is in the hospital.    Hypokalemia  K 2.1 but mag normal  Monitor and replace as indicated    Migraine  Continue home medication      Nicotine dependence  Dangers of cigarette smoking were reviewed with patient in detail. Patient was Counseled for 3-10 minutes. Nicotine replacement options were discussed. Nicotine replacement was discussed- prescribed    GERD (gastroesophageal reflux disease)  Continue home PPI      COVID-19  Patient tested positive for Covid 19 on 10/6/2023 with minimal symptom    Microcytosis  - Pending iron, TIBC, and ferritin  - Pending B12 and Folate    VTE Risk Mitigation (From admission, onward)         Ordered     Reason for No Pharmacological VTE Prophylaxis  Once        Question:  Reasons:  Answer:  Physician Provided (leave comment)    11/03/23 2125     IP VTE HIGH RISK PATIENT  Once         11/03/23 2125     Place sequential compression device  Until discontinued         11/03/23 2125                               Josue Scott MD  Department of Hospital Medicine  [facility]    COMPLETED  Family history non-contributory to current problem   Pertinent information:      "

## 2023-11-04 NOTE — ASSESSMENT & PLAN NOTE
"Patient's FSGs are controlled on current medication regimen.  Last A1c reviewed-   Lab Results   Component Value Date    HGBA1C 6.2 11/03/2023     Most recent fingerstick glucose reviewed- No results for input(s): "POCTGLUCOSE" in the last 24 hours.  Current correctional scale  Medium  Maintain anti-hyperglycemic dose as follows-   Antihyperglycemics (From admission, onward)    Start     Stop Route Frequency Ordered    11/03/23 2100  insulin detemir U-100 injection 10 Units         -- SubQ Nightly 11/03/23 1942        Hold Oral hypoglycemics while patient is in the hospital.  "

## 2023-11-04 NOTE — ASSESSMENT & PLAN NOTE
- ALONZO score of 4 with 20% risk at 14 days of all-cause mortality  - Heart score of 6, Moderate risk, Risk of MACE of 12-16.6%  - Trend troponin and ECG  - Pending Echo in AM  - Continue DAPT, tammi intensity statin, and ARB but holding BB due to CHF exacerbation  -  Nitroglycerin, morphine and supplemental Oxygen PRN  -  Received Lovenox 1 mg/kg piror to transfer  -  NPO after midnight  - Cardiology consulted, thanks for the assistance

## 2023-11-04 NOTE — NURSING
Patient complaining of chest pain. Called Dr. Broderick. Order given for 12 lead ekg, troponin, and chest xray.

## 2023-11-04 NOTE — ASSESSMENT & PLAN NOTE
Patient with acute kidney injury/acute renal failure likely due to acute tubular necrosis of unknown etiology but may be due to recent covid 19 about 4 weeks ago FRANKO is currently stable. Baseline creatinine 1.07 about one month ago - Labs reviewed- Renal function/electrolytes with Estimated Creatinine Clearance: 51.3 mL/min (A) (based on SCr of 1.78 mg/dL (H)). according to latest data. Monitor urine output and serial BMP and adjust therapy as needed. Avoid nephrotoxins and renally dose meds for GFR listed above.      - Pending US kidney  - Pending urine Na, Urea and CR. BMP to calculate FeNA and FeUrea  -  Avoid nephrotoxic drugs  - Renally dose all applicable medications  - Strict input and output  - Daily renal panel to monitor renal function  - Nephrology consulted, thanks for the assistance

## 2023-11-05 PROBLEM — Z79.4 TYPE 2 DIABETES MELLITUS WITHOUT COMPLICATION, WITH LONG-TERM CURRENT USE OF INSULIN: Status: ACTIVE | Noted: 2023-11-03

## 2023-11-05 PROBLEM — I50.9 CONGESTIVE HEART FAILURE: Status: ACTIVE | Noted: 2023-11-05

## 2023-11-05 LAB
ALBUMIN SERPL BCP-MCNC: 2.7 G/DL (ref 3.5–5)
ANION GAP SERPL CALCULATED.3IONS-SCNC: 7 MMOL/L (ref 7–16)
AORTIC ROOT ANNULUS: 3.46 CM
AV INDEX (PROSTH): 0.67
AV MEAN GRADIENT: 4 MMHG
AV PEAK GRADIENT: 9 MMHG
AV VALVE AREA BY VELOCITY RATIO: 2.02 CM²
AV VALVE AREA: 2.08 CM²
AV VELOCITY RATIO: 0.66
BASOPHILS # BLD AUTO: 0.01 K/UL (ref 0–0.2)
BASOPHILS NFR BLD AUTO: 0.1 % (ref 0–1)
BSA FOR ECHO PROCEDURE: 2.76 M2
BUN SERPL-MCNC: 34 MG/DL (ref 7–18)
BUN/CREAT SERPL: 21 (ref 6–20)
CALCIUM SERPL-MCNC: 9.8 MG/DL (ref 8.5–10.1)
CHLORIDE SERPL-SCNC: 87 MMOL/L (ref 98–107)
CO2 SERPL-SCNC: 43 MMOL/L (ref 21–32)
CREAT SERPL-MCNC: 1.61 MG/DL (ref 0.55–1.02)
CV ECHO LV RWT: 0.37 CM
DIFFERENTIAL METHOD BLD: ABNORMAL
DOP CALC AO PEAK VEL: 1.52 M/S
DOP CALC AO VTI: 29.2 CM
DOP CALC LVOT AREA: 3.1 CM2
DOP CALC LVOT DIAMETER: 1.98 CM
DOP CALC LVOT PEAK VEL: 1 M/S
DOP CALC LVOT STROKE VOLUME: 60.63 CM3
DOP CALCLVOT PEAK VEL VTI: 19.7 CM
E WAVE DECELERATION TIME: 344.34 MSEC
E/A RATIO: 0.79
E/E' RATIO: 7.14 M/S
ECHO LV POSTERIOR WALL: 0.92 CM (ref 0.6–1.1)
EGFR (NO RACE VARIABLE) (RUSH/TITUS): 36 ML/MIN/1.73M2
EJECTION FRACTION: 50 %
EOSINOPHIL # BLD AUTO: 0 K/UL (ref 0–0.5)
EOSINOPHIL NFR BLD AUTO: 0 % (ref 1–4)
ERYTHROCYTE [DISTWIDTH] IN BLOOD BY AUTOMATED COUNT: 19.8 % (ref 11.5–14.5)
FRACTIONAL SHORTENING: 18 % (ref 28–44)
GLOBAL LONGITUIDAL STRAIN: -14.1 %
GLUCOSE SERPL-MCNC: 150 MG/DL (ref 74–106)
HCT VFR BLD AUTO: 45.1 % (ref 38–47)
HGB BLD-MCNC: 13.9 G/DL (ref 12–16)
IMM GRANULOCYTES # BLD AUTO: 0.06 K/UL (ref 0–0.04)
IMM GRANULOCYTES NFR BLD: 0.4 % (ref 0–0.4)
INTERVENTRICULAR SEPTUM: 1.04 CM (ref 0.6–1.1)
LEFT ATRIUM SIZE: 4.4 CM
LEFT INTERNAL DIMENSION IN SYSTOLE: 4.04 CM (ref 2.1–4)
LEFT VENTRICLE DIASTOLIC VOLUME INDEX: 44.62 ML/M2
LEFT VENTRICLE DIASTOLIC VOLUME: 115.56 ML
LEFT VENTRICLE MASS INDEX: 67 G/M2
LEFT VENTRICLE SYSTOLIC VOLUME INDEX: 27.7 ML/M2
LEFT VENTRICLE SYSTOLIC VOLUME: 71.78 ML
LEFT VENTRICULAR INTERNAL DIMENSION IN DIASTOLE: 4.95 CM (ref 3.5–6)
LEFT VENTRICULAR MASS: 174.2 G
LV LATERAL E/E' RATIO: 5.56 M/S
LV SEPTAL E/E' RATIO: 10 M/S
LVOT MG: 1.87 MMHG
LVOT MV: 0.65 CM/S
LYMPHOCYTES # BLD AUTO: 0.87 K/UL (ref 1–4.8)
LYMPHOCYTES NFR BLD AUTO: 6.3 % (ref 27–41)
MCH RBC QN AUTO: 23.6 PG (ref 27–31)
MCHC RBC AUTO-ENTMCNC: 30.8 G/DL (ref 32–36)
MCV RBC AUTO: 76.6 FL (ref 80–96)
MONOCYTES # BLD AUTO: 0.33 K/UL (ref 0–0.8)
MONOCYTES NFR BLD AUTO: 2.4 % (ref 2–6)
MPC BLD CALC-MCNC: 10.1 FL (ref 9.4–12.4)
MV PEAK A VEL: 0.63 M/S
MV PEAK E VEL: 0.5 M/S
NEUTROPHILS # BLD AUTO: 12.58 K/UL (ref 1.8–7.7)
NEUTROPHILS NFR BLD AUTO: 90.8 % (ref 53–65)
NRBC # BLD AUTO: 0.04 X10E3/UL
NRBC, AUTO (.00): 0.3 %
PHOSPHATE SERPL-MCNC: 3.2 MG/DL (ref 2.5–4.5)
PISA TR MAX VEL: 3 M/S
PLATELET # BLD AUTO: 226 K/UL (ref 150–400)
POTASSIUM SERPL-SCNC: 2.9 MMOL/L (ref 3.5–5.1)
PV PEAK GRADIENT: 4 MMHG
PV PEAK VELOCITY: 0.97 M/S
RBC # BLD AUTO: 5.89 M/UL (ref 4.2–5.4)
RIGHT ATRIAL AREA: 27 CM2
RIGHT VENTRICULAR END-DIASTOLIC DIMENSION: 4.1 CM
RIGHT VENTRICULAR LENGTH IN DIASTOLE (APICAL 4-CHAMBER VIEW): 6.79 CM
RV MID DIAMA: 2.77 CM
SODIUM SERPL-SCNC: 134 MMOL/L (ref 136–145)
TDI LATERAL: 0.09 M/S
TDI SEPTAL: 0.05 M/S
TDI: 0.07 M/S
TR MAX PG: 36 MMHG
TRICUSPID ANNULAR PLANE SYSTOLIC EXCURSION: 2.18 CM
WBC # BLD AUTO: 13.85 K/UL (ref 4.5–11)
Z-SCORE OF LEFT VENTRICULAR DIMENSION IN END DIASTOLE: -12.86
Z-SCORE OF LEFT VENTRICULAR DIMENSION IN END SYSTOLE: -7.57

## 2023-11-05 PROCEDURE — 25000003 PHARM REV CODE 250: Performed by: INTERNAL MEDICINE

## 2023-11-05 PROCEDURE — 25000003 PHARM REV CODE 250: Performed by: HOSPITALIST

## 2023-11-05 PROCEDURE — 25000242 PHARM REV CODE 250 ALT 637 W/ HCPCS: Performed by: HOSPITALIST

## 2023-11-05 PROCEDURE — 99233 PR SUBSEQUENT HOSPITAL CARE,LEVL III: ICD-10-PCS | Mod: GC,,, | Performed by: STUDENT IN AN ORGANIZED HEALTH CARE EDUCATION/TRAINING PROGRAM

## 2023-11-05 PROCEDURE — 85025 COMPLETE CBC W/AUTO DIFF WBC: CPT

## 2023-11-05 PROCEDURE — 94761 N-INVAS EAR/PLS OXIMETRY MLT: CPT

## 2023-11-05 PROCEDURE — 63600175 PHARM REV CODE 636 W HCPCS: Performed by: HOSPITALIST

## 2023-11-05 PROCEDURE — S4991 NICOTINE PATCH NONLEGEND: HCPCS | Performed by: HOSPITALIST

## 2023-11-05 PROCEDURE — 94640 AIRWAY INHALATION TREATMENT: CPT

## 2023-11-05 PROCEDURE — 25000003 PHARM REV CODE 250: Performed by: STUDENT IN AN ORGANIZED HEALTH CARE EDUCATION/TRAINING PROGRAM

## 2023-11-05 PROCEDURE — 25000003 PHARM REV CODE 250

## 2023-11-05 PROCEDURE — 11000001 HC ACUTE MED/SURG PRIVATE ROOM

## 2023-11-05 PROCEDURE — 63600175 PHARM REV CODE 636 W HCPCS: Performed by: INTERNAL MEDICINE

## 2023-11-05 PROCEDURE — 99900035 HC TECH TIME PER 15 MIN (STAT)

## 2023-11-05 PROCEDURE — 63600175 PHARM REV CODE 636 W HCPCS

## 2023-11-05 PROCEDURE — 27000190 HC CPAP FULL FACE MASK W/VALVE

## 2023-11-05 PROCEDURE — 99233 SBSQ HOSP IP/OBS HIGH 50: CPT | Mod: GC,,, | Performed by: STUDENT IN AN ORGANIZED HEALTH CARE EDUCATION/TRAINING PROGRAM

## 2023-11-05 PROCEDURE — 94660 CPAP INITIATION&MGMT: CPT

## 2023-11-05 PROCEDURE — 27000221 HC OXYGEN, UP TO 24 HOURS

## 2023-11-05 PROCEDURE — 80069 RENAL FUNCTION PANEL: CPT

## 2023-11-05 PROCEDURE — 82962 GLUCOSE BLOOD TEST: CPT

## 2023-11-05 PROCEDURE — 63600175 PHARM REV CODE 636 W HCPCS: Performed by: GENERAL PRACTICE

## 2023-11-05 RX ORDER — LORATADINE 10 MG/1
10 TABLET ORAL DAILY
Qty: 30 TABLET | Refills: 3 | Status: ON HOLD | OUTPATIENT
Start: 2023-11-05 | End: 2024-02-14 | Stop reason: HOSPADM

## 2023-11-05 RX ORDER — PANTOPRAZOLE SODIUM 40 MG/1
40 TABLET, DELAYED RELEASE ORAL DAILY
Qty: 90 TABLET | Refills: 1 | Status: SHIPPED | OUTPATIENT
Start: 2023-11-05 | End: 2024-02-28 | Stop reason: SDUPTHER

## 2023-11-05 RX ORDER — LEVOTHYROXINE SODIUM 150 UG/1
150 TABLET ORAL
Qty: 90 TABLET | Refills: 1 | Status: SHIPPED | OUTPATIENT
Start: 2023-11-05

## 2023-11-05 RX ORDER — HEPARIN SODIUM 5000 [USP'U]/ML
7500 INJECTION, SOLUTION INTRAVENOUS; SUBCUTANEOUS EVERY 8 HOURS
Status: DISCONTINUED | OUTPATIENT
Start: 2023-11-05 | End: 2023-11-10 | Stop reason: HOSPADM

## 2023-11-05 RX ORDER — BUMETANIDE 0.25 MG/ML
1 INJECTION INTRAMUSCULAR; INTRAVENOUS DAILY
Status: DISCONTINUED | OUTPATIENT
Start: 2023-11-05 | End: 2023-11-10 | Stop reason: HOSPADM

## 2023-11-05 RX ORDER — QUETIAPINE FUMARATE 25 MG/1
25 TABLET, FILM COATED ORAL DAILY
Qty: 30 TABLET | Refills: 1 | Status: SHIPPED | OUTPATIENT
Start: 2023-11-05

## 2023-11-05 RX ORDER — PREDNISONE 20 MG/1
40 TABLET ORAL DAILY
Status: DISCONTINUED | OUTPATIENT
Start: 2023-11-05 | End: 2023-11-10 | Stop reason: HOSPADM

## 2023-11-05 RX ORDER — HEPARIN SODIUM 5000 [USP'U]/ML
5000 INJECTION, SOLUTION INTRAVENOUS; SUBCUTANEOUS EVERY 8 HOURS
Status: DISCONTINUED | OUTPATIENT
Start: 2023-11-05 | End: 2023-11-05

## 2023-11-05 RX ORDER — POTASSIUM CHLORIDE 20 MEQ/1
40 TABLET, EXTENDED RELEASE ORAL 2 TIMES DAILY
Status: COMPLETED | OUTPATIENT
Start: 2023-11-05 | End: 2023-11-06

## 2023-11-05 RX ADMIN — NICOTINE 1 PATCH: 21 PATCH, EXTENDED RELEASE TRANSDERMAL at 10:11

## 2023-11-05 RX ADMIN — CYCLOBENZAPRINE 10 MG: 10 TABLET, FILM COATED ORAL at 08:11

## 2023-11-05 RX ADMIN — IPRATROPIUM BROMIDE AND ALBUTEROL SULFATE 3 ML: 2.5; .5 SOLUTION RESPIRATORY (INHALATION) at 12:11

## 2023-11-05 RX ADMIN — IPRATROPIUM BROMIDE AND ALBUTEROL SULFATE 3 ML: 2.5; .5 SOLUTION RESPIRATORY (INHALATION) at 07:11

## 2023-11-05 RX ADMIN — IRON SUCROSE 200 MG: 20 INJECTION, SOLUTION INTRAVENOUS at 12:11

## 2023-11-05 RX ADMIN — PANTOPRAZOLE SODIUM 40 MG: 40 TABLET, DELAYED RELEASE ORAL at 10:11

## 2023-11-05 RX ADMIN — SPIRONOLACTONE 50 MG: 25 TABLET ORAL at 10:11

## 2023-11-05 RX ADMIN — INSULIN DETEMIR 10 UNITS: 100 INJECTION, SOLUTION SUBCUTANEOUS at 08:11

## 2023-11-05 RX ADMIN — CHOLECALCIFEROL TAB 125 MCG (5000 UNIT) 5000 UNITS: 125 TAB at 10:11

## 2023-11-05 RX ADMIN — NIFEDIPINE 90 MG: 30 TABLET, FILM COATED, EXTENDED RELEASE ORAL at 10:11

## 2023-11-05 RX ADMIN — CALCIUM CARBONATE (ANTACID) CHEW TAB 500 MG 500 MG: 500 CHEW TAB at 10:11

## 2023-11-05 RX ADMIN — CALCIUM CARBONATE (ANTACID) CHEW TAB 500 MG 500 MG: 500 CHEW TAB at 08:11

## 2023-11-05 RX ADMIN — GABAPENTIN 600 MG: 300 CAPSULE ORAL at 08:11

## 2023-11-05 RX ADMIN — POTASSIUM CHLORIDE 40 MEQ: 1500 TABLET, EXTENDED RELEASE ORAL at 08:11

## 2023-11-05 RX ADMIN — PREDNISONE 40 MG: 20 TABLET ORAL at 12:11

## 2023-11-05 RX ADMIN — LEVOTHYROXINE SODIUM 150 MCG: 0.15 TABLET ORAL at 06:11

## 2023-11-05 RX ADMIN — POLYETHYLENE GLYCOL 3350 17 G: 17 POWDER, FOR SOLUTION ORAL at 10:11

## 2023-11-05 RX ADMIN — ALLOPURINOL 300 MG: 300 TABLET ORAL at 10:11

## 2023-11-05 RX ADMIN — CHOLECALCIFEROL TAB 125 MCG (5000 UNIT) 5000 UNITS: 125 TAB at 08:11

## 2023-11-05 RX ADMIN — DOCUSATE SODIUM 100 MG: 100 CAPSULE, LIQUID FILLED ORAL at 10:11

## 2023-11-05 RX ADMIN — HEPARIN SODIUM 7500 UNITS: 5000 INJECTION INTRAVENOUS; SUBCUTANEOUS at 09:11

## 2023-11-05 RX ADMIN — POTASSIUM CHLORIDE 40 MEQ: 1500 TABLET, EXTENDED RELEASE ORAL at 10:11

## 2023-11-05 RX ADMIN — TICAGRELOR 90 MG: 90 TABLET ORAL at 08:11

## 2023-11-05 RX ADMIN — ATORVASTATIN CALCIUM 80 MG: 80 TABLET, FILM COATED ORAL at 10:11

## 2023-11-05 RX ADMIN — SERTRALINE HYDROCHLORIDE 50 MG: 50 TABLET ORAL at 10:11

## 2023-11-05 RX ADMIN — DOCUSATE SODIUM 100 MG: 100 CAPSULE, LIQUID FILLED ORAL at 08:11

## 2023-11-05 RX ADMIN — GABAPENTIN 600 MG: 300 CAPSULE ORAL at 10:11

## 2023-11-05 RX ADMIN — TICAGRELOR 90 MG: 90 TABLET ORAL at 10:11

## 2023-11-05 RX ADMIN — TOPIRAMATE 150 MG: 100 TABLET, FILM COATED ORAL at 10:11

## 2023-11-05 RX ADMIN — TOPIRAMATE 150 MG: 100 TABLET, FILM COATED ORAL at 08:11

## 2023-11-05 RX ADMIN — ASPIRIN 81 MG: 81 TABLET, COATED ORAL at 10:11

## 2023-11-05 RX ADMIN — DULOXETINE 30 MG: 30 CAPSULE, DELAYED RELEASE ORAL at 10:11

## 2023-11-05 NOTE — H&P (VIEW-ONLY)
[facility] - [facility] Medical Telemetry  Cardiology  Consult Note    Patient Name: Holly Porter  MRN: [id number]  Admission Date: 11/3/2023  Hospital Length of Stay: 2 days  Code Status: Full Code   Attending Provider: Kenan Broderick DO   Consulting Provider: Alex Ortega DO  Primary Care Physician: Regan Frausto MD  Principal Problem:Acute on chronic respiratory failure with hypoxia and hypercapnia    Patient information was obtained from patient, past medical records and ER records.     Consults  Subjective:     Chief Complaint:  Shortness of breath     HPI:   PT presented to [facility] ER with complaint of increasing shortness of breath    Pt reports three day history of progressive shortness of breath.  She reports experienced chest pain, mid sternal, non-radiating, associated with nausea and diaphoresis, occurred at rest, lasted fifteen to twenty minutes, resolved spontaneously. Chest pain has not reoccurred.  She cannot recall having stress test or LHC, old records show stress imaging not performed due to pt exceeding weight limitation of imaging equipment, recalls previous echo. She is recovering from COVID 19 pneumonia in early October, was hospitalized in [place], discharged to home care after several days.      Past Medical History:   Diagnosis Date    Acquired hypothyroidism     Atherosclerosis of native artery of extremity with intermittent claudication 01/30/2019    bilateral legs    BMI 50.0-59.9, adult 02/22/2021    Chronic pain syndrome     opioid depen    COPD (chronic obstructive pulmonary disease)     COVID-19 10/06/2023    Depression     Diabetes mellitus, type 2     followed by Aurea Kwong NP, [facility]    DVT of lower extremity, bilateral     Essential hypertension 06/08/2021    GERD (gastroesophageal reflux disease)     Gout 07/05/2023    Hyperlipidemia     Lumbosacral radiculopathy 06/05/2023    followed by GLENN Valdes, [facility]     Migraines     Nicotine dependence     Nicotine dependence     Obesity hypoventilation syndrome     chronic CO2 retainer with compensatory metabolic alkalosis    Osteoarthritis     Seizure disorder     Sleep apnea     on cpap    Thyroid cancer     Venous stasis ulcer limited to breakdown of skin with varicose veins     followed by Estuardo Zhao    Vitamin D deficiency        Past Surgical History:   Procedure Laterality Date    ABCESS DRAINAGE      HYSTERECTOMY      ILIAC ARTERY STENT Bilateral 01/28/2020    Bilateral distal aorta and common iliac 8 X 59 vbx covered stents performed by Dr. Antonio Jacinto.    RADIOFREQUENCY ABLATION Left 04/15/2022    Procedure: Left calf  Radiofrequency Ablation;  Surgeon: Matty Falcon DO;  Location: [facility];  Service: Vascular;  Laterality: Left;    STAB PHLEBECTOMY OF VARICOSE VEINS Left 01/25/2013    Left leg microphlebectomies x 23 stab avulsions and ligation of multiple varicose veins perrformed by Dr. Cirilo Aguirre.    THYROIDECTOMY      hx: thyroid cancer    TOE AMPUTATION Left 01/28/2020    2nd, 4th and 5th performed by Dr. Anam Saeed.    TOE AMPUTATION Right 01/28/2020    4th toe performed by Dr. Anam Saeed.    TOTAL ABDOMINAL HYSTERECTOMY W/ BILATERAL SALPINGOOPHORECTOMY      TUBAL LIGATION      VAGINAL DELIVERY      x 4    VENOUS ABLATION Right 08/09/2019    GSV Varithena Ablation performed by Dr. Estuardo Falcon    VENOUS ABLATION Left 08/02/2019    ATAV Varithena Ablation performed by Dr. Estuardo Falcon.    VENOUS ABLATION Right 07/13/2015    ATAV Laser Ablatio performed by Dr. Cirilo Aguirre.    VENOUS ABLATION Right 07/06/2015    Distal  GSV Laser Ablation performed by dr. Cirilo Aguirre.    VENOUS ABLATION Left 04/20/2015    Left Distal GSV Laser Ablation performed by dr. Cirilo Aguirre.    VENOUS ABLATION Right 10/28/2013    Right Distal GSV RF Ablation performed by Dr. Cirilo Aguirre.    VENOUS ABLATION Left 10/25/2013    SSV RF Ablation performed by   Cirilo Aguirre.    VENOUS ABLATION Left 10/07/2013    Left GSV and Left ATAV RF Ablation performed by Dr. Cirilo Aguirre.    VENOUS ABLATION Right 01/21/2013    GSV RF Ablation w/micros x 22 performed by Dr. Cirilo Aguirre.    VENOUS ABLATION Left 06/25/2021    Left distal GSV Varithena Ablation       Review of patient's allergies indicates:   Allergen Reactions    Ammonium peroxydisulfate Shortness Of Breath    Avocado (laurus persea) Anaphylaxis    Bananas [banana] Anaphylaxis and Swelling     CRAMPS,    Chocolate flavor Anaphylaxis     MOUTH SWELLING    Fentanyl Shortness Of Breath and Itching    Percocet [oxycodone-acetaminophen] Shortness Of Breath and Itching    Silvadene [silver sulfadiazine]     Clindamycin     Corticosteroids (glucocorticoids)     Hydrocortisone Blisters    Lasix [furosemide] Blisters     BURNS SKIN    Nutritional supplement-fiber Itching    Pregabalin     Shellfish containing products     Adhesive Rash and Blisters    Iodine Rash    Iodine and iodide containing products Rash    Latex, natural rubber Rash    Pcn [penicillins] Rash    Sulfa (sulfonamide antibiotics) Rash and Blisters       No current facility-administered medications on file prior to encounter.     Current Outpatient Medications on File Prior to Encounter   Medication Sig    ACCU-CHEK GUIDE GLUCOSE METER Misc     ACCU-CHEK GUIDE TEST STRIPS Strp     ACCU-CHEK SOFTCLIX LANCETS Misc     albuterol (PROVENTIL/VENTOLIN HFA) 90 mcg/actuation inhaler Inhale 2 puffs into the lungs 4 (four) times daily. Rescue    allopurinoL (ZYLOPRIM) 300 MG tablet TAKE ONE TABLET EVERY DAY    amitriptyline (ELAVIL) 25 MG tablet Take 1 tablet (25 mg total) by mouth nightly as needed for Insomnia.    apixaban (ELIQUIS) 5 mg Tab Take 1 tablet (5 mg total) by mouth 2 (two) times daily.    aspirin (ECOTRIN) 81 MG EC tablet Take 1 tablet (81 mg total) by mouth once daily.    BREO ELLIPTA 100-25 mcg/dose diskus inhaler Inhale 1 puff into the  lungs once daily.    bumetanide (BUMEX) 2 MG tablet Take 2 mg by mouth 2 (two) times daily.    calcium carbonate (OS-MICHAELA) 500 mg calcium (1,250 mg) tablet Take 1 tablet (500 mg total) by mouth 2 (two) times daily.    carvediloL (COREG) 12.5 MG tablet Take 0.5 tablets (6.25 mg total) by mouth 2 (two) times daily.    cholecalciferol, vitamin D3, 125 mcg (5,000 unit) capsule Take 1 capsule (5,000 Units total) by mouth 2 (two) times a day.    cilostazoL (PLETAL) 100 MG Tab Take 1 tablet (100 mg total) by mouth 2 (two) times daily.    colchicine, gout, (COLCRYS) 0.6 mg tablet TAKE 1 TABLET BY MOUTH 3 TIMES A DAY FOR 2 DAYS THEN 1 TABLET DAILY UNTIL GONE    cyclobenzaprine (FLEXERIL) 10 MG tablet Take 1 tablet (10 mg total) by mouth every evening.    dexAMETHasone (DECADRON) 4 MG Tab Take by mouth.    docusate sodium (COLACE) 100 MG capsule Take 1 capsule (100 mg total) by mouth 2 (two) times daily.    DULoxetine (CYMBALTA) 30 MG capsule TAKE 1 CAPSULE (30 MG TOTAL) BY MOUTH ONCE DAILY.    gabapentin (NEURONTIN) 600 MG tablet Take 1 tablet (600 mg total) by mouth 3 (three) times daily.    HYDROcodone-acetaminophen (NORCO)  mg per tablet Take 1 tablet by mouth every 6 (six) hours as needed for Pain.    HYDROcodone-acetaminophen (NORCO)  mg per tablet Take 1 tablet by mouth every 6 (six) hours as needed for Pain.    ibuprofen (ADVIL,MOTRIN) 800 MG tablet Take 1 tablet (800 mg total) by mouth 2 (two) times daily as needed for Pain.    insulin detemir U-100, Levemir, (LEVEMIR FLEXTOUCH U100 INSULIN) 100 unit/mL (3 mL) InPn pen Inject 10 Units into the skin every evening. 15 ml with 1 refill    levothyroxine (SYNTHROID) 150 MCG tablet TAKE 1 TABLET BY MOUTH BEFORE BREAKFAST.    loratadine (CLARITIN) 10 mg tablet Take 1 tablet (10 mg total) by mouth once daily.    losartan-hydrochlorothiazide 50-12.5 mg (HYZAAR) 50-12.5 mg per tablet Take 1 tablet by mouth once daily.    metOLazone (ZAROXOLYN) 2.5  "MG tablet Take 1 tablet (2.5 mg total) by mouth once daily.    mupirocin (BACTROBAN) 2 % ointment Apply topically 2 (two) times daily.    naloxone (NARCAN) 4 mg/actuation Spry 1 spray once.    nicotine (NICODERM CQ) 21 mg/24 hr Place 1 patch onto the skin every 24 hours.    NIFEdipine (PROCARDIA-XL) 90 MG (OSM) 24 hr tablet Take 1 tablet (90 mg total) by mouth once daily.    NOVOFINE 32 32 gauge x 1/4" Ndle Use as directed once daily.    pantoprazole (PROTONIX) 40 MG tablet Take 1 tablet (40 mg total) by mouth once daily.    phentermine (ADIPEX-P) 37.5 mg tablet Take 1 tablet (37.5 mg total) by mouth before breakfast.    polyethylene glycol (GLYCOLAX) 17 gram PwPk Take 17 g by mouth once daily.    potassium chloride SA (K-DUR,KLOR-CON) 20 MEQ tablet Take 1 tablet (20 mEq total) by mouth once daily.    QUEtiapine (SEROQUEL) 25 MG Tab Take 1 tablet (25 mg total) by mouth once daily.    sertraline (ZOLOFT) 50 MG tablet Take 1 tablet (50 mg total) by mouth once daily.    spironolactone (ALDACTONE) 50 MG tablet Take 1 tablet (50 mg total) by mouth once daily.    SYMBICORT 160-4.5 mcg/actuation HFAA Inhale into the lungs.    tiotropium (SPIRIVA) 18 mcg inhalation capsule Inhale 1 capsule (18 mcg total) into the lungs once daily. Controller    topiramate (TOPAMAX) 100 MG tablet Take 1.5 tablets (150 mg total) by mouth 2 (two) times daily.    triamcinolone acetonide 0.1% (KENALOG) 0.1 % cream Apply topically 3 (three) times daily.     Family History       Problem Relation (Age of Onset)    Coronary aneurysm Father    Coronary artery disease Father    Heart disease Mother, Father    Hypertension Mother, Father    No Known Problems Son, Son, Son, Son, Sister, Brother, Brother, Brother    Osteoarthritis Mother          Tobacco Use    Smoking status: Every Day     Current packs/day: 0.50     Average packs/day: 0.5 packs/day for 33.0 years (16.5 ttl pk-yrs)     Types: Cigarettes     Passive exposure: Current    " Smokeless tobacco: Never   Substance and Sexual Activity    Alcohol use: Never    Drug use: Never    Sexual activity: Not Currently     Review of Systems   Unable to perform ROS: Acuity of condition   Respiratory:          PT SOB, able to tolerate only a few seconds before has to replace BIPAP to control SOB.      Objective:     Vital Signs (Most Recent):  Temp: 97.1 °F (36.2 °C) (11/05/23 0633)  Pulse: 69 (11/05/23 0740)  Resp: 16 (11/05/23 0740)  BP: 105/66 (11/05/23 0633)  SpO2: 100 % (11/05/23 0740) Vital Signs (24h Range):  Temp:  [97.1 °F (36.2 °C)-98.9 °F (37.2 °C)] 97.1 °F (36.2 °C)  Pulse:  [68-92] 69  Resp:  [16-22] 16  SpO2:  [91 %-100 %] 100 %  BP: ()/(51-85) 105/66     Weight: (!) 158.3 kg (349 lb)  Body mass index is 53.07 kg/m².    SpO2: 100 %         Intake/Output Summary (Last 24 hours) at 11/5/2023 0859  Last data filed at 11/5/2023 0541  Gross per 24 hour   Intake 960 ml   Output 2300 ml   Net -1340 ml       Lines/Drains/Airways       Peripheral Intravenous Line  Duration                  Peripheral IV - Single Lumen 11/03/23 1850 20 G Anterior;Proximal;Right Forearm 1 day                     Physical Exam  Vitals and nursing note reviewed.   Constitutional:       Appearance: Normal appearance. She is obese.   HENT:      Head: Normocephalic and atraumatic.      Right Ear: External ear normal.      Left Ear: External ear normal.   Eyes:      General: No scleral icterus.        Right eye: No discharge.         Left eye: No discharge.      Extraocular Movements: Extraocular movements intact.      Conjunctiva/sclera: Conjunctivae normal.      Pupils: Pupils are equal, round, and reactive to light.   Cardiovascular:      Rate and Rhythm: Normal rate and regular rhythm.      Pulses: Normal pulses.      Heart sounds: Normal heart sounds. No murmur heard.     No friction rub. No gallop.      Comments: Heart tones distant  Pulmonary:      Effort: Pulmonary effort is normal.      Breath sounds:  Normal breath sounds. No wheezing, rhonchi or rales.      Comments: Poor inspiratory effort, on BIPAP  Chest:      Chest wall: No tenderness.   Abdominal:      General: Abdomen is flat. Bowel sounds are normal. There is no distension.      Palpations: Abdomen is soft.      Tenderness: There is no abdominal tenderness. There is no guarding or rebound.   Musculoskeletal:         General: No swelling or tenderness. Normal range of motion.      Cervical back: Normal range of motion and neck supple.      Right lower leg: Edema present.      Left lower leg: Edema present.   Skin:     General: Skin is warm and dry.      Findings: No erythema or rash.   Neurological:      General: No focal deficit present.      Mental Status: She is alert and oriented to person, place, and time.      Cranial Nerves: No cranial nerve deficit.      Motor: No weakness.      Gait: Gait normal.   Psychiatric:         Mood and Affect: Mood normal.         Behavior: Behavior normal.         Thought Content: Thought content normal.         Judgment: Judgment normal.          Significant Labs: All pertinent lab results from the last 24 hours have been reviewed. and   Recent Lab Results         11/05/23  0247   11/04/23  1840   11/04/23  1811   11/04/23  1101        Albumin 2.7             Anion Gap 7             Baso # 0.01             Basophil % 0.1             BUN 34             BUN/CREAT RATIO 21             Calcium 9.8             Chloride 87             CO2 43             Creatinine 1.61             Differential Method Auto             eGFR 36             Eos # 0.00             Eosinophil % 0.0             Glucose 150             Hematocrit 45.1             Hemoglobin 13.9             Immature Grans (Abs) 0.06             Immature Granulocytes 0.4             Lymph # 0.87             Lymph % 6.3             MCH 23.6             MCHC 30.8             MCV 76.6             Mono # 0.33             Mono % 2.4             MPV 10.1              Neutrophils, Abs 12.58             Neutrophils Relative 90.8             nRBC 0.3             NUCLEATED RBC ABSOLUTE 0.04             Phosphorus Level 3.2             Platelet Count 226             POC Base Excess   27.0     27.2       POC HCO3   53.9     53.7       POC PCO2   63     60       POC PH   7.54     7.56       POC PO2   65     58       POC SATURATED O2   95     93       Potassium 2.9             RBC 5.89             RDW 19.8             Sodium 134             Troponin I High Sensitivity     129.4         WBC 13.85                     Significant Imaging: Echocardiogram: 2D echo with color flow doppler: No results found for this or any previous visit. and Transthoracic echo (TTE) complete (Cupid Only):   Results for orders placed or performed during the hospital encounter of 11/03/23   Echo   Result Value Ref Range    BSA 2.76 m2    LVOT stroke volume 60.63 cm3    LVIDd 4.95 3.5 - 6.0 cm    LV Systolic Volume 71.78 mL    LV Systolic Volume Index 27.7 mL/m2    LVIDs 4.04 (A) 2.1 - 4.0 cm    LV Diastolic Volume 115.56 mL    LV Diastolic Volume Index 44.62 mL/m2    IVS 1.04 0.6 - 1.1 cm    LVOT diameter 1.98 cm    LVOT area 3.1 cm2    FS 18 (A) 28 - 44 %    Left Ventricle Relative Wall Thickness 0.37 cm    Posterior Wall 0.92 0.6 - 1.1 cm    LV mass 174.20 g    LV Mass Index 67 g/m2    MV Peak E Dima 0.50 m/s    TDI LATERAL 0.09 m/s    TDI SEPTAL 0.05 m/s    E/E' ratio 7.14 m/s    MV Peak A Dima 0.63 m/s    TR Max Dima 3.00 m/s    E/A ratio 0.79     E wave deceleration time 344.34 msec    LV SEPTAL E/E' RATIO 10.00 m/s    LV LATERAL E/E' RATIO 5.56 m/s    LVOT peak dima 1.00 m/s    Left Ventricular Outflow Tract Mean Velocity 0.65 cm/s    Left Ventricular Outflow Tract Mean Gradient 1.87 mmHg    Right ventricular length in diastole (apical 4-chamber view) 6.79 cm    RV mid diameter 2.77 cm    TAPSE 2.18 cm    RA area 27.0 cm2    AV mean gradient 4 mmHg    AV peak gradient 9 mmHg    Ao peak dima 1.52 m/s    Ao VTI  29.20 cm    LVOT peak VTI 19.70 cm    AV valve area 2.08 cm²    AV Velocity Ratio 0.66     AV index (prosthetic) 0.67     JOSE by Velocity Ratio 2.02 cm²    Triscuspid Valve Regurgitation Peak Gradient 36 mmHg    PV PEAK VELOCITY 0.97 m/s    PV peak gradient 4 mmHg    Ao root annulus 3.46 cm    Mean e' 0.07 m/s    ZLVIDS -7.57     ZLVIDD -12.86     EF 50 %    GLS -14.1 %    RVDD 4.10 cm    LA size 4.4 cm     Assessment and Plan:     Type 2 MI (myocardial infarction)  Elevation of troponin concerning for NSTEMI with elevation from 40 10/5/23 to 297 at presentation, then decreasing to 229 over first hospital day.  Pt is a candidate for LHC/poss when able to lie flat     Non compliance with medical treatment  Discussed smoking cessation, pt unable to pick stop date today    Diabetes mellitus, type 2  Fairly well controlled with HBA1C 6.2    Acute on chronic diastolic CHF (congestive heart failure)  Patient is identified as having Diastolic (HFpEF) heart failure that is Chronic. CHF is currently uncontrolled due to Continued edema of extremities. Latest ECHO performed and demonstrates- Results for orders placed during the hospital encounter of 11/24/21    Echo    Interpretation Summary  · The left ventricle is normal in size with concentric remodeling and low normal systolic function.  · The estimated ejection fraction is 50%.  · Atrial fibrillation not observed.  · Normal right ventricular size.  · Normal left ventricular diastolic function.  . Continue Beta Blocker and ACE/ARB and monitor clinical status closely. Monitor on telemetry. Patient is off CHF pathway.  Monitor strict Is&Os and daily weights.  Place on fluid restriction of 2 L. Continue to stress to patient importance of self efficacy and  on diet for CHF. Last BNP reviewed- and noted below     COPD exacerbation  Shortness of breath improved on BIPAP        VTE Risk Mitigation (From admission, onward)         Ordered     Reason for No Pharmacological  VTE Prophylaxis  Once        Question:  Reasons:  Answer:  Physician Provided (leave comment)    11/03/23 2125     IP VTE HIGH RISK PATIENT  Once         11/03/23 2125     Place sequential compression device  Until discontinued         11/03/23 2125                Thank you for your consult. I will follow-up with patient. Please contact us if you have any additional questions.    Alex Ortega, DO  Cardiology   [facility] - [address] [place] [address]

## 2023-11-05 NOTE — SUBJECTIVE & OBJECTIVE
Interval History: Patient examined at bedside. Pt endorses chest pain with cough which is tender on palpation. Cardiology was consulted and recommends LHC when patient is able to tolerate laying supine. Nephrology indicated no obstruction on renal ultrasound.     Review of Systems   Constitutional:  Positive for fatigue. Negative for chills, diaphoresis and fever.   HENT:  Negative for sinus pressure, sore throat, tinnitus, trouble swallowing and voice change.    Respiratory:  Positive for shortness of breath. Negative for cough and chest tightness.    Cardiovascular:  Positive for chest pain and leg swelling. Negative for palpitations.        Chest pain with cough   Gastrointestinal:  Positive for abdominal pain. Negative for blood in stool, nausea and vomiting.   Genitourinary:  Negative for dysuria and hematuria.   Musculoskeletal:  Negative for neck pain and neck stiffness.   Skin:  Negative for pallor.   Neurological:  Positive for weakness. Negative for tremors, seizures, syncope and speech difficulty.   Psychiatric/Behavioral:  Negative for agitation, behavioral problems and confusion.      Objective:     Vital Signs (Most Recent):  Temp: 97.5 °F (36.4 °C) (11/05/23 1434)  Pulse: 75 (11/05/23 1434)  Resp: 18 (11/05/23 1434)  BP: (!) 88/46 (11/05/23 1434)  SpO2: 96 % (11/05/23 1434) Vital Signs (24h Range):  Temp:  [97.1 °F (36.2 °C)-98.9 °F (37.2 °C)] 97.5 °F (36.4 °C)  Pulse:  [68-80] 75  Resp:  [16-22] 18  SpO2:  [91 %-100 %] 96 %  BP: ()/(46-66) 88/46     Weight: (!) 158.3 kg (349 lb)  Body mass index is 53.07 kg/m².    Intake/Output Summary (Last 24 hours) at 11/5/2023 1639  Last data filed at 11/5/2023 1434  Gross per 24 hour   Intake 480 ml   Output 1050 ml   Net -570 ml         Physical Exam  Vitals and nursing note reviewed.   Constitutional:       General: She is not in acute distress.     Appearance: Normal appearance. She is ill-appearing.   HENT:      Head: Normocephalic and atraumatic.       Right Ear: External ear normal.      Left Ear: External ear normal.      Nose: Nose normal. No congestion or rhinorrhea.      Mouth/Throat:      Mouth: Mucous membranes are dry.      Pharynx: No oropharyngeal exudate or posterior oropharyngeal erythema.   Eyes:      General: No scleral icterus.        Right eye: No discharge.         Left eye: No discharge.      Extraocular Movements: Extraocular movements intact.      Conjunctiva/sclera: Conjunctivae normal.   Cardiovascular:      Rate and Rhythm: Normal rate.   Pulmonary:      Effort: Pulmonary effort is normal.      Breath sounds: Normal breath sounds. No rhonchi or rales.   Chest:      Chest wall: Tenderness present.   Abdominal:      General: Bowel sounds are normal.      Tenderness: There is no abdominal tenderness. There is no guarding.      Hernia: No hernia is present.   Musculoskeletal:      Comments: Bilateral lower extremity tenderness on palpation   Neurological:      Mental Status: She is alert.             Significant Labs: All pertinent labs within the past 24 hours have been reviewed.    Significant Imaging: I have reviewed all pertinent imaging results/findings within the past 24 hours.

## 2023-11-05 NOTE — PROGRESS NOTES
Subjective:       Patient ID: Holly Porter is a 63 y.o. female.    Chief Complaint: Results (Abnl x-ray )    HPI  .  Patient seen and evaluated patient does have a history of CHF complains of moderate ear pain also complains of ringing in the ear patient does have a history of tobacco use patient does give a history of occasional shortness of breath.  Today I am going to order ABG BMP I am concerned about COPD patient does complain of dyspnea will check a CBC hemoglobin A1c PA and lateral chest x-ray.  Will order a new oxygen machine for the patient also the patient is requesting a shower chair..Due to the many adverse health risks of smoking tobacco,  Patient has been encouraged to quit smoking, and smoking sensation treatments have been   treatments have been offered and a  smoking cessation class has been recommended to the patient    Current Medications:  No current facility-administered medications for this visit.    Current Outpatient Medications:     levothyroxine (SYNTHROID) 150 MCG tablet, Take 1 tablet (150 mcg total) by mouth before breakfast., Disp: 90 tablet, Rfl: 1    loratadine (CLARITIN) 10 mg tablet, Take 1 tablet (10 mg total) by mouth once daily., Disp: 30 tablet, Rfl: 3    pantoprazole (PROTONIX) 40 MG tablet, Take 1 tablet (40 mg total) by mouth once daily., Disp: 90 tablet, Rfl: 1    QUEtiapine (SEROQUEL) 25 MG Tab, Take 1 tablet (25 mg total) by mouth once daily., Disp: 30 tablet, Rfl: 1    Facility-Administered Medications Ordered in Other Visits:     albuterol-ipratropium 2.5 mg-0.5 mg/3 mL nebulizer solution 3 mL, 3 mL, Nebulization, Q6H, Lynne Benavidez MD, 3 mL at 11/05/23 1251    allopurinoL tablet 300 mg, 300 mg, Oral, Daily, Lynne Benavidez MD, 300 mg at 11/05/23 1007    aspirin EC tablet 81 mg, 81 mg, Oral, Daily, Lynne Benavidez MD, 81 mg at 11/05/23 1006    atorvastatin tablet 80 mg, 80 mg, Oral, Daily, Josue Scott MD, 80 mg at 11/05/23 1007    bumetanide  injection 1 mg, 1 mg, Intravenous, Daily, Fahad Esposito MD    calcium carbonate 200 mg calcium (500 mg) chewable tablet 500 mg, 500 mg, Oral, BID, Lynne Benavidez MD, 500 mg at 11/05/23 1006    cholecalciferol (vitamin D3) 125 mcg (5,000 unit) tablet 5,000 Units, 5,000 Units, Oral, BID, Kenna Broderick DO, 5,000 Units at 11/05/23 1007    cyclobenzaprine tablet 10 mg, 10 mg, Oral, QHS, Lynne Benavidez MD, 10 mg at 11/04/23 2109    dextrose 10% bolus 125 mL 125 mL, 12.5 g, Intravenous, PRN, Lynne Benavidez MD    dextrose 10% bolus 250 mL 250 mL, 25 g, Intravenous, PRN, Lynne Benavidez MD    docusate sodium capsule 100 mg, 100 mg, Oral, BID, Lynne Benavidez MD, 100 mg at 11/05/23 1006    DULoxetine DR capsule 30 mg, 30 mg, Oral, Daily, Lynne Benavidez MD, 30 mg at 11/05/23 1006    gabapentin capsule 600 mg, 600 mg, Oral, TID, Lynne Benavidez MD, 600 mg at 11/05/23 1006    glucagon (human recombinant) injection 1 mg, 1 mg, Intramuscular, PRN, Lynne Benavidez MD    glucose chewable tablet 16 g, 16 g, Oral, PRN, Lynne Benavidez MD    glucose chewable tablet 24 g, 24 g, Oral, PRN, Lynne Benavidez MD    HYDROcodone-acetaminophen  mg per tablet 1 tablet, 1 tablet, Oral, Q6H PRN, Lynne Benavidez MD    insulin detemir U-100 injection 10 Units, 10 Units, Subcutaneous, QHS, Lynne Benavidez MD, 10 Units at 11/04/23 2110    iron sucrose injection 200 mg, 200 mg, Intravenous, Daily, Fahad Esposito MD, 200 mg at 11/05/23 1248    levothyroxine tablet 150 mcg, 150 mcg, Oral, Before breakfast, Lynne Benavidez MD, 150 mcg at 11/05/23 0600    losartan tablet 100 mg, 100 mg, Oral, Daily, Lynne Benavidez MD, 100 mg at 11/04/23 0911    melatonin tablet 6 mg, 6 mg, Oral, Nightly PRN, Josue Scott MD    nicotine 21 mg/24 hr 1 patch, 1 patch, Transdermal, Daily, Lynne Benavidez MD, 1 patch at 11/05/23 1009    NIFEdipine 24 hr tablet 90 mg, 90 mg, Oral,  "Daily, Lynne Benavidez MD, 90 mg at 11/05/23 1006    nitroGLYCERIN SL tablet 0.4 mg, 0.4 mg, Sublingual, Q5 Min PRN, Josue Scott MD    pantoprazole EC tablet 40 mg, 40 mg, Oral, Daily, Lynne Benavidez MD, 40 mg at 11/05/23 1006    polyethylene glycol packet 17 g, 17 g, Oral, Daily, Lynne Benavidez MD, 17 g at 11/05/23 1005    potassium chloride SA CR tablet 40 mEq, 40 mEq, Oral, BID, Namrata Sweet MD, 40 mEq at 11/05/23 1006    predniSONE tablet 40 mg, 40 mg, Oral, Daily, Namrata Sweet MD, 40 mg at 11/05/23 1248    sertraline tablet 50 mg, 50 mg, Oral, Daily, Lynne Benavidez MD, 50 mg at 11/05/23 1006    simethicone chewable tablet 80 mg, 1 tablet, Oral, QID PRN, Josue Scott MD    sodium chloride 0.9% flush 10 mL, 10 mL, Intravenous, Q12H PRN, Josue Scott MD    spironolactone tablet 50 mg, 50 mg, Oral, Daily, Lynne Benavidez MD, 50 mg at 11/05/23 1006    ticagrelor tablet 90 mg, 90 mg, Oral, BID, Josue Scott MD, 90 mg at 11/05/23 1007    topiramate tablet 150 mg, 150 mg, Oral, BID, Lynne Benavidez MD, 150 mg at 11/05/23 1006           Review of Systems   Constitutional:  Negative for appetite change, fatigue and fever.   Respiratory:  Negative for shortness of breath.    Cardiovascular:  Negative for chest pain.   Gastrointestinal:  Negative for abdominal pain and constipation.   Endocrine: Negative for polydipsia, polyphagia and polyuria.   Genitourinary:  Negative for difficulty urinating, frequency and hot flashes.   Allergic/Immunologic: Negative for environmental allergies.   Neurological:  Negative for dizziness and light-headedness.   Psychiatric/Behavioral:  Negative for agitation.                 Vitals:    11/01/23 1437   BP: 136/64   BP Location: Right arm   Patient Position: Sitting   BP Method: Large (Automatic)   Pulse: 103   Resp: 18   Temp: 97.7 °F (36.5 °C)   TempSrc: Temporal   SpO2: 97%   Weight: (!) 158.4 kg (349 lb 1.6 oz)   Height: 5' 8" (1.727 m)    "     Physical Exam  Vitals and nursing note reviewed.   Constitutional:       Appearance: Normal appearance.   Cardiovascular:      Rate and Rhythm: Normal rate and regular rhythm.      Pulses: Normal pulses.      Heart sounds: Normal heart sounds.   Pulmonary:      Effort: Pulmonary effort is normal.      Breath sounds: Normal breath sounds.   Abdominal:      General: Abdomen is flat. Bowel sounds are normal.      Palpations: Abdomen is soft.   Musculoskeletal:         General: Normal range of motion.   Skin:     General: Skin is warm and dry.   Neurological:      General: No focal deficit present.      Mental Status: She is alert and oriented to person, place, and time. Mental status is at baseline.           Last Labs:     Office Visit on 10/17/2023   Component Date Value    Sodium 10/17/2023 135 (L)     Potassium 10/17/2023 2.7 (L)     Chloride 10/17/2023 92 (L)     CO2 10/17/2023 41 (H)     Anion Gap 10/17/2023 5 (L)     Glucose 10/17/2023 100     BUN 10/17/2023 30 (H)     Creatinine 10/17/2023 1.22 (H)     BUN/Creatinine Ratio 10/17/2023 25 (H)     Calcium 10/17/2023 9.8     eGFR 10/17/2023 50 (L)     ProBNP 10/17/2023 286 (H)    Admission on 10/05/2023, Discharged on 10/05/2023   Component Date Value    Sodium 10/05/2023 138     Potassium 10/05/2023 3.0 (L)     Chloride 10/05/2023 98     CO2 10/05/2023 35 (H)     Anion Gap 10/05/2023 8     Glucose 10/05/2023 124 (H)     BUN 10/05/2023 12     Creatinine 10/05/2023 1.07 (H)     BUN/Creatinine Ratio 10/05/2023 11     Calcium 10/05/2023 9.7     Total Protein 10/05/2023 7.0     Albumin 10/05/2023 3.3 (L)     Globulin 10/05/2023 3.7     A/G Ratio 10/05/2023 0.9     Bilirubin, Total 10/05/2023 0.4     Alk Phos 10/05/2023 111     ALT 10/05/2023 13     AST 10/05/2023 24     eGFR 10/05/2023 58 (L)     Troponin I High Sensitiv* 10/05/2023 40.8     ProBNP 10/05/2023 1,932 (H)     D-Dimer 10/05/2023 2.46 (H)     WBC 10/05/2023 8.49     RBC 10/05/2023 6.13 (H)      Hemoglobin 10/05/2023 15.3     Hematocrit 10/05/2023 48.4 (H)     MCV 10/05/2023 79.0 (L)     MCH 10/05/2023 25.0 (L)     MCHC 10/05/2023 31.6 (L)     RDW 10/05/2023 17.7 (H)     Platelet Count 10/05/2023 290     MPV 10/05/2023 10.8     Neutrophils % 10/05/2023 67.4 (H)     Lymphocytes % 10/05/2023 19.4 (L)     Monocytes % 10/05/2023 10.0 (H)     Eosinophils % 10/05/2023 1.8     Basophils % 10/05/2023 0.7     Immature Granulocytes % 10/05/2023 0.7 (H)     nRBC, Auto 10/05/2023 0.0     Neutrophils, Abs 10/05/2023 5.72     Lymphocytes, Absolute 10/05/2023 1.65     Monocytes, Absolute 10/05/2023 0.85 (H)     Eosinophils, Absolute 10/05/2023 0.15     Basophils, Absolute 10/05/2023 0.06     Immature Granulocytes, A* 10/05/2023 0.06 (H)     nRBC, Absolute 10/05/2023 0.00     Diff Type 10/05/2023 Auto     POC Glucose 10/05/2023 122 (H)    Office Visit on 10/02/2023   Component Date Value    POC Amphetamines 10/02/2023 Negative     POC Barbiturates 10/02/2023 Negative     POC Benzodiazepines 10/02/2023 Negative     POC Cocaine 10/02/2023 Negative     POC THC 10/02/2023 Negative     POC Methadone 10/02/2023 Negative     POC Methamphetamine 10/02/2023 Negative     POC Opiates 10/02/2023 Presumptive Positive (A)     POC Oxycodone 10/02/2023 Negative     POC Phencyclidine 10/02/2023 Negative     POC Methylenedioxymetham* 10/02/2023 Negative     POC Tricyclic Antidepres* 10/02/2023 Negative     POC Buprenorphine 10/02/2023 Negative      Acceptab* 10/02/2023 Yes     POC Temperature (Urine) 10/02/2023 92        Last Imaging:  Echo    Left Ventricle: The left ventricle is normal in size. Normal wall   thickness. Normal wall motion. There is normal systolic function with a   visually estimated ejection fraction of 60 - 65%. Ejection fraction by   visual approximation is 50%. There is normal diastolic function.    Right Ventricle: Moderate right ventricular enlargement. Systolic   function is normal.    Left Atrium:  Left atrium is mildly dilated.    Right Atrium: Right atrium is moderately dilated.    Aortic Valve: The aortic valve is a trileaflet valve.    Tricuspid Valve: There is mild regurgitation. No paravalvular   regurgitation.         **Labs and x-rays personally reviewed by me    ** reviewed      Objective:        Assessment:       1. Congestive heart failure, unspecified HF chronicity, unspecified heart failure type  Basic Metabolic Panel    CBC Auto Differential    X-Ray Chest PA And Lateral    Misc Sendout Test, Blood Venous Blood Gas    Misc Sendout Test, Blood Venous Blood Gas      2. Type 2 diabetes mellitus without complication, with long-term current use of insulin  Hemoglobin A1C           Plan:         [unfilled]

## 2023-11-05 NOTE — PLAN OF CARE
[facility] - [facility] Telemetry  Initial Discharge Assessment       Primary Care Provider: Regan Frausto MD    Admission Diagnosis: CHF (congestive heart failure) [I50.9]    Admission Date: 11/3/2023  Expected Discharge Date:          Payor: [facility] MCARE / Plan: [facility] DUAL COMPLETE PPO SNP / Product Type: Medicare Advantage /     Extended Emergency Contact Information  Primary Emergency Contact: Dustin Caruso  Address: [address] [place] [address]  Home Phone: [phone]  Relation: Daughter  Secondary Emergency Contact: COLT MCDERMOTT  Home Phone: [phone]  Relation: Friend  Preferred language: English   needed? No    Discharge Plan A: Home  Discharge Plan B: Home      [facility] [place] - [address]  [address]  [place] [address]  Phone: [phone] Fax: [phone]    [id number] - [place] - [address]  [address] [facility] [address]  Phone: [phone] Fax: [phone]    [facility] [place] - [address]  [address]  [place] [address]  Phone: [phone] Fax: [phone]      Initial Assessment (most recent)       Adult Discharge Assessment - 11/05/23 1034          Discharge Assessment    Assessment Type Discharge Planning Assessment     Source of Information patient     People in Home alone     Do you expect to return to your current living situation? Yes     Do you have help at home or someone to help you manage your care at home? Yes     Equipment Currently Used at Home none     Readmission within 30 days? No     Patient currently being followed by outpatient case management? No     Do you currently have service(s) that help you manage your care at home? No     Do you take prescription medications? Yes     Do you have prescription coverage? Yes     Coverage Medicare     Do you have any problems  affording any of your prescribed medications? No     Is the patient taking medications as prescribed? no     DME Needed Upon Discharge  none     Discharge Plan A Home     Discharge Plan B Home                   Pt lives at home alone. Denies any hh/dme. Plans to return home when medically stable. SDOH completed within last 6 months. IM explained and signed. SS following.

## 2023-11-05 NOTE — ASSESSMENT & PLAN NOTE
Patient's COPD is with exacerbation noted by continued dyspnea, use of accessory muscles for breathing and worsening of baseline hypoxia currently.  Patient is currently off COPD Pathway. Continue scheduled inhalers Steroids, Antibiotics and Supplemental oxygen and monitor respiratory status closely.     - DuoNeb q6h  - Budesonide 0.5 mg BID  - d/c solumedrol and start prednisone 40 mg  - On continuous CPAP

## 2023-11-05 NOTE — HPI
PT presented to [facility] ER with complaint of increasing shortness of breath    Pt reports three day history of progressive shortness of breath.  She reports experienced chest pain, mid sternal, non-radiating, associated with nausea and diaphoresis, occurred at rest, lasted fifteen to twenty minutes, resolved spontaneously. Chest pain has not reoccurred.  She cannot recall having stress test or LHC, old records show stress imaging not performed due to pt exceeding weight limitation of imaging equipment, recalls previous echo. She is recovering from COVID 19 pneumonia in early October, was hospitalized in [place], discharged to home care after several days.

## 2023-11-05 NOTE — HOSPITAL COURSE
Pt reports shortness of breath has improved on BiPAP, is breathing easier,  Denies recurrence of chest pain.

## 2023-11-05 NOTE — CONSULTS
[facility] - [facility] Telemetry  Cardiology  Consult Note    Patient Name: Holly Porter  MRN: [id number]  Admission Date: 11/3/2023  Hospital Length of Stay: 2 days  Code Status: Full Code   Attending Provider: Kenan Broderick DO   Consulting Provider: Alex Ortega DO  Primary Care Physician: Regan Frausto MD  Principal Problem:Acute on chronic respiratory failure with hypoxia and hypercapnia    Patient information was obtained from patient, past medical records and ER records.     Consults  Subjective:     Chief Complaint:  Shortness of breath     HPI:   PT presented to [facility] ER with complaint of increasing shortness of breath    Pt reports three day history of progressive shortness of breath.  She reports experienced chest pain, mid sternal, non-radiating, associated with nausea and diaphoresis, occurred at rest, lasted fifteen to twenty minutes, resolved spontaneously. Chest pain has not reoccurred.  She cannot recall having stress test or LHC, old records show stress imaging not performed due to pt exceeding weight limitation of imaging equipment, recalls previous echo. She is recovering from COVID 19 pneumonia in early October, was hospitalized in [place], discharged to home care after several days.      Past Medical History:   Diagnosis Date    Acquired hypothyroidism     Atherosclerosis of native artery of extremity with intermittent claudication 01/30/2019    bilateral legs    BMI 50.0-59.9, adult 02/22/2021    Chronic pain syndrome     opioid depen    COPD (chronic obstructive pulmonary disease)     COVID-19 10/06/2023    Depression     Diabetes mellitus, type 2     followed by Aurea Kwong NP, [facility]    DVT of lower extremity, bilateral     Essential hypertension 06/08/2021    GERD (gastroesophageal reflux disease)     Gout 07/05/2023    Hyperlipidemia     Lumbosacral radiculopathy 06/05/2023    followed by GLENN Valdes, [facility]     Migraines     Nicotine dependence     Nicotine dependence     Obesity hypoventilation syndrome     chronic CO2 retainer with compensatory metabolic alkalosis    Osteoarthritis     Seizure disorder     Sleep apnea     on cpap    Thyroid cancer     Venous stasis ulcer limited to breakdown of skin with varicose veins     followed by Estuardo Zhao    Vitamin D deficiency        Past Surgical History:   Procedure Laterality Date    ABCESS DRAINAGE      HYSTERECTOMY      ILIAC ARTERY STENT Bilateral 01/28/2020    Bilateral distal aorta and common iliac 8 X 59 vbx covered stents performed by Dr. Antonio Jacinto.    RADIOFREQUENCY ABLATION Left 04/15/2022    Procedure: Left calf  Radiofrequency Ablation;  Surgeon: Matty Falcon DO;  Location: [facility];  Service: Vascular;  Laterality: Left;    STAB PHLEBECTOMY OF VARICOSE VEINS Left 01/25/2013    Left leg microphlebectomies x 23 stab avulsions and ligation of multiple varicose veins perrformed by Dr. Cirilo Aguirre.    THYROIDECTOMY      hx: thyroid cancer    TOE AMPUTATION Left 01/28/2020    2nd, 4th and 5th performed by Dr. Anam Saeed.    TOE AMPUTATION Right 01/28/2020    4th toe performed by Dr. Anam Saeed.    TOTAL ABDOMINAL HYSTERECTOMY W/ BILATERAL SALPINGOOPHORECTOMY      TUBAL LIGATION      VAGINAL DELIVERY      x 4    VENOUS ABLATION Right 08/09/2019    GSV Varithena Ablation performed by Dr. Estuardo Falcon    VENOUS ABLATION Left 08/02/2019    ATAV Varithena Ablation performed by Dr. Estuardo Falcon.    VENOUS ABLATION Right 07/13/2015    ATAV Laser Ablatio performed by Dr. Cirilo Aguirre.    VENOUS ABLATION Right 07/06/2015    Distal  GSV Laser Ablation performed by dr. Cirilo Aguirre.    VENOUS ABLATION Left 04/20/2015    Left Distal GSV Laser Ablation performed by dr. Cirilo Aguirre.    VENOUS ABLATION Right 10/28/2013    Right Distal GSV RF Ablation performed by Dr. Cirilo Aguirre.    VENOUS ABLATION Left 10/25/2013    SSV RF Ablation performed by   Cirilo Aguirre.    VENOUS ABLATION Left 10/07/2013    Left GSV and Left ATAV RF Ablation performed by Dr. Cirilo Aguirre.    VENOUS ABLATION Right 01/21/2013    GSV RF Ablation w/micros x 22 performed by Dr. Cirilo Aguirre.    VENOUS ABLATION Left 06/25/2021    Left distal GSV Varithena Ablation       Review of patient's allergies indicates:   Allergen Reactions    Ammonium peroxydisulfate Shortness Of Breath    Avocado (laurus persea) Anaphylaxis    Bananas [banana] Anaphylaxis and Swelling     CRAMPS,    Chocolate flavor Anaphylaxis     MOUTH SWELLING    Fentanyl Shortness Of Breath and Itching    Percocet [oxycodone-acetaminophen] Shortness Of Breath and Itching    Silvadene [silver sulfadiazine]     Clindamycin     Corticosteroids (glucocorticoids)     Hydrocortisone Blisters    Lasix [furosemide] Blisters     BURNS SKIN    Nutritional supplement-fiber Itching    Pregabalin     Shellfish containing products     Adhesive Rash and Blisters    Iodine Rash    Iodine and iodide containing products Rash    Latex, natural rubber Rash    Pcn [penicillins] Rash    Sulfa (sulfonamide antibiotics) Rash and Blisters       No current facility-administered medications on file prior to encounter.     Current Outpatient Medications on File Prior to Encounter   Medication Sig    ACCU-CHEK GUIDE GLUCOSE METER Misc     ACCU-CHEK GUIDE TEST STRIPS Strp     ACCU-CHEK SOFTCLIX LANCETS Misc     albuterol (PROVENTIL/VENTOLIN HFA) 90 mcg/actuation inhaler Inhale 2 puffs into the lungs 4 (four) times daily. Rescue    allopurinoL (ZYLOPRIM) 300 MG tablet TAKE ONE TABLET EVERY DAY    amitriptyline (ELAVIL) 25 MG tablet Take 1 tablet (25 mg total) by mouth nightly as needed for Insomnia.    apixaban (ELIQUIS) 5 mg Tab Take 1 tablet (5 mg total) by mouth 2 (two) times daily.    aspirin (ECOTRIN) 81 MG EC tablet Take 1 tablet (81 mg total) by mouth once daily.    BREO ELLIPTA 100-25 mcg/dose diskus inhaler Inhale 1 puff into the  lungs once daily.    bumetanide (BUMEX) 2 MG tablet Take 2 mg by mouth 2 (two) times daily.    calcium carbonate (OS-MICHAELA) 500 mg calcium (1,250 mg) tablet Take 1 tablet (500 mg total) by mouth 2 (two) times daily.    carvediloL (COREG) 12.5 MG tablet Take 0.5 tablets (6.25 mg total) by mouth 2 (two) times daily.    cholecalciferol, vitamin D3, 125 mcg (5,000 unit) capsule Take 1 capsule (5,000 Units total) by mouth 2 (two) times a day.    cilostazoL (PLETAL) 100 MG Tab Take 1 tablet (100 mg total) by mouth 2 (two) times daily.    colchicine, gout, (COLCRYS) 0.6 mg tablet TAKE 1 TABLET BY MOUTH 3 TIMES A DAY FOR 2 DAYS THEN 1 TABLET DAILY UNTIL GONE    cyclobenzaprine (FLEXERIL) 10 MG tablet Take 1 tablet (10 mg total) by mouth every evening.    dexAMETHasone (DECADRON) 4 MG Tab Take by mouth.    docusate sodium (COLACE) 100 MG capsule Take 1 capsule (100 mg total) by mouth 2 (two) times daily.    DULoxetine (CYMBALTA) 30 MG capsule TAKE 1 CAPSULE (30 MG TOTAL) BY MOUTH ONCE DAILY.    gabapentin (NEURONTIN) 600 MG tablet Take 1 tablet (600 mg total) by mouth 3 (three) times daily.    HYDROcodone-acetaminophen (NORCO)  mg per tablet Take 1 tablet by mouth every 6 (six) hours as needed for Pain.    HYDROcodone-acetaminophen (NORCO)  mg per tablet Take 1 tablet by mouth every 6 (six) hours as needed for Pain.    ibuprofen (ADVIL,MOTRIN) 800 MG tablet Take 1 tablet (800 mg total) by mouth 2 (two) times daily as needed for Pain.    insulin detemir U-100, Levemir, (LEVEMIR FLEXTOUCH U100 INSULIN) 100 unit/mL (3 mL) InPn pen Inject 10 Units into the skin every evening. 15 ml with 1 refill    levothyroxine (SYNTHROID) 150 MCG tablet TAKE 1 TABLET BY MOUTH BEFORE BREAKFAST.    loratadine (CLARITIN) 10 mg tablet Take 1 tablet (10 mg total) by mouth once daily.    losartan-hydrochlorothiazide 50-12.5 mg (HYZAAR) 50-12.5 mg per tablet Take 1 tablet by mouth once daily.    metOLazone (ZAROXOLYN) 2.5  "MG tablet Take 1 tablet (2.5 mg total) by mouth once daily.    mupirocin (BACTROBAN) 2 % ointment Apply topically 2 (two) times daily.    naloxone (NARCAN) 4 mg/actuation Spry 1 spray once.    nicotine (NICODERM CQ) 21 mg/24 hr Place 1 patch onto the skin every 24 hours.    NIFEdipine (PROCARDIA-XL) 90 MG (OSM) 24 hr tablet Take 1 tablet (90 mg total) by mouth once daily.    NOVOFINE 32 32 gauge x 1/4" Ndle Use as directed once daily.    pantoprazole (PROTONIX) 40 MG tablet Take 1 tablet (40 mg total) by mouth once daily.    phentermine (ADIPEX-P) 37.5 mg tablet Take 1 tablet (37.5 mg total) by mouth before breakfast.    polyethylene glycol (GLYCOLAX) 17 gram PwPk Take 17 g by mouth once daily.    potassium chloride SA (K-DUR,KLOR-CON) 20 MEQ tablet Take 1 tablet (20 mEq total) by mouth once daily.    QUEtiapine (SEROQUEL) 25 MG Tab Take 1 tablet (25 mg total) by mouth once daily.    sertraline (ZOLOFT) 50 MG tablet Take 1 tablet (50 mg total) by mouth once daily.    spironolactone (ALDACTONE) 50 MG tablet Take 1 tablet (50 mg total) by mouth once daily.    SYMBICORT 160-4.5 mcg/actuation HFAA Inhale into the lungs.    tiotropium (SPIRIVA) 18 mcg inhalation capsule Inhale 1 capsule (18 mcg total) into the lungs once daily. Controller    topiramate (TOPAMAX) 100 MG tablet Take 1.5 tablets (150 mg total) by mouth 2 (two) times daily.    triamcinolone acetonide 0.1% (KENALOG) 0.1 % cream Apply topically 3 (three) times daily.     Family History       Problem Relation (Age of Onset)    Coronary aneurysm Father    Coronary artery disease Father    Heart disease Mother, Father    Hypertension Mother, Father    No Known Problems Son, Son, Son, Son, Sister, Brother, Brother, Brother    Osteoarthritis Mother          Tobacco Use    Smoking status: Every Day     Current packs/day: 0.50     Average packs/day: 0.5 packs/day for 33.0 years (16.5 ttl pk-yrs)     Types: Cigarettes     Passive exposure: Current    " Smokeless tobacco: Never   Substance and Sexual Activity    Alcohol use: Never    Drug use: Never    Sexual activity: Not Currently     Review of Systems   Unable to perform ROS: Acuity of condition   Respiratory:          PT SOB, able to tolerate only a few seconds before has to replace BIPAP to control SOB.      Objective:     Vital Signs (Most Recent):  Temp: 97.1 °F (36.2 °C) (11/05/23 0633)  Pulse: 69 (11/05/23 0740)  Resp: 16 (11/05/23 0740)  BP: 105/66 (11/05/23 0633)  SpO2: 100 % (11/05/23 0740) Vital Signs (24h Range):  Temp:  [97.1 °F (36.2 °C)-98.9 °F (37.2 °C)] 97.1 °F (36.2 °C)  Pulse:  [68-92] 69  Resp:  [16-22] 16  SpO2:  [91 %-100 %] 100 %  BP: ()/(51-85) 105/66     Weight: (!) 158.3 kg (349 lb)  Body mass index is 53.07 kg/m².    SpO2: 100 %         Intake/Output Summary (Last 24 hours) at 11/5/2023 0859  Last data filed at 11/5/2023 0541  Gross per 24 hour   Intake 960 ml   Output 2300 ml   Net -1340 ml       Lines/Drains/Airways       Peripheral Intravenous Line  Duration                  Peripheral IV - Single Lumen 11/03/23 1850 20 G Anterior;Proximal;Right Forearm 1 day                     Physical Exam  Vitals and nursing note reviewed.   Constitutional:       Appearance: Normal appearance. She is obese.   HENT:      Head: Normocephalic and atraumatic.      Right Ear: External ear normal.      Left Ear: External ear normal.   Eyes:      General: No scleral icterus.        Right eye: No discharge.         Left eye: No discharge.      Extraocular Movements: Extraocular movements intact.      Conjunctiva/sclera: Conjunctivae normal.      Pupils: Pupils are equal, round, and reactive to light.   Cardiovascular:      Rate and Rhythm: Normal rate and regular rhythm.      Pulses: Normal pulses.      Heart sounds: Normal heart sounds. No murmur heard.     No friction rub. No gallop.      Comments: Heart tones distant  Pulmonary:      Effort: Pulmonary effort is normal.      Breath sounds:  Normal breath sounds. No wheezing, rhonchi or rales.      Comments: Poor inspiratory effort, on BIPAP  Chest:      Chest wall: No tenderness.   Abdominal:      General: Abdomen is flat. Bowel sounds are normal. There is no distension.      Palpations: Abdomen is soft.      Tenderness: There is no abdominal tenderness. There is no guarding or rebound.   Musculoskeletal:         General: No swelling or tenderness. Normal range of motion.      Cervical back: Normal range of motion and neck supple.      Right lower leg: Edema present.      Left lower leg: Edema present.   Skin:     General: Skin is warm and dry.      Findings: No erythema or rash.   Neurological:      General: No focal deficit present.      Mental Status: She is alert and oriented to person, place, and time.      Cranial Nerves: No cranial nerve deficit.      Motor: No weakness.      Gait: Gait normal.   Psychiatric:         Mood and Affect: Mood normal.         Behavior: Behavior normal.         Thought Content: Thought content normal.         Judgment: Judgment normal.          Significant Labs: All pertinent lab results from the last 24 hours have been reviewed. and   Recent Lab Results         11/05/23  0247   11/04/23  1840   11/04/23  1811   11/04/23  1101        Albumin 2.7             Anion Gap 7             Baso # 0.01             Basophil % 0.1             BUN 34             BUN/CREAT RATIO 21             Calcium 9.8             Chloride 87             CO2 43             Creatinine 1.61             Differential Method Auto             eGFR 36             Eos # 0.00             Eosinophil % 0.0             Glucose 150             Hematocrit 45.1             Hemoglobin 13.9             Immature Grans (Abs) 0.06             Immature Granulocytes 0.4             Lymph # 0.87             Lymph % 6.3             MCH 23.6             MCHC 30.8             MCV 76.6             Mono # 0.33             Mono % 2.4             MPV 10.1              Neutrophils, Abs 12.58             Neutrophils Relative 90.8             nRBC 0.3             NUCLEATED RBC ABSOLUTE 0.04             Phosphorus Level 3.2             Platelet Count 226             POC Base Excess   27.0     27.2       POC HCO3   53.9     53.7       POC PCO2   63     60       POC PH   7.54     7.56       POC PO2   65     58       POC SATURATED O2   95     93       Potassium 2.9             RBC 5.89             RDW 19.8             Sodium 134             Troponin I High Sensitivity     129.4         WBC 13.85                     Significant Imaging: Echocardiogram: 2D echo with color flow doppler: No results found for this or any previous visit. and Transthoracic echo (TTE) complete (Cupid Only):   Results for orders placed or performed during the hospital encounter of 11/03/23   Echo   Result Value Ref Range    BSA 2.76 m2    LVOT stroke volume 60.63 cm3    LVIDd 4.95 3.5 - 6.0 cm    LV Systolic Volume 71.78 mL    LV Systolic Volume Index 27.7 mL/m2    LVIDs 4.04 (A) 2.1 - 4.0 cm    LV Diastolic Volume 115.56 mL    LV Diastolic Volume Index 44.62 mL/m2    IVS 1.04 0.6 - 1.1 cm    LVOT diameter 1.98 cm    LVOT area 3.1 cm2    FS 18 (A) 28 - 44 %    Left Ventricle Relative Wall Thickness 0.37 cm    Posterior Wall 0.92 0.6 - 1.1 cm    LV mass 174.20 g    LV Mass Index 67 g/m2    MV Peak E Dima 0.50 m/s    TDI LATERAL 0.09 m/s    TDI SEPTAL 0.05 m/s    E/E' ratio 7.14 m/s    MV Peak A Dima 0.63 m/s    TR Max Dima 3.00 m/s    E/A ratio 0.79     E wave deceleration time 344.34 msec    LV SEPTAL E/E' RATIO 10.00 m/s    LV LATERAL E/E' RATIO 5.56 m/s    LVOT peak dima 1.00 m/s    Left Ventricular Outflow Tract Mean Velocity 0.65 cm/s    Left Ventricular Outflow Tract Mean Gradient 1.87 mmHg    Right ventricular length in diastole (apical 4-chamber view) 6.79 cm    RV mid diameter 2.77 cm    TAPSE 2.18 cm    RA area 27.0 cm2    AV mean gradient 4 mmHg    AV peak gradient 9 mmHg    Ao peak dima 1.52 m/s    Ao VTI  29.20 cm    LVOT peak VTI 19.70 cm    AV valve area 2.08 cm²    AV Velocity Ratio 0.66     AV index (prosthetic) 0.67     JOSE by Velocity Ratio 2.02 cm²    Triscuspid Valve Regurgitation Peak Gradient 36 mmHg    PV PEAK VELOCITY 0.97 m/s    PV peak gradient 4 mmHg    Ao root annulus 3.46 cm    Mean e' 0.07 m/s    ZLVIDS -7.57     ZLVIDD -12.86     EF 50 %    GLS -14.1 %    RVDD 4.10 cm    LA size 4.4 cm     Assessment and Plan:     Type 2 MI (myocardial infarction)  Elevation of troponin concerning for NSTEMI with elevation from 40 10/5/23 to 297 at presentation, then decreasing to 229 over first hospital day.  Pt is a candidate for LHC/poss when able to lie flat     Non compliance with medical treatment  Discussed smoking cessation, pt unable to pick stop date today    Diabetes mellitus, type 2  Fairly well controlled with HBA1C 6.2    Acute on chronic diastolic CHF (congestive heart failure)  Patient is identified as having Diastolic (HFpEF) heart failure that is Chronic. CHF is currently uncontrolled due to Continued edema of extremities. Latest ECHO performed and demonstrates- Results for orders placed during the hospital encounter of 11/24/21    Echo    Interpretation Summary  · The left ventricle is normal in size with concentric remodeling and low normal systolic function.  · The estimated ejection fraction is 50%.  · Atrial fibrillation not observed.  · Normal right ventricular size.  · Normal left ventricular diastolic function.  . Continue Beta Blocker and ACE/ARB and monitor clinical status closely. Monitor on telemetry. Patient is off CHF pathway.  Monitor strict Is&Os and daily weights.  Place on fluid restriction of 2 L. Continue to stress to patient importance of self efficacy and  on diet for CHF. Last BNP reviewed- and noted below     COPD exacerbation  Shortness of breath improved on BIPAP        VTE Risk Mitigation (From admission, onward)         Ordered     Reason for No Pharmacological  VTE Prophylaxis  Once        Question:  Reasons:  Answer:  Physician Provided (leave comment)    11/03/23 2125     IP VTE HIGH RISK PATIENT  Once         11/03/23 2125     Place sequential compression device  Until discontinued         11/03/23 2125                Thank you for your consult. I will follow-up with patient. Please contact us if you have any additional questions.    Alex Ortega, DO  Cardiology   [facility] - [address] [place] [address]

## 2023-11-05 NOTE — PROGRESS NOTES
[facility] - [facility] Tele[facility]  Nephrology  Progress Note    Patient Name: Holly Porter  MRN: [id number]  Admission Date: 11/3/2023  Hospital Length of Stay: 2 days  Attending Provider: Kenan Broderick DO   Primary Care Physician: Regan Frausto MD  Principal Problem:Acute on chronic respiratory failure with hypoxia and hypercapnia    Consults  Subjective:     Interval History: F/U renal impairment. Creat stable 1.6.    Review of patient's allergies indicates:   Allergen Reactions    Ammonium peroxydisulfate Shortness Of Breath    Avocado (laurus persea) Anaphylaxis    Bananas [banana] Anaphylaxis and Swelling     CRAMPS,    Chocolate flavor Anaphylaxis     MOUTH SWELLING    Fentanyl Shortness Of Breath and Itching    Percocet [oxycodone-acetaminophen] Shortness Of Breath and Itching    Silvadene [silver sulfadiazine]     Clindamycin     Corticosteroids (glucocorticoids)     Hydrocortisone Blisters    Lasix [furosemide] Blisters     BURNS SKIN    Nutritional supplement-fiber Itching    Pregabalin     Shellfish containing products     Adhesive Rash and Blisters    Iodine Rash    Iodine and iodide containing products Rash    Latex, natural rubber Rash    Pcn [penicillins] Rash    Sulfa (sulfonamide antibiotics) Rash and Blisters     Current Facility-Administered Medications   Medication Frequency    albuterol-ipratropium 2.5 mg-0.5 mg/3 mL nebulizer solution 3 mL Q6H    allopurinoL tablet 300 mg Daily    aspirin EC tablet 81 mg Daily    atorvastatin tablet 80 mg Daily    bumetanide injection 1 mg Q12H    calcium carbonate 200 mg calcium (500 mg) chewable tablet 500 mg BID    cholecalciferol (vitamin D3) 125 mcg (5,000 unit) tablet 5,000 Units BID    cyclobenzaprine tablet 10 mg QHS    dextrose 10% bolus 125 mL 125 mL PRN    dextrose 10% bolus 250 mL 250 mL PRN    docusate sodium capsule 100 mg BID    DULoxetine DR capsule 30 mg Daily    gabapentin capsule 600 mg TID    glucagon (human  recombinant) injection 1 mg PRN    glucose chewable tablet 16 g PRN    glucose chewable tablet 24 g PRN    HYDROcodone-acetaminophen  mg per tablet 1 tablet Q6H PRN    insulin detemir U-100 injection 10 Units QHS    iron sucrose injection 200 mg Daily    levothyroxine tablet 150 mcg Before breakfast    losartan tablet 100 mg Daily    melatonin tablet 6 mg Nightly PRN    methylPREDNISolone sodium succinate injection 40 mg Q12H    nicotine 21 mg/24 hr 1 patch Daily    NIFEdipine 24 hr tablet 90 mg Daily    nitroGLYCERIN SL tablet 0.4 mg Q5 Min PRN    pantoprazole EC tablet 40 mg Daily    polyethylene glycol packet 17 g Daily    potassium chloride SA CR tablet 40 mEq BID    sertraline tablet 50 mg Daily    simethicone chewable tablet 80 mg QID PRN    sodium chloride 0.9% flush 10 mL Q12H PRN    spironolactone tablet 50 mg Daily    ticagrelor tablet 90 mg BID    topiramate tablet 150 mg BID       Objective:     Vital Signs (Most Recent):  Temp: 97.1 °F (36.2 °C) (11/05/23 0633)  Pulse: 69 (11/05/23 0740)  Resp: 16 (11/05/23 0740)  BP: 105/66 (11/05/23 0633)  SpO2: 100 % (11/05/23 0740) Vital Signs (24h Range):  Temp:  [97.1 °F (36.2 °C)-98.9 °F (37.2 °C)] 97.1 °F (36.2 °C)  Pulse:  [68-92] 69  Resp:  [16-22] 16  SpO2:  [91 %-100 %] 100 %  BP: ()/(51-85) 105/66     Weight: (!) 158.3 kg (349 lb) (11/04/23 0235)  Body mass index is 53.07 kg/m².  Body surface area is 2.76 meters squared.    I/O last 3 completed shifts:  In: 960 [P.O.:960]  Out: 2800 [Urine:2800]    Physical Exam Awake, no resp distress. C/O tremulousness which she says began with admit to hospital. Chest clear.    Significant Labs:sureBMP:   Recent Labs   Lab 11/04/23  0321 11/05/23  0247   * 150*   * 134*   K 2.4* 2.9*   CL 84* 87*   CO2 43* 43*   BUN 30* 34*   CREATININE 1.55* 1.61*   CALCIUM 9.5 9.8   MG 2.3  --      All labs within the past 24 hours have been reviewed.    Significant Imaging:  Labs: Reviewed    Assessment/Plan:      Active Diagnoses:    Diagnosis Date Noted POA    PRINCIPAL PROBLEM:  Acute on chronic respiratory failure with hypoxia and hypercapnia [J96.21, J96.22] 11/03/2023 Yes    Obesity, diabetes, and hypertension syndrome [E11.69, E66.9, E11.59, I15.2] 11/04/2023 Yes    Type 2 MI (myocardial infarction) [I21.A1] 11/04/2023 Yes    COPD exacerbation [J44.1] 11/03/2023 Yes    Acute on chronic diastolic CHF (congestive heart failure) [I50.33] 11/03/2023 Yes    Acute on chronic renal failure [N17.9, N18.9] 11/03/2023 Yes    Diabetes mellitus, type 2 [E11.9] 11/03/2023 Yes    Obesity hypoventilation syndrome [E66.2] 11/03/2023 Yes    Hypokalemia [E87.6] 11/03/2023 Yes    Non compliance with medical treatment [Z91.199] 11/03/2023 Not Applicable    Microcytosis [R71.8] 11/03/2023 Yes    COVID-19 [U07.1] 11/03/2023 Yes    GERD (gastroesophageal reflux disease) [K21.9] 11/03/2023 Yes    Nicotine dependence [F17.200] 11/03/2023 Yes    Migraine [G43.909] 11/03/2023 Yes      Problems Resolved During this Admission:    Diagnosis Date Noted Date Resolved POA    NSTEMI (non-ST elevated myocardial infarction) [I21.4] 11/03/2023 11/04/2023 Yes       Currently on Bumex 1mg q 12 with electrolyte abnormalities which may improve off that if she can do w/o it. Favor stopping Bumex if able. Will go ahead and cut back to daily.  Tremulousness ?due to inhaled Albuterol.  Renal fct stable and no obstruction by sonogram      Thank you for your consult. I will follow-up with patient. Please contact us if you have any additional questions.    Fahad Esposito MD  Nephrology  [facility] - [address] [place] [address]

## 2023-11-05 NOTE — PROGRESS NOTES
[facility] - [facility] Tele[facility] Medicine  Progress Note    Patient Name: Holly Porter  MRN: [id number]  Patient Class: IP- Inpatient   Admission Date: 11/3/2023  Length of Stay: 2 days  Attending Physician: Kenan Broderick DO  Primary Care Provider: Regan Frausto MD        Subjective:     Principal Problem:Acute on chronic respiratory failure with hypoxia and hypercapnia        HPI:  Patient is a 64 yo female who presents to the hospital as a transfer from [facility] for higher level of care. She presented to the [facility] earlier today with a complaint of shortness for several days that has worsened over the past two days, at which time she started experiencing a severe decrease in her urinary output associated with a right flank, nausea, vomit  and low abdominal pain but denies any urinary symptoms. The shortness of breath is also associated with an acute onset of chest pain today. Chest pain is located at the left sternal region as is associated with radiation to her bilateral arms and bilateral sides of her neck, mild diaphoresis and tremor, but denies any palpitations. Patient has a history of COPD and follows with Pulmonologist Dr. Loyola with recommendation for home oxygen several months ago which was ordered by her PCP Dr. Frausto,  but the patient stated that she never received the order and has thus not been on home oxygen.. Moreover, the patient continues to smoke about 2 packs daily despite medical advice to quit smoking.     Of note, patient presented to the [facility] on 10/5/2023 with similar symptoms of worsening shortness of breath and was transferred to [facility] in [place], [place] for higher level of care, where she was admitted for acute on chronic respiratory failure secondary to COPD exacerbation in the setting of positive Covid 19 infection on 10/06/2023. She only had mild symptoms for this  Covid 19 infection, but had a severe Covid 19 infection in 2021. She has received only one dose of the Covid 19 vaccine back in 2021 with no boosters.    On arrival to the  critical access hospital, the patient was afebrile, but vital signs were significant for /54 and RR 22 with SPO2 of 73% with no leukocytosis, no source of infection and normal lactic acidosis. She was thus placed on supplemental oxygenation with improvement of SPO2. Ensuring work up revealed CMP with hypokalemia with K of 2.4 but normal Mg, bicarb of 45; acute on chronic renal failure with BUN/CR 29/1.84 with GFR 30 from a baseline of BUN/CR 12/1.07 and GFR of 58 four weeks ago, initial troponin of 297 from a baseline of 40.8 four weeks ago; elevated NTpBNP of 3183 from a baseline of 286 two weeks ago and 1932 four weeks ago. Last documented Echo in our system in 2021 showed HFpEF with EF of 55%. CXR with cardiomegaly without omar pulmonary edema or focal consolidation and EKG with sinus tachycardia and RBBB. CT head with negative findings as checked for initial complaint of lightheadedness and slurred speech on arrival that has since been resolved.  Coagulation study was normal and urinalysis grossly normal. Patient will be admitted for further evaluation and management.          Overview/Hospital Course:  No notes on file    Interval History: Patient examined at bedside. Pt endorses chest pain with cough which is tender on palpation. Cardiology was consulted and recommends LHC when patient is able to tolerate laying supine. Nephrology indicated no obstruction on renal ultrasound.     Review of Systems   Constitutional:  Positive for fatigue. Negative for chills, diaphoresis and fever.   HENT:  Negative for sinus pressure, sore throat, tinnitus, trouble swallowing and voice change.    Respiratory:  Positive for shortness of breath. Negative for cough and chest tightness.    Cardiovascular:  Positive for chest pain and leg swelling.  Negative for palpitations.        Chest pain with cough   Gastrointestinal:  Positive for abdominal pain. Negative for blood in stool, nausea and vomiting.   Genitourinary:  Negative for dysuria and hematuria.   Musculoskeletal:  Negative for neck pain and neck stiffness.   Skin:  Negative for pallor.   Neurological:  Positive for weakness. Negative for tremors, seizures, syncope and speech difficulty.   Psychiatric/Behavioral:  Negative for agitation, behavioral problems and confusion.      Objective:     Vital Signs (Most Recent):  Temp: 97.5 °F (36.4 °C) (11/05/23 1434)  Pulse: 75 (11/05/23 1434)  Resp: 18 (11/05/23 1434)  BP: (!) 88/46 (11/05/23 1434)  SpO2: 96 % (11/05/23 1434) Vital Signs (24h Range):  Temp:  [97.1 °F (36.2 °C)-98.9 °F (37.2 °C)] 97.5 °F (36.4 °C)  Pulse:  [68-80] 75  Resp:  [16-22] 18  SpO2:  [91 %-100 %] 96 %  BP: ()/(46-66) 88/46     Weight: (!) 158.3 kg (349 lb)  Body mass index is 53.07 kg/m².    Intake/Output Summary (Last 24 hours) at 11/5/2023 1639  Last data filed at 11/5/2023 1434  Gross per 24 hour   Intake 480 ml   Output 1050 ml   Net -570 ml         Physical Exam  Vitals and nursing note reviewed.   Constitutional:       General: She is not in acute distress.     Appearance: Normal appearance. She is ill-appearing.   HENT:      Head: Normocephalic and atraumatic.      Right Ear: External ear normal.      Left Ear: External ear normal.      Nose: Nose normal. No congestion or rhinorrhea.      Mouth/Throat:      Mouth: Mucous membranes are dry.      Pharynx: No oropharyngeal exudate or posterior oropharyngeal erythema.   Eyes:      General: No scleral icterus.        Right eye: No discharge.         Left eye: No discharge.      Extraocular Movements: Extraocular movements intact.      Conjunctiva/sclera: Conjunctivae normal.   Cardiovascular:      Rate and Rhythm: Normal rate.   Pulmonary:      Effort: Pulmonary effort is normal.      Breath sounds: Normal breath sounds. No  rhonchi or rales.   Chest:      Chest wall: Tenderness present.   Abdominal:      General: Bowel sounds are normal.      Tenderness: There is no abdominal tenderness. There is no guarding.      Hernia: No hernia is present.   Musculoskeletal:      Comments: Bilateral lower extremity tenderness on palpation   Neurological:      Mental Status: She is alert.             Significant Labs: All pertinent labs within the past 24 hours have been reviewed.    Significant Imaging: I have reviewed all pertinent imaging results/findings within the past 24 hours.      Assessment/Plan:      * Acute on chronic respiratory failure with hypoxia and hypercapnia  Patient with Hypercapnic and Hypoxic Respiratory failure which is Acute on chronic.  she is on home oxygen at 2-3 LPM but has not been using it LPM. Supplemental oxygen was provided and noted- Oxygen Concentration (%):  [32-50] 32    .   Signs/symptoms of respiratory failure include- increased work of breathing and lethargy. Contributing diagnoses includes - CHF, COPD, Obesity Hypoventilation and recent Covid 19 infection and non complaince with home oxygenation Labs and images were reviewed. Patient Has recent ABG, which has been reviewed. Will treat underlying causes and adjust management of respiratory failure as follows- supplemental oxygenation and BIPAP at night    11/5: O2 sat 96%. Continue current management.     Obesity, diabetes, and hypertension syndrome  Body mass index is 53.07 kg/m². Morbid obesity complicates all aspects of disease management from diagnostic modalities to treatment. Weight loss encouraged and health benefits explained to patient.         Migraine  Continue home medication      Nicotine dependence  Dangers of cigarette smoking were reviewed with patient in detail. Patient was Counseled for 3-10 minutes. Nicotine replacement options were discussed. Nicotine replacement was discussed- prescribed    GERD (gastroesophageal reflux disease)  Continue  "home PPI      COVID-19  Patient tested positive for Covid 19 on 10/6/2023 with minimal symptom    Microcytosis  Fe 22  Started Iron sucrose    Non compliance with medical treatment  Patient was supposed to be on home oxygen but has not been for many months. Pt was unable to obtain home oxygen in spite of contacting her medical providers office numerous times. She continues to smoke despite medical advise with last smoking one week ago      Hypokalemia  K 2.9 but mag normal  Monitor and replace as indicated    Obesity hypoventilation syndrome  Body mass index is 53.07 kg/m². Morbid obesity complicates all aspects of disease management from diagnostic modalities to treatment. Weight loss encouraged and health benefits explained to patient.         Severe obesity (BMI >= 40)  Body mass index is 53.07 kg/m². Morbid obesity complicates all aspects of disease management from diagnostic modalities to treatment. Weight loss encouraged and health benefits explained to patient.         Type 2 diabetes mellitus without complication, with long-term current use of insulin  Patient's FSGs are controlled on current medication regimen.  Last A1c reviewed-   Lab Results   Component Value Date    HGBA1C 6.2 11/03/2023     Most recent fingerstick glucose reviewed- No results for input(s): "POCTGLUCOSE" in the last 24 hours.  Current correctional scale  Medium  Maintain anti-hyperglycemic dose as follows-   Antihyperglycemics (From admission, onward)    Start     Stop Route Frequency Ordered    11/03/23 2100  insulin detemir U-100 injection 10 Units         -- SubQ Nightly 11/03/23 1942        Hold Oral hypoglycemics while patient is in the hospital.    Acute on chronic renal failure  Patient with acute kidney injury/acute renal failure likely due to acute tubular necrosis of unknown etiology but may be due to recent covid 19 about 4 weeks ago FRANKO is currently stable. Baseline creatinine 1.07 about one month ago - Labs reviewed- Renal " "function/electrolytes with Estimated Creatinine Clearance: 56.7 mL/min (A) (based on SCr of 1.61 mg/dL (H)). according to latest data. Monitor urine output and serial BMP and adjust therapy as needed. Avoid nephrotoxins and renally dose meds for GFR listed above.      - Pending US kidney  - Pending urine Na, Urea and CR. BMP to calculate FeNA and FeUrea  -  Avoid nephrotoxic drugs  - Renally dose all applicable medications  - Strict input and output  - Daily renal panel to monitor renal function  -   11/5: Nephrology consulted   Renal sono with normal size kidneys w/o obstruction.  -continue K supplemenation and Fe supplementation    Recommend a decrease in bumex      Acute on chronic diastolic CHF (congestive heart failure)  Patient is identified as having Diastolic (HFpEF) heart failure that is Acute on chronic. CHF is currently uncontrolled due to Continued edema of extremities and Rales/crackles on pulmonary exam. Latest ECHO performed and demonstrates- Results for orders placed during the hospital encounter of 11/24/21    Echo    Interpretation Summary  · The left ventricle is normal in size with concentric remodeling and low normal systolic function.  · The estimated ejection fraction is 50%.  · Atrial fibrillation not observed.  · Normal right ventricular size.  · Normal left ventricular diastolic function.  . Continue ACE/ARB, Furosemide and Aldactone and monitor clinical status closely. Monitor on telemetry. Patient is off CHF pathway.  Monitor strict Is&Os and daily weights.  Place on fluid restriction of 2 L. Cardiology has been consulted. Continue to stress to patient importance of self efficacy and  on diet for CHF. Last BNP reviewed- and noted below No results for input(s): "BNP", "BNPTRIAGEBLO" in the last 168 hours..      - Bumetanide 1 mg BID and home aldactone, but monitor renal function daily  - Holding BB due to CHF exacerbation   - Strict I and O  - Daily weight  - Pending Echo repeat  - " Cardiology consulted, thanks for the assistance    Continue current management.Plan for [facility] when patient is able to tolerate laying supine    COPD exacerbation  Patient's COPD is with exacerbation noted by continued dyspnea, use of accessory muscles for breathing and worsening of baseline hypoxia currently.  Patient is currently off COPD Pathway. Continue scheduled inhalers Steroids, Antibiotics and Supplemental oxygen and monitor respiratory status closely.     - DuoNeb q6h  - Budesonide 0.5 mg BID  - d/c solumedrol and start prednisone 40 mg  - On continuous CPAP        VTE Risk Mitigation (From admission, onward)         Ordered     Reason for No Pharmacological VTE Prophylaxis  Once        Question:  Reasons:  Answer:  Physician Provided (leave comment)    11/03/23 2125     IP VTE HIGH RISK PATIENT  Once         11/03/23 2125     Place sequential compression device  Until discontinued         11/03/23 2125                Discharge Planning   MONTSERRAT:      Code Status: Full Code   Is the patient medically ready for discharge?:     Reason for patient still in hospital (select all that apply): Treatment  Discharge Plan A: Home                  Namrata Sweet MD  Department of [facility] Medicine   [facility] - [facility] Telemetry

## 2023-11-05 NOTE — ASSESSMENT & PLAN NOTE
"Patient is identified as having Diastolic (HFpEF) heart failure that is Acute on chronic. CHF is currently uncontrolled due to Continued edema of extremities and Rales/crackles on pulmonary exam. Latest ECHO performed and demonstrates- Results for orders placed during the hospital encounter of 11/24/21    Echo    Interpretation Summary  · The left ventricle is normal in size with concentric remodeling and low normal systolic function.  · The estimated ejection fraction is 50%.  · Atrial fibrillation not observed.  · Normal right ventricular size.  · Normal left ventricular diastolic function.  . Continue ACE/ARB, Furosemide and Aldactone and monitor clinical status closely. Monitor on telemetry. Patient is off CHF pathway.  Monitor strict Is&Os and daily weights.  Place on fluid restriction of 2 L. Cardiology has been consulted. Continue to stress to patient importance of self efficacy and  on diet for CHF. Last BNP reviewed- and noted below No results for input(s): "BNP", "BNPTRIAGEBLO" in the last 168 hours..      - Bumetanide 1 mg BID and home aldactone, but monitor renal function daily  - Holding BB due to CHF exacerbation   - Strict I and O  - Daily weight  - Pending Echo repeat  - Cardiology consulted, thanks for the assistance    Continue current management.Plan for [facility] when patient is able to tolerate laying supine  "

## 2023-11-05 NOTE — SUBJECTIVE & OBJECTIVE
Past Medical History:   Diagnosis Date    Acquired hypothyroidism     Atherosclerosis of native artery of extremity with intermittent claudication 01/30/2019    bilateral legs    BMI 50.0-59.9, adult 02/22/2021    Chronic pain syndrome     opioid depen    COPD (chronic obstructive pulmonary disease)     COVID-19 10/06/2023    Depression     Diabetes mellitus, type 2     followed by Aurea Kwong NP, [facility]    DVT of lower extremity, bilateral     Essential hypertension 06/08/2021    GERD (gastroesophageal reflux disease)     Gout 07/05/2023    Hyperlipidemia     Lumbosacral radiculopathy 06/05/2023    followed by GLENN Valdes, [facility]    Migraines     Nicotine dependence     Nicotine dependence     Obesity hypoventilation syndrome     chronic CO2 retainer with compensatory metabolic alkalosis    Osteoarthritis     Seizure disorder     Sleep apnea     on cpap    Thyroid cancer     Venous stasis ulcer limited to breakdown of skin with varicose veins     followed by Estuardo Zhao    Vitamin D deficiency        Past Surgical History:   Procedure Laterality Date    ABCESS DRAINAGE      HYSTERECTOMY      ILIAC ARTERY STENT Bilateral 01/28/2020    Bilateral distal aorta and common iliac 8 X 59 vbx covered stents performed by Dr. Antonio Jacinto.    RADIOFREQUENCY ABLATION Left 04/15/2022    Procedure: Left calf  Radiofrequency Ablation;  Surgeon: Matty Falcon DO;  Location: [facility];  Service: Vascular;  Laterality: Left;    STAB PHLEBECTOMY OF VARICOSE VEINS Left 01/25/2013    Left leg microphlebectomies x 23 stab avulsions and ligation of multiple varicose veins perrformed by Dr. Cirilo Aguirre.    THYROIDECTOMY      hx: thyroid cancer    TOE AMPUTATION Left 01/28/2020    2nd, 4th and 5th performed by Dr. Anam Saeed.    TOE AMPUTATION Right 01/28/2020    4th toe performed by Dr. Anam Saeed.    TOTAL ABDOMINAL HYSTERECTOMY W/ BILATERAL SALPINGOOPHORECTOMY      TUBAL LIGATION       VAGINAL DELIVERY      x 4    VENOUS ABLATION Right 08/09/2019    GSV Varithena Ablation performed by Dr. Estuardo Falcon    VENOUS ABLATION Left 08/02/2019    ATAV Varithena Ablation performed by Dr. Estuardo Falcon.    VENOUS ABLATION Right 07/13/2015    ATAV Laser Ablatio performed by Dr. Cirilo Aguirre.    VENOUS ABLATION Right 07/06/2015    Distal  GSV Laser Ablation performed by dr. Cirilo Aguirre.    VENOUS ABLATION Left 04/20/2015    Left Distal GSV Laser Ablation performed by dr. Cirilo Aguirre.    VENOUS ABLATION Right 10/28/2013    Right Distal GSV RF Ablation performed by Dr. Cirilo Aguirre.    VENOUS ABLATION Left 10/25/2013    SSV RF Ablation performed by dr. Cirilo Aguirre.    VENOUS ABLATION Left 10/07/2013    Left GSV and Left ATAV RF Ablation performed by Dr. Cirilo Aguirre.    VENOUS ABLATION Right 01/21/2013    GSV RF Ablation w/micros x 22 performed by Dr. Cirilo Aguirre.    VENOUS ABLATION Left 06/25/2021    Left distal GSV Varithena Ablation       Review of patient's allergies indicates:   Allergen Reactions    Ammonium peroxydisulfate Shortness Of Breath    Avocado (laurus persea) Anaphylaxis    Bananas [banana] Anaphylaxis and Swelling     CRAMPS,    Chocolate flavor Anaphylaxis     MOUTH SWELLING    Fentanyl Shortness Of Breath and Itching    Percocet [oxycodone-acetaminophen] Shortness Of Breath and Itching    Silvadene [silver sulfadiazine]     Clindamycin     Corticosteroids (glucocorticoids)     Hydrocortisone Blisters    Lasix [furosemide] Blisters     BURNS SKIN    Nutritional supplement-fiber Itching    Pregabalin     Shellfish containing products     Adhesive Rash and Blisters    Iodine Rash    Iodine and iodide containing products Rash    Latex, natural rubber Rash    Pcn [penicillins] Rash    Sulfa (sulfonamide antibiotics) Rash and Blisters       No current facility-administered medications on file prior to encounter.     Current Outpatient Medications on File Prior to Encounter   Medication Sig    ACCU-CHEK GUIDE GLUCOSE METER  [facility]     ACCU-CHEK GUIDE TEST STRIPS Strp     ACCU-CHEK SOFTCLIX LANCETS Misc     albuterol (PROVENTIL/VENTOLIN HFA) 90 mcg/actuation inhaler Inhale 2 puffs into the lungs 4 (four) times daily. Rescue    allopurinoL (ZYLOPRIM) 300 MG tablet TAKE ONE TABLET EVERY DAY    amitriptyline (ELAVIL) 25 MG tablet Take 1 tablet (25 mg total) by mouth nightly as needed for Insomnia.    apixaban (ELIQUIS) 5 mg Tab Take 1 tablet (5 mg total) by mouth 2 (two) times daily.    aspirin (ECOTRIN) 81 MG EC tablet Take 1 tablet (81 mg total) by mouth once daily.    BREO ELLIPTA 100-25 mcg/dose diskus inhaler Inhale 1 puff into the lungs once daily.    bumetanide (BUMEX) 2 MG tablet Take 2 mg by mouth 2 (two) times daily.    calcium carbonate (OS-MICHAELA) 500 mg calcium (1,250 mg) tablet Take 1 tablet (500 mg total) by mouth 2 (two) times daily.    carvediloL (COREG) 12.5 MG tablet Take 0.5 tablets (6.25 mg total) by mouth 2 (two) times daily.    cholecalciferol, vitamin D3, 125 mcg (5,000 unit) capsule Take 1 capsule (5,000 Units total) by mouth 2 (two) times a day.    cilostazoL (PLETAL) 100 MG Tab Take 1 tablet (100 mg total) by mouth 2 (two) times daily.    colchicine, gout, (COLCRYS) 0.6 mg tablet TAKE 1 TABLET BY MOUTH 3 TIMES A DAY FOR 2 DAYS THEN 1 TABLET DAILY UNTIL GONE    cyclobenzaprine (FLEXERIL) 10 MG tablet Take 1 tablet (10 mg total) by mouth every evening.    dexAMETHasone (DECADRON) 4 MG Tab Take by mouth.    docusate sodium (COLACE) 100 MG capsule Take 1 capsule (100 mg total) by mouth 2 (two) times daily.    DULoxetine (CYMBALTA) 30 MG capsule TAKE 1 CAPSULE (30 MG TOTAL) BY MOUTH ONCE DAILY.    gabapentin (NEURONTIN) 600 MG tablet Take 1 tablet (600 mg total) by mouth 3 (three) times daily.    HYDROcodone-acetaminophen (NORCO)  mg per tablet Take 1 tablet by mouth every 6 (six) hours as needed for Pain.    HYDROcodone-acetaminophen (NORCO)  mg per tablet Take 1 tablet by mouth every 6 (six) hours as needed  "for Pain.    ibuprofen (ADVIL,MOTRIN) 800 MG tablet Take 1 tablet (800 mg total) by mouth 2 (two) times daily as needed for Pain.    insulin detemir U-100, Levemir, (LEVEMIR FLEXTOUCH U100 INSULIN) 100 unit/mL (3 mL) InPn pen Inject 10 Units into the skin every evening. 15 ml with 1 refill    levothyroxine (SYNTHROID) 150 MCG tablet TAKE 1 TABLET BY MOUTH BEFORE BREAKFAST.    loratadine (CLARITIN) 10 mg tablet Take 1 tablet (10 mg total) by mouth once daily.    losartan-hydrochlorothiazide 50-12.5 mg (HYZAAR) 50-12.5 mg per tablet Take 1 tablet by mouth once daily.    metOLazone (ZAROXOLYN) 2.5 MG tablet Take 1 tablet (2.5 mg total) by mouth once daily.    mupirocin (BACTROBAN) 2 % ointment Apply topically 2 (two) times daily.    naloxone (NARCAN) 4 mg/actuation Spry 1 spray once.    nicotine (NICODERM CQ) 21 mg/24 hr Place 1 patch onto the skin every 24 hours.    NIFEdipine (PROCARDIA-XL) 90 MG (OSM) 24 hr tablet Take 1 tablet (90 mg total) by mouth once daily.    NOVOFINE 32 32 gauge x 1/4" Ndle Use as directed once daily.    pantoprazole (PROTONIX) 40 MG tablet Take 1 tablet (40 mg total) by mouth once daily.    phentermine (ADIPEX-P) 37.5 mg tablet Take 1 tablet (37.5 mg total) by mouth before breakfast.    polyethylene glycol (GLYCOLAX) 17 gram PwPk Take 17 g by mouth once daily.    potassium chloride SA (K-DUR,KLOR-CON) 20 MEQ tablet Take 1 tablet (20 mEq total) by mouth once daily.    QUEtiapine (SEROQUEL) 25 MG Tab Take 1 tablet (25 mg total) by mouth once daily.    sertraline (ZOLOFT) 50 MG tablet Take 1 tablet (50 mg total) by mouth once daily.    spironolactone (ALDACTONE) 50 MG tablet Take 1 tablet (50 mg total) by mouth once daily.    SYMBICORT 160-4.5 mcg/actuation HFAA Inhale into the lungs.    tiotropium (SPIRIVA) 18 mcg inhalation capsule Inhale 1 capsule (18 mcg total) into the lungs once daily. Controller    topiramate (TOPAMAX) 100 MG tablet Take 1.5 tablets (150 mg total) by mouth 2 (two) times " daily.    triamcinolone acetonide 0.1% (KENALOG) 0.1 % cream Apply topically 3 (three) times daily.     Family History       Problem Relation (Age of Onset)    Coronary aneurysm Father    Coronary artery disease Father    Heart disease Mother, Father    Hypertension Mother, Father    No Known Problems Son, Son, Son, Son, Sister, Brother, Brother, Brother    Osteoarthritis Mother          Tobacco Use    Smoking status: Every Day     Current packs/day: 0.50     Average packs/day: 0.5 packs/day for 33.0 years (16.5 ttl pk-yrs)     Types: Cigarettes     Passive exposure: Current    Smokeless tobacco: Never   Substance and Sexual Activity    Alcohol use: Never    Drug use: Never    Sexual activity: Not Currently     Review of Systems   Unable to perform ROS: Acuity of condition   Respiratory:          PT SOB, able to tolerate only a few seconds before has to replace BIPAP to control SOB.      Objective:     Vital Signs (Most Recent):  Temp: 97.1 °F (36.2 °C) (11/05/23 0633)  Pulse: 69 (11/05/23 0740)  Resp: 16 (11/05/23 0740)  BP: 105/66 (11/05/23 0633)  SpO2: 100 % (11/05/23 0740) Vital Signs (24h Range):  Temp:  [97.1 °F (36.2 °C)-98.9 °F (37.2 °C)] 97.1 °F (36.2 °C)  Pulse:  [68-92] 69  Resp:  [16-22] 16  SpO2:  [91 %-100 %] 100 %  BP: ()/(51-85) 105/66     Weight: (!) 158.3 kg (349 lb)  Body mass index is 53.07 kg/m².    SpO2: 100 %         Intake/Output Summary (Last 24 hours) at 11/5/2023 0859  Last data filed at 11/5/2023 0541  Gross per 24 hour   Intake 960 ml   Output 2300 ml   Net -1340 ml       Lines/Drains/Airways       Peripheral Intravenous Line  Duration                  Peripheral IV - Single Lumen 11/03/23 1850 20 G Anterior;Proximal;Right Forearm 1 day                     Physical Exam  Vitals and nursing note reviewed.   Constitutional:       Appearance: Normal appearance. She is obese.   HENT:      Head: Normocephalic and atraumatic.      Right Ear: External ear normal.      Left Ear: External  ear normal.   Eyes:      General: No scleral icterus.        Right eye: No discharge.         Left eye: No discharge.      Extraocular Movements: Extraocular movements intact.      Conjunctiva/sclera: Conjunctivae normal.      Pupils: Pupils are equal, round, and reactive to light.   Cardiovascular:      Rate and Rhythm: Normal rate and regular rhythm.      Pulses: Normal pulses.      Heart sounds: Normal heart sounds. No murmur heard.     No friction rub. No gallop.      Comments: Heart tones distant  Pulmonary:      Effort: Pulmonary effort is normal.      Breath sounds: Normal breath sounds. No wheezing, rhonchi or rales.      Comments: Poor inspiratory effort, on BIPAP  Chest:      Chest wall: No tenderness.   Abdominal:      General: Abdomen is flat. Bowel sounds are normal. There is no distension.      Palpations: Abdomen is soft.      Tenderness: There is no abdominal tenderness. There is no guarding or rebound.   Musculoskeletal:         General: No swelling or tenderness. Normal range of motion.      Cervical back: Normal range of motion and neck supple.      Right lower leg: Edema present.      Left lower leg: Edema present.   Skin:     General: Skin is warm and dry.      Findings: No erythema or rash.   Neurological:      General: No focal deficit present.      Mental Status: She is alert and oriented to person, place, and time.      Cranial Nerves: No cranial nerve deficit.      Motor: No weakness.      Gait: Gait normal.   Psychiatric:         Mood and Affect: Mood normal.         Behavior: Behavior normal.         Thought Content: Thought content normal.         Judgment: Judgment normal.          Significant Labs: All pertinent lab results from the last 24 hours have been reviewed. and   Recent Lab Results         11/05/23  0247   11/04/23  1840   11/04/23  1811   11/04/23  1101        Albumin 2.7             Anion Gap 7             Baso # 0.01             Basophil % 0.1             BUN 34              BUN/CREAT RATIO 21             Calcium 9.8             Chloride 87             CO2 43             Creatinine 1.61             Differential Method Auto             eGFR 36             Eos # 0.00             Eosinophil % 0.0             Glucose 150             Hematocrit 45.1             Hemoglobin 13.9             Immature Grans (Abs) 0.06             Immature Granulocytes 0.4             Lymph # 0.87             Lymph % 6.3             MCH 23.6             MCHC 30.8             MCV 76.6             Mono # 0.33             Mono % 2.4             MPV 10.1             Neutrophils, Abs 12.58             Neutrophils Relative 90.8             nRBC 0.3             NUCLEATED RBC ABSOLUTE 0.04             Phosphorus Level 3.2             Platelet Count 226             POC Base Excess   27.0     27.2       POC HCO3   53.9     53.7       POC PCO2   63     60       POC PH   7.54     7.56       POC PO2   65     58       POC SATURATED O2   95     93       Potassium 2.9             RBC 5.89             RDW 19.8             Sodium 134             Troponin I High Sensitivity     129.4         WBC 13.85                     Significant Imaging: Echocardiogram: 2D echo with color flow doppler: No results found for this or any previous visit. and Transthoracic echo (TTE) complete (Cupid Only):   Results for orders placed or performed during the hospital encounter of 11/03/23   Echo   Result Value Ref Range    BSA 2.76 m2    LVOT stroke volume 60.63 cm3    LVIDd 4.95 3.5 - 6.0 cm    LV Systolic Volume 71.78 mL    LV Systolic Volume Index 27.7 mL/m2    LVIDs 4.04 (A) 2.1 - 4.0 cm    LV Diastolic Volume 115.56 mL    LV Diastolic Volume Index 44.62 mL/m2    IVS 1.04 0.6 - 1.1 cm    LVOT diameter 1.98 cm    LVOT area 3.1 cm2    FS 18 (A) 28 - 44 %    Left Ventricle Relative Wall Thickness 0.37 cm    Posterior Wall 0.92 0.6 - 1.1 cm    LV mass 174.20 g    LV Mass Index 67 g/m2    MV Peak E Dima 0.50 m/s    TDI LATERAL 0.09 m/s    TDI SEPTAL  0.05 m/s    E/E' ratio 7.14 m/s    MV Peak A Dima 0.63 m/s    TR Max Dima 3.00 m/s    E/A ratio 0.79     E wave deceleration time 344.34 msec    LV SEPTAL E/E' RATIO 10.00 m/s    LV LATERAL E/E' RATIO 5.56 m/s    LVOT peak dima 1.00 m/s    Left Ventricular Outflow Tract Mean Velocity 0.65 cm/s    Left Ventricular Outflow Tract Mean Gradient 1.87 mmHg    Right ventricular length in diastole (apical 4-chamber view) 6.79 cm    RV mid diameter 2.77 cm    TAPSE 2.18 cm    RA area 27.0 cm2    AV mean gradient 4 mmHg    AV peak gradient 9 mmHg    Ao peak dima 1.52 m/s    Ao VTI 29.20 cm    LVOT peak VTI 19.70 cm    AV valve area 2.08 cm²    AV Velocity Ratio 0.66     AV index (prosthetic) 0.67     JOSE by Velocity Ratio 2.02 cm²    Triscuspid Valve Regurgitation Peak Gradient 36 mmHg    PV PEAK VELOCITY 0.97 m/s    PV peak gradient 4 mmHg    Ao root annulus 3.46 cm    Mean e' 0.07 m/s    ZLVIDS -7.57     ZLVIDD -12.86     EF 50 %    GLS -14.1 %    RVDD 4.10 cm    LA size 4.4 cm

## 2023-11-05 NOTE — ASSESSMENT & PLAN NOTE
Patient is identified as having Diastolic (HFpEF) heart failure that is Chronic. CHF is currently uncontrolled due to Continued edema of extremities. Latest ECHO performed and demonstrates- Results for orders placed during the hospital encounter of 11/24/21    Echo    Interpretation Summary  · The left ventricle is normal in size with concentric remodeling and low normal systolic function.  · The estimated ejection fraction is 50%.  · Atrial fibrillation not observed.  · Normal right ventricular size.  · Normal left ventricular diastolic function.  . Continue Beta Blocker and ACE/ARB and monitor clinical status closely. Monitor on telemetry. Patient is off CHF pathway.  Monitor strict Is&Os and daily weights.  Place on fluid restriction of 2 L. Continue to stress to patient importance of self efficacy and  on diet for CHF. Last BNP reviewed- and noted below

## 2023-11-05 NOTE — ASSESSMENT & PLAN NOTE
Elevation of troponin concerning for NSTEMI with elevation from 40 10/5/23 to 297 at presentation, then decreasing to 229 over first hospital day.  Pt is a candidate for LHC/poss when able to lie flat

## 2023-11-05 NOTE — ASSESSMENT & PLAN NOTE
Patient with acute kidney injury/acute renal failure likely due to acute tubular necrosis of unknown etiology but may be due to recent covid 19 about 4 weeks ago FRANKO is currently stable. Baseline creatinine 1.07 about one month ago - Labs reviewed- Renal function/electrolytes with Estimated Creatinine Clearance: 56.7 mL/min (A) (based on SCr of 1.61 mg/dL (H)). according to latest data. Monitor urine output and serial BMP and adjust therapy as needed. Avoid nephrotoxins and renally dose meds for GFR listed above.      - Pending US kidney  - Pending urine Na, Urea and CR. BMP to calculate FeNA and FeUrea  -  Avoid nephrotoxic drugs  - Renally dose all applicable medications  - Strict input and output  - Daily renal panel to monitor renal function  -   11/5: Nephrology consulted   Renal sono with normal size kidneys w/o obstruction.  -continue K supplemenation and Fe supplementation    Recommend a decrease in bumex

## 2023-11-05 NOTE — ASSESSMENT & PLAN NOTE
Patient with Hypercapnic and Hypoxic Respiratory failure which is Acute on chronic.  she is on home oxygen at 2-3 LPM but has not been using it LPM. Supplemental oxygen was provided and noted- Oxygen Concentration (%):  [32-50] 32    .   Signs/symptoms of respiratory failure include- increased work of breathing and lethargy. Contributing diagnoses includes - CHF, COPD, Obesity Hypoventilation and recent Covid 19 infection and non complaince with home oxygenation Labs and images were reviewed. Patient Has recent ABG, which has been reviewed. Will treat underlying causes and adjust management of respiratory failure as follows- supplemental oxygenation and BIPAP at night    11/5: O2 sat 96%. Continue current management.

## 2023-11-06 PROBLEM — U07.1 COVID-19: Status: RESOLVED | Noted: 2023-11-03 | Resolved: 2023-11-06

## 2023-11-06 PROBLEM — R56.9 SEIZURE: Status: ACTIVE | Noted: 2023-11-06

## 2023-11-06 PROBLEM — M1A.9XX0 CHRONIC GOUT: Status: ACTIVE | Noted: 2023-11-06

## 2023-11-06 LAB
ALBUMIN SERPL BCP-MCNC: 2.8 G/DL (ref 3.5–5)
ANION GAP SERPL CALCULATED.3IONS-SCNC: 9 MMOL/L (ref 7–16)
BASOPHILS # BLD AUTO: 0.02 K/UL (ref 0–0.2)
BASOPHILS NFR BLD AUTO: 0.1 % (ref 0–1)
BUN SERPL-MCNC: 37 MG/DL (ref 7–18)
BUN/CREAT SERPL: 26 (ref 6–20)
CALCIUM SERPL-MCNC: 10.3 MG/DL (ref 8.5–10.1)
CATH EF QUANTITATIVE: 55 %
CHLORIDE SERPL-SCNC: 93 MMOL/L (ref 98–107)
CO2 SERPL-SCNC: 36 MMOL/L (ref 21–32)
CREAT SERPL-MCNC: 1.4 MG/DL (ref 0.55–1.02)
DIFFERENTIAL METHOD BLD: ABNORMAL
EGFR (NO RACE VARIABLE) (RUSH/TITUS): 42 ML/MIN/1.73M2
EOSINOPHIL # BLD AUTO: 0 K/UL (ref 0–0.5)
EOSINOPHIL NFR BLD AUTO: 0 % (ref 1–4)
ERYTHROCYTE [DISTWIDTH] IN BLOOD BY AUTOMATED COUNT: 20.3 % (ref 11.5–14.5)
GLUCOSE SERPL-MCNC: 102 MG/DL (ref 74–106)
GLUCOSE SERPL-MCNC: 132 MG/DL (ref 70–105)
GLUCOSE SERPL-MCNC: 135 MG/DL (ref 70–105)
GLUCOSE SERPL-MCNC: 137 MG/DL (ref 70–105)
GLUCOSE SERPL-MCNC: 148 MG/DL (ref 70–105)
GLUCOSE SERPL-MCNC: 159 MG/DL (ref 70–105)
GLUCOSE SERPL-MCNC: 166 MG/DL (ref 70–105)
GLUCOSE SERPL-MCNC: 171 MG/DL (ref 70–105)
GLUCOSE SERPL-MCNC: 172 MG/DL (ref 70–105)
GLUCOSE SERPL-MCNC: 186 MG/DL (ref 70–105)
GLUCOSE SERPL-MCNC: 190 MG/DL (ref 70–105)
GLUCOSE SERPL-MCNC: 198 MG/DL (ref 70–105)
GLUCOSE SERPL-MCNC: 198 MG/DL (ref 70–105)
GLUCOSE SERPL-MCNC: 268 MG/DL (ref 70–105)
HCO3 UR-SCNC: 45.9 MMOL/L (ref 21–28)
HCO3 UR-SCNC: 54.1 MMOL/L (ref 21–28)
HCT VFR BLD AUTO: 46.5 % (ref 38–47)
HGB BLD-MCNC: 14.1 G/DL (ref 12–16)
IMM GRANULOCYTES # BLD AUTO: 0.12 K/UL (ref 0–0.04)
IMM GRANULOCYTES NFR BLD: 0.7 % (ref 0–0.4)
LYMPHOCYTES # BLD AUTO: 1.47 K/UL (ref 1–4.8)
LYMPHOCYTES NFR BLD AUTO: 9.1 % (ref 27–41)
MCH RBC QN AUTO: 24 PG (ref 27–31)
MCHC RBC AUTO-ENTMCNC: 30.3 G/DL (ref 32–36)
MCV RBC AUTO: 79.1 FL (ref 80–96)
MONOCYTES # BLD AUTO: 0.52 K/UL (ref 0–0.8)
MONOCYTES NFR BLD AUTO: 3.2 % (ref 2–6)
MPC BLD CALC-MCNC: 10.9 FL (ref 9.4–12.4)
NEUTROPHILS # BLD AUTO: 14.11 K/UL (ref 1.8–7.7)
NEUTROPHILS NFR BLD AUTO: 86.9 % (ref 53–65)
NRBC # BLD AUTO: 0.05 X10E3/UL
NRBC, AUTO (.00): 0.3 %
PCO2 BLDA: 59 MMHG (ref 35–48)
PCO2 BLDA: 63 MMHG (ref 35–48)
PH SMN: 7.47 [PH] (ref 7.35–7.45)
PH SMN: 7.57 [PH] (ref 7.35–7.45)
PHOSPHATE SERPL-MCNC: 3.4 MG/DL (ref 2.5–4.5)
PLATELET # BLD AUTO: 268 K/UL (ref 150–400)
PO2 BLDA: 41 MMHG (ref 83–108)
PO2 BLDA: 79 MMHG (ref 83–108)
POC BASE EXCESS: 18.9 MMOL/L (ref -2–3)
POC BASE EXCESS: 27.8 MMOL/L (ref -2–3)
POC SATURATED O2: 84 % (ref 95–98)
POC SATURATED O2: 96 % (ref 95–98)
POTASSIUM SERPL-SCNC: 3.2 MMOL/L (ref 3.5–5.1)
RBC # BLD AUTO: 5.88 M/UL (ref 4.2–5.4)
SODIUM SERPL-SCNC: 135 MMOL/L (ref 136–145)
URATE SERPL-MCNC: 7.7 MG/DL (ref 2.6–6)
WBC # BLD AUTO: 16.24 K/UL (ref 4.5–11)

## 2023-11-06 PROCEDURE — 25000003 PHARM REV CODE 250: Performed by: STUDENT IN AN ORGANIZED HEALTH CARE EDUCATION/TRAINING PROGRAM

## 2023-11-06 PROCEDURE — 84550 ASSAY OF BLOOD/URIC ACID: CPT

## 2023-11-06 PROCEDURE — 99152 MOD SED SAME PHYS/QHP 5/>YRS: CPT | Performed by: INTERNAL MEDICINE

## 2023-11-06 PROCEDURE — 93010 ELECTROCARDIOGRAM REPORT: CPT | Mod: ,,, | Performed by: HOSPITALIST

## 2023-11-06 PROCEDURE — 99233 SBSQ HOSP IP/OBS HIGH 50: CPT | Mod: GT,,, | Performed by: NURSE PRACTITIONER

## 2023-11-06 PROCEDURE — 25000003 PHARM REV CODE 250

## 2023-11-06 PROCEDURE — 94640 AIRWAY INHALATION TREATMENT: CPT

## 2023-11-06 PROCEDURE — 25500020 PHARM REV CODE 255: Performed by: INTERNAL MEDICINE

## 2023-11-06 PROCEDURE — 82962 GLUCOSE BLOOD TEST: CPT

## 2023-11-06 PROCEDURE — 97167 OT EVAL HIGH COMPLEX 60 MIN: CPT

## 2023-11-06 PROCEDURE — 63600175 PHARM REV CODE 636 W HCPCS

## 2023-11-06 PROCEDURE — 25000003 PHARM REV CODE 250: Performed by: NURSE PRACTITIONER

## 2023-11-06 PROCEDURE — C1894 INTRO/SHEATH, NON-LASER: HCPCS | Performed by: INTERNAL MEDICINE

## 2023-11-06 PROCEDURE — 93010 EKG 12-LEAD: ICD-10-PCS | Mod: ,,, | Performed by: HOSPITALIST

## 2023-11-06 PROCEDURE — 99152 PR MOD CONSCIOUS SEDATION, SAME PHYS, 5+ YRS, FIRST 15 MIN: ICD-10-PCS | Mod: ,,, | Performed by: INTERNAL MEDICINE

## 2023-11-06 PROCEDURE — 27201423 OPTIME MED/SURG SUP & DEVICES STERILE SUPPLY: Performed by: INTERNAL MEDICINE

## 2023-11-06 PROCEDURE — 99233 PR SUBSEQUENT HOSPITAL CARE,LEVL III: ICD-10-PCS | Mod: GT,,, | Performed by: NURSE PRACTITIONER

## 2023-11-06 PROCEDURE — C1760 CLOSURE DEV, VASC: HCPCS | Performed by: INTERNAL MEDICINE

## 2023-11-06 PROCEDURE — 97163 PT EVAL HIGH COMPLEX 45 MIN: CPT

## 2023-11-06 PROCEDURE — 63600175 PHARM REV CODE 636 W HCPCS: Performed by: HOSPITALIST

## 2023-11-06 PROCEDURE — 93458 L HRT ARTERY/VENTRICLE ANGIO: CPT | Performed by: INTERNAL MEDICINE

## 2023-11-06 PROCEDURE — 63600175 PHARM REV CODE 636 W HCPCS: Performed by: INTERNAL MEDICINE

## 2023-11-06 PROCEDURE — 25000003 PHARM REV CODE 250: Performed by: INTERNAL MEDICINE

## 2023-11-06 PROCEDURE — 94761 N-INVAS EAR/PLS OXIMETRY MLT: CPT

## 2023-11-06 PROCEDURE — 93458 PR CATH PLACE/CORON ANGIO, IMG SUPER/INTERP,W LEFT HEART VENTRICULOGRAPHY: ICD-10-PCS | Mod: 26,,, | Performed by: INTERNAL MEDICINE

## 2023-11-06 PROCEDURE — 82803 BLOOD GASES ANY COMBINATION: CPT

## 2023-11-06 PROCEDURE — 27000221 HC OXYGEN, UP TO 24 HOURS

## 2023-11-06 PROCEDURE — 93458 L HRT ARTERY/VENTRICLE ANGIO: CPT | Mod: 26,,, | Performed by: INTERNAL MEDICINE

## 2023-11-06 PROCEDURE — 36600 WITHDRAWAL OF ARTERIAL BLOOD: CPT

## 2023-11-06 PROCEDURE — 80069 RENAL FUNCTION PANEL: CPT

## 2023-11-06 PROCEDURE — 63600175 PHARM REV CODE 636 W HCPCS: Performed by: GENERAL PRACTICE

## 2023-11-06 PROCEDURE — 99232 SBSQ HOSP IP/OBS MODERATE 35: CPT | Mod: GC,,, | Performed by: FAMILY MEDICINE

## 2023-11-06 PROCEDURE — 99152 MOD SED SAME PHYS/QHP 5/>YRS: CPT | Mod: ,,, | Performed by: INTERNAL MEDICINE

## 2023-11-06 PROCEDURE — 93005 ELECTROCARDIOGRAM TRACING: CPT

## 2023-11-06 PROCEDURE — 11000001 HC ACUTE MED/SURG PRIVATE ROOM

## 2023-11-06 PROCEDURE — 99900031 HC PATIENT EDUCATION (STAT)

## 2023-11-06 PROCEDURE — 99153 MOD SED SAME PHYS/QHP EA: CPT | Performed by: INTERNAL MEDICINE

## 2023-11-06 PROCEDURE — C1769 GUIDE WIRE: HCPCS | Performed by: INTERNAL MEDICINE

## 2023-11-06 PROCEDURE — 99900035 HC TECH TIME PER 15 MIN (STAT)

## 2023-11-06 PROCEDURE — 94660 CPAP INITIATION&MGMT: CPT

## 2023-11-06 PROCEDURE — S4991 NICOTINE PATCH NONLEGEND: HCPCS | Performed by: HOSPITALIST

## 2023-11-06 PROCEDURE — 99232 PR SUBSEQUENT HOSPITAL CARE,LEVL II: ICD-10-PCS | Mod: GC,,, | Performed by: FAMILY MEDICINE

## 2023-11-06 PROCEDURE — 85025 COMPLETE CBC W/AUTO DIFF WBC: CPT

## 2023-11-06 PROCEDURE — 25000003 PHARM REV CODE 250: Performed by: HOSPITALIST

## 2023-11-06 PROCEDURE — 25000242 PHARM REV CODE 250 ALT 637 W/ HCPCS: Performed by: HOSPITALIST

## 2023-11-06 DEVICE — ANGIO-SEAL VIP VASCULAR CLOSURE DEVICE
Type: IMPLANTABLE DEVICE | Site: ARTERIAL | Status: FUNCTIONAL
Brand: ANGIO-SEAL

## 2023-11-06 RX ORDER — DIPHENHYDRAMINE HYDROCHLORIDE 50 MG/ML
INJECTION INTRAMUSCULAR; INTRAVENOUS
Status: DISCONTINUED | OUTPATIENT
Start: 2023-11-06 | End: 2023-11-06 | Stop reason: HOSPADM

## 2023-11-06 RX ORDER — LORAZEPAM 2 MG/ML
1 INJECTION INTRAMUSCULAR
Status: DISCONTINUED | OUTPATIENT
Start: 2023-11-06 | End: 2023-11-10 | Stop reason: HOSPADM

## 2023-11-06 RX ORDER — ATORVASTATIN CALCIUM 40 MG/1
40 TABLET, FILM COATED ORAL NIGHTLY
Status: DISCONTINUED | OUTPATIENT
Start: 2023-11-06 | End: 2023-11-10 | Stop reason: HOSPADM

## 2023-11-06 RX ORDER — LORAZEPAM 2 MG/ML
2 INJECTION INTRAMUSCULAR
Status: DISCONTINUED | OUTPATIENT
Start: 2023-11-06 | End: 2023-11-06

## 2023-11-06 RX ORDER — LIDOCAINE HYDROCHLORIDE 10 MG/ML
INJECTION, SOLUTION EPIDURAL; INFILTRATION; INTRACAUDAL; PERINEURAL
Status: DISCONTINUED | OUTPATIENT
Start: 2023-11-06 | End: 2023-11-06 | Stop reason: HOSPADM

## 2023-11-06 RX ORDER — HEPARIN SOD,PORCINE/0.9 % NACL 1000/500ML
INTRAVENOUS SOLUTION INTRAVENOUS
Status: DISCONTINUED | OUTPATIENT
Start: 2023-11-06 | End: 2023-11-06 | Stop reason: HOSPADM

## 2023-11-06 RX ORDER — COLCHICINE 0.6 MG/1
0.6 TABLET, FILM COATED ORAL DAILY
Status: COMPLETED | OUTPATIENT
Start: 2023-11-06 | End: 2023-11-07

## 2023-11-06 RX ORDER — SODIUM CHLORIDE 9 MG/ML
INJECTION, SOLUTION INTRAVENOUS
Status: DISCONTINUED | OUTPATIENT
Start: 2023-11-06 | End: 2023-11-10 | Stop reason: HOSPADM

## 2023-11-06 RX ORDER — MIDAZOLAM HYDROCHLORIDE 1 MG/ML
INJECTION INTRAMUSCULAR; INTRAVENOUS
Status: DISCONTINUED | OUTPATIENT
Start: 2023-11-06 | End: 2023-11-06 | Stop reason: HOSPADM

## 2023-11-06 RX ORDER — SODIUM CHLORIDE 450 MG/100ML
INJECTION, SOLUTION INTRAVENOUS CONTINUOUS
Status: DISCONTINUED | OUTPATIENT
Start: 2023-11-06 | End: 2023-11-06

## 2023-11-06 RX ADMIN — GABAPENTIN 600 MG: 300 CAPSULE ORAL at 09:11

## 2023-11-06 RX ADMIN — POLYETHYLENE GLYCOL 3350 17 G: 17 POWDER, FOR SOLUTION ORAL at 08:11

## 2023-11-06 RX ADMIN — SPIRONOLACTONE 50 MG: 25 TABLET ORAL at 08:11

## 2023-11-06 RX ADMIN — LEVOTHYROXINE SODIUM 150 MCG: 0.15 TABLET ORAL at 06:11

## 2023-11-06 RX ADMIN — NICOTINE 1 PATCH: 21 PATCH, EXTENDED RELEASE TRANSDERMAL at 08:11

## 2023-11-06 RX ADMIN — GABAPENTIN 600 MG: 300 CAPSULE ORAL at 08:11

## 2023-11-06 RX ADMIN — SERTRALINE HYDROCHLORIDE 50 MG: 50 TABLET ORAL at 08:11

## 2023-11-06 RX ADMIN — HYDROCODONE BITARTRATE AND ACETAMINOPHEN 1 TABLET: 10; 325 TABLET ORAL at 09:11

## 2023-11-06 RX ADMIN — CHOLECALCIFEROL TAB 125 MCG (5000 UNIT) 5000 UNITS: 125 TAB at 08:11

## 2023-11-06 RX ADMIN — COLCHICINE 0.6 MG: 0.6 TABLET ORAL at 04:11

## 2023-11-06 RX ADMIN — CHOLECALCIFEROL TAB 125 MCG (5000 UNIT) 5000 UNITS: 125 TAB at 09:11

## 2023-11-06 RX ADMIN — IPRATROPIUM BROMIDE AND ALBUTEROL SULFATE 3 ML: 2.5; .5 SOLUTION RESPIRATORY (INHALATION) at 07:11

## 2023-11-06 RX ADMIN — INSULIN DETEMIR 10 UNITS: 100 INJECTION, SOLUTION SUBCUTANEOUS at 09:11

## 2023-11-06 RX ADMIN — PANTOPRAZOLE SODIUM 40 MG: 40 TABLET, DELAYED RELEASE ORAL at 08:11

## 2023-11-06 RX ADMIN — DULOXETINE 30 MG: 30 CAPSULE, DELAYED RELEASE ORAL at 08:11

## 2023-11-06 RX ADMIN — ASPIRIN 81 MG: 81 TABLET, COATED ORAL at 08:11

## 2023-11-06 RX ADMIN — CYCLOBENZAPRINE 10 MG: 10 TABLET, FILM COATED ORAL at 09:11

## 2023-11-06 RX ADMIN — ALLOPURINOL 300 MG: 300 TABLET ORAL at 08:11

## 2023-11-06 RX ADMIN — GABAPENTIN 600 MG: 300 CAPSULE ORAL at 04:11

## 2023-11-06 RX ADMIN — ATORVASTATIN CALCIUM 40 MG: 40 TABLET, FILM COATED ORAL at 09:11

## 2023-11-06 RX ADMIN — POTASSIUM CHLORIDE 40 MEQ: 1500 TABLET, EXTENDED RELEASE ORAL at 09:11

## 2023-11-06 RX ADMIN — DOCUSATE SODIUM 100 MG: 100 CAPSULE, LIQUID FILLED ORAL at 08:11

## 2023-11-06 RX ADMIN — ATORVASTATIN CALCIUM 80 MG: 80 TABLET, FILM COATED ORAL at 08:11

## 2023-11-06 RX ADMIN — TICAGRELOR 90 MG: 90 TABLET ORAL at 08:11

## 2023-11-06 RX ADMIN — SODIUM CHLORIDE: 0.45 INJECTION, SOLUTION INTRAVENOUS at 04:11

## 2023-11-06 RX ADMIN — TOPIRAMATE 150 MG: 100 TABLET, FILM COATED ORAL at 08:11

## 2023-11-06 RX ADMIN — POTASSIUM CHLORIDE 40 MEQ: 1500 TABLET, EXTENDED RELEASE ORAL at 08:11

## 2023-11-06 RX ADMIN — CALCIUM CARBONATE (ANTACID) CHEW TAB 500 MG 500 MG: 500 CHEW TAB at 08:11

## 2023-11-06 RX ADMIN — IRON SUCROSE 200 MG: 20 INJECTION, SOLUTION INTRAVENOUS at 09:11

## 2023-11-06 RX ADMIN — IPRATROPIUM BROMIDE AND ALBUTEROL SULFATE 3 ML: 2.5; .5 SOLUTION RESPIRATORY (INHALATION) at 01:11

## 2023-11-06 RX ADMIN — DOCUSATE SODIUM 100 MG: 100 CAPSULE, LIQUID FILLED ORAL at 09:11

## 2023-11-06 RX ADMIN — CALCIUM CARBONATE (ANTACID) CHEW TAB 500 MG 500 MG: 500 CHEW TAB at 09:11

## 2023-11-06 RX ADMIN — TOPIRAMATE 150 MG: 100 TABLET, FILM COATED ORAL at 09:11

## 2023-11-06 RX ADMIN — HEPARIN SODIUM 7500 UNITS: 5000 INJECTION INTRAVENOUS; SUBCUTANEOUS at 06:11

## 2023-11-06 RX ADMIN — PREDNISONE 40 MG: 20 TABLET ORAL at 08:11

## 2023-11-06 NOTE — PROGRESS NOTES
[facility] - [facility] Tele[facility]  Cardiology  Progress Note    Patient Name: Holly Porter  MRN: [id number]  Admission Date: 11/3/2023  Hospital Length of Stay: 3 days  Code Status: Full Code   Attending Physician: Regan Glover Jr., MD   Primary Care Physician: Regan Frausto MD  Expected Discharge Date:   Principal Problem:Acute on chronic respiratory failure with hypoxia and hypercapnia    Subjective:     Interval History: Pt is seen lying in bed almost completely flat without difficulty. O2 per NC. Pt denies chest pain or SOB on exam.    Review of Systems   Constitutional: Negative for chills, diaphoresis and fever.   Cardiovascular:  Positive for dyspnea on exertion. Negative for chest pain, orthopnea and syncope.   Respiratory:  Negative for cough and shortness of breath.    Gastrointestinal:  Negative for abdominal pain, nausea and vomiting.     Objective:     Vital Signs (Most Recent):  Temp: 97.2 °F (36.2 °C) (11/06/23 1438)  Pulse: 63 (11/06/23 1355)  Resp: 17 (11/06/23 1355)  BP: (!) 160/87 (11/06/23 1355)  SpO2: 97 % (11/06/23 1355) Vital Signs (24h Range):  Temp:  [97.2 °F (36.2 °C)-98.3 °F (36.8 °C)] 97.2 °F (36.2 °C)  Pulse:  [58-81] 63  Resp:  [16-20] 17  SpO2:  [93 %-98 %] 97 %  BP: (101-160)/(56-87) 160/87     Weight: (!) 158.3 kg (349 lb)  Body mass index is 53.07 kg/m².     SpO2: 97 %         Intake/Output Summary (Last 24 hours) at 11/6/2023 1656  Last data filed at 11/6/2023 1151  Gross per 24 hour   Intake --   Output 600 ml   Net -600 ml       Lines/Drains/Airways       Peripheral Intravenous Line  Duration                  Peripheral IV - Single Lumen 11/03/23 1850 20 G Anterior;Proximal;Right Forearm 2 days                       Physical Exam  Vitals reviewed.   Constitutional:       General: She is not in acute distress.     Appearance: She is obese.   Eyes:      Pupils: Pupils are equal, round, and reactive to light.   Cardiovascular:      Rate and Rhythm: Normal  rate and regular rhythm.      Pulses: Normal pulses.      Heart sounds: Normal heart sounds.   Pulmonary:      Effort: Pulmonary effort is normal.   Skin:     General: Skin is warm and dry.   Neurological:      Mental Status: She is alert and oriented to person, place, and time.   Psychiatric:         Mood and Affect: Mood normal.         Behavior: Behavior normal.            Significant Labs: All pertinent lab results from the last 24 hours have been reviewed. and   Recent Lab Results  (Last 5 results in the past 24 hours)        11/06/23  1628   11/06/23  1324   11/06/23  1054   11/06/23  0703   11/06/23  0625        Albumin               Anion Gap               Baso #               Basophil %               BUN               BUN/CREAT RATIO               Calcium               Cath EF Quantitative   55             Chloride               CO2               Creatinine               Differential Method               eGFR               Eos #               Eosinophil %               Glucose               Hematocrit               Hemoglobin               Immature Grans (Abs)               Immature Granulocytes               Lymph #               Lymph %               MCH               MCHC               MCV               Mono #               Mono %               MPV               Neutrophils, Abs               Neutrophils Relative               nRBC               NUCLEATED RBC ABSOLUTE               Phosphorus Level               Platelet Count               POC Base Excess         18.9       POC Glucose 198     190   172         POC HCO3         45.9       POC PCO2         63       POC PH         7.47       POC PO2         79       POC SATURATED O2         96       Potassium               RBC               RDW               Sodium               Uric Acid               WBC                                      Significant Imaging: Echocardiogram: Transthoracic echo (TTE) complete (Cupid Only):   Results for orders placed  or performed during the hospital encounter of 11/03/23   Echo   Result Value Ref Range    BSA 2.76 m2    LVOT stroke volume 60.63 cm3    LVIDd 4.95 3.5 - 6.0 cm    LV Systolic Volume 71.78 mL    LV Systolic Volume Index 27.7 mL/m2    LVIDs 4.04 (A) 2.1 - 4.0 cm    LV Diastolic Volume 115.56 mL    LV Diastolic Volume Index 44.62 mL/m2    IVS 1.04 0.6 - 1.1 cm    LVOT diameter 1.98 cm    LVOT area 3.1 cm2    FS 18 (A) 28 - 44 %    Left Ventricle Relative Wall Thickness 0.37 cm    Posterior Wall 0.92 0.6 - 1.1 cm    LV mass 174.20 g    LV Mass Index 67 g/m2    MV Peak E Dima 0.50 m/s    TDI LATERAL 0.09 m/s    TDI SEPTAL 0.05 m/s    E/E' ratio 7.14 m/s    MV Peak A Dima 0.63 m/s    TR Max Dima 3.00 m/s    E/A ratio 0.79     E wave deceleration time 344.34 msec    LV SEPTAL E/E' RATIO 10.00 m/s    LV LATERAL E/E' RATIO 5.56 m/s    LVOT peak dima 1.00 m/s    Left Ventricular Outflow Tract Mean Velocity 0.65 cm/s    Left Ventricular Outflow Tract Mean Gradient 1.87 mmHg    Right ventricular length in diastole (apical 4-chamber view) 6.79 cm    RV mid diameter 2.77 cm    TAPSE 2.18 cm    RA area 27.0 cm2    AV mean gradient 4 mmHg    AV peak gradient 9 mmHg    Ao peak dima 1.52 m/s    Ao VTI 29.20 cm    LVOT peak VTI 19.70 cm    AV valve area 2.08 cm²    AV Velocity Ratio 0.66     AV index (prosthetic) 0.67     JOSE by Velocity Ratio 2.02 cm²    Triscuspid Valve Regurgitation Peak Gradient 36 mmHg    PV PEAK VELOCITY 0.97 m/s    PV peak gradient 4 mmHg    Ao root annulus 3.46 cm    Mean e' 0.07 m/s    ZLVIDS -7.57     ZLVIDD -12.86     EF 50 %    GLS -14.1 %    RVDD 4.10 cm    LA size 4.4 cm    Narrative      Left Ventricle: The left ventricle is normal in size. Normal wall   thickness. Normal wall motion. There is normal systolic function with a   visually estimated ejection fraction of 60 - 65%. Ejection fraction by   visual approximation is 50%. There is normal diastolic function.    Right Ventricle: Moderate right  ventricular enlargement. Systolic   function is normal.    Left Atrium: Left atrium is mildly dilated.    Right Atrium: Right atrium is moderately dilated.    Aortic Valve: The aortic valve is a trileaflet valve.    Tricuspid Valve: There is mild regurgitation. No paravalvular   regurgitation.       Assessment and Plan:     Brief HPI: [facility] with complaint of increasing shortness of breath    Pt reports three day history of progressive shortness of breath.  She reports experienced chest pain, mid sternal, non-radiating, associated with nausea and diaphoresis, occurred at rest, lasted fifteen to twenty minutes, resolved spontaneously. Chest pain has not reoccurred.  She cannot recall having stress test or LHC, old records show stress imaging not performed due to pt exceeding weight limitation of imaging equipment, recalls previous echo. She is recovering from COVID 19 pneumonia in early October, was hospitalized in [place], discharged to home care after several days.        Type 2 MI (myocardial infarction)  Elevation of troponin concerning for NSTEMI with elevation from 40 10/5/23 to 297 at presentation, then decreasing to 229 over first hospital day.  Pt is a candidate for LHC/poss when able to lie flat     11/6/23:  - Dr. Paul has seen and evaluated this pt  - pt is able to lie flat today without difficulty  - keep NPO for LHC today    Nicotine dependence  - Dr. Ortega discussed smoking cessation over the weekend  - agree with nicotine patches    Non compliance with medical treatment  Discussed smoking cessation, pt unable to pick stop date today    Hypokalemia  - receiving PO replacement  - continue aldactone  - BMP in AM    Type 2 diabetes mellitus without complication, with long-term current use of insulin  - Fairly well controlled with HBA1C 6.2  - primary team managing    Acute on chronic diastolic CHF (congestive heart failure)  - Echo shows EF 60-65% with moderate RVE and SO  - HR  in the 50s today and BP is soft, will reassess tomorrow for BB and further medication optimization  - continue aldactone      COPD exacerbation  - Shortness of breath improved on BIPAP    11/06/23:  - now on O2 per NC  - lying almost flat in bed without difficulty, no conversational dyspnea        VTE Risk Mitigation (From admission, onward)         Ordered     heparin (porcine) injection 7,500 Units  Every 8 hours         11/05/23 1715     Reason for No Pharmacological VTE Prophylaxis  Once        Question:  Reasons:  Answer:  Physician Provided (leave comment)    11/03/23 2125     IP VTE HIGH RISK PATIENT  Once         11/03/23 2125     Place sequential compression device  Until discontinued         11/03/23 2125                WARREN Dowell  Cardiology  [facility] - [facility]

## 2023-11-06 NOTE — ASSESSMENT & PLAN NOTE
- Echo shows EF 60-65% with moderate RVE and SO  - HR in the 50s today and BP is soft, will reassess tomorrow for BB and further medication optimization  - continue aldactone

## 2023-11-06 NOTE — NURSING
Pt refused linens to be changed, tech and nurse attempted to change and pt continued to refuse. Family here now and patient wants family to change her linens, chg bath complete and gown changed by tech.

## 2023-11-06 NOTE — INTERVAL H&P NOTE
The patient has been examined and the H&P has been reviewed:    I concur with the findings and no changes have occurred since H&P was written.    Procedure risks, benefits and alternative options discussed and understood by patient/family.          Active Hospital Problems    Diagnosis  POA    *Acute on chronic respiratory failure with hypoxia and hypercapnia [J96.21, J96.22]  Yes    Obesity, diabetes, and hypertension syndrome [E11.69, E66.9, E11.59, I15.2]  Yes    Type 2 MI (myocardial infarction) [I21.A1]  Yes     Likely 2/2 to FRANKO  Troponins 297.1-->258.8-->277.5-->229.7  Continue atorvastatin 80 mg  Continue ASA 81  Continue brilinta       COPD exacerbation [J44.1]  Yes    Acute on chronic diastolic CHF (congestive heart failure) [I50.33]  Yes    Acute on chronic renal failure [N17.9, N18.9]  Yes    Type 2 diabetes mellitus without complication, with long-term current use of insulin [E11.9, Z79.4]  Yes    Obesity hypoventilation syndrome [E66.2]  Yes    Hypokalemia [E87.6]  Yes    Non compliance with medical treatment [Z91.199]  Not Applicable    Microcytosis [R71.8]  Yes    COVID-19 [U07.1]  Yes    GERD (gastroesophageal reflux disease) [K21.9]  Yes    Nicotine dependence [F17.200]  Yes    Migraine [G43.909]  Yes      Resolved Hospital Problems    Diagnosis Date Resolved POA    NSTEMI (non-ST elevated myocardial infarction) [I21.4] 11/04/2023 Yes

## 2023-11-06 NOTE — ASSESSMENT & PLAN NOTE
11/07  - Improving  -Will continue to monitor       11/06  - US kidney unremarkable  - Na 135 from 134; K 3.2 from 2.9; BUN 37 from 34; Cr 1.40 from 1.16 Ca 10.3 and uric acid 7.7  - Started on Colchicine for gout flare; will monitor kidneys closely  - Nephrology has signed-off at this time and recommends little diuretic    - Continue monitor       Patient with acute kidney injury/acute renal failure likely due to acute tubular necrosis of unknown etiology but may be due to recent covid 19 about 4 weeks ago FRANKO is currently stable. Baseline creatinine 1.07 about one month ago - Labs reviewed- Renal function/electrolytes with Estimated Creatinine Clearance: 65.2 mL/min (A) (based on SCr of 1.4 mg/dL (H)). according to latest data. Monitor urine output and serial BMP and adjust therapy as needed. Avoid nephrotoxins and renally dose meds for GFR listed above.      - Pending US kidney  - Pending urine Na, Urea and CR. BMP to calculate FeNA and FeUrea  -  Avoid nephrotoxic drugs  - Renally dose all applicable medications  - Strict input and output  - Daily renal panel to monitor renal function  -   11/5: Nephrology consulted   Renal sono with normal size kidneys w/o obstruction.  -continue K supplemenation and Fe supplementation    Recommend a decrease in bumex

## 2023-11-06 NOTE — PROGRESS NOTES
[facility] - [facility]  Nephrology  Progress Note    Patient Name: Holly Porter  MRN: [id number]  Admission Date: 11/3/2023  Hospital Length of Stay: 3 days  Attending Provider: Regan Glover Jr., MD   Primary Care Physician: Regan Frausto MD  Principal Problem:Acute on chronic respiratory failure with hypoxia and hypercapnia    Consults  Subjective:     Interval History: Pt seen in f/u of her early ARF with met alkalosis. Alkalosis improving as is hypokalemia. Bumex reduced yesterday to allow renal correction.     Review of patient's allergies indicates:   Allergen Reactions    Ammonium peroxydisulfate Shortness Of Breath    Avocado (laurus persea) Anaphylaxis    Bananas [banana] Anaphylaxis and Swelling     CRAMPS,    Chocolate flavor Anaphylaxis     MOUTH SWELLING    Fentanyl Shortness Of Breath and Itching    Percocet [oxycodone-acetaminophen] Shortness Of Breath and Itching    Silvadene [silver sulfadiazine]     Clindamycin     Corticosteroids (glucocorticoids)     Hydrocortisone Blisters    Lasix [furosemide] Blisters     BURNS SKIN    Nutritional supplement-fiber Itching    Pregabalin     Shellfish containing products     Adhesive Rash and Blisters    Iodine Rash    Iodine and iodide containing products Rash    Latex, natural rubber Rash    Pcn [penicillins] Rash    Sulfa (sulfonamide antibiotics) Rash and Blisters     Current Facility-Administered Medications   Medication Frequency    albuterol-ipratropium 2.5 mg-0.5 mg/3 mL nebulizer solution 3 mL Q6H    allopurinoL tablet 300 mg Daily    aspirin EC tablet 81 mg Daily    atorvastatin tablet 80 mg Daily    bumetanide injection 1 mg Daily    calcium carbonate 200 mg calcium (500 mg) chewable tablet 500 mg BID    cholecalciferol (vitamin D3) 125 mcg (5,000 unit) tablet 5,000 Units BID    cyclobenzaprine tablet 10 mg QHS    dextrose 10% bolus 125 mL 125 mL PRN    dextrose 10% bolus 250 mL 250 mL PRN    docusate sodium  capsule 100 mg BID    DULoxetine DR capsule 30 mg Daily    gabapentin capsule 600 mg TID    glucagon (human recombinant) injection 1 mg PRN    glucose chewable tablet 16 g PRN    glucose chewable tablet 24 g PRN    heparin (porcine) injection 7,500 Units Q8H    HYDROcodone-acetaminophen  mg per tablet 1 tablet Q6H PRN    insulin detemir U-100 injection 10 Units QHS    iron sucrose injection 200 mg Daily    levothyroxine tablet 150 mcg Before breakfast    melatonin tablet 6 mg Nightly PRN    nicotine 21 mg/24 hr 1 patch Daily    nitroGLYCERIN SL tablet 0.4 mg Q5 Min PRN    pantoprazole EC tablet 40 mg Daily    polyethylene glycol packet 17 g Daily    potassium chloride SA CR tablet 40 mEq BID    predniSONE tablet 40 mg Daily    sertraline tablet 50 mg Daily    simethicone chewable tablet 80 mg QID PRN    sodium chloride 0.9% flush 10 mL Q12H PRN    spironolactone tablet 50 mg Daily    ticagrelor tablet 90 mg BID    topiramate tablet 150 mg BID       Objective:     Vital Signs (Most Recent):  Temp: 97.4 °F (36.3 °C) (11/06/23 0658)  Pulse: 63 (11/06/23 0723)  Resp: 20 (11/06/23 0723)  BP: 107/74 (11/06/23 0658)  SpO2: 95 % (11/06/23 0723) Vital Signs (24h Range):  Temp:  [97.3 °F (36.3 °C)-98.3 °F (36.8 °C)] 97.4 °F (36.3 °C)  Pulse:  [63-81] 63  Resp:  [16-20] 20  SpO2:  [92 %-98 %] 95 %  BP: ()/(46-74) 107/74     Weight: (!) 158.3 kg (349 lb) (11/04/23 0235)  Body mass index is 53.07 kg/m².  Body surface area is 2.76 meters squared.    I/O last 3 completed shifts:  In: 480 [P.O.:480]  Out: 1050 [Urine:1050]    Physical Exam Obese. Much more alert and cheerful today.    Significant Labs:sureBMP:   Recent Labs   Lab 11/04/23  0321 11/05/23  0247 11/06/23  0243   *   < > 102   *   < > 135*   K 2.4*   < > 3.2*   CL 84*   < > 93*   CO2 43*   < > 36*   BUN 30*   < > 37*   CREATININE 1.55*   < > 1.40*   CALCIUM 9.5   < > 10.3*   MG 2.3  --   --     < > = values in this interval not displayed.      All labs within the past 24 hours have been reviewed.    Significant Imaging:  Labs: Reviewed    Assessment/Plan:     Active Diagnoses:    Diagnosis Date Noted POA    PRINCIPAL PROBLEM:  Acute on chronic respiratory failure with hypoxia and hypercapnia [J96.21, J96.22] 11/03/2023 Yes    Obesity, diabetes, and hypertension syndrome [E11.69, E66.9, E11.59, I15.2] 11/04/2023 Yes    Type 2 MI (myocardial infarction) [I21.A1] 11/04/2023 Yes    COPD exacerbation [J44.1] 11/03/2023 Yes    Acute on chronic diastolic CHF (congestive heart failure) [I50.33] 11/03/2023 Yes    Acute on chronic renal failure [N17.9, N18.9] 11/03/2023 Yes    Type 2 diabetes mellitus without complication, with long-term current use of insulin [E11.9, Z79.4] 11/03/2023 Yes    Obesity hypoventilation syndrome [E66.2] 11/03/2023 Yes    Hypokalemia [E87.6] 11/03/2023 Yes    Non compliance with medical treatment [Z91.199] 11/03/2023 Not Applicable    Microcytosis [R71.8] 11/03/2023 Yes    COVID-19 [U07.1] 11/03/2023 Yes    GERD (gastroesophageal reflux disease) [K21.9] 11/03/2023 Yes    Nicotine dependence [F17.200] 11/03/2023 Yes    Migraine [G43.909] 11/03/2023 Yes      Problems Resolved During this Admission:    Diagnosis Date Noted Date Resolved POA    NSTEMI (non-ST elevated myocardial infarction) [I21.4] 11/03/2023 11/04/2023 Yes       Continue with as little diuretic as possible for now. I'll sign off. Call if needed.    Thank you for your consult. I will follow-up with patient. Please contact us if you have any additional questions.    Fahad Esposito MD  Nephrology  [facility] - [address] [place] [address]

## 2023-11-06 NOTE — PLAN OF CARE
SS received a consult on pt for home oxygen, rollator and shower chair. SS spoke with pt and informed that shower chair is not covered by insurance. SS attempted to contact daughter also and notify, message left for her to return my call. SS faxed info to [employer], [employer] with [employer] and informed them of order. Awaiting oxygen sats to see if pt qualifies for home oxygen.

## 2023-11-06 NOTE — ASSESSMENT & PLAN NOTE
11/06  - Reports left foot tenderness to touch  -Hx of gout flares  -uric acid 7.7  -Started on low dose colchicine; monitoring renal function closely

## 2023-11-06 NOTE — ASSESSMENT & PLAN NOTE
- Shortness of breath improved on BIPAP    11/06/23:  - now on O2 per NC  - lying almost flat in bed without difficulty, no conversational dyspnea

## 2023-11-06 NOTE — PT/OT/SLP EVAL
Occupational Therapy   Evaluation    Name: Holly Porter  MRN: [id number]  Admitting Diagnosis: Acute on chronic respiratory failure with hypoxia and hypercapnia  Recent Surgery: * No surgery found *      Recommendations:     Discharge Recommendations: TBD   Discharge Equipment Recommendations:     Barriers to discharge:       Assessment:     Holly Porter is a 63 y.o. female with a medical diagnosis of Acute on chronic respiratory failure with hypoxia and hypercapnia.  She presents with Pt came in to hospital with episode of slurred speech and COPD exacerbation. Pt c/o BLE P! And tenderness that she believes is a gout flare up.Pt was noted to have BUE tremors when taking Meds from RN. Pt stated tremors began on Friday 11/3/23. Tremors subsided within a few seconds. While sitting EOB Pt became unresponsive RN was called Pt had seizure and was assisted back to bed on her l side. Seizure activity lasted for 5 min. Pt became oriented and conversational prior to OT leaving room.Performance deficits affecting function: weakness, decreased strength, endurance, and self care skills .      Rehab Prognosis: Good; patient would benefit from acute skilled OT services to address these deficits and reach maximum level of function.       Plan:     Patient to be seen 5 x/week to address the above listed problems via self-care/home management, therapeutic activities, therapeutic exercises  Plan of Care Expires:    Plan of Care Reviewed with: patient    Subjective     Chief Complaint: Pt c/o BLE P!  Patient/Family Comments/goals: return home    Occupational Profile:  Living Environment: Pt lives in alone first floor apartment with family members close by  Previous level of function: Pt was I with sponge bath d/t not being able to get into tub. Pt will have a bath bench at home at time of D/C  Roles and Routines: mother, friend neighbor  Equipment Used at Home: shower chair, walker, standard, rollator  Assistance upon Discharge: Pt  will have A from family    Pain/Comfort:  Pain Rating 1: 8/10  Location - Side 1: Bilateral  Location 1: foot  Pain Addressed 2: Pre-medicate for activity    Patients cultural, spiritual, Confucianism conflicts given the current situation:      Objective:     Communicated with: EPIFANIO Villanueva prior to session.  Patient found HOB elevated with lehman catheter  upon OT entry to room.    General Precautions: Standard, fall  Orthopedic Precautions:    Braces:    Respiratory Status: Nasal cannula, flow 2 L/min    Occupational Performance:    Bed Mobility:    Patient completed Supine to Sit with minimum assistance    Functional Mobility/Transfers:    Functional Mobility: functional mobility not assessed d/t seizure activity    Activities of Daily Living:  Upper Body Dressing: modified independence Pt doffed/donned gown    Cognitive/Visual Perceptual:  Cognitive/Psychosocial Skills:     -       Oriented to: Person, Place, Time, and Situation   -       Follows Commands/attention:Follows multistep  commands    Physical Exam:  Upper Extremity Range of Motion:     -       Right Upper Extremity: WNL  -       Left Upper Extremity: WNL  Upper Extremity Strength:    -       Right Upper Extremity: 3/5  -       Left Upper Extremity: 3/5    AMPAC 6 Click ADL:  AMPAC Total Score: 16    Treatment & Education:  Pt educated on role and scope of OT practice. Pt was eager to get out of bed and walk, therapist explained that would need to wait d/t seizure.    Patient left HOB elevated with all lines intact, call button in reach, and nursing present    GOALS:   Multidisciplinary Problems       Occupational Therapy Goals          Problem: Occupational Therapy    Goal Priority Disciplines Outcome Interventions   Occupational Therapy Goal     OT, PT/OT Ongoing, Progressing    Description: STG:  Pt will perform grooming with setup  Pt will bathe with Mod I  Pt will perform UE dressing with I  Pt will perform LE dressing with Mod I  Pt will sit EOB x 15 min  with supervision assistance  Pt will transfer bed/chair/bsc with Mod I  Pt will perform standing task x 15 min with supervision assistance  Pt will tolerate 30 minutes of tx without fatigue      LT.Restore to max I with self care and mobility.                          History:     Past Medical History:   Diagnosis Date    Acquired hypothyroidism     Atherosclerosis of native artery of extremity with intermittent claudication 2019    bilateral legs    BMI 50.0-59.9, adult 2021    Chronic pain syndrome     opioid depen    COPD (chronic obstructive pulmonary disease)     COVID-19 10/06/2023    Depression     Diabetes mellitus, type 2     followed by Aurea Kwong NP, [facility]    DVT of lower extremity, bilateral     Essential hypertension 2021    GERD (gastroesophageal reflux disease)     Gout 2023    Hyperlipidemia     Lumbosacral radiculopathy 2023    followed by GLENN Valdes, [facility]    Migraines     Nicotine dependence     Nicotine dependence     Obesity hypoventilation syndrome     chronic CO2 retainer with compensatory metabolic alkalosis    Osteoarthritis     Seizure disorder     Sleep apnea     on cpap    Thyroid cancer     Venous stasis ulcer limited to breakdown of skin with varicose veins     followed by Estuardo Zhao    Vitamin D deficiency          Past Surgical History:   Procedure Laterality Date    ABCESS DRAINAGE      HYSTERECTOMY      ILIAC ARTERY STENT Bilateral 2020    Bilateral distal aorta and common iliac 8 X 59 vbx covered stents performed by Dr. Antonio Jacinto.    RADIOFREQUENCY ABLATION Left 04/15/2022    Procedure: Left calf  Radiofrequency Ablation;  Surgeon: Matty Falcon DO;  Location: [facility];  Service: Vascular;  Laterality: Left;    STAB PHLEBECTOMY OF VARICOSE VEINS Left 2013    Left leg microphlebectomies x 23 stab avulsions and ligation of multiple varicose veins perrformed by Dr. Cirilo Aguirre.     THYROIDECTOMY      hx: thyroid cancer    TOE AMPUTATION Left 01/28/2020    2nd, 4th and 5th performed by Dr. Anam Saeed.    TOE AMPUTATION Right 01/28/2020    4th toe performed by Dr. Anam Saeed.    TOTAL ABDOMINAL HYSTERECTOMY W/ BILATERAL SALPINGOOPHORECTOMY      TUBAL LIGATION      VAGINAL DELIVERY      x 4    VENOUS ABLATION Right 08/09/2019    GSV Varithena Ablation performed by Dr. Estuardo Falcon    VENOUS ABLATION Left 08/02/2019    ATAV Varithena Ablation performed by Dr. Estuardo Falcon.    VENOUS ABLATION Right 07/13/2015    ATAV Laser Ablatio performed by Dr. Cirilo Aguirre.    VENOUS ABLATION Right 07/06/2015    Distal  GSV Laser Ablation performed by dr. Cirilo Aguirre.    VENOUS ABLATION Left 04/20/2015    Left Distal GSV Laser Ablation performed by dr. Cirilo Aguirre.    VENOUS ABLATION Right 10/28/2013    Right Distal GSV RF Ablation performed by Dr. Cirilo Aguirre.    VENOUS ABLATION Left 10/25/2013    SSV RF Ablation performed by dr. Cirilo Aguirre.    VENOUS ABLATION Left 10/07/2013    Left GSV and Left ATAV RF Ablation performed by Dr. Cirilo Aguirre.    VENOUS ABLATION Right 01/21/2013    GSV RF Ablation w/micros x 22 performed by Dr. Cirilo Aguirre.    VENOUS ABLATION Left 06/25/2021    Left distal GSV Varithena Ablation       Time Tracking:     OT Date of Treatment:    OT Start Time: 0906  OT Stop Time: 0940  OT Total Time (min): 34 min    Billable Minutes:Evaluation 20 11/6/2023

## 2023-11-06 NOTE — NURSING
Attempted x 2 to call patient's son Venkat ([phone]). Voice mailbox full, unable to leave message.

## 2023-11-06 NOTE — ASSESSMENT & PLAN NOTE
"11/06  - Cardiology is on board; planned LHC and pending results         Patient is identified as having Diastolic (HFpEF) heart failure that is Acute on chronic. CHF is currently uncontrolled due to Continued edema of extremities and Rales/crackles on pulmonary exam. Latest ECHO performed and demonstrates- Results for orders placed during the hospital encounter of 11/24/21    Echo    Interpretation Summary  · The left ventricle is normal in size with concentric remodeling and low normal systolic function.  · The estimated ejection fraction is 50%.  · Atrial fibrillation not observed.  · Normal right ventricular size.  · Normal left ventricular diastolic function.  . Continue ACE/ARB, Furosemide and Aldactone and monitor clinical status closely. Monitor on telemetry. Patient is off CHF pathway.  Monitor strict Is&Os and daily weights.  Place on fluid restriction of 2 L. Cardiology has been consulted. Continue to stress to patient importance of self efficacy and  on diet for CHF. Last BNP reviewed- and noted below No results for input(s): "BNP", "BNPTRIAGEBLO" in the last 168 hours..      - Bumetanide 1 mg BID and home aldactone, but monitor renal function daily  - Holding BB due to CHF exacerbation   - Strict I and O  - Daily weight  - Pending Echo repeat  - Cardiology consulted, thanks for the assistance    Continue current management.Plan for LHC when patient is able to tolerate laying supine  "

## 2023-11-06 NOTE — ASSESSMENT & PLAN NOTE
Elevation of troponin concerning for NSTEMI with elevation from 40 10/5/23 to 297 at presentation, then decreasing to 229 over first hospital day.  Pt is a candidate for LHC/poss when able to lie flat     11/6/23:  - Dr. Paul has seen and evaluated this pt  - pt is able to lie flat today without difficulty  - keep NPO for LHC today

## 2023-11-06 NOTE — PLAN OF CARE
Problem: Occupational Therapy  Goal: Occupational Therapy Goal  Description: STG:  Pt will perform grooming with setup  Pt will bathe with Mod I  Pt will perform UE dressing with I  Pt will perform LE dressing with Mod I  Pt will sit EOB x 15 min with supervision assistance  Pt will transfer bed/chair/bsc with Mod I  Pt will perform standing task x 15 min with supervision assistance  Pt will tolerate 30 minutes of tx without fatigue      LT.Restore to max I with self care and mobility.     Outcome: Ongoing, Progressing

## 2023-11-06 NOTE — SUBJECTIVE & OBJECTIVE
Interval History: Patient was seen and examined at bedside. No acute events overnight. During initial rounds patient was complaining of left side foot pain; Uric acid is 7.7. Started on low-dose of colchicine given renal function. While rounding on other patients, per nurse patient had a unprovoked seizure that lasted roughly 5 minutes with UE tremors. Ordered 1 mg Ativan PRN. EEG pending. Cardiology plan to [facility] today.     Review of Systems   Constitutional:  Negative for activity change and appetite change.   Respiratory:  Negative for chest tightness, shortness of breath and wheezing.    Cardiovascular:  Negative for chest pain and palpitations.   Gastrointestinal:  Negative for abdominal distention and abdominal pain.   Skin:  Positive for color change. Negative for wound.        Bilateral LE Stasis dermatitis    Neurological:  Negative for seizures and weakness.   Psychiatric/Behavioral:  Negative for confusion and sleep disturbance.      Objective:     Vital Signs (Most Recent):  Temp: 97.6 °F (36.4 °C) (11/06/23 1053)  Pulse: 65 (11/06/23 1053)  Resp: 18 (11/06/23 1053)  BP: 113/70 (11/06/23 1053)  SpO2: 96 % (11/06/23 1053) Vital Signs (24h Range):  Temp:  [97.3 °F (36.3 °C)-98.3 °F (36.8 °C)] 97.6 °F (36.4 °C)  Pulse:  [58-81] 65  Resp:  [16-20] 18  SpO2:  [93 %-98 %] 96 %  BP: ()/(46-74) 113/70     Weight: (!) 158.3 kg (349 lb)  Body mass index is 53.07 kg/m².    Intake/Output Summary (Last 24 hours) at 11/6/2023 1405  Last data filed at 11/6/2023 1151  Gross per 24 hour   Intake --   Output 1050 ml   Net -1050 ml         Physical Exam  Vitals and nursing note reviewed.   Constitutional:       General: She is not in acute distress.     Appearance: Normal appearance. She is obese. She is not ill-appearing.   HENT:      Head: Normocephalic and atraumatic.      Right Ear: Tympanic membrane normal.      Left Ear: Tympanic membrane normal.   Cardiovascular:      Rate and Rhythm: Normal rate.      Heart  sounds: No murmur heard.     No friction rub. No gallop.   Pulmonary:      Breath sounds: No wheezing.   Chest:      Chest wall: No tenderness.   Abdominal:      General: There is no distension.      Tenderness: There is no abdominal tenderness.   Musculoskeletal:         General: Tenderness present.      Cervical back: Normal range of motion.      Right lower leg: No edema.      Left lower leg: No edema.      Comments: Left 1st-digit toe tender with touch; Lower left leg tendered near the ankle  No erythema, swelling or warmth    Skin:     Findings: No bruising, erythema or rash.   Neurological:      Mental Status: She is alert and oriented to person, place, and time.             Significant Labs: All pertinent labs within the past 24 hours have been reviewed.    Significant Imaging: I have reviewed all pertinent imaging results/findings within the past 24 hours.

## 2023-11-06 NOTE — ASSESSMENT & PLAN NOTE
11/06  - Reports feeling better today  -Continue to monitor  -Continue nightly BiPAP  -ABG showed pH 7.47; paCO2 63; paO2 79        Patient with Hypercapnic and Hypoxic Respiratory failure which is Acute on chronic.  she is on home oxygen at 2-3 LPM but has not been using it LPM. Supplemental oxygen was provided and noted- Oxygen Concentration (%):  [40] 40    .   Signs/symptoms of respiratory failure include- increased work of breathing and lethargy. Contributing diagnoses includes - CHF, COPD, Obesity Hypoventilation and recent Covid 19 infection and non complaince with home oxygenation Labs and images were reviewed. Patient Has recent ABG, which has been reviewed. Will treat underlying causes and adjust management of respiratory failure as follows- supplemental oxygenation and BIPAP at night    11/5: O2 sat 96%. Continue current management.

## 2023-11-06 NOTE — NURSING
0925 Called to room per therapy. Pt noted sitting on side of bed with blank stare. Unresponsive to verbal or tactile stimuli. Pt noted convulsing. Pt assisted to lying position in bed x2 staff then positioned on side. Seizure like activity noted and lasted x5 minutes. 02sat remained above 95% on 3L. MD notified per staff. New orders given. See MAR.      0930 Pt no longer convulsing. Delayed responses noted. MD at bedside. Vitals 97.8 131/78 82 16 97% on 3L  1000 Pt off floor to CT  1020 Pt returned to floor from CT

## 2023-11-06 NOTE — SUBJECTIVE & OBJECTIVE
Interval History: Pt is seen lying in bed almost completely flat without difficulty. O2 per NC. Pt denies chest pain or SOB on exam.    Review of Systems   Constitutional: Negative for chills, diaphoresis and fever.   Cardiovascular:  Positive for dyspnea on exertion. Negative for chest pain, orthopnea and syncope.   Respiratory:  Negative for cough and shortness of breath.    Gastrointestinal:  Negative for abdominal pain, nausea and vomiting.     Objective:     Vital Signs (Most Recent):  Temp: 97.2 °F (36.2 °C) (11/06/23 1438)  Pulse: 63 (11/06/23 1355)  Resp: 17 (11/06/23 1355)  BP: (!) 160/87 (11/06/23 1355)  SpO2: 97 % (11/06/23 1355) Vital Signs (24h Range):  Temp:  [97.2 °F (36.2 °C)-98.3 °F (36.8 °C)] 97.2 °F (36.2 °C)  Pulse:  [58-81] 63  Resp:  [16-20] 17  SpO2:  [93 %-98 %] 97 %  BP: (101-160)/(56-87) 160/87     Weight: (!) 158.3 kg (349 lb)  Body mass index is 53.07 kg/m².     SpO2: 97 %         Intake/Output Summary (Last 24 hours) at 11/6/2023 1656  Last data filed at 11/6/2023 1151  Gross per 24 hour   Intake --   Output 600 ml   Net -600 ml       Lines/Drains/Airways       Peripheral Intravenous Line  Duration                  Peripheral IV - Single Lumen 11/03/23 1850 20 G Anterior;Proximal;Right Forearm 2 days                       Physical Exam  Vitals reviewed.   Constitutional:       General: She is not in acute distress.     Appearance: She is obese.   Eyes:      Pupils: Pupils are equal, round, and reactive to light.   Cardiovascular:      Rate and Rhythm: Normal rate and regular rhythm.      Pulses: Normal pulses.      Heart sounds: Normal heart sounds.   Pulmonary:      Effort: Pulmonary effort is normal.   Skin:     General: Skin is warm and dry.   Neurological:      Mental Status: She is alert and oriented to person, place, and time.   Psychiatric:         Mood and Affect: Mood normal.         Behavior: Behavior normal.            Significant Labs: All pertinent lab results from the last  24 hours have been reviewed. and   Recent Lab Results  (Last 5 results in the past 24 hours)        11/06/23  1628   11/06/23  1324   11/06/23  1054   11/06/23  0703   11/06/23  0625        Albumin               Anion Gap               Baso #               Basophil %               BUN               BUN/CREAT RATIO               Calcium               Cath EF Quantitative   55             Chloride               CO2               Creatinine               Differential Method               eGFR               Eos #               Eosinophil %               Glucose               Hematocrit               Hemoglobin               Immature Grans (Abs)               Immature Granulocytes               Lymph #               Lymph %               MCH               MCHC               MCV               Mono #               Mono %               MPV               Neutrophils, Abs               Neutrophils Relative               nRBC               NUCLEATED RBC ABSOLUTE               Phosphorus Level               Platelet Count               POC Base Excess         18.9       POC Glucose 198     190   172         POC HCO3         45.9       POC PCO2         63       POC PH         7.47       POC PO2         79       POC SATURATED O2         96       Potassium               RBC               RDW               Sodium               Uric Acid               WBC                                      Significant Imaging: Echocardiogram: Transthoracic echo (TTE) complete (Cupid Only):   Results for orders placed or performed during the hospital encounter of 11/03/23   Echo   Result Value Ref Range    BSA 2.76 m2    LVOT stroke volume 60.63 cm3    LVIDd 4.95 3.5 - 6.0 cm    LV Systolic Volume 71.78 mL    LV Systolic Volume Index 27.7 mL/m2    LVIDs 4.04 (A) 2.1 - 4.0 cm    LV Diastolic Volume 115.56 mL    LV Diastolic Volume Index 44.62 mL/m2    IVS 1.04 0.6 - 1.1 cm    LVOT diameter 1.98 cm    LVOT area 3.1 cm2    FS 18 (A) 28 - 44 %    Left  Ventricle Relative Wall Thickness 0.37 cm    Posterior Wall 0.92 0.6 - 1.1 cm    LV mass 174.20 g    LV Mass Index 67 g/m2    MV Peak E Dima 0.50 m/s    TDI LATERAL 0.09 m/s    TDI SEPTAL 0.05 m/s    E/E' ratio 7.14 m/s    MV Peak A Dima 0.63 m/s    TR Max Dima 3.00 m/s    E/A ratio 0.79     E wave deceleration time 344.34 msec    LV SEPTAL E/E' RATIO 10.00 m/s    LV LATERAL E/E' RATIO 5.56 m/s    LVOT peak dima 1.00 m/s    Left Ventricular Outflow Tract Mean Velocity 0.65 cm/s    Left Ventricular Outflow Tract Mean Gradient 1.87 mmHg    Right ventricular length in diastole (apical 4-chamber view) 6.79 cm    RV mid diameter 2.77 cm    TAPSE 2.18 cm    RA area 27.0 cm2    AV mean gradient 4 mmHg    AV peak gradient 9 mmHg    Ao peak dima 1.52 m/s    Ao VTI 29.20 cm    LVOT peak VTI 19.70 cm    AV valve area 2.08 cm²    AV Velocity Ratio 0.66     AV index (prosthetic) 0.67     JOSE by Velocity Ratio 2.02 cm²    Triscuspid Valve Regurgitation Peak Gradient 36 mmHg    PV PEAK VELOCITY 0.97 m/s    PV peak gradient 4 mmHg    Ao root annulus 3.46 cm    Mean e' 0.07 m/s    ZLVIDS -7.57     ZLVIDD -12.86     EF 50 %    GLS -14.1 %    RVDD 4.10 cm    LA size 4.4 cm    Narrative      Left Ventricle: The left ventricle is normal in size. Normal wall   thickness. Normal wall motion. There is normal systolic function with a   visually estimated ejection fraction of 60 - 65%. Ejection fraction by   visual approximation is 50%. There is normal diastolic function.    Right Ventricle: Moderate right ventricular enlargement. Systolic   function is normal.    Left Atrium: Left atrium is mildly dilated.    Right Atrium: Right atrium is moderately dilated.    Aortic Valve: The aortic valve is a trileaflet valve.    Tricuspid Valve: There is mild regurgitation. No paravalvular   regurgitation.

## 2023-11-06 NOTE — ASSESSMENT & PLAN NOTE
11/06  - K 3.2  -Continued scheduled potassium 40 mEq        K 2.9 but mag normal  Monitor and replace as indicated

## 2023-11-06 NOTE — PLAN OF CARE
Problem: Gas Exchange Impaired  Goal: Optimal Gas Exchange  11/6/2023 0730 by Loretta Jewell, RRT  Outcome: Ongoing, Progressing  11/6/2023 0729 by Loretta Jewell, RRT  Outcome: Ongoing, Progressing     Problem: Airway Clearance Ineffective  Goal: Effective Airway Clearance  11/6/2023 0730 by Loretta Jewell, RRT  Outcome: Ongoing, Progressing  11/6/2023 0730 by Loretta Jewell, RRT  Outcome: Ongoing, Progressing

## 2023-11-06 NOTE — PLAN OF CARE
Problem: Physical Therapy  Goal: Physical Therapy Goal  Description: Short term goals:  1. Supine to sit with Contact Guard Assistance  2. Sit to stand transfer with Contact Guard Assistance  3. Bed to chair transfer with Minimal Assistance using Rolling Walker  4. Gait  x 50 feet with Minimal Assistance using Rolling Walker.     Long term goals:  1. Supine to sit with [place]  2. Sit to stand transfer with Modified [place]  3. Bed to chair transfer with Modified [place] using Rolling Walker  4. Gait  x 100 feet with Modified [place] using Rolling Walker.     Outcome: Ongoing, Progressing

## 2023-11-06 NOTE — ASSESSMENT & PLAN NOTE
11/06  - Per nurse, patient had a seizure after our initially rounds  -Seized for about 5 minutes; no hx of seizure  - AOX 3   -Ordered 1 mg Ativan PRN  -Seizure precaution  -EEG ordered  -Continue to monitor   - CT head showed no acute events

## 2023-11-06 NOTE — PT/OT/SLP EVAL
Physical Therapy Evaluation     Patient Name: Holly Porter   MRN: [id number]  Recent Surgery: * No surgery found *      Recommendations:     Discharge Recommendations: Low Intensity Therapy   Discharge Equipment Recommendations: none   Barriers to discharge: Ongoing medical treatment    Assessment:     Holly Porter is a 63 y.o. female admitted with a medical diagnosis of Acute on chronic respiratory failure with hypoxia and hypercapnia. She presents with the following impairments/functional limitations: weakness, impaired self care skills, impaired functional mobility, gait instability, impaired balance, decreased lower extremity function, pain, impaired coordination. Pt presents with intention tremor that began on Friday. She came to the hospital with slurred speech but has since resolved. She has pain to bilateral LE which she attributes to gout exacerbation. She demonstrates fair strength in BLE. Upon sitting at edge of bed, pt became unresponsive and began convulsing. RN was called and pt was assisted back to bed. Seizure activity lasted approximately 5 minutes. Pt returned to mental baseline prior to PT exiting room. Pt is eager to attempt out of bed mobility but will be going for further testing today. She required only minimal assistance to sit at edge. Will attempt further mobility as able.    Rehab Prognosis: Good; patient would benefit from acute PT services to address these deficits and reach maximum level of function.    Plan:     During this hospitalization, patient to be seen 5 x/week to address the above listed problems via gait training, therapeutic activities, therapeutic exercises, neuromuscular re-education    Plan of Care Expires: 12/06/23    Subjective     Chief Complaint: acute respiratory failure  Patient Comments/Goals: Pt is agreeable to PT  Pain/Comfort:  Pain Rating 1: 8/10  Location - Side 1: Bilateral  Location - Orientation 1: lower  Location 1: leg  Pain Addressed 1: Nurse  notified  Pain Rating Post-Intervention 1: 8/10    Social History:  Living Environment: Patient lives alone in a first floor apartment with tub-shower combo  Prior Level of Function: Prior to admission, patient was modified independent with ADLs using rolling walker for mobility and ambulated community distances using rolling walker  Equipment Used at Home: walker, rolling  DME owned (not currently used):  rollator, shower chair, Oxygen has been ordered but not delivered. Has wheelchair  Assistance Upon Discharge: family    Objective:     Communicated with CARMELA Robert RN prior to session. Patient found HOB elevated with peripheral IV, lehman catheter, oxygen upon PT entry to room.    General Precautions: Standard, seizure, fall   Orthopedic Precautions: N/A   Braces: N/A    Respiratory Status: Nasal cannula, flow 3 L/min    Exams:  Cognition: Patient is oriented to Person, Place, Time, Situation  RLE ROM: WFL  RLE Strength:  3+/5  LLE ROM: WFL  LLE Strength:  3+/5  Gross Motor Coordination: WFL initially, Pt unable to finish assessment due to seizure  Sensation:    -       Intact  Skin Integrity/Edema:     -       Edema: Mild BLE    Functional Mobility:  Gait belt applied - N/A  Bed Mobility  Scooting: contact guard assistance  Supine to Sit: minimum assistance for trunk management  Sit to Supine: dependence  and of 2 persons for LE management and trunk management  Transfers  Not attempted  Gait  Not attempted.  Balance  Sitting: contact guard assistance  Standing:  N/A      Therapeutic Activities and Exercises:   Patient educated on role of acute care PT and PT POC, safety while in hospital including calling nurse for mobility, and call light usage      AM-PAC 6 CLICK MOBILITY  Total Score:10    Patient left HOB elevated with all lines intact and call button in reach.    GOALS:   Multidisciplinary Problems       Physical Therapy Goals          Problem: Physical Therapy    Goal Priority Disciplines Outcome  Goal Variances Interventions   Physical Therapy Goal     PT, PT/OT Ongoing, Progressing     Description: Short term goals:  1. Supine to sit with Contact Guard Assistance  2. Sit to stand transfer with Contact Guard Assistance  3. Bed to chair transfer with Minimal Assistance using Rolling Walker  4. Gait  x 50 feet with Minimal Assistance using Rolling Walker.     Long term goals:  1. Supine to sit with [place]  2. Sit to stand transfer with Modified [place]  3. Bed to chair transfer with Modified [place] using Rolling Walker  4. Gait  x 100 feet with Modified [place] using Rolling Walker.                          History:     Past Medical History:   Diagnosis Date    Acquired hypothyroidism     Atherosclerosis of native artery of extremity with intermittent claudication 01/30/2019    bilateral legs    BMI 50.0-59.9, adult 02/22/2021    Chronic pain syndrome     opioid depen    COPD (chronic obstructive pulmonary disease)     COVID-19 10/06/2023    Depression     Diabetes mellitus, type 2     followed by Aurea Kwong NP, [facility]    DVT of lower extremity, bilateral     Essential hypertension 06/08/2021    GERD (gastroesophageal reflux disease)     Gout 07/05/2023    Hyperlipidemia     Lumbosacral radiculopathy 06/05/2023    followed by GLENN Valdes, [facility]    Migraines     Nicotine dependence     Nicotine dependence     Obesity hypoventilation syndrome     chronic CO2 retainer with compensatory metabolic alkalosis    Osteoarthritis     Seizure disorder     Sleep apnea     on cpap    Thyroid cancer     Venous stasis ulcer limited to breakdown of skin with varicose veins     followed by Estuardo Zhao    Vitamin D deficiency        Past Surgical History:   Procedure Laterality Date    ABCESS DRAINAGE      HYSTERECTOMY      ILIAC ARTERY STENT Bilateral 01/28/2020    Bilateral distal aorta and common iliac 8 X 59 vbx covered stents performed by Dr. Antonio Jacinto.     RADIOFREQUENCY ABLATION Left 04/15/2022    Procedure: Left calf  Radiofrequency Ablation;  Surgeon: Matty Falcon DO;  Location: [facility];  Service: Vascular;  Laterality: Left;    STAB PHLEBECTOMY OF VARICOSE VEINS Left 01/25/2013    Left leg microphlebectomies x 23 stab avulsions and ligation of multiple varicose veins perrformed by Dr. Cirilo Aguirre.    THYROIDECTOMY      hx: thyroid cancer    TOE AMPUTATION Left 01/28/2020    2nd, 4th and 5th performed by Dr. Anam Saeed.    TOE AMPUTATION Right 01/28/2020    4th toe performed by Dr. Anam Saeed.    TOTAL ABDOMINAL HYSTERECTOMY W/ BILATERAL SALPINGOOPHORECTOMY      TUBAL LIGATION      VAGINAL DELIVERY      x 4    VENOUS ABLATION Right 08/09/2019    GSV Varithena Ablation performed by Dr. Estuardo Falcon    VENOUS ABLATION Left 08/02/2019    ATAV Varithena Ablation performed by Dr. Estuardo Falcon.    VENOUS ABLATION Right 07/13/2015    ATAV Laser Ablatio performed by Dr. Cirilo Aguirre.    VENOUS ABLATION Right 07/06/2015    Distal  GSV Laser Ablation performed by dr. Cirilo Aguirre.    VENOUS ABLATION Left 04/20/2015    Left Distal GSV Laser Ablation performed by dr. Cirilo Aguirre.    VENOUS ABLATION Right 10/28/2013    Right Distal GSV RF Ablation performed by Dr. Cirilo Aguirre.    VENOUS ABLATION Left 10/25/2013    SSV RF Ablation performed by dr. Cirilo Aguirre.    VENOUS ABLATION Left 10/07/2013    Left GSV and Left ATAV RF Ablation performed by Dr. Cirilo Aguirre.    VENOUS ABLATION Right 01/21/2013    GSV RF Ablation w/micros x 22 performed by Dr. Cirilo Aguirre.    VENOUS ABLATION Left 06/25/2021    Left distal GSV Varithena Ablation       Time Tracking:     PT Received On: 11/06/23  PT Start Time: 0906  PT Stop Time: 0941  PT Total Time (min): 35 min     Billable Minutes: Evaluation high complexity    11/6/2023

## 2023-11-06 NOTE — PROGRESS NOTES
[facility] - [facility] Medical Telemetry Hospital Medicine  Progress Note    Patient Name: Holly Porter  MRN: [id number]  Patient Class: IP- Inpatient   Admission Date: 11/3/2023  Length of Stay: 3 days  Attending Physician: Regan Glover Jr., MD  Primary Care Provider: Regan Frausto MD        Subjective:     Principal Problem:Acute on chronic respiratory failure with hypoxia and hypercapnia        HPI:  Patient is a 64 yo female who presents to the hospital as a transfer from [facility] for higher level of care. She presented to the [facility] earlier today with a complaint of shortness for several days that has worsened over the past two days, at which time she started experiencing a severe decrease in her urinary output associated with a right flank, nausea, vomit  and low abdominal pain but denies any urinary symptoms. The shortness of breath is also associated with an acute onset of chest pain today. Chest pain is located at the left sternal region as is associated with radiation to her bilateral arms and bilateral sides of her neck, mild diaphoresis and tremor, but denies any palpitations. Patient has a history of COPD and follows with Pulmonologist Dr. Loyola with recommendation for home oxygen several months ago which was ordered by her PCP Dr. Frausto,  but the patient stated that she never received the order and has thus not been on home oxygen.. Moreover, the patient continues to smoke about 2 packs daily despite medical advice to quit smoking.     Of note, patient presented to the [facility] on 10/5/2023 with similar symptoms of worsening shortness of breath and was transferred to [facility] in [place], [place] for higher level of care, where she was admitted for acute on chronic respiratory failure secondary to COPD exacerbation in the setting of positive Covid 19 infection on 10/06/2023. She only had mild symptoms for  this Covid 19 infection, but had a severe Covid 19 infection in 2021. She has received only one dose of the Covid 19 vaccine back in 2021 with no boosters.    On arrival to the  critical access hospital, the patient was afebrile, but vital signs were significant for /54 and RR 22 with SPO2 of 73% with no leukocytosis, no source of infection and normal lactic acidosis. She was thus placed on supplemental oxygenation with improvement of SPO2. Ensuring work up revealed CMP with hypokalemia with K of 2.4 but normal Mg, bicarb of 45; acute on chronic renal failure with BUN/CR 29/1.84 with GFR 30 from a baseline of BUN/CR 12/1.07 and GFR of 58 four weeks ago, initial troponin of 297 from a baseline of 40.8 four weeks ago; elevated NTpBNP of 3183 from a baseline of 286 two weeks ago and 1932 four weeks ago. Last documented Echo in our system in 2021 showed HFpEF with EF of 55%. CXR with cardiomegaly without omar pulmonary edema or focal consolidation and EKG with sinus tachycardia and RBBB. CT head with negative findings as checked for initial complaint of lightheadedness and slurred speech on arrival that has since been resolved.  Coagulation study was normal and urinalysis grossly normal. Patient will be admitted for further evaluation and management.          Overview/Hospital Course:  No notes on file    Interval History: Patient was seen and examined at bedside. No acute events overnight. During initial rounds patient was complaining of left side foot pain; Uric acid is 7.7. Started on low-dose of colchicine given renal function. While rounding on other patients, per nurse patient had a unprovoked seizure that lasted roughly 5 minutes with UE tremors. Ordered 1 mg Ativan PRN. EEG pending. Cardiology plan to [facility] today.     Review of Systems   Constitutional:  Negative for activity change and appetite change.   Respiratory:  Negative for chest tightness, shortness of breath and wheezing.    Cardiovascular:   Negative for chest pain and palpitations.   Gastrointestinal:  Negative for abdominal distention and abdominal pain.   Skin:  Positive for color change. Negative for wound.        Bilateral LE Stasis dermatitis    Neurological:  Negative for seizures and weakness.   Psychiatric/Behavioral:  Negative for confusion and sleep disturbance.      Objective:     Vital Signs (Most Recent):  Temp: 97.6 °F (36.4 °C) (11/06/23 1053)  Pulse: 65 (11/06/23 1053)  Resp: 18 (11/06/23 1053)  BP: 113/70 (11/06/23 1053)  SpO2: 96 % (11/06/23 1053) Vital Signs (24h Range):  Temp:  [97.3 °F (36.3 °C)-98.3 °F (36.8 °C)] 97.6 °F (36.4 °C)  Pulse:  [58-81] 65  Resp:  [16-20] 18  SpO2:  [93 %-98 %] 96 %  BP: ()/(46-74) 113/70     Weight: (!) 158.3 kg (349 lb)  Body mass index is 53.07 kg/m².    Intake/Output Summary (Last 24 hours) at 11/6/2023 1405  Last data filed at 11/6/2023 1151  Gross per 24 hour   Intake --   Output 1050 ml   Net -1050 ml         Physical Exam  Vitals and nursing note reviewed.   Constitutional:       General: She is not in acute distress.     Appearance: Normal appearance. She is obese. She is not ill-appearing.   HENT:      Head: Normocephalic and atraumatic.      Right Ear: Tympanic membrane normal.      Left Ear: Tympanic membrane normal.   Cardiovascular:      Rate and Rhythm: Normal rate.      Heart sounds: No murmur heard.     No friction rub. No gallop.   Pulmonary:      Breath sounds: No wheezing.   Chest:      Chest wall: No tenderness.   Abdominal:      General: There is no distension.      Tenderness: There is no abdominal tenderness.   Musculoskeletal:         General: Tenderness present.      Cervical back: Normal range of motion.      Right lower leg: No edema.      Left lower leg: No edema.      Comments: Left 1st-digit toe tender with touch; Lower left leg tendered near the ankle  No erythema, swelling or warmth    Skin:     Findings: No bruising, erythema or rash.   Neurological:      Mental  Status: She is alert and oriented to person, place, and time.             Significant Labs: All pertinent labs within the past 24 hours have been reviewed.    Significant Imaging: I have reviewed all pertinent imaging results/findings within the past 24 hours.      Assessment/Plan:      * Acute on chronic respiratory failure with hypoxia and hypercapnia  11/06  - Reports feeling better today  -Continue to monitor  -Continue nightly BiPAP  -ABG showed pH 7.47; paCO2 63; paO2 79        Patient with Hypercapnic and Hypoxic Respiratory failure which is Acute on chronic.  she is on home oxygen at 2-3 LPM but has not been using it LPM. Supplemental oxygen was provided and noted- Oxygen Concentration (%):  [40] 40    .   Signs/symptoms of respiratory failure include- increased work of breathing and lethargy. Contributing diagnoses includes - CHF, COPD, Obesity Hypoventilation and recent Covid 19 infection and non complaince with home oxygenation Labs and images were reviewed. Patient Has recent ABG, which has been reviewed. Will treat underlying causes and adjust management of respiratory failure as follows- supplemental oxygenation and BIPAP at night    11/5: O2 sat 96%. Continue current management.     Acute on chronic diastolic CHF (congestive heart failure)  11/06  - Cardiology is on board; planned [facility] and pending results         Patient is identified as having Diastolic (HFpEF) heart failure that is Acute on chronic. CHF is currently uncontrolled due to Continued edema of extremities and Rales/crackles on pulmonary exam. Latest ECHO performed and demonstrates- Results for orders placed during the hospital encounter of 11/24/21    Echo    Interpretation Summary  · The left ventricle is normal in size with concentric remodeling and low normal systolic function.  · The estimated ejection fraction is 50%.  · Atrial fibrillation not observed.  · Normal right ventricular size.  · Normal left ventricular diastolic  "function.  . Continue ACE/ARB, Furosemide and Aldactone and monitor clinical status closely. Monitor on telemetry. Patient is off CHF pathway.  Monitor strict Is&Os and daily weights.  Place on fluid restriction of 2 L. Cardiology has been consulted. Continue to stress to patient importance of self efficacy and  on diet for CHF. Last BNP reviewed- and noted below No results for input(s): "BNP", "BNPTRIAGEBLO" in the last 168 hours..      - Bumetanide 1 mg BID and home aldactone, but monitor renal function daily  - Holding BB due to CHF exacerbation   - Strict I and O  - Daily weight  - Pending Echo repeat  - Cardiology consulted, thanks for the assistance    Continue current management.Plan for [facility] when patient is able to tolerate laying supine    Acute on chronic renal failure  11/06  - US kidney unremarkable  - Na 135 from 134; K 3.2 from 2.9; BUN 37 from 34; Cr 1.40 from 1.16 Ca 10.3 and uric acid 7.7  - Started on Colchicine for gout flare; will monitor kidneys closely  - Nephrology has signed-off at this time and recommends little diuretic    - Continue monitor       Patient with acute kidney injury/acute renal failure likely due to acute tubular necrosis of unknown etiology but may be due to recent covid 19 about 4 weeks ago FRANKO is currently stable. Baseline creatinine 1.07 about one month ago - Labs reviewed- Renal function/electrolytes with Estimated Creatinine Clearance: 65.2 mL/min (A) (based on SCr of 1.4 mg/dL (H)). according to latest data. Monitor urine output and serial BMP and adjust therapy as needed. Avoid nephrotoxins and renally dose meds for GFR listed above.      - Pending US kidney  - Pending urine Na, Urea and CR. BMP to calculate FeNA and FeUrea  -  Avoid nephrotoxic drugs  - Renally dose all applicable medications  - Strict input and output  - Daily renal panel to monitor renal function  -   11/5: Nephrology consulted   Renal sono with normal size kidneys w/o obstruction.  -continue " "K supplemenation and Fe supplementation    Recommend a decrease in bumex      Chronic gout  11/06  - Reports left foot tenderness to touch  -Hx of gout flares  -uric acid 7.7  -Started on low dose colchicine; monitoring renal function closely       COPD exacerbation  Patient's COPD is with exacerbation noted by continued dyspnea, use of accessory muscles for breathing and worsening of baseline hypoxia currently.  Patient is currently off COPD Pathway. Continue scheduled inhalers Steroids, Antibiotics and Supplemental oxygen and monitor respiratory status closely.     - DuoNeb q6h  - Budesonide 0.5 mg BID  - d/c solumedrol and start prednisone 40 mg  - On continuous CPAP      Seizure    11/06  - Per nurse, patient had a seizure after our initially rounds  -Seized for about 5 minutes; no hx of seizure  - AOX 3   -Ordered 1 mg Ativan PRN  -Seizure precaution  -EEG ordered  -Continue to monitor   - CT head showed no acute events     Type 2 diabetes mellitus without complication, with long-term current use of insulin  Patient's FSGs are controlled on current medication regimen.  Last A1c reviewed-   Lab Results   Component Value Date    HGBA1C 6.2 11/03/2023     Most recent fingerstick glucose reviewed- No results for input(s): "POCTGLUCOSE" in the last 24 hours.  Current correctional scale  Medium  Maintain anti-hyperglycemic dose as follows-   Antihyperglycemics (From admission, onward)      Start     Stop Route Frequency Ordered    11/03/23 2100  insulin detemir U-100 injection 10 Units         -- SubQ Nightly 11/03/23 1942          Hold Oral hypoglycemics while patient is in the hospital.    Nicotine dependence  Dangers of cigarette smoking were reviewed with patient in detail. Patient was Counseled for 3-10 minutes. Nicotine replacement options were discussed. Nicotine replacement was discussed- prescribed    Migraine  Continue home medication      Microcytosis  Fe 22  Started Iron sucrose    Hypokalemia  11/06  - K " 3.2  -Continued scheduled potassium 40 mEq        K 2.9 but mag normal  Monitor and replace as indicated    GERD (gastroesophageal reflux disease)  Continue home PPI      Non compliance with medical treatment  Patient was supposed to be on home oxygen but has not been for many months. Pt was unable to obtain home oxygen in spite of contacting her medical providers office numerous times. She continues to smoke despite medical advise with last smoking one week ago      Obesity hypoventilation syndrome  Body mass index is 53.07 kg/m². Morbid obesity complicates all aspects of disease management from diagnostic modalities to treatment. Weight loss encouraged and health benefits explained to patient.         Obesity, diabetes, and hypertension syndrome  Body mass index is 53.07 kg/m². Morbid obesity complicates all aspects of disease management from diagnostic modalities to treatment. Weight loss encouraged and health benefits explained to patient.         Severe obesity (BMI >= 40)  Body mass index is 53.07 kg/m². Morbid obesity complicates all aspects of disease management from diagnostic modalities to treatment. Weight loss encouraged and health benefits explained to patient.           VTE Risk Mitigation (From admission, onward)           Ordered     heparin (porcine) injection 7,500 Units  Every 8 hours         11/05/23 1715     Reason for No Pharmacological VTE Prophylaxis  Once        Question:  Reasons:  Answer:  Physician Provided (leave comment)    11/03/23 2125     IP VTE HIGH RISK PATIENT  Once         11/03/23 2125     Place sequential compression device  Until discontinued         11/03/23 2125                    Discharge Planning   MONTSERRAT:      Code Status: Full Code   Is the patient medically ready for discharge?:     Reason for patient still in hospital (select all that apply): Treatment  Discharge Plan A: Home                  Richard Fortune MD  Department of Hospital Medicine   [facility] -   [facility]

## 2023-11-06 NOTE — ASSESSMENT & PLAN NOTE
11/06  - US kidney unremarkable  - Na 135 from 134; K 3.2 from 2.9; BUN 37 from 34; Cr 1.40 from 1.16 Ca 10.3 and uric acid 7.7  - Started on Colchicine for gout flare; will monitor kidneys closely   - Continue monitor       Patient with acute kidney injury/acute renal failure likely due to acute tubular necrosis of unknown etiology but may be due to recent covid 19 about 4 weeks ago FRANKO is currently stable. Baseline creatinine 1.07 about one month ago - Labs reviewed- Renal function/electrolytes with Estimated Creatinine Clearance: 65.2 mL/min (A) (based on SCr of 1.4 mg/dL (H)). according to latest data. Monitor urine output and serial BMP and adjust therapy as needed. Avoid nephrotoxins and renally dose meds for GFR listed above.      - Pending US kidney  - Pending urine Na, Urea and CR. BMP to calculate FeNA and FeUrea  -  Avoid nephrotoxic drugs  - Renally dose all applicable medications  - Strict input and output  - Daily renal panel to monitor renal function  -   11/5: Nephrology consulted   Renal sono with normal size kidneys w/o obstruction.  -continue K supplemenation and Fe supplementation    Recommend a decrease in bumex

## 2023-11-07 LAB
ALBUMIN SERPL BCP-MCNC: 2.9 G/DL (ref 3.5–5)
ANION GAP SERPL CALCULATED.3IONS-SCNC: 8 MMOL/L (ref 7–16)
BASOPHILS # BLD AUTO: 0.04 K/UL (ref 0–0.2)
BASOPHILS NFR BLD AUTO: 0.3 % (ref 0–1)
BUN SERPL-MCNC: 29 MG/DL (ref 7–18)
BUN/CREAT SERPL: 22 (ref 6–20)
CALCIUM SERPL-MCNC: 10.2 MG/DL (ref 8.5–10.1)
CHLORIDE SERPL-SCNC: 96 MMOL/L (ref 98–107)
CO2 SERPL-SCNC: 36 MMOL/L (ref 21–32)
CREAT SERPL-MCNC: 1.29 MG/DL (ref 0.55–1.02)
DIFFERENTIAL METHOD BLD: ABNORMAL
EGFR (NO RACE VARIABLE) (RUSH/TITUS): 46 ML/MIN/1.73M2
EOSINOPHIL # BLD AUTO: 0 K/UL (ref 0–0.5)
EOSINOPHIL NFR BLD AUTO: 0 % (ref 1–4)
ERYTHROCYTE [DISTWIDTH] IN BLOOD BY AUTOMATED COUNT: 21 % (ref 11.5–14.5)
GLUCOSE SERPL-MCNC: 129 MG/DL (ref 74–106)
GLUCOSE SERPL-MCNC: 135 MG/DL (ref 70–105)
GLUCOSE SERPL-MCNC: 137 MG/DL (ref 70–105)
GLUCOSE SERPL-MCNC: 198 MG/DL (ref 70–105)
GLUCOSE SERPL-MCNC: 247 MG/DL (ref 70–105)
HCT VFR BLD AUTO: 52 % (ref 38–47)
HGB BLD-MCNC: 15.7 G/DL (ref 12–16)
IMM GRANULOCYTES # BLD AUTO: 0.15 K/UL (ref 0–0.04)
IMM GRANULOCYTES NFR BLD: 1 % (ref 0–0.4)
LYMPHOCYTES # BLD AUTO: 1.49 K/UL (ref 1–4.8)
LYMPHOCYTES NFR BLD AUTO: 10 % (ref 27–41)
MCH RBC QN AUTO: 23.8 PG (ref 27–31)
MCHC RBC AUTO-ENTMCNC: 30.2 G/DL (ref 32–36)
MCV RBC AUTO: 78.7 FL (ref 80–96)
MONOCYTES # BLD AUTO: 0.67 K/UL (ref 0–0.8)
MONOCYTES NFR BLD AUTO: 4.5 % (ref 2–6)
MPC BLD CALC-MCNC: 9.8 FL (ref 9.4–12.4)
NEUTROPHILS # BLD AUTO: 12.62 K/UL (ref 1.8–7.7)
NEUTROPHILS NFR BLD AUTO: 84.2 % (ref 53–65)
NRBC # BLD AUTO: 0.08 X10E3/UL
NRBC, AUTO (.00): 0.5 %
PHOSPHATE SERPL-MCNC: 4.3 MG/DL (ref 2.5–4.5)
PLATELET # BLD AUTO: 217 K/UL (ref 150–400)
POTASSIUM SERPL-SCNC: 3.8 MMOL/L (ref 3.5–5.1)
RBC # BLD AUTO: 6.61 M/UL (ref 4.2–5.4)
SODIUM SERPL-SCNC: 136 MMOL/L (ref 136–145)
WBC # BLD AUTO: 14.97 K/UL (ref 4.5–11)

## 2023-11-07 PROCEDURE — 63600175 PHARM REV CODE 636 W HCPCS

## 2023-11-07 PROCEDURE — 82962 GLUCOSE BLOOD TEST: CPT

## 2023-11-07 PROCEDURE — 94640 AIRWAY INHALATION TREATMENT: CPT

## 2023-11-07 PROCEDURE — 25000003 PHARM REV CODE 250: Performed by: HOSPITALIST

## 2023-11-07 PROCEDURE — 27000221 HC OXYGEN, UP TO 24 HOURS

## 2023-11-07 PROCEDURE — 99900035 HC TECH TIME PER 15 MIN (STAT)

## 2023-11-07 PROCEDURE — 94761 N-INVAS EAR/PLS OXIMETRY MLT: CPT

## 2023-11-07 PROCEDURE — 97116 GAIT TRAINING THERAPY: CPT

## 2023-11-07 PROCEDURE — 99232 SBSQ HOSP IP/OBS MODERATE 35: CPT | Mod: GC,,, | Performed by: FAMILY MEDICINE

## 2023-11-07 PROCEDURE — S4991 NICOTINE PATCH NONLEGEND: HCPCS | Performed by: HOSPITALIST

## 2023-11-07 PROCEDURE — 94660 CPAP INITIATION&MGMT: CPT

## 2023-11-07 PROCEDURE — 25000003 PHARM REV CODE 250: Performed by: STUDENT IN AN ORGANIZED HEALTH CARE EDUCATION/TRAINING PROGRAM

## 2023-11-07 PROCEDURE — 97110 THERAPEUTIC EXERCISES: CPT

## 2023-11-07 PROCEDURE — 80069 RENAL FUNCTION PANEL: CPT

## 2023-11-07 PROCEDURE — 25000003 PHARM REV CODE 250

## 2023-11-07 PROCEDURE — 11000001 HC ACUTE MED/SURG PRIVATE ROOM

## 2023-11-07 PROCEDURE — 95813 EEG EXTND MNTR 61-119 MIN: CPT

## 2023-11-07 PROCEDURE — 25000242 PHARM REV CODE 250 ALT 637 W/ HCPCS: Performed by: HOSPITALIST

## 2023-11-07 PROCEDURE — 63600175 PHARM REV CODE 636 W HCPCS: Performed by: GENERAL PRACTICE

## 2023-11-07 PROCEDURE — 99232 PR SUBSEQUENT HOSPITAL CARE,LEVL II: ICD-10-PCS | Mod: GC,,, | Performed by: FAMILY MEDICINE

## 2023-11-07 PROCEDURE — 63600175 PHARM REV CODE 636 W HCPCS: Performed by: HOSPITALIST

## 2023-11-07 PROCEDURE — 25000003 PHARM REV CODE 250: Performed by: INTERNAL MEDICINE

## 2023-11-07 PROCEDURE — 63600175 PHARM REV CODE 636 W HCPCS: Performed by: INTERNAL MEDICINE

## 2023-11-07 PROCEDURE — 97530 THERAPEUTIC ACTIVITIES: CPT

## 2023-11-07 PROCEDURE — 85025 COMPLETE CBC W/AUTO DIFF WBC: CPT

## 2023-11-07 PROCEDURE — 25000003 PHARM REV CODE 250: Performed by: NURSE PRACTITIONER

## 2023-11-07 RX ORDER — NIFEDIPINE 30 MG/1
30 TABLET, EXTENDED RELEASE ORAL DAILY
Status: DISCONTINUED | OUTPATIENT
Start: 2023-11-07 | End: 2023-11-10 | Stop reason: HOSPADM

## 2023-11-07 RX ADMIN — DULOXETINE 30 MG: 30 CAPSULE, DELAYED RELEASE ORAL at 08:11

## 2023-11-07 RX ADMIN — HEPARIN SODIUM 7500 UNITS: 5000 INJECTION INTRAVENOUS; SUBCUTANEOUS at 09:11

## 2023-11-07 RX ADMIN — GABAPENTIN 600 MG: 300 CAPSULE ORAL at 08:11

## 2023-11-07 RX ADMIN — IPRATROPIUM BROMIDE AND ALBUTEROL SULFATE 3 ML: 2.5; .5 SOLUTION RESPIRATORY (INHALATION) at 07:11

## 2023-11-07 RX ADMIN — SERTRALINE HYDROCHLORIDE 50 MG: 50 TABLET ORAL at 08:11

## 2023-11-07 RX ADMIN — SPIRONOLACTONE 50 MG: 25 TABLET ORAL at 08:11

## 2023-11-07 RX ADMIN — LEVOTHYROXINE SODIUM 150 MCG: 0.15 TABLET ORAL at 05:11

## 2023-11-07 RX ADMIN — TOPIRAMATE 150 MG: 100 TABLET, FILM COATED ORAL at 09:11

## 2023-11-07 RX ADMIN — HEPARIN SODIUM 7500 UNITS: 5000 INJECTION INTRAVENOUS; SUBCUTANEOUS at 02:11

## 2023-11-07 RX ADMIN — CALCIUM CARBONATE (ANTACID) CHEW TAB 500 MG 500 MG: 500 CHEW TAB at 08:11

## 2023-11-07 RX ADMIN — ALLOPURINOL 300 MG: 300 TABLET ORAL at 08:11

## 2023-11-07 RX ADMIN — CALCIUM CARBONATE (ANTACID) CHEW TAB 500 MG 500 MG: 500 CHEW TAB at 09:11

## 2023-11-07 RX ADMIN — INSULIN DETEMIR 10 UNITS: 100 INJECTION, SOLUTION SUBCUTANEOUS at 09:11

## 2023-11-07 RX ADMIN — IPRATROPIUM BROMIDE AND ALBUTEROL SULFATE 3 ML: 2.5; .5 SOLUTION RESPIRATORY (INHALATION) at 01:11

## 2023-11-07 RX ADMIN — BUMETANIDE 1 MG: 0.25 INJECTION INTRAMUSCULAR; INTRAVENOUS at 08:11

## 2023-11-07 RX ADMIN — ATORVASTATIN CALCIUM 40 MG: 40 TABLET, FILM COATED ORAL at 09:11

## 2023-11-07 RX ADMIN — CHOLECALCIFEROL TAB 125 MCG (5000 UNIT) 5000 UNITS: 125 TAB at 09:11

## 2023-11-07 RX ADMIN — POLYETHYLENE GLYCOL 3350 17 G: 17 POWDER, FOR SOLUTION ORAL at 08:11

## 2023-11-07 RX ADMIN — GABAPENTIN 600 MG: 300 CAPSULE ORAL at 02:11

## 2023-11-07 RX ADMIN — CYCLOBENZAPRINE 10 MG: 10 TABLET, FILM COATED ORAL at 09:11

## 2023-11-07 RX ADMIN — GABAPENTIN 600 MG: 300 CAPSULE ORAL at 09:11

## 2023-11-07 RX ADMIN — NICOTINE 1 PATCH: 21 PATCH, EXTENDED RELEASE TRANSDERMAL at 08:11

## 2023-11-07 RX ADMIN — DOCUSATE SODIUM 100 MG: 100 CAPSULE, LIQUID FILLED ORAL at 09:11

## 2023-11-07 RX ADMIN — PREDNISONE 40 MG: 20 TABLET ORAL at 08:11

## 2023-11-07 RX ADMIN — CHOLECALCIFEROL TAB 125 MCG (5000 UNIT) 5000 UNITS: 125 TAB at 08:11

## 2023-11-07 RX ADMIN — NIFEDIPINE 30 MG: 30 TABLET, FILM COATED, EXTENDED RELEASE ORAL at 08:11

## 2023-11-07 RX ADMIN — PANTOPRAZOLE SODIUM 40 MG: 40 TABLET, DELAYED RELEASE ORAL at 08:11

## 2023-11-07 RX ADMIN — COLCHICINE 0.6 MG: 0.6 TABLET ORAL at 09:11

## 2023-11-07 RX ADMIN — TOPIRAMATE 150 MG: 100 TABLET, FILM COATED ORAL at 08:11

## 2023-11-07 RX ADMIN — DOCUSATE SODIUM 100 MG: 100 CAPSULE, LIQUID FILLED ORAL at 08:11

## 2023-11-07 RX ADMIN — ASPIRIN 81 MG: 81 TABLET, COATED ORAL at 08:11

## 2023-11-07 RX ADMIN — IRON SUCROSE 200 MG: 20 INJECTION, SOLUTION INTRAVENOUS at 08:11

## 2023-11-07 NOTE — ADDENDUM NOTE
Encounter addended by: Nayla Gomez on: 11/7/2023 3:47 PM   Actions taken: SmartForm saved, Flowsheet accepted

## 2023-11-07 NOTE — ASSESSMENT & PLAN NOTE
11/07  - Improved from yesterday  - Last dose of Colchicine today       11/06  - Reports left foot tenderness to touch  -Hx of gout flares  -uric acid 7.7  -Started on low dose colchicine; monitoring renal function closely

## 2023-11-07 NOTE — ASSESSMENT & PLAN NOTE
11/07  - K 3.8    11/06  - K 3.2  -Continued scheduled potassium 40 mEq        K 2.9 but mag normal  Monitor and replace as indicated

## 2023-11-07 NOTE — SUBJECTIVE & OBJECTIVE
Interval History: Patient seen and examined at bedside. No acute events overnight. Reports feeling well with exception of tremors and right-side hip discomfort for weeks. Ordered b/l hip x-ray; pending results. No seizure episode. Spoke with daughter In-law regarding SWB at discharge to [place] if possible. SS placed Oxygen request  for home; awaiting approval. Restarted Nifedipine for HTN.     Review of Systems   Constitutional:  Negative for activity change and appetite change.   Respiratory:  Negative for cough, chest tightness and shortness of breath.    Cardiovascular:  Negative for chest pain, palpitations and leg swelling.   Gastrointestinal:  Negative for abdominal pain.   Neurological:  Positive for tremors and headaches.     Objective:     Vital Signs (Most Recent):  Temp: 97.8 °F (36.6 °C) (11/07/23 1059)  Pulse: 62 (11/07/23 1345)  Resp: 20 (11/07/23 1345)  BP: 117/64 (11/07/23 1059)  SpO2: 95 % (11/07/23 1345) Vital Signs (24h Range):  Temp:  [97.2 °F (36.2 °C)-98.8 °F (37.1 °C)] 97.8 °F (36.6 °C)  Pulse:  [54-64] 62  Resp:  [18-23] 20  SpO2:  [91 %-98 %] 95 %  BP: (117-159)/(64-91) 117/64     Weight: (!) 158.3 kg (349 lb)  Body mass index is 53.07 kg/m².    Intake/Output Summary (Last 24 hours) at 11/7/2023 1416  Last data filed at 11/7/2023 1104  Gross per 24 hour   Intake --   Output 2525 ml   Net -2525 ml         Physical Exam  Vitals and nursing note reviewed.   Constitutional:       General: She is not in acute distress.     Appearance: Normal appearance. She is obese. She is not ill-appearing.   HENT:      Head: Normocephalic and atraumatic.   Cardiovascular:      Rate and Rhythm: Normal rate.      Heart sounds: No murmur heard.     No friction rub. No gallop.   Pulmonary:      Effort: No respiratory distress.   Chest:      Chest wall: No tenderness.   Abdominal:      General: There is no distension.      Tenderness: There is no abdominal tenderness.   Musculoskeletal:         General:  Tenderness present.      Cervical back: Normal range of motion.      Right lower leg: No edema.      Left lower leg: No edema.   Neurological:      Mental Status: She is alert.   Psychiatric:         Mood and Affect: Mood normal.         Behavior: Behavior normal.             Significant Labs: All pertinent labs within the past 24 hours have been reviewed.    Significant Imaging: I have reviewed all pertinent imaging results/findings within the past 24 hours.

## 2023-11-07 NOTE — PT/OT/SLP PROGRESS
Physical Therapy Treatment    Patient Name:  Holly Porter   MRN:  [id number]    Recommendations:     Discharge Recommendations: Low Intensity Therapy  Discharge Equipment Recommendations: none  Barriers to discharge: None    Assessment:     Holly Porter is a 63 y.o. female admitted with a medical diagnosis of Acute on chronic respiratory failure with hypoxia and hypercapnia.  She presents with the following impairments/functional limitations: weakness, impaired self care skills, impaired functional mobility, gait instability, impaired balance, decreased lower extremity function, pain, impaired coordination Pt was initially very anxious about ambulation but performed well with rolling walker. Pt did not perform exercises as lunch was delivered and pt was taken down for x-ray after lunch..    Rehab Prognosis: Good; patient would benefit from acute skilled PT services to address these deficits and reach maximum level of function.    Recent Surgery: Procedure(s) (LRB):  Left heart cath (Left) 1 Day Post-Op    Plan:     During this hospitalization, patient to be seen 5 x/week to address the identified rehab impairments via gait training, therapeutic activities, therapeutic exercises, neuromuscular re-education and progress toward the following goals:    Plan of Care Expires:  12/06/23    Subjective     Chief Complaint: respiratory failure  Patient/Family Comments/goals: Pt is agreeable to PT   Pain/Comfort:  Pain Rating 1: 3/10  Location - Side 1: Bilateral  Location 1: hip  Pain Addressed 1: Distraction  Pain Rating Post-Intervention 1: 3/10      Objective:     Communicated with HINA Brooks RN prior to session.  Patient found HOB elevated with peripheral IV, oxygen, lehman catheter upon PT entry to room.     General Precautions: Standard, fall  Orthopedic Precautions: N/A  Braces: N/A  Respiratory Status: Nasal cannula, flow 4 L/min     Functional Mobility:  Bed Mobility:     Scooting: contact guard assistance  Supine  to Sit: minimum assistance  Transfers:     Sit to Stand:  contact guard assistance with rolling walker  Bed to Chair: contact guard assistance with  rolling walker  using  Step Transfer  Gait: 120 ft contact guard assistance with rolling walker, slow hilario  Balance: good      AM-PAC 6 CLICK MOBILITY  Turning over in bed (including adjusting bedclothes, sheets and blankets)?: 3  Sitting down on and standing up from a chair with arms (e.g., wheelchair, bedside commode, etc.): 4  Moving from lying on back to sitting on the side of the bed?: 3  Moving to and from a bed to a chair (including a wheelchair)?: 4  Need to walk in hospital room?: 4  Climbing 3-5 steps with a railing?: 3  Basic Mobility Total Score: 21       Treatment & Education:  Pt assisted with bed mobility, ambulated in room and hallway with rolling walker and 4L/minutes O2, assisted in to chair for lunch    Patient left up in chair with all lines intact and call button in reach..    GOALS:   Multidisciplinary Problems       Physical Therapy Goals          Problem: Physical Therapy    Goal Priority Disciplines Outcome Goal Variances Interventions   Physical Therapy Goal     PT, PT/OT Ongoing, Progressing     Description: Short term goals:  1. Supine to sit with Contact Guard Assistance  2. Sit to stand transfer with Contact Guard Assistance  3. Bed to chair transfer with Minimal Assistance using Rolling Walker  4. Gait  x 50 feet with Minimal Assistance using Rolling Walker.     Long term goals:  1. Supine to sit with [place]  2. Sit to stand transfer with Modified [place]  3. Bed to chair transfer with Modified [place] using Rolling Walker  4. Gait  x 100 feet with Modified [place] using Rolling Walker.                          Time Tracking:     PT Received On: 11/07/23  PT Start Time: 1138     PT Stop Time: 1155  PT Total Time (min): 17 min     Billable Minutes: Gait Training 12    Treatment Type: Treatment  PT/PTA: PT     Number  of PTA visits since last PT visit: 0     11/07/2023

## 2023-11-07 NOTE — PLAN OF CARE
Problem: Adult Inpatient Plan of Care  Goal: Plan of Care Review  Outcome: Ongoing, Progressing  Goal: Patient-Specific Goal (Individualized)  Outcome: Ongoing, Progressing  Goal: Absence of Hospital-Acquired Illness or Injury  Outcome: Ongoing, Progressing  Goal: Optimal Comfort and Wellbeing  Outcome: Ongoing, Progressing  Goal: Readiness for Transition of Care  Outcome: Ongoing, Progressing     Problem: Diabetes Comorbidity  Goal: Blood Glucose Level Within Targeted Range  Outcome: Ongoing, Progressing     Problem: Bariatric Environmental Safety  Goal: Safety Maintained with Care  Outcome: Ongoing, Progressing     Problem: Gas Exchange Impaired  Goal: Optimal Gas Exchange  Outcome: Ongoing, Progressing     Problem: Breathing Pattern Ineffective  Goal: Effective Breathing Pattern  Outcome: Ongoing, Progressing     Problem: Fluid and Electrolyte Imbalance (Acute Kidney Injury/Impairment)  Goal: Fluid and Electrolyte Balance  Outcome: Ongoing, Progressing     Problem: Oral Intake Inadequate (Acute Kidney Injury/Impairment)  Goal: Optimal Nutrition Intake  Outcome: Ongoing, Progressing     Problem: Renal Function Impairment (Acute Kidney Injury/Impairment)  Goal: Effective Renal Function  Outcome: Ongoing, Progressing     Problem: Noninvasive Ventilation Acute  Goal: Effective Unassisted Ventilation and Oxygenation  Outcome: Ongoing, Progressing     Problem: Airway Clearance Ineffective  Goal: Effective Airway Clearance  Outcome: Ongoing, Progressing     Problem: Skin Injury Risk Increased  Goal: Skin Health and Integrity  Outcome: Ongoing, Progressing

## 2023-11-07 NOTE — ASSESSMENT & PLAN NOTE
"11/07  - LHC done on 11/06 and unremarkable      11/06  - Cardiology is on board; planned LHC and pending results         Patient is identified as having Diastolic (HFpEF) heart failure that is Acute on chronic. CHF is currently uncontrolled due to Continued edema of extremities and Rales/crackles on pulmonary exam. Latest ECHO performed and demonstrates- Results for orders placed during the hospital encounter of 11/24/21    Echo    Interpretation Summary  · The left ventricle is normal in size with concentric remodeling and low normal systolic function.  · The estimated ejection fraction is 50%.  · Atrial fibrillation not observed.  · Normal right ventricular size.  · Normal left ventricular diastolic function.  . Continue ACE/ARB, Furosemide and Aldactone and monitor clinical status closely. Monitor on telemetry. Patient is off CHF pathway.  Monitor strict Is&Os and daily weights.  Place on fluid restriction of 2 L. Cardiology has been consulted. Continue to stress to patient importance of self efficacy and  on diet for CHF. Last BNP reviewed- and noted below No results for input(s): "BNP", "BNPTRIAGEBLO" in the last 168 hours..      - Bumetanide 1 mg BID and home aldactone, but monitor renal function daily  - Holding BB due to CHF exacerbation   - Strict I and O  - Daily weight  - Pending Echo repeat  - Cardiology consulted, thanks for the assistance    Continue current management.Plan for LHC when patient is able to tolerate laying supine  "

## 2023-11-07 NOTE — ASSESSMENT & PLAN NOTE
11/07  - Continue to monitor   -Reports feeling better   - Ordered hip xray       11/06  - Reports feeling better today  -Continue to monitor  -Continue nightly BiPAP  -ABG showed pH 7.47; paCO2 63; paO2 79        Patient with Hypercapnic and Hypoxic Respiratory failure which is Acute on chronic.  she is on home oxygen at 2-3 LPM but has not been using it LPM. Supplemental oxygen was provided and noted- Oxygen Concentration (%):  [32-40] 36    .   Signs/symptoms of respiratory failure include- increased work of breathing and lethargy. Contributing diagnoses includes - CHF, COPD, Obesity Hypoventilation and recent Covid 19 infection and non complaince with home oxygenation Labs and images were reviewed. Patient Has recent ABG, which has been reviewed. Will treat underlying causes and adjust management of respiratory failure as follows- supplemental oxygenation and BIPAP at night    11/5: O2 sat 96%. Continue current management.

## 2023-11-07 NOTE — ASSESSMENT & PLAN NOTE
11/07  - No events overnight  - EEG pending         11/06  - Per nurse, patient had a seizure after our initially rounds  -Seized for about 5 minutes; no hx of seizure  - AOX 3   -Ordered 1 mg Ativan PRN  -Seizure precaution  -EEG ordered  -Continue to monitor   - CT head showed no acute events

## 2023-11-07 NOTE — PROGRESS NOTES
[facility] Medicine  Progress Note    Patient Name: Holly Porter  MRN: [id number]  Patient Class: IP- Inpatient   Admission Date: 11/3/2023  Length of Stay: 4 days  Attending Physician: Regan Glover Jr., MD  Primary Care Provider: Regan Frausto MD        Subjective:     Principal Problem:Acute on chronic respiratory failure with hypoxia and hypercapnia        HPI:  Patient is a 64 yo female who presents to the hospital as a transfer from [facility] for higher level of care. She presented to the [facility] earlier today with a complaint of shortness for several days that has worsened over the past two days, at which time she started experiencing a severe decrease in her urinary output associated with a right flank, nausea, vomit  and low abdominal pain but denies any urinary symptoms. The shortness of breath is also associated with an acute onset of chest pain today. Chest pain is located at the left sternal region as is associated with radiation to her bilateral arms and bilateral sides of her neck, mild diaphoresis and tremor, but denies any palpitations. Patient has a history of COPD and follows with Pulmonologist Dr. Loyola with recommendation for home oxygen several months ago which was ordered by her PCP Dr. Frausto,  but the patient stated that she never received the order and has thus not been on home oxygen.. Moreover, the patient continues to smoke about 2 packs daily despite medical advice to quit smoking.     Of note, patient presented to the [facility] on 10/5/2023 with similar symptoms of worsening shortness of breath and was transferred to [facility] in [place], [place] for higher level of care, where she was admitted for acute on chronic respiratory failure secondary to COPD exacerbation in the setting of positive Covid 19 infection on 10/06/2023. She only had mild symptoms for  this Covid 19 infection, but had a severe Covid 19 infection in 2021. She has received only one dose of the Covid 19 vaccine back in 2021 with no boosters.    On arrival to the  critical access hospital, the patient was afebrile, but vital signs were significant for /54 and RR 22 with SPO2 of 73% with no leukocytosis, no source of infection and normal lactic acidosis. She was thus placed on supplemental oxygenation with improvement of SPO2. Ensuring work up revealed CMP with hypokalemia with K of 2.4 but normal Mg, bicarb of 45; acute on chronic renal failure with BUN/CR 29/1.84 with GFR 30 from a baseline of BUN/CR 12/1.07 and GFR of 58 four weeks ago, initial troponin of 297 from a baseline of 40.8 four weeks ago; elevated NTpBNP of 3183 from a baseline of 286 two weeks ago and 1932 four weeks ago. Last documented Echo in our system in 2021 showed HFpEF with EF of 55%. CXR with cardiomegaly without omar pulmonary edema or focal consolidation and EKG with sinus tachycardia and RBBB. CT head with negative findings as checked for initial complaint of lightheadedness and slurred speech on arrival that has since been resolved.  Coagulation study was normal and urinalysis grossly normal. Patient will be admitted for further evaluation and management.          Overview/Hospital Course:  No notes on file    Interval History: Patient seen and examined at bedside. No acute events overnight. Reports feeling well with exception of tremors and right-side hip discomfort for weeks. Ordered b/l hip x-ray; pending results. No seizure episode. Spoke with daughter In-law regarding SWB at discharge to [place] if possible. SS placed Oxygen request  for home; awaiting approval. Restarted Nifedipine for HTN.     Review of Systems   Constitutional:  Negative for activity change and appetite change.   Respiratory:  Negative for cough, chest tightness and shortness of breath.    Cardiovascular:  Negative for chest pain,  palpitations and leg swelling.   Gastrointestinal:  Negative for abdominal pain.   Neurological:  Positive for tremors and headaches.     Objective:     Vital Signs (Most Recent):  Temp: 97.8 °F (36.6 °C) (11/07/23 1059)  Pulse: 62 (11/07/23 1345)  Resp: 20 (11/07/23 1345)  BP: 117/64 (11/07/23 1059)  SpO2: 95 % (11/07/23 1345) Vital Signs (24h Range):  Temp:  [97.2 °F (36.2 °C)-98.8 °F (37.1 °C)] 97.8 °F (36.6 °C)  Pulse:  [54-64] 62  Resp:  [18-23] 20  SpO2:  [91 %-98 %] 95 %  BP: (117-159)/(64-91) 117/64     Weight: (!) 158.3 kg (349 lb)  Body mass index is 53.07 kg/m².    Intake/Output Summary (Last 24 hours) at 11/7/2023 1416  Last data filed at 11/7/2023 1104  Gross per 24 hour   Intake --   Output 2525 ml   Net -2525 ml         Physical Exam  Vitals and nursing note reviewed.   Constitutional:       General: She is not in acute distress.     Appearance: Normal appearance. She is obese. She is not ill-appearing.   HENT:      Head: Normocephalic and atraumatic.   Cardiovascular:      Rate and Rhythm: Normal rate.      Heart sounds: No murmur heard.     No friction rub. No gallop.   Pulmonary:      Effort: No respiratory distress.   Chest:      Chest wall: No tenderness.   Abdominal:      General: There is no distension.      Tenderness: There is no abdominal tenderness.   Musculoskeletal:         General: Tenderness present.      Cervical back: Normal range of motion.      Right lower leg: No edema.      Left lower leg: No edema.   Neurological:      Mental Status: She is alert.   Psychiatric:         Mood and Affect: Mood normal.         Behavior: Behavior normal.             Significant Labs: All pertinent labs within the past 24 hours have been reviewed.    Significant Imaging: I have reviewed all pertinent imaging results/findings within the past 24 hours.      Assessment/Plan:      * Acute on chronic respiratory failure with hypoxia and hypercapnia  11/07  - Continue to monitor   -Reports feeling better    - Ordered hip xray       11/06  - Reports feeling better today  -Continue to monitor  -Continue nightly BiPAP  -ABG showed pH 7.47; paCO2 63; paO2 79        Patient with Hypercapnic and Hypoxic Respiratory failure which is Acute on chronic.  she is on home oxygen at 2-3 LPM but has not been using it LPM. Supplemental oxygen was provided and noted- Oxygen Concentration (%):  [32-40] 36    .   Signs/symptoms of respiratory failure include- increased work of breathing and lethargy. Contributing diagnoses includes - CHF, COPD, Obesity Hypoventilation and recent Covid 19 infection and non complaince with home oxygenation Labs and images were reviewed. Patient Has recent ABG, which has been reviewed. Will treat underlying causes and adjust management of respiratory failure as follows- supplemental oxygenation and BIPAP at night    11/5: O2 sat 96%. Continue current management.     Acute on chronic diastolic CHF (congestive heart failure)  11/07  - LHC done on 11/06 and unremarkable      11/06  - Cardiology is on board; planned LHC and pending results         Patient is identified as having Diastolic (HFpEF) heart failure that is Acute on chronic. CHF is currently uncontrolled due to Continued edema of extremities and Rales/crackles on pulmonary exam. Latest ECHO performed and demonstrates- Results for orders placed during the hospital encounter of 11/24/21    Echo    Interpretation Summary  · The left ventricle is normal in size with concentric remodeling and low normal systolic function.  · The estimated ejection fraction is 50%.  · Atrial fibrillation not observed.  · Normal right ventricular size.  · Normal left ventricular diastolic function.  . Continue ACE/ARB, Furosemide and Aldactone and monitor clinical status closely. Monitor on telemetry. Patient is off CHF pathway.  Monitor strict Is&Os and daily weights.  Place on fluid restriction of 2 L. Cardiology has been consulted. Continue to stress to patient  "importance of self efficacy and  on diet for CHF. Last BNP reviewed- and noted below No results for input(s): "BNP", "BNPTRIAGEBLO" in the last 168 hours..      - Bumetanide 1 mg BID and home aldactone, but monitor renal function daily  - Holding BB due to CHF exacerbation   - Strict I and O  - Daily weight  - Pending Echo repeat  - Cardiology consulted, thanks for the assistance    Continue current management.Plan for [facility] when patient is able to tolerate laying supine    Acute on chronic renal failure  11/07  - Improving  -Will continue to monitor       11/06  - US kidney unremarkable  - Na 135 from 134; K 3.2 from 2.9; BUN 37 from 34; Cr 1.40 from 1.16 Ca 10.3 and uric acid 7.7  - Started on Colchicine for gout flare; will monitor kidneys closely  - Nephrology has signed-off at this time and recommends little diuretic    - Continue monitor       Patient with acute kidney injury/acute renal failure likely due to acute tubular necrosis of unknown etiology but may be due to recent covid 19 about 4 weeks ago FRANKO is currently stable. Baseline creatinine 1.07 about one month ago - Labs reviewed- Renal function/electrolytes with Estimated Creatinine Clearance: 65.2 mL/min (A) (based on SCr of 1.4 mg/dL (H)). according to latest data. Monitor urine output and serial BMP and adjust therapy as needed. Avoid nephrotoxins and renally dose meds for GFR listed above.      - Pending US kidney  - Pending urine Na, Urea and CR. BMP to calculate FeNA and FeUrea  -  Avoid nephrotoxic drugs  - Renally dose all applicable medications  - Strict input and output  - Daily renal panel to monitor renal function  -   11/5: Nephrology consulted   Renal sono with normal size kidneys w/o obstruction.  -continue K supplemenation and Fe supplementation    Recommend a decrease in bumex      Chronic gout  11/07  - Improved from yesterday  - Last dose of Colchicine today       11/06  - Reports left foot tenderness to touch  -Hx of gout " "flares  -uric acid 7.7  -Started on low dose colchicine; monitoring renal function closely       COPD exacerbation  Patient's COPD is with exacerbation noted by continued dyspnea, use of accessory muscles for breathing and worsening of baseline hypoxia currently.  Patient is currently off COPD Pathway. Continue scheduled inhalers Steroids, Antibiotics and Supplemental oxygen and monitor respiratory status closely.     - DuoNeb q6h  - Budesonide 0.5 mg BID  - d/c solumedrol and start prednisone 40 mg  - On continuous CPAP      Seizure  11/07  - No events overnight  - EEG pending         11/06  - Per nurse, patient had a seizure after our initially rounds  -Seized for about 5 minutes; no hx of seizure  - AOX 3   -Ordered 1 mg Ativan PRN  -Seizure precaution  -EEG ordered  -Continue to monitor   - CT head showed no acute events     Type 2 diabetes mellitus without complication, with long-term current use of insulin  Patient's FSGs are controlled on current medication regimen.  Last A1c reviewed-   Lab Results   Component Value Date    HGBA1C 6.2 11/03/2023     Most recent fingerstick glucose reviewed- No results for input(s): "POCTGLUCOSE" in the last 24 hours.  Current correctional scale  Medium  Maintain anti-hyperglycemic dose as follows-   Antihyperglycemics (From admission, onward)    Start     Stop Route Frequency Ordered    11/03/23 2100  insulin detemir U-100 injection 10 Units         -- SubQ Nightly 11/03/23 1942        Hold Oral hypoglycemics while patient is in the hospital.    Nicotine dependence  Dangers of cigarette smoking were reviewed with patient in detail. Patient was Counseled for 3-10 minutes. Nicotine replacement options were discussed. Nicotine replacement was discussed- prescribed    Migraine  Continue home medication      Microcytosis  Fe 22  Started Iron sucrose    Hypokalemia  11/07  - K 3.8    11/06  - K 3.2  -Continued scheduled potassium 40 mEq        K 2.9 but mag normal  Monitor and " replace as indicated    GERD (gastroesophageal reflux disease)  Continue home PPI      Non compliance with medical treatment  Patient was supposed to be on home oxygen but has not been for many months. Pt was unable to obtain home oxygen in spite of contacting her medical providers office numerous times. She continues to smoke despite medical advise with last smoking one week ago      Obesity hypoventilation syndrome  Body mass index is 53.07 kg/m². Morbid obesity complicates all aspects of disease management from diagnostic modalities to treatment. Weight loss encouraged and health benefits explained to patient.         Obesity, diabetes, and hypertension syndrome  Body mass index is 53.07 kg/m². Morbid obesity complicates all aspects of disease management from diagnostic modalities to treatment. Weight loss encouraged and health benefits explained to patient.         Severe obesity (BMI >= 40)  Body mass index is 53.07 kg/m². Morbid obesity complicates all aspects of disease management from diagnostic modalities to treatment. Weight loss encouraged and health benefits explained to patient.           VTE Risk Mitigation (From admission, onward)         Ordered     heparin (porcine) injection 7,500 Units  Every 8 hours         11/05/23 1715     Reason for No Pharmacological VTE Prophylaxis  Once        Question:  Reasons:  Answer:  Physician Provided (leave comment)    11/03/23 2125     IP VTE HIGH RISK PATIENT  Once         11/03/23 2125     Place sequential compression device  Until discontinued         11/03/23 2125                Discharge Planning   MONTSERRAT:      Code Status: Full Code   Is the patient medically ready for discharge?:     Reason for patient still in hospital (select all that apply): Treatment and Pending disposition  Discharge Plan A: Home                  Richard Fortune MD  Department of Hospital Medicine   [facility]

## 2023-11-07 NOTE — PLAN OF CARE
SS has received a consult again for home oxygen. SS spoke with MD this am and pt is not ready for discharge at this time.  Oxygen sats have to be obtained within 48 hours of discharge. SS also spoke with pt's daughter in law, Dustin.  Pt may need swb at discharge. She will speak with pt and family about swb and let SS know.

## 2023-11-07 NOTE — PLAN OF CARE
Problem: Gas Exchange Impaired  Goal: Optimal Gas Exchange  Outcome: Ongoing, Progressing     Problem: Breathing Pattern Ineffective  Goal: Effective Breathing Pattern  Outcome: Ongoing, Progressing     Problem: Noninvasive Ventilation Acute  Goal: Effective Unassisted Ventilation and Oxygenation  Outcome: Ongoing, Progressing     Problem: Airway Clearance Ineffective  Goal: Effective Airway Clearance  Outcome: Ongoing, Progressing     Problem: Skin Injury Risk Increased  Goal: Skin Health and Integrity  Outcome: Ongoing, Progressing

## 2023-11-08 LAB
ALBUMIN SERPL BCP-MCNC: 3.1 G/DL (ref 3.5–5)
ANION GAP SERPL CALCULATED.3IONS-SCNC: 9 MMOL/L (ref 7–16)
BASOPHILS # BLD AUTO: 0.05 K/UL (ref 0–0.2)
BASOPHILS NFR BLD AUTO: 0.3 % (ref 0–1)
BUN SERPL-MCNC: 33 MG/DL (ref 7–18)
BUN/CREAT SERPL: 28 (ref 6–20)
CALCIUM SERPL-MCNC: 10.2 MG/DL (ref 8.5–10.1)
CHLORIDE SERPL-SCNC: 98 MMOL/L (ref 98–107)
CO2 SERPL-SCNC: 37 MMOL/L (ref 21–32)
CREAT SERPL-MCNC: 1.2 MG/DL (ref 0.55–1.02)
DIFFERENTIAL METHOD BLD: ABNORMAL
EGFR (NO RACE VARIABLE) (RUSH/TITUS): 51 ML/MIN/1.73M2
EOSINOPHIL # BLD AUTO: 0.01 K/UL (ref 0–0.5)
EOSINOPHIL NFR BLD AUTO: 0.1 % (ref 1–4)
ERYTHROCYTE [DISTWIDTH] IN BLOOD BY AUTOMATED COUNT: 21.2 % (ref 11.5–14.5)
GLUCOSE SERPL-MCNC: 100 MG/DL (ref 74–106)
GLUCOSE SERPL-MCNC: 173 MG/DL (ref 70–105)
GLUCOSE SERPL-MCNC: 185 MG/DL (ref 70–105)
GLUCOSE SERPL-MCNC: 216 MG/DL (ref 70–105)
HCT VFR BLD AUTO: 52.3 % (ref 38–47)
HGB BLD-MCNC: 15.7 G/DL (ref 12–16)
IMM GRANULOCYTES # BLD AUTO: 0.17 K/UL (ref 0–0.04)
IMM GRANULOCYTES NFR BLD: 1.1 % (ref 0–0.4)
LYMPHOCYTES # BLD AUTO: 2.01 K/UL (ref 1–4.8)
LYMPHOCYTES NFR BLD AUTO: 13.1 % (ref 27–41)
MCH RBC QN AUTO: 24.2 PG (ref 27–31)
MCHC RBC AUTO-ENTMCNC: 30 G/DL (ref 32–36)
MCV RBC AUTO: 80.7 FL (ref 80–96)
MONOCYTES # BLD AUTO: 1.04 K/UL (ref 0–0.8)
MONOCYTES NFR BLD AUTO: 6.8 % (ref 2–6)
MPC BLD CALC-MCNC: 9.7 FL (ref 9.4–12.4)
NEUTROPHILS # BLD AUTO: 12.03 K/UL (ref 1.8–7.7)
NEUTROPHILS NFR BLD AUTO: 78.6 % (ref 53–65)
NRBC # BLD AUTO: 0.04 X10E3/UL
NRBC, AUTO (.00): 0.3 %
PHOSPHATE SERPL-MCNC: 4.6 MG/DL (ref 2.5–4.5)
PLATELET # BLD AUTO: 230 K/UL (ref 150–400)
POTASSIUM SERPL-SCNC: 3.9 MMOL/L (ref 3.5–5.1)
RBC # BLD AUTO: 6.48 M/UL (ref 4.2–5.4)
SODIUM SERPL-SCNC: 140 MMOL/L (ref 136–145)
WBC # BLD AUTO: 15.31 K/UL (ref 4.5–11)

## 2023-11-08 PROCEDURE — 99900035 HC TECH TIME PER 15 MIN (STAT)

## 2023-11-08 PROCEDURE — 25000242 PHARM REV CODE 250 ALT 637 W/ HCPCS: Performed by: HOSPITALIST

## 2023-11-08 PROCEDURE — 27000221 HC OXYGEN, UP TO 24 HOURS

## 2023-11-08 PROCEDURE — 94660 CPAP INITIATION&MGMT: CPT

## 2023-11-08 PROCEDURE — 11000001 HC ACUTE MED/SURG PRIVATE ROOM

## 2023-11-08 PROCEDURE — 63600175 PHARM REV CODE 636 W HCPCS

## 2023-11-08 PROCEDURE — 82962 GLUCOSE BLOOD TEST: CPT

## 2023-11-08 PROCEDURE — 99232 PR SUBSEQUENT HOSPITAL CARE,LEVL II: ICD-10-PCS | Mod: GC,,, | Performed by: FAMILY MEDICINE

## 2023-11-08 PROCEDURE — 63600175 PHARM REV CODE 636 W HCPCS: Performed by: INTERNAL MEDICINE

## 2023-11-08 PROCEDURE — 25000003 PHARM REV CODE 250: Performed by: INTERNAL MEDICINE

## 2023-11-08 PROCEDURE — 85025 COMPLETE CBC W/AUTO DIFF WBC: CPT

## 2023-11-08 PROCEDURE — 99900031 HC PATIENT EDUCATION (STAT)

## 2023-11-08 PROCEDURE — 94761 N-INVAS EAR/PLS OXIMETRY MLT: CPT

## 2023-11-08 PROCEDURE — 25000003 PHARM REV CODE 250: Performed by: NURSE PRACTITIONER

## 2023-11-08 PROCEDURE — 25000003 PHARM REV CODE 250: Performed by: HOSPITALIST

## 2023-11-08 PROCEDURE — S4991 NICOTINE PATCH NONLEGEND: HCPCS | Performed by: HOSPITALIST

## 2023-11-08 PROCEDURE — 63600175 PHARM REV CODE 636 W HCPCS: Performed by: GENERAL PRACTICE

## 2023-11-08 PROCEDURE — 80069 RENAL FUNCTION PANEL: CPT

## 2023-11-08 PROCEDURE — 63600175 PHARM REV CODE 636 W HCPCS: Performed by: HOSPITALIST

## 2023-11-08 PROCEDURE — 97110 THERAPEUTIC EXERCISES: CPT

## 2023-11-08 PROCEDURE — 25000003 PHARM REV CODE 250: Performed by: STUDENT IN AN ORGANIZED HEALTH CARE EDUCATION/TRAINING PROGRAM

## 2023-11-08 PROCEDURE — 99232 SBSQ HOSP IP/OBS MODERATE 35: CPT | Mod: GC,,, | Performed by: FAMILY MEDICINE

## 2023-11-08 PROCEDURE — 25000003 PHARM REV CODE 250

## 2023-11-08 PROCEDURE — 94640 AIRWAY INHALATION TREATMENT: CPT

## 2023-11-08 RX ORDER — ASPIRIN 81 MG/1
81 TABLET ORAL
Qty: 90 TABLET | Refills: 1 | Status: SHIPPED | OUTPATIENT
Start: 2023-11-08

## 2023-11-08 RX ORDER — POLYETHYLENE GLYCOL 3350 17 G/17G
17 POWDER, FOR SOLUTION ORAL DAILY
Status: DISCONTINUED | OUTPATIENT
Start: 2023-11-08 | End: 2023-11-08

## 2023-11-08 RX ORDER — POLYETHYLENE GLYCOL 3350 17 G/17G
17 POWDER, FOR SOLUTION ORAL DAILY PRN
Status: DISCONTINUED | OUTPATIENT
Start: 2023-11-08 | End: 2023-11-10 | Stop reason: HOSPADM

## 2023-11-08 RX ORDER — SYRING-NEEDL,DISP,INSUL,0.3 ML 29 G X1/2"
296 SYRINGE, EMPTY DISPOSABLE MISCELLANEOUS ONCE
Status: COMPLETED | OUTPATIENT
Start: 2023-11-08 | End: 2023-11-08

## 2023-11-08 RX ADMIN — CHOLECALCIFEROL TAB 125 MCG (5000 UNIT) 5000 UNITS: 125 TAB at 09:11

## 2023-11-08 RX ADMIN — LEVOTHYROXINE SODIUM 150 MCG: 0.15 TABLET ORAL at 05:11

## 2023-11-08 RX ADMIN — IRON SUCROSE 200 MG: 20 INJECTION, SOLUTION INTRAVENOUS at 09:11

## 2023-11-08 RX ADMIN — GABAPENTIN 600 MG: 300 CAPSULE ORAL at 02:11

## 2023-11-08 RX ADMIN — INSULIN DETEMIR 10 UNITS: 100 INJECTION, SOLUTION SUBCUTANEOUS at 09:11

## 2023-11-08 RX ADMIN — DOCUSATE SODIUM 100 MG: 100 CAPSULE, LIQUID FILLED ORAL at 09:11

## 2023-11-08 RX ADMIN — CALCIUM CARBONATE (ANTACID) CHEW TAB 500 MG 500 MG: 500 CHEW TAB at 09:11

## 2023-11-08 RX ADMIN — PREDNISONE 40 MG: 20 TABLET ORAL at 09:11

## 2023-11-08 RX ADMIN — NIFEDIPINE 30 MG: 30 TABLET, FILM COATED, EXTENDED RELEASE ORAL at 09:11

## 2023-11-08 RX ADMIN — MAGNESIUM CITRATE 296 ML: 1.75 LIQUID ORAL at 02:11

## 2023-11-08 RX ADMIN — HEPARIN SODIUM 7500 UNITS: 5000 INJECTION INTRAVENOUS; SUBCUTANEOUS at 02:11

## 2023-11-08 RX ADMIN — TOPIRAMATE 150 MG: 100 TABLET, FILM COATED ORAL at 09:11

## 2023-11-08 RX ADMIN — SERTRALINE HYDROCHLORIDE 50 MG: 50 TABLET ORAL at 09:11

## 2023-11-08 RX ADMIN — ATORVASTATIN CALCIUM 40 MG: 40 TABLET, FILM COATED ORAL at 09:11

## 2023-11-08 RX ADMIN — DULOXETINE 30 MG: 30 CAPSULE, DELAYED RELEASE ORAL at 09:11

## 2023-11-08 RX ADMIN — HEPARIN SODIUM 7500 UNITS: 5000 INJECTION INTRAVENOUS; SUBCUTANEOUS at 05:11

## 2023-11-08 RX ADMIN — HEPARIN SODIUM 7500 UNITS: 5000 INJECTION INTRAVENOUS; SUBCUTANEOUS at 09:11

## 2023-11-08 RX ADMIN — PANTOPRAZOLE SODIUM 40 MG: 40 TABLET, DELAYED RELEASE ORAL at 09:11

## 2023-11-08 RX ADMIN — SPIRONOLACTONE 50 MG: 25 TABLET ORAL at 09:11

## 2023-11-08 RX ADMIN — GABAPENTIN 600 MG: 300 CAPSULE ORAL at 09:11

## 2023-11-08 RX ADMIN — POLYETHYLENE GLYCOL 3350 17 G: 17 POWDER, FOR SOLUTION ORAL at 09:11

## 2023-11-08 RX ADMIN — CYCLOBENZAPRINE 10 MG: 10 TABLET, FILM COATED ORAL at 09:11

## 2023-11-08 RX ADMIN — IPRATROPIUM BROMIDE AND ALBUTEROL SULFATE 3 ML: 2.5; .5 SOLUTION RESPIRATORY (INHALATION) at 07:11

## 2023-11-08 RX ADMIN — IPRATROPIUM BROMIDE AND ALBUTEROL SULFATE 3 ML: 2.5; .5 SOLUTION RESPIRATORY (INHALATION) at 12:11

## 2023-11-08 RX ADMIN — BUMETANIDE 1 MG: 0.25 INJECTION INTRAMUSCULAR; INTRAVENOUS at 09:11

## 2023-11-08 RX ADMIN — NICOTINE 1 PATCH: 21 PATCH, EXTENDED RELEASE TRANSDERMAL at 09:11

## 2023-11-08 RX ADMIN — ALLOPURINOL 300 MG: 300 TABLET ORAL at 09:11

## 2023-11-08 RX ADMIN — ASPIRIN 81 MG: 81 TABLET, COATED ORAL at 09:11

## 2023-11-08 RX ADMIN — IPRATROPIUM BROMIDE AND ALBUTEROL SULFATE 3 ML: 2.5; .5 SOLUTION RESPIRATORY (INHALATION) at 01:11

## 2023-11-08 NOTE — PLAN OF CARE
[facility]  [facility]charge Reassessment    Primary Care Provider: Regan Frausto MD    Expected Discharge Date:     Reassessment (most recent)       Discharge Reassessment - 11/08/23 1045          Discharge Reassessment    Discharge Plan A Skilled Nursing Facility     Discharge Plan B Rehab                     SS spoke with pt's daughter and choice obtained for [facility] for swb. SS will make referral and await insurance approval.

## 2023-11-08 NOTE — ASSESSMENT & PLAN NOTE
11/08  - EEG showed normal; cannot rule out epileptic seizure      11/07  - No events overnight  - EEG pending         11/06  - Per nurse, patient had a seizure after our initially rounds  -Seized for about 5 minutes; no hx of seizure  - AOX 3   -Ordered 1 mg Ativan PRN  -Seizure precaution  -EEG ordered  -Continue to monitor   - CT head showed no acute events

## 2023-11-08 NOTE — PROGRESS NOTES
[facility] - [facility] Tele[facility] Hospital Medicine  Progress Note    Patient Name: Holly Porter  MRN: [id number]  Patient Class: IP- Inpatient   Admission Date: 11/3/2023  Length of Stay: 5 days  Attending Physician: Regan Glover Jr., MD  Primary Care Provider: Regan Frausto MD        Subjective:     Principal Problem:Acute on chronic respiratory failure with hypoxia and hypercapnia        HPI:  Patient is a 64 yo female who presents to the hospital as a transfer from [facility] for higher level of care. She presented to the [facility] earlier today with a complaint of shortness for several days that has worsened over the past two days, at which time she started experiencing a severe decrease in her urinary output associated with a right flank, nausea, vomit  and low abdominal pain but denies any urinary symptoms. The shortness of breath is also associated with an acute onset of chest pain today. Chest pain is located at the left sternal region as is associated with radiation to her bilateral arms and bilateral sides of her neck, mild diaphoresis and tremor, but denies any palpitations. Patient has a history of COPD and follows with Pulmonologist Dr. Loyola with recommendation for home oxygen several months ago which was ordered by her PCP Dr. Frausto,  but the patient stated that she never received the order and has thus not been on home oxygen.. Moreover, the patient continues to smoke about 2 packs daily despite medical advice to quit smoking.     Of note, patient presented to the [facility] on 10/5/2023 with similar symptoms of worsening shortness of breath and was transferred to [facility] in [place], [place] for higher level of care, where she was admitted for acute on chronic respiratory failure secondary to COPD exacerbation in the setting of positive Covid 19 infection on 10/06/2023. She only had mild symptoms for  this Covid 19 infection, but had a severe Covid 19 infection in 2021. She has received only one dose of the Covid 19 vaccine back in 2021 with no boosters.    On arrival to the  critical access hospital, the patient was afebrile, but vital signs were significant for /54 and RR 22 with SPO2 of 73% with no leukocytosis, no source of infection and normal lactic acidosis. She was thus placed on supplemental oxygenation with improvement of SPO2. Ensuring work up revealed CMP with hypokalemia with K of 2.4 but normal Mg, bicarb of 45; acute on chronic renal failure with BUN/CR 29/1.84 with GFR 30 from a baseline of BUN/CR 12/1.07 and GFR of 58 four weeks ago, initial troponin of 297 from a baseline of 40.8 four weeks ago; elevated NTpBNP of 3183 from a baseline of 286 two weeks ago and 1932 four weeks ago. Last documented Echo in our system in 2021 showed HFpEF with EF of 55%. CXR with cardiomegaly without omar pulmonary edema or focal consolidation and EKG with sinus tachycardia and RBBB. CT head with negative findings as checked for initial complaint of lightheadedness and slurred speech on arrival that has since been resolved.  Coagulation study was normal and urinalysis grossly normal. Patient will be admitted for further evaluation and management.          Overview/Hospital Course:  No notes on file    Interval History: Patient seen and examined at bedside. No acute events overnight. Reports no BM for the past few days; KUB confirmed constipation. Started on Mg Citrate. EEG showed normal EEG but cannot rule out epileptic seizure. SS is awaiting [facility] swb approval. Cardiology signed off and patient will follow up with Dr. Ortega outpatient.     Review of Systems   Constitutional:  Negative for activity change and appetite change.   Respiratory:  Negative for chest tightness, shortness of breath and wheezing.    Cardiovascular:  Negative for chest pain and leg swelling.   Gastrointestinal:  Negative for abdominal  pain.   Skin:  Negative for color change.   Neurological:  Negative for dizziness and weakness.     Objective:     Vital Signs (Most Recent):  Temp: 98 °F (36.7 °C) (11/08/23 1059)  Pulse: 62 (11/08/23 1059)  Resp: 16 (11/08/23 1059)  BP: 126/60 (11/08/23 1059)  SpO2: 97 % (11/08/23 1059) Vital Signs (24h Range):  Temp:  [97.7 °F (36.5 °C)-98.5 °F (36.9 °C)] 98 °F (36.7 °C)  Pulse:  [60-68] 62  Resp:  [16-22] 16  SpO2:  [95 %-98 %] 97 %  BP: (107-147)/(60-75) 126/60     Weight: (!) 158.3 kg (349 lb)  Body mass index is 53.07 kg/m².    Intake/Output Summary (Last 24 hours) at 11/8/2023 1214  Last data filed at 11/8/2023 1036  Gross per 24 hour   Intake 240 ml   Output 1900 ml   Net -1660 ml         Physical Exam  Vitals and nursing note reviewed.   Constitutional:       General: She is not in acute distress.     Appearance: Normal appearance. She is obese. She is not ill-appearing.   HENT:      Head: Normocephalic and atraumatic.      Right Ear: Tympanic membrane normal.      Left Ear: Tympanic membrane normal.   Cardiovascular:      Rate and Rhythm: Normal rate.      Heart sounds: No murmur heard.     No friction rub. No gallop.   Pulmonary:      Effort: No respiratory distress.   Chest:      Chest wall: No tenderness.   Musculoskeletal:         General: No tenderness.      Cervical back: Normal range of motion.      Right lower leg: No edema.      Left lower leg: No edema.   Neurological:      Mental Status: She is alert and oriented to person, place, and time.             Significant Labs: All pertinent labs within the past 24 hours have been reviewed.    Significant Imaging: I have reviewed all pertinent imaging results/findings within the past 24 hours.      Assessment/Plan:      * Acute on chronic respiratory failure with hypoxia and hypercapnia  11/08  - hip xray showed femoracetabular impingement; no fracture or dislocation     11/07  - Continue to monitor   -Reports feeling better   - Ordered hip xray        11/06  - Reports feeling better today  -Continue to monitor  -Continue nightly BiPAP  -ABG showed pH 7.47; paCO2 63; paO2 79        Patient with Hypercapnic and Hypoxic Respiratory failure which is Acute on chronic.  she is on home oxygen at 2-3 LPM but has not been using it LPM. Supplemental oxygen was provided and noted- Oxygen Concentration (%):  [4-40] 35    .   Signs/symptoms of respiratory failure include- increased work of breathing and lethargy. Contributing diagnoses includes - CHF, COPD, Obesity Hypoventilation and recent Covid 19 infection and non complaince with home oxygenation Labs and images were reviewed. Patient Has recent ABG, which has been reviewed. Will treat underlying causes and adjust management of respiratory failure as follows- supplemental oxygenation and BIPAP at night    11/5: O2 sat 96%. Continue current management.     Acute on chronic diastolic CHF (congestive heart failure)  11/08  - Cardiology signed off and recommend to follow up with Dr. Ortega  -Appreciate your expertise       11/07  - LHC done on 11/06 and unremarkable      11/06  - Cardiology is on board; planned LHC and pending results         Patient is identified as having Diastolic (HFpEF) heart failure that is Acute on chronic. CHF is currently uncontrolled due to Continued edema of extremities and Rales/crackles on pulmonary exam. Latest ECHO performed and demonstrates- Results for orders placed during the hospital encounter of 11/24/21    Echo    Interpretation Summary  · The left ventricle is normal in size with concentric remodeling and low normal systolic function.  · The estimated ejection fraction is 50%.  · Atrial fibrillation not observed.  · Normal right ventricular size.  · Normal left ventricular diastolic function.  . Continue ACE/ARB, Furosemide and Aldactone and monitor clinical status closely. Monitor on telemetry. Patient is off CHF pathway.  Monitor strict Is&Os and daily weights.  Place on fluid  "restriction of 2 L. Cardiology has been consulted. Continue to stress to patient importance of self efficacy and  on diet for CHF. Last BNP reviewed- and noted below No results for input(s): "BNP", "BNPTRIAGEBLO" in the last 168 hours..      - Bumetanide 1 mg BID and home aldactone, but monitor renal function daily  - Holding BB due to CHF exacerbation   - Strict I and O  - Daily weight  - Pending Echo repeat  - Cardiology consulted, thanks for the assistance    Continue current management.Plan for [facility] when patient is able to tolerate laying supine    Acute on chronic renal failure  11/07  - Improving  -Will continue to monitor       11/06  - US kidney unremarkable  - Na 135 from 134; K 3.2 from 2.9; BUN 37 from 34; Cr 1.40 from 1.16 Ca 10.3 and uric acid 7.7  - Started on Colchicine for gout flare; will monitor kidneys closely  - Nephrology has signed-off at this time and recommends little diuretic    - Continue monitor       Patient with acute kidney injury/acute renal failure likely due to acute tubular necrosis of unknown etiology but may be due to recent covid 19 about 4 weeks ago FRANKO is currently stable. Baseline creatinine 1.07 about one month ago - Labs reviewed- Renal function/electrolytes with Estimated Creatinine Clearance: 65.2 mL/min (A) (based on SCr of 1.4 mg/dL (H)). according to latest data. Monitor urine output and serial BMP and adjust therapy as needed. Avoid nephrotoxins and renally dose meds for GFR listed above.      - Pending US kidney  - Pending urine Na, Urea and CR. BMP to calculate FeNA and FeUrea  -  Avoid nephrotoxic drugs  - Renally dose all applicable medications  - Strict input and output  - Daily renal panel to monitor renal function  -   11/5: Nephrology consulted   Renal sono with normal size kidneys w/o obstruction.  -continue K supplemenation and Fe supplementation    Recommend a decrease in bumex      Chronic gout  11/07  - Improved from yesterday  - Last dose of " "Colchicine today       11/06  - Reports left foot tenderness to touch  -Hx of gout flares  -uric acid 7.7  -Started on low dose colchicine; monitoring renal function closely       COPD exacerbation  Patient's COPD is with exacerbation noted by continued dyspnea, use of accessory muscles for breathing and worsening of baseline hypoxia currently.  Patient is currently off COPD Pathway. Continue scheduled inhalers Steroids, Antibiotics and Supplemental oxygen and monitor respiratory status closely.     - DuoNeb q6h  - Budesonide 0.5 mg BID  - d/c solumedrol and start prednisone 40 mg  - On continuous CPAP      Seizure  11/08  - EEG showed normal; cannot rule out epileptic seizure      11/07  - No events overnight  - EEG pending         11/06  - Per nurse, patient had a seizure after our initially rounds  -Seized for about 5 minutes; no hx of seizure  - AOX 3   -Ordered 1 mg Ativan PRN  -Seizure precaution  -EEG ordered  -Continue to monitor   - CT head showed no acute events     Type 2 diabetes mellitus without complication, with long-term current use of insulin  Patient's FSGs are controlled on current medication regimen.  Last A1c reviewed-   Lab Results   Component Value Date    HGBA1C 6.2 11/03/2023     Most recent fingerstick glucose reviewed- No results for input(s): "POCTGLUCOSE" in the last 24 hours.  Current correctional scale  Medium  Maintain anti-hyperglycemic dose as follows-   Antihyperglycemics (From admission, onward)    Start     Stop Route Frequency Ordered    11/03/23 2100  insulin detemir U-100 injection 10 Units         -- SubQ Nightly 11/03/23 1942        Hold Oral hypoglycemics while patient is in the hospital.    Nicotine dependence  Dangers of cigarette smoking were reviewed with patient in detail. Patient was Counseled for 3-10 minutes. Nicotine replacement options were discussed. Nicotine replacement was discussed- prescribed    Migraine  Continue home medication      Microcytosis  Fe " 22  Started Iron sucrose    Hypokalemia  11/07  - K 3.8    11/06  - K 3.2  -Continued scheduled potassium 40 mEq        K 2.9 but mag normal  Monitor and replace as indicated    GERD (gastroesophageal reflux disease)  Continue home PPI      Non compliance with medical treatment  Patient was supposed to be on home oxygen but has not been for many months. Pt was unable to obtain home oxygen in spite of contacting her medical providers office numerous times. She continues to smoke despite medical advise with last smoking one week ago      Obesity hypoventilation syndrome  Body mass index is 53.07 kg/m². Morbid obesity complicates all aspects of disease management from diagnostic modalities to treatment. Weight loss encouraged and health benefits explained to patient.         Obesity, diabetes, and hypertension syndrome  Body mass index is 53.07 kg/m². Morbid obesity complicates all aspects of disease management from diagnostic modalities to treatment. Weight loss encouraged and health benefits explained to patient.         Severe obesity (BMI >= 40)  Body mass index is 53.07 kg/m². Morbid obesity complicates all aspects of disease management from diagnostic modalities to treatment. Weight loss encouraged and health benefits explained to patient.           VTE Risk Mitigation (From admission, onward)         Ordered     heparin (porcine) injection 7,500 Units  Every 8 hours         11/05/23 1715     Reason for No Pharmacological VTE Prophylaxis  Once        Question:  Reasons:  Answer:  Physician Provided (leave comment)    11/03/23 2125     IP VTE HIGH RISK PATIENT  Once         11/03/23 2125     Place sequential compression device  Until discontinued         11/03/23 2125                Discharge Planning   MONTSERRAT:      Code Status: Full Code   Is the patient medically ready for discharge?:     Reason for patient still in hospital (select all that apply): Treatment and Pending disposition  Discharge Plan A: Skilled  Nursing Facility                  Richard Fortune MD  Department of Hospital Medicine   [facility] - [address] [place] [address]

## 2023-11-08 NOTE — PROGRESS NOTES
LHC yesterday revealed normal coronaries. LVEDP was normal. Normal EF. Echo showed moderate RVE and SO. Recommend transition to home dose of PO Bumex. HR cannot tolerate BB at this time. Cardiology will sign off, please call if needed. Pt to f/u with Dr. Ortega in cardiology clinic.

## 2023-11-08 NOTE — SUBJECTIVE & OBJECTIVE
Interval History: Patient seen and examined at bedside. No acute events overnight. Reports no BM for the past few days; KUB confirmed constipation. Started on Mg Citrate. EEG showed normal EEG but cannot rule out epileptic seizure. SS is awaiting [facility] swb approval. Cardiology signed off and patient will follow up with Dr. Ortega outpatient.     Review of Systems   Constitutional:  Negative for activity change and appetite change.   Respiratory:  Negative for chest tightness, shortness of breath and wheezing.    Cardiovascular:  Negative for chest pain and leg swelling.   Gastrointestinal:  Negative for abdominal pain.   Skin:  Negative for color change.   Neurological:  Negative for dizziness and weakness.     Objective:     Vital Signs (Most Recent):  Temp: 98 °F (36.7 °C) (11/08/23 1059)  Pulse: 62 (11/08/23 1059)  Resp: 16 (11/08/23 1059)  BP: 126/60 (11/08/23 1059)  SpO2: 97 % (11/08/23 1059) Vital Signs (24h Range):  Temp:  [97.7 °F (36.5 °C)-98.5 °F (36.9 °C)] 98 °F (36.7 °C)  Pulse:  [60-68] 62  Resp:  [16-22] 16  SpO2:  [95 %-98 %] 97 %  BP: (107-147)/(60-75) 126/60     Weight: (!) 158.3 kg (349 lb)  Body mass index is 53.07 kg/m².    Intake/Output Summary (Last 24 hours) at 11/8/2023 1214  Last data filed at 11/8/2023 1036  Gross per 24 hour   Intake 240 ml   Output 1900 ml   Net -1660 ml         Physical Exam  Vitals and nursing note reviewed.   Constitutional:       General: She is not in acute distress.     Appearance: Normal appearance. She is obese. She is not ill-appearing.   HENT:      Head: Normocephalic and atraumatic.      Right Ear: Tympanic membrane normal.      Left Ear: Tympanic membrane normal.   Cardiovascular:      Rate and Rhythm: Normal rate.      Heart sounds: No murmur heard.     No friction rub. No gallop.   Pulmonary:      Effort: No respiratory distress.   Chest:      Chest wall: No tenderness.   Musculoskeletal:         General: No tenderness.      Cervical back: Normal range of  motion.      Right lower leg: No edema.      Left lower leg: No edema.   Neurological:      Mental Status: She is alert and oriented to person, place, and time.             Significant Labs: All pertinent labs within the past 24 hours have been reviewed.    Significant Imaging: I have reviewed all pertinent imaging results/findings within the past 24 hours.

## 2023-11-08 NOTE — PT/OT/SLP PROGRESS
Occupational Therapy   Treatment    Name: Holly Porter  MRN: [id number]  Admitting Diagnosis:  Acute on chronic respiratory failure with hypoxia and hypercapnia  2 Days Post-Op    Recommendations:     Discharge Recommendations:    Discharge Equipment Recommendations:     Barriers to discharge:       Assessment:     Holly Porter is a 63 y.o. female with a medical diagnosis of Acute on chronic respiratory failure with hypoxia and hypercapnia.  She presents with the following performance deficits affecting function are weakness, impaired endurance, impaired self care skills, impaired functional mobility, gait instability, impaired balance, impaired cardiopulmonary response to activity, decreased coordination.     Rehab Prognosis:  Good; patient would benefit from acute skilled OT services to address these deficits and reach maximum level of function.       Plan:     Patient to be seen 5 x/week to address the above listed problems via self-care/home management, therapeutic activities, therapeutic exercises  Plan of Care Expires:    Plan of Care Reviewed with: patient    Subjective     Chief Complaint: acute on chronic respiratory failure with hypoxia and hypercapnia  Patient/Family Comments/goals: Agreeable to OT tx  Pain/Comfort:  Pain Rating 1: 0/10    Objective:     Communicated with: ELLEN Brooks RN prior to session.  Patient found up in chair with peripheral IV, oxygen upon OT entry to room.    General Precautions: Standard, fall    Orthopedic Precautions:   Braces:    Respiratory Status: Nasal cannula, flow 4 L/min     Occupational Performance:     Bed Mobility:        Functional Mobility/Transfers:      Activities of Daily Living:        [facility] 6 Click ADL: 21    Treatment & Education:  Pt performed B UE strengthening exercises to include:   Shoulder flexion   Chest press    Elbow flexion   Elbow extension   Pectoral stretches   Bilateral rowing  All exercises performed 2x15 reps with green  theraband.      Patient left up in chair with all lines intact and call button in reach    GOALS:   Multidisciplinary Problems       Occupational Therapy Goals          Problem: Occupational Therapy    Goal Priority Disciplines Outcome Interventions   Occupational Therapy Goal     OT, PT/OT Ongoing, Progressing    Description: STG:  Pt will perform grooming with setup  Pt will bathe with Mod I  Pt will perform UE dressing with I  Pt will perform LE dressing with Mod I  Pt will sit EOB x 15 min with supervision assistance  Pt will transfer bed/chair/bsc with Mod I  Pt will perform standing task x 15 min with supervision assistance  Pt will tolerate 30 minutes of tx without fatigue      LT.Restore to max I with self care and mobility.                          Time Tracking:     OT Date of Treatment: 23  OT Start Time: 1630  OT Stop Time: 1646  OT Total Time (min): 16 min    Billable Minutes:Therapeutic Exercise 16               2023

## 2023-11-08 NOTE — PT/OT/SLP PROGRESS
Physical Therapy Treatment    Patient Name:  Holly Porter   MRN:  [id number]    Recommendations:     Discharge Recommendations: Low Intensity Therapy  Discharge Equipment Recommendations: none  Barriers to discharge: None    Assessment:     Holly Porter is a 63 y.o. female admitted with a medical diagnosis of Acute on chronic respiratory failure with hypoxia and hypercapnia.  She presents with the following impairments/functional limitations: weakness, impaired self care skills, impaired functional mobility, gait instability, impaired balance, decreased lower extremity function, pain, impaired coordination Pt's mobility continues to improve. She has been transferring herself to [facility] without assistance. Gait distance is much improved but still requires increased oxygen support..    Rehab Prognosis: Good; patient would benefit from acute skilled PT services to address these deficits and reach maximum level of function.    Recent Surgery: Procedure(s) (LRB):  Left heart cath (Left) 2 Days Post-Op    Plan:     During this hospitalization, patient to be seen 5 x/week to address the identified rehab impairments via gait training, therapeutic activities, therapeutic exercises, neuromuscular re-education and progress toward the following goals:    Plan of Care Expires:  12/06/23    Subjective     Chief Complaint: shortness of breath   Patient/Family Comments/goals: Pt is agreeable to PT   Pain/Comfort:  Pain Rating 1: 3/10  Location - Side 1: Right  Location 1: leg  Pain Addressed 1: Cessation of Activity  Pain Rating Post-Intervention 1: 3/10      Objective:     Communicated with HINA Brooks RN prior to session.  Patient found up in chair with peripheral IV, oxygen upon PT entry to room.     General Precautions: Standard, fall  Orthopedic Precautions: N/A  Braces: N/A  Respiratory Status: Nasal cannula, flow 3 L/min     Functional Mobility:  Transfers:     Sit to Stand:  contact guard assistance with rolling walker  Gait:  160 ft stand-by assistance with rolling walker, 3 L/minutes O2, slightly flexed posture, reciprocal pattern  Balance: good      AM-PAC 6 CLICK MOBILITY  Turning over in bed (including adjusting bedclothes, sheets and blankets)?: 4  Sitting down on and standing up from a chair with arms (e.g., wheelchair, bedside commode, etc.): 4  Moving from lying on back to sitting on the side of the bed?: 4  Moving to and from a bed to a chair (including a wheelchair)?: 4  Need to walk in hospital room?: 4  Climbing 3-5 steps with a railing?: 3  Basic Mobility Total Score: 23       Treatment & Education:  Pt performed bilateral LE: seated exercises: ankle pumps, long arc quads, marches, and hip adduction x 30 each, ambulation in hallway with rolling walker       Patient left up in chair with all lines intact and call button in reach..    GOALS:   Multidisciplinary Problems       Physical Therapy Goals          Problem: Physical Therapy    Goal Priority Disciplines Outcome Goal Variances Interventions   Physical Therapy Goal     PT, PT/OT Ongoing, Progressing     Description: Short term goals:  1. Supine to sit with Contact Guard Assistance  2. Sit to stand transfer with Contact Guard Assistance  3. Bed to chair transfer with Minimal Assistance using Rolling Walker  4. Gait  x 50 feet with Minimal Assistance using Rolling Walker.     Long term goals:  1. Supine to sit with [place]  2. Sit to stand transfer with Modified [place]  3. Bed to chair transfer with Modified [place] using Rolling Walker  4. Gait  x 100 feet with Modified [place] using Rolling Walker.                          Time Tracking:     PT Received On: 11/08/23  PT Start Time: 1112     PT Stop Time: 1128  PT Total Time (min): 16 min     Billable Minutes: Therapeutic Exercise 16    Treatment Type: Treatment  PT/PTA: PT     Number of PTA visits since last PT visit: 0     11/08/2023

## 2023-11-08 NOTE — PLAN OF CARE
[facility] does not have any beds available until next week. SS called and spoke with daughter in law and choice obtained for [facility] and will make a referral to [facility]

## 2023-11-08 NOTE — ASSESSMENT & PLAN NOTE
"11/08  - Cardiology signed off and recommend to follow up with Dr. Ortega  -Appreciate your expertise       11/07  - LHC done on 11/06 and unremarkable      11/06  - Cardiology is on board; planned LHC and pending results         Patient is identified as having Diastolic (HFpEF) heart failure that is Acute on chronic. CHF is currently uncontrolled due to Continued edema of extremities and Rales/crackles on pulmonary exam. Latest ECHO performed and demonstrates- Results for orders placed during the hospital encounter of 11/24/21    Echo    Interpretation Summary  · The left ventricle is normal in size with concentric remodeling and low normal systolic function.  · The estimated ejection fraction is 50%.  · Atrial fibrillation not observed.  · Normal right ventricular size.  · Normal left ventricular diastolic function.  . Continue ACE/ARB, Furosemide and Aldactone and monitor clinical status closely. Monitor on telemetry. Patient is off CHF pathway.  Monitor strict Is&Os and daily weights.  Place on fluid restriction of 2 L. Cardiology has been consulted. Continue to stress to patient importance of self efficacy and  on diet for CHF. Last BNP reviewed- and noted below No results for input(s): "BNP", "BNPTRIAGEBLO" in the last 168 hours..      - Bumetanide 1 mg BID and home aldactone, but monitor renal function daily  - Holding BB due to CHF exacerbation   - Strict I and O  - Daily weight  - Pending Echo repeat  - Cardiology consulted, thanks for the assistance    Continue current management.Plan for LHC when patient is able to tolerate laying supine  "

## 2023-11-08 NOTE — PROCEDURES
[facility] [address] [place] [address]  Neurophysiology Department  Tel. [phone]    Patient: Holly Porter  MRN: [id number]   YOB: 1960   Date of Service:  11/07/2023        Refererring Physician:  Dr. Rogers      EEG NUMBER:  [id number]    This is a standard 62 minute digital EEG performed as an inpatient    INDICATION:  63-year-old female with new onset of seizure      REPORT:  Background activity reaches 8-9 hertz range, blocks with eye opening, symmetrical.  Intermittent period of drowsiness, no abnormality seen.  Photic stimulation showed symmetrical driving.  Hyperventilation was performed and it did not provoke any focal slowing or seizure activity.  No clinical events occurred during the recording.  Intermittent artifacts noted during the recording.  Heart rate 72 per minute.  No electrographic seizure activity noted.      IMPRESSION:  This is a normal awake and drowsy state EEG.  Normal EEG will not rule out epileptic seizure and clinical correlation is suggested.

## 2023-11-09 LAB
GLUCOSE SERPL-MCNC: 103 MG/DL (ref 70–105)
GLUCOSE SERPL-MCNC: 115 MG/DL (ref 70–105)
GLUCOSE SERPL-MCNC: 122 MG/DL (ref 70–105)
GLUCOSE SERPL-MCNC: 130 MG/DL (ref 70–105)
GLUCOSE SERPL-MCNC: 188 MG/DL (ref 70–105)

## 2023-11-09 PROCEDURE — 82962 GLUCOSE BLOOD TEST: CPT

## 2023-11-09 PROCEDURE — 63600175 PHARM REV CODE 636 W HCPCS

## 2023-11-09 PROCEDURE — 25000003 PHARM REV CODE 250: Performed by: HOSPITALIST

## 2023-11-09 PROCEDURE — S4991 NICOTINE PATCH NONLEGEND: HCPCS | Performed by: HOSPITALIST

## 2023-11-09 PROCEDURE — 11000001 HC ACUTE MED/SURG PRIVATE ROOM

## 2023-11-09 PROCEDURE — 94640 AIRWAY INHALATION TREATMENT: CPT

## 2023-11-09 PROCEDURE — 25000242 PHARM REV CODE 250 ALT 637 W/ HCPCS: Performed by: HOSPITALIST

## 2023-11-09 PROCEDURE — 99232 SBSQ HOSP IP/OBS MODERATE 35: CPT | Mod: GC,,, | Performed by: FAMILY MEDICINE

## 2023-11-09 PROCEDURE — 99232 PR SUBSEQUENT HOSPITAL CARE,LEVL II: ICD-10-PCS | Mod: GC,,, | Performed by: FAMILY MEDICINE

## 2023-11-09 PROCEDURE — 97110 THERAPEUTIC EXERCISES: CPT

## 2023-11-09 PROCEDURE — 63600175 PHARM REV CODE 636 W HCPCS: Performed by: HOSPITALIST

## 2023-11-09 PROCEDURE — 99900035 HC TECH TIME PER 15 MIN (STAT)

## 2023-11-09 PROCEDURE — 25000003 PHARM REV CODE 250: Performed by: STUDENT IN AN ORGANIZED HEALTH CARE EDUCATION/TRAINING PROGRAM

## 2023-11-09 PROCEDURE — 25000003 PHARM REV CODE 250: Performed by: NURSE PRACTITIONER

## 2023-11-09 PROCEDURE — 94761 N-INVAS EAR/PLS OXIMETRY MLT: CPT

## 2023-11-09 PROCEDURE — 97535 SELF CARE MNGMENT TRAINING: CPT

## 2023-11-09 PROCEDURE — 27000221 HC OXYGEN, UP TO 24 HOURS

## 2023-11-09 PROCEDURE — 25000003 PHARM REV CODE 250

## 2023-11-09 PROCEDURE — 94660 CPAP INITIATION&MGMT: CPT

## 2023-11-09 PROCEDURE — 63600175 PHARM REV CODE 636 W HCPCS: Performed by: GENERAL PRACTICE

## 2023-11-09 PROCEDURE — 97116 GAIT TRAINING THERAPY: CPT

## 2023-11-09 PROCEDURE — 25000003 PHARM REV CODE 250: Performed by: INTERNAL MEDICINE

## 2023-11-09 RX ADMIN — HEPARIN SODIUM 7500 UNITS: 5000 INJECTION INTRAVENOUS; SUBCUTANEOUS at 03:11

## 2023-11-09 RX ADMIN — ALLOPURINOL 300 MG: 300 TABLET ORAL at 09:11

## 2023-11-09 RX ADMIN — GABAPENTIN 600 MG: 300 CAPSULE ORAL at 03:11

## 2023-11-09 RX ADMIN — IPRATROPIUM BROMIDE AND ALBUTEROL SULFATE 3 ML: 2.5; .5 SOLUTION RESPIRATORY (INHALATION) at 07:11

## 2023-11-09 RX ADMIN — INSULIN DETEMIR 10 UNITS: 100 INJECTION, SOLUTION SUBCUTANEOUS at 09:11

## 2023-11-09 RX ADMIN — ASPIRIN 81 MG: 81 TABLET, COATED ORAL at 09:11

## 2023-11-09 RX ADMIN — CHOLECALCIFEROL TAB 125 MCG (5000 UNIT) 5000 UNITS: 125 TAB at 08:11

## 2023-11-09 RX ADMIN — DOCUSATE SODIUM 100 MG: 100 CAPSULE, LIQUID FILLED ORAL at 08:11

## 2023-11-09 RX ADMIN — LEVOTHYROXINE SODIUM 150 MCG: 0.15 TABLET ORAL at 05:11

## 2023-11-09 RX ADMIN — SERTRALINE HYDROCHLORIDE 50 MG: 50 TABLET ORAL at 09:11

## 2023-11-09 RX ADMIN — ATORVASTATIN CALCIUM 40 MG: 40 TABLET, FILM COATED ORAL at 08:11

## 2023-11-09 RX ADMIN — DOCUSATE SODIUM 100 MG: 100 CAPSULE, LIQUID FILLED ORAL at 09:11

## 2023-11-09 RX ADMIN — TOPIRAMATE 150 MG: 100 TABLET, FILM COATED ORAL at 08:11

## 2023-11-09 RX ADMIN — CALCIUM CARBONATE (ANTACID) CHEW TAB 500 MG 500 MG: 500 CHEW TAB at 09:11

## 2023-11-09 RX ADMIN — CALCIUM CARBONATE (ANTACID) CHEW TAB 500 MG 500 MG: 500 CHEW TAB at 08:11

## 2023-11-09 RX ADMIN — PANTOPRAZOLE SODIUM 40 MG: 40 TABLET, DELAYED RELEASE ORAL at 09:11

## 2023-11-09 RX ADMIN — TOPIRAMATE 150 MG: 100 TABLET, FILM COATED ORAL at 09:11

## 2023-11-09 RX ADMIN — BUMETANIDE 1 MG: 0.25 INJECTION INTRAMUSCULAR; INTRAVENOUS at 09:11

## 2023-11-09 RX ADMIN — PREDNISONE 40 MG: 20 TABLET ORAL at 09:11

## 2023-11-09 RX ADMIN — DULOXETINE 30 MG: 30 CAPSULE, DELAYED RELEASE ORAL at 09:11

## 2023-11-09 RX ADMIN — GABAPENTIN 600 MG: 300 CAPSULE ORAL at 08:11

## 2023-11-09 RX ADMIN — IPRATROPIUM BROMIDE AND ALBUTEROL SULFATE 3 ML: 2.5; .5 SOLUTION RESPIRATORY (INHALATION) at 01:11

## 2023-11-09 RX ADMIN — IPRATROPIUM BROMIDE AND ALBUTEROL SULFATE 3 ML: 2.5; .5 SOLUTION RESPIRATORY (INHALATION) at 12:11

## 2023-11-09 RX ADMIN — SPIRONOLACTONE 50 MG: 25 TABLET ORAL at 09:11

## 2023-11-09 RX ADMIN — CYCLOBENZAPRINE 10 MG: 10 TABLET, FILM COATED ORAL at 08:11

## 2023-11-09 RX ADMIN — HEPARIN SODIUM 7500 UNITS: 5000 INJECTION INTRAVENOUS; SUBCUTANEOUS at 05:11

## 2023-11-09 RX ADMIN — HEPARIN SODIUM 7500 UNITS: 5000 INJECTION INTRAVENOUS; SUBCUTANEOUS at 09:11

## 2023-11-09 RX ADMIN — NICOTINE 1 PATCH: 21 PATCH, EXTENDED RELEASE TRANSDERMAL at 09:11

## 2023-11-09 RX ADMIN — CHOLECALCIFEROL TAB 125 MCG (5000 UNIT) 5000 UNITS: 125 TAB at 09:11

## 2023-11-09 RX ADMIN — NIFEDIPINE 30 MG: 30 TABLET, FILM COATED, EXTENDED RELEASE ORAL at 09:11

## 2023-11-09 NOTE — PROGRESS NOTES
[facility] Tele[facility] Hospital Medicine  Progress Note    Patient Name: Holly Porter  MRN: [id number]  Patient Class: IP- Inpatient   Admission Date: 11/3/2023  Length of Stay: 6 days  Attending Physician: Regan Glover Jr., MD  Primary Care Provider: Regan Frausto MD        Subjective:     Principal Problem:Acute on chronic respiratory failure with hypoxia and hypercapnia        HPI:  Patient is a 64 yo female who presents to the hospital as a transfer from [facility] for higher level of care. She presented to the [facility] earlier today with a complaint of shortness for several days that has worsened over the past two days, at which time she started experiencing a severe decrease in her urinary output associated with a right flank, nausea, vomit  and low abdominal pain but denies any urinary symptoms. The shortness of breath is also associated with an acute onset of chest pain today. Chest pain is located at the left sternal region as is associated with radiation to her bilateral arms and bilateral sides of her neck, mild diaphoresis and tremor, but denies any palpitations. Patient has a history of COPD and follows with Pulmonologist Dr. Loyola with recommendation for home oxygen several months ago which was ordered by her PCP Dr. Frausto,  but the patient stated that she never received the order and has thus not been on home oxygen.. Moreover, the patient continues to smoke about 2 packs daily despite medical advice to quit smoking.     Of note, patient presented to the [facility] on 10/5/2023 with similar symptoms of worsening shortness of breath and was transferred to [facility] in [place], [place] for higher level of care, where she was admitted for acute on chronic respiratory failure secondary to COPD exacerbation in the setting of positive Covid 19 infection on 10/06/2023. She only had mild symptoms for  this Covid 19 infection, but had a severe Covid 19 infection in 2021. She has received only one dose of the Covid 19 vaccine back in 2021 with no boosters.    On arrival to the  critical access hospital, the patient was afebrile, but vital signs were significant for /54 and RR 22 with SPO2 of 73% with no leukocytosis, no source of infection and normal lactic acidosis. She was thus placed on supplemental oxygenation with improvement of SPO2. Ensuring work up revealed CMP with hypokalemia with K of 2.4 but normal Mg, bicarb of 45; acute on chronic renal failure with BUN/CR 29/1.84 with GFR 30 from a baseline of BUN/CR 12/1.07 and GFR of 58 four weeks ago, initial troponin of 297 from a baseline of 40.8 four weeks ago; elevated NTpBNP of 3183 from a baseline of 286 two weeks ago and 1932 four weeks ago. Last documented Echo in our system in 2021 showed HFpEF with EF of 55%. CXR with cardiomegaly without omar pulmonary edema or focal consolidation and EKG with sinus tachycardia and RBBB. CT head with negative findings as checked for initial complaint of lightheadedness and slurred speech on arrival that has since been resolved.  Coagulation study was normal and urinalysis grossly normal. Patient will be admitted for further evaluation and management.          Overview/Hospital Course:  No notes on file    Interval History: Patient seen and examined at bedside. No acute events overnight. Reports having a decent BM with the Mg citrate. At this time, pending approval to the [facility] [place].     Review of Systems   Constitutional:  Negative for activity change and appetite change.   Respiratory:  Negative for chest tightness and shortness of breath.    Cardiovascular:  Negative for chest pain and leg swelling.   Gastrointestinal:  Negative for abdominal distention, abdominal pain, constipation, nausea and vomiting.   Musculoskeletal:  Negative for arthralgias and myalgias.   Skin:  Negative for color change.    Neurological:  Negative for weakness.     Objective:     Vital Signs (Most Recent):  Temp: 98.6 °F (37 °C) (11/09/23 1011)  Pulse: 69 (11/09/23 1011)  Resp: 17 (11/09/23 1011)  BP: 131/65 (11/09/23 1011)  SpO2: 96 % (11/09/23 1011) Vital Signs (24h Range):  Temp:  [97.8 °F (36.6 °C)-98.8 °F (37.1 °C)] 98.6 °F (37 °C)  Pulse:  [58-78] 69  Resp:  [16-20] 17  SpO2:  [96 %-100 %] 96 %  BP: (111-155)/(60-76) 131/65     Weight: (!) 158.3 kg (349 lb)  Body mass index is 53.07 kg/m².  No intake or output data in the 24 hours ending 11/09/23 1042      Physical Exam  Vitals and nursing note reviewed.   Constitutional:       General: She is not in acute distress.     Appearance: Normal appearance. She is not ill-appearing.   HENT:      Head: Normocephalic and atraumatic.      Right Ear: Tympanic membrane normal.      Left Ear: Tympanic membrane normal.   Cardiovascular:      Rate and Rhythm: Normal rate.      Heart sounds: No murmur heard.     No friction rub. No gallop.   Pulmonary:      Effort: No respiratory distress.      Breath sounds: No wheezing.   Chest:      Chest wall: No tenderness.   Abdominal:      General: There is no distension.      Tenderness: There is no abdominal tenderness.   Musculoskeletal:         General: Tenderness present.      Cervical back: Normal range of motion.      Right lower leg: No edema.      Left lower leg: No edema.      Comments: Left leg tenderness    Skin:     Coloration: Skin is not jaundiced.      Findings: No erythema.   Neurological:      Mental Status: She is alert and oriented to person, place, and time.   Psychiatric:         Mood and Affect: Mood normal.         Behavior: Behavior normal.             Significant Labs: All pertinent labs within the past 24 hours have been reviewed.    Significant Imaging: I have reviewed all pertinent imaging results/findings within the past 24 hours.      Assessment/Plan:      * Acute on chronic respiratory failure with hypoxia and  hypercapnia  11/09  Possible discharge soon; pending placement at [facility]       11/08  - hip xray showed femoracetabular impingement; no fracture or dislocation     11/07  - Continue to monitor   -Reports feeling better   - Ordered hip xray       11/06  - Reports feeling better today  -Continue to monitor  -Continue nightly BiPAP  -ABG showed pH 7.47; paCO2 63; paO2 79        Patient with Hypercapnic and Hypoxic Respiratory failure which is Acute on chronic.  she is on home oxygen at 2-3 LPM but has not been using it LPM. Supplemental oxygen was provided and noted- Oxygen Concentration (%):  [32-40] 40    .   Signs/symptoms of respiratory failure include- increased work of breathing and lethargy. Contributing diagnoses includes - CHF, COPD, Obesity Hypoventilation and recent Covid 19 infection and non complaince with home oxygenation Labs and images were reviewed. Patient Has recent ABG, which has been reviewed. Will treat underlying causes and adjust management of respiratory failure as follows- supplemental oxygenation and BIPAP at night    11/5: O2 sat 96%. Continue current management.     Acute on chronic diastolic CHF (congestive heart failure)  11/08  - Cardiology signed off and recommend to follow up with Dr. Ortega  -Appreciate your expertise       11/07  - LHC done on 11/06 and unremarkable      11/06  - Cardiology is on board; planned LHC and pending results         Patient is identified as having Diastolic (HFpEF) heart failure that is Acute on chronic. CHF is currently uncontrolled due to Continued edema of extremities and Rales/crackles on pulmonary exam. Latest ECHO performed and demonstrates- Results for orders placed during the hospital encounter of 11/24/21    Echo    Interpretation Summary  · The left ventricle is normal in size with concentric remodeling and low normal systolic function.  · The estimated ejection fraction is 50%.  · Atrial fibrillation not observed.  · Normal right ventricular  "size.  · Normal left ventricular diastolic function.  . Continue ACE/ARB, Furosemide and Aldactone and monitor clinical status closely. Monitor on telemetry. Patient is off CHF pathway.  Monitor strict Is&Os and daily weights.  Place on fluid restriction of 2 L. Cardiology has been consulted. Continue to stress to patient importance of self efficacy and  on diet for CHF. Last BNP reviewed- and noted below No results for input(s): "BNP", "BNPTRIAGEBLO" in the last 168 hours..      - Bumetanide 1 mg BID and home aldactone, but monitor renal function daily  - Holding BB due to CHF exacerbation   - Strict I and O  - Daily weight  - Pending Echo repeat  - Cardiology consulted, thanks for the assistance    Continue current management.Plan for [facility] when patient is able to tolerate laying supine    Acute on chronic renal failure  11/07  - Improving  -Will continue to monitor       11/06  - US kidney unremarkable  - Na 135 from 134; K 3.2 from 2.9; BUN 37 from 34; Cr 1.40 from 1.16 Ca 10.3 and uric acid 7.7  - Started on Colchicine for gout flare; will monitor kidneys closely  - Nephrology has signed-off at this time and recommends little diuretic    - Continue monitor       Patient with acute kidney injury/acute renal failure likely due to acute tubular necrosis of unknown etiology but may be due to recent covid 19 about 4 weeks ago FRANKO is currently stable. Baseline creatinine 1.07 about one month ago - Labs reviewed- Renal function/electrolytes with Estimated Creatinine Clearance: 65.2 mL/min (A) (based on SCr of 1.4 mg/dL (H)). according to latest data. Monitor urine output and serial BMP and adjust therapy as needed. Avoid nephrotoxins and renally dose meds for GFR listed above.      - Pending US kidney  - Pending urine Na, Urea and CR. BMP to calculate FeNA and FeUrea  -  Avoid nephrotoxic drugs  - Renally dose all applicable medications  - Strict input and output  - Daily renal panel to monitor renal function  - " "  11/5: Nephrology consulted   Renal sono with normal size kidneys w/o obstruction.  -continue K supplemenation and Fe supplementation    Recommend a decrease in bumex      Chronic gout  11/09  - Resolved       11/07  - Improved from yesterday  - Last dose of Colchicine today       11/06  - Reports left foot tenderness to touch  -Hx of gout flares  -uric acid 7.7  -Started on low dose colchicine; monitoring renal function closely       COPD exacerbation  Patient's COPD is with exacerbation noted by continued dyspnea, use of accessory muscles for breathing and worsening of baseline hypoxia currently.  Patient is currently off COPD Pathway. Continue scheduled inhalers Steroids, Antibiotics and Supplemental oxygen and monitor respiratory status closely.     - DuoNeb q6h  - Budesonide 0.5 mg BID  - d/c solumedrol and start prednisone 40 mg  - On continuous CPAP      Seizure  11/08  - EEG showed normal; cannot rule out epileptic seizure      11/07  - No events overnight  - EEG pending         11/06  - Per nurse, patient had a seizure after our initially rounds  -Seized for about 5 minutes; no hx of seizure  - AOX 3   -Ordered 1 mg Ativan PRN  -Seizure precaution  -EEG ordered  -Continue to monitor   - CT head showed no acute events     Type 2 diabetes mellitus without complication, with long-term current use of insulin  Patient's FSGs are controlled on current medication regimen.  Last A1c reviewed-   Lab Results   Component Value Date    HGBA1C 6.2 11/03/2023     Most recent fingerstick glucose reviewed- No results for input(s): "POCTGLUCOSE" in the last 24 hours.  Current correctional scale  Medium  Maintain anti-hyperglycemic dose as follows-   Antihyperglycemics (From admission, onward)    Start     Stop Route Frequency Ordered    11/03/23 2100  insulin detemir U-100 injection 10 Units         -- SubQ Nightly 11/03/23 1942        Hold Oral hypoglycemics while patient is in the hospital.    Nicotine " dependence  Dangers of cigarette smoking were reviewed with patient in detail. Patient was Counseled for 3-10 minutes. Nicotine replacement options were discussed. Nicotine replacement was discussed- prescribed    Migraine  Continue home medication      Microcytosis  Fe 22  Started Iron sucrose    Hypokalemia  11/07  - K 3.8    11/06  - K 3.2  -Continued scheduled potassium 40 mEq        K 2.9 but mag normal  Monitor and replace as indicated    GERD (gastroesophageal reflux disease)  Continue home PPI      Non compliance with medical treatment  Patient was supposed to be on home oxygen but has not been for many months. Pt was unable to obtain home oxygen in spite of contacting her medical providers office numerous times. She continues to smoke despite medical advise with last smoking one week ago      Obesity hypoventilation syndrome  Body mass index is 53.07 kg/m². Morbid obesity complicates all aspects of disease management from diagnostic modalities to treatment. Weight loss encouraged and health benefits explained to patient.         Obesity, diabetes, and hypertension syndrome  Body mass index is 53.07 kg/m². Morbid obesity complicates all aspects of disease management from diagnostic modalities to treatment. Weight loss encouraged and health benefits explained to patient.         Severe obesity (BMI >= 40)  Body mass index is 53.07 kg/m². Morbid obesity complicates all aspects of disease management from diagnostic modalities to treatment. Weight loss encouraged and health benefits explained to patient.           VTE Risk Mitigation (From admission, onward)         Ordered     heparin (porcine) injection 7,500 Units  Every 8 hours         11/05/23 1715     Reason for No Pharmacological VTE Prophylaxis  Once        Question:  Reasons:  Answer:  Physician Provided (leave comment)    11/03/23 2125     IP VTE HIGH RISK PATIENT  Once         11/03/23 2125     Place sequential compression device  Until discontinued          11/03/23 2125                Discharge Planning   MONTSERRAT:      Code Status: Full Code   Is the patient medically ready for discharge?:     Reason for patient still in hospital (select all that apply): Pending disposition  Discharge Plan A: Skilled Nursing Facility                  Richard Fortune MD  Department of Hospital Medicine   [facility] - [facility] Tele[facility]

## 2023-11-09 NOTE — ASSESSMENT & PLAN NOTE
11/09  - Resolved       11/07  - Improved from yesterday  - Last dose of Colchicine today       11/06  - Reports left foot tenderness to touch  -Hx of gout flares  -uric acid 7.7  -Started on low dose colchicine; monitoring renal function closely

## 2023-11-09 NOTE — PLAN OF CARE
Problem: Gas Exchange Impaired  Goal: Optimal Gas Exchange  Outcome: Ongoing, Progressing     Problem: Breathing Pattern Ineffective  Goal: Effective Breathing Pattern  Outcome: Ongoing, Progressing     Problem: Noninvasive Ventilation Acute  Goal: Effective Unassisted Ventilation and Oxygenation  Outcome: Ongoing, Progressing     Problem: Airway Clearance Ineffective  Goal: Effective Airway Clearance  Outcome: Ongoing, Progressing

## 2023-11-09 NOTE — ASSESSMENT & PLAN NOTE
11/09  Possible discharge soon; pending placement at [facility]       11/08  - hip xray showed femoracetabular impingement; no fracture or dislocation     11/07  - Continue to monitor   -Reports feeling better   - Ordered hip xray       11/06  - Reports feeling better today  -Continue to monitor  -Continue nightly BiPAP  -ABG showed pH 7.47; paCO2 63; paO2 79        Patient with Hypercapnic and Hypoxic Respiratory failure which is Acute on chronic.  she is on home oxygen at 2-3 LPM but has not been using it LPM. Supplemental oxygen was provided and noted- Oxygen Concentration (%):  [32-40] 40    .   Signs/symptoms of respiratory failure include- increased work of breathing and lethargy. Contributing diagnoses includes - CHF, COPD, Obesity Hypoventilation and recent Covid 19 infection and non complaince with home oxygenation Labs and images were reviewed. Patient Has recent ABG, which has been reviewed. Will treat underlying causes and adjust management of respiratory failure as follows- supplemental oxygenation and BIPAP at night    11/5: O2 sat 96%. Continue current management.

## 2023-11-09 NOTE — SUBJECTIVE & OBJECTIVE
Interval History: Patient seen and examined at bedside. No acute events overnight. Reports having a decent BM with the Mg citrate. At this time, pending approval to the [facility].     Review of Systems   Constitutional:  Negative for activity change and appetite change.   Respiratory:  Negative for chest tightness and shortness of breath.    Cardiovascular:  Negative for chest pain and leg swelling.   Gastrointestinal:  Negative for abdominal distention, abdominal pain, constipation, nausea and vomiting.   Musculoskeletal:  Negative for arthralgias and myalgias.   Skin:  Negative for color change.   Neurological:  Negative for weakness.     Objective:     Vital Signs (Most Recent):  Temp: 98.6 °F (37 °C) (11/09/23 1011)  Pulse: 69 (11/09/23 1011)  Resp: 17 (11/09/23 1011)  BP: 131/65 (11/09/23 1011)  SpO2: 96 % (11/09/23 1011) Vital Signs (24h Range):  Temp:  [97.8 °F (36.6 °C)-98.8 °F (37.1 °C)] 98.6 °F (37 °C)  Pulse:  [58-78] 69  Resp:  [16-20] 17  SpO2:  [96 %-100 %] 96 %  BP: (111-155)/(60-76) 131/65     Weight: (!) 158.3 kg (349 lb)  Body mass index is 53.07 kg/m².  No intake or output data in the 24 hours ending 11/09/23 1042      Physical Exam  Vitals and nursing note reviewed.   Constitutional:       General: She is not in acute distress.     Appearance: Normal appearance. She is not ill-appearing.   HENT:      Head: Normocephalic and atraumatic.      Right Ear: Tympanic membrane normal.      Left Ear: Tympanic membrane normal.   Cardiovascular:      Rate and Rhythm: Normal rate.      Heart sounds: No murmur heard.     No friction rub. No gallop.   Pulmonary:      Effort: No respiratory distress.      Breath sounds: No wheezing.   Chest:      Chest wall: No tenderness.   Abdominal:      General: There is no distension.      Tenderness: There is no abdominal tenderness.   Musculoskeletal:         General: Tenderness present.      Cervical back: Normal range of motion.      Right lower leg: No edema.       Left lower leg: No edema.      Comments: Left leg tenderness    Skin:     Coloration: Skin is not jaundiced.      Findings: No erythema.   Neurological:      Mental Status: She is alert and oriented to person, place, and time.   Psychiatric:         Mood and Affect: Mood normal.         Behavior: Behavior normal.             Significant Labs: All pertinent labs within the past 24 hours have been reviewed.    Significant Imaging: I have reviewed all pertinent imaging results/findings within the past 24 hours.

## 2023-11-09 NOTE — PT/OT/SLP PROGRESS
Physical Therapy Treatment    Patient Name:  Holly Porter   MRN:  [id number]    Recommendations:     Discharge Recommendations: Low Intensity Therapy  Discharge Equipment Recommendations: none  Barriers to discharge: None    Assessment:     Holly Porter is a 63 y.o. female admitted with a medical diagnosis of Acute on chronic respiratory failure with hypoxia and hypercapnia.  She presents with the following impairments/functional limitations: weakness, impaired self care skills, impaired functional mobility, gait instability, impaired balance, decreased lower extremity function, pain, impaired coordination Pt continues to improve with ambulation distance. Completes bed mobility without assistance but needs breaks due to fatigue.    Rehab Prognosis: Good; patient would benefit from acute skilled PT services to address these deficits and reach maximum level of function.    Recent Surgery: Procedure(s) (LRB):  Left heart cath (Left) 3 Days Post-Op    Plan:     During this hospitalization, patient to be seen 5 x/week to address the identified rehab impairments via gait training, therapeutic activities, therapeutic exercises, neuromuscular re-education and progress toward the following goals:    Plan of Care Expires:  12/06/23    Subjective     Chief Complaint: shortness of breath   Patient/Family Comments/goals: Pt is agreeable to PT   Pain/Comfort:  Pain Rating 1: 3/10  Location - Side 1: Right  Location 1: shoulder  Pain Addressed 1: Distraction  Pain Rating Post-Intervention 1: 3/10      Objective:     Communicated with QIAN Ann RN prior to session.  Patient found HOB elevated with peripheral IV, oxygen upon PT entry to room.     General Precautions: Standard, fall  Orthopedic Precautions: N/A  Braces: N/A  Respiratory Status: Nasal cannula, flow 3 L/min     Functional Mobility:  Bed Mobility:     Scooting: stand by assistance  Supine to Sit: stand by assistance  Transfers:     Sit to Stand:  contact guard  assistance with rolling walker  Gait: 200 ft contact guard assistance with rolling walker, 3 L/minutes O2, slow hilario, Wide JUICE  Balance: good      AM-PAC 6 CLICK MOBILITY  Turning over in bed (including adjusting bedclothes, sheets and blankets)?: 4  Sitting down on and standing up from a chair with arms (e.g., wheelchair, bedside commode, etc.): 4  Moving from lying on back to sitting on the side of the bed?: 4  Moving to and from a bed to a chair (including a wheelchair)?: 4  Need to walk in hospital room?: 4  Climbing 3-5 steps with a railing?: 3  Basic Mobility Total Score: 23       Treatment & Education:  Pt performed bilateral LE: seated exercises: ankle pumps, long arc quads, marches, and hip abduction x 30 each, rest breaks taken as needed      Patient left sitting edge of bed with all lines intact and call button in reach..    GOALS:   Multidisciplinary Problems       Physical Therapy Goals          Problem: Physical Therapy    Goal Priority Disciplines Outcome Goal Variances Interventions   Physical Therapy Goal     PT, PT/OT Ongoing, Progressing     Description: Short term goals:  1. Supine to sit with Contact Guard Assistance  2. Sit to stand transfer with Contact Guard Assistance  3. Bed to chair transfer with Minimal Assistance using Rolling Walker  4. Gait  x 50 feet with Minimal Assistance using Rolling Walker.     Long term goals:  1. Supine to sit with [place]  2. Sit to stand transfer with Modified [place]  3. Bed to chair transfer with Modified [place] using Rolling Walker  4. Gait  x 100 feet with Modified [place] using Rolling Walker.                          Time Tracking:     PT Received On: 11/09/23  PT Start Time: 1457     PT Stop Time: 1523  PT Total Time (min): 26 min     Billable Minutes: Gait Training 10 and Therapeutic Exercise 16    Treatment Type: Treatment  PT/PTA: PT     Number of PTA visits since last PT visit: 0     11/09/2023

## 2023-11-10 VITALS
OXYGEN SATURATION: 98 % | TEMPERATURE: 98 F | HEIGHT: 68 IN | DIASTOLIC BLOOD PRESSURE: 57 MMHG | SYSTOLIC BLOOD PRESSURE: 134 MMHG | WEIGHT: 293 LBS | BODY MASS INDEX: 44.41 KG/M2 | RESPIRATION RATE: 18 BRPM | HEART RATE: 55 BPM

## 2023-11-10 LAB
ANION GAP SERPL CALCULATED.3IONS-SCNC: 7 MMOL/L (ref 7–16)
BASOPHILS # BLD AUTO: 0.04 K/UL (ref 0–0.2)
BASOPHILS NFR BLD AUTO: 0.4 % (ref 0–1)
BUN SERPL-MCNC: 30 MG/DL (ref 7–18)
BUN/CREAT SERPL: 29 (ref 6–20)
CALCIUM SERPL-MCNC: 10 MG/DL (ref 8.5–10.1)
CHLORIDE SERPL-SCNC: 101 MMOL/L (ref 98–107)
CO2 SERPL-SCNC: 35 MMOL/L (ref 21–32)
CREAT SERPL-MCNC: 1.04 MG/DL (ref 0.55–1.02)
DIFFERENTIAL METHOD BLD: ABNORMAL
EGFR (NO RACE VARIABLE) (RUSH/TITUS): 60 ML/MIN/1.73M2
EOSINOPHIL # BLD AUTO: 0.11 K/UL (ref 0–0.5)
EOSINOPHIL NFR BLD AUTO: 1 % (ref 1–4)
ERYTHROCYTE [DISTWIDTH] IN BLOOD BY AUTOMATED COUNT: 21.3 % (ref 11.5–14.5)
GLUCOSE SERPL-MCNC: 88 MG/DL (ref 74–106)
GLUCOSE SERPL-MCNC: 90 MG/DL (ref 70–105)
HCT VFR BLD AUTO: 49.1 % (ref 38–47)
HGB BLD-MCNC: 14.9 G/DL (ref 12–16)
IMM GRANULOCYTES # BLD AUTO: 0.18 K/UL (ref 0–0.04)
IMM GRANULOCYTES NFR BLD: 1.6 % (ref 0–0.4)
LYMPHOCYTES # BLD AUTO: 1.75 K/UL (ref 1–4.8)
LYMPHOCYTES NFR BLD AUTO: 15.9 % (ref 27–41)
MCH RBC QN AUTO: 24.1 PG (ref 27–31)
MCHC RBC AUTO-ENTMCNC: 30.3 G/DL (ref 32–36)
MCV RBC AUTO: 79.4 FL (ref 80–96)
MONOCYTES # BLD AUTO: 0.71 K/UL (ref 0–0.8)
MONOCYTES NFR BLD AUTO: 6.5 % (ref 2–6)
MPC BLD CALC-MCNC: 10.3 FL (ref 9.4–12.4)
NEUTROPHILS # BLD AUTO: 8.19 K/UL (ref 1.8–7.7)
NEUTROPHILS NFR BLD AUTO: 74.6 % (ref 53–65)
NRBC # BLD AUTO: 0 X10E3/UL
NRBC, AUTO (.00): 0 %
PLATELET # BLD AUTO: 243 K/UL (ref 150–400)
POTASSIUM SERPL-SCNC: 4.2 MMOL/L (ref 3.5–5.1)
RBC # BLD AUTO: 6.18 M/UL (ref 4.2–5.4)
SODIUM SERPL-SCNC: 139 MMOL/L (ref 136–145)
WBC # BLD AUTO: 10.98 K/UL (ref 4.5–11)

## 2023-11-10 PROCEDURE — 25000003 PHARM REV CODE 250

## 2023-11-10 PROCEDURE — 80048 BASIC METABOLIC PNL TOTAL CA: CPT

## 2023-11-10 PROCEDURE — 25000242 PHARM REV CODE 250 ALT 637 W/ HCPCS: Performed by: HOSPITALIST

## 2023-11-10 PROCEDURE — 27000221 HC OXYGEN, UP TO 24 HOURS

## 2023-11-10 PROCEDURE — 85025 COMPLETE CBC W/AUTO DIFF WBC: CPT

## 2023-11-10 PROCEDURE — 63600175 PHARM REV CODE 636 W HCPCS: Performed by: GENERAL PRACTICE

## 2023-11-10 PROCEDURE — 99239 HOSP IP/OBS DSCHRG MGMT >30: CPT | Mod: GC,,, | Performed by: FAMILY MEDICINE

## 2023-11-10 PROCEDURE — 94761 N-INVAS EAR/PLS OXIMETRY MLT: CPT

## 2023-11-10 PROCEDURE — S4991 NICOTINE PATCH NONLEGEND: HCPCS | Performed by: HOSPITALIST

## 2023-11-10 PROCEDURE — 99900035 HC TECH TIME PER 15 MIN (STAT)

## 2023-11-10 PROCEDURE — 94640 AIRWAY INHALATION TREATMENT: CPT

## 2023-11-10 PROCEDURE — 25000003 PHARM REV CODE 250: Performed by: STUDENT IN AN ORGANIZED HEALTH CARE EDUCATION/TRAINING PROGRAM

## 2023-11-10 PROCEDURE — 82962 GLUCOSE BLOOD TEST: CPT

## 2023-11-10 PROCEDURE — 25000003 PHARM REV CODE 250: Performed by: INTERNAL MEDICINE

## 2023-11-10 PROCEDURE — 99239 PR HOSPITAL DISCHARGE DAY,>30 MIN: ICD-10-PCS | Mod: GC,,, | Performed by: FAMILY MEDICINE

## 2023-11-10 PROCEDURE — 94660 CPAP INITIATION&MGMT: CPT

## 2023-11-10 PROCEDURE — 63600175 PHARM REV CODE 636 W HCPCS

## 2023-11-10 PROCEDURE — 25000003 PHARM REV CODE 250: Performed by: HOSPITALIST

## 2023-11-10 RX ORDER — NIFEDIPINE 60 MG/1
60 TABLET, EXTENDED RELEASE ORAL DAILY
Qty: 90 TABLET | Refills: 0 | Status: SHIPPED | OUTPATIENT
Start: 2023-11-11 | End: 2024-03-20 | Stop reason: SDUPTHER

## 2023-11-10 RX ADMIN — GABAPENTIN 600 MG: 300 CAPSULE ORAL at 09:11

## 2023-11-10 RX ADMIN — DULOXETINE 30 MG: 30 CAPSULE, DELAYED RELEASE ORAL at 09:11

## 2023-11-10 RX ADMIN — ASPIRIN 81 MG: 81 TABLET, COATED ORAL at 09:11

## 2023-11-10 RX ADMIN — TOPIRAMATE 150 MG: 100 TABLET, FILM COATED ORAL at 09:11

## 2023-11-10 RX ADMIN — CALCIUM CARBONATE (ANTACID) CHEW TAB 500 MG 500 MG: 500 CHEW TAB at 09:11

## 2023-11-10 RX ADMIN — HEPARIN SODIUM 7500 UNITS: 5000 INJECTION INTRAVENOUS; SUBCUTANEOUS at 06:11

## 2023-11-10 RX ADMIN — LEVOTHYROXINE SODIUM 150 MCG: 0.15 TABLET ORAL at 06:11

## 2023-11-10 RX ADMIN — ALLOPURINOL 300 MG: 300 TABLET ORAL at 09:11

## 2023-11-10 RX ADMIN — PANTOPRAZOLE SODIUM 40 MG: 40 TABLET, DELAYED RELEASE ORAL at 09:11

## 2023-11-10 RX ADMIN — NIFEDIPINE 30 MG: 30 TABLET, FILM COATED, EXTENDED RELEASE ORAL at 09:11

## 2023-11-10 RX ADMIN — SERTRALINE HYDROCHLORIDE 50 MG: 50 TABLET ORAL at 09:11

## 2023-11-10 RX ADMIN — IPRATROPIUM BROMIDE AND ALBUTEROL SULFATE 3 ML: 2.5; .5 SOLUTION RESPIRATORY (INHALATION) at 07:11

## 2023-11-10 RX ADMIN — BUMETANIDE 1 MG: 0.25 INJECTION INTRAMUSCULAR; INTRAVENOUS at 09:11

## 2023-11-10 RX ADMIN — SPIRONOLACTONE 50 MG: 25 TABLET ORAL at 09:11

## 2023-11-10 RX ADMIN — CHOLECALCIFEROL TAB 125 MCG (5000 UNIT) 5000 UNITS: 125 TAB at 09:11

## 2023-11-10 RX ADMIN — IPRATROPIUM BROMIDE AND ALBUTEROL SULFATE 3 ML: 2.5; .5 SOLUTION RESPIRATORY (INHALATION) at 12:11

## 2023-11-10 RX ADMIN — NICOTINE 1 PATCH: 21 PATCH, EXTENDED RELEASE TRANSDERMAL at 09:11

## 2023-11-10 RX ADMIN — PREDNISONE 40 MG: 20 TABLET ORAL at 09:11

## 2023-11-10 RX ADMIN — DOCUSATE SODIUM 100 MG: 100 CAPSULE, LIQUID FILLED ORAL at 09:11

## 2023-11-10 NOTE — DISCHARGE INSTRUCTIONS
Patient will follow-up with Cardiology Sidra KELLEY in 2 weeks    Patient will follow-up with Cardiologist, Dr. Ortega in 4 weeks    Patient will follow-up with PCP, Dr. Frausto in 1 weeks

## 2023-11-10 NOTE — NURSING
AVS reviewed with pt. Verbalized understanding. Meds delivered via Pharmacy at [facility]. IV removed with cath tip intact and no px noted. Dry dressing applied to site. Pt transferred to private auto via wheelchair. O2 intact. D/C home with family.

## 2023-11-10 NOTE — ASSESSMENT & PLAN NOTE
11/10  - At this time resolved; follow up with neurology outpatient      11/08  - EEG showed normal; cannot rule out epileptic seizure      11/07  - No events overnight  - EEG pending         11/06  - Per nurse, patient had a seizure after our initially rounds  -Seized for about 5 minutes; no hx of seizure  - AOX 3   -Ordered 1 mg Ativan PRN  -Seizure precaution  -EEG ordered  -Continue to monitor   - CT head showed no acute events

## 2023-11-10 NOTE — ASSESSMENT & PLAN NOTE
11/10  - Trending down back to baseline   - Discharge home today       11/07  - Improving  -Will continue to monitor       11/06  - US kidney unremarkable  - Na 135 from 134; K 3.2 from 2.9; BUN 37 from 34; Cr 1.40 from 1.16 Ca 10.3 and uric acid 7.7  - Started on Colchicine for gout flare; will monitor kidneys closely  - Nephrology has signed-off at this time and recommends little diuretic    - Continue monitor       Patient with acute kidney injury/acute renal failure likely due to acute tubular necrosis of unknown etiology but may be due to recent covid 19 about 4 weeks ago FRANKO is currently stable. Baseline creatinine 1.07 about one month ago - Labs reviewed- Renal function/electrolytes with Estimated Creatinine Clearance: 87.7 mL/min (A) (based on SCr of 1.04 mg/dL (H)). according to latest data. Monitor urine output and serial BMP and adjust therapy as needed. Avoid nephrotoxins and renally dose meds for GFR listed above.      - Pending US kidney  - Pending urine Na, Urea and CR. BMP to calculate FeNA and FeUrea  -  Avoid nephrotoxic drugs  - Renally dose all applicable medications  - Strict input and output  - Daily renal panel to monitor renal function  -   11/5: Nephrology consulted   Renal sono with normal size kidneys w/o obstruction.  -continue K supplemenation and Fe supplementation    Recommend a decrease in bumex

## 2023-11-10 NOTE — PT/OT/SLP PROGRESS
Physical Therapy      Patient Name:  Holly Porter   MRN:  [id number]    Patient not seen today secondary to Patient unwilling to participate (Pt states she is being discharged today and declines therapy).

## 2023-11-10 NOTE — ASSESSMENT & PLAN NOTE
"11/10  - Resume home glycemic medications           Patient's FSGs are controlled on current medication regimen.  Last A1c reviewed-   Lab Results   Component Value Date    HGBA1C 6.2 11/03/2023     Most recent fingerstick glucose reviewed- No results for input(s): "POCTGLUCOSE" in the last 24 hours.  Current correctional scale  Medium  Maintain anti-hyperglycemic dose as follows-   Antihyperglycemics (From admission, onward)    Start     Stop Route Frequency Ordered    11/03/23 2100  insulin detemir U-100 injection 10 Units         -- SubQ Nightly 11/03/23 1942        Hold Oral hypoglycemics while patient is in the hospital.  "

## 2023-11-10 NOTE — ASSESSMENT & PLAN NOTE
11/10  - Discharge today  -Follow up with PCP  - At this time, no need for SWB; able to walk 200 feet    11/09  Possible discharge soon; pending placement at [facility]       11/08  - hip xray showed femoracetabular impingement; no fracture or dislocation     11/07  - Continue to monitor   -Reports feeling better   - Ordered hip xray       11/06  - Reports feeling better today  -Continue to monitor  -Continue nightly BiPAP  -ABG showed pH 7.47; paCO2 63; paO2 79        Patient with Hypercapnic and Hypoxic Respiratory failure which is Acute on chronic.  she is on home oxygen at 2-3 LPM but has not been using it LPM. Supplemental oxygen was provided and noted- Oxygen Concentration (%):  [28-40] 28    .   Signs/symptoms of respiratory failure include- increased work of breathing and lethargy. Contributing diagnoses includes - CHF, COPD, Obesity Hypoventilation and recent Covid 19 infection and non complaince with home oxygenation Labs and images were reviewed. Patient Has recent ABG, which has been reviewed. Will treat underlying causes and adjust management of respiratory failure as follows- supplemental oxygenation and BIPAP at night    11/5: O2 sat 96%. Continue current management.

## 2023-11-10 NOTE — ASSESSMENT & PLAN NOTE
11/10  - Recommend to follow up with PCP  - Continue CPAP at home           Patient's COPD is with exacerbation noted by continued dyspnea, use of accessory muscles for breathing and worsening of baseline hypoxia currently.  Patient is currently off COPD Pathway. Continue scheduled inhalers Steroids, Antibiotics and Supplemental oxygen and monitor respiratory status closely.     - DuoNeb q6h  - Budesonide 0.5 mg BID  - d/c solumedrol and start prednisone 40 mg  - On continuous CPAP

## 2023-11-10 NOTE — DISCHARGE SUMMARY
[facility] - [facility] Tele[facility] Hospital Medicine  Discharge Summary      Patient Name: Holly Porter  MRN: [id number]  [facility]: [id number]  Patient Class: IP- Inpatient  Admission Date: 11/3/2023  Hospital Length of Stay: 7 days  Discharge Date and Time:  11/10/2023 11:41 AM  Attending Physician: Regan Glover Jr., MD   Discharging Provider: Richard Fortune MD  Primary Care Provider: Regan Frausto MD    Primary Care Team: Networked reference to record PCT     HPI:   Patient is a 64 yo female who presents to the hospital as a transfer from [facility] for higher level of care. She presented to the [facility] earlier today with a complaint of shortness for several days that has worsened over the past two days, at which time she started experiencing a severe decrease in her urinary output associated with a right flank, nausea, vomit  and low abdominal pain but denies any urinary symptoms. The shortness of breath is also associated with an acute onset of chest pain today. Chest pain is located at the left sternal region as is associated with radiation to her bilateral arms and bilateral sides of her neck, mild diaphoresis and tremor, but denies any palpitations. Patient has a history of COPD and follows with Pulmonologist Dr. Loyola with recommendation for home oxygen several months ago which was ordered by her PCP Dr. Frausto,  but the patient stated that she never received the order and has thus not been on home oxygen.. Moreover, the patient continues to smoke about 2 packs daily despite medical advice to quit smoking.     Of note, patient presented to the [facility] on 10/5/2023 with similar symptoms of worsening shortness of breath and was transferred to [facility] in [place], [place] for higher level of care, where she was admitted for acute on chronic respiratory failure secondary to COPD exacerbation in the setting of  positive Covid 19 infection on 10/06/2023. She only had mild symptoms for this Covid 19 infection, but had a severe Covid 19 infection in 2021. She has received only one dose of the Covid 19 vaccine back in 2021 with no boosters.    On arrival to the  critical access hospital, the patient was afebrile, but vital signs were significant for /54 and RR 22 with SPO2 of 73% with no leukocytosis, no source of infection and normal lactic acidosis. She was thus placed on supplemental oxygenation with improvement of SPO2. Ensuring work up revealed CMP with hypokalemia with K of 2.4 but normal Mg, bicarb of 45; acute on chronic renal failure with BUN/CR 29/1.84 with GFR 30 from a baseline of BUN/CR 12/1.07 and GFR of 58 four weeks ago, initial troponin of 297 from a baseline of 40.8 four weeks ago; elevated NTpBNP of 3183 from a baseline of 286 two weeks ago and 1932 four weeks ago. Last documented Echo in our system in 2021 showed HFpEF with EF of 55%. CXR with cardiomegaly without omar pulmonary edema or focal consolidation and EKG with sinus tachycardia and RBBB. CT head with negative findings as checked for initial complaint of lightheadedness and slurred speech on arrival that has since been resolved.  Coagulation study was normal and urinalysis grossly normal. Patient will be admitted for further evaluation and management.          Procedure(s) (LRB):  Left heart cath (Left)      Hospital Course:   Patient was admitted to the [facility] emergency hospital for acute on chronic respiratory failure with hypoxia hypercapnia.     Patient is more stable than admission and ready to be discharged today from [facility] back to home.     Patient will follow up with PCP, Dr. Du; Cardiology, WARREN Valente; and Cardiologist, Dr. Ortega       Goals of Care Treatment Preferences:  Code Status: Full Code      Consults:   Consults (From admission, onward)        Status Ordering Provider     Inpatient consult to Social Work  Once         Provider:  (Not yet assigned)    Completed BOBBY VANN     Inpatient consult to Social Work  Once        Provider:  (Not yet assigned)    Completed JOSAFAT ORTIZ     Inpatient consult to Nephrology  Once        Provider:  Fahad Esposito MD    Acknowledged RENÉ BRITO     Inpatient consult to Cardiology  Once        Provider:  (Not yet assigned)    Completed SHAINA MARCUM  Seizure  11/10  - At this time resolved; follow up with neurology outpatient      11/08  - EEG showed normal; cannot rule out epileptic seizure      11/07  - No events overnight  - EEG pending         11/06  - Per nurse, patient had a seizure after our initially rounds  -Seized for about 5 minutes; no hx of seizure  - AOX 3   -Ordered 1 mg Ativan PRN  -Seizure precaution  -EEG ordered  -Continue to monitor   - CT head showed no acute events     Pulmonary  * Acute on chronic respiratory failure with hypoxia and hypercapnia  11/10  - Discharge today  -Follow up with PCP  - At this time, no need for SWB; able to walk 200 feet    11/09  Possible discharge soon; pending placement at [facility]       11/08  - hip xray showed femoracetabular impingement; no fracture or dislocation     11/07  - Continue to monitor   -Reports feeling better   - Ordered hip xray       11/06  - Reports feeling better today  -Continue to monitor  -Continue nightly BiPAP  -ABG showed pH 7.47; paCO2 63; paO2 79        Patient with Hypercapnic and Hypoxic Respiratory failure which is Acute on chronic.  she is on home oxygen at 2-3 LPM but has not been using it LPM. Supplemental oxygen was provided and noted- Oxygen Concentration (%):  [28-40] 28    .   Signs/symptoms of respiratory failure include- increased work of breathing and lethargy. Contributing diagnoses includes - CHF, COPD, Obesity Hypoventilation and recent Covid 19 infection and non complaince with home oxygenation Labs and images were reviewed. Patient Has recent ABG, which has been reviewed.  Will treat underlying causes and adjust management of respiratory failure as follows- supplemental oxygenation and BIPAP at night    11/5: O2 sat 96%. Continue current management.     COPD exacerbation  11/10  - Recommend to follow up with PCP  - Continue CPAP at home           Patient's COPD is with exacerbation noted by continued dyspnea, use of accessory muscles for breathing and worsening of baseline hypoxia currently.  Patient is currently off COPD Pathway. Continue scheduled inhalers Steroids, Antibiotics and Supplemental oxygen and monitor respiratory status closely.     - DuoNeb q6h  - Budesonide 0.5 mg BID  - d/c solumedrol and start prednisone 40 mg  - On continuous CPAP      Cardiac/Vascular  Acute on chronic diastolic CHF (congestive heart failure)  11/08  - Cardiology signed off and recommend to follow up with Dr. Ortega  -Appreciate your expertise        11/07  - LHC done on 11/06 and unremarkable      11/06  - Cardiology is on board; planned LHC and pending results         Patient is identified as having Diastolic (HFpEF) heart failure that is Acute on chronic. CHF is currently uncontrolled due to Continued edema of extremities and Rales/crackles on pulmonary exam. Latest ECHO performed and demonstrates- Results for orders placed during the hospital encounter of 11/24/21    Echo    Interpretation Summary  · The left ventricle is normal in size with concentric remodeling and low normal systolic function.  · The estimated ejection fraction is 50%.  · Atrial fibrillation not observed.  · Normal right ventricular size.  · Normal left ventricular diastolic function.  . Continue ACE/ARB, Furosemide and Aldactone and monitor clinical status closely. Monitor on telemetry. Patient is off CHF pathway.  Monitor strict Is&Os and daily weights.  Place on fluid restriction of 2 L. Cardiology has been consulted. Continue to stress to patient importance of self efficacy and  on diet for CHF. Last BNP reviewed-  "and noted below No results for input(s): "BNP", "BNPTRIAGEBLO" in the last 168 hours..      - Bumetanide 1 mg BID and home aldactone, but monitor renal function daily  - Holding BB due to CHF exacerbation   - Strict I and O  - Daily weight  - Pending Echo repeat  - Cardiology consulted, thanks for the assistance    Continue current management.Plan for [facility] when patient is able to tolerate laying supine    Renal/  Acute on chronic renal failure   11/10  - Trending down back to baseline   - Discharge home today       11/07  - Improving  -Will continue to monitor       11/06  - US kidney unremarkable  - Na 135 from 134; K 3.2 from 2.9; BUN 37 from 34; Cr 1.40 from 1.16 Ca 10.3 and uric acid 7.7  - Started on Colchicine for gout flare; will monitor kidneys closely  - Nephrology has signed-off at this time and recommends little diuretic    - Continue monitor       Patient with acute kidney injury/acute renal failure likely due to acute tubular necrosis of unknown etiology but may be due to recent covid 19 about 4 weeks ago FRANKO is currently stable. Baseline creatinine 1.07 about one month ago - Labs reviewed- Renal function/electrolytes with Estimated Creatinine Clearance: 87.7 mL/min (A) (based on SCr of 1.04 mg/dL (H)). according to latest data. Monitor urine output and serial BMP and adjust therapy as needed. Avoid nephrotoxins and renally dose meds for GFR listed above.      - Pending US kidney  - Pending urine Na, Urea and CR. BMP to calculate FeNA and FeUrea  -  Avoid nephrotoxic drugs  - Renally dose all applicable medications  - Strict input and output  - Daily renal panel to monitor renal function  -   11/5: Nephrology consulted   Renal sono with normal size kidneys w/o obstruction.  -continue K supplemenation and Fe supplementation    Recommend a decrease in bumex      Endocrine  Type 2 diabetes mellitus without complication, with long-term current use of insulin  11/10  - Resume home glycemic medications " "          Patient's FSGs are controlled on current medication regimen.  Last A1c reviewed-   Lab Results   Component Value Date    HGBA1C 6.2 11/03/2023     Most recent fingerstick glucose reviewed- No results for input(s): "POCTGLUCOSE" in the last 24 hours.  Current correctional scale  Medium  Maintain anti-hyperglycemic dose as follows-   Antihyperglycemics (From admission, onward)    Start     Stop Route Frequency Ordered    11/03/23 2100  insulin detemir U-100 injection 10 Units         -- SubQ Nightly 11/03/23 1942        Hold Oral hypoglycemics while patient is in the hospital.    Orthopedic  Chronic gout  11/09  - Resolved       11/07  - Improved from yesterday  - Last dose of Colchicine today       11/06  - Reports left foot tenderness to touch  -Hx of gout flares  -uric acid 7.7  -Started on low dose colchicine; monitoring renal function closely         Final Active Diagnoses:    Diagnosis Date Noted POA    PRINCIPAL PROBLEM:  Acute on chronic respiratory failure with hypoxia and hypercapnia [J96.21, J96.22] 11/03/2023 Yes    Acute on chronic diastolic CHF (congestive heart failure) [I50.33] 11/03/2023 Yes    Acute on chronic renal failure [N17.9, N18.9] 11/03/2023 Yes    Chronic gout [M1A.9XX0] 11/06/2023 No    COPD exacerbation [J44.1] 11/03/2023 Yes    Seizure [R56.9] 11/06/2023 No    Type 2 diabetes mellitus without complication, with long-term current use of insulin [E11.9, Z79.4] 11/03/2023 Yes    Nicotine dependence [F17.200] 11/03/2023 Yes    Migraine [G43.909] 11/03/2023 Yes    Microcytosis [R71.8] 11/03/2023 Yes    Hypokalemia [E87.6] 11/03/2023 Yes    GERD (gastroesophageal reflux disease) [K21.9] 11/03/2023 Yes    Non compliance with medical treatment [Z91.199] 11/03/2023 Not Applicable    Obesity hypoventilation syndrome [E66.2] 11/03/2023 Yes    Obesity, diabetes, and hypertension syndrome [E11.69, E66.9, E11.59, I15.2] 11/04/2023 Yes    Type 2 MI (myocardial infarction) " "[I21.A1] 11/04/2023 Yes      Problems Resolved During this Admission:    Diagnosis Date Noted Date Resolved POA    NSTEMI (non-ST elevated myocardial infarction) [I21.4] 11/03/2023 11/04/2023 Yes    COVID-19 [U07.1] 11/03/2023 11/06/2023 Yes       Discharged Condition: good    Disposition: Home or Self Care    Follow Up:   Follow-up Information     Alex Ortega DO. Schedule an appointment as soon as possible for a visit on 12/20/2023.    Specialties: Interventional Cardiology, Cardiology  Why: Hospital f/u - Type II MI, diastolic heart failure, COPD exac; 11:00 am  Contact information:  [address] [place] [address]  [phone]             Suzie Valente FNP. Schedule an appointment as soon as possible for a visit on 12/6/2023.    Specialty: Cardiology  Why: Hospital f/u - Type II MI, diastolic heart failure, COPD exac; 1:30 pm  Contact information:  [address] [place] [address] [facility]  [place] [address]  [phone]             Regan Frausto MD Follow up on 11/16/2023.    Specialties: Internal Medicine, Family Medicine, Hospitalist  Why: Hospital  follow-up for Acute on Chronic respiratory failure with hypoxia and hypercapnia; 11:30 am  Contact information:  [address] [place] [address]  Inpatient [facility] [address]  [phone]                       Patient Instructions:      OXYGEN FOR HOME USE     Order Specific Question Answer Comments   Liter Flow 2    Duration With activity    Qualifying Test Performed at: Activity    Oxygen saturation at rest 94    Oxygen saturation with activity 98    Oxygen saturation with activity on oxygen 98    Portable mode: continuous    Route nasal cannula    Device: home concentrator with portable concentrator    Length of need (in months): 12 mos    Patient condition with qualifying saturation COPD    Height: 5' 8" (1.727 m)    Weight: 158.3 kg (349 lb)    Alternative treatment measures have been tried or considered and " deemed clinically ineffective. Yes      ACCEPT - Ambulatory referral/consult to Cardiology   Standing Status: Future   Referral Priority: Routine Referral Type: Consultation   Referral Reason: Specialty Services Required   Requested Specialty: Cardiology   Number of Visits Requested: 1     Ambulatory referral/consult to Smoking Cessation Program   Standing Status: Future   Referral Priority: Routine Referral Type: Consultation   Referral Reason: Specialty Services Required   Requested Specialty: CTTS   Number of Visits Requested: 1     Diet diabetic     Notify your health care provider if you experience any of the following:     Notify your health care provider if you experience any of the following:  increased confusion or weakness     Notify your health care provider if you experience any of the following:  persistent dizziness, light-headedness, or visual disturbances     Notify your health care provider if you experience any of the following:  redness, tenderness, or signs of infection (pain, swelling, redness, odor or green/yellow discharge around incision site)     Notify your health care provider if you experience any of the following:  temperature >100.4     Activity as tolerated       Significant Diagnostic Studies: Labs:   BMP:   Recent Labs   Lab 11/10/23  0352   GLU 88      K 4.2      CO2 35*   BUN 30*   CREATININE 1.04*   CALCIUM 10.0   , CMP   Recent Labs   Lab 11/10/23  0352      K 4.2      CO2 35*   GLU 88   BUN 30*   CREATININE 1.04*   CALCIUM 10.0   ANIONGAP 7    and A1C:   Recent Labs   Lab 11/03/23  1059 11/03/23  1839   HGBA1C 6.3 6.2       Pending Diagnostic Studies:     Procedure Component Value Units Date/Time    EKG 12-lead [[id number]]     Order Status: Sent Lab Status: No result     EXTRA TUBES [[id number]] Collected: 11/08/23 0659    Order Status: Sent Lab Status: In process Updated: 11/08/23 0708    Specimen: Blood, Venous     Narrative:      The following orders  were created for panel order EXTRA TUBES.  Procedure                               Abnormality         Status                     ---------                               -----------         ------                     Lavender Top Hold[[id number]]                               In process                   Please view results for these tests on the individual orders.    Sodium, Random Urine [[id number]]     Order Status: Sent Lab Status: No result     Specimen: Urine          Medications:  Reconciled Home Medications:      Medication List      CHANGE how you take these medications    aspirin 81 MG EC tablet  Commonly known as: ECOTRIN  TAKE 1 TABLET BY MOUTH EVERY DAY  What changed: when to take this     HYDROcodone-acetaminophen  mg per tablet  Commonly known as: NORCO  Take 1 tablet by mouth every 6 (six) hours as needed for Pain.  What changed: Another medication with the same name was removed. Continue taking this medication, and follow the directions you see here.     NIFEdipine 60 MG (OSM) 24 hr tablet  Commonly known as: PROCARDIA-XL  Take 1 tablet (60 mg total) by mouth once daily.  Start taking on: November 11, 2023  What changed:   · medication strength  · how much to take        CONTINUE taking these medications    ACCU-CHEK GUIDE GLUCOSE METER Misc  Generic drug: blood-glucose meter     ACCU-CHEK GUIDE TEST STRIPS Strp  Generic drug: blood sugar diagnostic     ACCU-CHEK SOFTCLIX LANCETS Misc  Generic drug: lancets     albuterol 90 mcg/actuation inhaler  Commonly known as: PROVENTIL/VENTOLIN HFA  Inhale 2 puffs into the lungs 4 (four) times daily. Rescue     allopurinoL 300 MG tablet  Commonly known as: ZYLOPRIM  TAKE ONE TABLET EVERY DAY     amitriptyline 25 MG tablet  Commonly known as: ELAVIL  Take 1 tablet (25 mg total) by mouth nightly as needed for Insomnia.     apixaban 5 mg Tab  Commonly known as: ELIQUIS  Take 1 tablet (5 mg total) by mouth 2 (two) times daily.     BREO ELLIPTA 100-25  mcg/dose diskus inhaler  Generic drug: fluticasone furoate-vilanteroL  Inhale 1 puff into the lungs once daily.     bumetanide 2 MG tablet  Commonly known as: BUMEX  Take 2 mg by mouth 2 (two) times daily.     calcium carbonate 500 mg calcium (1,250 mg) tablet  Commonly known as: OS-MICHAELA  Take 1 tablet (500 mg total) by mouth 2 (two) times daily.     carvediloL 12.5 MG tablet  Commonly known as: COREG  Take 0.5 tablets (6.25 mg total) by mouth 2 (two) times daily.     cholecalciferol (vitamin D3) 125 mcg (5,000 unit) capsule  Take 1 capsule (5,000 Units total) by mouth 2 (two) times a day.     cilostazoL 100 MG Tab  Commonly known as: PLETAL  Take 1 tablet (100 mg total) by mouth 2 (two) times daily.     colchicine (gout) 0.6 mg tablet  Commonly known as: COLCRYS  TAKE 1 TABLET BY MOUTH 3 TIMES A DAY FOR 2 DAYS THEN 1 TABLET DAILY UNTIL GONE     cyclobenzaprine 10 MG tablet  Commonly known as: FLEXERIL  Take 1 tablet (10 mg total) by mouth every evening.     dexAMETHasone 4 MG Tab  Commonly known as: DECADRON  Take by mouth.     docusate sodium 100 MG capsule  Commonly known as: COLACE  Take 1 capsule (100 mg total) by mouth 2 (two) times daily.     DULoxetine 30 MG capsule  Commonly known as: CYMBALTA  TAKE 1 CAPSULE (30 MG TOTAL) BY MOUTH ONCE DAILY.     gabapentin 600 MG tablet  Commonly known as: NEURONTIN  Take 1 tablet (600 mg total) by mouth 3 (three) times daily.     ibuprofen 800 MG tablet  Commonly known as: ADVIL,MOTRIN  Take 1 tablet (800 mg total) by mouth 2 (two) times daily as needed for Pain.     LEVEMIR FLEXTOUCH U100 INSULIN 100 unit/mL (3 mL) Inpn pen  Generic drug: insulin detemir U-100 (Levemir)  Inject 10 Units into the skin every evening. 15 ml with 1 refill     levothyroxine 150 MCG tablet  Commonly known as: SYNTHROID  Take 1 tablet (150 mcg total) by mouth before breakfast.     loratadine 10 mg tablet  Commonly known as: CLARITIN  Take 1 tablet (10 mg total) by mouth once daily.    "  losartan-hydrochlorothiazide 50-12.5 mg 50-12.5 mg per tablet  Commonly known as: HYZAAR  Take 1 tablet by mouth once daily.     metOLazone 2.5 MG tablet  Commonly known as: ZAROXOLYN  Take 1 tablet (2.5 mg total) by mouth once daily.     mupirocin 2 % ointment  Commonly known as: BACTROBAN  Apply topically 2 (two) times daily.     naloxone 4 mg/actuation Spry  Commonly known as: NARCAN  1 spray once.     nicotine 21 mg/24 hr  Commonly known as: NICODERM CQ  Place 1 patch onto the skin every 24 hours.     NOVOFINE 32 32 gauge x 1/4" Ndle  Generic drug: pen needle, diabetic  Use as directed once daily.     pantoprazole 40 MG tablet  Commonly known as: PROTONIX  Take 1 tablet (40 mg total) by mouth once daily.     phentermine 37.5 mg tablet  Commonly known as: ADIPEX-P  Take 1 tablet (37.5 mg total) by mouth before breakfast.     polyethylene glycol 17 gram Pwpk  Commonly known as: GLYCOLAX  Take 17 g by mouth once daily.     potassium chloride SA 20 MEQ tablet  Commonly known as: K-DUR,KLOR-CON  Take 1 tablet (20 mEq total) by mouth once daily.     QUEtiapine 25 MG Tab  Commonly known as: SEROQUEL  Take 1 tablet (25 mg total) by mouth once daily.     sertraline 50 MG tablet  Commonly known as: ZOLOFT  Take 1 tablet (50 mg total) by mouth once daily.     spironolactone 50 MG tablet  Commonly known as: ALDACTONE  Take 1 tablet (50 mg total) by mouth once daily.     SYMBICORT 160-4.5 mcg/actuation Hfaa  Generic drug: budesonide-formoterol 160-4.5 mcg  Inhale into the lungs.     tiotropium 18 mcg inhalation capsule  Commonly known as: SPIRIVA  Inhale 1 capsule (18 mcg total) into the lungs once daily. Controller     topiramate 100 MG tablet  Commonly known as: TOPAMAX  Take 1.5 tablets (150 mg total) by mouth 2 (two) times daily.        STOP taking these medications    triamcinolone acetonide 0.1% 0.1 % cream  Commonly known as: KENALOG            Indwelling Lines/Drains at time of discharge:   Lines/Drains/Airways     " None                 Time spent on the discharge of patient: 46 minutes         Richard Fortune MD  Department of Hospital Medicine  [facility] - [address] [place] [address]

## 2023-11-10 NOTE — HOSPITAL COURSE
Patient was admitted to the [facility] emergency hospital for acute on chronic respiratory failure with hypoxia hypercapnia.     Patient is more stable than admission and ready to be discharged today from [facility] back to home.     Patient will follow up with PCP, Dr. Du; Cardiology, WARREN Valente; and Cardiologist, Dr. Ortega

## 2023-11-10 NOTE — PT/OT/SLP PROGRESS
Occupational Therapy   Treatment    Name: Holly Porter  MRN: [id number]  Admitting Diagnosis:  Acute on chronic respiratory failure with hypoxia and hypercapnia  3 Days Post-Op    Recommendations:     Discharge Recommendations:    Discharge Equipment Recommendations:   (to be determined)  Barriers to discharge:  None    Assessment:     Holly Porter is a 63 y.o. female with a medical diagnosis of Acute on chronic respiratory failure with hypoxia and hypercapnia.  She presents with complaint of (R) shoulder pain. Pt agreed to ADL retraining. Performance deficits affecting function are weakness, impaired endurance, impaired self care skills, pain.     Rehab Prognosis:  Good; patient would benefit from acute skilled OT services to address these deficits and reach maximum level of function.       Plan:     Patient to be seen 5 x/week to address the above listed problems via self-care/home management, therapeutic activities, therapeutic exercises  Plan of Care Expires:    Plan of Care Reviewed with: patient    Subjective     Chief Complaint: Acute on Chronic Respiratory Failure with Hypoxia and Hypercapnia  Patient/Family Comments/goals: Pt agreed to tx.  Pain/Comfort:  Pain Rating 1: 6/10  Location - Side 1: Right  Location 1: shoulder  Pain Addressed 1: Reposition, Distraction  Pain Rating Post-Intervention 1: 6/10    Objective:     Communicated with: EPIFANIO Ann prior to session.  Patient found sitting edge of bed with   upon OT entry to room.    General Precautions: Standard, fall    Orthopedic Precautions:N/A  Braces: N/A  Respiratory Status: Nasal cannula, flow 3 L/min     Occupational Performance:     Bed Mobility:         Functional Mobility/Transfers:  Patient completed Sit <> Stand Transfer with contact guard assistance  with  rolling walker   Functional Mobility: CGA to side step to HOB    Activities of Daily Living:  Grooming: independence   Bathing: (Bath performed using multiple wash cloths per  pt's request) independence with UE anterior bathing. Min a bathing back crossing large towel over shoulders. Pt stood with CGA for perineum/buttock bathing. I with bathing legs and min a with bathing feet  Upper Body Dressing: setup to faustino gown   Lower Body Dressing: dependence donning socks      [facility] 6 Click ADL:      Treatment & Education:  Pt participated well with tx. Bath performed sitting EOB with rest breaks provided as needed.    Patient left sitting edge of bed with all lines intact and call button in reach    GOALS:   Multidisciplinary Problems       Occupational Therapy Goals          Problem: Occupational Therapy    Goal Priority Disciplines Outcome Interventions   Occupational Therapy Goal     OT, PT/OT Ongoing, Progressing    Description: STG:  Pt will perform grooming with setup  Pt will bathe with Mod I  Pt will perform UE dressing with I  Pt will perform LE dressing with Mod I  Pt will sit EOB x 15 min with supervision assistance  Pt will transfer bed/chair/bsc with Mod I  Pt will perform standing task x 15 min with supervision assistance  Pt will tolerate 30 minutes of tx without fatigue      LT.Restore to max I with self care and mobility.                          Time Tracking:     OT Date of Treatment: 23  OT Start Time: 1346  OT Stop Time: 1432  OT Total Time (min): 46 min    Billable Minutes:Self Care/Home Management 45    OT/LYNETTE: OT          2023

## 2023-11-10 NOTE — PLAN OF CARE
Problem: Adult Inpatient Plan of Care  Goal: Plan of Care Review  Outcome: Met  Goal: Patient-Specific Goal (Individualized)  Outcome: Met  Goal: Absence of Hospital-Acquired Illness or Injury  Outcome: Met  Goal: Optimal Comfort and Wellbeing  Outcome: Met  Goal: Readiness for Transition of Care  Outcome: Met     Problem: Diabetes Comorbidity  Goal: Blood Glucose Level Within Targeted Range  Outcome: Met     Problem: Bariatric Environmental Safety  Goal: Safety Maintained with Care  Outcome: Met     Problem: Gas Exchange Impaired  Goal: Optimal Gas Exchange  Outcome: Met     Problem: Breathing Pattern Ineffective  Goal: Effective Breathing Pattern  Outcome: Met     Problem: Fluid and Electrolyte Imbalance (Acute Kidney Injury/Impairment)  Goal: Fluid and Electrolyte Balance  Outcome: Met     Problem: Oral Intake Inadequate (Acute Kidney Injury/Impairment)  Goal: Optimal Nutrition Intake  Outcome: Met     Problem: Renal Function Impairment (Acute Kidney Injury/Impairment)  Goal: Effective Renal Function  Outcome: Met     Problem: Noninvasive Ventilation Acute  Goal: Effective Unassisted Ventilation and Oxygenation  Outcome: Met     Problem: Airway Clearance Ineffective  Goal: Effective Airway Clearance  Outcome: Met     Problem: Skin Injury Risk Increased  Goal: Skin Health and Integrity  Outcome: Met

## 2023-11-13 ENCOUNTER — PATIENT OUTREACH (OUTPATIENT)
Dept: ADMINISTRATIVE | Facility: CLINIC | Age: 63
End: 2023-11-13

## 2023-11-13 ENCOUNTER — DOCUMENTATION ONLY (OUTPATIENT)
Dept: HEPATOLOGY | Facility: HOSPITAL | Age: 63
End: 2023-11-13

## 2023-11-13 NOTE — PLAN OF CARE
Rcd call from Maricel from out pt follow up, stated the pt does not have oxygen and she had spoke with health now. I re faxed referral and spoke to Lauryn @ [facility] now and they stated pt states she has oxygen at home.   Called pt now, she has a tank at home she stated she took from hospital. Called [facility] now back and they will accept the referral and get a concentrator to her home. They will also  portable tank and verify who it belongs to.     1860  Spoke with MD Fortune now. He is calling pt now to have follow up with PCP tomorrow for a walk test. [facility] is unable to send for approval as the pt did not have a sat on room air. 0 further dc needs from CM stand point at this time.

## 2023-11-13 NOTE — PROGRESS NOTES
C3 nurse spoke with Holly Porter's daughter, Dustin for a TCC post hospital discharge follow up call. The patient has a scheduled HOSFU appointment with Regan Frausto MD  on 11/16/2023 @ 130.  Patient's daughter was unable to complete med rec at this time but did request call back later today. Dustin states patient was discharge home with portable oxygen tank but is about to run out of oxygen.   Dustin does not know who delivered the oxygen to the patient.  Dustin states patient had a prescription for an inhaler called to [facility] pharmacy in [facility] but was unable to  due to cost. Message route to Dr. Frausto staff regarding inhaler.    In basket message sent to Camila Alonzo, case management regarding this issue.

## 2023-11-13 NOTE — PROGRESS NOTES
Holly Porter was discharged on 11/10/2023 from [facility] Medicine Service from improved chronic respiratory failure with hypoxia and hypercapnia. During hospitalization patient was inquiring about a home oxygen tank. Per patient and chart review, patient was evaluated for a home oxygen a few months ago by PCP, Dr. Frausto, but never received.    During hospital visit, placed a  consult regarding approval or meeting criteria for home oxygen at discharge. Therefore, patient can follow-up with PCP.  Patient received nasal cannula and nightly BiPAP while at the hospital.     Today, notified that patient was discharged with [facility] oxygen from room; per patient, states that a  employee that wheeled her down to vehicle, implied that she can take the oxygen tank home.    After talking to the patient via telephone, she understood the miscommunication. Agreed to give the oxygen back to [facility]. Patient will follow up with PCP tomorrow for home oxygen approval.

## 2023-11-13 NOTE — PLAN OF CARE
[facility] Telemetry  Discharge Final Note    Primary Care Provider: Regan Frausto MD    Expected Discharge Date: 11/10/2023    Final Discharge Note (most recent)       Final Note - 11/13/23 0830          Final Note    Assessment Type Final Discharge Note     Anticipated Discharge Disposition Home or Self Care                     Important Message from Medicare        Date IMM was signed: 11/05/23  Time IMM was signed: 0912    Contact Info       Alex Ortega DO   Specialty: Interventional Cardiology, Cardiology   Relationship: Consulting Physician    [address] [place], [place] [id number]   Phone: [phone]       Next Steps: Schedule an appointment as soon as possible for a visit on 12/20/2023    Instructions: Hospital f/u - Type II MI, diastolic heart failure, COPD exac; 11:00 am    Suzie Valente FNP   Specialty: Cardiology    [address] [place] [address] [facility] [place] [id number]   Phone: [phone]       Next Steps: Schedule an appointment as soon as possible for a visit on 12/6/2023    Instructions: Hospital f/u - Type II MI, diastolic heart failure, COPD exac; 1:30 pm    Regan Frausto MD   Specialty: Internal Medicine, Family Medicine, Hospitalist   Relationship: PCP - General    [address] [place] [address]  [facility] [place] [id number]   Phone: [phone]       Next Steps: Follow up on 11/16/2023    Instructions: Hospital  follow-up for Acute on Chronic respiratory failure with hypoxia and hypercapnia; 11:30 am          Pt discharged home with no needs.

## 2023-11-13 NOTE — PROGRESS NOTES
Contacted Whitley at [facility] Case Management via phone. Whitley states Camila Alonzo has left for the day.  Whitley was unable to help and transferred to [facility].   Mindy states she will reach out to the provider and health now to get the patient's oxygen to her home.

## 2023-11-13 NOTE — PROGRESS NOTES
Rec'd response from Camila Alonzo, Case Management.    Contacted [facility] nursing station.   Charge nurse was unavailable.   Nette took message and was going to try to contact discharge nurse for details.   Awaiting response

## 2023-11-13 NOTE — PROGRESS NOTES
Rec'd call from Anna's nurse supervisor at 127pm.  States she thinks the patient took a portable oxygen tank from the hospital.    States she called the discharging nurse with no answer.   Message sent to Camila Alonzo, Case Management regarding if the order has been sent to [facility] now.

## 2023-11-13 NOTE — PROGRESS NOTES
Called Dustin and instucted her to look on oxygen tank for label regarding company name of oxygen delivery service.   Awaiting return call

## 2023-11-14 NOTE — PROGRESS NOTES
Contacted Dustin.   States she does not have medication or medication list with her but will call me when she gets medication list.   Patient states she still has callback information from conversation on 11/13/2023.

## 2023-11-16 ENCOUNTER — HOSPITAL ENCOUNTER (OUTPATIENT)
Dept: RADIOLOGY | Facility: HOSPITAL | Age: 63
Discharge: HOME | End: 2023-11-16
Attending: INTERNAL MEDICINE
Payer: MEDICARE

## 2023-11-16 ENCOUNTER — OFFICE VISIT (OUTPATIENT)
Dept: FAMILY MEDICINE | Facility: CLINIC | Age: 63
End: 2023-11-16
Payer: MEDICARE

## 2023-11-16 VITALS
BODY MASS INDEX: 44.41 KG/M2 | OXYGEN SATURATION: 95 % | RESPIRATION RATE: 17 BRPM | DIASTOLIC BLOOD PRESSURE: 65 MMHG | HEIGHT: 68 IN | WEIGHT: 293 LBS | SYSTOLIC BLOOD PRESSURE: 123 MMHG | HEART RATE: 88 BPM | TEMPERATURE: 98 F

## 2023-11-16 DIAGNOSIS — E66.01 CLASS 3 SEVERE OBESITY WITH BODY MASS INDEX (BMI) OF 50.0 TO 59.9 IN ADULT, UNSPECIFIED OBESITY TYPE, UNSPECIFIED WHETHER SERIOUS COMORBIDITY PRESENT: ICD-10-CM

## 2023-11-16 DIAGNOSIS — K59.00 CONSTIPATION, UNSPECIFIED CONSTIPATION TYPE: Primary | ICD-10-CM

## 2023-11-16 DIAGNOSIS — R31.9 HEMATURIA, UNSPECIFIED TYPE: ICD-10-CM

## 2023-11-16 DIAGNOSIS — K59.00 CONSTIPATION, UNSPECIFIED CONSTIPATION TYPE: ICD-10-CM

## 2023-11-16 LAB
ANION GAP SERPL CALCULATED.3IONS-SCNC: 8 MMOL/L (ref 7–16)
ANISOCYTOSIS BLD QL SMEAR: ABNORMAL
BASOPHILS # BLD AUTO: 0.07 K/UL (ref 0–0.2)
BASOPHILS NFR BLD AUTO: 0.6 % (ref 0–1)
BILIRUB UR QL STRIP: NEGATIVE
BUN SERPL-MCNC: 18 MG/DL (ref 7–18)
BUN/CREAT SERPL: 13 (ref 6–20)
CALCIUM SERPL-MCNC: 10.6 MG/DL (ref 8.5–10.1)
CHLORIDE SERPL-SCNC: 98 MMOL/L (ref 98–107)
CLARITY UR: CLEAR
CO2 SERPL-SCNC: 35 MMOL/L (ref 21–32)
COLOR UR: NORMAL
CREAT SERPL-MCNC: 1.39 MG/DL (ref 0.55–1.02)
CRENATED CELLS: ABNORMAL
DIFFERENTIAL METHOD BLD: ABNORMAL
EGFR (NO RACE VARIABLE) (RUSH/TITUS): 42 ML/MIN/1.73M2
EOSINOPHIL # BLD AUTO: 0.16 K/UL (ref 0–0.5)
EOSINOPHIL NFR BLD AUTO: 1.4 % (ref 1–4)
ERYTHROCYTE [DISTWIDTH] IN BLOOD BY AUTOMATED COUNT: 22.5 % (ref 11.5–14.5)
GLUCOSE SERPL-MCNC: 118 MG/DL (ref 74–106)
GLUCOSE UR STRIP-MCNC: NORMAL MG/DL
HCT VFR BLD AUTO: 52.5 % (ref 38–47)
HGB BLD-MCNC: 16.3 G/DL (ref 12–16)
HYPOCHROMIA BLD QL SMEAR: ABNORMAL
IMM GRANULOCYTES # BLD AUTO: 0.11 K/UL (ref 0–0.04)
IMM GRANULOCYTES NFR BLD: 1 % (ref 0–0.4)
KETONES UR STRIP-SCNC: NEGATIVE MG/DL
LEUKOCYTE ESTERASE UR QL STRIP: NEGATIVE
LYMPHOCYTES # BLD AUTO: 1.71 K/UL (ref 1–4.8)
LYMPHOCYTES NFR BLD AUTO: 15.5 % (ref 27–41)
MCH RBC QN AUTO: 24.4 PG (ref 27–31)
MCHC RBC AUTO-ENTMCNC: 31 G/DL (ref 32–36)
MCV RBC AUTO: 78.5 FL (ref 80–96)
MONOCYTES # BLD AUTO: 0.67 K/UL (ref 0–0.8)
MONOCYTES NFR BLD AUTO: 6.1 % (ref 2–6)
MPC BLD CALC-MCNC: 11.1 FL (ref 9.4–12.4)
NEUTROPHILS # BLD AUTO: 8.33 K/UL (ref 1.8–7.7)
NEUTROPHILS NFR BLD AUTO: 75.4 % (ref 53–65)
NITRITE UR QL STRIP: NEGATIVE
NRBC # BLD AUTO: 0 X10E3/UL
NRBC, AUTO (.00): 0 %
OVALOCYTES BLD QL SMEAR: ABNORMAL
PH UR STRIP: 5 PH UNITS
PLATELET # BLD AUTO: 253 K/UL (ref 150–400)
PLATELET MORPHOLOGY: ABNORMAL
POTASSIUM SERPL-SCNC: 3.4 MMOL/L (ref 3.5–5.1)
PROT UR QL STRIP: NEGATIVE
RBC # BLD AUTO: 6.69 M/UL (ref 4.2–5.4)
RBC # UR STRIP: NEGATIVE /UL
SODIUM SERPL-SCNC: 138 MMOL/L (ref 136–145)
SP GR UR STRIP: 1.01
UROBILINOGEN UR STRIP-ACNC: NORMAL MG/DL
WBC # BLD AUTO: 11.05 K/UL (ref 4.5–11)

## 2023-11-16 PROCEDURE — 3078F PR MOST RECENT DIASTOLIC BLOOD PRESSURE < 80 MM HG: ICD-10-PCS | Mod: ,,, | Performed by: INTERNAL MEDICINE

## 2023-11-16 PROCEDURE — 1159F PR MEDICATION LIST DOCUMENTED IN MEDICAL RECORD: ICD-10-PCS | Mod: ,,, | Performed by: INTERNAL MEDICINE

## 2023-11-16 PROCEDURE — 99495 TCM SERVICES (MODERATE COMPLEXITY): ICD-10-PCS | Mod: ,,, | Performed by: INTERNAL MEDICINE

## 2023-11-16 PROCEDURE — 3078F DIAST BP <80 MM HG: CPT | Mod: ,,, | Performed by: INTERNAL MEDICINE

## 2023-11-16 PROCEDURE — 85025 COMPLETE CBC W/AUTO DIFF WBC: CPT | Mod: ,,, | Performed by: CLINICAL MEDICAL LABORATORY

## 2023-11-16 PROCEDURE — 80048 BASIC METABOLIC PANEL: ICD-10-PCS | Mod: ,,, | Performed by: CLINICAL MEDICAL LABORATORY

## 2023-11-16 PROCEDURE — 3044F PR MOST RECENT HEMOGLOBIN A1C LEVEL <7.0%: ICD-10-PCS | Mod: ,,, | Performed by: INTERNAL MEDICINE

## 2023-11-16 PROCEDURE — 3066F NEPHROPATHY DOC TX: CPT | Mod: ,,, | Performed by: INTERNAL MEDICINE

## 2023-11-16 PROCEDURE — 3044F HG A1C LEVEL LT 7.0%: CPT | Mod: ,,, | Performed by: INTERNAL MEDICINE

## 2023-11-16 PROCEDURE — 80048 BASIC METABOLIC PNL TOTAL CA: CPT | Mod: ,,, | Performed by: CLINICAL MEDICAL LABORATORY

## 2023-11-16 PROCEDURE — 74018 RADEX ABDOMEN 1 VIEW: CPT | Mod: 26,,, | Performed by: STUDENT IN AN ORGANIZED HEALTH CARE EDUCATION/TRAINING PROGRAM

## 2023-11-16 PROCEDURE — 85025 CBC WITH DIFFERENTIAL: ICD-10-PCS | Mod: ,,, | Performed by: CLINICAL MEDICAL LABORATORY

## 2023-11-16 PROCEDURE — 3061F PR NEG MICROALBUMINURIA RESULT DOCUMENTED/REVIEW: ICD-10-PCS | Mod: ,,, | Performed by: INTERNAL MEDICINE

## 2023-11-16 PROCEDURE — 81003 URINALYSIS AUTO W/O SCOPE: CPT | Mod: QW,,, | Performed by: CLINICAL MEDICAL LABORATORY

## 2023-11-16 PROCEDURE — 99495 TRANSJ CARE MGMT MOD F2F 14D: CPT | Mod: ,,, | Performed by: INTERNAL MEDICINE

## 2023-11-16 PROCEDURE — 74018 RADEX ABDOMEN 1 VIEW: CPT | Mod: TC

## 2023-11-16 PROCEDURE — 1160F PR REVIEW ALL MEDS BY PRESCRIBER/CLIN PHARMACIST DOCUMENTED: ICD-10-PCS | Mod: ,,, | Performed by: INTERNAL MEDICINE

## 2023-11-16 PROCEDURE — 81003 URINALYSIS, REFLEX TO URINE CULTURE: ICD-10-PCS | Mod: QW,,, | Performed by: CLINICAL MEDICAL LABORATORY

## 2023-11-16 PROCEDURE — 1159F MED LIST DOCD IN RCRD: CPT | Mod: ,,, | Performed by: INTERNAL MEDICINE

## 2023-11-16 PROCEDURE — 3008F PR BODY MASS INDEX (BMI) DOCUMENTED: ICD-10-PCS | Mod: ,,, | Performed by: INTERNAL MEDICINE

## 2023-11-16 PROCEDURE — 1111F DSCHRG MED/CURRENT MED MERGE: CPT | Mod: ,,, | Performed by: INTERNAL MEDICINE

## 2023-11-16 PROCEDURE — 3074F SYST BP LT 130 MM HG: CPT | Mod: ,,, | Performed by: INTERNAL MEDICINE

## 2023-11-16 PROCEDURE — 3074F PR MOST RECENT SYSTOLIC BLOOD PRESSURE < 130 MM HG: ICD-10-PCS | Mod: ,,, | Performed by: INTERNAL MEDICINE

## 2023-11-16 PROCEDURE — 3066F PR DOCUMENTATION OF TREATMENT FOR NEPHROPATHY: ICD-10-PCS | Mod: ,,, | Performed by: INTERNAL MEDICINE

## 2023-11-16 PROCEDURE — 1160F RVW MEDS BY RX/DR IN RCRD: CPT | Mod: ,,, | Performed by: INTERNAL MEDICINE

## 2023-11-16 PROCEDURE — 3061F NEG MICROALBUMINURIA REV: CPT | Mod: ,,, | Performed by: INTERNAL MEDICINE

## 2023-11-16 PROCEDURE — 3008F BODY MASS INDEX DOCD: CPT | Mod: ,,, | Performed by: INTERNAL MEDICINE

## 2023-11-16 PROCEDURE — 1111F PR DISCHARGE MEDS RECONCILED W/ CURRENT OUTPATIENT MED LIST: ICD-10-PCS | Mod: ,,, | Performed by: INTERNAL MEDICINE

## 2023-11-16 PROCEDURE — 74018 XR KUB: ICD-10-PCS | Mod: 26,,, | Performed by: STUDENT IN AN ORGANIZED HEALTH CARE EDUCATION/TRAINING PROGRAM

## 2023-11-16 RX ORDER — NITROFURANTOIN 25; 75 MG/1; MG/1
100 CAPSULE ORAL 2 TIMES DAILY
Qty: 14 CAPSULE | Refills: 0 | Status: SHIPPED | OUTPATIENT
Start: 2023-11-16 | End: 2023-12-06

## 2023-11-16 RX ORDER — MECLIZINE HYDROCHLORIDE 25 MG/1
25 TABLET ORAL 2 TIMES DAILY PRN
Qty: 30 TABLET | Refills: 3 | Status: ON HOLD | OUTPATIENT
Start: 2023-11-16 | End: 2024-02-14 | Stop reason: HOSPADM

## 2023-11-19 PROBLEM — E66.813 CLASS 3 SEVERE OBESITY WITH BODY MASS INDEX (BMI) OF 50.0 TO 59.9 IN ADULT: Status: ACTIVE | Noted: 2023-11-03

## 2023-11-19 PROBLEM — K59.00 CONSTIPATION: Status: ACTIVE | Noted: 2023-11-19

## 2023-11-19 PROBLEM — R31.9 HEMATURIA: Status: ACTIVE | Noted: 2023-11-19

## 2023-11-19 NOTE — PROGRESS NOTES
Subjective:       Patient ID: Holly Porter is a 63 y.o. female.    Chief Complaint: TCC Call and Dizziness  Patient seen and evaluated patient was recently discharged from hospital.  Patient does have a recent history of hematuria complains of chronic constipation and also has chronic vertigo.    Patient has chronic obesity and I recommend  Diet exercise and lifestyle modification.    For the above problems I am going to do the following big gait CBC UA CNS KUB   Antivert 25 mg 1 p.o. b.i.d. p.r.n.   For the hematuria and dysuria the plan Macrobid 100 mg 1 p.o. b.i.d. I am treating for possible urinary tract infection.  Dizziness: no fever, no light-headedness and no chest pain.no environmental allergies.    .    Current Medications:    Current Outpatient Medications:     ACCU-CHEK GUIDE GLUCOSE METER Misc, , Disp: , Rfl:     ACCU-CHEK GUIDE TEST STRIPS Strp, , Disp: , Rfl:     ACCU-CHEK SOFTCLIX LANCETS Misc, , Disp: , Rfl:     albuterol (PROVENTIL/VENTOLIN HFA) 90 mcg/actuation inhaler, Inhale 2 puffs into the lungs 4 (four) times daily. Rescue, Disp: 18 g, Rfl: 2    allopurinoL (ZYLOPRIM) 300 MG tablet, TAKE ONE TABLET EVERY DAY, Disp: 90 tablet, Rfl: 3    apixaban (ELIQUIS) 5 mg Tab, Take 1 tablet (5 mg total) by mouth 2 (two) times daily., Disp: 60 tablet, Rfl: 3    aspirin (ECOTRIN) 81 MG EC tablet, TAKE 1 TABLET BY MOUTH EVERY DAY, Disp: 90 tablet, Rfl: 1    BREO ELLIPTA 100-25 mcg/dose diskus inhaler, Inhale 1 puff into the lungs once daily., Disp: 60 each, Rfl: 0    bumetanide (BUMEX) 2 MG tablet, Take 2 mg by mouth 2 (two) times daily., Disp: , Rfl:     calcium carbonate (OS-MICHAELA) 500 mg calcium (1,250 mg) tablet, Take 1 tablet (500 mg total) by mouth 2 (two) times daily., Disp: 60 tablet, Rfl: 3    carvediloL (COREG) 12.5 MG tablet, Take 0.5 tablets (6.25 mg total) by mouth 2 (two) times daily., Disp: 30 tablet, Rfl: 11    cholecalciferol, vitamin D3, 125 mcg (5,000 unit) capsule, Take 1 capsule (5,000  Units total) by mouth 2 (two) times a day., Disp: 60 capsule, Rfl: 3    cilostazoL (PLETAL) 100 MG Tab, Take 1 tablet (100 mg total) by mouth 2 (two) times daily., Disp: 90 tablet, Rfl: 1    colchicine, gout, (COLCRYS) 0.6 mg tablet, TAKE 1 TABLET BY MOUTH 3 TIMES A DAY FOR 2 DAYS THEN 1 TABLET DAILY UNTIL GONE, Disp: 30 tablet, Rfl: 2    cyclobenzaprine (FLEXERIL) 10 MG tablet, Take 1 tablet (10 mg total) by mouth every evening., Disp: 30 tablet, Rfl: 2    dexAMETHasone (DECADRON) 4 MG Tab, Take by mouth., Disp: , Rfl:     docusate sodium (COLACE) 100 MG capsule, Take 1 capsule (100 mg total) by mouth 2 (two) times daily., Disp: 60 capsule, Rfl: 2    DULoxetine (CYMBALTA) 30 MG capsule, TAKE 1 CAPSULE (30 MG TOTAL) BY MOUTH ONCE DAILY., Disp: 30 capsule, Rfl: 0    gabapentin (NEURONTIN) 600 MG tablet, Take 1 tablet (600 mg total) by mouth 3 (three) times daily., Disp: 180 tablet, Rfl: 2    HYDROcodone-acetaminophen (NORCO)  mg per tablet, Take 1 tablet by mouth every 6 (six) hours as needed for Pain., Disp: 120 tablet, Rfl: 0    ibuprofen (ADVIL,MOTRIN) 800 MG tablet, Take 1 tablet (800 mg total) by mouth 2 (two) times daily as needed for Pain., Disp: 30 tablet, Rfl: 2    insulin detemir U-100, Levemir, (LEVEMIR FLEXTOUCH U100 INSULIN) 100 unit/mL (3 mL) InPn pen, Inject 10 Units into the skin every evening. 15 ml with 1 refill, Disp: 15 mL, Rfl: 1    levothyroxine (SYNTHROID) 150 MCG tablet, Take 1 tablet (150 mcg total) by mouth before breakfast., Disp: 90 tablet, Rfl: 1    loratadine (CLARITIN) 10 mg tablet, Take 1 tablet (10 mg total) by mouth once daily., Disp: 30 tablet, Rfl: 3    losartan-hydrochlorothiazide 50-12.5 mg (HYZAAR) 50-12.5 mg per tablet, Take 1 tablet by mouth once daily., Disp: 90 tablet, Rfl: 1    metOLazone (ZAROXOLYN) 2.5 MG tablet, Take 1 tablet (2.5 mg total) by mouth once daily., Disp: 30 tablet, Rfl: 2    mupirocin (BACTROBAN) 2 % ointment, Apply topically 2 (two) times daily.,  "Disp: 80 g, Rfl: 2    naloxone (NARCAN) 4 mg/actuation Spry, 1 spray once., Disp: , Rfl:     nicotine (NICODERM CQ) 21 mg/24 hr, Place 1 patch onto the skin every 24 hours., Disp: , Rfl:     NIFEdipine (PROCARDIA-XL) 60 MG (OSM) 24 hr tablet, Take 1 tablet (60 mg total) by mouth once daily., Disp: 90 tablet, Rfl: 0    NOVOFINE 32 32 gauge x 1/4" Ndle, Use as directed once daily., Disp: 90 each, Rfl: 3    pantoprazole (PROTONIX) 40 MG tablet, Take 1 tablet (40 mg total) by mouth once daily., Disp: 90 tablet, Rfl: 1    phentermine (ADIPEX-P) 37.5 mg tablet, Take 1 tablet (37.5 mg total) by mouth before breakfast., Disp: 30 tablet, Rfl: 0    polyethylene glycol (GLYCOLAX) 17 gram PwPk, Take 17 g by mouth once daily., Disp: , Rfl:     potassium chloride SA (K-DUR,KLOR-CON) 20 MEQ tablet, Take 1 tablet (20 mEq total) by mouth once daily., Disp: 90 tablet, Rfl: 1    QUEtiapine (SEROQUEL) 25 MG Tab, Take 1 tablet (25 mg total) by mouth once daily., Disp: 30 tablet, Rfl: 1    sertraline (ZOLOFT) 50 MG tablet, Take 1 tablet (50 mg total) by mouth once daily., Disp: 30 tablet, Rfl: 5    tiotropium (SPIRIVA) 18 mcg inhalation capsule, Inhale 1 capsule (18 mcg total) into the lungs once daily. Controller, Disp: 90 capsule, Rfl: 3    amitriptyline (ELAVIL) 25 MG tablet, Take 1 tablet (25 mg total) by mouth nightly as needed for Insomnia. (Patient not taking: Reported on 11/16/2023), Disp: 90 tablet, Rfl: 1    meclizine (ANTIVERT) 25 mg tablet, Take 1 tablet (25 mg total) by mouth 2 (two) times daily as needed for Dizziness., Disp: 30 tablet, Rfl: 3    nitrofurantoin, macrocrystal-monohydrate, (MACROBID) 100 MG capsule, Take 1 capsule (100 mg total) by mouth 2 (two) times daily., Disp: 14 capsule, Rfl: 0    spironolactone (ALDACTONE) 50 MG tablet, Take 1 tablet (50 mg total) by mouth once daily., Disp: 30 tablet, Rfl: 11    SYMBICORT 160-4.5 mcg/actuation HFAA, Inhale into the lungs., Disp: , Rfl:     topiramate (TOPAMAX) 100 MG " "tablet, Take 1.5 tablets (150 mg total) by mouth 2 (two) times daily., Disp: 90 tablet, Rfl: 11           Review of Systems   Constitutional:  Negative for appetite change, fatigue and fever.   Respiratory:  Negative for shortness of breath.    Cardiovascular:  Negative for chest pain.   Gastrointestinal:  Negative for abdominal pain and constipation.   Endocrine: Negative for polydipsia, polyphagia and polyuria.   Genitourinary:  Negative for difficulty urinating, frequency and hot flashes.   Allergic/Immunologic: Negative for environmental allergies.   Neurological:  Positive for dizziness. Negative for light-headedness.   Psychiatric/Behavioral:  Negative for agitation.                 Vitals:    11/16/23 1124 11/16/23 1126   BP: 123/65    BP Location: Left arm    Patient Position: Sitting    BP Method: Large (Automatic)    Pulse: 88    Resp: 17    Temp: 97.6 °F (36.4 °C)    TempSrc: Temporal    SpO2: (!) 93% 95%   Weight: (!) 154.9 kg (341 lb 6.4 oz)    Height: 5' 8" (1.727 m)         Physical Exam  Vitals and nursing note reviewed.   Constitutional:       Appearance: Normal appearance. She is obese.   HENT:      Head: Normocephalic and atraumatic.      Right Ear: Tympanic membrane, ear canal and external ear normal.      Left Ear: Tympanic membrane, ear canal and external ear normal.      Nose: Nose normal.      Mouth/Throat:      Mouth: Mucous membranes are moist.      Pharynx: Oropharynx is clear.   Eyes:      Extraocular Movements: Extraocular movements intact.      Conjunctiva/sclera: Conjunctivae normal.      Pupils: Pupils are equal, round, and reactive to light.   Cardiovascular:      Rate and Rhythm: Normal rate and regular rhythm.      Pulses: Normal pulses.      Heart sounds: Normal heart sounds.   Pulmonary:      Effort: Pulmonary effort is normal.      Breath sounds: Normal breath sounds.   Abdominal:      General: Abdomen is flat. Bowel sounds are normal.      Palpations: Abdomen is soft. "   Musculoskeletal:         General: Normal range of motion.      Cervical back: Normal range of motion and neck supple.   Skin:     General: Skin is warm and dry.   Neurological:      General: No focal deficit present.      Mental Status: She is alert. Mental status is at baseline. She is disoriented.   Psychiatric:         Mood and Affect: Mood normal.         Behavior: Behavior normal.         Thought Content: Thought content normal.         Judgment: Judgment normal.           Last Labs:     Office Visit on 11/16/2023   Component Date Value    Sodium 11/16/2023 138     Potassium 11/16/2023 3.4 (L)     Chloride 11/16/2023 98     CO2 11/16/2023 35 (H)     Anion Gap 11/16/2023 8     Glucose 11/16/2023 118 (H)     BUN 11/16/2023 18     Creatinine 11/16/2023 1.39 (H)     BUN/Creatinine Ratio 11/16/2023 13     Calcium 11/16/2023 10.6 (H)     eGFR 11/16/2023 42 (L)     Color, UA 11/16/2023 Light-Yellow     Clarity, UA 11/16/2023 Clear     pH, UA 11/16/2023 5.0     Leukocytes, UA 11/16/2023 Negative     Nitrites, UA 11/16/2023 Negative     Protein, UA 11/16/2023 Negative     Glucose, UA 11/16/2023 Normal     Ketones, UA 11/16/2023 Negative     Urobilinogen, UA 11/16/2023 Normal     Bilirubin, UA 11/16/2023 Negative     Blood, UA 11/16/2023 Negative     Specific Gravity, UA 11/16/2023 1.014     WBC 11/16/2023 11.05 (H)     RBC 11/16/2023 6.69 (H)     Hemoglobin 11/16/2023 16.3 (H)     Hematocrit 11/16/2023 52.5 (H)     MCV 11/16/2023 78.5 (L)     MCH 11/16/2023 24.4 (L)     MCHC 11/16/2023 31.0 (L)     RDW 11/16/2023 22.5 (H)     Platelet Count 11/16/2023 253     MPV 11/16/2023 11.1     Neutrophils % 11/16/2023 75.4 (H)     Lymphocytes % 11/16/2023 15.5 (L)     Monocytes % 11/16/2023 6.1 (H)     Eosinophils % 11/16/2023 1.4     Basophils % 11/16/2023 0.6     Immature Granulocytes % 11/16/2023 1.0 (H)     nRBC, Auto 11/16/2023 0.0     Neutrophils, Abs 11/16/2023 8.33 (H)     Lymphocytes, Absolute 11/16/2023 1.71      Monocytes, Absolute 11/16/2023 0.67     Eosinophils, Absolute 11/16/2023 0.16     Basophils, Absolute 11/16/2023 0.07     Immature Granulocytes, A* 11/16/2023 0.11 (H)     nRBC, Absolute 11/16/2023 0.00     Diff Type 11/16/2023 Scan Smear     Platelet Morphology 11/16/2023 Few Large Platelets (A)     Anisocytosis 11/16/2023 2+     Crenated Cells 11/16/2023 Few     Ovalocytes 11/16/2023 Few     Hypochromic 11/16/2023 Few    No results displayed because visit has over 200 results.      Admission on 11/03/2023, Discharged on 11/03/2023   Component Date Value    PTT 11/03/2023 27.5     ProBNP 11/03/2023 3,183 (H)     Sodium 11/03/2023 133 (L)     Potassium 11/03/2023 2.1 (LL)     Chloride 11/03/2023 86 (L)     CO2 11/03/2023 43 (H)     Anion Gap 11/03/2023 6 (L)     Glucose 11/03/2023 145 (H)     BUN 11/03/2023 33 (H)     Creatinine 11/03/2023 1.78 (H)     BUN/Creatinine Ratio 11/03/2023 19     Calcium 11/03/2023 9.2     Total Protein 11/03/2023 7.3     Albumin 11/03/2023 3.4 (L)     Globulin 11/03/2023 3.9     A/G Ratio 11/03/2023 0.9     Bilirubin, Total 11/03/2023 1.1     Alk Phos 11/03/2023 89     ALT 11/03/2023 20     AST 11/03/2023 35     eGFR 11/03/2023 32 (L)     Lactic Acid 11/03/2023 1.9     Magnesium 11/03/2023 1.9     PT 11/03/2023 13.2     INR 11/03/2023 1.01     Troponin I High Sensitiv* 11/03/2023 297.1 (HH)     Color, UA 11/03/2023 Yellow     Clarity, UA 11/03/2023 Clear     pH, UA 11/03/2023 5.5     Leukocytes, UA 11/03/2023 Negative     Nitrites, UA 11/03/2023 Negative     Protein, UA 11/03/2023 30 (A)     Glucose, UA 11/03/2023 Negative     Ketones, UA 11/03/2023 Trace     Urobilinogen, UA 11/03/2023 1.0     Bilirubin, UA 11/03/2023 Small (A)     Blood, UA 11/03/2023 Negative     Specific Gravity, UA 11/03/2023 1.020     WBC 11/03/2023 9.62     RBC 11/03/2023 6.07 (H)     Hemoglobin 11/03/2023 14.4     Hematocrit 11/03/2023 47.1 (H)     MCV 11/03/2023 77.6 (L)     MCH 11/03/2023 23.7 (L)     MCHC  11/03/2023 30.6 (L)     RDW 11/03/2023 19.7 (H)     Platelet Count 11/03/2023 233     MPV 11/03/2023 10.3     Neutrophils % 11/03/2023 72.7 (H)     Lymphocytes % 11/03/2023 19.1 (L)     Monocytes % 11/03/2023 6.3 (H)     Eosinophils % 11/03/2023 1.0     Basophils % 11/03/2023 0.4     Immature Granulocytes % 11/03/2023 0.5 (H)     nRBC, Auto 11/03/2023 0.4 (H)     Neutrophils, Abs 11/03/2023 6.98     Lymphocytes, Absolute 11/03/2023 1.84     Monocytes, Absolute 11/03/2023 0.61     Eosinophils, Absolute 11/03/2023 0.10     Basophils, Absolute 11/03/2023 0.04     Immature Granulocytes, A* 11/03/2023 0.05 (H)     nRBC, Absolute 11/03/2023 0.04 (H)     Diff Type 11/03/2023 Auto     POC Glucose 11/03/2023 212 (H)     WBC, UA 11/03/2023 0-5     RBC, UA 11/03/2023 0-3     Bacteria, UA 11/03/2023 Few (A)     Squamous Epithelial Cell* 11/03/2023 Moderate (A)    Lab Visit on 11/03/2023   Component Date Value    Sodium 11/03/2023 133 (L)     Potassium 11/03/2023 2.4 (LL)     Chloride 11/03/2023 88 (L)     CO2 11/03/2023 45 (H)     Anion Gap 11/03/2023 2 (L)     Glucose 11/03/2023 116 (H)     BUN 11/03/2023 29 (H)     Creatinine 11/03/2023 1.84 (H)     BUN/Creatinine Ratio 11/03/2023 16     Calcium 11/03/2023 9.0     eGFR 11/03/2023 30 (L)     Hemoglobin A1C 11/03/2023 6.3     Estimated Average Glucose 11/03/2023 124     WBC 11/03/2023 9.32     RBC 11/03/2023 5.90 (H)     Hemoglobin 11/03/2023 14.0     Hematocrit 11/03/2023 45.4     MCV 11/03/2023 76.9 (L)     MCH 11/03/2023 23.7 (L)     MCHC 11/03/2023 30.8 (L)     RDW 11/03/2023 19.9 (H)     Platelet Count 11/03/2023 216     MPV 11/03/2023 10.7     Neutrophils % 11/03/2023 72.1 (H)     Lymphocytes % 11/03/2023 18.6 (L)     Monocytes % 11/03/2023 7.2 (H)     Eosinophils % 11/03/2023 1.1     Basophils % 11/03/2023 0.4     Immature Granulocytes % 11/03/2023 0.6 (H)     nRBC, Auto 11/03/2023 0.3 (H)     Neutrophils, Abs 11/03/2023 6.72     Lymphocytes, Absolute 11/03/2023 1.73      Monocytes, Absolute 11/03/2023 0.67     Eosinophils, Absolute 11/03/2023 0.10     Basophils, Absolute 11/03/2023 0.04     Immature Granulocytes, A* 11/03/2023 0.06 (H)     nRBC, Absolute 11/03/2023 0.03 (H)     Diff Type 11/03/2023 Auto    Office Visit on 10/17/2023   Component Date Value    Sodium 10/17/2023 135 (L)     Potassium 10/17/2023 2.7 (L)     Chloride 10/17/2023 92 (L)     CO2 10/17/2023 41 (H)     Anion Gap 10/17/2023 5 (L)     Glucose 10/17/2023 100     BUN 10/17/2023 30 (H)     Creatinine 10/17/2023 1.22 (H)     BUN/Creatinine Ratio 10/17/2023 25 (H)     Calcium 10/17/2023 9.8     eGFR 10/17/2023 50 (L)     ProBNP 10/17/2023 286 (H)        Last Imaging:  X-Ray KUB  Narrative: EXAMINATION:  XR KUB    CLINICAL HISTORY:  Constipation, unspecified    TECHNIQUE:  XR KUB    COMPARISON:  11/8/23    FINDINGS:  Lower lobes are clear    There is no bowel obstruction.  No free air.  No renal calculi.    Liver and renal silhouettes are normal.    No acute bone findings.  Impression: No bowel obstruction or renal calculi.  Mild to moderate colonic stool    Electronically signed by: Damon Barrow  Date:    11/16/2023  Time:    12:18         **Labs and x-rays personally reviewed by me    ** reviewed      Objective:        Assessment:       1. Constipation, unspecified constipation type  X-Ray KUB    Basic Metabolic Panel    CBC Auto Differential    Basic Metabolic Panel    CBC Auto Differential    CBC Morphology      2. Hematuria, unspecified type  Urinalysis, Reflex to Urine Culture    Urinalysis, Reflex to Urine Culture      3. Class 3 severe obesity with body mass index (BMI) of 50.0 to 59.9 in adult, unspecified obesity type, unspecified whether serious comorbidity present             Plan:         1. Constipation, unspecified constipation type  -     X-Ray KUB; Future; Expected date: 11/16/2023  -     Basic Metabolic Panel; Future; Expected date: 11/16/2023  -     CBC Auto Differential; Future; Expected  date: 11/16/2023  -     CBC Morphology    2. Hematuria, unspecified type  -     Urinalysis, Reflex to Urine Culture; Future; Expected date: 11/16/2023    3. Class 3 severe obesity with body mass index (BMI) of 50.0 to 59.9 in adult, unspecified obesity type, unspecified whether serious comorbidity present    Other orders  -     meclizine (ANTIVERT) 25 mg tablet; Take 1 tablet (25 mg total) by mouth 2 (two) times daily as needed for Dizziness.  Dispense: 30 tablet; Refill: 3  -     nitrofurantoin, macrocrystal-monohydrate, (MACROBID) 100 MG capsule; Take 1 capsule (100 mg total) by mouth 2 (two) times daily.  Dispense: 14 capsule; Refill: 0

## 2023-11-29 NOTE — PROGRESS NOTES
Subjective:         Patient ID: Holly Porter is a 63 y.o. female.    Chief Complaint: Low-back Pain and Leg Pain            Pain  This is a chronic problem. The current episode started more than 1 year ago. The problem occurs daily. The problem has been unchanged. Associated symptoms include arthralgias. Pertinent negatives include no chest pain, chills, coughing, diaphoresis, fever, sore throat, vertigo or vomiting.     Review of Systems   Constitutional:  Negative for activity change, chills, diaphoresis, fever and unexpected weight change.   HENT:  Negative for drooling, ear discharge, ear pain, facial swelling, nosebleeds, sore throat, trouble swallowing, voice change and goiter.    Eyes:  Negative for photophobia, pain, discharge, redness and visual disturbance.   Respiratory:  Negative for apnea, cough, choking, chest tightness, shortness of breath, wheezing and stridor.    Cardiovascular:  Negative for chest pain, palpitations and leg swelling.   Gastrointestinal:  Negative for abdominal distention, diarrhea, rectal pain, vomiting and fecal incontinence.   Endocrine: Negative for cold intolerance, heat intolerance, polydipsia, polyphagia and polyuria.   Genitourinary:  Negative for bladder incontinence, dysuria, flank pain, frequency and hot flashes.   Musculoskeletal:  Positive for arthralgias, back pain, leg pain and neck stiffness.   Integumentary:  Negative for color change and pallor.   Allergic/Immunologic: Negative for immunocompromised state.   Neurological:  Negative for dizziness, vertigo, seizures, syncope, facial asymmetry, speech difficulty, light-headedness, memory loss and coordination difficulties.   Hematological:  Negative for adenopathy. Does not bruise/bleed easily.   Psychiatric/Behavioral:  Negative for agitation, behavioral problems, confusion, decreased concentration, dysphoric mood, hallucinations, self-injury and suicidal ideas. The patient is not nervous/anxious and is not  hyperactive.            Past Medical History:   Diagnosis Date    Acquired hypothyroidism     Atherosclerosis of native artery of extremity with intermittent claudication 01/30/2019    bilateral legs    BMI 50.0-59.9, adult 02/22/2021    Chronic pain syndrome     opioid depen    COPD (chronic obstructive pulmonary disease)     COVID-19 10/06/2023    Depression     Diabetes mellitus, type 2     [facility]    DVT of lower extremity, bilateral     Essential hypertension 06/08/2021    GERD (gastroesophageal reflux disease)     Gout 07/05/2023    Hyperlipidemia     Lumbosacral radiculopathy 06/05/2023    followed by GLENN Valdes, [facility]    Migraines     Nicotine dependence     Nicotine dependence     Obesity hypoventilation syndrome     chronic CO2 retainer with compensatory metabolic alkalosis    Osteoarthritis     Seizure disorder     Sleep apnea     on cpap    Thyroid cancer     Venous stasis ulcer limited to breakdown of skin with varicose veins     followed by Estuardo Zhao    Vitamin D deficiency      Past Surgical History:   Procedure Laterality Date    ABCESS DRAINAGE      HYSTERECTOMY      ILIAC ARTERY STENT Bilateral 01/28/2020    Bilateral distal aorta and common iliac 8 X 59 vbx covered stents performed by Dr. Antonio Jacinto.    LEFT HEART CATHETERIZATION Left 11/6/2023    Procedure: Left heart cath;  Surgeon: Alex Ortega DO;  Location: [facility] CATH LAB;  Service: Cardiology;  Laterality: Left;    RADIOFREQUENCY ABLATION Left 04/15/2022    Procedure: Left calf  Radiofrequency Ablation;  Surgeon: Matty Falcon DO;  Location: [facility] OR;  Service: Vascular;  Laterality: Left;    STAB PHLEBECTOMY OF VARICOSE VEINS Left 01/25/2013    Left leg microphlebectomies x 23 stab avulsions and ligation of multiple varicose veins perrformed by Dr. Cirilo Aguirre.    THYROIDECTOMY      hx: thyroid cancer    TOE AMPUTATION Left 01/28/2020    2nd, 4th and  5th performed by Dr. Anam Saeed.    TOE AMPUTATION Right 01/28/2020    4th toe performed by Dr. Anam Saeed.    TOTAL ABDOMINAL HYSTERECTOMY W/ BILATERAL SALPINGOOPHORECTOMY      TUBAL LIGATION      VAGINAL DELIVERY      x 4    VENOUS ABLATION Right 08/09/2019    GSV Varithena Ablation performed by Dr. Estuardo Falcon    VENOUS ABLATION Left 08/02/2019    ATAV Varithena Ablation performed by Dr. Estuardo Falcon.    VENOUS ABLATION Right 07/13/2015    ATAV Laser Ablatio performed by Dr. Cirilo Aguirre.    VENOUS ABLATION Right 07/06/2015    Distal  GSV Laser Ablation performed by dr. Cirilo Aguirre.    VENOUS ABLATION Left 04/20/2015    Left Distal GSV Laser Ablation performed by dr. Cirilo Aguirre.    VENOUS ABLATION Right 10/28/2013    Right Distal GSV RF Ablation performed by Dr. Cirilo Aguirre.    VENOUS ABLATION Left 10/25/2013    SSV RF Ablation performed by dr. Cirilo Aguirre.    VENOUS ABLATION Left 10/07/2013    Left GSV and Left ATAV RF Ablation performed by Dr. Cirilo Aguirre.    VENOUS ABLATION Right 01/21/2013    GSV RF Ablation w/micros x 22 performed by Dr. Cirilo Aguirre.    VENOUS ABLATION Left 06/25/2021    Left distal GSV Varithena Ablation     Social History     Socioeconomic History    Marital status:    Tobacco Use    Smoking status: Every Day     Current packs/day: 0.50     Average packs/day: 0.5 packs/day for 33.0 years (16.5 ttl pk-yrs)     Types: Cigarettes     Passive exposure: Current    Smokeless tobacco: Never   Substance and Sexual Activity    Alcohol use: Never    Drug use: Never    Sexual activity: Not Currently     Social Determinants of Health     Financial Resource Strain: Low Risk  (12/2/2022)    Overall Financial Resource Strain (CARDIA)     Difficulty of Paying Living Expenses: Not very hard   Food Insecurity: No Food Insecurity (12/2/2022)    Hunger Vital Sign     Worried About Running Out of Food in the Last Year: Never true     Ran Out of Food in the Last Year: Never true   Transportation Needs: No Transportation  Needs (12/2/2022)    PRAPARE - Transportation     Lack of Transportation (Medical): No     Lack of Transportation (Non-Medical): No   Physical Activity: Inactive (12/2/2022)    Exercise Vital Sign     Days of Exercise per Week: 0 days     Minutes of Exercise per Session: 0 min   Stress: No Stress Concern Present (12/2/2022)    [employer] [place] [employer] - Occupational Stress Questionnaire     Feeling of Stress : Not at all   Social Connections: Socially Isolated (12/2/2022)    Social Connection and Isolation Panel [NHANES]     Frequency of Communication with Friends and Family: More than three times a week     Frequency of Social Gatherings with Friends and Family: More than three times a week     Attends [employer] Services: Never     Active Member of Clubs or Organizations: No     Attends Club or Organization Meetings: Never     Marital Status:    Housing Stability: Low Risk  (12/2/2022)    Housing Stability Vital Sign     Unable to Pay for Housing in the Last Year: No     Number of Places Lived in the Last Year: 2     Unstable Housing in the Last Year: No     Family History   Problem Relation Age of Onset    Heart disease Mother         age 84 CHF    Hypertension Mother     Osteoarthritis Mother     Coronary aneurysm Father     Coronary artery disease Father     Hypertension Father     Heart disease Father     No Known Problems Son     No Known Problems Son     No Known Problems Son     No Known Problems Son     No Known Problems Sister     No Known Problems Brother         hx: varicose veins    No Known Problems Brother         MVA: parlazed    No Known Problems Brother      Review of patient's allergies indicates:   Allergen Reactions    Ammonium peroxydisulfate Shortness Of Breath    Avocado (laurus persea) Anaphylaxis    Bananas [banana] Anaphylaxis and Swelling     CRAMPS,    Chocolate flavor Anaphylaxis     MOUTH SWELLING    Fentanyl Shortness Of Breath and Itching    Percocet  "[oxycodone-acetaminophen] Shortness Of Breath and Itching    Silvadene [silver sulfadiazine]     Clindamycin     Corticosteroids (glucocorticoids)     Hydrocortisone Blisters    Lasix [furosemide] Blisters     BURNS SKIN    Nutritional supplement-fiber Itching    Pregabalin     Shellfish containing products     Adhesive Rash and Blisters    Iodine Rash    Iodine and iodide containing products Rash    Latex, natural rubber Rash    Pcn [penicillins] Rash    Sulfa (sulfonamide antibiotics) Rash and Blisters        Objective:  Vitals:    11/30/23 1317   BP: (!) 151/82   Pulse: 82   Resp: 18   Weight: (!) 156 kg (344 lb)   Height: 5' 7" (1.702 m)   PainSc:   8         Physical Exam  Vitals and nursing note reviewed. Exam conducted with a chaperone present.   Constitutional:       General: She is awake. She is not in acute distress.     Appearance: Normal appearance. She is obese. She is not ill-appearing, toxic-appearing or diaphoretic.   HENT:      Head: Normocephalic and atraumatic.      Nose: Nose normal.      Mouth/Throat:      Mouth: Mucous membranes are moist.      Pharynx: Oropharynx is clear.   Eyes:      Conjunctiva/sclera: Conjunctivae normal.      Pupils: Pupils are equal, round, and reactive to light.   Cardiovascular:      Rate and Rhythm: Normal rate.   Pulmonary:      Effort: Pulmonary effort is normal. No respiratory distress.   Abdominal:      Palpations: Abdomen is soft.   Musculoskeletal:         General: Normal range of motion.      Cervical back: Normal range of motion and neck supple.   Skin:     General: Skin is warm and dry.   Neurological:      General: No focal deficit present.      Mental Status: She is alert and oriented to person, place, and time. Mental status is at baseline.      Cranial Nerves: No cranial nerve deficit (II-XII).   Psychiatric:         Mood and Affect: Mood normal.         Behavior: Behavior normal. Behavior is cooperative.         Thought Content: Thought content normal. "           X-Ray KUB  Narrative: EXAMINATION:  XR KUB    CLINICAL HISTORY:  Constipation, unspecified    TECHNIQUE:  XR KUB    COMPARISON:  11/8/23    FINDINGS:  Lower lobes are clear    There is no bowel obstruction.  No free air.  No renal calculi.    Liver and renal silhouettes are normal.    No acute bone findings.  Impression: No bowel obstruction or renal calculi.  Mild to moderate colonic stool    Electronically signed by: Damon Barrow  Date:    11/16/2023  Time:    12:18         Office Visit on 11/16/2023   Component Date Value Ref Range Status    Sodium 11/16/2023 138  136 - 145 mmol/L Final    Potassium 11/16/2023 3.4 (L)  3.5 - 5.1 mmol/L Final    Chloride 11/16/2023 98  98 - 107 mmol/L Final    CO2 11/16/2023 35 (H)  21 - 32 mmol/L Final    Anion Gap 11/16/2023 8  7 - 16 mmol/L Final    Glucose 11/16/2023 118 (H)  74 - 106 mg/dL Final    BUN 11/16/2023 18  7 - 18 mg/dL Final    Creatinine 11/16/2023 1.39 (H)  0.55 - 1.02 mg/dL Final    BUN/Creatinine Ratio 11/16/2023 13  6 - 20 Final    Calcium 11/16/2023 10.6 (H)  8.5 - 10.1 mg/dL Final    eGFR 11/16/2023 42 (L)  >=60 mL/min/1.73m2 Final    Color, UA 11/16/2023 Light-Yellow  Colorless, Straw, Yellow, Light Yellow, Dark Yellow Final    Clarity, UA 11/16/2023 Clear  Clear Final    pH, UA 11/16/2023 5.0  5.0 to 8.0 pH Units Final    Leukocytes, UA 11/16/2023 Negative  Negative Final    Nitrites, UA 11/16/2023 Negative  Negative Final    Protein, UA 11/16/2023 Negative  Negative Final    Glucose, UA 11/16/2023 Normal  Normal mg/dL Final    Ketones, UA 11/16/2023 Negative  Negative mg/dL Final    Urobilinogen, UA 11/16/2023 Normal  0.2, 1.0, Normal mg/dL Final    Bilirubin, UA 11/16/2023 Negative  Negative Final    Blood, UA 11/16/2023 Negative  Negative Final    Specific [place], UA 11/16/2023 1.014  <=1.030 Final    WBC 11/16/2023 11.05 (H)  4.50 - 11.00 K/uL Final    RBC 11/16/2023 6.69 (H)  4.20 - 5.40 M/uL Final    Hemoglobin 11/16/2023 16.3 (H)   12.0 - 16.0 g/dL Final    Hematocrit 11/16/2023 52.5 (H)  38.0 - 47.0 % Final    MCV 11/16/2023 78.5 (L)  80.0 - 96.0 fL Final    MCH 11/16/2023 24.4 (L)  27.0 - 31.0 pg Final    MCHC 11/16/2023 31.0 (L)  32.0 - 36.0 g/dL Final    RDW 11/16/2023 22.5 (H)  11.5 - 14.5 % Final    Platelet Count 11/16/2023 253  150 - 400 K/uL Final    MPV 11/16/2023 11.1  9.4 - 12.4 fL Final    Neutrophils % 11/16/2023 75.4 (H)  53.0 - 65.0 % Final    Lymphocytes % 11/16/2023 15.5 (L)  27.0 - 41.0 % Final    Monocytes % 11/16/2023 6.1 (H)  2.0 - 6.0 % Final    Eosinophils % 11/16/2023 1.4  1.0 - 4.0 % Final    Basophils % 11/16/2023 0.6  0.0 - 1.0 % Final    Immature Granulocytes % 11/16/2023 1.0 (H)  0.0 - 0.4 % Final    nRBC, Auto 11/16/2023 0.0  <=0.0 % Final    Neutrophils, Abs 11/16/2023 8.33 (H)  1.80 - 7.70 K/uL Final    Lymphocytes, Absolute 11/16/2023 1.71  1.00 - 4.80 K/uL Final    Monocytes, Absolute 11/16/2023 0.67  0.00 - 0.80 K/uL Final    Eosinophils, Absolute 11/16/2023 0.16  0.00 - 0.50 K/uL Final    Basophils, Absolute 11/16/2023 0.07  0.00 - 0.20 K/uL Final    Immature Granulocytes, Absolute 11/16/2023 0.11 (H)  0.00 - 0.04 K/uL Final    nRBC, Absolute 11/16/2023 0.00  <=0.00 x10e3/uL Final    Diff Type 11/16/2023 Scan Smear   Final    Platelet Morphology 11/16/2023 Few Large Platelets (A)  Normal Final    Anisocytosis 11/16/2023 2+   Final    Crenated Cells 11/16/2023 Few   Final    Ovalocytes 11/16/2023 Few   Final    Hypochromic 11/16/2023 Few   Final   No results displayed because visit has over 200 results.      Admission on 11/03/2023, Discharged on 11/03/2023   Component Date Value Ref Range Status    PTT 11/03/2023 27.5  25.2 - 37.3 seconds Final    ProBNP 11/03/2023 3,183 (H)  1 - 125 pg/mL Final    Sodium 11/03/2023 133 (L)  136 - 145 mmol/L Final    Potassium 11/03/2023 2.1 (LL)  3.5 - 5.1 mmol/L Final    Chloride 11/03/2023 86 (L)  98 - 107 mmol/L Final    CO2 11/03/2023 43 (H)  21 - 32 mmol/L Final     Anion Gap 11/03/2023 6 (L)  7 - 16 mmol/L Final    Glucose 11/03/2023 145 (H)  74 - 106 mg/dL Final    BUN 11/03/2023 33 (H)  7 - 18 mg/dL Final    Creatinine 11/03/2023 1.78 (H)  0.55 - 1.02 mg/dL Final    BUN/Creatinine Ratio 11/03/2023 19  6 - 20 Final    Calcium 11/03/2023 9.2  8.5 - 10.1 mg/dL Final    Total Protein 11/03/2023 7.3  6.4 - 8.2 g/dL Final    Albumin 11/03/2023 3.4 (L)  3.5 - 5.0 g/dL Final    Globulin 11/03/2023 3.9  2.0 - 4.0 g/dL Final    A/G Ratio 11/03/2023 0.9   Final    Bilirubin, Total 11/03/2023 1.1  >0.0 - 1.2 mg/dL Final    Alk Phos 11/03/2023 89  50 - 130 U/L Final    ALT 11/03/2023 20  13 - 56 U/L Final    AST 11/03/2023 35  15 - 37 U/L Final    eGFR 11/03/2023 32 (L)  >=60 mL/min/1.73m2 Final    Lactic Acid 11/03/2023 1.9  0.4 - 2.0 mmol/L Final    Magnesium 11/03/2023 1.9  1.7 - 2.3 mg/dL Final    PT 11/03/2023 13.2  11.7 - 14.7 seconds Final    INR 11/03/2023 1.01  <=3.30 Final    Troponin I High Sensitivity 11/03/2023 297.1 (HH)  <=60.4 pg/mL Final    Color, UA 11/03/2023 Yellow  Colorless, Straw, Yellow, Light Yellow, Dark Yellow Final    Clarity, UA 11/03/2023 Clear  Clear Final    pH, UA 11/03/2023 5.5  5.0 to 8.0 pH Units Final    Leukocytes, UA 11/03/2023 Negative  Negative Final    Nitrites, UA 11/03/2023 Negative  Negative Final    Protein, UA 11/03/2023 30 (A)  Negative Final    Glucose, UA 11/03/2023 Negative  Normal mg/dL Final    Ketones, UA 11/03/2023 Trace  Negative mg/dL Final    Urobilinogen, UA 11/03/2023 1.0  0.2, 1.0, Normal mg/dL Final    Bilirubin, UA 11/03/2023 Small (A)  Negative Final    Blood, UA 11/03/2023 Negative  Negative Final    Specific Gravity, UA 11/03/2023 1.020  <=1.005, 1.010, 1.015, 1.020, 1.025, 1.030 Final    WBC 11/03/2023 9.62  4.50 - 11.00 K/uL Final    RBC 11/03/2023 6.07 (H)  4.20 - 5.40 M/uL Final    Hemoglobin 11/03/2023 14.4  12.0 - 16.0 g/dL Final    Hematocrit 11/03/2023 47.1 (H)  38.0 - 47.0 % Final    MCV 11/03/2023 77.6 (L)  80.0  - 96.0 fL Final    MCH 11/03/2023 23.7 (L)  27.0 - 31.0 pg Final    MCHC 11/03/2023 30.6 (L)  32.0 - 36.0 g/dL Final    RDW 11/03/2023 19.7 (H)  11.5 - 14.5 % Final    Platelet Count 11/03/2023 233  150 - 400 K/uL Final    MPV 11/03/2023 10.3  9.4 - 12.4 fL Final    Neutrophils % 11/03/2023 72.7 (H)  53.0 - 65.0 % Final    Lymphocytes % 11/03/2023 19.1 (L)  27.0 - 41.0 % Final    Monocytes % 11/03/2023 6.3 (H)  2.0 - 6.0 % Final    Eosinophils % 11/03/2023 1.0  1.0 - 4.0 % Final    Basophils % 11/03/2023 0.4  0.0 - 1.0 % Final    Immature Granulocytes % 11/03/2023 0.5 (H)  0.0 - 0.4 % Final    nRBC, Auto 11/03/2023 0.4 (H)  <=0.0 % Final    Neutrophils, Abs 11/03/2023 6.98  1.80 - 7.70 K/uL Final    Lymphocytes, Absolute 11/03/2023 1.84  1.00 - 4.80 K/uL Final    Monocytes, Absolute 11/03/2023 0.61  0.00 - 0.80 K/uL Final    Eosinophils, Absolute 11/03/2023 0.10  0.00 - 0.50 K/uL Final    Basophils, Absolute 11/03/2023 0.04  0.00 - 0.20 K/uL Final    Immature Granulocytes, Absolute 11/03/2023 0.05 (H)  0.00 - 0.04 K/uL Final    nRBC, Absolute 11/03/2023 0.04 (H)  <=0.00 x10e3/uL Final    Diff Type 11/03/2023 Auto   Final    POC Glucose 11/03/2023 212 (H)  70 - 105 mg/dL Final    WBC, UA 11/03/2023 0-5  None Seen, 0-5 /hpf Final    RBC, UA 11/03/2023 0-3  None Seen, 0-3 /hpf Final    Bacteria, UA 11/03/2023 Few (A)  None Seen /hpf Final    Squamous Epithelial Cells, UA 11/03/2023 Moderate (A)  None Seen, Rare, None Seen To Occasional /lpf Final   Lab Visit on 11/03/2023   Component Date Value Ref Range Status    Sodium 11/03/2023 133 (L)  136 - 145 mmol/L Final    Potassium 11/03/2023 2.4 (LL)  3.5 - 5.1 mmol/L Final    Chloride 11/03/2023 88 (L)  98 - 107 mmol/L Final    CO2 11/03/2023 45 (H)  21 - 32 mmol/L Final    Anion Gap 11/03/2023 2 (L)  7 - 16 mmol/L Final    Glucose 11/03/2023 116 (H)  74 - 106 mg/dL Final    BUN 11/03/2023 29 (H)  7 - 18 mg/dL Final    Creatinine 11/03/2023 1.84 (H)  0.55 - 1.02 mg/dL  Final    BUN/Creatinine Ratio 11/03/2023 16  6 - 20 Final    Calcium 11/03/2023 9.0  8.5 - 10.1 mg/dL Final    eGFR 11/03/2023 30 (L)  >=60 mL/min/1.73m2 Final    Hemoglobin A1C 11/03/2023 6.3  4.5 - 6.6 % Final    Estimated Average Glucose 11/03/2023 124  mg/dL Final    WBC 11/03/2023 9.32  4.50 - 11.00 K/uL Final    RBC 11/03/2023 5.90 (H)  4.20 - 5.40 M/uL Final    Hemoglobin 11/03/2023 14.0  12.0 - 16.0 g/dL Final    Hematocrit 11/03/2023 45.4  38.0 - 47.0 % Final    MCV 11/03/2023 76.9 (L)  80.0 - 96.0 fL Final    MCH 11/03/2023 23.7 (L)  27.0 - 31.0 pg Final    MCHC 11/03/2023 30.8 (L)  32.0 - 36.0 g/dL Final    RDW 11/03/2023 19.9 (H)  11.5 - 14.5 % Final    Platelet Count 11/03/2023 216  150 - 400 K/uL Final    MPV 11/03/2023 10.7  9.4 - 12.4 fL Final    Neutrophils % 11/03/2023 72.1 (H)  53.0 - 65.0 % Final    Lymphocytes % 11/03/2023 18.6 (L)  27.0 - 41.0 % Final    Monocytes % 11/03/2023 7.2 (H)  2.0 - 6.0 % Final    Eosinophils % 11/03/2023 1.1  1.0 - 4.0 % Final    Basophils % 11/03/2023 0.4  0.0 - 1.0 % Final    Immature Granulocytes % 11/03/2023 0.6 (H)  0.0 - 0.4 % Final    nRBC, Auto 11/03/2023 0.3 (H)  <=0.0 % Final    Neutrophils, Abs 11/03/2023 6.72  1.80 - 7.70 K/uL Final    Lymphocytes, Absolute 11/03/2023 1.73  1.00 - 4.80 K/uL Final    Monocytes, Absolute 11/03/2023 0.67  0.00 - 0.80 K/uL Final    Eosinophils, Absolute 11/03/2023 0.10  0.00 - 0.50 K/uL Final    Basophils, Absolute 11/03/2023 0.04  0.00 - 0.20 K/uL Final    Immature Granulocytes, Absolute 11/03/2023 0.06 (H)  0.00 - 0.04 K/uL Final    nRBC, Absolute 11/03/2023 0.03 (H)  <=0.00 x10e3/uL Final    Diff Type 11/03/2023 Auto   Final   Office Visit on 10/17/2023   Component Date Value Ref Range Status    Sodium 10/17/2023 135 (L)  136 - 145 mmol/L Final    Potassium 10/17/2023 2.7 (L)  3.5 - 5.1 mmol/L Final    Chloride 10/17/2023 92 (L)  98 - 107 mmol/L Final    CO2 10/17/2023 41 (H)  21 - 32 mmol/L Final    Anion Gap 10/17/2023 5  (L)  7 - 16 mmol/L Final    Glucose 10/17/2023 100  74 - 106 mg/dL Final    BUN 10/17/2023 30 (H)  7 - 18 mg/dL Final    Creatinine 10/17/2023 1.22 (H)  0.55 - 1.02 mg/dL Final    BUN/Creatinine Ratio 10/17/2023 25 (H)  6 - 20 Final    Calcium 10/17/2023 9.8  8.5 - 10.1 mg/dL Final    eGFR 10/17/2023 50 (L)  >=60 mL/min/1.73m2 Final    ProBNP 10/17/2023 286 (H)  1 - 125 pg/mL Final   Admission on 10/05/2023, Discharged on 10/05/2023   Component Date Value Ref Range Status    Sodium 10/05/2023 138  136 - 145 mmol/L Final    Potassium 10/05/2023 3.0 (L)  3.5 - 5.1 mmol/L Final    Chloride 10/05/2023 98  98 - 107 mmol/L Final    CO2 10/05/2023 35 (H)  21 - 32 mmol/L Final    Anion Gap 10/05/2023 8  7 - 16 mmol/L Final    Glucose 10/05/2023 124 (H)  74 - 106 mg/dL Final    BUN 10/05/2023 12  7 - 18 mg/dL Final    Creatinine 10/05/2023 1.07 (H)  0.55 - 1.02 mg/dL Final    BUN/Creatinine Ratio 10/05/2023 11  6 - 20 Final    Calcium 10/05/2023 9.7  8.5 - 10.1 mg/dL Final    Total Protein 10/05/2023 7.0  6.4 - 8.2 g/dL Final    Albumin 10/05/2023 3.3 (L)  3.5 - 5.0 g/dL Final    Globulin 10/05/2023 3.7  2.0 - 4.0 g/dL Final    A/G Ratio 10/05/2023 0.9   Final    Bilirubin, Total 10/05/2023 0.4  >0.0 - 1.2 mg/dL Final    Alk Phos 10/05/2023 111  50 - 130 U/L Final    ALT 10/05/2023 13  13 - 56 U/L Final    AST 10/05/2023 24  15 - 37 U/L Final    eGFR 10/05/2023 58 (L)  >=60 mL/min/1.73m2 Final    Troponin I High Sensitivity 10/05/2023 40.8  <=60.4 pg/mL Final    ProBNP 10/05/2023 1,932 (H)  1 - 125 pg/mL Final    D-Dimer 10/05/2023 2.46 (H)  0.00 - 0.47 µg/mL Final    WBC 10/05/2023 8.49  4.50 - 11.00 K/uL Final    RBC 10/05/2023 6.13 (H)  4.20 - 5.40 M/uL Final    Hemoglobin 10/05/2023 15.3  12.0 - 16.0 g/dL Final    Hematocrit 10/05/2023 48.4 (H)  38.0 - 47.0 % Final    MCV 10/05/2023 79.0 (L)  80.0 - 96.0 fL Final    MCH 10/05/2023 25.0 (L)  27.0 - 31.0 pg Final    MCHC 10/05/2023 31.6 (L)  32.0 - 36.0 g/dL Final    RDW  10/05/2023 17.7 (H)  11.5 - 14.5 % Final    Platelet Count 10/05/2023 290  150 - 400 K/uL Final    MPV 10/05/2023 10.8  9.4 - 12.4 fL Final    Neutrophils % 10/05/2023 67.4 (H)  53.0 - 65.0 % Final    Lymphocytes % 10/05/2023 19.4 (L)  27.0 - 41.0 % Final    Monocytes % 10/05/2023 10.0 (H)  2.0 - 6.0 % Final    Eosinophils % 10/05/2023 1.8  1.0 - 4.0 % Final    Basophils % 10/05/2023 0.7  0.0 - 1.0 % Final    Immature Granulocytes % 10/05/2023 0.7 (H)  0.0 - 0.4 % Final    nRBC, Auto 10/05/2023 0.0  <=0.0 % Final    Neutrophils, Abs 10/05/2023 5.72  1.80 - 7.70 K/uL Final    Lymphocytes, Absolute 10/05/2023 1.65  1.00 - 4.80 K/uL Final    Monocytes, Absolute 10/05/2023 0.85 (H)  0.00 - 0.80 K/uL Final    Eosinophils, Absolute 10/05/2023 0.15  0.00 - 0.50 K/uL Final    Basophils, Absolute 10/05/2023 0.06  0.00 - 0.20 K/uL Final    Immature Granulocytes, Absolute 10/05/2023 0.06 (H)  0.00 - 0.04 K/uL Final    nRBC, Absolute 10/05/2023 0.00  <=0.00 x10e3/uL Final    Diff Type 10/05/2023 Auto   Final    POC Glucose 10/05/2023 122 (H)  70 - 105 mg/dL Final   Office Visit on 10/02/2023   Component Date Value Ref Range Status    POC Amphetamines 10/02/2023 Negative  Negative, Inconclusive Final    POC Barbiturates 10/02/2023 Negative  Negative, Inconclusive Final    POC Benzodiazepines 10/02/2023 Negative  Negative, Inconclusive Final    POC Cocaine 10/02/2023 Negative  Negative, Inconclusive Final    POC THC 10/02/2023 Negative  Negative, Inconclusive Final    POC Methadone 10/02/2023 Negative  Negative, Inconclusive Final    POC Methamphetamine 10/02/2023 Negative  Negative, Inconclusive Final    POC Opiates 10/02/2023 Presumptive Positive (A)  Negative, Inconclusive Final    POC Oxycodone 10/02/2023 Negative  Negative, Inconclusive Final    POC Phencyclidine 10/02/2023 Negative  Negative, Inconclusive Final    POC Methylenedioxymethamphetamine * 10/02/2023 Negative  Negative, Inconclusive Final    POC Tricyclic  Antidepressants 10/02/2023 Negative  Negative, Inconclusive Final    POC Buprenorphine 10/02/2023 Negative   Final     Acceptable 10/02/2023 Yes   Final    POC Temperature (Urine) 10/02/2023 92   Final   Admission on 10/01/2023, Discharged on 10/01/2023   Component Date Value Ref Range Status    POC Glucose 10/01/2023 98  70 - 105 mg/dL Final   Admission on 09/29/2023, Discharged on 09/30/2023   Component Date Value Ref Range Status    Culture, Wound/Abscess 09/30/2023 No Growth at 2 Days   Final   Office Visit on 06/28/2023   Component Date Value Ref Range Status    Sodium 06/28/2023 138  136 - 145 mmol/L Final    Potassium 06/28/2023 4.3  3.5 - 5.1 mmol/L Final    Chloride 06/28/2023 102  98 - 107 mmol/L Final    CO2 06/28/2023 32  21 - 32 mmol/L Final    Anion Gap 06/28/2023 8  7 - 16 mmol/L Final    Glucose 06/28/2023 86  74 - 106 mg/dL Final    BUN 06/28/2023 14  7 - 18 mg/dL Final    Creatinine 06/28/2023 0.98  0.55 - 1.02 mg/dL Final    BUN/Creatinine Ratio 06/28/2023 14  6 - 20 Final    Calcium 06/28/2023 9.6  8.5 - 10.1 mg/dL Final    eGFR 06/28/2023 65  >=60 mL/min/1.73m2 Final    ProBNP 06/28/2023 112  1 - 125 pg/mL Final    Uric Acid 06/28/2023 3.6  2.6 - 6.0 mg/dL Final    Color, UA 06/28/2023 Yellow  Colorless, Straw, Yellow, Light Yellow, Dark Yellow Final    Clarity, UA 06/28/2023 Clear  Clear Final    pH, UA 06/28/2023 5.5  5.0 to 8.0 pH Units Final    Leukocytes, UA 06/28/2023 Negative  Negative Final    Nitrites, UA 06/28/2023 Negative  Negative Final    Protein, UA 06/28/2023 20 (A)  Negative Final    Glucose, UA 06/28/2023 Normal  Normal mg/dL Final    Ketones, UA 06/28/2023 Negative  Negative mg/dL Final    Urobilinogen, UA 06/28/2023 2 (A)  0.2, 1.0, Normal mg/dL Final    Bilirubin, UA 06/28/2023 Negative  Negative Final    Blood, UA 06/28/2023 Negative  Negative Final    Specific Gravity, UA 06/28/2023 1.033 (H)  <=1.030 Final   There may be more visits with results that are  not included.         No orders of the defined types were placed in this encounter.          Requested Prescriptions     Signed Prescriptions Disp Refills    HYDROcodone-acetaminophen (NORCO)  mg per tablet 120 tablet 0     Sig: Take 1 tablet by mouth every 6 (six) hours as needed for Pain.    HYDROcodone-acetaminophen (NORCO)  mg per tablet 120 tablet 0     Sig: Take 1 tablet by mouth every 6 (six) hours as needed for Pain.    HYDROcodone-acetaminophen (NORCO)  mg per tablet 120 tablet 0     Sig: Take 1 tablet by mouth every 6 (six) hours as needed for Pain.       Assessment:     1. Lumbosacral spondylosis without myelopathy    2. Chronic pain of right knee         A's of Opioid Risk Assessment  Activity:Patient can perform ADL.   Analgesia:Patients pain is partially controlled by current medication. Patient has tried OTC medications such as Tylenol and Ibuprofen with out relief.   Adverse Effects: Patient denies constipation or sedation.  Aberrant Behavior:  reviewed with no aberrant drug seeking/taking behavior.  Overdose reversal drug naloxone discussed    Drug screen reviewed      MRI cervical spine [facility] dated April 18, 2023 multiple level degenerative changes C4/5 disc osteophyte complex mild central canal stenosis uncovertebral hypertrophic        Plan:    Narcan December 2022    Wheelchair/4 point walker for mobility due to chronic nonhealing wounds right foot     Following wound management chronic ulcers lower extremities    Following orthopedics knee pain [facility], she states she was advised not to have surgery due to her weight    Diabetic, limit steroids    Cannot tolerate OxyContin she is severely allergic      Chronic back and joint pain no new complaints today she states current medications helping her discomfort     She would like to continue with conservative management    Continue current medication    Continue home exercise program as tolerated    Follow-up 2  months    Dr. Crandall, August 2024    Bring original prescription medication bottles/container/box with labels to each visit

## 2023-11-30 ENCOUNTER — OFFICE VISIT (OUTPATIENT)
Dept: PAIN MEDICINE | Facility: CLINIC | Age: 63
End: 2023-11-30
Payer: MEDICARE

## 2023-11-30 VITALS
WEIGHT: 293 LBS | RESPIRATION RATE: 18 BRPM | SYSTOLIC BLOOD PRESSURE: 151 MMHG | HEIGHT: 67 IN | HEART RATE: 82 BPM | DIASTOLIC BLOOD PRESSURE: 82 MMHG | BODY MASS INDEX: 45.99 KG/M2

## 2023-11-30 DIAGNOSIS — G89.29 CHRONIC PAIN OF RIGHT KNEE: Chronic | ICD-10-CM

## 2023-11-30 DIAGNOSIS — M25.561 CHRONIC PAIN OF RIGHT KNEE: Chronic | ICD-10-CM

## 2023-11-30 DIAGNOSIS — M47.817 LUMBOSACRAL SPONDYLOSIS WITHOUT MYELOPATHY: Primary | Chronic | ICD-10-CM

## 2023-11-30 PROCEDURE — 99215 OFFICE O/P EST HI 40 MIN: CPT | Mod: PBBFAC | Performed by: PHYSICIAN ASSISTANT

## 2023-11-30 PROCEDURE — 1159F MED LIST DOCD IN RCRD: CPT | Mod: CPTII,,, | Performed by: PHYSICIAN ASSISTANT

## 2023-11-30 PROCEDURE — 3061F PR NEG MICROALBUMINURIA RESULT DOCUMENTED/REVIEW: ICD-10-PCS | Mod: CPTII,,, | Performed by: PHYSICIAN ASSISTANT

## 2023-11-30 PROCEDURE — 1111F DSCHRG MED/CURRENT MED MERGE: CPT | Mod: CPTII,,, | Performed by: PHYSICIAN ASSISTANT

## 2023-11-30 PROCEDURE — 3066F NEPHROPATHY DOC TX: CPT | Mod: CPTII,,, | Performed by: PHYSICIAN ASSISTANT

## 2023-11-30 PROCEDURE — 1159F PR MEDICATION LIST DOCUMENTED IN MEDICAL RECORD: ICD-10-PCS | Mod: CPTII,,, | Performed by: PHYSICIAN ASSISTANT

## 2023-11-30 PROCEDURE — 1111F PR DISCHARGE MEDS RECONCILED W/ CURRENT OUTPATIENT MED LIST: ICD-10-PCS | Mod: CPTII,,, | Performed by: PHYSICIAN ASSISTANT

## 2023-11-30 PROCEDURE — 99214 PR OFFICE/OUTPT VISIT, EST, LEVL IV, 30-39 MIN: ICD-10-PCS | Mod: S$PBB,,, | Performed by: PHYSICIAN ASSISTANT

## 2023-11-30 PROCEDURE — 3079F PR MOST RECENT DIASTOLIC BLOOD PRESSURE 80-89 MM HG: ICD-10-PCS | Mod: CPTII,,, | Performed by: PHYSICIAN ASSISTANT

## 2023-11-30 PROCEDURE — 3077F SYST BP >= 140 MM HG: CPT | Mod: CPTII,,, | Performed by: PHYSICIAN ASSISTANT

## 2023-11-30 PROCEDURE — 3008F BODY MASS INDEX DOCD: CPT | Mod: CPTII,,, | Performed by: PHYSICIAN ASSISTANT

## 2023-11-30 PROCEDURE — 3077F PR MOST RECENT SYSTOLIC BLOOD PRESSURE >= 140 MM HG: ICD-10-PCS | Mod: CPTII,,, | Performed by: PHYSICIAN ASSISTANT

## 2023-11-30 PROCEDURE — 3061F NEG MICROALBUMINURIA REV: CPT | Mod: CPTII,,, | Performed by: PHYSICIAN ASSISTANT

## 2023-11-30 PROCEDURE — 3044F PR MOST RECENT HEMOGLOBIN A1C LEVEL <7.0%: ICD-10-PCS | Mod: CPTII,,, | Performed by: PHYSICIAN ASSISTANT

## 2023-11-30 PROCEDURE — 99214 OFFICE O/P EST MOD 30 MIN: CPT | Mod: S$PBB,,, | Performed by: PHYSICIAN ASSISTANT

## 2023-11-30 PROCEDURE — 3079F DIAST BP 80-89 MM HG: CPT | Mod: CPTII,,, | Performed by: PHYSICIAN ASSISTANT

## 2023-11-30 PROCEDURE — 3066F PR DOCUMENTATION OF TREATMENT FOR NEPHROPATHY: ICD-10-PCS | Mod: CPTII,,, | Performed by: PHYSICIAN ASSISTANT

## 2023-11-30 PROCEDURE — 3044F HG A1C LEVEL LT 7.0%: CPT | Mod: CPTII,,, | Performed by: PHYSICIAN ASSISTANT

## 2023-11-30 PROCEDURE — 3008F PR BODY MASS INDEX (BMI) DOCUMENTED: ICD-10-PCS | Mod: CPTII,,, | Performed by: PHYSICIAN ASSISTANT

## 2023-11-30 RX ORDER — HYDROCODONE BITARTRATE AND ACETAMINOPHEN 10; 325 MG/1; MG/1
1 TABLET ORAL EVERY 6 HOURS PRN
Qty: 120 TABLET | Refills: 0 | Status: SHIPPED | OUTPATIENT
Start: 2024-01-06 | End: 2023-12-06 | Stop reason: SDUPTHER

## 2023-11-30 RX ORDER — HYDROCODONE BITARTRATE AND ACETAMINOPHEN 10; 325 MG/1; MG/1
1 TABLET ORAL EVERY 6 HOURS PRN
Qty: 120 TABLET | Refills: 0 | Status: SHIPPED | OUTPATIENT
Start: 2024-02-05 | End: 2023-12-06 | Stop reason: SDUPTHER

## 2023-11-30 RX ORDER — HYDROCODONE BITARTRATE AND ACETAMINOPHEN 10; 325 MG/1; MG/1
1 TABLET ORAL EVERY 6 HOURS PRN
Qty: 120 TABLET | Refills: 0 | Status: SHIPPED | OUTPATIENT
Start: 2023-12-07 | End: 2024-01-06

## 2023-12-06 ENCOUNTER — OFFICE VISIT (OUTPATIENT)
Dept: CARDIOLOGY | Facility: CLINIC | Age: 63
End: 2023-12-06
Payer: MEDICARE

## 2023-12-06 ENCOUNTER — HOSPITAL ENCOUNTER (EMERGENCY)
Facility: HOSPITAL | Age: 63
Discharge: HOME | End: 2023-12-06
Payer: MEDICARE

## 2023-12-06 VITALS
BODY MASS INDEX: 45.99 KG/M2 | HEART RATE: 95 BPM | DIASTOLIC BLOOD PRESSURE: 77 MMHG | WEIGHT: 293 LBS | OXYGEN SATURATION: 94 % | TEMPERATURE: 99 F | SYSTOLIC BLOOD PRESSURE: 144 MMHG | RESPIRATION RATE: 16 BRPM | HEIGHT: 67 IN

## 2023-12-06 VITALS
SYSTOLIC BLOOD PRESSURE: 118 MMHG | WEIGHT: 293 LBS | DIASTOLIC BLOOD PRESSURE: 70 MMHG | HEART RATE: 100 BPM | HEIGHT: 67 IN | OXYGEN SATURATION: 85 % | BODY MASS INDEX: 45.99 KG/M2 | RESPIRATION RATE: 16 BRPM

## 2023-12-06 DIAGNOSIS — I50.9 CONGESTIVE HEART FAILURE, UNSPECIFIED HF CHRONICITY, UNSPECIFIED HEART FAILURE TYPE: ICD-10-CM

## 2023-12-06 DIAGNOSIS — R06.02 SHORTNESS OF BREATH: ICD-10-CM

## 2023-12-06 DIAGNOSIS — E87.6 HYPOKALEMIA: ICD-10-CM

## 2023-12-06 DIAGNOSIS — E87.6 HYPOKALEMIA: Primary | ICD-10-CM

## 2023-12-06 DIAGNOSIS — I50.9 ACUTE ON CHRONIC CONGESTIVE HEART FAILURE, UNSPECIFIED HEART FAILURE TYPE: Primary | ICD-10-CM

## 2023-12-06 DIAGNOSIS — I50.33 ACUTE ON CHRONIC DIASTOLIC CHF (CONGESTIVE HEART FAILURE): Primary | ICD-10-CM

## 2023-12-06 LAB
ALBUMIN SERPL BCP-MCNC: 3.4 G/DL (ref 3.5–5)
ALBUMIN/GLOB SERPL: 1 {RATIO}
ALP SERPL-CCNC: 111 U/L (ref 50–130)
ALT SERPL W P-5'-P-CCNC: 22 U/L (ref 13–56)
ANION GAP SERPL CALCULATED.3IONS-SCNC: 5 MMOL/L (ref 7–16)
ANISOCYTOSIS BLD QL SMEAR: ABNORMAL
AST SERPL W P-5'-P-CCNC: 35 U/L (ref 15–37)
BASOPHILS # BLD AUTO: 0.05 K/UL (ref 0–0.2)
BASOPHILS NFR BLD AUTO: 0.6 % (ref 0–1)
BILIRUB SERPL-MCNC: 0.7 MG/DL (ref ?–1.2)
BUN SERPL-MCNC: 15 MG/DL (ref 7–18)
BUN/CREAT SERPL: 16 (ref 6–20)
CALCIUM SERPL-MCNC: 9.7 MG/DL (ref 8.5–10.1)
CHLORIDE SERPL-SCNC: 97 MMOL/L (ref 98–107)
CO2 SERPL-SCNC: 38 MMOL/L (ref 21–32)
CREAT SERPL-MCNC: 0.96 MG/DL (ref 0.55–1.02)
DIFFERENTIAL METHOD BLD: ABNORMAL
EGFR (NO RACE VARIABLE) (RUSH/TITUS): 66 ML/MIN/1.73M2
EOSINOPHIL # BLD AUTO: 0.3 K/UL (ref 0–0.5)
EOSINOPHIL NFR BLD AUTO: 3.4 % (ref 1–4)
ERYTHROCYTE [DISTWIDTH] IN BLOOD BY AUTOMATED COUNT: 24 % (ref 11.5–14.5)
FLUAV AG UPPER RESP QL IA.RAPID: NEGATIVE
FLUBV AG UPPER RESP QL IA.RAPID: NEGATIVE
GLOBULIN SER-MCNC: 3.5 G/DL (ref 2–4)
GLUCOSE SERPL-MCNC: 97 MG/DL (ref 74–106)
HCT VFR BLD AUTO: 49.6 % (ref 38–47)
HGB BLD-MCNC: 15.6 G/DL (ref 12–16)
IMM GRANULOCYTES # BLD AUTO: 0.06 K/UL (ref 0–0.04)
IMM GRANULOCYTES NFR BLD: 0.7 % (ref 0–0.4)
LYMPHOCYTES # BLD AUTO: 1.77 K/UL (ref 1–4.8)
LYMPHOCYTES NFR BLD AUTO: 20.2 % (ref 27–41)
MCH RBC QN AUTO: 25.1 PG (ref 27–31)
MCHC RBC AUTO-ENTMCNC: 31.5 G/DL (ref 32–36)
MCV RBC AUTO: 79.7 FL (ref 80–96)
MONOCYTES # BLD AUTO: 0.52 K/UL (ref 0–0.8)
MONOCYTES NFR BLD AUTO: 5.9 % (ref 2–6)
MPC BLD CALC-MCNC: 11.1 FL (ref 9.4–12.4)
NEUTROPHILS # BLD AUTO: 6.06 K/UL (ref 1.8–7.7)
NEUTROPHILS NFR BLD AUTO: 69.2 % (ref 53–65)
NRBC # BLD AUTO: 0 X10E3/UL
NRBC, AUTO (.00): 0 %
NT-PROBNP SERPL-MCNC: 1371 PG/ML (ref 1–125)
PLATELET # BLD AUTO: 218 K/UL (ref 150–400)
PLATELET MORPHOLOGY: ABNORMAL
POLYCHROMASIA BLD QL SMEAR: ABNORMAL
POTASSIUM SERPL-SCNC: 3.5 MMOL/L (ref 3.5–5.1)
PROT SERPL-MCNC: 6.9 G/DL (ref 6.4–8.2)
RBC # BLD AUTO: 6.22 M/UL (ref 4.2–5.4)
SARS-COV-2 RDRP RESP QL NAA+PROBE: NEGATIVE
SODIUM SERPL-SCNC: 136 MMOL/L (ref 136–145)
TROPONIN I SERPL DL<=0.01 NG/ML-MCNC: 13.6 PG/ML
TROPONIN I SERPL DL<=0.01 NG/ML-MCNC: 15.8 PG/ML
WBC # BLD AUTO: 8.76 K/UL (ref 4.5–11)

## 2023-12-06 PROCEDURE — 80053 COMPREHEN METABOLIC PANEL: CPT | Performed by: NURSE PRACTITIONER

## 2023-12-06 PROCEDURE — 3061F PR NEG MICROALBUMINURIA RESULT DOCUMENTED/REVIEW: ICD-10-PCS | Mod: CPTII,,, | Performed by: NURSE PRACTITIONER

## 2023-12-06 PROCEDURE — 85025 COMPLETE CBC W/AUTO DIFF WBC: CPT | Performed by: NURSE PRACTITIONER

## 2023-12-06 PROCEDURE — 87635 SARS-COV-2 COVID-19 AMP PRB: CPT | Performed by: NURSE PRACTITIONER

## 2023-12-06 PROCEDURE — 96374 THER/PROPH/DIAG INJ IV PUSH: CPT

## 2023-12-06 PROCEDURE — 99213 OFFICE O/P EST LOW 20 MIN: CPT | Mod: S$PBB,,, | Performed by: NURSE PRACTITIONER

## 2023-12-06 PROCEDURE — 93010 EKG 12-LEAD: ICD-10-PCS | Mod: 77,,, | Performed by: INTERNAL MEDICINE

## 2023-12-06 PROCEDURE — 3061F NEG MICROALBUMINURIA REV: CPT | Mod: CPTII,,, | Performed by: NURSE PRACTITIONER

## 2023-12-06 PROCEDURE — 3078F PR MOST RECENT DIASTOLIC BLOOD PRESSURE < 80 MM HG: ICD-10-PCS | Mod: CPTII,,, | Performed by: NURSE PRACTITIONER

## 2023-12-06 PROCEDURE — 3044F PR MOST RECENT HEMOGLOBIN A1C LEVEL <7.0%: ICD-10-PCS | Mod: CPTII,,, | Performed by: NURSE PRACTITIONER

## 2023-12-06 PROCEDURE — 99284 PR EMERGENCY DEPT VISIT,LEVEL IV: ICD-10-PCS | Mod: ,,, | Performed by: NURSE PRACTITIONER

## 2023-12-06 PROCEDURE — 1159F PR MEDICATION LIST DOCUMENTED IN MEDICAL RECORD: ICD-10-PCS | Mod: CPTII,,, | Performed by: NURSE PRACTITIONER

## 2023-12-06 PROCEDURE — 3008F PR BODY MASS INDEX (BMI) DOCUMENTED: ICD-10-PCS | Mod: CPTII,,, | Performed by: NURSE PRACTITIONER

## 2023-12-06 PROCEDURE — 93010 ELECTROCARDIOGRAM REPORT: CPT | Mod: S$PBB,,, | Performed by: STUDENT IN AN ORGANIZED HEALTH CARE EDUCATION/TRAINING PROGRAM

## 2023-12-06 PROCEDURE — 99284 EMERGENCY DEPT VISIT MOD MDM: CPT | Mod: ,,, | Performed by: NURSE PRACTITIONER

## 2023-12-06 PROCEDURE — 99285 EMERGENCY DEPT VISIT HI MDM: CPT | Mod: 25

## 2023-12-06 PROCEDURE — 3044F HG A1C LEVEL LT 7.0%: CPT | Mod: CPTII,,, | Performed by: NURSE PRACTITIONER

## 2023-12-06 PROCEDURE — 1159F MED LIST DOCD IN RCRD: CPT | Mod: CPTII,,, | Performed by: NURSE PRACTITIONER

## 2023-12-06 PROCEDURE — 99213 PR OFFICE/OUTPT VISIT, EST, LEVL III, 20-29 MIN: ICD-10-PCS | Mod: S$PBB,,, | Performed by: NURSE PRACTITIONER

## 2023-12-06 PROCEDURE — 1111F PR DISCHARGE MEDS RECONCILED W/ CURRENT OUTPATIENT MED LIST: ICD-10-PCS | Mod: CPTII,,, | Performed by: NURSE PRACTITIONER

## 2023-12-06 PROCEDURE — 3008F BODY MASS INDEX DOCD: CPT | Mod: CPTII,,, | Performed by: NURSE PRACTITIONER

## 2023-12-06 PROCEDURE — 87804 INFLUENZA ASSAY W/OPTIC: CPT | Performed by: NURSE PRACTITIONER

## 2023-12-06 PROCEDURE — 3066F PR DOCUMENTATION OF TREATMENT FOR NEPHROPATHY: ICD-10-PCS | Mod: CPTII,,, | Performed by: NURSE PRACTITIONER

## 2023-12-06 PROCEDURE — 93005 ELECTROCARDIOGRAM TRACING: CPT

## 2023-12-06 PROCEDURE — 3066F NEPHROPATHY DOC TX: CPT | Mod: CPTII,,, | Performed by: NURSE PRACTITIONER

## 2023-12-06 PROCEDURE — 99215 OFFICE O/P EST HI 40 MIN: CPT | Mod: PBBFAC,25 | Performed by: NURSE PRACTITIONER

## 2023-12-06 PROCEDURE — 84484 ASSAY OF TROPONIN QUANT: CPT | Performed by: NURSE PRACTITIONER

## 2023-12-06 PROCEDURE — 93010 EKG 12-LEAD: ICD-10-PCS | Mod: S$PBB,,, | Performed by: STUDENT IN AN ORGANIZED HEALTH CARE EDUCATION/TRAINING PROGRAM

## 2023-12-06 PROCEDURE — 3074F SYST BP LT 130 MM HG: CPT | Mod: CPTII,,, | Performed by: NURSE PRACTITIONER

## 2023-12-06 PROCEDURE — 3078F DIAST BP <80 MM HG: CPT | Mod: CPTII,,, | Performed by: NURSE PRACTITIONER

## 2023-12-06 PROCEDURE — 1111F DSCHRG MED/CURRENT MED MERGE: CPT | Mod: CPTII,,, | Performed by: NURSE PRACTITIONER

## 2023-12-06 PROCEDURE — 3074F PR MOST RECENT SYSTOLIC BLOOD PRESSURE < 130 MM HG: ICD-10-PCS | Mod: CPTII,,, | Performed by: NURSE PRACTITIONER

## 2023-12-06 PROCEDURE — 25000003 PHARM REV CODE 250: Performed by: NURSE PRACTITIONER

## 2023-12-06 PROCEDURE — 93010 ELECTROCARDIOGRAM REPORT: CPT | Mod: 77,,, | Performed by: INTERNAL MEDICINE

## 2023-12-06 PROCEDURE — 93005 ELECTROCARDIOGRAM TRACING: CPT | Mod: PBBFAC | Performed by: STUDENT IN AN ORGANIZED HEALTH CARE EDUCATION/TRAINING PROGRAM

## 2023-12-06 PROCEDURE — 83880 ASSAY OF NATRIURETIC PEPTIDE: CPT | Performed by: NURSE PRACTITIONER

## 2023-12-06 RX ORDER — BUMETANIDE 0.25 MG/ML
2 INJECTION INTRAMUSCULAR; INTRAVENOUS
Status: COMPLETED | OUTPATIENT
Start: 2023-12-06 | End: 2023-12-06

## 2023-12-06 RX ORDER — BUMETANIDE 2 MG/1
2 TABLET ORAL 2 TIMES DAILY
Qty: 60 TABLET | Refills: 0 | Status: SHIPPED | OUTPATIENT
Start: 2023-12-06 | End: 2024-02-07 | Stop reason: SDUPTHER

## 2023-12-06 RX ADMIN — BUMETANIDE 2 MG: 0.25 INJECTION INTRAMUSCULAR; INTRAVENOUS at 03:12

## 2023-12-06 NOTE — ED TRIAGE NOTES
"PT PRESENTS WITH C/O SOB SINCE THIS MORNING. EDEMA NOTED TO LOWER EXTREMITIES. PT HAS HX OF CHF AND STATES OUT OF "FLUID PILLS" X3DAYS.  "

## 2023-12-06 NOTE — ASSESSMENT & PLAN NOTE
Echo    Interpretation Summary    Left Ventricle: The left ventricle is normal in size. Normal wall thickness. Normal wall motion. There is normal systolic function with a visually estimated ejection fraction of 60 - 65%. Ejection fraction by visual approximation is 50%. There is normal diastolic function.    Right Ventricle: Moderate right ventricular enlargement. Systolic function is normal.    Left Atrium: Left atrium is mildly dilated.    Right Atrium: Right atrium is moderately dilated.    Aortic Valve: The aortic valve is a trileaflet valve.    Tricuspid Valve: There is mild regurgitation. No paravalvular regurgitation.  .  - decompensated heart failure due to noncompliance with diuretic  - advised pt to go to the ED for further evaluation, pt agreed and was taken to the ED via wheelchair, report was called to ED physician  - pt has a f/u appt with Dr. Ortega 12/20/23

## 2023-12-06 NOTE — PROGRESS NOTES
PCP: Regan Frausto MD    Referring Provider:     Subjective:   Holly Porter is a 63 y.o. female with multiple comorbidities, PMHx significant for HFpEF, HTN, COPD, DVT, DM2, HLD, morbid obesity, obesity hypoventilation syndrome, seizure disorder, thyroid cancer, who presents for hospital discharge follow up.     Pt was recently admitted to the hospital for acute on chronic diastolic heart failure. She had a LHC that revealed normal coronaries. She was discharged home on Bumex.     She is seen in clinic today with c/o SOB and AGUILAR. She states she has been out of her Bumex for at least 3 days. She states she has been trying to call her PCP's office for refills but she could not get an answer. She has conversational dyspnea, her O2 sat was initially 85%, improved to 92% after applying O2 at 2L per NC. Her HR is 100-107bpm. On exam, she has BLE edema and rales in bilateral lung bases.      EKG   Results for orders placed or performed during the hospital encounter of 11/03/23   EKG 12-lead    Collection Time: 11/06/23  7:50 AM    Narrative    Test Reason : R07.9,    Vent. Rate : 064 BPM     Atrial Rate : 064 BPM     P-R Int : 164 ms          QRS Dur : 132 ms      QT Int : 446 ms       P-R-T Axes : 069 205 003 degrees     QTc Int : 460 ms    Sinus rhythm with Fusion complexes  Right bundle branch block  Abnormal ECG  When compared with ECG of 04-NOV-2023 18:10,  Fusion complexes are now Present  Premature atrial complexes are no longer Present  Minimal criteria for Inferior infarct are no longer Present  Confirmed by Tien Barksdale MD ([id number]) on 11/6/2023 2:03:00 PM    Referred By: SAVANNA RASHID           Confirmed By:Tien Barksdale MD     ECHO Results for orders placed during the hospital encounter of 11/03/23    Echo    Interpretation Summary    Left Ventricle: The left ventricle is normal in size. Normal wall thickness. Normal wall motion. There is normal systolic function with a visually estimated  ejection fraction of 60 - 65%. Ejection fraction by visual approximation is 50%. There is normal diastolic function.    Right Ventricle: Moderate right ventricular enlargement. Systolic function is normal.    Left Atrium: Left atrium is mildly dilated.    Right Atrium: Right atrium is moderately dilated.    Aortic Valve: The aortic valve is a trileaflet valve.    Tricuspid Valve: There is mild regurgitation. No paravalvular regurgitation.    [facility] Results for orders placed during the hospital encounter of 11/03/23    Cardiac catheterization    Conclusion    The ejection fraction was calculated to be 55%.    The left ventricular systolic function was normal.    The left ventricular end diastolic pressure was normal.    The pre-procedure left ventricular end diastolic pressure was 14.    The estimated blood loss was none.    The coronary arteries were normal..    The procedure log was documented by Documenter: Khadijah Lyman RN and verified by Alex Ortega DO.    Date: 11/6/2023  Time: 2:00    Normal LV, EF 55%  Very mild non-obstructive CAD  Right femoral stent site widely patent        Lab Results   Component Value Date     11/16/2023    K 3.4 (L) 11/16/2023    CL 98 11/16/2023    CO2 35 (H) 11/16/2023    BUN 18 11/16/2023    CREATININE 1.39 (H) 11/16/2023    CALCIUM 10.6 (H) 11/16/2023    ANIONGAP 8 11/16/2023    ESTGFRAFRICA 70 09/27/2021    EGFRNONAA 63 05/10/2022       Lab Results   Component Value Date    CHOL 166 11/03/2023    CHOL 192 06/05/2023    CHOL 172 06/06/2022     Lab Results   Component Value Date    HDL 33 (L) 11/03/2023    HDL 57 06/05/2023    HDL 51 06/06/2022     Lab Results   Component Value Date    LDLCALC 111 11/03/2023    LDLCALC 115 06/05/2023    LDLCALC 99 06/06/2022     Lab Results   Component Value Date    TRIG 110 11/03/2023    TRIG 102 06/05/2023    TRIG 112 06/06/2022     Lab Results   Component Value Date    CHOLHDL 5.0 11/03/2023    CHOLHDL 3.4 06/05/2023    CHOLHDL 3.4  06/06/2022       Lab Results   Component Value Date    WBC 11.05 (H) 11/16/2023    HGB 16.3 (H) 11/16/2023    HCT 52.5 (H) 11/16/2023    MCV 78.5 (L) 11/16/2023     11/16/2023           Current Outpatient Medications:     albuterol (PROVENTIL/VENTOLIN HFA) 90 mcg/actuation inhaler, Inhale 2 puffs into the lungs 4 (four) times daily. Rescue, Disp: 18 g, Rfl: 2    apixaban (ELIQUIS) 5 mg Tab, Take 1 tablet (5 mg total) by mouth 2 (two) times daily., Disp: 60 tablet, Rfl: 3    aspirin (ECOTRIN) 81 MG EC tablet, TAKE 1 TABLET BY MOUTH EVERY DAY, Disp: 90 tablet, Rfl: 1    BREO ELLIPTA 100-25 mcg/dose diskus inhaler, Inhale 1 puff into the lungs once daily., Disp: 60 each, Rfl: 0    bumetanide (BUMEX) 2 MG tablet, Take 2 mg by mouth 2 (two) times daily., Disp: , Rfl:     calcium carbonate (OS-MICHAELA) 500 mg calcium (1,250 mg) tablet, Take 1 tablet (500 mg total) by mouth 2 (two) times daily., Disp: 60 tablet, Rfl: 3    cilostazoL (PLETAL) 100 MG Tab, Take 1 tablet (100 mg total) by mouth 2 (two) times daily., Disp: 90 tablet, Rfl: 1    colchicine, gout, (COLCRYS) 0.6 mg tablet, TAKE 1 TABLET BY MOUTH 3 TIMES A DAY FOR 2 DAYS THEN 1 TABLET DAILY UNTIL GONE, Disp: 30 tablet, Rfl: 2    DULoxetine (CYMBALTA) 30 MG capsule, TAKE 1 CAPSULE (30 MG TOTAL) BY MOUTH ONCE DAILY., Disp: 30 capsule, Rfl: 0    gabapentin (NEURONTIN) 600 MG tablet, Take 1 tablet (600 mg total) by mouth 3 (three) times daily., Disp: 180 tablet, Rfl: 2    [START ON 12/7/2023] HYDROcodone-acetaminophen (NORCO)  mg per tablet, Take 1 tablet by mouth every 6 (six) hours as needed for Pain., Disp: 120 tablet, Rfl: 0    [START ON 1/6/2024] HYDROcodone-acetaminophen (NORCO)  mg per tablet, Take 1 tablet by mouth every 6 (six) hours as needed for Pain., Disp: 120 tablet, Rfl: 0    ibuprofen (ADVIL,MOTRIN) 800 MG tablet, Take 1 tablet (800 mg total) by mouth 2 (two) times daily as needed for Pain., Disp: 30 tablet, Rfl: 2    insulin detemir  U-100, Levemir, (LEVEMIR FLEXTOUCH U100 INSULIN) 100 unit/mL (3 mL) InPn pen, Inject 10 Units into the skin every evening. 15 ml with 1 refill, Disp: 15 mL, Rfl: 1    levothyroxine (SYNTHROID) 150 MCG tablet, Take 1 tablet (150 mcg total) by mouth before breakfast., Disp: 90 tablet, Rfl: 1    loratadine (CLARITIN) 10 mg tablet, Take 1 tablet (10 mg total) by mouth once daily., Disp: 30 tablet, Rfl: 3    meclizine (ANTIVERT) 25 mg tablet, Take 1 tablet (25 mg total) by mouth 2 (two) times daily as needed for Dizziness., Disp: 30 tablet, Rfl: 3    metOLazone (ZAROXOLYN) 2.5 MG tablet, Take 1 tablet (2.5 mg total) by mouth once daily., Disp: 30 tablet, Rfl: 2    naloxone (NARCAN) 4 mg/actuation Spry, 1 spray once., Disp: , Rfl:     NIFEdipine (PROCARDIA-XL) 60 MG (OSM) 24 hr tablet, Take 1 tablet (60 mg total) by mouth once daily., Disp: 90 tablet, Rfl: 0    pantoprazole (PROTONIX) 40 MG tablet, Take 1 tablet (40 mg total) by mouth once daily., Disp: 90 tablet, Rfl: 1    potassium chloride SA (K-DUR,KLOR-CON) 20 MEQ tablet, Take 1 tablet (20 mEq total) by mouth once daily., Disp: 90 tablet, Rfl: 1    QUEtiapine (SEROQUEL) 25 MG Tab, Take 1 tablet (25 mg total) by mouth once daily., Disp: 30 tablet, Rfl: 1    sertraline (ZOLOFT) 50 MG tablet, Take 1 tablet (50 mg total) by mouth once daily., Disp: 30 tablet, Rfl: 5    ACCU-CHEK GUIDE GLUCOSE METER Misc, , Disp: , Rfl:     ACCU-CHEK GUIDE TEST STRIPS Strp, , Disp: , Rfl:     ACCU-CHEK SOFTCLIX LANCETS Misc, , Disp: , Rfl:     allopurinoL (ZYLOPRIM) 300 MG tablet, TAKE ONE TABLET EVERY DAY, Disp: 90 tablet, Rfl: 3    amitriptyline (ELAVIL) 25 MG tablet, Take 1 tablet (25 mg total) by mouth nightly as needed for Insomnia. (Patient not taking: Reported on 11/16/2023), Disp: 90 tablet, Rfl: 1    carvediloL (COREG) 12.5 MG tablet, Take 0.5 tablets (6.25 mg total) by mouth 2 (two) times daily., Disp: 30 tablet, Rfl: 11    cholecalciferol, vitamin D3, 125 mcg (5,000 unit)  "capsule, Take 1 capsule (5,000 Units total) by mouth 2 (two) times a day. (Patient not taking: Reported on 12/6/2023), Disp: 60 capsule, Rfl: 3    cyclobenzaprine (FLEXERIL) 10 MG tablet, Take 1 tablet (10 mg total) by mouth every evening. (Patient not taking: Reported on 12/6/2023), Disp: 30 tablet, Rfl: 2    dexAMETHasone (DECADRON) 4 MG Tab, Take by mouth., Disp: , Rfl:     docusate sodium (COLACE) 100 MG capsule, Take 1 capsule (100 mg total) by mouth 2 (two) times daily. (Patient not taking: Reported on 12/6/2023), Disp: 60 capsule, Rfl: 2    [START ON 2/5/2024] HYDROcodone-acetaminophen (NORCO)  mg per tablet, Take 1 tablet by mouth every 6 (six) hours as needed for Pain., Disp: 120 tablet, Rfl: 0    losartan-hydrochlorothiazide 50-12.5 mg (HYZAAR) 50-12.5 mg per tablet, Take 1 tablet by mouth once daily. (Patient not taking: Reported on 12/6/2023), Disp: 90 tablet, Rfl: 1    mupirocin (BACTROBAN) 2 % ointment, Apply topically 2 (two) times daily. (Patient not taking: Reported on 12/6/2023), Disp: 80 g, Rfl: 2    nicotine (NICODERM CQ) 21 mg/24 hr, Place 1 patch onto the skin every 24 hours., Disp: , Rfl:     nitrofurantoin, macrocrystal-monohydrate, (MACROBID) 100 MG capsule, Take 1 capsule (100 mg total) by mouth 2 (two) times daily. (Patient not taking: Reported on 12/6/2023), Disp: 14 capsule, Rfl: 0    NOVOFINE 32 32 gauge x 1/4" Ndle, Use as directed once daily. (Patient not taking: Reported on 12/6/2023), Disp: 90 each, Rfl: 3    phentermine (ADIPEX-P) 37.5 mg tablet, Take 1 tablet (37.5 mg total) by mouth before breakfast. (Patient not taking: Reported on 12/6/2023), Disp: 30 tablet, Rfl: 0    polyethylene glycol (GLYCOLAX) 17 gram PwPk, Take 17 g by mouth once daily., Disp: , Rfl:     spironolactone (ALDACTONE) 50 MG tablet, Take 1 tablet (50 mg total) by mouth once daily., Disp: 30 tablet, Rfl: 11    SYMBICORT 160-4.5 mcg/actuation HFAA, Inhale into the lungs., Disp: , Rfl:     tiotropium " "(SPIRIVA) 18 mcg inhalation capsule, Inhale 1 capsule (18 mcg total) into the lungs once daily. Controller, Disp: 90 capsule, Rfl: 3    topiramate (TOPAMAX) 100 MG tablet, Take 1.5 tablets (150 mg total) by mouth 2 (two) times daily., Disp: 90 tablet, Rfl: 11    Review of Systems   Constitutional:  Positive for malaise/fatigue. Negative for chills, fever and weight loss.   Respiratory:  Positive for shortness of breath.    Cardiovascular:  Positive for orthopnea, leg swelling and PND. Negative for chest pain and palpitations.   Gastrointestinal:  Negative for abdominal pain, nausea and vomiting.   Neurological:  Negative for focal weakness.         Objective:   /70   Pulse 100   Resp 16   Ht 5' 7" (1.702 m)   Wt (!) 154.7 kg (341 lb)   SpO2 (!) 85%   BMI 53.41 kg/m²     Physical Exam  Constitutional:       General: She is not in acute distress.     Appearance: She is obese.   Eyes:      Pupils: Pupils are equal, round, and reactive to light.   Cardiovascular:      Rate and Rhythm: Regular rhythm. Tachycardia present.   Pulmonary:      Effort: Pulmonary effort is normal.      Breath sounds: Rales present.   Musculoskeletal:      Cervical back: Neck supple. No rigidity.      Right lower leg: Edema present.      Left lower leg: Edema present.   Skin:     General: Skin is warm and dry.   Neurological:      Mental Status: She is alert and oriented to person, place, and time.   Psychiatric:         Mood and Affect: Mood normal.         Behavior: Behavior normal.           Assessment:     1. Acute on chronic diastolic CHF (congestive heart failure)        2. Congestive heart failure, unspecified HF chronicity, unspecified heart failure type  ACCEPT - Ambulatory referral/consult to Cardiology      3. Hypokalemia  EKG 12-lead            Plan:   Acute on chronic diastolic CHF (congestive heart failure)    Echo    Interpretation Summary    Left Ventricle: The left ventricle is normal in size. Normal wall thickness. " Normal wall motion. There is normal systolic function with a visually estimated ejection fraction of 60 - 65%. Ejection fraction by visual approximation is 50%. There is normal diastolic function.    Right Ventricle: Moderate right ventricular enlargement. Systolic function is normal.    Left Atrium: Left atrium is mildly dilated.    Right Atrium: Right atrium is moderately dilated.    Aortic Valve: The aortic valve is a trileaflet valve.    Tricuspid Valve: There is mild regurgitation. No paravalvular regurgitation.  .  - decompensated heart failure due to noncompliance with diuretic  - advised pt to go to the ED for further evaluation, pt agreed and was taken to the ED via wheelchair, report was called to ED physician  - pt has a f/u appt with Dr. Ortega 12/20/23

## 2023-12-06 NOTE — ED PROVIDER NOTES
Encounter Date: 12/6/2023       History     Chief Complaint   Patient presents with    Shortness of Breath     63 year old female presents to ED with complaint of shortness of breath. Patient was at Cardiology office for routine visit and was sent to ED for shortness of breath and to have excess fluid removed. Patient reports missing 3 days of Bumex but continuing Metolazone for CHF but states secondary medication was not helping with fluid build up. She reports she became short of breath on this morning with minimal activity. She states she had a cough on last night that woke her up from her sleep. She states she coughed up small amounts of white phlegm. Denies chest pain; reports palpitations. Denies fever, chills, nausea/vomiting, diarrhea. Patient reports she was supposed to have home oxygen after hospitalization in [place], [place] and from hospitalization here; but has not received home oxygen.     The history is provided by the patient and medical records.     Review of patient's allergies indicates:   Allergen Reactions    Ammonium peroxydisulfate Shortness Of Breath    Avocado (laurus persea) Anaphylaxis    Bananas [banana] Anaphylaxis and Swelling     CRAMPS,    Chocolate flavor Anaphylaxis     MOUTH SWELLING    Fentanyl Shortness Of Breath and Itching    Percocet [oxycodone-acetaminophen] Shortness Of Breath and Itching    Silvadene [silver sulfadiazine]     Clindamycin     Corticosteroids (glucocorticoids)     Hydrocortisone Blisters    Lasix [furosemide] Blisters     BURNS SKIN    Pregabalin     Adhesive Rash and Blisters    Iodine Rash    Latex, natural rubber Rash    Pcn [penicillins] Rash    Sulfa (sulfonamide antibiotics) Rash and Blisters     Past Medical History:   Diagnosis Date    Acquired hypothyroidism     Atherosclerosis of native artery of extremity with intermittent claudication 01/30/2019    bilateral legs    BMI 50.0-59.9, adult 02/22/2021    Chronic pain syndrome     opioid depen    COPD  (chronic obstructive pulmonary disease)     COVID-19 10/06/2023    Depression     Diabetes mellitus, type 2     followed by Aurea Kwong NP, [facility]    DVT of lower extremity, bilateral     Essential hypertension 06/08/2021    GERD (gastroesophageal reflux disease)     Gout 07/05/2023    Hyperlipidemia     Lumbosacral radiculopathy 06/05/2023    followed by GLENN Valdes, [facility]    Migraines     Nicotine dependence     Nicotine dependence     Obesity hypoventilation syndrome     chronic CO2 retainer with compensatory metabolic alkalosis    Osteoarthritis     Seizure disorder     Sleep apnea     on cpap    Thyroid cancer     Venous stasis ulcer limited to breakdown of skin with varicose veins     followed by Estuardo Zhao    Vitamin D deficiency      Past Surgical History:   Procedure Laterality Date    ABCESS DRAINAGE      HYSTERECTOMY      ILIAC ARTERY STENT Bilateral 01/28/2020    Bilateral distal aorta and common iliac 8 X 59 vbx covered stents performed by Dr. Antonio Jacinto.    LEFT HEART CATHETERIZATION Left 11/6/2023    Procedure: Left heart cath;  Surgeon: Alex Ortega DO;  Location: [facility] CATH LAB;  Service: Cardiology;  Laterality: Left;    RADIOFREQUENCY ABLATION Left 04/15/2022    Procedure: Left calf  Radiofrequency Ablation;  Surgeon: Matty Falcon DO;  Location: [facility] OR;  Service: Vascular;  Laterality: Left;    STAB PHLEBECTOMY OF VARICOSE VEINS Left 01/25/2013    Left leg microphlebectomies x 23 stab avulsions and ligation of multiple varicose veins perrformed by Dr. Cirilo Aguirre.    THYROIDECTOMY      hx: thyroid cancer    TOE AMPUTATION Left 01/28/2020    2nd, 4th and 5th performed by Dr. Anam Saeed.    TOE AMPUTATION Right 01/28/2020    4th toe performed by Dr. Anam Saeed.    TOTAL ABDOMINAL HYSTERECTOMY W/ BILATERAL SALPINGOOPHORECTOMY      TUBAL LIGATION      VAGINAL DELIVERY      x 4    VENOUS ABLATION Right 08/09/2019    GSV Varithena Ablation  performed by Dr. Estuardo Falcon    VENOUS ABLATION Left 08/02/2019    ATAV Varithena Ablation performed by Dr. Estuardo Falcon.    VENOUS ABLATION Right 07/13/2015    ATAV Laser Ablatio performed by Dr. Cirilo Aguirre.    VENOUS ABLATION Right 07/06/2015    Distal  GSV Laser Ablation performed by dr. Cirilo Aguirre.    VENOUS ABLATION Left 04/20/2015    Left Distal GSV Laser Ablation performed by dr. Cirilo Aguirre.    VENOUS ABLATION Right 10/28/2013    Right Distal GSV RF Ablation performed by Dr. Cirilo Aguirre.    VENOUS ABLATION Left 10/25/2013    SSV RF Ablation performed by dr. Cirilo Aguirre.    VENOUS ABLATION Left 10/07/2013    Left GSV and Left ATAV RF Ablation performed by Dr. Cirilo Aguirre.    VENOUS ABLATION Right 01/21/2013    GSV RF Ablation w/micros x 22 performed by Dr. Cirilo Aguirre.    VENOUS ABLATION Left 06/25/2021    Left distal GSV Varithena Ablation     Family History   Problem Relation Age of Onset    Heart disease Mother         age 84 CHF    Hypertension Mother     Osteoarthritis Mother     Coronary aneurysm Father     Coronary artery disease Father     Hypertension Father     Heart disease Father     No Known Problems Son     No Known Problems Son     No Known Problems Son     No Known Problems Son     No Known Problems Sister     No Known Problems Brother         hx: varicose veins    No Known Problems Brother         MVA: parlazed    No Known Problems Brother      Social History     Tobacco Use    Smoking status: Every Day     Current packs/day: 0.50     Average packs/day: 0.5 packs/day for 33.0 years (16.5 ttl pk-yrs)     Types: Cigarettes     Passive exposure: Current    Smokeless tobacco: Never   Substance Use Topics    Alcohol use: Never    Drug use: Never     Review of Systems   Constitutional:  Negative for chills and fever.   HENT:  Negative for sinus pressure and sinus pain.    Eyes:  Negative for photophobia and visual disturbance.   Respiratory:  Positive for cough and shortness of breath.    Cardiovascular:  Positive  for palpitations. Negative for chest pain.   Gastrointestinal:  Negative for nausea and vomiting.   Endocrine: Negative for cold intolerance and heat intolerance.   Genitourinary:  Negative for dysuria and urgency.   Musculoskeletal:  Negative for arthralgias and gait problem.   Skin:  Negative for color change and wound.   Neurological:  Negative for dizziness and weakness.   Hematological:  Negative for adenopathy. Does not bruise/bleed easily.   Psychiatric/Behavioral:  Negative for agitation and confusion.    All other systems reviewed and are negative.      Physical Exam     Initial Vitals [12/06/23 1440]   BP Pulse Resp Temp SpO2   137/61 89 (!) 22 98.8 °F (37.1 °C) (!) 85 %      MAP       --         Physical Exam    Nursing note and vitals reviewed.  Constitutional: She appears well-developed and well-nourished.   HENT:   Head: Normocephalic and atraumatic.   Eyes: EOM are normal. Pupils are equal, round, and reactive to light.   Neck: Neck supple.   Normal range of motion.  Cardiovascular:  Normal rate and regular rhythm.           No murmur heard.  Pulmonary/Chest: She has no wheezes. She has no rhonchi.   Abdominal: Abdomen is soft. She exhibits no distension. There is no abdominal tenderness.   Musculoskeletal:         General: Tenderness and edema present.      Cervical back: Normal range of motion and neck supple.      Right lower leg: Tenderness present. 2+ Edema present.      Left lower leg: Tenderness present. 2+ Edema present.     Lymphadenopathy:     She has no cervical adenopathy.   Neurological: She is alert and oriented to person, place, and time. No cranial nerve deficit or sensory deficit.   Skin: Skin is warm and dry. Capillary refill takes less than 2 seconds.   Psychiatric: She has a normal mood and affect. Thought content normal.         Medical Screening Exam   See Full Note    ED Course   Procedures  Labs Reviewed   COMPREHENSIVE METABOLIC PANEL - Abnormal; Notable for the following  components:       Result Value    Chloride 97 (*)     CO2 38 (*)     Anion Gap 5 (*)     Albumin 3.4 (*)     All other components within normal limits   NT-PRO NATRIURETIC PEPTIDE - Abnormal; Notable for the following components:    ProBNP 1,371 (*)     All other components within normal limits   CBC WITH DIFFERENTIAL - Abnormal; Notable for the following components:    RBC 6.22 (*)     Hematocrit 49.6 (*)     MCV 79.7 (*)     MCH 25.1 (*)     MCHC 31.5 (*)     RDW 24.0 (*)     Neutrophils % 69.2 (*)     Lymphocytes % 20.2 (*)     Immature Granulocytes % 0.7 (*)     Immature Granulocytes, Absolute 0.06 (*)     All other components within normal limits   CBC MORPHOLOGY - Abnormal; Notable for the following components:    Platelet Morphology Few Large Platelets (*)     All other components within normal limits   RAPID INFLUENZA A/B - Normal   SARS-COV-2 RNA AMPLIFICATION, QUAL - Normal    Narrative:     Negative SARS-CoV results should not be used as the sole basis for treatment or patient management decisions; negative results should be considered in the context of a patient's recent exposures, history and the presene of clinical signs and symptoms consistent with COVID-19.  Negative results should be treated as presumptive and confirmed by molecular assay, if necessary for patient management.   TROPONIN I - Normal   TROPONIN I - Normal   CBC W/ AUTO DIFFERENTIAL    Narrative:     The following orders were created for panel order CBC Auto Differential.  Procedure                               Abnormality         Status                     ---------                               -----------         ------                     CBC with Differential[[id number]]       Abnormal            Final result                 Please view results for these tests on the individual orders.        ECG Results              EKG 12-lead (Final result)  Result time 12/07/23 04:38:16      Final result by Interface, Lab In [facility] (12/07/23  04:38:16)                   Narrative:    Test Reason : R06.02,    Vent. Rate : 096 BPM     Atrial Rate : 000 BPM     P-R Int : 122 ms          QRS Dur : 108 ms      QT Int : 386 ms       P-R-T Axes : 062 119 038 degrees     QTc Int : 449 ms    Sinus arrhythmia  Right axis deviation  Possible right ventricular hypertrophy  Borderline ECG    Confirmed by Nir STORM, Brady STEPHENS ([id number]) on 12/7/2023 4:38:09 AM    Referred By: [email or web address]   SELF           Confirmed By:Brady Loyola MD                                  Imaging Results              X-Ray Chest 1 View (Final result)  Result time 12/06/23 15:11:39      Final result by Edson De MD (12/06/23 15:11:39)                   Impression:      Cardiomegaly with interstitial prominence favoring edema.      Electronically signed by: Edson De  Date:    12/06/2023  Time:    15:11               Narrative:    EXAMINATION:  XR CHEST 1 VIEW    CLINICAL HISTORY:  shortness of breath;    TECHNIQUE:  Single frontal view of the chest was performed.    COMPARISON:  11/04/2023    FINDINGS:  Cardiac silhouette is enlarged.  Interstitial prominence.  No pneumothorax or pleural effusion.                                       Medications   bumetanide injection 2 mg (2 mg Intravenous Given 12/6/23 1547)     Medical Decision Making  63 year old female presents to ED with complaint of shortness of breath. Patient was at Cardiology office for routine visit and was sent to ED for shortness of breath and to have excess fluid removed. Patient reports missing 3 days of Bumex but continuing Metolazone for CHF but states secondary medication was not helping with fluid build up. She reports she became short of breath on this morning with minimal activity. She states she had a cough on last night that woke her up from her sleep. She states she coughed up small amounts of white phlegm. Denies chest pain; reports palpitations. Denies fever, chills, nausea/vomiting, diarrhea. Patient  reports she was supposed to have home oxygen after hospitalization in [place], [place] and from hospitalization here; but has not received home oxygen.     Labs, diagnostics obtained. Case discussed with Dr. Navas. IV Bumex administered. Prescription provided    Amount and/or Complexity of Data Reviewed  Labs: ordered.     Details: Chloride 97 (*)  CO2 38 (*)  Anion Gap 5 (*)  Albumin 3.4 (*)  All other components within normal limits  NT-PRO NATRIURETIC PEPTIDE - Abnormal; Notable for the following components:  ProBNP 1,371 (*)  All other components within normal limits  CBC WITH DIFFERENTIAL - Abnormal; Notable for the following components:  RBC 6.22 (*)  Hematocrit 49.6 (*)  MCV 79.7 (*)  MCH 25.1 (*)  MCHC 31.5 (*)  RDW 24.0 (*)  Neutrophils % 69.2 (*)  Lymphocytes % 20.2 (*)  Immature Granulocytes % 0.7 (*)  Immature Granulocytes, Absolute 0.06 (*)  All other components within normal limits  CBC MORPHOLOGY - Abnormal; Notable for the following components:  Platelet Morphology Few Large Platelets (*)    Radiology: ordered.     Details: Cardiomegaly with interstitial prominence favoring edema.    ECG/medicine tests: ordered.     Details: Sinus arrhythmia; right axis deviation    Risk  Prescription drug management.                                      Clinical Impression:   Final diagnoses:  [R06.02] Shortness of breath  [I50.9] Acute on chronic congestive heart failure, unspecified heart failure type (Primary)        ED Disposition Condition    Discharge Stable          ED Prescriptions       Medication Sig Dispense Start Date End Date Auth. Provider    bumetanide (BUMEX) 2 MG tablet Take 1 tablet (2 mg total) by mouth 2 (two) times daily. 60 tablet 12/6/2023 1/5/2024 Radha Polanco, WARREN          Follow-up Information    None          Radha Polanco, WARREN  12/08/23 2040

## 2023-12-07 ENCOUNTER — TELEPHONE (OUTPATIENT)
Dept: EMERGENCY MEDICINE | Facility: HOSPITAL | Age: 63
End: 2023-12-07
Payer: MEDICARE

## 2023-12-07 NOTE — DISCHARGE INSTRUCTIONS
Follow up with Cardiology as indicated. Return to ED if any new or worsening of symptoms occur. Follow up with Dr. Frausto for home oxygen as previously arranged. Return to ED if any new or worsening of symptoms occur.

## 2023-12-11 RX ORDER — CILOSTAZOL 100 MG/1
100 TABLET ORAL 2 TIMES DAILY
Qty: 90 TABLET | Refills: 1 | OUTPATIENT
Start: 2023-12-11

## 2023-12-11 RX ORDER — BUMETANIDE 2 MG/1
2 TABLET ORAL 2 TIMES DAILY
Qty: 180 TABLET | Refills: 0 | OUTPATIENT
Start: 2023-12-11 | End: 2024-01-10

## 2024-01-10 RX ORDER — COLCHICINE 0.6 MG/1
TABLET ORAL
Qty: 30 TABLET | Refills: 2 | Status: SHIPPED | OUTPATIENT
Start: 2024-01-10

## 2024-01-20 RX ORDER — INSULIN DETEMIR 100 [IU]/ML
INJECTION, SOLUTION SUBCUTANEOUS
Qty: 15 ML | Refills: 1 | Status: SHIPPED | OUTPATIENT
Start: 2024-01-20 | End: 2024-06-05 | Stop reason: SDUPTHER

## 2024-02-02 ENCOUNTER — HOSPITAL ENCOUNTER (EMERGENCY)
Facility: HOSPITAL | Age: 64
Discharge: HOME | End: 2024-02-02
Attending: FAMILY MEDICINE
Payer: MEDICARE

## 2024-02-02 VITALS
SYSTOLIC BLOOD PRESSURE: 112 MMHG | HEART RATE: 74 BPM | RESPIRATION RATE: 18 BRPM | DIASTOLIC BLOOD PRESSURE: 66 MMHG | HEIGHT: 68 IN | WEIGHT: 293 LBS | OXYGEN SATURATION: 97 % | TEMPERATURE: 98 F | BODY MASS INDEX: 44.41 KG/M2

## 2024-02-02 DIAGNOSIS — I10 PRIMARY HYPERTENSION: ICD-10-CM

## 2024-02-02 DIAGNOSIS — M19.072 ARTHRITIS OF LEFT FOOT: Primary | ICD-10-CM

## 2024-02-02 DIAGNOSIS — I50.20 SYSTOLIC CONGESTIVE HEART FAILURE, UNSPECIFIED HF CHRONICITY: ICD-10-CM

## 2024-02-02 DIAGNOSIS — R60.0 BILATERAL LOWER EXTREMITY EDEMA: ICD-10-CM

## 2024-02-02 DIAGNOSIS — M79.672 LEFT FOOT PAIN: ICD-10-CM

## 2024-02-02 LAB — GLUCOSE SERPL-MCNC: 102 MG/DL (ref 70–105)

## 2024-02-02 PROCEDURE — 99284 EMERGENCY DEPT VISIT MOD MDM: CPT | Mod: ,,, | Performed by: FAMILY MEDICINE

## 2024-02-02 PROCEDURE — 82962 GLUCOSE BLOOD TEST: CPT

## 2024-02-02 PROCEDURE — 99284 EMERGENCY DEPT VISIT MOD MDM: CPT

## 2024-02-02 PROCEDURE — 63600175 PHARM REV CODE 636 W HCPCS: Mod: JZ,TB | Performed by: FAMILY MEDICINE

## 2024-02-02 PROCEDURE — 96372 THER/PROPH/DIAG INJ SC/IM: CPT | Performed by: FAMILY MEDICINE

## 2024-02-02 RX ORDER — KETOROLAC TROMETHAMINE 30 MG/ML
30 INJECTION, SOLUTION INTRAMUSCULAR; INTRAVENOUS
Status: COMPLETED | OUTPATIENT
Start: 2024-02-02 | End: 2024-02-02

## 2024-02-02 RX ORDER — MORPHINE SULFATE 2 MG/ML
2 INJECTION, SOLUTION INTRAMUSCULAR; INTRAVENOUS
Status: COMPLETED | OUTPATIENT
Start: 2024-02-02 | End: 2024-02-02

## 2024-02-02 RX ADMIN — MORPHINE SULFATE 2 MG: 2 INJECTION, SOLUTION INTRAMUSCULAR; INTRAVENOUS at 03:02

## 2024-02-02 RX ADMIN — KETOROLAC TROMETHAMINE 30 MG: 30 INJECTION, SOLUTION INTRAMUSCULAR at 03:02

## 2024-02-02 NOTE — ED PROVIDER NOTES
Encounter Date: 2/2/2024       History     Chief Complaint   Patient presents with    Leg Swelling     C/o left leg/foot swelling     63 year old female presents with venous stasis, DM HTN and obesity.  Her left foot hurts with 4/10 pain.  She has heart failure as well as gout.        Review of patient's allergies indicates:   Allergen Reactions    Ammonium peroxydisulfate Shortness Of Breath    Avocado (laurus persea) Anaphylaxis    Bananas [banana] Anaphylaxis and Swelling     CRAMPS,    Chocolate flavor Anaphylaxis     MOUTH SWELLING    Fentanyl Shortness Of Breath and Itching    Percocet [oxycodone-acetaminophen] Shortness Of Breath and Itching    Silvadene [silver sulfadiazine]     Clindamycin     Corticosteroids (glucocorticoids)     Hydrocortisone Blisters    Lasix [furosemide] Blisters     BURNS SKIN    Pregabalin     Adhesive Rash and Blisters    Iodine Rash    Latex, natural rubber Rash    Pcn [penicillins] Rash    Sulfa (sulfonamide antibiotics) Rash and Blisters     Past Medical History:   Diagnosis Date    Acquired hypothyroidism     Atherosclerosis of native artery of extremity with intermittent claudication 01/30/2019    bilateral legs    BMI 50.0-59.9, adult 02/22/2021    Chronic pain syndrome     opioid depen    COPD (chronic obstructive pulmonary disease)     COVID-19 10/06/2023    Depression     Diabetes mellitus, type 2     followed by Aurea Kwong NP, [facility]    DVT of lower extremity, bilateral     Essential hypertension 06/08/2021    GERD (gastroesophageal reflux disease)     Gout 07/05/2023    Hyperlipidemia     Lumbosacral radiculopathy 06/05/2023    followed by GLENN Valdes, [facility]    Migraines     Nicotine dependence     Nicotine dependence     Obesity hypoventilation syndrome     chronic CO2 retainer with compensatory metabolic alkalosis    Osteoarthritis     Seizure disorder     Sleep apnea     on cpap    Thyroid cancer     Venous stasis ulcer limited to  breakdown of skin with varicose veins     followed by Estuardo Zhao    Vitamin D deficiency      Past Surgical History:   Procedure Laterality Date    ABCESS DRAINAGE      HYSTERECTOMY      ILIAC ARTERY STENT Bilateral 01/28/2020    Bilateral distal aorta and common iliac 8 X 59 vbx covered stents performed by Dr. Antonio Jacinto.    LEFT HEART CATHETERIZATION Left 11/6/2023    Procedure: Left heart cath;  Surgeon: Alex Ortega DO;  Location: [facility] CATH LAB;  Service: Cardiology;  Laterality: Left;    RADIOFREQUENCY ABLATION Left 04/15/2022    Procedure: Left calf  Radiofrequency Ablation;  Surgeon: Matty Falcon DO;  Location: [facility] OR;  Service: Vascular;  Laterality: Left;    STAB PHLEBECTOMY OF VARICOSE VEINS Left 01/25/2013    Left leg microphlebectomies x 23 stab avulsions and ligation of multiple varicose veins perrformed by Dr. Cirilo Aguirre.    THYROIDECTOMY      hx: thyroid cancer    TOE AMPUTATION Left 01/28/2020    2nd, 4th and 5th performed by Dr. Anam Saeed.    TOE AMPUTATION Right 01/28/2020    4th toe performed by Dr. Anam Saeed.    TOTAL ABDOMINAL HYSTERECTOMY W/ BILATERAL SALPINGOOPHORECTOMY      TUBAL LIGATION      VAGINAL DELIVERY      x 4    VENOUS ABLATION Right 08/09/2019    GSV Varithena Ablation performed by Dr. Estuardo Falcon    VENOUS ABLATION Left 08/02/2019    ATAV Varithena Ablation performed by Dr. Estuardo Falcon.    VENOUS ABLATION Right 07/13/2015    ATAV Laser Ablatio performed by Dr. Cirilo Aguirre.    VENOUS ABLATION Right 07/06/2015    Distal  GSV Laser Ablation performed by dr. Cirilo Aguirre.    VENOUS ABLATION Left 04/20/2015    Left Distal GSV Laser Ablation performed by dr. Cirilo Aguirre.    VENOUS ABLATION Right 10/28/2013    Right Distal GSV RF Ablation performed by Dr. Cirilo Aguirre.    VENOUS ABLATION Left 10/25/2013    SSV RF Ablation performed by dr. Cirilo Aguirre.    VENOUS ABLATION Left 10/07/2013    Left GSV and Left ATAV RF Ablation performed by Dr. Cirilo Aguirre.    VENOUS  ABLATION Right 01/21/2013    GSV RF Ablation w/micros x 22 performed by Dr. Cirilo Aguirre.    VENOUS ABLATION Left 06/25/2021    Left distal GSV Varithena Ablation     Family History   Problem Relation Age of Onset    Heart disease Mother         age 84 CHF    Hypertension Mother     Osteoarthritis Mother     Coronary aneurysm Father     Coronary artery disease Father     Hypertension Father     Heart disease Father     No Known Problems Son     No Known Problems Son     No Known Problems Son     No Known Problems Son     No Known Problems Sister     No Known Problems Brother         hx: varicose veins    No Known Problems Brother         MVA: parlazed    No Known Problems Brother      Social History     Tobacco Use    Smoking status: Every Day     Current packs/day: 0.50     Average packs/day: 0.5 packs/day for 33.0 years (16.5 ttl pk-yrs)     Types: Cigarettes     Passive exposure: Current    Smokeless tobacco: Never   Substance Use Topics    Alcohol use: Never    Drug use: Never     Review of Systems   Constitutional:  Positive for activity change, appetite change and fatigue.   Musculoskeletal:  Positive for arthralgias, joint swelling and myalgias.   Skin:  Positive for rash.   All other systems reviewed and are negative.      Physical Exam     Initial Vitals   BP Pulse Resp Temp SpO2   02/02/24 1432 02/02/24 1432 02/02/24 1434 02/02/24 1432 02/02/24 1432   112/66 92 (!) 22 98.2 °F (36.8 °C) (!) 75 %      MAP       --                Physical Exam    Nursing note and vitals reviewed.  Constitutional: She appears well-developed and well-nourished.   HENT:   Head: Normocephalic and atraumatic.   Eyes: EOM are normal. Pupils are equal, round, and reactive to light.   Neck:   Normal range of motion.  Cardiovascular:  Normal rate.           Pulmonary/Chest: Breath sounds normal.   Abdominal: Bowel sounds are normal.   Musculoskeletal:      Cervical back: Normal range of motion.      Comments: Decrease range of motion of  left foot.     Neurological: She is alert and oriented to person, place, and time. She has normal reflexes.   Skin: Skin is warm. Capillary refill takes less than 2 seconds.   Psychiatric: She has a normal mood and affect.         Medical Screening Exam   See Full Note    ED Course   Procedures  Labs Reviewed   POCT GLUCOSE MONITORING CONTINUOUS   POCT GLUCOSE MONITORING CONTINUOUS          Imaging Results              X-Ray Foot Complete Left (Final result)  Result time 02/02/24 15:03:53      Final result by Rene Santiago MD (02/02/24 15:03:53)                   Impression:      Bony structures are generally unchanged from the previous study.  No definite plain film sign of osteomyelitis at this time    Soft tissue swelling      Electronically signed by: Rene Santiago  Date:    02/02/2024  Time:    15:03               Narrative:    EXAMINATION:  XR FOOT COMPLETE 3 VIEW LEFT    CLINICAL HISTORY:  .  Pain in left foot    COMPARISON:  June 28, 2023    TECHNIQUE:  Left foot AP, lateral, and oblique views    FINDINGS:  There is soft tissue swelling about the foot.    There has been previous amputation of the 2nd, 4th, and 5th toes at the level of the bases of the proximal phalanges.  There is no definite plain film sign of acute osteomyelitis.  Osteopenia.  There is mild plantar calcaneal spur formation.                                       Medications   morphine injection 2 mg (has no administration in time range)   ketorolac injection 30 mg (30 mg Intramuscular Given 2/2/24 1516)     Medical Decision Making  She has DM with morbid obesity    Amount and/or Complexity of Data Reviewed  Radiology: ordered.    Risk  Prescription drug management.                                      Clinical Impression:   Final diagnoses:  [M79.672] Left foot pain  [M19.072] Arthritis of left foot (Primary)  [I50.20] Systolic congestive heart failure, unspecified HF chronicity  [I10] Primary hypertension  [R60.0] Bilateral lower  extremity edema        ED Disposition Condition    Discharge Stable          ED Prescriptions    None       Follow-up Information       Follow up With Specialties Details Why Contact Info    Regan Frausto MD Internal Medicine, Family Medicine, Hospitalist In 1 day  [address] [place] [address]  [facility] [place] [address]  [phone]               Rosita Grayson MD  02/02/24 1507       Rosita Grayson MD  02/02/24 1507       Rosita Grayson MD  02/02/24 1538

## 2024-02-02 NOTE — ED TRIAGE NOTES
C/o left foot/leg swelling x 3 days-noted left foot/leg swollen with scar tissue from old wounds-missing toes due to amputation

## 2024-02-05 PROBLEM — N17.9 ACUTE ON CHRONIC RENAL FAILURE: Status: RESOLVED | Noted: 2023-11-03 | Resolved: 2024-02-05

## 2024-02-05 PROBLEM — J96.21 ACUTE ON CHRONIC RESPIRATORY FAILURE WITH HYPOXIA AND HYPERCAPNIA: Status: RESOLVED | Noted: 2023-11-03 | Resolved: 2024-02-05

## 2024-02-05 PROBLEM — J96.22 ACUTE ON CHRONIC RESPIRATORY FAILURE WITH HYPOXIA AND HYPERCAPNIA: Status: RESOLVED | Noted: 2023-11-03 | Resolved: 2024-02-05

## 2024-02-05 PROBLEM — N18.9 ACUTE ON CHRONIC RENAL FAILURE: Status: RESOLVED | Noted: 2023-11-03 | Resolved: 2024-02-05

## 2024-02-05 PROBLEM — I21.A1 TYPE 2 MI (MYOCARDIAL INFARCTION): Status: RESOLVED | Noted: 2023-11-04 | Resolved: 2024-02-05

## 2024-02-07 ENCOUNTER — HOSPITAL ENCOUNTER (INPATIENT)
Facility: HOSPITAL | Age: 64
LOS: 7 days | Discharge: HOME | DRG: 189 | End: 2024-02-14
Attending: FAMILY MEDICINE | Admitting: HOSPITALIST
Payer: MEDICARE

## 2024-02-07 ENCOUNTER — OFFICE VISIT (OUTPATIENT)
Dept: FAMILY MEDICINE | Facility: CLINIC | Age: 64
End: 2024-02-07
Payer: MEDICARE

## 2024-02-07 VITALS
TEMPERATURE: 98 F | WEIGHT: 293 LBS | SYSTOLIC BLOOD PRESSURE: 135 MMHG | OXYGEN SATURATION: 99 % | DIASTOLIC BLOOD PRESSURE: 78 MMHG | BODY MASS INDEX: 44.41 KG/M2 | HEIGHT: 68 IN | RESPIRATION RATE: 18 BRPM | HEART RATE: 70 BPM

## 2024-02-07 DIAGNOSIS — J44.9 CHRONIC OBSTRUCTIVE PULMONARY DISEASE, UNSPECIFIED COPD TYPE: Primary | ICD-10-CM

## 2024-02-07 DIAGNOSIS — I50.33 ACUTE ON CHRONIC DIASTOLIC CHF (CONGESTIVE HEART FAILURE): ICD-10-CM

## 2024-02-07 DIAGNOSIS — I50.9 CHF (CONGESTIVE HEART FAILURE): ICD-10-CM

## 2024-02-07 DIAGNOSIS — J96.01 ACUTE HYPOXEMIC RESPIRATORY FAILURE: Primary | ICD-10-CM

## 2024-02-07 DIAGNOSIS — I27.20 PULMONARY HYPERTENSION: ICD-10-CM

## 2024-02-07 DIAGNOSIS — I50.9 CONGESTIVE HEART FAILURE, UNSPECIFIED HF CHRONICITY, UNSPECIFIED HEART FAILURE TYPE: ICD-10-CM

## 2024-02-07 DIAGNOSIS — R06.02 SOB (SHORTNESS OF BREATH): ICD-10-CM

## 2024-02-07 PROBLEM — I73.9 PERIPHERAL ARTERIAL DISEASE WITH HISTORY OF REVASCULARIZATION: Status: ACTIVE | Noted: 2024-02-07

## 2024-02-07 PROBLEM — I26.99 PULMONARY EMBOLISM: Status: ACTIVE | Noted: 2024-02-07

## 2024-02-07 PROBLEM — Z98.890 PERIPHERAL ARTERIAL DISEASE WITH HISTORY OF REVASCULARIZATION: Status: ACTIVE | Noted: 2024-02-07

## 2024-02-07 LAB
ALBUMIN SERPL BCP-MCNC: 3.3 G/DL (ref 3.5–5)
ALBUMIN/GLOB SERPL: 1 {RATIO}
ALP SERPL-CCNC: 101 U/L (ref 50–130)
ALT SERPL W P-5'-P-CCNC: 14 U/L (ref 13–56)
AMPHET UR QL SCN: NEGATIVE
ANION GAP SERPL CALCULATED.3IONS-SCNC: 4 MMOL/L (ref 7–16)
ANISOCYTOSIS BLD QL SMEAR: ABNORMAL
AST SERPL W P-5'-P-CCNC: 21 U/L (ref 15–37)
BARBITURATES UR QL SCN: NEGATIVE
BASOPHILS # BLD AUTO: 0.07 K/UL (ref 0–0.2)
BASOPHILS NFR BLD AUTO: 0.8 % (ref 0–1)
BENZODIAZ METAB UR QL SCN: NEGATIVE
BILIRUB SERPL-MCNC: 0.9 MG/DL (ref ?–1.2)
BILIRUB UR QL STRIP: NEGATIVE
BUN SERPL-MCNC: 10 MG/DL (ref 7–18)
BUN/CREAT SERPL: 10 (ref 6–20)
CALCIUM SERPL-MCNC: 9.6 MG/DL (ref 8.5–10.1)
CANNABINOIDS UR QL SCN: NEGATIVE
CHLORIDE SERPL-SCNC: 106 MMOL/L (ref 98–107)
CLARITY UR: NORMAL
CO2 SERPL-SCNC: 31 MMOL/L (ref 21–32)
COCAINE UR QL SCN: NEGATIVE
COLOR UR: NORMAL
CREAT SERPL-MCNC: 1 MG/DL (ref 0.55–1.02)
D DIMER PPP FEU-MCNC: 2.59 ΜG/ML (ref 0–0.47)
DIFFERENTIAL METHOD BLD: ABNORMAL
EGFR (NO RACE VARIABLE) (RUSH/TITUS): 63 ML/MIN/1.73M2
EOSINOPHIL # BLD AUTO: 0.34 K/UL (ref 0–0.5)
EOSINOPHIL NFR BLD AUTO: 4 % (ref 1–4)
ERYTHROCYTE [DISTWIDTH] IN BLOOD BY AUTOMATED COUNT: 24 % (ref 11.5–14.5)
GLOBULIN SER-MCNC: 3.4 G/DL (ref 2–4)
GLUCOSE SERPL-MCNC: 122 MG/DL (ref 70–105)
GLUCOSE SERPL-MCNC: 82 MG/DL (ref 74–106)
GLUCOSE UR STRIP-MCNC: NORMAL MG/DL
HCO3 UR-SCNC: 34.1 MMOL/L (ref 24–28)
HCT VFR BLD AUTO: 49.1 % (ref 38–47)
HCT VFR BLD CALC: 56 % (ref 35–51)
HGB BLD-MCNC: 14.9 G/DL (ref 12–16)
IMM GRANULOCYTES # BLD AUTO: 0.05 K/UL (ref 0–0.04)
IMM GRANULOCYTES NFR BLD: 0.6 % (ref 0–0.4)
KETONES UR STRIP-SCNC: NEGATIVE MG/DL
LDH SERPL L TO P-CCNC: 1.8 MMOL/L (ref 0.3–1.2)
LEUKOCYTE ESTERASE UR QL STRIP: NEGATIVE
LYMPHOCYTES # BLD AUTO: 1.72 K/UL (ref 1–4.8)
LYMPHOCYTES NFR BLD AUTO: 20.3 % (ref 27–41)
MCH RBC QN AUTO: 22.1 PG (ref 27–31)
MCHC RBC AUTO-ENTMCNC: 30.3 G/DL (ref 32–36)
MCV RBC AUTO: 72.8 FL (ref 80–96)
MICROCYTES BLD QL SMEAR: ABNORMAL
MONOCYTES # BLD AUTO: 0.74 K/UL (ref 0–0.8)
MONOCYTES NFR BLD AUTO: 8.7 % (ref 2–6)
MPC BLD CALC-MCNC: ABNORMAL G/DL
NEUTROPHILS # BLD AUTO: 5.55 K/UL (ref 1.8–7.7)
NEUTROPHILS NFR BLD AUTO: 65.6 % (ref 53–65)
NITRITE UR QL STRIP: NEGATIVE
NRBC # BLD AUTO: 0.02 X10E3/UL
NRBC, AUTO (.00): 0.2 %
NT-PROBNP SERPL-MCNC: 2073 PG/ML (ref 1–125)
OHS QRS DURATION: 104 MS
OHS QTC CALCULATION: 469 MS
OPIATES UR QL SCN: NEGATIVE
PCO2 BLDA: 59 MMHG (ref 41–51)
PCP UR QL SCN: NEGATIVE
PH SMN: 7.37 [PH] (ref 7.32–7.42)
PH UR STRIP: 7.5 PH UNITS
PLATELET # BLD AUTO: 213 K/UL (ref 150–400)
PLATELET MORPHOLOGY: ABNORMAL
PO2 BLDA: 30 MMHG (ref 25–40)
POC BASE EXCESS: 6.3 MMOL/L (ref -2–3)
POC CO2: 35.9 MMOL/L
POC IONIZED CALCIUM: 1.17 MMOL/L (ref 1.15–1.35)
POC SATURATED O2: 55 % (ref 40–70)
POCT GLUCOSE: 92 MG/DL (ref 60–95)
POTASSIUM BLD-SCNC: 3.6 MMOL/L (ref 3.4–4.5)
POTASSIUM SERPL-SCNC: 3.4 MMOL/L (ref 3.5–5.1)
PROT SERPL-MCNC: 6.7 G/DL (ref 6.4–8.2)
PROT UR QL STRIP: NEGATIVE
RBC # BLD AUTO: 6.74 M/UL (ref 4.2–5.4)
RBC # UR STRIP: NEGATIVE /UL
SARS-COV-2 RDRP RESP QL NAA+PROBE: NEGATIVE
SODIUM BLD-SCNC: 137 MMOL/L (ref 136–145)
SODIUM SERPL-SCNC: 138 MMOL/L (ref 136–145)
SP GR UR STRIP: 1.01
TROPONIN I SERPL DL<=0.01 NG/ML-MCNC: 30.2 PG/ML
UROBILINOGEN UR STRIP-ACNC: NORMAL MG/DL
WBC # BLD AUTO: 8.47 K/UL (ref 4.5–11)

## 2024-02-07 PROCEDURE — 25000242 PHARM REV CODE 250 ALT 637 W/ HCPCS: Performed by: FAMILY MEDICINE

## 2024-02-07 PROCEDURE — 96374 THER/PROPH/DIAG INJ IV PUSH: CPT

## 2024-02-07 PROCEDURE — 99214 OFFICE O/P EST MOD 30 MIN: CPT | Mod: ,,, | Performed by: INTERNAL MEDICINE

## 2024-02-07 PROCEDURE — 82962 GLUCOSE BLOOD TEST: CPT

## 2024-02-07 PROCEDURE — 80053 COMPREHEN METABOLIC PANEL: CPT | Performed by: FAMILY MEDICINE

## 2024-02-07 PROCEDURE — 25000003 PHARM REV CODE 250: Performed by: HOSPITALIST

## 2024-02-07 PROCEDURE — 27000221 HC OXYGEN, UP TO 24 HOURS

## 2024-02-07 PROCEDURE — 82803 BLOOD GASES ANY COMBINATION: CPT

## 2024-02-07 PROCEDURE — 63600175 PHARM REV CODE 636 W HCPCS: Performed by: HOSPITALIST

## 2024-02-07 PROCEDURE — 11000001 HC ACUTE MED/SURG PRIVATE ROOM

## 2024-02-07 PROCEDURE — 85014 HEMATOCRIT: CPT

## 2024-02-07 PROCEDURE — 5A09357 ASSISTANCE WITH RESPIRATORY VENTILATION, LESS THAN 24 CONSECUTIVE HOURS, CONTINUOUS POSITIVE AIRWAY PRESSURE: ICD-10-PCS | Performed by: HOSPITALIST

## 2024-02-07 PROCEDURE — 82947 ASSAY GLUCOSE BLOOD QUANT: CPT

## 2024-02-07 PROCEDURE — 85025 COMPLETE CBC W/AUTO DIFF WBC: CPT | Performed by: FAMILY MEDICINE

## 2024-02-07 PROCEDURE — 3075F SYST BP GE 130 - 139MM HG: CPT | Mod: ,,, | Performed by: INTERNAL MEDICINE

## 2024-02-07 PROCEDURE — 84295 ASSAY OF SERUM SODIUM: CPT

## 2024-02-07 PROCEDURE — 87635 SARS-COV-2 COVID-19 AMP PRB: CPT | Performed by: HOSPITALIST

## 2024-02-07 PROCEDURE — 83880 ASSAY OF NATRIURETIC PEPTIDE: CPT | Performed by: FAMILY MEDICINE

## 2024-02-07 PROCEDURE — 93005 ELECTROCARDIOGRAM TRACING: CPT

## 2024-02-07 PROCEDURE — 84132 ASSAY OF SERUM POTASSIUM: CPT

## 2024-02-07 PROCEDURE — 99900035 HC TECH TIME PER 15 MIN (STAT)

## 2024-02-07 PROCEDURE — 80307 DRUG TEST PRSMV CHEM ANLYZR: CPT | Performed by: HOSPITALIST

## 2024-02-07 PROCEDURE — 94761 N-INVAS EAR/PLS OXIMETRY MLT: CPT

## 2024-02-07 PROCEDURE — 5A0945A ASSISTANCE WITH RESPIRATORY VENTILATION, 24-96 CONSECUTIVE HOURS, HIGH NASAL FLOW/VELOCITY: ICD-10-PCS | Performed by: HOSPITALIST

## 2024-02-07 PROCEDURE — 99223 1ST HOSP IP/OBS HIGH 75: CPT | Mod: ,,, | Performed by: HOSPITALIST

## 2024-02-07 PROCEDURE — 3008F BODY MASS INDEX DOCD: CPT | Mod: ,,, | Performed by: INTERNAL MEDICINE

## 2024-02-07 PROCEDURE — 99285 EMERGENCY DEPT VISIT HI MDM: CPT | Mod: ,,, | Performed by: FAMILY MEDICINE

## 2024-02-07 PROCEDURE — S4991 NICOTINE PATCH NONLEGEND: HCPCS | Performed by: HOSPITALIST

## 2024-02-07 PROCEDURE — 3078F DIAST BP <80 MM HG: CPT | Mod: ,,, | Performed by: INTERNAL MEDICINE

## 2024-02-07 PROCEDURE — 63600175 PHARM REV CODE 636 W HCPCS: Performed by: FAMILY MEDICINE

## 2024-02-07 PROCEDURE — 81003 URINALYSIS AUTO W/O SCOPE: CPT | Mod: 59 | Performed by: HOSPITALIST

## 2024-02-07 PROCEDURE — 83605 ASSAY OF LACTIC ACID: CPT

## 2024-02-07 PROCEDURE — 25000242 PHARM REV CODE 250 ALT 637 W/ HCPCS: Performed by: HOSPITALIST

## 2024-02-07 PROCEDURE — 82330 ASSAY OF CALCIUM: CPT

## 2024-02-07 PROCEDURE — 93010 ELECTROCARDIOGRAM REPORT: CPT | Mod: ,,, | Performed by: HOSPITALIST

## 2024-02-07 PROCEDURE — 99285 EMERGENCY DEPT VISIT HI MDM: CPT | Mod: 25

## 2024-02-07 PROCEDURE — 84484 ASSAY OF TROPONIN QUANT: CPT | Performed by: FAMILY MEDICINE

## 2024-02-07 RX ORDER — LANOLIN ALCOHOL/MO/W.PET/CERES
800 CREAM (GRAM) TOPICAL
Status: DISCONTINUED | OUTPATIENT
Start: 2024-02-07 | End: 2024-02-14 | Stop reason: HOSPADM

## 2024-02-07 RX ORDER — ONDANSETRON HYDROCHLORIDE 2 MG/ML
4 INJECTION, SOLUTION INTRAVENOUS EVERY 8 HOURS PRN
Status: DISCONTINUED | OUTPATIENT
Start: 2024-02-07 | End: 2024-02-14 | Stop reason: HOSPADM

## 2024-02-07 RX ORDER — LEVOFLOXACIN 5 MG/ML
500 INJECTION, SOLUTION INTRAVENOUS
Status: COMPLETED | OUTPATIENT
Start: 2024-02-07 | End: 2024-02-12

## 2024-02-07 RX ORDER — ASPIRIN 81 MG/1
81 TABLET ORAL DAILY
Status: DISCONTINUED | OUTPATIENT
Start: 2024-02-08 | End: 2024-02-14 | Stop reason: HOSPADM

## 2024-02-07 RX ORDER — TALC
6 POWDER (GRAM) TOPICAL NIGHTLY PRN
Status: DISCONTINUED | OUTPATIENT
Start: 2024-02-07 | End: 2024-02-14 | Stop reason: HOSPADM

## 2024-02-07 RX ORDER — COLCHICINE 0.6 MG/1
0.6 TABLET, FILM COATED ORAL DAILY
Status: DISCONTINUED | OUTPATIENT
Start: 2024-02-08 | End: 2024-02-14 | Stop reason: HOSPADM

## 2024-02-07 RX ORDER — NALOXONE HCL 0.4 MG/ML
0.02 VIAL (ML) INJECTION
Status: DISCONTINUED | OUTPATIENT
Start: 2024-02-07 | End: 2024-02-14 | Stop reason: HOSPADM

## 2024-02-07 RX ORDER — ACETAMINOPHEN 500 MG
1000 TABLET ORAL EVERY 8 HOURS PRN
Status: DISCONTINUED | OUTPATIENT
Start: 2024-02-07 | End: 2024-02-09

## 2024-02-07 RX ORDER — TIOTROPIUM BROMIDE INHALATION SPRAY 3.12 UG/1
1 SPRAY, METERED RESPIRATORY (INHALATION) DAILY
Qty: 4 G | Refills: 0 | Status: SHIPPED | OUTPATIENT
Start: 2024-02-07 | End: 2024-02-21 | Stop reason: SDUPTHER

## 2024-02-07 RX ORDER — ACETAMINOPHEN 325 MG/1
650 TABLET ORAL EVERY 4 HOURS PRN
Status: DISCONTINUED | OUTPATIENT
Start: 2024-02-07 | End: 2024-02-14 | Stop reason: HOSPADM

## 2024-02-07 RX ORDER — SODIUM,POTASSIUM PHOSPHATES 280-250MG
2 POWDER IN PACKET (EA) ORAL
Status: DISCONTINUED | OUTPATIENT
Start: 2024-02-07 | End: 2024-02-14 | Stop reason: HOSPADM

## 2024-02-07 RX ORDER — HYDROCODONE BITARTRATE AND ACETAMINOPHEN 10; 325 MG/1; MG/1
1 TABLET ORAL 3 TIMES DAILY
Status: DISCONTINUED | OUTPATIENT
Start: 2024-02-07 | End: 2024-02-10

## 2024-02-07 RX ORDER — ALUMINUM HYDROXIDE, MAGNESIUM HYDROXIDE, AND SIMETHICONE 1200; 120; 1200 MG/30ML; MG/30ML; MG/30ML
30 SUSPENSION ORAL 4 TIMES DAILY PRN
Status: DISCONTINUED | OUTPATIENT
Start: 2024-02-07 | End: 2024-02-14 | Stop reason: HOSPADM

## 2024-02-07 RX ORDER — GLUCAGON 1 MG
1 KIT INJECTION
Status: DISCONTINUED | OUTPATIENT
Start: 2024-02-07 | End: 2024-02-14 | Stop reason: HOSPADM

## 2024-02-07 RX ORDER — IBUPROFEN 200 MG
24 TABLET ORAL
Status: DISCONTINUED | OUTPATIENT
Start: 2024-02-07 | End: 2024-02-14 | Stop reason: HOSPADM

## 2024-02-07 RX ORDER — IBUPROFEN 200 MG
1 TABLET ORAL DAILY
Status: DISCONTINUED | OUTPATIENT
Start: 2024-02-07 | End: 2024-02-14 | Stop reason: HOSPADM

## 2024-02-07 RX ORDER — ALLOPURINOL 300 MG/1
300 TABLET ORAL DAILY
Status: DISCONTINUED | OUTPATIENT
Start: 2024-02-08 | End: 2024-02-14 | Stop reason: HOSPADM

## 2024-02-07 RX ORDER — POLYETHYLENE GLYCOL 3350 17 G/17G
17 POWDER, FOR SOLUTION ORAL DAILY PRN
Status: DISCONTINUED | OUTPATIENT
Start: 2024-02-07 | End: 2024-02-14 | Stop reason: HOSPADM

## 2024-02-07 RX ORDER — ENOXAPARIN SODIUM 100 MG/ML
40 INJECTION SUBCUTANEOUS EVERY 24 HOURS
Status: DISCONTINUED | OUTPATIENT
Start: 2024-02-07 | End: 2024-02-07

## 2024-02-07 RX ORDER — HYDROCODONE BITARTRATE AND ACETAMINOPHEN 10; 325 MG/1; MG/1
1 TABLET ORAL EVERY 6 HOURS PRN
Status: ON HOLD | COMMUNITY
Start: 2024-01-09 | End: 2024-02-14

## 2024-02-07 RX ORDER — KETOROLAC TROMETHAMINE 30 MG/ML
30 INJECTION, SOLUTION INTRAMUSCULAR; INTRAVENOUS
Status: COMPLETED | OUTPATIENT
Start: 2024-02-07 | End: 2024-02-07

## 2024-02-07 RX ORDER — DIPHENHYDRAMINE HYDROCHLORIDE 50 MG/ML
25 INJECTION INTRAMUSCULAR; INTRAVENOUS ONCE
Status: COMPLETED | OUTPATIENT
Start: 2024-02-07 | End: 2024-02-07

## 2024-02-07 RX ORDER — IPRATROPIUM BROMIDE AND ALBUTEROL SULFATE 2.5; .5 MG/3ML; MG/3ML
3 SOLUTION RESPIRATORY (INHALATION)
Status: COMPLETED | OUTPATIENT
Start: 2024-02-07 | End: 2024-02-07

## 2024-02-07 RX ORDER — BUMETANIDE 2 MG/1
2 TABLET ORAL 2 TIMES DAILY
Qty: 60 TABLET | Refills: 0 | Status: SHIPPED | OUTPATIENT
Start: 2024-02-07 | End: 2024-03-05

## 2024-02-07 RX ORDER — SODIUM CHLORIDE 0.9 % (FLUSH) 0.9 %
10 SYRINGE (ML) INJECTION
Status: DISCONTINUED | OUTPATIENT
Start: 2024-02-07 | End: 2024-02-14 | Stop reason: HOSPADM

## 2024-02-07 RX ORDER — ACETAMINOPHEN 325 MG/1
650 TABLET ORAL EVERY 8 HOURS PRN
Status: DISCONTINUED | OUTPATIENT
Start: 2024-02-07 | End: 2024-02-14 | Stop reason: HOSPADM

## 2024-02-07 RX ORDER — NIFEDIPINE 30 MG/1
60 TABLET, EXTENDED RELEASE ORAL DAILY
Status: DISCONTINUED | OUTPATIENT
Start: 2024-02-08 | End: 2024-02-14 | Stop reason: HOSPADM

## 2024-02-07 RX ORDER — INSULIN ASPART 100 [IU]/ML
0-5 INJECTION, SOLUTION INTRAVENOUS; SUBCUTANEOUS
Status: DISCONTINUED | OUTPATIENT
Start: 2024-02-07 | End: 2024-02-14 | Stop reason: HOSPADM

## 2024-02-07 RX ORDER — LEVOTHYROXINE SODIUM 150 UG/1
150 TABLET ORAL
Status: DISCONTINUED | OUTPATIENT
Start: 2024-02-08 | End: 2024-02-14 | Stop reason: HOSPADM

## 2024-02-07 RX ORDER — IPRATROPIUM BROMIDE AND ALBUTEROL SULFATE 2.5; .5 MG/3ML; MG/3ML
3 SOLUTION RESPIRATORY (INHALATION)
Status: DISCONTINUED | OUTPATIENT
Start: 2024-02-07 | End: 2024-02-14 | Stop reason: HOSPADM

## 2024-02-07 RX ORDER — FUROSEMIDE 10 MG/ML
40 INJECTION INTRAMUSCULAR; INTRAVENOUS
Status: DISCONTINUED | OUTPATIENT
Start: 2024-02-07 | End: 2024-02-10

## 2024-02-07 RX ORDER — IBUPROFEN 200 MG
16 TABLET ORAL
Status: DISCONTINUED | OUTPATIENT
Start: 2024-02-07 | End: 2024-02-14 | Stop reason: HOSPADM

## 2024-02-07 RX ADMIN — METHYLPREDNISOLONE SODIUM SUCCINATE 60 MG: 40 INJECTION INTRAMUSCULAR; INTRAVENOUS at 05:02

## 2024-02-07 RX ADMIN — NICOTINE 1 PATCH: 14 PATCH, EXTENDED RELEASE TRANSDERMAL at 05:02

## 2024-02-07 RX ADMIN — DIPHENHYDRAMINE HYDROCHLORIDE 25 MG: 50 INJECTION INTRAMUSCULAR; INTRAVENOUS at 05:02

## 2024-02-07 RX ADMIN — FUROSEMIDE 40 MG: 10 INJECTION, SOLUTION INTRAVENOUS at 05:02

## 2024-02-07 RX ADMIN — METHYLPREDNISOLONE SODIUM SUCCINATE 60 MG: 40 INJECTION INTRAMUSCULAR; INTRAVENOUS at 09:02

## 2024-02-07 RX ADMIN — IPRATROPIUM BROMIDE AND ALBUTEROL SULFATE 3 ML: 2.5; .5 SOLUTION RESPIRATORY (INHALATION) at 05:02

## 2024-02-07 RX ADMIN — LEVOFLOXACIN 500 MG: 500 INJECTION, SOLUTION INTRAVENOUS at 05:02

## 2024-02-07 RX ADMIN — IPRATROPIUM BROMIDE AND ALBUTEROL SULFATE 3 ML: .5; 3 SOLUTION RESPIRATORY (INHALATION) at 02:02

## 2024-02-07 RX ADMIN — HYDROCODONE BITARTRATE AND ACETAMINOPHEN 1 TABLET: 10; 325 TABLET ORAL at 10:02

## 2024-02-07 RX ADMIN — KETOROLAC TROMETHAMINE 30 MG: 30 INJECTION, SOLUTION INTRAMUSCULAR; INTRAVENOUS at 02:02

## 2024-02-07 RX ADMIN — APIXABAN 5 MG: 5 TABLET, FILM COATED ORAL at 09:02

## 2024-02-07 RX ADMIN — Medication 6 MG: at 09:02

## 2024-02-07 NOTE — PROGRESS NOTES
PT sent to ER for COPD exacerbation per Dr Frausto; pt also has CHF; pt will arrive to [facility] ED for further evaluation POV.

## 2024-02-07 NOTE — ED PROVIDER NOTES
Encounter Date: 2/7/2024    SCRIBE #1 NOTE: I, June Barnes, am scribing for, and in the presence of,  Pantera Baker DO.       History     Chief Complaint   Patient presents with    Shortness of Breath    Leg Pain     A 62 y/o Female presents to the ED with c/o SOB. Patient states she has Mhx of CHF, Diabetes Mellitus. Patient has, swelling and tenderness in both lower legs. Patient states she her left lower leg has pain. PMHx, Gout, chronic pain syndrome, and Type 2 diabetes Mellitus. There are no other issues to report with is patient at this time.    The history is provided by the patient. No  was used.     Review of patient's allergies indicates:   Allergen Reactions    Ammonium peroxydisulfate Shortness Of Breath    Avocado (laurus persea) Anaphylaxis    Bananas [banana] Anaphylaxis and Swelling     CRAMPS,    Chocolate flavor Anaphylaxis     MOUTH SWELLING    Fentanyl Shortness Of Breath and Itching    Percocet [oxycodone-acetaminophen] Shortness Of Breath and Itching    Silvadene [silver sulfadiazine]     Clindamycin     Corticosteroids (glucocorticoids)     Hydrocortisone Blisters    Lasix [furosemide] Blisters     BURNS SKIN    Pregabalin     Adhesive Rash and Blisters    Iodine Rash    Latex, natural rubber Rash    Pcn [penicillins] Rash    Sulfa (sulfonamide antibiotics) Rash and Blisters     Past Medical History:   Diagnosis Date    Acquired hypothyroidism     Atherosclerosis of native artery of extremity with intermittent claudication 01/30/2019    bilateral legs    BMI 50.0-59.9, adult 02/22/2021    Chronic pain syndrome     opioid depen    Congestive heart failure (CHF)     COPD (chronic obstructive pulmonary disease)     COVID-19 10/06/2023    Depression     Diabetes mellitus, type 2     followed by Aurea Kwong NP, [facility]    DVT of lower extremity, bilateral     Essential hypertension 06/08/2021    GERD (gastroesophageal reflux disease)     Gout 07/05/2023     Hyperlipidemia     Lumbosacral radiculopathy 06/05/2023    followed by GLENN Valdes, [facility]    Migraines     Nicotine dependence     Nicotine dependence     Obesity hypoventilation syndrome     chronic CO2 retainer with compensatory metabolic alkalosis    Osteoarthritis     Seizure disorder     Sleep apnea     on cpap    Thyroid cancer     Venous stasis ulcer limited to breakdown of skin with varicose veins     followed by Estuardo Zhao    Vitamin D deficiency      Past Surgical History:   Procedure Laterality Date    ABCESS DRAINAGE      HYSTERECTOMY      ILIAC ARTERY STENT Bilateral 01/28/2020    Bilateral distal aorta and common iliac 8 X 59 vbx covered stents performed by Dr. Antonio Jacinto.    LEFT HEART CATHETERIZATION Left 11/6/2023    Procedure: Left heart cath;  Surgeon: Alex Ortega DO;  Location: [facility] CATH LAB;  Service: Cardiology;  Laterality: Left;    RADIOFREQUENCY ABLATION Left 04/15/2022    Procedure: Left calf  Radiofrequency Ablation;  Surgeon: Matty Falcon DO;  Location: [facility] OR;  Service: Vascular;  Laterality: Left;    STAB PHLEBECTOMY OF VARICOSE VEINS Left 01/25/2013    Left leg microphlebectomies x 23 stab avulsions and ligation of multiple varicose veins perrformed by Dr. Cirilo Aguirre.    THYROIDECTOMY      hx: thyroid cancer    TOE AMPUTATION Left 01/28/2020    2nd, 4th and 5th performed by Dr. Anam Saeed.    TOE AMPUTATION Right 01/28/2020    4th toe performed by Dr. Anam Saeed.    TOTAL ABDOMINAL HYSTERECTOMY W/ BILATERAL SALPINGOOPHORECTOMY      TUBAL LIGATION      VAGINAL DELIVERY      x 4    VENOUS ABLATION Right 08/09/2019    GSV Varithena Ablation performed by Dr. Estuardo Falcon    VENOUS ABLATION Left 08/02/2019    ATAV Varithena Ablation performed by Dr. Estuardo Falcon.    VENOUS ABLATION Right 07/13/2015    ATAV Laser Ablatio performed by Dr. Cirilo Aguirre.    VENOUS ABLATION Right 07/06/2015    Distal  GSV Laser Ablation performed by dr. Cirilo Aguirre.     VENOUS ABLATION Left 04/20/2015    Left Distal GSV Laser Ablation performed by dr. Cirilo Aguirre.    VENOUS ABLATION Right 10/28/2013    Right Distal GSV RF Ablation performed by Dr. Cirilo Aguirre.    VENOUS ABLATION Left 10/25/2013    SSV RF Ablation performed by dr. Cirilo Aguirre.    VENOUS ABLATION Left 10/07/2013    Left GSV and Left ATAV RF Ablation performed by Dr. Cirilo Aguirre.    VENOUS ABLATION Right 01/21/2013    GSV RF Ablation w/micros x 22 performed by Dr. Cirilo Aguirre.    VENOUS ABLATION Left 06/25/2021    Left distal GSV Varithena Ablation     Family History   Problem Relation Age of Onset    Heart disease Mother         age 84 CHF    Hypertension Mother     Osteoarthritis Mother     Coronary aneurysm Father     Coronary artery disease Father     Hypertension Father     Heart disease Father     No Known Problems Son     No Known Problems Son     No Known Problems Son     No Known Problems Son     No Known Problems Sister     No Known Problems Brother         hx: varicose veins    No Known Problems Brother         MVA: parlazed    No Known Problems Brother      Social History     Tobacco Use    Smoking status: Every Day     Current packs/day: 0.50     Average packs/day: 0.5 packs/day for 33.0 years (16.5 ttl pk-yrs)     Types: Cigarettes     Passive exposure: Current    Smokeless tobacco: Never   Substance Use Topics    Alcohol use: Never    Drug use: Never     Review of Systems   Respiratory:  Positive for shortness of breath.    Cardiovascular:  Positive for leg swelling.   Gastrointestinal:  Negative for abdominal pain.       Physical Exam     Initial Vitals   BP Pulse Resp Temp SpO2   02/07/24 1152 02/07/24 1152 02/07/24 1209 02/07/24 1152 02/07/24 1152   (!) 150/81 96 (!) 26 98.1 °F (36.7 °C) (!) 75 %      MAP       --                Physical Exam    Nursing note and vitals reviewed.  Constitutional: She appears well-developed and well-nourished.   HENT:   Head: Normocephalic and atraumatic.   Right Ear: External ear  normal.   Left Ear: External ear normal.   Nose: Nose normal.   Mouth/Throat: Oropharynx is clear and moist.   Eyes: Conjunctivae and EOM are normal. Pupils are equal, round, and reactive to light.   Neck: Neck supple.   Normal range of motion.  Cardiovascular:  Normal rate, regular rhythm, normal heart sounds and intact distal pulses.           Pulmonary/Chest: Breath sounds normal.   Abdominal: Abdomen is soft. Bowel sounds are normal.   Genitourinary:    Vagina and uterus normal.     Musculoskeletal:         General: Tenderness and edema present.      Cervical back: Normal range of motion and neck supple.      Comments: Bi- Lateral lower leg swelling      Neurological: She is alert and oriented to person, place, and time. She has normal strength and normal reflexes.   Skin: Skin is warm. Capillary refill takes less than 2 seconds.   Psychiatric: She has a normal mood and affect. Her behavior is normal. Judgment and thought content normal.         ED Course   Procedures  Labs Reviewed   COMPREHENSIVE METABOLIC PANEL - Abnormal; Notable for the following components:       Result Value    Potassium 3.4 (*)     Anion Gap 4 (*)     Albumin 3.3 (*)     All other components within normal limits   NT-PRO NATRIURETIC PEPTIDE - Abnormal; Notable for the following components:    ProBNP 2,073 (*)     All other components within normal limits   CBC WITH DIFFERENTIAL - Abnormal; Notable for the following components:    RBC 6.74 (*)     Hematocrit 49.1 (*)     MCV 72.8 (*)     MCH 22.1 (*)     MCHC 30.3 (*)     RDW 24.0 (*)     Neutrophils % 65.6 (*)     Lymphocytes % 20.3 (*)     Monocytes % 8.7 (*)     Immature Granulocytes % 0.6 (*)     nRBC, Auto 0.2 (*)     Immature Granulocytes, Absolute 0.05 (*)     nRBC, Absolute 0.02 (*)     All other components within normal limits   CBC MORPHOLOGY - Abnormal; Notable for the following components:    Platelet Morphology Few Large Platelets (*)     All other components within normal  limits   D DIMER, QUANTITATIVE - Abnormal; Notable for the following components:    D-Dimer 2.59 (*)     All other components within normal limits   TROPONIN I - Normal   DRUG SCREEN, URINE (BEAKER) - Normal   SARS-COV-2 RNA AMPLIFICATION, QUAL - Normal    Narrative:     Negative SARS-CoV results should not be used as the sole basis for treatment or patient management decisions; negative results should be considered in the context of a patient's recent exposures, history and the presene of clinical signs and symptoms consistent with COVID-19.  Negative results should be treated as presumptive and confirmed by molecular assay, if necessary for patient management.   CBC W/ AUTO DIFFERENTIAL    Narrative:     The following orders were created for panel order CBC auto differential.  Procedure                               Abnormality         Status                     ---------                               -----------         ------                     CBC with Differential[[id number]]       Abnormal            Final result                 Please view results for these tests on the individual orders.   URINALYSIS, REFLEX TO URINE CULTURE     EKG Readings: (Independently Interpreted)   Heart Rate: 97 bpm.   Normal sinus rhythm   Right superior axis deviation   Incomplete right bundle branch block   Right ventricular hypertrophy with repolarization abnormality   Nonspecific T wave abnormality   Prolonged QT   Abnormal ECG   When compared with ECG of 06-DEC-2023 15:10,   PREVIOUS ECG IS PRESENT     ECG Results              EKG 12-lead (Final result)        Collection Time Result Time QRS Duration OHS QTC Calculation    02/07/24 11:44:15 02/07/24 19:55:03 104 469                     Final result by Interface, Lab In [facility] (02/07/24 19:55:07)                   Narrative:    Test Reason : R06.02,    Vent. Rate : 097 BPM     Atrial Rate : 097 BPM     P-R Int : 178 ms          QRS Dur : 104 ms      QT Int : 370 ms        P-R-T Axes : 067 203 028 degrees     QTc Int : 469 ms    Normal sinus rhythm  Right superior axis deviation  Incomplete right bundle branch block  Right ventricular hypertrophy with repolarization abnormality  Nonspecific T wave abnormality  Prolonged QT  Abnormal ECG  When compared with ECG of 06-DEC-2023 15:10,  PREVIOUS ECG IS PRESENT  Confirmed by Tien Barksdale MD ([id number]) on 2/7/2024 7:55:02 PM    Referred By: [email or web address]   SELF           Confirmed By:Tien Barksdale MD                                  Imaging Results              CTA Chest Non-Coronary (PE Studies) (Final result)  Result time 02/08/24 13:10:20      Final result by Modesto Tristan II, MD (02/08/24 13:10:20)                   Impression:      No evidence of pulmonary thromboembolism airspace density could indicate infiltrate and or atelectasis..      Electronically signed by: Modesto Tristan  Date:    02/08/2024  Time:    13:10               Narrative:    EXAMINATION:  CTA CHEST NON CORONARY (PE STUDIES)    CLINICAL HISTORY:  Pulmonary embolism (PE) suspected, positive D-dimer;    TECHNIQUE:  Axial CT imaging of the chest is performed with intravenous contrast. Contrast dose is 100 cc Isovue 370.    CT dose reduction technique used - Dose Rite and tube current modulation.    COMPARISON:  None available    FINDINGS:  No thrombus or other abnormality is identified in the pulmonary arteries or veins.  The pulmonary vessel caliber is within normal limits.  The cardiac size is enlarged.  Otherwise heart mediastinum and great vessels appear within normal limits.    Small amount airspace density both lungs more prominent in the right lung and mostly in the dependent lungs.  Remaining pulmonary.  Small amount parenchyma shows no evidence of airspace disease or abnormal density.  No effusion or pneumothorax is present.                                       US ARTERIAL DOPPLER EXAM (Final result)  Result time 02/08/24 10:29:04      Final result  by Edson De MD (02/08/24 10:29:04)                   Impression:      Mild-to-moderate bilateral peripheral vascular disease.  No occlusion.      Electronically signed by: Edson De  Date:    02/08/2024  Time:    10:29               Narrative:    EXAMINATION:  Bilateral lower extremity arterial ultrasound    CLINICAL HISTORY:  Lower extremity swelling and pain    TECHNIQUE:  Grayscale, color Doppler, and waveform analysis performed of the bilateral lower extremity arterial system.    COMPARISON:  None    FINDINGS:  No vascular occlusion.  Mild plaque seen bilaterally on the grayscale images.  Biphasic waveform seen throughout.  No significant velocity alteration.                                       US Lower Extremity Veins Bilateral (Final result)  Result time 02/07/24 20:20:17      Final result by Agus Sahni MD (02/07/24 20:20:17)                   Impression:      Unremarkable duplex imaging of the bilateral lower extremity. No evidence of DVT.    Place of service: [facility]      Electronically signed by: Agus Sahni  Date:    02/07/2024  Time:    20:20               Narrative:    EXAMINATION:  Ultrasound lower extremity veins bilateral    CLINICAL HISTORY:  Shortness of breath    DUPLEX SCAN OF THE BILATERAL LOWER EXTREMITY VEINS    Date: 02/07/2024    TECHNIQUE:  Duplex scan of the bilateral lower extremity veins using the B-mode/grayscale imaging and Doppler spectral analysis and color-flow    COMPARISON:  Prior ultrasound December 4, 2022    FINDINGS:  Major venous structures of the bilateral lower extremity demonstrate a normal course and caliber. There is no evidence of deep venous thrombosis. No abnormal intrinsic echogenic lesions are demonstrated in the scanned blood vessels. The visualized veins demonstrate complete compressibility with normal color Doppler flow and spectral analysis.    Ultrasound images were captured and stored.                                       X-Ray Chest  AP (Final result)  Result time 02/07/24 12:19:12      Final result by Modesto Tristan II, MD (02/07/24 12:19:12)                   Impression:      Findings suggest mild cardiac decompensation and / or pneumonitis.      Electronically signed by: Modesto Tristan  Date:    02/07/2024  Time:    12:19               Narrative:    EXAMINATION:  XR CHEST AP PORTABLE    CLINICAL HISTORY:  SOB;    COMPARISON:  6 December 2023    TECHNIQUE:  XR CHEST AP PORTABLE    FINDINGS:  The heart and mediastinum are stable in size and configuration.  The pulmonary vascularity is slightly increased with bilateral increased interstitial lung density.  No other lung infiltrates, effusions, pneumothorax or other abnormality is demonstrated.                                    X-Rays:   Independently Interpreted Readings:   Other Readings:  EXAMINATION:  XR CHEST AP PORTABLE     CLINICAL HISTORY:  SOB;     COMPARISON:  6 December 2023     TECHNIQUE:  XR CHEST AP PORTABLE     FINDINGS:  The heart and mediastinum are stable in size and configuration.  The pulmonary vascularity is slightly increased with bilateral increased interstitial lung density.  No other lung infiltrates, effusions, pneumothorax or other abnormality is demonstrated.     Impression:     Findings suggest mild cardiac decompensation and / or pneumonitis.        Electronically signed by: Modesto Tristan  Date:                                            02/07/2024  Time:                                           12:19        Exam Ended: 02/07/24 12:17 CST Last Resulted: 02/07/24 12:19 CST            Medications   nicotine 14 mg/24 hr 1 patch (1 patch Transdermal Patch Removed 2/8/24 0927)   methylPREDNISolone sodium succinate injection 60 mg (60 mg Intravenous Given 2/8/24 2208)   allopurinoL tablet 300 mg (300 mg Oral Given 2/8/24 0926)   apixaban tablet 5 mg (5 mg Oral Given 2/8/24 2044)   aspirin EC tablet 81 mg (81 mg Oral Given 2/8/24 0926)   colchicine capsule/tablet  0.6 mg (0.6 mg Oral Given 2/8/24 0926)   insulin detemir U-100 injection 10 Units (10 Units Subcutaneous Given 2/8/24 0928)   levothyroxine tablet 150 mcg (150 mcg Oral Given 2/8/24 0621)   NIFEdipine 24 hr tablet 60 mg (60 mg Oral Given 2/8/24 0926)   sodium chloride 0.9% flush 10 mL (has no administration in time range)   melatonin tablet 6 mg (6 mg Oral Given 2/8/24 2044)   ondansetron injection 4 mg (has no administration in time range)   polyethylene glycol packet 17 g (has no administration in time range)   acetaminophen tablet 650 mg (has no administration in time range)   aluminum-magnesium hydroxide-simethicone 200-200-20 mg/5 mL suspension 30 mL (has no administration in time range)   acetaminophen tablet 650 mg (has no administration in time range)   acetaminophen tablet 1,000 mg (has no administration in time range)   naloxone 0.4 mg/mL injection 0.02 mg (has no administration in time range)   potassium bicarbonate disintegrating tablet 50 mEq (has no administration in time range)   potassium bicarbonate disintegrating tablet 35 mEq (has no administration in time range)   potassium bicarbonate disintegrating tablet 60 mEq (has no administration in time range)   magnesium oxide tablet 800 mg (has no administration in time range)   magnesium oxide tablet 800 mg (has no administration in time range)   potassium, sodium phosphates 280-160-250 mg packet 2 packet (has no administration in time range)   potassium, sodium phosphates 280-160-250 mg packet 2 packet (has no administration in time range)   potassium, sodium phosphates 280-160-250 mg packet 2 packet (has no administration in time range)   glucose chewable tablet 16 g (has no administration in time range)   glucose chewable tablet 24 g (has no administration in time range)   glucagon (human recombinant) injection 1 mg (has no administration in time range)   insulin aspart U-100 injection 0-5 Units (has no administration in time range)   furosemide  injection 40 mg (40 mg Intravenous Given 2/8/24 1714)   albuterol-ipratropium 2.5 mg-0.5 mg/3 mL nebulizer solution 3 mL (3 mLs Nebulization Given 2/8/24 1950)   dextrose 10% bolus 125 mL 125 mL (has no administration in time range)   dextrose 10% bolus 250 mL 250 mL (has no administration in time range)   levoFLOXacin 500 mg/100 mL IVPB 500 mg (0 mg Intravenous Stopped 2/8/24 1950)   HYDROcodone-acetaminophen  mg per tablet 1 tablet (1 tablet Oral Given 2/8/24 2044)   diphenhydrAMINE capsule 50 mg (has no administration in time range)   famotidine tablet 20 mg (20 mg Oral Given 2/8/24 1016)   albuterol-ipratropium 2.5 mg-0.5 mg/3 mL nebulizer solution 3 mL (3 mLs Nebulization Given 2/7/24 1411)   ketorolac injection 30 mg (30 mg Intravenous Given 2/7/24 1411)   diphenhydrAMINE injection 25 mg (25 mg Intravenous Given 2/7/24 1752)   diphenhydrAMINE injection 50 mg (50 mg Intravenous Given 2/8/24 1016)   iopamidoL (ISOVUE-370) injection 100 mL (100 mLs Intravenous Given 2/8/24 1115)     Medical Decision Making  Amount and/or Complexity of Data Reviewed  Labs: ordered.  Radiology: ordered.    Risk  Prescription drug management.  Decision regarding hospitalization.              Attending Attestation:           Physician Attestation for Scribe:  Physician Attestation Statement for Scribe #1: I, Pantera Baker, DO, reviewed documentation, as scribed by June Barnes in my presence, and it is both accurate and complete.             ED Course as of 02/08/24 2211 Wed Feb 07, 2024   1305 02/07/24 1221  X-Ray Chest AP  Performed: 02/07/24 1217  Final         Impression: Findings suggest mild cardiac decompensation and / or pneumonitis. Electronically signed by: Modesto Tristan Date: 02/07/2024 Time: 12:19       [CM]      ED Course User Index  [CM] June Barnes                           Clinical Impression:  Final diagnoses:  [R06.02] SOB (shortness of breath)          ED Disposition Condition    Admit                  Pantera Baker, DO  02/08/24 [id number]

## 2024-02-07 NOTE — Clinical Note
Patient transported to [facility] via bed.  Patient tolerated well. Bedside report and assessment with EPIFANIO Perdue.  Puncture site stable.  No signs of bleeding or hematoma.  Dressing in place.  Patient education complete.  Friend at bedside.

## 2024-02-07 NOTE — ED NOTES
Patient placed on Bipap per Dr. Baker verbal order. Bipap setting: IPAP/EPAP 15/5, rate 20, FIO2 50%. O2 saturation 90%

## 2024-02-08 PROBLEM — M86.9 OSTEOMYELITIS: Status: ACTIVE | Noted: 2024-02-08

## 2024-02-08 LAB
ANION GAP SERPL CALCULATED.3IONS-SCNC: 13 MMOL/L (ref 7–16)
ANISOCYTOSIS BLD QL SMEAR: ABNORMAL
AORTIC ROOT ANNULUS: 3.09 CM
AORTIC VALVE CUSP SEPERATION: 2.27 CM
AV INDEX (PROSTH): 1.5
AV MEAN GRADIENT: 2 MMHG
AV PEAK GRADIENT: 2 MMHG
AV VALVE AREA BY VELOCITY RATIO: 4.07 CM²
AV VALVE AREA: 5.29 CM²
AV VELOCITY RATIO: 1.15
BASOPHILS # BLD AUTO: 0.01 K/UL (ref 0–0.2)
BASOPHILS NFR BLD AUTO: 0.2 % (ref 0–1)
BSA FOR ECHO PROCEDURE: 2.83 M2
BUN SERPL-MCNC: 14 MG/DL (ref 7–18)
BUN/CREAT SERPL: 15 (ref 6–20)
CALCIUM SERPL-MCNC: 9.8 MG/DL (ref 8.5–10.1)
CHLORIDE SERPL-SCNC: 102 MMOL/L (ref 98–107)
CO2 SERPL-SCNC: 28 MMOL/L (ref 21–32)
CREAT SERPL-MCNC: 0.95 MG/DL (ref 0.55–1.02)
CRP SERPL-MCNC: 1.53 MG/DL (ref 0–0.8)
CV ECHO LV RWT: 0.71 CM
DIFFERENTIAL METHOD BLD: ABNORMAL
DOP CALC AO PEAK VEL: 0.78 M/S
DOP CALC AO VTI: 12.8 CM
DOP CALC LVOT AREA: 3.5 CM2
DOP CALC LVOT DIAMETER: 2.12 CM
DOP CALC LVOT PEAK VEL: 0.9 M/S
DOP CALC LVOT STROKE VOLUME: 67.74 CM3
DOP CALCLVOT PEAK VEL VTI: 19.2 CM
E WAVE DECELERATION TIME: 256.53 MSEC
E/A RATIO: 0.65
E/E' RATIO: 5.56 M/S
ECHO LV POSTERIOR WALL: 1.37 CM (ref 0.6–1.1)
EGFR (NO RACE VARIABLE) (RUSH/TITUS): 67 ML/MIN/1.73M2
EJECTION FRACTION: 60 %
EOSINOPHIL # BLD AUTO: 0 K/UL (ref 0–0.5)
EOSINOPHIL NFR BLD AUTO: 0 % (ref 1–4)
ERYTHROCYTE [DISTWIDTH] IN BLOOD BY AUTOMATED COUNT: 24.2 % (ref 11.5–14.5)
ERYTHROCYTE [SEDIMENTATION RATE] IN BLOOD BY WESTERGREN METHOD: 1 MM/HR (ref 0–30)
FRACTIONAL SHORTENING: 19 % (ref 28–44)
GLUCOSE SERPL-MCNC: 156 MG/DL (ref 74–106)
GLUCOSE SERPL-MCNC: 157 MG/DL (ref 70–105)
GLUCOSE SERPL-MCNC: 158 MG/DL (ref 70–105)
GLUCOSE SERPL-MCNC: 179 MG/DL (ref 70–105)
HCO3 UR-SCNC: 32.8 MMOL/L (ref 21–28)
HCT VFR BLD AUTO: 48.8 % (ref 38–47)
HGB BLD-MCNC: 14.6 G/DL (ref 12–16)
HYPOCHROMIA BLD QL SMEAR: ABNORMAL
IMM GRANULOCYTES # BLD AUTO: 0.03 K/UL (ref 0–0.04)
IMM GRANULOCYTES NFR BLD: 0.6 % (ref 0–0.4)
INTERVENTRICULAR SEPTUM: 1.3 CM (ref 0.6–1.1)
IVC DIAMETER: 2.51 CM
LA MAJOR: 3.41 CM
LEFT ATRIUM SIZE: 3.33 CM
LEFT INTERNAL DIMENSION IN SYSTOLE: 3.13 CM (ref 2.1–4)
LEFT VENTRICLE DIASTOLIC VOLUME INDEX: 24.05 ML/M2
LEFT VENTRICLE DIASTOLIC VOLUME: 63.73 ML
LEFT VENTRICLE MASS INDEX: 69 G/M2
LEFT VENTRICLE SYSTOLIC VOLUME INDEX: 14.6 ML/M2
LEFT VENTRICLE SYSTOLIC VOLUME: 38.82 ML
LEFT VENTRICULAR INTERNAL DIMENSION IN DIASTOLE: 3.85 CM (ref 3.5–6)
LEFT VENTRICULAR MASS: 183.72 G
LV LATERAL E/E' RATIO: 5 M/S
LV SEPTAL E/E' RATIO: 6.25 M/S
LVOT MG: 1.52 MMHG
LVOT MV: 0.58 CM/S
LYMPHOCYTES # BLD AUTO: 0.56 K/UL (ref 1–4.8)
LYMPHOCYTES NFR BLD AUTO: 11.2 % (ref 27–41)
MAGNESIUM SERPL-MCNC: 2.2 MG/DL (ref 1.7–2.3)
MCH RBC QN AUTO: 21.6 PG (ref 27–31)
MCHC RBC AUTO-ENTMCNC: 29.9 G/DL (ref 32–36)
MCV RBC AUTO: 72.3 FL (ref 80–96)
MICROCYTES BLD QL SMEAR: ABNORMAL
MONOCYTES # BLD AUTO: 0.04 K/UL (ref 0–0.8)
MONOCYTES NFR BLD AUTO: 0.8 % (ref 2–6)
MPC BLD CALC-MCNC: ABNORMAL G/DL
MV PEAK A VEL: 0.77 M/S
MV PEAK E VEL: 0.5 M/S
NEUTROPHILS # BLD AUTO: 4.36 K/UL (ref 1.8–7.7)
NEUTROPHILS NFR BLD AUTO: 87.2 % (ref 53–65)
NRBC # BLD AUTO: 0 X10E3/UL
NRBC, AUTO (.00): 0 %
PCO2 BLDA: 53 MMHG (ref 35–48)
PH SMN: 7.4 [PH] (ref 7.35–7.45)
PISA TR MAX VEL: 2.95 M/S
PLATELET # BLD AUTO: 194 K/UL (ref 150–400)
PLATELET MORPHOLOGY: ABNORMAL
PO2 BLDA: 49 MMHG (ref 83–108)
POC BASE EXCESS: 6.6 MMOL/L (ref -2–3)
POC SATURATED O2: 84 % (ref 95–98)
POTASSIUM SERPL-SCNC: 4.2 MMOL/L (ref 3.5–5.1)
PV PEAK GRADIENT: 2 MMHG
PV PEAK VELOCITY: 0.66 M/S
RA MAJOR: 4.47 CM
RA PRESSURE ESTIMATED: 15 MMHG
RBC # BLD AUTO: 6.75 M/UL (ref 4.2–5.4)
RIGHT VENTRICULAR END-DIASTOLIC DIMENSION: 4.75 CM
RV TB RVSP: 18 MMHG
SODIUM SERPL-SCNC: 139 MMOL/L (ref 136–145)
TDI LATERAL: 0.1 M/S
TDI SEPTAL: 0.08 M/S
TDI: 0.09 M/S
TR MAX PG: 35 MMHG
TRICUSPID ANNULAR PLANE SYSTOLIC EXCURSION: 1.73 CM
TV REST PULMONARY ARTERY PRESSURE: 50 MMHG
WBC # BLD AUTO: 5 K/UL (ref 4.5–11)
Z-SCORE OF LEFT VENTRICULAR DIMENSION IN END DIASTOLE: -17.25
Z-SCORE OF LEFT VENTRICULAR DIMENSION IN END SYSTOLE: -11.02

## 2024-02-08 PROCEDURE — 25000242 PHARM REV CODE 250 ALT 637 W/ HCPCS: Performed by: HOSPITALIST

## 2024-02-08 PROCEDURE — 83735 ASSAY OF MAGNESIUM: CPT | Performed by: HOSPITALIST

## 2024-02-08 PROCEDURE — 25000003 PHARM REV CODE 250: Performed by: HOSPITALIST

## 2024-02-08 PROCEDURE — 86140 C-REACTIVE PROTEIN: CPT | Performed by: HOSPITALIST

## 2024-02-08 PROCEDURE — 85025 COMPLETE CBC W/AUTO DIFF WBC: CPT | Performed by: HOSPITALIST

## 2024-02-08 PROCEDURE — 25500020 PHARM REV CODE 255: Performed by: HOSPITALIST

## 2024-02-08 PROCEDURE — 27000221 HC OXYGEN, UP TO 24 HOURS

## 2024-02-08 PROCEDURE — 82803 BLOOD GASES ANY COMBINATION: CPT

## 2024-02-08 PROCEDURE — 85651 RBC SED RATE NONAUTOMATED: CPT | Performed by: HOSPITALIST

## 2024-02-08 PROCEDURE — 94761 N-INVAS EAR/PLS OXIMETRY MLT: CPT

## 2024-02-08 PROCEDURE — 11000001 HC ACUTE MED/SURG PRIVATE ROOM

## 2024-02-08 PROCEDURE — 82962 GLUCOSE BLOOD TEST: CPT

## 2024-02-08 PROCEDURE — 94640 AIRWAY INHALATION TREATMENT: CPT

## 2024-02-08 PROCEDURE — 99900035 HC TECH TIME PER 15 MIN (STAT)

## 2024-02-08 PROCEDURE — S4991 NICOTINE PATCH NONLEGEND: HCPCS | Performed by: HOSPITALIST

## 2024-02-08 PROCEDURE — 63600175 PHARM REV CODE 636 W HCPCS: Performed by: HOSPITALIST

## 2024-02-08 PROCEDURE — 80048 BASIC METABOLIC PNL TOTAL CA: CPT | Performed by: HOSPITALIST

## 2024-02-08 PROCEDURE — 99233 SBSQ HOSP IP/OBS HIGH 50: CPT | Mod: ,,, | Performed by: HOSPITALIST

## 2024-02-08 RX ORDER — DIPHENHYDRAMINE HYDROCHLORIDE 50 MG/ML
50 INJECTION INTRAMUSCULAR; INTRAVENOUS ONCE
Status: COMPLETED | OUTPATIENT
Start: 2024-02-08 | End: 2024-02-08

## 2024-02-08 RX ORDER — FAMOTIDINE 20 MG/1
20 TABLET, FILM COATED ORAL DAILY
Status: DISCONTINUED | OUTPATIENT
Start: 2024-02-08 | End: 2024-02-14 | Stop reason: HOSPADM

## 2024-02-08 RX ORDER — DIPHENHYDRAMINE HCL 25 MG
50 CAPSULE ORAL
Status: DISCONTINUED | OUTPATIENT
Start: 2024-02-08 | End: 2024-02-14 | Stop reason: HOSPADM

## 2024-02-08 RX ADMIN — METHYLPREDNISOLONE SODIUM SUCCINATE 60 MG: 40 INJECTION INTRAMUSCULAR; INTRAVENOUS at 02:02

## 2024-02-08 RX ADMIN — HYDROCODONE BITARTRATE AND ACETAMINOPHEN 1 TABLET: 10; 325 TABLET ORAL at 08:02

## 2024-02-08 RX ADMIN — NICOTINE 1 PATCH: 14 PATCH, EXTENDED RELEASE TRANSDERMAL at 09:02

## 2024-02-08 RX ADMIN — FUROSEMIDE 40 MG: 10 INJECTION, SOLUTION INTRAVENOUS at 06:02

## 2024-02-08 RX ADMIN — HYDROCODONE BITARTRATE AND ACETAMINOPHEN 1 TABLET: 10; 325 TABLET ORAL at 02:02

## 2024-02-08 RX ADMIN — FAMOTIDINE 20 MG: 20 TABLET ORAL at 10:02

## 2024-02-08 RX ADMIN — METHYLPREDNISOLONE SODIUM SUCCINATE 60 MG: 40 INJECTION INTRAMUSCULAR; INTRAVENOUS at 06:02

## 2024-02-08 RX ADMIN — INSULIN DETEMIR 10 UNITS: 100 INJECTION, SOLUTION SUBCUTANEOUS at 09:02

## 2024-02-08 RX ADMIN — COLCHICINE 0.6 MG: 0.6 TABLET, FILM COATED ORAL at 09:02

## 2024-02-08 RX ADMIN — Medication 6 MG: at 08:02

## 2024-02-08 RX ADMIN — APIXABAN 5 MG: 5 TABLET, FILM COATED ORAL at 09:02

## 2024-02-08 RX ADMIN — LEVOTHYROXINE SODIUM 150 MCG: 0.15 TABLET ORAL at 06:02

## 2024-02-08 RX ADMIN — METHYLPREDNISOLONE SODIUM SUCCINATE 60 MG: 40 INJECTION INTRAMUSCULAR; INTRAVENOUS at 10:02

## 2024-02-08 RX ADMIN — HYDROCODONE BITARTRATE AND ACETAMINOPHEN 1 TABLET: 10; 325 TABLET ORAL at 09:02

## 2024-02-08 RX ADMIN — DIPHENHYDRAMINE HYDROCHLORIDE 50 MG: 50 INJECTION INTRAMUSCULAR; INTRAVENOUS at 10:02

## 2024-02-08 RX ADMIN — ALLOPURINOL 300 MG: 300 TABLET ORAL at 09:02

## 2024-02-08 RX ADMIN — IPRATROPIUM BROMIDE AND ALBUTEROL SULFATE 3 ML: 2.5; .5 SOLUTION RESPIRATORY (INHALATION) at 01:02

## 2024-02-08 RX ADMIN — FUROSEMIDE 40 MG: 10 INJECTION, SOLUTION INTRAVENOUS at 05:02

## 2024-02-08 RX ADMIN — APIXABAN 5 MG: 5 TABLET, FILM COATED ORAL at 08:02

## 2024-02-08 RX ADMIN — IPRATROPIUM BROMIDE AND ALBUTEROL SULFATE 3 ML: 2.5; .5 SOLUTION RESPIRATORY (INHALATION) at 07:02

## 2024-02-08 RX ADMIN — NIFEDIPINE 60 MG: 30 TABLET, FILM COATED, EXTENDED RELEASE ORAL at 09:02

## 2024-02-08 RX ADMIN — LEVOFLOXACIN 500 MG: 500 INJECTION, SOLUTION INTRAVENOUS at 06:02

## 2024-02-08 RX ADMIN — ASPIRIN 81 MG: 81 TABLET, COATED ORAL at 09:02

## 2024-02-08 RX ADMIN — IOPAMIDOL 100 ML: 755 INJECTION, SOLUTION INTRAVENOUS at 11:02

## 2024-02-08 NOTE — H&P
[facility] - Emergency Department  Hospital Medicine  History & Physical    Patient Name: Holly Porter  MRN: [id number]  Patient Class: IP- Inpatient  Admission Date: 2/7/2024  Attending Physician: Tien Barksdale MD   Primary Care Provider: Regan Frausto MD         Patient information was obtained from patient, relative(s), past medical records, and ER records.     Subjective:     Principal Problem:Acute hypoxemic respiratory failure    Chief Complaint:   Chief Complaint   Patient presents with    Shortness of Breath    Leg Pain        HPI: Ms. Holly Porter is a 63-year-old woman history of COPD on home O2 with tobacco use, diastolic heart failure, thyroid disease, class 3 obesity with JOVANNI, likely OHS, type 2 diabetes with diabetic ulcer s/p left toe amputation, prior DVT and PE on home Eliquis, PVD s/p R femoral stent, seizures who presents with worsening shortness of breath and bilateral leg pain.  She states that she developed severe respiratory distress at home and her inhalers and other medications were not adequate to improve her distress.  In addition, she reports progressive lower extremity swelling and bilateral leg pain and makes it difficult for her to ambulate.    In the emergency department she had hypoxia to the low 70s.  She was on a Ventimask and then switched to BiPAP.  Case was discussed with pulmonology and it was decided for patient to be on high-flow.  Patient stated that she is very happy with this as she would love to eat dinner and she sometimes feels that she is suffocating and the mass that is why she rarely wears her CPAP at home because she does not think she has appropriate mask.    ED course:   Initial Vitals   BP Pulse Resp Temp SpO2   02/07/24 1152 02/07/24 1152 02/07/24 1209 02/07/24 1152 02/07/24 1152   (!) 150/81 96 (!) 26 98.1 °F (36.7 °C) (!) 75 %                       Prior hospitalization: Visits for shortness of breath.  Hospitalized 11/2023 with  CHF/COPD exacerbations.   [facility] 11/2023: minimal CAD. Echo: EF 50%    Past Medical History:   Diagnosis Date    Acquired hypothyroidism     Atherosclerosis of native artery of extremity with intermittent claudication 01/30/2019    bilateral legs    BMI 50.0-59.9, adult 02/22/2021    Chronic pain syndrome     opioid depen    Congestive heart failure (CHF)     COPD (chronic obstructive pulmonary disease)     COVID-19 10/06/2023    Depression     Diabetes mellitus, type 2     followed by Aurea Kwong NP, [facility]    DVT of lower extremity, bilateral     Essential hypertension 06/08/2021    GERD (gastroesophageal reflux disease)     Gout 07/05/2023    Hyperlipidemia     Lumbosacral radiculopathy 06/05/2023    followed by GLENN Valdes, [facility]    Migraines     Nicotine dependence     Nicotine dependence     Obesity hypoventilation syndrome     chronic CO2 retainer with compensatory metabolic alkalosis    Osteoarthritis     Seizure disorder     Sleep apnea     on cpap    Thyroid cancer     Venous stasis ulcer limited to breakdown of skin with varicose veins     followed by Estuardo Zhao    Vitamin D deficiency        Past Surgical History:   Procedure Laterality Date    ABCESS DRAINAGE      HYSTERECTOMY      ILIAC ARTERY STENT Bilateral 01/28/2020    Bilateral distal aorta and common iliac 8 X 59 vbx covered stents performed by Dr. Antonio Jacinto.    LEFT HEART CATHETERIZATION Left 11/6/2023    Procedure: Left heart cath;  Surgeon: Alex Ortega DO;  Location: [facility] CATH LAB;  Service: Cardiology;  Laterality: Left;    RADIOFREQUENCY ABLATION Left 04/15/2022    Procedure: Left calf  Radiofrequency Ablation;  Surgeon: Matty Falcon DO;  Location: [facility] OR;  Service: Vascular;  Laterality: Left;    STAB PHLEBECTOMY OF VARICOSE VEINS Left 01/25/2013    Left leg microphlebectomies x 23 stab avulsions and ligation of multiple varicose veins perrformed by Dr. Cirilo Aguirre.     THYROIDECTOMY      hx: thyroid cancer    TOE AMPUTATION Left 01/28/2020    2nd, 4th and 5th performed by Dr. Anam Saeed.    TOE AMPUTATION Right 01/28/2020    4th toe performed by Dr. Anam Saeed.    TOTAL ABDOMINAL HYSTERECTOMY W/ BILATERAL SALPINGOOPHORECTOMY      TUBAL LIGATION      VAGINAL DELIVERY      x 4    VENOUS ABLATION Right 08/09/2019    GSV Varithena Ablation performed by Dr. Estuardo Falcon    VENOUS ABLATION Left 08/02/2019    ATAV Varithena Ablation performed by Dr. Estuardo Falcon.    VENOUS ABLATION Right 07/13/2015    ATAV Laser Ablatio performed by Dr. Cirilo Aguirre.    VENOUS ABLATION Right 07/06/2015    Distal  GSV Laser Ablation performed by dr. Cirilo Aguirre.    VENOUS ABLATION Left 04/20/2015    Left Distal GSV Laser Ablation performed by dr. Cirilo Aguirre.    VENOUS ABLATION Right 10/28/2013    Right Distal GSV RF Ablation performed by Dr. Cirilo Aguirre.    VENOUS ABLATION Left 10/25/2013    SSV RF Ablation performed by dr. Cirilo Aguirre.    VENOUS ABLATION Left 10/07/2013    Left GSV and Left ATAV RF Ablation performed by Dr. Cirilo Aguirre.    VENOUS ABLATION Right 01/21/2013    GSV RF Ablation w/micros x 22 performed by Dr. Cirilo Aguirre.    VENOUS ABLATION Left 06/25/2021    Left distal GSV Varithena Ablation       Review of patient's allergies indicates:   Allergen Reactions    Ammonium peroxydisulfate Shortness Of Breath    Avocado (laurus persea) Anaphylaxis    Bananas [banana] Anaphylaxis and Swelling     CRAMPS,    Chocolate flavor Anaphylaxis     MOUTH SWELLING    Fentanyl Shortness Of Breath and Itching    Percocet [oxycodone-acetaminophen] Shortness Of Breath and Itching    Silvadene [silver sulfadiazine]     Clindamycin     Corticosteroids (glucocorticoids)     Hydrocortisone Blisters    Lasix [furosemide] Blisters     BURNS SKIN    Pregabalin     Adhesive Rash and Blisters    Iodine Rash    Latex, natural rubber Rash    Pcn [penicillins] Rash    Sulfa (sulfonamide antibiotics) Rash and Blisters       No current  facility-administered medications on file prior to encounter.     Current Outpatient Medications on File Prior to Encounter   Medication Sig    ACCU-CHEK GUIDE GLUCOSE METER Misc     ACCU-CHEK GUIDE TEST STRIPS Strp     ACCU-CHEK SOFTCLIX LANCETS Misc     albuterol (PROVENTIL/VENTOLIN HFA) 90 mcg/actuation inhaler Inhale 2 puffs into the lungs 4 (four) times daily. Rescue    allopurinoL (ZYLOPRIM) 300 MG tablet TAKE ONE TABLET EVERY DAY    apixaban (ELIQUIS) 5 mg Tab Take 1 tablet (5 mg total) by mouth 2 (two) times daily.    aspirin (ECOTRIN) 81 MG EC tablet TAKE 1 TABLET BY MOUTH EVERY DAY    BREO ELLIPTA 100-25 mcg/dose diskus inhaler Inhale 1 puff into the lungs once daily.    bumetanide (BUMEX) 2 MG tablet Take 1 tablet (2 mg total) by mouth 2 (two) times daily.    calcium carbonate (OS-MICHAELA) 500 mg calcium (1,250 mg) tablet Take 1 tablet (500 mg total) by mouth 2 (two) times daily.    carvediloL (COREG) 12.5 MG tablet Take 0.5 tablets (6.25 mg total) by mouth 2 (two) times daily.    cilostazoL (PLETAL) 100 MG Tab Take 1 tablet (100 mg total) by mouth 2 (two) times daily.    colchicine (COLCRYS) 0.6 mg tablet TAKE 1 TABLET BY MOUTH 3 TIMES A DAY FOR 2 DAYS THEN 1 TABLET DAILY UNTIL GONE    dexAMETHasone (DECADRON) 4 MG Tab Take by mouth.    DULoxetine (CYMBALTA) 30 MG capsule TAKE 1 CAPSULE (30 MG TOTAL) BY MOUTH ONCE DAILY.    gabapentin (NEURONTIN) 600 MG tablet Take 1 tablet (600 mg total) by mouth 3 (three) times daily.    HYDROcodone-acetaminophen (NORCO)  mg per tablet Take 1 tablet by mouth every 6 (six) hours as needed.    ibuprofen (ADVIL,MOTRIN) 800 MG tablet Take 1 tablet (800 mg total) by mouth 2 (two) times daily as needed for Pain.    insulin detemir U-100, Levemir, (LEVEMIR FLEXTOUCH U100 INSULIN) 100 unit/mL (3 mL) InPn pen Inject 10 units into the skin every evening subcutaneous    levothyroxine (SYNTHROID) 150 MCG tablet Take 1 tablet (150 mcg total) by mouth before breakfast.     loratadine (CLARITIN) 10 mg tablet Take 1 tablet (10 mg total) by mouth once daily.    meclizine (ANTIVERT) 25 mg tablet Take 1 tablet (25 mg total) by mouth 2 (two) times daily as needed for Dizziness.    metOLazone (ZAROXOLYN) 2.5 MG tablet Take 1 tablet (2.5 mg total) by mouth once daily.    naloxone (NARCAN) 4 mg/actuation Spry 1 spray once.    nicotine (NICODERM CQ) 21 mg/24 hr Place 1 patch onto the skin every 24 hours.    NIFEdipine (PROCARDIA-XL) 60 MG (OSM) 24 hr tablet Take 1 tablet (60 mg total) by mouth once daily.    pantoprazole (PROTONIX) 40 MG tablet Take 1 tablet (40 mg total) by mouth once daily.    polyethylene glycol (GLYCOLAX) 17 gram PwPk Take 17 g by mouth once daily.    potassium chloride SA (K-DUR,KLOR-CON) 20 MEQ tablet Take 1 tablet (20 mEq total) by mouth once daily.    QUEtiapine (SEROQUEL) 25 MG Tab Take 1 tablet (25 mg total) by mouth once daily.    sertraline (ZOLOFT) 50 MG tablet Take 1 tablet (50 mg total) by mouth once daily.    spironolactone (ALDACTONE) 50 MG tablet Take 1 tablet (50 mg total) by mouth once daily.    tiotropium bromide (SPIRIVA RESPIMAT) 2.5 mcg/actuation inhaler Inhale 1 puff into the lungs Daily. Controller    topiramate (TOPAMAX) 100 MG tablet Take 1.5 tablets (150 mg total) by mouth 2 (two) times daily.    [DISCONTINUED] bumetanide (BUMEX) 2 MG tablet Take 1 tablet (2 mg total) by mouth 2 (two) times daily.     Family History       Problem Relation (Age of Onset)    Coronary aneurysm Father    Coronary artery disease Father    Heart disease Mother, Father    Hypertension Mother, Father    No Known Problems Son, Son, Son, Son, Sister, Brother, Brother, Brother    Osteoarthritis Mother          Tobacco Use    Smoking status: Every Day     Current packs/day: 0.50     Average packs/day: 0.5 packs/day for 33.0 years (16.5 ttl pk-yrs)     Types: Cigarettes     Passive exposure: Current    Smokeless tobacco: Never   Substance and Sexual Activity    Alcohol use:  Never    Drug use: Never    Sexual activity: Not Currently     Review of Systems   Constitutional:  Negative for activity change, chills, fatigue and fever.   HENT:  Negative for congestion and sore throat.    Respiratory:  Positive for shortness of breath and wheezing. Negative for chest tightness.    Cardiovascular:  Positive for leg swelling.   Gastrointestinal:  Negative for abdominal distention, abdominal pain and diarrhea.   Endocrine: Negative for cold intolerance and heat intolerance.   Genitourinary:  Negative for dysuria.   Musculoskeletal:  Negative for arthralgias and back pain.   Skin:  Negative for color change and rash.   Neurological:  Negative for dizziness, tremors and headaches.   Psychiatric/Behavioral:  Negative for agitation. The patient is not nervous/anxious.      Objective:     Vital Signs (Most Recent):  Temp: 98.1 °F (36.7 °C) (02/07/24 1152)  Pulse: 96 (02/07/24 1752)  Resp: 16 (02/07/24 1752)  BP: (!) 169/91 (02/07/24 1532)  SpO2: 97 % (02/07/24 1532) Vital Signs (24h Range):  Temp:  [98 °F (36.7 °C)-98.1 °F (36.7 °C)] 98.1 °F (36.7 °C)  Pulse:  [70-99] 96  Resp:  [16-26] 16  SpO2:  [75 %-99 %] 97 %  BP: (135-178)/(78-99) 169/91        There is no height or weight on file to calculate BMI.     Physical Exam  Constitutional:       Appearance: She is obese. She is ill-appearing.   HENT:      Head: Normocephalic and atraumatic.      Nose: Nose normal.      Mouth/Throat:      Mouth: Mucous membranes are moist.      Pharynx: Oropharynx is clear.   Eyes:      Extraocular Movements: Extraocular movements intact.      Conjunctiva/sclera: Conjunctivae normal.      Pupils: Pupils are equal, round, and reactive to light.   Cardiovascular:      Rate and Rhythm: Normal rate and regular rhythm.      Pulses: Normal pulses.      Heart sounds: Normal heart sounds.   Pulmonary:      Effort: Respiratory distress present.      Breath sounds: Wheezing present.   Abdominal:      General: Abdomen is flat.  Bowel sounds are normal.      Palpations: Abdomen is soft.   Musculoskeletal:         General: Normal range of motion.      Cervical back: Normal range of motion and neck supple.      Right lower leg: Edema present.      Left lower leg: Edema present.      Comments: Left toe amputation    Skin:     General: Skin is warm and dry.   Neurological:      General: No focal deficit present.      Mental Status: She is alert and oriented to person, place, and time.   Psychiatric:         Mood and Affect: Mood normal.         Behavior: Behavior normal.              CRANIAL NERVES     CN III, IV, VI   Pupils are equal, round, and reactive to light.       Significant Labs: All pertinent labs within the past 24 hours have been reviewed.    Significant Imaging: I have reviewed all pertinent imaging results/findings within the past 24 hours.  Assessment/Plan:     * Acute hypoxemic respiratory failure  Patient with Hypoxic Respiratory failure which is Acute on chronic.  she is on home oxygen at 2 LPM. Supplemental oxygen was provided and noted- Oxygen Concentration (%):  [45] 45    .   Signs/symptoms of respiratory failure include- tachypnea, increased work of breathing, use of accessory muscles, and wheezing. Contributing diagnoses includes - CHF, COPD, and Obesity Hypoventilation Labs and images were reviewed. Patient Has recent ABG, which has been reviewed. Will treat underlying causes and adjust management of respiratory failure as follows-   Hi Kelton, Treat COPD exacerbation, Treat CHF exacerbation, Pulmonology consult.      Peripheral arterial disease with history of revascularization  Status post femoral stent.  Patient reporting bilateral lower extremity pain.    Follow-up DVT and arterial Dopplers.      Pulmonary embolism  History of pulmonary embolism and DVT and patient currently on home Eliquis.    Given hypoxia and elevated D-dimer CTA was ordered.  Follow up result.  Lower extremity Dopplers ordered as  well.      Seizure  Continue home antiseizure medication.      Migraine  Continue outpatient treatment for suppressive therapy      Nicotine dependence  Dangers of cigarette smoking were reviewed with patient in detail. Patient was Counseled for 10 minutes or greater. Nicotine replacement options were discussed. Nicotine replacement was discussed- prescribed    GERD (gastroesophageal reflux disease)  Continue home PPI      Obesity hypoventilation syndrome  There is no height or weight on file to calculate BMI. Morbid obesity complicates all aspects of disease management from diagnostic modalities to treatment. Weight loss encouraged and health benefits explained to patient.         Class 3 severe obesity with body mass index (BMI) of 50.0 to 59.9 in adult  There is no height or weight on file to calculate BMI. Morbid obesity complicates all aspects of disease management from diagnostic modalities to treatment. Weight loss encouraged and health benefits explained to patient.         Type 2 diabetes mellitus without complication, with long-term current use of insulin  Patient's FSGs are controlled on current medication regimen.  Last A1c reviewed-   Lab Results   Component Value Date    HGBA1C 6.2 11/03/2023     Most recent fingerstick glucose reviewed-   Recent Labs   Lab 02/07/24  1237   POCTGLUCOSE 92     Current correctional scale  Low  Maintain anti-hyperglycemic dose as follows-   Antihyperglycemics (From admission, onward)      Start     Stop Route Frequency Ordered    02/08/24 0900  insulin detemir U-100 injection 10 Units         -- SubQ Daily 02/07/24 1712    02/07/24 1809  insulin aspart U-100 injection 0-5 Units         -- SubQ Before meals & nightly PRN 02/07/24 1712          Hold Oral hypoglycemics while patient is in the hospital.    Acute on chronic diastolic CHF (congestive heart failure)  Patient is identified as having Diastolic (HFpEF) heart failure that is Acute on chronic. CHF is currently  "uncontrolled due to Dyspnea not returned to baseline after 1 doses of IV diuretic. Latest ECHO performed and demonstrates- Results for orders placed during the hospital encounter of 11/03/23    Echo    Interpretation Summary    Left Ventricle: The left ventricle is normal in size. Normal wall thickness. Normal wall motion. There is normal systolic function with a visually estimated ejection fraction of 60 - 65%. Ejection fraction by visual approximation is 50%. There is normal diastolic function.    Right Ventricle: Moderate right ventricular enlargement. Systolic function is normal.    Left Atrium: Left atrium is mildly dilated.    Right Atrium: Right atrium is moderately dilated.    Aortic Valve: The aortic valve is a trileaflet valve.    Tricuspid Valve: There is mild regurgitation. No paravalvular regurgitation.  . Continue Beta Blocker, Furosemide, and Aldactone and monitor clinical status closely. Monitor on telemetry. Patient is off CHF pathway.  Monitor strict Is&Os and daily weights.  Place on fluid restriction of 1 L. Cardiology has not been consulted. Continue to stress to patient importance of self efficacy and  on diet for CHF. Last BNP reviewed- and noted below No results for input(s): "BNP", "BNPTRIAGEBLO" in the last 168 hours.    COPD exacerbation  Patient's COPD is with exacerbation noted by continued dyspnea, use of accessory muscles for breathing, and worsening of baseline hypoxia currently.  Patient is currently off COPD Pathway. Continue scheduled inhalers Steroids, Antibiotics, and Supplemental oxygen and monitor respiratory status closely.       VTE Risk Mitigation (From admission, onward)           Ordered     apixaban tablet 5 mg  2 times daily         02/07/24 1712     Place sequential compression device  Until discontinued         02/07/24 1712     IP VTE HIGH RISK PATIENT  Once         02/07/24 1712                                    Tien Barksdale MD  Department of Hospital " Medicine  [facility] - Emergency Department

## 2024-02-08 NOTE — ASSESSMENT & PLAN NOTE
History of pulmonary embolism and DVT and patient currently on home Eliquis.    Given hypoxia and elevated D-dimer CTA was ordered.  Follow up result.  Lower extremity Dopplers ordered as well.    2/8: negative dopplers.  F/u CTA chest and bilateral lower extremity.   Continue home eliquis.

## 2024-02-08 NOTE — SUBJECTIVE & OBJECTIVE
Interval History:     Review of Systems   Constitutional:  Negative for activity change, chills, fatigue and fever.   HENT:  Negative for congestion and sore throat.    Respiratory:  Positive for shortness of breath and wheezing. Negative for chest tightness.    Cardiovascular:  Positive for leg swelling.   Gastrointestinal:  Negative for abdominal distention, abdominal pain and diarrhea.   Endocrine: Negative for cold intolerance and heat intolerance.   Genitourinary:  Negative for dysuria.   Musculoskeletal:  Negative for arthralgias and back pain.   Skin:  Negative for color change and rash.   Neurological:  Negative for dizziness, tremors and headaches.   Psychiatric/Behavioral:  Negative for agitation. The patient is not nervous/anxious.      Objective:     Vital Signs (Most Recent):  Temp: 98.5 °F (36.9 °C) (02/08/24 0618)  Pulse: 92 (02/08/24 0755)  Resp: 20 (02/08/24 0755)  BP: 117/72 (02/08/24 0618)  SpO2: (!) 91 % (02/08/24 0618) Vital Signs (24h Range):  Temp:  [98 °F (36.7 °C)-98.5 °F (36.9 °C)] 98.5 °F (36.9 °C)  Pulse:  [] 92  Resp:  [16-26] 20  SpO2:  [75 %-99 %] 91 %  BP: (117-178)/(64-99) 117/72     Weight: (!) 167.5 kg (369 lb 4.3 oz)  Body mass index is 56.15 kg/m².    Intake/Output Summary (Last 24 hours) at 2/8/2024 0852  Last data filed at 2/8/2024 0524  Gross per 24 hour   Intake 460 ml   Output 1550 ml   Net -1090 ml         Physical Exam  Constitutional:       Appearance: She is obese. She is ill-appearing.   HENT:      Head: Normocephalic and atraumatic.      Nose: Nose normal.      Mouth/Throat:      Mouth: Mucous membranes are moist.      Pharynx: Oropharynx is clear.   Eyes:      Extraocular Movements: Extraocular movements intact.      Conjunctiva/sclera: Conjunctivae normal.      Pupils: Pupils are equal, round, and reactive to light.   Cardiovascular:      Rate and Rhythm: Normal rate and regular rhythm.      Pulses: Normal pulses.      Heart sounds: Normal heart sounds.    Pulmonary:      Effort: Respiratory distress present.      Breath sounds: Wheezing present.   Abdominal:      General: Abdomen is flat. Bowel sounds are normal.      Palpations: Abdomen is soft.   Musculoskeletal:         General: Normal range of motion.      Cervical back: Normal range of motion and neck supple.      Right lower leg: Edema present.      Left lower leg: Edema present.      Comments: Left toe amputation    Skin:     General: Skin is warm and dry.   Neurological:      General: No focal deficit present.      Mental Status: She is alert and oriented to person, place, and time.   Psychiatric:         Mood and Affect: Mood normal.         Behavior: Behavior normal.             Significant Labs: All pertinent labs within the past 24 hours have been reviewed.    Significant Imaging: I have reviewed all pertinent imaging results/findings within the past 24 hours.

## 2024-02-08 NOTE — PLAN OF CARE
Problem: Adult Inpatient Plan of Care  Goal: Plan of Care Review  Outcome: Ongoing, Progressing  Flowsheets (Taken 2/7/2024 2134)  Plan of Care Reviewed With: patient  Goal: Optimal Comfort and Wellbeing  Outcome: Ongoing, Progressing  Intervention: Monitor Pain and Promote Comfort  Flowsheets (Taken 2/7/2024 2134)  Pain Management Interventions:   pillow support provided   premedicated for activity   quiet environment facilitated   relaxation techniques promoted  Intervention: Provide Person-Centered Care  Flowsheets (Taken 2/7/2024 2134)  Trust Relationship/Rapport:   care explained   choices provided   emotional support provided   empathic listening provided   questions answered   questions encouraged   reassurance provided   thoughts/feelings acknowledged     Problem: Diabetes Comorbidity  Goal: Blood Glucose Level Within Targeted Range  Outcome: Ongoing, Progressing  Intervention: Monitor and Manage Glycemia  Flowsheets (Taken 2/7/2024 2134)  Glycemic Management:   blood glucose monitored   oral hydration promoted   supplemental insulin given

## 2024-02-08 NOTE — ED NOTES
Called [facility]N to give report, asked to call back in about 10 minutes due to a situation with a patient.

## 2024-02-08 NOTE — ASSESSMENT & PLAN NOTE
Patient with Hypoxic Respiratory failure which is Acute on chronic.  she is on home oxygen at 2 LPM. Supplemental oxygen was provided and noted- Oxygen Concentration (%):  [45-50] 50    .   Signs/symptoms of respiratory failure include- tachypnea, increased work of breathing, use of accessory muscles, and wheezing. Contributing diagnoses includes - CHF, COPD, and Obesity Hypoventilation Labs and images were reviewed. Patient Has recent ABG, which has been reviewed. Will treat underlying causes and adjust management of respiratory failure as follows-   Hi Kel[place], Treat COPD exacerbation, Treat CHF exacerbation, Pulmonology consult.      2/8: continue current care.  CTA chest ordered.  Pulm consult.  Patient appears stable.  ABG ordered.

## 2024-02-08 NOTE — ASSESSMENT & PLAN NOTE
Status post femoral stent.  Patient reporting bilateral lower extremity pain.    Follow-up DVT and arterial Dopplers.

## 2024-02-08 NOTE — ASSESSMENT & PLAN NOTE
Patient's FSGs are controlled on current medication regimen.  Last A1c reviewed-   Lab Results   Component Value Date    HGBA1C 6.2 11/03/2023     Most recent fingerstick glucose reviewed-   Recent Labs   Lab 02/07/24  1237   POCTGLUCOSE 92     Current correctional scale  Low  Maintain anti-hyperglycemic dose as follows-   Antihyperglycemics (From admission, onward)      Start     Stop Route Frequency Ordered    02/08/24 0900  insulin detemir U-100 injection 10 Units         -- SubQ Daily 02/07/24 1712    02/07/24 1809  insulin aspart U-100 injection 0-5 Units         -- SubQ Before meals & nightly PRN 02/07/24 1712          Hold Oral hypoglycemics while patient is in the hospital.

## 2024-02-08 NOTE — ASSESSMENT & PLAN NOTE
History of osteomyelitis of LLE.   X-ray and inflammatory markers ordered.   Patient states she has been unable to wear a shoe for the last several years.    Few have been willing to perform surgery or amputate.

## 2024-02-08 NOTE — ASSESSMENT & PLAN NOTE
Patient's COPD is with exacerbation noted by continued dyspnea, use of accessory muscles for breathing, and worsening of baseline hypoxia currently.  Patient is currently off COPD Pathway. Continue scheduled inhalers Steroids, Antibiotics, and Supplemental oxygen and monitor respiratory status closely.     2/8: continue duonebs, steroids, antibiotics.

## 2024-02-08 NOTE — PROGRESS NOTES
[facility] Tele[facility] Hospital Medicine  Progress Note    Patient Name: Holly Porter  MRN: [id number]  Patient Class: IP- Inpatient   Admission Date: 2/7/2024  Length of Stay: 1 days  Attending Physician: Tien Barksdale MD  Primary Care Provider: Regan Frausto MD        Subjective:     Principal Problem:Acute hypoxemic respiratory failure    HPI:  Ms. Holly Porter is a 63-year-old woman history of COPD on home O2 with tobacco use, diastolic heart failure, thyroid disease, class 3 obesity with JOVANNI, likely OHS, type 2 diabetes with diabetic ulcer s/p left toe amputation, prior DVT and PE on home Eliquis, PVD s/p R femoral stent, seizures who presents with worsening shortness of breath and bilateral leg pain.  She states that she developed severe respiratory distress at home and her inhalers and other medications were not adequate to improve her distress.  In addition, she reports progressive lower extremity swelling and bilateral leg pain and makes it difficult for her to ambulate.    In the emergency department she had hypoxia to the low 70s.  She was on a Ventimask and then switched to BiPAP.  Case was discussed with pulmonology and it was decided for patient to be on high-flow.  Patient stated that she is very happy with this as she would love to eat dinner and she sometimes feels that she is suffocating and the mass that is why she rarely wears her CPAP at home because she does not think she has appropriate mask.    ED course:   Initial Vitals   BP Pulse Resp Temp SpO2   02/07/24 1152 02/07/24 1152 02/07/24 1209 02/07/24 1152 02/07/24 1152   (!) 150/81 96 (!) 26 98.1 °F (36.7 °C) (!) 75 %                       Prior hospitalization: Visits for shortness of breath.  Hospitalized 11/2023 with CHF/COPD exacerbations.   C 11/2023: minimal CAD. Echo: EF 50%    Overview/Hospital Course:  No notes on file    Interval History:     Review of Systems   Constitutional:   Negative for activity change, chills, fatigue and fever.   HENT:  Negative for congestion and sore throat.    Respiratory:  Positive for shortness of breath and wheezing. Negative for chest tightness.    Cardiovascular:  Positive for leg swelling.   Gastrointestinal:  Negative for abdominal distention, abdominal pain and diarrhea.   Endocrine: Negative for cold intolerance and heat intolerance.   Genitourinary:  Negative for dysuria.   Musculoskeletal:  Negative for arthralgias and back pain.   Skin:  Negative for color change and rash.   Neurological:  Negative for dizziness, tremors and headaches.   Psychiatric/Behavioral:  Negative for agitation. The patient is not nervous/anxious.      Objective:     Vital Signs (Most Recent):  Temp: 98.5 °F (36.9 °C) (02/08/24 0618)  Pulse: 92 (02/08/24 0755)  Resp: 20 (02/08/24 0755)  BP: 117/72 (02/08/24 0618)  SpO2: (!) 91 % (02/08/24 0618) Vital Signs (24h Range):  Temp:  [98 °F (36.7 °C)-98.5 °F (36.9 °C)] 98.5 °F (36.9 °C)  Pulse:  [] 92  Resp:  [16-26] 20  SpO2:  [75 %-99 %] 91 %  BP: (117-178)/(64-99) 117/72     Weight: (!) 167.5 kg (369 lb 4.3 oz)  Body mass index is 56.15 kg/m².    Intake/Output Summary (Last 24 hours) at 2/8/2024 0833  Last data filed at 2/8/2024 0524  Gross per 24 hour   Intake 460 ml   Output 1550 ml   Net -1090 ml         Physical Exam  Constitutional:       Appearance: She is obese. She is ill-appearing.   HENT:      Head: Normocephalic and atraumatic.      Nose: Nose normal.      Mouth/Throat:      Mouth: Mucous membranes are moist.      Pharynx: Oropharynx is clear.   Eyes:      Extraocular Movements: Extraocular movements intact.      Conjunctiva/sclera: Conjunctivae normal.      Pupils: Pupils are equal, round, and reactive to light.   Cardiovascular:      Rate and Rhythm: Normal rate and regular rhythm.      Pulses: Normal pulses.      Heart sounds: Normal heart sounds.   Pulmonary:      Effort: Respiratory distress present.       Breath sounds: Wheezing present.   Abdominal:      General: Abdomen is flat. Bowel sounds are normal.      Palpations: Abdomen is soft.   Musculoskeletal:         General: Normal range of motion.      Cervical back: Normal range of motion and neck supple.      Right lower leg: Edema present.      Left lower leg: Edema present.      Comments: Left toe amputation    Skin:     General: Skin is warm and dry.   Neurological:      General: No focal deficit present.      Mental Status: She is alert and oriented to person, place, and time.   Psychiatric:         Mood and Affect: Mood normal.         Behavior: Behavior normal.             Significant Labs: All pertinent labs within the past 24 hours have been reviewed.    Significant Imaging: I have reviewed all pertinent imaging results/findings within the past 24 hours.    Assessment/Plan:      * Acute hypoxemic respiratory failure  Patient with Hypoxic Respiratory failure which is Acute on chronic.  she is on home oxygen at 2 LPM. Supplemental oxygen was provided and noted- Oxygen Concentration (%):  [45-50] 50    .   Signs/symptoms of respiratory failure include- tachypnea, increased work of breathing, use of accessory muscles, and wheezing. Contributing diagnoses includes - CHF, COPD, and Obesity Hypoventilation Labs and images were reviewed. Patient Has recent ABG, which has been reviewed. Will treat underlying causes and adjust management of respiratory failure as follows-   Hi Kelton, Treat COPD exacerbation, Treat CHF exacerbation, Pulmonology consult.      2/8: continue current care.  CTA chest ordered.  Pulm consult.  Patient appears stable.  ABG ordered.      Pulmonary embolism  History of pulmonary embolism and DVT and patient currently on home Eliquis.    Given hypoxia and elevated D-dimer CTA was ordered.  Follow up result.  Lower extremity Dopplers ordered as well.    2/8: negative dopplers.  F/u CTA chest and bilateral lower extremity.   Continue home eliquis.         Osteomyelitis  History of osteomyelitis of LLE.   X-ray and inflammatory markers ordered.   Patient states she has been unable to wear a shoe for the last several years.    Few have been willing to perform surgery or amputate.        Peripheral arterial disease with history of revascularization  Status post femoral stent.  Patient reporting bilateral lower extremity pain.    Follow-up DVT and arterial Dopplers.      Seizure  Continue home antiseizure medication.      Migraine  Continue outpatient treatment for suppressive therapy      Nicotine dependence  Dangers of cigarette smoking were reviewed with patient in detail. Patient was Counseled for 10 minutes or greater. Nicotine replacement options were discussed. Nicotine replacement was discussed- prescribed    GERD (gastroesophageal reflux disease)  Continue home PPI      Obesity hypoventilation syndrome  There is no height or weight on file to calculate BMI. Morbid obesity complicates all aspects of disease management from diagnostic modalities to treatment. Weight loss encouraged and health benefits explained to patient.         Class 3 severe obesity with body mass index (BMI) of 50.0 to 59.9 in adult  There is no height or weight on file to calculate BMI. Morbid obesity complicates all aspects of disease management from diagnostic modalities to treatment. Weight loss encouraged and health benefits explained to patient.         Type 2 diabetes mellitus without complication, with long-term current use of insulin  Patient's FSGs are controlled on current medication regimen.  Last A1c reviewed-   Lab Results   Component Value Date    HGBA1C 6.2 11/03/2023     Most recent fingerstick glucose reviewed-   Recent Labs   Lab 02/07/24  1237   POCTGLUCOSE 92     Current correctional scale  Low  Maintain anti-hyperglycemic dose as follows-   Antihyperglycemics (From admission, onward)      Start     Stop Route Frequency Ordered    02/08/24 0900  insulin detemir  "U-100 injection 10 Units         -- SubQ Daily 02/07/24 1712    02/07/24 1809  insulin aspart U-100 injection 0-5 Units         -- SubQ Before meals & nightly PRN 02/07/24 1712          Hold Oral hypoglycemics while patient is in the hospital.    Acute on chronic diastolic CHF (congestive heart failure)  Patient is identified as having Diastolic (HFpEF) heart failure that is Acute on chronic. CHF is currently uncontrolled due to Dyspnea not returned to baseline after 1 doses of IV diuretic. Latest ECHO performed and demonstrates- Results for orders placed during the hospital encounter of 11/03/23    2/8: continue IV lasix, a high risk medication, and monitor renal function carefully.          Echo    Interpretation Summary    Left Ventricle: The left ventricle is normal in size. Normal wall thickness. Normal wall motion. There is normal systolic function with a visually estimated ejection fraction of 60 - 65%. Ejection fraction by visual approximation is 50%. There is normal diastolic function.    Right Ventricle: Moderate right ventricular enlargement. Systolic function is normal.    Left Atrium: Left atrium is mildly dilated.    Right Atrium: Right atrium is moderately dilated.    Aortic Valve: The aortic valve is a trileaflet valve.    Tricuspid Valve: There is mild regurgitation. No paravalvular regurgitation.  . Continue Beta Blocker, Furosemide, and Aldactone and monitor clinical status closely. Monitor on telemetry. Patient is off CHF pathway.  Monitor strict Is&Os and daily weights.  Place on fluid restriction of 1 L. Cardiology has not been consulted. Continue to stress to patient importance of self efficacy and  on diet for CHF. Last BNP reviewed- and noted below No results for input(s): "BNP", "BNPTRIAGEBLO" in the last 168 hours.    COPD exacerbation  Patient's COPD is with exacerbation noted by continued dyspnea, use of accessory muscles for breathing, and worsening of baseline hypoxia currently. "  Patient is currently off COPD Pathway. Continue scheduled inhalers Steroids, Antibiotics, and Supplemental oxygen and monitor respiratory status closely.     2/8: continue duonebs, steroids, antibiotics.        VTE Risk Mitigation (From admission, onward)           Ordered     apixaban tablet 5 mg  2 times daily         02/07/24 1712     Place sequential compression device  Until discontinued         02/07/24 1712     IP VTE HIGH RISK PATIENT  Once         02/07/24 1712                    Discharge Planning   MONTSERRAT:      Code Status: Full Code   Is the patient medically ready for discharge?:     Reason for patient still in hospital (select all that apply): Treatment                     Tien Barksdale MD  Department of Hospital Medicine   [facility] - [facility]

## 2024-02-08 NOTE — ASSESSMENT & PLAN NOTE
History of pulmonary embolism and DVT and patient currently on home Eliquis.    Given hypoxia and elevated D-dimer CTA was ordered.  Follow up result.  Lower extremity Dopplers ordered as well.

## 2024-02-08 NOTE — SUBJECTIVE & OBJECTIVE
Past Medical History:   Diagnosis Date    Acquired hypothyroidism     Atherosclerosis of native artery of extremity with intermittent claudication 01/30/2019    bilateral legs    BMI 50.0-59.9, adult 02/22/2021    Chronic pain syndrome     opioid depen    Congestive heart failure (CHF)     COPD (chronic obstructive pulmonary disease)     COVID-19 10/06/2023    Depression     Diabetes mellitus, type 2     followed by Aurea Kwong NP, [facility]    DVT of lower extremity, bilateral     Essential hypertension 06/08/2021    GERD (gastroesophageal reflux disease)     Gout 07/05/2023    Hyperlipidemia     Lumbosacral radiculopathy 06/05/2023    followed by GLENN Valdes, [facility]    Migraines     Nicotine dependence     Nicotine dependence     Obesity hypoventilation syndrome     chronic CO2 retainer with compensatory metabolic alkalosis    Osteoarthritis     Seizure disorder     Sleep apnea     on cpap    Thyroid cancer     Venous stasis ulcer limited to breakdown of skin with varicose veins     followed by Estuardo Zhao    Vitamin D deficiency        Past Surgical History:   Procedure Laterality Date    ABCESS DRAINAGE      HYSTERECTOMY      ILIAC ARTERY STENT Bilateral 01/28/2020    Bilateral distal aorta and common iliac 8 X 59 vbx covered stents performed by Dr. Antonio Jacinto.    LEFT HEART CATHETERIZATION Left 11/6/2023    Procedure: Left heart cath;  Surgeon: Alex Ortega DO;  Location: [facility] CATH LAB;  Service: Cardiology;  Laterality: Left;    RADIOFREQUENCY ABLATION Left 04/15/2022    Procedure: Left calf  Radiofrequency Ablation;  Surgeon: Matty Falcon DO;  Location: [facility] OR;  Service: Vascular;  Laterality: Left;    STAB PHLEBECTOMY OF VARICOSE VEINS Left 01/25/2013    Left leg microphlebectomies x 23 stab avulsions and ligation of multiple varicose veins perrformed by Dr. Cirilo Aguirre.    THYROIDECTOMY      hx: thyroid cancer    TOE AMPUTATION Left 01/28/2020     2nd, 4th and 5th performed by Dr. Anam Saeed.    TOE AMPUTATION Right 01/28/2020    4th toe performed by Dr. Anam Saeed.    TOTAL ABDOMINAL HYSTERECTOMY W/ BILATERAL SALPINGOOPHORECTOMY      TUBAL LIGATION      VAGINAL DELIVERY      x 4    VENOUS ABLATION Right 08/09/2019    GSV Varithena Ablation performed by Dr. Estuardo Falcon    VENOUS ABLATION Left 08/02/2019    ATAV Varithena Ablation performed by Dr. Estuardo Falcon.    VENOUS ABLATION Right 07/13/2015    ATAV Laser Ablatio performed by Dr. Cirilo Aguirre.    VENOUS ABLATION Right 07/06/2015    Distal  GSV Laser Ablation performed by dr. Cirilo Aguirre.    VENOUS ABLATION Left 04/20/2015    Left Distal GSV Laser Ablation performed by dr. Cirilo Aguirre.    VENOUS ABLATION Right 10/28/2013    Right Distal GSV RF Ablation performed by Dr. Cirilo Aguirre.    VENOUS ABLATION Left 10/25/2013    SSV RF Ablation performed by dr. Cirilo Aguirre.    VENOUS ABLATION Left 10/07/2013    Left GSV and Left ATAV RF Ablation performed by Dr. Cirilo Aguirre.    VENOUS ABLATION Right 01/21/2013    GSV RF Ablation w/micros x 22 performed by Dr. Cirilo Aguirre.    VENOUS ABLATION Left 06/25/2021    Left distal GSV Varithena Ablation       Review of patient's allergies indicates:   Allergen Reactions    Ammonium peroxydisulfate Shortness Of Breath    Avocado (laurus persea) Anaphylaxis    Bananas [banana] Anaphylaxis and Swelling     CRAMPS,    Chocolate flavor Anaphylaxis     MOUTH SWELLING    Fentanyl Shortness Of Breath and Itching    Percocet [oxycodone-acetaminophen] Shortness Of Breath and Itching    Silvadene [silver sulfadiazine]     Clindamycin     Corticosteroids (glucocorticoids)     Hydrocortisone Blisters    Lasix [furosemide] Blisters     BURNS SKIN    Pregabalin     Adhesive Rash and Blisters    Iodine Rash    Latex, natural rubber Rash    Pcn [penicillins] Rash    Sulfa (sulfonamide antibiotics) Rash and Blisters       No current facility-administered medications on file prior to encounter.     Current  Outpatient Medications on File Prior to Encounter   Medication Sig    ACCU-CHEK GUIDE GLUCOSE METER Misc     ACCU-CHEK GUIDE TEST STRIPS Strp     ACCU-CHEK SOFTCLIX LANCETS Misc     albuterol (PROVENTIL/VENTOLIN HFA) 90 mcg/actuation inhaler Inhale 2 puffs into the lungs 4 (four) times daily. Rescue    allopurinoL (ZYLOPRIM) 300 MG tablet TAKE ONE TABLET EVERY DAY    apixaban (ELIQUIS) 5 mg Tab Take 1 tablet (5 mg total) by mouth 2 (two) times daily.    aspirin (ECOTRIN) 81 MG EC tablet TAKE 1 TABLET BY MOUTH EVERY DAY    BREO ELLIPTA 100-25 mcg/dose diskus inhaler Inhale 1 puff into the lungs once daily.    bumetanide (BUMEX) 2 MG tablet Take 1 tablet (2 mg total) by mouth 2 (two) times daily.    calcium carbonate (OS-MICHAELA) 500 mg calcium (1,250 mg) tablet Take 1 tablet (500 mg total) by mouth 2 (two) times daily.    carvediloL (COREG) 12.5 MG tablet Take 0.5 tablets (6.25 mg total) by mouth 2 (two) times daily.    cilostazoL (PLETAL) 100 MG Tab Take 1 tablet (100 mg total) by mouth 2 (two) times daily.    colchicine (COLCRYS) 0.6 mg tablet TAKE 1 TABLET BY MOUTH 3 TIMES A DAY FOR 2 DAYS THEN 1 TABLET DAILY UNTIL GONE    dexAMETHasone (DECADRON) 4 MG Tab Take by mouth.    DULoxetine (CYMBALTA) 30 MG capsule TAKE 1 CAPSULE (30 MG TOTAL) BY MOUTH ONCE DAILY.    gabapentin (NEURONTIN) 600 MG tablet Take 1 tablet (600 mg total) by mouth 3 (three) times daily.    HYDROcodone-acetaminophen (NORCO)  mg per tablet Take 1 tablet by mouth every 6 (six) hours as needed.    ibuprofen (ADVIL,MOTRIN) 800 MG tablet Take 1 tablet (800 mg total) by mouth 2 (two) times daily as needed for Pain.    insulin detemir U-100, Levemir, (LEVEMIR FLEXTOUCH U100 INSULIN) 100 unit/mL (3 mL) InPn pen Inject 10 units into the skin every evening subcutaneous    levothyroxine (SYNTHROID) 150 MCG tablet Take 1 tablet (150 mcg total) by mouth before breakfast.    loratadine (CLARITIN) 10 mg tablet Take 1 tablet (10 mg total) by mouth once  daily.    meclizine (ANTIVERT) 25 mg tablet Take 1 tablet (25 mg total) by mouth 2 (two) times daily as needed for Dizziness.    metOLazone (ZAROXOLYN) 2.5 MG tablet Take 1 tablet (2.5 mg total) by mouth once daily.    naloxone (NARCAN) 4 mg/actuation Spry 1 spray once.    nicotine (NICODERM CQ) 21 mg/24 hr Place 1 patch onto the skin every 24 hours.    NIFEdipine (PROCARDIA-XL) 60 MG (OSM) 24 hr tablet Take 1 tablet (60 mg total) by mouth once daily.    pantoprazole (PROTONIX) 40 MG tablet Take 1 tablet (40 mg total) by mouth once daily.    polyethylene glycol (GLYCOLAX) 17 gram PwPk Take 17 g by mouth once daily.    potassium chloride SA (K-DUR,KLOR-CON) 20 MEQ tablet Take 1 tablet (20 mEq total) by mouth once daily.    QUEtiapine (SEROQUEL) 25 MG Tab Take 1 tablet (25 mg total) by mouth once daily.    sertraline (ZOLOFT) 50 MG tablet Take 1 tablet (50 mg total) by mouth once daily.    spironolactone (ALDACTONE) 50 MG tablet Take 1 tablet (50 mg total) by mouth once daily.    tiotropium bromide (SPIRIVA RESPIMAT) 2.5 mcg/actuation inhaler Inhale 1 puff into the lungs Daily. Controller    topiramate (TOPAMAX) 100 MG tablet Take 1.5 tablets (150 mg total) by mouth 2 (two) times daily.    [DISCONTINUED] bumetanide (BUMEX) 2 MG tablet Take 1 tablet (2 mg total) by mouth 2 (two) times daily.     Family History       Problem Relation (Age of Onset)    Coronary aneurysm Father    Coronary artery disease Father    Heart disease Mother, Father    Hypertension Mother, Father    No Known Problems Son, Son, Son, Son, Sister, Brother, Brother, Brother    Osteoarthritis Mother          Tobacco Use    Smoking status: Every Day     Current packs/day: 0.50     Average packs/day: 0.5 packs/day for 33.0 years (16.5 ttl pk-yrs)     Types: Cigarettes     Passive exposure: Current    Smokeless tobacco: Never   Substance and Sexual Activity    Alcohol use: Never    Drug use: Never    Sexual activity: Not Currently     Review of Systems    Constitutional:  Negative for activity change, chills, fatigue and fever.   HENT:  Negative for congestion and sore throat.    Respiratory:  Positive for shortness of breath and wheezing. Negative for chest tightness.    Cardiovascular:  Positive for leg swelling.   Gastrointestinal:  Negative for abdominal distention, abdominal pain and diarrhea.   Endocrine: Negative for cold intolerance and heat intolerance.   Genitourinary:  Negative for dysuria.   Musculoskeletal:  Negative for arthralgias and back pain.   Skin:  Negative for color change and rash.   Neurological:  Negative for dizziness, tremors and headaches.   Psychiatric/Behavioral:  Negative for agitation. The patient is not nervous/anxious.      Objective:     Vital Signs (Most Recent):  Temp: 98.1 °F (36.7 °C) (02/07/24 1152)  Pulse: 96 (02/07/24 1752)  Resp: 16 (02/07/24 1752)  BP: (!) 169/91 (02/07/24 1532)  SpO2: 97 % (02/07/24 1532) Vital Signs (24h Range):  Temp:  [98 °F (36.7 °C)-98.1 °F (36.7 °C)] 98.1 °F (36.7 °C)  Pulse:  [70-99] 96  Resp:  [16-26] 16  SpO2:  [75 %-99 %] 97 %  BP: (135-178)/(78-99) 169/91        There is no height or weight on file to calculate BMI.     Physical Exam  Constitutional:       Appearance: She is obese. She is ill-appearing.   HENT:      Head: Normocephalic and atraumatic.      Nose: Nose normal.      Mouth/Throat:      Mouth: Mucous membranes are moist.      Pharynx: Oropharynx is clear.   Eyes:      Extraocular Movements: Extraocular movements intact.      Conjunctiva/sclera: Conjunctivae normal.      Pupils: Pupils are equal, round, and reactive to light.   Cardiovascular:      Rate and Rhythm: Normal rate and regular rhythm.      Pulses: Normal pulses.      Heart sounds: Normal heart sounds.   Pulmonary:      Effort: Respiratory distress present.      Breath sounds: Wheezing present.   Abdominal:      General: Abdomen is flat. Bowel sounds are normal.      Palpations: Abdomen is soft.   Musculoskeletal:          General: Normal range of motion.      Cervical back: Normal range of motion and neck supple.      Right lower leg: Edema present.      Left lower leg: Edema present.      Comments: Left toe amputation    Skin:     General: Skin is warm and dry.   Neurological:      General: No focal deficit present.      Mental Status: She is alert and oriented to person, place, and time.   Psychiatric:         Mood and Affect: Mood normal.         Behavior: Behavior normal.              CRANIAL NERVES     CN III, IV, VI   Pupils are equal, round, and reactive to light.       Significant Labs: All pertinent labs within the past 24 hours have been reviewed.    Significant Imaging: I have reviewed all pertinent imaging results/findings within the past 24 hours.

## 2024-02-08 NOTE — ASSESSMENT & PLAN NOTE
Patient with Hypoxic Respiratory failure which is Acute on chronic.  she is on home oxygen at 2 LPM. Supplemental oxygen was provided and noted- Oxygen Concentration (%):  [45] 45    .   Signs/symptoms of respiratory failure include- tachypnea, increased work of breathing, use of accessory muscles, and wheezing. Contributing diagnoses includes - CHF, COPD, and Obesity Hypoventilation Labs and images were reviewed. Patient Has recent ABG, which has been reviewed. Will treat underlying causes and adjust management of respiratory failure as follows-   Hi Kelton, Treat COPD exacerbation, Treat CHF exacerbation, Pulmonology consult.

## 2024-02-08 NOTE — ASSESSMENT & PLAN NOTE
"Patient is identified as having Diastolic (HFpEF) heart failure that is Acute on chronic. CHF is currently uncontrolled due to Dyspnea not returned to baseline after 1 doses of IV diuretic. Latest ECHO performed and demonstrates- Results for orders placed during the hospital encounter of 11/03/23 2/8: continue IV lasix, a high risk medication, and monitor renal function carefully.          Echo    Interpretation Summary    Left Ventricle: The left ventricle is normal in size. Normal wall thickness. Normal wall motion. There is normal systolic function with a visually estimated ejection fraction of 60 - 65%. Ejection fraction by visual approximation is 50%. There is normal diastolic function.    Right Ventricle: Moderate right ventricular enlargement. Systolic function is normal.    Left Atrium: Left atrium is mildly dilated.    Right Atrium: Right atrium is moderately dilated.    Aortic Valve: The aortic valve is a trileaflet valve.    Tricuspid Valve: There is mild regurgitation. No paravalvular regurgitation.  . Continue Beta Blocker, Furosemide, and Aldactone and monitor clinical status closely. Monitor on telemetry. Patient is off CHF pathway.  Monitor strict Is&Os and daily weights.  Place on fluid restriction of 1 L. Cardiology has not been consulted. Continue to stress to patient importance of self efficacy and  on diet for CHF. Last BNP reviewed- and noted below No results for input(s): "BNP", "BNPTRIAGEBLO" in the last 168 hours.  "

## 2024-02-08 NOTE — ASSESSMENT & PLAN NOTE
"Patient is identified as having Diastolic (HFpEF) heart failure that is Acute on chronic. CHF is currently uncontrolled due to Dyspnea not returned to baseline after 1 doses of IV diuretic. Latest ECHO performed and demonstrates- Results for orders placed during the hospital encounter of 11/03/23    Echo    Interpretation Summary    Left Ventricle: The left ventricle is normal in size. Normal wall thickness. Normal wall motion. There is normal systolic function with a visually estimated ejection fraction of 60 - 65%. Ejection fraction by visual approximation is 50%. There is normal diastolic function.    Right Ventricle: Moderate right ventricular enlargement. Systolic function is normal.    Left Atrium: Left atrium is mildly dilated.    Right Atrium: Right atrium is moderately dilated.    Aortic Valve: The aortic valve is a trileaflet valve.    Tricuspid Valve: There is mild regurgitation. No paravalvular regurgitation.  . Continue Beta Blocker, Furosemide, and Aldactone and monitor clinical status closely. Monitor on telemetry. Patient is off CHF pathway.  Monitor strict Is&Os and daily weights.  Place on fluid restriction of 1 L. Cardiology has not been consulted. Continue to stress to patient importance of self efficacy and  on diet for CHF. Last BNP reviewed- and noted below No results for input(s): "BNP", "BNPTRIAGEBLO" in the last 168 hours.  "

## 2024-02-08 NOTE — HPI
Ms. Holly Porter is a 63-year-old woman history of COPD on home O2 with tobacco use, diastolic heart failure, thyroid disease, class 3 obesity with JOVANNI, likely OHS, type 2 diabetes with diabetic ulcer s/p left toe amputation, prior DVT and PE on home Eliquis, PVD s/p R femoral stent, seizures who presents with worsening shortness of breath and bilateral leg pain.  She states that she developed severe respiratory distress at home and her inhalers and other medications were not adequate to improve her distress.  In addition, she reports progressive lower extremity swelling and bilateral leg pain and makes it difficult for her to ambulate.    In the emergency department she had hypoxia to the low 70s.  She was on a Ventimask and then switched to BiPAP.  Case was discussed with pulmonology and it was decided for patient to be on high-flow.  Patient stated that she is very happy with this as she would love to eat dinner and she sometimes feels that she is suffocating and the mass that is why she rarely wears her CPAP at home because she does not think she has appropriate mask.    ED course:   Initial Vitals   BP Pulse Resp Temp SpO2   02/07/24 1152 02/07/24 1152 02/07/24 1209 02/07/24 1152 02/07/24 1152   (!) 150/81 96 (!) 26 98.1 °F (36.7 °C) (!) 75 %                       Prior hospitalization: Visits for shortness of breath.  Hospitalized 11/2023 with CHF/COPD exacerbations.   [facility] 11/2023: minimal CAD. Echo: EF 50%

## 2024-02-09 LAB
ANION GAP SERPL CALCULATED.3IONS-SCNC: 9 MMOL/L (ref 7–16)
ANISOCYTOSIS BLD QL SMEAR: ABNORMAL
BASOPHILS # BLD AUTO: 0.01 K/UL (ref 0–0.2)
BASOPHILS NFR BLD AUTO: 0.1 % (ref 0–1)
BUN SERPL-MCNC: 16 MG/DL (ref 7–18)
BUN/CREAT SERPL: 17 (ref 6–20)
CALCIUM SERPL-MCNC: 9.6 MG/DL (ref 8.5–10.1)
CHLORIDE SERPL-SCNC: 102 MMOL/L (ref 98–107)
CO2 SERPL-SCNC: 31 MMOL/L (ref 21–32)
CREAT SERPL-MCNC: 0.96 MG/DL (ref 0.55–1.02)
DIFFERENTIAL METHOD BLD: ABNORMAL
EGFR (NO RACE VARIABLE) (RUSH/TITUS): 66 ML/MIN/1.73M2
EOSINOPHIL # BLD AUTO: 0 K/UL (ref 0–0.5)
EOSINOPHIL NFR BLD AUTO: 0 % (ref 1–4)
ERYTHROCYTE [DISTWIDTH] IN BLOOD BY AUTOMATED COUNT: 24.2 % (ref 11.5–14.5)
GLUCOSE SERPL-MCNC: 131 MG/DL (ref 74–106)
GLUCOSE SERPL-MCNC: 146 MG/DL (ref 70–105)
GLUCOSE SERPL-MCNC: 155 MG/DL (ref 70–105)
GLUCOSE SERPL-MCNC: 160 MG/DL (ref 70–105)
GLUCOSE SERPL-MCNC: 195 MG/DL (ref 70–105)
HCO3 UR-SCNC: 31.5 MMOL/L (ref 21–28)
HCT VFR BLD AUTO: 49.6 % (ref 38–47)
HGB BLD-MCNC: 14.8 G/DL (ref 12–16)
HYPOCHROMIA BLD QL SMEAR: ABNORMAL
IMM GRANULOCYTES # BLD AUTO: 0.06 K/UL (ref 0–0.04)
IMM GRANULOCYTES NFR BLD: 0.5 % (ref 0–0.4)
LYMPHOCYTES # BLD AUTO: 0.77 K/UL (ref 1–4.8)
LYMPHOCYTES NFR BLD AUTO: 6.2 % (ref 27–41)
MAGNESIUM SERPL-MCNC: 2.1 MG/DL (ref 1.7–2.3)
MCH RBC QN AUTO: 21.7 PG (ref 27–31)
MCHC RBC AUTO-ENTMCNC: 29.8 G/DL (ref 32–36)
MCV RBC AUTO: 72.6 FL (ref 80–96)
MICROCYTES BLD QL SMEAR: ABNORMAL
MONOCYTES # BLD AUTO: 0.33 K/UL (ref 0–0.8)
MONOCYTES NFR BLD AUTO: 2.7 % (ref 2–6)
MPC BLD CALC-MCNC: ABNORMAL G/DL
NEUTROPHILS # BLD AUTO: 11.23 K/UL (ref 1.8–7.7)
NEUTROPHILS NFR BLD AUTO: 90.5 % (ref 53–65)
NRBC # BLD AUTO: 0 X10E3/UL
NRBC, AUTO (.00): 0 %
OVALOCYTES BLD QL SMEAR: ABNORMAL
PCO2 BLDA: 36 MMHG (ref 35–48)
PH SMN: 7.55 [PH] (ref 7.35–7.45)
PLATELET # BLD AUTO: 218 K/UL (ref 150–400)
PLATELET MORPHOLOGY: ABNORMAL
PO2 BLDA: 94 MMHG (ref 83–108)
POC BASE EXCESS: 8.7 MMOL/L (ref -2–3)
POC SATURATED O2: 98 % (ref 95–98)
POLYCHROMASIA BLD QL SMEAR: ABNORMAL
POTASSIUM SERPL-SCNC: 3.8 MMOL/L (ref 3.5–5.1)
RBC # BLD AUTO: 6.83 M/UL (ref 4.2–5.4)
SODIUM SERPL-SCNC: 138 MMOL/L (ref 136–145)
WBC # BLD AUTO: 12.4 K/UL (ref 4.5–11)

## 2024-02-09 PROCEDURE — 99233 SBSQ HOSP IP/OBS HIGH 50: CPT | Mod: ,,, | Performed by: HOSPITALIST

## 2024-02-09 PROCEDURE — 82803 BLOOD GASES ANY COMBINATION: CPT

## 2024-02-09 PROCEDURE — 11000001 HC ACUTE MED/SURG PRIVATE ROOM

## 2024-02-09 PROCEDURE — 83735 ASSAY OF MAGNESIUM: CPT | Performed by: HOSPITALIST

## 2024-02-09 PROCEDURE — 85025 COMPLETE CBC W/AUTO DIFF WBC: CPT | Performed by: HOSPITALIST

## 2024-02-09 PROCEDURE — 94761 N-INVAS EAR/PLS OXIMETRY MLT: CPT

## 2024-02-09 PROCEDURE — 63600175 PHARM REV CODE 636 W HCPCS: Performed by: HOSPITALIST

## 2024-02-09 PROCEDURE — 25000003 PHARM REV CODE 250: Performed by: HOSPITALIST

## 2024-02-09 PROCEDURE — 80048 BASIC METABOLIC PNL TOTAL CA: CPT | Performed by: HOSPITALIST

## 2024-02-09 PROCEDURE — 27000221 HC OXYGEN, UP TO 24 HOURS

## 2024-02-09 PROCEDURE — 94640 AIRWAY INHALATION TREATMENT: CPT

## 2024-02-09 PROCEDURE — 82962 GLUCOSE BLOOD TEST: CPT

## 2024-02-09 PROCEDURE — 36600 WITHDRAWAL OF ARTERIAL BLOOD: CPT

## 2024-02-09 PROCEDURE — S4991 NICOTINE PATCH NONLEGEND: HCPCS | Performed by: HOSPITALIST

## 2024-02-09 PROCEDURE — 25000242 PHARM REV CODE 250 ALT 637 W/ HCPCS: Performed by: HOSPITALIST

## 2024-02-09 PROCEDURE — 99900035 HC TECH TIME PER 15 MIN (STAT)

## 2024-02-09 RX ORDER — GUAIFENESIN 100 MG/5ML
200 SOLUTION ORAL EVERY 4 HOURS PRN
Status: DISCONTINUED | OUTPATIENT
Start: 2024-02-09 | End: 2024-02-10

## 2024-02-09 RX ADMIN — NICOTINE 1 PATCH: 14 PATCH, EXTENDED RELEASE TRANSDERMAL at 09:02

## 2024-02-09 RX ADMIN — IPRATROPIUM BROMIDE AND ALBUTEROL SULFATE 3 ML: 2.5; .5 SOLUTION RESPIRATORY (INHALATION) at 01:02

## 2024-02-09 RX ADMIN — FUROSEMIDE 40 MG: 10 INJECTION, SOLUTION INTRAVENOUS at 05:02

## 2024-02-09 RX ADMIN — HYDROCODONE BITARTRATE AND ACETAMINOPHEN 1 TABLET: 10; 325 TABLET ORAL at 08:02

## 2024-02-09 RX ADMIN — COLCHICINE 0.6 MG: 0.6 TABLET, FILM COATED ORAL at 09:02

## 2024-02-09 RX ADMIN — APIXABAN 5 MG: 5 TABLET, FILM COATED ORAL at 09:02

## 2024-02-09 RX ADMIN — IPRATROPIUM BROMIDE AND ALBUTEROL SULFATE 3 ML: 2.5; .5 SOLUTION RESPIRATORY (INHALATION) at 07:02

## 2024-02-09 RX ADMIN — NIFEDIPINE 60 MG: 30 TABLET, FILM COATED, EXTENDED RELEASE ORAL at 09:02

## 2024-02-09 RX ADMIN — FAMOTIDINE 20 MG: 20 TABLET ORAL at 09:02

## 2024-02-09 RX ADMIN — INSULIN DETEMIR 10 UNITS: 100 INJECTION, SOLUTION SUBCUTANEOUS at 09:02

## 2024-02-09 RX ADMIN — APIXABAN 5 MG: 5 TABLET, FILM COATED ORAL at 08:02

## 2024-02-09 RX ADMIN — LEVOFLOXACIN 500 MG: 500 INJECTION, SOLUTION INTRAVENOUS at 05:02

## 2024-02-09 RX ADMIN — GUAIFENESIN 200 MG: 200 SOLUTION ORAL at 05:02

## 2024-02-09 RX ADMIN — HYDROCODONE BITARTRATE AND ACETAMINOPHEN 1 TABLET: 10; 325 TABLET ORAL at 05:02

## 2024-02-09 RX ADMIN — ASPIRIN 81 MG: 81 TABLET, COATED ORAL at 09:02

## 2024-02-09 RX ADMIN — METHYLPREDNISOLONE SODIUM SUCCINATE 60 MG: 40 INJECTION INTRAMUSCULAR; INTRAVENOUS at 05:02

## 2024-02-09 RX ADMIN — GUAIFENESIN 200 MG: 200 SOLUTION ORAL at 01:02

## 2024-02-09 RX ADMIN — HYDROCODONE BITARTRATE AND ACETAMINOPHEN 1 TABLET: 10; 325 TABLET ORAL at 09:02

## 2024-02-09 RX ADMIN — METHYLPREDNISOLONE SODIUM SUCCINATE 60 MG: 40 INJECTION INTRAMUSCULAR; INTRAVENOUS at 02:02

## 2024-02-09 RX ADMIN — FUROSEMIDE 40 MG: 10 INJECTION, SOLUTION INTRAVENOUS at 04:02

## 2024-02-09 RX ADMIN — METHYLPREDNISOLONE SODIUM SUCCINATE 60 MG: 40 INJECTION INTRAMUSCULAR; INTRAVENOUS at 08:02

## 2024-02-09 RX ADMIN — ALLOPURINOL 300 MG: 300 TABLET ORAL at 09:02

## 2024-02-09 RX ADMIN — LEVOTHYROXINE SODIUM 150 MCG: 0.15 TABLET ORAL at 05:02

## 2024-02-09 NOTE — PLAN OF CARE
Problem: Gas Exchange Impaired  Goal: Optimal Gas Exchange  Outcome: Ongoing, Progressing     Problem: Skin Injury Risk Increased  Goal: Skin Health and Integrity  Outcome: Ongoing, Progressing      rubella/antibody screen/HBsAG/HIV/group B strep/VDRL/RPR/blood type
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
## 2024-02-10 PROBLEM — G47.33 OSA (OBSTRUCTIVE SLEEP APNEA): Status: ACTIVE | Noted: 2024-02-10

## 2024-02-10 PROBLEM — Z87.39 HISTORY OF OSTEOMYELITIS: Status: ACTIVE | Noted: 2024-02-08

## 2024-02-10 PROBLEM — I27.20 PULMONARY HYPERTENSION: Status: ACTIVE | Noted: 2024-02-10

## 2024-02-10 LAB
ANION GAP SERPL CALCULATED.3IONS-SCNC: 8 MMOL/L (ref 7–16)
ANISOCYTOSIS BLD QL SMEAR: ABNORMAL
BASOPHILS # BLD AUTO: 0.02 K/UL (ref 0–0.2)
BASOPHILS NFR BLD AUTO: 0.1 % (ref 0–1)
BUN SERPL-MCNC: 21 MG/DL (ref 7–18)
BUN/CREAT SERPL: 21 (ref 6–20)
CALCIUM SERPL-MCNC: 9.7 MG/DL (ref 8.5–10.1)
CHLORIDE SERPL-SCNC: 101 MMOL/L (ref 98–107)
CO2 SERPL-SCNC: 34 MMOL/L (ref 21–32)
CREAT SERPL-MCNC: 0.99 MG/DL (ref 0.55–1.02)
DIFFERENTIAL METHOD BLD: ABNORMAL
EGFR (NO RACE VARIABLE) (RUSH/TITUS): 64 ML/MIN/1.73M2
EOSINOPHIL # BLD AUTO: 0 K/UL (ref 0–0.5)
EOSINOPHIL NFR BLD AUTO: 0 % (ref 1–4)
ERYTHROCYTE [DISTWIDTH] IN BLOOD BY AUTOMATED COUNT: 24.4 % (ref 11.5–14.5)
GLUCOSE SERPL-MCNC: 114 MG/DL (ref 70–105)
GLUCOSE SERPL-MCNC: 124 MG/DL (ref 70–105)
GLUCOSE SERPL-MCNC: 126 MG/DL (ref 74–106)
GLUCOSE SERPL-MCNC: 131 MG/DL (ref 70–105)
GLUCOSE SERPL-MCNC: 132 MG/DL (ref 70–105)
HCT VFR BLD AUTO: 49.9 % (ref 38–47)
HGB BLD-MCNC: 14.8 G/DL (ref 12–16)
IMM GRANULOCYTES # BLD AUTO: 0.15 K/UL (ref 0–0.04)
IMM GRANULOCYTES NFR BLD: 0.9 % (ref 0–0.4)
LYMPHOCYTES # BLD AUTO: 0.86 K/UL (ref 1–4.8)
LYMPHOCYTES NFR BLD AUTO: 5.1 % (ref 27–41)
MAGNESIUM SERPL-MCNC: 2.2 MG/DL (ref 1.7–2.3)
MCH RBC QN AUTO: 21.6 PG (ref 27–31)
MCHC RBC AUTO-ENTMCNC: 29.7 G/DL (ref 32–36)
MCV RBC AUTO: 73 FL (ref 80–96)
MICROCYTES BLD QL SMEAR: ABNORMAL
MONOCYTES # BLD AUTO: 0.44 K/UL (ref 0–0.8)
MONOCYTES NFR BLD AUTO: 2.6 % (ref 2–6)
MPC BLD CALC-MCNC: 10.6 FL (ref 9.4–12.4)
NEUTROPHILS # BLD AUTO: 15.35 K/UL (ref 1.8–7.7)
NEUTROPHILS NFR BLD AUTO: 91.3 % (ref 53–65)
NRBC # BLD AUTO: 0.02 X10E3/UL
NRBC, AUTO (.00): 0.1 %
OVALOCYTES BLD QL SMEAR: ABNORMAL
PLATELET # BLD AUTO: 221 K/UL (ref 150–400)
PLATELET MORPHOLOGY: ABNORMAL
POTASSIUM SERPL-SCNC: 3.5 MMOL/L (ref 3.5–5.1)
RBC # BLD AUTO: 6.84 M/UL (ref 4.2–5.4)
SODIUM SERPL-SCNC: 139 MMOL/L (ref 136–145)
WBC # BLD AUTO: 16.82 K/UL (ref 4.5–11)

## 2024-02-10 PROCEDURE — 25000003 PHARM REV CODE 250: Performed by: STUDENT IN AN ORGANIZED HEALTH CARE EDUCATION/TRAINING PROGRAM

## 2024-02-10 PROCEDURE — 25000242 PHARM REV CODE 250 ALT 637 W/ HCPCS: Performed by: HOSPITALIST

## 2024-02-10 PROCEDURE — 80048 BASIC METABOLIC PNL TOTAL CA: CPT | Performed by: HOSPITALIST

## 2024-02-10 PROCEDURE — 94640 AIRWAY INHALATION TREATMENT: CPT

## 2024-02-10 PROCEDURE — 63600175 PHARM REV CODE 636 W HCPCS: Performed by: HOSPITALIST

## 2024-02-10 PROCEDURE — 27000221 HC OXYGEN, UP TO 24 HOURS

## 2024-02-10 PROCEDURE — 83735 ASSAY OF MAGNESIUM: CPT | Performed by: HOSPITALIST

## 2024-02-10 PROCEDURE — 99233 SBSQ HOSP IP/OBS HIGH 50: CPT | Mod: ,,, | Performed by: HOSPITALIST

## 2024-02-10 PROCEDURE — 94761 N-INVAS EAR/PLS OXIMETRY MLT: CPT

## 2024-02-10 PROCEDURE — 82962 GLUCOSE BLOOD TEST: CPT

## 2024-02-10 PROCEDURE — 99900035 HC TECH TIME PER 15 MIN (STAT)

## 2024-02-10 PROCEDURE — S4991 NICOTINE PATCH NONLEGEND: HCPCS | Performed by: HOSPITALIST

## 2024-02-10 PROCEDURE — 11000001 HC ACUTE MED/SURG PRIVATE ROOM

## 2024-02-10 PROCEDURE — 25000003 PHARM REV CODE 250: Performed by: HOSPITALIST

## 2024-02-10 PROCEDURE — 63600175 PHARM REV CODE 636 W HCPCS: Performed by: STUDENT IN AN ORGANIZED HEALTH CARE EDUCATION/TRAINING PROGRAM

## 2024-02-10 PROCEDURE — 99223 1ST HOSP IP/OBS HIGH 75: CPT | Mod: ,,, | Performed by: STUDENT IN AN ORGANIZED HEALTH CARE EDUCATION/TRAINING PROGRAM

## 2024-02-10 PROCEDURE — 85025 COMPLETE CBC W/AUTO DIFF WBC: CPT | Performed by: HOSPITALIST

## 2024-02-10 RX ORDER — POTASSIUM CHLORIDE 7.45 MG/ML
10 INJECTION INTRAVENOUS ONCE
Status: COMPLETED | OUTPATIENT
Start: 2024-02-10 | End: 2024-02-10

## 2024-02-10 RX ORDER — BUMETANIDE 0.25 MG/ML
3 INJECTION INTRAMUSCULAR; INTRAVENOUS EVERY 12 HOURS
Status: DISCONTINUED | OUTPATIENT
Start: 2024-02-10 | End: 2024-02-13

## 2024-02-10 RX ORDER — POTASSIUM CHLORIDE 20 MEQ/1
40 TABLET, EXTENDED RELEASE ORAL EVERY 4 HOURS
Status: DISPENSED | OUTPATIENT
Start: 2024-02-10 | End: 2024-02-11

## 2024-02-10 RX ORDER — GUAIFENESIN 100 MG/5ML
200 SOLUTION ORAL EVERY 4 HOURS
Status: DISCONTINUED | OUTPATIENT
Start: 2024-02-10 | End: 2024-02-10

## 2024-02-10 RX ORDER — POTASSIUM CHLORIDE 20 MEQ/1
40 TABLET, EXTENDED RELEASE ORAL EVERY 4 HOURS
Status: DISCONTINUED | OUTPATIENT
Start: 2024-02-10 | End: 2024-02-10

## 2024-02-10 RX ORDER — BENZONATATE 100 MG/1
100 CAPSULE ORAL 3 TIMES DAILY PRN
Status: DISCONTINUED | OUTPATIENT
Start: 2024-02-10 | End: 2024-02-14 | Stop reason: HOSPADM

## 2024-02-10 RX ORDER — HYDROCODONE BITARTRATE AND ACETAMINOPHEN 5; 325 MG/1; MG/1
1 TABLET ORAL EVERY 6 HOURS PRN
Status: DISCONTINUED | OUTPATIENT
Start: 2024-02-10 | End: 2024-02-14 | Stop reason: HOSPADM

## 2024-02-10 RX ADMIN — FAMOTIDINE 20 MG: 20 TABLET ORAL at 08:02

## 2024-02-10 RX ADMIN — IPRATROPIUM BROMIDE AND ALBUTEROL SULFATE 3 ML: 2.5; .5 SOLUTION RESPIRATORY (INHALATION) at 07:02

## 2024-02-10 RX ADMIN — IPRATROPIUM BROMIDE AND ALBUTEROL SULFATE 3 ML: 2.5; .5 SOLUTION RESPIRATORY (INHALATION) at 12:02

## 2024-02-10 RX ADMIN — POLYETHYLENE GLYCOL 3350 17 G: 17 POWDER, FOR SOLUTION ORAL at 08:02

## 2024-02-10 RX ADMIN — INSULIN DETEMIR 10 UNITS: 100 INJECTION, SOLUTION SUBCUTANEOUS at 08:02

## 2024-02-10 RX ADMIN — ALLOPURINOL 300 MG: 300 TABLET ORAL at 08:02

## 2024-02-10 RX ADMIN — HYDROCODONE BITARTRATE AND ACETAMINOPHEN 1 TABLET: 10; 325 TABLET ORAL at 08:02

## 2024-02-10 RX ADMIN — BUMETANIDE 3 MG: 0.25 INJECTION INTRAMUSCULAR; INTRAVENOUS at 10:02

## 2024-02-10 RX ADMIN — POTASSIUM CHLORIDE 40 MEQ: 1500 TABLET, EXTENDED RELEASE ORAL at 10:02

## 2024-02-10 RX ADMIN — NIFEDIPINE 60 MG: 30 TABLET, FILM COATED, EXTENDED RELEASE ORAL at 08:02

## 2024-02-10 RX ADMIN — BENZONATATE 100 MG: 100 CAPSULE ORAL at 12:02

## 2024-02-10 RX ADMIN — POTASSIUM CHLORIDE 10 MEQ: 10 INJECTION, SOLUTION INTRAVENOUS at 10:02

## 2024-02-10 RX ADMIN — LEVOFLOXACIN 500 MG: 500 INJECTION, SOLUTION INTRAVENOUS at 06:02

## 2024-02-10 RX ADMIN — APIXABAN 5 MG: 5 TABLET, FILM COATED ORAL at 08:02

## 2024-02-10 RX ADMIN — NICOTINE 1 PATCH: 14 PATCH, EXTENDED RELEASE TRANSDERMAL at 08:02

## 2024-02-10 RX ADMIN — FUROSEMIDE 40 MG: 10 INJECTION, SOLUTION INTRAVENOUS at 05:02

## 2024-02-10 RX ADMIN — COLCHICINE 0.6 MG: 0.6 TABLET, FILM COATED ORAL at 08:02

## 2024-02-10 RX ADMIN — ASPIRIN 81 MG: 81 TABLET, COATED ORAL at 08:02

## 2024-02-10 RX ADMIN — LEVOTHYROXINE SODIUM 150 MCG: 0.15 TABLET ORAL at 05:02

## 2024-02-10 RX ADMIN — FUROSEMIDE 40 MG: 10 INJECTION, SOLUTION INTRAVENOUS at 03:02

## 2024-02-10 NOTE — ASSESSMENT & PLAN NOTE
Patient with Hypoxic Respiratory failure which is Acute on chronic.  she is on home oxygen at 2 LPM. Supplemental oxygen was provided and noted- Oxygen Concentration (%):  [40-53] 40    .   Signs/symptoms of respiratory failure include- tachypnea, increased work of breathing, use of accessory muscles, and wheezing. Contributing diagnoses includes - CHF, COPD, and Obesity Hypoventilation Labs and images were reviewed. Patient Has recent ABG, which has been reviewed. Will treat underlying causes and adjust management of respiratory failure as follows-   Hi Kelton, Treat COPD exacerbation, Treat CHF exacerbation, Pulmonology consult.      2/8: continue current care.  CTA chest ordered.  Pulm consult.  Patient appears stable.  ABG ordered.      2/9: ABG reviewed today and appears good.  Will decrease FiO2.  Appreciate assistance from pulmonology.

## 2024-02-10 NOTE — PLAN OF CARE
Problem: Adult Inpatient Plan of Care  Goal: Plan of Care Review  Outcome: Ongoing, Progressing  Goal: Patient-Specific Goal (Individualized)  Outcome: Ongoing, Progressing  Goal: Absence of Hospital-Acquired Illness or Injury  Outcome: Ongoing, Progressing     Problem: Bariatric Environmental Safety  Goal: Safety Maintained with Care  Outcome: Ongoing, Progressing     Problem: Diabetes Comorbidity  Goal: Blood Glucose Level Within Targeted Range  Outcome: Ongoing, Progressing     Problem: Gas Exchange Impaired  Goal: Optimal Gas Exchange  Outcome: Ongoing, Progressing

## 2024-02-10 NOTE — PROGRESS NOTES
[facility] Hospital Medicine  Progress Note    Patient Name: Holly Porter  MRN: [id number]  Patient Class: IP- Inpatient   Admission Date: 2/7/2024  Length of Stay: 2 days  Attending Physician: Tien Barksdale MD  Primary Care Provider: Regan Frausto MD        Subjective:     Principal Problem:Acute hypoxemic respiratory failure    HPI:  Ms. Holly Porter is a 63-year-old woman history of COPD on home O2 with tobacco use, diastolic heart failure, thyroid disease, class 3 obesity with JOVANNI, likely OHS, type 2 diabetes with diabetic ulcer s/p left toe amputation, prior DVT and PE on home Eliquis, PVD s/p R femoral stent, seizures who presents with worsening shortness of breath and bilateral leg pain.  She states that she developed severe respiratory distress at home and her inhalers and other medications were not adequate to improve her distress.  In addition, she reports progressive lower extremity swelling and bilateral leg pain and makes it difficult for her to ambulate.    In the emergency department she had hypoxia to the low 70s.  She was on a Ventimask and then switched to BiPAP.  Case was discussed with pulmonology and it was decided for patient to be on high-flow.  Patient stated that she is very happy with this as she would love to eat dinner and she sometimes feels that she is suffocating and the mass that is why she rarely wears her CPAP at home because she does not think she has appropriate mask.    ED course:   Initial Vitals   BP Pulse Resp Temp SpO2   02/07/24 1152 02/07/24 1152 02/07/24 1209 02/07/24 1152 02/07/24 1152   (!) 150/81 96 (!) 26 98.1 °F (36.7 °C) (!) 75 %                       Prior hospitalization: Visits for shortness of breath.  Hospitalized 11/2023 with CHF/COPD exacerbations.   C 11/2023: minimal CAD. Echo: EF 50%    Overview/Hospital Course:  No notes on file    Interval History:     Review of Systems   Constitutional:   Negative for activity change, chills, fatigue and fever.   HENT:  Negative for congestion and sore throat.    Respiratory:  Positive for shortness of breath and wheezing. Negative for chest tightness.    Cardiovascular:  Positive for leg swelling.   Gastrointestinal:  Negative for abdominal distention, abdominal pain and diarrhea.   Endocrine: Negative for cold intolerance and heat intolerance.   Genitourinary:  Negative for dysuria.   Musculoskeletal:  Negative for arthralgias and back pain.   Skin:  Negative for color change and rash.   Neurological:  Negative for dizziness, tremors and headaches.   Psychiatric/Behavioral:  Negative for agitation. The patient is not nervous/anxious.      Objective:     Vital Signs (Most Recent):  Temp: 98.2 °F (36.8 °C) (02/09/24 1412)  Pulse: 78 (02/09/24 1412)  Resp: 18 (02/09/24 1702)  BP: 129/75 (02/09/24 1412)  SpO2: 95 % (02/09/24 1649) Vital Signs (24h Range):  Temp:  [97.6 °F (36.4 °C)-98.4 °F (36.9 °C)] 98.2 °F (36.8 °C)  Pulse:  [72-87] 78  Resp:  [12-20] 18  SpO2:  [90 %-95 %] 95 %  BP: (124-152)/(70-82) 129/75     Weight: (!) 167.5 kg (369 lb 4.3 oz)  Body mass index is 56.15 kg/m².    Intake/Output Summary (Last 24 hours) at 2/9/2024 1812  Last data filed at 2/9/2024 1300  Gross per 24 hour   Intake 1035 ml   Output 2300 ml   Net -1265 ml         Physical Exam  Constitutional:       Appearance: She is obese. She is ill-appearing.   HENT:      Head: Normocephalic and atraumatic.      Nose: Nose normal.      Mouth/Throat:      Mouth: Mucous membranes are moist.      Pharynx: Oropharynx is clear.   Eyes:      Extraocular Movements: Extraocular movements intact.      Conjunctiva/sclera: Conjunctivae normal.      Pupils: Pupils are equal, round, and reactive to light.   Cardiovascular:      Rate and Rhythm: Normal rate and regular rhythm.      Pulses: Normal pulses.      Heart sounds: Normal heart sounds.   Pulmonary:      Effort: Respiratory distress present.      Breath  sounds: Wheezing present.   Abdominal:      General: Abdomen is flat. Bowel sounds are normal.      Palpations: Abdomen is soft.   Musculoskeletal:         General: Normal range of motion.      Cervical back: Normal range of motion and neck supple.      Right lower leg: Edema present.      Left lower leg: Edema present.      Comments: Left toe amputation    Skin:     General: Skin is warm and dry.   Neurological:      General: No focal deficit present.      Mental Status: She is alert and oriented to person, place, and time.   Psychiatric:         Mood and Affect: Mood normal.         Behavior: Behavior normal.             Significant Labs: All pertinent labs within the past 24 hours have been reviewed.    Significant Imaging: I have reviewed all pertinent imaging results/findings within the past 24 hours.    Assessment/Plan:      * Acute hypoxemic respiratory failure  Patient with Hypoxic Respiratory failure which is Acute on chronic.  she is on home oxygen at 2 LPM. Supplemental oxygen was provided and noted- Oxygen Concentration (%):  [40-53] 40    .   Signs/symptoms of respiratory failure include- tachypnea, increased work of breathing, use of accessory muscles, and wheezing. Contributing diagnoses includes - CHF, COPD, and Obesity Hypoventilation Labs and images were reviewed. Patient Has recent ABG, which has been reviewed. Will treat underlying causes and adjust management of respiratory failure as follows-   Hi Kelton, Treat COPD exacerbation, Treat CHF exacerbation, Pulmonology consult.      2/8: continue current care.  CTA chest ordered.  Pulm consult.  Patient appears stable.  ABG ordered.      2/9: ABG reviewed today and appears good.  Will decrease FiO2.  Appreciate assistance from pulmonology.      Pulmonary embolism  History of pulmonary embolism and DVT and patient currently on home Eliquis.    Given hypoxia and elevated D-dimer CTA was ordered.  Follow up result.  Lower extremity Dopplers ordered as  well.    2/8: negative dopplers.  F/u CTA chest and bilateral lower extremity.   Continue home eliquis.        Osteomyelitis  History of osteomyelitis of LLE.   X-ray and inflammatory markers ordered.   Patient states she has been unable to wear a shoe for the last several years.    Few have been willing to perform surgery or amputate.        Peripheral arterial disease with history of revascularization  Status post femoral stent.  Patient reporting bilateral lower extremity pain.    Follow-up DVT and arterial Dopplers.      Seizure  Continue home antiseizure medication.      Migraine  Continue outpatient treatment for suppressive therapy      Nicotine dependence  Dangers of cigarette smoking were reviewed with patient in detail. Patient was Counseled for 10 minutes or greater. Nicotine replacement options were discussed. Nicotine replacement was discussed- prescribed    GERD (gastroesophageal reflux disease)  Continue home PPI      Obesity hypoventilation syndrome  There is no height or weight on file to calculate BMI. Morbid obesity complicates all aspects of disease management from diagnostic modalities to treatment. Weight loss encouraged and health benefits explained to patient.         Class 3 severe obesity with body mass index (BMI) of 50.0 to 59.9 in adult  There is no height or weight on file to calculate BMI. Morbid obesity complicates all aspects of disease management from diagnostic modalities to treatment. Weight loss encouraged and health benefits explained to patient.         Type 2 diabetes mellitus without complication, with long-term current use of insulin  Patient's FSGs are controlled on current medication regimen.  Last A1c reviewed-   Lab Results   Component Value Date    HGBA1C 6.2 11/03/2023     Most recent fingerstick glucose reviewed-   Recent Labs   Lab 02/07/24  1237   POCTGLUCOSE 92     Current correctional scale  Low  Maintain anti-hyperglycemic dose as follows-   Antihyperglycemics  "(From admission, onward)      Start     Stop Route Frequency Ordered    02/08/24 0900  insulin detemir U-100 injection 10 Units         -- SubQ Daily 02/07/24 1712    02/07/24 1809  insulin aspart U-100 injection 0-5 Units         -- SubQ Before meals & nightly PRN 02/07/24 1712          Hold Oral hypoglycemics while patient is in the hospital.    Acute on chronic diastolic CHF (congestive heart failure)  Patient is identified as having Diastolic (HFpEF) heart failure that is Acute on chronic. CHF is currently uncontrolled due to Dyspnea not returned to baseline after 1 doses of IV diuretic. Latest ECHO performed and demonstrates- Results for orders placed during the hospital encounter of 11/03/23 2/8: continue IV lasix, a high risk medication, and monitor renal function carefully.          Echo    Interpretation Summary    Left Ventricle: The left ventricle is normal in size. Normal wall thickness. Normal wall motion. There is normal systolic function with a visually estimated ejection fraction of 60 - 65%. Ejection fraction by visual approximation is 50%. There is normal diastolic function.    Right Ventricle: Moderate right ventricular enlargement. Systolic function is normal.    Left Atrium: Left atrium is mildly dilated.    Right Atrium: Right atrium is moderately dilated.    Aortic Valve: The aortic valve is a trileaflet valve.    Tricuspid Valve: There is mild regurgitation. No paravalvular regurgitation.  . Continue Beta Blocker, Furosemide, and Aldactone and monitor clinical status closely. Monitor on telemetry. Patient is off CHF pathway.  Monitor strict Is&Os and daily weights.  Place on fluid restriction of 1 L. Cardiology has not been consulted. Continue to stress to patient importance of self efficacy and  on diet for CHF. Last BNP reviewed- and noted below No results for input(s): "BNP", "BNPTRIAGEBLO" in the last 168 hours.    COPD exacerbation  Patient's COPD is with exacerbation noted by " continued dyspnea, use of accessory muscles for breathing, and worsening of baseline hypoxia currently.  Patient is currently off COPD Pathway. Continue scheduled inhalers Steroids, Antibiotics, and Supplemental oxygen and monitor respiratory status closely.     2/8: continue duonebs, steroids, antibiotics.        VTE Risk Mitigation (From admission, onward)           Ordered     apixaban tablet 5 mg  2 times daily         02/07/24 1712     Place sequential compression device  Until discontinued         02/07/24 1712     IP VTE HIGH RISK PATIENT  Once         02/07/24 1712                    Discharge Planning   MONTSERRAT:      Code Status: Full Code   Is the patient medically ready for discharge?:     Reason for patient still in hospital (select all that apply): Treatment  Discharge Plan A: Home, Home Health                  Tien Barksdale MD  Department of Hospital Medicine   [facility] - [facility]

## 2024-02-10 NOTE — SUBJECTIVE & OBJECTIVE
Interval History:     Review of Systems   Constitutional:  Negative for activity change, chills, fatigue and fever.   HENT:  Negative for congestion and sore throat.    Respiratory:  Positive for shortness of breath and wheezing. Negative for chest tightness.    Cardiovascular:  Positive for leg swelling.   Gastrointestinal:  Negative for abdominal distention, abdominal pain and diarrhea.   Endocrine: Negative for cold intolerance and heat intolerance.   Genitourinary:  Negative for dysuria.   Musculoskeletal:  Negative for arthralgias and back pain.   Skin:  Negative for color change and rash.   Neurological:  Negative for dizziness, tremors and headaches.   Psychiatric/Behavioral:  Negative for agitation. The patient is not nervous/anxious.      Objective:     Vital Signs (Most Recent):  Temp: 98.2 °F (36.8 °C) (02/09/24 1412)  Pulse: 78 (02/09/24 1412)  Resp: 18 (02/09/24 1702)  BP: 129/75 (02/09/24 1412)  SpO2: 95 % (02/09/24 1649) Vital Signs (24h Range):  Temp:  [97.6 °F (36.4 °C)-98.4 °F (36.9 °C)] 98.2 °F (36.8 °C)  Pulse:  [72-87] 78  Resp:  [12-20] 18  SpO2:  [90 %-95 %] 95 %  BP: (124-152)/(70-82) 129/75     Weight: (!) 167.5 kg (369 lb 4.3 oz)  Body mass index is 56.15 kg/m².    Intake/Output Summary (Last 24 hours) at 2/9/2024 1812  Last data filed at 2/9/2024 1300  Gross per 24 hour   Intake 1035 ml   Output 2300 ml   Net -1265 ml         Physical Exam  Constitutional:       Appearance: She is obese. She is ill-appearing.   HENT:      Head: Normocephalic and atraumatic.      Nose: Nose normal.      Mouth/Throat:      Mouth: Mucous membranes are moist.      Pharynx: Oropharynx is clear.   Eyes:      Extraocular Movements: Extraocular movements intact.      Conjunctiva/sclera: Conjunctivae normal.      Pupils: Pupils are equal, round, and reactive to light.   Cardiovascular:      Rate and Rhythm: Normal rate and regular rhythm.      Pulses: Normal pulses.      Heart sounds: Normal heart sounds.    Pulmonary:      Effort: Respiratory distress present.      Breath sounds: Wheezing present.   Abdominal:      General: Abdomen is flat. Bowel sounds are normal.      Palpations: Abdomen is soft.   Musculoskeletal:         General: Normal range of motion.      Cervical back: Normal range of motion and neck supple.      Right lower leg: Edema present.      Left lower leg: Edema present.      Comments: Left toe amputation    Skin:     General: Skin is warm and dry.   Neurological:      General: No focal deficit present.      Mental Status: She is alert and oriented to person, place, and time.   Psychiatric:         Mood and Affect: Mood normal.         Behavior: Behavior normal.             Significant Labs: All pertinent labs within the past 24 hours have been reviewed.    Significant Imaging: I have reviewed all pertinent imaging results/findings within the past 24 hours.

## 2024-02-11 LAB
ANION GAP SERPL CALCULATED.3IONS-SCNC: 7 MMOL/L (ref 7–16)
BUN SERPL-MCNC: 20 MG/DL (ref 7–18)
BUN/CREAT SERPL: 21 (ref 6–20)
CALCIUM SERPL-MCNC: 9.4 MG/DL (ref 8.5–10.1)
CHLORIDE SERPL-SCNC: 101 MMOL/L (ref 98–107)
CO2 SERPL-SCNC: 35 MMOL/L (ref 21–32)
CREAT SERPL-MCNC: 0.94 MG/DL (ref 0.55–1.02)
EGFR (NO RACE VARIABLE) (RUSH/TITUS): 68 ML/MIN/1.73M2
GLUCOSE SERPL-MCNC: 104 MG/DL (ref 70–105)
GLUCOSE SERPL-MCNC: 113 MG/DL (ref 70–105)
GLUCOSE SERPL-MCNC: 115 MG/DL (ref 70–105)
GLUCOSE SERPL-MCNC: 142 MG/DL (ref 70–105)
GLUCOSE SERPL-MCNC: 90 MG/DL (ref 74–106)
MAGNESIUM SERPL-MCNC: 2.1 MG/DL (ref 1.7–2.3)
POTASSIUM SERPL-SCNC: 3.5 MMOL/L (ref 3.5–5.1)
SODIUM SERPL-SCNC: 139 MMOL/L (ref 136–145)

## 2024-02-11 PROCEDURE — 99900035 HC TECH TIME PER 15 MIN (STAT)

## 2024-02-11 PROCEDURE — 25000242 PHARM REV CODE 250 ALT 637 W/ HCPCS: Performed by: HOSPITALIST

## 2024-02-11 PROCEDURE — 94640 AIRWAY INHALATION TREATMENT: CPT

## 2024-02-11 PROCEDURE — 80048 BASIC METABOLIC PNL TOTAL CA: CPT | Performed by: STUDENT IN AN ORGANIZED HEALTH CARE EDUCATION/TRAINING PROGRAM

## 2024-02-11 PROCEDURE — 63600175 PHARM REV CODE 636 W HCPCS: Performed by: HOSPITALIST

## 2024-02-11 PROCEDURE — 25000003 PHARM REV CODE 250: Performed by: HOSPITALIST

## 2024-02-11 PROCEDURE — 83735 ASSAY OF MAGNESIUM: CPT | Performed by: STUDENT IN AN ORGANIZED HEALTH CARE EDUCATION/TRAINING PROGRAM

## 2024-02-11 PROCEDURE — 63600175 PHARM REV CODE 636 W HCPCS: Performed by: STUDENT IN AN ORGANIZED HEALTH CARE EDUCATION/TRAINING PROGRAM

## 2024-02-11 PROCEDURE — 82962 GLUCOSE BLOOD TEST: CPT

## 2024-02-11 PROCEDURE — 27000221 HC OXYGEN, UP TO 24 HOURS

## 2024-02-11 PROCEDURE — 94761 N-INVAS EAR/PLS OXIMETRY MLT: CPT

## 2024-02-11 PROCEDURE — 99233 SBSQ HOSP IP/OBS HIGH 50: CPT | Mod: ,,, | Performed by: HOSPITALIST

## 2024-02-11 PROCEDURE — S4991 NICOTINE PATCH NONLEGEND: HCPCS | Performed by: HOSPITALIST

## 2024-02-11 PROCEDURE — 11000001 HC ACUTE MED/SURG PRIVATE ROOM

## 2024-02-11 RX ORDER — LACTULOSE 10 G/15ML
30 SOLUTION ORAL 3 TIMES DAILY
Status: DISCONTINUED | OUTPATIENT
Start: 2024-02-11 | End: 2024-02-14 | Stop reason: HOSPADM

## 2024-02-11 RX ORDER — SODIUM CHLORIDE FOR INHALATION 3 %
4 VIAL, NEBULIZER (ML) INHALATION 2 TIMES DAILY
Status: DISCONTINUED | OUTPATIENT
Start: 2024-02-11 | End: 2024-02-14 | Stop reason: HOSPADM

## 2024-02-11 RX ADMIN — ALLOPURINOL 300 MG: 300 TABLET ORAL at 08:02

## 2024-02-11 RX ADMIN — LEVOTHYROXINE SODIUM 150 MCG: 0.15 TABLET ORAL at 06:02

## 2024-02-11 RX ADMIN — HYDROCODONE BITARTRATE AND ACETAMINOPHEN 1 TABLET: 5; 325 TABLET ORAL at 11:02

## 2024-02-11 RX ADMIN — IPRATROPIUM BROMIDE AND ALBUTEROL SULFATE 3 ML: 2.5; .5 SOLUTION RESPIRATORY (INHALATION) at 07:02

## 2024-02-11 RX ADMIN — FAMOTIDINE 20 MG: 20 TABLET ORAL at 08:02

## 2024-02-11 RX ADMIN — HYDROCODONE BITARTRATE AND ACETAMINOPHEN 1 TABLET: 5; 325 TABLET ORAL at 08:02

## 2024-02-11 RX ADMIN — APIXABAN 5 MG: 5 TABLET, FILM COATED ORAL at 08:02

## 2024-02-11 RX ADMIN — SODIUM CHLORIDE SOLN NEBU 3% 4 ML: 3 NEBU SOLN at 12:02

## 2024-02-11 RX ADMIN — LEVOFLOXACIN 500 MG: 500 INJECTION, SOLUTION INTRAVENOUS at 06:02

## 2024-02-11 RX ADMIN — IPRATROPIUM BROMIDE AND ALBUTEROL SULFATE 3 ML: 2.5; .5 SOLUTION RESPIRATORY (INHALATION) at 12:02

## 2024-02-11 RX ADMIN — LACTULOSE 30 G: 20 SOLUTION ORAL at 08:02

## 2024-02-11 RX ADMIN — SODIUM CHLORIDE SOLN NEBU 3% 4 ML: 3 NEBU SOLN at 07:02

## 2024-02-11 RX ADMIN — NICOTINE 1 PATCH: 14 PATCH, EXTENDED RELEASE TRANSDERMAL at 08:02

## 2024-02-11 RX ADMIN — BUMETANIDE 3 MG: 0.25 INJECTION INTRAMUSCULAR; INTRAVENOUS at 08:02

## 2024-02-11 RX ADMIN — NIFEDIPINE 60 MG: 30 TABLET, FILM COATED, EXTENDED RELEASE ORAL at 08:02

## 2024-02-11 RX ADMIN — INSULIN DETEMIR 10 UNITS: 100 INJECTION, SOLUTION SUBCUTANEOUS at 08:02

## 2024-02-11 RX ADMIN — COLCHICINE 0.6 MG: 0.6 TABLET, FILM COATED ORAL at 08:02

## 2024-02-11 RX ADMIN — LACTULOSE 30 G: 20 SOLUTION ORAL at 02:02

## 2024-02-11 RX ADMIN — ASPIRIN 81 MG: 81 TABLET, COATED ORAL at 08:02

## 2024-02-11 NOTE — PROGRESS NOTES
[facility] - [facility] Medicine  Progress Note    Patient Name: Holly Porter  MRN: [id number]  Patient Class: IP- Inpatient   Admission Date: 2/7/2024  Length of Stay: 4 days  Attending Physician: Tien Barksdale MD  Primary Care Provider: Regan Frausto MD        Subjective:     Principal Problem:Acute hypoxemic respiratory failure    HPI:  Ms. Holly Porter is a 63-year-old woman history of COPD on home O2 with tobacco use, diastolic heart failure, thyroid disease, class 3 obesity with JOVANNI, likely OHS, type 2 diabetes with diabetic ulcer s/p left toe amputation, prior DVT and PE on home Eliquis, PVD s/p R femoral stent, seizures who presents with worsening shortness of breath and bilateral leg pain.  She states that she developed severe respiratory distress at home and her inhalers and other medications were not adequate to improve her distress.  In addition, she reports progressive lower extremity swelling and bilateral leg pain and makes it difficult for her to ambulate.    In the emergency department she had hypoxia to the low 70s.  She was on a Ventimask and then switched to BiPAP.  Case was discussed with pulmonology and it was decided for patient to be on high-flow.  Patient stated that she is very happy with this as she would love to eat dinner and she sometimes feels that she is suffocating and the mass that is why she rarely wears her CPAP at home because she does not think she has appropriate mask.    ED course:   Initial Vitals   BP Pulse Resp Temp SpO2   02/07/24 1152 02/07/24 1152 02/07/24 1209 02/07/24 1152 02/07/24 1152   (!) 150/81 96 (!) 26 98.1 °F (36.7 °C) (!) 75 %                       Prior hospitalization: Visits for shortness of breath.  Hospitalized 11/2023 with CHF/COPD exacerbations.   C 11/2023: minimal CAD. Echo: EF 50%    Overview/Hospital Course:  No notes on file    Interval History:     Review of Systems   Constitutional:   Negative for activity change, chills, fatigue and fever.   HENT:  Negative for congestion and sore throat.    Respiratory:  Positive for shortness of breath and wheezing. Negative for chest tightness.    Cardiovascular:  Positive for leg swelling.   Gastrointestinal:  Negative for abdominal distention, abdominal pain and diarrhea.   Endocrine: Negative for cold intolerance and heat intolerance.   Genitourinary:  Negative for dysuria.   Musculoskeletal:  Negative for arthralgias and back pain.   Skin:  Negative for color change and rash.   Neurological:  Negative for dizziness, tremors and headaches.   Psychiatric/Behavioral:  Negative for agitation. The patient is not nervous/anxious.      Objective:     Vital Signs (Most Recent):  Temp: 98.3 °F (36.8 °C) (02/11/24 1416)  Pulse: 69 (02/11/24 1416)  Resp: 18 (02/11/24 1416)  BP: (!) 155/82 (02/11/24 1416)  SpO2: (!) 93 % (02/11/24 1618) Vital Signs (24h Range):  Temp:  [97.4 °F (36.3 °C)-98.3 °F (36.8 °C)] 98.3 °F (36.8 °C)  Pulse:  [62-73] 69  Resp:  [16-24] 18  SpO2:  [88 %-97 %] 93 %  BP: (115-155)/(65-82) 155/82     Weight: (!) 167.5 kg (369 lb 4.3 oz)  Body mass index is 56.15 kg/m².    Intake/Output Summary (Last 24 hours) at 2/11/2024 1751  Last data filed at 2/11/2024 1416  Gross per 24 hour   Intake --   Output 1300 ml   Net -1300 ml         Physical Exam  Constitutional:       Appearance: She is obese. She is ill-appearing.   HENT:      Head: Normocephalic and atraumatic.      Nose: Nose normal.      Mouth/Throat:      Mouth: Mucous membranes are moist.      Pharynx: Oropharynx is clear.   Eyes:      Extraocular Movements: Extraocular movements intact.      Conjunctiva/sclera: Conjunctivae normal.      Pupils: Pupils are equal, round, and reactive to light.   Cardiovascular:      Rate and Rhythm: Normal rate and regular rhythm.      Pulses: Normal pulses.      Heart sounds: Normal heart sounds.   Pulmonary:      Effort: Respiratory distress present.       Breath sounds: Wheezing present.   Abdominal:      General: Abdomen is flat. Bowel sounds are normal.      Palpations: Abdomen is soft.   Musculoskeletal:         General: Normal range of motion.      Cervical back: Normal range of motion and neck supple.      Right lower leg: Edema present.      Left lower leg: Edema present.      Comments: Left toe amputation    Skin:     General: Skin is warm and dry.   Neurological:      General: No focal deficit present.      Mental Status: She is alert and oriented to person, place, and time.   Psychiatric:         Mood and Affect: Mood normal.         Behavior: Behavior normal.             Significant Labs: All pertinent labs within the past 24 hours have been reviewed.    Significant Imaging: I have reviewed all pertinent imaging results/findings within the past 24 hours.    Assessment/Plan:      * Acute hypoxemic respiratory failure  Patient with Hypoxic Respiratory failure which is Acute on chronic.  she is on home oxygen at 2 LPM. Supplemental oxygen was provided and noted- Oxygen Concentration (%):  [30-38] 32    .   Signs/symptoms of respiratory failure include- tachypnea, increased work of breathing, use of accessory muscles, and wheezing. Contributing diagnoses includes - CHF, COPD, and Obesity Hypoventilation Labs and images were reviewed. Patient Has recent ABG, which has been reviewed. Will treat underlying causes and adjust management of respiratory failure as follows-   Hi Lynda, Treat COPD exacerbation, Treat CHF exacerbation, Pulmonology consult.      2/8: continue current care.  CTA chest ordered.  Pulm consult.  Patient appears stable.  ABG ordered.      2/9: ABG reviewed today and appears good.  Will decrease FiO2.  Appreciate assistance from pulmonology.      2/10: complaint of cough today and cough medication given. Weaning high lynda.  35% Fi02 on 30L today.     2/11: mucomyst added today.  Patient requested Ozempic today.  Will attempt to order with  insurance coverage.      Pulmonary hypertension    2/11: Case discussed with cardiology. Will plan for RHC during this hospitalization since patient needs to remain inpatient for at least 3-4 more days.        Pulmonary embolism  History of pulmonary embolism and DVT and patient currently on home Eliquis.    Given hypoxia and elevated D-dimer CTA was ordered.  Follow up result.  Lower extremity Dopplers ordered as well.    2/8: negative dopplers.  F/u CTA chest and bilateral lower extremity.   Continue home eliquis.        History of osteomyelitis  History of osteomyelitis of LLE.   X-ray and inflammatory markers ordered.   Patient states she has been unable to wear a shoe for the last several years.    Few have been willing to perform surgery or amputate.      2/9: no osteomyelitis on x-ray.        Peripheral arterial disease with history of revascularization  Status post femoral stent.  Patient reporting bilateral lower extremity pain.    Follow-up DVT and arterial Dopplers.      Seizure  Continue home antiseizure medication.      Migraine  Continue outpatient treatment for suppressive therapy      Nicotine dependence  Dangers of cigarette smoking were reviewed with patient in detail. Patient was Counseled for 10 minutes or greater. Nicotine replacement options were discussed. Nicotine replacement was discussed- prescribed    GERD (gastroesophageal reflux disease)  Continue home PPI      Obesity hypoventilation syndrome  There is no height or weight on file to calculate BMI. Morbid obesity complicates all aspects of disease management from diagnostic modalities to treatment. Weight loss encouraged and health benefits explained to patient.         Class 3 severe obesity with body mass index (BMI) of 50.0 to 59.9 in adult  Body mass index is 56.15 kg/m². Morbid obesity complicates all aspects of disease management from diagnostic modalities to treatment. Weight loss encouraged and health benefits explained to  patient.    2/11: ozempic requested.          Type 2 diabetes mellitus without complication, with long-term current use of insulin  Patient's FSGs are controlled on current medication regimen.  Last A1c reviewed-   Lab Results   Component Value Date    HGBA1C 6.2 11/03/2023     Most recent fingerstick glucose reviewed-   Recent Labs   Lab 02/07/24  1237   POCTGLUCOSE 92     Current correctional scale  Low  Maintain anti-hyperglycemic dose as follows-   Antihyperglycemics (From admission, onward)      Start     Stop Route Frequency Ordered    02/08/24 0900  insulin detemir U-100 injection 10 Units         -- SubQ Daily 02/07/24 1712    02/07/24 1809  insulin aspart U-100 injection 0-5 Units         -- SubQ Before meals & nightly PRN 02/07/24 1712          Hold Oral hypoglycemics while patient is in the hospital.    Acute on chronic diastolic CHF (congestive heart failure)  Patient is identified as having Diastolic (HFpEF) heart failure that is Acute on chronic. CHF is currently uncontrolled due to Dyspnea not returned to baseline after 1 doses of IV diuretic. Latest ECHO performed and demonstrates- Results for orders placed during the hospital encounter of 11/03/23    2/8: continue IV lasix, a high risk medication, and monitor renal function carefully.          Echo    Interpretation Summary    Left Ventricle: The left ventricle is normal in size. Normal wall thickness. Normal wall motion. There is normal systolic function with a visually estimated ejection fraction of 60 - 65%. Ejection fraction by visual approximation is 50%. There is normal diastolic function.    Right Ventricle: Moderate right ventricular enlargement. Systolic function is normal.    Left Atrium: Left atrium is mildly dilated.    Right Atrium: Right atrium is moderately dilated.    Aortic Valve: The aortic valve is a trileaflet valve.    Tricuspid Valve: There is mild regurgitation. No paravalvular regurgitation.  . Continue Beta Blocker,  "Furosemide, and Aldactone and monitor clinical status closely. Monitor on telemetry. Patient is off CHF pathway.  Monitor strict Is&Os and daily weights.  Place on fluid restriction of 1 L. Cardiology has not been consulted. Continue to stress to patient importance of self efficacy and  on diet for CHF. Last BNP reviewed- and noted below No results for input(s): "BNP", "BNPTRIAGEBLO" in the last 168 hours.    COPD exacerbation  Patient's COPD is with exacerbation noted by continued dyspnea, use of accessory muscles for breathing, and worsening of baseline hypoxia currently.  Patient is currently off COPD Pathway. Continue scheduled inhalers Steroids, Antibiotics, and Supplemental oxygen and monitor respiratory status closely.     2/8: continue duonebs, steroids, antibiotics.      2/11: mgmt per pulmonology.  Still requiring high oxygen.       VTE Risk Mitigation (From admission, onward)           Ordered     apixaban tablet 5 mg  2 times daily         02/07/24 1712     Place sequential compression device  Until discontinued         02/07/24 1712     IP VTE HIGH RISK PATIENT  Once         02/07/24 1712                    Discharge Planning   MONTSERRAT:      Code Status: Full Code   Is the patient medically ready for discharge?:     Reason for patient still in hospital (select all that apply): Treatment  Discharge Plan A: Home, Home Health                  Tien Barksdale MD  Department of Hospital Medicine   [facility] - [address] [place], [place] [address]    "

## 2024-02-11 NOTE — CONSULTS
[facility] - [facility] Telemetry  Pulmonology  Consult Note    Patient Name: Holly Porter  MRN: [id number]  Admission Date: 2/7/2024  Hospital Length of Stay: 3 days  Code Status: Full Code  Attending Physician: Tien Barksdale MD  Primary Care Provider: Regan Frausto MD   Principal Problem: Acute hypoxemic respiratory failure    Inpatient consult to Pulmonology  Consult performed by: Missy Godoy MD  Consult ordered by: Tien Barksdale MD        Subjective:     HPI:  No notes on file    Past Medical History:   Diagnosis Date    Acquired hypothyroidism     Atherosclerosis of native artery of extremity with intermittent claudication 01/30/2019    bilateral legs    BMI 50.0-59.9, adult 02/22/2021    Chronic pain syndrome     opioid depen    Congestive heart failure (CHF)     COPD (chronic obstructive pulmonary disease)     COVID-19 10/06/2023    Depression     Diabetes mellitus, type 2     followed by Aurea Kwong NP, [facility]    DVT of lower extremity, bilateral     Essential hypertension 06/08/2021    GERD (gastroesophageal reflux disease)     Gout 07/05/2023    Hyperlipidemia     Lumbosacral radiculopathy 06/05/2023    followed by GLENN Valdes, [facility]    Migraines     Nicotine dependence     Nicotine dependence     Obesity hypoventilation syndrome     chronic CO2 retainer with compensatory metabolic alkalosis    Osteoarthritis     Seizure disorder     Sleep apnea     on cpap    Thyroid cancer     Venous stasis ulcer limited to breakdown of skin with varicose veins     followed by Estuardo Zhao    Vitamin D deficiency        Past Surgical History:   Procedure Laterality Date    ABCESS DRAINAGE      HYSTERECTOMY      ILIAC ARTERY STENT Bilateral 01/28/2020    Bilateral distal aorta and common iliac 8 X 59 vbx covered stents performed by Dr. Antonio Jacinto.    LEFT HEART CATHETERIZATION Left 11/6/2023    Procedure: Left heart cath;  Surgeon: Alex Ortega  ;  Location: [facility] CATH LAB;  Service: Cardiology;  Laterality: Left;    RADIOFREQUENCY ABLATION Left 04/15/2022    Procedure: Left calf  Radiofrequency Ablation;  Surgeon: Matty Falcon DO;  Location: [facility] OR;  Service: Vascular;  Laterality: Left;    STAB PHLEBECTOMY OF VARICOSE VEINS Left 01/25/2013    Left leg microphlebectomies x 23 stab avulsions and ligation of multiple varicose veins perrformed by Dr. Cirilo Aguirre.    THYROIDECTOMY      hx: thyroid cancer    TOE AMPUTATION Left 01/28/2020    2nd, 4th and 5th performed by Dr. Anam Saeed.    TOE AMPUTATION Right 01/28/2020    4th toe performed by Dr. Anam Saeed.    TOTAL ABDOMINAL HYSTERECTOMY W/ BILATERAL SALPINGOOPHORECTOMY      TUBAL LIGATION      VAGINAL DELIVERY      x 4    VENOUS ABLATION Right 08/09/2019    GSV Varithena Ablation performed by Dr. Estuardo Falcon    VENOUS ABLATION Left 08/02/2019    ATAV Varithena Ablation performed by Dr. Estuardo Falcon.    VENOUS ABLATION Right 07/13/2015    ATAV Laser Ablatio performed by Dr. Cirilo Aguirre.    VENOUS ABLATION Right 07/06/2015    Distal  GSV Laser Ablation performed by dr. Cirilo Aguirre.    VENOUS ABLATION Left 04/20/2015    Left Distal GSV Laser Ablation performed by dr. Cirilo Aguirre.    VENOUS ABLATION Right 10/28/2013    Right Distal GSV RF Ablation performed by Dr. Cirilo Aguirre.    VENOUS ABLATION Left 10/25/2013    SSV RF Ablation performed by dr. Cirilo Aguirre.    VENOUS ABLATION Left 10/07/2013    Left GSV and Left ATAV RF Ablation performed by Dr. Cirilo Aguirre.    VENOUS ABLATION Right 01/21/2013    GSV RF Ablation w/micros x 22 performed by Dr. Cirilo Aguirre.    VENOUS ABLATION Left 06/25/2021    Left distal GSV Varithena Ablation       Review of patient's allergies indicates:   Allergen Reactions    Ammonium peroxydisulfate Shortness Of Breath    Avocado (laurus persea) Anaphylaxis    Bananas [banana] Anaphylaxis and Swelling     CRAMPS,    Chocolate flavor Anaphylaxis     MOUTH SWELLING    Fentanyl  Shortness Of Breath and Itching    Percocet [oxycodone-acetaminophen] Shortness Of Breath and Itching    Silvadene [silver sulfadiazine]     Clindamycin     Corticosteroids (glucocorticoids)     Hydrocortisone Blisters    Lasix [furosemide] Blisters     BURNS SKIN    Pregabalin     Adhesive Rash and Blisters    Iodine Rash    Latex, natural rubber Rash    Pcn [penicillins] Rash    Sulfa (sulfonamide antibiotics) Rash and Blisters       Family History       Problem Relation (Age of Onset)    Coronary aneurysm Father    Coronary artery disease Father    Heart disease Mother, Father    Hypertension Mother, Father    No Known Problems Son, Son, Son, Son, Sister, Brother, Brother, Brother    Osteoarthritis Mother          Tobacco Use    Smoking status: Every Day     Current packs/day: 0.50     Average packs/day: 0.5 packs/day for 33.0 years (16.5 ttl pk-yrs)     Types: Cigarettes     Passive exposure: Current    Smokeless tobacco: Never   Substance and Sexual Activity    Alcohol use: Never    Drug use: Never    Sexual activity: Not Currently         Review of Systems  Objective:     Vital Signs (Most Recent):  Temp: 97.4 °F (36.3 °C) (02/10/24 1923)  Pulse: 68 (02/10/24 1924)  Resp: 20 (02/10/24 1924)  BP: 115/65 (02/10/24 1923)  SpO2: (!) 92 % (02/10/24 1924) Vital Signs (24h Range):  Temp:  [97.4 °F (36.3 °C)-98.5 °F (36.9 °C)] 97.4 °F (36.3 °C)  Pulse:  [59-83] 68  Resp:  [16-20] 20  SpO2:  [91 %-96 %] 92 %  BP: (114-153)/(62-81) 115/65     Weight: (!) 167.5 kg (369 lb 4.3 oz)  Body mass index is 56.15 kg/m².      Intake/Output Summary (Last 24 hours) at 2/10/2024 1955  Last data filed at 2/10/2024 1600  Gross per 24 hour   Intake --   Output 3950 ml   Net -3950 ml        Physical Exam  Constitutional:       General: She is not in acute distress.     Appearance: She is obese. She is not toxic-appearing.   HENT:      Head: Normocephalic and atraumatic.   Eyes:      General: No scleral icterus.  Cardiovascular:       Rate and Rhythm: Normal rate and regular rhythm.      Pulses: Normal pulses.      Heart sounds: No murmur heard.  Pulmonary:      Effort: Pulmonary effort is normal.      Breath sounds: Normal breath sounds. No wheezing.   Abdominal:      Palpations: Abdomen is soft.      Tenderness: There is no abdominal tenderness. There is no guarding.   Musculoskeletal:      Comments: Prior toe amputations LLE   Skin:     General: Skin is warm.      Coloration: Skin is not jaundiced.   Neurological:      General: No focal deficit present.   Psychiatric:         Mood and Affect: Mood normal.          Vents:  Oxygen Concentration (%): 38 (02/10/24 1924)    Lines/Drains/Airways       Drain  Duration             Female External Urinary Catheter w/ Suction 02/07/24 2000 2 days              Peripheral Intravenous Line  Duration                  Peripheral IV - Single Lumen 02/07/24 1243 20 G Left Antecubital 3 days                    Significant Labs:    CBC/Anemia Profile:  Recent Labs   Lab 02/09/24  0403 02/10/24  0347   WBC 12.40* 16.82*   HGB 14.8 14.8   HCT 49.6* 49.9*    221   MCV 72.6* 73.0*   RDW 24.2* 24.4*        Chemistries:  Recent Labs   Lab 02/09/24  0403 02/10/24  0347    139   K 3.8 3.5    101   CO2 31 34*   BUN 16 21*   CREATININE 0.96 0.99   CALCIUM 9.6 9.7   MG 2.1 2.2       All pertinent labs within the past 24 hours have been reviewed.    Significant Imaging:   I have reviewed all pertinent imaging results/findings within the past 24 hours.    ABG  Recent Labs   Lab 02/09/24  1011   PH 7.55*   PO2 94   PCO2 36   HCO3 31.5*       Assessment/Plan:     Pulmonary  * Acute hypoxemic respiratory failure  Patient with Hypercapnic and Hypoxic Respiratory failure which is Acute.  she is on home oxygen at 3 LPM. Supplemental oxygen was provided and noted- Oxygen Concentration (%):  [38-43] 38. Improved on HFNC. CT-PE negative for VTE.  - wean for goal SpO2 >92%    COPD exacerbation  Concern for AECOPD in  this patient with nicotine dependence. Notably, hypercapnia has resolved off NIV. Completed 3 day burst of steroids with hold 02/10.   - repeat ABG in am  - will consider PO steroids  - clinical picture is convincing  for predominant AHF as etiology of presenting respiratory symptoms  - complete Levaquin x5 days    Cardiac/Vascular  Pulmonary hypertension  TTE with high pretest probability of pulmonary hypertension most likely group 2 and 3 disease in this patient with HFpEF presenting in acute exacerbation with fluid overload and untreated JOVANNI/OHS with evidence of chronic hypoxemia. Notably, has had PE in 2022; CT-PE imaging thereafter with no webs or evidence of organization and negative for acute PE. RHC would ideally be obtained upon treatment of JOVANNI/OHS and chronic hypoxemia. Plan for aggressive diuresis with plan for inpatient vs outpatient RHC. Management as per heart failure plan and OHS plan.    Acute on chronic diastolic CHF (congestive heart failure)  Presenting with acute decompensated diastolic heart failure with evidence of biventricular dysfunction. Course with improvement in dyspnea and O2 requirements with diuretic efforts. TTE 02/2024 with LVEF 60%, diastolic dysfunction, mild RV enlargement, TAPSE 1.73, mild RA dilation, mod TR, IVC pressure 15 and PASP 50. NTproBNP elevated 2073 (peak 3.1k in 11/2023). Notably, had normal LVEDP in 2023 during [facility]. Currently managed with bumex 2 mg BID at home. Has had good UOP with Lasix regimen, however, remains fluid overloaded on exam.    - transition to home Bumex at increased dose 3 mg IV Q12H   - potassium repletion ordered  - fluid restriction to maximize diuretic efforts  - recommend resuming GDMT for heart failure management    Other  JOVANNI (obstructive sleep apnea)  She has JOVANNI and non adherent with PAP therapy due to mask intolerance. She adds that her device was previously recalled. She has evidence of chronic hypoxemia with erythrocytosis. On  admission, CPAP settings of 8 with good Vte observed in ED. CPAP was ordered for nocturnal use, however, patient has declined. Discussed risks of untreated JOVANNI/OHS. She would consider nasal pillows in future. Recommend nocturnal O2 use to ensure no hypoxia with the caveat that this could worsen obstructive symptoms. Recommend outpatient Sleep Medicine referral.           Thank you for your consult. I will follow-up with patient. Please contact us if you have any additional questions.     Missy Godoy MD  Pulmonology  [facility] - [address] [place] [address]

## 2024-02-11 NOTE — ASSESSMENT & PLAN NOTE
Presenting with acute decompensated diastolic heart failure with evidence of biventricular dysfunction. Course with improvement in dyspnea and O2 requirements with diuretic efforts. TTE 02/2024 with LVEF 60%, diastolic dysfunction, mild RV enlargement, TAPSE 1.73, mild RA dilation, mod TR, IVC pressure 15 and PASP 50. NTproBNP elevated 2073 (peak 3.1k in 11/2023). Notably, had normal LVEDP in 2023 during [facility]. Currently managed with bumex 2 mg BID at home. Has had good UOP with Lasix regimen, however, remains fluid overloaded on exam.    - transition to home Bumex at increased dose 3 mg IV Q12H   - potassium repletion ordered  - fluid restriction to maximize diuretic efforts  - recommend resuming GDMT for heart failure management

## 2024-02-11 NOTE — ASSESSMENT & PLAN NOTE
>>ASSESSMENT AND PLAN FOR PULMONARY HYPERTENSION WRITTEN ON 2/10/2024  8:41 PM BY ZEYAD BLAIR MD    TTE with high pretest probability of pulmonary hypertension most likely group 2 and 3 disease in this patient with HFpEF presenting in acute exacerbation with fluid overload and untreated JOVANNI/OHS with evidence of chronic hypoxemia. Notably, has had PE in 2022; CT-PE imaging thereafter with no webs or evidence of organization and negative for acute PE. RHC would ideally be obtained upon treatment of JOVANNI/OHS and chronic hypoxemia. Plan for aggressive diuresis with plan for inpatient vs outpatient RHC. Management as per heart failure plan and OHS plan.

## 2024-02-11 NOTE — ASSESSMENT & PLAN NOTE
Patient with Hypercapnic and Hypoxic Respiratory failure which is Acute.  she is on home oxygen at 3 LPM. Supplemental oxygen was provided and noted- Oxygen Concentration (%):  [38-43] 38. Improved on HFNC. CT-PE negative for VTE.  - wean for goal SpO2 >92%

## 2024-02-11 NOTE — SUBJECTIVE & OBJECTIVE
Past Medical History:   Diagnosis Date    Acquired hypothyroidism     Atherosclerosis of native artery of extremity with intermittent claudication 01/30/2019    bilateral legs    BMI 50.0-59.9, adult 02/22/2021    Chronic pain syndrome     opioid depen    Congestive heart failure (CHF)     COPD (chronic obstructive pulmonary disease)     COVID-19 10/06/2023    Depression     Diabetes mellitus, type 2     followed by Aurea Kwong NP, [facility]    DVT of lower extremity, bilateral     Essential hypertension 06/08/2021    GERD (gastroesophageal reflux disease)     Gout 07/05/2023    Hyperlipidemia     Lumbosacral radiculopathy 06/05/2023    followed by GLENN Valdes, [facility]    Migraines     Nicotine dependence     Nicotine dependence     Obesity hypoventilation syndrome     chronic CO2 retainer with compensatory metabolic alkalosis    Osteoarthritis     Seizure disorder     Sleep apnea     on cpap    Thyroid cancer     Venous stasis ulcer limited to breakdown of skin with varicose veins     followed by Estuardo Zhao    Vitamin D deficiency        Past Surgical History:   Procedure Laterality Date    ABCESS DRAINAGE      HYSTERECTOMY      ILIAC ARTERY STENT Bilateral 01/28/2020    Bilateral distal aorta and common iliac 8 X 59 vbx covered stents performed by Dr. Antonio Jacinto.    LEFT HEART CATHETERIZATION Left 11/6/2023    Procedure: Left heart cath;  Surgeon: Alex Ortega DO;  Location: [facility] CATH LAB;  Service: Cardiology;  Laterality: Left;    RADIOFREQUENCY ABLATION Left 04/15/2022    Procedure: Left calf  Radiofrequency Ablation;  Surgeon: Matty Falcon DO;  Location: [facility] OR;  Service: Vascular;  Laterality: Left;    STAB PHLEBECTOMY OF VARICOSE VEINS Left 01/25/2013    Left leg microphlebectomies x 23 stab avulsions and ligation of multiple varicose veins perrformed by Dr. Cirilo Aguirre.    THYROIDECTOMY      hx: thyroid cancer    TOE AMPUTATION Left 01/28/2020     2nd, 4th and 5th performed by Dr. Anam Saeed.    TOE AMPUTATION Right 01/28/2020    4th toe performed by Dr. Anam Saeed.    TOTAL ABDOMINAL HYSTERECTOMY W/ BILATERAL SALPINGOOPHORECTOMY      TUBAL LIGATION      VAGINAL DELIVERY      x 4    VENOUS ABLATION Right 08/09/2019    GSV Varithena Ablation performed by Dr. Estuardo Falcon    VENOUS ABLATION Left 08/02/2019    ATAV Varithena Ablation performed by Dr. Estuardo Falcon.    VENOUS ABLATION Right 07/13/2015    ATAV Laser Ablatio performed by Dr. Cirilo Aguirre.    VENOUS ABLATION Right 07/06/2015    Distal  GSV Laser Ablation performed by dr. Cirilo Aguirre.    VENOUS ABLATION Left 04/20/2015    Left Distal GSV Laser Ablation performed by dr. Cirilo Aguirre.    VENOUS ABLATION Right 10/28/2013    Right Distal GSV RF Ablation performed by Dr. Cirilo Aguirre.    VENOUS ABLATION Left 10/25/2013    SSV RF Ablation performed by dr. Cirilo Aguirre.    VENOUS ABLATION Left 10/07/2013    Left GSV and Left ATAV RF Ablation performed by Dr. Cirilo Aguirre.    VENOUS ABLATION Right 01/21/2013    GSV RF Ablation w/micros x 22 performed by Dr. Cirilo Aguirre.    VENOUS ABLATION Left 06/25/2021    Left distal GSV Varithena Ablation       Review of patient's allergies indicates:   Allergen Reactions    Ammonium peroxydisulfate Shortness Of Breath    Avocado (laurus persea) Anaphylaxis    Bananas [banana] Anaphylaxis and Swelling     CRAMPS,    Chocolate flavor Anaphylaxis     MOUTH SWELLING    Fentanyl Shortness Of Breath and Itching    Percocet [oxycodone-acetaminophen] Shortness Of Breath and Itching    Silvadene [silver sulfadiazine]     Clindamycin     Corticosteroids (glucocorticoids)     Hydrocortisone Blisters    Lasix [furosemide] Blisters     BURNS SKIN    Pregabalin     Adhesive Rash and Blisters    Iodine Rash    Latex, natural rubber Rash    Pcn [penicillins] Rash    Sulfa (sulfonamide antibiotics) Rash and Blisters       Family History       Problem Relation (Age of Onset)    Coronary aneurysm Father    Coronary  artery disease Father    Heart disease Mother, Father    Hypertension Mother, Father    No Known Problems Son, Son, Son, Son, Sister, Brother, Brother, Brother    Osteoarthritis Mother          Tobacco Use    Smoking status: Every Day     Current packs/day: 0.50     Average packs/day: 0.5 packs/day for 33.0 years (16.5 ttl pk-yrs)     Types: Cigarettes     Passive exposure: Current    Smokeless tobacco: Never   Substance and Sexual Activity    Alcohol use: Never    Drug use: Never    Sexual activity: Not Currently         Review of Systems  Objective:     Vital Signs (Most Recent):  Temp: 97.4 °F (36.3 °C) (02/10/24 1923)  Pulse: 68 (02/10/24 1924)  Resp: 20 (02/10/24 1924)  BP: 115/65 (02/10/24 1923)  SpO2: (!) 92 % (02/10/24 1924) Vital Signs (24h Range):  Temp:  [97.4 °F (36.3 °C)-98.5 °F (36.9 °C)] 97.4 °F (36.3 °C)  Pulse:  [59-83] 68  Resp:  [16-20] 20  SpO2:  [91 %-96 %] 92 %  BP: (114-153)/(62-81) 115/65     Weight: (!) 167.5 kg (369 lb 4.3 oz)  Body mass index is 56.15 kg/m².      Intake/Output Summary (Last 24 hours) at 2/10/2024 1955  Last data filed at 2/10/2024 1600  Gross per 24 hour   Intake --   Output 3950 ml   Net -3950 ml        Physical Exam  Constitutional:       General: She is not in acute distress.     Appearance: She is obese. She is not toxic-appearing.   HENT:      Head: Normocephalic and atraumatic.   Eyes:      General: No scleral icterus.  Cardiovascular:      Rate and Rhythm: Normal rate and regular rhythm.      Pulses: Normal pulses.      Heart sounds: No murmur heard.  Pulmonary:      Effort: Pulmonary effort is normal.      Breath sounds: Normal breath sounds. No wheezing.   Abdominal:      Palpations: Abdomen is soft.      Tenderness: There is no abdominal tenderness. There is no guarding.   Musculoskeletal:      Comments: Prior toe amputations LLE   Skin:     General: Skin is warm.      Coloration: Skin is not jaundiced.   Neurological:      General: No focal deficit present.    Psychiatric:         Mood and Affect: Mood normal.          Vents:  Oxygen Concentration (%): 38 (02/10/24 1924)    Lines/Drains/Airways       Drain  Duration             Female External Urinary Catheter w/ Suction 02/07/24 2000 2 days              Peripheral Intravenous Line  Duration                  Peripheral IV - Single Lumen 02/07/24 1243 20 G Left Antecubital 3 days                    Significant Labs:    CBC/Anemia Profile:  Recent Labs   Lab 02/09/24  0403 02/10/24  0347   WBC 12.40* 16.82*   HGB 14.8 14.8   HCT 49.6* 49.9*    221   MCV 72.6* 73.0*   RDW 24.2* 24.4*        Chemistries:  Recent Labs   Lab 02/09/24  0403 02/10/24  0347    139   K 3.8 3.5    101   CO2 31 34*   BUN 16 21*   CREATININE 0.96 0.99   CALCIUM 9.6 9.7   MG 2.1 2.2       All pertinent labs within the past 24 hours have been reviewed.    Significant Imaging:   I have reviewed all pertinent imaging results/findings within the past 24 hours.

## 2024-02-11 NOTE — ASSESSMENT & PLAN NOTE
2/11: Case discussed with cardiology. Will plan for RHC during this hospitalization since patient needs to remain inpatient for at least 3-4 more days.

## 2024-02-11 NOTE — ASSESSMENT & PLAN NOTE
>>ASSESSMENT AND PLAN FOR PULMONARY HYPERTENSION WRITTEN ON 2/11/2024  5:54 PM BY CAM DAWSON MD      2/11: Case discussed with cardiology. Will plan for RHC during this hospitalization since patient needs to remain inpatient for at least 3-4 more days.

## 2024-02-11 NOTE — ASSESSMENT & PLAN NOTE
Patient with Hypoxic Respiratory failure which is Acute on chronic.  she is on home oxygen at 2 LPM. Supplemental oxygen was provided and noted- Oxygen Concentration (%):  [38-43] 38    .   Signs/symptoms of respiratory failure include- tachypnea, increased work of breathing, use of accessory muscles, and wheezing. Contributing diagnoses includes - CHF, COPD, and Obesity Hypoventilation Labs and images were reviewed. Patient Has recent ABG, which has been reviewed. Will treat underlying causes and adjust management of respiratory failure as follows-   Hi Lynda, Treat COPD exacerbation, Treat CHF exacerbation, Pulmonology consult.      2/8: continue current care.  CTA chest ordered.  Pulm consult.  Patient appears stable.  ABG ordered.      2/9: ABG reviewed today and appears good.  Will decrease FiO2.  Appreciate assistance from pulmonology.      2/10: complaint of cough today and cough medication given. Weaning high lynda.  35% Fi02 on 30L today.

## 2024-02-11 NOTE — ASSESSMENT & PLAN NOTE
She has JOVANNI and non adherent with PAP therapy due to mask intolerance. She adds that her device was previously recalled. She has evidence of chronic hypoxemia with erythrocytosis. On admission, CPAP settings of 8 with good Vte observed in ED. CPAP was ordered for nocturnal use, however, patient has declined. Discussed risks of untreated JOVANNI/OHS. She would consider nasal pillows in future. Recommend nocturnal O2 use to ensure no hypoxia with the caveat that this could worsen obstructive symptoms. Recommend outpatient Sleep Medicine referral.

## 2024-02-11 NOTE — ASSESSMENT & PLAN NOTE
Body mass index is 56.15 kg/m². Morbid obesity complicates all aspects of disease management from diagnostic modalities to treatment. Weight loss encouraged and health benefits explained to patient.    2/11: ozempic requested.

## 2024-02-11 NOTE — PLAN OF CARE
Problem: Adult Inpatient Plan of Care  Goal: Plan of Care Review  Outcome: Ongoing, Progressing  Goal: Patient-Specific Goal (Individualized)  Outcome: Ongoing, Progressing  Goal: Absence of Hospital-Acquired Illness or Injury  Outcome: Ongoing, Progressing  Goal: Optimal Comfort and Wellbeing  Outcome: Ongoing, Progressing  Goal: Readiness for Transition of Care  Outcome: Ongoing, Progressing     Problem: Bariatric Environmental Safety  Goal: Safety Maintained with Care  Outcome: Ongoing, Progressing     Problem: Diabetes Comorbidity  Goal: Blood Glucose Level Within Targeted Range  Outcome: Ongoing, Progressing     Problem: Gas Exchange Impaired  Goal: Optimal Gas Exchange  Outcome: Ongoing, Progressing     Problem: Skin Injury Risk Increased  Goal: Skin Health and Integrity  Outcome: Ongoing, Progressing

## 2024-02-11 NOTE — ASSESSMENT & PLAN NOTE
TTE with high pretest probability of pulmonary hypertension most likely group 2 and 3 disease in this patient with HFpEF presenting in acute exacerbation with fluid overload and untreated JOVANNI/OHS with evidence of chronic hypoxemia. Notably, has had PE in 2022; CT-PE imaging thereafter with no webs or evidence of organization and negative for acute PE. RHC would ideally be obtained upon treatment of JOVANNI/OHS and chronic hypoxemia. Plan for aggressive diuresis with plan for inpatient vs outpatient RHC. Management as per heart failure plan and OHS plan.

## 2024-02-11 NOTE — ASSESSMENT & PLAN NOTE
Patient with Hypoxic Respiratory failure which is Acute on chronic.  she is on home oxygen at 2 LPM. Supplemental oxygen was provided and noted- Oxygen Concentration (%):  [30-38] 32    .   Signs/symptoms of respiratory failure include- tachypnea, increased work of breathing, use of accessory muscles, and wheezing. Contributing diagnoses includes - CHF, COPD, and Obesity Hypoventilation Labs and images were reviewed. Patient Has recent ABG, which has been reviewed. Will treat underlying causes and adjust management of respiratory failure as follows-   Hi Lynda, Treat COPD exacerbation, Treat CHF exacerbation, Pulmonology consult.      2/8: continue current care.  CTA chest ordered.  Pulm consult.  Patient appears stable.  ABG ordered.      2/9: ABG reviewed today and appears good.  Will decrease FiO2.  Appreciate assistance from pulmonology.      2/10: complaint of cough today and cough medication given. Weaning high lynda.  35% Fi02 on 30L today.     2/11: mucomyst added today.  Patient requested Ozempic today.  Will attempt to order with insurance coverage.

## 2024-02-11 NOTE — ASSESSMENT & PLAN NOTE
History of osteomyelitis of LLE.   X-ray and inflammatory markers ordered.   Patient states she has been unable to wear a shoe for the last several years.    Few have been willing to perform surgery or amputate.      2/9: no osteomyelitis on x-ray.

## 2024-02-11 NOTE — SUBJECTIVE & OBJECTIVE
Interval History:     Review of Systems   Constitutional:  Negative for activity change, chills, fatigue and fever.   HENT:  Negative for congestion and sore throat.    Respiratory:  Positive for shortness of breath and wheezing. Negative for chest tightness.    Cardiovascular:  Positive for leg swelling.   Gastrointestinal:  Negative for abdominal distention, abdominal pain and diarrhea.   Endocrine: Negative for cold intolerance and heat intolerance.   Genitourinary:  Negative for dysuria.   Musculoskeletal:  Negative for arthralgias and back pain.   Skin:  Negative for color change and rash.   Neurological:  Negative for dizziness, tremors and headaches.   Psychiatric/Behavioral:  Negative for agitation. The patient is not nervous/anxious.      Objective:     Vital Signs (Most Recent):  Temp: 97.4 °F (36.3 °C) (02/10/24 1923)  Pulse: 68 (02/10/24 1924)  Resp: 20 (02/10/24 1924)  BP: 115/65 (02/10/24 1923)  SpO2: (!) 92 % (02/10/24 1924) Vital Signs (24h Range):  Temp:  [97.4 °F (36.3 °C)-98.5 °F (36.9 °C)] 97.4 °F (36.3 °C)  Pulse:  [59-83] 68  Resp:  [16-20] 20  SpO2:  [91 %-96 %] 92 %  BP: (114-153)/(62-81) 115/65     Weight: (!) 167.5 kg (369 lb 4.3 oz)  Body mass index is 56.15 kg/m².    Intake/Output Summary (Last 24 hours) at 2/10/2024 2005  Last data filed at 2/10/2024 1600  Gross per 24 hour   Intake --   Output 3950 ml   Net -3950 ml         Physical Exam  Constitutional:       Appearance: She is obese. She is ill-appearing.   HENT:      Head: Normocephalic and atraumatic.      Nose: Nose normal.      Mouth/Throat:      Mouth: Mucous membranes are moist.      Pharynx: Oropharynx is clear.   Eyes:      Extraocular Movements: Extraocular movements intact.      Conjunctiva/sclera: Conjunctivae normal.      Pupils: Pupils are equal, round, and reactive to light.   Cardiovascular:      Rate and Rhythm: Normal rate and regular rhythm.      Pulses: Normal pulses.      Heart sounds: Normal heart sounds.    Pulmonary:      Effort: Respiratory distress present.      Breath sounds: Wheezing present.   Abdominal:      General: Abdomen is flat. Bowel sounds are normal.      Palpations: Abdomen is soft.   Musculoskeletal:         General: Normal range of motion.      Cervical back: Normal range of motion and neck supple.      Right lower leg: Edema present.      Left lower leg: Edema present.      Comments: Left toe amputation    Skin:     General: Skin is warm and dry.   Neurological:      General: No focal deficit present.      Mental Status: She is alert and oriented to person, place, and time.   Psychiatric:         Mood and Affect: Mood normal.         Behavior: Behavior normal.             Significant Labs: All pertinent labs within the past 24 hours have been reviewed.    Significant Imaging: I have reviewed all pertinent imaging results/findings within the past 24 hours.

## 2024-02-11 NOTE — SUBJECTIVE & OBJECTIVE
Interval History:     Review of Systems   Constitutional:  Negative for activity change, chills, fatigue and fever.   HENT:  Negative for congestion and sore throat.    Respiratory:  Positive for shortness of breath and wheezing. Negative for chest tightness.    Cardiovascular:  Positive for leg swelling.   Gastrointestinal:  Negative for abdominal distention, abdominal pain and diarrhea.   Endocrine: Negative for cold intolerance and heat intolerance.   Genitourinary:  Negative for dysuria.   Musculoskeletal:  Negative for arthralgias and back pain.   Skin:  Negative for color change and rash.   Neurological:  Negative for dizziness, tremors and headaches.   Psychiatric/Behavioral:  Negative for agitation. The patient is not nervous/anxious.      Objective:     Vital Signs (Most Recent):  Temp: 98.3 °F (36.8 °C) (02/11/24 1416)  Pulse: 69 (02/11/24 1416)  Resp: 18 (02/11/24 1416)  BP: (!) 155/82 (02/11/24 1416)  SpO2: (!) 93 % (02/11/24 1618) Vital Signs (24h Range):  Temp:  [97.4 °F (36.3 °C)-98.3 °F (36.8 °C)] 98.3 °F (36.8 °C)  Pulse:  [62-73] 69  Resp:  [16-24] 18  SpO2:  [88 %-97 %] 93 %  BP: (115-155)/(65-82) 155/82     Weight: (!) 167.5 kg (369 lb 4.3 oz)  Body mass index is 56.15 kg/m².    Intake/Output Summary (Last 24 hours) at 2/11/2024 1751  Last data filed at 2/11/2024 1416  Gross per 24 hour   Intake --   Output 1300 ml   Net -1300 ml         Physical Exam  Constitutional:       Appearance: She is obese. She is ill-appearing.   HENT:      Head: Normocephalic and atraumatic.      Nose: Nose normal.      Mouth/Throat:      Mouth: Mucous membranes are moist.      Pharynx: Oropharynx is clear.   Eyes:      Extraocular Movements: Extraocular movements intact.      Conjunctiva/sclera: Conjunctivae normal.      Pupils: Pupils are equal, round, and reactive to light.   Cardiovascular:      Rate and Rhythm: Normal rate and regular rhythm.      Pulses: Normal pulses.      Heart sounds: Normal heart sounds.    Pulmonary:      Effort: Respiratory distress present.      Breath sounds: Wheezing present.   Abdominal:      General: Abdomen is flat. Bowel sounds are normal.      Palpations: Abdomen is soft.   Musculoskeletal:         General: Normal range of motion.      Cervical back: Normal range of motion and neck supple.      Right lower leg: Edema present.      Left lower leg: Edema present.      Comments: Left toe amputation    Skin:     General: Skin is warm and dry.   Neurological:      General: No focal deficit present.      Mental Status: She is alert and oriented to person, place, and time.   Psychiatric:         Mood and Affect: Mood normal.         Behavior: Behavior normal.             Significant Labs: All pertinent labs within the past 24 hours have been reviewed.    Significant Imaging: I have reviewed all pertinent imaging results/findings within the past 24 hours.

## 2024-02-11 NOTE — ASSESSMENT & PLAN NOTE
Concern for AECOPD in this patient with nicotine dependence. Notably, hypercapnia has resolved off NIV. Completed 3 day burst of steroids with hold 02/10.   - repeat ABG in am  - will consider PO steroids  - clinical picture is convincing  for predominant AHF as etiology of presenting respiratory symptoms  - complete Levaquin x5 days

## 2024-02-11 NOTE — HPI
62 yo F with JOVANNI/OHS (non adherent with PAP), nicotine dependence, HFpEF, TIIDM, PE on Eliquis (2022) and PVD s/p femoral stent who presented with acute respiratory distress 02/07 with admission for management of acute respiratory failure with hypoxia and hypercapnia with this service consulted for management.     German was assessed at time of admission 02/07 with transition to HFNC during ED course. She has undergone work up since admission including CT-PE that is negative for PE and TTE with evidence of diastolic dysfunction. She has had no recurrence of her hypoxia since transition to HFNC and no increased work of breathing. German reports feeling improved today. She has a cough with congestion that is difficult to expectorate. She denies fever. She is sleeping well and has no shortness of breath when resting in bed.

## 2024-02-11 NOTE — ASSESSMENT & PLAN NOTE
Patient's COPD is with exacerbation noted by continued dyspnea, use of accessory muscles for breathing, and worsening of baseline hypoxia currently.  Patient is currently off COPD Pathway. Continue scheduled inhalers Steroids, Antibiotics, and Supplemental oxygen and monitor respiratory status closely.     2/8: continue duonebs, steroids, antibiotics.      2/11: mgmt per pulmonology.  Still requiring high oxygen.

## 2024-02-11 NOTE — PROGRESS NOTES
[facility] - [facility] Tele[facility] Hospital Medicine  Progress Note    Patient Name: Holly Porter  MRN: [id number]  Patient Class: IP- Inpatient   Admission Date: 2/7/2024  Length of Stay: 3 days  Attending Physician: Tien Barksdale MD  Primary Care Provider: Regan Frausto MD        Subjective:     Principal Problem:Acute hypoxemic respiratory failure    HPI:  Ms. Holly Porter is a 63-year-old woman history of COPD on home O2 with tobacco use, diastolic heart failure, thyroid disease, class 3 obesity with JOVANNI, likely OHS, type 2 diabetes with diabetic ulcer s/p left toe amputation, prior DVT and PE on home Eliquis, PVD s/p R femoral stent, seizures who presents with worsening shortness of breath and bilateral leg pain.  She states that she developed severe respiratory distress at home and her inhalers and other medications were not adequate to improve her distress.  In addition, she reports progressive lower extremity swelling and bilateral leg pain and makes it difficult for her to ambulate.    In the emergency department she had hypoxia to the low 70s.  She was on a Ventimask and then switched to BiPAP.  Case was discussed with pulmonology and it was decided for patient to be on high-flow.  Patient stated that she is very happy with this as she would love to eat dinner and she sometimes feels that she is suffocating and the mass that is why she rarely wears her CPAP at home because she does not think she has appropriate mask.    ED course:   Initial Vitals   BP Pulse Resp Temp SpO2   02/07/24 1152 02/07/24 1152 02/07/24 1209 02/07/24 1152 02/07/24 1152   (!) 150/81 96 (!) 26 98.1 °F (36.7 °C) (!) 75 %                       Prior hospitalization: Visits for shortness of breath.  Hospitalized 11/2023 with CHF/COPD exacerbations.   C 11/2023: minimal CAD. Echo: EF 50%    Overview/Hospital Course:  No notes on file    Interval History:     Review of Systems   Constitutional:   Negative for activity change, chills, fatigue and fever.   HENT:  Negative for congestion and sore throat.    Respiratory:  Positive for shortness of breath and wheezing. Negative for chest tightness.    Cardiovascular:  Positive for leg swelling.   Gastrointestinal:  Negative for abdominal distention, abdominal pain and diarrhea.   Endocrine: Negative for cold intolerance and heat intolerance.   Genitourinary:  Negative for dysuria.   Musculoskeletal:  Negative for arthralgias and back pain.   Skin:  Negative for color change and rash.   Neurological:  Negative for dizziness, tremors and headaches.   Psychiatric/Behavioral:  Negative for agitation. The patient is not nervous/anxious.      Objective:     Vital Signs (Most Recent):  Temp: 97.4 °F (36.3 °C) (02/10/24 1923)  Pulse: 68 (02/10/24 1924)  Resp: 20 (02/10/24 1924)  BP: 115/65 (02/10/24 1923)  SpO2: (!) 92 % (02/10/24 1924) Vital Signs (24h Range):  Temp:  [97.4 °F (36.3 °C)-98.5 °F (36.9 °C)] 97.4 °F (36.3 °C)  Pulse:  [59-83] 68  Resp:  [16-20] 20  SpO2:  [91 %-96 %] 92 %  BP: (114-153)/(62-81) 115/65     Weight: (!) 167.5 kg (369 lb 4.3 oz)  Body mass index is 56.15 kg/m².    Intake/Output Summary (Last 24 hours) at 2/10/2024 2005  Last data filed at 2/10/2024 1600  Gross per 24 hour   Intake --   Output 3950 ml   Net -3950 ml         Physical Exam  Constitutional:       Appearance: She is obese. She is ill-appearing.   HENT:      Head: Normocephalic and atraumatic.      Nose: Nose normal.      Mouth/Throat:      Mouth: Mucous membranes are moist.      Pharynx: Oropharynx is clear.   Eyes:      Extraocular Movements: Extraocular movements intact.      Conjunctiva/sclera: Conjunctivae normal.      Pupils: Pupils are equal, round, and reactive to light.   Cardiovascular:      Rate and Rhythm: Normal rate and regular rhythm.      Pulses: Normal pulses.      Heart sounds: Normal heart sounds.   Pulmonary:      Effort: Respiratory distress present.      Breath  sounds: Wheezing present.   Abdominal:      General: Abdomen is flat. Bowel sounds are normal.      Palpations: Abdomen is soft.   Musculoskeletal:         General: Normal range of motion.      Cervical back: Normal range of motion and neck supple.      Right lower leg: Edema present.      Left lower leg: Edema present.      Comments: Left toe amputation    Skin:     General: Skin is warm and dry.   Neurological:      General: No focal deficit present.      Mental Status: She is alert and oriented to person, place, and time.   Psychiatric:         Mood and Affect: Mood normal.         Behavior: Behavior normal.             Significant Labs: All pertinent labs within the past 24 hours have been reviewed.    Significant Imaging: I have reviewed all pertinent imaging results/findings within the past 24 hours.    Assessment/Plan:      * Acute hypoxemic respiratory failure  Patient with Hypoxic Respiratory failure which is Acute on chronic.  she is on home oxygen at 2 LPM. Supplemental oxygen was provided and noted- Oxygen Concentration (%):  [38-43] 38    .   Signs/symptoms of respiratory failure include- tachypnea, increased work of breathing, use of accessory muscles, and wheezing. Contributing diagnoses includes - CHF, COPD, and Obesity Hypoventilation Labs and images were reviewed. Patient Has recent ABG, which has been reviewed. Will treat underlying causes and adjust management of respiratory failure as follows-   Hi Lynda, Treat COPD exacerbation, Treat CHF exacerbation, Pulmonology consult.      2/8: continue current care.  CTA chest ordered.  Pulm consult.  Patient appears stable.  ABG ordered.      2/9: ABG reviewed today and appears good.  Will decrease FiO2.  Appreciate assistance from pulmonology.      2/10: complaint of cough today and cough medication given. Weaning high lynda.  35% Fi02 on 30L today.     Pulmonary embolism  History of pulmonary embolism and DVT and patient currently on home Eliquis.    Given  hypoxia and elevated D-dimer CTA was ordered.  Follow up result.  Lower extremity Dopplers ordered as well.    2/8: negative dopplers.  F/u CTA chest and bilateral lower extremity.   Continue home eliquis.        History of osteomyelitis  History of osteomyelitis of LLE.   X-ray and inflammatory markers ordered.   Patient states she has been unable to wear a shoe for the last several years.    Few have been willing to perform surgery or amputate.      2/9: no osteomyelitis on x-ray.        Peripheral arterial disease with history of revascularization  Status post femoral stent.  Patient reporting bilateral lower extremity pain.    Follow-up DVT and arterial Dopplers.      Seizure  Continue home antiseizure medication.      Migraine  Continue outpatient treatment for suppressive therapy      Nicotine dependence  Dangers of cigarette smoking were reviewed with patient in detail. Patient was Counseled for 10 minutes or greater. Nicotine replacement options were discussed. Nicotine replacement was discussed- prescribed    GERD (gastroesophageal reflux disease)  Continue home PPI      Obesity hypoventilation syndrome  There is no height or weight on file to calculate BMI. Morbid obesity complicates all aspects of disease management from diagnostic modalities to treatment. Weight loss encouraged and health benefits explained to patient.         Class 3 severe obesity with body mass index (BMI) of 50.0 to 59.9 in adult  There is no height or weight on file to calculate BMI. Morbid obesity complicates all aspects of disease management from diagnostic modalities to treatment. Weight loss encouraged and health benefits explained to patient.         Type 2 diabetes mellitus without complication, with long-term current use of insulin  Patient's FSGs are controlled on current medication regimen.  Last A1c reviewed-   Lab Results   Component Value Date    HGBA1C 6.2 11/03/2023     Most recent fingerstick glucose reviewed-    Recent Labs   Lab 02/07/24  1237   POCTGLUCOSE 92     Current correctional scale  Low  Maintain anti-hyperglycemic dose as follows-   Antihyperglycemics (From admission, onward)      Start     Stop Route Frequency Ordered    02/08/24 0900  insulin detemir U-100 injection 10 Units         -- SubQ Daily 02/07/24 1712    02/07/24 1809  insulin aspart U-100 injection 0-5 Units         -- SubQ Before meals & nightly PRN 02/07/24 1712          Hold Oral hypoglycemics while patient is in the hospital.    Acute on chronic diastolic CHF (congestive heart failure)  Patient is identified as having Diastolic (HFpEF) heart failure that is Acute on chronic. CHF is currently uncontrolled due to Dyspnea not returned to baseline after 1 doses of IV diuretic. Latest ECHO performed and demonstrates- Results for orders placed during the hospital encounter of 11/03/23 2/8: continue IV lasix, a high risk medication, and monitor renal function carefully.          Echo    Interpretation Summary    Left Ventricle: The left ventricle is normal in size. Normal wall thickness. Normal wall motion. There is normal systolic function with a visually estimated ejection fraction of 60 - 65%. Ejection fraction by visual approximation is 50%. There is normal diastolic function.    Right Ventricle: Moderate right ventricular enlargement. Systolic function is normal.    Left Atrium: Left atrium is mildly dilated.    Right Atrium: Right atrium is moderately dilated.    Aortic Valve: The aortic valve is a trileaflet valve.    Tricuspid Valve: There is mild regurgitation. No paravalvular regurgitation.  . Continue Beta Blocker, Furosemide, and Aldactone and monitor clinical status closely. Monitor on telemetry. Patient is off CHF pathway.  Monitor strict Is&Os and daily weights.  Place on fluid restriction of 1 L. Cardiology has not been consulted. Continue to stress to patient importance of self efficacy and  on diet for CHF. Last BNP  "reviewed- and noted below No results for input(s): "BNP", "BNPTRIAGEBLO" in the last 168 hours.    COPD exacerbation  Patient's COPD is with exacerbation noted by continued dyspnea, use of accessory muscles for breathing, and worsening of baseline hypoxia currently.  Patient is currently off COPD Pathway. Continue scheduled inhalers Steroids, Antibiotics, and Supplemental oxygen and monitor respiratory status closely.     2/8: continue duonebs, steroids, antibiotics.        VTE Risk Mitigation (From admission, onward)           Ordered     apixaban tablet 5 mg  2 times daily         02/07/24 1712     Place sequential compression device  Until discontinued         02/07/24 1712     IP VTE HIGH RISK PATIENT  Once         02/07/24 1712                    Discharge Planning   MONTSERRAT:      Code Status: Full Code   Is the patient medically ready for discharge?:     Reason for patient still in hospital (select all that apply): Treatment  Discharge Plan A: Home, Home Health                  Tien Barksdale MD  Department of Hospital Medicine   [facility] - [address] [place], [place] [address]    "

## 2024-02-12 LAB
ANION GAP SERPL CALCULATED.3IONS-SCNC: 3 MMOL/L (ref 7–16)
BUN SERPL-MCNC: 18 MG/DL (ref 7–18)
BUN/CREAT SERPL: 19 (ref 6–20)
CALCIUM SERPL-MCNC: 9.3 MG/DL (ref 8.5–10.1)
CHLORIDE SERPL-SCNC: 99 MMOL/L (ref 98–107)
CO2 SERPL-SCNC: 38 MMOL/L (ref 21–32)
CREAT SERPL-MCNC: 0.97 MG/DL (ref 0.55–1.02)
EGFR (NO RACE VARIABLE) (RUSH/TITUS): 65 ML/MIN/1.73M2
GLUCOSE SERPL-MCNC: 104 MG/DL (ref 70–105)
GLUCOSE SERPL-MCNC: 108 MG/DL (ref 70–105)
GLUCOSE SERPL-MCNC: 86 MG/DL (ref 70–105)
GLUCOSE SERPL-MCNC: 92 MG/DL (ref 70–105)
GLUCOSE SERPL-MCNC: 95 MG/DL (ref 74–106)
MAGNESIUM SERPL-MCNC: 2.4 MG/DL (ref 1.7–2.3)
POTASSIUM SERPL-SCNC: 3.2 MMOL/L (ref 3.5–5.1)
SODIUM SERPL-SCNC: 137 MMOL/L (ref 136–145)

## 2024-02-12 PROCEDURE — 63600175 PHARM REV CODE 636 W HCPCS: Performed by: STUDENT IN AN ORGANIZED HEALTH CARE EDUCATION/TRAINING PROGRAM

## 2024-02-12 PROCEDURE — 83735 ASSAY OF MAGNESIUM: CPT | Performed by: STUDENT IN AN ORGANIZED HEALTH CARE EDUCATION/TRAINING PROGRAM

## 2024-02-12 PROCEDURE — 99223 1ST HOSP IP/OBS HIGH 75: CPT | Mod: ,,, | Performed by: STUDENT IN AN ORGANIZED HEALTH CARE EDUCATION/TRAINING PROGRAM

## 2024-02-12 PROCEDURE — S4991 NICOTINE PATCH NONLEGEND: HCPCS | Performed by: HOSPITALIST

## 2024-02-12 PROCEDURE — 99900035 HC TECH TIME PER 15 MIN (STAT)

## 2024-02-12 PROCEDURE — 99233 SBSQ HOSP IP/OBS HIGH 50: CPT | Mod: ,,, | Performed by: HOSPITALIST

## 2024-02-12 PROCEDURE — 94640 AIRWAY INHALATION TREATMENT: CPT

## 2024-02-12 PROCEDURE — 25000003 PHARM REV CODE 250: Performed by: HOSPITALIST

## 2024-02-12 PROCEDURE — 63600175 PHARM REV CODE 636 W HCPCS: Performed by: HOSPITALIST

## 2024-02-12 PROCEDURE — 25000242 PHARM REV CODE 250 ALT 637 W/ HCPCS: Performed by: HOSPITALIST

## 2024-02-12 PROCEDURE — 94761 N-INVAS EAR/PLS OXIMETRY MLT: CPT

## 2024-02-12 PROCEDURE — 11000001 HC ACUTE MED/SURG PRIVATE ROOM

## 2024-02-12 PROCEDURE — 80048 BASIC METABOLIC PNL TOTAL CA: CPT | Performed by: STUDENT IN AN ORGANIZED HEALTH CARE EDUCATION/TRAINING PROGRAM

## 2024-02-12 PROCEDURE — 82962 GLUCOSE BLOOD TEST: CPT

## 2024-02-12 PROCEDURE — 27000221 HC OXYGEN, UP TO 24 HOURS

## 2024-02-12 RX ADMIN — INSULIN DETEMIR 10 UNITS: 100 INJECTION, SOLUTION SUBCUTANEOUS at 09:02

## 2024-02-12 RX ADMIN — NICOTINE 1 PATCH: 14 PATCH, EXTENDED RELEASE TRANSDERMAL at 08:02

## 2024-02-12 RX ADMIN — IPRATROPIUM BROMIDE AND ALBUTEROL SULFATE 3 ML: 2.5; .5 SOLUTION RESPIRATORY (INHALATION) at 07:02

## 2024-02-12 RX ADMIN — LACTULOSE 30 G: 20 SOLUTION ORAL at 08:02

## 2024-02-12 RX ADMIN — FAMOTIDINE 20 MG: 20 TABLET ORAL at 08:02

## 2024-02-12 RX ADMIN — SODIUM CHLORIDE SOLN NEBU 3% 4 ML: 3 NEBU SOLN at 07:02

## 2024-02-12 RX ADMIN — BUMETANIDE 3 MG: 0.25 INJECTION INTRAMUSCULAR; INTRAVENOUS at 08:02

## 2024-02-12 RX ADMIN — COLCHICINE 0.6 MG: 0.6 TABLET, FILM COATED ORAL at 08:02

## 2024-02-12 RX ADMIN — APIXABAN 5 MG: 5 TABLET, FILM COATED ORAL at 08:02

## 2024-02-12 RX ADMIN — NIFEDIPINE 60 MG: 30 TABLET, FILM COATED, EXTENDED RELEASE ORAL at 08:02

## 2024-02-12 RX ADMIN — LEVOTHYROXINE SODIUM 150 MCG: 0.15 TABLET ORAL at 06:02

## 2024-02-12 RX ADMIN — LACTULOSE 30 G: 20 SOLUTION ORAL at 02:02

## 2024-02-12 RX ADMIN — POTASSIUM BICARBONATE 50 MEQ: 977.5 TABLET, EFFERVESCENT ORAL at 08:02

## 2024-02-12 RX ADMIN — HYDROCODONE BITARTRATE AND ACETAMINOPHEN 1 TABLET: 5; 325 TABLET ORAL at 09:02

## 2024-02-12 RX ADMIN — LEVOFLOXACIN 500 MG: 500 INJECTION, SOLUTION INTRAVENOUS at 05:02

## 2024-02-12 RX ADMIN — IPRATROPIUM BROMIDE AND ALBUTEROL SULFATE 3 ML: 2.5; .5 SOLUTION RESPIRATORY (INHALATION) at 01:02

## 2024-02-12 RX ADMIN — POTASSIUM BICARBONATE 50 MEQ: 977.5 TABLET, EFFERVESCENT ORAL at 09:02

## 2024-02-12 RX ADMIN — ASPIRIN 81 MG: 81 TABLET, COATED ORAL at 08:02

## 2024-02-12 RX ADMIN — ALLOPURINOL 300 MG: 300 TABLET ORAL at 08:02

## 2024-02-12 RX ADMIN — BUMETANIDE 3 MG: 0.25 INJECTION INTRAMUSCULAR; INTRAVENOUS at 09:02

## 2024-02-12 NOTE — PLAN OF CARE
Problem: Adult Inpatient Plan of Care  Goal: Readiness for Transition of Care  Outcome: Ongoing, Progressing     Problem: Bariatric Environmental Safety  Goal: Safety Maintained with Care  Outcome: Ongoing, Progressing     Problem: Adult Inpatient Plan of Care  Goal: Optimal Comfort and Wellbeing  Outcome: Ongoing, Progressing

## 2024-02-12 NOTE — SUBJECTIVE & OBJECTIVE
Interval History:     Review of Systems   Constitutional:  Negative for activity change, chills, fatigue and fever.   HENT:  Negative for congestion and sore throat.    Respiratory:  Positive for shortness of breath and wheezing. Negative for chest tightness.    Cardiovascular:  Positive for leg swelling.   Gastrointestinal:  Negative for abdominal distention, abdominal pain and diarrhea.   Endocrine: Negative for cold intolerance and heat intolerance.   Genitourinary:  Negative for dysuria.   Musculoskeletal:  Negative for arthralgias and back pain.   Skin:  Negative for color change and rash.   Neurological:  Negative for dizziness, tremors and headaches.   Psychiatric/Behavioral:  Negative for agitation. The patient is not nervous/anxious.      Objective:     Vital Signs (Most Recent):  Temp: 97.7 °F (36.5 °C) (02/12/24 1011)  Pulse: 68 (02/12/24 1011)  Resp: 19 (02/12/24 1011)  BP: (!) 147/80 (02/12/24 1011)  SpO2: 95 % (02/12/24 1011) Vital Signs (24h Range):  Temp:  [97.7 °F (36.5 °C)-98.4 °F (36.9 °C)] 97.7 °F (36.5 °C)  Pulse:  [62-74] 68  Resp:  [17-20] 19  SpO2:  [90 %-98 %] 95 %  BP: (134-166)/(78-84) 147/80     Weight: (!) 167.5 kg (369 lb 4.3 oz)  Body mass index is 56.15 kg/m².    Intake/Output Summary (Last 24 hours) at 2/12/2024 1330  Last data filed at 2/12/2024 0400  Gross per 24 hour   Intake --   Output 6000 ml   Net -6000 ml         Physical Exam  Constitutional:       Appearance: She is obese. She is ill-appearing.   HENT:      Head: Normocephalic and atraumatic.      Nose: Nose normal.      Mouth/Throat:      Mouth: Mucous membranes are moist.      Pharynx: Oropharynx is clear.   Eyes:      Extraocular Movements: Extraocular movements intact.      Conjunctiva/sclera: Conjunctivae normal.      Pupils: Pupils are equal, round, and reactive to light.   Cardiovascular:      Rate and Rhythm: Normal rate and regular rhythm.      Pulses: Normal pulses.      Heart sounds: Normal heart sounds.    Pulmonary:      Effort: Respiratory distress present.      Breath sounds: Wheezing present.   Abdominal:      General: Abdomen is flat. Bowel sounds are normal.      Palpations: Abdomen is soft.   Musculoskeletal:         General: Normal range of motion.      Cervical back: Normal range of motion and neck supple.      Right lower leg: Edema present.      Left lower leg: Edema present.      Comments: Left toe amputation    Skin:     General: Skin is warm and dry.   Neurological:      General: No focal deficit present.      Mental Status: She is alert and oriented to person, place, and time.   Psychiatric:         Mood and Affect: Mood normal.         Behavior: Behavior normal.             Significant Labs: All pertinent labs within the past 24 hours have been reviewed.    Significant Imaging: I have reviewed all pertinent imaging results/findings within the past 24 hours.

## 2024-02-12 NOTE — SUBJECTIVE & OBJECTIVE
Past Medical History:   Diagnosis Date    Acquired hypothyroidism     Atherosclerosis of native artery of extremity with intermittent claudication 01/30/2019    bilateral legs    BMI 50.0-59.9, adult 02/22/2021    Chronic pain syndrome     opioid depen    Congestive heart failure (CHF)     COPD (chronic obstructive pulmonary disease)     COVID-19 10/06/2023    Depression     Diabetes mellitus, type 2     followed by Aurea Kwong NP, [facility]    DVT of lower extremity, bilateral     Essential hypertension 06/08/2021    GERD (gastroesophageal reflux disease)     Gout 07/05/2023    Hyperlipidemia     Lumbosacral radiculopathy 06/05/2023    followed by GLENN Valdes, [facility]    Migraines     Nicotine dependence     Nicotine dependence     Obesity hypoventilation syndrome     chronic CO2 retainer with compensatory metabolic alkalosis    Osteoarthritis     Seizure disorder     Sleep apnea     on cpap    Thyroid cancer     Venous stasis ulcer limited to breakdown of skin with varicose veins     followed by Estuardo Zhao    Vitamin D deficiency        Past Surgical History:   Procedure Laterality Date    ABCESS DRAINAGE      HYSTERECTOMY      ILIAC ARTERY STENT Bilateral 01/28/2020    Bilateral distal aorta and common iliac 8 X 59 vbx covered stents performed by Dr. Antonio Jacinto.    LEFT HEART CATHETERIZATION Left 11/6/2023    Procedure: Left heart cath;  Surgeon: Alex Ortega DO;  Location: [facility] CATH LAB;  Service: Cardiology;  Laterality: Left;    RADIOFREQUENCY ABLATION Left 04/15/2022    Procedure: Left calf  Radiofrequency Ablation;  Surgeon: Matty Falcon DO;  Location: [facility] OR;  Service: Vascular;  Laterality: Left;    STAB PHLEBECTOMY OF VARICOSE VEINS Left 01/25/2013    Left leg microphlebectomies x 23 stab avulsions and ligation of multiple varicose veins perrformed by Dr. Cirilo Aguirre.    THYROIDECTOMY      hx: thyroid cancer    TOE AMPUTATION Left 01/28/2020     2nd, 4th and 5th performed by Dr. Anam Saeed.    TOE AMPUTATION Right 01/28/2020    4th toe performed by Dr. Anam Saeed.    TOTAL ABDOMINAL HYSTERECTOMY W/ BILATERAL SALPINGOOPHORECTOMY      TUBAL LIGATION      VAGINAL DELIVERY      x 4    VENOUS ABLATION Right 08/09/2019    GSV Varithena Ablation performed by Dr. Estuardo Falcon    VENOUS ABLATION Left 08/02/2019    ATAV Varithena Ablation performed by Dr. Estuardo Falcon.    VENOUS ABLATION Right 07/13/2015    ATAV Laser Ablatio performed by Dr. Cirilo Aguirre.    VENOUS ABLATION Right 07/06/2015    Distal  GSV Laser Ablation performed by dr. Cirilo Aguirre.    VENOUS ABLATION Left 04/20/2015    Left Distal GSV Laser Ablation performed by dr. Cirilo Aguirre.    VENOUS ABLATION Right 10/28/2013    Right Distal GSV RF Ablation performed by Dr. Cirilo Aguirre.    VENOUS ABLATION Left 10/25/2013    SSV RF Ablation performed by dr. Cirilo Aguirre.    VENOUS ABLATION Left 10/07/2013    Left GSV and Left ATAV RF Ablation performed by Dr. Cirilo Aguirre.    VENOUS ABLATION Right 01/21/2013    GSV RF Ablation w/micros x 22 performed by Dr. Cirilo Aguirre.    VENOUS ABLATION Left 06/25/2021    Left distal GSV Varithena Ablation       Review of patient's allergies indicates:   Allergen Reactions    Ammonium peroxydisulfate Shortness Of Breath    Avocado (laurus persea) Anaphylaxis    Bananas [banana] Anaphylaxis and Swelling     CRAMPS,    Chocolate flavor Anaphylaxis     MOUTH SWELLING    Fentanyl Shortness Of Breath and Itching    Percocet [oxycodone-acetaminophen] Shortness Of Breath and Itching    Silvadene [silver sulfadiazine]     Clindamycin     Corticosteroids (glucocorticoids)     Hydrocortisone Blisters    Lasix [furosemide] Blisters     BURNS SKIN    Pregabalin     Adhesive Rash and Blisters    Iodine Rash    Latex, natural rubber Rash    Pcn [penicillins] Rash    Sulfa (sulfonamide antibiotics) Rash and Blisters       No current facility-administered medications on file prior to encounter.     Current  Outpatient Medications on File Prior to Encounter   Medication Sig    ACCU-CHEK GUIDE GLUCOSE METER Misc     ACCU-CHEK GUIDE TEST STRIPS Strp     ACCU-CHEK SOFTCLIX LANCETS Misc     albuterol (PROVENTIL/VENTOLIN HFA) 90 mcg/actuation inhaler Inhale 2 puffs into the lungs 4 (four) times daily. Rescue    allopurinoL (ZYLOPRIM) 300 MG tablet TAKE ONE TABLET EVERY DAY    apixaban (ELIQUIS) 5 mg Tab Take 1 tablet (5 mg total) by mouth 2 (two) times daily.    aspirin (ECOTRIN) 81 MG EC tablet TAKE 1 TABLET BY MOUTH EVERY DAY    BREO ELLIPTA 100-25 mcg/dose diskus inhaler Inhale 1 puff into the lungs once daily.    bumetanide (BUMEX) 2 MG tablet Take 1 tablet (2 mg total) by mouth 2 (two) times daily.    calcium carbonate (OS-MICHAELA) 500 mg calcium (1,250 mg) tablet Take 1 tablet (500 mg total) by mouth 2 (two) times daily.    carvediloL (COREG) 12.5 MG tablet Take 0.5 tablets (6.25 mg total) by mouth 2 (two) times daily.    cilostazoL (PLETAL) 100 MG Tab Take 1 tablet (100 mg total) by mouth 2 (two) times daily.    colchicine (COLCRYS) 0.6 mg tablet TAKE 1 TABLET BY MOUTH 3 TIMES A DAY FOR 2 DAYS THEN 1 TABLET DAILY UNTIL GONE    dexAMETHasone (DECADRON) 4 MG Tab Take by mouth.    DULoxetine (CYMBALTA) 30 MG capsule TAKE 1 CAPSULE (30 MG TOTAL) BY MOUTH ONCE DAILY.    gabapentin (NEURONTIN) 600 MG tablet Take 1 tablet (600 mg total) by mouth 3 (three) times daily.    HYDROcodone-acetaminophen (NORCO)  mg per tablet Take 1 tablet by mouth every 6 (six) hours as needed.    ibuprofen (ADVIL,MOTRIN) 800 MG tablet Take 1 tablet (800 mg total) by mouth 2 (two) times daily as needed for Pain.    insulin detemir U-100, Levemir, (LEVEMIR FLEXTOUCH U100 INSULIN) 100 unit/mL (3 mL) InPn pen Inject 10 units into the skin every evening subcutaneous    levothyroxine (SYNTHROID) 150 MCG tablet Take 1 tablet (150 mcg total) by mouth before breakfast.    loratadine (CLARITIN) 10 mg tablet Take 1 tablet (10 mg total) by mouth once  daily.    meclizine (ANTIVERT) 25 mg tablet Take 1 tablet (25 mg total) by mouth 2 (two) times daily as needed for Dizziness.    metOLazone (ZAROXOLYN) 2.5 MG tablet Take 1 tablet (2.5 mg total) by mouth once daily.    naloxone (NARCAN) 4 mg/actuation Spry 1 spray once.    nicotine (NICODERM CQ) 21 mg/24 hr Place 1 patch onto the skin every 24 hours.    NIFEdipine (PROCARDIA-XL) 60 MG (OSM) 24 hr tablet Take 1 tablet (60 mg total) by mouth once daily.    pantoprazole (PROTONIX) 40 MG tablet Take 1 tablet (40 mg total) by mouth once daily.    polyethylene glycol (GLYCOLAX) 17 gram PwPk Take 17 g by mouth once daily.    potassium chloride SA (K-DUR,KLOR-CON) 20 MEQ tablet Take 1 tablet (20 mEq total) by mouth once daily.    QUEtiapine (SEROQUEL) 25 MG Tab Take 1 tablet (25 mg total) by mouth once daily.    sertraline (ZOLOFT) 50 MG tablet Take 1 tablet (50 mg total) by mouth once daily.    spironolactone (ALDACTONE) 50 MG tablet Take 1 tablet (50 mg total) by mouth once daily.    tiotropium bromide (SPIRIVA RESPIMAT) 2.5 mcg/actuation inhaler Inhale 1 puff into the lungs Daily. Controller    topiramate (TOPAMAX) 100 MG tablet Take 1.5 tablets (150 mg total) by mouth 2 (two) times daily.     Family History       Problem Relation (Age of Onset)    Coronary aneurysm Father    Coronary artery disease Father    Heart disease Mother, Father    Hypertension Mother, Father    No Known Problems Son, Son, Son, Son, Sister, Brother, Brother, Brother    Osteoarthritis Mother          Tobacco Use    Smoking status: Every Day     Current packs/day: 0.50     Average packs/day: 0.5 packs/day for 33.0 years (16.5 ttl pk-yrs)     Types: Cigarettes     Passive exposure: Current    Smokeless tobacco: Never   Substance and Sexual Activity    Alcohol use: Never    Drug use: Never    Sexual activity: Not Currently     Review of Systems   Constitutional: Negative for chills and fever.   HENT: Negative.     Eyes: Negative.    Cardiovascular:   Positive for dyspnea on exertion, leg swelling and orthopnea. Negative for chest pain.   Respiratory:  Positive for shortness of breath, sleep disturbances due to breathing and wheezing.    Gastrointestinal: Negative.    Neurological: Negative.      Objective:     Vital Signs (Most Recent):  Temp: 97.7 °F (36.5 °C) (02/12/24 1011)  Pulse: 68 (02/12/24 1011)  Resp: 19 (02/12/24 1011)  BP: (!) 147/80 (02/12/24 1011)  SpO2: 95 % (02/12/24 1011) Vital Signs (24h Range):  Temp:  [97.7 °F (36.5 °C)-98.4 °F (36.9 °C)] 97.7 °F (36.5 °C)  Pulse:  [62-74] 68  Resp:  [17-20] 19  SpO2:  [90 %-98 %] 95 %  BP: (134-166)/(78-84) 147/80     Weight: (!) 167.5 kg (369 lb 4.3 oz)  Body mass index is 56.15 kg/m².    SpO2: 95 %         Intake/Output Summary (Last 24 hours) at 2/12/2024 1311  Last data filed at 2/12/2024 0400  Gross per 24 hour   Intake --   Output 6000 ml   Net -6000 ml       Lines/Drains/Airways       Drain  Duration             Female External Urinary Catheter w/ Suction 02/07/24 2000 4 days              Peripheral Intravenous Line  Duration                  Peripheral IV - Single Lumen 02/07/24 1243 20 G Left Antecubital 5 days                     Physical Exam  Constitutional:       General: She is not in acute distress.     Appearance: She is obese.   HENT:      Mouth/Throat:      Mouth: Mucous membranes are moist.      Pharynx: Oropharynx is clear.   Eyes:      Pupils: Pupils are equal, round, and reactive to light.   Neck:      Vascular: No JVD.   Cardiovascular:      Rate and Rhythm: Normal rate and regular rhythm.   Pulmonary:      Effort: Pulmonary effort is normal.      Breath sounds: Decreased breath sounds present.   Abdominal:      General: Bowel sounds are normal.      Palpations: Abdomen is soft.   Musculoskeletal:      Right lower leg: No edema.      Left lower leg: No edema.   Skin:     General: Skin is warm and dry.   Neurological:      Mental Status: She is alert and oriented to person, place, and  "time.          Significant Labs: ABG: No results for input(s): "PH", "PCO2", "HCO3", "POCSATURATED", "BE" in the last 48 hours., Blood Culture: No results for input(s): "LABBLOO" in the last 48 hours., BMP:   Recent Labs   Lab 02/11/24  1026 02/12/24  0326   GLU 90 95    137   K 3.5 3.2*    99   CO2 35* 38*   BUN 20* 18   CREATININE 0.94 0.97   CALCIUM 9.4 9.3   MG 2.1 2.4*   , CMP   Recent Labs   Lab 02/11/24  1026 02/12/24  0326    137   K 3.5 3.2*    99   CO2 35* 38*   GLU 90 95   BUN 20* 18   CREATININE 0.94 0.97   CALCIUM 9.4 9.3   ANIONGAP 7 3*   , CBC No results for input(s): "WBC", "HGB", "HCT", "PLT" in the last 48 hours., Lipid Panel No results for input(s): "CHOL", "HDL", "LDLCALC", "TRIG", "CHOLHDL" in the last 48 hours., and Troponin No results for input(s): "TROPONINI" in the last 48 hours.    Significant Imaging: Cardiac Cath: Results for orders placed during the hospital encounter of 11/03/23    Cardiac catheterization    Conclusion    The ejection fraction was calculated to be 55%.    The left ventricular systolic function was normal.    The left ventricular end diastolic pressure was normal.    The pre-procedure left ventricular end diastolic pressure was 14.    The estimated blood loss was none.    The coronary arteries were normal..    The procedure log was documented by Documenter: Khadijah Lyman RN and verified by Alex Ortega DO.    Date: 11/6/2023  Time: 2:00    Normal LV, EF 55%  Very mild non-obstructive CAD  Right femoral stent site widely patent     , Echocardiogram: Transthoracic echo (TTE) complete (Cupid Only):   Results for orders placed or performed during the hospital encounter of 02/07/24   Echo   Result Value Ref Range    BSA 2.83 m2    LVOT stroke volume 67.74 cm3    LVIDd 3.85 3.5 - 6.0 cm    LV Systolic Volume 38.82 mL    LV Systolic Volume Index 14.6 mL/m2    LVIDs 3.13 2.1 - 4.0 cm    LV Diastolic Volume 63.73 mL    LV Diastolic Volume Index " 24.05 mL/m2    IVS 1.30 (A) 0.6 - 1.1 cm    LVOT diameter 2.12 cm    LVOT area 3.5 cm2    FS 19 (A) 28 - 44 %    Left Ventricle Relative Wall Thickness 0.71 cm    Posterior Wall 1.37 (A) 0.6 - 1.1 cm    LV mass 183.72 g    LV Mass Index 69 g/m2    MV Peak E Dima 0.50 m/s    TDI LATERAL 0.10 m/s    TDI SEPTAL 0.08 m/s    E/E' ratio 5.56 m/s    MV Peak A Dima 0.77 m/s    TR Max Dima 2.95 m/s    E/A ratio 0.65     E wave deceleration time 256.53 msec    LV SEPTAL E/E' RATIO 6.25 m/s    LV LATERAL E/E' RATIO 5.00 m/s    LVOT peak dima 0.90 m/s    Left Ventricular Outflow Tract Mean Velocity 0.58 cm/s    Left Ventricular Outflow Tract Mean Gradient 1.52 mmHg    RVDD 4.75 cm    TAPSE 1.73 cm    LA size 3.33 cm    Left Atrium Major Axis 3.41 cm    RA Major Axis 4.47 cm    AV mean gradient 2 mmHg    AV peak gradient 2 mmHg    Ao peak dima 0.78 m/s    Ao VTI 12.80 cm    LVOT peak VTI 19.20 cm    AV valve area 5.29 cm²    AV Velocity Ratio 1.15     AV index (prosthetic) 1.50     JOSE by Velocity Ratio 4.07 cm²    Triscuspid Valve Regurgitation Peak Gradient 35 mmHg    PV PEAK VELOCITY 0.66 m/s    PV peak gradient 2 mmHg    Ao root annulus 3.09 cm    IVC diameter 2.51 cm    Mean e' 0.09 m/s    ZLVIDS -11.02     ZLVIDD -17.25     AORTIC VALVE CUSP SEPERATION 2.27 cm    EF 60 %    TV resting pulmonary artery pressure 50 mmHg    RV TB RVSP 18 mmHg    Est. RA pres 15 mmHg    Narrative      Left Ventricle: The left ventricle is normal in size. There is mild   concentric hypertrophy. There is normal systolic function with a visually   estimated ejection fraction of 55 - 70%. Ejection fraction by visual   approximation is 60%. There is diastolic dysfunction.    Right Ventricle: Mild right ventricular enlargement.    Right Atrium: Right atrium is mildly dilated.    Aortic Valve: The aortic valve is a trileaflet valve. Mildly calcified   cusps.    Mitral Valve: There is mild bileaflet sclerosis.    Tricuspid Valve: There is mild to moderate  regurgitation.    IVC/SVC: Elevated venous pressure at 15 mmHg.    Pericardium: There is a trivial effusion.     , and X-Ray: CXR: X-Ray Chest 1 View (CXR): X-Ray Chest AP  Order: [id number]  Status: Final result       Visible to patient: Yes (not seen)       Next appt: Today at 01:45 PM in Cardiology (Alex Ortega, DO)    0 Result Notes  Details    Reading Physician Reading Date Result Priority   Modesto Tristan II, MD  [phone] 2/7/2024 STAT     Narrative & Impression  EXAMINATION:  XR CHEST AP PORTABLE     CLINICAL HISTORY:  SOB;     COMPARISON:  6 December 2023     TECHNIQUE:  XR CHEST AP PORTABLE     FINDINGS:  The heart and mediastinum are stable in size and configuration.  The pulmonary vascularity is slightly increased with bilateral increased interstitial lung density.  No other lung infiltrates, effusions, pneumothorax or other abnormality is demonstrated.     Impression:     Findings suggest mild cardiac decompensation and / or pneumonitis.        Electronically signed by: Modesto Tristan  Date:                                            02/07/2024  Time:                                           12:19

## 2024-02-12 NOTE — ASSESSMENT & PLAN NOTE
>>ASSESSMENT AND PLAN FOR PULMONARY HYPERTENSION WRITTEN ON 2/12/2024  1:19 PM BY RONI RIOS FNP    - Patient seen and evaluated by Dr. Riggins, followed outpatient by Dr. Ortega  - Appears euvolemic on assessment today  - Plan for RHC tomorrow. Procedure, risk, and benefits discussed in detail with patient. She verbalized understanding and wishes to proceed. Consent obtained and on chart  - Further recommendations to follow

## 2024-02-12 NOTE — HPI
Ms. Holly Porter is a 63-year-old woman history of COPD on home O2 with tobacco use, diastolic heart failure, thyroid disease, class 3 obesity with JOVANNI, likely OHS, type 2 diabetes with diabetic ulcer s/p left toe amputation, prior DVT and PE on home Eliquis, PVD s/p R femoral stent, and seizures who presents with worsening shortness of breath and bilateral leg pain.  She states that she developed severe respiratory distress at home and her inhalers and other medications were not adequate to improve her distress.  In addition, she reports progressive lower extremity swelling and bilateral leg pain and makes it difficult for her to ambulate.     In the emergency department she had hypoxia to the low 70s.  She was on a Ventimask and then switched to BiPAP. Case was discussed with pulmonology and it was decided for patient to be on high-flow.  Patient stated that she is very happy with this as she would love to eat dinner and she sometimes feels that she is suffocating in the mask that is why she rarely wears her CPAP at home.    2/12/2024:  Cardiology consulted for evaluation for RHC per pulmonology recommendations, pulmonary HTN, diastolic heart failure on admission; COPD, JOVANNI/OHS. Patient seen today, currently comfortable on O2 3L NC. She is followed outpatient by Dr. Ortega, underwent LHC 11/10/2023 with very mild nonobstructive CAD and LVEDP 14mmHg. Patient reports being compliant with medications but states she was out of her Bumex and missed about 4 days.

## 2024-02-12 NOTE — CONSULTS
[facility] - [facility] Medical Telemetry  Cardiology  Consult Note    Patient Name: Holly Porter  MRN: [id number]  Admission Date: 2/7/2024  Hospital Length of Stay: 5 days  Code Status: Full Code   Attending Provider: Tien Barksdale MD   Consulting Provider: WARREN Wheatley  Primary Care Physician: Regan Frausto MD  Principal Problem:Acute hypoxemic respiratory failure    Patient information was obtained from patient, past medical records, ER records, and primary team.     Inpatient consult to Cardiology  Consult performed by: Kristel Berkowitz FNP  Consult ordered by: Tien Barksdale MD  Reason for consult: Eval for RHC per pulmonology recommendation; pulmonary HTN, diastolic heart failure on admission; COPD, JOVANNI/OHS        Subjective:     Chief Complaint:  sob and bilateral leg pain     HPI:   Ms. Holly Porter is a 63-year-old woman history of COPD on home O2 with tobacco use, diastolic heart failure, thyroid disease, class 3 obesity with JOVANNI, likely OHS, type 2 diabetes with diabetic ulcer s/p left toe amputation, prior DVT and PE on home Eliquis, PVD s/p R femoral stent, and seizures who presents with worsening shortness of breath and bilateral leg pain.  She states that she developed severe respiratory distress at home and her inhalers and other medications were not adequate to improve her distress.  In addition, she reports progressive lower extremity swelling and bilateral leg pain and makes it difficult for her to ambulate.     In the emergency department she had hypoxia to the low 70s.  She was on a Ventimask and then switched to BiPAP. Case was discussed with pulmonology and it was decided for patient to be on high-flow.  Patient stated that she is very happy with this as she would love to eat dinner and she sometimes feels that she is suffocating in the mask that is why she rarely wears her CPAP at home.    2/12/2024:  Cardiology consulted for evaluation for RHC per  pulmonology recommendations, pulmonary HTN, diastolic heart failure on admission; COPD, JOVANNI/OHS. Patient seen today, currently comfortable on O2 3L NC. She is followed outpatient by Dr. Ortega, underwent LHC 11/10/2023 with very mild nonobstructive CAD and LVEDP 14mmHg. Patient reports being compliant with medications but states she was out of her Bumex and missed about 4 days.     Past Medical History:   Diagnosis Date    Acquired hypothyroidism     Atherosclerosis of native artery of extremity with intermittent claudication 01/30/2019    bilateral legs    BMI 50.0-59.9, adult 02/22/2021    Chronic pain syndrome     opioid depen    Congestive heart failure (CHF)     COPD (chronic obstructive pulmonary disease)     COVID-19 10/06/2023    Depression     Diabetes mellitus, type 2     followed by Aurea Kwong NP, [facility]    DVT of lower extremity, bilateral     Essential hypertension 06/08/2021    GERD (gastroesophageal reflux disease)     Gout 07/05/2023    Hyperlipidemia     Lumbosacral radiculopathy 06/05/2023    followed by GLENN Valdes, [facility]    Migraines     Nicotine dependence     Nicotine dependence     Obesity hypoventilation syndrome     chronic CO2 retainer with compensatory metabolic alkalosis    Osteoarthritis     Seizure disorder     Sleep apnea     on cpap    Thyroid cancer     Venous stasis ulcer limited to breakdown of skin with varicose veins     followed by Estuardo Zhao    Vitamin D deficiency        Past Surgical History:   Procedure Laterality Date    ABCESS DRAINAGE      HYSTERECTOMY      ILIAC ARTERY STENT Bilateral 01/28/2020    Bilateral distal aorta and common iliac 8 X 59 vbx covered stents performed by Dr. Antonio Jacinto.    LEFT HEART CATHETERIZATION Left 11/6/2023    Procedure: Left heart cath;  Surgeon: Alex Ortega DO;  Location: [facility] CATH LAB;  Service: Cardiology;  Laterality: Left;    RADIOFREQUENCY ABLATION Left 04/15/2022    Procedure: Left calf   Radiofrequency Ablation;  Surgeon: Matty Falcon DO;  Location: [facility];  Service: Vascular;  Laterality: Left;    STAB PHLEBECTOMY OF VARICOSE VEINS Left 01/25/2013    Left leg microphlebectomies x 23 stab avulsions and ligation of multiple varicose veins perrformed by Dr. Cirilo Aguirre.    THYROIDECTOMY      hx: thyroid cancer    TOE AMPUTATION Left 01/28/2020    2nd, 4th and 5th performed by Dr. Anam Saeed.    TOE AMPUTATION Right 01/28/2020    4th toe performed by Dr. Anam Saeed.    TOTAL ABDOMINAL HYSTERECTOMY W/ BILATERAL SALPINGOOPHORECTOMY      TUBAL LIGATION      VAGINAL DELIVERY      x 4    VENOUS ABLATION Right 08/09/2019    GSV Varithena Ablation performed by Dr. Estuardo Falcon    VENOUS ABLATION Left 08/02/2019    ATAV Varithena Ablation performed by Dr. Estuardo Falcon.    VENOUS ABLATION Right 07/13/2015    ATAV Laser Ablatio performed by Dr. Cirilo Aguirre.    VENOUS ABLATION Right 07/06/2015    Distal  GSV Laser Ablation performed by dr. Cirilo Aguirre.    VENOUS ABLATION Left 04/20/2015    Left Distal GSV Laser Ablation performed by dr. Cirilo Aguirre.    VENOUS ABLATION Right 10/28/2013    Right Distal GSV RF Ablation performed by Dr. Cirilo Aguirre.    VENOUS ABLATION Left 10/25/2013    SSV RF Ablation performed by dr. Cirilo Aguirre.    VENOUS ABLATION Left 10/07/2013    Left GSV and Left ATAV RF Ablation performed by Dr. Cirilo Aguirre.    VENOUS ABLATION Right 01/21/2013    GSV RF Ablation w/micros x 22 performed by Dr. Cirilo Aguirre.    VENOUS ABLATION Left 06/25/2021    Left distal GSV Varithena Ablation       Review of patient's allergies indicates:   Allergen Reactions    Ammonium peroxydisulfate Shortness Of Breath    Avocado (laurus persea) Anaphylaxis    Bananas [banana] Anaphylaxis and Swelling     CRAMPS,    Chocolate flavor Anaphylaxis     MOUTH SWELLING    Fentanyl Shortness Of Breath and Itching    Percocet [oxycodone-acetaminophen] Shortness Of Breath and Itching    Silvadene [silver sulfadiazine]      Clindamycin     Corticosteroids (glucocorticoids)     Hydrocortisone Blisters    Lasix [furosemide] Blisters     BURNS SKIN    Pregabalin     Adhesive Rash and Blisters    Iodine Rash    Latex, natural rubber Rash    Pcn [penicillins] Rash    Sulfa (sulfonamide antibiotics) Rash and Blisters       No current facility-administered medications on file prior to encounter.     Current Outpatient Medications on File Prior to Encounter   Medication Sig    ACCU-CHEK GUIDE GLUCOSE METER Misc     ACCU-CHEK GUIDE TEST STRIPS Strp     ACCU-CHEK SOFTCLIX LANCETS Misc     albuterol (PROVENTIL/VENTOLIN HFA) 90 mcg/actuation inhaler Inhale 2 puffs into the lungs 4 (four) times daily. Rescue    allopurinoL (ZYLOPRIM) 300 MG tablet TAKE ONE TABLET EVERY DAY    apixaban (ELIQUIS) 5 mg Tab Take 1 tablet (5 mg total) by mouth 2 (two) times daily.    aspirin (ECOTRIN) 81 MG EC tablet TAKE 1 TABLET BY MOUTH EVERY DAY    BREO ELLIPTA 100-25 mcg/dose diskus inhaler Inhale 1 puff into the lungs once daily.    bumetanide (BUMEX) 2 MG tablet Take 1 tablet (2 mg total) by mouth 2 (two) times daily.    calcium carbonate (OS-MICHAELA) 500 mg calcium (1,250 mg) tablet Take 1 tablet (500 mg total) by mouth 2 (two) times daily.    carvediloL (COREG) 12.5 MG tablet Take 0.5 tablets (6.25 mg total) by mouth 2 (two) times daily.    cilostazoL (PLETAL) 100 MG Tab Take 1 tablet (100 mg total) by mouth 2 (two) times daily.    colchicine (COLCRYS) 0.6 mg tablet TAKE 1 TABLET BY MOUTH 3 TIMES A DAY FOR 2 DAYS THEN 1 TABLET DAILY UNTIL GONE    dexAMETHasone (DECADRON) 4 MG Tab Take by mouth.    DULoxetine (CYMBALTA) 30 MG capsule TAKE 1 CAPSULE (30 MG TOTAL) BY MOUTH ONCE DAILY.    gabapentin (NEURONTIN) 600 MG tablet Take 1 tablet (600 mg total) by mouth 3 (three) times daily.    HYDROcodone-acetaminophen (NORCO)  mg per tablet Take 1 tablet by mouth every 6 (six) hours as needed.    ibuprofen (ADVIL,MOTRIN) 800 MG tablet Take 1 tablet (800 mg total) by  mouth 2 (two) times daily as needed for Pain.    insulin detemir U-100, Levemir, (LEVEMIR FLEXTOUCH U100 INSULIN) 100 unit/mL (3 mL) InPn pen Inject 10 units into the skin every evening subcutaneous    levothyroxine (SYNTHROID) 150 MCG tablet Take 1 tablet (150 mcg total) by mouth before breakfast.    loratadine (CLARITIN) 10 mg tablet Take 1 tablet (10 mg total) by mouth once daily.    meclizine (ANTIVERT) 25 mg tablet Take 1 tablet (25 mg total) by mouth 2 (two) times daily as needed for Dizziness.    metOLazone (ZAROXOLYN) 2.5 MG tablet Take 1 tablet (2.5 mg total) by mouth once daily.    naloxone (NARCAN) 4 mg/actuation Spry 1 spray once.    nicotine (NICODERM CQ) 21 mg/24 hr Place 1 patch onto the skin every 24 hours.    NIFEdipine (PROCARDIA-XL) 60 MG (OSM) 24 hr tablet Take 1 tablet (60 mg total) by mouth once daily.    pantoprazole (PROTONIX) 40 MG tablet Take 1 tablet (40 mg total) by mouth once daily.    polyethylene glycol (GLYCOLAX) 17 gram PwPk Take 17 g by mouth once daily.    potassium chloride SA (K-DUR,KLOR-CON) 20 MEQ tablet Take 1 tablet (20 mEq total) by mouth once daily.    QUEtiapine (SEROQUEL) 25 MG Tab Take 1 tablet (25 mg total) by mouth once daily.    sertraline (ZOLOFT) 50 MG tablet Take 1 tablet (50 mg total) by mouth once daily.    spironolactone (ALDACTONE) 50 MG tablet Take 1 tablet (50 mg total) by mouth once daily.    tiotropium bromide (SPIRIVA RESPIMAT) 2.5 mcg/actuation inhaler Inhale 1 puff into the lungs Daily. Controller    topiramate (TOPAMAX) 100 MG tablet Take 1.5 tablets (150 mg total) by mouth 2 (two) times daily.     Family History       Problem Relation (Age of Onset)    Coronary aneurysm Father    Coronary artery disease Father    Heart disease Mother, Father    Hypertension Mother, Father    No Known Problems Son, Son, Son, Son, Sister, Brother, Brother, Brother    Osteoarthritis Mother          Tobacco Use    Smoking status: Every Day     Current packs/day: 0.50      Average packs/day: 0.5 packs/day for 33.0 years (16.5 ttl pk-yrs)     Types: Cigarettes     Passive exposure: Current    Smokeless tobacco: Never   Substance and Sexual Activity    Alcohol use: Never    Drug use: Never    Sexual activity: Not Currently     Review of Systems   Constitutional: Negative for chills and fever.   HENT: Negative.     Eyes: Negative.    Cardiovascular:  Positive for dyspnea on exertion, leg swelling and orthopnea. Negative for chest pain.   Respiratory:  Positive for shortness of breath, sleep disturbances due to breathing and wheezing.    Gastrointestinal: Negative.    Neurological: Negative.      Objective:     Vital Signs (Most Recent):  Temp: 97.7 °F (36.5 °C) (02/12/24 1011)  Pulse: 68 (02/12/24 1011)  Resp: 19 (02/12/24 1011)  BP: (!) 147/80 (02/12/24 1011)  SpO2: 95 % (02/12/24 1011) Vital Signs (24h Range):  Temp:  [97.7 °F (36.5 °C)-98.4 °F (36.9 °C)] 97.7 °F (36.5 °C)  Pulse:  [62-74] 68  Resp:  [17-20] 19  SpO2:  [90 %-98 %] 95 %  BP: (134-166)/(78-84) 147/80     Weight: (!) 167.5 kg (369 lb 4.3 oz)  Body mass index is 56.15 kg/m².    SpO2: 95 %         Intake/Output Summary (Last 24 hours) at 2/12/2024 1311  Last data filed at 2/12/2024 0400  Gross per 24 hour   Intake --   Output 6000 ml   Net -6000 ml       Lines/Drains/Airways       Drain  Duration             Female External Urinary Catheter w/ Suction 02/07/24 2000 4 days              Peripheral Intravenous Line  Duration                  Peripheral IV - Single Lumen 02/07/24 1243 20 G Left Antecubital 5 days                     Physical Exam  Constitutional:       General: She is not in acute distress.     Appearance: She is obese.   HENT:      Mouth/Throat:      Mouth: Mucous membranes are moist.      Pharynx: Oropharynx is clear.   Eyes:      Pupils: Pupils are equal, round, and reactive to light.   Neck:      Vascular: No JVD.   Cardiovascular:      Rate and Rhythm: Normal rate and regular rhythm.   Pulmonary:       "Effort: Pulmonary effort is normal.      Breath sounds: Decreased breath sounds present.   Abdominal:      General: Bowel sounds are normal.      Palpations: Abdomen is soft.   Musculoskeletal:      Right lower leg: No edema.      Left lower leg: No edema.   Skin:     General: Skin is warm and dry.   Neurological:      Mental Status: She is alert and oriented to person, place, and time.          Significant Labs: ABG: No results for input(s): "PH", "PCO2", "HCO3", "POCSATURATED", "BE" in the last 48 hours., Blood Culture: No results for input(s): "LABBLOO" in the last 48 hours., BMP:   Recent Labs   Lab 02/11/24  1026 02/12/24  0326   GLU 90 95    137   K 3.5 3.2*    99   CO2 35* 38*   BUN 20* 18   CREATININE 0.94 0.97   CALCIUM 9.4 9.3   MG 2.1 2.4*   , CMP   Recent Labs   Lab 02/11/24  1026 02/12/24  0326    137   K 3.5 3.2*    99   CO2 35* 38*   GLU 90 95   BUN 20* 18   CREATININE 0.94 0.97   CALCIUM 9.4 9.3   ANIONGAP 7 3*   , CBC No results for input(s): "WBC", "HGB", "HCT", "PLT" in the last 48 hours., Lipid Panel No results for input(s): "CHOL", "HDL", "LDLCALC", "TRIG", "CHOLHDL" in the last 48 hours., and Troponin No results for input(s): "TROPONINI" in the last 48 hours.    Significant Imaging: Cardiac Cath: Results for orders placed during the hospital encounter of 11/03/23    Cardiac catheterization    Conclusion    The ejection fraction was calculated to be 55%.    The left ventricular systolic function was normal.    The left ventricular end diastolic pressure was normal.    The pre-procedure left ventricular end diastolic pressure was 14.    The estimated blood loss was none.    The coronary arteries were normal..    The procedure log was documented by Documenter: Khadijah Lyman RN and verified by Alex Ortega DO.    Date: 11/6/2023  Time: 2:00    Normal LV, EF 55%  Very mild non-obstructive CAD  Right femoral stent site widely patent     , Echocardiogram: " Transthoracic echo (TTE) complete (Cupid Only):   Results for orders placed or performed during the hospital encounter of 02/07/24   Echo   Result Value Ref Range    BSA 2.83 m2    LVOT stroke volume 67.74 cm3    LVIDd 3.85 3.5 - 6.0 cm    LV Systolic Volume 38.82 mL    LV Systolic Volume Index 14.6 mL/m2    LVIDs 3.13 2.1 - 4.0 cm    LV Diastolic Volume 63.73 mL    LV Diastolic Volume Index 24.05 mL/m2    IVS 1.30 (A) 0.6 - 1.1 cm    LVOT diameter 2.12 cm    LVOT area 3.5 cm2    FS 19 (A) 28 - 44 %    Left Ventricle Relative Wall Thickness 0.71 cm    Posterior Wall 1.37 (A) 0.6 - 1.1 cm    LV mass 183.72 g    LV Mass Index 69 g/m2    MV Peak E Dima 0.50 m/s    TDI LATERAL 0.10 m/s    TDI SEPTAL 0.08 m/s    E/E' ratio 5.56 m/s    MV Peak A Dima 0.77 m/s    TR Max Dima 2.95 m/s    E/A ratio 0.65     E wave deceleration time 256.53 msec    LV SEPTAL E/E' RATIO 6.25 m/s    LV LATERAL E/E' RATIO 5.00 m/s    LVOT peak dima 0.90 m/s    Left Ventricular Outflow Tract Mean Velocity 0.58 cm/s    Left Ventricular Outflow Tract Mean Gradient 1.52 mmHg    RVDD 4.75 cm    TAPSE 1.73 cm    LA size 3.33 cm    Left Atrium Major Axis 3.41 cm    RA Major Axis 4.47 cm    AV mean gradient 2 mmHg    AV peak gradient 2 mmHg    Ao peak dima 0.78 m/s    Ao VTI 12.80 cm    LVOT peak VTI 19.20 cm    AV valve area 5.29 cm²    AV Velocity Ratio 1.15     AV index (prosthetic) 1.50     JOSE by Velocity Ratio 4.07 cm²    Triscuspid Valve Regurgitation Peak Gradient 35 mmHg    PV PEAK VELOCITY 0.66 m/s    PV peak gradient 2 mmHg    Ao root annulus 3.09 cm    IVC diameter 2.51 cm    Mean e' 0.09 m/s    ZLVIDS -11.02     ZLVIDD -17.25     AORTIC VALVE CUSP SEPERATION 2.27 cm    EF 60 %    TV resting pulmonary artery pressure 50 mmHg    RV TB RVSP 18 mmHg    Est. RA pres 15 mmHg    Narrative      Left Ventricle: The left ventricle is normal in size. There is mild   concentric hypertrophy. There is normal systolic function with a visually   estimated  ejection fraction of 55 - 70%. Ejection fraction by visual   approximation is 60%. There is diastolic dysfunction.    Right Ventricle: Mild right ventricular enlargement.    Right Atrium: Right atrium is mildly dilated.    Aortic Valve: The aortic valve is a trileaflet valve. Mildly calcified   cusps.    Mitral Valve: There is mild bileaflet sclerosis.    Tricuspid Valve: There is mild to moderate regurgitation.    IVC/SVC: Elevated venous pressure at 15 mmHg.    Pericardium: There is a trivial effusion.     , and X-Ray: CXR: X-Ray Chest 1 View (CXR): X-Ray Chest AP  Order: [id number]  Status: Final result       Visible to patient: Yes (not seen)       Next appt: Today at 01:45 PM in Cardiology (Alex Ortega, DO)    0 Result Notes  Details    Reading Physician Reading Date Result Priority   Modesto Tristan II, MD  [phone] 2/7/2024 STAT     Narrative & Impression  EXAMINATION:  XR CHEST AP PORTABLE     CLINICAL HISTORY:  SOB;     COMPARISON:  6 December 2023     TECHNIQUE:  XR CHEST AP PORTABLE     FINDINGS:  The heart and mediastinum are stable in size and configuration.  The pulmonary vascularity is slightly increased with bilateral increased interstitial lung density.  No other lung infiltrates, effusions, pneumothorax or other abnormality is demonstrated.     Impression:     Findings suggest mild cardiac decompensation and / or pneumonitis.        Electronically signed by: Modesto Tristan  Date:                                            02/07/2024  Time:                                           12:19     Assessment and Plan:     JOVANNI (obstructive sleep apnea)  - Noncompliant with CPAP    Pulmonary hypertension  - Patient seen and evaluated by Dr. Riggins, followed outpatient by Dr. Ortega  - Appears euvolemic on assessment today  - Plan for RHC tomorrow. Procedure, risk, and benefits discussed in detail with patient. She verbalized understanding and wishes to proceed. Consent obtained and on chart  -  Further recommendations to follow    Pulmonary embolism  - Hx of PE, on Eliquis    Nicotine dependence  - Encourage smoking cessation    Obesity hypoventilation syndrome  - Body mass index is 56.15 kg/m². Morbid obesity complicates all aspects of disease management from diagnostic modalities to treatment.    COPD exacerbation  - Being followed by primary medical team        VTE Risk Mitigation (From admission, onward)           Ordered     apixaban tablet 5 mg  2 times daily         02/07/24 1712     Place sequential compression device  Until discontinued         02/07/24 1712     IP VTE HIGH RISK PATIENT  Once         02/07/24 1712                    Thank you for your consult. I will follow-up with patient. Please contact us if you have any additional questions.    Kristel Berkowitz, WARREN  Cardiology   [facility] - [facility]

## 2024-02-12 NOTE — ASSESSMENT & PLAN NOTE
- Body mass index is 56.15 kg/m². Morbid obesity complicates all aspects of disease management from diagnostic modalities to treatment.

## 2024-02-12 NOTE — ASSESSMENT & PLAN NOTE
"Patient is identified as having Diastolic (HFpEF) heart failure that is Acute on chronic. CHF is currently uncontrolled due to Dyspnea not returned to baseline after 1 doses of IV diuretic. Latest ECHO performed and demonstrates- Results for orders placed during the hospital encounter of 11/03/23 2/8: continue IV lasix, a high risk medication, and monitor renal function carefully.      2/12: plan for RHC tomorrow.          Echo    Interpretation Summary    Left Ventricle: The left ventricle is normal in size. Normal wall thickness. Normal wall motion. There is normal systolic function with a visually estimated ejection fraction of 60 - 65%. Ejection fraction by visual approximation is 50%. There is normal diastolic function.    Right Ventricle: Moderate right ventricular enlargement. Systolic function is normal.    Left Atrium: Left atrium is mildly dilated.    Right Atrium: Right atrium is moderately dilated.    Aortic Valve: The aortic valve is a trileaflet valve.    Tricuspid Valve: There is mild regurgitation. No paravalvular regurgitation.  . Continue Beta Blocker, Furosemide, and Aldactone and monitor clinical status closely. Monitor on telemetry. Patient is off CHF pathway.  Monitor strict Is&Os and daily weights.  Place on fluid restriction of 1 L. Cardiology has not been consulted. Continue to stress to patient importance of self efficacy and  on diet for CHF. Last BNP reviewed- and noted below No results for input(s): "BNP", "BNPTRIAGEBLO" in the last 168 hours.  "

## 2024-02-12 NOTE — PROGRESS NOTES
[facility] Hospital Medicine  Progress Note    Patient Name: Holly Porter  MRN: [id number]  Patient Class: IP- Inpatient   Admission Date: 2/7/2024  Length of Stay: 5 days  Attending Physician: Tien Barksdale MD  Primary Care Provider: Regan Frausto MD        Subjective:     Principal Problem:Acute hypoxemic respiratory failure    HPI:  Ms. Holly Porter is a 63-year-old woman history of COPD on home O2 with tobacco use, diastolic heart failure, thyroid disease, class 3 obesity with JOVANNI, likely OHS, type 2 diabetes with diabetic ulcer s/p left toe amputation, prior DVT and PE on home Eliquis, PVD s/p R femoral stent, seizures who presents with worsening shortness of breath and bilateral leg pain.  She states that she developed severe respiratory distress at home and her inhalers and other medications were not adequate to improve her distress.  In addition, she reports progressive lower extremity swelling and bilateral leg pain and makes it difficult for her to ambulate.    In the emergency department she had hypoxia to the low 70s.  She was on a Ventimask and then switched to BiPAP.  Case was discussed with pulmonology and it was decided for patient to be on high-flow.  Patient stated that she is very happy with this as she would love to eat dinner and she sometimes feels that she is suffocating and the mass that is why she rarely wears her CPAP at home because she does not think she has appropriate mask.    ED course:   Initial Vitals   BP Pulse Resp Temp SpO2   02/07/24 1152 02/07/24 1152 02/07/24 1209 02/07/24 1152 02/07/24 1152   (!) 150/81 96 (!) 26 98.1 °F (36.7 °C) (!) 75 %                       Prior hospitalization: Visits for shortness of breath.  Hospitalized 11/2023 with CHF/COPD exacerbations.   C 11/2023: minimal CAD. Echo: EF 50%    Overview/Hospital Course:  No notes on file    Interval History:     Review of Systems   Constitutional:   Negative for activity change, chills, fatigue and fever.   HENT:  Negative for congestion and sore throat.    Respiratory:  Positive for shortness of breath and wheezing. Negative for chest tightness.    Cardiovascular:  Positive for leg swelling.   Gastrointestinal:  Negative for abdominal distention, abdominal pain and diarrhea.   Endocrine: Negative for cold intolerance and heat intolerance.   Genitourinary:  Negative for dysuria.   Musculoskeletal:  Negative for arthralgias and back pain.   Skin:  Negative for color change and rash.   Neurological:  Negative for dizziness, tremors and headaches.   Psychiatric/Behavioral:  Negative for agitation. The patient is not nervous/anxious.      Objective:     Vital Signs (Most Recent):  Temp: 97.7 °F (36.5 °C) (02/12/24 1011)  Pulse: 68 (02/12/24 1011)  Resp: 19 (02/12/24 1011)  BP: (!) 147/80 (02/12/24 1011)  SpO2: 95 % (02/12/24 1011) Vital Signs (24h Range):  Temp:  [97.7 °F (36.5 °C)-98.4 °F (36.9 °C)] 97.7 °F (36.5 °C)  Pulse:  [62-74] 68  Resp:  [17-20] 19  SpO2:  [90 %-98 %] 95 %  BP: (134-166)/(78-84) 147/80     Weight: (!) 167.5 kg (369 lb 4.3 oz)  Body mass index is 56.15 kg/m².    Intake/Output Summary (Last 24 hours) at 2/12/2024 1330  Last data filed at 2/12/2024 0400  Gross per 24 hour   Intake --   Output 6000 ml   Net -6000 ml         Physical Exam  Constitutional:       Appearance: She is obese. She is ill-appearing.   HENT:      Head: Normocephalic and atraumatic.      Nose: Nose normal.      Mouth/Throat:      Mouth: Mucous membranes are moist.      Pharynx: Oropharynx is clear.   Eyes:      Extraocular Movements: Extraocular movements intact.      Conjunctiva/sclera: Conjunctivae normal.      Pupils: Pupils are equal, round, and reactive to light.   Cardiovascular:      Rate and Rhythm: Normal rate and regular rhythm.      Pulses: Normal pulses.      Heart sounds: Normal heart sounds.   Pulmonary:      Effort: Respiratory distress present.      Breath  sounds: Wheezing present.   Abdominal:      General: Abdomen is flat. Bowel sounds are normal.      Palpations: Abdomen is soft.   Musculoskeletal:         General: Normal range of motion.      Cervical back: Normal range of motion and neck supple.      Right lower leg: Edema present.      Left lower leg: Edema present.      Comments: Left toe amputation    Skin:     General: Skin is warm and dry.   Neurological:      General: No focal deficit present.      Mental Status: She is alert and oriented to person, place, and time.   Psychiatric:         Mood and Affect: Mood normal.         Behavior: Behavior normal.             Significant Labs: All pertinent labs within the past 24 hours have been reviewed.    Significant Imaging: I have reviewed all pertinent imaging results/findings within the past 24 hours.    Assessment/Plan:      * Acute hypoxemic respiratory failure  Patient with Hypoxic Respiratory failure which is Acute on chronic.  she is on home oxygen at 2 LPM. Supplemental oxygen was provided and noted- Oxygen Concentration (%):  [32] 32    .   Signs/symptoms of respiratory failure include- tachypnea, increased work of breathing, use of accessory muscles, and wheezing. Contributing diagnoses includes - CHF, COPD, and Obesity Hypoventilation Labs and images were reviewed. Patient Has recent ABG, which has been reviewed. Will treat underlying causes and adjust management of respiratory failure as follows-   Hi Lynda, Treat COPD exacerbation, Treat CHF exacerbation, Pulmonology consult.      2/8: continue current care.  CTA chest ordered.  Pulm consult.  Patient appears stable.  ABG ordered.      2/9: ABG reviewed today and appears good.  Will decrease FiO2.  Appreciate assistance from pulmonology.      2/10: complaint of cough today and cough medication given. Weaning high lynda.  35% Fi02 on 30L today.     2/11: mucomyst added today.  Patient requested Ozempic today.  Will attempt to order with insurance  coverage.      2/12: Improving.  Patient on 3L NC.  Social work consult for home O2.      Pulmonary hypertension    2/11: Case discussed with cardiology. Will plan for RHC during this hospitalization since patient needs to remain inpatient for at least 3-4 more days.        Pulmonary embolism  History of pulmonary embolism and DVT and patient currently on home Eliquis.    Given hypoxia and elevated D-dimer CTA was ordered.  Follow up result.  Lower extremity Dopplers ordered as well.    2/8: negative dopplers.  F/u CTA chest and bilateral lower extremity.   Continue home eliquis.        History of osteomyelitis  History of osteomyelitis of LLE.   X-ray and inflammatory markers ordered.   Patient states she has been unable to wear a shoe for the last several years.    Few have been willing to perform surgery or amputate.      2/9: no osteomyelitis on x-ray.        Peripheral arterial disease with history of revascularization  Status post femoral stent.  Patient reporting bilateral lower extremity pain.    Follow-up DVT and arterial Dopplers.      Seizure  Continue home antiseizure medication.      Migraine  Continue outpatient treatment for suppressive therapy      Nicotine dependence  Dangers of cigarette smoking were reviewed with patient in detail. Patient was Counseled for 10 minutes or greater. Nicotine replacement options were discussed. Nicotine replacement was discussed- prescribed    GERD (gastroesophageal reflux disease)  Continue home PPI      Obesity hypoventilation syndrome  There is no height or weight on file to calculate BMI. Morbid obesity complicates all aspects of disease management from diagnostic modalities to treatment. Weight loss encouraged and health benefits explained to patient.         Class 3 severe obesity with body mass index (BMI) of 50.0 to 59.9 in adult  Body mass index is 56.15 kg/m². Morbid obesity complicates all aspects of disease management from diagnostic modalities to  treatment. Weight loss encouraged and health benefits explained to patient.    2/11: ozempic requested.          Type 2 diabetes mellitus without complication, with long-term current use of insulin  Patient's FSGs are controlled on current medication regimen.  Last A1c reviewed-   Lab Results   Component Value Date    HGBA1C 6.2 11/03/2023     Most recent fingerstick glucose reviewed-   Recent Labs   Lab 02/07/24  1237   POCTGLUCOSE 92     Current correctional scale  Low  Maintain anti-hyperglycemic dose as follows-   Antihyperglycemics (From admission, onward)      Start     Stop Route Frequency Ordered    02/08/24 0900  insulin detemir U-100 injection 10 Units         -- SubQ Daily 02/07/24 1712    02/07/24 1809  insulin aspart U-100 injection 0-5 Units         -- SubQ Before meals & nightly PRN 02/07/24 1712          Hold Oral hypoglycemics while patient is in the hospital.    Acute on chronic diastolic CHF (congestive heart failure)  Patient is identified as having Diastolic (HFpEF) heart failure that is Acute on chronic. CHF is currently uncontrolled due to Dyspnea not returned to baseline after 1 doses of IV diuretic. Latest ECHO performed and demonstrates- Results for orders placed during the hospital encounter of 11/03/23    2/8: continue IV lasix, a high risk medication, and monitor renal function carefully.      2/12: plan for RHC tomorrow.          Echo    Interpretation Summary    Left Ventricle: The left ventricle is normal in size. Normal wall thickness. Normal wall motion. There is normal systolic function with a visually estimated ejection fraction of 60 - 65%. Ejection fraction by visual approximation is 50%. There is normal diastolic function.    Right Ventricle: Moderate right ventricular enlargement. Systolic function is normal.    Left Atrium: Left atrium is mildly dilated.    Right Atrium: Right atrium is moderately dilated.    Aortic Valve: The aortic valve is a trileaflet valve.    Tricuspid  "Valve: There is mild regurgitation. No paravalvular regurgitation.  . Continue Beta Blocker, Furosemide, and Aldactone and monitor clinical status closely. Monitor on telemetry. Patient is off CHF pathway.  Monitor strict Is&Os and daily weights.  Place on fluid restriction of 1 L. Cardiology has not been consulted. Continue to stress to patient importance of self efficacy and  on diet for CHF. Last BNP reviewed- and noted below No results for input(s): "BNP", "BNPTRIAGEBLO" in the last 168 hours.    COPD exacerbation  Patient's COPD is with exacerbation noted by continued dyspnea, use of accessory muscles for breathing, and worsening of baseline hypoxia currently.  Patient is currently off COPD Pathway. Continue scheduled inhalers Steroids, Antibiotics, and Supplemental oxygen and monitor respiratory status closely.     2/8: continue duonebs, steroids, antibiotics.      2/11: mgmt per pulmonology.  Still requiring high oxygen.       VTE Risk Mitigation (From admission, onward)           Ordered     apixaban tablet 5 mg  2 times daily         02/07/24 1712     Place sequential compression device  Until discontinued         02/07/24 1712     IP VTE HIGH RISK PATIENT  Once         02/07/24 1712                    Discharge Planning   MONTSERRAT:      Code Status: Full Code   Is the patient medically ready for discharge?:     Reason for patient still in hospital (select all that apply): Treatment  Discharge Plan A: Home, Home Health                  Tien Barksdale MD  Department of Hospital Medicine   [facility] - [facility]    "

## 2024-02-12 NOTE — ASSESSMENT & PLAN NOTE
- Patient seen and evaluated by Dr. Riggins, followed outpatient by Dr. Ortega  - Appears euvolemic on assessment today  - Plan for RHC tomorrow. Procedure, risk, and benefits discussed in detail with patient. She verbalized understanding and wishes to proceed. Consent obtained and on chart  - Further recommendations to follow

## 2024-02-12 NOTE — ASSESSMENT & PLAN NOTE
Patient with Hypoxic Respiratory failure which is Acute on chronic.  she is on home oxygen at 2 LPM. Supplemental oxygen was provided and noted- Oxygen Concentration (%):  [32] 32    .   Signs/symptoms of respiratory failure include- tachypnea, increased work of breathing, use of accessory muscles, and wheezing. Contributing diagnoses includes - CHF, COPD, and Obesity Hypoventilation Labs and images were reviewed. Patient Has recent ABG, which has been reviewed. Will treat underlying causes and adjust management of respiratory failure as follows-   Hi Lynda, Treat COPD exacerbation, Treat CHF exacerbation, Pulmonology consult.      2/8: continue current care.  CTA chest ordered.  Pulm consult.  Patient appears stable.  ABG ordered.      2/9: ABG reviewed today and appears good.  Will decrease FiO2.  Appreciate assistance from pulmonology.      2/10: complaint of cough today and cough medication given. Weaning high lynda.  35% Fi02 on 30L today.     2/11: mucomyst added today.  Patient requested Ozempic today.  Will attempt to order with insurance coverage.      2/12: Improving.  Patient on 3L NC.  Social work consult for home O2.

## 2024-02-13 LAB
ANION GAP SERPL CALCULATED.3IONS-SCNC: 4 MMOL/L (ref 7–16)
ANISOCYTOSIS BLD QL SMEAR: ABNORMAL
BASOPHILS # BLD AUTO: 0.02 K/UL (ref 0–0.2)
BASOPHILS NFR BLD AUTO: 0.2 % (ref 0–1)
BUN SERPL-MCNC: 14 MG/DL (ref 7–18)
BUN/CREAT SERPL: 14 (ref 6–20)
CALCIUM SERPL-MCNC: 9.8 MG/DL (ref 8.5–10.1)
CHLORIDE SERPL-SCNC: 96 MMOL/L (ref 98–107)
CO2 SERPL-SCNC: 40 MMOL/L (ref 21–32)
CREAT SERPL-MCNC: 0.97 MG/DL (ref 0.55–1.02)
DIFFERENTIAL METHOD BLD: ABNORMAL
EGFR (NO RACE VARIABLE) (RUSH/TITUS): 65 ML/MIN/1.73M2
EOSINOPHIL # BLD AUTO: 0.53 K/UL (ref 0–0.5)
EOSINOPHIL NFR BLD AUTO: 6.3 % (ref 1–4)
ERYTHROCYTE [DISTWIDTH] IN BLOOD BY AUTOMATED COUNT: 25.2 % (ref 11.5–14.5)
GLUCOSE SERPL-MCNC: 103 MG/DL (ref 70–105)
GLUCOSE SERPL-MCNC: 104 MG/DL (ref 74–106)
GLUCOSE SERPL-MCNC: 197 MG/DL (ref 70–105)
GLUCOSE SERPL-MCNC: 94 MG/DL (ref 70–105)
GLUCOSE SERPL-MCNC: 95 MG/DL (ref 70–105)
HCT VFR BLD AUTO: 57.8 % (ref 38–47)
HGB BLD-MCNC: 16.9 G/DL (ref 12–16)
HYPOCHROMIA BLD QL SMEAR: ABNORMAL
IMM GRANULOCYTES # BLD AUTO: 0.03 K/UL (ref 0–0.04)
IMM GRANULOCYTES NFR BLD: 0.4 % (ref 0–0.4)
LYMPHOCYTES # BLD AUTO: 2.06 K/UL (ref 1–4.8)
LYMPHOCYTES NFR BLD AUTO: 24.4 % (ref 27–41)
MAGNESIUM SERPL-MCNC: 2.5 MG/DL (ref 1.7–2.3)
MCH RBC QN AUTO: 21.3 PG (ref 27–31)
MCHC RBC AUTO-ENTMCNC: 29.2 G/DL (ref 32–36)
MCV RBC AUTO: 72.7 FL (ref 80–96)
MICROCYTES BLD QL SMEAR: ABNORMAL
MONOCYTES # BLD AUTO: 0.61 K/UL (ref 0–0.8)
MONOCYTES NFR BLD AUTO: 7.2 % (ref 2–6)
MPC BLD CALC-MCNC: 9.6 FL (ref 9.4–12.4)
NEUTROPHILS # BLD AUTO: 5.2 K/UL (ref 1.8–7.7)
NEUTROPHILS NFR BLD AUTO: 61.5 % (ref 53–65)
NRBC # BLD AUTO: 0 X10E3/UL
NRBC, AUTO (.00): 0 %
PLATELET # BLD AUTO: 234 K/UL (ref 150–400)
PLATELET MORPHOLOGY: ABNORMAL
POTASSIUM SERPL-SCNC: 3.8 MMOL/L (ref 3.5–5.1)
RBC # BLD AUTO: 7.95 M/UL (ref 4.2–5.4)
SODIUM SERPL-SCNC: 136 MMOL/L (ref 136–145)
TARGETS BLD QL SMEAR: ABNORMAL
WBC # BLD AUTO: 8.45 K/UL (ref 4.5–11)

## 2024-02-13 PROCEDURE — B2111ZZ FLUOROSCOPY OF MULTIPLE CORONARY ARTERIES USING LOW OSMOLAR CONTRAST: ICD-10-PCS | Performed by: STUDENT IN AN ORGANIZED HEALTH CARE EDUCATION/TRAINING PROGRAM

## 2024-02-13 PROCEDURE — 63600175 PHARM REV CODE 636 W HCPCS: Performed by: STUDENT IN AN ORGANIZED HEALTH CARE EDUCATION/TRAINING PROGRAM

## 2024-02-13 PROCEDURE — 27000221 HC OXYGEN, UP TO 24 HOURS

## 2024-02-13 PROCEDURE — 80048 BASIC METABOLIC PNL TOTAL CA: CPT | Performed by: STUDENT IN AN ORGANIZED HEALTH CARE EDUCATION/TRAINING PROGRAM

## 2024-02-13 PROCEDURE — 99152 MOD SED SAME PHYS/QHP 5/>YRS: CPT | Mod: ,,, | Performed by: STUDENT IN AN ORGANIZED HEALTH CARE EDUCATION/TRAINING PROGRAM

## 2024-02-13 PROCEDURE — 85025 COMPLETE CBC W/AUTO DIFF WBC: CPT | Performed by: HOSPITALIST

## 2024-02-13 PROCEDURE — 82962 GLUCOSE BLOOD TEST: CPT

## 2024-02-13 PROCEDURE — 99900035 HC TECH TIME PER 15 MIN (STAT)

## 2024-02-13 PROCEDURE — C1751 CATH, INF, PER/CENT/MIDLINE: HCPCS | Performed by: STUDENT IN AN ORGANIZED HEALTH CARE EDUCATION/TRAINING PROGRAM

## 2024-02-13 PROCEDURE — 94761 N-INVAS EAR/PLS OXIMETRY MLT: CPT

## 2024-02-13 PROCEDURE — S4991 NICOTINE PATCH NONLEGEND: HCPCS | Performed by: HOSPITALIST

## 2024-02-13 PROCEDURE — 25000242 PHARM REV CODE 250 ALT 637 W/ HCPCS: Performed by: HOSPITALIST

## 2024-02-13 PROCEDURE — 25000003 PHARM REV CODE 250: Performed by: HOSPITALIST

## 2024-02-13 PROCEDURE — 99233 SBSQ HOSP IP/OBS HIGH 50: CPT | Mod: ,,, | Performed by: HOSPITALIST

## 2024-02-13 PROCEDURE — 83735 ASSAY OF MAGNESIUM: CPT | Performed by: STUDENT IN AN ORGANIZED HEALTH CARE EDUCATION/TRAINING PROGRAM

## 2024-02-13 PROCEDURE — 94640 AIRWAY INHALATION TREATMENT: CPT

## 2024-02-13 PROCEDURE — 93451 RIGHT HEART CATH: CPT | Performed by: STUDENT IN AN ORGANIZED HEALTH CARE EDUCATION/TRAINING PROGRAM

## 2024-02-13 PROCEDURE — 4A023N6 MEASUREMENT OF CARDIAC SAMPLING AND PRESSURE, RIGHT HEART, PERCUTANEOUS APPROACH: ICD-10-PCS | Performed by: STUDENT IN AN ORGANIZED HEALTH CARE EDUCATION/TRAINING PROGRAM

## 2024-02-13 PROCEDURE — 25000003 PHARM REV CODE 250: Performed by: STUDENT IN AN ORGANIZED HEALTH CARE EDUCATION/TRAINING PROGRAM

## 2024-02-13 PROCEDURE — 93451 RIGHT HEART CATH: CPT | Mod: 26,,, | Performed by: STUDENT IN AN ORGANIZED HEALTH CARE EDUCATION/TRAINING PROGRAM

## 2024-02-13 PROCEDURE — C1894 INTRO/SHEATH, NON-LASER: HCPCS | Performed by: STUDENT IN AN ORGANIZED HEALTH CARE EDUCATION/TRAINING PROGRAM

## 2024-02-13 PROCEDURE — 63600175 PHARM REV CODE 636 W HCPCS: Performed by: HOSPITALIST

## 2024-02-13 PROCEDURE — 11000001 HC ACUTE MED/SURG PRIVATE ROOM

## 2024-02-13 PROCEDURE — 99152 MOD SED SAME PHYS/QHP 5/>YRS: CPT | Performed by: STUDENT IN AN ORGANIZED HEALTH CARE EDUCATION/TRAINING PROGRAM

## 2024-02-13 RX ORDER — METHYLPREDNISOLONE SOD SUCC 125 MG
VIAL (EA) INJECTION
Status: DISCONTINUED | OUTPATIENT
Start: 2024-02-13 | End: 2024-02-13 | Stop reason: HOSPADM

## 2024-02-13 RX ORDER — LIDOCAINE HYDROCHLORIDE 10 MG/ML
INJECTION INFILTRATION; PERINEURAL
Status: DISCONTINUED | OUTPATIENT
Start: 2024-02-13 | End: 2024-02-13 | Stop reason: HOSPADM

## 2024-02-13 RX ORDER — DIPHENHYDRAMINE HYDROCHLORIDE 50 MG/ML
INJECTION INTRAMUSCULAR; INTRAVENOUS
Status: DISCONTINUED | OUTPATIENT
Start: 2024-02-13 | End: 2024-02-13 | Stop reason: HOSPADM

## 2024-02-13 RX ORDER — HYDROMORPHONE HCL 2 MG/ML
VIAL (ML) INJECTION
Status: DISCONTINUED | OUTPATIENT
Start: 2024-02-13 | End: 2024-02-13 | Stop reason: HOSPADM

## 2024-02-13 RX ORDER — BUMETANIDE 0.25 MG/ML
3 INJECTION INTRAMUSCULAR; INTRAVENOUS EVERY 12 HOURS
Status: DISCONTINUED | OUTPATIENT
Start: 2024-02-13 | End: 2024-02-14 | Stop reason: HOSPADM

## 2024-02-13 RX ORDER — FAMOTIDINE 10 MG/ML
INJECTION INTRAVENOUS
Status: DISCONTINUED | OUTPATIENT
Start: 2024-02-13 | End: 2024-02-13 | Stop reason: HOSPADM

## 2024-02-13 RX ORDER — MIDAZOLAM HYDROCHLORIDE 1 MG/ML
INJECTION INTRAMUSCULAR; INTRAVENOUS
Status: DISCONTINUED | OUTPATIENT
Start: 2024-02-13 | End: 2024-02-13 | Stop reason: HOSPADM

## 2024-02-13 RX ADMIN — POTASSIUM BICARBONATE 50 MEQ: 977.5 TABLET, EFFERVESCENT ORAL at 09:02

## 2024-02-13 RX ADMIN — NIFEDIPINE 60 MG: 30 TABLET, FILM COATED, EXTENDED RELEASE ORAL at 09:02

## 2024-02-13 RX ADMIN — SODIUM CHLORIDE SOLN NEBU 3% 4 ML: 3 NEBU SOLN at 07:02

## 2024-02-13 RX ADMIN — IPRATROPIUM BROMIDE AND ALBUTEROL SULFATE 3 ML: 2.5; .5 SOLUTION RESPIRATORY (INHALATION) at 07:02

## 2024-02-13 RX ADMIN — APIXABAN 5 MG: 5 TABLET, FILM COATED ORAL at 08:02

## 2024-02-13 RX ADMIN — HYDROCODONE BITARTRATE AND ACETAMINOPHEN 1 TABLET: 5; 325 TABLET ORAL at 08:02

## 2024-02-13 RX ADMIN — FAMOTIDINE 20 MG: 20 TABLET ORAL at 09:02

## 2024-02-13 RX ADMIN — NICOTINE 1 PATCH: 14 PATCH, EXTENDED RELEASE TRANSDERMAL at 10:02

## 2024-02-13 RX ADMIN — BUMETANIDE 3 MG: 0.25 INJECTION INTRAMUSCULAR; INTRAVENOUS at 08:02

## 2024-02-13 RX ADMIN — COLCHICINE 0.6 MG: 0.6 TABLET, FILM COATED ORAL at 09:02

## 2024-02-13 RX ADMIN — LEVOTHYROXINE SODIUM 150 MCG: 0.15 TABLET ORAL at 05:02

## 2024-02-13 RX ADMIN — POTASSIUM BICARBONATE 50 MEQ: 977.5 TABLET, EFFERVESCENT ORAL at 08:02

## 2024-02-13 RX ADMIN — ALLOPURINOL 300 MG: 300 TABLET ORAL at 09:02

## 2024-02-13 RX ADMIN — LACTULOSE 30 G: 20 SOLUTION ORAL at 08:02

## 2024-02-13 RX ADMIN — INSULIN DETEMIR 10 UNITS: 100 INJECTION, SOLUTION SUBCUTANEOUS at 10:02

## 2024-02-13 RX ADMIN — BUMETANIDE 3 MG: 0.25 INJECTION INTRAMUSCULAR; INTRAVENOUS at 10:02

## 2024-02-13 RX ADMIN — IPRATROPIUM BROMIDE AND ALBUTEROL SULFATE 3 ML: 2.5; .5 SOLUTION RESPIRATORY (INHALATION) at 12:02

## 2024-02-13 RX ADMIN — ASPIRIN 81 MG: 81 TABLET, COATED ORAL at 09:02

## 2024-02-13 NOTE — SUBJECTIVE & OBJECTIVE
Interval History:     Review of Systems   Constitutional:  Negative for activity change, chills, fatigue and fever.   HENT:  Negative for congestion and sore throat.    Respiratory:  Positive for shortness of breath and wheezing. Negative for chest tightness.    Cardiovascular:  Positive for leg swelling.   Gastrointestinal:  Negative for abdominal distention, abdominal pain and diarrhea.   Endocrine: Negative for cold intolerance and heat intolerance.   Genitourinary:  Negative for dysuria.   Musculoskeletal:  Negative for arthralgias and back pain.   Skin:  Negative for color change and rash.   Neurological:  Negative for dizziness, tremors and headaches.   Psychiatric/Behavioral:  Negative for agitation. The patient is not nervous/anxious.      Objective:     Vital Signs (Most Recent):  Temp: 97.7 °F (36.5 °C) (02/13/24 1022)  Pulse: 80 (02/13/24 1641)  Resp: 17 (02/13/24 1530)  BP: (!) 155/56 (02/13/24 1530)  SpO2: (!) 92 % (02/13/24 1641) Vital Signs (24h Range):  Temp:  [97.3 °F (36.3 °C)-98.3 °F (36.8 °C)] 97.7 °F (36.5 °C)  Pulse:  [71-83] 80  Resp:  [17-20] 17  SpO2:  [92 %-96 %] 92 %  BP: (114-155)/(56-73) 155/56     Weight: (!) 167.5 kg (369 lb 4.3 oz)  Body mass index is 56.15 kg/m².    Intake/Output Summary (Last 24 hours) at 2/13/2024 1656  Last data filed at 2/13/2024 0430  Gross per 24 hour   Intake --   Output 5225 ml   Net -5225 ml         Physical Exam  Constitutional:       Appearance: She is obese. She is ill-appearing.   HENT:      Head: Normocephalic and atraumatic.      Nose: Nose normal.      Mouth/Throat:      Mouth: Mucous membranes are moist.      Pharynx: Oropharynx is clear.   Eyes:      Extraocular Movements: Extraocular movements intact.      Conjunctiva/sclera: Conjunctivae normal.      Pupils: Pupils are equal, round, and reactive to light.   Cardiovascular:      Rate and Rhythm: Normal rate and regular rhythm.      Pulses: Normal pulses.      Heart sounds: Normal heart sounds.    Pulmonary:      Effort: Respiratory distress present.      Breath sounds: Wheezing present.   Abdominal:      General: Abdomen is flat. Bowel sounds are normal.      Palpations: Abdomen is soft.   Musculoskeletal:         General: Normal range of motion.      Cervical back: Normal range of motion and neck supple.      Right lower leg: Edema present.      Left lower leg: Edema present.      Comments: Left toe amputation    Skin:     General: Skin is warm and dry.   Neurological:      General: No focal deficit present.      Mental Status: She is alert and oriented to person, place, and time.   Psychiatric:         Mood and Affect: Mood normal.         Behavior: Behavior normal.             Significant Labs: All pertinent labs within the past 24 hours have been reviewed.    Significant Imaging: I have reviewed all pertinent imaging results/findings within the past 24 hours.

## 2024-02-13 NOTE — PLAN OF CARE
Problem: Gas Exchange Impaired  Goal: Optimal Gas Exchange  Outcome: Ongoing, Progressing  Intervention: Optimize Oxygenation and Ventilation  Flowsheets (Taken 2/12/2024 1807)  Airway/Ventilation Management: pulmonary hygiene promoted  Head of Bed (HOB) Positioning: HOB at 30-45 degrees

## 2024-02-13 NOTE — PLAN OF CARE
Problem: Diabetes Comorbidity  Goal: Blood Glucose Level Within Targeted Range  Outcome: Ongoing, Progressing  Intervention: Monitor and Manage Glycemia  Flowsheets (Taken 2/12/2024 1933)  Glycemic Management:   blood glucose monitored   supplemental insulin given     Problem: Skin Injury Risk Increased  Goal: Skin Health and Integrity  Outcome: Ongoing, Progressing  Intervention: Optimize Skin Protection  Flowsheets (Taken 2/12/2024 1933)  Pressure Reduction Techniques:   frequent weight shift encouraged   weight shift assistance provided  Pressure Reduction Devices:   specialty bed utilized   positioning supports utilized  Skin Protection:   tubing/devices free from skin contact   incontinence pads utilized  Head of Bed (HOB) Positioning: HOB at 20-30 degrees  Intervention: Promote and Optimize Oral Intake  Flowsheets (Taken 2/12/2024 1933)  Oral Nutrition Promotion: social interaction promoted

## 2024-02-13 NOTE — PLAN OF CARE
Received consult for home O2. Physician and attending nurse notified of need for O2 sat on room air. Pt currently in cath lab for procedure. SS following

## 2024-02-13 NOTE — ASSESSMENT & PLAN NOTE
Patient with Hypoxic Respiratory failure which is Acute on chronic.  she is on home oxygen at 2 LPM. Supplemental oxygen was provided and noted- Oxygen Concentration (%):  [28-32] 28    .   Signs/symptoms of respiratory failure include- tachypnea, increased work of breathing, use of accessory muscles, and wheezing. Contributing diagnoses includes - CHF, COPD, and Obesity Hypoventilation Labs and images were reviewed. Patient Has recent ABG, which has been reviewed. Will treat underlying causes and adjust management of respiratory failure as follows-   Hi Lynda, Treat COPD exacerbation, Treat CHF exacerbation, Pulmonology consult.      2/8: continue current care.  CTA chest ordered.  Pulm consult.  Patient appears stable.  ABG ordered.      2/9: ABG reviewed today and appears good.  Will decrease FiO2.  Appreciate assistance from pulmonology.      2/10: complaint of cough today and cough medication given. Weaning high lynda.  35% Fi02 on 30L today.     2/11: mucomyst added today.  Patient requested Ozempic today.  Will attempt to order with insurance coverage.      2/12: Improving.  Patient on 3L [place].  Social work consult for home O2.     2/13: Plan for [facility] today and obtain home O2. Likely discharge tomorrow with pulmonology follow-up.

## 2024-02-13 NOTE — PROGRESS NOTES
[facility] Hospital Medicine  Progress Note    Patient Name: Holly Porter  MRN: [id number]  Patient Class: IP- Inpatient   Admission Date: 2/7/2024  Length of Stay: 6 days  Attending Physician: Tien Barksdale MD  Primary Care Provider: Regan Frausto MD        Subjective:     Principal Problem:Acute hypoxemic respiratory failure    HPI:  Ms. Holly Porter is a 63-year-old woman history of COPD on home O2 with tobacco use, diastolic heart failure, thyroid disease, class 3 obesity with JOVANNI, likely OHS, type 2 diabetes with diabetic ulcer s/p left toe amputation, prior DVT and PE on home Eliquis, PVD s/p R femoral stent, seizures who presents with worsening shortness of breath and bilateral leg pain.  She states that she developed severe respiratory distress at home and her inhalers and other medications were not adequate to improve her distress.  In addition, she reports progressive lower extremity swelling and bilateral leg pain and makes it difficult for her to ambulate.    In the emergency department she had hypoxia to the low 70s.  She was on a Ventimask and then switched to BiPAP.  Case was discussed with pulmonology and it was decided for patient to be on high-flow.  Patient stated that she is very happy with this as she would love to eat dinner and she sometimes feels that she is suffocating and the mass that is why she rarely wears her CPAP at home because she does not think she has appropriate mask.    ED course:   Initial Vitals   BP Pulse Resp Temp SpO2   02/07/24 1152 02/07/24 1152 02/07/24 1209 02/07/24 1152 02/07/24 1152   (!) 150/81 96 (!) 26 98.1 °F (36.7 °C) (!) 75 %                       Prior hospitalization: Visits for shortness of breath.  Hospitalized 11/2023 with CHF/COPD exacerbations.   C 11/2023: minimal CAD. Echo: EF 50%    Overview/Hospital Course:  No notes on file    Interval History:     Review of Systems   Constitutional:   Negative for activity change, chills, fatigue and fever.   HENT:  Negative for congestion and sore throat.    Respiratory:  Positive for shortness of breath and wheezing. Negative for chest tightness.    Cardiovascular:  Positive for leg swelling.   Gastrointestinal:  Negative for abdominal distention, abdominal pain and diarrhea.   Endocrine: Negative for cold intolerance and heat intolerance.   Genitourinary:  Negative for dysuria.   Musculoskeletal:  Negative for arthralgias and back pain.   Skin:  Negative for color change and rash.   Neurological:  Negative for dizziness, tremors and headaches.   Psychiatric/Behavioral:  Negative for agitation. The patient is not nervous/anxious.      Objective:     Vital Signs (Most Recent):  Temp: 97.7 °F (36.5 °C) (02/13/24 1022)  Pulse: 80 (02/13/24 1641)  Resp: 17 (02/13/24 1530)  BP: (!) 155/56 (02/13/24 1530)  SpO2: (!) 92 % (02/13/24 1641) Vital Signs (24h Range):  Temp:  [97.3 °F (36.3 °C)-98.3 °F (36.8 °C)] 97.7 °F (36.5 °C)  Pulse:  [71-83] 80  Resp:  [17-20] 17  SpO2:  [92 %-96 %] 92 %  BP: (114-155)/(56-73) 155/56     Weight: (!) 167.5 kg (369 lb 4.3 oz)  Body mass index is 56.15 kg/m².    Intake/Output Summary (Last 24 hours) at 2/13/2024 1656  Last data filed at 2/13/2024 0430  Gross per 24 hour   Intake --   Output 5225 ml   Net -5225 ml         Physical Exam  Constitutional:       Appearance: She is obese. She is ill-appearing.   HENT:      Head: Normocephalic and atraumatic.      Nose: Nose normal.      Mouth/Throat:      Mouth: Mucous membranes are moist.      Pharynx: Oropharynx is clear.   Eyes:      Extraocular Movements: Extraocular movements intact.      Conjunctiva/sclera: Conjunctivae normal.      Pupils: Pupils are equal, round, and reactive to light.   Cardiovascular:      Rate and Rhythm: Normal rate and regular rhythm.      Pulses: Normal pulses.      Heart sounds: Normal heart sounds.   Pulmonary:      Effort: Respiratory distress present.       Breath sounds: Wheezing present.   Abdominal:      General: Abdomen is flat. Bowel sounds are normal.      Palpations: Abdomen is soft.   Musculoskeletal:         General: Normal range of motion.      Cervical back: Normal range of motion and neck supple.      Right lower leg: Edema present.      Left lower leg: Edema present.      Comments: Left toe amputation    Skin:     General: Skin is warm and dry.   Neurological:      General: No focal deficit present.      Mental Status: She is alert and oriented to person, place, and time.   Psychiatric:         Mood and Affect: Mood normal.         Behavior: Behavior normal.             Significant Labs: All pertinent labs within the past 24 hours have been reviewed.    Significant Imaging: I have reviewed all pertinent imaging results/findings within the past 24 hours.    Assessment/Plan:      * Acute hypoxemic respiratory failure  Patient with Hypoxic Respiratory failure which is Acute on chronic.  she is on home oxygen at 2 LPM. Supplemental oxygen was provided and noted- Oxygen Concentration (%):  [28-32] 28    .   Signs/symptoms of respiratory failure include- tachypnea, increased work of breathing, use of accessory muscles, and wheezing. Contributing diagnoses includes - CHF, COPD, and Obesity Hypoventilation Labs and images were reviewed. Patient Has recent ABG, which has been reviewed. Will treat underlying causes and adjust management of respiratory failure as follows-   Hi Lynda, Treat COPD exacerbation, Treat CHF exacerbation, Pulmonology consult.      2/8: continue current care.  CTA chest ordered.  Pulm consult.  Patient appears stable.  ABG ordered.      2/9: ABG reviewed today and appears good.  Will decrease FiO2.  Appreciate assistance from pulmonology.      2/10: complaint of cough today and cough medication given. Weaning high lynda.  35% Fi02 on 30L today.     2/11: mucomyst added today.  Patient requested Ozempic today.  Will attempt to order with  insurance coverage.      2/12: Improving.  Patient on 3L NC.  Social work consult for home O2.     2/13: Plan for LHC today and obtain home O2. Likely discharge tomorrow with pulmonology follow-up.      Pulmonary hypertension    2/11: Case discussed with cardiology. Will plan for RHC during this hospitalization since patient needs to remain inpatient for at least 3-4 more days.        COPD exacerbation  Patient's COPD is with exacerbation noted by continued dyspnea, use of accessory muscles for breathing, and worsening of baseline hypoxia currently.  Patient is currently off COPD Pathway. Continue scheduled inhalers Steroids, Antibiotics, and Supplemental oxygen and monitor respiratory status closely.     2/8: continue duonebs, steroids, antibiotics.      2/11: mgmt per pulmonology.  Still requiring high oxygen.     Pulmonary embolism  History of pulmonary embolism and DVT and patient currently on home Eliquis.    Given hypoxia and elevated D-dimer CTA was ordered.  Follow up result.  Lower extremity Dopplers ordered as well.    2/8: negative dopplers.  F/u CTA chest and bilateral lower extremity.   Continue home eliquis.        Acute on chronic diastolic CHF (congestive heart failure)  Patient is identified as having Diastolic (HFpEF) heart failure that is Acute on chronic. CHF is currently uncontrolled due to Dyspnea not returned to baseline after 1 doses of IV diuretic. Latest ECHO performed and demonstrates- Results for orders placed during the hospital encounter of 11/03/23 2/8: continue IV lasix, a high risk medication, and monitor renal function carefully.      2/12: plan for RHC tomorrow.          Echo    Interpretation Summary    Left Ventricle: The left ventricle is normal in size. Normal wall thickness. Normal wall motion. There is normal systolic function with a visually estimated ejection fraction of 60 - 65%. Ejection fraction by visual approximation is 50%. There is normal diastolic function.     "Right Ventricle: Moderate right ventricular enlargement. Systolic function is normal.    Left Atrium: Left atrium is mildly dilated.    Right Atrium: Right atrium is moderately dilated.    Aortic Valve: The aortic valve is a trileaflet valve.    Tricuspid Valve: There is mild regurgitation. No paravalvular regurgitation.  . Continue Beta Blocker, Furosemide, and Aldactone and monitor clinical status closely. Monitor on telemetry. Patient is off CHF pathway.  Monitor strict Is&Os and daily weights.  Place on fluid restriction of 1 L. Cardiology has not been consulted. Continue to stress to patient importance of self efficacy and  on diet for CHF. Last BNP reviewed- and noted below No results for input(s): "BNP", "BNPTRIAGEBLO" in the last 168 hours.    Class 3 severe obesity with body mass index (BMI) of 50.0 to 59.9 in adult  Body mass index is 56.15 kg/m². Morbid obesity complicates all aspects of disease management from diagnostic modalities to treatment. Weight loss encouraged and health benefits explained to patient.    2/11: ozempic requested.          History of osteomyelitis  History of osteomyelitis of LLE.   X-ray and inflammatory markers ordered.   Patient states she has been unable to wear a shoe for the last several years.    Few have been willing to perform surgery or amputate.      2/9: no osteomyelitis on x-ray.        Peripheral arterial disease with history of revascularization  Status post femoral stent.  Patient reporting bilateral lower extremity pain.    Follow-up DVT and arterial Dopplers.      Seizure  Continue home antiseizure medication.      Migraine  Continue outpatient treatment for suppressive therapy      Nicotine dependence  Dangers of cigarette smoking were reviewed with patient in detail. Patient was Counseled for 10 minutes or greater. Nicotine replacement options were discussed. Nicotine replacement was discussed- prescribed    GERD (gastroesophageal reflux " disease)  Continue home PPI      Obesity hypoventilation syndrome  There is no height or weight on file to calculate BMI. Morbid obesity complicates all aspects of disease management from diagnostic modalities to treatment. Weight loss encouraged and health benefits explained to patient.         Type 2 diabetes mellitus without complication, with long-term current use of insulin  Patient's FSGs are controlled on current medication regimen.  Last A1c reviewed-   Lab Results   Component Value Date    HGBA1C 6.2 11/03/2023     Most recent fingerstick glucose reviewed-   Recent Labs   Lab 02/07/24  1237   POCTGLUCOSE 92     Current correctional scale  Low  Maintain anti-hyperglycemic dose as follows-   Antihyperglycemics (From admission, onward)      Start     Stop Route Frequency Ordered    02/08/24 0900  insulin detemir U-100 injection 10 Units         -- SubQ Daily 02/07/24 1712    02/07/24 1809  insulin aspart U-100 injection 0-5 Units         -- SubQ Before meals & nightly PRN 02/07/24 1712          Hold Oral hypoglycemics while patient is in the hospital.      VTE Risk Mitigation (From admission, onward)           Ordered     apixaban tablet 5 mg  2 times daily         02/07/24 1712     Place sequential compression device  Until discontinued         02/07/24 1712     IP VTE HIGH RISK PATIENT  Once         02/07/24 1712                    Discharge Planning   MONTSERRAT:      Code Status: Full Code   Is the patient medically ready for discharge?:     Reason for patient still in hospital (select all that apply): Treatment  Discharge Plan A: Home, Home Health                  Tien Barksdale MD  Department of Hospital Medicine   [facility]

## 2024-02-14 VITALS
HEIGHT: 68 IN | DIASTOLIC BLOOD PRESSURE: 74 MMHG | WEIGHT: 293 LBS | RESPIRATION RATE: 16 BRPM | SYSTOLIC BLOOD PRESSURE: 130 MMHG | HEART RATE: 77 BPM | TEMPERATURE: 98 F | BODY MASS INDEX: 44.41 KG/M2 | OXYGEN SATURATION: 92 %

## 2024-02-14 LAB
ANION GAP SERPL CALCULATED.3IONS-SCNC: 7 MMOL/L (ref 7–16)
BUN SERPL-MCNC: 28 MG/DL (ref 7–18)
BUN/CREAT SERPL: 23 (ref 6–20)
CALCIUM SERPL-MCNC: 10.1 MG/DL (ref 8.5–10.1)
CHLORIDE SERPL-SCNC: 100 MMOL/L (ref 98–107)
CO2 SERPL-SCNC: 35 MMOL/L (ref 21–32)
CREAT SERPL-MCNC: 1.23 MG/DL (ref 0.55–1.02)
EGFR (NO RACE VARIABLE) (RUSH/TITUS): 49 ML/MIN/1.73M2
GLUCOSE SERPL-MCNC: 112 MG/DL (ref 70–105)
GLUCOSE SERPL-MCNC: 117 MG/DL (ref 70–105)
GLUCOSE SERPL-MCNC: 126 MG/DL (ref 74–106)
GLUCOSE SERPL-MCNC: 141 MG/DL (ref 70–105)
MAGNESIUM SERPL-MCNC: 2.6 MG/DL (ref 1.7–2.3)
POTASSIUM SERPL-SCNC: 4 MMOL/L (ref 3.5–5.1)
SODIUM SERPL-SCNC: 138 MMOL/L (ref 136–145)

## 2024-02-14 PROCEDURE — 82962 GLUCOSE BLOOD TEST: CPT

## 2024-02-14 PROCEDURE — 94640 AIRWAY INHALATION TREATMENT: CPT

## 2024-02-14 PROCEDURE — 99239 HOSP IP/OBS DSCHRG MGMT >30: CPT | Mod: ,,, | Performed by: FAMILY MEDICINE

## 2024-02-14 PROCEDURE — 25000003 PHARM REV CODE 250: Performed by: HOSPITALIST

## 2024-02-14 PROCEDURE — 80048 BASIC METABOLIC PNL TOTAL CA: CPT | Performed by: STUDENT IN AN ORGANIZED HEALTH CARE EDUCATION/TRAINING PROGRAM

## 2024-02-14 PROCEDURE — 27000221 HC OXYGEN, UP TO 24 HOURS

## 2024-02-14 PROCEDURE — 83735 ASSAY OF MAGNESIUM: CPT | Performed by: STUDENT IN AN ORGANIZED HEALTH CARE EDUCATION/TRAINING PROGRAM

## 2024-02-14 PROCEDURE — 63600175 PHARM REV CODE 636 W HCPCS: Performed by: HOSPITALIST

## 2024-02-14 PROCEDURE — 99900035 HC TECH TIME PER 15 MIN (STAT)

## 2024-02-14 PROCEDURE — 94761 N-INVAS EAR/PLS OXIMETRY MLT: CPT

## 2024-02-14 PROCEDURE — S4991 NICOTINE PATCH NONLEGEND: HCPCS | Performed by: HOSPITALIST

## 2024-02-14 PROCEDURE — 25000242 PHARM REV CODE 250 ALT 637 W/ HCPCS: Performed by: HOSPITALIST

## 2024-02-14 RX ORDER — IBUPROFEN 200 MG
1 TABLET ORAL DAILY
Qty: 30 PATCH | Refills: 1 | Status: SHIPPED | OUTPATIENT
Start: 2024-02-15 | End: 2024-02-21

## 2024-02-14 RX ORDER — HYDROCODONE BITARTRATE AND ACETAMINOPHEN 10; 325 MG/1; MG/1
1 TABLET ORAL EVERY 6 HOURS PRN
Qty: 12 TABLET | Refills: 0 | Status: SHIPPED | OUTPATIENT
Start: 2024-02-14 | End: 2024-02-19 | Stop reason: SDUPTHER

## 2024-02-14 RX ADMIN — INSULIN DETEMIR 10 UNITS: 100 INJECTION, SOLUTION SUBCUTANEOUS at 08:02

## 2024-02-14 RX ADMIN — LEVOTHYROXINE SODIUM 150 MCG: 0.15 TABLET ORAL at 06:02

## 2024-02-14 RX ADMIN — BUMETANIDE 3 MG: 0.25 INJECTION INTRAMUSCULAR; INTRAVENOUS at 08:02

## 2024-02-14 RX ADMIN — COLCHICINE 0.6 MG: 0.6 TABLET, FILM COATED ORAL at 08:02

## 2024-02-14 RX ADMIN — ALLOPURINOL 300 MG: 300 TABLET ORAL at 08:02

## 2024-02-14 RX ADMIN — ASPIRIN 81 MG: 81 TABLET, COATED ORAL at 08:02

## 2024-02-14 RX ADMIN — IPRATROPIUM BROMIDE AND ALBUTEROL SULFATE 3 ML: 2.5; .5 SOLUTION RESPIRATORY (INHALATION) at 07:02

## 2024-02-14 RX ADMIN — IPRATROPIUM BROMIDE AND ALBUTEROL SULFATE 3 ML: 2.5; .5 SOLUTION RESPIRATORY (INHALATION) at 12:02

## 2024-02-14 RX ADMIN — FAMOTIDINE 20 MG: 20 TABLET ORAL at 08:02

## 2024-02-14 RX ADMIN — LACTULOSE 30 G: 20 SOLUTION ORAL at 08:02

## 2024-02-14 RX ADMIN — HYDROCODONE BITARTRATE AND ACETAMINOPHEN 1 TABLET: 5; 325 TABLET ORAL at 08:02

## 2024-02-14 RX ADMIN — NICOTINE 1 PATCH: 14 PATCH, EXTENDED RELEASE TRANSDERMAL at 08:02

## 2024-02-14 RX ADMIN — NIFEDIPINE 60 MG: 30 TABLET, FILM COATED, EXTENDED RELEASE ORAL at 08:02

## 2024-02-14 RX ADMIN — SODIUM CHLORIDE SOLN NEBU 3% 4 ML: 3 NEBU SOLN at 07:02

## 2024-02-14 RX ADMIN — APIXABAN 5 MG: 5 TABLET, FILM COATED ORAL at 08:02

## 2024-02-14 RX ADMIN — LACTULOSE 30 G: 20 SOLUTION ORAL at 02:02

## 2024-02-14 NOTE — RESPIRATORY THERAPY
Antithrombotics for secondary stroke prevention: Antiplatelets: Aspirin: 81 mg daily  Clopidogrel: 75 mg daily    Statins for secondary stroke prevention and hyperlipidemia, if present:   Statins: Atorvastatin- 40 mg daily    Aggressive risk factor modification: HTN     Rehab efforts: The patient has been evaluated by a stroke team provider and the therapy needs have been fully considered based off the presenting complaints and exam findings. The following therapy evaluations are needed: PT evaluate and treat, OT evaluate and treat, SLP evaluate and treat    Diagnostics ordered/pending: Carotid ultrasound to assess vasculature, HgbA1C to assess blood glucose levels, Lipid Profile to assess cholesterol levels, MRI head without contrast to assess brain parenchyma, TTE to assess cardiac function/status , TSH to assess thyroid function    VTE prophylaxis: Enoxaparin 40 mg SQ every 24 hours    BP parameters: TIA: SBP <220 until imaging confirmation of no infarct        Treatment given by RT student, Nisha Wheeler.     02/14/24 0717   Patient Assessment/Suction   Level of Consciousness (AVPU) alert   Respiratory Effort Unlabored   Expansion/Accessory Muscles/Retractions no use of accessory muscles;no retractions   All Lung Fields Breath Sounds Anterior:;Lateral:;equal bilaterally;clear   Rhythm/Pattern, Respiratory unlabored;pattern regular;depth regular;no shortness of breath reported   Cough Frequency no cough   PRE-TX-O2   Device (Oxygen Therapy) nasal cannula   $ Is the patient on Low Flow Oxygen? Yes   Flow (L/min) 2   Oxygen Concentration (%) 28   SpO2 (!) 92 %   Pulse Oximetry Type Intermittent   $ Pulse Oximetry - Multiple Charge Pulse Oximetry - Multiple   Pulse 85   Resp 16   Positioning   Body Position position maintained   Head of Bed (HOB) Positioning HOB elevated   Aerosol Therapy   $ Aerosol Therapy Charges Aerosol Treatment   Daily Review of Necessity (SVN) completed   Respiratory Treatment Status (SVN) given   Treatment Route (SVN) oxygen;mask   Patient Position (SVN) HOB elevated   Post Treatment Assessment (SVN) breath sounds unchanged   Signs of Intolerance (SVN) none   Breath Sounds Post-Respiratory Treatment   Throughout All Fields Post-Treatment All Fields   Throughout All Fields Post-Treatment Anterior:;Lateral:;no change   Post-treatment Heart Rate (beats/min) 82   Post-treatment Resp Rate (breaths/min) 14   Preset CPAP/BiPAP Settings   Mode Of Delivery other (see comments);CPAP  (Patient does not wear.)   Respiratory Evaluation   $ Care Plan Tech Time 15 min

## 2024-02-14 NOTE — PROGRESS NOTES
[facility]  Adult Nutrition  First Assessment Note         Reason for Assessment  Reason For Assessment: length of stay admitted 2/7/24 seen for LOS  Nutrition Risk Screen: no indicators present    Assessment and Plan  Patient admitted 2/7 and seen for LOS. Patient with dx acute respiratory failure, DM, CHF, and COPD. Patient is currently ordered a cardiac diet. No recent po intake % documented per flowsheets.Last BM noted today.    Patient is 369 pounds with a BMI of 56.15. Noted hx of DM with elevated blood glucose. Recommend to change diet to 2000 consistent carbohydrate. Noted patient awaiting home oxygen for d/c home. RD Following.           Learning Needs/Social Determinants of Health  Learning Assessment       02/07/2024 2145 [facility] (2/7/2024 - Present)   Created by Jose Anand, RN - RN (Nurse) Status: Complete                 PRIMARY LEARNER     Primary Learner Name:  Holly Porter  - 02/07/2024 2145    Relationship:  Patient WS - 02/07/2024 2145    Does the primary learner have any barriers to learning?:  No Barriers  - 02/07/2024 2145    What is the preferred language of the primary learner?:  English WS - 02/07/2024 2145    Is an  required?:  No  - 02/07/2024 2145    How does the primary learner prefer to learn new concepts?:  Listening WS - 02/07/2024 2145    How often do you need to have someone help you read instructions, pamphlets, or written material from your doctor or pharmacy?:  Rarely  - 02/07/2024 2145        CO-LEARNER #1     No question answered        CO-LEARNER #2     No question answered        SPECIAL TOPICS     No question answered        ANSWERED BY:     No question answered        Edit History       Jose Anand, RN - RN (Nurse)   02/07/2024 2145                           Social Determinants of Health     Tobacco Use: High Risk (2/7/2024)    Patient History     Smoking  Tobacco Use: Every Day     Smokeless Tobacco Use: Never     Passive Exposure: Current   Alcohol Use: Not At Risk (2/9/2024)    AUDIT-C     Frequency of Alcohol Consumption: Never     Average Number of Drinks: Patient does not drink     Frequency of Binge Drinking: Never   Financial Resource Strain: Low Risk  (2/9/2024)    Overall Financial Resource Strain (CARDIA)     Difficulty of Paying Living Expenses: Not hard at all   Food Insecurity: No Food Insecurity (2/9/2024)    Hunger Vital Sign     Worried About Running Out of Food in the Last Year: Never true     Ran Out of Food in the Last Year: Never true   Transportation Needs: Unmet Transportation Needs (2/9/2024)    PRAPARE - Transportation     Lack of Transportation (Medical): Yes     Lack of Transportation (Non-Medical): Yes   Physical Activity: Inactive (2/9/2024)    Exercise Vital Sign     Days of Exercise per Week: 0 days     Minutes of Exercise per Session: 0 min   Stress: No Stress Concern Present (2/9/2024)    [employer] [place] [employer] - Occupational Stress Questionnaire     Feeling of Stress : Not at all   Social Connections: Socially Isolated (2/9/2024)    Social Connection and Isolation Panel [NHANES]     Frequency of Communication with Friends and Family: Never     Frequency of Social Gatherings with Friends and Family: Three times a week     Attends Yazidism Services: Never     Active Member of Clubs or Organizations: Not on file     Attends Club or Organization Meetings: Never     Marital Status:    Housing Stability: Low Risk  (2/9/2024)    Housing Stability Vital Sign     Unable to Pay for Housing in the Last Year: No     Number of Places Lived in the Last Year: 1     Unstable Housing in the Last Year: No   Depression: Low Risk  (2/7/2024)    Depression     Last PHQ-4: Flowsheet Data: 0            Malnutrition  Is Patient Malnourished: No    Skin Integrity  Kyle Risk Assessment  Sensory Perception: 4-->no  impairment  Moisture: 4-->rarely moist  Activity: 3-->walks occasionally  Mobility: 4-->no limitation  Nutrition: 3-->adequate  Friction and Shear: 3-->no apparent problem  Kyle Score: 21  Comments on skin integrity:     Nutrition Diagnosis  Overweight related to Excessive Caloric intake as evidenced by BMI 56.15  Comments: Recommend 2000 calorie consistent carb diet        Recent Labs   Lab 02/14/24  0547 02/14/24  0613 02/14/24  1002   *  --   --    POCGLU  --    < > 141*    < > = values in this interval not displayed.     Comments on Glucose: dx dm    Nutrition Prescription / Recommendations  Recommendation/Intervention: recommend to change diet to 2000 consistent carbohydrate diet  Goals: po intake %, gentle weight loss/maintenance during admit  Nutrition Goal Status: new  Current Diet Order: cardiac  Nutrition Order Comments: recommend 2000 consistent carbohydrate  Chewing or Swallowing Difficulty?: No Chewing or swallowing difficulty  Recommended Diet: Consistent Carbohydrate 2000 (75g Carbs)  Recommended Oral Supplement: No Oral Supplements  Is Nutrition Support Recommended: [facility] Support: No  Is Nutrition Education Recommended: No    Monitor and Evaluation  % current Intake: Unknown/ No P.O. intake documented  % intake to meet estimated needs: 75 - 100 %  Food and Nutrient Intake: energy intake, food and beverage intake  Food and Nutrient Adminstration: diet order  Anthropometric Measurements: height/length, weight, weight change, body mass index  Biochemical Data, Medical Tests and Procedures: electrolyte and renal panel, gastrointestinal profile, glucose/endocrine profile, inflammatory profile  Tolerance: tolerating    Current Medical Diagnosis and Past Medical History     Past Medical History:   Diagnosis Date    Acquired hypothyroidism     Atherosclerosis of native artery of extremity with intermittent claudication 01/30/2019    bilateral legs    BMI 50.0-59.9, adult  02/22/2021    Chronic pain syndrome     opioid depen    Congestive heart failure (CHF)     COPD (chronic obstructive pulmonary disease)     COVID-19 10/06/2023    Depression     Diabetes mellitus, type 2     followed by Aurea Kwong NP, [facility]    DVT of lower extremity, bilateral     Essential hypertension 06/08/2021    GERD (gastroesophageal reflux disease)     Gout 07/05/2023    Hyperlipidemia     Lumbosacral radiculopathy 06/05/2023    followed by GLENN Valdes, [facility]    Migraines     Nicotine dependence     Nicotine dependence     Obesity hypoventilation syndrome     chronic CO2 retainer with compensatory metabolic alkalosis    Osteoarthritis     Seizure disorder     Sleep apnea     on cpap    Thyroid cancer     Venous stasis ulcer limited to breakdown of skin with varicose veins     followed by Estuardo Zhao    Vitamin D deficiency        Nutrition/Diet History  Food Allergies: other (see comments) (avocado,banana,chocolate flavoring)  Factors Affecting Nutritional Intake: None identified at this time    Lab/Procedures/Meds  Recent Labs   Lab 02/14/24  0547      K 4.0   BUN 28*   CREATININE 1.23*   CALCIUM 10.1      BUN/Cr elevated  Last A1c:   Lab Results   Component Value Date    HGBA1C 6.2 11/03/2023     Lab Results   Component Value Date    RBC 7.95 (H) 02/13/2024    HGB 16.9 (H) 02/13/2024    HCT 57.8 (H) 02/13/2024    MCV 72.7 (L) 02/13/2024    MCH 21.3 (L) 02/13/2024    MCHC 29.2 (L) 02/13/2024    TIBC 432 11/04/2023     Pertinent Labs Reviewed: reviewed  Pertinent Medications Reviewed: reviewed  Scheduled Meds:   albuterol-ipratropium  3 mL Nebulization Q6H WAKE    allopurinoL  300 mg Oral Daily    apixaban  5 mg Oral BID    aspirin  81 mg Oral Daily    bumetanide  3 mg Intravenous Q12H    colchicine  0.6 mg Oral Daily    famotidine  20 mg Oral Daily    insulin detemir U-100  10 Units Subcutaneous Daily    lactulose  30 g Oral TID    levothyroxine  150 mcg Oral  "Before breakfast    nicotine  1 patch Transdermal Daily    NIFEdipine  60 mg Oral Daily    sodium chloride 3%  4 mL Nebulization BID     Continuous Infusions:  PRN Meds:.acetaminophen, acetaminophen, aluminum-magnesium hydroxide-simethicone, benzonatate, dextrose 10%, dextrose 10%, diphenhydrAMINE, glucagon (human recombinant), glucose, glucose, HYDROcodone-acetaminophen, insulin aspart U-100, magnesium oxide, magnesium oxide, melatonin, naloxone, ondansetron, polyethylene glycol, potassium bicarbonate, potassium bicarbonate, potassium bicarbonate, potassium, sodium phosphates, potassium, sodium phosphates, potassium, sodium phosphates, sodium chloride 0.9%      Anthropometrics  Temp: 98.3 °F (36.8 °C)  Height Method: Stated  Height: 5' 8" (172.7 cm)  Height (inches): 68 in  Weight Method: Bed Scale  Weight: (!) 167.5 kg (369 lb 4.3 oz)  Weight (lb): (!) 369.27 lb  Ideal Body Weight (IBW), Female: 140 lb  % Ideal Body Weight, Female (lb): 263.76 %  BMI (Calculated): 56.2  BMI Grade: greater than 40 - morbid obesity       Estimated/Assessed Needs       Temp: 98.3 °F (36.8 °C)Oral  Weight Used For Calorie Calculations: (!) 167.4 kg (369 lb)   Energy Need Method: other (see comments) (11-14 kcal/kg) Energy Calorie Requirements (kcal): 6240-9258  Weight Used For Protein Calculations: 63.5 kg (140 lb)  Protein Requirements: 51-64  Estimated Fluid Requirement Method: RDA Method    RDA Method (mL): 1841       Nutrition by Nursing  Diet/Nutrition Received: consistent carb/diabetic diet  Intake (%):  (NPO)  Diet/Feeding Assistance: none  Diet/Feeding Tolerance: good  Last Bowel Movement: 02/14/24                Nutrition Follow-Up  RD Follow-up?: Yes      Nutrition Discharge Planning: home with HH; recommend 2000 consistent carb diet on d/c          Available via Secure Chat  "

## 2024-02-14 NOTE — PLAN OF CARE
Problem: Adult Inpatient Plan of Care  Goal: Patient-Specific Goal (Individualized)  Outcome: Ongoing, Progressing  Goal: Absence of Hospital-Acquired Illness or Injury  Outcome: Ongoing, Progressing     Problem: Bariatric Environmental Safety  Goal: Safety Maintained with Care  Outcome: Ongoing, Progressing     Problem: Diabetes Comorbidity  Goal: Blood Glucose Level Within Targeted Range  Outcome: Ongoing, Progressing

## 2024-02-14 NOTE — RESPIRATORY THERAPY
Treatment given by RT student, Nisha Noble.     02/14/24 1257   Patient Assessment/Suction   Level of Consciousness (AVPU) alert   Respiratory Effort Unlabored   Expansion/Accessory Muscles/Retractions no use of accessory muscles;no retractions   All Lung Fields Breath Sounds Anterior:;Lateral:;equal bilaterally;clear   Rhythm/Pattern, Respiratory unlabored;pattern regular;depth regular;no shortness of breath reported   Cough Frequency no cough   PRE-TX-O2   Device (Oxygen Therapy) room air   SpO2 (!) 92 %   Pulse Oximetry Type Intermittent   Pulse 84   Resp 15   Positioning   Body Position position maintained   Head of Bed (HOB) Positioning HOB elevated   Aerosol Therapy   $ Aerosol Therapy Charges Aerosol Treatment   Daily Review of Necessity (SVN) completed   Respiratory Treatment Status (SVN) given   Treatment Route (SVN) oxygen;mask   Patient Position (SVN) HOB elevated   Post Treatment Assessment (SVN) breath sounds unchanged   Signs of Intolerance (SVN) none   Breath Sounds Post-Respiratory Treatment   Throughout All Fields Post-Treatment All Fields   Throughout All Fields Post-Treatment Anterior:;Lateral:;no change   Post-treatment Heart Rate (beats/min) 98   Post-treatment Resp Rate (breaths/min) 14

## 2024-02-14 NOTE — PLAN OF CARE
[facility] - [facility] Telemetry  Discharge Final Note    Primary Care Provider: Regan Frausto MD    Expected Discharge Date:     Final Discharge Note (most recent)       Final Note - 02/14/24 1454          Final Note    Assessment Type Final Discharge Note     Anticipated Discharge Disposition Home or Self Care        Post-Acute Status    Post-Acute Authorization HME     HME Status Pending Delivery     Discharge Delays Home Medical Equipment (Insurance, Delivery)                   Patient d/c home today with Home O2 through [facility]. No further needs.IMM obtained verbally.   Important Message from Medicare

## 2024-02-15 ENCOUNTER — PATIENT OUTREACH (OUTPATIENT)
Dept: ADMINISTRATIVE | Facility: CLINIC | Age: 64
End: 2024-02-15

## 2024-02-15 NOTE — NURSING
Discharged paperwork reviewed with patient, acknowledged understanding. All lines removed and patients belongings returned, patient waiting for transportation home.

## 2024-02-15 NOTE — PROGRESS NOTES
C3 nurse attempted to contact Holly Porter  for a TCC post hospital discharge follow up call. The patient is unable to complete the call @ this time. The patient requested a callback.    The patient does not have a scheduled HOSFU appointment within 5-7 days post hospital discharge date 2/14/24. Message sent to Physician staff to assist with HOSFU appointment scheduling.

## 2024-02-15 NOTE — PROGRESS NOTES
C3 nurse attempted to contact Holly Porter  for a TCC post hospital discharge follow up call. No answer. Left voicemail with callback information. The patient does not have a scheduled HOSFU appointment. Message sent to PCP staff for assistance with scheduling visit with patient.

## 2024-02-18 PROBLEM — J44.9 CHRONIC OBSTRUCTIVE PULMONARY DISEASE: Status: ACTIVE | Noted: 2023-11-03

## 2024-02-19 DIAGNOSIS — M47.817 LUMBOSACRAL SPONDYLOSIS WITHOUT MYELOPATHY: Primary | ICD-10-CM

## 2024-02-19 DIAGNOSIS — M25.561 CHRONIC PAIN OF RIGHT KNEE: ICD-10-CM

## 2024-02-19 DIAGNOSIS — I73.9 PVD (PERIPHERAL VASCULAR DISEASE): ICD-10-CM

## 2024-02-19 DIAGNOSIS — G89.29 CHRONIC PAIN OF RIGHT KNEE: ICD-10-CM

## 2024-02-19 RX ORDER — HYDROCODONE BITARTRATE AND ACETAMINOPHEN 10; 325 MG/1; MG/1
1 TABLET ORAL EVERY 6 HOURS PRN
Qty: 120 TABLET | Refills: 0 | Status: SHIPPED | OUTPATIENT
Start: 2024-02-19 | End: 2024-02-29 | Stop reason: SDUPTHER

## 2024-02-19 RX ORDER — HYDROCODONE BITARTRATE AND ACETAMINOPHEN 10; 325 MG/1; MG/1
1 TABLET ORAL EVERY 6 HOURS PRN
Qty: 120 TABLET | Refills: 0 | Status: SHIPPED | OUTPATIENT
Start: 2024-03-20 | End: 2024-02-21 | Stop reason: SDUPTHER

## 2024-02-19 NOTE — PROGRESS NOTES
Subjective:       Patient ID: Holly Porter is a 63 y.o. female.    Chief Complaint: Leg Pain, Hip Pain, and Foot Pain    Leg Pain     Hip Pain     Foot Pain  Associated symptoms include coughing.   Patient presents with severe shortness a breath dyspnea on exertion dyspnea at rest uncontrollable coughing this patient is having acute COPD and CHF exacerbation I will check a BNP D-dimer troponins chest x-ray basic metabolic panel she has severe pain in the left foot left foot is swollen will x-ray of the left foot.  Refer to cardiology and Pulmonary.  O2 sats have been low I am going to refill the prescription for home O2 at 2 L and I am going to refer her to emergency room today on potential hospitalization has been discussed with the patient  .    Current Medications:    Current Outpatient Medications:     ACCU-CHEK GUIDE GLUCOSE METER Misc, , Disp: , Rfl:     ACCU-CHEK GUIDE TEST STRIPS Strp, , Disp: , Rfl:     ACCU-CHEK SOFTCLIX LANCETS Misc, , Disp: , Rfl:     albuterol (PROVENTIL/VENTOLIN HFA) 90 mcg/actuation inhaler, Inhale 2 puffs into the lungs 4 (four) times daily. Rescue, Disp: 18 g, Rfl: 2    allopurinoL (ZYLOPRIM) 300 MG tablet, TAKE ONE TABLET EVERY DAY, Disp: 90 tablet, Rfl: 3    apixaban (ELIQUIS) 5 mg Tab, Take 1 tablet (5 mg total) by mouth 2 (two) times daily., Disp: 60 tablet, Rfl: 3    aspirin (ECOTRIN) 81 MG EC tablet, TAKE 1 TABLET BY MOUTH EVERY DAY, Disp: 90 tablet, Rfl: 1    BREO ELLIPTA 100-25 mcg/dose diskus inhaler, Inhale 1 puff into the lungs once daily., Disp: 60 each, Rfl: 0    calcium carbonate (OS-MICHAELA) 500 mg calcium (1,250 mg) tablet, Take 1 tablet (500 mg total) by mouth 2 (two) times daily., Disp: 60 tablet, Rfl: 3    cilostazoL (PLETAL) 100 MG Tab, Take 1 tablet (100 mg total) by mouth 2 (two) times daily., Disp: 90 tablet, Rfl: 1    colchicine (COLCRYS) 0.6 mg tablet, TAKE 1 TABLET BY MOUTH 3 TIMES A DAY FOR 2 DAYS THEN 1 TABLET DAILY UNTIL GONE, Disp: 30 tablet, Rfl: 2     DULoxetine (CYMBALTA) 30 MG capsule, TAKE 1 CAPSULE (30 MG TOTAL) BY MOUTH ONCE DAILY., Disp: 30 capsule, Rfl: 0    gabapentin (NEURONTIN) 600 MG tablet, Take 1 tablet (600 mg total) by mouth 3 (three) times daily., Disp: 180 tablet, Rfl: 2    insulin detemir U-100, Levemir, (LEVEMIR FLEXTOUCH U100 INSULIN) 100 unit/mL (3 mL) InPn pen, Inject 10 units into the skin every evening subcutaneous, Disp: 15 mL, Rfl: 1    levothyroxine (SYNTHROID) 150 MCG tablet, Take 1 tablet (150 mcg total) by mouth before breakfast., Disp: 90 tablet, Rfl: 1    metOLazone (ZAROXOLYN) 2.5 MG tablet, Take 1 tablet (2.5 mg total) by mouth once daily., Disp: 30 tablet, Rfl: 2    naloxone (NARCAN) 4 mg/actuation Spry, 1 spray once., Disp: , Rfl:     NIFEdipine (PROCARDIA-XL) 60 MG (OSM) 24 hr tablet, Take 1 tablet (60 mg total) by mouth once daily., Disp: 90 tablet, Rfl: 0    pantoprazole (PROTONIX) 40 MG tablet, Take 1 tablet (40 mg total) by mouth once daily., Disp: 90 tablet, Rfl: 1    polyethylene glycol (GLYCOLAX) 17 gram PwPk, Take 17 g by mouth once daily., Disp: , Rfl:     potassium chloride SA (K-DUR,KLOR-CON) 20 MEQ tablet, Take 1 tablet (20 mEq total) by mouth once daily., Disp: 90 tablet, Rfl: 1    QUEtiapine (SEROQUEL) 25 MG Tab, Take 1 tablet (25 mg total) by mouth once daily., Disp: 30 tablet, Rfl: 1    sertraline (ZOLOFT) 50 MG tablet, Take 1 tablet (50 mg total) by mouth once daily., Disp: 30 tablet, Rfl: 5    bumetanide (BUMEX) 2 MG tablet, Take 1 tablet (2 mg total) by mouth 2 (two) times daily., Disp: 60 tablet, Rfl: 0    carvediloL (COREG) 12.5 MG tablet, Take 0.5 tablets (6.25 mg total) by mouth 2 (two) times daily., Disp: 30 tablet, Rfl: 11    HYDROcodone-acetaminophen (NORCO)  mg per tablet, Take 1 tablet by mouth every 6 (six) hours as needed for Pain., Disp: 12 tablet, Rfl: 0    nicotine (NICODERM CQ) 14 mg/24 hr, Place 1 patch onto the skin once daily., Disp: 30 patch, Rfl: 1    tiotropium bromide (SPIRIVA  "RESPIMAT) 2.5 mcg/actuation inhaler, Inhale 1 puff into the lungs Daily. Controller, Disp: 4 g, Rfl: 0           Review of Systems   Respiratory:  Positive for cough and shortness of breath.    Musculoskeletal:  Positive for leg pain.                Vitals:    02/07/24 1052   BP: 135/78   BP Location: Left arm   Patient Position: Sitting   BP Method: Large (Automatic)   Pulse: 70   Resp: 18   Temp: 98 °F (36.7 °C)   TempSrc: Temporal   SpO2: 99%   Weight: (!) 167.4 kg (369 lb)   Height: 5' 8" (1.727 m)        Physical Exam  Vitals and nursing note reviewed.   Constitutional:       Appearance: Normal appearance. She is obese.   Cardiovascular:      Rate and Rhythm: Normal rate and regular rhythm.      Pulses: Normal pulses.      Heart sounds: Normal heart sounds.   Pulmonary:      Effort: Pulmonary effort is normal.      Breath sounds: Normal breath sounds.   Abdominal:      General: Abdomen is flat. Bowel sounds are normal.      Palpations: Abdomen is soft.   Musculoskeletal:         General: Normal range of motion.   Skin:     General: Skin is warm and dry.   Neurological:      General: No focal deficit present.      Mental Status: She is alert and oriented to person, place, and time. Mental status is at baseline.           Last Labs:     No results displayed because visit has over 200 results.      Admission on 02/02/2024, Discharged on 02/02/2024   Component Date Value    POC Glucose 02/02/2024 102        Last Imaging:  Cardiac catheterization    The estimated blood loss was none.    Low normal right and left sided filling pressures with moderate   pre-capillary pulmonary HTN; RA 6mmHg, PA 50/21/34mmHg, and PCWP 8mmHg and   PVR of 5.25 MONTILLA)  Low normal systemic flow estimations; CO/CI by Vicente is 5.9/2.2 and by TD   6.0/2.3    Plan:  Evaluate for pre-capillary causes of pulmonary HTN    The procedure log was documented by Documenter: Jerrica Larson and   verified by Mahad Moreno MD.    Date: 2/13/2024  " Time: 3:04 PM         **Labs and x-rays personally reviewed by me    ** reviewed      Objective:        Assessment:       1. Chronic obstructive pulmonary disease, unspecified COPD type  Ambulatory referral/consult to Pulmonology      2. Congestive heart failure, unspecified HF chronicity, unspecified heart failure type  Ambulatory referral/consult to Cardiology           Plan:         1. Chronic obstructive pulmonary disease, unspecified COPD type  -     Ambulatory referral/consult to Pulmonology; Future; Expected date: 02/14/2024    2. Congestive heart failure, unspecified HF chronicity, unspecified heart failure type  -     Ambulatory referral/consult to Cardiology; Future; Expected date: 02/14/2024    Other orders  -     bumetanide (BUMEX) 2 MG tablet; Take 1 tablet (2 mg total) by mouth 2 (two) times daily.  Dispense: 60 tablet; Refill: 0  -     tiotropium bromide (SPIRIVA RESPIMAT) 2.5 mcg/actuation inhaler; Inhale 1 puff into the lungs Daily. Controller  Dispense: 4 g; Refill: 0

## 2024-02-19 NOTE — TELEPHONE ENCOUNTER
JASE WASHBURN   02/19/2024   9:34 AM   Patient Raymond 10 canceled per Suzie Valente FNP will reorder.

## 2024-02-21 ENCOUNTER — OFFICE VISIT (OUTPATIENT)
Dept: PULMONOLOGY | Facility: CLINIC | Age: 64
End: 2024-02-21
Payer: MEDICARE

## 2024-02-21 VITALS
HEART RATE: 78 BPM | HEIGHT: 68 IN | DIASTOLIC BLOOD PRESSURE: 74 MMHG | WEIGHT: 293 LBS | RESPIRATION RATE: 20 BRPM | BODY MASS INDEX: 44.41 KG/M2 | OXYGEN SATURATION: 90 % | SYSTOLIC BLOOD PRESSURE: 142 MMHG

## 2024-02-21 DIAGNOSIS — F17.200 NICOTINE DEPENDENCE WITH CURRENT USE: Primary | ICD-10-CM

## 2024-02-21 DIAGNOSIS — I27.20 PULMONARY HYPERTENSION: ICD-10-CM

## 2024-02-21 DIAGNOSIS — G47.33 OSA (OBSTRUCTIVE SLEEP APNEA): ICD-10-CM

## 2024-02-21 DIAGNOSIS — J96.12 CHRONIC RESPIRATORY FAILURE WITH HYPOXIA AND HYPERCAPNIA: ICD-10-CM

## 2024-02-21 DIAGNOSIS — J96.11 CHRONIC RESPIRATORY FAILURE WITH HYPOXIA AND HYPERCAPNIA: ICD-10-CM

## 2024-02-21 DIAGNOSIS — J42 CHRONIC BRONCHITIS, UNSPECIFIED CHRONIC BRONCHITIS TYPE: ICD-10-CM

## 2024-02-21 PROCEDURE — 3008F BODY MASS INDEX DOCD: CPT | Mod: CPTII,,, | Performed by: STUDENT IN AN ORGANIZED HEALTH CARE EDUCATION/TRAINING PROGRAM

## 2024-02-21 PROCEDURE — 99215 OFFICE O/P EST HI 40 MIN: CPT | Mod: PBBFAC | Performed by: STUDENT IN AN ORGANIZED HEALTH CARE EDUCATION/TRAINING PROGRAM

## 2024-02-21 PROCEDURE — 3078F DIAST BP <80 MM HG: CPT | Mod: CPTII,,, | Performed by: STUDENT IN AN ORGANIZED HEALTH CARE EDUCATION/TRAINING PROGRAM

## 2024-02-21 PROCEDURE — 3077F SYST BP >= 140 MM HG: CPT | Mod: CPTII,,, | Performed by: STUDENT IN AN ORGANIZED HEALTH CARE EDUCATION/TRAINING PROGRAM

## 2024-02-21 PROCEDURE — 99407 BEHAV CHNG SMOKING > 10 MIN: CPT | Mod: S$PBB,,, | Performed by: STUDENT IN AN ORGANIZED HEALTH CARE EDUCATION/TRAINING PROGRAM

## 2024-02-21 PROCEDURE — 99214 OFFICE O/P EST MOD 30 MIN: CPT | Mod: S$PBB,25,, | Performed by: STUDENT IN AN ORGANIZED HEALTH CARE EDUCATION/TRAINING PROGRAM

## 2024-02-21 PROCEDURE — 1159F MED LIST DOCD IN RCRD: CPT | Mod: CPTII,,, | Performed by: STUDENT IN AN ORGANIZED HEALTH CARE EDUCATION/TRAINING PROGRAM

## 2024-02-21 PROCEDURE — 1111F DSCHRG MED/CURRENT MED MERGE: CPT | Mod: CPTII,,, | Performed by: STUDENT IN AN ORGANIZED HEALTH CARE EDUCATION/TRAINING PROGRAM

## 2024-02-21 RX ORDER — TIOTROPIUM BROMIDE INHALATION SPRAY 3.12 UG/1
2 SPRAY, METERED RESPIRATORY (INHALATION) DAILY
Qty: 4 G | Refills: 11 | Status: SHIPPED | OUTPATIENT
Start: 2024-02-21

## 2024-02-21 RX ORDER — ALBUTEROL SULFATE 0.83 MG/ML
2.5 SOLUTION RESPIRATORY (INHALATION) EVERY 6 HOURS PRN
Qty: 300 ML | Refills: 11 | Status: SHIPPED | OUTPATIENT
Start: 2024-02-21 | End: 2025-02-20

## 2024-02-21 RX ORDER — VARENICLINE TARTRATE 0.5 (11)-1
KIT ORAL
Qty: 1 EACH | Refills: 0 | Status: SHIPPED | OUTPATIENT
Start: 2024-02-21 | End: 2024-03-25

## 2024-02-22 DIAGNOSIS — J96.11 CHRONIC RESPIRATORY FAILURE WITH HYPOXIA: Primary | ICD-10-CM

## 2024-02-22 NOTE — PROGRESS NOTES
[facility]  Pulmonology  NEW VISIT     Patient Name:  Holly Porter  Primary Care Provider: Regan Frausto MD  Date of Service: 02/21/2024  Reason for Referral: COPD      Chief Complaint: Shortness of breath    SUBJECTIVE   HPI:  Holly Porter is a 63 y.o. female with JOVANNI/OSH on PAP,  who presents today upon referral with complaints of shortness of breath.     German reports feeling well on this evaluation.  She has shortness of breath that is present with exertion.  She currently use and using her oxygen during this visit as her son who drove her here was concerned that it could affect his car.  She reports using her oxygen at home.  She is using her nightly PAP device with nasal pillows.  She has no cough.  She reports using her Spiriva which she needs a refill.   She was recently admitted 0207-14 with shortness of breath where she was found to be fluid overloaded.  For management she underwent diuresis the transitioned to Bumex from Lasix.  She had improvement in her respiratory status and while she was admitted on high-flow nasal cannula was deescalated to low-flow nasal cannula.  She also underwent right heart catheterization and was discharged home on PAP therapy.        Past Medical History:   Diagnosis Date    Acquired hypothyroidism     Atherosclerosis of native artery of extremity with intermittent claudication 01/30/2019    bilateral legs    BMI 50.0-59.9, adult 02/22/2021    Chronic pain syndrome     opioid depen    Congestive heart failure (CHF)     COPD (chronic obstructive pulmonary disease)     COVID-19 10/06/2023    Depression     Diabetes mellitus, type 2     followed by Aurea Kwong NP, [facility]    DVT of lower extremity, bilateral     Essential hypertension 06/08/2021    GERD (gastroesophageal reflux disease)     Gout 07/05/2023    Hyperlipidemia     Lumbosacral radiculopathy 06/05/2023    followed by GLENN Valdes, [facility]    Migraines     Nicotine  dependence     Nicotine dependence     Obesity hypoventilation syndrome     chronic CO2 retainer with compensatory metabolic alkalosis    Osteoarthritis     Seizure disorder     Sleep apnea     on cpap    Thyroid cancer     Venous stasis ulcer limited to breakdown of skin with varicose veins     followed by Estuardo Zhao    Vitamin D deficiency        Past Surgical History:   Procedure Laterality Date    ABCESS DRAINAGE      HYSTERECTOMY      ILIAC ARTERY STENT Bilateral 01/28/2020    Bilateral distal aorta and common iliac 8 X 59 vbx covered stents performed by Dr. Antonio Jacinto.    LEFT HEART CATHETERIZATION Left 11/6/2023    Procedure: Left heart cath;  Surgeon: Alex Ortega DO;  Location: [facility] CATH LAB;  Service: Cardiology;  Laterality: Left;    RADIOFREQUENCY ABLATION Left 04/15/2022    Procedure: Left calf  Radiofrequency Ablation;  Surgeon: Matty Falcon DO;  Location: [facility] OR;  Service: Vascular;  Laterality: Left;    RIGHT HEART CATHETERIZATION Right 2/13/2024    Procedure: INSERTION, CATHETER, RIGHT HEART;  Surgeon: Mahad Moreno MD;  Location: [facility] CATH LAB;  Service: Cardiology;  Laterality: Right;    STAB PHLEBECTOMY OF VARICOSE VEINS Left 01/25/2013    Left leg microphlebectomies x 23 stab avulsions and ligation of multiple varicose veins perrformed by Dr. Cirilo Aguirre.    THYROIDECTOMY      hx: thyroid cancer    TOE AMPUTATION Left 01/28/2020    2nd, 4th and 5th performed by Dr. Anam Saeed.    TOE AMPUTATION Right 01/28/2020    4th toe performed by Dr. Anam Saeed.    TOTAL ABDOMINAL HYSTERECTOMY W/ BILATERAL SALPINGOOPHORECTOMY      TUBAL LIGATION      VAGINAL DELIVERY      x 4    VENOUS ABLATION Right 08/09/2019    GSV Varithena Ablation performed by Dr. Estuardo Falcon    VENOUS ABLATION Left 08/02/2019    ATAV Varithena Ablation performed by Dr. Estuardo Falcon.    VENOUS ABLATION Right 07/13/2015    ATAV Laser Ablatio performed by Dr. Cirilo Aguirre.    VENOUS ABLATION  Right 07/06/2015    Distal  GSV Laser Ablation performed by dr. Cirilo Aguirre.    VENOUS ABLATION Left 04/20/2015    Left Distal GSV Laser Ablation performed by dr. Cirilo Aguirre.    VENOUS ABLATION Right 10/28/2013    Right Distal GSV RF Ablation performed by Dr. Cirilo Aguirre.    VENOUS ABLATION Left 10/25/2013    SSV RF Ablation performed by dr. Cirilo Aguirre.    VENOUS ABLATION Left 10/07/2013    Left GSV and Left ATAV RF Ablation performed by Dr. Cirilo Aguirre.    VENOUS ABLATION Right 01/21/2013    GSV RF Ablation w/micros x 22 performed by Dr. Cirilo Aguirre.    VENOUS ABLATION Left 06/25/2021    Left distal GSV Varithena Ablation       Family History   Problem Relation Age of Onset    Heart disease Mother         age 84 CHF    Hypertension Mother     Osteoarthritis Mother     Coronary aneurysm Father     Coronary artery disease Father     Hypertension Father     Heart disease Father     No Known Problems Son     No Known Problems Son     No Known Problems Son     No Known Problems Son     No Known Problems Sister     No Known Problems Brother         hx: varicose veins    No Known Problems Brother         MVA: parlazed    No Known Problems Brother         Social History     Socioeconomic History    Marital status:    Tobacco Use    Smoking status: Every Day     Current packs/day: 0.50     Average packs/day: 0.5 packs/day for 33.0 years (16.5 ttl pk-yrs)     Types: Cigarettes     Passive exposure: Current    Smokeless tobacco: Never    Tobacco comments:     5 cigarettes a day    Substance and Sexual Activity    Alcohol use: Never    Drug use: Never    Sexual activity: Not Currently     Social Determinants of Health     Financial Resource Strain: Low Risk  (2/9/2024)    Overall Financial Resource Strain (CARDIA)     Difficulty of Paying Living Expenses: Not hard at all   Food Insecurity: No Food Insecurity (2/9/2024)    Hunger Vital Sign     Worried About Running Out of Food in the Last Year: Never true     Ran Out of Food in the  Last Year: Never true   Transportation Needs: Unmet Transportation Needs (2/9/2024)    PRAPARE - Transportation     Lack of Transportation (Medical): Yes     Lack of Transportation (Non-Medical): Yes   Physical Activity: Inactive (2/9/2024)    Exercise Vital Sign     Days of Exercise per Week: 0 days     Minutes of Exercise per Session: 0 min   Stress: No Stress Concern Present (2/9/2024)    [place] [employer] - Occupational Stress Questionnaire     Feeling of Stress : Not at all   Social Connections: Socially Isolated (2/9/2024)    Social Connection and Isolation Panel [NHANES]     Frequency of Communication with Friends and Family: Never     Frequency of Social Gatherings with Friends and Family: Three times a week     Attends Jainism Services: Never     Attends Club or Organization Meetings: Never     Marital Status:    Housing Stability: Low Risk  (2/9/2024)    Housing Stability Vital Sign     Unable to Pay for Housing in the Last Year: No     Number of Places Lived in the Last Year: 1     Unstable Housing in the Last Year: No       Social History     Social History Narrative    Not on file       Review of patient's allergies indicates:   Allergen Reactions    Ammonium peroxydisulfate Shortness Of Breath    Avocado (laurus persea) Anaphylaxis    Bananas [banana] Anaphylaxis and Swelling     CRAMPS,    Chocolate flavor Anaphylaxis     MOUTH SWELLING    Fentanyl Shortness Of Breath and Itching    Nicoderm Swelling    Percocet [oxycodone-acetaminophen] Shortness Of Breath and Itching    Silvadene [silver sulfadiazine]     Clindamycin     Corticosteroids (glucocorticoids)     Hydrocortisone Blisters    Lasix [furosemide] Blisters     BURNS SKIN    Pregabalin     Adhesive Rash and Blisters    Iodine Rash    Latex, natural rubber Rash    Pcn [penicillins] Rash    Sulfa (sulfonamide antibiotics) Rash and Blisters        Medications: Medications reviewed to include over the counter  "medications.    Review of Systems: A focused ROS was completed and found to be negative except for that mentioned above.      OBJECTIVE   PHYSICAL EXAM:  Vitals:    02/21/24 1438 02/21/24 1451   BP: (!) 142/74    BP Location: Right forearm    Patient Position: Sitting    BP Method: Large (Manual)    Pulse: (!) 114 78   Resp: 20    SpO2: (!) 86% (!) 90%   Weight: (!) 153.4 kg (338 lb 3.2 oz)    Height: 5' 8" (1.727 m)         GENERAL: NAD  HEENT: normocephalic, non-icteric conjunctivae, moist oral mucosa  RESPIRATORY: diminished breath sounds, otherwise clear to auscultation, no wheezing, rales or rhonchi, on RA  CARDIOVASCULAR: Regular rate and rhythm, no murmurs rubs or gallops.  SKIN: no rash, jaundice, ecchymosis or ulcers  MUSCULOSKELETAL: No clubbing or cyanosis; b/l LE edema 1+ extending to mid shin  NEUROLOGIC: AO ×3, no gross deficits    LABS:  Lab studies reviewed and notable for ABG 02/2024: 7.40/53/49/32.8/84    IMAGING:  CT-PE 02/2024:  FINDINGS:  No thrombus or other abnormality is identified in the pulmonary arteries or veins.  The pulmonary vessel caliber is within normal limits.  The cardiac size is enlarged.  Otherwise heart mediastinum and great vessels appear within normal limits.     Small amount airspace density both lungs more prominent in the right lung and mostly in the dependent lungs.  Remaining pulmonary.  Small amount parenchyma shows no evidence of airspace disease or abnormal density.  No effusion or pneumothorax is present.    RHC 02/2024: RA 6/4/3, RV 50/1, mPAP 34, PCWP 8, PVR 5.2 (Td), CO/CI 5.98/2.26 (Td)    TTE:  02/2024: LVEF 60%, diastolic dysfunction present, mild RV enlargement, normal LA size, RA dilated, trace MR, mod TR, PASP 50    LHC 11/2023: no CAD, angiographically normal coronaries     LUNG FUNCTION TESTING:      SPIROMETRY/PFT:  02/2022 Pre Post   FVC 2.42 2.42   FEV1 1.95 1.95   FEV1/FVC 81 81   TLC 5.39    FRC  3.32    RV 2.97    DLCO 14.36      ASSESSMENT & PLAN "     1. Nicotine dependence with current use  Assessment & Plan:  Dangers of cigarette smoking were reviewed with patient in detail. Patient was Counseled for 10 minutes or greater. Nicotine replacement options were discussed. Nicotine replacement was discussed- prescribed. In action stage.     Orders:  -     varenicline (CHANTIX STARTING MONTH BOX) 0.5 mg (11)- 1 mg (42) tablet; Take one 0.5mg tab by mouth once daily X3 days,then increase to one 0.5mg tab twice daily X4 days,then increase to one 1mg tab twice daily  Dispense: 1 each; Refill: 0    2. Chronic bronchitis, unspecified chronic bronchitis type  Assessment & Plan:  This patient has no obstruction on prior PFTs. She reports improvement in her respiratory symptoms with Spiriva and albuterol nebulizer PRN. Refills provided. Will f/u on PFT.     Orders:  -     NEBULIZER FOR HOME USE  -     tiotropium bromide (SPIRIVA RESPIMAT) 2.5 mcg/actuation inhaler; Inhale 2 puffs into the lungs Daily. Controller  Dispense: 4 g; Refill: 11  -     Complete PFT w/ bronchodilator; Future  -     albuterol (PROVENTIL) 2.5 mg /3 mL (0.083 %) nebulizer solution; Take 3 mLs (2.5 mg total) by nebulization every 6 (six) hours as needed for Wheezing. Rescue  Dispense: 300 mL; Refill: 11  -     Stress test, pulmonary; Future    3. Chronic respiratory failure with hypoxia and hypercapnia  Assessment & Plan:  Patient with chronic hypercapnic and hypoxic respiratory failure on home oxygen at 3L and presents today off O2 therapy. CT-PE 02/2024 negative for VTE. Notably, she has PVD which also limits her SpO2 evaluation. Will obtain 6MWT and ABG  for assessment. Encouraged adherence with oxygen therapy.    Orders:  -     Stress test, pulmonary; Future  -     Arterial blood gas; Future    4. JOVANNI (obstructive sleep apnea)  Assessment & Plan:  Will obtain compliance report on follow up visit. This will guide future re-testing for PH.      5. Pulmonary hypertension  Assessment & Plan:  RHC  with precapillary pulmonary hypertension where mPAP 34, PCWP 8, PVR 5.2 (Td), CO/CI 5.98/2.26 (Td) in this patient with untreated JOVANNI at time of testing. History is notable for PE in 2022; CT-PE imaging thereafter with no webs or evidence of organization and negative for acute PE. Plan for optimization of treatment of JOVANNI (on PAP therapy at home, pending adherence report) and hypoxia.   - cont Bumex BID             Follow up in about 4 weeks (around 3/20/2024) for Routine follow up.      Case was discussed with patient; all questions were answered to patient's satisfaction and patient verbalized understanding.     Missy Godoy MD  Pulmonary Medicine  [facility]  Phone: [phone]

## 2024-02-23 PROBLEM — J41.8 MIXED SIMPLE AND MUCOPURULENT CHRONIC BRONCHITIS: Status: ACTIVE | Noted: 2023-11-03

## 2024-02-23 PROBLEM — J96.11 CHRONIC RESPIRATORY FAILURE WITH HYPOXIA AND HYPERCAPNIA: Status: ACTIVE | Noted: 2024-02-23

## 2024-02-23 PROBLEM — J41.0 SIMPLE CHRONIC BRONCHITIS: Status: ACTIVE | Noted: 2023-11-03

## 2024-02-23 PROBLEM — J96.01 ACUTE HYPOXEMIC RESPIRATORY FAILURE: Status: RESOLVED | Noted: 2024-02-07 | Resolved: 2024-02-23

## 2024-02-23 PROBLEM — J96.12 CHRONIC RESPIRATORY FAILURE WITH HYPOXIA AND HYPERCAPNIA: Status: ACTIVE | Noted: 2024-02-23

## 2024-02-23 NOTE — ASSESSMENT & PLAN NOTE
This patient has no obstruction on prior PFTs. She reports improvement in her respiratory symptoms with Spiriva and albuterol nebulizer PRN. Refills provided. Will f/u on PFT.

## 2024-02-23 NOTE — ASSESSMENT & PLAN NOTE
RHC with precapillary pulmonary hypertension where mPAP 34, PCWP 8, PVR 5.2 (Td), CO/CI 5.98/2.26 (Td) in this patient with untreated JOVANNI at time of testing. History is notable for PE in 2022; CT-PE imaging thereafter with no webs or evidence of organization and negative for acute PE. Plan for optimization of treatment of JOVANNI (on PAP therapy at home, pending adherence report) and hypoxia.   - cont Bumex BID

## 2024-02-23 NOTE — ASSESSMENT & PLAN NOTE
Patient with chronic hypercapnic and hypoxic respiratory failure on home oxygen at 3L and presents today off O2 therapy. CT-PE 02/2024 negative for VTE. Notably, she has PVD which also limits her SpO2 evaluation. Will obtain 6MWT and ABG  for assessment. Encouraged adherence with oxygen therapy.

## 2024-02-23 NOTE — ASSESSMENT & PLAN NOTE
>>ASSESSMENT AND PLAN FOR PULMONARY HYPERTENSION WRITTEN ON 2/23/2024 12:01 PM BY ZEYAD BLAIR MD    [facility] with precapillary pulmonary hypertension where mPAP 34, PCWP 8, PVR 5.2 (Td), CO/CI 5.98/2.26 (Td) in this patient with untreated JOVANNI at time of testing. History is notable for PE in 2022; CT-PE imaging thereafter with no webs or evidence of organization and negative for acute PE. Plan for optimization of treatment of JOVANNI (on PAP therapy at home, pending adherence report) and hypoxia.   - cont Bumex BID

## 2024-02-23 NOTE — ASSESSMENT & PLAN NOTE
Dangers of cigarette smoking were reviewed with patient in detail. Patient was Counseled for 10 minutes or greater. Nicotine replacement options were discussed. Nicotine replacement was discussed- prescribed. In action stage.

## 2024-02-24 NOTE — ASSESSMENT & PLAN NOTE
Patient's COPD is with exacerbation noted by continued dyspnea, use of accessory muscles for breathing, and worsening of baseline hypoxia currently.  Patient is currently off COPD Pathway. Continue scheduled inhalers Steroids, Antibiotics, and Supplemental oxygen and monitor respiratory status closely.     2/8: continue duonebs, steroids, antibiotics.      2/11: mgmt per pulmonology.  Still requiring high oxygen.   2/14 - Weaned to 2 liters.

## 2024-02-24 NOTE — ASSESSMENT & PLAN NOTE
>>ASSESSMENT AND PLAN FOR PULMONARY HYPERTENSION WRITTEN ON 2/24/2024  5:04 PM BY ELIZABETH HINSON JR., MD    2/14 - Evidence for precapillary HTN on RHC.

## 2024-02-24 NOTE — HOSPITAL COURSE
Patient is a 62 y/o AA female on 2/7/24 with acute on chronic hypoxic and hypercapnic respiratory failure.  Pulmonary was consulted to assist with management. Her care is complicated by BMI of 51, OHS and suspected JOVANNI and prior PE.  It was felt her major issue was decompensated diastolic HF. She was diuresed and improved.  A right heart cath was performed and showed evidence for precapillary pulmonary HTN. Report below:     Low normal systemic flow estimations; CO/CI   The estimated blood loss was none.       2. Low normal right and left sided filling pressures with moderate pre-capillary pulmonary HTN; RA 6mmHg, PA 50/21/34mmHg, and PCWP 8mmHg and PVR of 5.25 MONTILLA)by Vicente is 5.9/2.2 and by TD 6.0/2.3        Previous LHC in Nov 23 showed nl coronary arteries, Echo done during this admission as below; .           Left Ventricle: The left ventricle is normal in size. There is mild concentric hypertrophy. There is normal systolic function with a visually estimated ejection fraction of 55 - 70%. Ejection fraction by visual approximation is 60%. There is diastolic dysfunction.    Right Ventricle: Mild right ventricular enlargement.    Right Atrium: Right atrium is mildly dilated.    Aortic Valve: The aortic valve is a trileaflet valve. Mildly calcified cusps.    Mitral Valve: There is mild bileaflet sclerosis.    Tricuspid Valve: There is mild to moderate regurgitation.    IVC/SVC: Elevated venous pressure at 15 mmHg.    Pericardium: There is a trivial effusion.        Following diuresis patient was able to be weaned to her home 2liter NC and was felt ready for discharge.  Pulmonary will arrange follow up as outpatient.

## 2024-02-24 NOTE — ASSESSMENT & PLAN NOTE
"Patient's FSGs are controlled on current medication regimen.  Last A1c reviewed-   Lab Results   Component Value Date    HGBA1C 6.2 11/03/2023     Most recent fingerstick glucose reviewed-   No results for input(s): "POCTGLUCOSE" in the last 24 hours.    Current correctional scale  Low  Maintain anti-hyperglycemic dose as follows-   Antihyperglycemics (From admission, onward)      None          Hold Oral hypoglycemics while patient is in the hospital.    Would benefit from SGLT2 from HF standpoint. Overall may benefit from GLP1 drug to assist with weight reduction.   "

## 2024-02-24 NOTE — ASSESSMENT & PLAN NOTE
Body mass index is 56.15 kg/m². Morbid obesity complicates all aspects of disease management from diagnostic modalities to treatment. Weight loss encouraged and health benefits explained to patient.

## 2024-02-24 NOTE — ASSESSMENT & PLAN NOTE
"Patient is identified as having Diastolic (HFpEF) heart failure that is Acute on chronic. CHF is currently uncontrolled due to Dyspnea not returned to baseline after 1 doses of IV diuretic. Latest ECHO performed and demonstrates- Results for orders placed during the hospital encounter of 11/03/23 2/8: continue IV lasix, a high risk medication, and monitor renal function carefully.      2/12: plan for RHC tomorrow.      2/14 - diastolic HF. Home with diuretics.         Echo    Interpretation Summary    Left Ventricle: The left ventricle is normal in size. Normal wall thickness. Normal wall motion. There is normal systolic function with a visually estimated ejection fraction of 60 - 65%. Ejection fraction by visual approximation is 50%. There is normal diastolic function.    Right Ventricle: Moderate right ventricular enlargement. Systolic function is normal.    Left Atrium: Left atrium is mildly dilated.    Right Atrium: Right atrium is moderately dilated.    Aortic Valve: The aortic valve is a trileaflet valve.    Tricuspid Valve: There is mild regurgitation. No paravalvular regurgitation.  . Continue Beta Blocker, Furosemide, and Aldactone and monitor clinical status closely. Monitor on telemetry. Patient is off CHF pathway.  Monitor strict Is&Os and daily weights.  Place on fluid restriction of 1 L. Cardiology has not been consulted. Continue to stress to patient importance of self efficacy and  on diet for CHF. Last BNP reviewed- and noted below No results for input(s): "BNP", "BNPTRIAGEBLO" in the last 168 hours.  "

## 2024-02-24 NOTE — DISCHARGE SUMMARY
[facility] - [facility] Hospital Medicine  Discharge Summary      Patient Name: Holly Porter  MRN: [id number]  EDGARD: [id number]  Patient Class: IP- Inpatient  Admission Date: 2/7/2024  Hospital Length of Stay: 7 days  Discharge Date and Time: 2/14/2024  8:17 PM  Attending Physician: No att. providers found   Discharging Provider: Regan Glover Jr, MD  Primary Care Provider: Regan Frausto MD    Primary Care Team: Networked reference to record PCT     HPI:   Ms. Holly Porter is a 63-year-old woman history of COPD on home O2 with tobacco use, diastolic heart failure, thyroid disease, class 3 obesity with JOVANNI, likely OHS, type 2 diabetes with diabetic ulcer s/p left toe amputation, prior DVT and PE on home Eliquis, PVD s/p R femoral stent, seizures who presents with worsening shortness of breath and bilateral leg pain.  She states that she developed severe respiratory distress at home and her inhalers and other medications were not adequate to improve her distress.  In addition, she reports progressive lower extremity swelling and bilateral leg pain and makes it difficult for her to ambulate.    In the emergency department she had hypoxia to the low 70s.  She was on a Ventimask and then switched to BiPAP.  Case was discussed with pulmonology and it was decided for patient to be on high-flow.  Patient stated that she is very happy with this as she would love to eat dinner and she sometimes feels that she is suffocating and the mass that is why she rarely wears her CPAP at home because she does not think she has appropriate mask.    ED course:   Initial Vitals   BP Pulse Resp Temp SpO2   02/07/24 1152 02/07/24 1152 02/07/24 1209 02/07/24 1152 02/07/24 1152   (!) 150/81 96 (!) 26 98.1 °F (36.7 °C) (!) 75 %                       Prior hospitalization: Visits for shortness of breath.  Hospitalized 11/2023 with CHF/COPD exacerbations.   [facility] 11/2023: minimal CAD. Echo: EF  50%    Procedure(s) (LRB):  INSERTION, CATHETER, RIGHT HEART (Right)      Hospital Course:   Patient is a 64 y/o AA female on 2/7/24 with acute on chronic hypoxic and hypercapnic respiratory failure.  Pulmonary was consulted to assist with management. Her care is complicated by BMI of 51, OHS and suspected JOVANNI and prior PE.  It was felt her major issue was decompensated diastolic HF. She was diuresed and improved.  A right heart cath was performed and showed evidence for precapillary pulmonary HTN. Report below:     Low normal systemic flow estimations; CO/CI   The estimated blood loss was none.       2. Low normal right and left sided filling pressures with moderate pre-capillary pulmonary HTN; RA 6mmHg, PA 50/21/34mmHg, and PCWP 8mmHg and PVR of 5.25 MONTILLA)by Vicente is 5.9/2.2 and by TD 6.0/2.3        Previous LHC in Nov 23 showed nl coronary arteries, Echo done during this admission as below; .           Left Ventricle: The left ventricle is normal in size. There is mild concentric hypertrophy. There is normal systolic function with a visually estimated ejection fraction of 55 - 70%. Ejection fraction by visual approximation is 60%. There is diastolic dysfunction.    Right Ventricle: Mild right ventricular enlargement.    Right Atrium: Right atrium is mildly dilated.    Aortic Valve: The aortic valve is a trileaflet valve. Mildly calcified cusps.    Mitral Valve: There is mild bileaflet sclerosis.    Tricuspid Valve: There is mild to moderate regurgitation.    IVC/SVC: Elevated venous pressure at 15 mmHg.    Pericardium: There is a trivial effusion.        Following diuresis patient was able to be weaned to her home 2liter NC and was felt ready for discharge.  Pulmonary will arrange follow up as outpatient.      Goals of Care Treatment Preferences:  Code Status: Full Code      Consults:   Consults (From admission, onward)          Status Ordering Provider     Inpatient consult to Social Work  Once        Provider:   (Not yet assigned)    Completed CAM DAWSON     Inpatient consult to Cardiology  Once        Provider:  Norm Riggins MD    Completed CAM DAWSON     Inpatient consult to Pulmonology  Once        Provider:  Missy Godoy MD    Completed CAM DAWSON            Pulmonary  Obesity hypoventilation syndrome  Body mass index is 56.15 kg/m². Morbid obesity complicates all aspects of disease management from diagnostic modalities to treatment. Weight loss encouraged and health benefits explained to patient.         Mixed simple and mucopurulent chronic bronchitis  Patient's COPD is with exacerbation noted by continued dyspnea, use of accessory muscles for breathing, and worsening of baseline hypoxia currently.  Patient is currently off COPD Pathway. Continue scheduled inhalers Steroids, Antibiotics, and Supplemental oxygen and monitor respiratory status closely.     2/8: continue duonebs, steroids, antibiotics.      2/11: mgmt per pulmonology.  Still requiring high oxygen.   2/14 - Weaned to 2 liters.     Cardiac/Vascular  Pulmonary hypertension  2/14 - Evidence for precapillary HTN on RHC.        Peripheral arterial disease with history of revascularization  Status post femoral stent.  CTA with runoff done showed no significant obstruction.       Acute on chronic diastolic CHF (congestive heart failure)  Patient is identified as having Diastolic (HFpEF) heart failure that is Acute on chronic. CHF is currently uncontrolled due to Dyspnea not returned to baseline after 1 doses of IV diuretic. Latest ECHO performed and demonstrates- Results for orders placed during the hospital encounter of 11/03/23 2/8: continue IV lasix, a high risk medication, and monitor renal function carefully.      2/12: plan for RHC tomorrow.      2/14 - diastolic HF. Home with diuretics.         Echo    Interpretation Summary    Left Ventricle: The left ventricle is normal in size. Normal wall thickness. Normal wall motion.  "There is normal systolic function with a visually estimated ejection fraction of 60 - 65%. Ejection fraction by visual approximation is 50%. There is normal diastolic function.    Right Ventricle: Moderate right ventricular enlargement. Systolic function is normal.    Left Atrium: Left atrium is mildly dilated.    Right Atrium: Right atrium is moderately dilated.    Aortic Valve: The aortic valve is a trileaflet valve.    Tricuspid Valve: There is mild regurgitation. No paravalvular regurgitation.  . Continue Beta Blocker, Furosemide, and Aldactone and monitor clinical status closely. Monitor on telemetry. Patient is off CHF pathway.  Monitor strict Is&Os and daily weights.  Place on fluid restriction of 1 L. Cardiology has not been consulted. Continue to stress to patient importance of self efficacy and  on diet for CHF. Last BNP reviewed- and noted below No results for input(s): "BNP", "BNPTRIAGEBLO" in the last 168 hours.    Hematology  Pulmonary embolism  History of pulmonary embolism and DVT and patient currently on home Eliquis.    Given hypoxia and elevated D-dimer CTA was ordered.  Follow up result.  Lower extremity Dopplers ordered as well.    2/8: negative dopplers.  F/u CTA chest and bilateral lower extremity.   Continue home eliquis.        Endocrine  Class 3 severe obesity with body mass index (BMI) of 50.0 to 59.9 in adult  Body mass index is 56.15 kg/m². Morbid obesity complicates all aspects of disease management from diagnostic modalities to treatment. Weight loss encouraged and health benefits explained to patient.    2/11: ozempic requested.          Type 2 diabetes mellitus without complication, with long-term current use of insulin  Patient's FSGs are controlled on current medication regimen.  Last A1c reviewed-   Lab Results   Component Value Date    HGBA1C 6.2 11/03/2023     Most recent fingerstick glucose reviewed-   No results for input(s): "POCTGLUCOSE" in the last 24 " hours.    Current correctional scale  Low  Maintain anti-hyperglycemic dose as follows-   Antihyperglycemics (From admission, onward)      None          Hold Oral hypoglycemics while patient is in the hospital.    Would benefit from SGLT2 from HF standpoint. Overall may benefit from GLP1 drug to assist with weight reduction.     Other  JOVANNI (obstructive sleep apnea)        Nicotine dependence with current use  Dangers of cigarette smoking were reviewed with patient in detail. Patient was Counseled for 10 minutes or greater. Nicotine replacement options were discussed. Nicotine replacement was discussed- prescribed      Final Active Diagnoses:    Diagnosis Date Noted POA    Pulmonary hypertension [I27.20] 02/10/2024 Yes    JOVANNI (obstructive sleep apnea) [G47.33] 02/10/2024 Yes    Pulmonary embolism [I26.99] 02/07/2024 Yes    Peripheral arterial disease with history of revascularization [I73.9, Z98.890] 02/07/2024 Not Applicable    Mixed simple and mucopurulent chronic bronchitis [J41.8] 11/03/2023 Yes    Acute on chronic diastolic CHF (congestive heart failure) [I50.33] 11/03/2023 Yes    Type 2 diabetes mellitus without complication, with long-term current use of insulin [E11.9, Z79.4] 11/03/2023 Not Applicable    Class 3 severe obesity with body mass index (BMI) of 50.0 to 59.9 in adult [E66.01, Z68.43] 11/03/2023 Not Applicable    Obesity hypoventilation syndrome [E66.2] 11/03/2023 Yes    Nicotine dependence with current use [F17.200] 11/03/2023 Yes    Migraine [G43.909] 11/03/2023 Yes      Problems Resolved During this Admission:    Diagnosis Date Noted Date Resolved POA    PRINCIPAL PROBLEM:  Acute hypoxemic respiratory failure [J96.01] 02/07/2024 02/23/2024 Yes       Discharged Condition: good    Disposition: Home or Self Care    Follow Up:   Follow-up Information       Regan Frausto MD Follow up in 1 week(s).    Specialties: Internal Medicine, Family Medicine, Hospitalist  Contact information:  [address]  [place]  [facility] [address]  [phone]               Missy Godoy MD Follow up in 2 week(s).    Specialty: Pulmonary Disease  Contact information:  [address] [facility] [address]  [phone]               Suzie Valente FNP Follow up in 3 week(s).    Specialty: Cardiology  Contact information:  [address] [facility] [address]  [phone]                           Patient Instructions:   No discharge procedures on file.    Significant Diagnostic Studies: N/A    Pending Diagnostic Studies:       None           Medications:  Reconciled Home Medications:      Medication List        CONTINUE taking these medications      ACCU-CHEK GUIDE GLUCOSE METER Misc  Generic drug: blood-glucose meter     ACCU-CHEK GUIDE TEST STRIPS Strp  Generic drug: blood sugar diagnostic     ACCU-CHEK SOFTCLIX LANCETS Misc  Generic drug: lancets     albuterol 90 mcg/actuation inhaler  Commonly known as: PROVENTIL/VENTOLIN HFA  Inhale 2 puffs into the lungs 4 (four) times daily. Rescue     allopurinoL 300 MG tablet  Commonly known as: ZYLOPRIM  TAKE ONE TABLET EVERY DAY     apixaban 5 mg Tab  Commonly known as: ELIQUIS  Take 1 tablet (5 mg total) by mouth 2 (two) times daily.     aspirin 81 MG EC tablet  Commonly known as: ECOTRIN  TAKE 1 TABLET BY MOUTH EVERY DAY     bumetanide 2 MG tablet  Commonly known as: BUMEX  Take 1 tablet (2 mg total) by mouth 2 (two) times daily.     calcium carbonate 500 mg calcium (1,250 mg) tablet  Commonly known as: OS-MICHAELA  Take 1 tablet (500 mg total) by mouth 2 (two) times daily.     carvediloL 12.5 MG tablet  Commonly known as: COREG  Take 0.5 tablets (6.25 mg total) by mouth 2 (two) times daily.     cilostazoL 100 MG Tab  Commonly known as: PLETAL  Take 1 tablet (100 mg total) by mouth 2 (two) times daily.     colchicine 0.6 mg tablet  Commonly known as: COLCRYS  TAKE 1 TABLET BY MOUTH 3 TIMES A DAY FOR 2 DAYS THEN 1 TABLET  DAILY UNTIL GONE     DULoxetine 30 MG capsule  Commonly known as: CYMBALTA  TAKE 1 CAPSULE (30 MG TOTAL) BY MOUTH ONCE DAILY.     gabapentin 600 MG tablet  Commonly known as: NEURONTIN  Take 1 tablet (600 mg total) by mouth 3 (three) times daily.     LEVEMIR FLEXTOUCH U100 INSULIN 100 unit/mL (3 mL) Inpn pen  Generic drug: insulin detemir U-100 (Levemir)  Inject 10 units into the skin every evening subcutaneous     levothyroxine 150 MCG tablet  Commonly known as: SYNTHROID  Take 1 tablet (150 mcg total) by mouth before breakfast.     metOLazone 2.5 MG tablet  Commonly known as: ZAROXOLYN  Take 1 tablet (2.5 mg total) by mouth once daily.     naloxone 4 mg/actuation Spry  Commonly known as: NARCAN  1 spray once.     NIFEdipine 60 MG (OSM) 24 hr tablet  Commonly known as: PROCARDIA-XL  Take 1 tablet (60 mg total) by mouth once daily.     pantoprazole 40 MG tablet  Commonly known as: PROTONIX  Take 1 tablet (40 mg total) by mouth once daily.     polyethylene glycol 17 gram Pwpk  Commonly known as: GLYCOLAX  Take 17 g by mouth once daily.     potassium chloride SA 20 MEQ tablet  Commonly known as: K-DUR,KLOR-CON  Take 1 tablet (20 mEq total) by mouth once daily.     QUEtiapine 25 MG Tab  Commonly known as: SEROQUEL  Take 1 tablet (25 mg total) by mouth once daily.     sertraline 50 MG tablet  Commonly known as: ZOLOFT  Take 1 tablet (50 mg total) by mouth once daily.            STOP taking these medications      dexAMETHasone 4 MG Tab  Commonly known as: DECADRON     HYDROcodone-acetaminophen  mg per tablet  Commonly known as: NORCO     ibuprofen 800 MG tablet  Commonly known as: ADVIL,MOTRIN     loratadine 10 mg tablet  Commonly known as: CLARITIN     meclizine 25 mg tablet  Commonly known as: ANTIVERT     nicotine 21 mg/24 hr  Commonly known as: NICODERM CQ     spironolactone 50 MG tablet  Commonly known as: ALDACTONE     tiotropium 18 mcg inhalation capsule  Commonly known as: SPIRIVA     topiramate 100 MG  tablet  Commonly known as: TOPAMAX              Indwelling Lines/Drains at time of discharge:   Lines/Drains/Airways       None                   Time spent on the discharge of patient: 45 minutes         Regan Glover Jr, MD  Department of Hospital Medicine  [facility] - [facility] Tele[facility]

## 2024-02-27 ENCOUNTER — HOSPITAL ENCOUNTER (OUTPATIENT)
Dept: RADIOLOGY | Facility: HOSPITAL | Age: 64
Discharge: HOME | End: 2024-02-27
Attending: INTERNAL MEDICINE
Payer: MEDICARE

## 2024-02-27 ENCOUNTER — OFFICE VISIT (OUTPATIENT)
Dept: FAMILY MEDICINE | Facility: CLINIC | Age: 64
End: 2024-02-27
Payer: MEDICARE

## 2024-02-27 VITALS
WEIGHT: 293 LBS | DIASTOLIC BLOOD PRESSURE: 79 MMHG | RESPIRATION RATE: 18 BRPM | BODY MASS INDEX: 44.41 KG/M2 | OXYGEN SATURATION: 83 % | TEMPERATURE: 97 F | HEIGHT: 68 IN | SYSTOLIC BLOOD PRESSURE: 130 MMHG | HEART RATE: 110 BPM

## 2024-02-27 DIAGNOSIS — I87.2 VENOUS INSUFFICIENCY: ICD-10-CM

## 2024-02-27 DIAGNOSIS — M54.10 RADICULOPATHY, UNSPECIFIED SPINAL REGION: ICD-10-CM

## 2024-02-27 DIAGNOSIS — M17.11 OSTEOARTHRITIS OF RIGHT KNEE, UNSPECIFIED OSTEOARTHRITIS TYPE: ICD-10-CM

## 2024-02-27 DIAGNOSIS — R06.00 DYSPNEA, UNSPECIFIED TYPE: ICD-10-CM

## 2024-02-27 DIAGNOSIS — M16.11 OSTEOARTHRITIS OF RIGHT HIP, UNSPECIFIED OSTEOARTHRITIS TYPE: ICD-10-CM

## 2024-02-27 DIAGNOSIS — R06.00 DYSPNEA, UNSPECIFIED TYPE: Primary | ICD-10-CM

## 2024-02-27 LAB
ANION GAP SERPL CALCULATED.3IONS-SCNC: 8 MMOL/L (ref 7–16)
ANISOCYTOSIS BLD QL SMEAR: ABNORMAL
BASOPHILS # BLD AUTO: 0.05 K/UL (ref 0–0.2)
BASOPHILS NFR BLD AUTO: 0.7 % (ref 0–1)
BUN SERPL-MCNC: 13 MG/DL (ref 7–18)
BUN/CREAT SERPL: 14 (ref 6–20)
CALCIUM SERPL-MCNC: 9.6 MG/DL (ref 8.5–10.1)
CHLORIDE SERPL-SCNC: 100 MMOL/L (ref 98–107)
CO2 SERPL-SCNC: 33 MMOL/L (ref 21–32)
CREAT SERPL-MCNC: 0.95 MG/DL (ref 0.55–1.02)
D DIMER PPP FEU-MCNC: 0.48 ΜG/ML (ref 0–0.47)
DIFFERENTIAL METHOD BLD: ABNORMAL
EGFR (NO RACE VARIABLE) (RUSH/TITUS): 67 ML/MIN/1.73M2
EOSINOPHIL # BLD AUTO: 0.26 K/UL (ref 0–0.5)
EOSINOPHIL NFR BLD AUTO: 3.5 % (ref 1–4)
ERYTHROCYTE [DISTWIDTH] IN BLOOD BY AUTOMATED COUNT: 26.1 % (ref 11.5–14.5)
GLUCOSE SERPL-MCNC: 100 MG/DL (ref 74–106)
HCT VFR BLD AUTO: 55.4 % (ref 38–47)
HGB BLD-MCNC: 16.6 G/DL (ref 12–16)
IMM GRANULOCYTES # BLD AUTO: 0.02 K/UL (ref 0–0.04)
IMM GRANULOCYTES NFR BLD: 0.3 % (ref 0–0.4)
LYMPHOCYTES # BLD AUTO: 1.53 K/UL (ref 1–4.8)
LYMPHOCYTES NFR BLD AUTO: 20.8 % (ref 27–41)
MCH RBC QN AUTO: 22 PG (ref 27–31)
MCHC RBC AUTO-ENTMCNC: 30 G/DL (ref 32–36)
MCV RBC AUTO: 73.3 FL (ref 80–96)
MICROCYTES BLD QL SMEAR: ABNORMAL
MONOCYTES # BLD AUTO: 0.46 K/UL (ref 0–0.8)
MONOCYTES NFR BLD AUTO: 6.3 % (ref 2–6)
MPC BLD CALC-MCNC: ABNORMAL G/DL
NEUTROPHILS # BLD AUTO: 5.03 K/UL (ref 1.8–7.7)
NEUTROPHILS NFR BLD AUTO: 68.4 % (ref 53–65)
NRBC # BLD AUTO: 0 X10E3/UL
NRBC, AUTO (.00): 0 %
NT-PROBNP SERPL-MCNC: 642 PG/ML (ref 1–125)
PLATELET # BLD AUTO: 173 K/UL (ref 150–400)
PLATELET MORPHOLOGY: ABNORMAL
POTASSIUM SERPL-SCNC: 3.8 MMOL/L (ref 3.5–5.1)
RBC # BLD AUTO: 7.56 M/UL (ref 4.2–5.4)
SODIUM SERPL-SCNC: 137 MMOL/L (ref 136–145)
WBC # BLD AUTO: 7.35 K/UL (ref 4.5–11)

## 2024-02-27 PROCEDURE — 83880 ASSAY OF NATRIURETIC PEPTIDE: CPT | Mod: ,,, | Performed by: CLINICAL MEDICAL LABORATORY

## 2024-02-27 PROCEDURE — 3075F SYST BP GE 130 - 139MM HG: CPT | Mod: ,,, | Performed by: INTERNAL MEDICINE

## 2024-02-27 PROCEDURE — 85025 COMPLETE CBC W/AUTO DIFF WBC: CPT | Mod: ,,, | Performed by: CLINICAL MEDICAL LABORATORY

## 2024-02-27 PROCEDURE — 71046 X-RAY EXAM CHEST 2 VIEWS: CPT | Mod: 26,,, | Performed by: RADIOLOGY

## 2024-02-27 PROCEDURE — 73502 X-RAY EXAM HIP UNI 2-3 VIEWS: CPT | Mod: TC,RT

## 2024-02-27 PROCEDURE — 1159F MED LIST DOCD IN RCRD: CPT | Mod: ,,, | Performed by: INTERNAL MEDICINE

## 2024-02-27 PROCEDURE — 99214 OFFICE O/P EST MOD 30 MIN: CPT | Mod: ,,, | Performed by: INTERNAL MEDICINE

## 2024-02-27 PROCEDURE — 73502 X-RAY EXAM HIP UNI 2-3 VIEWS: CPT | Mod: 26,RT,, | Performed by: RADIOLOGY

## 2024-02-27 PROCEDURE — 3078F DIAST BP <80 MM HG: CPT | Mod: ,,, | Performed by: INTERNAL MEDICINE

## 2024-02-27 PROCEDURE — 72100 X-RAY EXAM L-S SPINE 2/3 VWS: CPT | Mod: TC

## 2024-02-27 PROCEDURE — 72100 X-RAY EXAM L-S SPINE 2/3 VWS: CPT | Mod: 26,,, | Performed by: RADIOLOGY

## 2024-02-27 PROCEDURE — 80048 BASIC METABOLIC PNL TOTAL CA: CPT | Mod: ,,, | Performed by: CLINICAL MEDICAL LABORATORY

## 2024-02-27 PROCEDURE — 73560 X-RAY EXAM OF KNEE 1 OR 2: CPT | Mod: TC,RT

## 2024-02-27 PROCEDURE — 1111F DSCHRG MED/CURRENT MED MERGE: CPT | Mod: ,,, | Performed by: INTERNAL MEDICINE

## 2024-02-27 PROCEDURE — 3008F BODY MASS INDEX DOCD: CPT | Mod: ,,, | Performed by: INTERNAL MEDICINE

## 2024-02-27 PROCEDURE — 73560 X-RAY EXAM OF KNEE 1 OR 2: CPT | Mod: 26,RT,, | Performed by: RADIOLOGY

## 2024-02-27 PROCEDURE — 71046 X-RAY EXAM CHEST 2 VIEWS: CPT | Mod: TC

## 2024-02-27 RX ORDER — CLINDAMYCIN HYDROCHLORIDE 150 MG/1
300 CAPSULE ORAL 3 TIMES DAILY
Qty: 30 CAPSULE | Refills: 0 | Status: SHIPPED | OUTPATIENT
Start: 2024-02-27 | End: 2024-03-04 | Stop reason: SDUPTHER

## 2024-02-28 ENCOUNTER — TELEPHONE (OUTPATIENT)
Dept: FAMILY MEDICINE | Facility: CLINIC | Age: 64
End: 2024-02-28
Payer: MEDICARE

## 2024-02-28 RX ORDER — PANTOPRAZOLE SODIUM 40 MG/1
40 TABLET, DELAYED RELEASE ORAL DAILY
Qty: 90 TABLET | Refills: 1 | Status: SHIPPED | OUTPATIENT
Start: 2024-02-28 | End: 2024-03-04 | Stop reason: SDUPTHER

## 2024-02-28 RX ORDER — COLCHICINE 0.6 MG/1
0.6 TABLET ORAL DAILY
Qty: 30 TABLET | Refills: 2 | Status: SHIPPED | OUTPATIENT
Start: 2024-02-28 | End: 2024-03-04 | Stop reason: SDUPTHER

## 2024-02-28 NOTE — TELEPHONE ENCOUNTER
----- Message from Regan Frausto MD sent at 2/27/2024  6:35 PM CST -----  Need to see in  1 week please  abnl results     0830 Pt is scheduled to come in on 03/12/24

## 2024-02-29 PROBLEM — R06.00 DYSPNEA: Status: ACTIVE | Noted: 2024-02-29

## 2024-02-29 PROBLEM — M17.11 OSTEOARTHRITIS OF RIGHT KNEE: Status: ACTIVE | Noted: 2024-02-29

## 2024-02-29 PROBLEM — M16.11 OSTEOARTHRITIS OF RIGHT HIP: Status: ACTIVE | Noted: 2024-02-29

## 2024-02-29 PROBLEM — I87.2 VENOUS INSUFFICIENCY: Status: ACTIVE | Noted: 2024-02-29

## 2024-02-29 PROBLEM — M54.10 RADICULOPATHY: Status: ACTIVE | Noted: 2024-02-29

## 2024-02-29 NOTE — PROGRESS NOTES
Subjective:         Patient ID: Holly Porter is a 63 y.o. female.    Chief Complaint: Low-back Pain, Leg Pain, Knee Pain, and Foot Pain            Pain  This is a chronic problem. The current episode started more than 1 year ago. The problem occurs daily. The problem has been waxing and waning. Associated symptoms include arthralgias. Pertinent negatives include no chest pain, chills, coughing, diaphoresis, fever, sore throat, vertigo or vomiting.     Review of Systems   Constitutional:  Negative for activity change, chills, diaphoresis, fever and unexpected weight change.   HENT:  Negative for drooling, ear discharge, ear pain, facial swelling, nosebleeds, sore throat, trouble swallowing, voice change and goiter.    Eyes:  Negative for photophobia, pain, discharge, redness and visual disturbance.   Respiratory:  Negative for apnea, cough, choking, chest tightness, shortness of breath, wheezing and stridor.    Cardiovascular:  Negative for chest pain, palpitations and leg swelling.   Gastrointestinal:  Negative for abdominal distention, diarrhea, rectal pain, vomiting and fecal incontinence.   Endocrine: Negative for cold intolerance, heat intolerance, polydipsia, polyphagia and polyuria.   Genitourinary:  Negative for bladder incontinence, dysuria, flank pain, frequency and hot flashes.   Musculoskeletal:  Positive for arthralgias, back pain, leg pain and neck stiffness.   Integumentary:  Negative for color change and pallor.   Allergic/Immunologic: Negative for immunocompromised state.   Neurological:  Negative for dizziness, vertigo, seizures, syncope, facial asymmetry, speech difficulty, light-headedness, memory loss and coordination difficulties.   Hematological:  Negative for adenopathy. Does not bruise/bleed easily.   Psychiatric/Behavioral:  Negative for agitation, behavioral problems, confusion, decreased concentration, dysphoric mood, hallucinations, self-injury and suicidal ideas. The patient is not  nervous/anxious and is not hyperactive.            Past Medical History:   Diagnosis Date    Acquired hypothyroidism     Atherosclerosis of native artery of extremity with intermittent claudication 01/30/2019    bilateral legs    BMI 50.0-59.9, adult 02/22/2021    Chronic pain syndrome     opioid depen    Congestive heart failure (CHF)     COPD (chronic obstructive pulmonary disease)     COVID-19 10/06/2023    Depression     Diabetes mellitus, type 2     followed by Aurea Kwong NP, [facility]    DVT of lower extremity, bilateral     Essential hypertension 06/08/2021    GERD (gastroesophageal reflux disease)     Gout 07/05/2023    Hyperlipidemia     Lumbosacral radiculopathy 06/05/2023    followed by GLENN Valdes, [facility]    Migraines     Nicotine dependence     Nicotine dependence     Obesity hypoventilation syndrome     chronic CO2 retainer with compensatory metabolic alkalosis    Osteoarthritis     Seizure disorder     Sleep apnea     on cpap    Thyroid cancer     Venous stasis ulcer limited to breakdown of skin with varicose veins     followed by Estuardo Zhao    Vitamin D deficiency      Past Surgical History:   Procedure Laterality Date    ABCESS DRAINAGE      HYSTERECTOMY      ILIAC ARTERY STENT Bilateral 01/28/2020    Bilateral distal aorta and common iliac 8 X 59 vbx covered stents performed by Dr. Antonio Jacinto.    LEFT HEART CATHETERIZATION Left 11/6/2023    Procedure: Left heart cath;  Surgeon: Alex Ortega DO;  Location: [facility] CATH LAB;  Service: Cardiology;  Laterality: Left;    RADIOFREQUENCY ABLATION Left 04/15/2022    Procedure: Left calf  Radiofrequency Ablation;  Surgeon: Matty Falcon DO;  Location: [facility] OR;  Service: Vascular;  Laterality: Left;    RIGHT HEART CATHETERIZATION Right 2/13/2024    Procedure: INSERTION, CATHETER, RIGHT HEART;  Surgeon: Mahad Moreno MD;  Location: [facility] CATH LAB;  Service: Cardiology;  Laterality: Right;     STAB PHLEBECTOMY OF VARICOSE VEINS Left 01/25/2013    Left leg microphlebectomies x 23 stab avulsions and ligation of multiple varicose veins perrformed by Dr. Cirilo Aguirre.    THYROIDECTOMY      hx: thyroid cancer    TOE AMPUTATION Left 01/28/2020    2nd, 4th and 5th performed by Dr. Anam Saeed.    TOE AMPUTATION Right 01/28/2020    4th toe performed by Dr. Anam Saeed.    TOTAL ABDOMINAL HYSTERECTOMY W/ BILATERAL SALPINGOOPHORECTOMY      TUBAL LIGATION      VAGINAL DELIVERY      x 4    VENOUS ABLATION Right 08/09/2019    GSV Varithena Ablation performed by Dr. Estuardo Falcon    VENOUS ABLATION Left 08/02/2019    ATAV Varithena Ablation performed by Dr. Estuardo Falcon.    VENOUS ABLATION Right 07/13/2015    ATAV Laser Ablatio performed by Dr. Cirilo Aguirre.    VENOUS ABLATION Right 07/06/2015    Distal  GSV Laser Ablation performed by dr. Cirilo Aguirre.    VENOUS ABLATION Left 04/20/2015    Left Distal GSV Laser Ablation performed by dr. Ciriol Aguirre.    VENOUS ABLATION Right 10/28/2013    Right Distal GSV RF Ablation performed by Dr. Cirilo Aguirre.    VENOUS ABLATION Left 10/25/2013    SSV RF Ablation performed by dr. Cirilo Aguirre.    VENOUS ABLATION Left 10/07/2013    Left GSV and Left ATAV RF Ablation performed by Dr. Cirilo Aguirre.    VENOUS ABLATION Right 01/21/2013    GSV RF Ablation w/micros x 22 performed by Dr. Cirilo Aguirre.    VENOUS ABLATION Left 06/25/2021    Left distal GSV Varithena Ablation     Social History     Socioeconomic History    Marital status:    Tobacco Use    Smoking status: Every Day     Current packs/day: 0.50     Average packs/day: 0.5 packs/day for 33.0 years (16.5 ttl pk-yrs)     Types: Cigarettes     Passive exposure: Current    Smokeless tobacco: Never    Tobacco comments:     5 cigarettes a day    Substance and Sexual Activity    Alcohol use: Never    Drug use: Never    Sexual activity: Not Currently     Social Determinants of Health     Financial Resource Strain: Low Risk  (2/9/2024)    Overall Financial  Resource Strain (CARDIA)     Difficulty of Paying Living Expenses: Not hard at all   Food Insecurity: No Food Insecurity (2/9/2024)    Hunger Vital Sign     Worried About Running Out of Food in the Last Year: Never true     Ran Out of Food in the Last Year: Never true   Transportation Needs: Unmet Transportation Needs (2/9/2024)    PRAPARE - Transportation     Lack of Transportation (Medical): Yes     Lack of Transportation (Non-Medical): Yes   Physical Activity: Inactive (2/9/2024)    Exercise Vital Sign     Days of Exercise per Week: 0 days     Minutes of Exercise per Session: 0 min   Stress: No Stress Concern Present (2/9/2024)    [employer] - Occupational Stress Questionnaire     Feeling of Stress : Not at all   Social Connections: Socially Isolated (2/9/2024)    Social Connection and Isolation Panel [NHANES]     Frequency of Communication with Friends and Family: Never     Frequency of Social Gatherings with Friends and Family: Three times a week     Attends Yazidi Services: Never     Attends Club or Organization Meetings: Never     Marital Status:    Housing Stability: Low Risk  (2/9/2024)    Housing Stability Vital Sign     Unable to Pay for Housing in the Last Year: No     Number of Places Lived in the Last Year: 1     Unstable Housing in the Last Year: No     Family History   Problem Relation Age of Onset    Heart disease Mother         age 84 CHF    Hypertension Mother     Osteoarthritis Mother     Coronary aneurysm Father     Coronary artery disease Father     Hypertension Father     Heart disease Father     No Known Problems Son     No Known Problems Son     No Known Problems Son     No Known Problems Son     No Known Problems Sister     No Known Problems Brother         hx: varicose veins    No Known Problems Brother         MVA: parlazed    No Known Problems Brother      Review of patient's allergies indicates:   Allergen Reactions    Ammonium peroxydisulfate  "Shortness Of Breath    Avocado (laurus persea) Anaphylaxis    Bananas [banana] Anaphylaxis and Swelling     CRAMPS,    Chocolate flavor Anaphylaxis     MOUTH SWELLING    Fentanyl Shortness Of Breath and Itching    Nicoderm Swelling    Percocet [oxycodone-acetaminophen] Shortness Of Breath and Itching    Silvadene [silver sulfadiazine]     Clindamycin     Corticosteroids (glucocorticoids)     Hydrocortisone Blisters    Lasix [furosemide] Blisters     BURNS SKIN    Pregabalin     Adhesive Rash and Blisters    Iodine Rash    Latex, natural rubber Rash    Pcn [penicillins] Rash    Sulfa (sulfonamide antibiotics) Rash and Blisters        Objective:  Vitals:    03/05/24 1312   BP: (!) 154/59   Pulse: 108   Resp: (!) 22   Weight: (!) 160.1 kg (353 lb)   Height: 5' 8" (1.727 m)   PainSc:   8           Physical Exam  Vitals and nursing note reviewed. Exam conducted with a chaperone present.   Constitutional:       General: She is awake. She is not in acute distress.     Appearance: Normal appearance. She is obese. She is not ill-appearing, toxic-appearing or diaphoretic.   HENT:      Head: Normocephalic and atraumatic.      Nose: Nose normal.      Mouth/Throat:      Mouth: Mucous membranes are moist.      Pharynx: Oropharynx is clear.   Eyes:      Conjunctiva/sclera: Conjunctivae normal.      Pupils: Pupils are equal, round, and reactive to light.   Cardiovascular:      Rate and Rhythm: Normal rate.   Pulmonary:      Effort: Pulmonary effort is normal. No respiratory distress.   Abdominal:      Palpations: Abdomen is soft.   Musculoskeletal:         General: Normal range of motion.      Cervical back: Normal range of motion and neck supple.   Skin:     General: Skin is warm and dry.   Neurological:      General: No focal deficit present.      Mental Status: She is alert and oriented to person, place, and time. Mental status is at baseline.      Cranial Nerves: No cranial nerve deficit (II-XII).   Psychiatric:         Mood " and Affect: Mood normal.         Behavior: Behavior normal. Behavior is cooperative.         Thought Content: Thought content normal.           X-Ray Lumbar Spine Ap And Lateral  Narrative: EXAMINATION:  XR LUMBAR SPINE AP AND LATERAL    CLINICAL HISTORY:  Dyspnea, unspecified    COMPARISON:  5 July 2023    TECHNIQUE:  XR LUMBAR SPINE AP AND LATERAL    FINDINGS:  No fracture is seen. Vertebral body heights and alignment are normal.  There is mild disc space loss and degenerative change at L4-5.  Small amount of facet joint degenerative change present.  Impression: Degenerative changes as described above.    Electronically signed by: Modesto Tristan  Date:    02/27/2024  Time:    13:57  X-Ray Hip 2 or 3 views Right (with Pelvis when performed)  Narrative: EXAMINATION:  XR HIP WITH PELVIS WHEN PERFORMED, 2 OR 3  VIEWS RIGHT    CLINICAL HISTORY:  Unilateral primary osteoarthritis, right hip    COMPARISON:  7 November 2023    TECHNIQUE:  XR HIP WITH PELVIS 3  VIEWS RIGHT    FINDINGS:  No evidence of fracture seen.  The alignment of the joints appears normal.  Mild-to-moderate bilateral hip degenerative change is present.  No soft tissue abnormality is seen.  Impression: Hip osteoarthrosis as described above.    Electronically signed by: Modesto Tristan  Date:    02/27/2024  Time:    13:36  X-Ray Knee 1 or 2 View Right  Narrative: EXAMINATION:  XR KNEE 1 OR 2 VIEW RIGHT    CLINICAL HISTORY:  Unilateral primary osteoarthritis, right knee    COMPARISON:  Right knee x-ray November 24, 2022    TECHNIQUE:  Frontal and lateral views of the right knee.    FINDINGS:  Moderate degenerative change of the medial compartment.  Mild degenerative change of the lateral and patellofemoral compartments.  Mild spurring of superior pole of patella.  No acute fracture or dislocation.  Impression: As above.    Point of Service: [facility]    Electronically signed by: Christian Foss  Date:    02/27/2024  Time:    13:35  X-Ray  Chest PA And Lateral  Narrative: EXAMINATION:  XR CHEST PA AND LATERAL    CLINICAL HISTORY:  Dyspnea, unspecified    COMPARISON:  7 February 2024    TECHNIQUE:  XR CHEST PA AND LATERAL    FINDINGS:  The heart and mediastinum are stable in size and configuration.  The pulmonary vascularity is slightly increased with bilateral increased interstitial lung density.  No other lung infiltrates, effusions, pneumothorax or other abnormality is demonstrated.  Impression: Findings suggest mild cardiac decompensation and / or pneumonitis.    Electronically signed by: Modesto Tristan  Date:    02/27/2024  Time:    13:35         Office Visit on 02/27/2024   Component Date Value Ref Range Status    Sodium 02/27/2024 137  136 - 145 mmol/L Final    Potassium 02/27/2024 3.8  3.5 - 5.1 mmol/L Final    Chloride 02/27/2024 100  98 - 107 mmol/L Final    CO2 02/27/2024 33 (H)  21 - 32 mmol/L Final    Anion Gap 02/27/2024 8  7 - 16 mmol/L Final    Glucose 02/27/2024 100  74 - 106 mg/dL Final    BUN 02/27/2024 13  7 - 18 mg/dL Final    Creatinine 02/27/2024 0.95  0.55 - 1.02 mg/dL Final    BUN/Creatinine Ratio 02/27/2024 14  6 - 20 Final    Calcium 02/27/2024 9.6  8.5 - 10.1 mg/dL Final    eGFR 02/27/2024 67  >=60 mL/min/1.73m2 Final    ProBNP 02/27/2024 642 (H)  1 - 125 pg/mL Final    D-Dimer 02/27/2024 0.48 (H)  0.00 - 0.47 µg/mL Final    WBC 02/27/2024 7.35  4.50 - 11.00 K/uL Final    RBC 02/27/2024 7.56 (H)  4.20 - 5.40 M/uL Final    Hemoglobin 02/27/2024 16.6 (H)  12.0 - 16.0 g/dL Final    Hematocrit 02/27/2024 55.4 (H)  38.0 - 47.0 % Final    MCV 02/27/2024 73.3 (L)  80.0 - 96.0 fL Final    MCH 02/27/2024 22.0 (L)  27.0 - 31.0 pg Final    MCHC 02/27/2024 30.0 (L)  32.0 - 36.0 g/dL Final    RDW 02/27/2024 26.1 (H)  11.5 - 14.5 % Final    Platelet Count 02/27/2024 173  150 - 400 K/uL Final    Neutrophils % 02/27/2024 68.4 (H)  53.0 - 65.0 % Final    Lymphocytes % 02/27/2024 20.8 (L)  27.0 - 41.0 % Final    Monocytes % 02/27/2024 6.3  (H)  2.0 - 6.0 % Final    Eosinophils % 02/27/2024 3.5  1.0 - 4.0 % Final    Basophils % 02/27/2024 0.7  0.0 - 1.0 % Final    Immature Granulocytes % 02/27/2024 0.3  0.0 - 0.4 % Final    nRBC, Auto 02/27/2024 0.0  <=0.0 % Final    Neutrophils, Abs 02/27/2024 5.03  1.80 - 7.70 K/uL Final    Lymphocytes, Absolute 02/27/2024 1.53  1.00 - 4.80 K/uL Final    Monocytes, Absolute 02/27/2024 0.46  0.00 - 0.80 K/uL Final    Eosinophils, Absolute 02/27/2024 0.26  0.00 - 0.50 K/uL Final    Basophils, Absolute 02/27/2024 0.05  0.00 - 0.20 K/uL Final    Immature Granulocytes, Absolute 02/27/2024 0.02  0.00 - 0.04 K/uL Final    nRBC, Absolute 02/27/2024 0.00  <=0.00 x10e3/uL Final    Diff Type 02/27/2024 Scan Smear   Final    Platelet Morphology 02/27/2024 Few Large Platelets (A)  Normal Final    Anisocytosis 02/27/2024 2+   Final    Microcytosis 02/27/2024 1+   Final   No results displayed because visit has over 200 results.      Admission on 02/02/2024, Discharged on 02/02/2024   Component Date Value Ref Range Status    POC Glucose 02/02/2024 102  70 - 105 mg/dL Final   Admission on 12/06/2023, Discharged on 12/06/2023   Component Date Value Ref Range Status    Sodium 12/06/2023 136  136 - 145 mmol/L Final    Potassium 12/06/2023 3.5  3.5 - 5.1 mmol/L Final    Chloride 12/06/2023 97 (L)  98 - 107 mmol/L Final    CO2 12/06/2023 38 (H)  21 - 32 mmol/L Final    Anion Gap 12/06/2023 5 (L)  7 - 16 mmol/L Final    Glucose 12/06/2023 97  74 - 106 mg/dL Final    BUN 12/06/2023 15  7 - 18 mg/dL Final    Creatinine 12/06/2023 0.96  0.55 - 1.02 mg/dL Final    BUN/Creatinine Ratio 12/06/2023 16  6 - 20 Final    Calcium 12/06/2023 9.7  8.5 - 10.1 mg/dL Final    Total Protein 12/06/2023 6.9  6.4 - 8.2 g/dL Final    Albumin 12/06/2023 3.4 (L)  3.5 - 5.0 g/dL Final    Globulin 12/06/2023 3.5  2.0 - 4.0 g/dL Final    A/G Ratio 12/06/2023 1.0   Final    Bilirubin, Total 12/06/2023 0.7  >0.0 - 1.2 mg/dL Final    Alk Phos 12/06/2023 111  50 - 130  U/L Final    ALT 12/06/2023 22  13 - 56 U/L Final    AST 12/06/2023 35  15 - 37 U/L Final    eGFR 12/06/2023 66  >=60 mL/min/1.73m2 Final    SARS COV-2 MOLECULAR 12/06/2023 Negative  Negative, Invalid Final    Influenza A 12/06/2023 Negative  Negative, Invalid Final    Influenza B 12/06/2023 Negative  Negative, Invalid Final    Troponin I High Sensitivity 12/06/2023 13.6  <=60.4 pg/mL Final    ProBNP 12/06/2023 1,371 (H)  1 - 125 pg/mL Final    WBC 12/06/2023 8.76  4.50 - 11.00 K/uL Final    RBC 12/06/2023 6.22 (H)  4.20 - 5.40 M/uL Final    Hemoglobin 12/06/2023 15.6  12.0 - 16.0 g/dL Final    Hematocrit 12/06/2023 49.6 (H)  38.0 - 47.0 % Final    MCV 12/06/2023 79.7 (L)  80.0 - 96.0 fL Final    MCH 12/06/2023 25.1 (L)  27.0 - 31.0 pg Final    MCHC 12/06/2023 31.5 (L)  32.0 - 36.0 g/dL Final    RDW 12/06/2023 24.0 (H)  11.5 - 14.5 % Final    Platelet Count 12/06/2023 218  150 - 400 K/uL Final    MPV 12/06/2023 11.1  9.4 - 12.4 fL Final    Neutrophils % 12/06/2023 69.2 (H)  53.0 - 65.0 % Final    Lymphocytes % 12/06/2023 20.2 (L)  27.0 - 41.0 % Final    Monocytes % 12/06/2023 5.9  2.0 - 6.0 % Final    Eosinophils % 12/06/2023 3.4  1.0 - 4.0 % Final    Basophils % 12/06/2023 0.6  0.0 - 1.0 % Final    Immature Granulocytes % 12/06/2023 0.7 (H)  0.0 - 0.4 % Final    nRBC, Auto 12/06/2023 0.0  <=0.0 % Final    Neutrophils, Abs 12/06/2023 6.06  1.80 - 7.70 K/uL Final    Lymphocytes, Absolute 12/06/2023 1.77  1.00 - 4.80 K/uL Final    Monocytes, Absolute 12/06/2023 0.52  0.00 - 0.80 K/uL Final    Eosinophils, Absolute 12/06/2023 0.30  0.00 - 0.50 K/uL Final    Basophils, Absolute 12/06/2023 0.05  0.00 - 0.20 K/uL Final    Immature Granulocytes, Absolute 12/06/2023 0.06 (H)  0.00 - 0.04 K/uL Final    nRBC, Absolute 12/06/2023 0.00  <=0.00 x10e3/uL Final    Diff Type 12/06/2023 Scan Smear   Final    Platelet Morphology 12/06/2023 Few Large Platelets (A)  Normal Final    Anisocytosis 12/06/2023 2+   Final    Polychromasia  12/06/2023 Few   Final    Troponin I High Sensitivity 12/06/2023 15.8  <=60.4 pg/mL Final   Office Visit on 11/16/2023   Component Date Value Ref Range Status    Sodium 11/16/2023 138  136 - 145 mmol/L Final    Potassium 11/16/2023 3.4 (L)  3.5 - 5.1 mmol/L Final    Chloride 11/16/2023 98  98 - 107 mmol/L Final    CO2 11/16/2023 35 (H)  21 - 32 mmol/L Final    Anion Gap 11/16/2023 8  7 - 16 mmol/L Final    Glucose 11/16/2023 118 (H)  74 - 106 mg/dL Final    BUN 11/16/2023 18  7 - 18 mg/dL Final    Creatinine 11/16/2023 1.39 (H)  0.55 - 1.02 mg/dL Final    BUN/Creatinine Ratio 11/16/2023 13  6 - 20 Final    Calcium 11/16/2023 10.6 (H)  8.5 - 10.1 mg/dL Final    eGFR 11/16/2023 42 (L)  >=60 mL/min/1.73m2 Final    Color, UA 11/16/2023 Light-Yellow  Colorless, Straw, Yellow, Light Yellow, Dark Yellow Final    Clarity, UA 11/16/2023 Clear  Clear Final    pH, UA 11/16/2023 5.0  5.0 to 8.0 pH Units Final    Leukocytes, UA 11/16/2023 Negative  Negative Final    Nitrites, UA 11/16/2023 Negative  Negative Final    Protein, UA 11/16/2023 Negative  Negative Final    Glucose, UA 11/16/2023 Normal  Normal mg/dL Final    Ketones, UA 11/16/2023 Negative  Negative mg/dL Final    Urobilinogen, UA 11/16/2023 Normal  0.2, 1.0, Normal mg/dL Final    Bilirubin, UA 11/16/2023 Negative  Negative Final    Blood, UA 11/16/2023 Negative  Negative Final    Specific [place], UA 11/16/2023 1.014  <=1.030 Final    WBC 11/16/2023 11.05 (H)  4.50 - 11.00 K/uL Final    RBC 11/16/2023 6.69 (H)  4.20 - 5.40 M/uL Final    Hemoglobin 11/16/2023 16.3 (H)  12.0 - 16.0 g/dL Final    Hematocrit 11/16/2023 52.5 (H)  38.0 - 47.0 % Final    MCV 11/16/2023 78.5 (L)  80.0 - 96.0 fL Final    MCH 11/16/2023 24.4 (L)  27.0 - 31.0 pg Final    MCHC 11/16/2023 31.0 (L)  32.0 - 36.0 g/dL Final    RDW 11/16/2023 22.5 (H)  11.5 - 14.5 % Final    Platelet Count 11/16/2023 253  150 - 400 K/uL Final    MPV 11/16/2023 11.1  9.4 - 12.4 fL Final    Neutrophils % 11/16/2023  75.4 (H)  53.0 - 65.0 % Final    Lymphocytes % 11/16/2023 15.5 (L)  27.0 - 41.0 % Final    Monocytes % 11/16/2023 6.1 (H)  2.0 - 6.0 % Final    Eosinophils % 11/16/2023 1.4  1.0 - 4.0 % Final    Basophils % 11/16/2023 0.6  0.0 - 1.0 % Final    Immature Granulocytes % 11/16/2023 1.0 (H)  0.0 - 0.4 % Final    nRBC, Auto 11/16/2023 0.0  <=0.0 % Final    Neutrophils, Abs 11/16/2023 8.33 (H)  1.80 - 7.70 K/uL Final    Lymphocytes, Absolute 11/16/2023 1.71  1.00 - 4.80 K/uL Final    Monocytes, Absolute 11/16/2023 0.67  0.00 - 0.80 K/uL Final    Eosinophils, Absolute 11/16/2023 0.16  0.00 - 0.50 K/uL Final    Basophils, Absolute 11/16/2023 0.07  0.00 - 0.20 K/uL Final    Immature Granulocytes, Absolute 11/16/2023 0.11 (H)  0.00 - 0.04 K/uL Final    nRBC, Absolute 11/16/2023 0.00  <=0.00 x10e3/uL Final    Diff Type 11/16/2023 Scan Smear   Final    Platelet Morphology 11/16/2023 Few Large Platelets (A)  Normal Final    Anisocytosis 11/16/2023 2+   Final    Crenated Cells 11/16/2023 Few   Final    Ovalocytes 11/16/2023 Few   Final    Hypochromic 11/16/2023 Few   Final   No results displayed because visit has over 200 results.      Admission on 11/03/2023, Discharged on 11/03/2023   Component Date Value Ref Range Status    PTT 11/03/2023 27.5  25.2 - 37.3 seconds Final    ProBNP 11/03/2023 3,183 (H)  1 - 125 pg/mL Final    Sodium 11/03/2023 133 (L)  136 - 145 mmol/L Final    Potassium 11/03/2023 2.1 (LL)  3.5 - 5.1 mmol/L Final    Chloride 11/03/2023 86 (L)  98 - 107 mmol/L Final    CO2 11/03/2023 43 (H)  21 - 32 mmol/L Final    Anion Gap 11/03/2023 6 (L)  7 - 16 mmol/L Final    Glucose 11/03/2023 145 (H)  74 - 106 mg/dL Final    BUN 11/03/2023 33 (H)  7 - 18 mg/dL Final    Creatinine 11/03/2023 1.78 (H)  0.55 - 1.02 mg/dL Final    BUN/Creatinine Ratio 11/03/2023 19  6 - 20 Final    Calcium 11/03/2023 9.2  8.5 - 10.1 mg/dL Final    Total Protein 11/03/2023 7.3  6.4 - 8.2 g/dL Final    Albumin 11/03/2023 3.4 (L)  3.5 - 5.0 g/dL  Final    Globulin 11/03/2023 3.9  2.0 - 4.0 g/dL Final    A/G Ratio 11/03/2023 0.9   Final    Bilirubin, Total 11/03/2023 1.1  >0.0 - 1.2 mg/dL Final    Alk Phos 11/03/2023 89  50 - 130 U/L Final    ALT 11/03/2023 20  13 - 56 U/L Final    AST 11/03/2023 35  15 - 37 U/L Final    eGFR 11/03/2023 32 (L)  >=60 mL/min/1.73m2 Final    Lactic Acid 11/03/2023 1.9  0.4 - 2.0 mmol/L Final    Magnesium 11/03/2023 1.9  1.7 - 2.3 mg/dL Final    PT 11/03/2023 13.2  11.7 - 14.7 seconds Final    INR 11/03/2023 1.01  <=3.30 Final    Troponin I High Sensitivity 11/03/2023 297.1 (HH)  <=60.4 pg/mL Final    Color, UA 11/03/2023 Yellow  Colorless, Straw, Yellow, Light Yellow, Dark Yellow Final    Clarity, UA 11/03/2023 Clear  Clear Final    pH, UA 11/03/2023 5.5  5.0 to 8.0 pH Units Final    Leukocytes, UA 11/03/2023 Negative  Negative Final    Nitrites, UA 11/03/2023 Negative  Negative Final    Protein, UA 11/03/2023 30 (A)  Negative Final    Glucose, UA 11/03/2023 Negative  Normal mg/dL Final    Ketones, UA 11/03/2023 Trace  Negative mg/dL Final    Urobilinogen, UA 11/03/2023 1.0  0.2, 1.0, Normal mg/dL Final    Bilirubin, UA 11/03/2023 Small (A)  Negative Final    Blood, UA 11/03/2023 Negative  Negative Final    Specific Gravity, UA 11/03/2023 1.020  <=1.005, 1.010, 1.015, 1.020, 1.025, 1.030 Final    WBC 11/03/2023 9.62  4.50 - 11.00 K/uL Final    RBC 11/03/2023 6.07 (H)  4.20 - 5.40 M/uL Final    Hemoglobin 11/03/2023 14.4  12.0 - 16.0 g/dL Final    Hematocrit 11/03/2023 47.1 (H)  38.0 - 47.0 % Final    MCV 11/03/2023 77.6 (L)  80.0 - 96.0 fL Final    MCH 11/03/2023 23.7 (L)  27.0 - 31.0 pg Final    MCHC 11/03/2023 30.6 (L)  32.0 - 36.0 g/dL Final    RDW 11/03/2023 19.7 (H)  11.5 - 14.5 % Final    Platelet Count 11/03/2023 233  150 - 400 K/uL Final    MPV 11/03/2023 10.3  9.4 - 12.4 fL Final    Neutrophils % 11/03/2023 72.7 (H)  53.0 - 65.0 % Final    Lymphocytes % 11/03/2023 19.1 (L)  27.0 - 41.0 % Final    Monocytes % 11/03/2023  6.3 (H)  2.0 - 6.0 % Final    Eosinophils % 11/03/2023 1.0  1.0 - 4.0 % Final    Basophils % 11/03/2023 0.4  0.0 - 1.0 % Final    Immature Granulocytes % 11/03/2023 0.5 (H)  0.0 - 0.4 % Final    nRBC, Auto 11/03/2023 0.4 (H)  <=0.0 % Final    Neutrophils, Abs 11/03/2023 6.98  1.80 - 7.70 K/uL Final    Lymphocytes, Absolute 11/03/2023 1.84  1.00 - 4.80 K/uL Final    Monocytes, Absolute 11/03/2023 0.61  0.00 - 0.80 K/uL Final    Eosinophils, Absolute 11/03/2023 0.10  0.00 - 0.50 K/uL Final    Basophils, Absolute 11/03/2023 0.04  0.00 - 0.20 K/uL Final    Immature Granulocytes, Absolute 11/03/2023 0.05 (H)  0.00 - 0.04 K/uL Final    nRBC, Absolute 11/03/2023 0.04 (H)  <=0.00 x10e3/uL Final    Diff Type 11/03/2023 Auto   Final    POC Glucose 11/03/2023 212 (H)  70 - 105 mg/dL Final    WBC, UA 11/03/2023 0-5  None Seen, 0-5 /hpf Final    RBC, UA 11/03/2023 0-3  None Seen, 0-3 /hpf Final    Bacteria, UA 11/03/2023 Few (A)  None Seen /hpf Final    Squamous Epithelial Cells, UA 11/03/2023 Moderate (A)  None Seen, Rare, None Seen To Occasional /lpf Final   Lab Visit on 11/03/2023   Component Date Value Ref Range Status    Sodium 11/03/2023 133 (L)  136 - 145 mmol/L Final    Potassium 11/03/2023 2.4 (LL)  3.5 - 5.1 mmol/L Final    Chloride 11/03/2023 88 (L)  98 - 107 mmol/L Final    CO2 11/03/2023 45 (H)  21 - 32 mmol/L Final    Anion Gap 11/03/2023 2 (L)  7 - 16 mmol/L Final    Glucose 11/03/2023 116 (H)  74 - 106 mg/dL Final    BUN 11/03/2023 29 (H)  7 - 18 mg/dL Final    Creatinine 11/03/2023 1.84 (H)  0.55 - 1.02 mg/dL Final    BUN/Creatinine Ratio 11/03/2023 16  6 - 20 Final    Calcium 11/03/2023 9.0  8.5 - 10.1 mg/dL Final    eGFR 11/03/2023 30 (L)  >=60 mL/min/1.73m2 Final    Hemoglobin A1C 11/03/2023 6.3  4.5 - 6.6 % Final    Estimated Average Glucose 11/03/2023 124  mg/dL Final    WBC 11/03/2023 9.32  4.50 - 11.00 K/uL Final    RBC 11/03/2023 5.90 (H)  4.20 - 5.40 M/uL Final    Hemoglobin 11/03/2023 14.0  12.0 - 16.0  g/dL Final    Hematocrit 11/03/2023 45.4  38.0 - 47.0 % Final    MCV 11/03/2023 76.9 (L)  80.0 - 96.0 fL Final    MCH 11/03/2023 23.7 (L)  27.0 - 31.0 pg Final    MCHC 11/03/2023 30.8 (L)  32.0 - 36.0 g/dL Final    RDW 11/03/2023 19.9 (H)  11.5 - 14.5 % Final    Platelet Count 11/03/2023 216  150 - 400 K/uL Final    MPV 11/03/2023 10.7  9.4 - 12.4 fL Final    Neutrophils % 11/03/2023 72.1 (H)  53.0 - 65.0 % Final    Lymphocytes % 11/03/2023 18.6 (L)  27.0 - 41.0 % Final    Monocytes % 11/03/2023 7.2 (H)  2.0 - 6.0 % Final    Eosinophils % 11/03/2023 1.1  1.0 - 4.0 % Final    Basophils % 11/03/2023 0.4  0.0 - 1.0 % Final    Immature Granulocytes % 11/03/2023 0.6 (H)  0.0 - 0.4 % Final    nRBC, Auto 11/03/2023 0.3 (H)  <=0.0 % Final    Neutrophils, Abs 11/03/2023 6.72  1.80 - 7.70 K/uL Final    Lymphocytes, Absolute 11/03/2023 1.73  1.00 - 4.80 K/uL Final    Monocytes, Absolute 11/03/2023 0.67  0.00 - 0.80 K/uL Final    Eosinophils, Absolute 11/03/2023 0.10  0.00 - 0.50 K/uL Final    Basophils, Absolute 11/03/2023 0.04  0.00 - 0.20 K/uL Final    Immature Granulocytes, Absolute 11/03/2023 0.06 (H)  0.00 - 0.04 K/uL Final    nRBC, Absolute 11/03/2023 0.03 (H)  <=0.00 x10e3/uL Final    Diff Type 11/03/2023 Auto   Final   Office Visit on 10/17/2023   Component Date Value Ref Range Status    Sodium 10/17/2023 135 (L)  136 - 145 mmol/L Final    Potassium 10/17/2023 2.7 (L)  3.5 - 5.1 mmol/L Final    Chloride 10/17/2023 92 (L)  98 - 107 mmol/L Final    CO2 10/17/2023 41 (H)  21 - 32 mmol/L Final    Anion Gap 10/17/2023 5 (L)  7 - 16 mmol/L Final    Glucose 10/17/2023 100  74 - 106 mg/dL Final    BUN 10/17/2023 30 (H)  7 - 18 mg/dL Final    Creatinine 10/17/2023 1.22 (H)  0.55 - 1.02 mg/dL Final    BUN/Creatinine Ratio 10/17/2023 25 (H)  6 - 20 Final    Calcium 10/17/2023 9.8  8.5 - 10.1 mg/dL Final    eGFR 10/17/2023 50 (L)  >=60 mL/min/1.73m2 Final    ProBNP 10/17/2023 286 (H)  1 - 125 pg/mL Final   Admission on 10/05/2023,  Discharged on 10/05/2023   Component Date Value Ref Range Status    Sodium 10/05/2023 138  136 - 145 mmol/L Final    Potassium 10/05/2023 3.0 (L)  3.5 - 5.1 mmol/L Final    Chloride 10/05/2023 98  98 - 107 mmol/L Final    CO2 10/05/2023 35 (H)  21 - 32 mmol/L Final    Anion Gap 10/05/2023 8  7 - 16 mmol/L Final    Glucose 10/05/2023 124 (H)  74 - 106 mg/dL Final    BUN 10/05/2023 12  7 - 18 mg/dL Final    Creatinine 10/05/2023 1.07 (H)  0.55 - 1.02 mg/dL Final    BUN/Creatinine Ratio 10/05/2023 11  6 - 20 Final    Calcium 10/05/2023 9.7  8.5 - 10.1 mg/dL Final    Total Protein 10/05/2023 7.0  6.4 - 8.2 g/dL Final    Albumin 10/05/2023 3.3 (L)  3.5 - 5.0 g/dL Final    Globulin 10/05/2023 3.7  2.0 - 4.0 g/dL Final    A/G Ratio 10/05/2023 0.9   Final    Bilirubin, Total 10/05/2023 0.4  >0.0 - 1.2 mg/dL Final    Alk Phos 10/05/2023 111  50 - 130 U/L Final    ALT 10/05/2023 13  13 - 56 U/L Final    AST 10/05/2023 24  15 - 37 U/L Final    eGFR 10/05/2023 58 (L)  >=60 mL/min/1.73m2 Final    Troponin I High Sensitivity 10/05/2023 40.8  <=60.4 pg/mL Final    ProBNP 10/05/2023 1,932 (H)  1 - 125 pg/mL Final    D-Dimer 10/05/2023 2.46 (H)  0.00 - 0.47 µg/mL Final    WBC 10/05/2023 8.49  4.50 - 11.00 K/uL Final    RBC 10/05/2023 6.13 (H)  4.20 - 5.40 M/uL Final    Hemoglobin 10/05/2023 15.3  12.0 - 16.0 g/dL Final    Hematocrit 10/05/2023 48.4 (H)  38.0 - 47.0 % Final    MCV 10/05/2023 79.0 (L)  80.0 - 96.0 fL Final    MCH 10/05/2023 25.0 (L)  27.0 - 31.0 pg Final    MCHC 10/05/2023 31.6 (L)  32.0 - 36.0 g/dL Final    RDW 10/05/2023 17.7 (H)  11.5 - 14.5 % Final    Platelet Count 10/05/2023 290  150 - 400 K/uL Final    MPV 10/05/2023 10.8  9.4 - 12.4 fL Final    Neutrophils % 10/05/2023 67.4 (H)  53.0 - 65.0 % Final    Lymphocytes % 10/05/2023 19.4 (L)  27.0 - 41.0 % Final    Monocytes % 10/05/2023 10.0 (H)  2.0 - 6.0 % Final    Eosinophils % 10/05/2023 1.8  1.0 - 4.0 % Final    Basophils % 10/05/2023 0.7  0.0 - 1.0 % Final     Immature Granulocytes % 10/05/2023 0.7 (H)  0.0 - 0.4 % Final    nRBC, Auto 10/05/2023 0.0  <=0.0 % Final    Neutrophils, Abs 10/05/2023 5.72  1.80 - 7.70 K/uL Final    Lymphocytes, Absolute 10/05/2023 1.65  1.00 - 4.80 K/uL Final    Monocytes, Absolute 10/05/2023 0.85 (H)  0.00 - 0.80 K/uL Final    Eosinophils, Absolute 10/05/2023 0.15  0.00 - 0.50 K/uL Final    Basophils, Absolute 10/05/2023 0.06  0.00 - 0.20 K/uL Final    Immature Granulocytes, Absolute 10/05/2023 0.06 (H)  0.00 - 0.04 K/uL Final    nRBC, Absolute 10/05/2023 0.00  <=0.00 x10e3/uL Final    Diff Type 10/05/2023 Auto   Final    POC Glucose 10/05/2023 122 (H)  70 - 105 mg/dL Final   There may be more visits with results that are not included.         Orders Placed This Encounter   Procedures    POCT Urine Drug Screen Presump     Interpretive Information:     Negative:  No drug detected at the cut off level.   Positive:  This result represents presumptive positive for the   tested drug, other substances may yield a positive response other   than the analyte of interest. This result should be utilized for   diagnostic purpose only. Confirmation testing will be performed upon physician request only.              Requested Prescriptions     Signed Prescriptions Disp Refills    HYDROcodone-acetaminophen (NORCO)  mg per tablet 120 tablet 0     Sig: Take 1 tablet by mouth every 6 (six) hours.    HYDROcodone-acetaminophen (NORCO)  mg per tablet 120 tablet 0     Sig: Take 1 tablet by mouth every 6 (six) hours.    HYDROcodone-acetaminophen (NORCO)  mg per tablet 120 tablet 0     Sig: Take 1 tablet by mouth every 6 (six) hours.       Assessment:     1. Lumbosacral spondylosis without myelopathy    2. Chronic pain of right knee    3. PVD (peripheral vascular disease)    4. Encounter for long-term (current) use of other medications         A's of Opioid Risk Assessment  Activity:Patient can perform ADL.   Analgesia:Patients pain is partially  controlled by current medication. Patient has tried OTC medications such as Tylenol and Ibuprofen with out relief.   Adverse Effects: Patient denies constipation or sedation.  Aberrant Behavior:  reviewed with no aberrant drug seeking/taking behavior.  Overdose reversal drug naloxone discussed    Drug screen reviewed      MRI cervical spine ARM dated April 18, 2023 multiple level degenerative changes C4/5 disc osteophyte complex mild central canal stenosis uncovertebral hypertrophic        Plan:    Narcan December 2022    Wheelchair/4 point walker for mobility due to chronic nonhealing wounds right foot     Following wound management chronic ulcers lower extremities    Following orthopedics knee pain [facility], she states she was advised not to have surgery due to her weight    Diabetic, limit steroids    Cannot tolerate OxyContin she is severely allergic    Poor procedure candidate multiple comorbidities    Chronic back and joint pain     Considering spinal surgery evaluation    She would like to continue with conservative management    Continue current medication    Continue home exercise program as tolerated    Follow-up 3 months    Dr. Crandall, August 2024    Bring original prescription medication bottles/container/box with labels to each visit

## 2024-02-29 NOTE — PROGRESS NOTES
Subjective:       Patient ID: Holly Porter is a 63 y.o. female.    Chief Complaint: Follow-up (COPD)    Follow-up      .  Patient seen in follow up today she has multiple complaints she complains of right pain give she complains of dyspnea she complains of swelling her right leg she does have radiculopathy extremity.  Going to do a complete workup.  She also has cellulitis in her lower extremities for the dyspnea has see below venous insufficiency see below OA of the right leg right hip see the plan below cellulitis will start Cleocin    Current Medications:    Current Outpatient Medications:     ACCU-CHEK GUIDE GLUCOSE METER Misc, , Disp: , Rfl:     ACCU-CHEK GUIDE TEST STRIPS Strp, , Disp: , Rfl:     ACCU-CHEK SOFTCLIX LANCETS Misc, , Disp: , Rfl:     albuterol (PROVENTIL) 2.5 mg /3 mL (0.083 %) nebulizer solution, Take 3 mLs (2.5 mg total) by nebulization every 6 (six) hours as needed for Wheezing. Rescue, Disp: 300 mL, Rfl: 11    albuterol (PROVENTIL/VENTOLIN HFA) 90 mcg/actuation inhaler, Inhale 2 puffs into the lungs 4 (four) times daily. Rescue, Disp: 18 g, Rfl: 2    allopurinoL (ZYLOPRIM) 300 MG tablet, TAKE ONE TABLET EVERY DAY, Disp: 90 tablet, Rfl: 3    apixaban (ELIQUIS) 5 mg Tab, Take 1 tablet (5 mg total) by mouth 2 (two) times daily., Disp: 60 tablet, Rfl: 3    aspirin (ECOTRIN) 81 MG EC tablet, TAKE 1 TABLET BY MOUTH EVERY DAY, Disp: 90 tablet, Rfl: 1    bumetanide (BUMEX) 2 MG tablet, Take 1 tablet (2 mg total) by mouth 2 (two) times daily., Disp: 60 tablet, Rfl: 0    calcium carbonate (OS-MICHAELA) 500 mg calcium (1,250 mg) tablet, Take 1 tablet (500 mg total) by mouth 2 (two) times daily., Disp: 60 tablet, Rfl: 3    cilostazoL (PLETAL) 100 MG Tab, Take 1 tablet (100 mg total) by mouth 2 (two) times daily., Disp: 90 tablet, Rfl: 1    DULoxetine (CYMBALTA) 30 MG capsule, TAKE 1 CAPSULE (30 MG TOTAL) BY MOUTH ONCE DAILY., Disp: 30 capsule, Rfl: 0    gabapentin (NEURONTIN) 600 MG tablet, Take 1 tablet  (600 mg total) by mouth 3 (three) times daily., Disp: 180 tablet, Rfl: 2    HYDROcodone-acetaminophen (NORCO)  mg per tablet, Take 1 tablet by mouth every 6 (six) hours as needed for Pain., Disp: 120 tablet, Rfl: 0    insulin detemir U-100, Levemir, (LEVEMIR FLEXTOUCH U100 INSULIN) 100 unit/mL (3 mL) InPn pen, Inject 10 units into the skin every evening subcutaneous, Disp: 15 mL, Rfl: 1    levothyroxine (SYNTHROID) 150 MCG tablet, Take 1 tablet (150 mcg total) by mouth before breakfast., Disp: 90 tablet, Rfl: 1    metOLazone (ZAROXOLYN) 2.5 MG tablet, Take 1 tablet (2.5 mg total) by mouth once daily., Disp: 30 tablet, Rfl: 2    naloxone (NARCAN) 4 mg/actuation Spry, 1 spray once., Disp: , Rfl:     NIFEdipine (PROCARDIA-XL) 60 MG (OSM) 24 hr tablet, Take 1 tablet (60 mg total) by mouth once daily., Disp: 90 tablet, Rfl: 0    polyethylene glycol (GLYCOLAX) 17 gram PwPk, Take 17 g by mouth once daily., Disp: , Rfl:     potassium chloride SA (K-DUR,KLOR-CON) 20 MEQ tablet, Take 1 tablet (20 mEq total) by mouth once daily., Disp: 90 tablet, Rfl: 1    QUEtiapine (SEROQUEL) 25 MG Tab, Take 1 tablet (25 mg total) by mouth once daily., Disp: 30 tablet, Rfl: 1    sertraline (ZOLOFT) 50 MG tablet, Take 1 tablet (50 mg total) by mouth once daily., Disp: 30 tablet, Rfl: 5    tiotropium bromide (SPIRIVA RESPIMAT) 2.5 mcg/actuation inhaler, Inhale 2 puffs into the lungs Daily. Controller, Disp: 4 g, Rfl: 11    varenicline (CHANTIX STARTING MONTH BOX) 0.5 mg (11)- 1 mg (42) tablet, Take one 0.5mg tab by mouth once daily X3 days,then increase to one 0.5mg tab twice daily X4 days,then increase to one 1mg tab twice daily, Disp: 1 each, Rfl: 0    carvediloL (COREG) 12.5 MG tablet, Take 0.5 tablets (6.25 mg total) by mouth 2 (two) times daily., Disp: 30 tablet, Rfl: 11    clindamycin (CLEOCIN) 150 MG capsule, Take 2 capsules (300 mg total) by mouth 3 (three) times daily., Disp: 30 capsule, Rfl: 0    colchicine (COLCRYS) 0.6 mg  "tablet, Take 1 tablet (0.6 mg total) by mouth once daily., Disp: 30 tablet, Rfl: 2    pantoprazole (PROTONIX) 40 MG tablet, Take 1 tablet (40 mg total) by mouth once daily., Disp: 90 tablet, Rfl: 1           Review of Systems             Vitals:    02/27/24 1012   BP: 130/79   BP Location: Left arm   Patient Position: Sitting   BP Method: Large (Automatic)   Pulse: 110   Resp: 18   Temp: 97.4 °F (36.3 °C)   TempSrc: Temporal   SpO2: (!) 83%   Weight: (!) 157.4 kg (347 lb)   Height: 5' 8" (1.727 m)        Physical Exam  Vitals and nursing note reviewed.   Constitutional:       Appearance: Normal appearance.   Cardiovascular:      Rate and Rhythm: Normal rate and regular rhythm.      Pulses: Normal pulses.      Heart sounds: Normal heart sounds.   Pulmonary:      Effort: Pulmonary effort is normal.      Breath sounds: Normal breath sounds.   Abdominal:      General: Abdomen is flat. Bowel sounds are normal.      Palpations: Abdomen is soft.   Musculoskeletal:         General: Normal range of motion.   Skin:     General: Skin is warm and dry.   Neurological:      General: No focal deficit present.      Mental Status: She is alert and oriented to person, place, and time. Mental status is at baseline.           Last Labs:     Office Visit on 02/27/2024   Component Date Value    Sodium 02/27/2024 137     Potassium 02/27/2024 3.8     Chloride 02/27/2024 100     CO2 02/27/2024 33 (H)     Anion Gap 02/27/2024 8     Glucose 02/27/2024 100     BUN 02/27/2024 13     Creatinine 02/27/2024 0.95     BUN/Creatinine Ratio 02/27/2024 14     Calcium 02/27/2024 9.6     eGFR 02/27/2024 67     ProBNP 02/27/2024 642 (H)     D-Dimer 02/27/2024 0.48 (H)     WBC 02/27/2024 7.35     RBC 02/27/2024 7.56 (H)     Hemoglobin 02/27/2024 16.6 (H)     Hematocrit 02/27/2024 55.4 (H)     MCV 02/27/2024 73.3 (L)     MCH 02/27/2024 22.0 (L)     MCHC 02/27/2024 30.0 (L)     RDW 02/27/2024 26.1 (H)     Platelet Count 02/27/2024 173     Neutrophils % " 02/27/2024 68.4 (H)     Lymphocytes % 02/27/2024 20.8 (L)     Monocytes % 02/27/2024 6.3 (H)     Eosinophils % 02/27/2024 3.5     Basophils % 02/27/2024 0.7     Immature Granulocytes % 02/27/2024 0.3     nRBC, Auto 02/27/2024 0.0     Neutrophils, Abs 02/27/2024 5.03     Lymphocytes, Absolute 02/27/2024 1.53     Monocytes, Absolute 02/27/2024 0.46     Eosinophils, Absolute 02/27/2024 0.26     Basophils, Absolute 02/27/2024 0.05     Immature Granulocytes, A* 02/27/2024 0.02     nRBC, Absolute 02/27/2024 0.00     Diff Type 02/27/2024 Scan Smear     Platelet Morphology 02/27/2024 Few Large Platelets (A)     Anisocytosis 02/27/2024 2+     Microcytosis 02/27/2024 1+    No results displayed because visit has over 200 results.      Admission on 02/02/2024, Discharged on 02/02/2024   Component Date Value    POC Glucose 02/02/2024 102        Last Imaging:  X-Ray Lumbar Spine Ap And Lateral  Narrative: EXAMINATION:  XR LUMBAR SPINE AP AND LATERAL    CLINICAL HISTORY:  Dyspnea, unspecified    COMPARISON:  5 July 2023    TECHNIQUE:  XR LUMBAR SPINE AP AND LATERAL    FINDINGS:  No fracture is seen. Vertebral body heights and alignment are normal.  There is mild disc space loss and degenerative change at L4-5.  Small amount of facet joint degenerative change present.  Impression: Degenerative changes as described above.    Electronically signed by: Modesto Tristan  Date:    02/27/2024  Time:    13:57  X-Ray Hip 2 or 3 views Right (with Pelvis when performed)  Narrative: EXAMINATION:  XR HIP WITH PELVIS WHEN PERFORMED, 2 OR 3  VIEWS RIGHT    CLINICAL HISTORY:  Unilateral primary osteoarthritis, right hip    COMPARISON:  7 November 2023    TECHNIQUE:  XR HIP WITH PELVIS 3  VIEWS RIGHT    FINDINGS:  No evidence of fracture seen.  The alignment of the joints appears normal.  Mild-to-moderate bilateral hip degenerative change is present.  No soft tissue abnormality is seen.  Impression: Hip osteoarthrosis as described  above.    Electronically signed by: Modesto Tristan  Date:    02/27/2024  Time:    13:36  X-Ray Knee 1 or 2 View Right  Narrative: EXAMINATION:  XR KNEE 1 OR 2 VIEW RIGHT    CLINICAL HISTORY:  Unilateral primary osteoarthritis, right knee    COMPARISON:  Right knee x-ray November 24, 2022    TECHNIQUE:  Frontal and lateral views of the right knee.    FINDINGS:  Moderate degenerative change of the medial compartment.  Mild degenerative change of the lateral and patellofemoral compartments.  Mild spurring of superior pole of patella.  No acute fracture or dislocation.  Impression: As above.    Point of Service: [facility]    Electronically signed by: Christian Foss  Date:    02/27/2024  Time:    13:35  X-Ray Chest PA And Lateral  Narrative: EXAMINATION:  XR CHEST PA AND LATERAL    CLINICAL HISTORY:  Dyspnea, unspecified    COMPARISON:  7 February 2024    TECHNIQUE:  XR CHEST PA AND LATERAL    FINDINGS:  The heart and mediastinum are stable in size and configuration.  The pulmonary vascularity is slightly increased with bilateral increased interstitial lung density.  No other lung infiltrates, effusions, pneumothorax or other abnormality is demonstrated.  Impression: Findings suggest mild cardiac decompensation and / or pneumonitis.    Electronically signed by: Modesto Tristan  Date:    02/27/2024  Time:    13:35         **Labs and x-rays personally reviewed by me    ** reviewed      Objective:        Assessment:       1. Dyspnea, unspecified type  Basic Metabolic Panel    CBC Auto Differential    NT-Pro Natriuretic Peptide    D-Dimer, Quantitative    X-Ray Chest PA And Lateral    X-Ray Lumbar Spine Ap And Lateral    Basic Metabolic Panel    CBC Auto Differential    NT-Pro Natriuretic Peptide    D-Dimer, Quantitative    Ambulatory referral/consult to Home Health    Ambulatory referral/consult to Physical/Occupational Therapy    CBC Morphology      2. Osteoarthritis of right hip, unspecified  osteoarthritis type  X-Ray Hip 2 or 3 views Right (with Pelvis when performed)      3. Osteoarthritis of right knee, unspecified osteoarthritis type  X-Ray Knee 1 or 2 View Right      4. Radiculopathy, unspecified spinal region  Ambulatory referral/consult to Neurology      5. Venous insufficiency  US Lower Extremity Veins Bilateral    Ambulatory referral/consult to [facility]           Plan:         1. Dyspnea, unspecified type  -     Basic Metabolic Panel; Future; Expected date: 02/27/2024  -     CBC Auto Differential; Future; Expected date: 02/27/2024  -     NT-Pro Natriuretic Peptide; Future; Expected date: 02/27/2024  -     D-Dimer, Quantitative; Future; Expected date: 02/27/2024  -     X-Ray Chest PA And Lateral; Future; Expected date: 02/27/2024  -     X-Ray Lumbar Spine Ap And Lateral; Future; Expected date: 02/27/2024  -     Ambulatory referral/consult to Home Health; Future; Expected date: 02/28/2024  -     Ambulatory referral/consult to Physical/Occupational Therapy; Future; Expected date: 03/05/2024  -     CBC Morphology    2. Osteoarthritis of right hip, unspecified osteoarthritis type  -     X-Ray Hip 2 or 3 views Right (with Pelvis when performed); Future; Expected date: 02/27/2024    3. Osteoarthritis of right knee, unspecified osteoarthritis type  -     X-Ray Knee 1 or 2 View Right; Future; Expected date: 02/27/2024    4. Radiculopathy, unspecified spinal region  -     Ambulatory referral/consult to Neurology; Future; Expected date: 03/05/2024    5. Venous insufficiency  -     US Lower Extremity Veins Bilateral; Future; Expected date: 02/27/2024  -     Ambulatory referral/consult to [facility]; Future; Expected date: 03/05/2024    Other orders  -     clindamycin (CLEOCIN) 150 MG capsule; Take 2 capsules (300 mg total) by mouth 3 (three) times daily.  Dispense: 30 capsule; Refill: 0  -     pantoprazole (PROTONIX) 40 MG tablet; Take 1 tablet (40 mg total) by mouth once daily.  Dispense: 90  tablet; Refill: 1  -     colchicine (COLCRYS) 0.6 mg tablet; Take 1 tablet (0.6 mg total) by mouth once daily.  Dispense: 30 tablet; Refill: 2

## 2024-03-04 RX ORDER — COLCHICINE 0.6 MG/1
0.6 TABLET ORAL DAILY
Qty: 30 TABLET | Refills: 2 | Status: SHIPPED | OUTPATIENT
Start: 2024-03-04

## 2024-03-04 RX ORDER — PANTOPRAZOLE SODIUM 40 MG/1
40 TABLET, DELAYED RELEASE ORAL DAILY
Qty: 90 TABLET | Refills: 1 | Status: SHIPPED | OUTPATIENT
Start: 2024-03-04 | End: 2024-06-05 | Stop reason: SDUPTHER

## 2024-03-04 RX ORDER — CLINDAMYCIN HYDROCHLORIDE 150 MG/1
300 CAPSULE ORAL 3 TIMES DAILY
Qty: 30 CAPSULE | Refills: 0 | Status: SHIPPED | OUTPATIENT
Start: 2024-03-04

## 2024-03-05 ENCOUNTER — OFFICE VISIT (OUTPATIENT)
Dept: PAIN MEDICINE | Facility: CLINIC | Age: 64
End: 2024-03-05
Payer: MEDICARE

## 2024-03-05 VITALS
DIASTOLIC BLOOD PRESSURE: 59 MMHG | WEIGHT: 293 LBS | SYSTOLIC BLOOD PRESSURE: 154 MMHG | BODY MASS INDEX: 44.41 KG/M2 | HEART RATE: 108 BPM | RESPIRATION RATE: 22 BRPM | HEIGHT: 68 IN

## 2024-03-05 DIAGNOSIS — M47.817 LUMBOSACRAL SPONDYLOSIS WITHOUT MYELOPATHY: Primary | Chronic | ICD-10-CM

## 2024-03-05 DIAGNOSIS — Z79.899 ENCOUNTER FOR LONG-TERM (CURRENT) USE OF OTHER MEDICATIONS: ICD-10-CM

## 2024-03-05 DIAGNOSIS — I73.9 PVD (PERIPHERAL VASCULAR DISEASE): Chronic | ICD-10-CM

## 2024-03-05 DIAGNOSIS — M25.561 CHRONIC PAIN OF RIGHT KNEE: Chronic | ICD-10-CM

## 2024-03-05 DIAGNOSIS — G89.29 CHRONIC PAIN OF RIGHT KNEE: Chronic | ICD-10-CM

## 2024-03-05 PROCEDURE — 99999PBSHW POCT URINE DRUG SCREEN PRESUMP: Mod: PBBFAC,,,

## 2024-03-05 PROCEDURE — 80305 DRUG TEST PRSMV DIR OPT OBS: CPT | Mod: PBBFAC | Performed by: PHYSICIAN ASSISTANT

## 2024-03-05 PROCEDURE — 3078F DIAST BP <80 MM HG: CPT | Mod: CPTII,,, | Performed by: PHYSICIAN ASSISTANT

## 2024-03-05 PROCEDURE — 3008F BODY MASS INDEX DOCD: CPT | Mod: CPTII,,, | Performed by: PHYSICIAN ASSISTANT

## 2024-03-05 PROCEDURE — 99213 OFFICE O/P EST LOW 20 MIN: CPT | Mod: PBBFAC | Performed by: PHYSICIAN ASSISTANT

## 2024-03-05 PROCEDURE — 3077F SYST BP >= 140 MM HG: CPT | Mod: CPTII,,, | Performed by: PHYSICIAN ASSISTANT

## 2024-03-05 PROCEDURE — 1111F DSCHRG MED/CURRENT MED MERGE: CPT | Mod: CPTII,,, | Performed by: PHYSICIAN ASSISTANT

## 2024-03-05 PROCEDURE — 1159F MED LIST DOCD IN RCRD: CPT | Mod: CPTII,,, | Performed by: PHYSICIAN ASSISTANT

## 2024-03-05 PROCEDURE — 99214 OFFICE O/P EST MOD 30 MIN: CPT | Mod: S$PBB,,, | Performed by: PHYSICIAN ASSISTANT

## 2024-03-05 RX ORDER — HYDROCODONE BITARTRATE AND ACETAMINOPHEN 10; 325 MG/1; MG/1
1 TABLET ORAL EVERY 6 HOURS
Qty: 120 TABLET | Refills: 0 | Status: SHIPPED | OUTPATIENT
Start: 2024-04-19 | End: 2024-06-04 | Stop reason: SDUPTHER

## 2024-03-05 RX ORDER — BUMETANIDE 2 MG/1
2 TABLET ORAL 2 TIMES DAILY
Qty: 60 TABLET | Refills: 0 | Status: SHIPPED | OUTPATIENT
Start: 2024-03-05 | End: 2024-04-11 | Stop reason: SDUPTHER

## 2024-03-05 RX ORDER — HYDROCODONE BITARTRATE AND ACETAMINOPHEN 10; 325 MG/1; MG/1
1 TABLET ORAL EVERY 6 HOURS
Qty: 120 TABLET | Refills: 0 | Status: SHIPPED | OUTPATIENT
Start: 2024-03-20 | End: 2024-06-04 | Stop reason: SDUPTHER

## 2024-03-05 RX ORDER — HYDROCODONE BITARTRATE AND ACETAMINOPHEN 10; 325 MG/1; MG/1
1 TABLET ORAL EVERY 6 HOURS
Qty: 120 TABLET | Refills: 0 | Status: SHIPPED | OUTPATIENT
Start: 2024-05-18 | End: 2024-06-04 | Stop reason: SDUPTHER

## 2024-03-10 NOTE — ASSESSMENT & PLAN NOTE
Diabetic with open foot wound  Vanc, flagyl, cef  Blood cultures sent  MRI for osteo  Gen surg if osteo  Ortho for fracture if no osteo     no

## 2024-03-13 ENCOUNTER — OFFICE VISIT (OUTPATIENT)
Dept: FAMILY MEDICINE | Facility: CLINIC | Age: 64
End: 2024-03-13
Payer: MEDICARE

## 2024-03-13 VITALS
TEMPERATURE: 98 F | OXYGEN SATURATION: 86 % | BODY MASS INDEX: 44.41 KG/M2 | RESPIRATION RATE: 16 BRPM | SYSTOLIC BLOOD PRESSURE: 150 MMHG | WEIGHT: 293 LBS | DIASTOLIC BLOOD PRESSURE: 67 MMHG | HEIGHT: 68 IN | HEART RATE: 104 BPM

## 2024-03-13 DIAGNOSIS — E66.9 OBESITY, UNSPECIFIED CLASSIFICATION, UNSPECIFIED OBESITY TYPE, UNSPECIFIED WHETHER SERIOUS COMORBIDITY PRESENT: ICD-10-CM

## 2024-03-13 DIAGNOSIS — F17.200 TOBACCO DEPENDENCY: ICD-10-CM

## 2024-03-13 DIAGNOSIS — I50.9 CONGESTIVE HEART FAILURE, UNSPECIFIED HF CHRONICITY, UNSPECIFIED HEART FAILURE TYPE: Primary | ICD-10-CM

## 2024-03-13 PROCEDURE — 1111F DSCHRG MED/CURRENT MED MERGE: CPT | Mod: ,,, | Performed by: INTERNAL MEDICINE

## 2024-03-13 PROCEDURE — 1159F MED LIST DOCD IN RCRD: CPT | Mod: ,,, | Performed by: INTERNAL MEDICINE

## 2024-03-13 PROCEDURE — 99214 OFFICE O/P EST MOD 30 MIN: CPT | Mod: ,,, | Performed by: INTERNAL MEDICINE

## 2024-03-13 PROCEDURE — 3077F SYST BP >= 140 MM HG: CPT | Mod: ,,, | Performed by: INTERNAL MEDICINE

## 2024-03-13 PROCEDURE — 3078F DIAST BP <80 MM HG: CPT | Mod: ,,, | Performed by: INTERNAL MEDICINE

## 2024-03-13 PROCEDURE — 3008F BODY MASS INDEX DOCD: CPT | Mod: ,,, | Performed by: INTERNAL MEDICINE

## 2024-03-14 PROBLEM — E66.9 OBESITY: Status: ACTIVE | Noted: 2023-11-03

## 2024-03-14 RX ORDER — FUROSEMIDE 20 MG/1
20 TABLET ORAL DAILY PRN
Qty: 40 TABLET | Refills: 1 | Status: SHIPPED | OUTPATIENT
Start: 2024-03-14 | End: 2024-04-11 | Stop reason: SDUPTHER

## 2024-03-14 RX ORDER — NITROGLYCERIN 0.4 MG/1
0.4 TABLET SUBLINGUAL EVERY 5 MIN PRN
Qty: 90 TABLET | Refills: 1 | Status: SHIPPED | OUTPATIENT
Start: 2024-03-14 | End: 2024-03-25

## 2024-03-14 RX ORDER — CARVEDILOL 12.5 MG/1
6.25 TABLET ORAL 2 TIMES DAILY
Qty: 30 TABLET | Refills: 11 | Status: SHIPPED | OUTPATIENT
Start: 2024-03-14 | End: 2024-06-05 | Stop reason: SDUPTHER

## 2024-03-14 NOTE — PROGRESS NOTES
Subjective:       Patient ID: Holly Porter is a 63 y.o. female.    Chief Complaint: Follow-up (2 week follow up; Dyspnea) and Arm Pain (Left )    Follow-up  Pertinent negatives include no abdominal pain, chest pain, fatigue or fever.   Arm Pain   Pertinent negatives include no chest pain.   Patient presents today with a history of CHF which has improved she also has tobacco dependency.  She stated that she is taking Bumex now but she still has intermittent swelling I am going to refill Lasix to take 60 mg every other day as needed for increased swelling she also states that she has intermittent chest pain stated she has had a cardiac catheterization and no stents were required.  She is requesting p.r.n. sublingual nitroglycerin.Due to the many adverse health risks of smoking tobacco,  Patient has been encouraged to quit smoking, and smoking sensation treatments have been   treatments have been offered and a  smoking cessation class has been recommended to the patient    .    Current Medications:    Current Outpatient Medications:     ACCU-CHEK GUIDE GLUCOSE METER Misc, , Disp: , Rfl:     ACCU-CHEK GUIDE TEST STRIPS Strp, , Disp: , Rfl:     ACCU-CHEK SOFTCLIX LANCETS Misc, , Disp: , Rfl:     albuterol (PROVENTIL) 2.5 mg /3 mL (0.083 %) nebulizer solution, Take 3 mLs (2.5 mg total) by nebulization every 6 (six) hours as needed for Wheezing. Rescue, Disp: 300 mL, Rfl: 11    albuterol (PROVENTIL/VENTOLIN HFA) 90 mcg/actuation inhaler, Inhale 2 puffs into the lungs 4 (four) times daily. Rescue, Disp: 18 g, Rfl: 2    allopurinoL (ZYLOPRIM) 300 MG tablet, TAKE ONE TABLET EVERY DAY, Disp: 90 tablet, Rfl: 3    apixaban (ELIQUIS) 5 mg Tab, Take 1 tablet (5 mg total) by mouth 2 (two) times daily., Disp: 60 tablet, Rfl: 3    aspirin (ECOTRIN) 81 MG EC tablet, TAKE 1 TABLET BY MOUTH EVERY DAY, Disp: 90 tablet, Rfl: 1    bumetanide (BUMEX) 2 MG tablet, TAKE 1 TABLET (2 MG TOTAL) BY MOUTH 2 (TWO) TIMES DAILY., Disp: 60 tablet,  Rfl: 0    calcium carbonate (OS-MICHAELA) 500 mg calcium (1,250 mg) tablet, Take 1 tablet (500 mg total) by mouth 2 (two) times daily., Disp: 60 tablet, Rfl: 3    cilostazoL (PLETAL) 100 MG Tab, Take 1 tablet (100 mg total) by mouth 2 (two) times daily., Disp: 90 tablet, Rfl: 1    clindamycin (CLEOCIN) 150 MG capsule, Take 2 capsules (300 mg total) by mouth 3 (three) times daily., Disp: 30 capsule, Rfl: 0    colchicine (COLCRYS) 0.6 mg tablet, Take 1 tablet (0.6 mg total) by mouth once daily., Disp: 30 tablet, Rfl: 2    DULoxetine (CYMBALTA) 30 MG capsule, TAKE 1 CAPSULE (30 MG TOTAL) BY MOUTH ONCE DAILY., Disp: 30 capsule, Rfl: 0    gabapentin (NEURONTIN) 600 MG tablet, Take 1 tablet (600 mg total) by mouth 3 (three) times daily., Disp: 180 tablet, Rfl: 2    [START ON 3/20/2024] HYDROcodone-acetaminophen (NORCO)  mg per tablet, Take 1 tablet by mouth every 6 (six) hours., Disp: 120 tablet, Rfl: 0    [START ON 4/19/2024] HYDROcodone-acetaminophen (NORCO)  mg per tablet, Take 1 tablet by mouth every 6 (six) hours., Disp: 120 tablet, Rfl: 0    [START ON 5/18/2024] HYDROcodone-acetaminophen (NORCO)  mg per tablet, Take 1 tablet by mouth every 6 (six) hours., Disp: 120 tablet, Rfl: 0    insulin detemir U-100, Levemir, (LEVEMIR FLEXTOUCH U100 INSULIN) 100 unit/mL (3 mL) InPn pen, Inject 10 units into the skin every evening subcutaneous, Disp: 15 mL, Rfl: 1    levothyroxine (SYNTHROID) 150 MCG tablet, Take 1 tablet (150 mcg total) by mouth before breakfast., Disp: 90 tablet, Rfl: 1    metOLazone (ZAROXOLYN) 2.5 MG tablet, Take 1 tablet (2.5 mg total) by mouth once daily., Disp: 30 tablet, Rfl: 2    naloxone (NARCAN) 4 mg/actuation Spry, 1 spray once., Disp: , Rfl:     pantoprazole (PROTONIX) 40 MG tablet, Take 1 tablet (40 mg total) by mouth once daily., Disp: 90 tablet, Rfl: 1    polyethylene glycol (GLYCOLAX) 17 gram PwPk, Take 17 g by mouth once daily., Disp: , Rfl:     potassium chloride SA  (K-DUR,KLOR-CON) 20 MEQ tablet, Take 1 tablet (20 mEq total) by mouth once daily., Disp: 90 tablet, Rfl: 1    QUEtiapine (SEROQUEL) 25 MG Tab, Take 1 tablet (25 mg total) by mouth once daily., Disp: 30 tablet, Rfl: 1    sertraline (ZOLOFT) 50 MG tablet, Take 1 tablet (50 mg total) by mouth once daily., Disp: 30 tablet, Rfl: 5    tiotropium bromide (SPIRIVA RESPIMAT) 2.5 mcg/actuation inhaler, Inhale 2 puffs into the lungs Daily. Controller, Disp: 4 g, Rfl: 11    varenicline (CHANTIX STARTING MONTH BOX) 0.5 mg (11)- 1 mg (42) tablet, Take one 0.5mg tab by mouth once daily X3 days,then increase to one 0.5mg tab twice daily X4 days,then increase to one 1mg tab twice daily, Disp: 1 each, Rfl: 0    carvediloL (COREG) 12.5 MG tablet, Take 0.5 tablets (6.25 mg total) by mouth 2 (two) times daily., Disp: 30 tablet, Rfl: 11    furosemide (LASIX) 20 MG tablet, Take 1 tablet (20 mg total) by mouth daily as needed (for swelling)., Disp: 40 tablet, Rfl: 1    NIFEdipine (PROCARDIA-XL) 60 MG (OSM) 24 hr tablet, Take 1 tablet (60 mg total) by mouth once daily., Disp: 90 tablet, Rfl: 0    nitroGLYCERIN (NITROSTAT) 0.4 MG SL tablet, Place 1 tablet (0.4 mg total) under the tongue every 5 (five) minutes as needed for Chest pain. Not to exceed 3 tablets., Disp: 90 tablet, Rfl: 1           Review of Systems   Constitutional:  Negative for appetite change, fatigue and fever.   Respiratory:  Positive for shortness of breath.    Cardiovascular:  Negative for chest pain.   Gastrointestinal:  Negative for abdominal pain and constipation.   Endocrine: Negative for polydipsia, polyphagia and polyuria.   Genitourinary:  Negative for difficulty urinating, frequency and hot flashes.   Allergic/Immunologic: Negative for environmental allergies.   Neurological:  Negative for dizziness and light-headedness.   Psychiatric/Behavioral:  Negative for agitation.                 Vitals:    03/13/24 1358   BP: (!) 150/67   BP Location: Left arm   Patient  "Position: Sitting   BP Method: Large (Automatic)   Pulse: 104   Resp: 16   Temp: 97.7 °F (36.5 °C)   TempSrc: Temporal   SpO2: (!) 86%   Weight: (!) 157.9 kg (348 lb)   Height: 5' 8" (1.727 m)        Physical Exam  Vitals and nursing note reviewed.   Constitutional:       Appearance: Normal appearance.   Cardiovascular:      Rate and Rhythm: Normal rate and regular rhythm.      Pulses: Normal pulses.      Heart sounds: Normal heart sounds.   Pulmonary:      Effort: Pulmonary effort is normal.      Breath sounds: Normal breath sounds.   Abdominal:      General: Abdomen is flat. Bowel sounds are normal.      Palpations: Abdomen is soft.   Musculoskeletal:         General: Normal range of motion.   Skin:     General: Skin is warm and dry.   Neurological:      General: No focal deficit present.      Mental Status: She is alert and oriented to person, place, and time. Mental status is at baseline.           Last Labs:     Office Visit on 03/05/2024   Component Date Value    POC Amphetamines 03/05/2024 Negative     POC Barbiturates 03/05/2024 Negative     POC Benzodiazepines 03/05/2024 Negative     POC Cocaine 03/05/2024 Negative     POC THC 03/05/2024 Negative     POC Methadone 03/05/2024 Negative     POC Methamphetamine 03/05/2024 Negative     POC Opiates 03/05/2024 Presumptive Positive (A)     POC Oxycodone 03/05/2024 Negative     POC Phencyclidine 03/05/2024 Negative     POC Methylenedioxymetham* 03/05/2024 Negative     POC Tricyclic Antidepres* 03/05/2024 Negative     POC Buprenorphine 03/05/2024 Negative      Acceptab* 03/05/2024 Yes     POC Temperature (Urine) 03/05/2024 92    Office Visit on 02/27/2024   Component Date Value    Sodium 02/27/2024 137     Potassium 02/27/2024 3.8     Chloride 02/27/2024 100     CO2 02/27/2024 33 (H)     Anion Gap 02/27/2024 8     Glucose 02/27/2024 100     BUN 02/27/2024 13     Creatinine 02/27/2024 0.95     BUN/Creatinine Ratio 02/27/2024 14     Calcium 02/27/2024 " 9.6     eGFR 02/27/2024 67     ProBNP 02/27/2024 642 (H)     D-Dimer 02/27/2024 0.48 (H)     WBC 02/27/2024 7.35     RBC 02/27/2024 7.56 (H)     Hemoglobin 02/27/2024 16.6 (H)     Hematocrit 02/27/2024 55.4 (H)     MCV 02/27/2024 73.3 (L)     MCH 02/27/2024 22.0 (L)     MCHC 02/27/2024 30.0 (L)     RDW 02/27/2024 26.1 (H)     Platelet Count 02/27/2024 173     Neutrophils % 02/27/2024 68.4 (H)     Lymphocytes % 02/27/2024 20.8 (L)     Monocytes % 02/27/2024 6.3 (H)     Eosinophils % 02/27/2024 3.5     Basophils % 02/27/2024 0.7     Immature Granulocytes % 02/27/2024 0.3     nRBC, Auto 02/27/2024 0.0     Neutrophils, Abs 02/27/2024 5.03     Lymphocytes, Absolute 02/27/2024 1.53     Monocytes, Absolute 02/27/2024 0.46     Eosinophils, Absolute 02/27/2024 0.26     Basophils, Absolute 02/27/2024 0.05     Immature Granulocytes, A* 02/27/2024 0.02     nRBC, Absolute 02/27/2024 0.00     Diff Type 02/27/2024 Scan Smear     Platelet Morphology 02/27/2024 Few Large Platelets (A)     Anisocytosis 02/27/2024 2+     Microcytosis 02/27/2024 1+    No results displayed because visit has over 200 results.          Last Imaging:  X-Ray Lumbar Spine Ap And Lateral  Narrative: EXAMINATION:  XR LUMBAR SPINE AP AND LATERAL    CLINICAL HISTORY:  Dyspnea, unspecified    COMPARISON:  5 July 2023    TECHNIQUE:  XR LUMBAR SPINE AP AND LATERAL    FINDINGS:  No fracture is seen. Vertebral body heights and alignment are normal.  There is mild disc space loss and degenerative change at L4-5.  Small amount of facet joint degenerative change present.  Impression: Degenerative changes as described above.    Electronically signed by: Modesto Tristan  Date:    02/27/2024  Time:    13:57  X-Ray Hip 2 or 3 views Right (with Pelvis when performed)  Narrative: EXAMINATION:  XR HIP WITH PELVIS WHEN PERFORMED, 2 OR 3  VIEWS RIGHT    CLINICAL HISTORY:  Unilateral primary osteoarthritis, right hip    COMPARISON:  7 November 2023    TECHNIQUE:  XR HIP WITH  PELVIS 3  VIEWS RIGHT    FINDINGS:  No evidence of fracture seen.  The alignment of the joints appears normal.  Mild-to-moderate bilateral hip degenerative change is present.  No soft tissue abnormality is seen.  Impression: Hip osteoarthrosis as described above.    Electronically signed by: Modesto Tristan  Date:    02/27/2024  Time:    13:36  X-Ray Knee 1 or 2 View Right  Narrative: EXAMINATION:  XR KNEE 1 OR 2 VIEW RIGHT    CLINICAL HISTORY:  Unilateral primary osteoarthritis, right knee    COMPARISON:  Right knee x-ray November 24, 2022    TECHNIQUE:  Frontal and lateral views of the right knee.    FINDINGS:  Moderate degenerative change of the medial compartment.  Mild degenerative change of the lateral and patellofemoral compartments.  Mild spurring of superior pole of patella.  No acute fracture or dislocation.  Impression: As above.    Point of Service: [facility]    Electronically signed by: Christian Foss  Date:    02/27/2024  Time:    13:35  X-Ray Chest PA And Lateral  Narrative: EXAMINATION:  XR CHEST PA AND LATERAL    CLINICAL HISTORY:  Dyspnea, unspecified    COMPARISON:  7 February 2024    TECHNIQUE:  XR CHEST PA AND LATERAL    FINDINGS:  The heart and mediastinum are stable in size and configuration.  The pulmonary vascularity is slightly increased with bilateral increased interstitial lung density.  No other lung infiltrates, effusions, pneumothorax or other abnormality is demonstrated.  Impression: Findings suggest mild cardiac decompensation and / or pneumonitis.    Electronically signed by: Modesto Tristan  Date:    02/27/2024  Time:    13:35         **Labs and x-rays personally reviewed by me    ** reviewed      Objective:        Assessment:       1. Congestive heart failure, unspecified HF chronicity, unspecified heart failure type        2. Tobacco dependency        3. Obesity, unspecified classification, unspecified obesity type, unspecified whether serious comorbidity present              Plan:         1. Congestive heart failure, unspecified HF chronicity, unspecified heart failure type    2. Tobacco dependency    3. Obesity, unspecified classification, unspecified obesity type, unspecified whether serious comorbidity present    Other orders  -     carvediloL (COREG) 12.5 MG tablet; Take 0.5 tablets (6.25 mg total) by mouth 2 (two) times daily.  Dispense: 30 tablet; Refill: 11  -     furosemide (LASIX) 20 MG tablet; Take 1 tablet (20 mg total) by mouth daily as needed (for swelling).  Dispense: 40 tablet; Refill: 1  -     nitroGLYCERIN (NITROSTAT) 0.4 MG SL tablet; Place 1 tablet (0.4 mg total) under the tongue every 5 (five) minutes as needed for Chest pain. Not to exceed 3 tablets.  Dispense: 90 tablet; Refill: 1

## 2024-03-18 ENCOUNTER — CLINICAL SUPPORT (OUTPATIENT)
Dept: PULMONOLOGY | Facility: HOSPITAL | Age: 64
End: 2024-03-18
Attending: INTERNAL MEDICINE
Payer: MEDICARE

## 2024-03-18 VITALS — HEIGHT: 68 IN | WEIGHT: 293 LBS | OXYGEN SATURATION: 88 % | BODY MASS INDEX: 44.41 KG/M2

## 2024-03-18 DIAGNOSIS — J96.12 CHRONIC RESPIRATORY FAILURE WITH HYPOXIA AND HYPERCAPNIA: ICD-10-CM

## 2024-03-18 DIAGNOSIS — J96.11 CHRONIC RESPIRATORY FAILURE WITH HYPOXIA: ICD-10-CM

## 2024-03-18 DIAGNOSIS — J96.11 CHRONIC RESPIRATORY FAILURE WITH HYPOXIA AND HYPERCAPNIA: ICD-10-CM

## 2024-03-18 DIAGNOSIS — J42 CHRONIC BRONCHITIS, UNSPECIFIED CHRONIC BRONCHITIS TYPE: ICD-10-CM

## 2024-03-18 PROCEDURE — 94727 GAS DIL/WSHOT DETER LNG VOL: CPT

## 2024-03-18 PROCEDURE — 94060 EVALUATION OF WHEEZING: CPT

## 2024-03-18 PROCEDURE — 94060 EVALUATION OF WHEEZING: CPT | Mod: 26,59,, | Performed by: STUDENT IN AN ORGANIZED HEALTH CARE EDUCATION/TRAINING PROGRAM

## 2024-03-18 PROCEDURE — 94729 DIFFUSING CAPACITY: CPT

## 2024-03-18 PROCEDURE — 94729 DIFFUSING CAPACITY: CPT | Mod: 26,,, | Performed by: STUDENT IN AN ORGANIZED HEALTH CARE EDUCATION/TRAINING PROGRAM

## 2024-03-18 PROCEDURE — 94618 PULMONARY STRESS TESTING: CPT | Mod: 26,,, | Performed by: STUDENT IN AN ORGANIZED HEALTH CARE EDUCATION/TRAINING PROGRAM

## 2024-03-18 PROCEDURE — 94727 GAS DIL/WSHOT DETER LNG VOL: CPT | Mod: 26,,, | Performed by: STUDENT IN AN ORGANIZED HEALTH CARE EDUCATION/TRAINING PROGRAM

## 2024-03-18 PROCEDURE — 27100098 HC SPACER

## 2024-03-18 PROCEDURE — 94618 PULMONARY STRESS TESTING: CPT

## 2024-03-18 NOTE — PROGRESS NOTES
PFT PROCEDURE EXPLAINED AND DEMONSTRATED. GOOD PATIENT EFFORT AND COOPERATION. ALBUTEROL ADMINISTERED AND TEST REPEATED. TOLERATED WELL. D/C IN GOOD CONDITION.   PATIENT APPEARED VERY DROWSY DURING TESTING.   PLETHYSMOGRAPH NOT PERFORMED.

## 2024-03-18 NOTE — PROCEDURES
PFT REPORT:  SPIROMETRY:  The pre-BD FVC is decreased, 2.01L/-2.78 Z score.  The pre-BD FEV1 is decreased, 1.57L/-2.64 Z score.  Thre pre-BD FEV1/FVC ratio is 78/0 Z score.    The post-BD FVC is decreased, 2.08L/-2.64 Z score.  The post-BD FEV1 is decreased, 1.57L/-2.64 Z score.  The post-BD FEV1/FVC ratio is 75/-0.42 Z score.    The is no significant bronchodilator effect.  There is scooping out of the expiratory limb of the flow volume loop.     LUNG VOLUMES:  The TLC is normal, 5.34L/-0.50 Z score.  The FRC is normal, 3.60L/0.54 Z score.  The RV is increased, 3.28L/2.09 Z score.    DIFFUSION CAPACITY:  The diffusion capacity is corrected for hemoglobin and is decreased, 10.24/-5.08 Z score.    Interpretation:  The post-BD spirometry shows no obstructive defect. There is preserved ratio impaired spirometry (PRISm).  There is no significant response to bronchodilators. Lack of a bronchodilator response in the lab does not predict clinical response to bronchodilator therapy.  There is scooping out of the expiratory limb of the flow volume loop.   There is no restrictive defect. Gas trapping is present.  There is a severe diffusion defect. Decreased diffusion capacity is suggestive of pulmonary vascular disease, emphysema, interstitial lung disease and/or anemia. Clinical correlation is advised.       Missy Godoy MD  Pulmonary Medicine  [facility]

## 2024-03-18 NOTE — PROCEDURES
SIX MINUTE WALK DISTANCE  BASELINE VITALS  HR: 93   BP: 148/90  SPO2: 87% on RA    END OF STUDY VITALS:  HR: 107  BP: 142/92  SPO2: 90% on 2LNC    RECOVERY VITALS:  After 1 min rest  HR: 98  BP: 129/89  SPO2: 90% on 2L NC    Patient walked 453 ft with 0 rests.   SpO2 brittney was 87% at 2 minutes.   The test was repeated on 2L NC with SpO2 brittney of 89%.      IMPRESSION:  Patient needs 2L NC at rest. Patient needs 2L NC with activity.  Patient had tachycardia with exercise that resolved with rest.    Missy Godoy MD  Pulmonary and Critical Care  [facility]

## 2024-03-20 RX ORDER — NIFEDIPINE 60 MG/1
60 TABLET, EXTENDED RELEASE ORAL DAILY
Qty: 90 TABLET | Refills: 3 | Status: SHIPPED | OUTPATIENT
Start: 2024-03-20

## 2024-03-25 ENCOUNTER — OFFICE VISIT (OUTPATIENT)
Dept: PULMONOLOGY | Facility: CLINIC | Age: 64
End: 2024-03-25
Payer: MEDICARE

## 2024-03-25 VITALS
WEIGHT: 293 LBS | DIASTOLIC BLOOD PRESSURE: 70 MMHG | SYSTOLIC BLOOD PRESSURE: 135 MMHG | HEART RATE: 103 BPM | OXYGEN SATURATION: 85 % | BODY MASS INDEX: 44.41 KG/M2 | HEIGHT: 68 IN | RESPIRATION RATE: 16 BRPM

## 2024-03-25 DIAGNOSIS — J41.8 MIXED SIMPLE AND MUCOPURULENT CHRONIC BRONCHITIS: ICD-10-CM

## 2024-03-25 DIAGNOSIS — F17.200 NICOTINE DEPENDENCE WITH CURRENT USE: ICD-10-CM

## 2024-03-25 DIAGNOSIS — I27.21 PULMONARY ARTERIAL HYPERTENSION: Primary | ICD-10-CM

## 2024-03-25 DIAGNOSIS — J96.12 CHRONIC RESPIRATORY FAILURE WITH HYPOXIA AND HYPERCAPNIA: ICD-10-CM

## 2024-03-25 DIAGNOSIS — I27.20 PULMONARY HYPERTENSION: ICD-10-CM

## 2024-03-25 DIAGNOSIS — J96.11 CHRONIC RESPIRATORY FAILURE WITH HYPOXIA AND HYPERCAPNIA: ICD-10-CM

## 2024-03-25 PROCEDURE — 3008F BODY MASS INDEX DOCD: CPT | Mod: CPTII,,, | Performed by: STUDENT IN AN ORGANIZED HEALTH CARE EDUCATION/TRAINING PROGRAM

## 2024-03-25 PROCEDURE — 3078F DIAST BP <80 MM HG: CPT | Mod: CPTII,,, | Performed by: STUDENT IN AN ORGANIZED HEALTH CARE EDUCATION/TRAINING PROGRAM

## 2024-03-25 PROCEDURE — 99215 OFFICE O/P EST HI 40 MIN: CPT | Mod: PBBFAC | Performed by: STUDENT IN AN ORGANIZED HEALTH CARE EDUCATION/TRAINING PROGRAM

## 2024-03-25 PROCEDURE — 99214 OFFICE O/P EST MOD 30 MIN: CPT | Mod: S$PBB,25,, | Performed by: STUDENT IN AN ORGANIZED HEALTH CARE EDUCATION/TRAINING PROGRAM

## 2024-03-25 PROCEDURE — 99406 BEHAV CHNG SMOKING 3-10 MIN: CPT | Mod: S$PBB,,, | Performed by: STUDENT IN AN ORGANIZED HEALTH CARE EDUCATION/TRAINING PROGRAM

## 2024-03-25 PROCEDURE — 3075F SYST BP GE 130 - 139MM HG: CPT | Mod: CPTII,,, | Performed by: STUDENT IN AN ORGANIZED HEALTH CARE EDUCATION/TRAINING PROGRAM

## 2024-03-25 PROCEDURE — 1159F MED LIST DOCD IN RCRD: CPT | Mod: CPTII,,, | Performed by: STUDENT IN AN ORGANIZED HEALTH CARE EDUCATION/TRAINING PROGRAM

## 2024-03-25 RX ORDER — VARENICLINE TARTRATE 1 MG/1
1 TABLET, FILM COATED ORAL 2 TIMES DAILY
Qty: 30 TABLET | Refills: 3 | Status: SHIPPED | OUTPATIENT
Start: 2024-03-25 | End: 2024-06-06 | Stop reason: SDUPTHER

## 2024-03-25 RX ORDER — PEN NEEDLE, DIABETIC 30 GX3/16"
NEEDLE, DISPOSABLE MISCELLANEOUS
Qty: 100 EACH | Refills: 3 | Status: SHIPPED | OUTPATIENT
Start: 2024-03-25

## 2024-03-25 RX ORDER — CETIRIZINE HYDROCHLORIDE 10 MG/1
10 TABLET ORAL DAILY
COMMUNITY

## 2024-03-25 RX ORDER — TADALAFIL 20 MG/1
20 TABLET ORAL DAILY
Qty: 30 TABLET | Refills: 0 | Status: SHIPPED | OUTPATIENT
Start: 2024-03-25 | End: 2024-06-05

## 2024-03-25 NOTE — PROGRESS NOTES
[facility]  Pulmonology  ESTABLISHED VISIT     Patient Name:  Holly Porter  Primary Care Provider: Regan Frausto MD  Date of Service: 03/25/2024    Chief Complaint: Shortness of breath    SUBJECTIVE   HPI:  Holly Porter is a 63 y.o. female with pulmonary hypertension (C 02/2024 mPAP 34, PCWP 8, PVR 5.2, CO/CI 5.98/2.26), chronic respiratory failure with hypoxia and hypercapnia, JOVANNI/OSH on PAP who presents today for follow up of shortness of breath. Last seen 02/2021 with plan for PFT, 6MWT and f/u of JOVANNI therapy.    German reports feeling well today. She had decreased her smoking while on Chantix to 1/4 pack a day and is interested in continuing this therapy. She has had improvement in her breathing which she attributes to her Spiriva. She has had no admissions to hospital since last seen. She uses her oxygen when at home and has noted difficulty with pushing her oxygen tank. Today, she is not on oxygen once again due to her son who helps with transporting her to appointments expresses concern with transport of tanks to vehicle.    Initial HPI  German reports feeling well on this evaluation.  She has shortness of breath that is present with exertion.  She currently use and using her oxygen during this visit as her son who drove her here was concerned that it could affect his car.  She reports using her oxygen at home.  She is using her nightly PAP device with nasal pillows.  She has no cough.  She reports using her Spiriva which she needs a refill.   She was recently admitted 0207-14 with shortness of breath where she was found to be fluid overloaded.  For management she underwent diuresis the transitioned to Bumex from Lasix.  She had improvement in her respiratory status and while she was admitted on high-flow nasal cannula was deescalated to low-flow nasal cannula.  She also underwent right heart catheterization and was discharged home on PAP therapy.        Past Medical History:    Diagnosis Date    Acquired hypothyroidism     Atherosclerosis of native artery of extremity with intermittent claudication 01/30/2019    bilateral legs    BMI 50.0-59.9, adult 02/22/2021    Chronic pain syndrome     opioid depen    Congestive heart failure (CHF)     COPD (chronic obstructive pulmonary disease)     COVID-19 10/06/2023    Depression     Diabetes mellitus, type 2     followed by Aurea Kwong NP, [facility]    DVT of lower extremity, bilateral     Essential hypertension 06/08/2021    GERD (gastroesophageal reflux disease)     Gout 07/05/2023    Hyperlipidemia     Lumbosacral radiculopathy 06/05/2023    followed by GLENN Valdes, [facility]    Migraines     Nicotine dependence     Nicotine dependence     Obesity hypoventilation syndrome     chronic CO2 retainer with compensatory metabolic alkalosis    Osteoarthritis     Seizure disorder     Sleep apnea     on cpap    Thyroid cancer     Venous stasis ulcer limited to breakdown of skin with varicose veins     followed by Estuardo Zhao    Vitamin D deficiency        Past Surgical History:   Procedure Laterality Date    ABCESS DRAINAGE      HYSTERECTOMY      ILIAC ARTERY STENT Bilateral 01/28/2020    Bilateral distal aorta and common iliac 8 X 59 vbx covered stents performed by Dr. Antonio Jacinto.    LEFT HEART CATHETERIZATION Left 11/6/2023    Procedure: Left heart cath;  Surgeon: Alex Ortega DO;  Location: [facility] CATH LAB;  Service: Cardiology;  Laterality: Left;    RADIOFREQUENCY ABLATION Left 04/15/2022    Procedure: Left calf  Radiofrequency Ablation;  Surgeon: Matty Falcon DO;  Location: [facility] OR;  Service: Vascular;  Laterality: Left;    RIGHT HEART CATHETERIZATION Right 2/13/2024    Procedure: INSERTION, CATHETER, RIGHT HEART;  Surgeon: Mahad Moreno MD;  Location: [facility] CATH LAB;  Service: Cardiology;  Laterality: Right;    STAB PHLEBECTOMY OF VARICOSE VEINS Left 01/25/2013    Left leg  microphlebectomies x 23 stab avulsions and ligation of multiple varicose veins perrformed by Dr. Cirilo Aguirre.    THYROIDECTOMY      hx: thyroid cancer    TOE AMPUTATION Left 01/28/2020    2nd, 4th and 5th performed by Dr. Anam Saeed.    TOE AMPUTATION Right 01/28/2020    4th toe performed by Dr. Anam Saeed.    TOTAL ABDOMINAL HYSTERECTOMY W/ BILATERAL SALPINGOOPHORECTOMY      TUBAL LIGATION      VAGINAL DELIVERY      x 4    VENOUS ABLATION Right 08/09/2019    GSV Varithena Ablation performed by Dr. Estuardo Falcon    VENOUS ABLATION Left 08/02/2019    ATAV Varithena Ablation performed by Dr. Estuardo Falcon.    VENOUS ABLATION Right 07/13/2015    ATAV Laser Ablatio performed by Dr. Cirilo Aguirre.    VENOUS ABLATION Right 07/06/2015    Distal  GSV Laser Ablation performed by dr. Cirilo Aguirre.    VENOUS ABLATION Left 04/20/2015    Left Distal GSV Laser Ablation performed by dr. Cirilo Aguirre.    VENOUS ABLATION Right 10/28/2013    Right Distal GSV RF Ablation performed by Dr. Cirilo Aguirre.    VENOUS ABLATION Left 10/25/2013    SSV RF Ablation performed by dr. Cirilo Aguirre.    VENOUS ABLATION Left 10/07/2013    Left GSV and Left ATAV RF Ablation performed by Dr. Cirilo Aguirre.    VENOUS ABLATION Right 01/21/2013    GSV RF Ablation w/micros x 22 performed by Dr. Cirilo Aguirre.    VENOUS ABLATION Left 06/25/2021    Left distal GSV Varithena Ablation       Family History   Problem Relation Age of Onset    Heart disease Mother         age 84 CHF    Hypertension Mother     Osteoarthritis Mother     Coronary aneurysm Father     Coronary artery disease Father     Hypertension Father     Heart disease Father     No Known Problems Son     No Known Problems Son     No Known Problems Son     No Known Problems Son     No Known Problems Sister     No Known Problems Brother         hx: varicose veins    No Known Problems Brother         MVA: parlazed    No Known Problems Brother         Social History     Socioeconomic History    Marital status:    Tobacco Use     Smoking status: Every Day     Current packs/day: 0.50     Average packs/day: 0.5 packs/day for 33.0 years (16.5 ttl pk-yrs)     Types: Cigarettes     Passive exposure: Current    Smokeless tobacco: Never    Tobacco comments:     5 cigarettes a day    Substance and Sexual Activity    Alcohol use: Never    Drug use: Never    Sexual activity: Not Currently     Social Determinants of Health     Financial Resource Strain: Low Risk  (2/9/2024)    Overall Financial Resource Strain (CARDIA)     Difficulty of Paying Living Expenses: Not hard at all   Food Insecurity: No Food Insecurity (2/9/2024)    Hunger Vital Sign     Worried About Running Out of Food in the Last Year: Never true     Ran Out of Food in the Last Year: Never true   Transportation Needs: Unmet Transportation Needs (2/9/2024)    PRAPARE - Transportation     Lack of Transportation (Medical): Yes     Lack of Transportation (Non-Medical): Yes   Physical Activity: Inactive (2/9/2024)    Exercise Vital Sign     Days of Exercise per Week: 0 days     Minutes of Exercise per Session: 0 min   Stress: No Stress Concern Present (2/9/2024)    [employer] - Occupational Stress Questionnaire     Feeling of Stress : Not at all   Social Connections: Socially Isolated (2/9/2024)    Social Connection and Isolation Panel [NHANES]     Frequency of Communication with Friends and Family: Never     Frequency of Social Gatherings with Friends and Family: Three times a week     Attends Jewish Services: Never     Attends Club or Organization Meetings: Never     Marital Status:    Housing Stability: Low Risk  (2/9/2024)    Housing Stability Vital Sign     Unable to Pay for Housing in the Last Year: No     Number of Places Lived in the Last Year: 1     Unstable Housing in the Last Year: No       Social History     Social History Narrative    Not on file       Review of patient's allergies indicates:   Allergen Reactions    Ammonium peroxydisulfate  "Shortness Of Breath    Avocado (laurus persea) Anaphylaxis    Bananas [banana] Anaphylaxis and Swelling     CRAMPS,    Chocolate flavor Anaphylaxis     MOUTH SWELLING    Fentanyl Shortness Of Breath and Itching    Nicoderm Swelling    Percocet [oxycodone-acetaminophen] Shortness Of Breath and Itching    Silvadene [silver sulfadiazine]     Clindamycin     Corticosteroids (glucocorticoids)     Hydrocortisone Blisters    Lasix [furosemide] Blisters     BURNS SKIN    Pregabalin     Adhesive Blisters, Rash and Itching    Iodine Rash    Latex, natural rubber Rash    Pcn [penicillins] Rash    Sulfa (sulfonamide antibiotics) Rash and Blisters        Medications: Medications reviewed to include over the counter medications.    Review of Systems: A focused ROS was completed and found to be negative except for that mentioned above.      OBJECTIVE   PHYSICAL EXAM:  Vitals:    03/25/24 1428   BP: 135/70   BP Location: Left arm   Patient Position: Sitting   BP Method: Large (Automatic)   Pulse: 103   Resp: 16   SpO2: (!) 85%   Weight: (!) 155.6 kg (343 lb)   Height: 5' 8" (1.727 m)     GENERAL: NAD  HEENT: normocephalic, non-icteric conjunctivae, moist oral mucosa  RESPIRATORY: diminished breath sounds, otherwise clear to auscultation, no wheezing, rales or rhonchi, on RA  CARDIOVASCULAR: Regular rate and rhythm, no murmurs rubs or gallops.  SKIN: no rash, jaundice, ecchymosis or ulcers  MUSCULOSKELETAL: No clubbing or cyanosis; b/l LE edema 1+ extending to mid shin  NEUROLOGIC: AO ×3, no gross deficits    LABS:  Lab studies reviewed and notable for ABG 02/2024: 7.40/53/49/32.8/84  Lab Results   Component Value Date    WBC 7.35 02/27/2024    HGB 16.6 (H) 02/27/2024    HCT 55.4 (H) 02/27/2024    MCV 73.3 (L) 02/27/2024     02/27/2024      Latest Reference Range & Units 02/07/24 12:41   ALP 50 - 130 U/L 101   PROTEIN TOTAL 6.4 - 8.2 g/dL 6.7   Albumin 3.5 - 5.0 g/dL 3.3 (L)   Albumin/Globulin Ratio  1.0   BILIRUBIN TOTAL " >0.0 - 1.2 mg/dL 0.9   AST 15 - 37 U/L 21   ALT 13 - 56 U/L 14   Globulin, Total 2.0 - 4.0 g/dL 3.4     BMP  Lab Results   Component Value Date     02/27/2024    K 3.8 02/27/2024     02/27/2024    CO2 33 (H) 02/27/2024    BUN 13 02/27/2024    CREATININE 0.95 02/27/2024    CALCIUM 9.6 02/27/2024    ANIONGAP 8 02/27/2024    EGFRNORACEVR 67 02/27/2024       IMAGING:  CT-PE 02/2024:  FINDINGS:  No thrombus or other abnormality is identified in the pulmonary arteries or veins.  The pulmonary vessel caliber is within normal limits.  The cardiac size is enlarged.  Otherwise heart mediastinum and great vessels appear within normal limits.     Small amount airspace density both lungs more prominent in the right lung and mostly in the dependent lungs.  Remaining pulmonary.  Small amount parenchyma shows no evidence of airspace disease or abnormal density.  No effusion or pneumothorax is present.    RHC 02/2024: RA 6/4/3, RV 50/1, mPAP 34, PCWP 8, PVR 5.2 (Td), CO/CI 5.98/2.26 (Td)    TTE:  02/2024: LVEF 60%, diastolic dysfunction present, mild RV enlargement, normal LA size, RA dilated, trace MR, mod TR, PASP 50    LHC 11/2023: no CAD, angiographically normal coronaries     LUNG FUNCTION TESTING:      SPIROMETRY/PFT:  03/2024 Pre Post   FVC 2.01/-2.78 2.08/-2.64   FEV1 1.57/-2.64 1.57/-2.64   FEV1/FVC 78/0 75/-0.42   TLC 5.34/-0.50    FRC  3.60/0.54    RV 3.28/2.09    DLCO 10.24/-5.08      SPIROMETRY/PFT:  02/2022 Pre Post   FVC 2.42/-2.13 2.42/-2.13   FEV1 1.95/-1.94 1.95/-1.94   FEV1/FVC 81/0.30 81/0.30   TLC 5.39/-0.43     FRC  3.32/0.12     RV 2.97/1.54     DLCO 14.36/-2.88          6MWD:  Date Distance (ft) Resting SpO2; Yonathan SpO2 O2 Required   03/2024 453 87%,RA; 87% 2-3L NC           ASSESSMENT & PLAN     1. Pulmonary arterial hypertension  Assessment & Plan:  RHC 02/2024 with precapillary pulmonary hypertension (mPAP 34, PCWP 8, PVR 5.2, CO/CI 5.98/2.26 -Td) that is out of proportion for JOVANNI on PAP. PFTs  with no obstruction. History is notable for PE in 2022; CT-PE imaging thereafter with no webs or evidence of organization and negative for acute PE. Lab work with negative HIV screen and imaging notable for hepatic steatosis. Improved fluid status with diuresis with ongoing good UOP. Plan for management as follows:  - WHO Class I, NYHA III  - plan for initiation of dual therapy with Tadalafil and ERA   -- close f/u to assess of medication approval   -- s/p hysterectomy with radiographic absence of uterus  - on initiation of therapy, plan for risk assessment in 6 months with to include repeat NTproBNP, TTE and 6MWT  - will plan for V/Q scan and consider triple therapy with riociguat if there is concern for CTEPH, order on f/u  - cont Bumex BID  - f/u on PAP compliance report    Orders:  -     tadalafiL (CIALIS) 20 MG Tab; Take 1 tablet (20 mg total) by mouth once daily.  Dispense: 30 tablet; Refill: 0    2. Mixed simple and mucopurulent chronic bronchitis  Assessment & Plan:  62 yo F with ongoing nicotine dependence presents for evaluation. Improved respiratory status with initiation of LAMA and DOROTHEA PRN. PFTs with PRISM. Will continue LAMA Qday and DOROTHEA PRN. Continue to recommend smoking cessation as outlined below.       3. Chronic respiratory failure with hypoxia and hypercapnia  Assessment & Plan:  Patient with chronic hypercapnic and hypoxic respiratory failure on home oxygen at 3L and presents today off O2 therapy. CT-PE 02/2024 negative for VTE. 6MWT with rest and exertional hypoxia. Managemetn as follows:  - cont NC supplementation 2L NC at rest and 3L NC with exertion  - continue to recommend portable oxygen; will reach out to DME to facilitate       4. Nicotine dependence with current use  Assessment & Plan:  Dangers of cigarette smoking were reviewed with patient in detail. Patient was Counseled for 3-10 minutes. Nicotine replacement options were discussed. Nicotine replacement was discussed- prescribed.  Continue Chantix.    Orders:  -     varenicline (CHANTIX CONTINUING MONTH BOX) 1 mg Tab; Take 1 tablet (1 mg total) by mouth 2 (two) times daily.  Dispense: 30 tablet; Refill: 3    5. Pulmonary hypertension             Follow up in about 8 weeks (around 5/20/2024).      Case was discussed with patient; all questions were answered to patient's satisfaction and patient verbalized understanding.     Missy Godoy MD  Pulmonary Medicine  [facility]  Phone: [phone]

## 2024-03-26 PROBLEM — I27.21 PULMONARY ARTERIAL HYPERTENSION: Status: ACTIVE | Noted: 2024-03-26

## 2024-03-26 PROBLEM — I27.21 PULMONARY ARTERIAL HYPERTENSION: Status: ACTIVE | Noted: 2024-02-10

## 2024-03-26 NOTE — ASSESSMENT & PLAN NOTE
RHC 02/2024 with precapillary pulmonary hypertension (mPAP 34, PCWP 8, PVR 5.2, CO/CI 5.98/2.26 -Td) that is out of proportion for JOVANNI on PAP. PFTs with no obstruction. History is notable for PE in 2022; CT-PE imaging thereafter with no webs or evidence of organization and negative for acute PE. Lab work with negative HIV screen and imaging notable for hepatic steatosis. Improved fluid status with diuresis with ongoing good UOP. Plan for management as follows:  - WHO Class I, NYHA III  - plan for initiation of dual therapy with Tadalafil and ERA   -- close f/u to assess of medication approval   -- s/p hysterectomy with radiographic absence of uterus  - on initiation of therapy, plan for risk assessment in 6 months with to include repeat NTproBNP, TTE and 6MWT  - will plan for V/Q scan and consider triple therapy with riociguat if there is concern for CTEPH, order on f/u  - cont Bumex BID  - f/u on PAP compliance report

## 2024-03-26 NOTE — ASSESSMENT & PLAN NOTE
Patient with chronic hypercapnic and hypoxic respiratory failure on home oxygen at 3L and presents today off O2 therapy. CT-PE 02/2024 negative for VTE. 6MWT with rest and exertional hypoxia. Managemetn as follows:  - cont NC supplementation 2L NC at rest and 3L NC with exertion  - continue to recommend portable oxygen; will reach out to DME to facilitate

## 2024-03-26 NOTE — ASSESSMENT & PLAN NOTE
Dangers of cigarette smoking were reviewed with patient in detail. Patient was Counseled for 3-10 minutes. Nicotine replacement options were discussed. Nicotine replacement was discussed- prescribed. Continue Chantix.

## 2024-03-26 NOTE — ASSESSMENT & PLAN NOTE
64 yo F with ongoing nicotine dependence presents for evaluation. Improved respiratory status with initiation of LAMA and DOROTHEA PRN. PFTs with PRISM. Will continue LAMA Qday and DOROTHEA PRN. Continue to recommend smoking cessation as outlined below.

## 2024-04-04 ENCOUNTER — PATIENT MESSAGE (OUTPATIENT)
Dept: DIABETES SERVICES | Facility: CLINIC | Age: 64
End: 2024-04-04
Payer: MEDICARE

## 2024-04-04 ENCOUNTER — TELEPHONE (OUTPATIENT)
Dept: DIABETES SERVICES | Facility: CLINIC | Age: 64
End: 2024-04-04
Payer: MEDICARE

## 2024-04-04 NOTE — TELEPHONE ENCOUNTER
Sent patient portal  message to notify them of cancellation of their Diabetes Management appointment with Aurea Kwong due to provider no longer practicing at [facility] effective April 19th 2024. Patient was instructed to follow up with their preferred primary care provider for continued care.

## 2024-04-11 ENCOUNTER — OFFICE VISIT (OUTPATIENT)
Dept: CARDIOLOGY | Facility: CLINIC | Age: 64
End: 2024-04-11
Payer: MEDICARE

## 2024-04-11 ENCOUNTER — OFFICE VISIT (OUTPATIENT)
Dept: NEUROLOGY | Facility: CLINIC | Age: 64
End: 2024-04-11
Payer: MEDICARE

## 2024-04-11 VITALS
SYSTOLIC BLOOD PRESSURE: 138 MMHG | HEART RATE: 92 BPM | DIASTOLIC BLOOD PRESSURE: 60 MMHG | WEIGHT: 293 LBS | HEIGHT: 68 IN | OXYGEN SATURATION: 92 % | BODY MASS INDEX: 44.41 KG/M2

## 2024-04-11 VITALS
HEART RATE: 83 BPM | WEIGHT: 293 LBS | SYSTOLIC BLOOD PRESSURE: 140 MMHG | BODY MASS INDEX: 44.41 KG/M2 | OXYGEN SATURATION: 94 % | DIASTOLIC BLOOD PRESSURE: 82 MMHG | HEIGHT: 68 IN

## 2024-04-11 DIAGNOSIS — R06.02 SOB (SHORTNESS OF BREATH): ICD-10-CM

## 2024-04-11 DIAGNOSIS — Z98.890 PERIPHERAL ARTERIAL DISEASE WITH HISTORY OF REVASCULARIZATION: ICD-10-CM

## 2024-04-11 DIAGNOSIS — M54.10 RADICULOPATHY, UNSPECIFIED SPINAL REGION: ICD-10-CM

## 2024-04-11 DIAGNOSIS — G43.719 INTRACTABLE CHRONIC MIGRAINE WITHOUT AURA AND WITHOUT STATUS MIGRAINOSUS: Primary | ICD-10-CM

## 2024-04-11 DIAGNOSIS — I27.21 PULMONARY ARTERIAL HYPERTENSION: Primary | ICD-10-CM

## 2024-04-11 DIAGNOSIS — I50.32 CHRONIC HEART FAILURE WITH PRESERVED EJECTION FRACTION: ICD-10-CM

## 2024-04-11 DIAGNOSIS — I73.9 PERIPHERAL ARTERIAL DISEASE WITH HISTORY OF REVASCULARIZATION: ICD-10-CM

## 2024-04-11 LAB
OHS QRS DURATION: 102 MS
OHS QTC CALCULATION: 467 MS

## 2024-04-11 PROCEDURE — 3078F DIAST BP <80 MM HG: CPT | Mod: CPTII,,, | Performed by: NURSE PRACTITIONER

## 2024-04-11 PROCEDURE — 1159F MED LIST DOCD IN RCRD: CPT | Mod: CPTII,,, | Performed by: NURSE PRACTITIONER

## 2024-04-11 PROCEDURE — 3008F BODY MASS INDEX DOCD: CPT | Mod: CPTII,,, | Performed by: NURSE PRACTITIONER

## 2024-04-11 PROCEDURE — 99213 OFFICE O/P EST LOW 20 MIN: CPT | Mod: S$PBB,,, | Performed by: NURSE PRACTITIONER

## 2024-04-11 PROCEDURE — 99215 OFFICE O/P EST HI 40 MIN: CPT | Mod: PBBFAC,25 | Performed by: NURSE PRACTITIONER

## 2024-04-11 PROCEDURE — 93005 ELECTROCARDIOGRAM TRACING: CPT | Mod: PBBFAC | Performed by: STUDENT IN AN ORGANIZED HEALTH CARE EDUCATION/TRAINING PROGRAM

## 2024-04-11 PROCEDURE — 93010 ELECTROCARDIOGRAM REPORT: CPT | Mod: S$PBB,,, | Performed by: STUDENT IN AN ORGANIZED HEALTH CARE EDUCATION/TRAINING PROGRAM

## 2024-04-11 PROCEDURE — 1160F RVW MEDS BY RX/DR IN RCRD: CPT | Mod: CPTII,,, | Performed by: NURSE PRACTITIONER

## 2024-04-11 PROCEDURE — 3075F SYST BP GE 130 - 139MM HG: CPT | Mod: CPTII,,, | Performed by: NURSE PRACTITIONER

## 2024-04-11 PROCEDURE — 99213 OFFICE O/P EST LOW 20 MIN: CPT | Mod: PBBFAC | Performed by: NURSE PRACTITIONER

## 2024-04-11 PROCEDURE — 3077F SYST BP >= 140 MM HG: CPT | Mod: CPTII,,, | Performed by: NURSE PRACTITIONER

## 2024-04-11 PROCEDURE — 3079F DIAST BP 80-89 MM HG: CPT | Mod: CPTII,,, | Performed by: NURSE PRACTITIONER

## 2024-04-11 RX ORDER — BUMETANIDE 2 MG/1
2 TABLET ORAL 2 TIMES DAILY
Qty: 60 TABLET | Refills: 11 | Status: SHIPPED | OUTPATIENT
Start: 2024-04-11 | End: 2024-06-05 | Stop reason: SDUPTHER

## 2024-04-11 RX ORDER — FUROSEMIDE 20 MG/1
20 TABLET ORAL DAILY PRN
Qty: 40 TABLET | Refills: 6 | Status: SHIPPED | OUTPATIENT
Start: 2024-04-11 | End: 2024-06-05

## 2024-04-11 RX ORDER — TOPIRAMATE 100 MG/1
100 TABLET, FILM COATED ORAL 2 TIMES DAILY
Qty: 60 TABLET | Refills: 11 | Status: SHIPPED | OUTPATIENT
Start: 2024-04-11 | End: 2024-06-05 | Stop reason: SDUPTHER

## 2024-04-11 NOTE — PROGRESS NOTES
Subjective:       Patient ID: Holly Porter is a 63 y.o. female     Chief Complaint:    Chief Complaint   Patient presents with    New Patient.     Med Refills.        Allergies:  Ammonium peroxydisulfate; Avocado (laurus persea); Bananas [banana]; Chocolate flavor; Fentanyl; Nicoderm; Percocet [oxycodone-acetaminophen]; Silvadene [silver sulfadiazine]; Clindamycin; Corticosteroids (glucocorticoids); Hydrocortisone; Lasix [furosemide]; Pregabalin; Adhesive; Iodine; Latex, natural rubber; Pcn [penicillins]; and Sulfa (sulfonamide antibiotics)    Current Medications:    Outpatient Encounter Medications as of 4/11/2024   Medication Sig Dispense Refill    ACCU-CHEK GUIDE GLUCOSE METER Misc       ACCU-CHEK GUIDE TEST STRIPS Strp       ACCU-CHEK SOFTCLIX LANCETS Misc       albuterol (PROVENTIL) 2.5 mg /3 mL (0.083 %) nebulizer solution Take 3 mLs (2.5 mg total) by nebulization every 6 (six) hours as needed for Wheezing. Rescue (Patient not taking: Reported on 3/25/2024) 300 mL 11    albuterol (PROVENTIL/VENTOLIN HFA) 90 mcg/actuation inhaler Inhale 2 puffs into the lungs 4 (four) times daily. Rescue (Patient not taking: Reported on 3/25/2024) 18 g 2    allopurinoL (ZYLOPRIM) 300 MG tablet TAKE ONE TABLET EVERY DAY 90 tablet 3    apixaban (ELIQUIS) 5 mg Tab Take 1 tablet (5 mg total) by mouth 2 (two) times daily. 60 tablet 3    aspirin (ECOTRIN) 81 MG EC tablet TAKE 1 TABLET BY MOUTH EVERY DAY 90 tablet 1    bumetanide (BUMEX) 2 MG tablet TAKE 1 TABLET (2 MG TOTAL) BY MOUTH 2 (TWO) TIMES DAILY. 60 tablet 0    calcium carbonate (OS-MICHAELA) 500 mg calcium (1,250 mg) tablet Take 1 tablet (500 mg total) by mouth 2 (two) times daily. (Patient not taking: Reported on 3/25/2024) 60 tablet 3    carvediloL (COREG) 12.5 MG tablet Take 0.5 tablets (6.25 mg total) by mouth 2 (two) times daily. 30 tablet 11    cetirizine (ZYRTEC) 10 MG tablet Take 10 mg by mouth once daily.      cilostazoL (PLETAL) 100 MG Tab Take 1 tablet (100 mg  "total) by mouth 2 (two) times daily. 90 tablet 1    clindamycin (CLEOCIN) 150 MG capsule Take 2 capsules (300 mg total) by mouth 3 (three) times daily. 30 capsule 0    colchicine (COLCRYS) 0.6 mg tablet Take 1 tablet (0.6 mg total) by mouth once daily. 30 tablet 2    DULoxetine (CYMBALTA) 30 MG capsule TAKE 1 CAPSULE (30 MG TOTAL) BY MOUTH ONCE DAILY. (Patient not taking: Reported on 3/25/2024) 30 capsule 0    furosemide (LASIX) 20 MG tablet Take 1 tablet (20 mg total) by mouth daily as needed (for swelling). 40 tablet 1    gabapentin (NEURONTIN) 600 MG tablet Take 1 tablet (600 mg total) by mouth 3 (three) times daily. (Patient not taking: Reported on 3/25/2024) 180 tablet 2    HYDROcodone-acetaminophen (NORCO)  mg per tablet Take 1 tablet by mouth every 6 (six) hours. 120 tablet 0    [START ON 4/19/2024] HYDROcodone-acetaminophen (NORCO)  mg per tablet Take 1 tablet by mouth every 6 (six) hours. 120 tablet 0    [START ON 5/18/2024] HYDROcodone-acetaminophen (NORCO)  mg per tablet Take 1 tablet by mouth every 6 (six) hours. 120 tablet 0    insulin detemir U-100, Levemir, (LEVEMIR FLEXTOUCH U100 INSULIN) 100 unit/mL (3 mL) InPn pen Inject 10 units into the skin every evening subcutaneous 15 mL 1    levothyroxine (SYNTHROID) 150 MCG tablet Take 1 tablet (150 mcg total) by mouth before breakfast. 90 tablet 1    metOLazone (ZAROXOLYN) 2.5 MG tablet Take 1 tablet (2.5 mg total) by mouth once daily. (Patient not taking: Reported on 3/25/2024) 30 tablet 2    naloxone (NARCAN) 4 mg/actuation Spry 1 spray once.      NIFEdipine (PROCARDIA-XL) 60 MG (OSM) 24 hr tablet Take 1 tablet (60 mg total) by mouth once daily. 90 tablet 3    pantoprazole (PROTONIX) 40 MG tablet Take 1 tablet (40 mg total) by mouth once daily. 90 tablet 1    pen needle, diabetic 32 gauge x 5/32" Ndle Use to inject insulin once daily 100 each 3    polyethylene glycol (GLYCOLAX) 17 gram PwPk Take 17 g by mouth once daily.      potassium " chloride SA (K-DUR,KLOR-CON) 20 MEQ tablet Take 1 tablet (20 mEq total) by mouth once daily. 90 tablet 1    QUEtiapine (SEROQUEL) 25 MG Tab Take 1 tablet (25 mg total) by mouth once daily. 30 tablet 1    sertraline (ZOLOFT) 50 MG tablet Take 1 tablet (50 mg total) by mouth once daily. (Patient not taking: Reported on 3/25/2024) 30 tablet 5    tadalafiL (CIALIS) 20 MG Tab Take 1 tablet (20 mg total) by mouth once daily. 30 tablet 0    tiotropium bromide (SPIRIVA RESPIMAT) 2.5 mcg/actuation inhaler Inhale 2 puffs into the lungs Daily. Controller 4 g 11    topiramate (TOPAMAX) 100 MG tablet Take 1 tablet (100 mg total) by mouth 2 (two) times daily. 60 tablet 11    varenicline (CHANTIX CONTINUING MONTH BOX) 1 mg Tab Take 1 tablet (1 mg total) by mouth 2 (two) times daily. 30 tablet 3    [DISCONTINUED] carvediloL (COREG) 12.5 MG tablet Take 0.5 tablets (6.25 mg total) by mouth 2 (two) times daily. 30 tablet 11    [DISCONTINUED] NIFEdipine (PROCARDIA-XL) 60 MG (OSM) 24 hr tablet Take 1 tablet (60 mg total) by mouth once daily. 90 tablet 0    [DISCONTINUED] nitroGLYCERIN (NITROSTAT) 0.4 MG SL tablet Place 1 tablet (0.4 mg total) under the tongue every 5 (five) minutes as needed for Chest pain. Not to exceed 3 tablets. 90 tablet 1    [DISCONTINUED] varenicline (CHANTIX STARTING MONTH BOX) 0.5 mg (11)- 1 mg (42) tablet Take one 0.5mg tab by mouth once daily X3 days,then increase to one 0.5mg tab twice daily X4 days,then increase to one 1mg tab twice daily 1 each 0     No facility-administered encounter medications on file as of 4/11/2024.       History of Present Illness  64 y/o AAF referred back to us for chronic lower back pain.  She was prior seen by us once in February of 2022 for reported headaches but did not keep her follow-up appointments.    In the past was followed by us for migraines, as far back as 2020.  Prior treated with topamax 150mg bid and well managed by her reports then, but would have worsening headaches  when she would fail to follow-up and get refills.  Which has been recurring theme in her history with us.  Today she is again not on it, this secondary to not following up.  She reports headaches, 3 days of the week, usually in the evening. She wants to restart the topamax, will do so but at lower dosing of 100mg bid.    She has chronic lower back pain, she is already an established patient here with Dr. Crandall, and actually saw them last month for her most recent evaluation.  Treatment plan is conservative.  She has multiple co-morbidities which makes her a poor candidate for interventions, already with chronic swelling, severe CHF.  Told she likely needed knee surgery but was not advised due to her obesity.  She is diabetic, thus no dose steroids.  Pain treatment notes did report on prior MRI cervical spine done [facility]/18/23 with multilevel degenerative changes, mild stenosis at C4/5.  No MRI or CT of lumbar spine to review, imaging does show XR lumbar around February of 2024 with mild degenerative findings reported per radiology.  She is already on neurontin as well as cymbalta.  She does endorse after admit in  February she had a lateral right thigh numbness, likely meuralgia paresthetica by her description.  Her reports are that the symptoms have been improving.    Given her obesity EMG/NCS would likely be limited but again already on neurontin and cymbalta, with prior allergy to lyrica.    Recommend continue with recommended treatment plan per pain treatment           Review of Systems  Review of Systems   Constitutional:  Negative for diaphoresis and fever.   HENT:  Negative for congestion, hearing loss and tinnitus.    Eyes:  Negative for blurred vision, double vision, photophobia, discharge and redness.   Respiratory:  Negative for cough and shortness of breath.    Cardiovascular:  Negative for chest pain.   Gastrointestinal:  Negative for abdominal pain, nausea and vomiting.   Musculoskeletal:  Positive  for back pain, joint pain, myalgias and neck pain.   Skin:  Negative for itching and rash.   Neurological:  Positive for headaches. Negative for dizziness, tremors, sensory change, speech change, focal weakness, seizures, loss of consciousness and weakness.   Psychiatric/Behavioral:  Negative for depression, hallucinations and memory loss. The patient does not have insomnia.    All other systems reviewed and are negative.     Objective:     NEUROLOGICAL EXAMINATION:     MENTAL STATUS   Oriented to person, place, and time.   Attention: normal. Concentration: normal.   Speech: speech is normal   Level of consciousness: alert  Knowledge: good and consistent with education.   Normal comprehension.     CRANIAL NERVES     CN II   Visual fields full to confrontation.   Visual acuity: normal  Right visual field deficit: none  Left visual field deficit: none     CN III, IV, VI   Pupils are equal, round, and reactive to light.  Extraocular motions are normal.   Right pupil: Size: 3 mm. Shape: regular. Reactivity: brisk. Consensual response: intact. Accommodation: intact.   Left pupil: Size: 3 mm. Shape: regular. Reactivity: brisk. Consensual response: intact. Accommodation: intact.   CN III: no CN III palsy  CN VI: no CN VI palsy  Nystagmus: none   Diplopia: none  Upgaze: normal  Downgaze: normal  Conjugate gaze: present  Vestibulo-ocular reflex: present    CN V   Facial sensation intact.   Right facial sensation deficit: none  Left facial sensation deficit: none  Right corneal reflex: normal  Left corneal reflex: normal    CN VII   Facial expression full, symmetric.   Right facial weakness: none  Left facial weakness: none  Right taste: normal  Left taste: normal    CN VIII   CN VIII normal.   Hearing: intact    CN IX, X   CN IX normal.   CN X normal.   Palate: symmetric    CN XI   CN XI normal.   Right sternocleidomastoid strength: normal  Left sternocleidomastoid strength: normal  Right trapezius strength: normal  Left  trapezius strength: normal    CN XII   CN XII normal.   Tongue: not atrophic  Fasciculations: absent  Tongue deviation: none    MOTOR EXAM   Muscle bulk: normal  Overall muscle tone: normal  Right arm tone: normal  Left arm tone: normal  Right arm pronator drift: absent  Left arm pronator drift: absent  Right leg tone: normal  Left leg tone: normal    Strength   Right neck flexion: 5/5  Left neck flexion: 5/5  Right neck extension: 5/5  Left neck extension: 5/5  Right deltoid: 5/5  Left deltoid: 5/5  Right biceps: 5/5  Left biceps: 5/5  Right triceps: 5/5  Left triceps: 5/5  Right wrist flexion: 5/  Left wrist flexion: 5/5  Right wrist extension: /5  Left wrist extension:   Right interossei: 5  Left interossei:   Right iliopsoas:   Left iliopsoas:   Right quadriceps:   Left quadriceps:   Right hamstrin/5  Left hamstrin/5  Right anterior tibial:   Left anterior tibial:   Right posterior tibial:   Left posterior tibial: /  Right gastroc:   Left gastroc: /    REFLEXES     Reflexes   Right brachioradialis: 2+  Left brachioradialis: 2+  Right biceps: 2+  Left biceps: 2+  Right triceps: 2+  Left triceps: 2+  Right patellar: 2+  Left patellar: 2+  Right achilles: 2+  Left achilles: 2+  Right plantar: normal  Left plantar: normal  Right Labmert: absent  Left Lambert: absent  Right ankle clonus: absent  Left ankle clonus: absent  Right pendular knee jerk: absent  Left pendular knee jerk: absent    SENSORY EXAM   Right arm light touch: normal  Left arm light touch: normal  Right leg light touch: decreased from toes  Left leg light touch: decreased from toes  Right arm vibration: normal  Left arm vibration: normal  Right leg vibration: decreased from toes  Left leg vibration: decreased from toes  Right arm proprioception: normal  Left arm proprioception: normal  Right leg proprioception: decreased from toes  Left leg proprioception: decreased from toes  Right arm pinprick:  normal  Left arm pinprick: normal  Right leg pinprick: decreased from toes  Left leg pinprick: decreased from toes  Romberg: negative    GAIT AND COORDINATION     Gait  Gait: wide-based     Coordination   Finger to nose coordination: normal  Heel to shin coordination: abnormal  Tandem walking coordination: abnormal    Tremor   Resting tremor: absent  Intention tremor: absent  Action tremor: absent       Using walker in clinic        Physical Exam  Vitals and nursing note reviewed.   Constitutional:       Appearance: Normal appearance.   HENT:      Head: Normocephalic.   Eyes:      Extraocular Movements: Extraocular movements intact and EOM normal.      Pupils: Pupils are equal, round, and reactive to light.   Cardiovascular:      Rate and Rhythm: Normal rate and regular rhythm.   Pulmonary:      Effort: Pulmonary effort is normal.      Breath sounds: Normal breath sounds.   Musculoskeletal:         General: No swelling or tenderness. Normal range of motion.      Cervical back: Normal range of motion and neck supple.      Right lower leg: No edema.      Left lower leg: No edema.   Skin:     General: Skin is warm and dry.      Coloration: Skin is not jaundiced.      Findings: No rash.   Neurological:      General: No focal deficit present.      Mental Status: She is alert and oriented to person, place, and time.      GCS: GCS eye subscore is 4. GCS verbal subscore is 5. GCS motor subscore is 6.      Cranial Nerves: No cranial nerve deficit.      Sensory: Sensory deficit present.      Motor: Motor function is intact. No weakness.      Coordination: Coordination abnormal. Heel to Shin Test abnormal. Finger-Nose-Finger Test and Romberg Test normal.      Gait: Gait abnormal and tandem walk abnormal.      Deep Tendon Reflexes: Reflexes normal.      Reflex Scores:       Tricep reflexes are 2+ on the right side and 2+ on the left side.       Bicep reflexes are 2+ on the right side and 2+ on the left side.        Brachioradialis reflexes are 2+ on the right side and 2+ on the left side.       Patellar reflexes are 2+ on the right side and 2+ on the left side.       Achilles reflexes are 2+ on the right side and 2+ on the left side.  Psychiatric:         Mood and Affect: Mood normal.         Speech: Speech normal.         Behavior: Behavior normal.          Assessment:     Problem List Items Addressed This Visit          Neuro    Migraine - Primary    Relevant Medications    topiramate (TOPAMAX) 100 MG tablet    Radiculopathy        Primary Diagnosis and ICD10  Intractable chronic migraine without aura and without status migrainosus [G43.719]    Plan:     Patient Instructions   Start topamax 100mg daily for a week then take twice daily  Continue the current neurontin and cymbalta  Agree with pain treatment plan as far as lower back pain  Follow-up one year    Medications Discontinued During This Encounter   Medication Reason    topiramate (TOPAMAX) 25 MG tablet Other - Commment Required       Requested Prescriptions     Signed Prescriptions Disp Refills    topiramate (TOPAMAX) 100 MG tablet 60 tablet 11     Sig: Take 1 tablet (100 mg total) by mouth 2 (two) times daily.       No orders of the defined types were placed in this encounter.

## 2024-04-11 NOTE — ASSESSMENT & PLAN NOTE
- EF 60% with LVDD  - continue coreg 12.5mg bid, nifedipine 60mg daily, bumex 2mg bid and lasix 20mg daily prn

## 2024-04-11 NOTE — PATIENT INSTRUCTIONS
Start topamax 100mg daily for a week then take twice daily  Continue the current neurontin and cymbalta  Agree with pain treatment plan as far as lower back pain  Follow-up one year

## 2024-04-11 NOTE — PROGRESS NOTES
PCP: Regan Frausto MD    Referring Provider:     Subjective:   Holly Porter is a 63 y.o. female with hx of morbid obesity, HFpEF, obesity hypoventilation syndrome, DM2, HTN, HLD, hypothyroidism, seizures, migraines, PVD, JOVANNI (noncompliant with CPAP), PE on Eliquis, who presents for hospital discharge follow up.    Pt was hospitalized 2/7/24 for COPD exacerbation and pulmonary hypertension. Underwent RHC that revealed low normal right and left sided filling pressures with moderate pre-capillary pulmonary HTN. Pt is following with Dr. Godoy in pulmonology.                 Fhx:  Shx:     EKG   Results for orders placed or performed during the hospital encounter of 02/07/24   EKG 12-lead    Collection Time: 02/07/24 11:44 AM   Result Value Ref Range    QRS Duration 104 ms    [facility] QTC Calculation 469 ms    Narrative    Test Reason : R06.02,    Vent. Rate : 097 BPM     Atrial Rate : 097 BPM     P-R Int : 178 ms          QRS Dur : 104 ms      QT Int : 370 ms       P-R-T Axes : 067 203 028 degrees     QTc Int : 469 ms    Normal sinus rhythm  Right superior axis deviation  Incomplete right bundle branch block  Right ventricular hypertrophy with repolarization abnormality  Nonspecific T wave abnormality  Prolonged QT  Abnormal ECG  When compared with ECG of 06-DEC-2023 15:10,  PREVIOUS ECG IS PRESENT  Confirmed by Tien Barksdale MD ([id number]) on 2/7/2024 7:55:02 PM    Referred By: [email or web address]   SELF           Confirmed By:Tien Barksdale MD     ECHO Results for orders placed during the hospital encounter of 02/07/24    Interpretation Summary    Left Ventricle: The left ventricle is normal in size. There is mild concentric hypertrophy. There is normal systolic function with a visually estimated ejection fraction of 55 - 70%. Ejection fraction by visual approximation is 60%. There is diastolic dysfunction.    Right Ventricle: Mild right ventricular enlargement.    Right Atrium: Right atrium is mildly dilated.     Aortic Valve: The aortic valve is a trileaflet valve. Mildly calcified cusps.    Mitral Valve: There is mild bileaflet sclerosis.    Tricuspid Valve: There is mild to moderate regurgitation.    IVC/SVC: Elevated venous pressure at 15 mmHg.    Pericardium: There is a trivial effusion.      Left Heart Catheterization 11/6/2023       The ejection fraction was calculated to be 55%.    The left ventricular systolic function was normal.    The left ventricular end diastolic pressure was normal.    The pre-procedure left ventricular end diastolic pressure was 14.    The estimated blood loss was none.    The coronary arteries were normal..     The procedure log was documented by Documenter: Khadijah Lyman RN and verified by Alex Ortega DO.     Date: 11/6/2023  Time: 2:00      Normal LV, EF 55%  Very mild non-obstructive CAD  Right femoral stent site widely patent         Right Cardiac Catheterization Results for orders placed during the hospital encounter of 02/07/24    Conclusion    Low normal right and left sided filling pressures with moderate pre-capillary pulmonary HTN; RA 6mmHg, PA 50/21/34mmHg, and PCWP 8mmHg and PVR of 5.25 MONTILLA)  Low normal systemic flow estimations; CO/CI by Vicente is 5.9/2.2 and by TD 6.0/2.3    Plan:  Evaluate for pre-capillary causes of pulmonary HTN    The procedure log was documented by Documenter: Jerrica Larson and verified by Mahad Moreno MD.    Date: 2/13/2024  Time: 3:04 PM        Lab Results   Component Value Date     02/27/2024    K 3.8 02/27/2024     02/27/2024    CO2 33 (H) 02/27/2024    BUN 13 02/27/2024    CREATININE 0.95 02/27/2024    CALCIUM 9.6 02/27/2024    ANIONGAP 8 02/27/2024    ESTGFRAFRICA 70 09/27/2021    EGFRNONAA 63 05/10/2022       Lab Results   Component Value Date    CHOL 166 11/03/2023    CHOL 192 06/05/2023    CHOL 172 06/06/2022     Lab Results   Component Value Date    HDL 33 (L) 11/03/2023    HDL 57 06/05/2023    HDL 51 06/06/2022      Lab Results   Component Value Date    LDLCALC 111 11/03/2023    LDLCALC 115 06/05/2023    LDLCALC 99 06/06/2022     Lab Results   Component Value Date    TRIG 110 11/03/2023    TRIG 102 06/05/2023    TRIG 112 06/06/2022     Lab Results   Component Value Date    CHOLHDL 5.0 11/03/2023    CHOLHDL 3.4 06/05/2023    CHOLHDL 3.4 06/06/2022       Lab Results   Component Value Date    WBC 7.35 02/27/2024    HGB 16.6 (H) 02/27/2024    HCT 55.4 (H) 02/27/2024    MCV 73.3 (L) 02/27/2024     02/27/2024           Current Outpatient Medications:     ACCU-CHEK GUIDE GLUCOSE METER Misc, , Disp: , Rfl:     ACCU-CHEK GUIDE TEST STRIPS Strp, , Disp: , Rfl:     ACCU-CHEK SOFTCLIX LANCETS Misc, , Disp: , Rfl:     albuterol (PROVENTIL) 2.5 mg /3 mL (0.083 %) nebulizer solution, Take 3 mLs (2.5 mg total) by nebulization every 6 (six) hours as needed for Wheezing. Rescue (Patient not taking: Reported on 3/25/2024), Disp: 300 mL, Rfl: 11    albuterol (PROVENTIL/VENTOLIN HFA) 90 mcg/actuation inhaler, Inhale 2 puffs into the lungs 4 (four) times daily. Rescue (Patient not taking: Reported on 3/25/2024), Disp: 18 g, Rfl: 2    allopurinoL (ZYLOPRIM) 300 MG tablet, TAKE ONE TABLET EVERY DAY, Disp: 90 tablet, Rfl: 3    apixaban (ELIQUIS) 5 mg Tab, Take 1 tablet (5 mg total) by mouth 2 (two) times daily., Disp: 60 tablet, Rfl: 3    aspirin (ECOTRIN) 81 MG EC tablet, TAKE 1 TABLET BY MOUTH EVERY DAY, Disp: 90 tablet, Rfl: 1    bumetanide (BUMEX) 2 MG tablet, Take 1 tablet (2 mg total) by mouth 2 (two) times daily., Disp: 60 tablet, Rfl: 11    carvediloL (COREG) 12.5 MG tablet, Take 0.5 tablets (6.25 mg total) by mouth 2 (two) times daily., Disp: 30 tablet, Rfl: 11    cetirizine (ZYRTEC) 10 MG tablet, Take 10 mg by mouth once daily., Disp: , Rfl:     cilostazoL (PLETAL) 100 MG Tab, Take 1 tablet (100 mg total) by mouth 2 (two) times daily., Disp: 90 tablet, Rfl: 1    clindamycin (CLEOCIN) 150 MG capsule, Take 2 capsules (300 mg total)  "by mouth 3 (three) times daily., Disp: 30 capsule, Rfl: 0    colchicine (COLCRYS) 0.6 mg tablet, Take 1 tablet (0.6 mg total) by mouth once daily., Disp: 30 tablet, Rfl: 2    furosemide (LASIX) 20 MG tablet, Take 1 tablet (20 mg total) by mouth daily as needed (for swelling)., Disp: 40 tablet, Rfl: 6    HYDROcodone-acetaminophen (NORCO)  mg per tablet, Take 1 tablet by mouth every 6 (six) hours., Disp: 120 tablet, Rfl: 0    [START ON 4/19/2024] HYDROcodone-acetaminophen (NORCO)  mg per tablet, Take 1 tablet by mouth every 6 (six) hours., Disp: 120 tablet, Rfl: 0    [START ON 5/18/2024] HYDROcodone-acetaminophen (NORCO)  mg per tablet, Take 1 tablet by mouth every 6 (six) hours., Disp: 120 tablet, Rfl: 0    insulin detemir U-100, Levemir, (LEVEMIR FLEXTOUCH U100 INSULIN) 100 unit/mL (3 mL) InPn pen, Inject 10 units into the skin every evening subcutaneous, Disp: 15 mL, Rfl: 1    levothyroxine (SYNTHROID) 150 MCG tablet, Take 1 tablet (150 mcg total) by mouth before breakfast., Disp: 90 tablet, Rfl: 1    naloxone (NARCAN) 4 mg/actuation Spry, 1 spray once., Disp: , Rfl:     NIFEdipine (PROCARDIA-XL) 60 MG (OSM) 24 hr tablet, Take 1 tablet (60 mg total) by mouth once daily., Disp: 90 tablet, Rfl: 3    pantoprazole (PROTONIX) 40 MG tablet, Take 1 tablet (40 mg total) by mouth once daily., Disp: 90 tablet, Rfl: 1    pen needle, diabetic 32 gauge x 5/32" Ndle, Use to inject insulin once daily, Disp: 100 each, Rfl: 3    polyethylene glycol (GLYCOLAX) 17 gram PwPk, Take 17 g by mouth once daily., Disp: , Rfl:     potassium chloride SA (K-DUR,KLOR-CON) 20 MEQ tablet, Take 1 tablet (20 mEq total) by mouth once daily., Disp: 90 tablet, Rfl: 1    QUEtiapine (SEROQUEL) 25 MG Tab, Take 1 tablet (25 mg total) by mouth once daily., Disp: 30 tablet, Rfl: 1    tadalafiL (CIALIS) 20 MG Tab, Take 1 tablet (20 mg total) by mouth once daily., Disp: 30 tablet, Rfl: 0    tiotropium bromide (SPIRIVA RESPIMAT) 2.5 " "mcg/actuation inhaler, Inhale 2 puffs into the lungs Daily. Controller, Disp: 4 g, Rfl: 11    topiramate (TOPAMAX) 100 MG tablet, Take 1 tablet (100 mg total) by mouth 2 (two) times daily., Disp: 60 tablet, Rfl: 11    varenicline (CHANTIX CONTINUING MONTH BOX) 1 mg Tab, Take 1 tablet (1 mg total) by mouth 2 (two) times daily., Disp: 30 tablet, Rfl: 3    Review of Systems   Constitutional:  Negative for chills, diaphoresis, fever and malaise/fatigue.   Respiratory:  Negative for cough.         Chronic shortness of breath   Cardiovascular:  Negative for chest pain, palpitations, orthopnea and PND.        Chronic leg swelling   Gastrointestinal:  Negative for abdominal pain, nausea and vomiting.   Musculoskeletal:  Positive for joint pain. Negative for falls.   Neurological:  Negative for focal weakness and weakness.         Objective:   BP (!) 140/82 (BP Location: Left arm, Patient Position: Sitting)   Pulse 83   Ht 5' 8" (1.727 m)   Wt (!) 152.4 kg (336 lb)   SpO2 (!) 94%   BMI 51.09 kg/m²     Physical Exam  Constitutional:       General: She is not in acute distress.     Appearance: She is obese.   Cardiovascular:      Rate and Rhythm: Normal rate and regular rhythm.   Pulmonary:      Effort: Pulmonary effort is normal.      Breath sounds: Decreased breath sounds present.   Musculoskeletal:      Cervical back: Neck supple. No rigidity.      Right lower leg: Edema present.      Left lower leg: Edema present.   Skin:     General: Skin is warm and dry.   Neurological:      Mental Status: She is alert and oriented to person, place, and time.   Psychiatric:         Mood and Affect: Mood normal.         Behavior: Behavior normal.           Assessment:     1. Pulmonary arterial hypertension  EKG 12-lead    EKG 12-lead      2. SOB (shortness of breath)  EKG 12-lead    EKG 12-lead      3. Chronic heart failure with preserved ejection fraction        4. Peripheral arterial disease with history of revascularization      "         Plan:   Chronic heart failure with preserved ejection fraction  - EF 60% with LVDD  - continue coreg 12.5mg bid, nifedipine 60mg daily, bumex 2mg bid and lasix 20mg daily prn      Peripheral arterial disease with history of revascularization  - missed f/u with Dr. Falcon in vein center  - pt states she will reschedule her appointment     Pulmonary arterial hypertension  - follows with pulmonary, Dr. Godoy      Follow up with me in one year or sooner if needed

## 2024-04-11 NOTE — PATIENT INSTRUCTIONS
No medication changes  Keep follow up appointments with Dr. Godoy and Dr. Frausto  Follow up in cardiology with me in one year

## 2024-04-17 RX ORDER — CILOSTAZOL 100 MG/1
100 TABLET ORAL 2 TIMES DAILY
Qty: 90 TABLET | Refills: 1 | Status: SHIPPED | OUTPATIENT
Start: 2024-04-17 | End: 2024-06-05 | Stop reason: SDUPTHER

## 2024-05-13 PROBLEM — I26.99 PULMONARY EMBOLISM: Status: RESOLVED | Noted: 2024-02-07 | Resolved: 2024-05-13

## 2024-05-27 PROBLEM — J96.11 CHRONIC RESPIRATORY FAILURE WITH HYPOXIA AND HYPERCAPNIA: Status: RESOLVED | Noted: 2024-02-23 | Resolved: 2024-05-27

## 2024-05-27 PROBLEM — J96.12 CHRONIC RESPIRATORY FAILURE WITH HYPOXIA AND HYPERCAPNIA: Status: RESOLVED | Noted: 2024-02-23 | Resolved: 2024-05-27

## 2024-06-04 NOTE — PROGRESS NOTES
Subjective:         Patient ID: Holly Porter is a 63 y.o. female.    Chief Complaint: Low-back Pain, Hip Pain, Leg Pain, and Foot Pain            Pain  This is a chronic problem. The current episode started more than 1 year ago. The problem occurs daily. The problem has been unchanged. Associated symptoms include arthralgias. Pertinent negatives include no chest pain, chills, coughing, diaphoresis, fever, sore throat, vertigo or vomiting.     Review of Systems   Constitutional:  Negative for activity change, chills, diaphoresis, fever and unexpected weight change.   HENT:  Negative for drooling, ear discharge, ear pain, facial swelling, nosebleeds, sore throat, trouble swallowing, voice change and goiter.    Eyes:  Negative for photophobia, pain, discharge, redness and visual disturbance.   Respiratory:  Negative for apnea, cough, choking, chest tightness, shortness of breath, wheezing and stridor.    Cardiovascular:  Negative for chest pain, palpitations and leg swelling.   Gastrointestinal:  Negative for abdominal distention, diarrhea, rectal pain, vomiting and fecal incontinence.   Endocrine: Negative for cold intolerance, heat intolerance, polydipsia, polyphagia and polyuria.   Genitourinary:  Negative for bladder incontinence, dysuria, flank pain, frequency and hot flashes.   Musculoskeletal:  Positive for arthralgias, back pain, leg pain and neck stiffness.   Integumentary:  Negative for color change and pallor.   Allergic/Immunologic: Negative for immunocompromised state.   Neurological:  Negative for dizziness, vertigo, seizures, syncope, facial asymmetry, speech difficulty, light-headedness, memory loss and coordination difficulties.   Hematological:  Negative for adenopathy. Does not bruise/bleed easily.   Psychiatric/Behavioral:  Negative for agitation, behavioral problems, confusion, decreased concentration, dysphoric mood, hallucinations, self-injury and suicidal ideas. The patient is not  nervous/anxious and is not hyperactive.            Past Medical History:   Diagnosis Date    Acquired hypothyroidism     Atherosclerosis of native artery of extremity with intermittent claudication 01/30/2019    bilateral legs    BMI 50.0-59.9, adult 02/22/2021    Chronic pain syndrome     opioid depen    Congestive heart failure (CHF)     COPD (chronic obstructive pulmonary disease)     COVID-19 10/06/2023    Depression     Diabetes mellitus, type 2     followed by Aurea Kwong NP, [facility]    DVT of lower extremity, bilateral     Essential hypertension 06/08/2021    GERD (gastroesophageal reflux disease)     Gout 07/05/2023    Hyperlipidemia     Lumbosacral radiculopathy 06/05/2023    followed by GLENN Valdes, [facility]    Migraines     Nicotine dependence     Nicotine dependence     Obesity hypoventilation syndrome     chronic CO2 retainer with compensatory metabolic alkalosis    Osteoarthritis     Seizure disorder     Sleep apnea     on cpap    Thyroid cancer     Venous stasis ulcer limited to breakdown of skin with varicose veins     followed by Estuardo Zhao    Vitamin D deficiency      Past Surgical History:   Procedure Laterality Date    ABCESS DRAINAGE      HYSTERECTOMY      ILIAC ARTERY STENT Bilateral 01/28/2020    Bilateral distal aorta and common iliac 8 X 59 vbx covered stents performed by Dr. Antonio Jacinto.    LEFT HEART CATHETERIZATION Left 11/6/2023    Procedure: Left heart cath;  Surgeon: Alex Ortega DO;  Location: [facility] CATH LAB;  Service: Cardiology;  Laterality: Left;    RADIOFREQUENCY ABLATION Left 04/15/2022    Procedure: Left calf  Radiofrequency Ablation;  Surgeon: Matty Falcon DO;  Location: [facility] OR;  Service: Vascular;  Laterality: Left;    RIGHT HEART CATHETERIZATION Right 2/13/2024    Procedure: INSERTION, CATHETER, RIGHT HEART;  Surgeon: Mahad Moreno MD;  Location: [facility] CATH LAB;  Service: Cardiology;  Laterality: Right;     STAB PHLEBECTOMY OF VARICOSE VEINS Left 01/25/2013    Left leg microphlebectomies x 23 stab avulsions and ligation of multiple varicose veins perrformed by Dr. Cirilo Aguirre.    THYROIDECTOMY      hx: thyroid cancer    TOE AMPUTATION Left 01/28/2020    2nd, 4th and 5th performed by Dr. Anam Saeed.    TOE AMPUTATION Right 01/28/2020    4th toe performed by Dr. Anam Saeed.    TOTAL ABDOMINAL HYSTERECTOMY W/ BILATERAL SALPINGOOPHORECTOMY      TUBAL LIGATION      VAGINAL DELIVERY      x 4    VENOUS ABLATION Right 08/09/2019    GSV Varithena Ablation performed by Dr. Estuardo Falcon    VENOUS ABLATION Left 08/02/2019    ATAV Varithena Ablation performed by Dr. Estuardo Falocn.    VENOUS ABLATION Right 07/13/2015    ATAV Laser Ablatio performed by Dr. Cirilo Aguirre.    VENOUS ABLATION Right 07/06/2015    Distal  GSV Laser Ablation performed by dr. Cirilo Aguirre.    VENOUS ABLATION Left 04/20/2015    Left Distal GSV Laser Ablation performed by dr. Cirilo Aguirre.    VENOUS ABLATION Right 10/28/2013    Right Distal GSV RF Ablation performed by Dr. Cirilo Aguirre.    VENOUS ABLATION Left 10/25/2013    SSV RF Ablation performed by dr. Cirilo Aguirre.    VENOUS ABLATION Left 10/07/2013    Left GSV and Left ATAV RF Ablation performed by Dr. Cirilo Aguirre.    VENOUS ABLATION Right 01/21/2013    GSV RF Ablation w/micros x 22 performed by Dr. Cirilo Aguirre.    VENOUS ABLATION Left 06/25/2021    Left distal GSV Varithena Ablation     Social History     Socioeconomic History    Marital status:    Tobacco Use    Smoking status: Every Day     Current packs/day: 0.50     Average packs/day: 0.5 packs/day for 33.0 years (16.5 ttl pk-yrs)     Types: Cigarettes     Passive exposure: Current    Smokeless tobacco: Never    Tobacco comments:     5 cigarettes a day    Substance and Sexual Activity    Alcohol use: Never    Drug use: Never    Sexual activity: Not Currently     Social Determinants of Health     Financial Resource Strain: Low Risk  (2/9/2024)    Overall Financial  Resource Strain (CARDIA)     Difficulty of Paying Living Expenses: Not hard at all   Food Insecurity: No Food Insecurity (2/9/2024)    Hunger Vital Sign     Worried About Running Out of Food in the Last Year: Never true     Ran Out of Food in the Last Year: Never true   Transportation Needs: Unmet Transportation Needs (2/9/2024)    PRAPARE - Transportation     Lack of Transportation (Medical): Yes     Lack of Transportation (Non-Medical): Yes   Physical Activity: Inactive (2/9/2024)    Exercise Vital Sign     Days of Exercise per Week: 0 days     Minutes of Exercise per Session: 0 min   Stress: No Stress Concern Present (2/9/2024)    [employer] - Occupational Stress Questionnaire     Feeling of Stress : Not at all   Housing Stability: Low Risk  (2/9/2024)    Housing Stability Vital Sign     Unable to Pay for Housing in the Last Year: No     Number of Places Lived in the Last Year: 1     Unstable Housing in the Last Year: No     Family History   Problem Relation Name Age of Onset    Heart disease Mother          age 84 CHF    Hypertension Mother      Osteoarthritis Mother      Coronary aneurysm Father      Coronary artery disease Father      Hypertension Father      Heart disease Father      No Known Problems Son      No Known Problems Son      No Known Problems Son      No Known Problems Son      No Known Problems Sister      No Known Problems Brother          hx: varicose veins    No Known Problems Brother          MVA: parlazed    No Known Problems Brother       Review of patient's allergies indicates:   Allergen Reactions    Ammonium peroxydisulfate Shortness Of Breath    Avocado (laurus persea) Anaphylaxis    Bananas [banana] Anaphylaxis and Swelling     CRAMPS,    Chocolate flavor Anaphylaxis     MOUTH SWELLING    Fentanyl Shortness Of Breath and Itching    Nicoderm Swelling    Percocet [oxycodone-acetaminophen] Shortness Of Breath and Itching    Silvadene [silver sulfadiazine]      "Clindamycin     Hydrocortisone Blisters    Lasix [furosemide] Blisters     BURNS SKIN    Pregabalin     Adhesive Blisters, Rash and Itching    Iodine Rash    Latex, natural rubber Rash    Pcn [penicillins] Rash    Sulfa (sulfonamide antibiotics) Rash and Blisters        Objective:  Vitals:    06/11/24 1309   BP: (!) 173/85   Pulse: 90   Resp: (!) 22   Weight: (!) 153.8 kg (339 lb)   Height: 5' 7" (1.702 m)   PainSc:   7             Physical Exam  Vitals and nursing note reviewed. Exam conducted with a chaperone present.   Constitutional:       General: She is awake. She is not in acute distress.     Appearance: Normal appearance. She is obese. She is not ill-appearing, toxic-appearing or diaphoretic.   HENT:      Head: Normocephalic and atraumatic.      Nose: Nose normal.      Mouth/Throat:      Mouth: Mucous membranes are moist.      Pharynx: Oropharynx is clear.   Eyes:      Conjunctiva/sclera: Conjunctivae normal.      Pupils: Pupils are equal, round, and reactive to light.   Cardiovascular:      Rate and Rhythm: Normal rate.   Pulmonary:      Effort: Pulmonary effort is normal. No respiratory distress.   Abdominal:      Palpations: Abdomen is soft.   Musculoskeletal:         General: Normal range of motion.      Cervical back: Normal range of motion and neck supple.   Skin:     General: Skin is warm and dry.   Neurological:      General: No focal deficit present.      Mental Status: She is alert and oriented to person, place, and time. Mental status is at baseline.      Cranial Nerves: No cranial nerve deficit (II-XII).   Psychiatric:         Mood and Affect: Mood normal.         Behavior: Behavior normal. Behavior is cooperative.         Thought Content: Thought content normal.           X-Ray Sinuses Min 3 Views  Narrative: EXAMINATION:  XR SINUSES MIN 3 VIEWS    CLINICAL HISTORY:  Allergic rhinitis, unspecified    COMPARISON:  Sinus x-ray November 8, 2022    TECHNIQUE:  Three views of the " sinuses.    FINDINGS:  No significant sinus opacification.  No air-fluid level to suggest acute sinusitis.  Consider sinus CT.  Impression: As above.    Point of Service: [facility]    Electronically signed by: Christian Foss  Date:    06/05/2024  Time:    12:00         Office Visit on 06/05/2024   Component Date Value Ref Range Status    Hemoglobin A1C 06/05/2024 5.7  4.5 - 6.6 % Final    Estimated Average Glucose 06/05/2024 117  mg/dL Final    Creatinine, Urine 06/05/2024 219  28 - 219 mg/dL Final    Microalbumin 06/05/2024 12.8 (H)  0.0 - 2.8 mg/dL Final    Microalbumin/Creatinine Ratio 06/05/2024 58.4 (H)  0.0 - 30.0 mg/g Final    Protein, Urine 06/05/2024 33.1 (H)  0.0 - 11.9 mg/dL Final    WBC 06/05/2024 7.64  4.50 - 11.00 K/uL Final    RBC 06/05/2024 7.64 (H)  4.20 - 5.40 M/uL Final    Hemoglobin 06/05/2024 17.0 (H)  12.0 - 16.0 g/dL Final    Hematocrit 06/05/2024 59.1 (HH)  38.0 - 47.0 % Final    MCV 06/05/2024 77.4 (L)  80.0 - 96.0 fL Final    MCH 06/05/2024 22.3 (L)  27.0 - 31.0 pg Final    MCHC 06/05/2024 28.8 (L)  32.0 - 36.0 g/dL Final    RDW 06/05/2024 27.1 (H)  11.5 - 14.5 % Final    Platelet Count 06/05/2024 188  150 - 400 K/uL Final    Neutrophils % 06/05/2024 50.0 (L)  53.0 - 65.0 % Final    Lymphocytes % 06/05/2024 31.5  27.0 - 41.0 % Final    Monocytes % 06/05/2024 8.4 (H)  2.0 - 6.0 % Final    Eosinophils % 06/05/2024 8.8 (H)  1.0 - 4.0 % Final    Basophils % 06/05/2024 0.8  0.0 - 1.0 % Final    Immature Granulocytes % 06/05/2024 0.5 (H)  0.0 - 0.4 % Final    nRBC, Auto 06/05/2024 0.0  <=0.0 % Final    Neutrophils, Abs 06/05/2024 3.82  1.80 - 7.70 K/uL Final    Lymphocytes, Absolute 06/05/2024 2.41  1.00 - 4.80 K/uL Final    Monocytes, Absolute 06/05/2024 0.64  0.00 - 0.80 K/uL Final    Eosinophils, Absolute 06/05/2024 0.67 (H)  0.00 - 0.50 K/uL Final    Basophils, Absolute 06/05/2024 0.06  0.00 - 0.20 K/uL Final    Immature Granulocytes, Absolute 06/05/2024 0.04  0.00 - 0.04 K/uL  Final    nRBC, Absolute 06/05/2024 0.00  <=0.00 x10e3/uL Final    Diff Type 06/05/2024 Scan Smear   Final    Platelet Morphology 06/05/2024 Few Large Platelets (A)  Normal Final    Anisocytosis 06/05/2024 2+   Final    Microcytosis 06/05/2024 Few   Final    Ovalocytes 06/05/2024 Few   Final    Hypochromic 06/05/2024 1+   Corrected   Office Visit on 04/11/2024   Component Date Value Ref Range Status    QRS Duration 04/11/2024 102  ms Final    [facility] QTC Calculation 04/11/2024 467  ms Final   Office Visit on 03/05/2024   Component Date Value Ref Range Status    POC Amphetamines 03/05/2024 Negative  Negative, Inconclusive Final    POC Barbiturates 03/05/2024 Negative  Negative, Inconclusive Final    POC Benzodiazepines 03/05/2024 Negative  Negative, Inconclusive Final    POC Cocaine 03/05/2024 Negative  Negative, Inconclusive Final    POC THC 03/05/2024 Negative  Negative, Inconclusive Final    POC Methadone 03/05/2024 Negative  Negative, Inconclusive Final    POC Methamphetamine 03/05/2024 Negative  Negative, Inconclusive Final    POC Opiates 03/05/2024 Presumptive Positive (A)  Negative, Inconclusive Final    POC Oxycodone 03/05/2024 Negative  Negative, Inconclusive Final    POC Phencyclidine 03/05/2024 Negative  Negative, Inconclusive Final    POC Methylenedioxymethamphetamine * 03/05/2024 Negative  Negative, Inconclusive Final    POC Tricyclic Antidepressants 03/05/2024 Negative  Negative, Inconclusive Final    POC Buprenorphine 03/05/2024 Negative   Final     Acceptable 03/05/2024 Yes   Final    POC Temperature (Urine) 03/05/2024 92   Final   Office Visit on 02/27/2024   Component Date Value Ref Range Status    Sodium 02/27/2024 137  136 - 145 mmol/L Final    Potassium 02/27/2024 3.8  3.5 - 5.1 mmol/L Final    Chloride 02/27/2024 100  98 - 107 mmol/L Final    CO2 02/27/2024 33 (H)  21 - 32 mmol/L Final    Anion Gap 02/27/2024 8  7 - 16 mmol/L Final    Glucose 02/27/2024 100  74 - 106 mg/dL Final    BUN  02/27/2024 13  7 - 18 mg/dL Final    Creatinine 02/27/2024 0.95  0.55 - 1.02 mg/dL Final    BUN/Creatinine Ratio 02/27/2024 14  6 - 20 Final    Calcium 02/27/2024 9.6  8.5 - 10.1 mg/dL Final    eGFR 02/27/2024 67  >=60 mL/min/1.73m2 Final    ProBNP 02/27/2024 642 (H)  1 - 125 pg/mL Final    D-Dimer 02/27/2024 0.48 (H)  0.00 - 0.47 µg/mL Final    WBC 02/27/2024 7.35  4.50 - 11.00 K/uL Final    RBC 02/27/2024 7.56 (H)  4.20 - 5.40 M/uL Final    Hemoglobin 02/27/2024 16.6 (H)  12.0 - 16.0 g/dL Final    Hematocrit 02/27/2024 55.4 (H)  38.0 - 47.0 % Final    MCV 02/27/2024 73.3 (L)  80.0 - 96.0 fL Final    MCH 02/27/2024 22.0 (L)  27.0 - 31.0 pg Final    MCHC 02/27/2024 30.0 (L)  32.0 - 36.0 g/dL Final    RDW 02/27/2024 26.1 (H)  11.5 - 14.5 % Final    Platelet Count 02/27/2024 173  150 - 400 K/uL Final    Neutrophils % 02/27/2024 68.4 (H)  53.0 - 65.0 % Final    Lymphocytes % 02/27/2024 20.8 (L)  27.0 - 41.0 % Final    Monocytes % 02/27/2024 6.3 (H)  2.0 - 6.0 % Final    Eosinophils % 02/27/2024 3.5  1.0 - 4.0 % Final    Basophils % 02/27/2024 0.7  0.0 - 1.0 % Final    Immature Granulocytes % 02/27/2024 0.3  0.0 - 0.4 % Final    nRBC, Auto 02/27/2024 0.0  <=0.0 % Final    Neutrophils, Abs 02/27/2024 5.03  1.80 - 7.70 K/uL Final    Lymphocytes, Absolute 02/27/2024 1.53  1.00 - 4.80 K/uL Final    Monocytes, Absolute 02/27/2024 0.46  0.00 - 0.80 K/uL Final    Eosinophils, Absolute 02/27/2024 0.26  0.00 - 0.50 K/uL Final    Basophils, Absolute 02/27/2024 0.05  0.00 - 0.20 K/uL Final    Immature Granulocytes, Absolute 02/27/2024 0.02  0.00 - 0.04 K/uL Final    nRBC, Absolute 02/27/2024 0.00  <=0.00 x10e3/uL Final    Diff Type 02/27/2024 Scan Smear   Final    Platelet Morphology 02/27/2024 Few Large Platelets (A)  Normal Final    Anisocytosis 02/27/2024 2+   Final    Microcytosis 02/27/2024 1+   Final   No results displayed because visit has over 200 results.      Admission on 02/02/2024, Discharged on 02/02/2024   Component  Date Value Ref Range Status    POC Glucose 02/02/2024 102  70 - 105 mg/dL Final         No orders of the defined types were placed in this encounter.          Requested Prescriptions     Pending Prescriptions Disp Refills    HYDROcodone-acetaminophen (NORCO)  mg per tablet 120 tablet 0     Sig: Take 1 tablet by mouth every 6 (six) hours.    HYDROcodone-acetaminophen (NORCO)  mg per tablet 120 tablet 0     Sig: Take 1 tablet by mouth every 6 (six) hours.    HYDROcodone-acetaminophen (NORCO)  mg per tablet 120 tablet 0     Sig: Take 1 tablet by mouth every 6 (six) hours.       Assessment:     1. Lumbosacral spondylosis without myelopathy    2. Chronic pain of right knee    3. PVD (peripheral vascular disease)           A's of Opioid Risk Assessment  Activity:Patient can perform ADL.   Analgesia:Patients pain is partially controlled by current medication. Patient has tried OTC medications such as Tylenol and Ibuprofen with out relief.   Adverse Effects: Patient denies constipation or sedation.  Aberrant Behavior:  reviewed with no aberrant drug seeking/taking behavior.  Overdose reversal drug naloxone discussed    Drug screen reviewed      MRI cervical spine ARM dated April 18, 2023 multiple level degenerative changes C4/5 disc osteophyte complex mild central canal stenosis uncovertebral hypertrophic        Plan:    Narcan December 2022    Wheelchair/4 point walker for mobility due to chronic nonhealing wounds right foot     Following wound management chronic ulcers lower extremities    Following orthopedics knee pain [facility], she states she was advised not to have surgery due to her weight    Diabetic, limit steroids    Cannot tolerate OxyContin she is severely allergic    Poor procedure candidate multiple comorbidities      Severe left lower extremity/foot pain related diabetic ulcer     Requesting Toradol injection     Toradol 60 mg IM, tolerated well     Considering further surgery left  lower extremity      She would like to continue with current medication    Continue current medication    Continue home exercise program as tolerated    Follow-up 3 months    Dr. Crandall, August 2024    Bring original prescription medication bottles/container/box with labels to each visit

## 2024-06-05 ENCOUNTER — APPOINTMENT (OUTPATIENT)
Dept: RADIOLOGY | Facility: CLINIC | Age: 64
End: 2024-06-05
Attending: INTERNAL MEDICINE
Payer: MEDICARE

## 2024-06-05 ENCOUNTER — OFFICE VISIT (OUTPATIENT)
Dept: FAMILY MEDICINE | Facility: CLINIC | Age: 64
End: 2024-06-05
Payer: MEDICARE

## 2024-06-05 ENCOUNTER — OFFICE VISIT (OUTPATIENT)
Dept: PULMONOLOGY | Facility: CLINIC | Age: 64
End: 2024-06-05
Payer: MEDICARE

## 2024-06-05 VITALS
WEIGHT: 293 LBS | OXYGEN SATURATION: 94 % | HEIGHT: 68 IN | HEART RATE: 90 BPM | RESPIRATION RATE: 18 BRPM | SYSTOLIC BLOOD PRESSURE: 119 MMHG | BODY MASS INDEX: 44.41 KG/M2 | DIASTOLIC BLOOD PRESSURE: 58 MMHG

## 2024-06-05 VITALS
WEIGHT: 293 LBS | RESPIRATION RATE: 18 BRPM | OXYGEN SATURATION: 92 % | SYSTOLIC BLOOD PRESSURE: 120 MMHG | BODY MASS INDEX: 44.41 KG/M2 | TEMPERATURE: 98 F | HEIGHT: 68 IN | HEART RATE: 90 BPM | DIASTOLIC BLOOD PRESSURE: 77 MMHG

## 2024-06-05 DIAGNOSIS — Z79.899 HIGH RISK MEDICATION USE: ICD-10-CM

## 2024-06-05 DIAGNOSIS — E11.9 TYPE 2 DIABETES MELLITUS WITHOUT COMPLICATION, WITH LONG-TERM CURRENT USE OF INSULIN: Primary | ICD-10-CM

## 2024-06-05 DIAGNOSIS — F17.200 NICOTINE DEPENDENCE WITH CURRENT USE: ICD-10-CM

## 2024-06-05 DIAGNOSIS — G47.33 OSA (OBSTRUCTIVE SLEEP APNEA): ICD-10-CM

## 2024-06-05 DIAGNOSIS — G43.719 INTRACTABLE CHRONIC MIGRAINE WITHOUT AURA AND WITHOUT STATUS MIGRAINOSUS: ICD-10-CM

## 2024-06-05 DIAGNOSIS — J96.12 CHRONIC RESPIRATORY FAILURE WITH HYPOXIA AND HYPERCAPNIA: ICD-10-CM

## 2024-06-05 DIAGNOSIS — J30.9 ALLERGIC RHINITIS, UNSPECIFIED SEASONALITY, UNSPECIFIED TRIGGER: ICD-10-CM

## 2024-06-05 DIAGNOSIS — J96.11 CHRONIC RESPIRATORY FAILURE WITH HYPOXIA AND HYPERCAPNIA: ICD-10-CM

## 2024-06-05 DIAGNOSIS — J41.8 MIXED SIMPLE AND MUCOPURULENT CHRONIC BRONCHITIS: ICD-10-CM

## 2024-06-05 DIAGNOSIS — I27.21 PULMONARY ARTERIAL HYPERTENSION: Primary | ICD-10-CM

## 2024-06-05 DIAGNOSIS — Z79.4 TYPE 2 DIABETES MELLITUS WITHOUT COMPLICATION, WITH LONG-TERM CURRENT USE OF INSULIN: Primary | ICD-10-CM

## 2024-06-05 LAB
ANISOCYTOSIS BLD QL SMEAR: ABNORMAL
BASOPHILS # BLD AUTO: 0.06 K/UL (ref 0–0.2)
BASOPHILS NFR BLD AUTO: 0.8 % (ref 0–1)
CREAT UR-MCNC: 219 MG/DL (ref 28–219)
DIFFERENTIAL METHOD BLD: ABNORMAL
EOSINOPHIL # BLD AUTO: 0.67 K/UL (ref 0–0.5)
EOSINOPHIL NFR BLD AUTO: 8.8 % (ref 1–4)
ERYTHROCYTE [DISTWIDTH] IN BLOOD BY AUTOMATED COUNT: 27.1 % (ref 11.5–14.5)
EST. AVERAGE GLUCOSE BLD GHB EST-MCNC: 117 MG/DL
HBA1C MFR BLD HPLC: 5.7 % (ref 4.5–6.6)
HCT VFR BLD AUTO: 59.1 % (ref 38–47)
HGB BLD-MCNC: 17 G/DL (ref 12–16)
HYPOCHROMIA BLD QL SMEAR: ABNORMAL
IMM GRANULOCYTES # BLD AUTO: 0.04 K/UL (ref 0–0.04)
IMM GRANULOCYTES NFR BLD: 0.5 % (ref 0–0.4)
LYMPHOCYTES # BLD AUTO: 2.41 K/UL (ref 1–4.8)
LYMPHOCYTES NFR BLD AUTO: 31.5 % (ref 27–41)
MCH RBC QN AUTO: 22.3 PG (ref 27–31)
MCHC RBC AUTO-ENTMCNC: 28.8 G/DL (ref 32–36)
MCV RBC AUTO: 77.4 FL (ref 80–96)
MICROALBUMIN UR-MCNC: 12.8 MG/DL (ref 0–2.8)
MICROALBUMIN/CREAT RATIO PNL UR: 58.4 MG/G (ref 0–30)
MICROCYTES BLD QL SMEAR: ABNORMAL
MONOCYTES # BLD AUTO: 0.64 K/UL (ref 0–0.8)
MONOCYTES NFR BLD AUTO: 8.4 % (ref 2–6)
MPC BLD CALC-MCNC: ABNORMAL G/DL
NEUTROPHILS # BLD AUTO: 3.82 K/UL (ref 1.8–7.7)
NEUTROPHILS NFR BLD AUTO: 50 % (ref 53–65)
NRBC # BLD AUTO: 0 X10E3/UL
NRBC, AUTO (.00): 0 %
OVALOCYTES BLD QL SMEAR: ABNORMAL
PLATELET # BLD AUTO: 188 K/UL (ref 150–400)
PLATELET MORPHOLOGY: ABNORMAL
PROT UR-MCNC: 33.1 MG/DL (ref 0–11.9)
RBC # BLD AUTO: 7.64 M/UL (ref 4.2–5.4)
WBC # BLD AUTO: 7.64 K/UL (ref 4.5–11)

## 2024-06-05 PROCEDURE — 1159F MED LIST DOCD IN RCRD: CPT | Mod: CPTII,,, | Performed by: STUDENT IN AN ORGANIZED HEALTH CARE EDUCATION/TRAINING PROGRAM

## 2024-06-05 PROCEDURE — 82570 ASSAY OF URINE CREATININE: CPT | Mod: ,,, | Performed by: CLINICAL MEDICAL LABORATORY

## 2024-06-05 PROCEDURE — 99215 OFFICE O/P EST HI 40 MIN: CPT | Mod: S$PBB,,, | Performed by: STUDENT IN AN ORGANIZED HEALTH CARE EDUCATION/TRAINING PROGRAM

## 2024-06-05 PROCEDURE — 70220 X-RAY EXAM OF SINUSES: CPT | Mod: TC,RHCUB | Performed by: INTERNAL MEDICINE

## 2024-06-05 PROCEDURE — 3008F BODY MASS INDEX DOCD: CPT | Mod: CPTII,,, | Performed by: STUDENT IN AN ORGANIZED HEALTH CARE EDUCATION/TRAINING PROGRAM

## 2024-06-05 PROCEDURE — 99214 OFFICE O/P EST MOD 30 MIN: CPT | Mod: ,,, | Performed by: INTERNAL MEDICINE

## 2024-06-05 PROCEDURE — 85025 COMPLETE CBC W/AUTO DIFF WBC: CPT | Mod: ,,, | Performed by: CLINICAL MEDICAL LABORATORY

## 2024-06-05 PROCEDURE — 3066F NEPHROPATHY DOC TX: CPT | Mod: CPTII,,, | Performed by: STUDENT IN AN ORGANIZED HEALTH CARE EDUCATION/TRAINING PROGRAM

## 2024-06-05 PROCEDURE — 3008F BODY MASS INDEX DOCD: CPT | Mod: ,,, | Performed by: INTERNAL MEDICINE

## 2024-06-05 PROCEDURE — 3060F POS MICROALBUMINURIA REV: CPT | Mod: ,,, | Performed by: INTERNAL MEDICINE

## 2024-06-05 PROCEDURE — 99214 OFFICE O/P EST MOD 30 MIN: CPT | Mod: PBBFAC,25 | Performed by: STUDENT IN AN ORGANIZED HEALTH CARE EDUCATION/TRAINING PROGRAM

## 2024-06-05 PROCEDURE — 3074F SYST BP LT 130 MM HG: CPT | Mod: ,,, | Performed by: INTERNAL MEDICINE

## 2024-06-05 PROCEDURE — 3078F DIAST BP <80 MM HG: CPT | Mod: ,,, | Performed by: INTERNAL MEDICINE

## 2024-06-05 PROCEDURE — 83036 HEMOGLOBIN GLYCOSYLATED A1C: CPT | Mod: ,,, | Performed by: CLINICAL MEDICAL LABORATORY

## 2024-06-05 PROCEDURE — 3044F HG A1C LEVEL LT 7.0%: CPT | Mod: CPTII,,, | Performed by: STUDENT IN AN ORGANIZED HEALTH CARE EDUCATION/TRAINING PROGRAM

## 2024-06-05 PROCEDURE — 3044F HG A1C LEVEL LT 7.0%: CPT | Mod: ,,, | Performed by: INTERNAL MEDICINE

## 2024-06-05 PROCEDURE — 3066F NEPHROPATHY DOC TX: CPT | Mod: ,,, | Performed by: INTERNAL MEDICINE

## 2024-06-05 PROCEDURE — 84156 ASSAY OF PROTEIN URINE: CPT | Mod: ,,, | Performed by: CLINICAL MEDICAL LABORATORY

## 2024-06-05 PROCEDURE — 3060F POS MICROALBUMINURIA REV: CPT | Mod: CPTII,,, | Performed by: STUDENT IN AN ORGANIZED HEALTH CARE EDUCATION/TRAINING PROGRAM

## 2024-06-05 PROCEDURE — 1160F RVW MEDS BY RX/DR IN RCRD: CPT | Mod: CPTII,,, | Performed by: STUDENT IN AN ORGANIZED HEALTH CARE EDUCATION/TRAINING PROGRAM

## 2024-06-05 PROCEDURE — 82043 UR ALBUMIN QUANTITATIVE: CPT | Mod: ,,, | Performed by: CLINICAL MEDICAL LABORATORY

## 2024-06-05 PROCEDURE — 3078F DIAST BP <80 MM HG: CPT | Mod: CPTII,,, | Performed by: STUDENT IN AN ORGANIZED HEALTH CARE EDUCATION/TRAINING PROGRAM

## 2024-06-05 PROCEDURE — 3074F SYST BP LT 130 MM HG: CPT | Mod: CPTII,,, | Performed by: STUDENT IN AN ORGANIZED HEALTH CARE EDUCATION/TRAINING PROGRAM

## 2024-06-05 RX ORDER — CILOSTAZOL 100 MG/1
100 TABLET ORAL 2 TIMES DAILY
Qty: 90 TABLET | Refills: 1 | Status: SHIPPED | OUTPATIENT
Start: 2024-06-05

## 2024-06-05 RX ORDER — BUMETANIDE 2 MG/1
2 TABLET ORAL 2 TIMES DAILY
Qty: 60 TABLET | Refills: 11 | Status: SHIPPED | OUTPATIENT
Start: 2024-06-05

## 2024-06-05 RX ORDER — POTASSIUM CHLORIDE 20 MEQ/1
20 TABLET, EXTENDED RELEASE ORAL DAILY
Qty: 90 TABLET | Refills: 1 | Status: SHIPPED | OUTPATIENT
Start: 2024-06-05

## 2024-06-05 RX ORDER — CARVEDILOL 12.5 MG/1
6.25 TABLET ORAL 2 TIMES DAILY
Qty: 30 TABLET | Refills: 11 | Status: SHIPPED | OUTPATIENT
Start: 2024-06-05

## 2024-06-05 RX ORDER — INSULIN DETEMIR 100 [IU]/ML
INJECTION, SOLUTION SUBCUTANEOUS
Qty: 15 ML | Refills: 1 | Status: SHIPPED | OUTPATIENT
Start: 2024-06-05

## 2024-06-05 RX ORDER — ALLOPURINOL 300 MG/1
300 TABLET ORAL DAILY
Qty: 90 TABLET | Refills: 3 | Status: SHIPPED | OUTPATIENT
Start: 2024-06-05

## 2024-06-05 RX ORDER — TOPIRAMATE 100 MG/1
100 TABLET, FILM COATED ORAL 2 TIMES DAILY
Qty: 60 TABLET | Refills: 11 | Status: SHIPPED | OUTPATIENT
Start: 2024-06-05 | End: 2025-06-05

## 2024-06-05 RX ORDER — NIFEDIPINE 60 MG/1
60 TABLET, EXTENDED RELEASE ORAL DAILY
Qty: 90 TABLET | Refills: 3 | Status: SHIPPED | OUTPATIENT
Start: 2024-06-05

## 2024-06-05 RX ORDER — LEVOTHYROXINE SODIUM 150 UG/1
150 TABLET ORAL
Qty: 90 TABLET | Refills: 1 | Status: SHIPPED | OUTPATIENT
Start: 2024-06-05

## 2024-06-05 RX ORDER — PANTOPRAZOLE SODIUM 40 MG/1
40 TABLET, DELAYED RELEASE ORAL DAILY
Qty: 90 TABLET | Refills: 1 | Status: SHIPPED | OUTPATIENT
Start: 2024-06-05

## 2024-06-05 RX ORDER — TADALAFIL 20 MG/1
40 TABLET ORAL DAILY
Qty: 30 TABLET | Refills: 11 | Status: SHIPPED | OUTPATIENT
Start: 2024-06-05

## 2024-06-05 RX ORDER — FUROSEMIDE 20 MG/1
20 TABLET ORAL DAILY PRN
Qty: 40 TABLET | Refills: 6 | Status: SHIPPED | OUTPATIENT
Start: 2024-06-05

## 2024-06-05 NOTE — PROGRESS NOTES
[facility]  Pulmonology  ESTABLISHED VISIT     Patient Name:  Holly Porter  Primary Care Provider: Regan Frausto MD  Date of Service: 03/25/2024    Chief Complaint: Shortness of breath    SUBJECTIVE   HPI:  Holly Porter is a 63 y.o. female with PAH ([facility] 02/2024 mPAP 34, PCWP 8, PVR 5.2, CO/CI 5.98/2.26), chronic respiratory failure with hypoxia and hypercapnia, JOVANNI/OSH on PAP who presents today for follow up of shortness of breath. Last seen 03/2024 with plan for Chantix and PA for dual therapy for PAH.     German reports feeling well today. She is still smoking, however, has decreased use which she attributes to Chantix, still 1/4 ppd. She is using her oxygen more consistently. She reports daily use of albuterol. She feels improved following starting Tadalafil; no dispenses on MAR but states she received it from her pharmacy.     Initial HPI  German reports feeling well on this evaluation.  She has shortness of breath that is present with exertion.  She currently use and using her oxygen during this visit as her son who drove her here was concerned that it could affect his car.  She reports using her oxygen at home.  She is using her nightly PAP device with nasal pillows.  She has no cough.  She reports using her Spiriva which she needs a refill.   She was recently admitted 0207-14 with shortness of breath where she was found to be fluid overloaded.  For management she underwent diuresis the transitioned to Bumex from Lasix.  She had improvement in her respiratory status and while she was admitted on high-flow nasal cannula was deescalated to low-flow nasal cannula.  She also underwent right heart catheterization and was discharged home on PAP therapy.        Past Medical History:   Diagnosis Date    Acquired hypothyroidism     Atherosclerosis of native artery of extremity with intermittent claudication 01/30/2019    bilateral legs    BMI 50.0-59.9, adult 02/22/2021    Chronic pain syndrome      opioid depen    Congestive heart failure (CHF)     COPD (chronic obstructive pulmonary disease)     COVID-19 10/06/2023    Depression     Diabetes mellitus, type 2     followed by Aurea Kwong NP, [facility]    DVT of lower extremity, bilateral     Essential hypertension 06/08/2021    GERD (gastroesophageal reflux disease)     Gout 07/05/2023    Hyperlipidemia     Lumbosacral radiculopathy 06/05/2023    followed by GLENN Valdes, [facility]    Migraines     Nicotine dependence     Nicotine dependence     Obesity hypoventilation syndrome     chronic CO2 retainer with compensatory metabolic alkalosis    Osteoarthritis     Seizure disorder     Sleep apnea     on cpap    Thyroid cancer     Venous stasis ulcer limited to breakdown of skin with varicose veins     followed by Estuardo Zhao    Vitamin D deficiency        Past Surgical History:   Procedure Laterality Date    ABCESS DRAINAGE      HYSTERECTOMY      ILIAC ARTERY STENT Bilateral 01/28/2020    Bilateral distal aorta and common iliac 8 X 59 vbx covered stents performed by Dr. Antonio Jacinto.    LEFT HEART CATHETERIZATION Left 11/6/2023    Procedure: Left heart cath;  Surgeon: Alex Ortega DO;  Location: [facility] CATH LAB;  Service: Cardiology;  Laterality: Left;    RADIOFREQUENCY ABLATION Left 04/15/2022    Procedure: Left calf  Radiofrequency Ablation;  Surgeon: Matty Falcon DO;  Location: [facility] OR;  Service: Vascular;  Laterality: Left;    RIGHT HEART CATHETERIZATION Right 2/13/2024    Procedure: INSERTION, CATHETER, RIGHT HEART;  Surgeon: Mahad Moreno MD;  Location: [facility] CATH LAB;  Service: Cardiology;  Laterality: Right;    STAB PHLEBECTOMY OF VARICOSE VEINS Left 01/25/2013    Left leg microphlebectomies x 23 stab avulsions and ligation of multiple varicose veins perrformed by Dr. Cirilo Aguirre.    THYROIDECTOMY      hx: thyroid cancer    TOE AMPUTATION Left 01/28/2020    2nd, 4th and 5th performed by   Anam Saeed.    TOE AMPUTATION Right 01/28/2020    4th toe performed by Dr. nAam Saeed.    TOTAL ABDOMINAL HYSTERECTOMY W/ BILATERAL SALPINGOOPHORECTOMY      TUBAL LIGATION      VAGINAL DELIVERY      x 4    VENOUS ABLATION Right 08/09/2019    GSV Varithena Ablation performed by Dr. Estuardo Falcon    VENOUS ABLATION Left 08/02/2019    ATAV Varithena Ablation performed by Dr. Estuardo Falcon.    VENOUS ABLATION Right 07/13/2015    ATAV Laser Ablatio performed by Dr. Cirilo Aguirre.    VENOUS ABLATION Right 07/06/2015    Distal  GSV Laser Ablation performed by dr. Cirilo Aguirre.    VENOUS ABLATION Left 04/20/2015    Left Distal GSV Laser Ablation performed by dr. Cirilo Aguirre.    VENOUS ABLATION Right 10/28/2013    Right Distal GSV RF Ablation performed by Dr. Cirilo Aguirre.    VENOUS ABLATION Left 10/25/2013    SSV RF Ablation performed by dr. Cirilo Aguirre.    VENOUS ABLATION Left 10/07/2013    Left GSV and Left ATAV RF Ablation performed by Dr. Cirilo Aguirre.    VENOUS ABLATION Right 01/21/2013    GSV RF Ablation w/micros x 22 performed by Dr. Cirilo Aguirre.    VENOUS ABLATION Left 06/25/2021    Left distal GSV Varithena Ablation       Family History   Problem Relation Name Age of Onset    Heart disease Mother          age 84 CHF    Hypertension Mother      Osteoarthritis Mother      Coronary aneurysm Father      Coronary artery disease Father      Hypertension Father      Heart disease Father      No Known Problems Son      No Known Problems Son      No Known Problems Son      No Known Problems Son      No Known Problems Sister      No Known Problems Brother          hx: varicose veins    No Known Problems Brother          MVA: parlazed    No Known Problems Brother          Social History     Socioeconomic History    Marital status:    Tobacco Use    Smoking status: Every Day     Current packs/day: 0.50     Average packs/day: 0.5 packs/day for 33.0 years (16.5 ttl pk-yrs)     Types: Cigarettes     Passive exposure: Current    Smokeless tobacco:  Never    Tobacco comments:     5 cigarettes a day    Substance and Sexual Activity    Alcohol use: Never    Drug use: Never    Sexual activity: Not Currently     Social Determinants of Health     Financial Resource Strain: Low Risk  (2/9/2024)    Overall Financial Resource Strain (CARDIA)     Difficulty of Paying Living Expenses: Not hard at all   Food Insecurity: No Food Insecurity (2/9/2024)    Hunger Vital Sign     Worried About Running Out of Food in the Last Year: Never true     Ran Out of Food in the Last Year: Never true   Transportation Needs: Unmet Transportation Needs (2/9/2024)    PRAPARE - Transportation     Lack of Transportation (Medical): Yes     Lack of Transportation (Non-Medical): Yes   Physical Activity: Inactive (2/9/2024)    Exercise Vital Sign     Days of Exercise per Week: 0 days     Minutes of Exercise per Session: 0 min   Stress: No Stress Concern Present (2/9/2024)    [employer] [place] [employer] - Occupational Stress Questionnaire     Feeling of Stress : Not at all   Housing Stability: Low Risk  (2/9/2024)    Housing Stability Vital Sign     Unable to Pay for Housing in the Last Year: No     Number of Places Lived in the Last Year: 1     Unstable Housing in the Last Year: No       Social History     Social History Narrative    Not on file       Review of patient's allergies indicates:   Allergen Reactions    Ammonium peroxydisulfate Shortness Of Breath    Avocado (laurus persea) Anaphylaxis    Bananas [banana] Anaphylaxis and Swelling     CRAMPS,    Chocolate flavor Anaphylaxis     MOUTH SWELLING    Fentanyl Shortness Of Breath and Itching    Nicoderm Swelling    Percocet [oxycodone-acetaminophen] Shortness Of Breath and Itching    Silvadene [silver sulfadiazine]     Clindamycin     Corticosteroids (glucocorticoids)     Hydrocortisone Blisters    Lasix [furosemide] Blisters     BURNS SKIN    Pregabalin     Adhesive Blisters, Rash and Itching    Iodine Rash    Latex, natural  "rubber Rash    Pcn [penicillins] Rash    Sulfa (sulfonamide antibiotics) Rash and Blisters        Medications: Medications reviewed to include over the counter medications.    Review of Systems: A focused ROS was completed and found to be negative except for that mentioned above.      OBJECTIVE   PHYSICAL EXAM:  Vitals:    06/05/24 1458   BP: (!) 119/58   BP Location: Left arm   Patient Position: Sitting   BP Method: Medium (Manual)   Pulse: 90   Resp: 18   SpO2: (!) 94%  Comment: 4 Liters   Weight: (!) 152.4 kg (336 lb)   Height: 5' 8" (1.727 m)     GENERAL: NAD  HEENT: normocephalic, non-icteric conjunctivae, moist oral mucosa  RESPIRATORY: diminished breath sounds, otherwise clear to auscultation, no wheezing, rales or rhonchi, on RA  CARDIOVASCULAR: Regular rate and rhythm, no murmurs rubs or gallops.  SKIN: no rash, jaundice, ecchymosis or ulcers  MUSCULOSKELETAL: No clubbing or cyanosis; b/l LE edema 1+ extending to mid shin  NEUROLOGIC: AO ×3, no gross deficits    LABS:  Lab studies reviewed and notable for ABG 02/2024: 7.40/53/49/32.8/84  Lab Results   Component Value Date    WBC 7.64 06/05/2024    HGB 17.0 (H) 06/05/2024    HCT 59.1 (HH) 06/05/2024    MCV 77.4 (L) 06/05/2024     06/05/2024      Latest Reference Range & Units 02/07/24 12:41   ALP 50 - 130 U/L 101   PROTEIN TOTAL 6.4 - 8.2 g/dL 6.7   Albumin 3.5 - 5.0 g/dL 3.3 (L)   Albumin/Globulin Ratio  1.0   BILIRUBIN TOTAL >0.0 - 1.2 mg/dL 0.9   AST 15 - 37 U/L 21   ALT 13 - 56 U/L 14   Globulin, Total 2.0 - 4.0 g/dL 3.4     BMP  Lab Results   Component Value Date     02/27/2024    K 3.8 02/27/2024     02/27/2024    CO2 33 (H) 02/27/2024    BUN 13 02/27/2024    CREATININE 0.95 02/27/2024    CALCIUM 9.6 02/27/2024    ANIONGAP 8 02/27/2024    EGFRNORACEVR 67 02/27/2024      Latest Reference Range & Units 12/04/22 15:31 06/28/23 11:50 10/05/23 15:36 10/17/23 11:28 11/03/23 13:20 12/06/23 15:31 02/07/24 12:41 02/27/24 11:18   NT-proBNP 1 - " 125 pg/mL 44 112 1,932 (H) 286 (H) 3,183 (H) 1,371 (H) 2,073 (H) 642 (H)     IMAGING:  CT-PE 02/2024:  FINDINGS:  No thrombus or other abnormality is identified in the pulmonary arteries or veins.  The pulmonary vessel caliber is within normal limits.  The cardiac size is enlarged.  Otherwise heart mediastinum and great vessels appear within normal limits.     Small amount airspace density both lungs more prominent in the right lung and mostly in the dependent lungs.  Remaining pulmonary.  Small amount parenchyma shows no evidence of airspace disease or abnormal density.  No effusion or pneumothorax is present.    [facility] 02/2024: RA 6/4/3, RV 50/1, mPAP 34, PCWP 8, PVR 5.2 (Td), CO/CI 5.98/2.26 (Td)    TTE:  02/2024: LVEF 60%, diastolic dysfunction present, mild RV enlargement, normal LA size, RA dilated, trace MR, mod TR, PASP 50    [facility] 11/2023: no CAD, angiographically normal coronaries     LUNG FUNCTION TESTING:      SPIROMETRY/PFT:  03/2024 Pre Post   FVC 2.01/-2.78 2.08/-2.64   FEV1 1.57/-2.64 1.57/-2.64   FEV1/FVC 78/0 75/-0.42   TLC 5.34/-0.50    FRC  3.60/0.54    RV 3.28/2.09    DLCO 10.24/-5.08      SPIROMETRY/PFT:  02/2022 Pre Post   FVC 2.42/-2.13 2.42/-2.13   FEV1 1.95/-1.94 1.95/-1.94   FEV1/FVC 81/0.30 81/0.30   TLC 5.39/-0.43     FRC  3.32/0.12     RV 2.97/1.54     DLCO 14.36/-2.88          6MWD:  Date Distance (ft) Resting SpO2; Yonathan SpO2 O2 Required   03/2024 453 87%,RA; 87% 2-3L NC           ASSESSMENT & PLAN     1. Pulmonary arterial hypertension  Assessment & Plan:  RH 02/2024 with precapillary pulmonary hypertension (mPAP 34, PCWP 8, PVR 5.2, CO/CI 5.98/2.26 -Td) that is out of proportion for JOVANNI on PAP. PFTs with no obstruction. History is notable for PE in 2022; CT-PE imaging thereafter with no webs or evidence of organization and negative for acute PE. Lab work with negative HIV screen and imaging notable for hepatic steatosis. Improved fluid status with diuresis with ongoing good UOP. Plan  for management as follows:  - WHO group I, NYHA class III  - REVEAL risk score 8; intermediate risk  - plan for initiation of dual therapy with Tadalafil and Macitentan   -- s/p hysterectomy with radiographic absence of uterus   -- medication side effects reviewed; educated on avoidance of abrupt cessation of medication  - obtain V/Q scan and consider triple therapy with riociguat if there is concern for CTEPH, order on f/u  - cont    - cont Bumex BID  - progressive erythrocytosis with normal Plts is worrisome for poor PAP therapy; will refer for titration study with Sleep Medicine  - on initiation of therapy, plan for risk and medication SE assessment in 3-6 months with to include repeat NTproBNP, CMP, TTE and 6MWT  - Vaccines up to date; pneumonia vaccine and influenza vaccine due in the fall  - O2 supplementation required, goal SpO2 >92%  - if persistent high risk 6 months following initiation of therapy, plan for referral to PH/transplant center  - presents today having just completed labs for another provider, will obtain labs on f/u including CTD w/u      Orders:  -     tadalafil (ADCIRCA) 20 mg Tab; Take 2 tablets (40 mg total) by mouth once daily.  Dispense: 30 tablet; Refill: 11  -     macitentan 10 mg Tab; Take 1 tablet (10 mg total) by mouth once daily.  Dispense: 30 tablet; Refill: 11  -     Basic Metabolic Panel; Future; Expected date: 06/05/2024  -     NT-Pro Natriuretic Peptide; Future; Expected date: 06/05/2024  -     Echo Saline Bubble? No; Ultrasound enhancing contrast? Yes; Future; Expected date: 09/05/2024  -     Stress test, pulmonary; Future  -     NM Lung Ventilation Perfusion Imaging; Future; Expected date: 06/06/2024  -     MyoMarker Panel 3; Future; Expected date: 06/06/2024  -     Anti-Neutrophilic Cytoplasmic Antibody; Future; Expected date: 06/06/2024  -     Rheumatoid Factor Screen; Future; Expected date: 06/06/2024  -     Cyclic Citrullinated Peptide Antibody, IgG; Future; Expected  date: 06/06/2024  -     IKE EIA w/ Reflex to dsDNA/UCHE; Future; Expected date: 06/06/2024  -     X-Ray Chest PA And Lateral; Future; Expected date: 06/06/2024    2. Mixed simple and mucopurulent chronic bronchitis  Assessment & Plan:  64 yo F with ongoing nicotine dependence presents for evaluation. Improved respiratory status with initiation of LAMA and DOROTHEA PRN. PFTs with PRISM.   - cont LAMA Qday and DOROTHEA PRN  - continue to recommend smoking cessation as outlined below.       3. Chronic respiratory failure with hypoxia and hypercapnia  Assessment & Plan:  Patient with chronic hypercapnic and hypoxic respiratory failure on home oxygen at 3L and presents today off O2 therapy. CT-PE 02/2024 negative for VTE. 6MWT with rest and exertional hypoxia. Management as follows:  - cont NC supplementation 2L NC at rest and 3-4L NC with exertion  - has obtained portable O2 therapy, improved consistent use with travel    Orders:  -     BiPAP/CPAP Titration ((Must have dx of JOVANNI from previous sleep study); Future    4. Nicotine dependence with current use  Assessment & Plan:  Dangers of cigarette smoking were reviewed with patient in detail. Patient was Counseled for 3-10 minutes. Nicotine replacement options were discussed. Nicotine replacement was discussed- previously prescribed with available refills. Continue Chantix.    Orders:  -     varenicline (CHANTIX CONTINUING MONTH BOX) 1 mg Tab; Take 1 tablet (1 mg total) by mouth 2 (two) times daily.  Dispense: 30 tablet; Refill: 3    5. High risk medication use  -     Hepatic Function Panel; Future; Expected date: 06/06/2024    6. JOVANNI (obstructive sleep apnea)  -     BiPAP/CPAP Titration ((Must have dx of JOVANNI from previous sleep study); Future           Follow up in about 3 months (around 9/5/2024).      Case was discussed with patient; all questions were answered to patient's satisfaction and patient verbalized understanding.     Missy Godoy MD  Pulmonary Medicine  [facility]  Phone: [phone]

## 2024-06-05 NOTE — PATIENT INSTRUCTIONS
We are working with your insurance to approve the following medications. These medications are for treatment of your PULMONARY ARTERIAL HYPERTENSION:  Tadalafil 20 mg by mouth daily  Macitentan 10 mg by mouth daily

## 2024-06-06 ENCOUNTER — TELEPHONE (OUTPATIENT)
Dept: FAMILY MEDICINE | Facility: CLINIC | Age: 64
End: 2024-06-06
Payer: MEDICARE

## 2024-06-06 RX ORDER — VARENICLINE TARTRATE 1 MG/1
1 TABLET, FILM COATED ORAL 2 TIMES DAILY
Qty: 30 TABLET | Refills: 3 | Status: SHIPPED | OUTPATIENT
Start: 2024-06-06

## 2024-06-06 NOTE — ASSESSMENT & PLAN NOTE
C 02/2024 with precapillary pulmonary hypertension (mPAP 34, PCWP 8, PVR 5.2, CO/CI 5.98/2.26 -Td) that is out of proportion for JOVANNI on PAP. PFTs with no obstruction. History is notable for PE in 2022; CT-PE imaging thereafter with no webs or evidence of organization and negative for acute PE. Lab work with negative HIV screen and imaging notable for hepatic steatosis. Improved fluid status with diuresis with ongoing good UOP. Plan for management as follows:  - WHO group I, NYHA class III  - REVEAL risk score 8; intermediate risk  - plan for initiation of dual therapy with Tadalafil and Macitentan   -- s/p hysterectomy with radiographic absence of uterus   -- medication side effects reviewed; educated on avoidance of abrupt cessation of medication  - obtain V/Q scan and consider triple therapy with riociguat if there is concern for CTEPH, order on f/u  - cont    - cont Bumex BID  - progressive erythrocytosis with normal Plts is worrisome for poor PAP therapy; will refer for titration study with Sleep Medicine  - on initiation of therapy, plan for risk and medication SE assessment in 3-6 months with to include repeat NTproBNP, CMP, TTE and 6MWT  - Vaccines up to date; pneumonia vaccine and influenza vaccine due in the fall  - O2 supplementation required, goal SpO2 >92%  - if persistent high risk 6 months following initiation of therapy, plan for referral to PH/transplant center  - presents today having just completed labs for another provider, will obtain labs on f/u including CTD w/u

## 2024-06-06 NOTE — ASSESSMENT & PLAN NOTE
64 yo F with ongoing nicotine dependence presents for evaluation. Improved respiratory status with initiation of LAMA and DOROTHEA PRN. PFTs with PRISM.   - cont LAMA Qday and DOROTHEA PRN  - continue to recommend smoking cessation as outlined below.

## 2024-06-06 NOTE — ASSESSMENT & PLAN NOTE
Patient with chronic hypercapnic and hypoxic respiratory failure on home oxygen at 3L and presents today off O2 therapy. CT-PE 02/2024 negative for VTE. 6MWT with rest and exertional hypoxia. Management as follows:  - cont NC supplementation 2L NC at rest and 3-4L NC with exertion  - has obtained portable O2 therapy, improved consistent use with travel

## 2024-06-06 NOTE — ASSESSMENT & PLAN NOTE
Dangers of cigarette smoking were reviewed with patient in detail. Patient was Counseled for 3-10 minutes. Nicotine replacement options were discussed. Nicotine replacement was discussed- previously prescribed with available refills. Continue Chantix.

## 2024-06-06 NOTE — TELEPHONE ENCOUNTER
----- Message from Regan Frausto MD sent at 6/5/2024 11:52 PM CDT -----  Need to see I  monday  abnl results     0818 Call made to pt; no answer; left voicemail for pt to return call to clinic.

## 2024-06-10 PROBLEM — J30.9 ALLERGIC RHINITIS: Status: ACTIVE | Noted: 2024-06-10

## 2024-06-10 NOTE — PROGRESS NOTES
"Subjective:       Patient ID: Holly Porter is a 63 y.o. female.    Chief Complaint: Follow-up (2 month fu CHF ) and Medication Refill    HPI  .  Patient presents today with history polycythemia patient also complains of sinus congestion sinus headache patient also has a ulcer to the left ankle patient has generalized weakness and fatigue as well.  Patient has a history of headache  Current Medications:    Current Outpatient Medications:     ACCU-CHEK GUIDE GLUCOSE METER Misc, , Disp: , Rfl:     ACCU-CHEK GUIDE TEST STRIPS Strp, , Disp: , Rfl:     ACCU-CHEK SOFTCLIX LANCETS Misc, , Disp: , Rfl:     aspirin (ECOTRIN) 81 MG EC tablet, TAKE 1 TABLET BY MOUTH EVERY DAY, Disp: 90 tablet, Rfl: 1    cetirizine (ZYRTEC) 10 MG tablet, Take 10 mg by mouth once daily., Disp: , Rfl:     clindamycin (CLEOCIN) 150 MG capsule, Take 2 capsules (300 mg total) by mouth 3 (three) times daily., Disp: 30 capsule, Rfl: 0    colchicine (COLCRYS) 0.6 mg tablet, Take 1 tablet (0.6 mg total) by mouth once daily., Disp: 30 tablet, Rfl: 2    HYDROcodone-acetaminophen (NORCO)  mg per tablet, Take 1 tablet by mouth every 6 (six) hours., Disp: 120 tablet, Rfl: 0    HYDROcodone-acetaminophen (NORCO)  mg per tablet, Take 1 tablet by mouth every 6 (six) hours., Disp: 120 tablet, Rfl: 0    HYDROcodone-acetaminophen (NORCO)  mg per tablet, Take 1 tablet by mouth every 6 (six) hours., Disp: 120 tablet, Rfl: 0    naloxone (NARCAN) 4 mg/actuation Spry, 1 spray once., Disp: , Rfl:     pen needle, diabetic 32 gauge x 5/32" Ndle, Use to inject insulin once daily, Disp: 100 each, Rfl: 3    polyethylene glycol (GLYCOLAX) 17 gram PwPk, Take 17 g by mouth once daily., Disp: , Rfl:     QUEtiapine (SEROQUEL) 25 MG Tab, Take 1 tablet (25 mg total) by mouth once daily., Disp: 30 tablet, Rfl: 1    tiotropium bromide (SPIRIVA RESPIMAT) 2.5 mcg/actuation inhaler, Inhale 2 puffs into the lungs Daily. Controller, Disp: 4 g, Rfl: 11    albuterol " (PROVENTIL) 2.5 mg /3 mL (0.083 %) nebulizer solution, Take 3 mLs (2.5 mg total) by nebulization every 6 (six) hours as needed for Wheezing. Rescue, Disp: 300 mL, Rfl: 11    albuterol (PROVENTIL/VENTOLIN HFA) 90 mcg/actuation inhaler, Inhale 2 puffs into the lungs 4 (four) times daily. Rescue, Disp: 18 g, Rfl: 2    allopurinoL (ZYLOPRIM) 300 MG tablet, Take 1 tablet (300 mg total) by mouth once daily., Disp: 90 tablet, Rfl: 3    apixaban (ELIQUIS) 5 mg Tab, Take 1 tablet (5 mg total) by mouth 2 (two) times daily., Disp: 60 tablet, Rfl: 3    bumetanide (BUMEX) 2 MG tablet, Take 1 tablet (2 mg total) by mouth 2 (two) times daily., Disp: 60 tablet, Rfl: 11    carvediloL (COREG) 12.5 MG tablet, Take 0.5 tablets (6.25 mg total) by mouth 2 (two) times daily., Disp: 30 tablet, Rfl: 11    cilostazoL (PLETAL) 100 MG Tab, Take 1 tablet (100 mg total) by mouth 2 (two) times daily., Disp: 90 tablet, Rfl: 1    furosemide (LASIX) 20 MG tablet, Take 1 tablet (20 mg total) by mouth daily as needed (for swelling)., Disp: 40 tablet, Rfl: 6    insulin detemir U-100, Levemir, (LEVEMIR FLEXTOUCH U100 INSULIN) 100 unit/mL (3 mL) InPn pen, Inject 10 units into the skin every evening subcutaneous, Disp: 15 mL, Rfl: 1    levothyroxine (SYNTHROID) 150 MCG tablet, Take 1 tablet (150 mcg total) by mouth before breakfast., Disp: 90 tablet, Rfl: 1    macitentan 10 mg Tab, Take 1 tablet (10 mg total) by mouth once daily., Disp: 30 tablet, Rfl: 11    NIFEdipine (PROCARDIA-XL) 60 MG (OSM) 24 hr tablet, Take 1 tablet (60 mg total) by mouth once daily., Disp: 90 tablet, Rfl: 3    pantoprazole (PROTONIX) 40 MG tablet, Take 1 tablet (40 mg total) by mouth once daily., Disp: 90 tablet, Rfl: 1    potassium chloride SA (K-DUR,KLOR-CON) 20 MEQ tablet, Take 1 tablet (20 mEq total) by mouth once daily., Disp: 90 tablet, Rfl: 1    tadalafil (ADCIRCA) 20 mg Tab, Take 2 tablets (40 mg total) by mouth once daily., Disp: 30 tablet, Rfl: 11    topiramate (TOPAMAX)  "100 MG tablet, Take 1 tablet (100 mg total) by mouth 2 (two) times daily., Disp: 60 tablet, Rfl: 11    varenicline (CHANTIX CONTINUING MONTH BOX) 1 mg Tab, Take 1 tablet (1 mg total) by mouth 2 (two) times daily., Disp: 30 tablet, Rfl: 3           ROS  Twelve point system reviewed, unremarkable except for stated above in HPI.        Objective:         Vitals:    06/05/24 1128   BP: 120/77   BP Location: Right arm   Patient Position: Sitting   BP Method: Large (Automatic)   Pulse: 90   Resp: 18   Temp: 97.6 °F (36.4 °C)   TempSrc: Tympanic   SpO2: (!) 92%   Weight: (!) 152.4 kg (336 lb)   Height: 5' 8" (1.727 m)        Physical Exam     Patient is awake alert oriented person place and  Lungs are clear to auscultation bilaterally no crackles or wheezes   Cardiovascular S1-S2 regular rate and rhythm no murmurs rubs or gallops   Abdomen is soft positive bowel sounds nontender, extremities no clubbing cyanosis edema  Neuro no focal neurological deficits  Skin warm and dry.     Last Labs:     Office Visit on 06/05/2024   Component Date Value    Hemoglobin A1C 06/05/2024 5.7     Estimated Average Glucose 06/05/2024 117     Creatinine, Urine 06/05/2024 219     Microalbumin 06/05/2024 12.8 (H)     Microalbumin/Creatinine * 06/05/2024 58.4 (H)     Protein, Urine 06/05/2024 33.1 (H)     WBC 06/05/2024 7.64     RBC 06/05/2024 7.64 (H)     Hemoglobin 06/05/2024 17.0 (H)     Hematocrit 06/05/2024 59.1 (HH)     MCV 06/05/2024 77.4 (L)     MCH 06/05/2024 22.3 (L)     MCHC 06/05/2024 28.8 (L)     RDW 06/05/2024 27.1 (H)     Platelet Count 06/05/2024 188     Neutrophils % 06/05/2024 50.0 (L)     Lymphocytes % 06/05/2024 31.5     Monocytes % 06/05/2024 8.4 (H)     Eosinophils % 06/05/2024 8.8 (H)     Basophils % 06/05/2024 0.8     Immature Granulocytes % 06/05/2024 0.5 (H)     nRBC, Auto 06/05/2024 0.0     Neutrophils, Abs 06/05/2024 3.82     Lymphocytes, Absolute 06/05/2024 2.41     Monocytes, Absolute 06/05/2024 0.64     " Eosinophils, Absolute 06/05/2024 0.67 (H)     Basophils, Absolute 06/05/2024 0.06     Immature Granulocytes, A* 06/05/2024 0.04     nRBC, Absolute 06/05/2024 0.00     Diff Type 06/05/2024 Scan Smear     Platelet Morphology 06/05/2024 Few Large Platelets (A)     Anisocytosis 06/05/2024 2+     Microcytosis 06/05/2024 Few     Ovalocytes 06/05/2024 Few     Hypochromic 06/05/2024 1+        Last Imaging:  X-Ray Sinuses Min 3 Views  Narrative: EXAMINATION:  XR SINUSES MIN 3 VIEWS    CLINICAL HISTORY:  Allergic rhinitis, unspecified    COMPARISON:  Sinus x-ray November 8, 2022    TECHNIQUE:  Three views of the sinuses.    FINDINGS:  No significant sinus opacification.  No air-fluid level to suggest acute sinusitis.  Consider sinus CT.  Impression: As above.    Point of Service: [facility]    Electronically signed by: Christian Foss  Date:    06/05/2024  Time:    12:00         **Labs and x-rays personally reviewed by me    ** reviewed           Assessment & Plan:       1. Type 2 diabetes mellitus without complication, with long-term current use of insulin  -     CBC Auto Differential; Future; Expected date: 06/05/2024  -     Hemoglobin A1C; Future; Expected date: 06/05/2024  -     Microalbumin/Creatinine Ratio, Urine; Future; Expected date: 06/05/2024  -     Protein, Random Urine; Future; Expected date: 06/05/2024  -     CBC Morphology    2. Allergic rhinitis, unspecified seasonality, unspecified trigger  -     X-Ray Sinuses Min 3 Views; Future; Expected date: 06/05/2024    3. Intractable chronic migraine without aura and without status migrainosus  -     topiramate (TOPAMAX) 100 MG tablet; Take 1 tablet (100 mg total) by mouth 2 (two) times daily.  Dispense: 60 tablet; Refill: 11    Other orders  -     allopurinoL (ZYLOPRIM) 300 MG tablet; Take 1 tablet (300 mg total) by mouth once daily.  Dispense: 90 tablet; Refill: 3  -     apixaban (ELIQUIS) 5 mg Tab; Take 1 tablet (5 mg total) by mouth 2 (two) times  daily.  Dispense: 60 tablet; Refill: 3  -     bumetanide (BUMEX) 2 MG tablet; Take 1 tablet (2 mg total) by mouth 2 (two) times daily.  Dispense: 60 tablet; Refill: 11  -     carvediloL (COREG) 12.5 MG tablet; Take 0.5 tablets (6.25 mg total) by mouth 2 (two) times daily.  Dispense: 30 tablet; Refill: 11  -     cilostazoL (PLETAL) 100 MG Tab; Take 1 tablet (100 mg total) by mouth 2 (two) times daily.  Dispense: 90 tablet; Refill: 1  -     furosemide (LASIX) 20 MG tablet; Take 1 tablet (20 mg total) by mouth daily as needed (for swelling).  Dispense: 40 tablet; Refill: 6  -     insulin detemir U-100, Levemir, (LEVEMIR FLEXTOUCH U100 INSULIN) 100 unit/mL (3 mL) InPn pen; Inject 10 units into the skin every evening subcutaneous  Dispense: 15 mL; Refill: 1  -     levothyroxine (SYNTHROID) 150 MCG tablet; Take 1 tablet (150 mcg total) by mouth before breakfast.  Dispense: 90 tablet; Refill: 1  -     NIFEdipine (PROCARDIA-XL) 60 MG (OSM) 24 hr tablet; Take 1 tablet (60 mg total) by mouth once daily.  Dispense: 90 tablet; Refill: 3  -     pantoprazole (PROTONIX) 40 MG tablet; Take 1 tablet (40 mg total) by mouth once daily.  Dispense: 90 tablet; Refill: 1  -     potassium chloride SA (K-DUR,KLOR-CON) 20 MEQ tablet; Take 1 tablet (20 mEq total) by mouth once daily.  Dispense: 90 tablet; Refill: 1            Regan Frausto MD

## 2024-06-11 ENCOUNTER — OFFICE VISIT (OUTPATIENT)
Dept: PAIN MEDICINE | Facility: CLINIC | Age: 64
End: 2024-06-11
Payer: MEDICARE

## 2024-06-11 VITALS
DIASTOLIC BLOOD PRESSURE: 85 MMHG | BODY MASS INDEX: 45.99 KG/M2 | HEIGHT: 67 IN | SYSTOLIC BLOOD PRESSURE: 173 MMHG | WEIGHT: 293 LBS | RESPIRATION RATE: 22 BRPM | HEART RATE: 90 BPM

## 2024-06-11 DIAGNOSIS — Z79.899 ENCOUNTER FOR LONG-TERM (CURRENT) USE OF OTHER MEDICATIONS: ICD-10-CM

## 2024-06-11 DIAGNOSIS — G89.29 CHRONIC PAIN OF RIGHT KNEE: Chronic | ICD-10-CM

## 2024-06-11 DIAGNOSIS — I73.9 PVD (PERIPHERAL VASCULAR DISEASE): Chronic | ICD-10-CM

## 2024-06-11 DIAGNOSIS — M25.561 CHRONIC PAIN OF RIGHT KNEE: Chronic | ICD-10-CM

## 2024-06-11 DIAGNOSIS — M47.817 LUMBOSACRAL SPONDYLOSIS WITHOUT MYELOPATHY: Primary | Chronic | ICD-10-CM

## 2024-06-11 PROCEDURE — 99214 OFFICE O/P EST MOD 30 MIN: CPT | Mod: S$PBB,25,, | Performed by: PHYSICIAN ASSISTANT

## 2024-06-11 PROCEDURE — 3060F POS MICROALBUMINURIA REV: CPT | Mod: CPTII,,, | Performed by: PHYSICIAN ASSISTANT

## 2024-06-11 PROCEDURE — 3044F HG A1C LEVEL LT 7.0%: CPT | Mod: CPTII,,, | Performed by: PHYSICIAN ASSISTANT

## 2024-06-11 PROCEDURE — 3008F BODY MASS INDEX DOCD: CPT | Mod: CPTII,,, | Performed by: PHYSICIAN ASSISTANT

## 2024-06-11 PROCEDURE — 1159F MED LIST DOCD IN RCRD: CPT | Mod: CPTII,,, | Performed by: PHYSICIAN ASSISTANT

## 2024-06-11 PROCEDURE — 3077F SYST BP >= 140 MM HG: CPT | Mod: CPTII,,, | Performed by: PHYSICIAN ASSISTANT

## 2024-06-11 PROCEDURE — 99999PBSHW POCT URINE DRUG SCREEN PRESUMP: Mod: PBBFAC,,,

## 2024-06-11 PROCEDURE — 99213 OFFICE O/P EST LOW 20 MIN: CPT | Mod: PBBFAC | Performed by: PHYSICIAN ASSISTANT

## 2024-06-11 PROCEDURE — 80305 DRUG TEST PRSMV DIR OPT OBS: CPT | Mod: PBBFAC | Performed by: PHYSICIAN ASSISTANT

## 2024-06-11 PROCEDURE — 96372 THER/PROPH/DIAG INJ SC/IM: CPT | Mod: PBBFAC | Performed by: PHYSICIAN ASSISTANT

## 2024-06-11 PROCEDURE — 99999PBSHW PR PBB SHADOW TECHNICAL ONLY FILED TO HB: Mod: PBBFAC,,,

## 2024-06-11 PROCEDURE — 3079F DIAST BP 80-89 MM HG: CPT | Mod: CPTII,,, | Performed by: PHYSICIAN ASSISTANT

## 2024-06-11 PROCEDURE — 3066F NEPHROPATHY DOC TX: CPT | Mod: CPTII,,, | Performed by: PHYSICIAN ASSISTANT

## 2024-06-11 RX ORDER — KETOROLAC TROMETHAMINE 30 MG/ML
60 INJECTION, SOLUTION INTRAMUSCULAR; INTRAVENOUS
Status: COMPLETED | OUTPATIENT
Start: 2024-06-11 | End: 2024-06-11

## 2024-06-11 RX ORDER — HYDROCODONE BITARTRATE AND ACETAMINOPHEN 10; 325 MG/1; MG/1
1 TABLET ORAL EVERY 6 HOURS
Qty: 120 TABLET | Refills: 0 | Status: SHIPPED | OUTPATIENT
Start: 2024-08-18

## 2024-06-11 RX ORDER — HYDROCODONE BITARTRATE AND ACETAMINOPHEN 10; 325 MG/1; MG/1
1 TABLET ORAL EVERY 6 HOURS
Qty: 120 TABLET | Refills: 0 | Status: SHIPPED | OUTPATIENT
Start: 2024-07-19

## 2024-06-11 RX ORDER — HYDROCODONE BITARTRATE AND ACETAMINOPHEN 10; 325 MG/1; MG/1
1 TABLET ORAL EVERY 6 HOURS
Qty: 120 TABLET | Refills: 0 | Status: SHIPPED | OUTPATIENT
Start: 2024-06-19

## 2024-06-11 RX ADMIN — KETOROLAC TROMETHAMINE 60 MG: 30 INJECTION, SOLUTION INTRAMUSCULAR at 01:06

## 2024-08-01 ENCOUNTER — PATIENT MESSAGE (OUTPATIENT)
Dept: DIABETES SERVICES | Facility: CLINIC | Age: 64
End: 2024-08-01
Payer: MEDICARE

## 2024-08-07 ENCOUNTER — TELEPHONE (OUTPATIENT)
Dept: PULMONOLOGY | Facility: CLINIC | Age: 64
End: 2024-08-07
Payer: MEDICARE

## 2024-08-22 ENCOUNTER — HOSPITAL ENCOUNTER (OUTPATIENT)
Dept: RADIOLOGY | Facility: HOSPITAL | Age: 64
Discharge: HOME | End: 2024-08-22
Attending: INTERNAL MEDICINE
Payer: MEDICARE

## 2024-08-22 ENCOUNTER — OFFICE VISIT (OUTPATIENT)
Dept: FAMILY MEDICINE | Facility: CLINIC | Age: 64
End: 2024-08-22
Payer: MEDICARE

## 2024-08-22 VITALS
OXYGEN SATURATION: 95 % | BODY MASS INDEX: 45.99 KG/M2 | TEMPERATURE: 97 F | HEIGHT: 67 IN | SYSTOLIC BLOOD PRESSURE: 139 MMHG | WEIGHT: 293 LBS | RESPIRATION RATE: 17 BRPM | DIASTOLIC BLOOD PRESSURE: 77 MMHG | HEART RATE: 93 BPM

## 2024-08-22 DIAGNOSIS — R07.9 CHEST PAIN, UNSPECIFIED TYPE: Primary | ICD-10-CM

## 2024-08-22 DIAGNOSIS — R10.9 ABDOMINAL PAIN, UNSPECIFIED ABDOMINAL LOCATION: ICD-10-CM

## 2024-08-22 DIAGNOSIS — E11.9 TYPE 2 DIABETES MELLITUS WITHOUT COMPLICATION, WITH LONG-TERM CURRENT USE OF INSULIN: ICD-10-CM

## 2024-08-22 DIAGNOSIS — Z79.4 TYPE 2 DIABETES MELLITUS WITHOUT COMPLICATION, WITH LONG-TERM CURRENT USE OF INSULIN: ICD-10-CM

## 2024-08-22 DIAGNOSIS — Z12.11 ENCOUNTER FOR SCREENING COLONOSCOPY: ICD-10-CM

## 2024-08-22 DIAGNOSIS — R13.10 DYSPHAGIA, UNSPECIFIED TYPE: ICD-10-CM

## 2024-08-22 LAB
ALBUMIN SERPL BCP-MCNC: 3.6 G/DL (ref 3.5–5)
ALP SERPL-CCNC: 126 U/L (ref 55–142)
ALT SERPL W P-5'-P-CCNC: 19 U/L (ref 13–56)
ANION GAP SERPL CALCULATED.3IONS-SCNC: 8 MMOL/L (ref 7–16)
ANISOCYTOSIS BLD QL SMEAR: ABNORMAL
AST SERPL W P-5'-P-CCNC: 18 U/L (ref 15–37)
BASOPHILS # BLD AUTO: 0.05 K/UL (ref 0–0.2)
BASOPHILS NFR BLD AUTO: 0.6 % (ref 0–1)
BILIRUB DIRECT SERPL-MCNC: 0.3 MG/DL (ref 0–0.2)
BILIRUB SERPL-MCNC: 0.5 MG/DL (ref ?–1.2)
BUN SERPL-MCNC: 13 MG/DL (ref 7–18)
BUN/CREAT SERPL: 13 (ref 6–20)
CALCIUM SERPL-MCNC: 10.2 MG/DL (ref 8.5–10.1)
CHLORIDE SERPL-SCNC: 106 MMOL/L (ref 98–107)
CO2 SERPL-SCNC: 29 MMOL/L (ref 21–32)
CREAT SERPL-MCNC: 1 MG/DL (ref 0.55–1.02)
DIFFERENTIAL METHOD BLD: ABNORMAL
EGFR (NO RACE VARIABLE) (RUSH/TITUS): 63 ML/MIN/1.73M2
EKG 12-LEAD: NORMAL
EOSINOPHIL # BLD AUTO: 0.34 K/UL (ref 0–0.5)
EOSINOPHIL NFR BLD AUTO: 4.3 % (ref 1–4)
ERYTHROCYTE [DISTWIDTH] IN BLOOD BY AUTOMATED COUNT: 21.4 % (ref 11.5–14.5)
EST. AVERAGE GLUCOSE BLD GHB EST-MCNC: 128 MG/DL
GLUCOSE SERPL-MCNC: 107 MG/DL (ref 74–106)
HBA1C MFR BLD HPLC: 6.1 % (ref 4.5–6.6)
HCT VFR BLD AUTO: 55.3 % (ref 38–47)
HEART RATE: 85
HGB BLD-MCNC: 16.8 G/DL (ref 12–16)
IMM GRANULOCYTES # BLD AUTO: 0.05 K/UL (ref 0–0.04)
IMM GRANULOCYTES NFR BLD: 0.6 % (ref 0–0.4)
LYMPHOCYTES # BLD AUTO: 1.86 K/UL (ref 1–4.8)
LYMPHOCYTES NFR BLD AUTO: 23.4 % (ref 27–41)
Lab: NORMAL
MCH RBC QN AUTO: 24.5 PG (ref 27–31)
MCHC RBC AUTO-ENTMCNC: 30.4 G/DL (ref 32–36)
MCV RBC AUTO: 80.6 FL (ref 80–96)
MICROCYTES BLD QL SMEAR: ABNORMAL
MONOCYTES # BLD AUTO: 0.54 K/UL (ref 0–0.8)
MONOCYTES NFR BLD AUTO: 6.8 % (ref 2–6)
MPC BLD CALC-MCNC: ABNORMAL G/DL
NEUTROPHILS # BLD AUTO: 5.11 K/UL (ref 1.8–7.7)
NEUTROPHILS NFR BLD AUTO: 64.3 % (ref 53–65)
NRBC # BLD AUTO: 0 X10E3/UL
NRBC, AUTO (.00): 0 %
OVALOCYTES BLD QL SMEAR: ABNORMAL
PLATELET # BLD AUTO: 224 K/UL (ref 150–400)
PLATELET MORPHOLOGY: ABNORMAL
POTASSIUM SERPL-SCNC: 3.8 MMOL/L (ref 3.5–5.1)
PR INTERVAL: 199
PROT SERPL-MCNC: 7.3 G/DL (ref 6.4–8.2)
PRT AXES: NORMAL
QRS DURATION: 146
QT/QTC: NORMAL
RBC # BLD AUTO: 6.86 M/UL (ref 4.2–5.4)
SODIUM SERPL-SCNC: 139 MMOL/L (ref 136–145)
TARGETS BLD QL SMEAR: ABNORMAL
TROPONIN I SERPL DL<=0.01 NG/ML-MCNC: 11.4 PG/ML
VENTRICULAR RATE: NORMAL
WBC # BLD AUTO: 7.95 K/UL (ref 4.5–11)

## 2024-08-22 PROCEDURE — 93000 ELECTROCARDIOGRAM COMPLETE: CPT | Mod: ,,, | Performed by: INTERNAL MEDICINE

## 2024-08-22 PROCEDURE — 3288F FALL RISK ASSESSMENT DOCD: CPT | Mod: ,,, | Performed by: INTERNAL MEDICINE

## 2024-08-22 PROCEDURE — 80048 BASIC METABOLIC PNL TOTAL CA: CPT | Mod: ,,, | Performed by: CLINICAL MEDICAL LABORATORY

## 2024-08-22 PROCEDURE — 3044F HG A1C LEVEL LT 7.0%: CPT | Mod: ,,, | Performed by: INTERNAL MEDICINE

## 2024-08-22 PROCEDURE — 3060F POS MICROALBUMINURIA REV: CPT | Mod: ,,, | Performed by: INTERNAL MEDICINE

## 2024-08-22 PROCEDURE — 80076 HEPATIC FUNCTION PANEL: CPT | Mod: ,,, | Performed by: CLINICAL MEDICAL LABORATORY

## 2024-08-22 PROCEDURE — 99214 OFFICE O/P EST MOD 30 MIN: CPT | Mod: 25,,, | Performed by: INTERNAL MEDICINE

## 2024-08-22 PROCEDURE — 3066F NEPHROPATHY DOC TX: CPT | Mod: ,,, | Performed by: INTERNAL MEDICINE

## 2024-08-22 PROCEDURE — 83036 HEMOGLOBIN GLYCOSYLATED A1C: CPT | Mod: ,,, | Performed by: CLINICAL MEDICAL LABORATORY

## 2024-08-22 PROCEDURE — 84484 ASSAY OF TROPONIN QUANT: CPT | Mod: ,,, | Performed by: CLINICAL MEDICAL LABORATORY

## 2024-08-22 PROCEDURE — 85025 COMPLETE CBC W/AUTO DIFF WBC: CPT | Mod: ,,, | Performed by: CLINICAL MEDICAL LABORATORY

## 2024-08-22 PROCEDURE — 3008F BODY MASS INDEX DOCD: CPT | Mod: ,,, | Performed by: INTERNAL MEDICINE

## 2024-08-22 PROCEDURE — 1101F PT FALLS ASSESS-DOCD LE1/YR: CPT | Mod: ,,, | Performed by: INTERNAL MEDICINE

## 2024-08-22 PROCEDURE — 3078F DIAST BP <80 MM HG: CPT | Mod: ,,, | Performed by: INTERNAL MEDICINE

## 2024-08-22 PROCEDURE — 1159F MED LIST DOCD IN RCRD: CPT | Mod: ,,, | Performed by: INTERNAL MEDICINE

## 2024-08-22 PROCEDURE — 74018 RADEX ABDOMEN 1 VIEW: CPT | Mod: TC

## 2024-08-22 PROCEDURE — 3075F SYST BP GE 130 - 139MM HG: CPT | Mod: ,,, | Performed by: INTERNAL MEDICINE

## 2024-08-22 RX ORDER — PANTOPRAZOLE SODIUM 40 MG/1
40 TABLET, DELAYED RELEASE ORAL DAILY
Qty: 90 TABLET | Refills: 1 | Status: SHIPPED | OUTPATIENT
Start: 2024-08-22

## 2024-08-22 RX ORDER — CYCLOBENZAPRINE HCL 10 MG
10 TABLET ORAL NIGHTLY
Qty: 30 TABLET | Refills: 0 | Status: SHIPPED | OUTPATIENT
Start: 2024-08-22

## 2024-08-22 RX ORDER — CILOSTAZOL 100 MG/1
100 TABLET ORAL 2 TIMES DAILY
Qty: 90 TABLET | Refills: 1 | Status: SHIPPED | OUTPATIENT
Start: 2024-08-22

## 2024-08-22 RX ORDER — ALLOPURINOL 300 MG/1
300 TABLET ORAL DAILY
Qty: 90 TABLET | Refills: 3 | Status: SHIPPED | OUTPATIENT
Start: 2024-08-22

## 2024-08-22 RX ORDER — ATORVASTATIN CALCIUM 10 MG/1
10 TABLET, FILM COATED ORAL DAILY
Qty: 90 TABLET | Refills: 1 | Status: SHIPPED | OUTPATIENT
Start: 2024-08-22

## 2024-08-22 RX ORDER — COLCHICINE 0.6 MG/1
0.6 TABLET ORAL DAILY
Qty: 30 TABLET | Refills: 2 | Status: SHIPPED | OUTPATIENT
Start: 2024-08-22

## 2024-08-22 RX ORDER — PSYLLIUM SEED
PACKET (EA) ORAL
Qty: 660 G | Refills: 0 | Status: SHIPPED | OUTPATIENT
Start: 2024-08-22

## 2024-08-22 RX ORDER — QUETIAPINE FUMARATE 25 MG/1
25 TABLET, FILM COATED ORAL DAILY
Qty: 30 TABLET | Refills: 1 | Status: SHIPPED | OUTPATIENT
Start: 2024-08-22

## 2024-08-22 RX ORDER — BUMETANIDE 2 MG/1
2 TABLET ORAL 2 TIMES DAILY
Qty: 60 TABLET | Refills: 11 | Status: SHIPPED | OUTPATIENT
Start: 2024-08-22

## 2024-08-22 RX ORDER — ATORVASTATIN CALCIUM 20 MG/1
20 TABLET, FILM COATED ORAL DAILY
COMMUNITY
Start: 2024-06-27 | End: 2024-08-22 | Stop reason: SDUPTHER

## 2024-08-22 RX ORDER — NIFEDIPINE 60 MG/1
60 TABLET, EXTENDED RELEASE ORAL DAILY
Qty: 90 TABLET | Refills: 3 | Status: SHIPPED | OUTPATIENT
Start: 2024-08-22

## 2024-08-26 ENCOUNTER — TELEPHONE (OUTPATIENT)
Dept: FAMILY MEDICINE | Facility: CLINIC | Age: 64
End: 2024-08-26
Payer: MEDICARE

## 2024-08-26 NOTE — TELEPHONE ENCOUNTER
----- Message from Regan Frausto MD sent at 8/26/2024 10:21 AM CDT -----  Need to see in  1 week please  abnl results     1025 Pt is scheduled for 09/12/24

## 2024-08-28 PROBLEM — R10.9 ABDOMINAL PAIN: Status: ACTIVE | Noted: 2024-08-28

## 2024-08-28 PROBLEM — R07.9 CHEST PAIN: Status: ACTIVE | Noted: 2024-08-28

## 2024-08-28 PROBLEM — Z12.11 ENCOUNTER FOR SCREENING COLONOSCOPY: Status: ACTIVE | Noted: 2024-08-28

## 2024-08-28 PROBLEM — R13.10 DYSPHAGIA: Status: ACTIVE | Noted: 2024-08-28

## 2024-08-28 NOTE — PROGRESS NOTES
Subjective:       Patient ID: Holly Porter is a 64 y.o. female.    Chief Complaint: Diabetes, Health Maintenance (Pt want c-scope and pt is schedule for mammo ), and Flank Pain (Left side )    HPI  .  Patient presents with multiple complaints multiple problems she complains of constipation patient has chronic diabetes chronic obesity chronic dyslipidemia also has intermittent chest pain.  The abdominal pain is on the right side.  She also has intermittent dysphagia.  She has cervical pain in her neck is slightly swollen.    Current Medications:    Current Outpatient Medications:     ACCU-CHEK GUIDE GLUCOSE METER Misc, , Disp: , Rfl:     ACCU-CHEK GUIDE TEST STRIPS Strp, , Disp: , Rfl:     ACCU-CHEK SOFTCLIX LANCETS Misc, , Disp: , Rfl:     albuterol (PROVENTIL) 2.5 mg /3 mL (0.083 %) nebulizer solution, Take 3 mLs (2.5 mg total) by nebulization every 6 (six) hours as needed for Wheezing. Rescue, Disp: 300 mL, Rfl: 11    albuterol (PROVENTIL/VENTOLIN HFA) 90 mcg/actuation inhaler, Inhale 2 puffs into the lungs 4 (four) times daily. Rescue, Disp: 18 g, Rfl: 2    aspirin (ECOTRIN) 81 MG EC tablet, TAKE 1 TABLET BY MOUTH EVERY DAY, Disp: 90 tablet, Rfl: 1    carvediloL (COREG) 12.5 MG tablet, Take 0.5 tablets (6.25 mg total) by mouth 2 (two) times daily., Disp: 30 tablet, Rfl: 11    cetirizine (ZYRTEC) 10 MG tablet, Take 10 mg by mouth once daily., Disp: , Rfl:     clindamycin (CLEOCIN) 150 MG capsule, Take 2 capsules (300 mg total) by mouth 3 (three) times daily., Disp: 30 capsule, Rfl: 0    furosemide (LASIX) 20 MG tablet, Take 1 tablet (20 mg total) by mouth daily as needed (for swelling)., Disp: 40 tablet, Rfl: 6    HYDROcodone-acetaminophen (NORCO)  mg per tablet, Take 1 tablet by mouth every 6 (six) hours., Disp: 120 tablet, Rfl: 0    HYDROcodone-acetaminophen (NORCO)  mg per tablet, Take 1 tablet by mouth every 6 (six) hours., Disp: 120 tablet, Rfl: 0    HYDROcodone-acetaminophen (NORCO)  mg  "per tablet, Take 1 tablet by mouth every 6 (six) hours., Disp: 120 tablet, Rfl: 0    insulin detemir U-100, Levemir, (LEVEMIR FLEXTOUCH U100 INSULIN) 100 unit/mL (3 mL) InPn pen, Inject 10 units into the skin every evening subcutaneous, Disp: 15 mL, Rfl: 1    levothyroxine (SYNTHROID) 150 MCG tablet, Take 1 tablet (150 mcg total) by mouth before breakfast., Disp: 90 tablet, Rfl: 1    macitentan 10 mg Tab, Take 1 tablet (10 mg total) by mouth once daily., Disp: 30 tablet, Rfl: 11    naloxone (NARCAN) 4 mg/actuation Spry, 1 spray once., Disp: , Rfl:     pen needle, diabetic 32 gauge x 5/32" Ndle, Use to inject insulin once daily, Disp: 100 each, Rfl: 3    polyethylene glycol (GLYCOLAX) 17 gram PwPk, Take 17 g by mouth once daily., Disp: , Rfl:     potassium chloride SA (K-DUR,KLOR-CON) 20 MEQ tablet, Take 1 tablet (20 mEq total) by mouth once daily., Disp: 90 tablet, Rfl: 1    tadalafil (ADCIRCA) 20 mg Tab, Take 2 tablets (40 mg total) by mouth once daily., Disp: 30 tablet, Rfl: 11    tiotropium bromide (SPIRIVA RESPIMAT) 2.5 mcg/actuation inhaler, Inhale 2 puffs into the lungs Daily. Controller, Disp: 4 g, Rfl: 11    topiramate (TOPAMAX) 100 MG tablet, Take 1 tablet (100 mg total) by mouth 2 (two) times daily., Disp: 60 tablet, Rfl: 11    varenicline (CHANTIX CONTINUING MONTH BOX) 1 mg Tab, Take 1 tablet (1 mg total) by mouth 2 (two) times daily., Disp: 30 tablet, Rfl: 3    allopurinoL (ZYLOPRIM) 300 MG tablet, Take 1 tablet (300 mg total) by mouth once daily., Disp: 90 tablet, Rfl: 3    apixaban (ELIQUIS) 5 mg Tab, Take 1 tablet (5 mg total) by mouth 2 (two) times daily., Disp: 60 tablet, Rfl: 3    atorvastatin (LIPITOR) 10 MG tablet, Take 1 tablet (10 mg total) by mouth once daily., Disp: 90 tablet, Rfl: 1    bumetanide (BUMEX) 2 MG tablet, Take 1 tablet (2 mg total) by mouth 2 (two) times daily., Disp: 60 tablet, Rfl: 11    cilostazoL (PLETAL) 100 MG Tab, Take 1 tablet (100 mg total) by mouth 2 (two) times daily., " "Disp: 90 tablet, Rfl: 1    colchicine (COLCRYS) 0.6 mg tablet, Take 1 tablet (0.6 mg total) by mouth once daily., Disp: 30 tablet, Rfl: 2    cyclobenzaprine (FLEXERIL) 10 MG tablet, Take 1 tablet (10 mg total) by mouth every evening., Disp: 30 tablet, Rfl: 0    NIFEdipine (PROCARDIA-XL) 60 MG (OSM) 24 hr tablet, Take 1 tablet (60 mg total) by mouth once daily., Disp: 90 tablet, Rfl: 3    pantoprazole (PROTONIX) 40 MG tablet, Take 1 tablet (40 mg total) by mouth once daily., Disp: 90 tablet, Rfl: 1    psyllium husk (METAMUCIL) 3.4 gram/5.4 gram Powd, Take 1 table spoon with water by mouth once daily., Disp: 660 g, Rfl: 0    QUEtiapine (SEROQUEL) 25 MG Tab, Take 1 tablet (25 mg total) by mouth once daily., Disp: 30 tablet, Rfl: 1           ROS  Twelve point system reviewed, unremarkable except for stated above in HPI.        Objective:         Vitals:    08/22/24 1440   BP: 139/77   BP Location: Left arm   Patient Position: Sitting   BP Method: X-Large (Automatic)   Pulse: 93   Resp: 17   Temp: 97.4 °F (36.3 °C)   TempSrc: Temporal   SpO2: 95%   Weight: (!) 148.8 kg (328 lb)   Height: 5' 7" (1.702 m)        Physical Exam     Patient is awake alert oriented person place and  Lungs are clear to auscultation bilaterally no crackles or wheezes   Cardiovascular S1-S2 regular rate and rhythm no murmurs rubs or gallops   Abdomen is soft positive bowel sounds tenderness in the right lower quadrant no rebound or guarding.      , extremities no clubbing cyanosis edema  Neuro no focal neurological deficits  Skin warm and dry.     Last Labs:     Office Visit on 08/22/2024   Component Date Value    NV Interval 08/22/2024 199     QRS Duration 08/22/2024 146     QT/QTc 08/22/2024 394/469     PRT Axes 08/22/2024 48/209/13     Heart Rate 08/22/2024 85     Results 08/22/2024      Hemoglobin A1C 08/22/2024 6.1     Estimated Average Glucose 08/22/2024 128     Troponin I High Sensitiv* 08/22/2024 11.4     Sodium 08/22/2024 139     " Potassium 08/22/2024 3.8     Chloride 08/22/2024 106     CO2 08/22/2024 29     Anion Gap 08/22/2024 8     Glucose 08/22/2024 107 (H)     BUN 08/22/2024 13     Creatinine 08/22/2024 1.00     BUN/Creatinine Ratio 08/22/2024 13     Calcium 08/22/2024 10.2 (H)     eGFR 08/22/2024 63     Total Protein 08/22/2024 7.3     Albumin 08/22/2024 3.6     Bilirubin, Total 08/22/2024 0.5     Bilirubin, Direct 08/22/2024 0.3 (H)     AST 08/22/2024 18     ALT 08/22/2024 19     Alk Phos 08/22/2024 126     WBC 08/22/2024 7.95     RBC 08/22/2024 6.86 (H)     Hemoglobin 08/22/2024 16.8 (H)     Hematocrit 08/22/2024 55.3 (H)     MCV 08/22/2024 80.6     MCH 08/22/2024 24.5 (L)     MCHC 08/22/2024 30.4 (L)     RDW 08/22/2024 21.4 (H)     Platelet Count 08/22/2024 224     Neutrophils % 08/22/2024 64.3     Lymphocytes % 08/22/2024 23.4 (L)     Monocytes % 08/22/2024 6.8 (H)     Eosinophils % 08/22/2024 4.3 (H)     Basophils % 08/22/2024 0.6     Immature Granulocytes % 08/22/2024 0.6 (H)     nRBC, Auto 08/22/2024 0.0     Neutrophils, Abs 08/22/2024 5.11     Lymphocytes, Absolute 08/22/2024 1.86     Monocytes, Absolute 08/22/2024 0.54     Eosinophils, Absolute 08/22/2024 0.34     Basophils, Absolute 08/22/2024 0.05     Immature Granulocytes, A* 08/22/2024 0.05 (H)     nRBC, Absolute 08/22/2024 0.00     Diff Type 08/22/2024 Scan Smear     Platelet Morphology 08/22/2024 Large & Giant Platelets (A)     Anisocytosis 08/22/2024 1+     Microcytosis 08/22/2024 Few     Target Cells 08/22/2024 Few     Ovalocytes 08/22/2024 Few        Last Imaging:  X-Ray KUB  Narrative: EXAMINATION:  XR KUB    CLINICAL HISTORY:  Unspecified abdominal pain    COMPARISON:  KUB November 16, 2023    TECHNIQUE:  Frontal views of the abdomen.    FINDINGS:  Nonspecific bowel gas pattern.  Scattered skeletal degenerative change.  Presumed pelvic phleboliths.  Lung bases clear.  Impression: No acute findings.    Point of Service: [facility]    Electronically  signed by: Christian Foss  Date:    08/22/2024  Time:    18:03         **Labs and x-rays personally reviewed by me    ** reviewed           Assessment & Plan:       1. Chest pain, unspecified type  -     POCT EKG 12-LEAD (Manually Resulted by Ordering Provider)  -     Troponin I; Future; Expected date: 08/22/2024  -     Basic Metabolic Panel; Future; Expected date: 08/22/2024  -     CBC Auto Differential; Future; Expected date: 08/22/2024  -     CBC Morphology    2. Encounter for screening colonoscopy  -     Colonoscopy; Future; Expected date: 08/22/2024    3. Dysphagia, unspecified type  -     SLP eval of swallow & oral function; Future  -     US Soft Tissue Head Neck; Future; Expected date: 08/22/2024    4. Abdominal pain, unspecified abdominal location  -     Ambulatory referral/consult to Gastroenterology; Future; Expected date: 08/29/2024  -     Hepatic Function Panel; Future; Expected date: 08/22/2024  -     X-Ray KUB; Future; Expected date: 08/22/2024    5. Type 2 diabetes mellitus without complication, with long-term current use of insulin  -     Hemoglobin A1C; Future; Expected date: 08/22/2024    Other orders  -     allopurinoL (ZYLOPRIM) 300 MG tablet; Take 1 tablet (300 mg total) by mouth once daily.  Dispense: 90 tablet; Refill: 3  -     apixaban (ELIQUIS) 5 mg Tab; Take 1 tablet (5 mg total) by mouth 2 (two) times daily.  Dispense: 60 tablet; Refill: 3  -     bumetanide (BUMEX) 2 MG tablet; Take 1 tablet (2 mg total) by mouth 2 (two) times daily.  Dispense: 60 tablet; Refill: 11  -     cilostazoL (PLETAL) 100 MG Tab; Take 1 tablet (100 mg total) by mouth 2 (two) times daily.  Dispense: 90 tablet; Refill: 1  -     colchicine (COLCRYS) 0.6 mg tablet; Take 1 tablet (0.6 mg total) by mouth once daily.  Dispense: 30 tablet; Refill: 2  -     NIFEdipine (PROCARDIA-XL) 60 MG (OSM) 24 hr tablet; Take 1 tablet (60 mg total) by mouth once daily.  Dispense: 90 tablet; Refill: 3  -     pantoprazole (PROTONIX) 40  MG tablet; Take 1 tablet (40 mg total) by mouth once daily.  Dispense: 90 tablet; Refill: 1  -     QUEtiapine (SEROQUEL) 25 MG Tab; Take 1 tablet (25 mg total) by mouth once daily.  Dispense: 30 tablet; Refill: 1  -     psyllium husk (METAMUCIL) 3.4 gram/5.4 gram Powd; Take 1 table spoon with water by mouth once daily.  Dispense: 660 g; Refill: 0  -     cyclobenzaprine (FLEXERIL) 10 MG tablet; Take 1 tablet (10 mg total) by mouth every evening.  Dispense: 30 tablet; Refill: 0  -     atorvastatin (LIPITOR) 10 MG tablet; Take 1 tablet (10 mg total) by mouth once daily.  Dispense: 90 tablet; Refill: 1            Regan Frautso MD

## 2024-09-03 ENCOUNTER — HOSPITAL ENCOUNTER (OUTPATIENT)
Dept: CARDIOLOGY | Facility: HOSPITAL | Age: 64
Discharge: HOME | End: 2024-09-03
Attending: STUDENT IN AN ORGANIZED HEALTH CARE EDUCATION/TRAINING PROGRAM
Payer: MEDICARE

## 2024-09-03 VITALS — BODY MASS INDEX: 45.99 KG/M2 | HEIGHT: 67 IN | WEIGHT: 293 LBS

## 2024-09-03 DIAGNOSIS — I27.21 PULMONARY ARTERIAL HYPERTENSION: ICD-10-CM

## 2024-09-03 LAB
AORTIC ROOT ANNULUS: 2.39 CM
AORTIC VALVE CUSP SEPERATION: 1.99 CM
AV INDEX (PROSTH): 0.86
AV MEAN GRADIENT: 3 MMHG
AV PEAK GRADIENT: 6 MMHG
AV VALVE AREA BY VELOCITY RATIO: 2.44 CM²
AV VALVE AREA: 2.58 CM²
AV VELOCITY RATIO: 0.82
BSA FOR ECHO PROCEDURE: 2.65 M2
CV ECHO LV RWT: 0.53 CM
DOP CALC AO PEAK VEL: 1.21 M/S
DOP CALC AO VTI: 22.8 CM
DOP CALC LVOT AREA: 3 CM2
DOP CALC LVOT DIAMETER: 1.95 CM
DOP CALC LVOT PEAK VEL: 0.99 M/S
DOP CALC LVOT STROKE VOLUME: 58.8 CM3
DOP CALCLVOT PEAK VEL VTI: 19.7 CM
E WAVE DECELERATION TIME: 135.89 MSEC
E/A RATIO: 0.41
E/E' RATIO: 4.57 M/S
ECHO LV POSTERIOR WALL: 1.18 CM (ref 0.6–1.1)
FRACTIONAL SHORTENING: 44 % (ref 28–44)
INTERVENTRICULAR SEPTUM: 1.2 CM (ref 0.6–1.1)
IVC DIAMETER: 2.63 CM
LEFT ATRIUM AREA SYSTOLIC (APICAL 2 CHAMBER): 24.68 CM2
LEFT ATRIUM AREA SYSTOLIC (APICAL 4 CHAMBER): 23.06 CM2
LEFT ATRIUM VOLUME INDEX MOD: 26.3 ML/M2
LEFT ATRIUM VOLUME MOD: 65.85 CM3
LEFT INTERNAL DIMENSION IN SYSTOLE: 2.47 CM (ref 2.1–4)
LEFT VENTRICLE DIASTOLIC VOLUME INDEX: 36.1 ML/M2
LEFT VENTRICLE DIASTOLIC VOLUME: 90.24 ML
LEFT VENTRICLE END SYSTOLIC VOLUME APICAL 2 CHAMBER: 71.57 ML
LEFT VENTRICLE END SYSTOLIC VOLUME APICAL 4 CHAMBER: 59.35 ML
LEFT VENTRICLE MASS INDEX: 77 G/M2
LEFT VENTRICLE SYSTOLIC VOLUME INDEX: 8.7 ML/M2
LEFT VENTRICLE SYSTOLIC VOLUME: 21.64 ML
LEFT VENTRICULAR INTERNAL DIMENSION IN DIASTOLE: 4.45 CM (ref 3.5–6)
LEFT VENTRICULAR MASS: 192.37 G
LV LATERAL E/E' RATIO: 4.57 M/S
LV SEPTAL E/E' RATIO: 4.57 M/S
LVED V (TEICH): 90.24 ML
LVES V (TEICH): 21.64 ML
LVOT MG: 1.89 MMHG
LVOT MV: 0.64 CM/S
MV PEAK A VEL: 0.78 M/S
MV PEAK E VEL: 0.32 M/S
MV STENOSIS PRESSURE HALF TIME: 39.41 MS
MV VALVE AREA P 1/2 METHOD: 5.58 CM2
PISA TR MAX VEL: 2.24 M/S
PV PEAK GRADIENT: 3 MMHG
PV PEAK VELOCITY: 0.92 M/S
RA VOL SYS: 80.35 ML
RIGHT ATRIAL AREA: 25.6 CM2
RIGHT ATRIUM VOLUME AREA LENGTH APICAL 4 CHAMBER: 75.09 ML
RIGHT VENTRICLE DIASTOLIC BASEL DIMENSION: 3.3 CM
RIGHT VENTRICLE DIASTOLIC LENGTH: 8.4 CM
RIGHT VENTRICLE DIASTOLIC MID DIMENSION: 2.1 CM
RIGHT VENTRICULAR LENGTH IN DIASTOLE (APICAL 4-CHAMBER VIEW): 8.37 CM
RV MID DIAMA: 2.07 CM
TDI LATERAL: 0.07 M/S
TDI SEPTAL: 0.07 M/S
TDI: 0.07 M/S
TR MAX PG: 20 MMHG
TRICUSPID ANNULAR PLANE SYSTOLIC EXCURSION: 2.7 CM
Z-SCORE OF LEFT VENTRICULAR DIMENSION IN END DIASTOLE: -11.41
Z-SCORE OF LEFT VENTRICULAR DIMENSION IN END SYSTOLE: -9.54

## 2024-09-03 PROCEDURE — 93306 TTE W/DOPPLER COMPLETE: CPT

## 2024-09-03 PROCEDURE — 93306 TTE W/DOPPLER COMPLETE: CPT | Mod: 26,,, | Performed by: INTERNAL MEDICINE

## 2024-09-05 NOTE — PROGRESS NOTES
Subjective:         Patient ID: Holly Porter is a 64 y.o. female.    Chief Complaint: Foot Pain (left) and Low-back Pain            Pain  This is a chronic problem. The current episode started more than 1 year ago. The problem occurs daily. The problem has been waxing and waning. Associated symptoms include arthralgias. Pertinent negatives include no chest pain, chills, coughing, diaphoresis, fever, sore throat, vertigo or vomiting.     Review of Systems   Constitutional:  Negative for activity change, chills, diaphoresis, fever and unexpected weight change.   HENT:  Negative for drooling, ear discharge, ear pain, facial swelling, nosebleeds, sore throat, trouble swallowing, voice change and goiter.    Eyes:  Negative for photophobia, pain, discharge, redness and visual disturbance.   Respiratory:  Negative for apnea, cough, choking, chest tightness, shortness of breath, wheezing and stridor.    Cardiovascular:  Negative for chest pain, palpitations and leg swelling.   Gastrointestinal:  Negative for abdominal distention, diarrhea, rectal pain, vomiting and fecal incontinence.   Endocrine: Negative for cold intolerance, heat intolerance, polydipsia, polyphagia and polyuria.   Genitourinary:  Negative for bladder incontinence, dysuria, flank pain, frequency and hot flashes.   Musculoskeletal:  Positive for arthralgias, back pain, leg pain and neck stiffness.   Integumentary:  Negative for color change and pallor.   Allergic/Immunologic: Negative for immunocompromised state.   Neurological:  Negative for dizziness, vertigo, seizures, syncope, facial asymmetry, speech difficulty, light-headedness, memory loss and coordination difficulties.   Hematological:  Negative for adenopathy. Does not bruise/bleed easily.   Psychiatric/Behavioral:  Negative for agitation, behavioral problems, confusion, decreased concentration, dysphoric mood, hallucinations, self-injury and suicidal ideas. The patient is not nervous/anxious  and is not hyperactive.            Past Medical History:   Diagnosis Date    Acquired hypothyroidism     Atherosclerosis of native artery of extremity with intermittent claudication 01/30/2019    bilateral legs    BMI 50.0-59.9, adult 02/22/2021    Chronic pain syndrome     opioid depen    Congestive heart failure (CHF)     COPD (chronic obstructive pulmonary disease)     COVID-19 10/06/2023    Depression     Diabetes mellitus, type 2     followed by Aurea Kwong NP, [facility]    DVT of lower extremity, bilateral     Essential hypertension 06/08/2021    GERD (gastroesophageal reflux disease)     Gout 07/05/2023    Hyperlipidemia     Lumbosacral radiculopathy 06/05/2023    followed by GLENN Valdes, [facility]    Migraines     Nicotine dependence     Nicotine dependence     Obesity hypoventilation syndrome     chronic CO2 retainer with compensatory metabolic alkalosis    Osteoarthritis     Seizure disorder     Sleep apnea     on cpap    Thyroid cancer     Venous stasis ulcer limited to breakdown of skin with varicose veins     followed by Estuardo Zhao    Vitamin D deficiency      Past Surgical History:   Procedure Laterality Date    ABCESS DRAINAGE      HYSTERECTOMY      ILIAC ARTERY STENT Bilateral 01/28/2020    Bilateral distal aorta and common iliac 8 X 59 vbx covered stents performed by Dr. Antonio Jacinto.    LEFT HEART CATHETERIZATION Left 11/6/2023    Procedure: Left heart cath;  Surgeon: Alex Ortega DO;  Location: [facility] CATH LAB;  Service: Cardiology;  Laterality: Left;    RADIOFREQUENCY ABLATION Left 04/15/2022    Procedure: Left calf  Radiofrequency Ablation;  Surgeon: Matty Falcon DO;  Location: [facility] OR;  Service: Vascular;  Laterality: Left;    RIGHT HEART CATHETERIZATION Right 2/13/2024    Procedure: INSERTION, CATHETER, RIGHT HEART;  Surgeon: Mahad Moreno MD;  Location: [facility] CATH LAB;  Service: Cardiology;   Laterality: Right;    STAB PHLEBECTOMY OF VARICOSE VEINS Left 01/25/2013    Left leg microphlebectomies x 23 stab avulsions and ligation of multiple varicose veins perrformed by Dr. Cirilo Aguirre.    THYROIDECTOMY      hx: thyroid cancer    TOE AMPUTATION Left 01/28/2020    2nd, 4th and 5th performed by Dr. Anam Saeed.    TOE AMPUTATION Right 01/28/2020    4th toe performed by Dr. Anam Saeed.    TOTAL ABDOMINAL HYSTERECTOMY W/ BILATERAL SALPINGOOPHORECTOMY      TUBAL LIGATION      VAGINAL DELIVERY      x 4    VENOUS ABLATION Right 08/09/2019    GSV Varithena Ablation performed by Dr. Estuardo Falcon    VENOUS ABLATION Left 08/02/2019    ATAV Varithena Ablation performed by Dr. Estuardo Falcon.    VENOUS ABLATION Right 07/13/2015    ATAV Laser Ablatio performed by Dr. Cirilo Aguirre.    VENOUS ABLATION Right 07/06/2015    Distal  GSV Laser Ablation performed by dr. Cirilo Aguirre.    VENOUS ABLATION Left 04/20/2015    Left Distal GSV Laser Ablation performed by dr. Cirilo Aguirre.    VENOUS ABLATION Right 10/28/2013    Right Distal GSV RF Ablation performed by Dr. Cirilo Aguirre.    VENOUS ABLATION Left 10/25/2013    SSV RF Ablation performed by dr. Cirilo Aguirre.    VENOUS ABLATION Left 10/07/2013    Left GSV and Left ATAV RF Ablation performed by Dr. Cirilo Aguirre.    VENOUS ABLATION Right 01/21/2013    GSV RF Ablation w/micros x 22 performed by Dr. Cirilo Aguirre.    VENOUS ABLATION Left 06/25/2021    Left distal GSV Varithena Ablation     Social History     Socioeconomic History    Marital status:    Tobacco Use    Smoking status: Every Day     Current packs/day: 0.50     Average packs/day: 0.5 packs/day for 33.0 years (16.5 ttl pk-yrs)     Types: Cigarettes     Passive exposure: Current    Smokeless tobacco: Never    Tobacco comments:     5 cigarettes a day    Substance and Sexual Activity    Alcohol use: Never    Drug use: Never    Sexual activity: Not Currently     Social Determinants of Health     Financial Resource Strain: Low  Risk  (6/14/2024)    Overall Financial Resource Strain (CARDIA)     Difficulty of Paying Living Expenses: Not hard at all   Food Insecurity: No Food Insecurity (6/14/2024)    Hunger Vital Sign     Worried About Running Out of Food in the Last Year: Never true     Ran Out of Food in the Last Year: Never true   Transportation Needs: Unmet Transportation Needs (2/9/2024)    PRAPARE - Transportation     Lack of Transportation (Medical): Yes     Lack of Transportation (Non-Medical): Yes   Physical Activity: Insufficiently Active (6/14/2024)    Exercise Vital Sign     Days of Exercise per Week: 2 days     Minutes of Exercise per Session: 10 min   Stress: Stress Concern Present (6/14/2024)    [employer] - Occupational Stress Questionnaire     Feeling of Stress : Very much   Housing Stability: High Risk (6/14/2024)    Housing Stability Vital Sign     Unable to Pay for Housing in the Last Year: Yes     Family History   Problem Relation Name Age of Onset    Heart disease Mother          age 84 CHF    Hypertension Mother      Osteoarthritis Mother      Coronary aneurysm Father      Coronary artery disease Father      Hypertension Father      Heart disease Father      No Known Problems Son      No Known Problems Son      No Known Problems Son      No Known Problems Son      No Known Problems Sister      No Known Problems Brother          hx: varicose veins    No Known Problems Brother          MVA: parlazed    No Known Problems Brother       Review of patient's allergies indicates:   Allergen Reactions    Ammonium peroxydisulfate Shortness Of Breath    Avocado (laurus persea) Anaphylaxis    Bananas [banana] Anaphylaxis and Swelling     CRAMPS,    Chocolate flavor Anaphylaxis     MOUTH SWELLING    Fentanyl Shortness Of Breath and Itching    Nicoderm Swelling    Percocet [oxycodone-acetaminophen] Shortness Of Breath and Itching    Silvadene [silver sulfadiazine]      "Clindamycin     Hydrocortisone Blisters    Lasix [furosemide] Blisters     BURNS SKIN    Pregabalin     Adhesive Blisters, Rash and Itching    Iodine Rash    Latex, natural rubber Rash    Pcn [penicillins] Rash    Sulfa (sulfonamide antibiotics) Rash and Blisters        Objective:  Vitals:    09/17/24 1355   BP: (!) 152/73   Pulse: 84   Resp: 18   Weight: (!) 150.1 kg (331 lb)   Height: 5' 8" (1.727 m)   PainSc:   7               Physical Exam  Vitals and nursing note reviewed. Exam conducted with a chaperone present.   Constitutional:       General: She is awake. She is not in acute distress.     Appearance: Normal appearance. She is obese. She is not ill-appearing, toxic-appearing or diaphoretic.   HENT:      Head: Normocephalic and atraumatic.      Nose: Nose normal.      Mouth/Throat:      Mouth: Mucous membranes are moist.      Pharynx: Oropharynx is clear.   Eyes:      Conjunctiva/sclera: Conjunctivae normal.      Pupils: Pupils are equal, round, and reactive to light.   Cardiovascular:      Rate and Rhythm: Normal rate.   Pulmonary:      Effort: Pulmonary effort is normal. No respiratory distress.   Abdominal:      Palpations: Abdomen is soft.   Musculoskeletal:         General: Normal range of motion.      Cervical back: Normal range of motion and neck supple.   Skin:     General: Skin is warm and dry.   Neurological:      General: No focal deficit present.      Mental Status: She is alert and oriented to person, place, and time. Mental status is at baseline.      Cranial Nerves: No cranial nerve deficit (II-XII).   Psychiatric:         Mood and Affect: Mood normal.         Behavior: Behavior normal. Behavior is cooperative.         Thought Content: Thought content normal.         Echo Saline Bubble? No; Ultrasound enhancing contrast? Yes    Left Ventricle: The left ventricle is normal in size. Mildly increased   wall thickness. There is concentric hypertrophy. There is normal systolic   function " with a visually estimated ejection fraction of 55 - 60%. There is   normal diastolic function.    Right Ventricle: Normal right ventricular cavity size. Systolic   function is normal.    Right Atrium: Right atrium is mildly dilated.    Aortic Valve: The aortic valve is a trileaflet valve.         Hospital Outpatient Visit on 09/03/2024   Component Date Value Ref Range Status    LVOT stroke volume 09/03/2024 58.80  cm3 Final    LVIDd 09/03/2024 4.45  3.5 - 6.0 cm Final    LV Systolic Volume 09/03/2024 21.64  mL Final    LVIDs 09/03/2024 2.47  2.1 - 4.0 cm Final    LV ESV A2C 09/03/2024 71.57  mL Final    LV Diastolic Volume 09/03/2024 90.24  mL Final    LV ESV A4C 09/03/2024 59.35  mL Final    Left Ventricular End Systolic Volu* 09/03/2024 21.64  mL Final    Left Ventricular End Diastolic Vol* 09/03/2024 90.24  mL Final    IVS 09/03/2024 1.20 (A)  0.6 - 1.1 cm Final    LVOT diameter 09/03/2024 1.95  cm Final    LVOT area 09/03/2024 3.0  cm2 Final    FS 09/03/2024 44  28 - 44 % Final    Left Ventricle Relative Wall Thick* 09/03/2024 0.53  cm Final    Posterior Wall 09/03/2024 1.18 (A)  0.6 - 1.1 cm Final    LV mass 09/03/2024 192.37  g Final    MV Peak E Dima 09/03/2024 0.32  m/s Final    TDI LATERAL 09/03/2024 0.07  m/s Final    TDI SEPTAL 09/03/2024 0.07  m/s Final    E/E' ratio 09/03/2024 4.57  m/s Final    MV Peak A Dima 09/03/2024 0.78  m/s Final    TR Max Dima 09/03/2024 2.24  m/s Final    E/A ratio 09/03/2024 0.41   Final    E wave deceleration time 09/03/2024 135.89  msec Final    LV SEPTAL E/E' RATIO 09/03/2024 4.57  m/s Final    LV LATERAL E/E' RATIO 09/03/2024 4.57  m/s Final    LVOT peak dima 09/03/2024 0.99  m/s Final    Left Ventricular Outflow Tract Michaela* 09/03/2024 0.64  cm/s Final    Left Ventricular Outflow Tract Michaela* 09/03/2024 1.89  mmHg Final    RV- singh basal diam 09/03/2024 3.3  cm Final    RV-singh mid d 09/03/2024 2.1  cm Final    Right ventricular length in  diasto* 09/03/2024 8.37  cm Final    RV-singh length 09/03/2024 8.4  cm Final    RV mid diameter 09/03/2024 2.07  cm Final    TAPSE 09/03/2024 2.70  cm Final    LA volume (mod) 09/03/2024 65.85  cm3 Final    RA area length vol 09/03/2024 75.09  mL Final    RA area 09/03/2024 25.6  cm2 Final    Right Atrium Volume Systolic 09/03/2024 80.35  mL Final    AV mean gradient 09/03/2024 3  mmHg Final    AV peak gradient 09/03/2024 6  mmHg Final    Ao peak ger 09/03/2024 1.21  m/s Final    Ao VTI 09/03/2024 22.80  cm Final    LVOT peak VTI 09/03/2024 19.70  cm Final    AV valve area 09/03/2024 2.58  cm² Final    AV Velocity Ratio 09/03/2024 0.82   Final    AV index (prosthetic) 09/03/2024 0.86   Final    JOSE by Velocity Ratio 09/03/2024 2.44  cm² Final    MV stenosis pressure 1/2 time 09/03/2024 39.41  ms Final    MV valve area p 1/2 method 09/03/2024 5.58  cm2 Final    Triscuspid Valve Regurgitation Pea* 09/03/2024 20  mmHg Final    PV PEAK VELOCITY 09/03/2024 0.92  m/s Final    PV peak gradient 09/03/2024 3  mmHg Final    Ao root annulus 09/03/2024 2.39  cm Final    IVC diameter 09/03/2024 2.63  cm Final    Mean e' 09/03/2024 0.07  m/s Final    LA area A4C 09/03/2024 23.06  cm2 Final    LA area A2C 09/03/2024 24.68  cm2 Final    AORTIC VALVE CUSP SEPERATION 09/03/2024 1.99  cm Final    BSA 09/03/2024 2.65  m2 Final    LV Systolic Volume Index 09/03/2024 8.7  mL/m2 Final    LV Diastolic Volume Index 09/03/2024 36.10  mL/m2 Final    LV Mass Index 09/03/2024 77  g/m2 Final    LA Volume Index (Mod) 09/03/2024 26.3  mL/m2 Final    ZLVIDS 09/03/2024 -9.54   Final    ZLVIDD 09/03/2024 -11.41   Final   Office Visit on 08/22/2024   Component Date Value Ref Range Status    MN Interval 08/22/2024 199   Final    QRS Duration 08/22/2024 146   Final    QT/QTc 08/22/2024 394/469   Final    PRT Axes 08/22/2024 48/209/13   Final    Heart Rate 08/22/2024 85   Final    Results 08/22/2024    Final     Hemoglobin A1C 08/22/2024 6.1  4.5 - 6.6 % Final    Estimated Average Glucose 08/22/2024 128  mg/dL Final    Troponin I High Sensitivity 08/22/2024 11.4  <=60.4 pg/mL Final    Sodium 08/22/2024 139  136 - 145 mmol/L Final    Potassium 08/22/2024 3.8  3.5 - 5.1 mmol/L Final    Chloride 08/22/2024 106  98 - 107 mmol/L Final    CO2 08/22/2024 29  21 - 32 mmol/L Final    Anion Gap 08/22/2024 8  7 - 16 mmol/L Final    Glucose 08/22/2024 107 (H)  74 - 106 mg/dL Final    BUN 08/22/2024 13  7 - 18 mg/dL Final    Creatinine 08/22/2024 1.00  0.55 - 1.02 mg/dL Final    BUN/Creatinine Ratio 08/22/2024 13  6 - 20 Final    Calcium 08/22/2024 10.2 (H)  8.5 - 10.1 mg/dL Final    eGFR 08/22/2024 63  >=60 mL/min/1.73m2 Final    Total Protein 08/22/2024 7.3  6.4 - 8.2 g/dL Final    Albumin 08/22/2024 3.6  3.5 - 5.0 g/dL Final    Bilirubin, Total 08/22/2024 0.5  >0.0 - 1.2 mg/dL Final    Bilirubin, Direct 08/22/2024 0.3 (H)  0.0 - 0.2 mg/dL Final    AST 08/22/2024 18  15 - 37 U/L Final    ALT 08/22/2024 19  13 - 56 U/L Final    Alk Phos 08/22/2024 126  55 - 142 U/L Final    WBC 08/22/2024 7.95  4.50 - 11.00 K/uL Final    RBC 08/22/2024 6.86 (H)  4.20 - 5.40 M/uL Final    Hemoglobin 08/22/2024 16.8 (H)  12.0 - 16.0 g/dL Final    Hematocrit 08/22/2024 55.3 (H)  38.0 - 47.0 % Final    MCV 08/22/2024 80.6  80.0 - 96.0 fL Final    MCH 08/22/2024 24.5 (L)  27.0 - 31.0 pg Final    MCHC 08/22/2024 30.4 (L)  32.0 - 36.0 g/dL Final    RDW 08/22/2024 21.4 (H)  11.5 - 14.5 % Final    Platelet Count 08/22/2024 224  150 - 400 K/uL Final    Neutrophils % 08/22/2024 64.3  53.0 - 65.0 % Final    Lymphocytes % 08/22/2024 23.4 (L)  27.0 - 41.0 % Final    Monocytes % 08/22/2024 6.8 (H)  2.0 - 6.0 % Final    Eosinophils % 08/22/2024 4.3 (H)  1.0 - 4.0 % Final    Basophils % 08/22/2024 0.6  0.0 - 1.0 % Final    Immature Granulocytes % 08/22/2024 0.6 (H)  0.0 - 0.4 % Final    nRBC, Auto 08/22/2024 0.0  <=0.0 % Final     Neutrophils, Abs 08/22/2024 5.11  1.80 - 7.70 K/uL Final    Lymphocytes, Absolute 08/22/2024 1.86  1.00 - 4.80 K/uL Final    Monocytes, Absolute 08/22/2024 0.54  0.00 - 0.80 K/uL Final    Eosinophils, Absolute 08/22/2024 0.34  0.00 - 0.50 K/uL Final    Basophils, Absolute 08/22/2024 0.05  0.00 - 0.20 K/uL Final    Immature Granulocytes, Absolute 08/22/2024 0.05 (H)  0.00 - 0.04 K/uL Final    nRBC, Absolute 08/22/2024 0.00  <=0.00 x10e3/uL Final    Diff Type 08/22/2024 Scan Smear   Final    Platelet Morphology 08/22/2024 Large & Giant Platelets (A)  Normal Final    Anisocytosis 08/22/2024 1+   Final    Microcytosis 08/22/2024 Few   Final    Target Cells 08/22/2024 Few   Final    Ovalocytes 08/22/2024 Few   Final   Office Visit on 06/11/2024   Component Date Value Ref Range Status    POC Amphetamines 06/11/2024 Negative  Negative, Inconclusive Final    POC Barbiturates 06/11/2024 Negative  Negative, Inconclusive Final    POC Benzodiazepines 06/11/2024 Negative  Negative, Inconclusive Final    POC Cocaine 06/11/2024 Negative  Negative, Inconclusive Final    POC THC 06/11/2024 Negative  Negative, Inconclusive Final    POC Methadone 06/11/2024 Negative  Negative, Inconclusive Final    POC Methamphetamine 06/11/2024 Negative  Negative, Inconclusive Final    POC Opiates 06/11/2024 Presumptive Positive (A)  Negative, Inconclusive Final    POC Oxycodone 06/11/2024 Negative  Negative, Inconclusive Final    POC Phencyclidine 06/11/2024 Negative  Negative, Inconclusive Final    POC Methylenedioxymethamphetamine * 06/11/2024 Negative  Negative, Inconclusive Final    POC Tricyclic Antidepressants 06/11/2024 Negative  Negative, Inconclusive Final    POC Buprenorphine 06/11/2024 Negative   Final     Acceptable 06/11/2024 Yes   Final    POC Temperature (Urine) 06/11/2024 90   Final   Office Visit on 06/05/2024   Component Date Value Ref Range Status    Hemoglobin A1C 06/05/2024 5.7  4.5 -  6.6 % Final    Estimated Average Glucose 06/05/2024 117  mg/dL Final    Creatinine, Urine 06/05/2024 219  28 - 219 mg/dL Final    Microalbumin 06/05/2024 12.8 (H)  0.0 - 2.8 mg/dL Final    Microalbumin/Creatinine Ratio 06/05/2024 58.4 (H)  0.0 - 30.0 mg/g Final    Protein, Urine 06/05/2024 33.1 (H)  0.0 - 11.9 mg/dL Final    WBC 06/05/2024 7.64  4.50 - 11.00 K/uL Final    RBC 06/05/2024 7.64 (H)  4.20 - 5.40 M/uL Final    Hemoglobin 06/05/2024 17.0 (H)  12.0 - 16.0 g/dL Final    Hematocrit 06/05/2024 59.1 (HH)  38.0 - 47.0 % Final    MCV 06/05/2024 77.4 (L)  80.0 - 96.0 fL Final    MCH 06/05/2024 22.3 (L)  27.0 - 31.0 pg Final    MCHC 06/05/2024 28.8 (L)  32.0 - 36.0 g/dL Final    RDW 06/05/2024 27.1 (H)  11.5 - 14.5 % Final    Platelet Count 06/05/2024 188  150 - 400 K/uL Final    Neutrophils % 06/05/2024 50.0 (L)  53.0 - 65.0 % Final    Lymphocytes % 06/05/2024 31.5  27.0 - 41.0 % Final    Monocytes % 06/05/2024 8.4 (H)  2.0 - 6.0 % Final    Eosinophils % 06/05/2024 8.8 (H)  1.0 - 4.0 % Final    Basophils % 06/05/2024 0.8  0.0 - 1.0 % Final    Immature Granulocytes % 06/05/2024 0.5 (H)  0.0 - 0.4 % Final    nRBC, Auto 06/05/2024 0.0  <=0.0 % Final    Neutrophils, Abs 06/05/2024 3.82  1.80 - 7.70 K/uL Final    Lymphocytes, Absolute 06/05/2024 2.41  1.00 - 4.80 K/uL Final    Monocytes, Absolute 06/05/2024 0.64  0.00 - 0.80 K/uL Final    Eosinophils, Absolute 06/05/2024 0.67 (H)  0.00 - 0.50 K/uL Final    Basophils, Absolute 06/05/2024 0.06  0.00 - 0.20 K/uL Final    Immature Granulocytes, Absolute 06/05/2024 0.04  0.00 - 0.04 K/uL Final    nRBC, Absolute 06/05/2024 0.00  <=0.00 x10e3/uL Final    Diff Type 06/05/2024 Scan Smear   Final    Platelet Morphology 06/05/2024 Few Large Platelets (A)  Normal Final    Anisocytosis 06/05/2024 2+   Final    Microcytosis 06/05/2024 Few   Final    Ovalocytes 06/05/2024 Few   Final    Hypochromic 06/05/2024 1+   Corrected   Office Visit on  04/11/2024   Component Date Value Ref Range Status    QRS Duration 04/11/2024 102  ms Final    [facility] QTC Calculation 04/11/2024 467  ms Final         Orders Placed This Encounter   Procedures    POCT Urine Drug Screen Presump     Interpretive Information:     Negative:  No drug detected at the cut off level.   Positive:  This result represents presumptive positive for the   tested drug, other substances may yield a positive response other   than the analyte of interest. This result should be utilized for   diagnostic purpose only. Confirmation testing will be performed upon physician request only.              Requested Prescriptions     Signed Prescriptions Disp Refills    HYDROcodone-acetaminophen (NORCO)  mg per tablet 120 tablet 0     Sig: Take 1 tablet by mouth every 6 (six) hours.    HYDROcodone-acetaminophen (NORCO)  mg per tablet 120 tablet 0     Sig: Take 1 tablet by mouth every 6 (six) hours.    HYDROcodone-acetaminophen (NORCO)  mg per tablet 120 tablet 0     Sig: Take 1 tablet by mouth every 6 (six) hours.       Assessment:     1. Lumbosacral spondylosis without myelopathy    2. Chronic pain of right knee    3. PVD (peripheral vascular disease)    4. Encounter for long-term (current) use of other medications             A's of Opioid Risk Assessment  Activity:Patient can perform ADL.   Analgesia:Patients pain is partially controlled by current medication. Patient has tried OTC medications such as Tylenol and Ibuprofen with out relief.   Adverse Effects: Patient denies constipation or sedation.  Aberrant Behavior:  reviewed with no aberrant drug seeking/taking behavior.  Overdose reversal drug naloxone discussed    Drug screen reviewed      MRI cervical spine [facility] dated April 18, 2023 multiple level degenerative changes C4/5 disc osteophyte complex mild central canal stenosis uncovertebral hypertrophic        Plan:    Narcan December 2022    Wheelchair/4 point walker for mobility  due to chronic nonhealing wounds right foot     Following wound management chronic ulcers lower extremities    Following orthopedics [facility], she states she was advised not to have surgery due to her weight    Diabetic, limit steroids    Cannot tolerate OxyContin she is severely allergic    History thyroid cancer    Poor procedure candidate multiple comorbidities      Severe left lower extremity/foot pain related diabetic ulcer     Requesting Toradol injection     Toradol 60 mg IM, tolerated well     Considering further surgery left lower extremity      She is concerned her thyroid cancers returning she has a lump at the bottom of her neck and anterior side difficulty swallowing painful range of motion jaw    She states she will obtain appointment with [facility] for follow-up    From pain management standpoint she states current medications helping she would like to continue current medical    Continue current medication    Continue home exercise program as tolerated    Follow-up 3 months    Dr. Crandall, August 2024    Bring original prescription medication bottles/container/box with labels to each visit

## 2024-09-09 PROBLEM — J96.11 CHRONIC RESPIRATORY FAILURE WITH HYPOXIA AND HYPERCAPNIA: Status: RESOLVED | Noted: 2024-02-23 | Resolved: 2024-09-09

## 2024-09-09 PROBLEM — J96.12 CHRONIC RESPIRATORY FAILURE WITH HYPOXIA AND HYPERCAPNIA: Status: RESOLVED | Noted: 2024-02-23 | Resolved: 2024-09-09

## 2024-09-10 ENCOUNTER — TELEPHONE (OUTPATIENT)
Dept: PULMONOLOGY | Facility: CLINIC | Age: 64
End: 2024-09-10
Payer: MEDICARE

## 2024-09-10 NOTE — TELEPHONE ENCOUNTER
----- Message from Susi Blanco sent at 9/10/2024 12:38 PM CDT -----  Regarding: Pt. rescheduled her appt.  Who Called: Holly Porter    Caller is requesting assistance/information from provider's office. (I was sending message to make sure appt. Is okay)      Preferred Method of Contact: Phone Call  Patient's Preferred Phone Number on File: [phone]     Additional Information: I rescheduled Pt.'s appt. Due to her transportation issues. Appt. Is changed To 09/12/2024 at 2:00 PM but for some reason the system put her appt. With [facility] before her walk. I'm not sure if that's okay so I was sending an in basket just okay it needs fixing.

## 2024-09-12 ENCOUNTER — TELEPHONE (OUTPATIENT)
Dept: PULMONOLOGY | Facility: CLINIC | Age: 64
End: 2024-09-12
Payer: MEDICARE

## 2024-09-12 NOTE — TELEPHONE ENCOUNTER
----- Message from Eloina Ospina sent at 9/12/2024 12:53 PM CDT -----  Regarding: appt  Who Called: Holly Porter    Caller is requesting assistance/information from provider's office.    Symptoms (please be specific): Pt want to reschedule her respiratory therapy and appt for today due to bad weather  she can come next week on 9/17 after 1.  How long has patient had these symptoms:    List of preferred pharmacies on file (remove unneeded): [unfilled]  If different, enter pharmacy into here including location and phone number:       Preferred Method of Contact: Phone Call  Patient's Preferred Phone Number on File: [phone]   Best Call Back Number, if different:  Additional Information:

## 2024-09-17 ENCOUNTER — OFFICE VISIT (OUTPATIENT)
Dept: PAIN MEDICINE | Facility: CLINIC | Age: 64
End: 2024-09-17
Payer: MEDICARE

## 2024-09-17 VITALS
HEIGHT: 68 IN | WEIGHT: 293 LBS | HEART RATE: 84 BPM | RESPIRATION RATE: 18 BRPM | BODY MASS INDEX: 44.41 KG/M2 | SYSTOLIC BLOOD PRESSURE: 152 MMHG | DIASTOLIC BLOOD PRESSURE: 73 MMHG

## 2024-09-17 DIAGNOSIS — M25.561 CHRONIC PAIN OF RIGHT KNEE: Chronic | ICD-10-CM

## 2024-09-17 DIAGNOSIS — I73.9 PVD (PERIPHERAL VASCULAR DISEASE): Chronic | ICD-10-CM

## 2024-09-17 DIAGNOSIS — Z79.899 ENCOUNTER FOR LONG-TERM (CURRENT) USE OF OTHER MEDICATIONS: ICD-10-CM

## 2024-09-17 DIAGNOSIS — M47.817 LUMBOSACRAL SPONDYLOSIS WITHOUT MYELOPATHY: Primary | Chronic | ICD-10-CM

## 2024-09-17 DIAGNOSIS — G89.29 CHRONIC PAIN OF RIGHT KNEE: Chronic | ICD-10-CM

## 2024-09-17 PROCEDURE — 3066F NEPHROPATHY DOC TX: CPT | Mod: CPTII,,, | Performed by: PHYSICIAN ASSISTANT

## 2024-09-17 PROCEDURE — 3078F DIAST BP <80 MM HG: CPT | Mod: CPTII,,, | Performed by: PHYSICIAN ASSISTANT

## 2024-09-17 PROCEDURE — 3044F HG A1C LEVEL LT 7.0%: CPT | Mod: CPTII,,, | Performed by: PHYSICIAN ASSISTANT

## 2024-09-17 PROCEDURE — 3060F POS MICROALBUMINURIA REV: CPT | Mod: CPTII,,, | Performed by: PHYSICIAN ASSISTANT

## 2024-09-17 PROCEDURE — 3077F SYST BP >= 140 MM HG: CPT | Mod: CPTII,,, | Performed by: PHYSICIAN ASSISTANT

## 2024-09-17 PROCEDURE — 80305 DRUG TEST PRSMV DIR OPT OBS: CPT | Mod: PBBFAC | Performed by: PHYSICIAN ASSISTANT

## 2024-09-17 PROCEDURE — 99214 OFFICE O/P EST MOD 30 MIN: CPT | Mod: S$PBB,,, | Performed by: PHYSICIAN ASSISTANT

## 2024-09-17 PROCEDURE — 99999PBSHW POCT URINE DRUG SCREEN PRESUMP: Mod: PBBFAC,,,

## 2024-09-17 PROCEDURE — 1101F PT FALLS ASSESS-DOCD LE1/YR: CPT | Mod: CPTII,,, | Performed by: PHYSICIAN ASSISTANT

## 2024-09-17 PROCEDURE — 99215 OFFICE O/P EST HI 40 MIN: CPT | Mod: PBBFAC | Performed by: PHYSICIAN ASSISTANT

## 2024-09-17 PROCEDURE — 3008F BODY MASS INDEX DOCD: CPT | Mod: CPTII,,, | Performed by: PHYSICIAN ASSISTANT

## 2024-09-17 PROCEDURE — 99999 PR PBB SHADOW E&M-EST. PATIENT-LVL V: CPT | Mod: PBBFAC,,, | Performed by: PHYSICIAN ASSISTANT

## 2024-09-17 PROCEDURE — 1159F MED LIST DOCD IN RCRD: CPT | Mod: CPTII,,, | Performed by: PHYSICIAN ASSISTANT

## 2024-09-17 PROCEDURE — 3288F FALL RISK ASSESSMENT DOCD: CPT | Mod: CPTII,,, | Performed by: PHYSICIAN ASSISTANT

## 2024-09-17 RX ORDER — HYDROCODONE BITARTRATE AND ACETAMINOPHEN 10; 325 MG/1; MG/1
1 TABLET ORAL EVERY 6 HOURS
Qty: 120 TABLET | Refills: 0 | Status: SHIPPED | OUTPATIENT
Start: 2024-09-18

## 2024-09-17 RX ORDER — HYDROCODONE BITARTRATE AND ACETAMINOPHEN 10; 325 MG/1; MG/1
1 TABLET ORAL EVERY 6 HOURS
Qty: 120 TABLET | Refills: 0 | Status: SHIPPED | OUTPATIENT
Start: 2024-11-17

## 2024-09-17 RX ORDER — HYDROCODONE BITARTRATE AND ACETAMINOPHEN 10; 325 MG/1; MG/1
1 TABLET ORAL EVERY 6 HOURS
Qty: 120 TABLET | Refills: 0 | Status: SHIPPED | OUTPATIENT
Start: 2024-10-18

## 2024-09-18 ENCOUNTER — PATIENT MESSAGE (OUTPATIENT)
Dept: VASCULAR SURGERY | Facility: CLINIC | Age: 64
End: 2024-09-18
Payer: MEDICARE

## 2024-09-18 ENCOUNTER — TELEPHONE (OUTPATIENT)
Dept: PULMONOLOGY | Facility: CLINIC | Age: 64
End: 2024-09-18
Payer: MEDICARE

## 2024-09-18 NOTE — TELEPHONE ENCOUNTER
----- Message from Shilpa Casiano sent at 9/18/2024  3:18 PM CDT -----  Pt has appts on 10/11. She cannot come to early appts. Needs around lunch time. Wants to res. [phone].  Who Called: Holly Porter    Caller is requesting assistance/information from provider's office.        Patient's Preferred Phone Number on File: [phone]   Best Call Back Number, if different:  Additional Information:

## 2024-09-18 NOTE — TELEPHONE ENCOUNTER
Pt rescheduled to 21OCT due to the way the schedules played out and patients transportation issues

## 2024-09-24 NOTE — PROGRESS NOTES
"  Holly Porter presented for a  Medicare AWV and comprehensive Health Risk Assessment today.  She is an established pt of Dr. Regan Frausto.    The following components were reviewed and updated:    Medical history  Family History  Social history  Allergies and Current Medications  Health Risk Assessment  Health Maintenance  Care Team         ** See Completed Assessments for Annual Wellness Visit within the encounter summary.**         The following assessments were completed:  Living Situation  CAGE  Depression Screening  Timed Get Up and Go  Whisper Test  Cognitive Function Screening  Nutrition Screening  ADL Screening  PAQ Screening      Opioid documentation:      Patient does have a current opioid prescription.      Patient accepted further discussion regarding opioid medication use.      Patient is currently taking hydrocodone narcotic for knee pain.        Pain level today is 7/10.       In addition to narcotic pain medications, patient is also using Gabapentin for pain control.       Patient is followed by a specialist currently for their pain and will not be referred today.       Patient's opioid risk potential based on ORT-OUD tool:       Kenneth each box that applies   No   Yes     Family history of substance abuse   Alcohol [x] []   Illegal drugs [x] []   Rx drugs [x] []     Personal history of substance abuse   Alcohol [x] []   Illegal drugs [x] []   Rx drugs [] [x]     Age between 16-45 years   [x]   []     Patient with ADD, OCD, Bipolar disorder, schizoprenia   [x]   []     Patient with depression   []   [x]                         Scoring total                         2                                        Non-opioid treatment options have been discussed today and added to the patient's after visit summary.        Vitals:    10/01/24 1125   BP: 138/86   BP Location: Right arm   Pulse: 83   Resp: 18   Temp: 97.7 °F (36.5 °C)   TempSrc: Oral   SpO2: 97%   Weight: (!) 152 kg (335 lb)   Height: 5' 8" " (1.727 m)     Body mass index is 50.94 kg/m².  Physical Exam  Constitutional:       General: She is not in acute distress.     Appearance: Normal appearance. She is obese.   HENT:      Head: Normocephalic.      Mouth/Throat:      Mouth: Mucous membranes are moist.   Cardiovascular:      Rate and Rhythm: Normal rate and regular rhythm.      Pulses: Normal pulses.      Heart sounds: Normal heart sounds. No murmur heard.  Pulmonary:      Effort: Pulmonary effort is normal. No respiratory distress.      Breath sounds: Normal breath sounds.   Abdominal:      Palpations: Abdomen is soft.      Tenderness: There is no abdominal tenderness.   Musculoskeletal:         General: Normal range of motion.      Cervical back: Neck supple.   Skin:     General: Skin is warm and dry.   Neurological:      Mental Status: She is alert and oriented to person, place, and time.   Psychiatric:         Mood and Affect: Mood is depressed.         Behavior: Behavior is cooperative.             Diagnoses and health risks identified today and associated recommendations/orders:    1. Encounter for subsequent annual wellness visit (AWV) in Medicare patient  Screenings performed, as noted above. Personal preventative testing needs reviewed.  Return for AWV in 12 months or thereafter       2. Pulmonary arterial hypertension  Chronic - Stable - Followed by Dr. Godoy (Pulmonology)  Continue antihypertensive medications    3. Congestive heart failure, unspecified HF chronicity, unspecified heart failure type  Chronic - Stable - Followed by PCP  Continue carvedilol 12.5 mg rx  Continue furosemide 20 mg po rx    4. Peripheral arterial disease with history of revascularization  Chronic - Stable - Followed by PCP  Continue cilostazol 100 mg rx    5. Venous insufficiency  Chronic - Stable - Followed by Dr. Falcon ([facility])      6. Type 2 diabetes mellitus without complication, with long-term current use of insulin  Chronic - Stable - Followed by  PCP  Continue antidiabetic medications    7. Dependence on supplemental oxygen  Chronic - Stable - Followed by PCP      8. Abnormality of gait and mobility  Chronic - Stable - Followed by PCP  No falls reported    9. Other reduced mobility  Chronic - Stable - Followed by PCP  No falls reported      10. BMI 50.0-59.9, adult  Recommend weight loss  Exercise such as walking for 30 minutes 3-5 days a week as tolerated  Adhere to low sodium low concentrated sweet diet    11. Morbid obesity  Recommend weight loss  Exercise such as walking for 30 minutes 3-5 days a week as tolerated  Adhere to low sodium low concentrated sweet diet    12. Need for vaccination    - varicella-zoster gE-AS01B, PF, (SHINGRIX, PF,) 50 mcg/0.5 mL injection; Inject 0.5 mLs into the muscle once. for 1 dose  Dispense: 1 each; Refill: 0  - RSVPreF3 antigen-AS01E, PF, (AREXVY) 120 mcg/0.5 mL SusR vaccine; Inject 0.5 mLs into the muscle once. for 1 dose  Dispense: 0.5 mL; Refill: 0      Provided Holly with a 5-10 year written screening schedule and personal prevention plan. Recommendations were developed using the USPSTF age appropriate recommendations. Education, counseling, and referrals were provided as needed. After Visit Summary printed and given to patient which includes a list of additional screenings\tests needed.    Follow up in about 1 year (around 10/1/2025).    GEENA High    Covid vaccine - will take at pharmacy, Shingles vaccine - order sent to pharmacy, RSV vaccine - order sent to pharmacy, Colon screening - ordered 8/22/24, Foot exam - done this visit, Influenza vaccine - declined, Pneumonia vaccine - declined, Eye exam - [facility] faxed.    I offered to discuss advanced care planning, including how to pick a person who would make decisions for you if you were unable to make them for yourself, called a health care power of , and what kind of decisions you might make such as use of life sustaining treatments such as  ventilators and tube feeding when faced with a life limiting illness recorded on a living will that they will need to know. (How you want to be cared for as you near the end of your natural life)     X  Patient is unwilling to engage in a discussion regarding advance directives at this time.

## 2024-10-01 ENCOUNTER — OFFICE VISIT (OUTPATIENT)
Dept: VASCULAR SURGERY | Facility: CLINIC | Age: 64
End: 2024-10-01
Payer: MEDICARE

## 2024-10-01 ENCOUNTER — OFFICE VISIT (OUTPATIENT)
Dept: FAMILY MEDICINE | Facility: CLINIC | Age: 64
End: 2024-10-01
Payer: MEDICARE

## 2024-10-01 VITALS
BODY MASS INDEX: 44.41 KG/M2 | HEART RATE: 100 BPM | WEIGHT: 293 LBS | RESPIRATION RATE: 20 BRPM | HEIGHT: 68 IN | SYSTOLIC BLOOD PRESSURE: 147 MMHG | DIASTOLIC BLOOD PRESSURE: 69 MMHG

## 2024-10-01 VITALS
DIASTOLIC BLOOD PRESSURE: 86 MMHG | TEMPERATURE: 98 F | BODY MASS INDEX: 44.41 KG/M2 | WEIGHT: 293 LBS | SYSTOLIC BLOOD PRESSURE: 138 MMHG | RESPIRATION RATE: 18 BRPM | HEIGHT: 68 IN | OXYGEN SATURATION: 97 % | HEART RATE: 83 BPM

## 2024-10-01 DIAGNOSIS — I50.9 CONGESTIVE HEART FAILURE, UNSPECIFIED HF CHRONICITY, UNSPECIFIED HEART FAILURE TYPE: ICD-10-CM

## 2024-10-01 DIAGNOSIS — Z23 NEED FOR VACCINATION: ICD-10-CM

## 2024-10-01 DIAGNOSIS — E11.9 TYPE 2 DIABETES MELLITUS WITHOUT COMPLICATION, WITH LONG-TERM CURRENT USE OF INSULIN: ICD-10-CM

## 2024-10-01 DIAGNOSIS — I73.9 PERIPHERAL ARTERIAL DISEASE WITH HISTORY OF REVASCULARIZATION: ICD-10-CM

## 2024-10-01 DIAGNOSIS — L81.9 HYPERPIGMENTATION OF SKIN: ICD-10-CM

## 2024-10-01 DIAGNOSIS — I83.813 VARICOSE VEINS OF BILATERAL LOWER EXTREMITIES WITH PAIN: ICD-10-CM

## 2024-10-01 DIAGNOSIS — R26.9 ABNORMALITY OF GAIT AND MOBILITY: ICD-10-CM

## 2024-10-01 DIAGNOSIS — I87.2 VENOUS INSUFFICIENCY: ICD-10-CM

## 2024-10-01 DIAGNOSIS — Z99.81 DEPENDENCE ON SUPPLEMENTAL OXYGEN: ICD-10-CM

## 2024-10-01 DIAGNOSIS — M79.604 LEG PAIN, BILATERAL: Primary | ICD-10-CM

## 2024-10-01 DIAGNOSIS — R60.0 EDEMA, LOWER EXTREMITY: ICD-10-CM

## 2024-10-01 DIAGNOSIS — E66.01 MORBID OBESITY: ICD-10-CM

## 2024-10-01 DIAGNOSIS — Z98.890 PERIPHERAL ARTERIAL DISEASE WITH HISTORY OF REVASCULARIZATION: ICD-10-CM

## 2024-10-01 DIAGNOSIS — Z79.4 TYPE 2 DIABETES MELLITUS WITHOUT COMPLICATION, WITH LONG-TERM CURRENT USE OF INSULIN: ICD-10-CM

## 2024-10-01 DIAGNOSIS — Z74.09 OTHER REDUCED MOBILITY: ICD-10-CM

## 2024-10-01 DIAGNOSIS — M79.605 LEG PAIN, BILATERAL: Primary | ICD-10-CM

## 2024-10-01 DIAGNOSIS — Z00.00 ENCOUNTER FOR SUBSEQUENT ANNUAL WELLNESS VISIT (AWV) IN MEDICARE PATIENT: Primary | ICD-10-CM

## 2024-10-01 DIAGNOSIS — I27.21 PULMONARY ARTERIAL HYPERTENSION: ICD-10-CM

## 2024-10-01 PROCEDURE — 3288F FALL RISK ASSESSMENT DOCD: CPT | Mod: ,,, | Performed by: NURSE PRACTITIONER

## 2024-10-01 PROCEDURE — 3060F POS MICROALBUMINURIA REV: CPT | Mod: ,,, | Performed by: NURSE PRACTITIONER

## 2024-10-01 PROCEDURE — 3066F NEPHROPATHY DOC TX: CPT | Mod: CPTII,,, | Performed by: FAMILY MEDICINE

## 2024-10-01 PROCEDURE — 3288F FALL RISK ASSESSMENT DOCD: CPT | Mod: CPTII,,, | Performed by: FAMILY MEDICINE

## 2024-10-01 PROCEDURE — 99999 PR PBB SHADOW E&M-EST. PATIENT-LVL V: CPT | Mod: PBBFAC,,, | Performed by: FAMILY MEDICINE

## 2024-10-01 PROCEDURE — 3077F SYST BP >= 140 MM HG: CPT | Mod: CPTII,,, | Performed by: FAMILY MEDICINE

## 2024-10-01 PROCEDURE — 3075F SYST BP GE 130 - 139MM HG: CPT | Mod: ,,, | Performed by: NURSE PRACTITIONER

## 2024-10-01 PROCEDURE — 1124F ACP DISCUSS-NO DSCNMKR DOCD: CPT | Mod: ,,, | Performed by: NURSE PRACTITIONER

## 2024-10-01 PROCEDURE — 99215 OFFICE O/P EST HI 40 MIN: CPT | Mod: PBBFAC | Performed by: FAMILY MEDICINE

## 2024-10-01 PROCEDURE — 1101F PT FALLS ASSESS-DOCD LE1/YR: CPT | Mod: CPTII,,, | Performed by: FAMILY MEDICINE

## 2024-10-01 PROCEDURE — 1159F MED LIST DOCD IN RCRD: CPT | Mod: ,,, | Performed by: NURSE PRACTITIONER

## 2024-10-01 PROCEDURE — 1101F PT FALLS ASSESS-DOCD LE1/YR: CPT | Mod: ,,, | Performed by: NURSE PRACTITIONER

## 2024-10-01 PROCEDURE — G0402 INITIAL PREVENTIVE EXAM: HCPCS | Mod: ,,, | Performed by: NURSE PRACTITIONER

## 2024-10-01 PROCEDURE — 3044F HG A1C LEVEL LT 7.0%: CPT | Mod: ,,, | Performed by: NURSE PRACTITIONER

## 2024-10-01 PROCEDURE — 3044F HG A1C LEVEL LT 7.0%: CPT | Mod: CPTII,,, | Performed by: FAMILY MEDICINE

## 2024-10-01 PROCEDURE — 1124F ACP DISCUSS-NO DSCNMKR DOCD: CPT | Mod: CPTII,,, | Performed by: FAMILY MEDICINE

## 2024-10-01 PROCEDURE — 3060F POS MICROALBUMINURIA REV: CPT | Mod: CPTII,,, | Performed by: FAMILY MEDICINE

## 2024-10-01 PROCEDURE — 3066F NEPHROPATHY DOC TX: CPT | Mod: ,,, | Performed by: NURSE PRACTITIONER

## 2024-10-01 PROCEDURE — 3078F DIAST BP <80 MM HG: CPT | Mod: CPTII,,, | Performed by: FAMILY MEDICINE

## 2024-10-01 PROCEDURE — 1160F RVW MEDS BY RX/DR IN RCRD: CPT | Mod: CPTII,,, | Performed by: FAMILY MEDICINE

## 2024-10-01 PROCEDURE — 3079F DIAST BP 80-89 MM HG: CPT | Mod: ,,, | Performed by: NURSE PRACTITIONER

## 2024-10-01 PROCEDURE — 1160F RVW MEDS BY RX/DR IN RCRD: CPT | Mod: ,,, | Performed by: NURSE PRACTITIONER

## 2024-10-01 PROCEDURE — 99214 OFFICE O/P EST MOD 30 MIN: CPT | Mod: S$PBB,,, | Performed by: FAMILY MEDICINE

## 2024-10-01 PROCEDURE — 3008F BODY MASS INDEX DOCD: CPT | Mod: CPTII,,, | Performed by: FAMILY MEDICINE

## 2024-10-01 PROCEDURE — 1159F MED LIST DOCD IN RCRD: CPT | Mod: CPTII,,, | Performed by: FAMILY MEDICINE

## 2024-10-01 NOTE — PROGRESS NOTES
[facility] CLINIC NOTE    Patient ID: Holly Porter is a 64 y.o. female.    I. HISTORY     Chief Complaint:   Chief Complaint   Patient presents with    Leg Pain     Exam room 2.  C/O bilateral leg pain        HPI: Holly Porter is a 64 y.o. female who presents to clinic today for follow-up after a 2 year absence from our clinic.  The patient has known chronic venous insufficiency with a history of multiple previous interventions.  She also sees Dr. Ortega for congestive heart failure.  She has had several surgical interventions with toes removed.  She states that she has down from 2 packs of cigarettes a day down to 1 and finally down to half pack a day over the past 2-1/2 months.  She states she continues to have edema of the bilateral lower extremities with hyperpigmentation and varicose veins despite previous efforts of conservative measures with compression and venous interventions.    Clinical summary:   for a 5 day follow-up post non thermal ablation of the distal accessory thigh vein and thermal ablation of a left calf  vein.  Post ablation ultrasound performed today shows well ablated calf  and well ablated left distal anterior accessory thigh vein.  No signs of thrombus or complication.  The patient states that she did well over the weekend without complications.  She continues to her postoperative wrap.  No signs of bleeding or phlebitis.  I have encouraged her to wear her thigh-high wrapped with a 2 week postoperative period to help prevent against complications.  She actually looks like she has less swelling today.    The patient has had numerous venous interventions previously, see her surgical history for details.    Past Medical History:   Diagnosis Date    Acquired hypothyroidism     Atherosclerosis of native artery of extremity with intermittent claudication 01/30/2019    bilateral legs    BMI 50.0-59.9, adult 02/22/2021    Chronic pain syndrome     opioid depen     Congestive heart failure (CHF)     COPD (chronic obstructive pulmonary disease)     COVID-19 10/06/2023    Depression     Diabetes mellitus, type 2     followed by Aurea Kwong NP, [facility]    DVT of lower extremity, bilateral     Essential hypertension 06/08/2021    GERD (gastroesophageal reflux disease)     Gout 07/05/2023    Hyperlipidemia     Lumbosacral radiculopathy 06/05/2023    followed by GLENN Valdes, [facility]    Migraines     Nicotine dependence     Nicotine dependence     Obesity hypoventilation syndrome     chronic CO2 retainer with compensatory metabolic alkalosis    Osteoarthritis     Seizure disorder     Sleep apnea     on cpap    Thyroid cancer     Venous stasis ulcer limited to breakdown of skin with varicose veins     followed by Estuardo Zhao    Vitamin D deficiency         Past Surgical History:   Procedure Laterality Date    ABCESS DRAINAGE      HYSTERECTOMY      ILIAC ARTERY STENT Bilateral 01/28/2020    Bilateral distal aorta and common iliac 8 X 59 vbx covered stents performed by Dr. Antonio Jacinto.    LEFT HEART CATHETERIZATION Left 11/6/2023    Procedure: Left heart cath;  Surgeon: Alex Ortega DO;  Location: [facility] CATH LAB;  Service: Cardiology;  Laterality: Left;    RADIOFREQUENCY ABLATION Left 04/15/2022    Procedure: Left calf  Radiofrequency Ablation;  Surgeon: Matty Falcon DO;  Location: [facility] OR;  Service: Vascular;  Laterality: Left;    RIGHT HEART CATHETERIZATION Right 2/13/2024    Procedure: INSERTION, CATHETER, RIGHT HEART;  Surgeon: Mahad Moreno MD;  Location: [facility] CATH LAB;  Service: Cardiology;  Laterality: Right;    STAB PHLEBECTOMY OF VARICOSE VEINS Left 01/25/2013    Left leg microphlebectomies x 23 stab avulsions and ligation of multiple varicose veins perrformed by Dr. Cirilo Aguirre.    THYROIDECTOMY      hx: thyroid cancer    TOE AMPUTATION Left 01/28/2020    2nd, 4th and 5th performed by Dr. Anam Saeed.    TOE  AMPUTATION Right 01/28/2020    4th toe performed by Dr. Anam Saeed.    TOTAL ABDOMINAL HYSTERECTOMY W/ BILATERAL SALPINGOOPHORECTOMY      TUBAL LIGATION      VAGINAL DELIVERY      x 4    VENOUS ABLATION Right 08/09/2019    GSV Varithena Ablation performed by Dr. Estuardo Falcon    VENOUS ABLATION Left 08/02/2019    ATAV Varithena Ablation performed by Dr. Estuardo Falcon.    VENOUS ABLATION Right 07/13/2015    ATAV Laser Ablatio performed by Dr. Cirilo Aguirre.    VENOUS ABLATION Right 07/06/2015    Distal  GSV Laser Ablation performed by dr. Cirilo Aguirre.    VENOUS ABLATION Left 04/20/2015    Left Distal GSV Laser Ablation performed by dr. Cirilo Aguirre.    VENOUS ABLATION Right 10/28/2013    Right Distal GSV RF Ablation performed by Dr. Cirilo Aguirre.    VENOUS ABLATION Left 10/25/2013    SSV RF Ablation performed by dr. Cirilo Aguirre.    VENOUS ABLATION Left 10/07/2013    Left GSV and Left ATAV RF Ablation performed by Dr. Cirilo Aguirre.    VENOUS ABLATION Right 01/21/2013    GSV RF Ablation w/micros x 22 performed by Dr. Cirilo Aguirre.    VENOUS ABLATION Left 06/25/2021    Left distal GSV Varithena Ablation       Social History     Tobacco Use   Smoking Status Every Day    Current packs/day: 0.50    Average packs/day: 0.5 packs/day for 33.0 years (16.5 ttl pk-yrs)    Types: Cigarettes    Passive exposure: Current   Smokeless Tobacco Never   Tobacco Comments    5 cigarettes a day          Current Outpatient Medications:     ACCU-CHEK GUIDE GLUCOSE METER Misc, , Disp: , Rfl:     ACCU-CHEK GUIDE TEST STRIPS Strp, , Disp: , Rfl:     ACCU-CHEK SOFTCLIX LANCETS Misc, , Disp: , Rfl:     albuterol (PROVENTIL) 2.5 mg /3 mL (0.083 %) nebulizer solution, Take 3 mLs (2.5 mg total) by nebulization every 6 (six) hours as needed for Wheezing. Rescue, Disp: 300 mL, Rfl: 11    albuterol (PROVENTIL/VENTOLIN HFA) 90 mcg/actuation inhaler, Inhale 2 puffs into the lungs 4 (four) times daily. Rescue, Disp: 18 g, Rfl: 2    allopurinoL (ZYLOPRIM) 300 MG tablet, Take 1 tablet  "(300 mg total) by mouth once daily., Disp: 90 tablet, Rfl: 3    apixaban (ELIQUIS) 5 mg Tab, Take 1 tablet (5 mg total) by mouth 2 (two) times daily., Disp: 60 tablet, Rfl: 3    aspirin (ECOTRIN) 81 MG EC tablet, TAKE 1 TABLET BY MOUTH EVERY DAY, Disp: 90 tablet, Rfl: 1    atorvastatin (LIPITOR) 10 MG tablet, Take 1 tablet (10 mg total) by mouth once daily., Disp: 90 tablet, Rfl: 1    bumetanide (BUMEX) 2 MG tablet, Take 1 tablet (2 mg total) by mouth 2 (two) times daily., Disp: 60 tablet, Rfl: 11    carvediloL (COREG) 12.5 MG tablet, Take 0.5 tablets (6.25 mg total) by mouth 2 (two) times daily., Disp: 30 tablet, Rfl: 11    cilostazoL (PLETAL) 100 MG Tab, Take 1 tablet (100 mg total) by mouth 2 (two) times daily., Disp: 90 tablet, Rfl: 1    colchicine (COLCRYS) 0.6 mg tablet, Take 1 tablet (0.6 mg total) by mouth once daily., Disp: 30 tablet, Rfl: 2    cyclobenzaprine (FLEXERIL) 10 MG tablet, Take 1 tablet (10 mg total) by mouth every evening., Disp: 30 tablet, Rfl: 0    furosemide (LASIX) 20 MG tablet, Take 1 tablet (20 mg total) by mouth daily as needed (for swelling)., Disp: 40 tablet, Rfl: 6    HYDROcodone-acetaminophen (NORCO)  mg per tablet, Take 1 tablet by mouth every 6 (six) hours., Disp: 120 tablet, Rfl: 0    insulin detemir U-100, Levemir, (LEVEMIR FLEXTOUCH U100 INSULIN) 100 unit/mL (3 mL) InPn pen, Inject 10 units into the skin every evening subcutaneous, Disp: 15 mL, Rfl: 1    levothyroxine (SYNTHROID) 150 MCG tablet, Take 1 tablet (150 mcg total) by mouth before breakfast., Disp: 90 tablet, Rfl: 1    naloxone (NARCAN) 4 mg/actuation Spry, 1 spray once., Disp: , Rfl:     NIFEdipine (ADALAT CC) 60 MG TbSR, Take 60 mg by mouth., Disp: , Rfl:     pantoprazole (PROTONIX) 40 MG tablet, Take 1 tablet (40 mg total) by mouth once daily., Disp: 90 tablet, Rfl: 1    pen needle, diabetic 32 gauge x 5/32" Ndle, Use to inject insulin once daily, Disp: 100 each, Rfl: 3    potassium chloride SA " (K-DUR,KLOR-CON) 20 MEQ tablet, Take 1 tablet (20 mEq total) by mouth once daily., Disp: 90 tablet, Rfl: 1    psyllium husk (METAMUCIL) 3.4 gram/5.4 gram Powd, Take 1 table spoon with water by mouth once daily., Disp: 660 g, Rfl: 0    QUEtiapine (SEROQUEL) 25 MG Tab, Take 1 tablet (25 mg total) by mouth once daily., Disp: 30 tablet, Rfl: 1    tadalafil (ADCIRCA) 20 mg Tab, Take 2 tablets (40 mg total) by mouth once daily., Disp: 30 tablet, Rfl: 11    tiotropium bromide (SPIRIVA RESPIMAT) 2.5 mcg/actuation inhaler, Inhale 2 puffs into the lungs Daily. Controller, Disp: 4 g, Rfl: 11    topiramate (TOPAMAX) 100 MG tablet, Take 1 tablet (100 mg total) by mouth 2 (two) times daily., Disp: 60 tablet, Rfl: 11    varenicline (CHANTIX CONTINUING MONTH BOX) 1 mg Tab, Take 1 tablet (1 mg total) by mouth 2 (two) times daily., Disp: 30 tablet, Rfl: 3    cetirizine (ZYRTEC) 10 MG tablet, Take 10 mg by mouth once daily. (Patient not taking: Reported on 10/1/2024), Disp: , Rfl:     clindamycin (CLEOCIN) 150 MG capsule, Take 2 capsules (300 mg total) by mouth 3 (three) times daily. (Patient not taking: Reported on 9/17/2024), Disp: 30 capsule, Rfl: 0    [START ON 11/17/2024] HYDROcodone-acetaminophen (NORCO)  mg per tablet, Take 1 tablet by mouth every 6 (six) hours. (Patient not taking: Reported on 10/1/2024), Disp: 120 tablet, Rfl: 0    [START ON 10/18/2024] HYDROcodone-acetaminophen (NORCO)  mg per tablet, Take 1 tablet by mouth every 6 (six) hours. (Patient not taking: Reported on 10/1/2024), Disp: 120 tablet, Rfl: 0    macitentan 10 mg Tab, Take 1 tablet (10 mg total) by mouth once daily. (Patient not taking: Reported on 10/1/2024), Disp: 30 tablet, Rfl: 11    polyethylene glycol (GLYCOLAX) 17 gram PwPk, Take 17 g by mouth once daily. (Patient not taking: Reported on 10/1/2024), Disp: , Rfl:     Review of Systems   Constitutional:  Negative for activity change, chills, diaphoresis, fatigue and fever.   Respiratory:   Negative for cough and shortness of breath.    Cardiovascular:  Positive for leg swelling. Negative for chest pain and claudication.        Hyperpigmentation LE   Gastrointestinal:  Negative for nausea and vomiting.   Musculoskeletal:  Positive for leg pain. Negative for joint swelling.   Integumentary:  Negative for rash and wound.   Neurological:  Negative for weakness and numbness.        II. PHYSICAL EXAM     Physical Exam  Constitutional:       General: She is awake. She is not in acute distress.     Appearance: Normal appearance. She is obese. She is not ill-appearing or toxic-appearing.   HENT:      Head: Normocephalic and atraumatic.   Eyes:      Extraocular Movements: Extraocular movements intact.      Conjunctiva/sclera: Conjunctivae normal.      Pupils: Pupils are equal, round, and reactive to light.   Neck:      Vascular: No carotid bruit or JVD.   Cardiovascular:      Rate and Rhythm: Normal rate and regular rhythm.      Pulses:           Dorsalis pedis pulses are detected w/ Doppler on the left side.        Posterior tibial pulses are detected w/ Doppler on the left side.      Heart sounds: No murmur heard.  Pulmonary:      Effort: Pulmonary effort is normal. No respiratory distress.      Breath sounds: No stridor. No wheezing, rhonchi or rales.   Musculoskeletal:         General: No swelling, tenderness or deformity.      Right lower le+ Edema present.      Left lower le+ Edema present.      Comments: Scattered hyperpigmentation and varicose veins of the bilateral Gator regions.   Feet:      Comments: Monophasic pulses of the left foot with hand-held Doppler of the DPs and PTs.  Skin:     General: Skin is warm.      Capillary Refill: Capillary refill takes less than 2 seconds.      Coloration: Skin is not ashen.      Findings: No bruising, erythema, lesion, rash or wound.   Neurological:      Mental Status: She is alert and oriented to person, place, and time.      Motor: No weakness.    Psychiatric:         Speech: Speech normal.         Behavior: Behavior normal. Behavior is cooperative.       Reticular/Spider veins noted:  RLE: anterior calf, medial calf, ankle and foot  LLE: anterior calf, medial calf, ankle and foot    Varicose veins noted:  RLE: anterior calf, medial calf, medial thigh, ankle and foot  LLE:  anterior calf, medial calf, medial thigh, ankle and foot    CEAP Classification  Clinical Signs: Class 5 - Skin changes as defined above with healed ulceration  Etiologic Classification: Primary  Anatomic distribution: Superficial  Pathophysiologic dysfunction: Reflux       Venous Clinical Severity Score  Pain:2=Daily, moderate activity limitation, occasional analgesics  Varicose Veins: 3=Extensive. Thigh and calf or GS and LS distribution  Venous Edema: 2=Afternoon edema, above ankle  Pigmentation: 2=Diffuse over most of gaiter distribution (lower 1/3) or recent pigmentation (purple)  Inflammation: 1=Mild cellulitis, limited to marginal area around ulcer  Induration: 2=Medial or lateral,less than lower third of leg  Number of Active Ulcers: 1=1  Active Ulceration, Duration: 1=<3 months  Active Ulcer Size: 1=<2 cm diameter  Compressive Therapy: 1=Intermittent use of stockings  Total Score: 16       III. ASSESSMENT & PLAN (MEDICAL DECISION MAKING)     1. Leg pain, bilateral    2. Venous insufficiency    3. Hyperpigmentation of skin    4. Edema, lower extremity    5. Varicose veins of bilateral lower extremities with pain        Assessment/Diagnosis and Plan:  The patient continues to have symptoms and complaints consistent with chronic venous insufficiency despite previous conservative and interventional modalities.  Today I will order a bilateral complete venous reflux study and see the patient back with results.    Orders Placed This Encounter    US Venous Reflux Study Bilateral      Matty Falcon DO

## 2024-10-01 NOTE — PATIENT INSTRUCTIONS
Counseling and Referral of Other Preventative  (Italic type indicates deductible and co-insurance are waived)    Patient Name: Holly Porter  Today's Date: 10/1/2024    Health Maintenance       Date Due Completion Date    Pneumococcal Vaccines (Age 65+) (1 of 2 - PCV) Never done ---    Eye Exam Never done ---    Colorectal Cancer Screening 10/27/2020 10/27/2015    Foot Exam 02/09/2023 2/9/2022 (Done)    Override on 2/9/2022: Done    Mammogram 08/16/2023 8/16/2022    Urine Drug Screen 08/07/2024 2/7/2024    Influenza Vaccine (1) Never done ---    Shingles Vaccine (2 of 2) 03/25/2025 (Originally 10/6/2022) 8/11/2022    Lipid Panel 11/03/2024 11/3/2023    Hemoglobin A1c 02/22/2025 8/22/2024    Diabetes Urine Screening 06/05/2025 6/5/2024    DEXA Scan 08/16/2025 8/16/2022    Low Dose Statin 09/17/2025 9/17/2024    TETANUS VACCINE 08/11/2032 8/11/2022        No orders of the defined types were placed in this encounter.    The following information is provided to all patients.  This information is to help you find resources for any of the problems found today that may be affecting your health:                  Living healthy guide: [facility] Hospital[facility] Communicationsth.gov    Understanding Diabetes: www.diabetes.org      Eating healthy: www.cdc.gov/healthyweight      CDC home safety checklist: www.cdc.gov/steadi/patient.html      Agency on Aging: [place]aging.org    Alcoholics anonymous (AA): www.aa.org      Physical Activity: www.marleni.nih.gov/bb9fapr      Tobacco use: [facility] Asiaealth.gov

## 2024-10-01 NOTE — LETTER
AUTHORIZATION FOR RELEASE OF   CONFIDENTIAL INFORMATION    Dear [facility],    We are seeing Holly Porter, date of birth 1960, in the clinic at [facility]. Regan Frausto MD is the patient's PCP. Holly Porter has an outstanding lab/procedure at the time we reviewed her chart. In order to help keep her health information updated, she has authorized us to request the following medical record(s):        (  )  MAMMOGRAM                                      (  )  COLONOSCOPY      (  )  PAP SMEAR                                          (  )  OUTSIDE LAB RESULTS     (  )  DEXA SCAN                                          (  x)  EYE EXAM            (  )  FOOT EXAM                                          (  )  ENTIRE RECORD     (  )  OUTSIDE IMMUNIZATIONS                 (  )  _______________         Please fax records to [facility], [place], John Anthony, MD, [phone]     If you have any questions, please contact  at [phone].           Patient Name: Holly Porter  : 1960  Patient Phone #: [phone]

## 2024-10-02 RX ORDER — ZOSTER VACCINE RECOMBINANT, ADJUVANTED 50 MCG/0.5
0.5 KIT INTRAMUSCULAR ONCE
Qty: 1 EACH | Refills: 0 | Status: SHIPPED | OUTPATIENT
Start: 2024-10-02 | End: 2024-10-02

## 2024-10-07 ENCOUNTER — TELEPHONE (OUTPATIENT)
Dept: PULMONOLOGY | Facility: CLINIC | Age: 64
End: 2024-10-07
Payer: MEDICARE

## 2024-10-07 NOTE — TELEPHONE ENCOUNTER
----- Message from Missy Godoy MD sent at 10/4/2024  3:35 PM CDT -----  Please remind patient to obtain labs before her follow up visit with me. Preferably a day before. Her echo shows improvement. We will discuss further on follow up visit.

## 2024-10-09 DIAGNOSIS — Z71.89 COMPLEX CARE COORDINATION: ICD-10-CM

## 2024-10-29 ENCOUNTER — HOSPITAL ENCOUNTER (OUTPATIENT)
Dept: RADIOLOGY | Facility: HOSPITAL | Age: 64
Discharge: HOME | End: 2024-10-29
Attending: FAMILY MEDICINE
Payer: MEDICARE

## 2024-10-29 ENCOUNTER — OFFICE VISIT (OUTPATIENT)
Dept: VASCULAR SURGERY | Facility: CLINIC | Age: 64
End: 2024-10-29
Attending: FAMILY MEDICINE
Payer: MEDICARE

## 2024-10-29 VITALS
WEIGHT: 293 LBS | DIASTOLIC BLOOD PRESSURE: 79 MMHG | HEART RATE: 97 BPM | BODY MASS INDEX: 44.41 KG/M2 | HEIGHT: 68 IN | RESPIRATION RATE: 18 BRPM | SYSTOLIC BLOOD PRESSURE: 152 MMHG

## 2024-10-29 DIAGNOSIS — R60.0 EDEMA, LOWER EXTREMITY: ICD-10-CM

## 2024-10-29 DIAGNOSIS — M79.604 LEG PAIN, BILATERAL: ICD-10-CM

## 2024-10-29 DIAGNOSIS — M79.605 LEG PAIN, BILATERAL: ICD-10-CM

## 2024-10-29 DIAGNOSIS — I83.813 VARICOSE VEINS OF BILATERAL LOWER EXTREMITIES WITH PAIN: ICD-10-CM

## 2024-10-29 DIAGNOSIS — L81.9 HYPERPIGMENTATION OF SKIN: ICD-10-CM

## 2024-10-29 DIAGNOSIS — I87.2 VENOUS INSUFFICIENCY: Primary | ICD-10-CM

## 2024-10-29 PROCEDURE — 3066F NEPHROPATHY DOC TX: CPT | Mod: CPTII,,, | Performed by: FAMILY MEDICINE

## 2024-10-29 PROCEDURE — 1160F RVW MEDS BY RX/DR IN RCRD: CPT | Mod: CPTII,,, | Performed by: FAMILY MEDICINE

## 2024-10-29 PROCEDURE — 93970 EXTREMITY STUDY: CPT | Mod: TC

## 2024-10-29 PROCEDURE — 3077F SYST BP >= 140 MM HG: CPT | Mod: CPTII,,, | Performed by: FAMILY MEDICINE

## 2024-10-29 PROCEDURE — 99999 PR PBB SHADOW E&M-EST. PATIENT-LVL V: CPT | Mod: PBBFAC,,, | Performed by: FAMILY MEDICINE

## 2024-10-29 PROCEDURE — 3008F BODY MASS INDEX DOCD: CPT | Mod: CPTII,,, | Performed by: FAMILY MEDICINE

## 2024-10-29 PROCEDURE — 1101F PT FALLS ASSESS-DOCD LE1/YR: CPT | Mod: CPTII,,, | Performed by: FAMILY MEDICINE

## 2024-10-29 PROCEDURE — 3288F FALL RISK ASSESSMENT DOCD: CPT | Mod: CPTII,,, | Performed by: FAMILY MEDICINE

## 2024-10-29 PROCEDURE — 99215 OFFICE O/P EST HI 40 MIN: CPT | Mod: PBBFAC,25 | Performed by: FAMILY MEDICINE

## 2024-10-29 PROCEDURE — 1159F MED LIST DOCD IN RCRD: CPT | Mod: CPTII,,, | Performed by: FAMILY MEDICINE

## 2024-10-29 PROCEDURE — 3078F DIAST BP <80 MM HG: CPT | Mod: CPTII,,, | Performed by: FAMILY MEDICINE

## 2024-10-29 PROCEDURE — 93970 EXTREMITY STUDY: CPT | Mod: 26,,, | Performed by: FAMILY MEDICINE

## 2024-10-29 PROCEDURE — 99214 OFFICE O/P EST MOD 30 MIN: CPT | Mod: S$PBB,,, | Performed by: FAMILY MEDICINE

## 2024-10-29 PROCEDURE — 3044F HG A1C LEVEL LT 7.0%: CPT | Mod: CPTII,,, | Performed by: FAMILY MEDICINE

## 2024-10-29 PROCEDURE — 3060F POS MICROALBUMINURIA REV: CPT | Mod: CPTII,,, | Performed by: FAMILY MEDICINE

## 2024-10-30 DIAGNOSIS — I87.2 VENOUS INSUFFICIENCY: Primary | ICD-10-CM

## 2024-10-30 DIAGNOSIS — I87.2 VENOUS INSUFFICIENCY (CHRONIC) (PERIPHERAL): ICD-10-CM

## 2024-10-30 RX ORDER — SODIUM CHLORIDE 9 MG/ML
INJECTION, SOLUTION INTRAVENOUS CONTINUOUS
OUTPATIENT
Start: 2025-01-17

## 2024-10-30 RX ORDER — SODIUM CHLORIDE 9 MG/ML
INJECTION, SOLUTION INTRAVENOUS CONTINUOUS
OUTPATIENT
Start: 2025-01-24

## 2024-10-30 RX ORDER — SODIUM CHLORIDE 9 MG/ML
INJECTION, SOLUTION INTRAVENOUS CONTINUOUS
OUTPATIENT
Start: 2025-01-10

## 2024-11-11 RX ORDER — ASPIRIN 81 MG/1
81 TABLET ORAL
Qty: 90 TABLET | Refills: 1 | Status: SHIPPED | OUTPATIENT
Start: 2024-11-11

## 2024-12-03 RX ORDER — LEVOTHYROXINE SODIUM 150 UG/1
TABLET ORAL
Qty: 90 TABLET | Refills: 1 | Status: SHIPPED | OUTPATIENT
Start: 2024-12-03

## 2024-12-04 ENCOUNTER — PATIENT MESSAGE (OUTPATIENT)
Dept: PULMONOLOGY | Facility: CLINIC | Age: 64
End: 2024-12-04
Payer: MEDICARE

## 2024-12-04 ENCOUNTER — CLINICAL SUPPORT (OUTPATIENT)
Dept: PULMONOLOGY | Facility: HOSPITAL | Age: 64
End: 2024-12-04
Attending: INTERNAL MEDICINE
Payer: MEDICARE

## 2024-12-04 ENCOUNTER — OFFICE VISIT (OUTPATIENT)
Dept: PULMONOLOGY | Facility: CLINIC | Age: 64
End: 2024-12-04
Payer: MEDICARE

## 2024-12-04 VITALS
HEART RATE: 59 BPM | BODY MASS INDEX: 44.41 KG/M2 | HEIGHT: 68 IN | DIASTOLIC BLOOD PRESSURE: 82 MMHG | RESPIRATION RATE: 20 BRPM | WEIGHT: 293 LBS | WEIGHT: 293 LBS | BODY MASS INDEX: 44.41 KG/M2 | OXYGEN SATURATION: 98 % | HEIGHT: 68 IN | SYSTOLIC BLOOD PRESSURE: 146 MMHG

## 2024-12-04 DIAGNOSIS — I27.21 PULMONARY ARTERIAL HYPERTENSION: Primary | ICD-10-CM

## 2024-12-04 PROCEDURE — 3288F FALL RISK ASSESSMENT DOCD: CPT | Mod: CPTII,,, | Performed by: STUDENT IN AN ORGANIZED HEALTH CARE EDUCATION/TRAINING PROGRAM

## 2024-12-04 PROCEDURE — 3066F NEPHROPATHY DOC TX: CPT | Mod: CPTII,,, | Performed by: STUDENT IN AN ORGANIZED HEALTH CARE EDUCATION/TRAINING PROGRAM

## 2024-12-04 PROCEDURE — 99999 PR PBB SHADOW E&M-EST. PATIENT-LVL V: CPT | Mod: PBBFAC,,, | Performed by: STUDENT IN AN ORGANIZED HEALTH CARE EDUCATION/TRAINING PROGRAM

## 2024-12-04 PROCEDURE — 3008F BODY MASS INDEX DOCD: CPT | Mod: CPTII,,, | Performed by: STUDENT IN AN ORGANIZED HEALTH CARE EDUCATION/TRAINING PROGRAM

## 2024-12-04 PROCEDURE — 94618 PULMONARY STRESS TESTING: CPT

## 2024-12-04 PROCEDURE — 3079F DIAST BP 80-89 MM HG: CPT | Mod: CPTII,,, | Performed by: STUDENT IN AN ORGANIZED HEALTH CARE EDUCATION/TRAINING PROGRAM

## 2024-12-04 PROCEDURE — 1159F MED LIST DOCD IN RCRD: CPT | Mod: CPTII,,, | Performed by: STUDENT IN AN ORGANIZED HEALTH CARE EDUCATION/TRAINING PROGRAM

## 2024-12-04 PROCEDURE — 1101F PT FALLS ASSESS-DOCD LE1/YR: CPT | Mod: CPTII,,, | Performed by: STUDENT IN AN ORGANIZED HEALTH CARE EDUCATION/TRAINING PROGRAM

## 2024-12-04 PROCEDURE — 3044F HG A1C LEVEL LT 7.0%: CPT | Mod: CPTII,,, | Performed by: STUDENT IN AN ORGANIZED HEALTH CARE EDUCATION/TRAINING PROGRAM

## 2024-12-04 PROCEDURE — 99213 OFFICE O/P EST LOW 20 MIN: CPT | Mod: 25,S$PBB,, | Performed by: STUDENT IN AN ORGANIZED HEALTH CARE EDUCATION/TRAINING PROGRAM

## 2024-12-04 PROCEDURE — 1160F RVW MEDS BY RX/DR IN RCRD: CPT | Mod: CPTII,,, | Performed by: STUDENT IN AN ORGANIZED HEALTH CARE EDUCATION/TRAINING PROGRAM

## 2024-12-04 PROCEDURE — 3077F SYST BP >= 140 MM HG: CPT | Mod: CPTII,,, | Performed by: STUDENT IN AN ORGANIZED HEALTH CARE EDUCATION/TRAINING PROGRAM

## 2024-12-04 PROCEDURE — 99215 OFFICE O/P EST HI 40 MIN: CPT | Mod: PBBFAC,25 | Performed by: STUDENT IN AN ORGANIZED HEALTH CARE EDUCATION/TRAINING PROGRAM

## 2024-12-04 PROCEDURE — 3060F POS MICROALBUMINURIA REV: CPT | Mod: CPTII,,, | Performed by: STUDENT IN AN ORGANIZED HEALTH CARE EDUCATION/TRAINING PROGRAM

## 2024-12-04 RX ORDER — MOXIFLOXACIN 5 MG/ML
1 SOLUTION/ DROPS OPHTHALMIC 4 TIMES DAILY
COMMUNITY
Start: 2024-11-07

## 2024-12-04 RX ORDER — IBUPROFEN 800 MG/1
800 TABLET ORAL EVERY 6 HOURS PRN
COMMUNITY

## 2024-12-04 RX ORDER — KETOROLAC TROMETHAMINE 5 MG/ML
1 SOLUTION OPHTHALMIC 4 TIMES DAILY
COMMUNITY
Start: 2024-11-07

## 2024-12-04 RX ORDER — PREDNISOLONE ACETATE 10 MG/ML
1 SUSPENSION/ DROPS OPHTHALMIC 4 TIMES DAILY
COMMUNITY
Start: 2024-11-07

## 2024-12-04 RX ORDER — DULOXETIN HYDROCHLORIDE 30 MG/1
30 CAPSULE, DELAYED RELEASE ORAL DAILY
COMMUNITY

## 2024-12-04 NOTE — PROGRESS NOTES
[facility]  Pulmonology  ESTABLISHED VISIT     Patient Name:  Holly Porter  Primary Care Provider: Regan Frausto MD  Date of Service: 03/25/2024    Chief Complaint: Shortness of breath    SUBJECTIVE   HPI:  Holly Porter is a 64 y.o. female with PAH (C 02/2024 mPAP 34, PCWP 8, PVR 5.2, CO/CI 5.98/2.26), chronic respiratory failure with hypoxia and hypercapnia, JOVANNI/OSH on PAP who presents today for follow up of shortness of breath. Last seen 03/2024 with plan for Chantix and PA for dual therapy for PAH.     German reports feeling well. She has had poor sleep the past few nights that makes her tired today. She has had no admissions or ED presentations since last visit. She had cataract surgery last month with an unremarkable post-operative course. We discussed labs due today.     Initial HPI  German reports feeling well on this evaluation.  She has shortness of breath that is present with exertion.  She currently use and using her oxygen during this visit as her son who drove her here was concerned that it could affect his car.  She reports using her oxygen at home.  She is using her nightly PAP device with nasal pillows.  She has no cough.  She reports using her Spiriva which she needs a refill.   She was recently admitted 0207-14 with shortness of breath where she was found to be fluid overloaded.  For management she underwent diuresis the transitioned to Bumex from Lasix.  She had improvement in her respiratory status and while she was admitted on high-flow nasal cannula was deescalated to low-flow nasal cannula.  She also underwent right heart catheterization and was discharged home on PAP therapy.      03/2024: reports feeling well today. She had decreased her smoking while on Chantix to 1/4 pack a day and is interested in continuing this therapy. She has had improvement in her breathing which she attributes to her Spiriva. She has had no admissions to hospital since last seen. She uses  her oxygen when at home and has noted difficulty with pushing her oxygen tank. Today, she is not on oxygen once again due to her son who helps with transporting her to appointments expresses concern with transport of tanks to vehicle.     06/2024: reports feeling well today. She is still smoking, however, has decreased use which she attributes to Chantix, still 1/4 ppd. She is using her oxygen more consistently. She reports daily use of albuterol. She feels improved following starting Tadalafil; no dispenses on MAR but states she received it from her pharmacy.       Past Medical History:   Diagnosis Date    Acquired hypothyroidism     Atherosclerosis of native artery of extremity with intermittent claudication 01/30/2019    bilateral legs    BMI 50.0-59.9, adult 02/22/2021    Chronic pain syndrome     opioid depen    Congestive heart failure (CHF)     COPD (chronic obstructive pulmonary disease)     COVID-19 10/06/2023    Depression     Diabetes mellitus, type 2     [facility] Kw[facility]    DVT of lower extremity, bilateral     Essential hypertension 06/08/2021    GERD (gastroesophageal reflux disease)     Gout 07/05/2023    Hyperlipidemia     Lumbosacral radiculopathy 06/05/2023    followed by GLENN Valdes, [facility]    Migraines     Nicotine dependence     Nicotine dependence     Obesity hypoventilation syndrome     chronic CO2 retainer with compensatory metabolic alkalosis    Osteoarthritis     Seizure disorder     Sleep apnea     on cpap    Thyroid cancer     Venous stasis ulcer limited to breakdown of skin with varicose veins     followed by Estuardo Zhao    Vitamin D deficiency        Past Surgical History:   Procedure Laterality Date    ABCESS DRAINAGE      HYSTERECTOMY      ILIAC ARTERY STENT Bilateral 01/28/2020    Bilateral distal aorta and common iliac 8 X 59 vbx covered stents performed by Dr. Antonio Jacinto.    LEFT HEART CATHETERIZATION Left 11/6/2023     Procedure: Left heart cath;  Surgeon: Alex Ortega DO;  Location: [facility] CATH LAB;  Service: Cardiology;  Laterality: Left;    RADIOFREQUENCY ABLATION Left 04/15/2022    Procedure: Left calf  Radiofrequency Ablation;  Surgeon: Matty Falcon DO;  Location: [facility] OR;  Service: Vascular;  Laterality: Left;    RIGHT HEART CATHETERIZATION Right 2/13/2024    Procedure: INSERTION, CATHETER, RIGHT HEART;  Surgeon: Mahad Moreno MD;  Location: [facility] CATH LAB;  Service: Cardiology;  Laterality: Right;    STAB PHLEBECTOMY OF VARICOSE VEINS Left 01/25/2013    Left leg microphlebectomies x 23 stab avulsions and ligation of multiple varicose veins perrformed by Dr. Cirilo Aguirre.    THYROIDECTOMY      hx: thyroid cancer    TOE AMPUTATION Left 01/28/2020    2nd, 4th and 5th performed by Dr. Anam Saeed.    TOE AMPUTATION Right 01/28/2020    4th toe performed by Dr. Anam Saeed.    TOTAL ABDOMINAL HYSTERECTOMY W/ BILATERAL SALPINGOOPHORECTOMY      TUBAL LIGATION      VAGINAL DELIVERY      x 4    VENOUS ABLATION Right 08/09/2019    GSV Varithena Ablation performed by Dr. Estuardo Falcon    VENOUS ABLATION Left 08/02/2019    ATAV Varithena Ablation performed by Dr. Estuardo Falcon.    VENOUS ABLATION Right 07/13/2015    ATAV Laser Ablatio performed by Dr. Cirilo Aguirre.    VENOUS ABLATION Right 07/06/2015    Distal  GSV Laser Ablation performed by dr. Cirilo Aguirre.    VENOUS ABLATION Left 04/20/2015    Left Distal GSV Laser Ablation performed by dr. Cirilo Aguirre.    VENOUS ABLATION Right 10/28/2013    Right Distal GSV RF Ablation performed by Dr. Cirilo Aguirre.    VENOUS ABLATION Left 10/25/2013    SSV RF Ablation performed by dr. Cirilo Aguirre.    VENOUS ABLATION Left 10/07/2013    Left GSV and Left ATAV RF Ablation performed by Dr. Cirilo Aguirre.    VENOUS ABLATION Right 01/21/2013    GSV RF Ablation w/micros x 22 performed by Dr. Cirilo Aguirre.    VENOUS ABLATION Left 06/25/2021    Left distal GSV Varithena Ablation       Family History    Problem Relation Name Age of Onset    Heart disease Mother          age 84 CHF    Hypertension Mother      Osteoarthritis Mother      Coronary aneurysm Father      Coronary artery disease Father      Hypertension Father      Heart disease Father      No Known Problems Sister      No Known Problems Brother          hx: varicose veins    No Known Problems Brother          MVA: parlazed    No Known Problems Brother      No Known Problems Son      No Known Problems Son      No Known Problems Son      No Known Problems Son          Social History     Socioeconomic History    Marital status:    Tobacco Use    Smoking status: Every Day     Current packs/day: 0.50     Average packs/day: 0.5 packs/day for 33.0 years (16.5 ttl pk-yrs)     Types: Cigarettes     Passive exposure: Current    Smokeless tobacco: Never    Tobacco comments:     5 cigarettes a day    Substance and Sexual Activity    Alcohol use: Never    Drug use: Never    Sexual activity: Not Currently     Partners: Male     Social Drivers of Health     Financial Resource Strain: Low Risk  (10/1/2024)    Overall Financial Resource Strain (CARDIA)     Difficulty of Paying Living Expenses: Not hard at all   Food Insecurity: No Food Insecurity (10/1/2024)    Hunger Vital Sign     Worried About Running Out of Food in the Last Year: Never true     Ran Out of Food in the Last Year: Never true   Transportation Needs: Unmet Transportation Needs (10/1/2024)    PRAPARE - Transportation     Lack of Transportation (Medical): Yes     Lack of Transportation (Non-Medical): Yes   Physical Activity: Inactive (10/1/2024)    Exercise Vital Sign     Days of Exercise per Week: 0 days     Minutes of Exercise per Session: 0 min   Stress: No Stress Concern Present (10/1/2024)    [employer] [place] [employer] - Occupational Stress Questionnaire     Feeling of Stress : Not at all   Housing Stability: Low Risk  (10/1/2024)    Housing Stability Vital Sign     Unable to Pay  "for Housing in the Last Year: No     Homeless in the Last Year: No       Social History     Social History Narrative    Not on file       Review of patient's allergies indicates:   Allergen Reactions    Ammonium peroxydisulfate Shortness Of Breath    Avocado (laurus persea) Anaphylaxis    Bananas [banana] Anaphylaxis and Swelling     CRAMPS,    Chocolate flavor Anaphylaxis     MOUTH SWELLING    Fentanyl Shortness Of Breath and Itching    Iodinated contrast media Anaphylaxis, Hives and Rash    Nicoderm Swelling    Percocet [oxycodone-acetaminophen] Shortness Of Breath and Itching    Silvadene [silver sulfadiazine]     Clindamycin     Hydrocortisone Blisters    Lasix [furosemide] Blisters     BURNS SKIN    Pregabalin     Adhesive Blisters, Rash and Itching    Iodine Rash    Latex, natural rubber Rash    Pcn [penicillins] Rash    Sulfa (sulfonamide antibiotics) Rash and Blisters        Medications: Medications reviewed to include over the counter medications.    Review of Systems: A focused ROS was completed and found to be negative except for that mentioned above.      OBJECTIVE   PHYSICAL EXAM:  Vitals:    12/04/24 1529   BP: (!) 146/82   BP Location: Right forearm   Patient Position: Sitting   Pulse: (!) 59   Resp: 20   SpO2: 98%   Weight: (!) 154.2 kg (340 lb)   Height: 5' 8" (1.727 m)     GENERAL: NAD  HEENT: normocephalic, non-icteric conjunctivae, moist oral mucosa  RESPIRATORY: diminished breath sounds, otherwise clear to auscultation, no wheezing, rales or rhonchi, on RA  CARDIOVASCULAR: Regular rate and rhythm, no murmurs rubs or gallops.  SKIN: no rash, jaundice, ecchymosis or ulcers  MUSCULOSKELETAL: No clubbing or cyanosis; b/l LE edema 1+ extending to mid shin  NEUROLOGIC: AO ×3, no gross deficits    LABS:  Lab studies reviewed and notable for ABG 02/2024: 7.40/53/49/32.8/84  Lab Results   Component Value Date    WBC 7.95 08/22/2024    HGB 16.8 (H) 08/22/2024    HCT 55.3 (H) 08/22/2024    MCV 80.6 " 08/22/2024     08/22/2024      Latest Reference Range & Units 02/07/24 12:41   ALP 50 - 130 U/L 101   PROTEIN TOTAL 6.4 - 8.2 g/dL 6.7   Albumin 3.5 - 5.0 g/dL 3.3 (L)   Albumin/Globulin Ratio  1.0   BILIRUBIN TOTAL >0.0 - 1.2 mg/dL 0.9   AST 15 - 37 U/L 21   ALT 13 - 56 U/L 14   Globulin, Total 2.0 - 4.0 g/dL 3.4     BMP  Lab Results   Component Value Date     12/04/2024    K 3.4 (L) 12/04/2024     12/04/2024    CO2 33 (H) 12/04/2024    BUN 14 12/04/2024    CREATININE 1.03 (H) 12/04/2024    CALCIUM 10.3 (H) 12/04/2024    ANIONGAP 12 12/04/2024    EGFRNORACEVR 60 12/04/2024      Latest Reference Range & Units 12/04/22 15:31 06/28/23 11:50 10/05/23 15:36 10/17/23 11:28 11/03/23 13:20 12/06/23 15:31 02/07/24 12:41 02/27/24 11:18   NT-proBNP 1 - 125 pg/mL 44 112 1,932 (H) 286 (H) 3,183 (H) 1,371 (H) 2,073 (H) 642 (H)     IMAGING:  CT-PE 02/2024:  FINDINGS:  No thrombus or other abnormality is identified in the pulmonary arteries or veins.  The pulmonary vessel caliber is within normal limits.  The cardiac size is enlarged.  Otherwise heart mediastinum and great vessels appear within normal limits.     Small amount airspace density both lungs more prominent in the right lung and mostly in the dependent lungs.  Remaining pulmonary.  Small amount parenchyma shows no evidence of airspace disease or abnormal density.  No effusion or pneumothorax is present.    RHC 02/2024: RA 6/4/3, RV 50/1, mPAP 34, PCWP 8, PVR 5.2 (Td), CO/CI 5.98/2.26 (Td)    TTE:  02/2024: LVEF 60%, diastolic dysfunction present, mild RV enlargement, normal LA size, RA dilated, trace MR, mod TR, PASP 50    09/2024: LVEF 55-60%, normal diastolic function, normal RV size, TAPSE 2.7, normal LA size, mild RA dilation, trace TR    [facility] 11/2023: no CAD, angiographically normal coronaries     LUNG FUNCTION TESTING:      SPIROMETRY/PFT:  03/2024 Pre Post   FVC 2.01/-2.78 2.08/-2.64   FEV1 1.57/-2.64 1.57/-2.64   FEV1/FVC 78/0 75/-0.42   TLC  5.34/-0.50    FRC  3.60/0.54    RV 3.28/2.09    DLCO 10.24/-5.08      SPIROMETRY/PFT:  02/2022 Pre Post   FVC 2.42/-2.13 2.42/-2.13   FEV1 1.95/-1.94 1.95/-1.94   FEV1/FVC 81/0.30 81/0.30   TLC 5.39/-0.43     FRC  3.32/0.12     RV 2.97/1.54     DLCO 14.36/-2.88          6MWD:  Date Distance (ft) Resting SpO2; Yonathan SpO2 O2 Required   03/2024 453 87%,RA; 87% 2-3L NC   12/2024 563 94%, RA; 91% None     ASSESSMENT & PLAN     1. Pulmonary arterial hypertension  Assessment & Plan:  RHC 02/2024 with precapillary pulmonary hypertension (mPAP 34, PCWP 8, PVR 5.2, CO/CI 5.98/2.26 -Td) that is out of proportion for JOVANNI on PAP. PFTs with no obstruction. History is notable for PE in 2022; CT-PE imaging thereafter with no webs or evidence of organization and negative for acute PE. Lab work with negative HIV screen and imaging notable for hepatic steatosis. Improved fluid status with diuresis with ongoing good UOP. Plan for management as follows:  - WHO group I, NYHA class II  - REVEAL risk score 8; intermediate risk  - previously planned for dual therapy with Tadalafil and Macitentan which has not started to date, interval with no decompensation   -- s/p hysterectomy with radiographic absence of uterus   -- medication side effects reviewed; educated on avoidance of abrupt cessation of medication  - cont Bumex BID  - progressive erythrocytosis with normal Plts is worrisome for poor PAP therapy; will refer for titration study with Sleep Medicine   -- still pending f/u  - on initiation of therapy, plan for risk and medication SE assessment in 3-6 months with to include repeat NTproBNP, CMP, TTE and 6MWT  - Vaccines up to date; pneumonia vaccine and influenza vaccine due in the fall  - O2 supplementation required, goal SpO2 >92%  - if persistent high risk 6 months following initiation of therapy, plan for referral to PH/transplant center  - to present to lab today for CTD w/u, labs remain uncollected              Follow up in about  5 weeks (around 1/8/2025) for Routine follow up.      Case was discussed with patient; all questions were answered to patient's satisfaction and patient verbalized understanding.     Missy Godoy MD  Pulmonary Medicine  [facility]  Phone: [phone]

## 2024-12-05 NOTE — ASSESSMENT & PLAN NOTE
[facility] 02/2024 with precapillary pulmonary hypertension (mPAP 34, PCWP 8, PVR 5.2, CO/CI 5.98/2.26 -Td) that is out of proportion for JOVANNI on PAP. PFTs with no obstruction. History is notable for PE in 2022; CT-PE imaging thereafter with no webs or evidence of organization and negative for acute PE. Lab work with negative HIV screen and imaging notable for hepatic steatosis. Improved fluid status with diuresis with ongoing good UOP. Plan for management as follows:  - WHO group I, NYHA class II  - REVEAL risk score 8; intermediate risk  - previously planned for dual therapy with Tadalafil and Macitentan which has not started to date, interval with no decompensation   -- s/p hysterectomy with radiographic absence of uterus   -- medication side effects reviewed; educated on avoidance of abrupt cessation of medication  - cont Bumex BID  - progressive erythrocytosis with normal Plts is worrisome for poor PAP therapy; will refer for titration study with Sleep Medicine   -- still pending f/u  - on initiation of therapy, plan for risk and medication SE assessment in 3-6 months with to include repeat NTproBNP, CMP, TTE and 6MWT  - Vaccines up to date; pneumonia vaccine and influenza vaccine due in the fall  - O2 supplementation required, goal SpO2 >92%  - if persistent high risk 6 months following initiation of therapy, plan for referral to PH/transplant center  - to present to lab today for CTD w/u, labs remain uncollected

## 2024-12-06 NOTE — PROCEDURES
SIX MINUTE WALK DISTANCE  BASELINE VITALS  Heart rate 70, /83, SpO2 94% on room     END OF STUDY VITALS:  Heart rate 86, /83, SpO2 91% on room air    RECOVERY VITALS:  After 1 min rest  Heart rate 74, /83, SpO2 93% on room air    Patient walked 563 ft with 0 rests.   SpO2 brittney was 91% at 3 minutes.       IMPRESSION:  Patient does not need oxygen supplementation at rest at rest or with ambulation.      Missy Godoy MD  Pulmonary and Critical Care  [facility]

## 2024-12-11 ENCOUNTER — OFFICE VISIT (OUTPATIENT)
Dept: PAIN MEDICINE | Facility: CLINIC | Age: 64
End: 2024-12-11
Payer: MEDICARE

## 2024-12-11 VITALS
RESPIRATION RATE: 20 BRPM | WEIGHT: 293 LBS | BODY MASS INDEX: 44.41 KG/M2 | SYSTOLIC BLOOD PRESSURE: 150 MMHG | HEIGHT: 68 IN | DIASTOLIC BLOOD PRESSURE: 79 MMHG

## 2024-12-11 DIAGNOSIS — G89.29 CHRONIC PAIN OF BOTH KNEES: ICD-10-CM

## 2024-12-11 DIAGNOSIS — M47.817 LUMBOSACRAL SPONDYLOSIS WITHOUT MYELOPATHY: Chronic | ICD-10-CM

## 2024-12-11 DIAGNOSIS — G89.29 CHRONIC PAIN OF RIGHT KNEE: Chronic | ICD-10-CM

## 2024-12-11 DIAGNOSIS — M25.561 CHRONIC PAIN OF RIGHT KNEE: Chronic | ICD-10-CM

## 2024-12-11 DIAGNOSIS — G89.4 CHRONIC PAIN SYNDROME: Chronic | ICD-10-CM

## 2024-12-11 DIAGNOSIS — Z79.899 ENCOUNTER FOR LONG-TERM (CURRENT) USE OF MEDICATIONS: Primary | ICD-10-CM

## 2024-12-11 DIAGNOSIS — M25.561 CHRONIC PAIN OF BOTH KNEES: ICD-10-CM

## 2024-12-11 DIAGNOSIS — M25.562 CHRONIC PAIN OF BOTH KNEES: ICD-10-CM

## 2024-12-11 PROCEDURE — 99215 OFFICE O/P EST HI 40 MIN: CPT | Mod: PBBFAC | Performed by: PAIN MEDICINE

## 2024-12-11 PROCEDURE — 99999PBSHW POCT URINE DRUG SCREEN PRESUMP: Mod: PBBFAC,,,

## 2024-12-11 PROCEDURE — 99999 PR PBB SHADOW E&M-EST. PATIENT-LVL V: CPT | Mod: PBBFAC,,, | Performed by: PAIN MEDICINE

## 2024-12-11 PROCEDURE — 80305 DRUG TEST PRSMV DIR OPT OBS: CPT | Mod: PBBFAC | Performed by: PAIN MEDICINE

## 2024-12-11 RX ORDER — HYDROCODONE BITARTRATE AND ACETAMINOPHEN 10; 325 MG/1; MG/1
1 TABLET ORAL EVERY 6 HOURS PRN
Qty: 120 TABLET | Refills: 0 | Status: SHIPPED | OUTPATIENT
Start: 2024-12-18

## 2024-12-11 NOTE — PROGRESS NOTES
She Disclaimer: This note has been generated using voice-recognition software. There may be typographical errors that have been missed during proof-reading        Patient ID: Holly Porter is a 64 y.o. female.      Chief Complaint: Low-back Pain (Patient is having low back pain and right hip pain.)      64-year-old female returns for re-evaluation of bilateral knee, right hip and lower back pain.  She has not received an orthopedic consult, nerve block injections or physical therapy.  She has been treated for several years with opioid management.  She is not a spinal surgical candidate per Dr. Rueda for the lower back pain and radiculopathy.  X-rays of both knees revealed moderate degenerative changes.  X-rays and MRIs of the lumbar spine revealed degenerative changes.  She returns today for medication refill.  She denies any changes since the last office visit.  Intra-articular knee and nerve block injections were recommended for intractable pain.            Pain Assessment  Pain Assessment: 0-10  Pain Score:   7  Pain Location: Back  Pain Orientation: Right, Lower  Pain Descriptors: Aching, Constant  Pain Frequency: Continuous  Pain Onset: On-going  Clinical Progression: Gradually worsening  Aggravating Factors: Bending, Stretching, Straightening, Exercise, Kneeling, Squatting, Standing, Walking  Pain Intervention(s): Medication (See eMAR), Rest      A's of Opioid Risk Assessment  Activity:Patient is unable to perform ADL.   Analgesia:Patients pain is partially controlled by current medication.   Adverse Effects: Patient denies constipation or sedation.  Aberrant Behavior:  reviewed with no aberrant drug seeking/taking behavior.      Patient denies any suicidal or homicidal ideations    Physical Therapy/Home Exercise: no     US Venous Reflux Study Bilateral  Narrative: EXAMINATION:  Bilateral complete venous reflux study    CLINICAL HISTORY:  Bilateral lower extremity edema, hyperpigmentation, varicose veins  and history of venous insufficiency    TECHNIQUE:  Complete venous evaluation performed with the patient in the standing and supine position.  The deep system was evaluated for augmentation, phasicity, compressibility and presence or absence of DVT.  The superficial system was evaluated with spectral analysis and color-flow to determine reflux times.    COMPARISON:  None    FINDINGS:  Bilateral common femoral veins, femoral veins, popliteal veins, deep femoral veins, peroneal veins, and posterior tibial veins appear to be widely patent with no evidence of DVT.  There is normal augmentation and phasicity of the sampled vessels.    The right saphenofemoral junction measures 5.4mm in diameter with 0 seconds of reflux time noted.    The right great saphenous vein appears to be ablated from the proximal thigh to the proximal calf.    Maximum reflux time noted in the right great saphenous vein is 1.2 seconds noted at the level of theabove the ankle.    The right saphenopopliteal junction measures 6.6 mm in diameter with 0.68 seconds of reflux time noted.    The right small saphenous vein measures 5.7 mm in diameter with 0.68 seconds of reflux time noted.    Scattered refluxing varicosities right anteromedial leg with the largest measuring 8.6 mm in diameter with 0.9 seconds of reflux time noted.    The left saphenofemoral junction measures 8.8mm in diameter with 0.86 seconds of reflux time noted.    The left great saphenous vein appears to be ablated from the proximal thigh to the mid calf.    The left small saphenous vein appears to be ablated in its entirety.    Left anterior thigh accessory vein measures 6.3 mm in diameter with 0.95 seconds of reflux time noted.    Scattered refluxing varicose veins noted left lower extremity with the largest measuring 9.4 mm in diameter with 0.95 seconds of reflux time noted.    Scattered refluxing varicosities and areas of pre rupture noted bilaterally.  See technicians worksheet for  details.  Impression: No evidence of DVT noted bilaterally.    Dilation reflux of the right distal great saphenous vein, right small saphenous vein and left anterior thigh accessory vein with associated refluxing varicosities bilaterally as detailed above.    Electronically signed by: Matty Falcon  Date:    10/30/2024  Time:    08:16      Review of Systems   Constitutional: Negative.    HENT: Negative.     Eyes: Negative.    Respiratory: Negative.     Cardiovascular: Negative.    Gastrointestinal: Negative.    Endocrine: Negative.    Genitourinary: Negative.    Musculoskeletal:  Positive for arthralgias, joint swelling (Left lower extremity), leg pain (Left lower extremity) and joint deformity (Left lower extremity).   Integumentary:  Negative.   Neurological: Negative.    Hematological: Negative.    Psychiatric/Behavioral:  Positive for sleep disturbance.              Past Medical History:   Diagnosis Date    Acquired hypothyroidism     Atherosclerosis of native artery of extremity with intermittent claudication 01/30/2019    bilateral legs    BMI 50.0-59.9, adult 02/22/2021    Chronic pain syndrome     opioid depen    Congestive heart failure (CHF)     COPD (chronic obstructive pulmonary disease)     COVID-19 10/06/2023    Depression     Diabetes mellitus, type 2     followed by Aurea Kwong NP, [facility]    DVT of lower extremity, bilateral     Essential hypertension 06/08/2021    GERD (gastroesophageal reflux disease)     Gout 07/05/2023    Hyperlipidemia     Lumbosacral radiculopathy 06/05/2023    followed by GLENN Valdes, [facility]    Migraines     Nicotine dependence     Nicotine dependence     Obesity hypoventilation syndrome     chronic CO2 retainer with compensatory metabolic alkalosis    Osteoarthritis     Seizure disorder     Sleep apnea     on cpap    Thyroid cancer     Venous stasis ulcer limited to breakdown of skin with varicose veins     followed by Estuardo Zhao    Vitamin  D deficiency      Past Surgical History:   Procedure Laterality Date    ABCESS DRAINAGE      HYSTERECTOMY      ILIAC ARTERY STENT Bilateral 01/28/2020    Bilateral distal aorta and common iliac 8 X 59 vbx covered stents performed by Dr. Antonio Jacinto.    LEFT HEART CATHETERIZATION Left 11/6/2023    Procedure: Left heart cath;  Surgeon: Alex Ortega DO;  Location: [facility] CATH LAB;  Service: Cardiology;  Laterality: Left;    RADIOFREQUENCY ABLATION Left 04/15/2022    Procedure: Left calf  Radiofrequency Ablation;  Surgeon: Matty Falcon DO;  Location: [facility] OR;  Service: Vascular;  Laterality: Left;    RIGHT HEART CATHETERIZATION Right 2/13/2024    Procedure: INSERTION, CATHETER, RIGHT HEART;  Surgeon: Mahad Moreno MD;  Location: [facility] CATH LAB;  Service: Cardiology;  Laterality: Right;    STAB PHLEBECTOMY OF VARICOSE VEINS Left 01/25/2013    Left leg microphlebectomies x 23 stab avulsions and ligation of multiple varicose veins perrformed by Dr. Cirilo Aguirre.    THYROIDECTOMY      hx: thyroid cancer    TOE AMPUTATION Left 01/28/2020    2nd, 4th and 5th performed by Dr. Anam Saeed.    TOE AMPUTATION Right 01/28/2020    4th toe performed by Dr. Anam Saeed.    TOTAL ABDOMINAL HYSTERECTOMY W/ BILATERAL SALPINGOOPHORECTOMY      TUBAL LIGATION      VAGINAL DELIVERY      x 4    VENOUS ABLATION Right 08/09/2019    GSV Varithena Ablation performed by Dr. Estuardo Falcon    VENOUS ABLATION Left 08/02/2019    ATAV Varithena Ablation performed by Dr. Estuardo Falcon.    VENOUS ABLATION Right 07/13/2015    ATAV Laser Ablatio performed by Dr. Cirilo Aguirre.    VENOUS ABLATION Right 07/06/2015    Distal  GSV Laser Ablation performed by dr. Cirilo Aguirre.    VENOUS ABLATION Left 04/20/2015    Left Distal GSV Laser Ablation performed by dr. Cirilo Aguirre.    VENOUS ABLATION Right 10/28/2013    Right Distal GSV RF Ablation performed by Dr. Cirilo Aguirre.    VENOUS ABLATION Left 10/25/2013    SSV RF Ablation performed by dr. Kerr  Timothy.    VENOUS ABLATION Left 10/07/2013    Left GSV and Left ATAV RF Ablation performed by Dr. Cirilo Aguirre.    VENOUS ABLATION Right 01/21/2013    GSV RF Ablation w/micros x 22 performed by Dr. Cirilo Aguirre.    VENOUS ABLATION Left 06/25/2021    Left distal GSV Varithena Ablation     Social History     Socioeconomic History    Marital status:    Tobacco Use    Smoking status: Every Day     Current packs/day: 0.50     Average packs/day: 0.5 packs/day for 33.0 years (16.5 ttl pk-yrs)     Types: Cigarettes     Passive exposure: Current    Smokeless tobacco: Never    Tobacco comments:     5 cigarettes a day    Substance and Sexual Activity    Alcohol use: Never    Drug use: Never    Sexual activity: Not Currently     Partners: Male     Social Drivers of Health     Financial Resource Strain: Low Risk  (10/1/2024)    Overall Financial Resource Strain (CARDIA)     Difficulty of Paying Living Expenses: Not hard at all   Food Insecurity: No Food Insecurity (10/1/2024)    Hunger Vital Sign     Worried About Running Out of Food in the Last Year: Never true     Ran Out of Food in the Last Year: Never true   Transportation Needs: Unmet Transportation Needs (10/1/2024)    PRAPARE - Transportation     Lack of Transportation (Medical): Yes     Lack of Transportation (Non-Medical): Yes   Physical Activity: Inactive (10/1/2024)    Exercise Vital Sign     Days of Exercise per Week: 0 days     Minutes of Exercise per Session: 0 min   Stress: No Stress Concern Present (10/1/2024)    [employer] - Occupational Stress Questionnaire     Feeling of Stress : Not at all   Housing Stability: Low Risk  (10/1/2024)    Housing Stability Vital Sign     Unable to Pay for Housing in the Last Year: No     Homeless in the Last Year: No     Family History   Problem Relation Name Age of Onset    Heart disease Mother          age 84 CHF    Hypertension Mother      Osteoarthritis Mother      Coronary aneurysm Father       Coronary artery disease Father      Hypertension Father      Heart disease Father      No Known Problems Sister      No Known Problems Brother          hx: varicose veins    No Known Problems Brother          MVA: parlazed    No Known Problems Brother      No Known Problems Son      No Known Problems Son      No Known Problems Son      No Known Problems Son       Review of patient's allergies indicates:   Allergen Reactions    Ammonium peroxydisulfate Shortness Of Breath    Avocado (laurus persea) Anaphylaxis    Bananas [banana] Anaphylaxis and Swelling     CRAMPS,    Chocolate flavor Anaphylaxis     MOUTH SWELLING    Fentanyl Shortness Of Breath and Itching    Iodinated contrast media Anaphylaxis, Hives and Rash    Nicoderm Swelling    Percocet [oxycodone-acetaminophen] Shortness Of Breath and Itching    Silvadene [silver sulfadiazine]     Clindamycin     Hydrocortisone Blisters    Lasix [furosemide] Blisters     BURNS SKIN    Pregabalin     Adhesive Blisters, Rash and Itching    Iodine Rash    Latex, natural rubber Rash    Pcn [penicillins] Rash    Sulfa (sulfonamide antibiotics) Rash and Blisters     has a current medication list which includes the following prescription(s): accu-chek guide glucose meter, accu-chek guide test strips, accu-chek softclix lancets, albuterol, allopurinol, apixaban, aspirin, atorvastatin, bumetanide, carvedilol, cilostazol, clindamycin, colchicine, cyclobenzaprine, duloxetine, hydrocodone-acetaminophen, hydrocodone-acetaminophen, hydrocodone-acetaminophen, [START ON 12/18/2024] hydrocodone-acetaminophen, ibuprofen, levemir flextouch u100 insulin, ketorolac 0.5%, levothyroxine, moxifloxacin, naloxone, nifedipine, pantoprazole, pen needle, diabetic, potassium chloride sa, prednisolone acetate, metamucil, quetiapine, spiriva respimat, topiramate, and varenicline.      Objective:  Vitals:    12/11/24 1320   BP: (!) 150/79   Resp: 20        Physical Exam  Vitals and nursing note reviewed.    Constitutional:       General: She is not in acute distress.     Appearance: Normal appearance. She is obese. She is not ill-appearing, toxic-appearing or diaphoretic.   HENT:      Head: Normocephalic and atraumatic.      Nose: Nose normal.      Mouth/Throat:      Mouth: Mucous membranes are moist.   Eyes:      Extraocular Movements: Extraocular movements intact.      Pupils: Pupils are equal, round, and reactive to light.   Cardiovascular:      Rate and Rhythm: Normal rate and regular rhythm.      Heart sounds: Normal heart sounds.   Pulmonary:      Effort: Pulmonary effort is normal. No respiratory distress.      Breath sounds: Normal breath sounds. No stridor. No wheezing or rhonchi.   Abdominal:      General: Bowel sounds are normal.      Palpations: Abdomen is soft.   Musculoskeletal:         General: No swelling or deformity.      Cervical back: Normal and normal range of motion. No spasms or tenderness. No pain with movement. Normal range of motion.      Thoracic back: Normal.      Lumbar back: No spasms, tenderness or bony tenderness. Normal range of motion. Negative right straight leg raise test and negative left straight leg raise test. No scoliosis.      Right knee: Bony tenderness and crepitus present. Decreased range of motion. Tenderness present.      Left knee: Bony tenderness and crepitus present. Decreased range of motion. Tenderness present.      Right lower leg: No edema.      Left lower leg: No edema.   Skin:     General: Skin is warm.   Neurological:      General: No focal deficit present.      Mental Status: She is alert and oriented to person, place, and time. Mental status is at baseline.      Cranial Nerves: No cranial nerve deficit.      Sensory: Sensation is intact. No sensory deficit.      Motor: No weakness.      Coordination: Coordination normal.      Gait: Gait abnormal (Uses a walker).      Deep Tendon Reflexes: Reflexes are normal and symmetric.   Psychiatric:         Mood and  Affect: Mood normal.         Behavior: Behavior normal.           Assessment:      1. Encounter for long-term (current) use of medications    2. Lumbosacral spondylosis without myelopathy    3. Chronic pain of right knee    4. Chronic pain syndrome    5. Chronic pain of both knees          Plan:  1. reviewed  2.Addiction, Dependency, Tolerance, Opioid abuse-misuse, Death, Diversion Discussed. Overdose reversal drug Naloxone discussed. Patient is prescribed opiates for chronic nonmalignant pain pathology.  Patient is receiving opiates which require greater than a 72 hour supply of therapy.  Patient was educated on potential dependency associated with long-term opioid use as well as decreasing efficacy with prolonged use.  Patient was advised of risks, benefits and side effects and how to utilize each medication.  Patient was also informed that any deviation from therapy protocol will  lead to discontinuation of opiates.  It is reasonable to prescribe opioid analgesics for patient based on positive response to opioid medications, lack of side effects and  limited aberrant behavior.    3.Refill/Continue medications for pain control and function       Requested Prescriptions     Signed Prescriptions Disp Refills    HYDROcodone-acetaminophen (NORCO)  mg per tablet 120 tablet 0     Sig: Take 1 tablet by mouth every 6 (six) hours as needed for Pain.     4.Urine drug screen point of care obtained and consistent with prescribed medications and medication refill date.  Drug screen is to monitor compliance with prescribed opiates and to ensure that there was no misuse/abuse of other non-prescribed opioids or illicit drugs.  From this information I will determine if patient is suitable for opioid therapy  5. We will obtain prior authorization for bilateral intra-articular knee injections in  office  Orders Placed This Encounter   Procedures    Ambulatory referral/consult to Pain Clinic     Standing Status:   Future      Standing Expiration Date:   1/11/2026     Referral Priority:   Routine     Referral Type:   Procedure     Referral Reason:   Specialty Services Required     Referred to Provider:   Héctor Robles PA     Requested Specialty:   Pain Medicine     Number of Visits Requested:   1    POCT Urine Drug Screen ([facility])     Interpretive Information:     Negative:  No drug detected at the cut off level.   Positive:  This result represents presumptive positive for the   tested drug, other substances may yield a positive response other   than the analyte of interest. This result should be utilized for   diagnostic purpose only. Confirmation testing will be performed upon physician request only.              report:  Reviewed and consistent with medication use as prescribed.      The total time spent for evaluation and management on 12/12/2024 including reviewing separately obtained history, performing a medically appropriate exam and evaluation, documenting clinical information in the health record, independently interpreting results and communicating them to the patient/family/caregiver, and ordering medications/tests/procedures was between 15-29 minutes.    The above plan and management options were discussed at length with patient. Patient is in agreement with the above and verbalized understanding. It will be communicated with the referring physician via electronic record, fax, or mail.

## 2024-12-11 NOTE — PATIENT INSTRUCTIONS
FOLLOW UP WITH ROLA IN 4 WEEKS    YOU ARE SCHEDULED ON 12/23/24 AT 1:15 PM FOR BILATERAL KNEE INJECTIONS IN OFFICE WITH

## 2024-12-12 ENCOUNTER — HOSPITAL ENCOUNTER (OUTPATIENT)
Dept: RADIOLOGY | Facility: HOSPITAL | Age: 64
Discharge: HOME | End: 2024-12-12
Attending: NURSE PRACTITIONER
Payer: MEDICARE

## 2024-12-12 DIAGNOSIS — R05.9 COUGH, UNSPECIFIED TYPE: ICD-10-CM

## 2024-12-12 PROCEDURE — 71046 X-RAY EXAM CHEST 2 VIEWS: CPT | Mod: 26,,, | Performed by: RADIOLOGY

## 2024-12-12 PROCEDURE — 71046 X-RAY EXAM CHEST 2 VIEWS: CPT | Mod: TC

## 2024-12-23 ENCOUNTER — OFFICE VISIT (OUTPATIENT)
Dept: PAIN MEDICINE | Facility: CLINIC | Age: 64
End: 2024-12-23
Payer: MEDICARE

## 2024-12-23 VITALS
SYSTOLIC BLOOD PRESSURE: 138 MMHG | HEART RATE: 100 BPM | HEIGHT: 68 IN | BODY MASS INDEX: 44.41 KG/M2 | WEIGHT: 293 LBS | DIASTOLIC BLOOD PRESSURE: 76 MMHG

## 2024-12-23 DIAGNOSIS — G89.29 CHRONIC PAIN OF BOTH KNEES: Primary | ICD-10-CM

## 2024-12-23 DIAGNOSIS — M25.562 CHRONIC PAIN OF BOTH KNEES: Primary | ICD-10-CM

## 2024-12-23 DIAGNOSIS — M25.561 CHRONIC PAIN OF BOTH KNEES: Primary | ICD-10-CM

## 2024-12-23 PROCEDURE — 99215 OFFICE O/P EST HI 40 MIN: CPT | Mod: PBBFAC | Performed by: PAIN MEDICINE

## 2024-12-23 PROCEDURE — 3044F HG A1C LEVEL LT 7.0%: CPT | Mod: CPTII,,, | Performed by: PAIN MEDICINE

## 2024-12-23 PROCEDURE — 3008F BODY MASS INDEX DOCD: CPT | Mod: CPTII,,, | Performed by: PAIN MEDICINE

## 2024-12-23 PROCEDURE — 3066F NEPHROPATHY DOC TX: CPT | Mod: CPTII,,, | Performed by: PAIN MEDICINE

## 2024-12-23 PROCEDURE — 3075F SYST BP GE 130 - 139MM HG: CPT | Mod: CPTII,,, | Performed by: PAIN MEDICINE

## 2024-12-23 PROCEDURE — 1159F MED LIST DOCD IN RCRD: CPT | Mod: CPTII,,, | Performed by: PAIN MEDICINE

## 2024-12-23 PROCEDURE — 1101F PT FALLS ASSESS-DOCD LE1/YR: CPT | Mod: CPTII,,, | Performed by: PAIN MEDICINE

## 2024-12-23 PROCEDURE — 3060F POS MICROALBUMINURIA REV: CPT | Mod: CPTII,,, | Performed by: PAIN MEDICINE

## 2024-12-23 PROCEDURE — 99212 OFFICE O/P EST SF 10 MIN: CPT | Mod: S$PBB,,, | Performed by: PAIN MEDICINE

## 2024-12-23 PROCEDURE — 99999 PR PBB SHADOW E&M-EST. PATIENT-LVL V: CPT | Mod: PBBFAC,,, | Performed by: PAIN MEDICINE

## 2024-12-23 PROCEDURE — 3078F DIAST BP <80 MM HG: CPT | Mod: CPTII,,, | Performed by: PAIN MEDICINE

## 2024-12-23 PROCEDURE — 3288F FALL RISK ASSESSMENT DOCD: CPT | Mod: CPTII,,, | Performed by: PAIN MEDICINE

## 2024-12-23 RX ORDER — DICLOFENAC SODIUM 10 MG/G
2 GEL TOPICAL DAILY
Qty: 1 G | Refills: 3 | Status: SHIPPED | OUTPATIENT
Start: 2024-12-23

## 2024-12-23 NOTE — PROCEDURES
"Holly Porter is a 64 y.o. female patient.    Pulse: 100 (24 1418)  BP: (!) 162/85 (24 1418)  Weight: (!) 154.2 kg (340 lb) (24 1418)  Height: 5' 8" (172.7 cm) (24 1418)       Bilateral intra-articular knee injections    Date/Time: 2024 2:47 PM  Location procedure was performed: [facility]    Performed by: June Crandall MD  Authorized by: June Crandall MD  Pre-op diagnosis: Bilateral knee pain and osteoarthritis  Post-op diagnosis: Bilateral knee pain and osteoarthritis  Consent Done: Yes  Consent: Verbal consent obtained. Written consent obtained.  Risks and benefits: risks, benefits and alternatives were discussed  Consent given by: patient  Patient understanding: patient states understanding of the procedure being performed  Patient consent: the patient's understanding of the procedure matches consent given  Procedure consent: procedure consent matches procedure scheduled  Relevant documents: relevant documents present and verified  Test results: test results available and properly labeled  Site marked: the operative site was marked  Imaging studies: imaging studies available  Patient identity confirmed: , MRN and name  Time out: Immediately prior to procedure a "time out" was called to verify the correct patient, procedure, equipment, support staff and site/side marked as required.  Indications: joint swelling and pain   Body area: knee (Bilateral knee)  Local anesthesia used: yes    Anesthesia:  Local anesthesia used: yes  Local Anesthetic: topical anesthetic and lidocaine 1% without epinephrine  Anesthetic total: 10 mL    Patient sedated: no  Preparation: Patient was prepped and draped in the usual sterile fashion.  Needle size: 22 G  Approach: lateral  Triamcinolone amount (mg): 20 mg per knee.  Bupivacaine 0.25% amount (ml): 5 cc per knee.  Patient tolerance: Patient tolerated the procedure well with no immediate complications  Complications: " No  Specimens: No          12/23/2024

## 2024-12-23 NOTE — PROGRESS NOTES
She Disclaimer: This note has been generated using voice-recognition software. There may be typographical errors that have been missed during proof-reading        Patient ID: Holly Porter is a 64 y.o. female.      Chief Complaint: Knee Pain (bilateral)      64-year-old female returns for bilateral intra-articular knee injections.  She denies any changes since last office visit.  She has a pending appointment with Dr. Falcon for vascular insufficiency            Pain Assessment  Pain Assessment: 0-10  Pain Score:   2  Pain Location: Knee  Pain Orientation: Right, Left  Pain Descriptors: Aching, Dull, Constant  Pain Frequency: Continuous  Pain Onset: Awakened from sleep  Clinical Progression: Gradually worsening  Aggravating Factors: Bending, Squatting, Standing, Walking, Stairs  Pain Intervention(s): Medication (See eMAR), Rest, Massage      A's of Opioid Risk Assessment  Activity:Patient is unable to  perform ADL.   Analgesia:Patients pain is not controlled by current medication.   Adverse Effects: Patient denies constipation or sedation.  Aberrant Behavior:  reviewed with no aberrant drug seeking/taking behavior.      Patient denies any suicidal or homicidal ideations        X-Ray Chest PA And Lateral  Narrative: EXAMINATION:  XR CHEST PA AND LATERAL    CLINICAL HISTORY:  Cough, unspecified    TECHNIQUE:  PA and lateral views of the chest were performed.    COMPARISON:  02/27/2024.    FINDINGS:  The lungs are clear.  No focal consolidation.  Heart size is enlarged.  Mediastinal contours unremarkable.    Bony thorax intact.  Impression: No acute chest disease.    Electronically signed by: Waldo Solis  Date:    12/12/2024  Time:    14:15      Review of Systems   Constitutional: Negative.    HENT: Negative.     Eyes: Negative.    Respiratory: Negative.     Cardiovascular: Negative.    Gastrointestinal: Negative.    Endocrine: Negative.    Genitourinary: Negative.    Musculoskeletal:  Positive for arthralgias  (Bilateral knees), joint swelling (Left lower extremity) and leg pain (Left lower extremity).   Integumentary:  Negative.   Neurological: Negative.    Hematological: Negative.    Psychiatric/Behavioral:  Positive for sleep disturbance.              Past Medical History:   Diagnosis Date    Acquired hypothyroidism     Atherosclerosis of native artery of extremity with intermittent claudication 01/30/2019    bilateral legs    BMI 50.0-59.9, adult 02/22/2021    Chronic pain syndrome     opioid depen    Congestive heart failure (CHF)     COPD (chronic obstructive pulmonary disease)     COVID-19 10/06/2023    Depression     Diabetes mellitus, type 2     [facility]    DVT of lower extremity, bilateral     Essential hypertension 06/08/2021    GERD (gastroesophageal reflux disease)     Gout 07/05/2023    Hyperlipidemia     Lumbosacral radiculopathy 06/05/2023    followed by GLENN Valdes, [facility]    Migraines     Nicotine dependence     Nicotine dependence     Obesity hypoventilation syndrome     chronic CO2 retainer with compensatory metabolic alkalosis    Osteoarthritis     Seizure disorder     Sleep apnea     on cpap    Thyroid cancer     Venous stasis ulcer limited to breakdown of skin with varicose veins     followed by Estuardo Zhao    Vitamin D deficiency      Past Surgical History:   Procedure Laterality Date    ABCESS DRAINAGE      HYSTERECTOMY      ILIAC ARTERY STENT Bilateral 01/28/2020    Bilateral distal aorta and common iliac 8 X 59 vbx covered stents performed by Dr. Antonio Jacinto.    LEFT HEART CATHETERIZATION Left 11/6/2023    Procedure: Left heart cath;  Surgeon: Alex Ortega DO;  Location: [facility] CATH LAB;  Service: Cardiology;  Laterality: Left;    RADIOFREQUENCY ABLATION Left 04/15/2022    Procedure: Left calf  Radiofrequency Ablation;  Surgeon: Matty Falcon DO;  Location: [facility] OR;  Service: Vascular;  Laterality: Left;     RIGHT HEART CATHETERIZATION Right 2/13/2024    Procedure: INSERTION, CATHETER, RIGHT HEART;  Surgeon: Mahad Moreno MD;  Location: [facility] CATH LAB;  Service: Cardiology;  Laterality: Right;    STAB PHLEBECTOMY OF VARICOSE VEINS Left 01/25/2013    Left leg microphlebectomies x 23 stab avulsions and ligation of multiple varicose veins perrformed by Dr. Cirilo Aguirre.    THYROIDECTOMY      hx: thyroid cancer    TOE AMPUTATION Left 01/28/2020    2nd, 4th and 5th performed by Dr. Anam Saeed.    TOE AMPUTATION Right 01/28/2020    4th toe performed by Dr. Anam Saeed.    TOTAL ABDOMINAL HYSTERECTOMY W/ BILATERAL SALPINGOOPHORECTOMY      TUBAL LIGATION      VAGINAL DELIVERY      x 4    VENOUS ABLATION Right 08/09/2019    GSV Varithena Ablation performed by Dr. Estuardo Falcon    VENOUS ABLATION Left 08/02/2019    ATAV Varithena Ablation performed by Dr. Estuardo Falcon.    VENOUS ABLATION Right 07/13/2015    ATAV Laser Ablatio performed by Dr. Cirilo Aguirre.    VENOUS ABLATION Right 07/06/2015    Distal  GSV Laser Ablation performed by dr. Cirilo Aguirre.    VENOUS ABLATION Left 04/20/2015    Left Distal GSV Laser Ablation performed by dr. Cirilo Aguirre.    VENOUS ABLATION Right 10/28/2013    Right Distal GSV RF Ablation performed by Dr. Cirilo Aguirre.    VENOUS ABLATION Left 10/25/2013    SSV RF Ablation performed by dr. Cirilo Aguirre.    VENOUS ABLATION Left 10/07/2013    Left GSV and Left ATAV RF Ablation performed by Dr. Cirilo Aguirre.    VENOUS ABLATION Right 01/21/2013    GSV RF Ablation w/micros x 22 performed by Dr. Cirilo Aguirre.    VENOUS ABLATION Left 06/25/2021    Left distal GSV Varithena Ablation     Social History     Socioeconomic History    Marital status:    Tobacco Use    Smoking status: Every Day     Current packs/day: 0.50     Average packs/day: 0.5 packs/day for 33.0 years (16.5 ttl pk-yrs)     Types: Cigarettes     Passive exposure: Current    Smokeless tobacco: Never    Tobacco comments:     5 cigarettes a day    Substance and  Sexual Activity    Alcohol use: Never    Drug use: Never    Sexual activity: Not Currently     Partners: Male     Social Drivers of Health     Financial Resource Strain: Low Risk  (10/1/2024)    Overall Financial Resource Strain (CARDIA)     Difficulty of Paying Living Expenses: Not hard at all   Food Insecurity: No Food Insecurity (10/1/2024)    Hunger Vital Sign     Worried About Running Out of Food in the Last Year: Never true     Ran Out of Food in the Last Year: Never true   Transportation Needs: Unmet Transportation Needs (10/1/2024)    PRAPARE - Transportation     Lack of Transportation (Medical): Yes     Lack of Transportation (Non-Medical): Yes   Physical Activity: Inactive (10/1/2024)    Exercise Vital Sign     Days of Exercise per Week: 0 days     Minutes of Exercise per Session: 0 min   Stress: No Stress Concern Present (10/1/2024)    [employer] [place] [employer] - Occupational Stress Questionnaire     Feeling of Stress : Not at all   Housing Stability: Low Risk  (10/1/2024)    Housing Stability Vital Sign     Unable to Pay for Housing in the Last Year: No     Homeless in the Last Year: No     Family History   Problem Relation Name Age of Onset    Heart disease Mother          age 84 CHF    Hypertension Mother      Osteoarthritis Mother      Coronary aneurysm Father      Coronary artery disease Father      Hypertension Father      Heart disease Father      No Known Problems Sister      No Known Problems Brother          hx: varicose veins    No Known Problems Brother          MVA: parlazed    No Known Problems Brother      No Known Problems Son      No Known Problems Son      No Known Problems Son      No Known Problems Son       Review of patient's allergies indicates:   Allergen Reactions    Ammonium peroxydisulfate Shortness Of Breath    Avocado (laurus persea) Anaphylaxis    Bananas [banana] Anaphylaxis and Swelling     CRAMPS,    Chocolate flavor Anaphylaxis     MOUTH SWELLING     Fentanyl Shortness Of Breath and Itching    Iodinated contrast media Anaphylaxis, Hives and Rash    Nicoderm Swelling    Percocet [oxycodone-acetaminophen] Shortness Of Breath and Itching    Silvadene [silver sulfadiazine]     Clindamycin     Hydrocortisone Blisters    Pregabalin     Adhesive Blisters, Rash and Itching    Iodine Rash    Latex, natural rubber Rash    Pcn [penicillins] Rash    Sulfa (sulfonamide antibiotics) Rash and Blisters     has a current medication list which includes the following prescription(s): accu-chek guide glucose meter, accu-chek guide test strips, accu-chek softclix lancets, albuterol, allopurinol, apixaban, aspirin, atorvastatin, bumetanide, carvedilol, cilostazol, clindamycin, colchicine, duloxetine, hydrocodone-acetaminophen, hydrocodone-acetaminophen, hydrocodone-acetaminophen, hydrocodone-acetaminophen, levemir flextouch u100 insulin, ketorolac 0.5%, levothyroxine, moxifloxacin, naloxone, nifedipine, pantoprazole, pen needle, diabetic, potassium chloride sa, prednisolone acetate, metamucil, quetiapine, spiriva respimat, topiramate, varenicline, and diclofenac sodium.      Objective:  Vitals:    12/23/24 1420   BP: 138/76   Pulse:         Physical Exam  Vitals and nursing note reviewed.   Constitutional:       General: She is not in acute distress.     Appearance: Normal appearance. She is obese. She is not ill-appearing, toxic-appearing or diaphoretic.   HENT:      Head: Normocephalic and atraumatic.      Nose: Nose normal.      Mouth/Throat:      Mouth: Mucous membranes are moist.   Eyes:      Extraocular Movements: Extraocular movements intact.      Pupils: Pupils are equal, round, and reactive to light.   Cardiovascular:      Rate and Rhythm: Normal rate and regular rhythm.      Heart sounds: Normal heart sounds.   Pulmonary:      Effort: Pulmonary effort is normal. No respiratory distress.      Breath sounds: Normal breath sounds. No stridor. No wheezing or rhonchi.    Abdominal:      General: Bowel sounds are normal.      Palpations: Abdomen is soft.   Musculoskeletal:         General: No swelling or deformity.      Cervical back: Normal and normal range of motion. No spasms or tenderness. No pain with movement. Normal range of motion.      Thoracic back: Normal.      Lumbar back: No spasms, tenderness or bony tenderness. Normal range of motion. Negative right straight leg raise test and negative left straight leg raise test. No scoliosis.      Right knee: Bony tenderness present. Decreased range of motion. Tenderness present.      Left knee: Bony tenderness present. Decreased range of motion. Tenderness present.      Right lower leg: No edema.      Left lower leg: No edema.   Skin:     General: Skin is warm.   Neurological:      General: No focal deficit present.      Mental Status: She is alert and oriented to person, place, and time. Mental status is at baseline.      Cranial Nerves: No cranial nerve deficit.      Sensory: Sensation is intact. No sensory deficit.      Motor: No weakness.      Coordination: Coordination normal.      Gait: Gait abnormal (Uses a walker).      Deep Tendon Reflexes: Reflexes are normal and symmetric.   Psychiatric:         Mood and Affect: Mood normal.         Behavior: Behavior normal.           Assessment:      1. Chronic pain of both knees          Plan:  1. reviewed  2.Addiction, Dependency, Tolerance, Opioid abuse-misuse, Death, Diversion Discussed. Overdose reversal drug Naloxone discussed.  3.Refill/Continue medications for pain control and function       Requested Prescriptions     Signed Prescriptions Disp Refills    diclofenac sodium (VOLTAREN ARTHRITIS PAIN) 1 % Gel 1 g 3     Sig: Apply 2 g topically once daily.     4. Bilateral intra-articular knee injections performed today in office setting   5. Return in 1 month for re-evaluation     report:  Reviewed and consistent with medication use as prescribed.      The total time spent  for evaluation and management on 12/23/2024 including reviewing separately obtained history, performing a medically appropriate exam and evaluation, documenting clinical information in the health record, independently interpreting results and communicating them to the patient/family/caregiver, and ordering medications/tests/procedures was between 15-29 minutes.    The above plan and management options were discussed at length with patient. Patient is in agreement with the above and verbalized understanding. It will be communicated with the referring physician via electronic record, fax, or mail.

## 2024-12-30 ENCOUNTER — PATIENT OUTREACH (OUTPATIENT)
Facility: HOSPITAL | Age: 64
End: 2024-12-30
Payer: MEDICARE

## 2024-12-30 NOTE — LETTER
AUTHORIZATION FOR RELEASE OF   CONFIDENTIAL INFORMATION    [facility],    We are seeing Holly Porter, date of birth 1960, in the clinic at [facility]. Regan Frausto MD is the patient's PCP. Holly Porter has an outstanding lab/procedure at the time we reviewed her chart. In order to help keep her health information updated, she has authorized us to request the following medical record(s):        (  )  MAMMOGRAM                                      (  )  COLONOSCOPY      (  )  PAP SMEAR                                          (  )  OUTSIDE LAB RESULTS     (  )  DEXA SCAN                                          (X)  EYE EXAM            (  )  FOOT EXAM                                          (  )  ENTIRE RECORD     (  )  OUTSIDE IMMUNIZATIONS                 (  )  _______________         Please fax records to [facility] Coordinator, Maryellen Ortega, [phone].     If you have any questions, please contact [phone].          Patient Name: Holly Porter  : 1960  Patient Phone #: [phone]

## 2024-12-30 NOTE — PROGRESS NOTES
12/30/2024   --Chart accessed for: Care Gaps  --Care Gaps addressed: eye exam  Outreach made to patient via telephone, no answer . (Success) (Left Message) (Unavailable)   Requested eye exam records from [facility] Due   Topic Date Due    Eye Exam  Never done    Pneumococcal Vaccines (Age 50+) (1 of 2 - PCV) Never done    Colorectal Cancer Screening  10/27/2020    Foot Exam  02/09/2023    Mammogram  08/16/2023    Influenza Vaccine (1) Never done    Lipid Panel  11/03/2024

## 2024-12-30 NOTE — PROGRESS NOTES
Subjective:         Patient ID: Holly Porter is a 64 y.o. female.    Chief Complaint: Low-back Pain and Leg Pain            Pain  This is a chronic problem. The current episode started more than 1 year ago. The problem occurs daily. The problem has been gradually improving. Associated symptoms include arthralgias. Pertinent negatives include no chest pain, chills, coughing, diaphoresis, fever, sore throat, vertigo or vomiting.     Review of Systems   Constitutional:  Negative for activity change, chills, diaphoresis, fever and unexpected weight change.   HENT:  Negative for drooling, ear discharge, ear pain, facial swelling, nosebleeds, sore throat, trouble swallowing, voice change and goiter.    Eyes:  Negative for photophobia, pain, discharge, redness and visual disturbance.   Respiratory:  Negative for apnea, cough, choking, chest tightness, shortness of breath, wheezing and stridor.    Cardiovascular:  Negative for chest pain, palpitations and leg swelling.   Gastrointestinal:  Negative for abdominal distention, diarrhea, rectal pain, vomiting and fecal incontinence.   Endocrine: Negative for cold intolerance, heat intolerance, polydipsia, polyphagia and polyuria.   Genitourinary:  Negative for bladder incontinence, dysuria, flank pain, frequency and hot flashes.   Musculoskeletal:  Positive for arthralgias, back pain, leg pain and neck stiffness.   Integumentary:  Negative for color change and pallor.   Allergic/Immunologic: Negative for immunocompromised state.   Neurological:  Negative for dizziness, vertigo, seizures, syncope, facial asymmetry, speech difficulty, light-headedness, memory loss and coordination difficulties.   Hematological:  Negative for adenopathy. Does not bruise/bleed easily.   Psychiatric/Behavioral:  Negative for agitation, behavioral problems, confusion, decreased concentration, dysphoric mood, hallucinations, self-injury and suicidal ideas. The patient is not nervous/anxious and  is not hyperactive.            Past Medical History:   Diagnosis Date    Acquired hypothyroidism     Atherosclerosis of native artery of extremity with intermittent claudication 01/30/2019    bilateral legs    BMI 50.0-59.9, adult 02/22/2021    Chronic pain syndrome     opioid depen    Congestive heart failure (CHF)     COPD (chronic obstructive pulmonary disease)     COVID-19 10/06/2023    Depression     Diabetes mellitus, type 2     followed by Aurea Kwong NP, [facility]    DVT of lower extremity, bilateral     Essential hypertension 06/08/2021    GERD (gastroesophageal reflux disease)     Gout 07/05/2023    Hyperlipidemia     Lumbosacral radiculopathy 06/05/2023    followed by GLENN Valdes, [facility]    Migraines     Nicotine dependence     Nicotine dependence     Obesity hypoventilation syndrome     chronic CO2 retainer with compensatory metabolic alkalosis    Osteoarthritis     Seizure disorder     Sleep apnea     on cpap    Thyroid cancer     Venous stasis ulcer limited to breakdown of skin with varicose veins     followed by Estuardo Zhao    Vitamin D deficiency      Past Surgical History:   Procedure Laterality Date    ABCESS DRAINAGE      HYSTERECTOMY      ILIAC ARTERY STENT Bilateral 01/28/2020    Bilateral distal aorta and common iliac 8 X 59 vbx covered stents performed by Dr. Antonio Jacinto.    LEFT HEART CATHETERIZATION Left 11/6/2023    Procedure: Left heart cath;  Surgeon: Alex Ortega DO;  Location: [facility] CATH LAB;  Service: Cardiology;  Laterality: Left;    RADIOFREQUENCY ABLATION Left 04/15/2022    Procedure: Left calf  Radiofrequency Ablation;  Surgeon: Matty Falcon DO;  Location: [facility] OR;  Service: Vascular;  Laterality: Left;    RIGHT HEART CATHETERIZATION Right 2/13/2024    Procedure: INSERTION, CATHETER, RIGHT HEART;  Surgeon: Mahad Moreno MD;  Location: [facility] CATH LAB;  Service: Cardiology;  Laterality: Right;    STAB PHLEBECTOMY  OF VARICOSE VEINS Left 01/25/2013    Left leg microphlebectomies x 23 stab avulsions and ligation of multiple varicose veins perrformed by Dr. Cirilo Aguirre.    THYROIDECTOMY      hx: thyroid cancer    TOE AMPUTATION Left 01/28/2020    2nd, 4th and 5th performed by Dr. Anam Saeed.    TOE AMPUTATION Right 01/28/2020    4th toe performed by Dr. Anam Saeed.    TOTAL ABDOMINAL HYSTERECTOMY W/ BILATERAL SALPINGOOPHORECTOMY      TUBAL LIGATION      VAGINAL DELIVERY      x 4    VENOUS ABLATION Right 08/09/2019    GSV Varithena Ablation performed by Dr. Estuardo Falcon    VENOUS ABLATION Left 08/02/2019    ATAV Varithena Ablation performed by Dr. Estuardo Falcon.    VENOUS ABLATION Right 07/13/2015    ATAV Laser Ablatio performed by Dr. Cirilo Aguirre.    VENOUS ABLATION Right 07/06/2015    Distal  GSV Laser Ablation performed by dr. Cirilo Aguirre.    VENOUS ABLATION Left 04/20/2015    Left Distal GSV Laser Ablation performed by dr. Cirilo Aguirre.    VENOUS ABLATION Right 10/28/2013    Right Distal GSV RF Ablation performed by Dr. Cirilo Aguirre.    VENOUS ABLATION Left 10/25/2013    SSV RF Ablation performed by dr. Cirilo Aguirre.    VENOUS ABLATION Left 10/07/2013    Left GSV and Left ATAV RF Ablation performed by Dr. Cirilo Aguirre.    VENOUS ABLATION Right 01/21/2013    GSV RF Ablation w/micros x 22 performed by Dr. Cirilo Aguirre.    VENOUS ABLATION Left 06/25/2021    Left distal GSV Varithena Ablation     Social History     Socioeconomic History    Marital status:    Tobacco Use    Smoking status: Every Day     Current packs/day: 0.50     Average packs/day: 0.5 packs/day for 33.0 years (16.5 ttl pk-yrs)     Types: Cigarettes     Passive exposure: Current    Smokeless tobacco: Never    Tobacco comments:     5 cigarettes a day    Substance and Sexual Activity    Alcohol use: Never    Drug use: Never    Sexual activity: Not Currently     Partners: Male     Social Drivers of Health     Financial Resource Strain: Low Risk  (10/1/2024)    Overall Financial Resource  Strain (CARDIA)     Difficulty of Paying Living Expenses: Not hard at all   Food Insecurity: No Food Insecurity (10/1/2024)    Hunger Vital Sign     Worried About Running Out of Food in the Last Year: Never true     Ran Out of Food in the Last Year: Never true   Transportation Needs: Unmet Transportation Needs (10/1/2024)    PRAPARE - Transportation     Lack of Transportation (Medical): Yes     Lack of Transportation (Non-Medical): Yes   Physical Activity: Inactive (10/1/2024)    Exercise Vital Sign     Days of Exercise per Week: 0 days     Minutes of Exercise per Session: 0 min   Stress: No Stress Concern Present (10/1/2024)    [employer] [place] [employer] - Occupational Stress Questionnaire     Feeling of Stress : Not at all   Housing Stability: Low Risk  (10/1/2024)    Housing Stability Vital Sign     Unable to Pay for Housing in the Last Year: No     Homeless in the Last Year: No     Family History   Problem Relation Name Age of Onset    Heart disease Mother          age 84 CHF    Hypertension Mother      Osteoarthritis Mother      Coronary aneurysm Father      Coronary artery disease Father      Hypertension Father      Heart disease Father      No Known Problems Sister      No Known Problems Brother          hx: varicose veins    No Known Problems Brother          MVA: parlazed    No Known Problems Brother      No Known Problems Son      No Known Problems Son      No Known Problems Son      No Known Problems Son       Review of patient's allergies indicates:   Allergen Reactions    Ammonium peroxydisulfate Shortness Of Breath    Avocado (laurus persea) Anaphylaxis    Bananas [banana] Anaphylaxis and Swelling     CRAMPS,    Chocolate flavor Anaphylaxis     MOUTH SWELLING    Fentanyl Shortness Of Breath and Itching    Iodinated contrast media Anaphylaxis, Hives and Rash    Nicoderm Swelling    Percocet [oxycodone-acetaminophen] Shortness Of Breath and Itching    Silvadene [silver sulfadiazine]      "Clindamycin     Hydrocortisone Blisters    Pregabalin     Adhesive Blisters, Rash and Itching    Iodine Rash    Latex, natural rubber Rash    Pcn [penicillins] Rash    Sulfa (sulfonamide antibiotics) Rash and Blisters        Objective:  Vitals:    01/13/25 1119   BP: (!) 173/85   Pulse: 89   Resp: 20   Weight: (!) 151.5 kg (334 lb)   Height: 5' 8" (1.727 m)   PainSc:   7                 Physical Exam  Vitals and nursing note reviewed. Exam conducted with a chaperone present.   Constitutional:       General: She is awake. She is not in acute distress.     Appearance: Normal appearance. She is obese. She is not ill-appearing, toxic-appearing or diaphoretic.   HENT:      Head: Normocephalic and atraumatic.      Nose: Nose normal.      Mouth/Throat:      Mouth: Mucous membranes are moist.      Pharynx: Oropharynx is clear.   Eyes:      Conjunctiva/sclera: Conjunctivae normal.      Pupils: Pupils are equal, round, and reactive to light.   Cardiovascular:      Rate and Rhythm: Normal rate.   Pulmonary:      Effort: Pulmonary effort is normal. No respiratory distress.   Abdominal:      Palpations: Abdomen is soft.   Musculoskeletal:         General: Normal range of motion.      Cervical back: Normal range of motion and neck supple.   Skin:     General: Skin is warm and dry.   Neurological:      General: No focal deficit present.      Mental Status: She is alert and oriented to person, place, and time. Mental status is at baseline.      Cranial Nerves: No cranial nerve deficit (II-XII).   Psychiatric:         Mood and Affect: Mood normal.         Behavior: Behavior normal. Behavior is cooperative.         Thought Content: Thought content normal.           X-Ray Chest PA And Lateral  Narrative: EXAMINATION:  XR CHEST PA AND LATERAL    CLINICAL HISTORY:  Cough, unspecified    TECHNIQUE:  PA and lateral views of the chest were performed.    COMPARISON:  02/27/2024.    FINDINGS:  The lungs are clear.  No focal consolidation.  " Heart size is enlarged.  Mediastinal contours unremarkable.    Bony thorax intact.  Impression: No acute chest disease.    Electronically signed by: Waldo Solis  Date:    12/12/2024  Time:    14:15         Office Visit on 12/11/2024   Component Date Value Ref Range Status    POC Amphetamines 12/11/2024 Negative  Negative, Inconclusive Final    POC Barbiturates 12/11/2024 Negative  Negative, Inconclusive Final    POC Benzodiazepines 12/11/2024 Negative  Negative, Inconclusive Final    POC Cocaine 12/11/2024 Negative  Negative, Inconclusive Final    POC THC 12/11/2024 Negative  Negative, Inconclusive Final    POC Methadone 12/11/2024 Negative  Negative, Inconclusive Final    POC Methamphetamine 12/11/2024 Negative  Negative, Inconclusive Final    POC Opiates 12/11/2024 Presumptive Positive (A)  Negative, Inconclusive Final    POC Oxycodone 12/11/2024 Negative  Negative, Inconclusive Final    POC Phencyclidine 12/11/2024 Negative  Negative, Inconclusive Final    POC Methylenedioxymethamphetamine * 12/11/2024 Negative  Negative, Inconclusive Final    POC Tricyclic Antidepressants 12/11/2024 Negative  Negative, Inconclusive Final    POC Buprenorphine 12/11/2024 Negative   Final     Acceptable 12/11/2024 Yes   Final    POC Temperature (Urine) 12/11/2024 94   Final   Lab Visit on 12/04/2024   Component Date Value Ref Range Status    Sodium 12/04/2024 144  136 - 145 mmol/L Final    Potassium 12/04/2024 3.4 (L)  3.5 - 5.1 mmol/L Final    Chloride 12/04/2024 102  98 - 107 mmol/L Final    CO2 12/04/2024 33 (H)  23 - 31 mmol/L Final    Anion Gap 12/04/2024 12  7 - 16 mmol/L Final    Glucose 12/04/2024 70 (L)  82 - 115 mg/dL Final    BUN 12/04/2024 14  10 - 20 mg/dL Final    Creatinine 12/04/2024 1.03 (H)  0.55 - 1.02 mg/dL Final    BUN/Creatinine Ratio 12/04/2024 14  6 - 20 Final    Calcium 12/04/2024 10.3 (H)  8.4 - 10.2 mg/dL Final    eGFR 12/04/2024 60  >=60 mL/min/1.73m2 Final    ProBNP 12/04/2024 162 (H)   1 - 125 pg/mL Final    Total Protein 12/04/2024 8.6 (H)  5.8 - 7.6 g/dL Final    Albumin 12/04/2024 4.3  3.4 - 4.8 g/dL Final    Bilirubin, Total 12/04/2024 0.3  <=1.5 mg/dL Final    Bilirubin, Direct 12/04/2024 0.2  <0.5 mg/dL Final    AST 12/04/2024 21  5 - 34 U/L Final    ALT 12/04/2024 8  <=55 U/L Final    Alk Phos 12/04/2024 143  40 - 150 U/L Final    Anti-Kyra-1 Ab 12/04/2024 <20  <20 Units Final    Anti-PL-7 Ab 12/04/2024 Negative  Negative Final    Anti-PL-12 Ab 12/04/2024 Negative  Negative Final    Anti-EJ Ab 12/04/2024 Negative  Negative Final    Anti-OJ Ab 12/04/2024 Negative  Negative Final    Anti-SRP Ab 12/04/2024 Negative  Negative Final    Anti-Mi-2-Ab 12/04/2024 Negative  Negative Final    Anti-TIF-1gamma Ab 12/04/2024 <20  <20 Units Final    Anti-MDA-5 Ab (CADM-140) 12/04/2024 <20  <20 Units Final    Anti-NXP-2 (P140) Ab 12/04/2024 <20  <20 Units Final    Anti-PM/Scl-100 Ab 12/04/2024 <20  <20 Units Final    Anti-Ku Ab 12/04/2024 Negative  Negative Final    Anti-SS-A 52kD Ab, IgG 12/04/2024 <20  <20 Units Final    Anti-U1 RNP Ab 12/04/2024 <20  <20 Units Final    Anti-U2 RNP Ab 12/04/2024 Negative  Negative Final    Anti-U3 RNP (Fibrillarin) 12/04/2024 Negative  Negative Final    c-ANCA 12/04/2024 Negative  Negative Final    p-ANCA 12/04/2024 Positive (A)  Negative Final    RA 12/04/2024 Positive (A)  Negative Final    CCP 12/04/2024 <16.0  <=19.9 units Final    IKE Screen 12/04/2024 Positive (A)  Negative Final    UCHE Screen 12/04/2024 Negative  Negative Final    UCHE Screen (EU) 12/04/2024 2.1  0.0 - 19.9 EUs Final    Anti-dsDNA 12/04/2024 Negative  Negative Final    Anti-DSDNA (IU) 12/04/2024 13  0 - 24 IU Final   Office Visit on 09/17/2024   Component Date Value Ref Range Status    POC Amphetamines 09/17/2024 Negative  Negative, Inconclusive Final    POC Barbiturates 09/17/2024 Negative  Negative, Inconclusive Final    POC Benzodiazepines 09/17/2024 Negative  Negative, Inconclusive Final    POC  Cocaine 09/17/2024 Negative  Negative, Inconclusive Final    POC THC 09/17/2024 Negative  Negative, Inconclusive Final    POC Methadone 09/17/2024 Negative  Negative, Inconclusive Final    POC Methamphetamine 09/17/2024 Negative  Negative, Inconclusive Final    POC Opiates 09/17/2024 Presumptive Positive (A)  Negative, Inconclusive Final    POC Oxycodone 09/17/2024 Negative  Negative, Inconclusive Final    POC Phencyclidine 09/17/2024 Negative  Negative, Inconclusive Final    POC Methylenedioxymethamphetamine * 09/17/2024 Negative  Negative, Inconclusive Final    POC Tricyclic Antidepressants 09/17/2024 Negative  Negative, Inconclusive Final    POC Buprenorphine 09/17/2024 Negative   Final     Acceptable 09/17/2024 Yes   Final    POC Temperature (Urine) 09/17/2024 92   Final   Hospital Outpatient Visit on 09/03/2024   Component Date Value Ref Range Status    LVOT stroke volume 09/03/2024 58.80  cm3 Final    LVIDd 09/03/2024 4.45  3.5 - 6.0 cm Final    LV Systolic Volume 09/03/2024 21.64  mL Final    LVIDs 09/03/2024 2.47  2.1 - 4.0 cm Final    LV ESV A2C 09/03/2024 71.57  mL Final    LV Diastolic Volume 09/03/2024 90.24  mL Final    LV ESV A4C 09/03/2024 59.35  mL Final    Left Ventricular End Systolic Volu* 09/03/2024 21.64  mL Final    Left Ventricular End Diastolic Vol* 09/03/2024 90.24  mL Final    IVS 09/03/2024 1.20 (A)  0.6 - 1.1 cm Final    LVOT diameter 09/03/2024 1.95  cm Final    LVOT area 09/03/2024 3.0  cm2 Final    FS 09/03/2024 44  28 - 44 % Final    Left Ventricle Relative Wall Thick* 09/03/2024 0.53  cm Final    PW 09/03/2024 1.18 (A)  0.6 - 1.1 cm Final    LV mass 09/03/2024 192.37  g Final    MV Peak E Dima 09/03/2024 0.32  m/s Final    TDI LATERAL 09/03/2024 0.07  m/s Final    TDI SEPTAL 09/03/2024 0.07  m/s Final    E/E' ratio 09/03/2024 4.57  m/s Final    MV Peak A Dima 09/03/2024 0.78  m/s Final    TR Max Dima 09/03/2024 2.24  m/s Final    E/A ratio 09/03/2024 0.41   Final    E  wave deceleration time 09/03/2024 135.89  msec Final    LV SEPTAL E/E' RATIO 09/03/2024 4.57  m/s Final    LV LATERAL E/E' RATIO 09/03/2024 4.57  m/s Final    LVOT peak ger 09/03/2024 0.99  m/s Final    Left Ventricular Outflow Tract Michaela* 09/03/2024 0.64  cm/s Final    Left Ventricular Outflow Tract Michaela* 09/03/2024 1.89  mmHg Final    RV- singh basal diam 09/03/2024 3.3  cm Final    RV-singh mid d 09/03/2024 2.1  cm Final    RV Basal Diameter 09/03/2024 8.37  cm Final    RV-singh length 09/03/2024 8.4  cm Final    RV mid diameter 09/03/2024 2.07  cm Final    TAPSE 09/03/2024 2.70  cm Final    LA Vol (MOD) 09/03/2024 65.85  cm3 Final    RA area length vol 09/03/2024 75.09  mL Final    RA Area 09/03/2024 25.6  cm2 Final    RA Vol 09/03/2024 80.35  mL Final    AV mean gradient 09/03/2024 3  mmHg Final    AV peak gradient 09/03/2024 6  mmHg Final    Ao peak ger 09/03/2024 1.21  m/s Final    Ao VTI 09/03/2024 22.80  cm Final    LVOT peak VTI 09/03/2024 19.70  cm Final    AV valve area 09/03/2024 2.58  cm² Final    AV Velocity Ratio 09/03/2024 0.82   Final    AV index (prosthetic) 09/03/2024 0.86   Final    JOSE by Velocity Ratio 09/03/2024 2.44  cm² Final    MV stenosis pressure 1/2 time 09/03/2024 39.41  ms Final    MV valve area p 1/2 method 09/03/2024 5.58  cm2 Final    Triscuspid Valve Regurgitation Pea* 09/03/2024 20  mmHg Final    PV PEAK VELOCITY 09/03/2024 0.92  m/s Final    PV peak gradient 09/03/2024 3  mmHg Final    Ao root annulus 09/03/2024 2.39  cm Final    IVC diameter 09/03/2024 2.63  cm Final    Mean e' 09/03/2024 0.07  m/s Final    LA area A4C 09/03/2024 23.06  cm2 Final    LA area A2C 09/03/2024 24.68  cm2 Final    AORTIC VALVE CUSP SEPERATION 09/03/2024 1.99  cm Final    BSA 09/03/2024 2.65  m2 Final    LV Systolic Volume Index 09/03/2024 8.7  mL/m2 Final    LV Diastolic Volume Index 09/03/2024 36.10  mL/m2 Final    LV Mass Index 09/03/2024 77  g/m2 Final    WENDI (MOD) 09/03/2024 26.3  mL/m2 Final     ZLVIDS 09/03/2024 -9.54   Final    ZLVIDD 09/03/2024 -11.41   Final   Office Visit on 08/22/2024   Component Date Value Ref Range Status    NJ Interval 08/22/2024 199   Final    QRS Duration 08/22/2024 146   Final    QT/QTc 08/22/2024 394/469   Final    PRT Axes 08/22/2024 48/209/13   Final    Heart Rate 08/22/2024 85   Final    Results 08/22/2024    Final    Hemoglobin A1C 08/22/2024 6.1  4.5 - 6.6 % Final    Estimated Average Glucose 08/22/2024 128  mg/dL Final    Troponin I High Sensitivity 08/22/2024 11.4  <=60.4 pg/mL Final    Sodium 08/22/2024 139  136 - 145 mmol/L Final    Potassium 08/22/2024 3.8  3.5 - 5.1 mmol/L Final    Chloride 08/22/2024 106  98 - 107 mmol/L Final    CO2 08/22/2024 29  21 - 32 mmol/L Final    Anion Gap 08/22/2024 8  7 - 16 mmol/L Final    Glucose 08/22/2024 107 (H)  74 - 106 mg/dL Final    BUN 08/22/2024 13  7 - 18 mg/dL Final    Creatinine 08/22/2024 1.00  0.55 - 1.02 mg/dL Final    BUN/Creatinine Ratio 08/22/2024 13  6 - 20 Final    Calcium 08/22/2024 10.2 (H)  8.5 - 10.1 mg/dL Final    eGFR 08/22/2024 63  >=60 mL/min/1.73m2 Final    Total Protein 08/22/2024 7.3  6.4 - 8.2 g/dL Final    Albumin 08/22/2024 3.6  3.5 - 5.0 g/dL Final    Bilirubin, Total 08/22/2024 0.5  >0.0 - 1.2 mg/dL Final    Bilirubin, Direct 08/22/2024 0.3 (H)  0.0 - 0.2 mg/dL Final    AST 08/22/2024 18  15 - 37 U/L Final    ALT 08/22/2024 19  13 - 56 U/L Final    Alk Phos 08/22/2024 126  55 - 142 U/L Final    WBC 08/22/2024 7.95  4.50 - 11.00 K/uL Final    RBC 08/22/2024 6.86 (H)  4.20 - 5.40 M/uL Final    Hemoglobin 08/22/2024 16.8 (H)  12.0 - 16.0 g/dL Final    Hematocrit 08/22/2024 55.3 (H)  38.0 - 47.0 % Final    MCV 08/22/2024 80.6  80.0 - 96.0 fL Final    MCH 08/22/2024 24.5 (L)  27.0 - 31.0 pg Final    MCHC 08/22/2024 30.4 (L)  32.0 - 36.0 g/dL Final    RDW 08/22/2024 21.4 (H)  11.5 - 14.5 % Final    Platelet Count 08/22/2024 224  150 - 400 K/uL Final    Neutrophils % 08/22/2024 64.3  53.0 - 65.0 % Final     Lymphocytes % 08/22/2024 23.4 (L)  27.0 - 41.0 % Final    Monocytes % 08/22/2024 6.8 (H)  2.0 - 6.0 % Final    Eosinophils % 08/22/2024 4.3 (H)  1.0 - 4.0 % Final    Basophils % 08/22/2024 0.6  0.0 - 1.0 % Final    Immature Granulocytes % 08/22/2024 0.6 (H)  0.0 - 0.4 % Final    nRBC, Auto 08/22/2024 0.0  <=0.0 % Final    Neutrophils, Abs 08/22/2024 5.11  1.80 - 7.70 K/uL Final    Lymphocytes, Absolute 08/22/2024 1.86  1.00 - 4.80 K/uL Final    Monocytes, Absolute 08/22/2024 0.54  0.00 - 0.80 K/uL Final    Eosinophils, Absolute 08/22/2024 0.34  0.00 - 0.50 K/uL Final    Basophils, Absolute 08/22/2024 0.05  0.00 - 0.20 K/uL Final    Immature Granulocytes, Absolute 08/22/2024 0.05 (H)  0.00 - 0.04 K/uL Final    nRBC, Absolute 08/22/2024 0.00  <=0.00 x10e3/uL Final    Diff Type 08/22/2024 Scan Smear   Final    Platelet Morphology 08/22/2024 Large & Giant Platelets (A)  Normal Final    Anisocytosis 08/22/2024 1+   Final    Microcytosis 08/22/2024 Few   Final    Target Cells 08/22/2024 Few   Final    Ovalocytes 08/22/2024 Few   Final         No orders of the defined types were placed in this encounter.          Requested Prescriptions     Signed Prescriptions Disp Refills    HYDROcodone-acetaminophen (NORCO)  mg per tablet 120 tablet 0     Sig: Take 1 tablet by mouth every 6 (six) hours.    HYDROcodone-acetaminophen (NORCO)  mg per tablet 120 tablet 0     Sig: Take 1 tablet by mouth every 6 (six) hours.    HYDROcodone-acetaminophen (NORCO)  mg per tablet 120 tablet 0     Sig: Take 1 tablet by mouth every 6 (six) hours.    diclofenac sodium (VOLTAREN ARTHRITIS PAIN) 1 % Gel 1 g 3     Sig: Apply 2 g topically once daily.       Assessment:     1. Chronic pain of right knee    2. Lumbosacral spondylosis without myelopathy    3. PVD (peripheral vascular disease)               A's of Opioid Risk Assessment  Activity:Patient can perform ADL.   Analgesia:Patients pain is partially controlled by current  medication. Patient has tried OTC medications such as Tylenol and Ibuprofen with out relief.   Adverse Effects: Patient denies constipation or sedation.  Aberrant Behavior:  reviewed with no aberrant drug seeking/taking behavior.  Overdose reversal drug naloxone discussed    Drug screen reviewed      MRI cervical spine ARM dated April 18, 2023 multiple level degenerative changes C4/5 disc osteophyte complex mild central canal stenosis uncovertebral hypertrophic        Plan:    Narcan December 2022    Wheelchair/4 point walker for mobility due to chronic nonhealing wounds right foot     Following wound management chronic ulcers lower extremities    Following orthopedics knee pain [facility], she states she was advised not to have surgery due to her weight    Diabetic, limit steroids    Cannot tolerate OxyContin she is severely allergic    History thyroid cancer    Poor procedure candidate multiple comorbidities    Bilateral intra-articular knee injection December 23, 2024  Patient stated knee pain improved      Severe left lower extremity/foot pain related diabetic ulcer       Doing well current medications she states it does help with her discomfort     She would like to continue current medications    Continue current medication    Continue home exercise program as tolerated    Follow-up 3 months    Dr. Crandall, December 2025    Bring original prescription medication bottles/container/box with labels to each visit

## 2025-01-06 RX ORDER — DICLOFENAC SODIUM 10 MG/G
2 GEL TOPICAL DAILY
Qty: 1 G | Refills: 3 | Status: SHIPPED | OUTPATIENT
Start: 2025-01-06

## 2025-01-10 ENCOUNTER — TELEPHONE (OUTPATIENT)
Dept: VASCULAR SURGERY | Facility: CLINIC | Age: 65
End: 2025-01-10
Payer: MEDICARE

## 2025-01-13 ENCOUNTER — OFFICE VISIT (OUTPATIENT)
Dept: PAIN MEDICINE | Facility: CLINIC | Age: 65
End: 2025-01-13
Payer: MEDICARE

## 2025-01-13 VITALS
SYSTOLIC BLOOD PRESSURE: 173 MMHG | DIASTOLIC BLOOD PRESSURE: 85 MMHG | WEIGHT: 293 LBS | HEIGHT: 68 IN | RESPIRATION RATE: 20 BRPM | BODY MASS INDEX: 44.41 KG/M2 | HEART RATE: 89 BPM

## 2025-01-13 DIAGNOSIS — M25.561 CHRONIC PAIN OF RIGHT KNEE: Primary | Chronic | ICD-10-CM

## 2025-01-13 DIAGNOSIS — I73.9 PVD (PERIPHERAL VASCULAR DISEASE): Chronic | ICD-10-CM

## 2025-01-13 DIAGNOSIS — G89.29 CHRONIC PAIN OF RIGHT KNEE: Primary | Chronic | ICD-10-CM

## 2025-01-13 DIAGNOSIS — M47.817 LUMBOSACRAL SPONDYLOSIS WITHOUT MYELOPATHY: Chronic | ICD-10-CM

## 2025-01-13 PROCEDURE — 1101F PT FALLS ASSESS-DOCD LE1/YR: CPT | Mod: CPTII,,, | Performed by: PHYSICIAN ASSISTANT

## 2025-01-13 PROCEDURE — 1159F MED LIST DOCD IN RCRD: CPT | Mod: CPTII,,, | Performed by: PHYSICIAN ASSISTANT

## 2025-01-13 PROCEDURE — 3008F BODY MASS INDEX DOCD: CPT | Mod: CPTII,,, | Performed by: PHYSICIAN ASSISTANT

## 2025-01-13 PROCEDURE — 3079F DIAST BP 80-89 MM HG: CPT | Mod: CPTII,,, | Performed by: PHYSICIAN ASSISTANT

## 2025-01-13 PROCEDURE — 99999 PR PBB SHADOW E&M-EST. PATIENT-LVL V: CPT | Mod: PBBFAC,,, | Performed by: PHYSICIAN ASSISTANT

## 2025-01-13 PROCEDURE — 1125F AMNT PAIN NOTED PAIN PRSNT: CPT | Mod: CPTII,,, | Performed by: PHYSICIAN ASSISTANT

## 2025-01-13 PROCEDURE — 99215 OFFICE O/P EST HI 40 MIN: CPT | Mod: PBBFAC | Performed by: PHYSICIAN ASSISTANT

## 2025-01-13 PROCEDURE — 3077F SYST BP >= 140 MM HG: CPT | Mod: CPTII,,, | Performed by: PHYSICIAN ASSISTANT

## 2025-01-13 PROCEDURE — 99214 OFFICE O/P EST MOD 30 MIN: CPT | Mod: S$PBB,,, | Performed by: PHYSICIAN ASSISTANT

## 2025-01-13 PROCEDURE — 3288F FALL RISK ASSESSMENT DOCD: CPT | Mod: CPTII,,, | Performed by: PHYSICIAN ASSISTANT

## 2025-01-13 RX ORDER — HYDROCODONE BITARTRATE AND ACETAMINOPHEN 10; 325 MG/1; MG/1
1 TABLET ORAL EVERY 6 HOURS
Qty: 120 TABLET | Refills: 0 | Status: SHIPPED | OUTPATIENT
Start: 2025-01-17

## 2025-01-13 RX ORDER — DICLOFENAC SODIUM 10 MG/G
2 GEL TOPICAL DAILY
Qty: 1 G | Refills: 3 | Status: SHIPPED | OUTPATIENT
Start: 2025-01-13

## 2025-01-13 RX ORDER — HYDROCODONE BITARTRATE AND ACETAMINOPHEN 10; 325 MG/1; MG/1
1 TABLET ORAL EVERY 6 HOURS
Qty: 120 TABLET | Refills: 0 | Status: SHIPPED | OUTPATIENT
Start: 2025-03-18

## 2025-01-13 RX ORDER — HYDROCODONE BITARTRATE AND ACETAMINOPHEN 10; 325 MG/1; MG/1
1 TABLET ORAL EVERY 6 HOURS
Qty: 120 TABLET | Refills: 0 | Status: SHIPPED | OUTPATIENT
Start: 2025-02-15

## 2025-01-15 ENCOUNTER — OFFICE VISIT (OUTPATIENT)
Dept: PULMONOLOGY | Facility: CLINIC | Age: 65
End: 2025-01-15
Payer: MEDICARE

## 2025-01-15 VITALS
DIASTOLIC BLOOD PRESSURE: 82 MMHG | HEART RATE: 65 BPM | RESPIRATION RATE: 16 BRPM | BODY MASS INDEX: 44.41 KG/M2 | SYSTOLIC BLOOD PRESSURE: 150 MMHG | OXYGEN SATURATION: 98 % | HEIGHT: 68 IN | WEIGHT: 293 LBS

## 2025-01-15 DIAGNOSIS — I77.82 VASCULITIS, ANCA POSITIVE: ICD-10-CM

## 2025-01-15 DIAGNOSIS — I27.21 PULMONARY ARTERIAL HYPERTENSION: Primary | ICD-10-CM

## 2025-01-15 DIAGNOSIS — J41.8 MIXED SIMPLE AND MUCOPURULENT CHRONIC BRONCHITIS: ICD-10-CM

## 2025-01-15 PROCEDURE — 1160F RVW MEDS BY RX/DR IN RCRD: CPT | Mod: CPTII,,, | Performed by: STUDENT IN AN ORGANIZED HEALTH CARE EDUCATION/TRAINING PROGRAM

## 2025-01-15 PROCEDURE — 99999 PR PBB SHADOW E&M-EST. PATIENT-LVL V: CPT | Mod: PBBFAC,,, | Performed by: STUDENT IN AN ORGANIZED HEALTH CARE EDUCATION/TRAINING PROGRAM

## 2025-01-15 PROCEDURE — 3008F BODY MASS INDEX DOCD: CPT | Mod: CPTII,,, | Performed by: STUDENT IN AN ORGANIZED HEALTH CARE EDUCATION/TRAINING PROGRAM

## 2025-01-15 PROCEDURE — 99215 OFFICE O/P EST HI 40 MIN: CPT | Mod: PBBFAC | Performed by: STUDENT IN AN ORGANIZED HEALTH CARE EDUCATION/TRAINING PROGRAM

## 2025-01-15 PROCEDURE — 1101F PT FALLS ASSESS-DOCD LE1/YR: CPT | Mod: CPTII,,, | Performed by: STUDENT IN AN ORGANIZED HEALTH CARE EDUCATION/TRAINING PROGRAM

## 2025-01-15 PROCEDURE — 99214 OFFICE O/P EST MOD 30 MIN: CPT | Mod: S$PBB,,, | Performed by: STUDENT IN AN ORGANIZED HEALTH CARE EDUCATION/TRAINING PROGRAM

## 2025-01-15 PROCEDURE — 3077F SYST BP >= 140 MM HG: CPT | Mod: CPTII,,, | Performed by: STUDENT IN AN ORGANIZED HEALTH CARE EDUCATION/TRAINING PROGRAM

## 2025-01-15 PROCEDURE — 3079F DIAST BP 80-89 MM HG: CPT | Mod: CPTII,,, | Performed by: STUDENT IN AN ORGANIZED HEALTH CARE EDUCATION/TRAINING PROGRAM

## 2025-01-15 PROCEDURE — 1126F AMNT PAIN NOTED NONE PRSNT: CPT | Mod: CPTII,,, | Performed by: STUDENT IN AN ORGANIZED HEALTH CARE EDUCATION/TRAINING PROGRAM

## 2025-01-15 PROCEDURE — 1159F MED LIST DOCD IN RCRD: CPT | Mod: CPTII,,, | Performed by: STUDENT IN AN ORGANIZED HEALTH CARE EDUCATION/TRAINING PROGRAM

## 2025-01-15 PROCEDURE — 3288F FALL RISK ASSESSMENT DOCD: CPT | Mod: CPTII,,, | Performed by: STUDENT IN AN ORGANIZED HEALTH CARE EDUCATION/TRAINING PROGRAM

## 2025-01-15 RX ORDER — BENZONATATE 100 MG/1
CAPSULE ORAL
COMMUNITY
Start: 2025-01-07

## 2025-01-15 RX ORDER — TADALAFIL 20 MG/1
40 TABLET ORAL
COMMUNITY
Start: 2025-01-06

## 2025-01-15 NOTE — PROGRESS NOTES
[facility]  Pulmonology  ESTABLISHED VISIT     Patient Name:  Holly Porter  Primary Care Provider: Regan Frausto MD  Date of Service: 03/25/2024    Chief Complaint: Shortness of breath    SUBJECTIVE   HPI:  Holly Porter is a 64 y.o. female with PAH ([facility] 02/2024 mPAP 34, PCWP 8, PVR 5.2, CO/CI 5.98/2.26), chronic respiratory failure with hypoxia and hypercapnia, JOVANNI/OSH on PAP who presents today for follow up of shortness of breath. Last seen 03/2024 with plan for Chantix and PA for dual therapy for PAH.     German reports feeling well with stable shortness of breath with exertion.  She has had no worsening in her breathing since her last evaluation.  She reports good output with her water pill of which she takes 1 to 2 times a day (Bumex) depending on her days activity.  She is currently using respiratory twice daily and recently filled her Spiriva as well; she has had decrease in her shortness of breath with her nebulizer treatment as well.  She received a sample of a nicotine inhaler which has significantly help decrease her daily cigarette use.  She has had no ED presentations or hospitalizations for any shortness of breath or otherwise.  We reviewed the results of her lab test.    Initial HPI  German reports feeling well on this evaluation.  She has shortness of breath that is present with exertion.  She currently use and using her oxygen during this visit as her son who drove her here was concerned that it could affect his car.  She reports using her oxygen at home.  She is using her nightly PAP device with nasal pillows.  She has no cough.  She reports using her Spiriva which she needs a refill.   She was recently admitted 0207-14 with shortness of breath where she was found to be fluid overloaded.  For management she underwent diuresis the transitioned to Bumex from Lasix.  She had improvement in her respiratory status and while she was admitted on high-flow nasal cannula was  deescalated to low-flow nasal cannula.  She also underwent right heart catheterization and was discharged home on PAP therapy.      03/2024: reports feeling well today. She had decreased her smoking while on Chantix to 1/4 pack a day and is interested in continuing this therapy. She has had improvement in her breathing which she attributes to her Spiriva. She has had no admissions to hospital since last seen. She uses her oxygen when at home and has noted difficulty with pushing her oxygen tank. Today, she is not on oxygen once again due to her son who helps with transporting her to appointments expresses concern with transport of tanks to vehicle.     06/2024: reports feeling well today. She is still smoking, however, has decreased use which she attributes to Chantix, still 1/4 ppd. She is using her oxygen more consistently. She reports daily use of albuterol. She feels improved following starting Tadalafil; no dispenses on MAR but states she received it from her pharmacy.     12/2024: feeling well. She has had poor sleep the past few nights that makes her tired today. She has had no admissions or ED presentations since last visit. She had cataract surgery last month with an unremarkable post-operative course. We discussed labs due today.       Past Medical History:   Diagnosis Date    Acquired hypothyroidism     Atherosclerosis of native artery of extremity with intermittent claudication 01/30/2019    bilateral legs    BMI 50.0-59.9, adult 02/22/2021    Chronic pain syndrome     opioid depen    Congestive heart failure (CHF)     COPD (chronic obstructive pulmonary disease)     COVID-19 10/06/2023    Depression     Diabetes mellitus, type 2     followed by Aurea Kwong NP, [facility]    DVT of lower extremity, bilateral     Essential hypertension 06/08/2021    GERD (gastroesophageal reflux disease)     Gout 07/05/2023    Hyperlipidemia     Lumbosacral radiculopathy 06/05/2023    followed by GLENN Valdes,  [facility]    Migraines     Nicotine dependence     Nicotine dependence     Obesity hypoventilation syndrome     chronic CO2 retainer with compensatory metabolic alkalosis    Osteoarthritis     Seizure disorder     Sleep apnea     on cpap    Thyroid cancer     Venous stasis ulcer limited to breakdown of skin with varicose veins     followed by Estuardo Zhao    Vitamin D deficiency        Past Surgical History:   Procedure Laterality Date    ABCESS DRAINAGE      HYSTERECTOMY      ILIAC ARTERY STENT Bilateral 01/28/2020    Bilateral distal aorta and common iliac 8 X 59 vbx covered stents performed by Dr. Antonio Jacinto.    LEFT HEART CATHETERIZATION Left 11/6/2023    Procedure: Left heart cath;  Surgeon: Alex Ortega DO;  Location: [facility] CATH LAB;  Service: Cardiology;  Laterality: Left;    RADIOFREQUENCY ABLATION Left 04/15/2022    Procedure: Left calf  Radiofrequency Ablation;  Surgeon: Matty Falcon DO;  Location: [facility] OR;  Service: Vascular;  Laterality: Left;    RIGHT HEART CATHETERIZATION Right 2/13/2024    Procedure: INSERTION, CATHETER, RIGHT HEART;  Surgeon: Mahad Moreno MD;  Location: [facility] CATH LAB;  Service: Cardiology;  Laterality: Right;    STAB PHLEBECTOMY OF VARICOSE VEINS Left 01/25/2013    Left leg microphlebectomies x 23 stab avulsions and ligation of multiple varicose veins perrformed by Dr. Cirilo Aguirre.    THYROIDECTOMY      hx: thyroid cancer    TOE AMPUTATION Left 01/28/2020    2nd, 4th and 5th performed by Dr. Anam Saeed.    TOE AMPUTATION Right 01/28/2020    4th toe performed by Dr. Anam Saeed.    TOTAL ABDOMINAL HYSTERECTOMY W/ BILATERAL SALPINGOOPHORECTOMY      TUBAL LIGATION      VAGINAL DELIVERY      x 4    VENOUS ABLATION Right 08/09/2019    GSV Varithena Ablation performed by Dr. Estuardo Falcon    VENOUS ABLATION Left 08/02/2019    ATAV Varithena Ablation performed by Dr. Estuardo Falcon.    VENOUS ABLATION Right 07/13/2015    ATAV Laser Ablatio  performed by Dr. Cirilo Aguirre.    VENOUS ABLATION Right 07/06/2015    Distal  GSV Laser Ablation performed by dr. Cirilo Aguirre.    VENOUS ABLATION Left 04/20/2015    Left Distal GSV Laser Ablation performed by dr. Cirilo Aguirre.    VENOUS ABLATION Right 10/28/2013    Right Distal GSV RF Ablation performed by Dr. Cirilo Aguirre.    VENOUS ABLATION Left 10/25/2013    SSV RF Ablation performed by dr. Cirilo Aguirre.    VENOUS ABLATION Left 10/07/2013    Left GSV and Left ATAV RF Ablation performed by Dr. Cirilo Aguirre.    VENOUS ABLATION Right 01/21/2013    GSV RF Ablation w/micros x 22 performed by Dr. Cirilo Aguirre.    VENOUS ABLATION Left 06/25/2021    Left distal GSV Varithena Ablation       Family History   Problem Relation Name Age of Onset    Heart disease Mother          age 84 CHF    Hypertension Mother      Osteoarthritis Mother      Coronary aneurysm Father      Coronary artery disease Father      Hypertension Father      Heart disease Father      No Known Problems Sister      No Known Problems Brother          hx: varicose veins    No Known Problems Brother          MVA: parlazed    No Known Problems Brother      No Known Problems Son      No Known Problems Son      No Known Problems Son      No Known Problems Son          Social History     Socioeconomic History    Marital status:    Tobacco Use    Smoking status: Every Day     Current packs/day: 0.50     Average packs/day: 0.5 packs/day for 33.0 years (16.5 ttl pk-yrs)     Types: Cigarettes     Passive exposure: Current    Smokeless tobacco: Never    Tobacco comments:     5 cigarettes a day    Substance and Sexual Activity    Alcohol use: Never    Drug use: Never    Sexual activity: Not Currently     Partners: Male     Social Drivers of Health     Financial Resource Strain: Low Risk  (10/1/2024)    Overall Financial Resource Strain (CARDIA)     Difficulty of Paying Living Expenses: Not hard at all   Food Insecurity: No Food Insecurity (10/1/2024)    Hunger Vital Sign     Worried  "About Running Out of Food in the Last Year: Never true     Ran Out of Food in the Last Year: Never true   Transportation Needs: Unmet Transportation Needs (10/1/2024)    PRAPARE - Transportation     Lack of Transportation (Medical): Yes     Lack of Transportation (Non-Medical): Yes   Physical Activity: Inactive (10/1/2024)    Exercise Vital Sign     Days of Exercise per Week: 0 days     Minutes of Exercise per Session: 0 min   Stress: No Stress Concern Present (10/1/2024)    [employer] - Occupational Stress Questionnaire     Feeling of Stress : Not at all   Housing Stability: Low Risk  (10/1/2024)    Housing Stability Vital Sign     Unable to Pay for Housing in the Last Year: No     Homeless in the Last Year: No       Social History     Social History Narrative    Not on file       Review of patient's allergies indicates:   Allergen Reactions    Ammonium peroxydisulfate Shortness Of Breath    Avocado (laurus persea) Anaphylaxis    Bananas [banana] Anaphylaxis and Swelling     CRAMPS,    Chocolate flavor Anaphylaxis     MOUTH SWELLING    Fentanyl Shortness Of Breath and Itching    Iodinated contrast media Anaphylaxis, Hives and Rash    Nicoderm Swelling    Percocet [oxycodone-acetaminophen] Shortness Of Breath and Itching    Silvadene [silver sulfadiazine]     Clindamycin     Hydrocortisone Blisters    Pregabalin     Adhesive Blisters, Rash and Itching    Iodine Rash    Latex, natural rubber Rash    Pcn [penicillins] Rash    Sulfa (sulfonamide antibiotics) Rash and Blisters        Medications: Medications reviewed to include over the counter medications.    Review of Systems: A focused ROS was completed and found to be negative except for that mentioned above.      OBJECTIVE   PHYSICAL EXAM:  Vitals:    01/15/25 1115   BP: (!) 150/82   BP Location: Left arm   Patient Position: Sitting   Pulse: 65   Resp: 16   SpO2: 98%   Weight: (!) 151 kg (332 lb 14.3 oz)   Height: 5' 8" (1.727 m) "     GENERAL: NAD  HEENT: normocephalic, non-icteric conjunctivae, moist oral mucosa  RESPIRATORY: clear lung in bilateral posterior and anterior lung fields, no wheezing, rales or rhonchi, on RA  CARDIOVASCULAR: Regular rate and rhythm, no murmurs rubs or gallops.  SKIN: no rash, jaundice, ecchymosis or ulcers  MUSCULOSKELETAL: No clubbing or cyanosis; b/l LE edema 1+ extending to mid shin  NEUROLOGIC: AO ×3, no gross deficits    LABS:  Lab studies reviewed and notable for ABG 02/2024: 7.40/53/49/32.8/84, IKE positive, pANCA positive, CCP undetectable, negative anti-dsDNA (12/2024)  Lab Results   Component Value Date    WBC 7.95 08/22/2024    HGB 16.8 (H) 08/22/2024    HCT 55.3 (H) 08/22/2024    MCV 80.6 08/22/2024     08/22/2024     BMP  Lab Results   Component Value Date     12/04/2024    K 3.4 (L) 12/04/2024     12/04/2024    CO2 33 (H) 12/04/2024    BUN 14 12/04/2024    CREATININE 1.03 (H) 12/04/2024    CALCIUM 10.3 (H) 12/04/2024    ANIONGAP 12 12/04/2024    EGFRNORACEVR 60 12/04/2024 12/04/22 15:31 06/28/23 11:50 10/05/23 15:36 10/17/23 11:28 11/03/23 13:20 12/06/23 15:31 02/07/24 12:41 02/27/24 11:18   NT-proBNP 44 112 1,932 (H) 286 (H) 3,183 (H) 1,371 (H) 2,073 (H) 642 (H)     IMAGING:  CT-PE 02/2024:  FINDINGS:  No thrombus or other abnormality is identified in the pulmonary arteries or veins.  The pulmonary vessel caliber is within normal limits.  The cardiac size is enlarged.  Otherwise heart mediastinum and great vessels appear within normal limits.     Small amount airspace density both lungs more prominent in the right lung and mostly in the dependent lungs.  Remaining pulmonary.  Small amount parenchyma shows no evidence of airspace disease or abnormal density.  No effusion or pneumothorax is present.    RHC 02/2024: RA 6/4/3, RV 50/1, mPAP 34, PCWP 8, PVR 5.2 (Td), CO/CI 5.98/2.26 (Td)    TTE:  02/2024: LVEF 60%, diastolic dysfunction present, mild RV enlargement, normal LA  size, RA dilated, trace MR, mod TR, PASP 50    09/2024: LVEF 55-60%, normal diastolic function, normal RV size, TAPSE 2.7, normal LA size, mild RA dilation, trace TR    [facility] 11/2023: no CAD, angiographically normal coronaries     LUNG FUNCTION TESTING:      SPIROMETRY/PFT:  03/2024 Pre Post   FVC 2.01/-2.78 2.08/-2.64   FEV1 1.57/-2.64 1.57/-2.64   FEV1/FVC 78/0 75/-0.42   TLC 5.34/-0.50    FRC  3.60/0.54    RV 3.28/2.09    DLCO 10.24/-5.08      SPIROMETRY/PFT:  02/2022 Pre Post   FVC 2.42/-2.13 2.42/-2.13   FEV1 1.95/-1.94 1.95/-1.94   FEV1/FVC 81/0.30 81/0.30   TLC 5.39/-0.43     FRC  3.32/0.12     RV 2.97/1.54     DLCO 14.36/-2.88          6MWD:  Date Distance (ft) Resting SpO2; Yonathan SpO2 O2 Required   03/2024 453 87%,RA; 87% 2-3L NC   12/2024 563 94%, RA; 91% None     ASSESSMENT & PLAN     1. Pulmonary arterial hypertension  Assessment & Plan:  C 02/2024 with precapillary pulmonary hypertension (mPAP 34, PCWP 8, PVR 5.2, CO/CI 5.98/2.26 -Td) that is out of proportion for JOVANNI on PAP. PFTs with no obstruction. History is notable for PE in 2022; CT-PE imaging thereafter with no webs or evidence of organization and negative for acute PE. Lab work with negative HIV screen and imaging notable for hepatic steatosis. Improved fluid status with diuresis with ongoing good UOP. Plan for management as follows:  - WHO group I, NYHA class II  - REVEAL risk score 8; intermediate risk  - cont Tadalafil   -- s/p hysterectomy with radiographic absence of uterus   -- medication side effects reviewed; educated on avoidance of abrupt cessation of medication  - cont Bumex BID  - progressive erythrocytosis with normal Plts is worrisome for poor PAP therapy; will refer for titration study with Sleep Medicine   -- still pending f/u  - on follow up 06/2025 or thereafter, repeat NTproBNP, CMP, TTE and 6MWT  - complete work up with MPO and PR3 for positive pANCA        2. Vasculitis, ANCA positive  -     Miscellaneous Test, Sendout MPO,  Myeloperoxidase Antibodies, IgG, Serum; Future; Expected date: 01/15/2025  -     Miscellaneous Test, Sendout PR3; Proteinase 3 Antibodies, IgG, Serum; Future; Expected date: 01/15/2025    3. Mixed simple and mucopurulent chronic bronchitis  Assessment & Plan:  64 yo F with ongoing nicotine dependence presents for evaluation. Improved respiratory status with initiation of LAMA and DOROTHEA PRN. PFTs with PRISM.   - cont Breztri BID  - stop Spiriva  - cont DOROTHEA PRN          Follow up in about 6 months (around 7/15/2025) for Routine follow up.      Case was discussed with patient; all questions were answered to patient's satisfaction and patient verbalized understanding.     Missy Godoy MD  Pulmonary Medicine  [facility]  Phone: [phone]

## 2025-01-15 NOTE — ASSESSMENT & PLAN NOTE
RHC 02/2024 with precapillary pulmonary hypertension (mPAP 34, PCWP 8, PVR 5.2, CO/CI 5.98/2.26 -Td) that is out of proportion for JOVANNI on PAP. PFTs with no obstruction. History is notable for PE in 2022; CT-PE imaging thereafter with no webs or evidence of organization and negative for acute PE. Lab work with negative HIV screen and imaging notable for hepatic steatosis. Improved fluid status with diuresis with ongoing good UOP. Plan for management as follows:  - WHO group I, NYHA class II  - REVEAL risk score 8; intermediate risk  - cont Tadalafil   -- s/p hysterectomy with radiographic absence of uterus   -- medication side effects reviewed; educated on avoidance of abrupt cessation of medication  - cont Bumex BID  - progressive erythrocytosis with normal Plts is worrisome for poor PAP therapy; will refer for titration study with Sleep Medicine   -- still pending f/u  - on follow up 06/2025 or thereafter, repeat NTproBNP, CMP, TTE and 6MWT  - complete work up with MPO and PR3 for positive pANCA

## 2025-01-15 NOTE — ASSESSMENT & PLAN NOTE
64 yo F with ongoing nicotine dependence presents for evaluation. Improved respiratory status with initiation of LAMA and DOROTHEA PRN. PFTs with PRISM.   - cont Breztri BID  - stop Spiriva  - cont DOROTHEA PRN

## 2025-03-11 NOTE — PLAN OF CARE
Pharmacist Clinic: Nephrology Management    Tadeo Eric is a 89 y.o. female was referred to Clinical Pharmacy Team for bp management.     Referring Provider: Dacia Asencio APRN-C*    THIS IS A NEW PATIENT APPOINTMENT. PATIENT WILL BE ESTABLISHING CARE WITH CLINICAL PHARMACY.    Appointment was completed by tadeo who was reached at .    REVIEW OF PAST APPNT (IF APPLICABLE):   N/a    Allergies Reviewed? No    Allergies   Allergen Reactions    Bactrim [Sulfamethoxazole-Trimethoprim] Other     Significant hyperkalemia and renal dysfunction       Current Outpatient Medications on File Prior to Visit   Medication Sig Dispense Refill    amLODIPine (Norvasc) 5 mg tablet Take 1.5 tablets (7.5 mg) by mouth once daily. 135 tablet 3    aspirin 81 mg EC tablet Take 1 tablet (81 mg) by mouth once daily. 90 tablet 3    blood pressure monitor (Blood Pressure Kit) kit 1 kit once daily. 1 kit 0    levothyroxine (Synthroid, Levoxyl) 25 mcg tablet Take 2 tablets (50 mcg) by mouth once daily. 180 tablet 3    methIMAzole (Tapazole) 5 mg tablet Take 1 tablet (5 mg) by mouth 2 times a day. 180 tablet 3    multivitamin with minerals (Therems-M) tablet Take 1 tablet by mouth once daily. 90 tablet 3    sodium bicarbonate 650 mg tablet Take 2 tablets (1,300 mg) by mouth every 12 hours. 360 tablet 3     No current facility-administered medications on file prior to visit.       PHARMACEUTICAL ASSESSMENT:    MEDICATION RECONCILIATION    Was a medication reconciliation completed at this visit? Yes  Home Pharmacy Reviewed? Yes, describe: [facility]      Drug Interactions? No    Medication Documentation Review Audit       Reviewed by KRISTEN Brambila (Nurse Practitioner) on 01/23/25 at 0851      Medication Order Taking? Sig Documenting Provider Last Dose Status   amLODIPine (Norvasc) 5 mg tablet [id number] Yes Take 1.5 tablets (7.5 mg) by mouth once daily. Emiliana Hayden MD  Active   aspirin 81 mg EC tablet    Problem: Adult Inpatient Plan of Care  Goal: Patient-Specific Goal (Individualized)  Outcome: Ongoing, Progressing  Goal: Absence of Hospital-Acquired Illness or Injury  Outcome: Ongoing, Progressing     Problem: Bariatric Environmental Safety  Goal: Safety Maintained with Care  Outcome: Ongoing, Progressing     Problem: Diabetes Comorbidity  Goal: Blood Glucose Level Within Targeted Range  Outcome: Ongoing, Progressing     Problem: Skin Injury Risk Increased  Goal: Skin Health and Integrity  Outcome: Ongoing, Progressing      [id number] Yes Take 1 tablet (81 mg) by mouth once daily. Emiliana Hayden MD  Active   levothyroxine (Synthroid, Levoxyl) 25 mcg tablet [id number] Yes Take 2 tablets (50 mcg) by mouth once daily. Emiliana Hayden MD  Active   methIMAzole (Tapazole) 5 mg tablet [id number] Yes Take 1 tablet (5 mg) by mouth 2 times a day. Emiliana Hayden MD  Active   multivitamin with minerals (Therems-M) tablet [id number] Yes Take 1 tablet by mouth once daily. Emiliana Hayden MD  Active   sodium bicarbonate 650 mg tablet [id number] Yes Take 2 tablets (1,300 mg) by mouth every 12 hours. CLOTILDE Brambila-CNP  Active                    DISEASE MANAGEMENT ASSESSMENT:     Hypertension History:  HTN diagnosis: yes  Current Regimen  Amlodipine 7.5mg every day    HTN at goal? no  BP Cuff at home? yes  BP Readings from Last 6 Encounters:   01/23/25 150/54   12/19/24 142/51   12/10/24 144/52   08/26/24 130/77   08/21/24 138/84   08/13/24 122/64       Hyperlipidemia History:  Diagnosis? no    Diabetes History:  Diagnosis? no  At goal? yes    Lab Results   Component Value Date    HGBA1C 5.4 11/20/2021    HGBA1C 4.1 05/05/2021       Lab Review  Electrolytes: Within normal limits (stable from last)     Lab Results   Component Value Date    BILITOT 0.4 12/26/2024    CALCIUM 9.4 12/26/2024    CO2 23 12/26/2024    CL 94 (L) 12/26/2024    CREATININE 1.22 (H) 12/26/2024    GLUCOSE 93 12/26/2024    ALKPHOS 89 12/26/2024    K 5.0 12/26/2024    PROT 6.9 12/26/2024     (L) 12/26/2024    AST 18 12/26/2024    ALT 7 12/26/2024    BUN 13 12/26/2024    ANIONGAP 13 12/26/2024    MG 2.18 01/12/2024    PHOS 3.3 12/26/2024    ALBUMIN 3.9 12/26/2024    GFRF 35 (A) 09/05/2023       HISTORY OF PRESENT ILLNESS    CKD ASSESSMENT    Stage: 3b (eGFR 30-44)    Patient followed by nephrology: Yes - flores richardson      Last GFR:   Lab Results   Component Value Date    EGFR 42 (L) 12/26/2024    EGFR 29 (L) 12/10/2024    EGFR 33 (L) 03/19/2024       Last Albumin/Creatine  "Ratio: not on file  Date: n/a  No results found for: \"MICROALBCREA\"    CURRENT PHARMACOTHERAPY  ACE-I/ARB? No  SGLT2-I? No  MRA? No  GLP-1 RA? No  Hyperuricemic Agent(s)? No  Hyperlipidemic Agent(s)? No    MEDICATIONS REQUIRING RENAL DOSE ADJUSTMENTS:  No  After review, all medications are dosed appropriately     DISCUSSION/ASSESSMENT:   Discussed:   Home bp readings not available for review today. Normally checked by son who is unavailable at time of appointment. Patient agreeable to have son write down readings so we can review at next appointment and plan for adjustments.    EDUCATION:   Counseled patient on MOA, expectations, side effects, duration of therapy, contraindications, administration, and monitoring parameters  Recommended maintaining a diet with a protein intake no more than 0.8 g/kg body weight per day and a sodium intake no more than 2 grams per day  For patients with HTN, recommended that we target a systolic blood pressure less than 130 mm Hg, when tolerated.  Answered all patient questions and concerns      Assessment/Plan   Problem List Items Addressed This Visit    None      RECOMMENDATIONS/PLAN    Chronic Kidney Disease is stable with most recent GFR of 42 mL/min/1.73m*2   No changes today  Follow Up: 4/9 @ 940a for bp review    Last Appnt with Referring Provider: 1/23/25  Next Appnt with Referring Provider: 5/15/2025  Clinical Pharmacist follow up: 4/9 at 940a  Type of Encounter: Yamila Pérez PharmD    Verbal consent to manage patient's drug therapy was obtained from the patient . They were informed they may decline to participate or withdraw from participation in pharmacy services at any time.    Continue all meds under the continuation of care with the referring provider and clinical pharmacy team.    "

## 2025-04-03 NOTE — PROGRESS NOTES
Subjective:         Patient ID: Holly Porter is a 64 y.o. female.    Chief Complaint: Hip Pain (Patient is having right hip, right leg and right foot pain.)            Pain  This is a chronic problem. The current episode started more than 1 year ago. The problem occurs daily. The problem has been waxing and waning. Associated symptoms include arthralgias. Pertinent negatives include no chest pain, chills, coughing, diaphoresis, fever, sore throat, vertigo or vomiting.     Review of Systems   Constitutional:  Negative for activity change, chills, diaphoresis, fever and unexpected weight change.   HENT:  Negative for drooling, ear discharge, ear pain, facial swelling, nosebleeds, sore throat, trouble swallowing, voice change and goiter.    Eyes:  Negative for photophobia, pain, discharge, redness and visual disturbance.   Respiratory:  Negative for apnea, cough, choking, chest tightness, shortness of breath, wheezing and stridor.    Cardiovascular:  Negative for chest pain, palpitations and leg swelling.   Gastrointestinal:  Negative for abdominal distention, diarrhea, rectal pain, vomiting and fecal incontinence.   Endocrine: Negative for cold intolerance, heat intolerance, polydipsia, polyphagia and polyuria.   Genitourinary:  Negative for bladder incontinence, dysuria, flank pain, frequency and hot flashes.   Musculoskeletal:  Positive for arthralgias, back pain, leg pain and neck stiffness.   Integumentary:  Negative for color change and pallor.   Allergic/Immunologic: Negative for immunocompromised state.   Neurological:  Negative for dizziness, vertigo, seizures, syncope, facial asymmetry, speech difficulty, light-headedness and coordination difficulties.   Hematological:  Negative for adenopathy. Does not bruise/bleed easily.   Psychiatric/Behavioral:  Negative for agitation, behavioral problems, confusion, decreased concentration, dysphoric mood, hallucinations, self-injury and suicidal ideas. The patient  is not nervous/anxious and is not hyperactive.            Past Medical History:   Diagnosis Date    Acquired hypothyroidism     Atherosclerosis of native artery of extremity with intermittent claudication 01/30/2019    bilateral legs    BMI 50.0-59.9, adult 02/22/2021    Chronic pain syndrome     opioid depen    Congestive heart failure (CHF)     COPD (chronic obstructive pulmonary disease)     COVID-19 10/06/2023    Depression     Diabetes mellitus, type 2     followed by Aurea Kwong NP, [facility]    DVT of lower extremity, bilateral     Essential hypertension 06/08/2021    GERD (gastroesophageal reflux disease)     Gout 07/05/2023    Hyperlipidemia     Lumbosacral radiculopathy 06/05/2023    followed by GLENN Valdes, [facility]    Migraines     Nicotine dependence     Nicotine dependence     Obesity hypoventilation syndrome     chronic CO2 retainer with compensatory metabolic alkalosis    Osteoarthritis     Seizure disorder     Sleep apnea     on cpap    Thyroid cancer     Venous stasis ulcer limited to breakdown of skin with varicose veins     followed by Estuardo Zhao    Vitamin D deficiency      Past Surgical History:   Procedure Laterality Date    ABCESS DRAINAGE      HYSTERECTOMY      ILIAC ARTERY STENT Bilateral 01/28/2020    Bilateral distal aorta and common iliac 8 X 59 vbx covered stents performed by Dr. Antonio Jacinto.    LEFT HEART CATHETERIZATION Left 11/6/2023    Procedure: Left heart cath;  Surgeon: Alex Ortega DO;  Location: [facility] CATH LAB;  Service: Cardiology;  Laterality: Left;    RADIOFREQUENCY ABLATION Left 04/15/2022    Procedure: Left calf  Radiofrequency Ablation;  Surgeon: Matty Falcon DO;  Location: [facility] OR;  Service: Vascular;  Laterality: Left;    RIGHT HEART CATHETERIZATION Right 2/13/2024    Procedure: INSERTION, CATHETER, RIGHT HEART;  Surgeon: Mahad Moreno MD;  Location: [facility] CATH LAB;  Service: Cardiology;  Laterality:  Right;    STAB PHLEBECTOMY OF VARICOSE VEINS Left 01/25/2013    Left leg microphlebectomies x 23 stab avulsions and ligation of multiple varicose veins perrformed by Dr. Cirilo Aguirre.    THYROIDECTOMY      hx: thyroid cancer    TOE AMPUTATION Left 01/28/2020    2nd, 4th and 5th performed by Dr. Anam Saeed.    TOE AMPUTATION Right 01/28/2020    4th toe performed by Dr. Anam Saeed.    TOTAL ABDOMINAL HYSTERECTOMY W/ BILATERAL SALPINGOOPHORECTOMY      TUBAL LIGATION      VAGINAL DELIVERY      x 4    VENOUS ABLATION Right 08/09/2019    GSV Varithena Ablation performed by Dr. Estuardo Falcon    VENOUS ABLATION Left 08/02/2019    ATAV Varithena Ablation performed by Dr. Estuardo Falcon.    VENOUS ABLATION Right 07/13/2015    ATAV Laser Ablatio performed by Dr. Cirilo Aguirre.    VENOUS ABLATION Right 07/06/2015    Distal  GSV Laser Ablation performed by dr. Cirilo Aguirre.    VENOUS ABLATION Left 04/20/2015    Left Distal GSV Laser Ablation performed by dr. Cirilo Aguirre.    VENOUS ABLATION Right 10/28/2013    Right Distal GSV RF Ablation performed by Dr. Cirilo Aguirre.    VENOUS ABLATION Left 10/25/2013    SSV RF Ablation performed by dr. Cirilo Aguirre.    VENOUS ABLATION Left 10/07/2013    Left GSV and Left ATAV RF Ablation performed by Dr. Cirilo Aguirre.    VENOUS ABLATION Right 01/21/2013    GSV RF Ablation w/micros x 22 performed by Dr. Cirilo Aguirre.    VENOUS ABLATION Left 06/25/2021    Left distal GSV Varithena Ablation     Social History     Socioeconomic History    Marital status:    Tobacco Use    Smoking status: Every Day     Current packs/day: 0.50     Average packs/day: 0.5 packs/day for 33.0 years (16.5 ttl pk-yrs)     Types: Cigarettes     Passive exposure: Current    Smokeless tobacco: Never    Tobacco comments:     5 cigarettes a day    Substance and Sexual Activity    Alcohol use: Never    Drug use: Never    Sexual activity: Not Currently     Partners: Male     Social Drivers of Health     Financial Resource Strain: Low Risk  (3/25/2025)     Overall Financial Resource Strain (CARDIA)     Difficulty of Paying Living Expenses: Not hard at all   Food Insecurity: Food Insecurity Present (3/25/2025)    Hunger Vital Sign     Worried About Running Out of Food in the Last Year: Never true     Ran Out of Food in the Last Year: Sometimes true   Transportation Needs: Unmet Transportation Needs (3/25/2025)    PRAPARE - Transportation     Lack of Transportation (Medical): Yes     Lack of Transportation (Non-Medical): Yes   Physical Activity: Insufficiently Active (3/25/2025)    Exercise Vital Sign     Days of Exercise per Week: 2 days     Minutes of Exercise per Session: 10 min   Stress: Stress Concern Present (3/25/2025)    Citizen of [place] [employer] - Occupational Stress Questionnaire     Feeling of Stress : To some extent   Housing Stability: Low Risk  (3/25/2025)    Housing Stability Vital Sign     Unable to Pay for Housing in the Last Year: No     Number of Times Moved in the Last Year: 1     Homeless in the Last Year: No     Family History   Problem Relation Name Age of Onset    Heart disease Mother          age 84 CHF    Hypertension Mother      Osteoarthritis Mother      Coronary aneurysm Father      Coronary artery disease Father      Hypertension Father      Heart disease Father      No Known Problems Sister      No Known Problems Brother          hx: varicose veins    No Known Problems Brother          MVA: parlazed    No Known Problems Brother      No Known Problems Son      No Known Problems Son      No Known Problems Son      No Known Problems Son       Review of patient's allergies indicates:   Allergen Reactions    Ammonium peroxydisulfate Shortness Of Breath    Avocado (laurus persea) Anaphylaxis    Bananas [banana] Anaphylaxis and Swelling     CRAMPS,    Chocolate flavor Anaphylaxis     MOUTH SWELLING    Fentanyl Shortness Of Breath and Itching    Iodinated contrast media Anaphylaxis, Hives and Rash    Nicoderm Swelling    Percocet  "[oxycodone-acetaminophen] Shortness Of Breath and Itching    Silvadene [silver sulfadiazine]     Clindamycin     Hydrocortisone Blisters    Pregabalin     Adhesive Blisters, Rash and Itching    Iodine Rash    Latex, natural rubber Rash    Pcn [penicillins] Rash    Sulfa (sulfonamide antibiotics) Rash and Blisters        Objective:  Vitals:    04/14/25 1329 04/14/25 1331   BP: (!) 184/80    Pulse: 88    Resp: 18    Weight: (!) 153.3 kg (338 lb)    Height: 5' 8" (1.727 m)    PainSc:   7   7   PainLoc: Hip                    Physical Exam  Vitals and nursing note reviewed. Exam conducted with a chaperone present.   Constitutional:       General: She is awake. She is not in acute distress.     Appearance: Normal appearance. She is obese. She is not ill-appearing, toxic-appearing or diaphoretic.   HENT:      Head: Normocephalic and atraumatic.      Nose: Nose normal.      Mouth/Throat:      Mouth: Mucous membranes are moist.      Pharynx: Oropharynx is clear.   Eyes:      Conjunctiva/sclera: Conjunctivae normal.      Pupils: Pupils are equal, round, and reactive to light.   Cardiovascular:      Rate and Rhythm: Normal rate.   Pulmonary:      Effort: Pulmonary effort is normal. No respiratory distress.   Abdominal:      Palpations: Abdomen is soft.   Musculoskeletal:         General: Normal range of motion.      Cervical back: Normal range of motion and neck supple.   Skin:     General: Skin is warm and dry.   Neurological:      General: No focal deficit present.      Mental Status: She is alert and oriented to person, place, and time. Mental status is at baseline.      Cranial Nerves: No cranial nerve deficit (II-XII).   Psychiatric:         Mood and Affect: Mood normal.         Behavior: Behavior normal. Behavior is cooperative.         Thought Content: Thought content normal.           X-Ray Chest PA And Lateral  Narrative: EXAMINATION:  XR CHEST PA AND LATERAL    CLINICAL HISTORY:  Cough, unspecified    TECHNIQUE:  PA " and lateral views of the chest were performed.    COMPARISON:  02/27/2024.    FINDINGS:  The lungs are clear.  No focal consolidation.  Heart size is enlarged.  Mediastinal contours unremarkable.    Bony thorax intact.  Impression: No acute chest disease.    Electronically signed by: Waldo Solis  Date:    12/12/2024  Time:    14:15         Lab Visit on 01/15/2025   Component Date Value Ref Range Status    [facility] Generic Orderable 01/15/2025 SEE COMMENTS   Final    [facility] Generic Orderable 01/15/2025 SEE COMMENTS   Final   Office Visit on 12/11/2024   Component Date Value Ref Range Status    POC Amphetamines 12/11/2024 Negative  Negative, Inconclusive Final    POC Barbiturates 12/11/2024 Negative  Negative, Inconclusive Final    POC Benzodiazepines 12/11/2024 Negative  Negative, Inconclusive Final    POC Cocaine 12/11/2024 Negative  Negative, Inconclusive Final    POC THC 12/11/2024 Negative  Negative, Inconclusive Final    POC Methadone 12/11/2024 Negative  Negative, Inconclusive Final    POC Methamphetamine 12/11/2024 Negative  Negative, Inconclusive Final    POC Opiates 12/11/2024 Presumptive Positive (A)  Negative, Inconclusive Final    POC Oxycodone 12/11/2024 Negative  Negative, Inconclusive Final    POC Phencyclidine 12/11/2024 Negative  Negative, Inconclusive Final    POC Methylenedioxymethamphetamine * 12/11/2024 Negative  Negative, Inconclusive Final    POC Tricyclic Antidepressants 12/11/2024 Negative  Negative, Inconclusive Final    POC Buprenorphine 12/11/2024 Negative   Final     Acceptable 12/11/2024 Yes   Final    POC Temperature (Urine) 12/11/2024 94   Final   Lab Visit on 12/04/2024   Component Date Value Ref Range Status    Sodium 12/04/2024 144  136 - 145 mmol/L Final    Potassium 12/04/2024 3.4 (L)  3.5 - 5.1 mmol/L Final    Chloride 12/04/2024 102  98 - 107 mmol/L Final    CO2 12/04/2024 33 (H)  23 - 31 mmol/L Final    Anion Gap 12/04/2024 12  7 - 16 mmol/L Final    Glucose  12/04/2024 70 (L)  82 - 115 mg/dL Final    BUN 12/04/2024 14  10 - 20 mg/dL Final    Creatinine 12/04/2024 1.03 (H)  0.55 - 1.02 mg/dL Final    BUN/Creatinine Ratio 12/04/2024 14  6 - 20 Final    Calcium 12/04/2024 10.3 (H)  8.4 - 10.2 mg/dL Final    eGFR 12/04/2024 60  >=60 mL/min/1.73m2 Final    ProBNP 12/04/2024 162 (H)  1 - 125 pg/mL Final    Total Protein 12/04/2024 8.6 (H)  5.8 - 7.6 g/dL Final    Albumin 12/04/2024 4.3  3.4 - 4.8 g/dL Final    Bilirubin, Total 12/04/2024 0.3  <=1.5 mg/dL Final    Bilirubin, Direct 12/04/2024 0.2  <0.5 mg/dL Final    AST 12/04/2024 21  5 - 34 U/L Final    ALT 12/04/2024 8  <=55 U/L Final    Alk Phos 12/04/2024 143  40 - 150 U/L Final    Anti-Kyra-1 Ab 12/04/2024 <20  <20 Units Final    Anti-PL-7 Ab 12/04/2024 Negative  Negative Final    Anti-PL-12 Ab 12/04/2024 Negative  Negative Final    Anti-EJ Ab 12/04/2024 Negative  Negative Final    Anti-OJ Ab 12/04/2024 Negative  Negative Final    Anti-SRP Ab 12/04/2024 Negative  Negative Final    Anti-Mi-2-Ab 12/04/2024 Negative  Negative Final    Anti-TIF-1gamma Ab 12/04/2024 <20  <20 Units Final    Anti-MDA-5 Ab (CADM-140) 12/04/2024 <20  <20 Units Final    Anti-NXP-2 (P140) Ab 12/04/2024 <20  <20 Units Final    Anti-PM/Scl-100 Ab 12/04/2024 <20  <20 Units Final    Anti-Ku Ab 12/04/2024 Negative  Negative Final    Anti-SS-A 52kD Ab, IgG 12/04/2024 <20  <20 Units Final    Anti-U1 RNP Ab 12/04/2024 <20  <20 Units Final    Anti-U2 RNP Ab 12/04/2024 Negative  Negative Final    Anti-U3 RNP (Fibrillarin) 12/04/2024 Negative  Negative Final    c-ANCA 12/04/2024 Negative  Negative Final    p-ANCA 12/04/2024 Positive (A)  Negative Final    RA 12/04/2024 Positive (A)  Negative Final    CCP 12/04/2024 <16.0  <=19.9 units Final    IKE Screen 12/04/2024 Positive (A)  Negative Final    UCHE Screen 12/04/2024 Negative  Negative Final    UCHE Screen (EU) 12/04/2024 2.1  0.0 - 19.9 EUs Final    Anti-dsDNA 12/04/2024 Negative  Negative Final    Anti-DSDNA  (IU) 12/04/2024 13  0 - 24 IU Final         Orders Placed This Encounter   Procedures    Controlled Substance Monitoring Panel, Random, Urine     Standing Status:   Future     Number of Occurrences:   1     Expected Date:   4/14/2025     Expiration Date:   6/13/2026     Send normal result to authorizing provider's In Basket if patient is active on MyChart::   Yes    POCT Urine Drug Screen Presump     Interpretive Information:     Negative:  No drug detected at the cut off level.   Positive:  This result represents presumptive positive for the   tested drug, other substances may yield a positive response other   than the analyte of interest. This result should be utilized for   diagnostic purpose only. Confirmation testing will be performed upon physician request only.              Requested Prescriptions     Signed Prescriptions Disp Refills    HYDROcodone-acetaminophen (NORCO)  mg per tablet 120 tablet 0     Sig: Take 1 tablet by mouth every 6 (six) hours.       Assessment:     1. Lumbosacral spondylosis without myelopathy    2. Chronic pain of right knee    3. PVD (peripheral vascular disease)    4. Encounter for long-term (current) use of medications    5. Chronic pain of left knee                 A's of Opioid Risk Assessment  Activity:Patient can perform ADL.   Analgesia:Patients pain is partially controlled by current medication. Patient has tried OTC medications such as Tylenol and Ibuprofen with out relief.   Adverse Effects: Patient denies constipation or sedation.  Aberrant Behavior:  reviewed with no aberrant drug seeking/taking behavior.  Overdose reversal drug naloxone discussed    Drug screen reviewed      MRI cervical spine [facility] dated April 18, 2023 multiple level degenerative changes C4/5 disc osteophyte complex mild central canal stenosis uncovertebral hypertrophic        Plan:    Narcan December 2022    Wheelchair/4 point walker for mobility due to chronic nonhealing wounds right foot      Following wound management chronic ulcers lower extremities    Following orthopedics knee pain [facility], she states she was advised not to have surgery due to her weight    Diabetic, limit steroids    Cannot tolerate OxyContin she is severely allergic    History thyroid cancer    Poor procedure candidate multiple comorbidities    Bilateral intra-articular knee injection December 23, 2024  Patient stated knee pain improved      Severe left lower extremity/foot pain related diabetic ulcer     Chronic edema bilateral lower extremities      Considering repeating bilateral knee injections she would like to do this next week    Requesting refill medication use April 20, 2025 refill date next visit    She would like to continue current medications    Continue home exercise program as tolerated    Will request prior authorization bilateral knee steroid injection    Follow-up 1 week    Dr. Crandall, December 2025    Bring original prescription medication bottles/container/box with labels to each visit

## 2025-04-04 ENCOUNTER — OFFICE VISIT (OUTPATIENT)
Dept: CARDIOLOGY | Facility: CLINIC | Age: 65
End: 2025-04-04
Payer: MEDICARE

## 2025-04-04 VITALS
OXYGEN SATURATION: 95 % | WEIGHT: 293 LBS | SYSTOLIC BLOOD PRESSURE: 138 MMHG | DIASTOLIC BLOOD PRESSURE: 78 MMHG | HEART RATE: 67 BPM | BODY MASS INDEX: 44.41 KG/M2 | HEIGHT: 68 IN

## 2025-04-04 DIAGNOSIS — I27.21 PULMONARY ARTERIAL HYPERTENSION: ICD-10-CM

## 2025-04-04 DIAGNOSIS — F17.200 NICOTINE DEPENDENCE WITH CURRENT USE: ICD-10-CM

## 2025-04-04 DIAGNOSIS — I50.32 CHRONIC HEART FAILURE WITH PRESERVED EJECTION FRACTION: Primary | ICD-10-CM

## 2025-04-04 PROCEDURE — 3008F BODY MASS INDEX DOCD: CPT | Mod: CPTII,,, | Performed by: NURSE PRACTITIONER

## 2025-04-04 PROCEDURE — 3078F DIAST BP <80 MM HG: CPT | Mod: CPTII,,, | Performed by: NURSE PRACTITIONER

## 2025-04-04 PROCEDURE — 1159F MED LIST DOCD IN RCRD: CPT | Mod: CPTII,,, | Performed by: NURSE PRACTITIONER

## 2025-04-04 PROCEDURE — 1101F PT FALLS ASSESS-DOCD LE1/YR: CPT | Mod: CPTII,,, | Performed by: NURSE PRACTITIONER

## 2025-04-04 PROCEDURE — 1126F AMNT PAIN NOTED NONE PRSNT: CPT | Mod: CPTII,,, | Performed by: NURSE PRACTITIONER

## 2025-04-04 PROCEDURE — 3288F FALL RISK ASSESSMENT DOCD: CPT | Mod: CPTII,,, | Performed by: NURSE PRACTITIONER

## 2025-04-04 PROCEDURE — 99215 OFFICE O/P EST HI 40 MIN: CPT | Mod: PBBFAC | Performed by: NURSE PRACTITIONER

## 2025-04-04 PROCEDURE — 99213 OFFICE O/P EST LOW 20 MIN: CPT | Mod: S$PBB,,, | Performed by: NURSE PRACTITIONER

## 2025-04-04 PROCEDURE — 99999 PR PBB SHADOW E&M-EST. PATIENT-LVL V: CPT | Mod: PBBFAC,,, | Performed by: NURSE PRACTITIONER

## 2025-04-04 PROCEDURE — 3075F SYST BP GE 130 - 139MM HG: CPT | Mod: CPTII,,, | Performed by: NURSE PRACTITIONER

## 2025-04-04 NOTE — PROGRESS NOTES
PCP: Regan Frausto MD    Referring Provider:     Subjective:   Holly Porter is a 64 y.o. female with hx of morbid obesity, HFpEF, obesity hypoventilation syndrome, DM2, HTN, HLD, hypothyroidism, seizures, migraines, PVD, JOVANNI (noncompliant with CPAP), PE on Eliquis, who presents for hospital discharge follow up.    4/4/25 - Doing well. Denies any chest pain. SOB and BLE is chronic and stable. Continues to follow with pulmonary.     4/11/24 - Pt was hospitalized 2/7/24 for COPD exacerbation and pulmonary hypertension. Underwent RHC that revealed low normal right and left sided filling pressures with moderate pre-capillary pulmonary HTN. Pt is following with Dr. Godoy in pulmonology.         Fhx: Mother: heart disease, CHF, Father, heart disease, coronary aneurysm, HTN  Shx: Every day smoker, no ETOH or drug use    EKG   Results for orders placed or performed in visit on 04/11/24   EKG 12-lead    Collection Time: 04/11/24  1:20 PM   Result Value Ref Range    QRS Duration 102 ms    [facility] QTC Calculation 467 ms    Narrative    Test Reason : I27.21,R06.02,    Vent. Rate : 088 BPM     Atrial Rate : 088 BPM     P-R Int : 180 ms          QRS Dur : 102 ms      QT Int : 386 ms       P-R-T Axes : 071 232 059 degrees     QTc Int : 467 ms    Normal sinus rhythm with sinus arrhythmia  Incomplete right bundle branch block  Right superior axis deviation  Right ventricular hypertrophy  Possible Anterior infarct ,age undetermined  Abnormal ECG  When compared with ECG of 07-FEB-2024 11:44,  Borderline criteria for Anterior infarct are now Present  Confirmed by Norm Riggins MD ([id number]) on 4/11/2024 4:14:47 PM    Referred By:             Confirmed By:Norm Riggins MD     ECHO Results for orders placed during the hospital encounter of 02/07/24    Interpretation Summary    Left Ventricle: The left ventricle is normal in size. There is mild concentric hypertrophy. There is normal systolic function with a visually estimated ejection  fraction of 55 - 70%. Ejection fraction by visual approximation is 60%. There is diastolic dysfunction.    Right Ventricle: Mild right ventricular enlargement.    Right Atrium: Right atrium is mildly dilated.    Aortic Valve: The aortic valve is a trileaflet valve. Mildly calcified cusps.    Mitral Valve: There is mild bileaflet sclerosis.    Tricuspid Valve: There is mild to moderate regurgitation.    IVC/SVC: Elevated venous pressure at 15 mmHg.    Pericardium: There is a trivial effusion.    Results for orders placed during the hospital encounter of 09/03/24    Echo Saline Bubble? No; Ultrasound enhancing contrast? Yes    Interpretation Summary    Left Ventricle: The left ventricle is normal in size. Mildly increased wall thickness. There is concentric hypertrophy. There is normal systolic function with a visually estimated ejection fraction of 55 - 60%. There is normal diastolic function.    Right Ventricle: Normal right ventricular cavity size. Systolic function is normal.    Right Atrium: Right atrium is mildly dilated.    Aortic Valve: The aortic valve is a trileaflet valve.     Left Heart Catheterization 11/6/2023       The ejection fraction was calculated to be 55%.    The left ventricular systolic function was normal.    The left ventricular end diastolic pressure was normal.    The pre-procedure left ventricular end diastolic pressure was 14.    The estimated blood loss was none.    The coronary arteries were normal..     The procedure log was documented by Documenter: Khadijah Lyman RN and verified by Alex Ortega DO.     Date: 11/6/2023  Time: 2:00      Normal LV, EF 55%  Very mild non-obstructive CAD  Right femoral stent site widely patent         Right Cardiac Catheterization Results for orders placed during the hospital encounter of 02/07/24    Conclusion    Low normal right and left sided filling pressures with moderate pre-capillary pulmonary HTN; RA 6mmHg, PA 50/21/34mmHg, and PCWP 8mmHg  and PVR of 5.25 MONTILLA)  Low normal systemic flow estimations; CO/CI by Vicente is 5.9/2.2 and by TD 6.0/2.3    Plan:  Evaluate for pre-capillary causes of pulmonary HTN    The procedure log was documented by Documenter: Jerrica Larson and verified by Mahad Moreno MD.    Date: 2/13/2024  Time: 3:04 PM        Lab Results   Component Value Date     12/04/2024    K 3.4 (L) 12/04/2024     12/04/2024    CO2 33 (H) 12/04/2024    BUN 14 12/04/2024    CREATININE 1.03 (H) 12/04/2024    CALCIUM 10.3 (H) 12/04/2024    ANIONGAP 12 12/04/2024    ESTGFRAFRICA 70 09/27/2021    EGFRNONAA 63 05/10/2022       Lab Results   Component Value Date    CHOL 166 11/03/2023    CHOL 192 06/05/2023    CHOL 172 06/06/2022     Lab Results   Component Value Date    HDL 33 (L) 11/03/2023    HDL 57 06/05/2023    HDL 51 06/06/2022     Lab Results   Component Value Date    LDLCALC 111 11/03/2023    LDLCALC 115 06/05/2023    LDLCALC 99 06/06/2022     Lab Results   Component Value Date    TRIG 110 11/03/2023    TRIG 102 06/05/2023    TRIG 112 06/06/2022     Lab Results   Component Value Date    CHOLHDL 5.0 11/03/2023    CHOLHDL 3.4 06/05/2023    CHOLHDL 3.4 06/06/2022       Lab Results   Component Value Date    WBC 7.95 08/22/2024    HGB 16.8 (H) 08/22/2024    HCT 55.3 (H) 08/22/2024    MCV 80.6 08/22/2024     08/22/2024           Current Outpatient Medications:     ACCU-CHEK GUIDE GLUCOSE METER Misc, , Disp: , Rfl:     ACCU-CHEK GUIDE TEST STRIPS Strp, , Disp: , Rfl:     ACCU-CHEK SOFTCLIX LANCETS Misc, , Disp: , Rfl:     albuterol (PROVENTIL) 2.5 mg /3 mL (0.083 %) nebulizer solution, Take 3 mLs (2.5 mg total) by nebulization every 6 (six) hours as needed for Wheezing. Rescue, Disp: 300 mL, Rfl: 11    allopurinoL (ZYLOPRIM) 300 MG tablet, Take 1 tablet (300 mg total) by mouth once daily., Disp: 90 tablet, Rfl: 3    apixaban (ELIQUIS) 5 mg Tab, Take 1 tablet (5 mg total) by mouth 2 (two) times daily., Disp: 60 tablet, Rfl: 3     aspirin (ECOTRIN) 81 MG EC tablet, TAKE 1 TABLET BY MOUTH EVERY DAY, Disp: 90 tablet, Rfl: 1    atorvastatin (LIPITOR) 10 MG tablet, Take 1 tablet (10 mg total) by mouth once daily., Disp: 90 tablet, Rfl: 1    benzonatate (TESSALON) 100 MG capsule, TAKE ONE CAPSULE 3 TIMES DAILY IF NEEDED FOR COUGH, Disp: , Rfl:     BREZTRI AEROSPHERE 160-9-4.8 mcg/actuation HFAA, Take 2 puffs by mouth 2 (two) times daily., Disp: , Rfl:     bumetanide (BUMEX) 2 MG tablet, Take 1 tablet (2 mg total) by mouth 2 (two) times daily., Disp: 60 tablet, Rfl: 11    carvediloL (COREG) 12.5 MG tablet, Take 0.5 tablets (6.25 mg total) by mouth 2 (two) times daily., Disp: 30 tablet, Rfl: 11    cilostazoL (PLETAL) 100 MG Tab, Take 1 tablet (100 mg total) by mouth 2 (two) times daily., Disp: 90 tablet, Rfl: 1    colchicine (COLCRYS) 0.6 mg tablet, Take 1 tablet (0.6 mg total) by mouth once daily., Disp: 30 tablet, Rfl: 2    diclofenac sodium (VOLTAREN ARTHRITIS PAIN) 1 % Gel, Apply 2 g topically once daily., Disp: 1 g, Rfl: 3    DULoxetine (CYMBALTA) 30 MG capsule, Take 30 mg by mouth once daily., Disp: , Rfl:     HYDROcodone-acetaminophen (NORCO)  mg per tablet, Take 1 tablet by mouth every 6 (six) hours., Disp: 120 tablet, Rfl: 0    HYDROcodone-acetaminophen (NORCO)  mg per tablet, Take 1 tablet by mouth every 6 (six) hours., Disp: 120 tablet, Rfl: 0    HYDROcodone-acetaminophen (NORCO)  mg per tablet, Take 1 tablet by mouth every 6 (six) hours., Disp: 120 tablet, Rfl: 0    insulin detemir U-100, Levemir, (LEVEMIR FLEXTOUCH U100 INSULIN) 100 unit/mL (3 mL) InPn pen, Inject 10 units into the skin every evening subcutaneous, Disp: 15 mL, Rfl: 1    ketorolac 0.5% (ACULAR) 0.5 % Drop, Place 1 drop into the right eye 4 (four) times daily., Disp: , Rfl:     levothyroxine (SYNTHROID) 150 MCG tablet, TAKE 1 TABLET EVERY MORNING 30 MINUTES PRIOR TO BREAKFAST MEAL FOR THYROID, Disp: 90 tablet, Rfl: 1    moxifloxacin (VIGAMOX) 0.5 %  "ophthalmic solution, Place 1 drop into the right eye 4 (four) times daily., Disp: , Rfl:     naloxone (NARCAN) 4 mg/actuation Spry, 1 spray once., Disp: , Rfl:     NIFEdipine (ADALAT CC) 60 MG TbSR, Take 60 mg by mouth., Disp: , Rfl:     pantoprazole (PROTONIX) 40 MG tablet, Take 1 tablet (40 mg total) by mouth once daily., Disp: 90 tablet, Rfl: 1    pen needle, diabetic 32 gauge x 5/32" Ndle, Use to inject insulin once daily, Disp: 100 each, Rfl: 3    potassium chloride SA (K-DUR,KLOR-CON) 20 MEQ tablet, Take 1 tablet (20 mEq total) by mouth once daily., Disp: 90 tablet, Rfl: 1    prednisoLONE acetate (PRED FORTE) 1 % DrpS, Place 1 drop into the right eye 4 (four) times daily., Disp: , Rfl:     psyllium husk (METAMUCIL) 3.4 gram/5.4 gram Powd, Take 1 table spoon with water by mouth once daily., Disp: 660 g, Rfl: 0    QUEtiapine (SEROQUEL) 25 MG Tab, Take 1 tablet (25 mg total) by mouth once daily., Disp: 30 tablet, Rfl: 1    tadalafiL (CIALIS) 20 MG Tab, Take 40 mg by mouth., Disp: , Rfl:     topiramate (TOPAMAX) 100 MG tablet, Take 1 tablet (100 mg total) by mouth 2 (two) times daily., Disp: 60 tablet, Rfl: 11    varenicline (CHANTIX CONTINUING MONTH BOX) 1 mg Tab, Take 1 tablet (1 mg total) by mouth 2 (two) times daily., Disp: 30 tablet, Rfl: 3    Review of Systems   Constitutional:  Negative for chills, diaphoresis, fever and malaise/fatigue.   Respiratory:  Positive for shortness of breath. Negative for cough.         Chronic shortness of breath   Cardiovascular:  Negative for chest pain, palpitations, orthopnea and PND.        Chronic leg swelling   Gastrointestinal:  Negative for abdominal pain, nausea and vomiting.   Musculoskeletal:  Positive for joint pain. Negative for falls.   Neurological:  Negative for focal weakness and weakness.         Objective:   /78 (BP Location: Left arm, Patient Position: Sitting)   Pulse 67   Ht 5' 8" (1.727 m)   Wt (!) 153.8 kg (339 lb)   SpO2 95%   BMI 51.54 kg/m² "     Physical Exam  Constitutional:       General: She is not in acute distress.     Appearance: She is obese.   Cardiovascular:      Rate and Rhythm: Normal rate and regular rhythm.      Heart sounds: Normal heart sounds.   Pulmonary:      Effort: Pulmonary effort is normal.      Breath sounds: Decreased breath sounds present.   Musculoskeletal:      Cervical back: Neck supple. No rigidity.      Right lower leg: Edema present.      Left lower leg: Edema present.   Skin:     General: Skin is warm and dry.   Neurological:      Mental Status: She is alert and oriented to person, place, and time.   Psychiatric:         Mood and Affect: Mood normal.         Behavior: Behavior normal.           Assessment:     1. Chronic heart failure with preserved ejection fraction        2. Pulmonary arterial hypertension        3. Nicotine dependence with current use                Plan:   Chronic heart failure with preserved ejection fraction  EF 55-60%  BLE edema at baseline  Continue Bumex and Coreg    Pulmonary arterial hypertension  Continue to follow with pulmonary    Nicotine dependence with current use  Currently taking Chantix and trying to quit smoking        Follow up with Dr. Ortega in one year, sooner if needed

## 2025-04-14 ENCOUNTER — OFFICE VISIT (OUTPATIENT)
Dept: NEUROLOGY | Facility: CLINIC | Age: 65
End: 2025-04-14
Payer: MEDICARE

## 2025-04-14 ENCOUNTER — OFFICE VISIT (OUTPATIENT)
Dept: VASCULAR SURGERY | Facility: CLINIC | Age: 65
End: 2025-04-14
Payer: MEDICARE

## 2025-04-14 ENCOUNTER — OFFICE VISIT (OUTPATIENT)
Dept: PAIN MEDICINE | Facility: CLINIC | Age: 65
End: 2025-04-14
Payer: MEDICARE

## 2025-04-14 VITALS
RESPIRATION RATE: 18 BRPM | HEART RATE: 88 BPM | SYSTOLIC BLOOD PRESSURE: 184 MMHG | HEIGHT: 68 IN | DIASTOLIC BLOOD PRESSURE: 80 MMHG | WEIGHT: 293 LBS | BODY MASS INDEX: 44.41 KG/M2

## 2025-04-14 VITALS
SYSTOLIC BLOOD PRESSURE: 149 MMHG | HEIGHT: 68 IN | DIASTOLIC BLOOD PRESSURE: 61 MMHG | WEIGHT: 293 LBS | HEART RATE: 65 BPM | RESPIRATION RATE: 16 BRPM | BODY MASS INDEX: 44.41 KG/M2

## 2025-04-14 VITALS
BODY MASS INDEX: 44.41 KG/M2 | HEART RATE: 64 BPM | SYSTOLIC BLOOD PRESSURE: 157 MMHG | WEIGHT: 293 LBS | DIASTOLIC BLOOD PRESSURE: 77 MMHG | RESPIRATION RATE: 18 BRPM | HEIGHT: 68 IN | OXYGEN SATURATION: 92 %

## 2025-04-14 DIAGNOSIS — G89.29 CHRONIC PAIN OF RIGHT KNEE: Chronic | ICD-10-CM

## 2025-04-14 DIAGNOSIS — I87.2 VENOUS INSUFFICIENCY: Primary | ICD-10-CM

## 2025-04-14 DIAGNOSIS — M79.605 LEG PAIN, BILATERAL: ICD-10-CM

## 2025-04-14 DIAGNOSIS — G43.719 INTRACTABLE CHRONIC MIGRAINE WITHOUT AURA AND WITHOUT STATUS MIGRAINOSUS: Primary | ICD-10-CM

## 2025-04-14 DIAGNOSIS — I73.9 PVD (PERIPHERAL VASCULAR DISEASE): Chronic | ICD-10-CM

## 2025-04-14 DIAGNOSIS — I87.2 VENOUS INSUFFICIENCY (CHRONIC) (PERIPHERAL): ICD-10-CM

## 2025-04-14 DIAGNOSIS — R60.0 EDEMA, LOWER EXTREMITY: ICD-10-CM

## 2025-04-14 DIAGNOSIS — L81.9 HYPERPIGMENTATION OF SKIN: ICD-10-CM

## 2025-04-14 DIAGNOSIS — M25.562 CHRONIC PAIN OF LEFT KNEE: Chronic | ICD-10-CM

## 2025-04-14 DIAGNOSIS — M25.561 CHRONIC PAIN OF RIGHT KNEE: Chronic | ICD-10-CM

## 2025-04-14 DIAGNOSIS — M79.604 LEG PAIN, BILATERAL: ICD-10-CM

## 2025-04-14 DIAGNOSIS — G89.29 CHRONIC PAIN OF LEFT KNEE: Chronic | ICD-10-CM

## 2025-04-14 DIAGNOSIS — Z79.899 ENCOUNTER FOR LONG-TERM (CURRENT) USE OF MEDICATIONS: ICD-10-CM

## 2025-04-14 DIAGNOSIS — M47.817 LUMBOSACRAL SPONDYLOSIS WITHOUT MYELOPATHY: Primary | Chronic | ICD-10-CM

## 2025-04-14 PROCEDURE — 99214 OFFICE O/P EST MOD 30 MIN: CPT | Mod: S$PBB,,, | Performed by: PHYSICIAN ASSISTANT

## 2025-04-14 PROCEDURE — 3077F SYST BP >= 140 MM HG: CPT | Mod: CPTII,,, | Performed by: NURSE PRACTITIONER

## 2025-04-14 PROCEDURE — 99999PBSHW POCT URINE DRUG SCREEN PRESUMP: Mod: PBBFAC,,,

## 2025-04-14 PROCEDURE — 3008F BODY MASS INDEX DOCD: CPT | Mod: CPTII,,, | Performed by: FAMILY MEDICINE

## 2025-04-14 PROCEDURE — 3079F DIAST BP 80-89 MM HG: CPT | Mod: CPTII,,, | Performed by: PHYSICIAN ASSISTANT

## 2025-04-14 PROCEDURE — 1160F RVW MEDS BY RX/DR IN RCRD: CPT | Mod: CPTII,,, | Performed by: FAMILY MEDICINE

## 2025-04-14 PROCEDURE — 99999 PR PBB SHADOW E&M-EST. PATIENT-LVL V: CPT | Mod: PBBFAC,,, | Performed by: NURSE PRACTITIONER

## 2025-04-14 PROCEDURE — 99214 OFFICE O/P EST MOD 30 MIN: CPT | Mod: S$PBB,,, | Performed by: FAMILY MEDICINE

## 2025-04-14 PROCEDURE — 99999 PR PBB SHADOW E&M-EST. PATIENT-LVL V: CPT | Mod: PBBFAC,,, | Performed by: FAMILY MEDICINE

## 2025-04-14 PROCEDURE — 1159F MED LIST DOCD IN RCRD: CPT | Mod: CPTII,,, | Performed by: PHYSICIAN ASSISTANT

## 2025-04-14 PROCEDURE — 1101F PT FALLS ASSESS-DOCD LE1/YR: CPT | Mod: CPTII,,, | Performed by: NURSE PRACTITIONER

## 2025-04-14 PROCEDURE — 3288F FALL RISK ASSESSMENT DOCD: CPT | Mod: CPTII,,, | Performed by: PHYSICIAN ASSISTANT

## 2025-04-14 PROCEDURE — 3008F BODY MASS INDEX DOCD: CPT | Mod: CPTII,,, | Performed by: NURSE PRACTITIONER

## 2025-04-14 PROCEDURE — 1101F PT FALLS ASSESS-DOCD LE1/YR: CPT | Mod: CPTII,,, | Performed by: PHYSICIAN ASSISTANT

## 2025-04-14 PROCEDURE — 1126F AMNT PAIN NOTED NONE PRSNT: CPT | Mod: CPTII,,, | Performed by: NURSE PRACTITIONER

## 2025-04-14 PROCEDURE — 1159F MED LIST DOCD IN RCRD: CPT | Mod: CPTII,,, | Performed by: FAMILY MEDICINE

## 2025-04-14 PROCEDURE — 99215 OFFICE O/P EST HI 40 MIN: CPT | Mod: PBBFAC | Performed by: FAMILY MEDICINE

## 2025-04-14 PROCEDURE — 3008F BODY MASS INDEX DOCD: CPT | Mod: CPTII,,, | Performed by: PHYSICIAN ASSISTANT

## 2025-04-14 PROCEDURE — 99999 PR PBB SHADOW E&M-EST. PATIENT-LVL V: CPT | Mod: PBBFAC,,, | Performed by: PHYSICIAN ASSISTANT

## 2025-04-14 PROCEDURE — 1125F AMNT PAIN NOTED PAIN PRSNT: CPT | Mod: CPTII,,, | Performed by: PHYSICIAN ASSISTANT

## 2025-04-14 PROCEDURE — 99215 OFFICE O/P EST HI 40 MIN: CPT | Mod: PBBFAC | Performed by: PHYSICIAN ASSISTANT

## 2025-04-14 PROCEDURE — 80305 DRUG TEST PRSMV DIR OPT OBS: CPT | Mod: PBBFAC | Performed by: PHYSICIAN ASSISTANT

## 2025-04-14 PROCEDURE — 3077F SYST BP >= 140 MM HG: CPT | Mod: CPTII,,, | Performed by: FAMILY MEDICINE

## 2025-04-14 PROCEDURE — 99213 OFFICE O/P EST LOW 20 MIN: CPT | Mod: S$PBB,,, | Performed by: NURSE PRACTITIONER

## 2025-04-14 PROCEDURE — 3077F SYST BP >= 140 MM HG: CPT | Mod: CPTII,,, | Performed by: PHYSICIAN ASSISTANT

## 2025-04-14 PROCEDURE — 99215 OFFICE O/P EST HI 40 MIN: CPT | Mod: PBBFAC | Performed by: NURSE PRACTITIONER

## 2025-04-14 PROCEDURE — 3288F FALL RISK ASSESSMENT DOCD: CPT | Mod: CPTII,,, | Performed by: NURSE PRACTITIONER

## 2025-04-14 PROCEDURE — 3078F DIAST BP <80 MM HG: CPT | Mod: CPTII,,, | Performed by: NURSE PRACTITIONER

## 2025-04-14 PROCEDURE — 3078F DIAST BP <80 MM HG: CPT | Mod: CPTII,,, | Performed by: FAMILY MEDICINE

## 2025-04-14 PROCEDURE — 1159F MED LIST DOCD IN RCRD: CPT | Mod: CPTII,,, | Performed by: NURSE PRACTITIONER

## 2025-04-14 PROCEDURE — 1160F RVW MEDS BY RX/DR IN RCRD: CPT | Mod: CPTII,,, | Performed by: NURSE PRACTITIONER

## 2025-04-14 RX ORDER — SODIUM CHLORIDE 9 MG/ML
INJECTION, SOLUTION INTRAVENOUS CONTINUOUS
OUTPATIENT
Start: 2025-07-11

## 2025-04-14 RX ORDER — HYDROCODONE BITARTRATE AND ACETAMINOPHEN 10; 325 MG/1; MG/1
1 TABLET ORAL EVERY 6 HOURS
Qty: 120 TABLET | Refills: 0 | Status: CANCELLED | OUTPATIENT
Start: 2025-04-14

## 2025-04-14 RX ORDER — SODIUM CHLORIDE 9 MG/ML
INJECTION, SOLUTION INTRAVENOUS CONTINUOUS
OUTPATIENT
Start: 2025-06-27

## 2025-04-14 RX ORDER — SODIUM CHLORIDE 9 MG/ML
INJECTION, SOLUTION INTRAVENOUS CONTINUOUS
OUTPATIENT
Start: 2025-06-20

## 2025-04-14 RX ORDER — TOPIRAMATE 100 MG/1
100 TABLET, FILM COATED ORAL 2 TIMES DAILY
Qty: 60 TABLET | Refills: 11 | Status: SHIPPED | OUTPATIENT
Start: 2025-04-14 | End: 2026-04-14

## 2025-04-14 RX ORDER — HYDROCODONE BITARTRATE AND ACETAMINOPHEN 10; 325 MG/1; MG/1
1 TABLET ORAL EVERY 6 HOURS
Qty: 120 TABLET | Refills: 0 | Status: SHIPPED | OUTPATIENT
Start: 2025-04-19

## 2025-04-14 NOTE — PATIENT INSTRUCTIONS
Continue current topamax 100mg at night  Continue the current neurontin and cymbalta  Agree with pain treatment plan as far as lower back pain  Follow-up one year

## 2025-04-14 NOTE — PROGRESS NOTES
[facility] CLINIC NOTE    Patient ID: Holly Porter is a 64 y.o. female.    I. HISTORY     Chief Complaint:   Chief Complaint   Patient presents with    Follow-up     REDISCUSS PROCEDURES        HPI: Holly Porter is a 64 y.o. female who presents to clinic today after having to reschedule/canceled procedures secondary to other medical problems.  The patient states that she continues to have leg pain, edema, hyperpigmentation and painful varicose veins.  She has continued with compressive therapy and leg elevation to the best of her ability.  She considers her symptoms to be life altering would like to have underlying condition corrected if possible.    The patient presented recently for follow-up after a 2 year absence from our clinic.  The patient has known chronic venous insufficiency with a history of multiple previous interventions.  She also sees Dr. Ortega for congestive heart failure.  She has had several surgical interventions with toes removed.  She states that she has down from 2 packs of cigarettes a day down to 1 and finally down to half pack a day over the past 2-1/2 months.  She states she continues to have edema of the bilateral lower extremities with hyperpigmentation and varicose veins despite previous efforts of conservative measures with compression and venous interventions.    Bilateral complete venous reflux study performed 10/29/2024 shows no evidence of DVT bilaterally.  Study also shows well ablated bilateral great saphenous veins proximally, right anterior thigh accessory vein, and left small saphenous vein.  Study also shows dilation reflux of the distal right great saphenous vein, right small saphenous vein, and left anterior thigh accessory vein.  Large refluxing varicosities noted bilaterally.     Clinical summary:   for a 5 day follow-up post non thermal ablation of the distal accessory thigh vein and thermal ablation of a left calf  vein.  Post ablation ultrasound  performed today shows well ablated calf  and well ablated left distal anterior accessory thigh vein.  No signs of thrombus or complication.  The patient states that she did well over the weekend without complications.  She continues to her postoperative wrap.  No signs of bleeding or phlebitis.  I have encouraged her to wear her thigh-high wrapped with a 2 week postoperative period to help prevent against complications.  She actually looks like she has less swelling today.    The patient has had numerous venous interventions previously, see her surgical history for details.    Past Medical History:   Diagnosis Date    Acquired hypothyroidism     Atherosclerosis of native artery of extremity with intermittent claudication 01/30/2019    bilateral legs    BMI 50.0-59.9, adult 02/22/2021    Chronic pain syndrome     opioid depen    Congestive heart failure (CHF)     COPD (chronic obstructive pulmonary disease)     COVID-19 10/06/2023    Depression     Diabetes mellitus, type 2     followed by Aurea Kwong NP, [facility]    DVT of lower extremity, bilateral     Essential hypertension 06/08/2021    GERD (gastroesophageal reflux disease)     Gout 07/05/2023    Hyperlipidemia     Lumbosacral radiculopathy 06/05/2023    followed by GLENN Valdes, [facility]    Migraines     Nicotine dependence     Nicotine dependence     Obesity hypoventilation syndrome     chronic CO2 retainer with compensatory metabolic alkalosis    Osteoarthritis     Seizure disorder     Sleep apnea     on cpap    Thyroid cancer     Venous stasis ulcer limited to breakdown of skin with varicose veins     followed by Estuardo Zhao    Vitamin D deficiency         Past Surgical History:   Procedure Laterality Date    ABCESS DRAINAGE      HYSTERECTOMY      ILIAC ARTERY STENT Bilateral 01/28/2020    Bilateral distal aorta and common iliac 8 X 59 vbx covered stents performed by Dr. Antonio Jacinto.    LEFT HEART CATHETERIZATION Left  11/6/2023    Procedure: Left heart cath;  Surgeon: Alex Ortega DO;  Location: [facility] CATH LAB;  Service: Cardiology;  Laterality: Left;    RADIOFREQUENCY ABLATION Left 04/15/2022    Procedure: Left calf  Radiofrequency Ablation;  Surgeon: Matty Falcon DO;  Location: [facility] OR;  Service: Vascular;  Laterality: Left;    RIGHT HEART CATHETERIZATION Right 2/13/2024    Procedure: INSERTION, CATHETER, RIGHT HEART;  Surgeon: Mahad Moreno MD;  Location: [facility] CATH LAB;  Service: Cardiology;  Laterality: Right;    STAB PHLEBECTOMY OF VARICOSE VEINS Left 01/25/2013    Left leg microphlebectomies x 23 stab avulsions and ligation of multiple varicose veins perrformed by Dr. Cirilo Aguirre.    THYROIDECTOMY      hx: thyroid cancer    TOE AMPUTATION Left 01/28/2020    2nd, 4th and 5th performed by Dr. Anam Saeed.    TOE AMPUTATION Right 01/28/2020    4th toe performed by Dr. Anam Saeed.    TOTAL ABDOMINAL HYSTERECTOMY W/ BILATERAL SALPINGOOPHORECTOMY      TUBAL LIGATION      VAGINAL DELIVERY      x 4    VENOUS ABLATION Right 08/09/2019    GSV Varithena Ablation performed by Dr. Estuardo Falcon    VENOUS ABLATION Left 08/02/2019    ATAV Varithena Ablation performed by Dr. Estuardo Falcon.    VENOUS ABLATION Right 07/13/2015    ATAV Laser Ablatio performed by Dr. Cirilo Aguirre.    VENOUS ABLATION Right 07/06/2015    Distal  GSV Laser Ablation performed by dr. Cirilo Aguirre.    VENOUS ABLATION Left 04/20/2015    Left Distal GSV Laser Ablation performed by dr. Cirilo Aguirre.    VENOUS ABLATION Right 10/28/2013    Right Distal GSV RF Ablation performed by Dr. Cirilo Aguirre.    VENOUS ABLATION Left 10/25/2013    SSV RF Ablation performed by dr. Cirilo Aguirre.    VENOUS ABLATION Left 10/07/2013    Left GSV and Left ATAV RF Ablation performed by Dr. Cirilo Aguirre.    VENOUS ABLATION Right 01/21/2013    GSV RF Ablation w/micros x 22 performed by Dr. Cirilo Aguirre.    VENOUS ABLATION Left 06/25/2021    Left distal GSV Varithena Ablation       Social  History     Tobacco Use   Smoking Status Every Day    Current packs/day: 0.50    Average packs/day: 0.5 packs/day for 33.0 years (16.5 ttl pk-yrs)    Types: Cigarettes    Passive exposure: Current   Smokeless Tobacco Never   Tobacco Comments    5 cigarettes a day          Current Outpatient Medications:     ACCU-CHEK GUIDE GLUCOSE METER Misc, , Disp: , Rfl:     ACCU-CHEK GUIDE TEST STRIPS Strp, , Disp: , Rfl:     ACCU-CHEK SOFTCLIX LANCETS Misc, , Disp: , Rfl:     albuterol (PROVENTIL) 2.5 mg /3 mL (0.083 %) nebulizer solution, Take 3 mLs (2.5 mg total) by nebulization every 6 (six) hours as needed for Wheezing. Rescue, Disp: 300 mL, Rfl: 11    allopurinoL (ZYLOPRIM) 300 MG tablet, Take 1 tablet (300 mg total) by mouth once daily., Disp: 90 tablet, Rfl: 3    apixaban (ELIQUIS) 5 mg Tab, Take 1 tablet (5 mg total) by mouth 2 (two) times daily., Disp: 60 tablet, Rfl: 3    aspirin (ECOTRIN) 81 MG EC tablet, TAKE 1 TABLET BY MOUTH EVERY DAY, Disp: 90 tablet, Rfl: 1    atorvastatin (LIPITOR) 10 MG tablet, Take 1 tablet (10 mg total) by mouth once daily., Disp: 90 tablet, Rfl: 1    benzonatate (TESSALON) 100 MG capsule, TAKE ONE CAPSULE 3 TIMES DAILY IF NEEDED FOR COUGH, Disp: , Rfl:     BREZTRI AEROSPHERE 160-9-4.8 mcg/actuation HFAA, Take 2 puffs by mouth 2 (two) times daily., Disp: , Rfl:     bumetanide (BUMEX) 2 MG tablet, Take 1 tablet (2 mg total) by mouth 2 (two) times daily., Disp: 60 tablet, Rfl: 11    carvediloL (COREG) 12.5 MG tablet, Take 0.5 tablets (6.25 mg total) by mouth 2 (two) times daily., Disp: 30 tablet, Rfl: 11    cilostazoL (PLETAL) 100 MG Tab, Take 1 tablet (100 mg total) by mouth 2 (two) times daily., Disp: 90 tablet, Rfl: 1    colchicine (COLCRYS) 0.6 mg tablet, Take 1 tablet (0.6 mg total) by mouth once daily., Disp: 30 tablet, Rfl: 2    diclofenac sodium (VOLTAREN ARTHRITIS PAIN) 1 % Gel, Apply 2 g topically once daily., Disp: 1 g, Rfl: 3    DULoxetine (CYMBALTA) 30 MG capsule, Take 30 mg by  "mouth once daily., Disp: , Rfl:     HYDROcodone-acetaminophen (NORCO)  mg per tablet, Take 1 tablet by mouth every 6 (six) hours., Disp: 120 tablet, Rfl: 0    HYDROcodone-acetaminophen (NORCO)  mg per tablet, Take 1 tablet by mouth every 6 (six) hours., Disp: 120 tablet, Rfl: 0    HYDROcodone-acetaminophen (NORCO)  mg per tablet, Take 1 tablet by mouth every 6 (six) hours., Disp: 120 tablet, Rfl: 0    insulin detemir U-100, Levemir, (LEVEMIR FLEXTOUCH U100 INSULIN) 100 unit/mL (3 mL) InPn pen, Inject 10 units into the skin every evening subcutaneous, Disp: 15 mL, Rfl: 1    ketorolac 0.5% (ACULAR) 0.5 % Drop, Place 1 drop into the right eye 4 (four) times daily., Disp: , Rfl:     levothyroxine (SYNTHROID) 150 MCG tablet, TAKE 1 TABLET EVERY MORNING 30 MINUTES PRIOR TO BREAKFAST MEAL FOR THYROID, Disp: 90 tablet, Rfl: 1    moxifloxacin (VIGAMOX) 0.5 % ophthalmic solution, Place 1 drop into the right eye 4 (four) times daily., Disp: , Rfl:     naloxone (NARCAN) 4 mg/actuation Spry, 1 spray once., Disp: , Rfl:     NIFEdipine (ADALAT CC) 60 MG TbSR, Take 60 mg by mouth., Disp: , Rfl:     pantoprazole (PROTONIX) 40 MG tablet, Take 1 tablet (40 mg total) by mouth once daily., Disp: 90 tablet, Rfl: 1    pen needle, diabetic 32 gauge x 5/32" Ndle, Use to inject insulin once daily, Disp: 100 each, Rfl: 3    potassium chloride SA (K-DUR,KLOR-CON) 20 MEQ tablet, Take 1 tablet (20 mEq total) by mouth once daily., Disp: 90 tablet, Rfl: 1    prednisoLONE acetate (PRED FORTE) 1 % DrpS, Place 1 drop into the right eye 4 (four) times daily., Disp: , Rfl:     psyllium husk (METAMUCIL) 3.4 gram/5.4 gram Powd, Take 1 table spoon with water by mouth once daily., Disp: 660 g, Rfl: 0    QUEtiapine (SEROQUEL) 25 MG Tab, Take 1 tablet (25 mg total) by mouth once daily., Disp: 30 tablet, Rfl: 1    tadalafiL (CIALIS) 20 MG Tab, Take 40 mg by mouth., Disp: , Rfl:     topiramate (TOPAMAX) 100 MG tablet, Take 1 tablet (100 mg " total) by mouth 2 (two) times daily., Disp: 60 tablet, Rfl: 11    varenicline (CHANTIX CONTINUING MONTH BOX) 1 mg Tab, Take 1 tablet (1 mg total) by mouth 2 (two) times daily., Disp: 30 tablet, Rfl: 3    Review of Systems   Constitutional:  Negative for activity change, chills, diaphoresis, fatigue and fever.   Respiratory:  Negative for cough and shortness of breath.    Cardiovascular:  Positive for leg swelling. Negative for chest pain and claudication.        Hyperpigmentation LE   Gastrointestinal:  Negative for nausea and vomiting.   Musculoskeletal:  Positive for leg pain. Negative for joint swelling.   Integumentary:  Negative for rash and wound.   Neurological:  Negative for weakness and numbness.        II. PHYSICAL EXAM     Physical Exam  Constitutional:       General: She is awake. She is not in acute distress.     Appearance: Normal appearance. She is obese. She is not ill-appearing or toxic-appearing.   HENT:      Head: Normocephalic and atraumatic.   Eyes:      Extraocular Movements: Extraocular movements intact.      Conjunctiva/sclera: Conjunctivae normal.      Pupils: Pupils are equal, round, and reactive to light.   Neck:      Vascular: No carotid bruit or JVD.   Cardiovascular:      Rate and Rhythm: Normal rate and regular rhythm.      Pulses:           Dorsalis pedis pulses are detected w/ Doppler on the left side.        Posterior tibial pulses are detected w/ Doppler on the left side.      Heart sounds: No murmur heard.  Pulmonary:      Effort: Pulmonary effort is normal. No respiratory distress.      Breath sounds: No stridor. No wheezing, rhonchi or rales.   Musculoskeletal:         General: No swelling, tenderness or deformity.      Right lower le+ Edema present.      Left lower le+ Edema present.      Comments: Scattered hyperpigmentation and varicose veins of the bilateral Gator regions.   Feet:      Comments: Monophasic pulses of the left foot with hand-held Doppler of the DPs and  PTs.  Skin:     General: Skin is warm.      Capillary Refill: Capillary refill takes less than 2 seconds.      Coloration: Skin is not ashen.      Findings: No bruising, erythema, lesion, rash or wound.   Neurological:      Mental Status: She is alert and oriented to person, place, and time.      Motor: No weakness.   Psychiatric:         Speech: Speech normal.         Behavior: Behavior normal. Behavior is cooperative.                                     Reticular/Spider veins noted:  RLE: anterior calf, medial calf, ankle and foot  LLE: anterior calf, medial calf, ankle and foot    Varicose veins noted:  RLE: anterior calf, medial calf, medial thigh, ankle and foot  LLE:  anterior calf, medial calf, medial thigh, ankle and foot    CEAP Classification  Clinical Signs: Class 5 - Skin changes as defined above with healed ulceration  Etiologic Classification: Primary  Anatomic distribution: Superficial  Pathophysiologic dysfunction: Reflux       Venous Clinical Severity Score  Pain:2=Daily, moderate activity limitation, occasional analgesics  Varicose Veins: 3=Extensive. Thigh and calf or GS and LS distribution  Venous Edema: 2=Afternoon edema, above ankle  Pigmentation: 2=Diffuse over most of gaiter distribution (lower 1/3) or recent pigmentation (purple)  Inflammation: 1=Mild cellulitis, limited to marginal area around ulcer  Induration: 2=Medial or lateral,less than lower third of leg  Number of Active Ulcers: 1=1  Active Ulceration, Duration: 1=<3 months  Active Ulcer Size: 1=<2 cm diameter  Compressive Therapy: 1=Intermittent use of stockings  Total Score: 16       III. ASSESSMENT & PLAN (MEDICAL DECISION MAKING)     1. Venous insufficiency    2. Hyperpigmentation of skin    3. Leg pain, bilateral    4. Edema, lower extremity        Assessment/Diagnosis and Plan:  The patient continues to have sequela of chronic venous insufficiency despite over 3 months of conservative therapy. The patient continues to have life  altering symptoms as well as a positive reflux study as noted in the history of present illness above. At this time I believe it would be reasonable to proceed with a right small saphenous vein and left anterior thigh accessory vein endovenous ablation for symptomatic venous insufficiency.  Also believe would be reasonable to proceed with a right distal great saphenous vein non thermal ablation using Varithena non compound foam sclerosant 1%.  Finally, I believe it would be reasonable to proceed with greater than 20 micro phlebectomies of the bilateral lower extremities.  Untreated venous disease increases the patient's risk for cellulitis, deep vein thrombosis, chronic skin changes and venous ulceration.  Risk and benefits of the procedure were explained to the patient and the patient wishes to proceed. The patient does not have overly distended veins and denies history of DVT/PE and will not require prophylactic anticoagulation.          Matty Falcon, DO

## 2025-04-14 NOTE — PROGRESS NOTES
Subjective:       Patient ID: Holly Porter is a 64 y.o. female     Chief Complaint:    No chief complaint on file.       Allergies:  Ammonium peroxydisulfate; Avocado (laurus persea); Bananas [banana]; Chocolate flavor; Fentanyl; Iodinated contrast media; Nicoderm; Percocet [oxycodone-acetaminophen]; Silvadene [silver sulfadiazine]; Clindamycin; Hydrocortisone; Pregabalin; Adhesive; Iodine; Latex, natural rubber; Pcn [penicillins]; and Sulfa (sulfonamide antibiotics)    Current Medications:    Outpatient Encounter Medications as of 4/14/2025   Medication Sig Dispense Refill    ACCU-CHEK GUIDE GLUCOSE METER Misc       ACCU-CHEK GUIDE TEST STRIPS Strp       ACCU-CHEK SOFTCLIX LANCETS Misc       allopurinoL (ZYLOPRIM) 300 MG tablet Take 1 tablet (300 mg total) by mouth once daily. 90 tablet 3    apixaban (ELIQUIS) 5 mg Tab Take 1 tablet (5 mg total) by mouth 2 (two) times daily. 60 tablet 3    aspirin (ECOTRIN) 81 MG EC tablet TAKE 1 TABLET BY MOUTH EVERY DAY 90 tablet 1    atorvastatin (LIPITOR) 10 MG tablet Take 1 tablet (10 mg total) by mouth once daily. 90 tablet 1    benzonatate (TESSALON) 100 MG capsule TAKE ONE CAPSULE 3 TIMES DAILY IF NEEDED FOR COUGH      BREZTRI AEROSPHERE 160-9-4.8 mcg/actuation HFAA Take 2 puffs by mouth 2 (two) times daily.      bumetanide (BUMEX) 2 MG tablet Take 1 tablet (2 mg total) by mouth 2 (two) times daily. 60 tablet 11    carvediloL (COREG) 12.5 MG tablet Take 0.5 tablets (6.25 mg total) by mouth 2 (two) times daily. 30 tablet 11    cilostazoL (PLETAL) 100 MG Tab Take 1 tablet (100 mg total) by mouth 2 (two) times daily. 90 tablet 1    colchicine (COLCRYS) 0.6 mg tablet Take 1 tablet (0.6 mg total) by mouth once daily. 30 tablet 2    diclofenac sodium (VOLTAREN ARTHRITIS PAIN) 1 % Gel Apply 2 g topically once daily. 1 g 3    DULoxetine (CYMBALTA) 30 MG capsule Take 30 mg by mouth once daily.      HYDROcodone-acetaminophen (NORCO)  mg per tablet Take 1 tablet by mouth  "every 6 (six) hours. 120 tablet 0    HYDROcodone-acetaminophen (NORCO)  mg per tablet Take 1 tablet by mouth every 6 (six) hours. 120 tablet 0    HYDROcodone-acetaminophen (NORCO)  mg per tablet Take 1 tablet by mouth every 6 (six) hours. 120 tablet 0    insulin detemir U-100, Levemir, (LEVEMIR FLEXTOUCH U100 INSULIN) 100 unit/mL (3 mL) InPn pen Inject 10 units into the skin every evening subcutaneous 15 mL 1    ketorolac 0.5% (ACULAR) 0.5 % Drop Place 1 drop into the right eye 4 (four) times daily.      levothyroxine (SYNTHROID) 150 MCG tablet TAKE 1 TABLET EVERY MORNING 30 MINUTES PRIOR TO BREAKFAST MEAL FOR THYROID 90 tablet 1    moxifloxacin (VIGAMOX) 0.5 % ophthalmic solution Place 1 drop into the right eye 4 (four) times daily.      naloxone (NARCAN) 4 mg/actuation Spry 1 spray once.      NIFEdipine (ADALAT CC) 60 MG TbSR Take 60 mg by mouth.      pantoprazole (PROTONIX) 40 MG tablet Take 1 tablet (40 mg total) by mouth once daily. 90 tablet 1    pen needle, diabetic 32 gauge x 5/32" Ndle Use to inject insulin once daily 100 each 3    potassium chloride SA (K-DUR,KLOR-CON) 20 MEQ tablet Take 1 tablet (20 mEq total) by mouth once daily. 90 tablet 1    prednisoLONE acetate (PRED FORTE) 1 % DrpS Place 1 drop into the right eye 4 (four) times daily.      psyllium husk (METAMUCIL) 3.4 gram/5.4 gram Powd Take 1 table spoon with water by mouth once daily. 660 g 0    QUEtiapine (SEROQUEL) 25 MG Tab Take 1 tablet (25 mg total) by mouth once daily. 30 tablet 1    tadalafiL (CIALIS) 20 MG Tab Take 40 mg by mouth.      varenicline (CHANTIX CONTINUING MONTH BOX) 1 mg Tab Take 1 tablet (1 mg total) by mouth 2 (two) times daily. 30 tablet 3    [DISCONTINUED] topiramate (TOPAMAX) 100 MG tablet Take 1 tablet (100 mg total) by mouth 2 (two) times daily. 60 tablet 11    albuterol (PROVENTIL) 2.5 mg /3 mL (0.083 %) nebulizer solution Take 3 mLs (2.5 mg total) by nebulization every 6 (six) hours as needed for Wheezing. " Rescue 300 mL 11    topiramate (TOPAMAX) 100 MG tablet Take 1 tablet (100 mg total) by mouth 2 (two) times daily. 60 tablet 11    [DISCONTINUED] clindamycin (CLEOCIN) 150 MG capsule Take 2 capsules (300 mg total) by mouth 3 (three) times daily. (Patient not taking: Reported on 1/15/2025) 30 capsule 0    [DISCONTINUED] dexAMETHasone (DECADRON) 4 MG Tab Take 4 mg by mouth. (Patient not taking: Reported on 1/15/2025)       No facility-administered encounter medications on file as of 4/14/2025.       History of Present Illness  65 y/o AAF who was last seen in clinic over a year ago.    In the past was followed by us for migraines, as far back as 2020.  Prior treated with topamax 150mg bid and well managed by her reports then, but would have worsening headaches when she would fail to follow-up and get refills.  Which has been recurring theme in her history with us.  Prior to last visit had not been seen in well over a year.  And today, also not seen in over a year.  Last visit again not on the topamax due to lack of follow-up.  Was reporting headache 3 days of the week and wanted to restart the topamax.  Which we did.  I scheduled her for 1 year follow-up.  For most part her headaches are about the same, she says, unless someone wakes her up?  Told her to tell them to not wake her up.  She seems okay with keeping her current treatment as it is.    She has chronic lower back pain, she is already an established patient here with Dr. Crandall.  Given her obesity EMG/NCS would likely be limited but again already on neurontin and cymbalta, with prior allergy to lyrica.    Recommend continue with recommended treatment plan per pain treatment           Review of Systems  Review of Systems   Constitutional:  Negative for diaphoresis and fever.   HENT:  Negative for congestion, hearing loss and tinnitus.    Eyes:  Negative for blurred vision, double vision, photophobia, discharge and redness.   Respiratory:  Negative for cough and  shortness of breath.    Cardiovascular:  Negative for chest pain.   Gastrointestinal:  Negative for abdominal pain, nausea and vomiting.   Musculoskeletal:  Positive for back pain, joint pain, myalgias and neck pain.   Skin:  Negative for itching and rash.   Neurological:  Positive for headaches. Negative for dizziness, tremors, sensory change, speech change, focal weakness, seizures, loss of consciousness and weakness.   Psychiatric/Behavioral:  Negative for depression, hallucinations and memory loss. The patient does not have insomnia.    All other systems reviewed and are negative.     Objective:     NEUROLOGICAL EXAMINATION:     MENTAL STATUS   Oriented to person, place, and time.   Attention: normal. Concentration: normal.   Speech: speech is normal   Level of consciousness: alert  Knowledge: good and consistent with education.   Normal comprehension.     CRANIAL NERVES     CN II   Visual fields full to confrontation.   Visual acuity: normal  Right visual field deficit: none  Left visual field deficit: none     CN III, IV, VI   Pupils are equal, round, and reactive to light.  Extraocular motions are normal.   Right pupil: Size: 3 mm. Shape: regular. Reactivity: brisk. Consensual response: intact. Accommodation: intact.   Left pupil: Size: 3 mm. Shape: regular. Reactivity: brisk. Consensual response: intact. Accommodation: intact.   CN III: no CN III palsy  CN VI: no CN VI palsy  Nystagmus: none   Diplopia: none  Upgaze: normal  Downgaze: normal  Conjugate gaze: present  Vestibulo-ocular reflex: present    CN V   Facial sensation intact.   Right facial sensation deficit: none  Left facial sensation deficit: none  Right corneal reflex: normal  Left corneal reflex: normal    CN VII   Facial expression full, symmetric.   Right facial weakness: none  Left facial weakness: none  Right taste: normal  Left taste: normal    CN VIII   CN VIII normal.   Hearing: intact    CN IX, X   CN IX normal.   CN X normal.   Palate:  symmetric    CN XI   CN XI normal.   Right sternocleidomastoid strength: normal  Left sternocleidomastoid strength: normal  Right trapezius strength: normal  Left trapezius strength: normal    CN XII   CN XII normal.   Tongue: not atrophic  Fasciculations: absent  Tongue deviation: none    MOTOR EXAM   Muscle bulk: normal  Overall muscle tone: normal  Right arm tone: normal  Left arm tone: normal  Right arm pronator drift: absent  Left arm pronator drift: absent  Right leg tone: normal  Left leg tone: normal    Strength   Right neck flexion: 5/5  Left neck flexion: 5/5  Right neck extension: 5/5  Left neck extension: 5/5  Right deltoid: 5/5  Left deltoid: 5/5  Right biceps: 5/5  Left biceps: 5/5  Right triceps: 5/5  Left triceps: 5/5  Right wrist flexion: 5/5  Left wrist flexion: 5/5  Right wrist extension: 5/5  Left wrist extension: 5/5  Right interossei: 5/5  Left interossei: 5/5  Right iliopsoas: 5/5  Left iliopsoas: 5/5  Right quadriceps: 5/5  Left quadriceps: 5/5  Right hamstrin/5  Left hamstrin/5  Right anterior tibial: 5/5  Left anterior tibial: 5/5  Right posterior tibial: 5/5  Left posterior tibial: 5/5  Right gastroc: 5/5  Left gastroc: 5/5    REFLEXES     Reflexes   Right brachioradialis: 2+  Left brachioradialis: 2+  Right biceps: 2+  Left biceps: 2+  Right triceps: 2+  Left triceps: 2+  Right patellar: 2+  Left patellar: 2+  Right achilles: 2+  Left achilles: 2+  Right plantar: normal  Left plantar: normal  Right Lambert: absent  Left Lambert: absent  Right ankle clonus: absent  Left ankle clonus: absent  Right pendular knee jerk: absent  Left pendular knee jerk: absent    SENSORY EXAM   Right arm light touch: normal  Left arm light touch: normal  Right leg light touch: decreased from toes  Left leg light touch: decreased from toes  Right arm vibration: normal  Left arm vibration: normal  Right leg vibration: decreased from toes  Left leg vibration: decreased from toes  Right arm proprioception:  normal  Left arm proprioception: normal  Right leg proprioception: decreased from toes  Left leg proprioception: decreased from toes  Right arm pinprick: normal  Left arm pinprick: normal  Right leg pinprick: decreased from toes  Left leg pinprick: decreased from toes  Romberg: negative    GAIT AND COORDINATION     Gait  Gait: wide-based     Coordination   Finger to nose coordination: normal  Heel to shin coordination: abnormal  Tandem walking coordination: abnormal    Tremor   Resting tremor: absent  Intention tremor: absent  Action tremor: absent       Using walker in clinic        Physical Exam  Vitals and nursing note reviewed.   Constitutional:       Appearance: Normal appearance.   HENT:      Head: Normocephalic.   Eyes:      Extraocular Movements: Extraocular movements intact and EOM normal.      Pupils: Pupils are equal, round, and reactive to light.   Cardiovascular:      Rate and Rhythm: Normal rate and regular rhythm.   Pulmonary:      Effort: Pulmonary effort is normal.      Breath sounds: Normal breath sounds.   Musculoskeletal:         General: No swelling or tenderness. Normal range of motion.      Cervical back: Normal range of motion and neck supple.      Right lower leg: No edema.      Left lower leg: No edema.   Skin:     General: Skin is warm and dry.      Coloration: Skin is not jaundiced.      Findings: No rash.   Neurological:      General: No focal deficit present.      Mental Status: She is alert and oriented to person, place, and time.      GCS: GCS eye subscore is 4. GCS verbal subscore is 5. GCS motor subscore is 6.      Cranial Nerves: No cranial nerve deficit.      Sensory: Sensory deficit present.      Motor: Motor function is intact. No weakness.      Coordination: Coordination abnormal. Heel to Shin Test abnormal. Finger-Nose-Finger Test and Romberg Test normal.      Gait: Gait abnormal and tandem walk abnormal.      Deep Tendon Reflexes: Reflexes normal.      Reflex Scores:        Tricep reflexes are 2+ on the right side and 2+ on the left side.       Bicep reflexes are 2+ on the right side and 2+ on the left side.       Brachioradialis reflexes are 2+ on the right side and 2+ on the left side.       Patellar reflexes are 2+ on the right side and 2+ on the left side.       Achilles reflexes are 2+ on the right side and 2+ on the left side.  Psychiatric:         Mood and Affect: Mood normal.         Speech: Speech normal.         Behavior: Behavior normal.          Assessment:     Problem List Items Addressed This Visit          Neuro    Migraine - Primary    Relevant Medications    topiramate (TOPAMAX) 100 MG tablet          Primary Diagnosis and ICD10  Intractable chronic migraine without aura and without status migrainosus [G43.719]    Plan:     Patient Instructions   Continue current topamax 100mg at night  Continue the current neurontin and cymbalta  Agree with pain treatment plan as far as lower back pain  Follow-up one year    Medications Discontinued During This Encounter   Medication Reason    topiramate (TOPAMAX) 100 MG tablet Reorder         Requested Prescriptions     Signed Prescriptions Disp Refills    topiramate (TOPAMAX) 100 MG tablet 60 tablet 11     Sig: Take 1 tablet (100 mg total) by mouth 2 (two) times daily.       No orders of the defined types were placed in this encounter.

## 2025-04-14 NOTE — PATIENT INSTRUCTIONS
Pre Prodedure Information  Holly Porter  1960 MRN [id number]  [phone]    Procedure (s) and Date (s):  1)  Left ATAV Radiofrequency Ablation w/ Microphlectomies >  - 2025  2)  Right Distal GSV Varithena Ablation w/ Microphlebectomies > - 2025  3)  Right SSV Radiofrequency Ablation - 2025    **  Take eliquis, coreg, synthroid, nifedipine, protonix only morning of each procedure  **  Hold Levemir insuling 24 hours prior to each procedure    Do not eat or drink anything after midnight.  You may take medications that are listed above with a small sip of water morning of procedure.  We will call you the day prior with your time to report for surgery.  You will check in at Ambulatory Surgery on the Ground Floor.  Shower prior to procedure as your leg will be wrapped for 48 hours and you will not be able to shower.  Do not wear lotion, oil, or cream on you legs on the day of your procedure.  This will cause the Doctor's amena to fade.  Wear shorts, skirt, or loose pants and larger shoes (house shoes).   Bring a pillow or two and leave in the car (to prop your leg).  Before and during your procedure, to help keep you calm and comfortable, you may be given medicine that makes you sleepy.  This is called sedation or anesthesia.  Possible side effects may include:  Drowsiness  Weakness  Upset Stomach  Vomiting  Bring someone with you to drive, stay during the procedure, and drive you home.  Someone will also need to stay with you at home the first night.  No driving for 24 hours after sedation.  Prepare to walk 15 minutes out of each hour for the first 48 hours post op.  Prepare to keep your legs propped up while sitting for the first 48 hours or 2 days following your procedure (recliner, couch, or chair).  Dressings will remain on your legs for at least 48 hours or 2 days after procedure.  Full discharge instructions will be provided on the day of your procedure.  Typically, you are in and  out within a few hours.  We will follow you postoperatively with a 4 day post ablation ultrasound and then a 1 month, 3 month, 6 month, and 1 year post ablation visit with the physician or nurse practitioner with or without an ultrasound.  Please make every effort to attend these appointments as they will be essential to following you progress.  Please kindly notify us as soon as possible if you need to reschedule your appointment.  As always, we look forward to caring for you and are happy to answer your questions.  Please call us at [phone].

## 2025-04-16 RX ORDER — HYDROCODONE BITARTRATE AND ACETAMINOPHEN 10; 325 MG/1; MG/1
1 TABLET ORAL EVERY 6 HOURS
Qty: 120 TABLET | Refills: 0 | Status: CANCELLED | OUTPATIENT
Start: 2025-04-16

## 2025-04-16 RX ORDER — HYDROCODONE BITARTRATE AND ACETAMINOPHEN 10; 325 MG/1; MG/1
1 TABLET ORAL EVERY 6 HOURS
Qty: 120 TABLET | Refills: 0 | Status: CANCELLED | OUTPATIENT
Start: 2025-04-19

## 2025-04-16 NOTE — PROGRESS NOTES
Subjective:         Patient ID: Holly Porter is a 64 y.o. female.    Chief Complaint: Knee Pain (Pain in back and bilateral knees and left foot.)            Pain  This is a chronic problem. The current episode started more than 1 year ago. The problem occurs daily. The problem has been unchanged. Associated symptoms include arthralgias. Pertinent negatives include no chest pain, chills, coughing, diaphoresis, fever, sore throat, vertigo or vomiting.     Review of Systems   Constitutional:  Negative for activity change, chills, diaphoresis, fever and unexpected weight change.   HENT:  Negative for drooling, ear discharge, ear pain, facial swelling, nosebleeds, sore throat, trouble swallowing, voice change and goiter.    Eyes:  Negative for photophobia, pain, discharge, redness and visual disturbance.   Respiratory:  Negative for apnea, cough, choking, chest tightness, shortness of breath, wheezing and stridor.    Cardiovascular:  Negative for chest pain, palpitations and leg swelling.   Gastrointestinal:  Negative for abdominal distention, diarrhea, rectal pain, vomiting and fecal incontinence.   Endocrine: Negative for cold intolerance, heat intolerance, polydipsia, polyphagia and polyuria.   Genitourinary:  Negative for bladder incontinence, dysuria, flank pain, frequency and hot flashes.   Musculoskeletal:  Positive for arthralgias, back pain, leg pain and neck stiffness.   Integumentary:  Negative for color change and pallor.   Allergic/Immunologic: Negative for immunocompromised state.   Neurological:  Negative for dizziness, vertigo, seizures, syncope, facial asymmetry, speech difficulty, light-headedness, coordination difficulties and memory loss.   Hematological:  Negative for adenopathy. Does not bruise/bleed easily.   Psychiatric/Behavioral:  Negative for agitation, behavioral problems, confusion, decreased concentration, dysphoric mood, hallucinations, self-injury and suicidal ideas. The patient is  not nervous/anxious and is not hyperactive.            Past Medical History:   Diagnosis Date    Acquired hypothyroidism     Atherosclerosis of native artery of extremity with intermittent claudication 01/30/2019    bilateral legs    BMI 50.0-59.9, adult 02/22/2021    Chronic pain syndrome     opioid depen    Congestive heart failure (CHF)     COPD (chronic obstructive pulmonary disease)     COVID-19 10/06/2023    Depression     Diabetes mellitus, type 2     followed by Aurea Kwong NP, [facility]    DVT of lower extremity, bilateral     Essential hypertension 06/08/2021    GERD (gastroesophageal reflux disease)     Gout 07/05/2023    Hyperlipidemia     Lumbosacral radiculopathy 06/05/2023    followed by GLENN Valdes, [facility]    Migraines     Nicotine dependence     Nicotine dependence     Obesity hypoventilation syndrome     chronic CO2 retainer with compensatory metabolic alkalosis    Osteoarthritis     Seizure disorder     Sleep apnea     on cpap    Thyroid cancer     Venous stasis ulcer limited to breakdown of skin with varicose veins     followed by Estuardo Zhao    Vitamin D deficiency      Past Surgical History:   Procedure Laterality Date    ABCESS DRAINAGE      HYSTERECTOMY      ILIAC ARTERY STENT Bilateral 01/28/2020    Bilateral distal aorta and common iliac 8 X 59 vbx covered stents performed by Dr. Antonio Jacinto.    LEFT HEART CATHETERIZATION Left 11/6/2023    Procedure: Left heart cath;  Surgeon: Alex Ortega DO;  Location: [facility] CATH LAB;  Service: Cardiology;  Laterality: Left;    RADIOFREQUENCY ABLATION Left 04/15/2022    Procedure: Left calf  Radiofrequency Ablation;  Surgeon: Matty Falcon DO;  Location: [facility] OR;  Service: Vascular;  Laterality: Left;    RIGHT HEART CATHETERIZATION Right 2/13/2024    Procedure: INSERTION, CATHETER, RIGHT HEART;  Surgeon: Mahad Moreno MD;  Location: [facility] CATH LAB;  Service: Cardiology;  Laterality:  Right;    STAB PHLEBECTOMY OF VARICOSE VEINS Left 01/25/2013    Left leg microphlebectomies x 23 stab avulsions and ligation of multiple varicose veins perrformed by Dr. Cirilo Aguirre.    THYROIDECTOMY      hx: thyroid cancer    TOE AMPUTATION Left 01/28/2020    2nd, 4th and 5th performed by Dr. Anam Saeed.    TOE AMPUTATION Right 01/28/2020    4th toe performed by Dr. Anam Saeed.    TOTAL ABDOMINAL HYSTERECTOMY W/ BILATERAL SALPINGOOPHORECTOMY      TUBAL LIGATION      VAGINAL DELIVERY      x 4    VENOUS ABLATION Right 08/09/2019    GSV Varithena Ablation performed by Dr. Estuardo Falcon    VENOUS ABLATION Left 08/02/2019    ATAV Varithena Ablation performed by Dr. Estuardo Falcon.    VENOUS ABLATION Right 07/13/2015    ATAV Laser Ablatio performed by Dr. Cirilo Aguirre.    VENOUS ABLATION Right 07/06/2015    Distal  GSV Laser Ablation performed by dr. Cirilo Aguirre.    VENOUS ABLATION Left 04/20/2015    Left Distal GSV Laser Ablation performed by dr. Cirilo Aguirre.    VENOUS ABLATION Right 10/28/2013    Right Distal GSV RF Ablation performed by Dr. Cirilo Aguirre.    VENOUS ABLATION Left 10/25/2013    SSV RF Ablation performed by dr. Cirilo Aguirre.    VENOUS ABLATION Left 10/07/2013    Left GSV and Left ATAV RF Ablation performed by Dr. Cirilo Aguirre.    VENOUS ABLATION Right 01/21/2013    GSV RF Ablation w/micros x 22 performed by Dr. Cirilo Aguirre.    VENOUS ABLATION Left 06/25/2021    Left distal GSV Varithena Ablation     Social History     Socioeconomic History    Marital status:    Tobacco Use    Smoking status: Every Day     Current packs/day: 0.50     Average packs/day: 0.5 packs/day for 33.0 years (16.5 ttl pk-yrs)     Types: Cigarettes     Passive exposure: Current    Smokeless tobacco: Never    Tobacco comments:     5 cigarettes a day    Substance and Sexual Activity    Alcohol use: Never    Drug use: Never    Sexual activity: Not Currently     Partners: Male     Social Drivers of Health     Financial Resource Strain: Low Risk  (3/25/2025)     Overall Financial Resource Strain (CARDIA)     Difficulty of Paying Living Expenses: Not hard at all   Food Insecurity: Food Insecurity Present (3/25/2025)    Hunger Vital Sign     Worried About Running Out of Food in the Last Year: Never true     Ran Out of Food in the Last Year: Sometimes true   Transportation Needs: Unmet Transportation Needs (3/25/2025)    PRAPARE - Transportation     Lack of Transportation (Medical): Yes     Lack of Transportation (Non-Medical): Yes   Physical Activity: Insufficiently Active (3/25/2025)    Exercise Vital Sign     Days of Exercise per Week: 2 days     Minutes of Exercise per Session: 10 min   Stress: Stress Concern Present (3/25/2025)    [employer] [place] [place] - Occupational Stress Questionnaire     Feeling of Stress : To some extent   Housing Stability: Low Risk  (3/25/2025)    Housing Stability Vital Sign     Unable to Pay for Housing in the Last Year: No     Number of Times Moved in the Last Year: 1     Homeless in the Last Year: No     Family History   Problem Relation Name Age of Onset    Heart disease Mother          age 84 CHF    Hypertension Mother      Osteoarthritis Mother      Coronary aneurysm Father      Coronary artery disease Father      Hypertension Father      Heart disease Father      No Known Problems Sister      No Known Problems Brother          hx: varicose veins    No Known Problems Brother          MVA: parlazed    No Known Problems Brother      No Known Problems Son      No Known Problems Son      No Known Problems Son      No Known Problems Son       Review of patient's allergies indicates:   Allergen Reactions    Ammonium peroxydisulfate Shortness Of Breath    Avocado (laurus persea) Anaphylaxis    Bananas [banana] Anaphylaxis and Swelling     CRAMPS,    Chocolate flavor Anaphylaxis     MOUTH SWELLING    Fentanyl Shortness Of Breath and Itching    Iodinated contrast media Anaphylaxis, Hives and Rash    Nicoderm Swelling    Percocet  "[oxycodone-acetaminophen] Shortness Of Breath and Itching    Silvadene [silver sulfadiazine]     Clindamycin     Pregabalin     Adhesive Blisters, Rash and Itching    Iodine Rash    Latex, natural rubber Rash    Pcn [penicillins] Rash    Sulfa (sulfonamide antibiotics) Rash and Blisters        Objective:  Vitals:    04/22/25 1349 04/22/25 1352 04/22/25 1502   BP: (!) 169/77  (!) 168/88   Pulse: 88     Resp: 18     Weight: (!) 151.8 kg (334 lb 9.6 oz)     Height: 5' 8" (1.727 m)     PainSc:    7 0-No pain                     Physical Exam  Vitals and nursing note reviewed. Exam conducted with a chaperone present.   Constitutional:       General: She is awake. She is not in acute distress.     Appearance: Normal appearance. She is obese. She is not ill-appearing, toxic-appearing or diaphoretic.   HENT:      Head: Normocephalic and atraumatic.      Nose: Nose normal.      Mouth/Throat:      Mouth: Mucous membranes are moist.      Pharynx: Oropharynx is clear.   Eyes:      Conjunctiva/sclera: Conjunctivae normal.      Pupils: Pupils are equal, round, and reactive to light.   Cardiovascular:      Rate and Rhythm: Normal rate.   Pulmonary:      Effort: Pulmonary effort is normal. No respiratory distress.   Abdominal:      Palpations: Abdomen is soft.   Musculoskeletal:         General: Normal range of motion.      Cervical back: Normal range of motion and neck supple.   Skin:     General: Skin is warm and dry.   Neurological:      General: No focal deficit present.      Mental Status: She is alert and oriented to person, place, and time. Mental status is at baseline.      Cranial Nerves: No cranial nerve deficit (II-XII).   Psychiatric:         Mood and Affect: Mood normal.         Behavior: Behavior normal. Behavior is cooperative.         Thought Content: Thought content normal.           X-Ray Chest PA And Lateral  Narrative: EXAMINATION:  XR CHEST PA AND LATERAL    CLINICAL HISTORY:  Cough, " unspecified    TECHNIQUE:  PA and lateral views of the chest were performed.    COMPARISON:  02/27/2024.    FINDINGS:  The lungs are clear.  No focal consolidation.  Heart size is enlarged.  Mediastinal contours unremarkable.    Bony thorax intact.  Impression: No acute chest disease.    Electronically signed by: Waldo Solis  Date:    12/12/2024  Time:    14:15         Office Visit on 04/14/2025   Component Date Value Ref Range Status    POC Amphetamines 04/14/2025 Negative  Negative, Inconclusive Final    POC Barbiturates 04/14/2025 Negative  Negative, Inconclusive Final    POC Benzodiazepines 04/14/2025 Negative  Negative, Inconclusive Final    POC Cocaine 04/14/2025 Negative  Negative, Inconclusive Final    POC THC 04/14/2025 Negative  Negative, Inconclusive Final    POC Methadone 04/14/2025 Negative  Negative, Inconclusive Final    POC Methamphetamine 04/14/2025 Negative  Negative, Inconclusive Final    POC Opiates 04/14/2025 Presumptive Positive (A)  Negative, Inconclusive Final    POC Oxycodone 04/14/2025 Negative  Negative, Inconclusive Final    POC Phencyclidine 04/14/2025 Negative  Negative, Inconclusive Final    POC Methylenedioxymethamphetamine * 04/14/2025 Negative  Negative, Inconclusive Final    POC Tricyclic Antidepressants 04/14/2025 Negative  Negative, Inconclusive Final    POC Buprenorphine 04/14/2025 Negative   Final     Acceptable 04/14/2025 Yes   Final    POC Temperature (Urine) 04/14/2025 92   Final    Creatinine, U 04/14/2025 204.5  mg/dL Final    Specific Gravity 04/14/2025 1.027   Final    pH 04/14/2025 5.4   Final    Oxidants 04/14/2025 Negative  Cutoff: 200 mg/L Final    Comment 04/14/2025 Normal   Final    Codeine 04/14/2025 Not Detected  Cutoff: 25 ng/mL Final    C6BG 04/14/2025 Not Detected  Cutoff: 100 ng/mL Final    Morphine 04/14/2025 Not Detected  Cutoff: 25 ng/mL Final    M6BG 04/14/2025 Not Detected  Cutoff: 100 ng/mL Final    6 Monoacetylmorphine 04/14/2025 Not  Detected  Cutoff: 25 ng/mL Final    Hydrocodone 04/14/2025 Present (A)  Cutoff: 25 ng/mL Final    Norhydrocodone 04/14/2025 Present (A)  Cutoff: 25 ng/mL Final    Dihydrocodeine 04/14/2025 Present (A)  Cutoff: 25 ng/mL Final    Hydromorphone 04/14/2025 Present (A)  Cutoff: 25 ng/mL Final    Hydromorphone-3-beta-glucuronide 04/14/2025 Present (A)  Cutoff: 100 ng/mL Final    Oxycodone 04/14/2025 Not Detected  Cutoff: 25 ng/mL Final    Noroxycodone 04/14/2025 Not Detected  Cutoff: 25 ng/mL Final    Oxymorphone 04/14/2025 Not Detected  Cutoff: 25 ng/mL Final    Oxymorphone-3-beta-glucuronid 04/14/2025 Not Detected  Cutoff: 100 ng/mL Final    Noroxymorphone 04/14/2025 Not Detected  Cutoff: 25 ng/mL Final    Fentanyl 04/14/2025 Not Detected  Cutoff: 2 ng/mL Final    Norfentanyl 04/14/2025 Not Detected  Cutoff: 2 ng/mL Final    Meperidine 04/14/2025 Not Detected  Cutoff: 25 ng/mL Final    Normeperidine 04/14/2025 Not Detected  Cutoff: 25 ng/mL Final    Naloxone 04/14/2025 Not Detected  Cutoff: 25 ng/mL Final    Naloxone-3-beta-glucuronide 04/14/2025 Not Detected  Cutoff: 100 ng/mL Final    Methadone 04/14/2025 Not Detected  Cutoff: 25 ng/mL Final    EDDP 04/14/2025 Not Detected  Cutoff: 25 ng/mL Final    Propoxyphene 04/14/2025 Not Detected  Cutoff: 25 ng/mL Final    Norpropoxyphene 04/14/2025 Not Detected  Cutoff: 25 ng/mL Final    Tramadol 04/14/2025 Not Detected  Cutoff: 25 ng/mL Final    O-desmethyltramadol 04/14/2025 Not Detected  Cutoff: 25 ng/mL Final    Tapentadol 04/14/2025 Not Detected  Cutoff: 25 ng/mL Final    N-Desmethyltapentadol 04/14/2025 Not Detected  Cutoff: 50 ng/mL Final    M TAPENTADOL-BETA-GLUCURONIDE 04/14/2025 Not Detected  Cutoff: 100 ng/mL Final    Buprenorphine 04/14/2025 Not Detected  Cutoff: 5 ng/mL Final    Norbuprenorphine 04/14/2025 SEE COMMENTS  Cutoff: 5 ng/mL Final    Norbuprenorphine glucuronide 04/14/2025 Not Detected  Cutoff: 20 ng/mL Final    Opioid Interpretation 04/14/2025 SEE  COMMENTS   Final    Cocaine 04/14/2025 Negative  Cutoff: 150 ng/mL Final    Tetrahydrocannabinol 04/14/2025 Negative  Cutoff: 50 ng/mL Final    Alprazolam 04/14/2025 Not Detected  Cutoff: 10 ng/mL Final    Alpha-Hydroxyalprazolam 04/14/2025 Not Detected  Cutoff: 10 ng/mL Final    Alpha-Hydroxyalprazolam Glucuronide 04/14/2025 Not Detected  Cutoff: 50 ng/mL Final    Chlordiazepoxide 04/14/2025 Not Detected  Cutoff: 10 ng/mL Final    Clobazam 04/14/2025 Not Detected  Cutoff: 10 ng/mL Final    N-Desmethylclobazam 04/14/2025 Not Detected  Cutoff: 200 ng/mL Final    Clonazepam 04/14/2025 Not Detected  Cutoff: 10 ng/mL Final    7-aminoclonazepam 04/14/2025 Not Detected  Cutoff: 10 ng/mL Final    Diazepam 04/14/2025 Not Detected  Cutoff: 10 ng/mL Final    Nordiazepam 04/14/2025 Not Detected  Cutoff: 10 ng/mL Final    Flunitrazepam 04/14/2025 Not Detected  Cutoff: 10 ng/mL Final    7-aminoflunitrazepam 04/14/2025 Not Detected  Cutoff: 10 ng/mL Final    Flurazepam 04/14/2025 Not Detected  Cutoff: 10 ng/mL Final    2-Hydroxy Ethyl Flurazepam 04/14/2025 Not Detected  Cutoff: 10 ng/mL Final    Lorazepam 04/14/2025 Not Detected  Cutoff: 10 ng/mL Final    Lorazepam Glucuronide 04/14/2025 Not Detected  Cutoff: 50 ng/mL Final    Midazolam 04/14/2025 Not Detected  Cutoff: 10 ng/mL Final    Alpha-Hydroxy Midazolam 04/14/2025 Not Detected  Cutoff: 10 ng/mL Final    Oxazepam 04/14/2025 Not Detected  Cutoff: 10 ng/mL Final    Oxazepam Glucuronide 04/14/2025 Not Detected  Cutoff: 50 ng/mL Final    Prazepam 04/14/2025 Not Detected  Cutoff: 10 ng/mL Final    Temazepam 04/14/2025 Not Detected  Cutoff: 10 ng/mL Final    Temazepam Glucuronide 04/14/2025 Not Detected  Cutoff: 50 ng/mL Final    Triazolam 04/14/2025 Not Detected  Cutoff: 10 ng/mL Final    Alpha-Hydroxy Triazolam 04/14/2025 Not Detected  Cutoff: 10 ng/mL Final    Zolpidem 04/14/2025 Not Detected  Cutoff: 10 ng/mL Final    Zolpidem Phenyl-4-Carboxylic acid 04/14/2025 Not Detected   Cutoff: 10 ng/mL Final    Benzodiazepine Interpretation 04/14/2025 SEE COMMENTS   Final    List Patient's Current Medications 04/14/2025 Unknown   Final    Methamphetamine 04/14/2025 Not Detected  Cutoff: 100 ng/mL Final    Amphetamine 04/14/2025 Not Detected  Cutoff: 100 ng/mL Final    3,4-methylenedioxymethamphetamine * 04/14/2025 Not Detected  Cutoff: 100 ng/mL Final    3,4-zpuqsaflvmnxnw-G-ethylamphetam* 04/14/2025 Not Detected  Cutoff: 100 ng/mL Final    3,4-methylenedioxyamphetamine (MDA) 04/14/2025 Not Detected  Cutoff: 100 ng/mL Final    Ephedrine 04/14/2025 Not Detected  Cutoff: 100 ng/mL Final    Pseudoephedrine 04/14/2025 Not Detected  Cutoff: 100 ng/mL Final    Phentermine 04/14/2025 Not Detected  Cutoff: 100 ng/mL Final    Phencyclidine (PCP) 04/14/2025 Not Detected  Cutoff: 20 ng/mL Final    Methylphenidate 04/14/2025 Not Detected  Cutoff: 20 ng/mL Final    Ritalinic acid 04/14/2025 Not Detected  Cutoff: 100 ng/mL Final    Stimulant Interpretation 04/14/2025 SEE COMMENTS   Final    Barbiturates 04/14/2025 Negative  Cutoff: 200 ng/mL Final   Lab Visit on 01/15/2025   Component Date Value Ref Range Status    Reno Generic Orderable 01/15/2025 SEE COMMENTS   Final    Reno Generic Orderable 01/15/2025 SEE COMMENTS   Final   Office Visit on 12/11/2024   Component Date Value Ref Range Status    POC Amphetamines 12/11/2024 Negative  Negative, Inconclusive Final    POC Barbiturates 12/11/2024 Negative  Negative, Inconclusive Final    POC Benzodiazepines 12/11/2024 Negative  Negative, Inconclusive Final    POC Cocaine 12/11/2024 Negative  Negative, Inconclusive Final    POC THC 12/11/2024 Negative  Negative, Inconclusive Final    POC Methadone 12/11/2024 Negative  Negative, Inconclusive Final    POC Methamphetamine 12/11/2024 Negative  Negative, Inconclusive Final    POC Opiates 12/11/2024 Presumptive Positive (A)  Negative, Inconclusive Final    POC Oxycodone 12/11/2024 Negative  Negative, Inconclusive Final     POC Phencyclidine 12/11/2024 Negative  Negative, Inconclusive Final    POC Methylenedioxymethamphetamine * 12/11/2024 Negative  Negative, Inconclusive Final    POC Tricyclic Antidepressants 12/11/2024 Negative  Negative, Inconclusive Final    POC Buprenorphine 12/11/2024 Negative   Final     Acceptable 12/11/2024 Yes   Final    POC Temperature (Urine) 12/11/2024 94   Final   Lab Visit on 12/04/2024   Component Date Value Ref Range Status    Sodium 12/04/2024 144  136 - 145 mmol/L Final    Potassium 12/04/2024 3.4 (L)  3.5 - 5.1 mmol/L Final    Chloride 12/04/2024 102  98 - 107 mmol/L Final    CO2 12/04/2024 33 (H)  23 - 31 mmol/L Final    Anion Gap 12/04/2024 12  7 - 16 mmol/L Final    Glucose 12/04/2024 70 (L)  82 - 115 mg/dL Final    BUN 12/04/2024 14  10 - 20 mg/dL Final    Creatinine 12/04/2024 1.03 (H)  0.55 - 1.02 mg/dL Final    BUN/Creatinine Ratio 12/04/2024 14  6 - 20 Final    Calcium 12/04/2024 10.3 (H)  8.4 - 10.2 mg/dL Final    eGFR 12/04/2024 60  >=60 mL/min/1.73m2 Final    ProBNP 12/04/2024 162 (H)  1 - 125 pg/mL Final    Total Protein 12/04/2024 8.6 (H)  5.8 - 7.6 g/dL Final    Albumin 12/04/2024 4.3  3.4 - 4.8 g/dL Final    Bilirubin, Total 12/04/2024 0.3  <=1.5 mg/dL Final    Bilirubin, Direct 12/04/2024 0.2  <0.5 mg/dL Final    AST 12/04/2024 21  5 - 34 U/L Final    ALT 12/04/2024 8  <=55 U/L Final    Alk Phos 12/04/2024 143  40 - 150 U/L Final    Anti-Kyra-1 Ab 12/04/2024 <20  <20 Units Final    Anti-PL-7 Ab 12/04/2024 Negative  Negative Final    Anti-PL-12 Ab 12/04/2024 Negative  Negative Final    Anti-EJ Ab 12/04/2024 Negative  Negative Final    Anti-OJ Ab 12/04/2024 Negative  Negative Final    Anti-SRP Ab 12/04/2024 Negative  Negative Final    Anti-Mi-2-Ab 12/04/2024 Negative  Negative Final    Anti-TIF-1gamma Ab 12/04/2024 <20  <20 Units Final    Anti-MDA-5 Ab (CADM-140) 12/04/2024 <20  <20 Units Final    Anti-NXP-2 (P140) Ab 12/04/2024 <20  <20 Units Final    Anti-PM/Scl-100 Ab  12/04/2024 <20  <20 Units Final    Anti-Ku Ab 12/04/2024 Negative  Negative Final    Anti-SS-A 52kD Ab, IgG 12/04/2024 <20  <20 Units Final    Anti-U1 RNP Ab 12/04/2024 <20  <20 Units Final    Anti-U2 RNP Ab 12/04/2024 Negative  Negative Final    Anti-U3 RNP (Fibrillarin) 12/04/2024 Negative  Negative Final    c-ANCA 12/04/2024 Negative  Negative Final    p-ANCA 12/04/2024 Positive (A)  Negative Final    RA 12/04/2024 Positive (A)  Negative Final    CCP 12/04/2024 <16.0  <=19.9 units Final    IKE Screen 12/04/2024 Positive (A)  Negative Final    UCHE Screen 12/04/2024 Negative  Negative Final    UCHE Screen (EU) 12/04/2024 2.1  0.0 - 19.9 EUs Final    Anti-dsDNA 12/04/2024 Negative  Negative Final    Anti-DSDNA (IU) 12/04/2024 13  0 - 24 IU Final         Orders Placed This Encounter   Procedures    Large Joint Aspiration/Injection: bilateral knee     This order was created via procedure documentation    X-Ray Knee 1 or 2 View Bilateral     Standing Status:   Future     Expected Date:   4/22/2025     Expiration Date:   4/22/2026     May the Radiologist modify the order per protocol to meet the clinical needs of the patient?:   Yes     Release to patient:   Immediate    Ambulatory Referral/Consult to Physical Therapy     Standing Status:   Future     Expected Date:   4/29/2025     Expiration Date:   5/22/2026     Referral Priority:   Routine     Referral Type:   Physical Therapy     Referral Reason:   Specialty Services Required     Requested Specialty:   Physical Therapy     Number of Visits Requested:   1           Requested Prescriptions     Signed Prescriptions Disp Refills    HYDROcodone-acetaminophen (NORCO)  mg per tablet 120 tablet 0     Sig: Take 1 tablet by mouth every 6 (six) hours.    HYDROcodone-acetaminophen (NORCO)  mg per tablet 120 tablet 0     Sig: Take 1 tablet by mouth every 6 (six) hours.       Assessment:     1. Chronic pain of right knee    2. Lumbosacral spondylosis without myelopathy     3. PVD (peripheral vascular disease)    4. Chronic pain of left knee                   A's of Opioid Risk Assessment  Activity:Patient can perform ADL.   Analgesia:Patients pain is partially controlled by current medication. Patient has tried OTC medications such as Tylenol and Ibuprofen with out relief.   Adverse Effects: Patient denies constipation or sedation.  Aberrant Behavior:  reviewed with no aberrant drug seeking/taking behavior.  Overdose reversal drug naloxone discussed    Drug screen reviewed        MRI cervical spine ARM dated April 18, 2023 multiple level degenerative changes C4/5 disc osteophyte complex mild central canal stenosis uncovertebral hypertrophic    X-ray right knee [facility] February 27, 2024  Moderate degenerative changes  No fracture noted        Plan:    Narcan December 2022    Patient request anesthetic spray/ethyl chloride for in office procedures      Wheelchair/4 point walker for mobility due to chronic nonhealing wounds right foot     Following wound management chronic ulcers lower extremities    Following orthopedics knee pain [facility], she states she was advised not to have surgery due to her weight    Patient states she is not allergic to steroids April 22, 2025      Cannot tolerate OxyContin she is severely allergic    History thyroid cancer    Poor procedure candidate multiple comorbidities    Chronic edema bilateral lower extremities    Bilateral intra-articular knee injection April 22, 2025  Patient stated she had 80% relief after procedure      Here today for bilateral knee injection    She states current medication does help with her chronic back and joint pain    Requesting physical therapy [facility]    She would like to continue current medications    Continue home exercise program as tolerated    Follow-up three-months    Dr. Crandall, December 2025    Bring original prescription medication bottles/container/box with labels to each visit

## 2025-04-18 LAB
1OH-MIDAZOLAM UR QL SCN: NOT DETECTED NG/ML
2-OH-ETHYLFLURAZ UR QL SCN: NOT DETECTED NG/ML
6MAM UR QL SCN: NOT DETECTED NG/ML
7AMINOCLONAZEPAM UR QL SCN: NOT DETECTED NG/ML
7AMINOFLUNITRAZEPAM UR QL SCN: NOT DETECTED NG/ML
A-OH ALPRAZ GLUC UR QL SCN: NOT DETECTED NG/ML
A-OH ALPRAZ UR QL SCN: NOT DETECTED NG/ML
A-OH-TRIAZOLAM UR QL SCN: NOT DETECTED NG/ML
ALPRAZ UR QL SCN: NOT DETECTED NG/ML
AMPHET UR QL SCN: NOT DETECTED NG/ML
ANNOTATION COMMENT IMP: NORMAL
BARBITURATES UR QL SCN>200 NG/ML: NEGATIVE NG/ML
BUPRENORPHINE UR QL SCN: NOT DETECTED NG/ML
C6G UR QL SCN: NOT DETECTED NG/ML
CHLORDIAZEP UR QL SCN: NOT DETECTED NG/ML
CLOBAZAM UR QL SCN: NOT DETECTED NG/ML
CLONAZEPAM UR QL SCN: NOT DETECTED NG/ML
COCAINE UR QL SCN: NEGATIVE NG/ML
CODEINE UR QL SCN: NOT DETECTED NG/ML
CREAT UR-MCNC: 204.5 MG/DL
DHC UR QL SCN: PRESENT NG/ML
DIAZEPAM UR QL SCN: NOT DETECTED NG/ML
DRUG SCREEN COMMENT UR-IMP: ABNORMAL
EDDP UR QL SCN: NOT DETECTED NG/ML
EPHEDRIN UR QL SCN: NOT DETECTED NG/ML
FENTANYL UR QL SCN: NOT DETECTED NG/ML
FLUNITRAZEPAM UR QL SCN: NOT DETECTED NG/ML
FLURAZEPAM UR QL SCN: NOT DETECTED NG/ML
H3G UR QL SCN: PRESENT NG/ML
HYDROCODONE UR QL SCN: PRESENT NG/ML
HYDROMORPHONE UR QL SCN: PRESENT NG/ML
LORAZEPAM GLUCURONIDE UR QL SCN: NOT DETECTED NG/ML
LORAZEPAM UR QL SCN: NOT DETECTED NG/ML
MDA UR QL SCN: NOT DETECTED NG/ML
MDEA UR QL SCN: NOT DETECTED NG/ML
MDMA UR QL SCN: NOT DETECTED NG/ML
ME-PHENIDATE UR QL SCN: NOT DETECTED NG/ML
MEDICATIONS MEDICATION.CURRENT: ABNORMAL
MEPERIDINE UR QL SCN: NOT DETECTED NG/ML
METHADONE UR QL SCN: NOT DETECTED NG/ML
METHAMPHET UR QL SCN: NOT DETECTED NG/ML
MIDAZOLAM UR QL SCN: NOT DETECTED NG/ML
MORPHINE UR QL SCN: NOT DETECTED NG/ML
MORPHINE-6-GLUCURONIDE UR QL SCN: NOT DETECTED NG/ML
N3G UR QL SCN: NOT DETECTED NG/ML
NALOXONE UR QL SCN: NOT DETECTED NG/ML
NALOXONE-3G UR QL SCN: NOT DETECTED NG/ML
NORBUPRENORPHINE UR QL SCN: ABNORMAL NG/ML
NORCLOBAZAM UR QL SCN: NOT DETECTED NG/ML
NORDIAZEPAM UR QL SCN: NOT DETECTED NG/ML
NORFENTANYL UR QL: NOT DETECTED NG/ML
NORHYDROCODONE UR QL SCN: PRESENT NG/ML
NORMEPERIDINE UR QL: NOT DETECTED NG/ML
NOROXYCODONE UR QL SCN: NOT DETECTED NG/ML
NOROXYMORPHONE UR QL SCN: NOT DETECTED NG/ML
NORPROPOXYPH UR QL SCN: NOT DETECTED NG/ML
NORTAPENTADOL UR QL SCN: NOT DETECTED NG/ML
O-NORTRAMADOL UR QL: NOT DETECTED NG/ML
O3G UR QL SCN: NOT DETECTED NG/ML
OXAZEPAM GLUCURONIDE UR QL SCN: NOT DETECTED NG/ML
OXAZEPAM UR QL SCN: NOT DETECTED NG/ML
OXIDANTS UR QL: NEGATIVE
OXYCODONE UR QL SCN: NOT DETECTED NG/ML
OXYMORPHONE UR QL SCN: NOT DETECTED NG/ML
PCP UR QL SCN: NOT DETECTED NG/ML
PH UR: 5.4 [PH]
PHENTERMINE UR QL SCN: NOT DETECTED NG/ML
PPAA UR QL SCN: NOT DETECTED NG/ML
PRAZEPAM UR QL SCN: NOT DETECTED NG/ML
PROPOXYPH UR QL SCN: NOT DETECTED NG/ML
PSEUDOEPHEDRINE UR QL SCN: NOT DETECTED NG/ML
SP GR UR REFRACTOMETRY: 1.03
TAPEN GLUC UR QL SCN: NOT DETECTED NG/ML
TAPENTADOL UR QL SCN: NOT DETECTED NG/ML
TEMAZEPAM GLUCURONIDE UR QL SCN: NOT DETECTED NG/ML
TEMAZEPAM UR QL SCN: NOT DETECTED NG/ML
THC UR QL SCN: NEGATIVE NG/ML
TOXICOLOGIST REVIEW: ABNORMAL
TOXICOLOGIST REVIEW: ABNORMAL
TRAMADOL UR QL SCN: NOT DETECTED NG/ML
TRIAZOLAM UR QL SCN: NOT DETECTED NG/ML
ZOLPIDEM PHENYL-4-CARB UR QL SCN: NOT DETECTED NG/ML
ZOLPIDEM UR QL SCN: NOT DETECTED NG/ML

## 2025-04-22 ENCOUNTER — OFFICE VISIT (OUTPATIENT)
Dept: PAIN MEDICINE | Facility: CLINIC | Age: 65
End: 2025-04-22
Payer: MEDICARE

## 2025-04-22 ENCOUNTER — HOSPITAL ENCOUNTER (OUTPATIENT)
Dept: RADIOLOGY | Facility: HOSPITAL | Age: 65
Discharge: HOME | End: 2025-04-22
Attending: PHYSICIAN ASSISTANT
Payer: MEDICARE

## 2025-04-22 VITALS
HEART RATE: 88 BPM | BODY MASS INDEX: 44.41 KG/M2 | HEIGHT: 68 IN | WEIGHT: 293 LBS | RESPIRATION RATE: 18 BRPM | SYSTOLIC BLOOD PRESSURE: 168 MMHG | DIASTOLIC BLOOD PRESSURE: 88 MMHG

## 2025-04-22 DIAGNOSIS — G89.29 CHRONIC PAIN OF RIGHT KNEE: Primary | ICD-10-CM

## 2025-04-22 DIAGNOSIS — M47.817 LUMBOSACRAL SPONDYLOSIS WITHOUT MYELOPATHY: Chronic | ICD-10-CM

## 2025-04-22 DIAGNOSIS — M25.562 CHRONIC PAIN OF LEFT KNEE: ICD-10-CM

## 2025-04-22 DIAGNOSIS — I73.9 PVD (PERIPHERAL VASCULAR DISEASE): Chronic | ICD-10-CM

## 2025-04-22 DIAGNOSIS — M25.561 CHRONIC PAIN OF RIGHT KNEE: Chronic | ICD-10-CM

## 2025-04-22 DIAGNOSIS — G89.29 CHRONIC PAIN OF RIGHT KNEE: Chronic | ICD-10-CM

## 2025-04-22 DIAGNOSIS — G89.29 CHRONIC PAIN OF LEFT KNEE: ICD-10-CM

## 2025-04-22 DIAGNOSIS — M25.561 CHRONIC PAIN OF RIGHT KNEE: Primary | ICD-10-CM

## 2025-04-22 PROCEDURE — 99999 PR PBB SHADOW E&M-EST. PATIENT-LVL V: CPT | Mod: PBBFAC,,, | Performed by: PHYSICIAN ASSISTANT

## 2025-04-22 PROCEDURE — 73560 X-RAY EXAM OF KNEE 1 OR 2: CPT | Mod: TC,50

## 2025-04-22 PROCEDURE — 3008F BODY MASS INDEX DOCD: CPT | Mod: CPTII,,, | Performed by: PHYSICIAN ASSISTANT

## 2025-04-22 PROCEDURE — 20610 DRAIN/INJ JOINT/BURSA W/O US: CPT | Mod: 50,PBBFAC | Performed by: PHYSICIAN ASSISTANT

## 2025-04-22 PROCEDURE — 1159F MED LIST DOCD IN RCRD: CPT | Mod: CPTII,,, | Performed by: PHYSICIAN ASSISTANT

## 2025-04-22 PROCEDURE — 3077F SYST BP >= 140 MM HG: CPT | Mod: CPTII,,, | Performed by: PHYSICIAN ASSISTANT

## 2025-04-22 PROCEDURE — 99999PBSHW PR PBB SHADOW TECHNICAL ONLY FILED TO HB: Mod: PBBFAC,,,

## 2025-04-22 PROCEDURE — 1126F AMNT PAIN NOTED NONE PRSNT: CPT | Mod: CPTII,,, | Performed by: PHYSICIAN ASSISTANT

## 2025-04-22 PROCEDURE — 99214 OFFICE O/P EST MOD 30 MIN: CPT | Mod: 25,S$PBB,, | Performed by: PHYSICIAN ASSISTANT

## 2025-04-22 PROCEDURE — 99215 OFFICE O/P EST HI 40 MIN: CPT | Mod: PBBFAC,25 | Performed by: PHYSICIAN ASSISTANT

## 2025-04-22 PROCEDURE — 3079F DIAST BP 80-89 MM HG: CPT | Mod: CPTII,,, | Performed by: PHYSICIAN ASSISTANT

## 2025-04-22 RX ORDER — BUPIVACAINE HYDROCHLORIDE 2.5 MG/ML
2 INJECTION, SOLUTION EPIDURAL; INFILTRATION; INTRACAUDAL; PERINEURAL
Status: DISCONTINUED | OUTPATIENT
Start: 2025-04-22 | End: 2025-04-22 | Stop reason: HOSPADM

## 2025-04-22 RX ORDER — TRIAMCINOLONE ACETONIDE 40 MG/ML
20 INJECTION, SUSPENSION INTRA-ARTICULAR; INTRAMUSCULAR
Status: DISCONTINUED | OUTPATIENT
Start: 2025-04-22 | End: 2025-04-22 | Stop reason: HOSPADM

## 2025-04-22 RX ORDER — HYDROCODONE BITARTRATE AND ACETAMINOPHEN 10; 325 MG/1; MG/1
1 TABLET ORAL EVERY 6 HOURS
Qty: 120 TABLET | Refills: 0 | Status: SHIPPED | OUTPATIENT
Start: 2025-06-18

## 2025-04-22 RX ORDER — HYDROCODONE BITARTRATE AND ACETAMINOPHEN 10; 325 MG/1; MG/1
1 TABLET ORAL EVERY 6 HOURS
Qty: 120 TABLET | Refills: 0 | Status: SHIPPED | OUTPATIENT
Start: 2025-05-19

## 2025-04-22 RX ADMIN — BUPIVACAINE HYDROCHLORIDE 2 ML: 2.5 INJECTION, SOLUTION EPIDURAL; INFILTRATION; INTRACAUDAL; PERINEURAL at 01:04

## 2025-04-22 RX ADMIN — TRIAMCINOLONE ACETONIDE 20 MG: 40 INJECTION, SUSPENSION INTRA-ARTICULAR; INTRAMUSCULAR at 01:04

## 2025-04-22 NOTE — PROCEDURES
Large Joint Aspiration/Injection: bilateral knee    Date/Time: 4/22/2025 1:45 PM    Performed by: Héctor Robles PA  Authorized by: Héctor Robles PA    Consent Done?:  Yes (Written)  Indications:  Arthritis and pain  Site marked: the procedure site was marked    Timeout: prior to procedure the correct patient, procedure, and site was verified    Prep: patient was prepped and draped in usual sterile fashion      Local anesthesia used?: Yes    Local anesthetic: Ethyl chloride spray.    Details:  Needle Size:  22 G  Approach:  Lateral  Location:  Knee  Laterality:  Bilateral  Site:  Bilateral knee  Medications (Right):  2 mL BUPivacaine (PF) 0.25% (2.5 mg/ml) 0.25 % (2.5 mg/mL); 20 mg triamcinolone acetonide 40 mg/mL  Medications (Left):  2 mL BUPivacaine (PF) 0.25% (2.5 mg/ml) 0.25 % (2.5 mg/mL); 20 mg triamcinolone acetonide 40 mg/mL  Patient tolerance:  Patient tolerated the procedure well with no immediate complications     Bupivacaine 0.25% 25 mg/ 10 ml , 2 cc     Estimated blood loss less than 1 ml    Tolerated procedure well    No complication noted    Band-Aid was applied

## 2025-05-12 ENCOUNTER — CLINICAL SUPPORT (OUTPATIENT)
Dept: REHABILITATION | Facility: HOSPITAL | Age: 65
End: 2025-05-12
Payer: MEDICARE

## 2025-05-12 DIAGNOSIS — M25.562 CHRONIC PAIN OF LEFT KNEE: ICD-10-CM

## 2025-05-12 DIAGNOSIS — G89.29 CHRONIC PAIN OF RIGHT KNEE: ICD-10-CM

## 2025-05-12 DIAGNOSIS — M25.561 CHRONIC PAIN OF RIGHT KNEE: ICD-10-CM

## 2025-05-12 DIAGNOSIS — M47.817 LUMBOSACRAL SPONDYLOSIS WITHOUT MYELOPATHY: ICD-10-CM

## 2025-05-12 DIAGNOSIS — G89.29 CHRONIC PAIN OF LEFT KNEE: ICD-10-CM

## 2025-05-12 PROCEDURE — 97162 PT EVAL MOD COMPLEX 30 MIN: CPT
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
## 2025-05-16 ENCOUNTER — CLINICAL SUPPORT (OUTPATIENT)
Dept: REHABILITATION | Facility: HOSPITAL | Age: 65
End: 2025-05-16
Payer: MEDICARE

## 2025-05-16 DIAGNOSIS — M25.562 CHRONIC PAIN OF LEFT KNEE: ICD-10-CM

## 2025-05-16 DIAGNOSIS — G89.29 CHRONIC PAIN OF RIGHT KNEE: ICD-10-CM

## 2025-05-16 DIAGNOSIS — G89.29 CHRONIC PAIN OF LEFT KNEE: ICD-10-CM

## 2025-05-16 DIAGNOSIS — M25.561 CHRONIC PAIN OF RIGHT KNEE: ICD-10-CM

## 2025-05-16 DIAGNOSIS — M47.817 LUMBOSACRAL SPONDYLOSIS WITHOUT MYELOPATHY: Primary | ICD-10-CM

## 2025-05-16 PROCEDURE — 97110 THERAPEUTIC EXERCISES: CPT | Mod: CQ

## 2025-05-16 PROCEDURE — 97112 NEUROMUSCULAR REEDUCATION: CPT | Mod: CQ

## 2025-05-16 PROCEDURE — 97530 THERAPEUTIC ACTIVITIES: CPT | Mod: CQ

## 2025-05-16 NOTE — PROGRESS NOTES
Outpatient Rehab    Physical Therapy Visit    Patient Name: Holly Porter  MRN: [id number]  YOB: 1960  Encounter Date: 5/16/2025    Therapy Diagnosis:   Encounter Diagnoses   Name Primary?    Lumbosacral spondylosis without myelopathy Yes    Chronic pain of right knee     Chronic pain of left knee      Physician: Héctor Robles PA    Physician Orders: Eval and Treat  Medical Diagnosis: Lumbosacral spondylosis without myelopathy  Chronic pain of right knee  Chronic pain of left knee    Visit # / Visits Authorized:  2 / 12  Insurance Authorization Period: 5/12/2025 to 4/13/2027  Date of Evaluation: 5/12/2025  Plan of Care Certification: 5/12/2025 to 6/16/2025      PT/PTA:     Number of PTA visits since last PT visit: 1/5  Time In: 1315   Time Out: 1355  Total Time (in minutes): 40   Total Billable Time (in minutes): 40    FOTO:  Intake Score:  %  Survey Score 2:  %  Survey Score 3:  %    Received Plan of Care per Winifred Barahona PT        Subjective   pain as noted; has taken pain meds today.  Pain reported as 7/10. L foot    Objective            Treatment:  Therapeutic Exercise  TE 1: sitting BLE ex x 20 reps: marching, LAQ, hip abd w/ blue band, ankle rocking  Balance/Neuromuscular Re-Education  NMR 1: standing BLE exercises x 20 reps at walker: marching, heel/toe raises, hip abd  NMR 2: sitting BUE rows and horizontal abd x 20 reps each  Therapeutic Activity  TA 1: sit to stand, 2x10 reps  TA 2: mini squats 2x10 reps    Time Entry(in minutes):  Neuromuscular Re-Education Time Entry: 15  Therapeutic Activity Time Entry: 10  Therapeutic Exercise Time Entry: 15    Assessment & Plan   Assessment: low level exercises/activities to promote general strengthening, endurance  Evaluation/Treatment Tolerance: Patient tolerated treatment well    Patient will continue to benefit from skilled outpatient physical therapy to address the deficits listed in the problem list box on initial evaluation, provide  pt/family education and to maximize pt's level of independence in the home and community environment.     Education  Education was done with Patient. The patient's learning style includes Demonstration and Listening. The patient Demonstrates understanding and Verbalizes understanding.         Issued blue resistance band for use w/ HEP       Plan: Continue per POC and progress as pt able    Goals:   Active       LTG       Patient will report pain less than or equal to 5/10 with daily activities on average and no greater than 7/10        Start:  05/12/25    Expected End:  06/16/25            Patient will increase bilateral hip strength by 1/2 grade for improved pain and functional mobility        Start:  05/12/25    Expected End:  06/16/25            Patient will complete TUG test in less than 25 seconds to reduce risk of fall and improve daily functional mobility        Start:  05/12/25    Expected End:  06/16/25               STG       Patient will report independence and compliance with HEP for therapy carryover       Start:  05/12/25    Expected End:  06/02/25            Patient will improve hip flexion and HS length by 5 degrees        Start:  05/12/25    Expected End:  06/02/25            Patient will improve core strength as evidenced by ability to perform 10 posterior pelvic tilts independently without verbal or tactile cuing       Start:  05/12/25    Expected End:  06/02/25                Shanel Casiano, PTA

## 2025-05-21 ENCOUNTER — CLINICAL SUPPORT (OUTPATIENT)
Dept: REHABILITATION | Facility: HOSPITAL | Age: 65
End: 2025-05-21
Payer: MEDICARE

## 2025-05-21 DIAGNOSIS — M25.561 CHRONIC PAIN OF RIGHT KNEE: ICD-10-CM

## 2025-05-21 DIAGNOSIS — G89.29 CHRONIC PAIN OF LEFT KNEE: ICD-10-CM

## 2025-05-21 DIAGNOSIS — G89.29 CHRONIC PAIN OF RIGHT KNEE: ICD-10-CM

## 2025-05-21 DIAGNOSIS — M47.817 LUMBOSACRAL SPONDYLOSIS WITHOUT MYELOPATHY: Primary | ICD-10-CM

## 2025-05-21 DIAGNOSIS — M25.562 CHRONIC PAIN OF LEFT KNEE: ICD-10-CM

## 2025-05-21 PROCEDURE — 97530 THERAPEUTIC ACTIVITIES: CPT | Mod: CQ

## 2025-05-21 PROCEDURE — 97110 THERAPEUTIC EXERCISES: CPT | Mod: CQ

## 2025-05-21 PROCEDURE — 97112 NEUROMUSCULAR REEDUCATION: CPT | Mod: CQ

## 2025-05-21 NOTE — PROGRESS NOTES
"  Outpatient Rehab    Physical Therapy Visit    Patient Name: Holly Porter  MRN: [id number]  YOB: 1960  Encounter Date: 5/21/2025    Therapy Diagnosis:   Encounter Diagnoses   Name Primary?    Lumbosacral spondylosis without myelopathy Yes    Chronic pain of right knee     Chronic pain of left knee      Physician: Héctor Robles PA    Physician Orders: Eval and Treat  Medical Diagnosis: Lumbosacral spondylosis without myelopathy  Chronic pain of right knee  Chronic pain of left knee    Visit # / Visits Authorized:  3 / 12  Insurance Authorization Period: 5/12/2025 to 4/13/2027  Date of Evaluation: 5/12/2025  Plan of Care Certification: 5/12/2025 to 6/16/2025   FOTO due visit 5 or 6     PT/PTA:     Number of PTA visits since last PT visit: 2/5  Time In: 1315   Time Out: 1400  Total Time (in minutes): 45   Total Billable Time (in minutes): 45    FOTO:  Intake Score:  %  Survey Score 2:  %  Survey Score 3:  %       Subjective   pt reports no pain at all and has taken her pain meds today; also able to walk to the mailbox now w/out pain and w/out any AD.  Pain reported as 0/10.      Objective            Treatment:  Therapeutic Exercise  TE 2: nustep x 8 min  TE 3: wedge B calf stretch x 2 min  TE 4: sitting B HS stretching, 3x20 reps  Balance/Neuromuscular Re-Education  NMR 1: standing BLE exercises x 20 reps at steps: marching, heel/toe raises, hip abd, hip ext  Therapeutic Activity  TA 1: sit to stand, 2x10 reps  TA 2: mini squats 2x10 reps  TA 3: 4" step ups R and L x 10 reps each, fwd and lateral    Time Entry(in minutes):  Neuromuscular Re-Education Time Entry: 15  Therapeutic Activity Time Entry: 15  Therapeutic Exercise Time Entry: 15    Assessment & Plan   Assessment: pt did exceptionally well w/ functional activities/exercises w/ only minimal to moderate fatigue requiring frequent rest breaks; pt pleasant, compliant and eager to improve  Evaluation/Treatment Tolerance: Patient tolerated " treatment well    Patient will continue to benefit from skilled outpatient physical therapy to address the deficits listed in the problem list box on initial evaluation, provide pt/family education and to maximize pt's level of independence in the home and community environment.          Plan: Continue per POC and progress as pt able    Goals:   Active       LTG       Patient will report pain less than or equal to 5/10 with daily activities on average and no greater than 7/10        Start:  05/12/25    Expected End:  06/16/25            Patient will increase bilateral hip strength by 1/2 grade for improved pain and functional mobility        Start:  05/12/25    Expected End:  06/16/25            Patient will complete TUG test in less than 25 seconds to reduce risk of fall and improve daily functional mobility        Start:  05/12/25    Expected End:  06/16/25               STG       Patient will report independence and compliance with HEP for therapy carryover       Start:  05/12/25    Expected End:  06/02/25            Patient will improve hip flexion and HS length by 5 degrees        Start:  05/12/25    Expected End:  06/02/25            Patient will improve core strength as evidenced by ability to perform 10 posterior pelvic tilts independently without verbal or tactile cuing       Start:  05/12/25    Expected End:  06/02/25                Shanel Casiano, PTA

## 2025-05-30 ENCOUNTER — DOCUMENTATION ONLY (OUTPATIENT)
Dept: REHABILITATION | Facility: HOSPITAL | Age: 65
End: 2025-05-30
Payer: MEDICARE

## 2025-05-30 NOTE — PROGRESS NOTES
"  Patient presented to PT today and began treatment session on the Nuep. PT questioned patient regarding pain and progress with PT. Patient reported "My left foot has really been giving me fits the past 2-3 days." She reported 8/10 L foot pain with no cause for increased pain. She described pain as a pressure and stated that it hurt to put weight on the ball of her foot. PT removed patient's bilateral socks and shoes and noted that patiet's L foot is more swollen than her R. PT completed visual inspection and noted no wounds. PT contacted patient's primary care physician's (WARREN Quinn) office and was told that their office is closing early and no longer accepting patients today. Patient reported that she is going to go home and take her "circulation medicine" and prop her feet up. PT recommended that patient visit ER regarding her symptoms, however patient stated she would "se eif he medicine helped first."     Jono Barahona, PT, DPT     "

## 2025-06-02 ENCOUNTER — HOSPITAL ENCOUNTER (OUTPATIENT)
Dept: RADIOLOGY | Facility: HOSPITAL | Age: 65
Discharge: HOME | End: 2025-06-02
Attending: NURSE PRACTITIONER
Payer: MEDICARE

## 2025-06-02 DIAGNOSIS — E05.10: ICD-10-CM

## 2025-06-02 PROCEDURE — 76536 US EXAM OF HEAD AND NECK: CPT | Mod: 26,,, | Performed by: INTERNAL MEDICINE

## 2025-06-02 PROCEDURE — 76536 US EXAM OF HEAD AND NECK: CPT | Mod: TC

## 2025-06-05 ENCOUNTER — TELEPHONE (OUTPATIENT)
Dept: NEUROLOGY | Facility: CLINIC | Age: 65
End: 2025-06-05
Payer: MEDICARE

## 2025-06-09 ENCOUNTER — TELEPHONE (OUTPATIENT)
Dept: NEUROLOGY | Facility: CLINIC | Age: 65
End: 2025-06-09
Payer: MEDICARE

## 2025-06-09 DIAGNOSIS — G43.719 INTRACTABLE CHRONIC MIGRAINE WITHOUT AURA AND WITHOUT STATUS MIGRAINOSUS: Primary | ICD-10-CM

## 2025-06-09 RX ORDER — AMITRIPTYLINE HYDROCHLORIDE 25 MG/1
25 TABLET, FILM COATED ORAL NIGHTLY PRN
Qty: 30 TABLET | Refills: 5 | Status: SHIPPED | OUTPATIENT
Start: 2025-06-09 | End: 2026-06-09

## 2025-06-09 NOTE — TELEPHONE ENCOUNTER
Called with the information below from EDMOND Ray NP. She v/u.      ----- Message from WARREN Kirby sent at 6/9/2025  9:35 AM CDT -----  Regarding: RE: headache  I sent in rx for elavil, take as directed.  Keep her follow-up with us so I can see how this is doing and if not improved we can see about CGRP meds  ----- Message -----  From: Jennifer Arboleda LPN  Sent: 6/5/2025  10:56 AM CDT  To: WARREN Mendez  Subject: headache                                         Pt is calling states that she is having bad headaches and making her eyes hurt. States the topomax not helping.

## 2025-06-11 ENCOUNTER — CLINICAL SUPPORT (OUTPATIENT)
Dept: REHABILITATION | Facility: HOSPITAL | Age: 65
End: 2025-06-11
Payer: MEDICARE

## 2025-06-11 DIAGNOSIS — M47.817 LUMBOSACRAL SPONDYLOSIS WITHOUT MYELOPATHY: Primary | ICD-10-CM

## 2025-06-11 PROCEDURE — 97110 THERAPEUTIC EXERCISES: CPT

## 2025-06-11 PROCEDURE — 97530 THERAPEUTIC ACTIVITIES: CPT

## 2025-06-12 NOTE — PROGRESS NOTES
"  Outpatient Rehab    Physical Therapy Progress Note : Updated Plan of Care    Patient Name: Holly Porter  MRN: [id number]  YOB: 1960  Encounter Date: 6/11/2025    Therapy Diagnosis:   Encounter Diagnosis   Name Primary?    Lumbosacral spondylosis without myelopathy Yes     Physician: Héctor Robles PA    Physician Orders: Eval and Treat  Medical Diagnosis: Lumbosacral spondylosis without myelopathy  Chronic pain of right knee  Chronic pain of left knee  Surgical Diagnosis: Not applicable for this Episode   Surgical Date: Not applicable for this Episode    Visit # / Visits Authorized:  3 / 16  Insurance Authorization Period: 5/12/2025 to 4/13/2027  Date of Evaluation: 5/12/2025   Plan of Care Certification: 6/12/2025 to 7/4/2025      PT/PTA: PT   Number of PTA visits since last PT visit:0  Time In: 1320   Time Out: 1400  Total Time (in minutes): 40   Total Billable Time (in minutes): 40    FOTO:  Intake Score:  %  Survey Score 2:  %  Survey Score 3:  %    Precautions: Fall risk        Subjective   "I am so happy my feet and leg are not as swollen. I was able to fit into house shoes that I bought two years ago and they haven't fit.".         Objective    Patient's gait speed is 1.84 feet per second with rolling walker.         Treatment:  Therapeutic Exercise  TE 1: NuStep x 8 minutes  TE 2: seated marching 2 x 10  TE 3: hip ABD 3 x 10 blue band  TE 4: HS curls 2 x 10 blue band  TE 5: LAQs 2 x 10 3"  TE 6: seated heel and toe raises 2 x 10  Therapeutic Activity  TA 1: sit to stand, 2x10 reps  TA 2: mini squats 2x10 reps  TA 3: standing marching 2 x 10    Time Entry(in minutes):  Therapeutic Activity Time Entry: 15  Therapeutic Exercise Time Entry: 25    Assessment & Plan   Assessment  Holly presents with a condition of Low complexity.   Presentation of Symptoms: Stable  Will Comorbidities Impact Care: Yes  Obesity limits ability to participate and endurance.     Functional Limitations: Activity " tolerance, Decreased ambulation distance/endurance, Functional mobility, Painful locomotion/ambulation, Pain with ADLs/IADLs  Impairments: Impaired physical strength, Lack of appropriate home exercise program, Pain with functional activity  Personal Factors Affecting Prognosis: Pain    Patient Goal for Therapy (PT): reduce pain  Prognosis: Good  Assessment Details: Patient continues to have deficits in strength and motor control as well as continued pain. However, she is progressing toward rehab goals and demonstrates improving functional mobility. Patient will benefit from continued skilled PT services to continue progressing toward rehab goals.     Plan  From a physical therapy perspective, the patient would benefit from: Skilled Rehab Services    Planned therapy interventions include: Therapeutic exercise, Therapeutic activities, Neuromuscular re-education, Gait training, and Manual therapy.    Planned modalities to include: Electrical stimulation - passive/unattended.        Visit Frequency: 2 times Per Week for 3 Weeks.       This plan was discussed with Patient.   Discussion participants: Agreed Upon Plan of Care             The patient will continue to benefit from skilled outpatient physical therapy in order to address the deficits listed in the problem list on the initial evaluation, provide patient and family education, and maximize the patients level of independence in the home and community environments.     The patient's spiritual, cultural, and educational needs were considered, and the patient is agreeable to the plan of care and goals.           Goals:   Active       LTG       Patient will report pain less than or equal to 5/10 with daily activities on average and no greater than 7/10  (Progressing)       Start:  05/12/25    Expected End:  06/16/25            Patient will increase bilateral hip strength by 1/2 grade for improved pain and functional mobility  (Progressing)       Start:  05/12/25     Expected End:  06/16/25            Patient will complete TUG test in less than 25 seconds to reduce risk of fall and improve daily functional mobility  (Progressing)       Start:  05/12/25    Expected End:  06/16/25               STG       Patient will report independence and compliance with HEP for therapy carryover (Met)       Start:  05/12/25    Expected End:  06/02/25    Resolved:  06/12/25         Patient will improve hip flexion and HS length by 5 degrees  (Progressing)       Start:  05/12/25    Expected End:  06/02/25            Patient will improve core strength as evidenced by ability to perform 10 posterior pelvic tilts independently without verbal or tactile cuing (Progressing)       Start:  05/12/25    Expected End:  06/02/25                Jono Barahona, PT, DPT

## 2025-06-17 ENCOUNTER — DOCUMENTATION ONLY (OUTPATIENT)
Dept: REHABILITATION | Facility: HOSPITAL | Age: 65
End: 2025-06-17
Payer: MEDICARE

## 2025-06-17 NOTE — PROGRESS NOTES
"[facility]  PT Discharge Note    Name: Holly Porter  [facility] Number: [id number]      Therapy Diagnosis:        Encounter Diagnosis   Name Primary?    Lumbosacral spondylosis without myelopathy Yes      Physician: Héctor Robles PA     Physician Orders: Eval and Treat  Medical Diagnosis: Lumbosacral spondylosis without myelopathy  Chronic pain of right knee  Chronic pain of left knee  Evaluation Date: 5/12/2025  Date of Last visit: 6/11/2025  Total Visits Received: 4    ASSESSMENT      Patient called and reported to office staff that she would like to discharge from PT plan of care at this time due to "dealing with depression and mental health problems" at this time.    Discharge reason: Patient requested discharge    Discharge FOTO Score: Not completed due to patient not returning to PT.     Goals: Unable to adequately assess patient goal status due to patient not returning to PT.     PLAN   This patient is discharged from Physical Therapy      Jono Barahona, PT, DPT     "

## 2025-06-18 ENCOUNTER — OFFICE VISIT (OUTPATIENT)
Dept: PULMONOLOGY | Facility: CLINIC | Age: 65
End: 2025-06-18
Payer: MEDICARE

## 2025-06-18 VITALS
HEIGHT: 68 IN | SYSTOLIC BLOOD PRESSURE: 146 MMHG | HEART RATE: 92 BPM | OXYGEN SATURATION: 92 % | DIASTOLIC BLOOD PRESSURE: 65 MMHG | BODY MASS INDEX: 44.41 KG/M2 | WEIGHT: 293 LBS

## 2025-06-18 DIAGNOSIS — F17.210 SMOKING GREATER THAN 40 PACK YEARS: ICD-10-CM

## 2025-06-18 DIAGNOSIS — I27.21 PULMONARY ARTERIAL HYPERTENSION: Primary | ICD-10-CM

## 2025-06-18 DIAGNOSIS — F17.200 NICOTINE DEPENDENCE WITH CURRENT USE: ICD-10-CM

## 2025-06-18 PROCEDURE — 99214 OFFICE O/P EST MOD 30 MIN: CPT | Mod: 25,S$PBB,, | Performed by: STUDENT IN AN ORGANIZED HEALTH CARE EDUCATION/TRAINING PROGRAM

## 2025-06-18 PROCEDURE — 1101F PT FALLS ASSESS-DOCD LE1/YR: CPT | Mod: CPTII,,, | Performed by: STUDENT IN AN ORGANIZED HEALTH CARE EDUCATION/TRAINING PROGRAM

## 2025-06-18 PROCEDURE — 1159F MED LIST DOCD IN RCRD: CPT | Mod: CPTII,,, | Performed by: STUDENT IN AN ORGANIZED HEALTH CARE EDUCATION/TRAINING PROGRAM

## 2025-06-18 PROCEDURE — 99215 OFFICE O/P EST HI 40 MIN: CPT | Mod: PBBFAC | Performed by: STUDENT IN AN ORGANIZED HEALTH CARE EDUCATION/TRAINING PROGRAM

## 2025-06-18 PROCEDURE — 1125F AMNT PAIN NOTED PAIN PRSNT: CPT | Mod: CPTII,,, | Performed by: STUDENT IN AN ORGANIZED HEALTH CARE EDUCATION/TRAINING PROGRAM

## 2025-06-18 PROCEDURE — 3288F FALL RISK ASSESSMENT DOCD: CPT | Mod: CPTII,,, | Performed by: STUDENT IN AN ORGANIZED HEALTH CARE EDUCATION/TRAINING PROGRAM

## 2025-06-18 PROCEDURE — 3078F DIAST BP <80 MM HG: CPT | Mod: CPTII,,, | Performed by: STUDENT IN AN ORGANIZED HEALTH CARE EDUCATION/TRAINING PROGRAM

## 2025-06-18 PROCEDURE — 3077F SYST BP >= 140 MM HG: CPT | Mod: CPTII,,, | Performed by: STUDENT IN AN ORGANIZED HEALTH CARE EDUCATION/TRAINING PROGRAM

## 2025-06-18 PROCEDURE — 1160F RVW MEDS BY RX/DR IN RCRD: CPT | Mod: CPTII,,, | Performed by: STUDENT IN AN ORGANIZED HEALTH CARE EDUCATION/TRAINING PROGRAM

## 2025-06-18 PROCEDURE — 99406 BEHAV CHNG SMOKING 3-10 MIN: CPT | Mod: S$PBB,,, | Performed by: STUDENT IN AN ORGANIZED HEALTH CARE EDUCATION/TRAINING PROGRAM

## 2025-06-18 PROCEDURE — 3008F BODY MASS INDEX DOCD: CPT | Mod: CPTII,,, | Performed by: STUDENT IN AN ORGANIZED HEALTH CARE EDUCATION/TRAINING PROGRAM

## 2025-06-18 PROCEDURE — 99999 PR PBB SHADOW E&M-EST. PATIENT-LVL V: CPT | Mod: PBBFAC,,, | Performed by: STUDENT IN AN ORGANIZED HEALTH CARE EDUCATION/TRAINING PROGRAM

## 2025-06-18 RX ORDER — BENZONATATE 200 MG/1
200 CAPSULE ORAL 3 TIMES DAILY
COMMUNITY
Start: 2025-06-04

## 2025-06-18 RX ORDER — TRIAMCINOLONE ACETONIDE 1 MG/G
OINTMENT TOPICAL
COMMUNITY
Start: 2025-04-28

## 2025-06-18 RX ORDER — ALBUTEROL SULFATE 90 UG/1
2 INHALANT RESPIRATORY (INHALATION) EVERY 6 HOURS PRN
COMMUNITY
Start: 2025-04-28

## 2025-06-18 RX ORDER — SITAGLIPTIN 50 MG/1
50 TABLET, FILM COATED ORAL NIGHTLY
COMMUNITY
Start: 2025-04-28

## 2025-06-18 RX ORDER — TADALAFIL 20 MG/1
40 TABLET ORAL DAILY
Qty: 60 TABLET | Refills: 11 | Status: SHIPPED | OUTPATIENT
Start: 2025-06-18

## 2025-06-18 NOTE — PROGRESS NOTES
[facility]  Pulmonology  ESTABLISHED VISIT     Patient Name:  Holly Porter  Primary Care Provider: Regan Frausto MD  Date of Service: 03/25/2024    Chief Complaint: Shortness of breath    SUBJECTIVE   HPI:  Holly Porter is a 64 y.o. female with PAH (RHC 02/2024 mPAP 34, PCWP 8, PVR 5.2, CO/CI 5.98/2.26), chronic respiratory failure with hypoxia and hypercapnia, JOVANNI/OSH on PAP who presents today for follow up of shortness of breath. Last seen 03/2024 with plan for Chantix and PA for dual therapy for PAH.     Ms. Porter presents for a follow-up visit to discuss pulmonary hypertension management and recent lab work results. Ms. Porter reports fluctuating breathing quality with episodes of choking sensation, which she attempts to alleviate with cough syrup. She reports significant stress related to family issues, particularly her children. Her current cigarette consumption is about 1 pack per day, reduced from 2 packs previously. She mentions recent cataract surgery, which has improved her vision. 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 She reports good output  She does not explicitly deny any symptoms or medical diagnoses.    MEDICATIONS:  Ms. Porter is on Bumex, a water pill, twice daily (morning and evening) for pulmonary hypertension. She is also taking Tadalafil 40 mg daily for the same condition. Her Tadalafil prescription has been changed from short-term to a 1-year supply, with the dose maintained at 40 mg daily (previously taking two 20 mg tablets). Ms. Porter is on daily breathing medication and uses Combivent inhaler as needed for choking sensation. She also undergoes breathing treatment at bedtime and sometimes in the morning.    SOCIAL HISTORY:  Smoking: Currently smokes 1 pack per day, previously 2 packs per day Occupation: Former hairdresser           Initial HPI  German reports feeling  well on this evaluation.  She has shortness of breath that is present with exertion.  She currently use and using her oxygen during this visit as her son who drove her here was concerned that it could affect his car.  She reports using her oxygen at home.  She is using her nightly PAP device with nasal pillows.  She has no cough.  She reports using her Spiriva which she needs a refill.   She was recently admitted 0207-14 with shortness of breath where she was found to be fluid overloaded.  For management she underwent diuresis the transitioned to Bumex from Lasix.  She had improvement in her respiratory status and while she was admitted on high-flow nasal cannula was deescalated to low-flow nasal cannula.  She also underwent right heart catheterization and was discharged home on PAP therapy.      03/2024: reports feeling well today. She had decreased her smoking while on Chantix to 1/4 pack a day and is interested in continuing this therapy. She has had improvement in her breathing which she attributes to her Spiriva. She has had no admissions to hospital since last seen. She uses her oxygen when at home and has noted difficulty with pushing her oxygen tank. Today, she is not on oxygen once again due to her son who helps with transporting her to appointments expresses concern with transport of tanks to vehicle.     06/2024: reports feeling well today. She is still smoking, however, has decreased use which she attributes to Chantix, still 1/4 ppd. She is using her oxygen more consistently. She reports daily use of albuterol. She feels improved following starting Tadalafil; no dispenses on MAR but states she received it from her pharmacy.     12/2024: feeling well. She has had poor sleep the past few nights that makes her tired today. She has had no admissions or ED presentations since last visit. She had cataract surgery last month with an unremarkable post-operative course. We discussed labs due today.   01/2025:  reports feeling well with stable shortness of breath with exertion.  She has had no worsening in her breathing since her last evaluation.  She reports good output with her water pill of which she takes 1 to 2 times a day (Bumex) depending on her days activity.  She is currently using respiratory twice daily and recently filled her Spiriva as well; she has had decrease in her shortness of breath with her nebulizer treatment as well.  She received a sample of a nicotine inhaler which has significantly help decrease her daily cigarette use.  She has had no ED presentations or hospitalizations for any shortness of breath or otherwise.  We reviewed the results of her lab test.      Past Medical History:   Diagnosis Date    Acquired hypothyroidism     Atherosclerosis of native artery of extremity with intermittent claudication 01/30/2019    bilateral legs    BMI 50.0-59.9, adult 02/22/2021    Chronic pain syndrome     opioid depen    Congestive heart failure (CHF)     COPD (chronic obstructive pulmonary disease)     COVID-19 10/06/2023    Depression     Diabetes mellitus, type 2     followed by Aurea Kwong NP, [facility]    DVT of lower extremity, bilateral     Essential hypertension 06/08/2021    GERD (gastroesophageal reflux disease)     Gout 07/05/2023    Hyperlipidemia     Lumbosacral radiculopathy 06/05/2023    followed by GLENN Valdes, [facility]    Migraines     Nicotine dependence     Nicotine dependence     Obesity hypoventilation syndrome     chronic CO2 retainer with compensatory metabolic alkalosis    Osteoarthritis     Seizure disorder     Sleep apnea     on cpap    Thyroid cancer     Venous stasis ulcer limited to breakdown of skin with varicose veins     followed by Estuardo Zhao    Vitamin D deficiency        Past Surgical History:   Procedure Laterality Date    ABCESS DRAINAGE      HYSTERECTOMY      ILIAC ARTERY STENT Bilateral 01/28/2020    Bilateral distal aorta and common iliac  8 X 59 vbx covered stents performed by Dr. Antonio Jacinto.    LEFT HEART CATHETERIZATION Left 11/6/2023    Procedure: Left heart cath;  Surgeon: Alex Ortega DO;  Location: [facility] CATH LAB;  Service: Cardiology;  Laterality: Left;    RADIOFREQUENCY ABLATION Left 04/15/2022    Procedure: Left calf  Radiofrequency Ablation;  Surgeon: Matty Falcon DO;  Location: [facility] OR;  Service: Vascular;  Laterality: Left;    RIGHT HEART CATHETERIZATION Right 2/13/2024    Procedure: INSERTION, CATHETER, RIGHT HEART;  Surgeon: Mahad Moreno MD;  Location: [facility] CATH LAB;  Service: Cardiology;  Laterality: Right;    STAB PHLEBECTOMY OF VARICOSE VEINS Left 01/25/2013    Left leg microphlebectomies x 23 stab avulsions and ligation of multiple varicose veins perrformed by Dr. Cirilo Aguirre.    THYROIDECTOMY      hx: thyroid cancer    TOE AMPUTATION Left 01/28/2020    2nd, 4th and 5th performed by Dr. Anam Saeed.    TOE AMPUTATION Right 01/28/2020    4th toe performed by Dr. Anam Saeed.    TOTAL ABDOMINAL HYSTERECTOMY W/ BILATERAL SALPINGOOPHORECTOMY      TUBAL LIGATION      VAGINAL DELIVERY      x 4    VENOUS ABLATION Right 08/09/2019    GSV Varithena Ablation performed by Dr. Estuardo Falcon    VENOUS ABLATION Left 08/02/2019    ATAV Varithena Ablation performed by Dr. Estuardo Falcon.    VENOUS ABLATION Right 07/13/2015    ATAV Laser Ablatio performed by Dr. Cirilo Aguirre.    VENOUS ABLATION Right 07/06/2015    Distal  GSV Laser Ablation performed by dr. Cirilo Aguirre.    VENOUS ABLATION Left 04/20/2015    Left Distal GSV Laser Ablation performed by dr. Cirilo Aguirre.    VENOUS ABLATION Right 10/28/2013    Right Distal GSV RF Ablation performed by Dr. Cirilo Aguirre.    VENOUS ABLATION Left 10/25/2013    SSV RF Ablation performed by dr. Cirilo Aguirre.    VENOUS ABLATION Left 10/07/2013    Left GSV and Left ATAV RF Ablation performed by Dr. Cirilo Aguirre.    VENOUS ABLATION Right 01/21/2013    GSV RF Ablation w/micros x 22 performed by Dr. Kerr  Timothy.    VENOUS ABLATION Left 06/25/2021    Left distal GSV Varithena Ablation       Family History   Problem Relation Name Age of Onset    Heart disease Mother          age 84 CHF    Hypertension Mother      Osteoarthritis Mother      Coronary aneurysm Father      Coronary artery disease Father      Hypertension Father      Heart disease Father      No Known Problems Sister      No Known Problems Brother          hx: varicose veins    No Known Problems Brother          MVA: parlazed    No Known Problems Brother      No Known Problems Son      No Known Problems Son      No Known Problems Son      No Known Problems Son          Social History     Socioeconomic History    Marital status:    Tobacco Use    Smoking status: Every Day     Current packs/day: 1.00     Average packs/day: 1.6 packs/day for 36.5 years (57.8 ttl pk-yrs)     Types: Cigarettes     Start date: 1989     Passive exposure: Current    Smokeless tobacco: Never   Substance and Sexual Activity    Alcohol use: Never    Drug use: Never    Sexual activity: Not Currently     Partners: Male     Social Drivers of Health     Financial Resource Strain: Low Risk  (3/25/2025)    Overall Financial Resource Strain (CARDIA)     Difficulty of Paying Living Expenses: Not hard at all   Food Insecurity: Food Insecurity Present (3/25/2025)    Hunger Vital Sign     Worried About Running Out of Food in the Last Year: Never true     Ran Out of Food in the Last Year: Sometimes true   Transportation Needs: High Risk (4/5/2025)    Received from [facility] SDOH Screening     Has lack of transportation kept you from medical appointments, meetings, work or from getting things needed for daily living? choose all that apply.: Yes, it has kept me from medical appointments or from getting my medications   Physical Activity: Insufficiently Active (3/25/2025)    Exercise Vital Sign     Days of Exercise per Week: 2 days     Minutes of Exercise per Session: 10 min  "  Stress: Stress Concern Present (3/25/2025)    [employer] - Occupational Stress Questionnaire     Feeling of Stress : To some extent   Housing Stability: Low Risk  (3/25/2025)    Housing Stability Vital Sign     Unable to Pay for Housing in the Last Year: No     Number of Times Moved in the Last Year: 1     Homeless in the Last Year: No       Social History     Social History Narrative    Not on file       Review of patient's allergies indicates:   Allergen Reactions    Ammonium peroxydisulfate Shortness Of Breath    Avocado (laurus persea) Anaphylaxis    Bananas [banana] Anaphylaxis and Swelling     CRAMPS,    Chocolate flavor Anaphylaxis     MOUTH SWELLING    Fentanyl Shortness Of Breath and Itching    Iodinated contrast media Anaphylaxis, Hives and Rash    Nicoderm Swelling    Percocet [oxycodone-acetaminophen] Shortness Of Breath and Itching    Silvadene [silver sulfadiazine]     Clindamycin     Pregabalin     Adhesive Blisters, Rash and Itching    Iodine Rash    Latex, natural rubber Rash    Pcn [penicillins] Rash    Shellfish containing products Rash    Sulfa (sulfonamide antibiotics) Rash and Blisters        Medications: Medications reviewed to include over the counter medications.    Review of Systems: A focused ROS was completed and found to be negative except for that mentioned above.      OBJECTIVE   PHYSICAL EXAM:  Vitals:    06/18/25 1010   BP: (!) 146/65   BP Location: Left arm   Patient Position: Sitting   Pulse: 92   SpO2: (!) 92%   Weight: (!) 151.5 kg (334 lb)   Height: 5' 8" (1.727 m)     GENERAL: NAD  HEENT: normocephalic, non-icteric conjunctivae, moist oral mucosa  RESPIRATORY: clear lung in bilateral posterior and anterior lung fields, no wheezing, rales or rhonchi, on RA  CARDIOVASCULAR: Regular rate and rhythm, no murmurs rubs or gallops.  SKIN: no rash, jaundice, ecchymosis or ulcers  MUSCULOSKELETAL: No clubbing or cyanosis; b/l LE edema 1+ extending to mid " shin  NEUROLOGIC: AO ×3, no gross deficits    LABS:  Lab studies reviewed and notable for ABG 02/2024: 7.40/53/49/32.8/84, IKE positive, pANCA positive, CCP undetectable, negative anti-dsDNA (12/2024), antiMPO negative, antiPR3 negative )01/2025)  Lab Results   Component Value Date    WBC 7.95 08/22/2024    HGB 16.8 (H) 08/22/2024    HCT 55.3 (H) 08/22/2024    MCV 80.6 08/22/2024     08/22/2024     BMP  Lab Results   Component Value Date     12/04/2024    K 3.4 (L) 12/04/2024     12/04/2024    CO2 33 (H) 12/04/2024    BUN 14 12/04/2024    CREATININE 1.03 (H) 12/04/2024    CALCIUM 10.3 (H) 12/04/2024    ANIONGAP 12 12/04/2024    EGFRNORACEVR 60 12/04/2024 12/04/22 15:31 06/28/23 11:50 10/05/23 15:36 10/17/23 11:28 11/03/23 13:20 12/06/23 15:31 02/07/24 12:41 02/27/24 11:18   NT-proBNP 44 112 1,932 (H) 286 (H) 3,183 (H) 1,371 (H) 2,073 (H) 642 (H)      02/27/24 11:18 12/04/24 16:17 06/18/25 10:06   NT-proBNP 642 (H) 162 (H) 99     IMAGING:  CT-PE 02/2024:  FINDINGS:  No thrombus or other abnormality is identified in the pulmonary arteries or veins.  The pulmonary vessel caliber is within normal limits.  The cardiac size is enlarged.  Otherwise heart mediastinum and great vessels appear within normal limits.     Small amount airspace density both lungs more prominent in the right lung and mostly in the dependent lungs.  Remaining pulmonary.  Small amount parenchyma shows no evidence of airspace disease or abnormal density.  No effusion or pneumothorax is present.    RHC 02/2024: RA 6/4/3, RV 50/1, mPAP 34, PCWP 8, PVR 5.2 (Td), CO/CI 5.98/2.26 (Td)    TTE:  02/2024: LVEF 60%, diastolic dysfunction present, mild RV enlargement, normal LA size, RA dilated, trace MR, mod TR, PASP 50    09/2024: LVEF 55-60%, normal diastolic function, normal RV size, TAPSE 2.7, normal LA size, mild RA dilation, trace TR    [facility] 11/2023: no CAD, angiographically normal coronaries     LUNG FUNCTION TESTING:       SPIROMETRY/PFT:  03/2024 Pre Post   FVC 2.01/-2.78 2.08/-2.64   FEV1 1.57/-2.64 1.57/-2.64   FEV1/FVC 78/0 75/-0.42   TLC 5.34/-0.50    FRC  3.60/0.54    RV 3.28/2.09    DLCO 10.24/-5.08      SPIROMETRY/PFT:  02/2022 Pre Post   FVC 2.42/-2.13 2.42/-2.13   FEV1 1.95/-1.94 1.95/-1.94   FEV1/FVC 81/0.30 81/0.30   TLC 5.39/-0.43     FRC  3.32/0.12     RV 2.97/1.54     DLCO 14.36/-2.88          6MWD:  Date Distance (ft) Resting SpO2; Yonathan SpO2 O2 Required   03/2024 453 87%,RA; 87% 2-3L NC   12/2024 563 94%, RA; 91% None     ASSESSMENT & PLAN     IMPRESSION:  Assessed pulmonary HTN management, including recent lab work and medication regimen.  Evaluated breathing issues and choking sensation, potentially related to airway inflammation from smoking.  Considered wrist pain, likely due to repetitive motions from occupation.  Noted recent cataract surgery and ongoing eye care.  Recognized significant psychosocial stressors affecting health and smoking habits.    SECONDARY PULMONARY HYPERTENSION:  - Ordered lab work for pulmonary HTN management.  - Plan for walking test to check oxygen levels at next year's appointment.  - Plan for lung scan at next year's appointment.  - Continued Tadalafil 40 mg daily (two 20 mg tablets).    BREATHING ABNORMALITIES:  - Ms. Porter to use Combivent inhaler when experiencing choking sensation to see if it provides relief.    FLUID RETENTION:  - Continued Bumex (water pill) twice daily, morning and evening.    LEFT WRIST PAIN:  - Recommend using wrist stabilizers or splints, available at [facility], to support wrists during daily activities.    FOLLOW-UP:  - Follow up in January for reassessment.  - Contact the office for results of today's lab work.          1. Pulmonary arterial hypertension  Assessment & Plan:  RHC 02/2024 with precapillary pulmonary hypertension (mPAP 34, PCWP 8, PVR 5.2, CO/CI 5.98/2.26 -Td) that is out of proportion for JOVANNI on PAP. PFTs with no obstruction. History is  notable for PE in 2022; CT-PE imaging thereafter with no webs or evidence of organization and negative for acute PE. Lab work with negative HIV screen and imaging notable for hepatic steatosis. Improved fluid status with diuresis with ongoing good UOP. Plan for management as follows:  - WHO group I, NYHA class II  - REVEAL risk score 4; low risk  - cont Tadalafil daily   -- increase dose to 40 mg QDay   -- s/p hysterectomy with radiographic absence of uterus   -- medication side effects reviewed; educated on avoidance of abrupt cessation of medication  - cont Bumex BID  - NTproBNP lowest to date, <100  - progressive erythrocytosis with normal Plts is worrisome for poor PAP therapy; will refer for titration study with Sleep Medicine   -- Sleep Medicine appointment 08/2026  - obtain 6MWT    Orders:  -     tadalafil (ADCIRCA) 20 mg Tab; Take 2 tablets (40 mg total) by mouth once daily.  Dispense: 60 tablet; Refill: 11  -     Stress test, pulmonary; Future; Expected date: 12/18/2025    2. Smoking greater than 40 pack years  Assessment & Plan:  Patient has a >50 pack-year smoking with ongoing nicotine dependence. We discussed lung cancer screening with low-dose CT including benefits (early diagnosis, reduced risk of death from lung cancer and decreased worry) and harms (false-positive findings, over-diagnosis, radiation exposure, increased anxiety) of screening and importance of adherence to the screening program with annual imaging at minimum. Patient is agreeable to lung cancer screening and to this end we will obtain a low-dose CT annual.   - LDCT due now, ordered      Orders:  -     CT Chest Lung Screening Low Dose; Future; Expected date: 06/18/2025    3. Nicotine dependence with current use  Assessment & Plan:  Dangers of cigarette smoking were reviewed with patient in detail. Patient was counseled for 5 minutes. Nicotine replacement options were discussed. Nicotine replacement was discussed- not prescribed. Ongoing  stress and in pre-contemplation stage.           This note was generated with the assistance of ambient listening technology. Verbal consent was obtained by the patient and accompanying visitor(s) for the recording of patient appointment to facilitate this note. I attest to having reviewed and edited the generated note for accuracy, though some syntax or spelling errors may persist. Please contact the author of this note for any clarification.       Follow up in about 7 months (around 1/18/2026).    Case was discussed with patient; all questions were answered to patient's satisfaction and patient verbalized understanding.     Missy Godoy MD  Pulmonary Medicine  [facility]  Phone: [phone]

## 2025-06-18 NOTE — ASSESSMENT & PLAN NOTE
RHC 02/2024 with precapillary pulmonary hypertension (mPAP 34, PCWP 8, PVR 5.2, CO/CI 5.98/2.26 -Td) that is out of proportion for JOVANNI on PAP. PFTs with no obstruction. History is notable for PE in 2022; CT-PE imaging thereafter with no webs or evidence of organization and negative for acute PE. Lab work with negative HIV screen and imaging notable for hepatic steatosis. Improved fluid status with diuresis with ongoing good UOP. Plan for management as follows:  - WHO group I, NYHA class II  - REVEAL risk score 4; low risk  - cont Tadalafil daily   -- increase dose to 40 mg QDay   -- s/p hysterectomy with radiographic absence of uterus   -- medication side effects reviewed; educated on avoidance of abrupt cessation of medication  - cont Bumex BID  - NTproBNP lowest to date, <100  - progressive erythrocytosis with normal Plts is worrisome for poor PAP therapy; will refer for titration study with Sleep Medicine   -- Sleep Medicine appointment 08/2026  - obtain 6MWT

## 2025-06-19 ENCOUNTER — RESULTS FOLLOW-UP (OUTPATIENT)
Dept: PULMONOLOGY | Facility: CLINIC | Age: 65
End: 2025-06-19

## 2025-06-19 ENCOUNTER — TELEPHONE (OUTPATIENT)
Dept: PULMONOLOGY | Facility: CLINIC | Age: 65
End: 2025-06-19
Payer: MEDICARE

## 2025-06-19 PROBLEM — F17.210 SMOKING GREATER THAN 40 PACK YEARS: Status: ACTIVE | Noted: 2025-06-19

## 2025-06-19 NOTE — TELEPHONE ENCOUNTER
----- Message from Missy Godoy MD sent at 6/19/2025 10:24 AM CDT -----  Stacia, could you call German and let her know that her BNP is the lowest it has been. This is good news. Continue her tadalafil and Bumex, Thanks.   ----- Message -----  From: Lab, Background User  Sent: 6/18/2025  12:22 PM CDT  To: Missy Godoy MD

## 2025-06-19 NOTE — ASSESSMENT & PLAN NOTE
Dangers of cigarette smoking were reviewed with patient in detail. Patient was counseled for 5 minutes. Nicotine replacement options were discussed. Nicotine replacement was discussed- not prescribed. Ongoing stress and in pre-contemplation stage.

## 2025-06-19 NOTE — ASSESSMENT & PLAN NOTE
Patient has a >50 pack-year smoking with ongoing nicotine dependence. We discussed lung cancer screening with low-dose CT including benefits (early diagnosis, reduced risk of death from lung cancer and decreased worry) and harms (false-positive findings, over-diagnosis, radiation exposure, increased anxiety) of screening and importance of adherence to the screening program with annual imaging at minimum. Patient is agreeable to lung cancer screening and to this end we will obtain a low-dose CT annual.   - LDCT due now, ordered

## 2025-06-20 ENCOUNTER — ANESTHESIA EVENT (OUTPATIENT)
Dept: PAIN MEDICINE | Facility: HOSPITAL | Age: 65
End: 2025-06-20
Payer: MEDICARE

## 2025-06-20 ENCOUNTER — ANESTHESIA (OUTPATIENT)
Dept: PAIN MEDICINE | Facility: HOSPITAL | Age: 65
End: 2025-06-20
Payer: MEDICARE

## 2025-06-20 ENCOUNTER — HOSPITAL ENCOUNTER (OUTPATIENT)
Facility: HOSPITAL | Age: 65
Discharge: HOME | End: 2025-06-20
Attending: FAMILY MEDICINE | Admitting: FAMILY MEDICINE
Payer: MEDICARE

## 2025-06-20 VITALS — DIASTOLIC BLOOD PRESSURE: 66 MMHG | OXYGEN SATURATION: 100 % | SYSTOLIC BLOOD PRESSURE: 87 MMHG | HEART RATE: 70 BPM

## 2025-06-20 VITALS
HEART RATE: 68 BPM | TEMPERATURE: 98 F | HEIGHT: 68 IN | SYSTOLIC BLOOD PRESSURE: 152 MMHG | BODY MASS INDEX: 44.41 KG/M2 | DIASTOLIC BLOOD PRESSURE: 84 MMHG | WEIGHT: 293 LBS | OXYGEN SATURATION: 100 % | RESPIRATION RATE: 22 BRPM

## 2025-06-20 DIAGNOSIS — I87.2 VENOUS INSUFFICIENCY: Primary | ICD-10-CM

## 2025-06-20 PROCEDURE — 37766 PHLEB VEINS - EXTREM 20+: CPT | Performed by: FAMILY MEDICINE

## 2025-06-20 PROCEDURE — 36475 ENDOVENOUS RF 1ST VEIN: CPT | Mod: 51,LT,, | Performed by: FAMILY MEDICINE

## 2025-06-20 PROCEDURE — 37000008 HC ANESTHESIA 1ST 15 MINUTES: Performed by: FAMILY MEDICINE

## 2025-06-20 PROCEDURE — 63600175 PHARM REV CODE 636 W HCPCS: Performed by: FAMILY MEDICINE

## 2025-06-20 PROCEDURE — 36475 ENDOVENOUS RF 1ST VEIN: CPT | Performed by: FAMILY MEDICINE

## 2025-06-20 PROCEDURE — 27000716 HC OXISENSOR PROBE, ANY SIZE: Performed by: NURSE ANESTHETIST, CERTIFIED REGISTERED

## 2025-06-20 PROCEDURE — C1888 ENDOVAS NON-CARDIAC ABL CATH: HCPCS | Performed by: FAMILY MEDICINE

## 2025-06-20 PROCEDURE — 63600175 PHARM REV CODE 636 W HCPCS: Performed by: NURSE ANESTHETIST, CERTIFIED REGISTERED

## 2025-06-20 PROCEDURE — 25000003 PHARM REV CODE 250: Performed by: NURSE ANESTHETIST, CERTIFIED REGISTERED

## 2025-06-20 PROCEDURE — 25000003 PHARM REV CODE 250: Performed by: FAMILY MEDICINE

## 2025-06-20 PROCEDURE — 37766 PHLEB VEINS - EXTREM 20+: CPT | Mod: LT,,, | Performed by: FAMILY MEDICINE

## 2025-06-20 PROCEDURE — C1894 INTRO/SHEATH, NON-LASER: HCPCS | Performed by: FAMILY MEDICINE

## 2025-06-20 PROCEDURE — 37000009 HC ANESTHESIA EA ADD 15 MINS: Performed by: FAMILY MEDICINE

## 2025-06-20 PROCEDURE — 27000284 HC CANNULA NASAL: Performed by: NURSE ANESTHETIST, CERTIFIED REGISTERED

## 2025-06-20 RX ORDER — MUPIROCIN 20 MG/G
OINTMENT TOPICAL CODE/TRAUMA/SEDATION MEDICATION
Status: DISCONTINUED | OUTPATIENT
Start: 2025-06-20 | End: 2025-06-20 | Stop reason: HOSPADM

## 2025-06-20 RX ORDER — LIDOCAINE HYDROCHLORIDE 10 MG/ML
INJECTION, SOLUTION INFILTRATION; PERINEURAL CODE/TRAUMA/SEDATION MEDICATION
Status: DISCONTINUED | OUTPATIENT
Start: 2025-06-20 | End: 2025-06-20 | Stop reason: HOSPADM

## 2025-06-20 RX ORDER — LIDOCAINE HYDROCHLORIDE 20 MG/ML
INJECTION, SOLUTION EPIDURAL; INFILTRATION; INTRACAUDAL; PERINEURAL
Status: DISCONTINUED | OUTPATIENT
Start: 2025-06-20 | End: 2025-06-20

## 2025-06-20 RX ORDER — KETOROLAC TROMETHAMINE 30 MG/ML
INJECTION, SOLUTION INTRAMUSCULAR; INTRAVENOUS
Status: DISCONTINUED | OUTPATIENT
Start: 2025-06-20 | End: 2025-06-20

## 2025-06-20 RX ORDER — SODIUM CHLORIDE 9 MG/ML
INJECTION, SOLUTION INTRAVENOUS CONTINUOUS
Status: DISCONTINUED | OUTPATIENT
Start: 2025-06-20 | End: 2025-06-20 | Stop reason: HOSPADM

## 2025-06-20 RX ORDER — PROPOFOL 10 MG/ML
VIAL (ML) INTRAVENOUS
Status: DISCONTINUED | OUTPATIENT
Start: 2025-06-20 | End: 2025-06-20

## 2025-06-20 RX ADMIN — PROPOFOL 100 MCG/KG/MIN: 10 INJECTION, EMULSION INTRAVENOUS at 07:06

## 2025-06-20 RX ADMIN — KETOROLAC TROMETHAMINE 30 MG: 30 INJECTION, SOLUTION INTRAMUSCULAR; INTRAVENOUS at 07:06

## 2025-06-20 RX ADMIN — SODIUM CHLORIDE: 9 INJECTION, SOLUTION INTRAVENOUS at 07:06

## 2025-06-20 RX ADMIN — PROPOFOL 50 MG: 10 INJECTION, EMULSION INTRAVENOUS at 07:06

## 2025-06-20 RX ADMIN — LIDOCAINE HYDROCHLORIDE 75 MG: 20 INJECTION, SOLUTION EPIDURAL; INFILTRATION; INTRACAUDAL; PERINEURAL at 07:06

## 2025-06-20 NOTE — ANESTHESIA PREPROCEDURE EVALUATION
06/20/2025  Holly Porter is a 64 y.o., female.    Past Medical History:   Diagnosis Date    Acquired hypothyroidism     Atherosclerosis of native artery of extremity with intermittent claudication 01/30/2019    bilateral legs    BMI 50.0-59.9, adult 02/22/2021    Chronic pain syndrome     opioid depen    Congestive heart failure (CHF)     COPD (chronic obstructive pulmonary disease)     COVID-19 10/06/2023    Depression     Diabetes mellitus, type 2     followed by Aurea Kwong NP, [facility]    DVT of lower extremity, bilateral     Essential hypertension 06/08/2021    GERD (gastroesophageal reflux disease)     Gout 07/05/2023    Hyperlipidemia     Lumbosacral radiculopathy 06/05/2023    followed by GLENN Valdes, [facility]    Migraines     Nicotine dependence     Nicotine dependence     Obesity hypoventilation syndrome     chronic CO2 retainer with compensatory metabolic alkalosis    Osteoarthritis     Seizure disorder     Sleep apnea     on cpap    Thyroid cancer     Venous stasis ulcer limited to breakdown of skin with varicose veins     followed by Estuardo Zhao    Vitamin D deficiency        Past Surgical History:   Procedure Laterality Date    ABCESS DRAINAGE      HYSTERECTOMY      ILIAC ARTERY STENT Bilateral 01/28/2020    Bilateral distal aorta and common iliac 8 X 59 vbx covered stents performed by Dr. Antonio Jacinto.    LEFT HEART CATHETERIZATION Left 11/6/2023    Procedure: Left heart cath;  Surgeon: Alex Ortega DO;  Location: [facility] CATH LAB;  Service: Cardiology;  Laterality: Left;    RADIOFREQUENCY ABLATION Left 04/15/2022    Procedure: Left calf  Radiofrequency Ablation;  Surgeon: Matty Falcon DO;  Location: [facility] OR;  Service: Vascular;  Laterality: Left;    RIGHT HEART CATHETERIZATION Right 2/13/2024    Procedure: INSERTION, CATHETER, RIGHT  HEART;  Surgeon: Mahad Moreno MD;  Location: [facility] CATH LAB;  Service: Cardiology;  Laterality: Right;    STAB PHLEBECTOMY OF VARICOSE VEINS Left 01/25/2013    Left leg microphlebectomies x 23 stab avulsions and ligation of multiple varicose veins perrformed by Dr. Cirilo Aguirre.    THYROIDECTOMY      hx: thyroid cancer    TOE AMPUTATION Left 01/28/2020    2nd, 4th and 5th performed by Dr. Anam Saeed.    TOE AMPUTATION Right 01/28/2020    4th toe performed by Dr. Anam Saeed.    TOTAL ABDOMINAL HYSTERECTOMY W/ BILATERAL SALPINGOOPHORECTOMY      TUBAL LIGATION      VAGINAL DELIVERY      x 4    VENOUS ABLATION Right 08/09/2019    GSV Varithena Ablation performed by Dr. Estuardo Falcon    VENOUS ABLATION Left 08/02/2019    ATAV Varithena Ablation performed by Dr. Estuardo Falcon.    VENOUS ABLATION Right 07/13/2015    ATAV Laser Ablatio performed by Dr. Cirilo Aguirre.    VENOUS ABLATION Right 07/06/2015    Distal  GSV Laser Ablation performed by dr. Cirilo Aguirre.    VENOUS ABLATION Left 04/20/2015    Left Distal GSV Laser Ablation performed by dr. Cirilo Aguirre.    VENOUS ABLATION Right 10/28/2013    Right Distal GSV RF Ablation performed by Dr. Cirilo Aguirre.    VENOUS ABLATION Left 10/25/2013    SSV RF Ablation performed by dr. Cirilo Aguirre.    VENOUS ABLATION Left 10/07/2013    Left GSV and Left ATAV RF Ablation performed by Dr. Cirilo Aguirre.    VENOUS ABLATION Right 01/21/2013    GSV RF Ablation w/micros x 22 performed by Dr. Cirilo Aguirre.    VENOUS ABLATION Left 06/25/2021    Left distal GSV Varithena Ablation       Family History   Problem Relation Name Age of Onset    Heart disease Mother          age 84 CHF    Hypertension Mother      Osteoarthritis Mother      Coronary aneurysm Father      Coronary artery disease Father      Hypertension Father      Heart disease Father      No Known Problems Sister      No Known Problems Brother          hx: varicose veins    No Known Problems Brother          MVA: parlazed    No Known Problems Brother       No Known Problems Son      No Known Problems Son      No Known Problems Son      No Known Problems Son         Social History     Socioeconomic History    Marital status:    Tobacco Use    Smoking status: Every Day     Current packs/day: 1.00     Average packs/day: 1.6 packs/day for 36.5 years (57.8 ttl pk-yrs)     Types: Cigarettes     Start date: 1989     Passive exposure: Current    Smokeless tobacco: Never   Substance and Sexual Activity    Alcohol use: Never    Drug use: Never    Sexual activity: Not Currently     Partners: Male     Social Drivers of Health     Financial Resource Strain: Low Risk  (3/25/2025)    Overall Financial Resource Strain (CARDIA)     Difficulty of Paying Living Expenses: Not hard at all   Food Insecurity: Food Insecurity Present (3/25/2025)    Hunger Vital Sign     Worried About Running Out of Food in the Last Year: Never true     Ran Out of Food in the Last Year: Sometimes true   Transportation Needs: High Risk (4/5/2025)    Received from [facility] SDOH Screening     Has lack of transportation kept you from medical appointments, meetings, work or from getting things needed for daily living? choose all that apply.: Yes, it has kept me from medical appointments or from getting my medications   Physical Activity: Insufficiently Active (3/25/2025)    Exercise Vital Sign     Days of Exercise per Week: 2 days     Minutes of Exercise per Session: 10 min   Stress: Stress Concern Present (3/25/2025)    [employer] [place] [employer] - Occupational Stress Questionnaire     Feeling of Stress : To some extent   Housing Stability: Low Risk  (3/25/2025)    Housing Stability Vital Sign     Unable to Pay for Housing in the Last Year: No     Number of Times Moved in the Last Year: 1     Homeless in the Last Year: No       Current Medications[1]    Review of patient's allergies indicates:   Allergen Reactions    Ammonium peroxydisulfate Shortness Of Breath    Avocado  (laurus persea) Anaphylaxis    Bananas [banana] Anaphylaxis and Swelling     CRAMPS,    Chocolate flavor Anaphylaxis     MOUTH SWELLING    Fentanyl Shortness Of Breath and Itching    Iodinated contrast media Anaphylaxis, Hives and Rash    Nicoderm Swelling    Percocet [oxycodone-acetaminophen] Shortness Of Breath and Itching    Silvadene [silver sulfadiazine]     Clindamycin     Pregabalin     Adhesive Blisters, Rash and Itching    Iodine Rash    Latex, natural rubber Rash    Pcn [penicillins] Rash    Shellfish containing products Rash    Sulfa (sulfonamide antibiotics) Rash and Blisters      Pre-op Assessment    I have reviewed the Patient Summary Reports.     I have reviewed the Nursing Notes. I have reviewed the NPO Status.   I have reviewed the Medications.     Review of Systems  Anesthesia Hx:  No problems with previous Anesthesia             Denies Family Hx of Anesthesia complications.    Denies Personal Hx of Anesthesia complications.                    Social:  Smoker       Cardiovascular:  Exercise tolerance: poor   Hypertension       CHF    hyperlipidemia   ECG has been reviewed.         Shortness of Breath       Congestive Heart Failure (CHF)                Hypertension         Pulmonary:   COPD   Shortness of breath  Sleep Apnea    Chronic Obstructive Pulmonary Disease (COPD):           Obstructive Sleep Apnea (JOVANNI).           Hepatic/GI:     GERD         Gerd          Musculoskeletal:  Arthritis    Musculoskeletal General/Symptoms: low back pain, joint pain.      Arthritis        Denies Lumbar Spine Disorders   Neurological:    Neuromuscular Disease,  Headaches Seizures     Dx of Headaches  Pain Syndrome   Chronic Pain Syndrome Arthritis      Seizure Disorder                        Neuromuscular Disease   Endocrine:  Diabetes, type 2 Hypothyroidism   Diabetes, Type 2 Diabetes                   Hypothyroidism        Morbid Obesity / BMI > 40  Psych:  Psychiatric History    Anxiety Disorder.    Depression.             Physical Exam  General: Well nourished, Alert, Oriented and Cooperative    Airway:  Mouth Opening: Normal  Neck ROM: Normal ROM    Dental:  Edentulous    Chest/Lungs:  Normal Respiratory Rate    Heart:  Rate: Normal        Anesthesia Plan  Type of Anesthesia, risks & benefits discussed:    Anesthesia Type: Gen Natural Airway, MAC  Intra-op Monitoring Plan: Standard ASA Monitors  Post Op Pain Control Plan: multimodal analgesia and IV/PO Opioids PRN  Induction:  IV  Informed Consent: Informed consent signed with the Patient and all parties understand the risks and agree with anesthesia plan.  All questions answered.   ASA Score: 4  Day of Surgery Review of History & Physical: I have interviewed and examined the patient. I have reviewed the patient's H&P dated: There are no significant changes.     Ready For Surgery From Anesthesia Perspective.     .           [1]   Current Facility-Administered Medications   Medication Dose Route Frequency Provider Last Rate Last Admin    0.9% NaCl infusion   Intravenous Continuous Matty Falcon DO        LIDOcaine-EPINEPHrine 1%-1:100,000 30 mL, LIDOcaine HCL 10 mg/ml (1%) 20 mL, sodium bicarbonate 10 mL in 0.9% NaCl 500 mL solution   MISCELLANEOUS Once Matty Falcon DO

## 2025-06-20 NOTE — H&P
Patient ID: Holly Porter is a 64 y.o. female.     I. HISTORY      Chief Complaint:        Chief Complaint   Patient presents with    Follow-up       REDISCUSS PROCEDURES         HPI: Holly Porter is a 64 y.o. female who presents today after having to reschedule/canceled procedures secondary to other medical problems.  The patient states that she continues to have leg pain, edema, hyperpigmentation and painful varicose veins.  She has continued with compressive therapy and leg elevation to the best of her ability.  She considers her symptoms to be life altering would like to have underlying condition corrected if possible.     The patient presented recently for follow-up after a 2 year absence from our clinic.  The patient has known chronic venous insufficiency with a history of multiple previous interventions.  She also sees Dr. Ortega for congestive heart failure.  She has had several surgical interventions with toes removed.  She states that she has down from 2 packs of cigarettes a day down to 1 and finally down to half pack a day over the past 2-1/2 months.  She states she continues to have edema of the bilateral lower extremities with hyperpigmentation and varicose veins despite previous efforts of conservative measures with compression and venous interventions.     Bilateral complete venous reflux study performed 10/29/2024 shows no evidence of DVT bilaterally.  Study also shows well ablated bilateral great saphenous veins proximally, right anterior thigh accessory vein, and left small saphenous vein.  Study also shows dilation reflux of the distal right great saphenous vein, right small saphenous vein, and left anterior thigh accessory vein.  Large refluxing varicosities noted bilaterally.      Clinical summary:   for a 5 day follow-up post non thermal ablation of the distal accessory thigh vein and thermal ablation of a left calf  vein.  Post ablation ultrasound performed today shows well  ablated calf  and well ablated left distal anterior accessory thigh vein.  No signs of thrombus or complication.  The patient states that she did well over the weekend without complications.  She continues to her postoperative wrap.  No signs of bleeding or phlebitis.  I have encouraged her to wear her thigh-high wrapped with a 2 week postoperative period to help prevent against complications.  She actually looks like she has less swelling today.     The patient has had numerous venous interventions previously, see her surgical history for details.          Past Medical History:   Diagnosis Date    Acquired hypothyroidism      Atherosclerosis of native artery of extremity with intermittent claudication 01/30/2019     bilateral legs    BMI 50.0-59.9, adult 02/22/2021    Chronic pain syndrome       opioid depen    Congestive heart failure (CHF)      COPD (chronic obstructive pulmonary disease)      COVID-19 10/06/2023    Depression      Diabetes mellitus, type 2       followed by Aurea Kwong NP, [facility]    DVT of lower extremity, bilateral      Essential hypertension 06/08/2021    GERD (gastroesophageal reflux disease)      Gout 07/05/2023    Hyperlipidemia      Lumbosacral radiculopathy 06/05/2023     followed by GLENN Valdes, [facility]    Migraines      Nicotine dependence      Nicotine dependence      Obesity hypoventilation syndrome       chronic CO2 retainer with compensatory metabolic alkalosis    Osteoarthritis      Seizure disorder      Sleep apnea       on cpap    Thyroid cancer      Venous stasis ulcer limited to breakdown of skin with varicose veins       followed by Estuardo Zhao    Vitamin D deficiency                 Past Surgical History:   Procedure Laterality Date    ABCESS DRAINAGE        HYSTERECTOMY        ILIAC ARTERY STENT Bilateral 01/28/2020     Bilateral distal aorta and common iliac 8 X 59 vbx covered stents performed by Dr. Antonio Jacinto.    LEFT HEART  CATHETERIZATION Left 11/6/2023     Procedure: Left heart cath;  Surgeon: Alex Ortega DO;  Location: [facility] CATH LAB;  Service: Cardiology;  Laterality: Left;    RADIOFREQUENCY ABLATION Left 04/15/2022     Procedure: Left calf  Radiofrequency Ablation;  Surgeon: Matty Falcon DO;  Location: [facility] OR;  Service: Vascular;  Laterality: Left;    RIGHT HEART CATHETERIZATION Right 2/13/2024     Procedure: INSERTION, CATHETER, RIGHT HEART;  Surgeon: Mahad Moreno MD;  Location: [facility] CATH LAB;  Service: Cardiology;  Laterality: Right;    STAB PHLEBECTOMY OF VARICOSE VEINS Left 01/25/2013     Left leg microphlebectomies x 23 stab avulsions and ligation of multiple varicose veins perrformed by Dr. Cirilo Aguirre.    THYROIDECTOMY         hx: thyroid cancer    TOE AMPUTATION Left 01/28/2020     2nd, 4th and 5th performed by Dr. Anam Saeed.    TOE AMPUTATION Right 01/28/2020     4th toe performed by Dr. Anam Saeed.    TOTAL ABDOMINAL HYSTERECTOMY W/ BILATERAL SALPINGOOPHORECTOMY        TUBAL LIGATION        VAGINAL DELIVERY         x 4    VENOUS ABLATION Right 08/09/2019     GSV Varithena Ablation performed by Dr. Estuardo Falcon    VENOUS ABLATION Left 08/02/2019     ATAV Varithena Ablation performed by Dr. Estuardo Falcon.    VENOUS ABLATION Right 07/13/2015     ATAV Laser Ablatio performed by Dr. Cirilo Aguirre.    VENOUS ABLATION Right 07/06/2015     Distal  GSV Laser Ablation performed by dr. Cirilo Aguirre.    VENOUS ABLATION Left 04/20/2015     Left Distal GSV Laser Ablation performed by dr. Cirilo Aguirre.    VENOUS ABLATION Right 10/28/2013     Right Distal GSV RF Ablation performed by Dr. Cirilo Aguirre.    VENOUS ABLATION Left 10/25/2013     SSV RF Ablation performed by dr. Cirilo Aguirre.    VENOUS ABLATION Left 10/07/2013     Left GSV and Left ATAV RF Ablation performed by Dr. Cirilo Aguirre.    VENOUS ABLATION Right 01/21/2013     GSV RF Ablation w/micros x 22 performed by Dr. Cirilo Aguirre.    VENOUS ABLATION Left 06/25/2021      Left distal GSV Varithena Ablation         Tobacco Use History   Social History           Tobacco Use   Smoking Status Every Day    Current packs/day: 0.50    Average packs/day: 0.5 packs/day for 33.0 years (16.5 ttl pk-yrs)    Types: Cigarettes    Passive exposure: Current   Smokeless Tobacco Never   Tobacco Comments     5 cigarettes a day               Current Medications      Current Outpatient Medications:     ACCU-CHEK GUIDE GLUCOSE METER Misc, , Disp: , Rfl:     ACCU-CHEK GUIDE TEST STRIPS Strp, , Disp: , Rfl:     ACCU-CHEK SOFTCLIX LANCETS Misc, , Disp: , Rfl:     albuterol (PROVENTIL) 2.5 mg /3 mL (0.083 %) nebulizer solution, Take 3 mLs (2.5 mg total) by nebulization every 6 (six) hours as needed for Wheezing. Rescue, Disp: 300 mL, Rfl: 11    allopurinoL (ZYLOPRIM) 300 MG tablet, Take 1 tablet (300 mg total) by mouth once daily., Disp: 90 tablet, Rfl: 3    apixaban (ELIQUIS) 5 mg Tab, Take 1 tablet (5 mg total) by mouth 2 (two) times daily., Disp: 60 tablet, Rfl: 3    aspirin (ECOTRIN) 81 MG EC tablet, TAKE 1 TABLET BY MOUTH EVERY DAY, Disp: 90 tablet, Rfl: 1    atorvastatin (LIPITOR) 10 MG tablet, Take 1 tablet (10 mg total) by mouth once daily., Disp: 90 tablet, Rfl: 1    benzonatate (TESSALON) 100 MG capsule, TAKE ONE CAPSULE 3 TIMES DAILY IF NEEDED FOR COUGH, Disp: , Rfl:     BREZTRI AEROSPHERE 160-9-4.8 mcg/actuation HFAA, Take 2 puffs by mouth 2 (two) times daily., Disp: , Rfl:     bumetanide (BUMEX) 2 MG tablet, Take 1 tablet (2 mg total) by mouth 2 (two) times daily., Disp: 60 tablet, Rfl: 11    carvediloL (COREG) 12.5 MG tablet, Take 0.5 tablets (6.25 mg total) by mouth 2 (two) times daily., Disp: 30 tablet, Rfl: 11    cilostazoL (PLETAL) 100 MG Tab, Take 1 tablet (100 mg total) by mouth 2 (two) times daily., Disp: 90 tablet, Rfl: 1    colchicine (COLCRYS) 0.6 mg tablet, Take 1 tablet (0.6 mg total) by mouth once daily., Disp: 30 tablet, Rfl: 2    diclofenac sodium (VOLTAREN ARTHRITIS PAIN) 1 %  "Gel, Apply 2 g topically once daily., Disp: 1 g, Rfl: 3    DULoxetine (CYMBALTA) 30 MG capsule, Take 30 mg by mouth once daily., Disp: , Rfl:     HYDROcodone-acetaminophen (NORCO)  mg per tablet, Take 1 tablet by mouth every 6 (six) hours., Disp: 120 tablet, Rfl: 0    HYDROcodone-acetaminophen (NORCO)  mg per tablet, Take 1 tablet by mouth every 6 (six) hours., Disp: 120 tablet, Rfl: 0    HYDROcodone-acetaminophen (NORCO)  mg per tablet, Take 1 tablet by mouth every 6 (six) hours., Disp: 120 tablet, Rfl: 0    insulin detemir U-100, Levemir, (LEVEMIR FLEXTOUCH U100 INSULIN) 100 unit/mL (3 mL) InPn pen, Inject 10 units into the skin every evening subcutaneous, Disp: 15 mL, Rfl: 1    ketorolac 0.5% (ACULAR) 0.5 % Drop, Place 1 drop into the right eye 4 (four) times daily., Disp: , Rfl:     levothyroxine (SYNTHROID) 150 MCG tablet, TAKE 1 TABLET EVERY MORNING 30 MINUTES PRIOR TO BREAKFAST MEAL FOR THYROID, Disp: 90 tablet, Rfl: 1    moxifloxacin (VIGAMOX) 0.5 % ophthalmic solution, Place 1 drop into the right eye 4 (four) times daily., Disp: , Rfl:     naloxone (NARCAN) 4 mg/actuation Spry, 1 spray once., Disp: , Rfl:     NIFEdipine (ADALAT CC) 60 MG TbSR, Take 60 mg by mouth., Disp: , Rfl:     pantoprazole (PROTONIX) 40 MG tablet, Take 1 tablet (40 mg total) by mouth once daily., Disp: 90 tablet, Rfl: 1    pen needle, diabetic 32 gauge x 5/32" Ndle, Use to inject insulin once daily, Disp: 100 each, Rfl: 3    potassium chloride SA (K-DUR,KLOR-CON) 20 MEQ tablet, Take 1 tablet (20 mEq total) by mouth once daily., Disp: 90 tablet, Rfl: 1    prednisoLONE acetate (PRED FORTE) 1 % DrpS, Place 1 drop into the right eye 4 (four) times daily., Disp: , Rfl:     psyllium husk (METAMUCIL) 3.4 gram/5.4 gram Powd, Take 1 table spoon with water by mouth once daily., Disp: 660 g, Rfl: 0    QUEtiapine (SEROQUEL) 25 MG Tab, Take 1 tablet (25 mg total) by mouth once daily., Disp: 30 tablet, Rfl: 1    tadalafiL (CIALIS) " 20 MG Tab, Take 40 mg by mouth., Disp: , Rfl:     topiramate (TOPAMAX) 100 MG tablet, Take 1 tablet (100 mg total) by mouth 2 (two) times daily., Disp: 60 tablet, Rfl: 11    varenicline (CHANTIX CONTINUING MONTH BOX) 1 mg Tab, Take 1 tablet (1 mg total) by mouth 2 (two) times daily., Disp: 30 tablet, Rfl: 3        Review of Systems   Constitutional:  Negative for activity change, chills, diaphoresis, fatigue and fever.   Respiratory:  Negative for cough and shortness of breath.    Cardiovascular:  Positive for leg swelling. Negative for chest pain and claudication.        Hyperpigmentation LE   Gastrointestinal:  Negative for nausea and vomiting.   Musculoskeletal:  Positive for leg pain. Negative for joint swelling.   Integumentary:  Negative for rash and wound.   Neurological:  Negative for weakness and numbness.         II. PHYSICAL EXAM      Physical Exam  Constitutional:       General: She is awake. She is not in acute distress.     Appearance: Normal appearance. She is obese. She is not ill-appearing or toxic-appearing.   HENT:      Head: Normocephalic and atraumatic.   Eyes:      Extraocular Movements: Extraocular movements intact.      Conjunctiva/sclera: Conjunctivae normal.      Pupils: Pupils are equal, round, and reactive to light.   Neck:      Vascular: No carotid bruit or JVD.   Cardiovascular:      Rate and Rhythm: Normal rate and regular rhythm.      Pulses:           Dorsalis pedis pulses are detected w/ Doppler on the left side.        Posterior tibial pulses are detected w/ Doppler on the left side.      Heart sounds: No murmur heard.  Pulmonary:      Effort: Pulmonary effort is normal. No respiratory distress.      Breath sounds: No stridor. No wheezing, rhonchi or rales.   Musculoskeletal:         General: No swelling, tenderness or deformity.      Right lower le+ Edema present.      Left lower le+ Edema present.      Comments: Scattered hyperpigmentation and varicose veins of the  bilateral Gator regions.   Feet:      Comments: Monophasic pulses of the left foot with hand-held Doppler of the DPs and PTs.  Skin:     General: Skin is warm.      Capillary Refill: Capillary refill takes less than 2 seconds.      Coloration: Skin is not ashen.      Findings: No bruising, erythema, lesion, rash or wound.   Neurological:      Mental Status: She is alert and oriented to person, place, and time.      Motor: No weakness.   Psychiatric:         Speech: Speech normal.         Behavior: Behavior normal. Behavior is cooperative.                                              Reticular/Spider veins noted:  RLE: anterior calf, medial calf, ankle and foot  LLE: anterior calf, medial calf, ankle and foot     Varicose veins noted:  RLE: anterior calf, medial calf, medial thigh, ankle and foot  LLE:  anterior calf, medial calf, medial thigh, ankle and foot     CEAP Classification  Clinical Signs: Class 5 - Skin changes as defined above with healed ulceration  Etiologic Classification: Primary  Anatomic distribution: Superficial  Pathophysiologic dysfunction: Reflux        Venous Clinical Severity Score  Pain:2=Daily, moderate activity limitation, occasional analgesics  Varicose Veins: 3=Extensive. Thigh and calf or GS and LS distribution  Venous Edema: 2=Afternoon edema, above ankle  Pigmentation: 2=Diffuse over most of gaiter distribution (lower 1/3) or recent pigmentation (purple)  Inflammation: 1=Mild cellulitis, limited to marginal area around ulcer  Induration: 2=Medial or lateral,less than lower third of leg  Number of Active Ulcers: 1=1  Active Ulceration, Duration: 1=<3 months  Active Ulcer Size: 1=<2 cm diameter  Compressive Therapy: 1=Intermittent use of stockings  Total Score: 16        III. ASSESSMENT & PLAN (MEDICAL DECISION MAKING)      1. Venous insufficiency    2. Hyperpigmentation of skin    3. Leg pain, bilateral    4. Edema, lower extremity          Assessment/Diagnosis and Plan:  The patient  continues to have sequela of chronic venous insufficiency despite over 3 months of conservative therapy. The patient continues to have life altering symptoms as well as a positive reflux study as noted in the history of present illness above. At this time I believe it would be reasonable to proceed with a right small saphenous vein and left anterior thigh accessory vein endovenous ablation for symptomatic venous insufficiency.  Also believe would be reasonable to proceed with a right distal great saphenous vein non thermal ablation using Varithena non compound foam sclerosant 1%.  Finally, I believe it would be reasonable to proceed with greater than 20 micro phlebectomies of the bilateral lower extremities.  Untreated venous disease increases the patient's risk for cellulitis, deep vein thrombosis, chronic skin changes and venous ulceration.  Risk and benefits of the procedure were explained to the patient and the patient wishes to proceed. The patient does not have overly distended veins and denies history of DVT/PE and will not require prophylactic anticoagulation.        Matty Falcon, DO

## 2025-06-20 NOTE — DISCHARGE SUMMARY
[facility] - Pain Management  Discharge Note  Short Stay    Procedure(s) (LRB):  Left ATAV RF Ablation (Left)  Left Leg Microphlebectomies >20 (Left)      OUTCOME: Patient tolerated treatment/procedure well without complication and is now ready for discharge.    DISPOSITION: Home or Self Care    FINAL DIAGNOSIS:  Venous insufficiency    FOLLOWUP: In clinic    DISCHARGE INSTRUCTIONS:  No discharge procedures on file.     TIME SPENT ON DISCHARGE: 5 minutes

## 2025-06-20 NOTE — DISCHARGE INSTRUCTIONS
Vein Procedure Discharge Instructions    Today:  Someone must stay with you tonight.  No alcohol for at least 8 hours after your procedure.  No driving for 24 hours after your procedure if you receive sedation/anesthesia.    For the first 48 hours (2 days) following your procedure:  Walk at minimal 15 minutes on every hour (while awake).  The more active you are, the better.  When resting, elevate leg.  Keep leg propped up (recliner, couch, etc.) while sitting.  Take pain medication, if prescribed.  Take Ibuprofen, or Acetaminophen, for the next 2-3 days with an over-the-counter anti-acid (Prilosec, Protonix, Nexium, etc.)  Apply ice packs, change position, or try deep breathing.  If you have bleeding, apply pressure with a clean rag or gauze for 10 minutes (If it still bleeds after 10 minutes, call your doctor's office or go to the Emergency Room.  Call your doctor if you have fever of 101 F or higher, redness or swelling at the site of surgery, or foul-smelling discharge at the site of surgery.  If you have nausea (upset stomach)  Apply a cold washcloth or towel to your forehead or neck  Eat bland, unseasoned foods  Smell essential oils like peppermint or lonnie, or rubbing alcohol  Do deep breathing, relax, or try to sleep    After your 48 hours (2 days) Post Op period complete:  Remove dressings, throw away all except the ACE wrap, keep the ACE wrap.  Shower using warm soapy water.  Use separate wash cloth on the treated leg.  No baths for 2 weeks.  Compression up to groin must be worn daily for 2 weeks.  You may use the ACE wrap for entire leg, or compression hose to knees and ACE wrap to groin.  (If you had your small saphenous vein ablated, then compression only to knee)  If your vein was ablated with Varithena (foam), compression must be worn day and night.  Walk for at least 10 minutes daily for the next month.    Activity:  If you have a normal ultrasound one week after your ablation:  You may resume  walking activities immediately.  You may resume jogging 2 weeks after your procedure.  You may resume swimming 2 weeks after your procedure.  If you had microphlebectomies, you must make sure all areas are completely healed before swimming.  You may resume lower body weight lifting 2 weeks after your procedure.  You may resume upper body weight lifting while sitting down 2 days after your procedure.  You may resume sexual activities 2 weeks after your procedure.  You may fly commercially 2 weeks after your procedure.  You may scuba dive 1 month after your procedure.    Pilots - You may not fly for 1 month after your procedure.  You must have a normal 1 month post op ultrasound before returning to flight status.    Your follow-up appointment is: _________________________________________________________    Please call our office at [phone] with any questions or concerns.  You may call the following number for after hour and weekend concerns: [phone] (Dr. Falcon).

## 2025-06-20 NOTE — PLAN OF CARE
0949- Rec'd pt asleep but easily aroused. VSS. Dressing to L leg CDI, pulses palpable. All lines and tubes intact. Positioned for comfort and safety. Care ongoing.     1005- Pt awake and alert. Able to tolerate food and drink. VSS. Dressing to L leg CDI, pulses palpable. All lines and tubes intact. Positioned for comfort and safety. Care ongoing.     1038-Pt up and ambulating. All lines and tubes d/c'd. Dressing to L leg CDI, pulses palpable. VSS. Pending d/c.    1039- Pt d/c'd via wheelchair.

## 2025-06-20 NOTE — OP NOTE
Date: 6/20/25  Surgeon: Dr Estuardo Falcon DO    Procedure:  Endovenous radio frequency ablation of the left anterior thigh accessory vein(s) of the lower extremity.    Ambulatory phlebectomy of the left lower extremity.    Preprocedure and postprocedure diagnosis: Symptomatic varicose veins and venous insufficiency of the Left leg and other complications to include leg pain, leg edema and chronic skin changes.  The patient has previously failed conservative therapy consider a C4 chronic venous disease with venous hypertension.    Anesthesia: Local infiltration with monitored anesthesia care.  Estimated blood loss: 50 cc.  Specimens removed:  veins. Disposed of.   Complications: none.  Implants or grafts: none.    Findings:  A total of 8 cycles of radiofrequency ablation was used to treat 12 cm of vein over 2 minutes 40 seconds.  Maximum diameter of the vein treated is 6.3 mm with a maximum reflux time of 0.95 seconds.     A total of 29 micro phlebectomy stab avulsions of symptomatic varicose veins of the left  leg were performed.    Procedure detail:  After patient identification, surgical site verification, and marked symptomatic Left leg, the patient was taken to the procedure room and placed in the supine position.  Monitored anesthesia care was induced.  The patient was prepped and draped in the normal sterile fashion leaving the Left leg exposed.  A time-out performed identifying the patient and the correct surgical site.  Tumescent anesthesia was then infiltrated at the site of expected cannulation of the Left anterior thigh accessory vein.  The Left anterior thigh accessory vein was then cannulated using the modified Seldinger technique to place a 6 Uzbek sheath.  I then advanced a 10x60 cm radiofrequency ablation catheter through the sheath up the saphenous vein to approximately 2 cm distal to the epigastric vein.  Tumescence anesthesia was then infiltrated around the vein to be treated.  The patient was  placed in the head-down position.  Radiofrequency ablation of the Left anterior thigh accessory vein was then performed using a total of 8 cycles of radiofrequency ablation to treat 12 cm of vein over 2 minutes 40 seconds.  After this was done the catheter and sheath were removed and noted to be intact.  Completion ultrasound revealed a widely patent femoral vein, sapheno femoral junction and an ablated anterior thigh accessory vein.  The patient's leg was then cleaned and dried.  Sterile dressings and wraps were applied.  The patient was transported to the recovery room in stable condition.    Tumescence anesthesia was infiltrated at all sites expected micro phlebectomies.  A small stab incision was made at each of the 29 sites with an 11 blade.  Sterile melissa hooks and hemostats were used to perform stab avulsions of the symptomatic varicose veins.  Specimens that were removed were disposed of.  Sutures, Steri-Strips and Dermabond were used as appropriate for hemostasis.  The patient's leg was then cleaned and dried.  Sterile dressings and wraps were applied.  The patient was transported to the recovery room in stable condition.

## 2025-06-20 NOTE — ANESTHESIA POSTPROCEDURE EVALUATION
Anesthesia Post Evaluation    Patient: Holly Porter    Procedure(s) Performed: Procedure(s) (LRB):  Left ATAV RF Ablation (Left)  Left Leg Microphlebectomies >20 (Left)    Final Anesthesia Type: MAC      Patient participation: Yes- Able to Participate  Level of consciousness: awake and alert  Post-procedure vital signs: reviewed and stable  Pain management: adequate  Airway patency: patent    PONV status at discharge: No PONV  Anesthetic complications: no      Cardiovascular status: blood pressure returned to baseline, hemodynamically stable and stable  Respiratory status: unassisted  Hydration status: euvolemic  Follow-up not needed.  Comments: Refer to nursing notes for pain/aldair score upon discharge from recovery              Vitals Value Taken Time   /84 06/20/25 10:26   Temp 97F 06/20/25 13:29   Pulse 68 06/20/25 10:26   Resp 22 06/20/25 10:05   SpO2 100 % 06/20/25 10:26         Event Time   Out of Recovery 10:26:00         Pain/Aldair Score: Aldair Score: 10 (6/20/2025 10:05 AM)

## 2025-06-20 NOTE — TRANSFER OF CARE
"Anesthesia Transfer of Care Note    Patient: Holly Porter    Procedure(s) Performed: Procedure(s) (LRB):  Left ATAV RF Ablation (Left)  Left Leg Microphlebectomies >20 (Left)    Patient location: Other: VEIN CENTER    Anesthesia Type: MAC    Transport from OR: Transported from OR on 2-3 L/min O2 by NC with adequate spontaneous ventilation    Post pain: adequate analgesia    Post assessment: no apparent anesthetic complications    Post vital signs: stable    Level of consciousness: sedated    Nausea/Vomiting: no nausea/vomiting    Complications: none    Transfer of care protocol was followedComments: Good SV continue, VSS, RTRN      Last vitals: Visit Vitals  /68   Pulse 90   Temp 36.7 °C (98.1 °F) (Oral)   Resp 20   Ht 5' 8" (1.727 m)   Wt (!) 148.3 kg (327 lb)   BMI 49.72 kg/m²     "

## 2025-06-25 NOTE — PROGRESS NOTES
Subjective:         Patient ID: Holly Porter is a 64 y.o. female.    Chief Complaint: Low-back Pain, Hip Pain (right), and Foot Pain (left)            Pain  This is a chronic problem. The current episode started more than 1 year ago. The problem occurs daily. The problem has been waxing and waning. Associated symptoms include arthralgias. Pertinent negatives include no chest pain, chills, coughing, diaphoresis, fever, sore throat, vertigo or vomiting.     Review of Systems   Constitutional:  Negative for activity change, chills, diaphoresis, fever and unexpected weight change.   HENT:  Negative for drooling, ear discharge, ear pain, facial swelling, nosebleeds, sore throat, trouble swallowing, voice change and goiter.    Eyes:  Negative for photophobia, pain, discharge, redness and visual disturbance.   Respiratory:  Negative for apnea, cough, choking, chest tightness, shortness of breath, wheezing and stridor.    Cardiovascular:  Negative for chest pain, palpitations and leg swelling.   Gastrointestinal:  Negative for abdominal distention, diarrhea, rectal pain, vomiting and fecal incontinence.   Endocrine: Negative for cold intolerance, heat intolerance, polydipsia, polyphagia and polyuria.   Genitourinary:  Negative for bladder incontinence, dysuria, flank pain, frequency and hot flashes.   Musculoskeletal:  Positive for arthralgias, back pain, leg pain and neck stiffness.   Integumentary:  Negative for color change and pallor.   Allergic/Immunologic: Negative for immunocompromised state.   Neurological:  Negative for dizziness, vertigo, seizures, syncope, facial asymmetry, speech difficulty, light-headedness, coordination difficulties and memory loss.   Hematological:  Negative for adenopathy. Does not bruise/bleed easily.   Psychiatric/Behavioral:  Negative for agitation, behavioral problems, confusion, decreased concentration, dysphoric mood, hallucinations, self-injury and suicidal ideas. The patient is  not nervous/anxious and is not hyperactive.            Past Medical History:   Diagnosis Date    Acquired hypothyroidism     Atherosclerosis of native artery of extremity with intermittent claudication 01/30/2019    bilateral legs    BMI 50.0-59.9, adult 02/22/2021    Chronic pain syndrome     opioid depen    Congestive heart failure (CHF)     COPD (chronic obstructive pulmonary disease)     COVID-19 10/06/2023    Depression     Diabetes mellitus, type 2     followed by Aurea Kwong NP, [facility]    DVT of lower extremity, bilateral     Essential hypertension 06/08/2021    GERD (gastroesophageal reflux disease)     Gout 07/05/2023    Hyperlipidemia     Lumbosacral radiculopathy 06/05/2023    followed by GLENN Valdes, [facility]    Migraines     Nicotine dependence     Nicotine dependence     Obesity hypoventilation syndrome     chronic CO2 retainer with compensatory metabolic alkalosis    Osteoarthritis     Seizure disorder     Sleep apnea     on cpap    Thyroid cancer     Venous stasis ulcer limited to breakdown of skin with varicose veins     followed by Estuardo Zhao    Vitamin D deficiency      Past Surgical History:   Procedure Laterality Date    ABCESS DRAINAGE      HYSTERECTOMY      ILIAC ARTERY STENT Bilateral 01/28/2020    Bilateral distal aorta and common iliac 8 X 59 vbx covered stents performed by Dr. Antonio Jacinto.    LEFT HEART CATHETERIZATION Left 11/6/2023    Procedure: Left heart cath;  Surgeon: Alex Ortega DO;  Location: [facility] CATH LAB;  Service: Cardiology;  Laterality: Left;    RADIOFREQUENCY ABLATION Left 04/15/2022    Procedure: Left calf  Radiofrequency Ablation;  Surgeon: Matty Falcon DO;  Location: [facility] OR;  Service: Vascular;  Laterality: Left;    RADIOFREQUENCY ABLATION, ENDOVENOUS Left 6/20/2025    Procedure: Left ATAV RF Ablation;  Surgeon: Matty Falcon DO;  Location: [facility] PAIN MGMT;  Service: Peripheral Vascular;  Laterality:  Left;    RIGHT HEART CATHETERIZATION Right 2/13/2024    Procedure: INSERTION, CATHETER, RIGHT HEART;  Surgeon: Mahad Moreno MD;  Location: [facility] CATH LAB;  Service: Cardiology;  Laterality: Right;    STAB PHLEBECTOMY OF VARICOSE VEINS Left 01/25/2013    Left leg microphlebectomies x 23 stab avulsions and ligation of multiple varicose veins perrformed by Dr. Cirilo Aguirre.    STAB PHLEBECTOMY OF VARICOSE VEINS Left 6/20/2025    Procedure: Left Leg Microphlebectomies >20;  Surgeon: Matty Falcon DO;  Location: [facility] PAIN MGMT;  Service: Peripheral Vascular;  Laterality: Left;    THYROIDECTOMY      hx: thyroid cancer    TOE AMPUTATION Left 01/28/2020    2nd, 4th and 5th performed by Dr. Anam Saeed.    TOE AMPUTATION Right 01/28/2020    4th toe performed by Dr. Anam Saeed.    TOTAL ABDOMINAL HYSTERECTOMY W/ BILATERAL SALPINGOOPHORECTOMY      TUBAL LIGATION      VAGINAL DELIVERY      x 4    VENOUS ABLATION Right 08/09/2019    GSV Varithena Ablation performed by Dr. Estuardo Falcon    VENOUS ABLATION Left 08/02/2019    ATAV Varithena Ablation performed by Dr. Estuardo Falcon.    VENOUS ABLATION Right 07/13/2015    ATAV Laser Ablatio performed by Dr. Cirilo Aguirre.    VENOUS ABLATION Right 07/06/2015    Distal  GSV Laser Ablation performed by dr. Cirilo Aguirre.    VENOUS ABLATION Left 04/20/2015    Left Distal GSV Laser Ablation performed by dr. Cirilo Aguirre.    VENOUS ABLATION Right 10/28/2013    Right Distal GSV RF Ablation performed by Dr. Cirilo Aguirre.    VENOUS ABLATION Left 10/25/2013    SSV RF Ablation performed by dr. Cirilo Aguirre.    VENOUS ABLATION Left 10/07/2013    Left GSV and Left ATAV RF Ablation performed by Dr. Cirilo Aguirre.    VENOUS ABLATION Right 01/21/2013    GSV RF Ablation w/micros x 22 performed by Dr. Cirilo Aguirre.    VENOUS ABLATION Left 06/25/2021    Left distal GSV Varithena Ablation     Social History     Socioeconomic History    Marital status:    Tobacco Use    Smoking status: Every Day     Current  packs/day: 1.00     Average packs/day: 1.6 packs/day for 36.5 years (57.9 ttl pk-yrs)     Types: Cigarettes     Start date: 1989     Passive exposure: Current    Smokeless tobacco: Never   Substance and Sexual Activity    Alcohol use: Never    Drug use: Never    Sexual activity: Not Currently     Partners: Male     Social Drivers of Health     Financial Resource Strain: Low Risk  (3/25/2025)    Overall Financial Resource Strain (CARDIA)     Difficulty of Paying Living Expenses: Not hard at all   Food Insecurity: Food Insecurity Present (3/25/2025)    Hunger Vital Sign     Worried About Running Out of Food in the Last Year: Never true     Ran Out of Food in the Last Year: Sometimes true   Transportation Needs: High Risk (4/5/2025)    Received from [facility] SDOH Screening     Has lack of transportation kept you from medical appointments, meetings, work or from getting things needed for daily living? choose all that apply.: Yes, it has kept me from medical appointments or from getting my medications   Physical Activity: Insufficiently Active (3/25/2025)    Exercise Vital Sign     Days of Exercise per Week: 2 days     Minutes of Exercise per Session: 10 min   Stress: Stress Concern Present (3/25/2025)    [employer] [place] [employer] - Occupational Stress Questionnaire     Feeling of Stress : To some extent   Housing Stability: Low Risk  (3/25/2025)    Housing Stability Vital Sign     Unable to Pay for Housing in the Last Year: No     Number of Times Moved in the Last Year: 1     Homeless in the Last Year: No     Family History   Problem Relation Name Age of Onset    Heart disease Mother          age 84 CHF    Hypertension Mother      Osteoarthritis Mother      Coronary aneurysm Father      Coronary artery disease Father      Hypertension Father      Heart disease Father      No Known Problems Sister      No Known Problems Brother          hx: varicose veins    No Known Problems Brother          " MVA: parlazed    No Known Problems Brother      No Known Problems Son      No Known Problems Son      No Known Problems Son      No Known Problems Son       Review of patient's allergies indicates:   Allergen Reactions    Ammonium peroxydisulfate Shortness Of Breath    Avocado (laurus persea) Anaphylaxis    Bananas [banana] Anaphylaxis and Swelling     CRAMPS,    Chocolate flavor Anaphylaxis     MOUTH SWELLING    Fentanyl Shortness Of Breath and Itching    Iodinated contrast media Anaphylaxis, Hives and Rash    Nicoderm Swelling    Percocet [oxycodone-acetaminophen] Shortness Of Breath and Itching    Silvadene [silver sulfadiazine]     Clindamycin     Pregabalin     Adhesive Blisters, Rash and Itching    Iodine Rash    Latex, natural rubber Rash     Known to tolerate propofol  Known to tolerate lidocaine    Pcn [penicillins] Rash    Shellfish containing products Rash    Sulfa (sulfonamide antibiotics) Rash and Blisters        Objective:  Vitals:    07/15/25 1343   BP: (!) 156/64   Pulse: 100   Resp: 18   Weight: (!) 150.1 kg (331 lb)   Height: 5' 8" (1.727 m)   PainSc:   7   PainLoc: Back                       Physical Exam  Vitals and nursing note reviewed. Exam conducted with a chaperone present.   Constitutional:       General: She is awake. She is not in acute distress.     Appearance: Normal appearance. She is obese. She is not ill-appearing, toxic-appearing or diaphoretic.   HENT:      Head: Normocephalic and atraumatic.      Nose: Nose normal.      Mouth/Throat:      Mouth: Mucous membranes are moist.      Pharynx: Oropharynx is clear.   Eyes:      Conjunctiva/sclera: Conjunctivae normal.      Pupils: Pupils are equal, round, and reactive to light.   Cardiovascular:      Rate and Rhythm: Normal rate.   Pulmonary:      Effort: Pulmonary effort is normal. No respiratory distress.   Abdominal:      Palpations: Abdomen is soft.   Musculoskeletal:         General: Normal range of motion.      Cervical back: " Normal range of motion and neck supple.      Lumbar back: Tenderness present.      Right knee: Tenderness present.      Left knee: Tenderness present.   Skin:     General: Skin is warm and dry.   Neurological:      General: No focal deficit present.      Mental Status: She is alert and oriented to person, place, and time. Mental status is at baseline.      Cranial Nerves: No cranial nerve deficit (II-XII).   Psychiatric:         Mood and Affect: Mood normal.         Behavior: Behavior normal. Behavior is cooperative.         Thought Content: Thought content normal.           CT Chest Lung Screening Low Dose  Narrative: EXAMINATION:  CT CHEST LUNG SCREENING LOW DOSE    CLINICAL HISTORY:  Lung cancer screening, >= 30 pk-yr current smoker (Age 55-80y); Nicotine dependence, cigarettes, uncomplicated    TECHNIQUE:  CT of the thorax was performed with low dose, lung screening protocol.  No contrast was administered.  Sagittal and coronal reconstructions were obtained.    COMPARISON:  Chest radiograph 12/12/2024.    FINDINGS:  Lungs: There are no abnormal opacities that require further evaluation.  The largest opacity in the right lung appears solid and measures 0.8 cm on series 3, image 102.  The largest opacity in the left lung appears solid and measures 0.5 cm on series 3, image 80.  The lungs show no findings consistent with emphysema.    Few prominent mediastinal lymph nodes, for example measuring 1.8 cm (series 2, image 51).    Pleura:   No effusion..    Heart and pericardium: Normal size without effusion.    Aorta and vasculature: Atherosclerosis including coronary arteries.    Chest wall and skeletal structures: Unremarkable except age-appropriate degenerative changes.    Upper abdomen: Unremarkable.  Impression: Lung-RADS Category: 2 - Benign Appearance or Behavior - continue annual screening with LDCT in 12 months.    Clinically or potentially clinically significant non lung cancer finding:  None.    Prior Lung  Cancer Modifier:  No history of prior lung cancer.    Electronically signed by: Alex Munoz  Date:    07/15/2025  Time:    13:01         Lab Visit on 06/18/2025   Component Date Value Ref Range Status    ProBNP 06/18/2025 99  1 - 125 pg/mL Final   Office Visit on 04/14/2025   Component Date Value Ref Range Status    POC Amphetamines 04/14/2025 Negative  Negative, Inconclusive Final    POC Barbiturates 04/14/2025 Negative  Negative, Inconclusive Final    POC Benzodiazepines 04/14/2025 Negative  Negative, Inconclusive Final    POC Cocaine 04/14/2025 Negative  Negative, Inconclusive Final    POC THC 04/14/2025 Negative  Negative, Inconclusive Final    POC Methadone 04/14/2025 Negative  Negative, Inconclusive Final    POC Methamphetamine 04/14/2025 Negative  Negative, Inconclusive Final    POC Opiates 04/14/2025 Presumptive Positive (A)  Negative, Inconclusive Final    POC Oxycodone 04/14/2025 Negative  Negative, Inconclusive Final    POC Phencyclidine 04/14/2025 Negative  Negative, Inconclusive Final    POC Methylenedioxymethamphetamine * 04/14/2025 Negative  Negative, Inconclusive Final    POC Tricyclic Antidepressants 04/14/2025 Negative  Negative, Inconclusive Final    POC Buprenorphine 04/14/2025 Negative   Final     Acceptable 04/14/2025 Yes   Final    POC Temperature (Urine) 04/14/2025 92   Final    Creatinine, U 04/14/2025 204.5  mg/dL Final    Specific Gravity 04/14/2025 1.027   Final    pH 04/14/2025 5.4   Final    Oxidants 04/14/2025 Negative  Cutoff: 200 mg/L Final    Comment 04/14/2025 Normal   Final    Codeine 04/14/2025 Not Detected  Cutoff: 25 ng/mL Final    C6BG 04/14/2025 Not Detected  Cutoff: 100 ng/mL Final    Morphine 04/14/2025 Not Detected  Cutoff: 25 ng/mL Final    M6BG 04/14/2025 Not Detected  Cutoff: 100 ng/mL Final    6 Monoacetylmorphine 04/14/2025 Not Detected  Cutoff: 25 ng/mL Final    Hydrocodone 04/14/2025 Present (A)  Cutoff: 25 ng/mL Final    Norhydrocodone 04/14/2025  Present (A)  Cutoff: 25 ng/mL Final    Dihydrocodeine 04/14/2025 Present (A)  Cutoff: 25 ng/mL Final    Hydromorphone 04/14/2025 Present (A)  Cutoff: 25 ng/mL Final    Hydromorphone-3-beta-glucuronide 04/14/2025 Present (A)  Cutoff: 100 ng/mL Final    Oxycodone 04/14/2025 Not Detected  Cutoff: 25 ng/mL Final    Noroxycodone 04/14/2025 Not Detected  Cutoff: 25 ng/mL Final    Oxymorphone 04/14/2025 Not Detected  Cutoff: 25 ng/mL Final    Oxymorphone-3-beta-glucuronid 04/14/2025 Not Detected  Cutoff: 100 ng/mL Final    Noroxymorphone 04/14/2025 Not Detected  Cutoff: 25 ng/mL Final    Fentanyl 04/14/2025 Not Detected  Cutoff: 2 ng/mL Final    Norfentanyl 04/14/2025 Not Detected  Cutoff: 2 ng/mL Final    Meperidine 04/14/2025 Not Detected  Cutoff: 25 ng/mL Final    Normeperidine 04/14/2025 Not Detected  Cutoff: 25 ng/mL Final    Naloxone 04/14/2025 Not Detected  Cutoff: 25 ng/mL Final    Naloxone-3-beta-glucuronide 04/14/2025 Not Detected  Cutoff: 100 ng/mL Final    Methadone 04/14/2025 Not Detected  Cutoff: 25 ng/mL Final    EDDP 04/14/2025 Not Detected  Cutoff: 25 ng/mL Final    Propoxyphene 04/14/2025 Not Detected  Cutoff: 25 ng/mL Final    Norpropoxyphene 04/14/2025 Not Detected  Cutoff: 25 ng/mL Final    Tramadol 04/14/2025 Not Detected  Cutoff: 25 ng/mL Final    O-desmethyltramadol 04/14/2025 Not Detected  Cutoff: 25 ng/mL Final    Tapentadol 04/14/2025 Not Detected  Cutoff: 25 ng/mL Final    N-Desmethyltapentadol 04/14/2025 Not Detected  Cutoff: 50 ng/mL Final    M TAPENTADOL-BETA-GLUCURONIDE 04/14/2025 Not Detected  Cutoff: 100 ng/mL Final    Buprenorphine 04/14/2025 Not Detected  Cutoff: 5 ng/mL Final    Norbuprenorphine 04/14/2025 SEE COMMENTS  Cutoff: 5 ng/mL Final    Norbuprenorphine glucuronide 04/14/2025 Not Detected  Cutoff: 20 ng/mL Final    Opioid Interpretation 04/14/2025 SEE COMMENTS   Final    Cocaine 04/14/2025 Negative  Cutoff: 150 ng/mL Final    Tetrahydrocannabinol 04/14/2025 Negative  Cutoff: 50  ng/mL Final    Alprazolam 04/14/2025 Not Detected  Cutoff: 10 ng/mL Final    Alpha-Hydroxyalprazolam 04/14/2025 Not Detected  Cutoff: 10 ng/mL Final    Alpha-Hydroxyalprazolam Glucuronide 04/14/2025 Not Detected  Cutoff: 50 ng/mL Final    Chlordiazepoxide 04/14/2025 Not Detected  Cutoff: 10 ng/mL Final    Clobazam 04/14/2025 Not Detected  Cutoff: 10 ng/mL Final    N-Desmethylclobazam 04/14/2025 Not Detected  Cutoff: 200 ng/mL Final    Clonazepam 04/14/2025 Not Detected  Cutoff: 10 ng/mL Final    7-aminoclonazepam 04/14/2025 Not Detected  Cutoff: 10 ng/mL Final    Diazepam 04/14/2025 Not Detected  Cutoff: 10 ng/mL Final    Nordiazepam 04/14/2025 Not Detected  Cutoff: 10 ng/mL Final    Flunitrazepam 04/14/2025 Not Detected  Cutoff: 10 ng/mL Final    7-aminoflunitrazepam 04/14/2025 Not Detected  Cutoff: 10 ng/mL Final    Flurazepam 04/14/2025 Not Detected  Cutoff: 10 ng/mL Final    2-Hydroxy Ethyl Flurazepam 04/14/2025 Not Detected  Cutoff: 10 ng/mL Final    Lorazepam 04/14/2025 Not Detected  Cutoff: 10 ng/mL Final    Lorazepam Glucuronide 04/14/2025 Not Detected  Cutoff: 50 ng/mL Final    Midazolam 04/14/2025 Not Detected  Cutoff: 10 ng/mL Final    Alpha-Hydroxy Midazolam 04/14/2025 Not Detected  Cutoff: 10 ng/mL Final    Oxazepam 04/14/2025 Not Detected  Cutoff: 10 ng/mL Final    Oxazepam Glucuronide 04/14/2025 Not Detected  Cutoff: 50 ng/mL Final    Prazepam 04/14/2025 Not Detected  Cutoff: 10 ng/mL Final    Temazepam 04/14/2025 Not Detected  Cutoff: 10 ng/mL Final    Temazepam Glucuronide 04/14/2025 Not Detected  Cutoff: 50 ng/mL Final    Triazolam 04/14/2025 Not Detected  Cutoff: 10 ng/mL Final    Alpha-Hydroxy Triazolam 04/14/2025 Not Detected  Cutoff: 10 ng/mL Final    Zolpidem 04/14/2025 Not Detected  Cutoff: 10 ng/mL Final    Zolpidem Phenyl-4-Carboxylic acid 04/14/2025 Not Detected  Cutoff: 10 ng/mL Final    Benzodiazepine Interpretation 04/14/2025 SEE COMMENTS   Final    List Patient's Current Medications  04/14/2025 Unknown   Final    Methamphetamine 04/14/2025 Not Detected  Cutoff: 100 ng/mL Final    Amphetamine 04/14/2025 Not Detected  Cutoff: 100 ng/mL Final    3,4-methylenedioxymethamphetamine * 04/14/2025 Not Detected  Cutoff: 100 ng/mL Final    3,9-gbbqyqopogblqa-Z-ethylamphetam* 04/14/2025 Not Detected  Cutoff: 100 ng/mL Final    3,4-methylenedioxyamphetamine (MDA) 04/14/2025 Not Detected  Cutoff: 100 ng/mL Final    Ephedrine 04/14/2025 Not Detected  Cutoff: 100 ng/mL Final    Pseudoephedrine 04/14/2025 Not Detected  Cutoff: 100 ng/mL Final    Phentermine 04/14/2025 Not Detected  Cutoff: 100 ng/mL Final    Phencyclidine (PCP) 04/14/2025 Not Detected  Cutoff: 20 ng/mL Final    Methylphenidate 04/14/2025 Not Detected  Cutoff: 20 ng/mL Final    Ritalinic acid 04/14/2025 Not Detected  Cutoff: 100 ng/mL Final    Stimulant Interpretation 04/14/2025 SEE COMMENTS   Final    Barbiturates 04/14/2025 Negative  Cutoff: 200 ng/mL Final   Office Visit on 04/04/2025   Component Date Value Ref Range Status    QRS Duration 04/04/2025 140  ms Final    [facility] QTC Calculation 04/04/2025 483  ms Final         Orders Placed This Encounter   Procedures    POCT Urine Drug Screen Presump     Interpretive Information:     Negative:  No drug detected at the cut off level.   Positive:  This result represents presumptive positive for the   tested drug, other substances may yield a positive response other   than the analyte of interest. This result should be utilized for   diagnostic purpose only. Confirmation testing will be performed upon physician request only.              Requested Prescriptions     Signed Prescriptions Disp Refills    HYDROcodone-acetaminophen (NORCO)  mg per tablet 120 tablet 0     Sig: Take 1 tablet by mouth every 6 (six) hours.    HYDROcodone-acetaminophen (NORCO)  mg per tablet 120 tablet 0     Sig: Take 1 tablet by mouth every 6 (six) hours.    HYDROcodone-acetaminophen (NORCO)  mg per tablet 120  tablet 0     Sig: Take 1 tablet by mouth every 6 (six) hours.       Assessment:     1. Lumbosacral spondylosis without myelopathy    2. Chronic pain of right knee    3. PVD (peripheral vascular disease)    4. Encounter for long-term (current) use of medications                     A's of Opioid Risk Assessment  Activity:Patient can perform ADL.   Analgesia:Patients pain is partially controlled by current medication. Patient has tried OTC medications such as Tylenol and Ibuprofen with out relief.   Adverse Effects: Patient denies constipation or sedation.  Aberrant Behavior:  reviewed with no aberrant drug seeking/taking behavior.  Overdose reversal drug naloxone discussed    Drug screen reviewed        MRI cervical spine ARM dated April 18, 2023 multiple level degenerative changes C4/5 disc osteophyte complex mild central canal stenosis uncovertebral hypertrophic    X-ray right knee [facility] February 27, 2024  Moderate degenerative changes  No fracture noted        Plan:    Narcan December 2022    Patient request anesthetic spray/ethyl chloride for in office procedures      Wheelchair/4 point walker for mobility due to chronic nonhealing wounds right foot     Following wound management chronic ulcers lower extremities    Following orthopedics knee pain [facility], she states she was advised not to have surgery due to her weight    Patient states she is not allergic to steroids April 22, 2025      Cannot tolerate OxyContin she is severely allergic    History thyroid cancer    Poor procedure candidate multiple comorbidities    Chronic edema bilateral lower extremities    Bilateral intra-articular knee injection April 22, 2025  Patient stated she had 80% relief after procedure      No new complaints     She states she is doing well current medications helping control her discomfort    Continue home exercise program as tolerated    Follow-up 3 months    Dr. Crandall, December 2025    Bring original  prescription medication bottles/container/box with labels to each visit

## 2025-06-26 ENCOUNTER — TELEPHONE (OUTPATIENT)
Dept: VASCULAR SURGERY | Facility: CLINIC | Age: 65
End: 2025-06-26
Payer: MEDICARE

## 2025-06-26 ENCOUNTER — HOSPITAL ENCOUNTER (OUTPATIENT)
Facility: HOSPITAL | Age: 65
Discharge: HOME | End: 2025-06-26
Attending: FAMILY MEDICINE
Payer: MEDICARE

## 2025-06-26 ENCOUNTER — OFFICE VISIT (OUTPATIENT)
Dept: VASCULAR SURGERY | Facility: CLINIC | Age: 65
End: 2025-06-26
Payer: MEDICARE

## 2025-06-26 VITALS
DIASTOLIC BLOOD PRESSURE: 79 MMHG | HEIGHT: 68 IN | BODY MASS INDEX: 44.41 KG/M2 | WEIGHT: 293 LBS | RESPIRATION RATE: 20 BRPM | HEART RATE: 80 BPM | SYSTOLIC BLOOD PRESSURE: 154 MMHG

## 2025-06-26 DIAGNOSIS — I87.2 VENOUS INSUFFICIENCY: ICD-10-CM

## 2025-06-26 DIAGNOSIS — L81.9 HYPERPIGMENTATION OF SKIN: ICD-10-CM

## 2025-06-26 DIAGNOSIS — M79.604 LEG PAIN, BILATERAL: ICD-10-CM

## 2025-06-26 DIAGNOSIS — R60.0 EDEMA, LOWER EXTREMITY: ICD-10-CM

## 2025-06-26 DIAGNOSIS — I87.2 VENOUS INSUFFICIENCY: Primary | ICD-10-CM

## 2025-06-26 DIAGNOSIS — I83.813 VARICOSE VEINS OF BILATERAL LOWER EXTREMITIES WITH PAIN: ICD-10-CM

## 2025-06-26 DIAGNOSIS — M79.605 LEG PAIN, BILATERAL: ICD-10-CM

## 2025-06-26 PROCEDURE — 93971 EXTREMITY STUDY: CPT | Mod: TC,LT

## 2025-06-26 PROCEDURE — 99215 OFFICE O/P EST HI 40 MIN: CPT | Mod: PBBFAC,25 | Performed by: FAMILY MEDICINE

## 2025-06-26 PROCEDURE — 99999 PR PBB SHADOW E&M-EST. PATIENT-LVL V: CPT | Mod: PBBFAC,,, | Performed by: FAMILY MEDICINE

## 2025-06-26 PROCEDURE — 93971 EXTREMITY STUDY: CPT | Mod: 26,LT,, | Performed by: FAMILY MEDICINE

## 2025-06-26 NOTE — PATIENT INSTRUCTIONS
Pre Procedure Information    Name: Holly Porter  : 1960  MRN: [id number]  Phone:  [phone]  Allergies:  See attached list    Procedure (s) and Date (s):  1)  Right Distal GSV Varithena Ablation - 07/10/2025      We will call you the day prior with your time to report for surgery.  You will check in at [facility].  Shower prior to procedure as your leg will be wrapped for 48 hours and you will not be able to shower.  Do not wear lotion, oil, or cream on you legs on the day of your procedure.  This will cause the Doctor's amena to fade.  Wear shorts, skirt, or loose pants and larger shoes (house shoes).   Bring a pillow or two and leave in the car (to prop your leg).  Prepare to walk 15 minutes out of each hour for the first 48 hours post op.  Prepare to keep your legs propped up while sitting for the first 48 hours or 2 days following your procedure (recliner, couch, or chair).  Dressings will remain on your legs for at least 48 hours or 2 days after procedure.  Full discharge instructions will be provided on the day of your procedure.    Typically, you are in and out within a few hours.  We will follow you postoperatively with a 4 day post ablation ultrasound and then a 1 month, 3 month, 6 month, and 1 year post ablation visit with the physician or nurse practitioner with or without an ultrasound.  Please make every effort to attend these appointments as they will be essential to following you progress.      Procedure (s) and Date (s):  2)  Right SSV Radiofrequency Ablation - 2025 (Friday)    ** Take eliquis, coreg, synthroid, nifedipine, protonix ONLY morning of this procedure  ** Hold levemir insulin 24 hours before procedure    Do not eat or drink anything after midnight.  You may take medications that are listed above with a small sip of water morning of procedure.  We will call you the day prior with your time to report for surgery.  You will check in at [facility] on  the Ground Floor.  Shower prior to procedure as your leg will be wrapped for 48 hours and you will not be able to shower.  Do not wear lotion, oil, or cream on you legs on the day of your procedure.  This will cause the Doctor's amena to fade.  Wear shorts, skirt, or loose pants and larger shoes (house shoes).   Bring a pillow or two and leave in the car (to prop your leg).  Before and during your procedure, to help keep you calm and comfortable, you may be given medicine that makes you sleepy.  This is called sedation or anesthesia.  Possible side effects may include:  Drowsiness  Weakness  Upset Stomach  Vomiting  Bring someone with you to drive, stay during the procedure, and drive you home.  Someone will also need to stay with you at home the first night.  No driving for 24 hours after sedation.  Prepare to walk 15 minutes out of each hour for the first 48 hours post op.  Prepare to keep your legs propped up while sitting for the first 48 hours or 2 days following your procedure (recliner, couch, or chair).  Dressings will remain on your legs for at least 48 hours or 2 days after procedure.  Full discharge instructions will be provided on the day of your procedure.    Typically, you are in and out within a few hours.  We will follow you postoperatively with a 4 day post ablation ultrasound and then a 1 month, 3 month, 6 month, and 1 year post ablation visit with the physician or nurse practitioner with or without an ultrasound.  Please make every effort to attend these appointments as they will be essential to following you progress.    Please kindly notify us as soon as possible if you need to reschedule your appointment.    For Financial Services and/or billing questions:  Patient Accounts Customer Service (Billing questions) Email [email or web address], or call [phone] or [phone]. Live Chat: [email or web address]  Patient Financial Services (Medicaid/Financial Assistance) Email [email or web address] or call  [phone].   Financial Clearance (Assists with payment arrangements for upcoming scheduled services) Call [phone]      As always, we look forward to caring for you and are happy to answer your questions.  Please call us at [phone].

## 2025-06-26 NOTE — TELEPHONE ENCOUNTER
Copied from CRM #[id number]. Topic: Appointments - Appointment Rescheduling  >> Jun 26, 2025 11:38 AM Mina wrote:  Who Called: Holly Porter    Caller is requesting assistance/information from provider's office.    \  Preferred Method of Contact: Phone Call  Patient's Preferred Phone Number on File: [phone]   Best Call Back Number, if different:  Additional Information: Patient is wanting to reschedule her upcoming procedure

## 2025-06-26 NOTE — PROGRESS NOTES
VEIN CENTER CLINIC NOTE    Patient ID: Holly Porter is a 64 y.o. female.    I. HISTORY     Chief Complaint:   Chief Complaint   Patient presents with    Post-op Evaluation     US Left ATAV RF with micros      History of Present Illness    CHIEF COMPLAINT:  Patient presents today for follow up after vein procedure    POST-PROCEDURE STATUS:  She reports LLE feels slightly lighter and less tight compared to prior to the procedure.  Denies any difficulties since her procedures.  Micro phlebectomy sites on the left look good and are soft to palpation.  No signs of active phlebitis or retained coagulum at this time.  Post ablation ultrasound today shows a well ablated left anterior thigh accessory vein and no evidence of heat induced thrombus.      ROS:  General: -fever, -chills, -fatigue, -weight gain, -weight loss  Eyes: -vision changes, -redness, -discharge  ENT: -ear pain, -nasal congestion, -sore throat  Cardiovascular: -chest pain, -palpitations, -lower extremity edema  Respiratory: -cough, -shortness of breath  Gastrointestinal: -abdominal pain, -nausea, -vomiting, -diarrhea, -constipation, -blood in stool  Genitourinary: -dysuria, -hematuria, -frequency  Musculoskeletal: -joint pain, -muscle pain  Skin: -rash, -lesion  Neurological: -headache, -dizziness, -numbness, -tingling  Psychiatric: -anxiety, -depression, -sleep difficulty         Clinical summary:   for a 5 day follow-up post non thermal ablation of the distal accessory thigh vein and thermal ablation of a left calf  vein.  Post ablation ultrasound performed today shows well ablated calf  and well ablated left distal anterior accessory thigh vein.  No signs of thrombus or complication.  The patient states that she did well over the weekend without complications.  She continues to her postoperative wrap.  No signs of bleeding or phlebitis.  I have encouraged her to wear her thigh-high wrapped with a 2 week postoperative period to help  prevent against complications.  She actually looks like she has less swelling today.    The patient has had numerous venous interventions previously, see her surgical history for details.    Bilateral complete venous reflux study performed 10/29/2024 shows no evidence of DVT bilaterally.  Study also shows well ablated bilateral great saphenous veins proximally, right anterior thigh accessory vein, and left small saphenous vein.  Study also shows dilation reflux of the distal right great saphenous vein, right small saphenous vein, and left anterior thigh accessory vein.  Large refluxing varicosities noted bilaterally.     Past Medical History:   Diagnosis Date    Acquired hypothyroidism     Atherosclerosis of native artery of extremity with intermittent claudication 01/30/2019    bilateral legs    BMI 50.0-59.9, adult 02/22/2021    Chronic pain syndrome     opioid depen    Congestive heart failure (CHF)     COPD (chronic obstructive pulmonary disease)     COVID-19 10/06/2023    Depression     Diabetes mellitus, type 2     followed by Aurea Kwong NP, [facility]    DVT of lower extremity, bilateral     Essential hypertension 06/08/2021    GERD (gastroesophageal reflux disease)     Gout 07/05/2023    Hyperlipidemia     Lumbosacral radiculopathy 06/05/2023    followed by GLENN Valdes, [facility]    Migraines     Nicotine dependence     Nicotine dependence     Obesity hypoventilation syndrome     chronic CO2 retainer with compensatory metabolic alkalosis    Osteoarthritis     Seizure disorder     Sleep apnea     on cpap    Thyroid cancer     Venous stasis ulcer limited to breakdown of skin with varicose veins     followed by Estuardo Zhao    Vitamin D deficiency         Past Surgical History:   Procedure Laterality Date    ABCESS DRAINAGE      HYSTERECTOMY      ILIAC ARTERY STENT Bilateral 01/28/2020    Bilateral distal aorta and common iliac 8 X 59 vbx covered stents performed by Dr. Antonio Jacinto.     LEFT HEART CATHETERIZATION Left 11/6/2023    Procedure: Left heart cath;  Surgeon: Alex Ortega DO;  Location: [facility] CATH LAB;  Service: Cardiology;  Laterality: Left;    RADIOFREQUENCY ABLATION Left 04/15/2022    Procedure: Left calf  Radiofrequency Ablation;  Surgeon: Matty Falcon DO;  Location: [facility] OR;  Service: Vascular;  Laterality: Left;    RADIOFREQUENCY ABLATION, ENDOVENOUS Left 6/20/2025    Procedure: Left ATAV RF Ablation;  Surgeon: Matty Falcon DO;  Location: [facility];  Service: Peripheral Vascular;  Laterality: Left;    RIGHT HEART CATHETERIZATION Right 2/13/2024    Procedure: INSERTION, CATHETER, RIGHT HEART;  Surgeon: Mahad Moreno MD;  Location: [facility] CATH LAB;  Service: Cardiology;  Laterality: Right;    STAB PHLEBECTOMY OF VARICOSE VEINS Left 01/25/2013    Left leg microphlebectomies x 23 stab avulsions and ligation of multiple varicose veins perrformed by Dr. Cirilo Aguirre.    STAB PHLEBECTOMY OF VARICOSE VEINS Left 6/20/2025    Procedure: Left Leg Microphlebectomies >20;  Surgeon: Matty Falcon DO;  Location: [facility];  Service: Peripheral Vascular;  Laterality: Left;    THYROIDECTOMY      hx: thyroid cancer    TOE AMPUTATION Left 01/28/2020    2nd, 4th and 5th performed by Dr. Anam Saeed.    TOE AMPUTATION Right 01/28/2020    4th toe performed by Dr. Anam Saeed.    TOTAL ABDOMINAL HYSTERECTOMY W/ BILATERAL SALPINGOOPHORECTOMY      TUBAL LIGATION      VAGINAL DELIVERY      x 4    VENOUS ABLATION Right 08/09/2019    GSV Varithena Ablation performed by Dr. Estuardo Falcon    VENOUS ABLATION Left 08/02/2019    ATAV Varithena Ablation performed by Dr. Estuardo Falcon.    VENOUS ABLATION Right 07/13/2015    ATAV Laser Ablatio performed by Dr. Cirilo Aguirre.    VENOUS ABLATION Right 07/06/2015    Distal  GSV Laser Ablation performed by dr. Cirilo Aguirre.    VENOUS ABLATION Left 04/20/2015    Left Distal GSV Laser Ablation performed by dr. Cirilo Aguirre.     VENOUS ABLATION Right 10/28/2013    Right Distal GSV RF Ablation performed by Dr. Cirilo Aguirre.    VENOUS ABLATION Left 10/25/2013    SSV RF Ablation performed by dr. Cirilo Aguirre.    VENOUS ABLATION Left 10/07/2013    Left GSV and Left ATAV RF Ablation performed by Dr. Cirilo Aguirre.    VENOUS ABLATION Right 01/21/2013    GSV RF Ablation w/micros x 22 performed by Dr. Cirilo Aguirre.    VENOUS ABLATION Left 06/25/2021    Left distal GSV Varithena Ablation       Social History     Tobacco Use   Smoking Status Every Day    Current packs/day: 1.00    Average packs/day: 1.6 packs/day for 36.5 years (57.8 ttl pk-yrs)    Types: Cigarettes    Start date: 1989    Passive exposure: Current   Smokeless Tobacco Never         Current Outpatient Medications:     ACCU-CHEK GUIDE GLUCOSE METER Misc, , Disp: , Rfl:     ACCU-CHEK GUIDE TEST STRIPS Strp, , Disp: , Rfl:     ACCU-CHEK SOFTCLIX LANCETS Misc, , Disp: , Rfl:     albuterol (PROVENTIL/VENTOLIN HFA) 90 mcg/actuation inhaler, 2 puffs every 6 (six) hours as needed., Disp: , Rfl:     allopurinoL (ZYLOPRIM) 300 MG tablet, Take 1 tablet (300 mg total) by mouth once daily., Disp: 90 tablet, Rfl: 3    amitriptyline (ELAVIL) 25 MG tablet, Take 1 tablet (25 mg total) by mouth nightly as needed for Insomnia., Disp: 30 tablet, Rfl: 5    apixaban (ELIQUIS) 5 mg Tab, Take 1 tablet (5 mg total) by mouth 2 (two) times daily., Disp: 60 tablet, Rfl: 3    aspirin (ECOTRIN) 81 MG EC tablet, TAKE 1 TABLET BY MOUTH EVERY DAY, Disp: 90 tablet, Rfl: 1    atorvastatin (LIPITOR) 10 MG tablet, Take 1 tablet (10 mg total) by mouth once daily., Disp: 90 tablet, Rfl: 1    benzonatate (TESSALON) 200 MG capsule, Take 200 mg by mouth 3 (three) times daily., Disp: , Rfl:     BREZTRI AEROSPHERE 160-9-4.8 mcg/actuation HFAA, Take 2 puffs by mouth 2 (two) times daily., Disp: , Rfl:     bumetanide (BUMEX) 2 MG tablet, Take 1 tablet (2 mg total) by mouth 2 (two) times daily., Disp: 60 tablet, Rfl: 11    carvediloL (COREG) 12.5 MG  "tablet, Take 0.5 tablets (6.25 mg total) by mouth 2 (two) times daily., Disp: 30 tablet, Rfl: 11    cilostazoL (PLETAL) 100 MG Tab, Take 1 tablet (100 mg total) by mouth 2 (two) times daily., Disp: 90 tablet, Rfl: 1    colchicine (COLCRYS) 0.6 mg tablet, Take 1 tablet (0.6 mg total) by mouth once daily., Disp: 30 tablet, Rfl: 2    diclofenac sodium (VOLTAREN ARTHRITIS PAIN) 1 % Gel, Apply 2 g topically once daily., Disp: 1 g, Rfl: 3    DULoxetine (CYMBALTA) 30 MG capsule, Take 30 mg by mouth once daily., Disp: , Rfl:     HYDROcodone-acetaminophen (NORCO)  mg per tablet, Take 1 tablet by mouth every 6 (six) hours., Disp: 120 tablet, Rfl: 0    HYDROcodone-acetaminophen (NORCO)  mg per tablet, Take 1 tablet by mouth every 6 (six) hours., Disp: 120 tablet, Rfl: 0    insulin detemir U-100, Levemir, (LEVEMIR FLEXTOUCH U100 INSULIN) 100 unit/mL (3 mL) InPn pen, Inject 10 units into the skin every evening subcutaneous, Disp: 15 mL, Rfl: 1    JANUVIA 50 mg Tab, Take 50 mg by mouth every evening., Disp: , Rfl:     levothyroxine (SYNTHROID) 150 MCG tablet, TAKE 1 TABLET EVERY MORNING 30 MINUTES PRIOR TO BREAKFAST MEAL FOR THYROID, Disp: 90 tablet, Rfl: 1    naloxone (NARCAN) 4 mg/actuation Spry, 1 spray once., Disp: , Rfl:     NIFEdipine (ADALAT CC) 60 MG TbSR, Take 60 mg by mouth., Disp: , Rfl:     pantoprazole (PROTONIX) 40 MG tablet, Take 1 tablet (40 mg total) by mouth once daily., Disp: 90 tablet, Rfl: 1    pen needle, diabetic 32 gauge x 5/32" Ndle, Use to inject insulin once daily, Disp: 100 each, Rfl: 3    potassium chloride SA (K-DUR,KLOR-CON) 20 MEQ tablet, Take 1 tablet (20 mEq total) by mouth once daily., Disp: 90 tablet, Rfl: 1    QUEtiapine (SEROQUEL) 25 MG Tab, Take 1 tablet (25 mg total) by mouth once daily., Disp: 30 tablet, Rfl: 1    tadalafil (ADCIRCA) 20 mg Tab, Take 2 tablets (40 mg total) by mouth once daily., Disp: 60 tablet, Rfl: 11    topiramate (TOPAMAX) 100 MG tablet, Take 1 tablet (100 " mg total) by mouth 2 (two) times daily., Disp: 60 tablet, Rfl: 11    triamcinolone acetonide 0.1% (KENALOG) 0.1 % ointment, SMARTSIG:sparingly Topical Twice Daily, Disp: , Rfl:     varenicline (CHANTIX CONTINUING MONTH BOX) 1 mg Tab, Take 1 tablet (1 mg total) by mouth 2 (two) times daily., Disp: 30 tablet, Rfl: 3  No current facility-administered medications for this visit.    Facility-Administered Medications Ordered in Other Visits:     [START ON 2025] LIDOcaine-EPINEPHrine 1%-1:100,000 30 mL, LIDOcaine HCL 10 mg/ml (1%) 20 mL, sodium bicarbonate 10 mL in 0.9% NaCl 500 mL solution, , MISCELLANEOUS, Once, Matty Falcon, DO      II. PHYSICAL EXAM     Physical Exam  Constitutional:       General: She is awake. She is not in acute distress.     Appearance: Normal appearance. She is obese. She is not ill-appearing or toxic-appearing.   HENT:      Head: Normocephalic and atraumatic.   Eyes:      Extraocular Movements: Extraocular movements intact.      Conjunctiva/sclera: Conjunctivae normal.      Pupils: Pupils are equal, round, and reactive to light.   Neck:      Vascular: No carotid bruit or JVD.   Cardiovascular:      Rate and Rhythm: Normal rate and regular rhythm.      Pulses:           Dorsalis pedis pulses are detected w/ Doppler on the left side.        Posterior tibial pulses are detected w/ Doppler on the left side.      Heart sounds: No murmur heard.  Pulmonary:      Effort: Pulmonary effort is normal. No respiratory distress.      Breath sounds: No stridor. No wheezing, rhonchi or rales.   Musculoskeletal:         General: No swelling, tenderness or deformity.      Right lower le+ Edema present.      Left lower le+ Edema present.      Comments: Scattered hyperpigmentation and varicose veins of the bilateral Gator regions.   Feet:      Comments: Monophasic pulses of the left foot with hand-held Doppler of the DPs and PTs.  Skin:     General: Skin is warm.      Capillary Refill: Capillary  refill takes less than 2 seconds.      Coloration: Skin is not ashen.      Findings: No bruising, erythema, lesion, rash or wound.   Neurological:      Mental Status: She is alert and oriented to person, place, and time.      Motor: No weakness.   Psychiatric:         Speech: Speech normal.         Behavior: Behavior normal. Behavior is cooperative.                                     Reticular/Spider veins noted:  RLE: anterior calf, medial calf, ankle and foot  LLE: anterior calf, medial calf, ankle and foot    Varicose veins noted:  RLE: anterior calf, medial calf, medial thigh, ankle and foot  LLE:  anterior calf, medial calf, medial thigh, ankle and foot    CEAP Classification  Clinical Signs: Class 5 - Skin changes as defined above with healed ulceration  Etiologic Classification: Primary  Anatomic distribution: Superficial  Pathophysiologic dysfunction: Reflux       Venous Clinical Severity Score  Pain:2=Daily, moderate activity limitation, occasional analgesics  Varicose Veins: 3=Extensive. Thigh and calf or GS and LS distribution  Venous Edema: 2=Afternoon edema, above ankle  Pigmentation: 2=Diffuse over most of gaiter distribution (lower 1/3) or recent pigmentation (purple)  Inflammation: 1=Mild cellulitis, limited to marginal area around ulcer  Induration: 2=Medial or lateral,less than lower third of leg  Number of Active Ulcers: 1=1  Active Ulceration, Duration: 1=<3 months  Active Ulcer Size: 1=<2 cm diameter  Compressive Therapy: 1=Intermittent use of stockings  Total Score: 16       III. ASSESSMENT & PLAN (MEDICAL DECISION MAKING)     1. Venous insufficiency    2. Hyperpigmentation of skin    3. Leg pain, bilateral    4. Varicose veins of bilateral lower extremities with pain    5. Edema, lower extremity        Assessment/Diagnosis and Plan:  The patient continues to have sequela of chronic venous insufficiency despite over 3 months of conservative therapy. The patient continues to have life altering  symptoms as well as a positive reflux study as noted in the history of present illness above. At this time I believe it would be reasonable to proceed with a right small saphenous vein endovenous ablation for symptomatic venous insufficiency.  Also believe would be reasonable to proceed with a right distal great saphenous vein non thermal ablation using Varithena non compound foam sclerosant 1%.  Untreated venous disease increases the patient's risk for cellulitis, deep vein thrombosis, chronic skin changes and venous ulceration.  Risk and benefits of the procedure were explained to the patient and the patient wishes to proceed. The patient does not have overly distended veins and denies history of DVT/PE and will not require prophylactic anticoagulation.  Orders Placed This Encounter    CV US Foam Sclerosant 1st Vein with Imaging        Matty Falcon, DO

## 2025-07-10 ENCOUNTER — OFFICE VISIT (OUTPATIENT)
Dept: VASCULAR SURGERY | Facility: CLINIC | Age: 65
End: 2025-07-10
Attending: FAMILY MEDICINE
Payer: MEDICARE

## 2025-07-10 VITALS
SYSTOLIC BLOOD PRESSURE: 175 MMHG | WEIGHT: 293 LBS | RESPIRATION RATE: 20 BRPM | HEIGHT: 68 IN | HEART RATE: 85 BPM | BODY MASS INDEX: 44.41 KG/M2 | DIASTOLIC BLOOD PRESSURE: 91 MMHG

## 2025-07-10 DIAGNOSIS — M79.604 LEG PAIN, BILATERAL: ICD-10-CM

## 2025-07-10 DIAGNOSIS — I87.2 VENOUS INSUFFICIENCY: Primary | ICD-10-CM

## 2025-07-10 DIAGNOSIS — R60.0 EDEMA, LOWER EXTREMITY: ICD-10-CM

## 2025-07-10 DIAGNOSIS — M79.605 LEG PAIN, BILATERAL: ICD-10-CM

## 2025-07-10 DIAGNOSIS — L81.9 HYPERPIGMENTATION OF SKIN: ICD-10-CM

## 2025-07-10 PROCEDURE — 3080F DIAST BP >= 90 MM HG: CPT | Mod: CPTII,,, | Performed by: FAMILY MEDICINE

## 2025-07-10 PROCEDURE — 99215 OFFICE O/P EST HI 40 MIN: CPT | Mod: PBBFAC | Performed by: FAMILY MEDICINE

## 2025-07-10 PROCEDURE — 1126F AMNT PAIN NOTED NONE PRSNT: CPT | Mod: CPTII,,, | Performed by: FAMILY MEDICINE

## 2025-07-10 PROCEDURE — 3077F SYST BP >= 140 MM HG: CPT | Mod: CPTII,,, | Performed by: FAMILY MEDICINE

## 2025-07-10 PROCEDURE — 99999 PR PBB SHADOW E&M-EST. PATIENT-LVL V: CPT | Mod: PBBFAC,,, | Performed by: FAMILY MEDICINE

## 2025-07-10 PROCEDURE — 99214 OFFICE O/P EST MOD 30 MIN: CPT | Mod: S$PBB,,, | Performed by: FAMILY MEDICINE

## 2025-07-10 PROCEDURE — 3008F BODY MASS INDEX DOCD: CPT | Mod: CPTII,,, | Performed by: FAMILY MEDICINE

## 2025-07-10 PROCEDURE — 1160F RVW MEDS BY RX/DR IN RCRD: CPT | Mod: CPTII,,, | Performed by: FAMILY MEDICINE

## 2025-07-10 PROCEDURE — 1159F MED LIST DOCD IN RCRD: CPT | Mod: CPTII,,, | Performed by: FAMILY MEDICINE

## 2025-07-10 NOTE — PROGRESS NOTES
VEIN CENTER CLINIC NOTE    Patient ID: Holly Porter is a 64 y.o. female.    I. HISTORY     Chief Complaint:   Chief Complaint   Patient presents with    Follow-up     Suture removal      History of Present Illness    CHIEF COMPLAINT:  Patient presents today for follow up after vein procedure    POST-PROCEDURE STATUS:  She reports LLE feels slightly lighter and less tight compared to prior to the procedure.  Denies any difficulties since her procedures.  Micro phlebectomy sites on the left look good and are soft to palpation.  No signs of active phlebitis or retained coagulum at this time.  She also presents today to have the sutures removed in 1 of the micro phlebectomy sites of her left lower extremity which has some continued bleeding on last visit.  No other complaints at this time.    ROS:  General: -fever, -chills, -fatigue, -weight gain, -weight loss  Eyes: -vision changes, -redness, -discharge  ENT: -ear pain, -nasal congestion, -sore throat  Cardiovascular: -chest pain, -palpitations, -lower extremity edema  Respiratory: -cough, -shortness of breath  Gastrointestinal: -abdominal pain, -nausea, -vomiting, -diarrhea, -constipation, -blood in stool  Genitourinary: -dysuria, -hematuria, -frequency  Musculoskeletal: -joint pain, -muscle pain  Skin: -rash, -lesion  Neurological: -headache, -dizziness, -numbness, -tingling  Psychiatric: -anxiety, -depression, -sleep difficulty         Clinical summary:   for a 5 day follow-up post non thermal ablation of the distal accessory thigh vein and thermal ablation of a left calf  vein.  Post ablation ultrasound performed today shows well ablated calf  and well ablated left distal anterior accessory thigh vein.  No signs of thrombus or complication.  The patient states that she did well over the weekend without complications.  She continues to her postoperative wrap.  No signs of bleeding or phlebitis.  I have encouraged her to wear her thigh-high  wrapped with a 2 week postoperative period to help prevent against complications.  She actually looks like she has less swelling today.    The patient has had numerous venous interventions previously, see her surgical history for details.    Bilateral complete venous reflux study performed 10/29/2024 shows no evidence of DVT bilaterally.  Study also shows well ablated bilateral great saphenous veins proximally, right anterior thigh accessory vein, and left small saphenous vein.  Study also shows dilation reflux of the distal right great saphenous vein, right small saphenous vein, and left anterior thigh accessory vein.  Large refluxing varicosities noted bilaterally.     Past Medical History:   Diagnosis Date    Acquired hypothyroidism     Atherosclerosis of native artery of extremity with intermittent claudication 01/30/2019    bilateral legs    BMI 50.0-59.9, adult 02/22/2021    Chronic pain syndrome     opioid depen    Congestive heart failure (CHF)     COPD (chronic obstructive pulmonary disease)     COVID-19 10/06/2023    Depression     Diabetes mellitus, type 2     followed by Aurea Kwong NP, [facility]    DVT of lower extremity, bilateral     Essential hypertension 06/08/2021    GERD (gastroesophageal reflux disease)     Gout 07/05/2023    Hyperlipidemia     Lumbosacral radiculopathy 06/05/2023    followed by GLENN Valdes, [facility]    Migraines     Nicotine dependence     Nicotine dependence     Obesity hypoventilation syndrome     chronic CO2 retainer with compensatory metabolic alkalosis    Osteoarthritis     Seizure disorder     Sleep apnea     on cpap    Thyroid cancer     Venous stasis ulcer limited to breakdown of skin with varicose veins     followed by Estuardo Zhao    Vitamin D deficiency         Past Surgical History:   Procedure Laterality Date    ABCESS DRAINAGE      HYSTERECTOMY      ILIAC ARTERY STENT Bilateral 01/28/2020    Bilateral distal aorta and common iliac 8 X  59 vbx covered stents performed by Dr. Antonio Jacinto.    LEFT HEART CATHETERIZATION Left 11/6/2023    Procedure: Left heart cath;  Surgeon: Alex Ortega DO;  Location: [facility] CATH LAB;  Service: Cardiology;  Laterality: Left;    RADIOFREQUENCY ABLATION Left 04/15/2022    Procedure: Left calf  Radiofrequency Ablation;  Surgeon: Matty Falcon DO;  Location: [facility] OR;  Service: Vascular;  Laterality: Left;    RADIOFREQUENCY ABLATION, ENDOVENOUS Left 6/20/2025    Procedure: Left ATAV RF Ablation;  Surgeon: Matty Falcon DO;  Location: [facility];  Service: Peripheral Vascular;  Laterality: Left;    RIGHT HEART CATHETERIZATION Right 2/13/2024    Procedure: INSERTION, CATHETER, RIGHT HEART;  Surgeon: Mahad Moreno MD;  Location: [facility] CATH LAB;  Service: Cardiology;  Laterality: Right;    STAB PHLEBECTOMY OF VARICOSE VEINS Left 01/25/2013    Left leg microphlebectomies x 23 stab avulsions and ligation of multiple varicose veins perrformed by Dr. Cirilo Aguirre.    STAB PHLEBECTOMY OF VARICOSE VEINS Left 6/20/2025    Procedure: Left Leg Microphlebectomies >20;  Surgeon: Matty Falcon DO;  Location: [facility];  Service: Peripheral Vascular;  Laterality: Left;    THYROIDECTOMY      hx: thyroid cancer    TOE AMPUTATION Left 01/28/2020    2nd, 4th and 5th performed by Dr. Anam Saeed.    TOE AMPUTATION Right 01/28/2020    4th toe performed by Dr. Anam Saeed.    TOTAL ABDOMINAL HYSTERECTOMY W/ BILATERAL SALPINGOOPHORECTOMY      TUBAL LIGATION      VAGINAL DELIVERY      x 4    VENOUS ABLATION Right 08/09/2019    GSV Varithena Ablation performed by Dr. Estuardo Falcon    VENOUS ABLATION Left 08/02/2019    ATAV Varithena Ablation performed by Dr. Estuardo Falcon.    VENOUS ABLATION Right 07/13/2015    ATAV Laser Ablatio performed by Dr. Cirilo Aguirre.    VENOUS ABLATION Right 07/06/2015    Distal  GSV Laser Ablation performed by dr. Cirilo Aguirre.    VENOUS ABLATION Left 04/20/2015    Left  Distal GSV Laser Ablation performed by dr. Cirilo Aguirre.    VENOUS ABLATION Right 10/28/2013    Right Distal GSV RF Ablation performed by Dr. Cirilo Aguirre.    VENOUS ABLATION Left 10/25/2013    SSV RF Ablation performed by dr. Cirilo Aguirre.    VENOUS ABLATION Left 10/07/2013    Left GSV and Left ATAV RF Ablation performed by Dr. Cirilo Aguirre.    VENOUS ABLATION Right 01/21/2013    GSV RF Ablation w/micros x 22 performed by Dr. Cirilo Aguirre.    VENOUS ABLATION Left 06/25/2021    Left distal GSV Varithena Ablation       Social History     Tobacco Use   Smoking Status Every Day    Current packs/day: 1.00    Average packs/day: 1.6 packs/day for 36.5 years (57.8 ttl pk-yrs)    Types: Cigarettes    Start date: 1989    Passive exposure: Current   Smokeless Tobacco Never         Current Outpatient Medications:     [facility], , Disp: , Rfl:     ACCU-CHEK GUIDE TEST STRIPS [facility], , Disp: , Rfl:     ACCU-CHEK SOFTCLIX LANCETS Misc, , Disp: , Rfl:     albuterol (PROVENTIL/VENTOLIN HFA) 90 mcg/actuation inhaler, 2 puffs every 6 (six) hours as needed., Disp: , Rfl:     allopurinoL (ZYLOPRIM) 300 MG tablet, Take 1 tablet (300 mg total) by mouth once daily., Disp: 90 tablet, Rfl: 3    amitriptyline (ELAVIL) 25 MG tablet, Take 1 tablet (25 mg total) by mouth nightly as needed for Insomnia., Disp: 30 tablet, Rfl: 5    apixaban (ELIQUIS) 5 mg Tab, Take 1 tablet (5 mg total) by mouth 2 (two) times daily., Disp: 60 tablet, Rfl: 3    aspirin (ECOTRIN) 81 MG EC tablet, TAKE 1 TABLET BY MOUTH EVERY DAY, Disp: 90 tablet, Rfl: 1    atorvastatin (LIPITOR) 10 MG tablet, Take 1 tablet (10 mg total) by mouth once daily., Disp: 90 tablet, Rfl: 1    benzonatate (TESSALON) 200 MG capsule, Take 200 mg by mouth 3 (three) times daily., Disp: , Rfl:     BREZTRI AEROSPHERE 160-9-4.8 mcg/actuation HFAA, Take 2 puffs by mouth 2 (two) times daily., Disp: , Rfl:     bumetanide (BUMEX) 2 MG tablet, Take 1 tablet (2 mg total) by mouth 2 (two) times  "daily., Disp: 60 tablet, Rfl: 11    carvediloL (COREG) 12.5 MG tablet, Take 0.5 tablets (6.25 mg total) by mouth 2 (two) times daily., Disp: 30 tablet, Rfl: 11    cilostazoL (PLETAL) 100 MG Tab, Take 1 tablet (100 mg total) by mouth 2 (two) times daily., Disp: 90 tablet, Rfl: 1    colchicine (COLCRYS) 0.6 mg tablet, Take 1 tablet (0.6 mg total) by mouth once daily., Disp: 30 tablet, Rfl: 2    diclofenac sodium (VOLTAREN ARTHRITIS PAIN) 1 % Gel, Apply 2 g topically once daily., Disp: 1 g, Rfl: 3    DULoxetine (CYMBALTA) 30 MG capsule, Take 30 mg by mouth once daily., Disp: , Rfl:     HYDROcodone-acetaminophen (NORCO)  mg per tablet, Take 1 tablet by mouth every 6 (six) hours., Disp: 120 tablet, Rfl: 0    HYDROcodone-acetaminophen (NORCO)  mg per tablet, Take 1 tablet by mouth every 6 (six) hours., Disp: 120 tablet, Rfl: 0    insulin detemir U-100, Levemir, (LEVEMIR FLEXTOUCH U100 INSULIN) 100 unit/mL (3 mL) InPn pen, Inject 10 units into the skin every evening subcutaneous, Disp: 15 mL, Rfl: 1    JANUVIA 50 mg Tab, Take 50 mg by mouth every evening., Disp: , Rfl:     levothyroxine (SYNTHROID) 150 MCG tablet, TAKE 1 TABLET EVERY MORNING 30 MINUTES PRIOR TO BREAKFAST MEAL FOR THYROID, Disp: 90 tablet, Rfl: 1    naloxone (NARCAN) 4 mg/actuation Spry, 1 spray once., Disp: , Rfl:     NIFEdipine (ADALAT CC) 60 MG TbSR, Take 60 mg by mouth., Disp: , Rfl:     pantoprazole (PROTONIX) 40 MG tablet, Take 1 tablet (40 mg total) by mouth once daily., Disp: 90 tablet, Rfl: 1    pen needle, diabetic 32 gauge x 5/32" Ndle, Use to inject insulin once daily, Disp: 100 each, Rfl: 3    potassium chloride SA (K-DUR,KLOR-CON) 20 MEQ tablet, Take 1 tablet (20 mEq total) by mouth once daily., Disp: 90 tablet, Rfl: 1    QUEtiapine (SEROQUEL) 25 MG Tab, Take 1 tablet (25 mg total) by mouth once daily., Disp: 30 tablet, Rfl: 1    tadalafil (ADCIRCA) 20 mg Tab, Take 2 tablets (40 mg total) by mouth once daily., Disp: 60 tablet, Rfl: " 11    topiramate (TOPAMAX) 100 MG tablet, Take 1 tablet (100 mg total) by mouth 2 (two) times daily., Disp: 60 tablet, Rfl: 11    triamcinolone acetonide 0.1% (KENALOG) 0.1 % ointment, SMARTSIG:sparingly Topical Twice Daily, Disp: , Rfl:     varenicline (CHANTIX CONTINUING MONTH BOX) 1 mg Tab, Take 1 tablet (1 mg total) by mouth 2 (two) times daily., Disp: 30 tablet, Rfl: 3      II. PHYSICAL EXAM     Physical Exam  Constitutional:       General: She is awake. She is not in acute distress.     Appearance: Normal appearance. She is obese. She is not ill-appearing or toxic-appearing.   HENT:      Head: Normocephalic and atraumatic.   Eyes:      Extraocular Movements: Extraocular movements intact.      Conjunctiva/sclera: Conjunctivae normal.      Pupils: Pupils are equal, round, and reactive to light.   Neck:      Vascular: No carotid bruit or JVD.   Cardiovascular:      Rate and Rhythm: Normal rate and regular rhythm.      Pulses:           Dorsalis pedis pulses are detected w/ Doppler on the left side.        Posterior tibial pulses are detected w/ Doppler on the left side.      Heart sounds: No murmur heard.  Pulmonary:      Effort: Pulmonary effort is normal. No respiratory distress.      Breath sounds: No stridor. No wheezing, rhonchi or rales.   Musculoskeletal:         General: No swelling, tenderness or deformity.      Right lower le+ Edema present.      Left lower le+ Edema present.      Comments: Scattered hyperpigmentation and varicose veins of the bilateral Gator regions.   Feet:      Comments: Monophasic pulses of the left foot with hand-held Doppler of the DPs and PTs.  Skin:     General: Skin is warm.      Capillary Refill: Capillary refill takes less than 2 seconds.      Coloration: Skin is not ashen.      Findings: No bruising, erythema, lesion, rash or wound.   Neurological:      Mental Status: She is alert and oriented to person, place, and time.      Motor: No weakness.   Psychiatric:          Speech: Speech normal.         Behavior: Behavior normal. Behavior is cooperative.                                     Reticular/Spider veins noted:  RLE: anterior calf, medial calf, ankle and foot  LLE: anterior calf, medial calf, ankle and foot    Varicose veins noted:  RLE: anterior calf, medial calf, medial thigh, ankle and foot  LLE:  anterior calf, medial calf, medial thigh, ankle and foot    CEAP Classification  Clinical Signs: Class 5 - Skin changes as defined above with healed ulceration  Etiologic Classification: Primary  Anatomic distribution: Superficial  Pathophysiologic dysfunction: Reflux       Venous Clinical Severity Score  Pain:2=Daily, moderate activity limitation, occasional analgesics  Varicose Veins: 3=Extensive. Thigh and calf or GS and LS distribution  Venous Edema: 2=Afternoon edema, above ankle  Pigmentation: 2=Diffuse over most of gaiter distribution (lower 1/3) or recent pigmentation (purple)  Inflammation: 1=Mild cellulitis, limited to marginal area around ulcer  Induration: 2=Medial or lateral,less than lower third of leg  Number of Active Ulcers: 1=1  Active Ulceration, Duration: 1=<3 months  Active Ulcer Size: 1=<2 cm diameter  Compressive Therapy: 1=Intermittent use of stockings  Total Score: 16       III. ASSESSMENT & PLAN (MEDICAL DECISION MAKING)     1. Venous insufficiency    2. Hyperpigmentation of skin    3. Leg pain, bilateral    4. Edema, lower extremity        Assessment/Diagnosis and Plan:  The patient continues to have sequela of chronic venous insufficiency despite over 3 months of conservative therapy. The patient continues to have life altering symptoms as well as a positive reflux study as noted in the history of present illness above. At this time I believe it would be reasonable to proceed with a right small saphenous vein endovenous ablation for symptomatic venous insufficiency.  Also believe would be reasonable to proceed with a right distal great saphenous vein  non thermal ablation using Varithena non compound foam sclerosant 1%.  Untreated venous disease increases the patient's risk for cellulitis, deep vein thrombosis, chronic skin changes and venous ulceration.  Risk and benefits of the procedure were explained to the patient and the patient wishes to proceed. The patient does not have overly distended veins and denies history of DVT/PE and will not require prophylactic anticoagulation.    Also today sutures were removed from the left lower medial leg.  Surgical glue placed over suture site along with Steri-Strips.  The patient was also placed in a Coban 2 Lite wrap to alleviate pressure in the lower leg.  The patient may remove this wrap within the next couple of days and returned to her daily compression socks.  Follow-up with next procedures.          Matty Falcon, DO

## 2025-07-15 ENCOUNTER — HOSPITAL ENCOUNTER (OUTPATIENT)
Dept: RADIOLOGY | Facility: HOSPITAL | Age: 65
Discharge: HOME | End: 2025-07-15
Attending: STUDENT IN AN ORGANIZED HEALTH CARE EDUCATION/TRAINING PROGRAM
Payer: MEDICARE

## 2025-07-15 ENCOUNTER — OFFICE VISIT (OUTPATIENT)
Dept: PAIN MEDICINE | Facility: CLINIC | Age: 65
End: 2025-07-15
Payer: MEDICARE

## 2025-07-15 VITALS — HEIGHT: 68 IN | BODY MASS INDEX: 44.41 KG/M2 | WEIGHT: 293 LBS

## 2025-07-15 VITALS
DIASTOLIC BLOOD PRESSURE: 64 MMHG | WEIGHT: 293 LBS | HEART RATE: 100 BPM | RESPIRATION RATE: 18 BRPM | SYSTOLIC BLOOD PRESSURE: 156 MMHG | BODY MASS INDEX: 44.41 KG/M2 | HEIGHT: 68 IN

## 2025-07-15 DIAGNOSIS — M25.561 CHRONIC PAIN OF RIGHT KNEE: Chronic | ICD-10-CM

## 2025-07-15 DIAGNOSIS — G89.29 CHRONIC PAIN OF RIGHT KNEE: Chronic | ICD-10-CM

## 2025-07-15 DIAGNOSIS — M47.817 LUMBOSACRAL SPONDYLOSIS WITHOUT MYELOPATHY: Primary | Chronic | ICD-10-CM

## 2025-07-15 DIAGNOSIS — I73.9 PVD (PERIPHERAL VASCULAR DISEASE): Chronic | ICD-10-CM

## 2025-07-15 DIAGNOSIS — Z79.899 ENCOUNTER FOR LONG-TERM (CURRENT) USE OF MEDICATIONS: ICD-10-CM

## 2025-07-15 DIAGNOSIS — F17.210 SMOKING GREATER THAN 40 PACK YEARS: ICD-10-CM

## 2025-07-15 PROCEDURE — 99999PBSHW POCT URINE DRUG SCREEN PRESUMP: Mod: PBBFAC,,,

## 2025-07-15 PROCEDURE — 99214 OFFICE O/P EST MOD 30 MIN: CPT | Mod: S$PBB,,, | Performed by: PHYSICIAN ASSISTANT

## 2025-07-15 PROCEDURE — 80305 DRUG TEST PRSMV DIR OPT OBS: CPT | Mod: PBBFAC | Performed by: PHYSICIAN ASSISTANT

## 2025-07-15 PROCEDURE — 3078F DIAST BP <80 MM HG: CPT | Mod: CPTII,,, | Performed by: PHYSICIAN ASSISTANT

## 2025-07-15 PROCEDURE — 1159F MED LIST DOCD IN RCRD: CPT | Mod: CPTII,,, | Performed by: PHYSICIAN ASSISTANT

## 2025-07-15 PROCEDURE — 71271 CT THORAX LUNG CANCER SCR C-: CPT | Mod: 26,,, | Performed by: STUDENT IN AN ORGANIZED HEALTH CARE EDUCATION/TRAINING PROGRAM

## 2025-07-15 PROCEDURE — 71271 CT THORAX LUNG CANCER SCR C-: CPT | Mod: TC

## 2025-07-15 PROCEDURE — 1101F PT FALLS ASSESS-DOCD LE1/YR: CPT | Mod: CPTII,,, | Performed by: PHYSICIAN ASSISTANT

## 2025-07-15 PROCEDURE — 3077F SYST BP >= 140 MM HG: CPT | Mod: CPTII,,, | Performed by: PHYSICIAN ASSISTANT

## 2025-07-15 PROCEDURE — 1125F AMNT PAIN NOTED PAIN PRSNT: CPT | Mod: CPTII,,, | Performed by: PHYSICIAN ASSISTANT

## 2025-07-15 PROCEDURE — 3288F FALL RISK ASSESSMENT DOCD: CPT | Mod: CPTII,,, | Performed by: PHYSICIAN ASSISTANT

## 2025-07-15 PROCEDURE — 3008F BODY MASS INDEX DOCD: CPT | Mod: CPTII,,, | Performed by: PHYSICIAN ASSISTANT

## 2025-07-15 PROCEDURE — 99999 PR PBB SHADOW E&M-EST. PATIENT-LVL V: CPT | Mod: PBBFAC,,, | Performed by: PHYSICIAN ASSISTANT

## 2025-07-15 PROCEDURE — 99215 OFFICE O/P EST HI 40 MIN: CPT | Mod: PBBFAC,25 | Performed by: PHYSICIAN ASSISTANT

## 2025-07-15 RX ORDER — HYDROCODONE BITARTRATE AND ACETAMINOPHEN 10; 325 MG/1; MG/1
1 TABLET ORAL EVERY 6 HOURS
Qty: 120 TABLET | Refills: 0 | Status: SHIPPED | OUTPATIENT
Start: 2025-09-16

## 2025-07-15 RX ORDER — BUPROPION HYDROCHLORIDE 150 MG/1
150 TABLET, EXTENDED RELEASE ORAL DAILY
COMMUNITY
Start: 2025-07-03

## 2025-07-15 RX ORDER — HYDROCODONE BITARTRATE AND ACETAMINOPHEN 10; 325 MG/1; MG/1
1 TABLET ORAL EVERY 6 HOURS
Qty: 120 TABLET | Refills: 0 | Status: SHIPPED | OUTPATIENT
Start: 2025-07-18

## 2025-07-15 RX ORDER — HYDROCODONE BITARTRATE AND ACETAMINOPHEN 10; 325 MG/1; MG/1
1 TABLET ORAL EVERY 6 HOURS
Qty: 120 TABLET | Refills: 0 | Status: SHIPPED | OUTPATIENT
Start: 2025-08-18

## 2025-08-04 PROBLEM — R10.9 ABDOMINAL PAIN: Status: RESOLVED | Noted: 2024-08-28 | Resolved: 2025-08-04

## 2025-08-12 ENCOUNTER — HOSPITAL ENCOUNTER (OUTPATIENT)
Dept: RADIOLOGY | Facility: HOSPITAL | Age: 65
Discharge: HOME | End: 2025-08-12
Attending: INTERNAL MEDICINE
Payer: MEDICARE

## 2025-08-12 DIAGNOSIS — R13.10 DYSPHAGIA, UNSPECIFIED TYPE: ICD-10-CM

## 2025-08-12 PROCEDURE — 76536 US EXAM OF HEAD AND NECK: CPT | Mod: TC

## 2025-08-12 PROCEDURE — 76536 US EXAM OF HEAD AND NECK: CPT | Mod: 26,,, | Performed by: RADIOLOGY

## 2025-08-18 ENCOUNTER — TELEPHONE (OUTPATIENT)
Dept: PULMONOLOGY | Facility: CLINIC | Age: 65
End: 2025-08-18
Payer: MEDICARE

## (undated) DEVICE — SET IV PRIMARY

## (undated) DEVICE — SPONGE GAUZE 4X4 12 PLY STL AMD 10/TRAY

## (undated) DEVICE — COVER SNAP KAPS 26INCH DEPTH

## (undated) DEVICE — TOWEL OR DISP STRL BLUE 4/PK

## (undated) DEVICE — SUTURE ETHILON 3-0 18 BLK PS2

## (undated) DEVICE — Device

## (undated) DEVICE — SHEATH INTRODUCER 7FR 11CM

## (undated) DEVICE — BANDAGE KERLIX AMD  4.5IN  SPONGE ROLL

## (undated) DEVICE — DRAPE ANGIO BRACH 38X44IN

## (undated) DEVICE — CATH DIAG IMPULSE 6FR FR4

## (undated) DEVICE — BNDG COFLEX FOAM LF2 ST 6X5YD

## (undated) DEVICE — GLOVE SURGICAL PROTEXIS PI SIZE 6

## (undated) DEVICE — OXISENSOR ADULT DIGIT N/S

## (undated) DEVICE — BANDAGE MATRIX HK LOOP 6IN 5YD

## (undated) DEVICE — WRAP COBAN SELF ADH 6 INX5YD STRETCHED STERILE

## (undated) DEVICE — GOWN SURGICAL SMARTGOWN LEVEL 4 / EXTRA LARGE STERILE

## (undated) DEVICE — SHEATH INTRODUCER 5FR 10CM

## (undated) DEVICE — APPLICATOR CHLORAPREP ORN 26ML

## (undated) DEVICE — COVER LIGHT HANDLE FLEX PK/2

## (undated) DEVICE — SHEATH INTRO 5FR 0.035IN 55CM

## (undated) DEVICE — GLOVE SENSICARE PI SLT 6.5

## (undated) DEVICE — SPONGE COTTON TRAY 4X4IN

## (undated) DEVICE — KIT ACCESS PROCEDURE PACK 7CM

## (undated) DEVICE — CATH DIAG IMPULSE 6FR MPA1

## (undated) DEVICE — GLOVE PROTEXIS PI CRM 5.5

## (undated) DEVICE — GLOVE SURGICAL PROTEXIS PI BLUE SIZE 6.0

## (undated) DEVICE — CATH DIAG IMPULSE 6FR FL4

## (undated) DEVICE — CATH IV INTROCAN 22G X 1

## (undated) DEVICE — CHLORAPREP 10.5 ML APPLICATOR

## (undated) DEVICE — DECANTER FLUID TRNSF WHITE 9IN

## (undated) DEVICE — GLOVE SENSICARE PI SURG 7.5

## (undated) DEVICE — GLOVE SURGICAL PROTEXIS PI BLUE SIZE 6.5

## (undated) DEVICE — APPLICATOR CHLORAPREP HI-LITE TINTED ORANGE 26ML

## (undated) DEVICE — NEEDLE ECLIPSE BLUNTFIL 18GX1.5 SAFETY

## (undated) DEVICE — DRESSING TRANS 4X4 TEGADERM

## (undated) DEVICE — KIT IV START

## (undated) DEVICE — CLIPPER BLADE MOD 4406 (CAREF)

## (undated) DEVICE — SUT ETHILON 3-0 PS2 18 BLK

## (undated) DEVICE — BANDAGE ELASTIC 6IN X 5.8YD VELCRO LATEX FREE STERILE

## (undated) DEVICE — GLOVE SURGICAL PROTEXIS PI CLASSIC SIZE 7.5

## (undated) DEVICE — VARITHENA UNIVERSAL ADMIN PACK

## (undated) DEVICE — CUFF FLEXIPORT BP LONG ADULT

## (undated) DEVICE — SET EXTENSION CLEARLINK 2INJ

## (undated) DEVICE — SOL CONTINU-FLO SET 2 LAV

## (undated) DEVICE — ETCO2 NC MICROSTR FEM ST ADLT

## (undated) DEVICE — PROTECTOR ULNAR NERVE FOAM

## (undated) DEVICE — INTRODUCER KIT MICRO 4FR

## (undated) DEVICE — SET EXT STD BORE CATH 7.6IN

## (undated) DEVICE — GUIDEWIRE INQWIRE SE 3MM JTIP

## (undated) DEVICE — GOWN LARGE NON REINFORCED

## (undated) DEVICE — GLOVE PROTEXIS PI CRM 8

## (undated) DEVICE — LEADWIRE DL DC EKG 10 PIN

## (undated) DEVICE — GLOVE SURGICAL PROTEXIS PI BLUE SIZE 7.0

## (undated) DEVICE — COVER SNAP KAP 26IN

## (undated) DEVICE — KIT IV START 849

## (undated) DEVICE — GLOVE BIOGEL SKINSENSE PI 7.5

## (undated) DEVICE — BANDAGE GAUZE COT STRL 4.5X4.1

## (undated) DEVICE — GUIDEWIRE ANGLED .035 150CM

## (undated) DEVICE — CONTRAST ISOVUE 370 100ML

## (undated) DEVICE — KIT MICROINTRODUCER 5F

## (undated) DEVICE — SHEATH INTRODUCER 6FR 11CM

## (undated) DEVICE — BNDG COFLEX FOAM LF2 ST 4X5YD

## (undated) DEVICE — WRAP COBAN SELF ADHESIVE 4INX5 YD (STERILE)

## (undated) DEVICE — CATH VENCLOSE FR ABLATION 60CM

## (undated) DEVICE — CATH IV JELCO 22GX1 IN

## (undated) DEVICE — CATH SWAN GANZ STND 7FR

## (undated) DEVICE — SPONGE GZ WOVEN 12-PLY 4X4IN